# Patient Record
Sex: FEMALE | Race: WHITE | NOT HISPANIC OR LATINO | Employment: OTHER | URBAN - NONMETROPOLITAN AREA
[De-identification: names, ages, dates, MRNs, and addresses within clinical notes are randomized per-mention and may not be internally consistent; named-entity substitution may affect disease eponyms.]

---

## 2017-01-03 ENCOUNTER — TELEPHONE (OUTPATIENT)
Dept: FAMILY MEDICINE CLINIC | Facility: CLINIC | Age: 72
End: 2017-01-03

## 2017-01-03 ENCOUNTER — ANTICOAGULATION VISIT (OUTPATIENT)
Dept: CARDIOLOGY | Facility: CLINIC | Age: 72
End: 2017-01-03

## 2017-01-03 VITALS — SYSTOLIC BLOOD PRESSURE: 126 MMHG | DIASTOLIC BLOOD PRESSURE: 70 MMHG

## 2017-01-03 DIAGNOSIS — I48.91 ATRIAL FIBRILLATION, UNSPECIFIED TYPE (HCC): ICD-10-CM

## 2017-01-03 DIAGNOSIS — Z95.2 PERSONAL HISTORY OF HEART VALVE REPLACEMENT: ICD-10-CM

## 2017-01-03 DIAGNOSIS — Z79.01 LONG-TERM (CURRENT) USE OF ANTICOAGULANTS: ICD-10-CM

## 2017-01-03 LAB — INR PPP: 2.4 (ref 0.9–1.1)

## 2017-01-03 PROCEDURE — 99211 OFF/OP EST MAY X REQ PHY/QHP: CPT | Performed by: NURSE PRACTITIONER

## 2017-01-03 PROCEDURE — 36416 COLLJ CAPILLARY BLOOD SPEC: CPT | Performed by: NURSE PRACTITIONER

## 2017-01-03 PROCEDURE — 85610 PROTHROMBIN TIME: CPT | Performed by: NURSE PRACTITIONER

## 2017-01-03 NOTE — TELEPHONE ENCOUNTER
----- Message from Salome Mireles sent at 1/3/2017 10:36 AM CST -----  Regarding: Blood pressure  Contact: [phone]  Patients blood pressure has gone down. The coumadin clinic checked it and it was 136/70.

## 2017-01-03 NOTE — PROGRESS NOTES
PT states compliant c tx. PT denies any new medications or bleeding issues. PT denies any missed doses or excessive k. Adjusted pt's dose and instructed to hold green vegs for 2 days. PT will be seen next week. Patient instructed regarding medication; results given and questions answered. Nutritional counseling given.  Dietary factors affecting therapy addressed.  Patient instructed to monitor for excessive bruising or bleeding. PT verbalizes understanding.   Electronically signed by ABRAHAM Morrow    '

## 2017-01-03 NOTE — MR AVS SNAPSHOT
Brendon Skelton   1/3/2017   Anticoagulation Visit    Dept Phone:  [phone]   Encounter #:  [id number]    Provider:  Maida Pearce RN   Department:  [facility] CARDIOLOGY                Your Full Care Plan              Your Updated Medication List          This list is accurate as of: 1/3/17 10:24 AM.  Always use your most recent med list.                acetaminophen 325 MG tablet   Commonly known as:  TYLENOL       * albuterol (2.5 MG/3ML) 0.083% nebulizer solution   Commonly known as:  PROVENTIL       * albuterol 108 (90 BASE) MCG/ACT inhaler   Commonly known as:  PROVENTIL HFA;VENTOLIN HFA       aspirin 81 MG chewable tablet       BD PEN NEEDLE DEREK U/F 32G X 4 MM misc   Generic drug:  Insulin Pen Needle       calcitriol 0.25 MCG capsule   Commonly known as:  ROCALTROL       citalopram 20 MG tablet   Commonly known as:  CeleXA   Take 1 tablet by mouth Daily.       diltiaZEM  MG 24 hr capsule   Commonly known as:  CARDIZEM CD       ferrous sulfate 325 (65 FE) MG tablet       furosemide 80 MG tablet   Commonly known as:  LASIX       glucose blood test strip       insulin aspart 100 UNIT/ML injection   Commonly known as:  novoLOG       insulin glargine 100 UNIT/ML injection   Commonly known as:  LANTUS       ipratropium 17 MCG/ACT inhaler   Commonly known as:  ATROVENT HFA   Inhale 2 puffs 4 (Four) Times a Day.       losartan 100 MG tablet   Commonly known as:  COZAAR       metOLazone 2.5 MG tablet   Commonly known as:  ZAROXOLYN       MULTIVITAMIN/IRON PO       potassium chloride 10 MEQ CR tablet   Commonly known as:  K-DUR   Take 1 tablet by mouth Daily.       Prenatal Vitamin 27-0.8 MG tablet       raNITIdine 150 MG tablet   Commonly known as:  ZANTAC       tiotropium 18 MCG per inhalation capsule   Commonly known as:  SPIRIVA HANDIHALER   Place 2 puffs into inhaler and inhale Daily.       traZODone 150 MG tablet   Commonly known as:  DESYREL   Take  2 tablets by mouth Every Night.       vitamin C with bob hips 250 MG tablet       warfarin 5 MG tablet   Commonly known as:  COUMADIN       ZETIA 10 MG tablet   Generic drug:  ezetimibe       * Notice:  This list has 2 medication(s) that are the same as other medications prescribed for you. Read the directions carefully, and ask your doctor or other care provider to review them with you.            We Performed the Following     POC INR       You Were Diagnosed With        Codes Comments    Long-term (current) use of anticoagulants     ICD-10-CM: Z79.01  ICD-9-CM: V58.61     Personal history of heart valve replacement     ICD-10-CM: Z95.2  ICD-9-CM: V43.3     Atrial fibrillation, unspecified type     ICD-10-CM: I48.91  ICD-9-CM: 427.31       Instructions     None    Patient Instructions History      Anticoagulation Summary as of 1/3/2017     INR goal 2.5-3.5   Today's INR 2.40!   Next INR check 1/11/2017    Indications   Long-term (current) use of anticoagulants [Z79.01]  Personal history of heart valve replacement [Z95.2]  Atrial fibrillation [I48.91] [I48.91]         January 2017 Details    Sun Mon Tue Wed Thu Fri Sat     1               2               3      5 mg   See details      4      2.5 mg         5      5 mg         6      2.5 mg         7      2.5 mg           8      5 mg         9      2.5 mg         10      5 mg         11      2.5 mg         12               13               14                 15               16               17               18               19               20               21                 22               23               24               25               26               27               28                 29               30               31                    Date Details   01/03 This INR check       Date of next INR:  1/11/2017           Upcoming Appointments     Visit Type Date Time Department    ANTICOAGULATION 1/3/2017 10:30 AM [facility] CARDIOLOGY [facility]    ANTICOAGULATION  1/11/2017 10:15 AM [facility] CARDIOLOGY [facility]AD    OFFICE VISIT 3/27/2017  1:15 PM [facility] FAM MED 2 MAD    OFFICE VISIT 10/18/2017  1:30 PM [facility] OPHTHALMOLOGY [facility]      MyChart Signup     Our records [facility] that [facility]. If you would like to sign up for MyChart, please email [email or web address] or call [phone] to obtain an activation code.             Other Info from Your Visit           Your Appointments     Jan 03, 2017 10:30 AM CST   Anticoagulation with COUMADIN CLINIC CARD [facility] CARDIOLOGY (--)    [address]  [address] [place] [address] [phone]            Jan 11, 2017 10:15 AM CST   Anticoagulation with COUMADIN CLINIC CARD [facility] CARDIOLOGY (--)    [address] [place] [address] [address]  [address] [place] [address] [phone]            Mar 27, 2017  1:15 PM CDT   Office Visit with Rula Paulino DO   [facility] FAMILY MEDICINE (--)    [address] [facility]  [address] [place] [address] [phone]           Arrive 15 minutes prior to appointment.            Oct 18, 2017  1:30 PM CDT   Office Visit with Ramses West MD   [facility] OPHTHALMOLOGY (--)    [address] [place] [address] [address]  [address] [place] [address] [phone]           Arrive 15 minutes prior to appointment.              Allergies     Crestor [Rosuvastatin Calcium]      Lipitor [Atorvastatin]      Lortab [Hydrocodone-acetaminophen]  Hallucinations      Vital Signs     Blood Pressure Smoking Status                126/70 Former Smoker          Results     POC INR      Component Value Standard Range & Units    INR 2.40 0.9 - 1.1

## 2017-01-11 ENCOUNTER — ANTICOAGULATION VISIT (OUTPATIENT)
Dept: CARDIOLOGY | Facility: CLINIC | Age: 72
End: 2017-01-11

## 2017-01-11 VITALS — DIASTOLIC BLOOD PRESSURE: 56 MMHG | HEART RATE: 60 BPM | SYSTOLIC BLOOD PRESSURE: 135 MMHG

## 2017-01-11 DIAGNOSIS — Z95.2 PERSONAL HISTORY OF HEART VALVE REPLACEMENT: ICD-10-CM

## 2017-01-11 DIAGNOSIS — Z79.01 LONG-TERM (CURRENT) USE OF ANTICOAGULANTS: ICD-10-CM

## 2017-01-11 DIAGNOSIS — I48.91 ATRIAL FIBRILLATION, UNSPECIFIED TYPE (HCC): ICD-10-CM

## 2017-01-11 LAB — INR PPP: 2.2 (ref 0.9–1.1)

## 2017-01-11 PROCEDURE — 36416 COLLJ CAPILLARY BLOOD SPEC: CPT | Performed by: NURSE PRACTITIONER

## 2017-01-11 PROCEDURE — 99211 OFF/OP EST MAY X REQ PHY/QHP: CPT | Performed by: NURSE PRACTITIONER

## 2017-01-11 PROCEDURE — 85610 PROTHROMBIN TIME: CPT | Performed by: NURSE PRACTITIONER

## 2017-01-11 NOTE — PROGRESS NOTES
Pt denies med changes, bleeding problems, missed doses, excessive vit K intake, or s/s of blood clot. Pt instructed to avoid green veggies for 2 days; pt verbalized an understanding. Patient instructed regarding medication; results given and questions answered. Nutritional counseling given.  Dietary factors affecting therapy addressed.  Patient instructed to monitor for excessive bruising or bleeding. Will recheck next week.Electronically signed by ABRAHAM Nieto

## 2017-01-11 NOTE — MR AVS SNAPSHOT
Brendon Skelton   1/11/2017   Anticoagulation Visit    Dept Phone:  [phone]   Encounter #:  [id number]    Provider:  Dixie Bermeo RN   Department:  [facility] CARDIOLOGY                Your Full Care Plan              Your Updated Medication List          This list is accurate as of: 1/11/17 10:12 AM.  Always use your most recent med list.                acetaminophen 325 MG tablet   Commonly known as:  TYLENOL       * albuterol (2.5 MG/3ML) 0.083% nebulizer solution   Commonly known as:  PROVENTIL       * albuterol 108 (90 BASE) MCG/ACT inhaler   Commonly known as:  PROVENTIL HFA;VENTOLIN HFA       aspirin 81 MG chewable tablet       BD PEN NEEDLE DEREK U/F 32G X 4 MM misc   Generic drug:  Insulin Pen Needle       calcitriol 0.25 MCG capsule   Commonly known as:  ROCALTROL       citalopram 20 MG tablet   Commonly known as:  CeleXA   Take 1 tablet by mouth Daily.       diltiaZEM  MG 24 hr capsule   Commonly known as:  CARDIZEM CD       ferrous sulfate 325 (65 FE) MG tablet       furosemide 80 MG tablet   Commonly known as:  LASIX       glucose blood test strip       insulin aspart 100 UNIT/ML injection   Commonly known as:  novoLOG       insulin glargine 100 UNIT/ML injection   Commonly known as:  LANTUS       ipratropium 17 MCG/ACT inhaler   Commonly known as:  ATROVENT HFA   Inhale 2 puffs 4 (Four) Times a Day.       losartan 100 MG tablet   Commonly known as:  COZAAR       metOLazone 2.5 MG tablet   Commonly known as:  ZAROXOLYN       MULTIVITAMIN/IRON PO       potassium chloride 10 MEQ CR tablet   Commonly known as:  K-DUR   Take 1 tablet by mouth Daily.       Prenatal Vitamin 27-0.8 MG tablet       raNITIdine 150 MG tablet   Commonly known as:  ZANTAC       tiotropium 18 MCG per inhalation capsule   Commonly known as:  SPIRIVA HANDIHALER   Place 2 puffs into inhaler and inhale Daily.       traZODone 150 MG tablet   Commonly known as:  DESYREL      Take 2 tablets by mouth Every Night.       vitamin C with bob hips 250 MG tablet       warfarin 5 MG tablet   Commonly known as:  COUMADIN       ZETIA 10 MG tablet   Generic drug:  ezetimibe       * Notice:  This list has 2 medication(s) that are the same as other medications prescribed for you. Read the directions carefully, and ask your doctor or other care provider to review them with you.            We Performed the Following     POC INR       You Were Diagnosed With        Codes Comments    Long-term (current) use of anticoagulants     ICD-10-CM: Z79.01  ICD-9-CM: V58.61     Personal history of heart valve replacement     ICD-10-CM: Z95.2  ICD-9-CM: V43.3     Atrial fibrillation, unspecified type     ICD-10-CM: I48.91  ICD-9-CM: 427.31       Instructions     None    Patient Instructions History      Anticoagulation Summary as of 1/11/2017     INR goal 2.5-3.5   Today's INR 2.20!   Next INR check 1/19/2017    Indications   Long-term (current) use of anticoagulants [Z79.01]  Personal history of heart valve replacement [Z95.2]  Atrial fibrillation [I48.91] [I48.91]         January 2017 Details    Sun Mon Tue Wed Thu Fri Sat     1               2               3               4               5               6               7                 8               9               10               11      5 mg   See details      12      5 mg         13      2.5 mg         14      2.5 mg           15      5 mg         16      2.5 mg         17      5 mg         18      2.5 mg         19      5 mg         20               21                 22               23               24               25               26               27               28                 29               30               31                    Date Details   01/11 This INR check       Date of next INR:  1/19/2017           Upcoming Appointments     Visit Type Date Time Department    ANTICOAGULATION 1/11/2017 10:15 AM [facility] CARDIOLOGY [facility]     ANTICOAGULATION 1/19/2017 10:15 AM [facility] CARDIOLOGY [facility]    OFFICE VISIT 3/27/2017  1:15 PM [facility] MED 2 MAD    OFFICE VISIT 10/18/2017  1:30 PM [facility] OPHTHALMOLOGY [facility]      MyChart Signup     Our records indicate that you have [facility]. If you would like to sign up for MyChart, please email [email or web address] or call [phone] to obtain an activation code.             Other Info from Your Visit           Your Appointments     Jan 11, 2017 10:15 AM CST   Anticoagulation with COUMADIN CLINIC CARD [facility] CARDIOLOGY (--)    [address]  [address] [place] [address] [place] [address] [phone]            Jan 19, 2017 10:15 AM CST   Anticoagulation with COUMADIN CLINIC CARD [facility] CARDIOLOGY (--)    [address]  [place] [address] [place] [address] [phone]            Mar 27, 2017  1:15 PM CDT   Office Visit with Rula Paulino DO   [facility] FAMILY MEDICINE (--)    [address] [facility]  [address] [place] [address] [phone]           Arrive 15 minutes prior to appointment.            Oct 18, 2017  1:30 PM CDT   Office Visit with Ramses West MD   [facility] OPHTHALMOLOGY (--)    [address] [place] [address] [address]  [place] [address] [place] [address] [phone]           Arrive 15 minutes prior to appointment.              Allergies     Crestor [Rosuvastatin Calcium]      Lipitor [Atorvastatin]      Lortab [Hydrocodone-acetaminophen]  Hallucinations      Vital Signs     Blood Pressure Pulse Smoking Status             135/56 60 Former Smoker         Results     POC INR      Component Value Standard Range & Units    INR 2.20 0.9 - 1.1

## 2017-01-19 ENCOUNTER — ANTICOAGULATION VISIT (OUTPATIENT)
Dept: CARDIOLOGY | Facility: CLINIC | Age: 72
End: 2017-01-19

## 2017-01-19 VITALS — DIASTOLIC BLOOD PRESSURE: 55 MMHG | HEART RATE: 60 BPM | SYSTOLIC BLOOD PRESSURE: 136 MMHG

## 2017-01-19 DIAGNOSIS — Z95.2 PERSONAL HISTORY OF HEART VALVE REPLACEMENT: ICD-10-CM

## 2017-01-19 DIAGNOSIS — Z79.01 LONG-TERM (CURRENT) USE OF ANTICOAGULANTS: ICD-10-CM

## 2017-01-19 DIAGNOSIS — I48.91 ATRIAL FIBRILLATION, UNSPECIFIED TYPE (HCC): ICD-10-CM

## 2017-01-19 LAB — INR PPP: 2.1 (ref 0.9–1.1)

## 2017-01-19 PROCEDURE — 85610 PROTHROMBIN TIME: CPT | Performed by: NURSE PRACTITIONER

## 2017-01-19 PROCEDURE — 99211 OFF/OP EST MAY X REQ PHY/QHP: CPT | Performed by: NURSE PRACTITIONER

## 2017-01-19 PROCEDURE — 36416 COLLJ CAPILLARY BLOOD SPEC: CPT | Performed by: NURSE PRACTITIONER

## 2017-01-19 NOTE — MR AVS SNAPSHOT
Brendon Skelton   1/19/2017   Anticoagulation Visit    Dept Phone:  [phone]   Encounter #:  [id number]    Provider:  Dixie Bermeo RN   Department:  [facility] CARDIOLOGY                Your Full Care Plan              Your Updated Medication List          This list is accurate as of: 1/19/17 10:02 AM.  Always use your most recent med list.                acetaminophen 325 MG tablet   Commonly known as:  TYLENOL       * albuterol (2.5 MG/3ML) 0.083% nebulizer solution   Commonly known as:  PROVENTIL       * albuterol 108 (90 BASE) MCG/ACT inhaler   Commonly known as:  PROVENTIL HFA;VENTOLIN HFA       aspirin 81 MG chewable tablet       BD PEN NEEDLE DEREK U/F 32G X 4 MM misc   Generic drug:  Insulin Pen Needle       calcitriol 0.25 MCG capsule   Commonly known as:  ROCALTROL       citalopram 20 MG tablet   Commonly known as:  CeleXA   Take 1 tablet by mouth Daily.       diltiaZEM  MG 24 hr capsule   Commonly known as:  CARDIZEM CD       ferrous sulfate 325 (65 FE) MG tablet       furosemide 80 MG tablet   Commonly known as:  LASIX       glucose blood test strip       insulin aspart 100 UNIT/ML injection   Commonly known as:  novoLOG       insulin glargine 100 UNIT/ML injection   Commonly known as:  LANTUS       ipratropium 17 MCG/ACT inhaler   Commonly known as:  ATROVENT HFA   Inhale 2 puffs 4 (Four) Times a Day.       losartan 100 MG tablet   Commonly known as:  COZAAR       metOLazone 2.5 MG tablet   Commonly known as:  ZAROXOLYN       MULTIVITAMIN/IRON PO       potassium chloride 10 MEQ CR tablet   Commonly known as:  K-DUR   Take 1 tablet by mouth Daily.       Prenatal Vitamin 27-0.8 MG tablet       raNITIdine 150 MG tablet   Commonly known as:  ZANTAC       tiotropium 18 MCG per inhalation capsule   Commonly known as:  SPIRIVA HANDIHALER   Place 2 puffs into inhaler and inhale Daily.       traZODone 150 MG tablet   Commonly known as:  DESYREL      Take 2 tablets by mouth Every Night.       vitamin C with bob hips 250 MG tablet       warfarin 5 MG tablet   Commonly known as:  COUMADIN       ZETIA 10 MG tablet   Generic drug:  ezetimibe       * Notice:  This list has 2 medication(s) that are the same as other medications prescribed for you. Read the directions carefully, and ask your doctor or other care provider to review them with you.            We Performed the Following     POC INR       You Were Diagnosed With        Codes Comments    Long-term (current) use of anticoagulants     ICD-10-CM: Z79.01  ICD-9-CM: V58.61     Personal history of heart valve replacement     ICD-10-CM: Z95.2  ICD-9-CM: V43.3     Atrial fibrillation, unspecified type     ICD-10-CM: I48.91  ICD-9-CM: 427.31       Instructions     None    Patient Instructions History      Anticoagulation Summary as of 1/19/2017     INR goal 2.5-3.5   Today's INR 2.10!   Next INR check 1/26/2017    Indications   Long-term (current) use of anticoagulants [Z79.01]  Personal history of heart valve replacement [Z95.2]  Atrial fibrillation [I48.91] [I48.91]         January 2017 Details    Sun Mon Tue Wed Thu Fri Sat     1               2               3               4               5               6               7                 8               9               10               11               12               13               14                 15               16               17               18               19      5 mg   See details      20      5 mg         21      2.5 mg           22      5 mg         23      2.5 mg         24      5 mg         25      2.5 mg         26      5 mg         27               28                 29               30               31                    Date Details   01/19 This INR check       Date of next INR:  1/26/2017           Upcoming Appointments     Visit Type Date Time Department    ANTICOAGULATION 1/19/2017 10:15 AM [facility] CARDIOLOGY [facility]    ANTICOAGULATION  1/26/2017 10:15 AM [facility] CARDIOLOGY MAD    OFFICE VISIT 3/27/2017  1:15 PM [facility] FAM MED 2 MAD    OFFICE VISIT 10/18/2017  1:30 PM [facility] OPHTHALMOLOGY [facility]      MyChart Signup     Our records indicate that you have [facility]. If you would like to sign up for MyChart, please email [email or web address] or call [phone] to obtain an activation code.             Other Info from Your Visit           Your Appointments     Jan 19, 2017 10:15 AM CST   Anticoagulation with COUMADIN CLINIC CARD [facility] CARDIOLOGY (--)    [address]  [address] [place] [address] [place] [address] [phone]            Jan 26, 2017 10:15 AM CST   Anticoagulation with COUMADIN CLINIC CARD [facility] CARDIOLOGY (--)    [address] [place] [address] [address]  [place] [address] [place] [address] [phone]            Mar 27, 2017  1:15 PM CDT   Office Visit with Rula Paulino DO   [facility] FAMILY MEDICINE (--)    [address] [facility]  [address] [place] [address] [phone]           Arrive 15 minutes prior to appointment.            Oct 18, 2017  1:30 PM CDT   Office Visit with aRmses West MD   [facility] OPHTHALMOLOGY (--)    [address] [place] [address] [address]  [address] [place] [address] [place] [address] [phone]           Arrive 15 minutes prior to appointment.              Allergies     Crestor [Rosuvastatin Calcium]      Lipitor [Atorvastatin]      Lortab [Hydrocodone-acetaminophen]  Hallucinations      Vital Signs     Blood Pressure Pulse Smoking Status             136/55 60 Former Smoker         Results     POC INR      Component Value Standard Range & Units    INR 2.10 0.9 - 1.1

## 2017-01-19 NOTE — PROGRESS NOTES
Pt denies med changes, missed doses, bleeding problems, s/s of blood clot, or excessive vit K intake. Pt was instructed to hold green veggies for 3 days; pt verbalized an understanding. Patient instructed regarding medication; results given and questions answered. Nutritional counseling given.  Dietary factors affecting therapy addressed.  Patient instructed to monitor for excessive bruising or bleeding. Will recheck next week.  Electronically signed by ABRAHAM Morrow

## 2017-01-25 ENCOUNTER — TELEPHONE (OUTPATIENT)
Dept: FAMILY MEDICINE CLINIC | Facility: CLINIC | Age: 72
End: 2017-01-25

## 2017-01-25 RX ORDER — CITALOPRAM 20 MG/1
20 TABLET ORAL DAILY
Qty: 30 TABLET | Refills: 2 | Status: SHIPPED | OUTPATIENT
Start: 2017-01-25 | End: 2017-03-03 | Stop reason: SDUPTHER

## 2017-01-25 NOTE — TELEPHONE ENCOUNTER
----- Message from June Stevens sent at 1/25/2017 11:28 AM CST -----  Regarding: Refill  Contact: [phone]  Pt needs a refill on Celexa, pen needles sent to [facility].

## 2017-01-26 ENCOUNTER — ANTICOAGULATION VISIT (OUTPATIENT)
Dept: CARDIOLOGY | Facility: CLINIC | Age: 72
End: 2017-01-26

## 2017-01-26 VITALS — HEART RATE: 76 BPM | SYSTOLIC BLOOD PRESSURE: 144 MMHG | DIASTOLIC BLOOD PRESSURE: 61 MMHG

## 2017-01-26 DIAGNOSIS — Z95.2 PERSONAL HISTORY OF HEART VALVE REPLACEMENT: ICD-10-CM

## 2017-01-26 DIAGNOSIS — I48.91 ATRIAL FIBRILLATION, UNSPECIFIED TYPE (HCC): ICD-10-CM

## 2017-01-26 DIAGNOSIS — Z79.01 LONG-TERM (CURRENT) USE OF ANTICOAGULANTS: ICD-10-CM

## 2017-01-26 LAB — INR PPP: 2.2 (ref 0.9–1.1)

## 2017-01-26 PROCEDURE — 99211 OFF/OP EST MAY X REQ PHY/QHP: CPT | Performed by: NURSE PRACTITIONER

## 2017-01-26 PROCEDURE — 85610 PROTHROMBIN TIME: CPT | Performed by: NURSE PRACTITIONER

## 2017-01-26 NOTE — PROGRESS NOTES
Pt denies med changes or bleeding problems. Pt states she had 1/2 cup serving of brussels sprouts twice since last visit and no other green veggies. Pt was instructed to hold green veggies for 2 days and continue with 1/2 cup servings every 3-4 days; pt verbalized. Dose adjusted today. Patient instructed regarding medication; results given and questions answered. Nutritional counseling given.  Dietary factors affecting therapy addressed.  Patient instructed to monitor for excessive bruising or bleeding. Will recheck next week.          This document has been electronically signed by ABRAHAM Nieto on January 27, 2017 9:12 AM

## 2017-01-26 NOTE — MR AVS SNAPSHOT
Brendon Skelton   1/26/2017   Anticoagulation Visit    Dept Phone:  [phone]   Encounter #:  [id number]    Provider:  Dixie Bermeo RN   Department:  [facility] CARDIOLOGY                Your Full Care Plan              Your Updated Medication List          This list is accurate as of: 1/26/17 10:15 AM.  Always use your most recent med list.                acetaminophen 325 MG tablet   Commonly known as:  TYLENOL       * albuterol (2.5 MG/3ML) 0.083% nebulizer solution   Commonly known as:  PROVENTIL       * albuterol 108 (90 BASE) MCG/ACT inhaler   Commonly known as:  PROVENTIL HFA;VENTOLIN HFA       aspirin 81 MG chewable tablet       calcitriol 0.25 MCG capsule   Commonly known as:  ROCALTROL       citalopram 20 MG tablet   Commonly known as:  CeleXA   Take 1 tablet by mouth Daily.       diltiaZEM  MG 24 hr capsule   Commonly known as:  CARDIZEM CD       ferrous sulfate 325 (65 FE) MG tablet       furosemide 80 MG tablet   Commonly known as:  LASIX       glucose blood test strip       insulin aspart 100 UNIT/ML injection   Commonly known as:  novoLOG       insulin glargine 100 UNIT/ML injection   Commonly known as:  LANTUS       Insulin Pen Needle 32G X 4 MM misc   Commonly known as:  BD PEN NEEDLE DEREK U/F   1 each 4 (Four) Times a Day.       ipratropium 17 MCG/ACT inhaler   Commonly known as:  ATROVENT HFA   Inhale 2 puffs 4 (Four) Times a Day.       losartan 100 MG tablet   Commonly known as:  COZAAR       metOLazone 2.5 MG tablet   Commonly known as:  ZAROXOLYN       MULTIVITAMIN/IRON PO       potassium chloride 10 MEQ CR tablet   Commonly known as:  K-DUR   Take 1 tablet by mouth Daily.       Prenatal Vitamin 27-0.8 MG tablet       raNITIdine 150 MG tablet   Commonly known as:  ZANTAC       tiotropium 18 MCG per inhalation capsule   Commonly known as:  SPIRIVA HANDIHALER   Place 2 puffs into inhaler and inhale Daily.       traZODone 150 MG  tablet   Commonly known as:  DESYREL   Take 2 tablets by mouth Every Night.       vitamin C with bob hips 250 MG tablet       warfarin 5 MG tablet   Commonly known as:  COUMADIN       ZETIA 10 MG tablet   Generic drug:  ezetimibe       * Notice:  This list has 2 medication(s) that are the same as other medications prescribed for you. Read the directions carefully, and ask your doctor or other care provider to review them with you.            We Performed the Following     POC INR       You Were Diagnosed With        Codes Comments    Long-term (current) use of anticoagulants     ICD-10-CM: Z79.01  ICD-9-CM: V58.61     Personal history of heart valve replacement     ICD-10-CM: Z95.2  ICD-9-CM: V43.3     Atrial fibrillation, unspecified type     ICD-10-CM: I48.91  ICD-9-CM: 427.31       Instructions     None    Patient Instructions History      Anticoagulation Summary as of 1/26/2017     INR goal 2.5-3.5   Today's INR 2.20!   Next INR check 2/2/2017    Indications   Long-term (current) use of anticoagulants [Z79.01]  Personal history of heart valve replacement [Z95.2]  Atrial fibrillation [I48.91] [I48.91]         January 2017 Details    Sun Mon Tue Wed Thu Fri Sat     1               2               3               4               5               6               7                 8               9               10               11               12               13               14                 15               16               17               18               19               20               21                 22               23               24               25               26      7.5 mg   See details      27      5 mg         28      2.5 mg           29      5 mg         30      2.5 mg         31      5 mg              Date Details   01/26 This INR check                 February 2017 Details    Sun Mon Tue Wed Thu Fri Sat        1      2.5 mg         2      7.5 mg         3               4                  5               6               7               8               9               10               11                 12               13               14               15               16               17               18                 19               20               21               22               23               24               25                 26               27               28                    Date Details   No additional details    Date of next INR:  2/2/2017           Upcoming Appointments     Visit Type Date Time Department    ANTICOAGULATION 1/26/2017 10:15 AM [facility] CARDIOLOGY [facility]    ANTICOAGULATION 2/2/2017 10:30 AM [facility] CARDIOLOGY [facility]    OFFICE VISIT 3/27/2017  1:15 PM [facility] FAM MED 2 MAD    OFFICE VISIT 10/18/2017  1:30 PM [facility]ALMOLOGY [facility]      MyChart Signup     Our records indicate that you have declined [facility] signup. If you would like to sign up for [employer], please email [email or web address] or call [phone] to obtain an activation code.             Other Info from Your Visit           Your Appointments     Feb 02, 2017 10:30 AM CST   Anticoagulation with COUMADIN CLINIC CARD [facility] CARDIOLOGY (--)    [address] [place] [address] [address]  [address] [facility] [address] [phone]            Mar 27, 2017  1:15 PM CDT   Office Visit with Rula Paulino DO   [facility] FAMILY MEDICINE (--)    [address] [facility]  [address] [place] [id number] [phone]           Arrive 15 minutes prior to appointment.            Oct 18, 2017  1:30 PM CDT   Office Visit with Ramses West MD   [facility] OPHTHALMOLOGY (--)    [address] [place] [address] [address]  [address] [place] [id number] [phone]           Arrive 15 minutes prior to appointment.              Allergies     Crestor [Rosuvastatin Calcium]      Lipitor [Atorvastatin]      Lortab [Hydrocodone-acetaminophen]  Hallucinations      Vital  Signs     Blood Pressure Pulse Smoking Status             144/61 76 Former Smoker         Results     POC INR      Component Value Standard Range & Units    INR 2.20 0.9 - 1.1

## 2017-01-31 ENCOUNTER — TELEPHONE (OUTPATIENT)
Dept: FAMILY MEDICINE CLINIC | Facility: CLINIC | Age: 72
End: 2017-01-31

## 2017-01-31 NOTE — TELEPHONE ENCOUNTER
----- Message from June Stevens sent at 1/31/2017  2:36 PM CST -----  Regarding: Refill  Contact: [phone]  Pt needs a refill on Lantus SoloStar pen sent to [facility] asap she is on her last pen.

## 2017-02-02 ENCOUNTER — ANTICOAGULATION VISIT (OUTPATIENT)
Dept: CARDIAC SURGERY | Facility: CLINIC | Age: 72
End: 2017-02-02

## 2017-02-02 VITALS — DIASTOLIC BLOOD PRESSURE: 59 MMHG | SYSTOLIC BLOOD PRESSURE: 141 MMHG | HEART RATE: 76 BPM

## 2017-02-02 DIAGNOSIS — Z95.2 PERSONAL HISTORY OF HEART VALVE REPLACEMENT: ICD-10-CM

## 2017-02-02 DIAGNOSIS — Z79.01 LONG-TERM (CURRENT) USE OF ANTICOAGULANTS: ICD-10-CM

## 2017-02-02 DIAGNOSIS — I48.91 ATRIAL FIBRILLATION, UNSPECIFIED TYPE (HCC): ICD-10-CM

## 2017-02-02 LAB — INR PPP: 8 (ref 0.9–1.1)

## 2017-02-02 PROCEDURE — 85610 PROTHROMBIN TIME: CPT | Performed by: NURSE PRACTITIONER

## 2017-02-02 PROCEDURE — 36416 COLLJ CAPILLARY BLOOD SPEC: CPT | Performed by: NURSE PRACTITIONER

## 2017-02-02 PROCEDURE — 99211 OFF/OP EST MAY X REQ PHY/QHP: CPT | Performed by: NURSE PRACTITIONER

## 2017-02-03 ENCOUNTER — ANTICOAGULATION VISIT (OUTPATIENT)
Dept: CARDIAC SURGERY | Facility: CLINIC | Age: 72
End: 2017-02-03

## 2017-02-03 VITALS — HEART RATE: 72 BPM | DIASTOLIC BLOOD PRESSURE: 65 MMHG | SYSTOLIC BLOOD PRESSURE: 131 MMHG

## 2017-02-03 DIAGNOSIS — I48.91 ATRIAL FIBRILLATION, UNSPECIFIED TYPE (HCC): ICD-10-CM

## 2017-02-03 DIAGNOSIS — Z79.01 LONG-TERM (CURRENT) USE OF ANTICOAGULANTS: ICD-10-CM

## 2017-02-03 DIAGNOSIS — Z95.2 PERSONAL HISTORY OF HEART VALVE REPLACEMENT: ICD-10-CM

## 2017-02-03 LAB — INR PPP: 4.5 (ref 0.9–1.1)

## 2017-02-03 PROCEDURE — 85610 PROTHROMBIN TIME: CPT | Performed by: NURSE PRACTITIONER

## 2017-02-03 PROCEDURE — 36416 COLLJ CAPILLARY BLOOD SPEC: CPT | Performed by: NURSE PRACTITIONER

## 2017-02-03 NOTE — PROGRESS NOTES
Pt states she ate a can of spinach and held coumadin as directed. Pt was instructed to have a serving of green veggies today and Monday; pt verbalized. Dose adjusted due to pt being able to come in today (previously thought it would be Monday before she could return). Patient instructed regarding medication; results given and questions answered. Nutritional counseling given.  Dietary factors affecting therapy addressed.  Patient instructed to monitor for excessive bruising or bleeding. Will recheck next week.  Electronically signed by ABRAHAM Morrow

## 2017-02-09 ENCOUNTER — ANTICOAGULATION VISIT (OUTPATIENT)
Dept: CARDIAC SURGERY | Facility: CLINIC | Age: 72
End: 2017-02-09

## 2017-02-09 VITALS — SYSTOLIC BLOOD PRESSURE: 130 MMHG | DIASTOLIC BLOOD PRESSURE: 57 MMHG | HEART RATE: 56 BPM

## 2017-02-09 DIAGNOSIS — Z79.01 LONG-TERM (CURRENT) USE OF ANTICOAGULANTS: ICD-10-CM

## 2017-02-09 DIAGNOSIS — Z95.2 PERSONAL HISTORY OF HEART VALVE REPLACEMENT: ICD-10-CM

## 2017-02-09 DIAGNOSIS — I48.91 ATRIAL FIBRILLATION, UNSPECIFIED TYPE (HCC): ICD-10-CM

## 2017-02-09 LAB — INR PPP: 3.3 (ref 0.9–1.1)

## 2017-02-09 PROCEDURE — 99211 OFF/OP EST MAY X REQ PHY/QHP: CPT | Performed by: NURSE PRACTITIONER

## 2017-02-09 PROCEDURE — 85610 PROTHROMBIN TIME: CPT | Performed by: NURSE PRACTITIONER

## 2017-02-09 NOTE — PROGRESS NOTES
Pt denies med changes or bleeding problems. Pt was instructed to continue 1/2 cup servings of green veggies every 3 days; pt verbalized an understanding. Patient instructed regarding medication; results given and questions answered. Nutritional counseling given.  Dietary factors affecting therapy addressed.  Patient instructed to monitor for excessive bruising or bleeding. Will recheck next week.  Electronically signed by ABRAHAM Morrow

## 2017-02-16 ENCOUNTER — ANTICOAGULATION VISIT (OUTPATIENT)
Dept: CARDIAC SURGERY | Facility: CLINIC | Age: 72
End: 2017-02-16

## 2017-02-16 VITALS — DIASTOLIC BLOOD PRESSURE: 56 MMHG | HEART RATE: 88 BPM | SYSTOLIC BLOOD PRESSURE: 131 MMHG

## 2017-02-16 DIAGNOSIS — Z95.2 PERSONAL HISTORY OF HEART VALVE REPLACEMENT: ICD-10-CM

## 2017-02-16 DIAGNOSIS — Z79.01 LONG-TERM (CURRENT) USE OF ANTICOAGULANTS: ICD-10-CM

## 2017-02-16 DIAGNOSIS — I48.91 ATRIAL FIBRILLATION, UNSPECIFIED TYPE (HCC): ICD-10-CM

## 2017-02-16 LAB — INR PPP: 3.6 (ref 0.9–1.1)

## 2017-02-16 PROCEDURE — 99211 OFF/OP EST MAY X REQ PHY/QHP: CPT | Performed by: NURSE PRACTITIONER

## 2017-02-16 PROCEDURE — 85610 PROTHROMBIN TIME: CPT | Performed by: NURSE PRACTITIONER

## 2017-02-16 NOTE — PROGRESS NOTES
Pt denies med changes or bleeding problems. Dose was adjusted and pt was instructed to increase green veggie intake to three times weekly; pt verbalized. Patient instructed regarding medication; results given and questions answered. Nutritional counseling given.  Dietary factors affecting therapy addressed.  Patient instructed to monitor for excessive bruising or bleeding. Will recheck in 12 days.          This document has been electronically signed by ABRAHAM Nieto on February 17, 2017 10:29 AM

## 2017-02-24 ENCOUNTER — TELEPHONE (OUTPATIENT)
Dept: FAMILY MEDICINE CLINIC | Facility: CLINIC | Age: 72
End: 2017-02-24

## 2017-02-24 RX ORDER — FUROSEMIDE 80 MG
40 TABLET ORAL DAILY
Qty: 15 TABLET | Refills: 0 | Status: SHIPPED | OUTPATIENT
Start: 2017-02-24 | End: 2017-03-03 | Stop reason: SDUPTHER

## 2017-02-24 RX ORDER — LOSARTAN POTASSIUM 100 MG/1
100 TABLET ORAL DAILY
Qty: 30 TABLET | Refills: 0 | Status: SHIPPED | OUTPATIENT
Start: 2017-02-24 | End: 2017-03-03 | Stop reason: SDUPTHER

## 2017-02-24 NOTE — TELEPHONE ENCOUNTER
----- Message from Salome Mireles sent at 2/24/2017 12:07 PM CST -----  Regarding: med refill  Contact: [phone]  Patient needs losartan and furosemide refilled at [facility].

## 2017-03-01 ENCOUNTER — ANTICOAGULATION VISIT (OUTPATIENT)
Dept: CARDIAC SURGERY | Facility: CLINIC | Age: 72
End: 2017-03-01

## 2017-03-01 VITALS — SYSTOLIC BLOOD PRESSURE: 122 MMHG | DIASTOLIC BLOOD PRESSURE: 61 MMHG | HEART RATE: 60 BPM

## 2017-03-01 DIAGNOSIS — Z95.2 PERSONAL HISTORY OF HEART VALVE REPLACEMENT: ICD-10-CM

## 2017-03-01 DIAGNOSIS — I48.91 ATRIAL FIBRILLATION, UNSPECIFIED TYPE (HCC): ICD-10-CM

## 2017-03-01 DIAGNOSIS — Z79.01 LONG-TERM (CURRENT) USE OF ANTICOAGULANTS: ICD-10-CM

## 2017-03-01 LAB — INR PPP: 2.1 (ref 0.9–1.1)

## 2017-03-01 PROCEDURE — 99211 OFF/OP EST MAY X REQ PHY/QHP: CPT | Performed by: NURSE PRACTITIONER

## 2017-03-01 PROCEDURE — 85610 PROTHROMBIN TIME: CPT | Performed by: NURSE PRACTITIONER

## 2017-03-01 NOTE — PROGRESS NOTES
Pt denies med changes or bleeding problems. Pt has been eating green veggies three times weekly. Dose adjusted today only and pt instructed to cut green veggies back to twice weekly; pt verbalized. Patient instructed regarding medication; results given and questions answered. Nutritional counseling given.  Dietary factors affecting therapy addressed.  Patient instructed to monitor for excessive bruising or bleeding. Will recheck in 2 weeks.          This document has been electronically signed by ABRAHAM Nieto on March 2, 2017 8:51 AM

## 2017-03-03 ENCOUNTER — OFFICE VISIT (OUTPATIENT)
Dept: FAMILY MEDICINE CLINIC | Facility: CLINIC | Age: 72
End: 2017-03-03

## 2017-03-03 ENCOUNTER — TELEPHONE (OUTPATIENT)
Dept: FAMILY MEDICINE CLINIC | Facility: CLINIC | Age: 72
End: 2017-03-03

## 2017-03-03 VITALS
TEMPERATURE: 96.5 F | OXYGEN SATURATION: 91 % | DIASTOLIC BLOOD PRESSURE: 78 MMHG | SYSTOLIC BLOOD PRESSURE: 124 MMHG | BODY MASS INDEX: 39.5 KG/M2 | HEART RATE: 51 BPM | WEIGHT: 222.9 LBS | HEIGHT: 63 IN

## 2017-03-03 DIAGNOSIS — Z11.59 NEED FOR HEPATITIS C SCREENING TEST: ICD-10-CM

## 2017-03-03 DIAGNOSIS — I10 ESSENTIAL HYPERTENSION, BENIGN: ICD-10-CM

## 2017-03-03 DIAGNOSIS — I50.32 CHRONIC DIASTOLIC HEART FAILURE (HCC): ICD-10-CM

## 2017-03-03 DIAGNOSIS — Z79.4 TYPE 2 DIABETES MELLITUS WITHOUT COMPLICATION, WITH LONG-TERM CURRENT USE OF INSULIN (HCC): Primary | ICD-10-CM

## 2017-03-03 DIAGNOSIS — E11.9 TYPE 2 DIABETES MELLITUS WITHOUT COMPLICATION, WITH LONG-TERM CURRENT USE OF INSULIN (HCC): Primary | ICD-10-CM

## 2017-03-03 DIAGNOSIS — E78.5 HYPERLIPIDEMIA, UNSPECIFIED HYPERLIPIDEMIA TYPE: ICD-10-CM

## 2017-03-03 DIAGNOSIS — N18.9 CKD (CHRONIC KIDNEY DISEASE), UNSPECIFIED STAGE: ICD-10-CM

## 2017-03-03 DIAGNOSIS — F32.A DEPRESSIVE DISORDER: ICD-10-CM

## 2017-03-03 PROCEDURE — 99214 OFFICE O/P EST MOD 30 MIN: CPT | Performed by: FAMILY MEDICINE

## 2017-03-03 RX ORDER — CITALOPRAM 20 MG/1
20 TABLET ORAL DAILY
Qty: 90 TABLET | Refills: 3 | Status: SHIPPED | OUTPATIENT
Start: 2017-03-03 | End: 2018-03-15 | Stop reason: SDUPTHER

## 2017-03-03 RX ORDER — RANITIDINE 150 MG/1
150 TABLET ORAL 2 TIMES DAILY
Qty: 180 TABLET | Refills: 3 | Status: SHIPPED | OUTPATIENT
Start: 2017-03-03 | End: 2018-08-15 | Stop reason: SDUPTHER

## 2017-03-03 RX ORDER — EZETIMIBE 10 MG/1
10 TABLET ORAL DAILY
Qty: 90 TABLET | Refills: 3 | Status: SHIPPED | OUTPATIENT
Start: 2017-03-03 | End: 2018-03-15 | Stop reason: SDUPTHER

## 2017-03-03 RX ORDER — FUROSEMIDE 40 MG/1
40 TABLET ORAL DAILY
Qty: 90 TABLET | Refills: 3 | Status: SHIPPED | OUTPATIENT
Start: 2017-03-03 | End: 2017-12-30 | Stop reason: HOSPADM

## 2017-03-03 RX ORDER — LOSARTAN POTASSIUM 100 MG/1
100 TABLET ORAL DAILY
Qty: 90 TABLET | Refills: 3 | Status: SHIPPED | OUTPATIENT
Start: 2017-03-03 | End: 2017-12-30 | Stop reason: HOSPADM

## 2017-03-03 RX ORDER — FUROSEMIDE 80 MG
80 TABLET ORAL DAILY
Qty: 90 TABLET | Refills: 3 | Status: SHIPPED | OUTPATIENT
Start: 2017-03-03 | End: 2017-03-03 | Stop reason: SDUPTHER

## 2017-03-03 RX ORDER — DILTIAZEM HYDROCHLORIDE 240 MG/1
240 CAPSULE, COATED, EXTENDED RELEASE ORAL DAILY
Qty: 90 CAPSULE | Refills: 3 | Status: SHIPPED | OUTPATIENT
Start: 2017-03-03 | End: 2018-03-15 | Stop reason: SDUPTHER

## 2017-03-03 RX ORDER — TRAZODONE HYDROCHLORIDE 150 MG/1
300 TABLET ORAL NIGHTLY
Qty: 180 TABLET | Refills: 3 | Status: SHIPPED | OUTPATIENT
Start: 2017-03-03 | End: 2018-08-20 | Stop reason: SDUPTHER

## 2017-03-03 NOTE — TELEPHONE ENCOUNTER
----- Message from Rula Paulino DO sent at 3/3/2017 11:50 AM CST -----  I tried to call her and she didn't answer...she was upset at her appt that her rx for furosemide had been decreased to 40 mg daily and I could not figure out when/where the change had been made. I found her last note from Dr. Caputo and he decreased her furosemide to 40 mg at her last appt December 20. She needs to speak with him regarding this med change. I had to call the pharmacy and change the rx back to 40 mg once a day.

## 2017-03-03 NOTE — PROGRESS NOTES
Subjective   Chief Complaint   Patient presents with   • Follow-up   • Med Refill       Brendon Skelton is a 71 y.o. female who presents for Follow-up and Med Refill   Diabetes   She presents for her follow-up diabetic visit. She has type 2 diabetes mellitus. There are no hypoglycemic associated symptoms. Associated symptoms include fatigue and polyuria. Pertinent negatives for diabetes include no chest pain, no foot paresthesias, no foot ulcerations, no polydipsia and no polyphagia. Current diabetic treatment includes insulin injections. She is compliant with treatment all of the time. Weight trend: has been trying to lose weight. She is following a diabetic diet. She rarely participates in exercise. Blood glucose monitoring compliance is fair. (-120) An ACE inhibitor/angiotensin II receptor blocker is being taken.     She has follow up appt with Dr. Gates coming up soon.   She reports that she normally takes 80 mg of lasix daily but that her last rx was different and she is not sure why.     The following portions of the patient's history were reviewed and updated as appropriate:problem list, current medications, allergies, past family history, past medical history, past social history and past surgical history    Past Medical History   Diagnosis Date   • Acute bronchitis    • Acute renal failure syndrome    • Anxiety    • Asthma    • Atrial fibrillation    • C. difficile colitis    • Callosity      under metatarsal head      • Cardiac pacemaker in situ    • CHF (congestive heart failure)    • Chronic obstructive lung disease    • Corns and callus    • Cortical senile cataract    • Depressive disorder    • Diabetes mellitus    • Diastolic heart failure    • Essential hypertension    • Foot pain    • Hyperlipidemia    • Long term current use of anticoagulant    • Nuclear cataract    • On anticoagulant therapy    • Open wound of hand    • Pulmonary emphysema    • Rectal hemorrhage    • Type 2 diabetes  mellitus        Social History   Substance Use Topics   • Smoking status: Former Smoker   • Smokeless tobacco: Not on file   • Alcohol use No       Medications:    Current Outpatient Prescriptions:   •  acetaminophen (TYLENOL) 325 MG tablet, Take 650 mg by mouth every 6 (six) hours as needed for mild pain (1-3)., Disp: , Rfl:   •  albuterol (PROVENTIL HFA;VENTOLIN HFA) 108 (90 BASE) MCG/ACT inhaler, Inhale 2 puffs every 4 (four) hours as needed for wheezing., Disp: , Rfl:   •  albuterol (PROVENTIL) (2.5 MG/3ML) 0.083% nebulizer solution, Take 2.5 mg by nebulization 4 (four) times a day., Disp: , Rfl:   •  Ascorbic Acid (VITAMIN C WITH OCHOA HIPS) 250 MG tablet, Take 250 mg by mouth daily., Disp: , Rfl:   •  aspirin 81 MG chewable tablet, Chew 81 mg daily., Disp: , Rfl:   •  calcitriol (ROCALTROL) 0.25 MCG capsule, Take 0.25 mcg by mouth every other day., Disp: , Rfl:   •  citalopram (CeleXA) 20 MG tablet, Take 1 tablet by mouth Daily., Disp: 30 tablet, Rfl: 2  •  diltiazem CD (CARDIZEM CD) 240 MG 24 hr capsule, Take 240 mg by mouth daily., Disp: , Rfl:   •  ezetimibe (ZETIA) 10 MG tablet, Take 10 mg by mouth daily., Disp: , Rfl:   •  ferrous sulfate 325 (65 FE) MG tablet, Take 325 mg by mouth Daily With Breakfast., Disp: , Rfl:   •  furosemide (LASIX) 80 MG tablet, Take 0.5 tablets by mouth Daily., Disp: 15 tablet, Rfl: 0  •  glucose blood test strip, 1 each by Other route 3 (three) times a day. Use as instructed, Disp: , Rfl:   •  insulin aspart (NovoLOG) 100 UNIT/ML injection, Inject 2-12 Units under the skin 3 (three) times a day before meals., Disp: , Rfl:   •  Insulin Glargine (LANTUS SOLOSTAR) 100 UNIT/ML injection pen, Inject 30 Units under the skin Daily., Disp: 5 pen, Rfl: 5  •  insulin glargine (LANTUS) 100 UNIT/ML injection, Inject 30 Units under the skin daily., Disp: , Rfl:   •  Insulin Pen Needle (BD PEN NEEDLE DEREK U/F) 32G X 4 MM misc, 1 each 4 (Four) Times a Day., Disp: 120 each, Rfl: 5  •   "ipratropium (ATROVENT HFA) 17 MCG/ACT inhaler, Inhale 2 puffs 4 (Four) Times a Day., Disp: 1 inhaler, Rfl: 3  •  losartan (COZAAR) 100 MG tablet, Take 1 tablet by mouth Daily., Disp: 30 tablet, Rfl: 0  •  Multiple Vitamins-Iron (MULTIVITAMIN/IRON PO), Take 1 tablet by mouth daily., Disp: , Rfl:   •  potassium chloride (K-DUR) 10 MEQ CR tablet, Take 1 tablet by mouth Daily., Disp: 30 tablet, Rfl: 5  •  Prenatal Vit-Fe Fumarate-FA (PRENATAL VITAMIN) 27-0.8 MG tablet, Take 1 tablet by mouth daily., Disp: , Rfl:   •  ranitidine (ZANTAC) 150 MG tablet, Take 150 mg by mouth 2 (two) times a day., Disp: , Rfl:   •  traZODone (DESYREL) 150 MG tablet, Take 2 tablets by mouth Every Night., Disp: 60 tablet, Rfl: 5  •  warfarin (COUMADIN) 5 MG tablet, Take 5 mg by mouth Daily. Take 5 mg Sunday, Tuesday, Thursday and Saturday  Take 2.5 mg Monday, Wednesday and Friday, Disp: , Rfl:     Allergies   Allergen Reactions   • Crestor [Rosuvastatin Calcium]    • Lipitor [Atorvastatin]    • Lortab [Hydrocodone-Acetaminophen] Hallucinations       Review of Systems   Constitutional: Positive for fatigue.   Respiratory: Negative for cough and shortness of breath.    Cardiovascular: Negative for chest pain, palpitations and leg swelling.   Endocrine: Positive for polyuria. Negative for polydipsia and polyphagia.   Musculoskeletal: Negative.    Psychiatric/Behavioral: Negative.        Objective   Visit Vitals   • /78   • Pulse 51   • Temp 96.5 °F (35.8 °C)   • Ht 63\" (160 cm)   • Wt 222 lb 14.4 oz (101 kg)   • SpO2 91%   • BMI 39.48 kg/m2       Physical Exam   Constitutional: She appears well-developed and well-nourished. No distress.   Cardiovascular: Normal rate and regular rhythm.    No murmur heard.  Pulmonary/Chest: Effort normal and breath sounds normal.   Musculoskeletal: She exhibits no edema.   Neurological: She is alert.   Skin: She is not diaphoretic.   Psychiatric: She has a normal mood and affect. Her behavior is normal. "   Nursing note and vitals reviewed.      Assessment/Plan   Brendon Skelton is a 71 y.o. female seen today for the followin. Type 2 diabetes mellitus without complication, with long-term current use of insulin  meds refilled. Labs today    - Basic Metabolic Panel  - Hemoglobin A1c  - Insulin Glargine (LANTUS SOLOSTAR) 100 UNIT/ML injection pen; Inject 30 Units under the skin Daily.  Dispense: 10 pen; Refill: 3  - MicroAlbumin, Urine, Random    2. CKD (chronic kidney disease), unspecified stage    - Basic Metabolic Panel    3. Hyperlipidemia, unspecified hyperlipidemia type    - Lipid Panel  - ezetimibe (ZETIA) 10 MG tablet; Take 1 tablet by mouth Daily.  Dispense: 90 tablet; Refill: 3    4. Depressive disorder    - traZODone (DESYREL) 150 MG tablet; Take 2 tablets by mouth Every Night.  Dispense: 180 tablet; Refill: 3  - citalopram (CeleXA) 20 MG tablet; Take 1 tablet by mouth Daily.  Dispense: 90 tablet; Refill: 3    5. Need for hepatitis C screening test    - Hepatitis C Antibody    6. Essential hypertension, benign    - losartan (COZAAR) 100 MG tablet; Take 1 tablet by mouth Daily.  Dispense: 90 tablet; Refill: 3  - diltiaZEM CD (CARDIZEM CD) 240 MG 24 hr capsule; Take 1 capsule by mouth Daily.  Dispense: 90 capsule; Refill: 3    7. Chronic diastolic heart failure    - furosemide (LASIX) 40 MG tablet; Take 1 tablet by mouth Daily.  Dispense: 90 tablet; Refill: 3    Follow up: Return for Medicare Wellness.          This document has been electronically signed by Rula Paulino DO on 2017 11:57 PM

## 2017-03-07 ENCOUNTER — TELEPHONE (OUTPATIENT)
Dept: FAMILY MEDICINE CLINIC | Facility: CLINIC | Age: 72
End: 2017-03-07

## 2017-03-07 NOTE — TELEPHONE ENCOUNTER
----- Message from June Stevens sent at 3/7/2017  1:44 PM CST -----  Regarding: Medicine   Contact: [phone]  Pt called stating that Dr. Paulino had changed her Lasix to 40 mg 1 x daily and Dr. Simon wants her to stay on 80 mg per day.  Can you change it back to 80 mg and call in a new RX.  Send to [facility]

## 2017-03-14 ENCOUNTER — ANTICOAGULATION VISIT (OUTPATIENT)
Dept: CARDIAC SURGERY | Facility: CLINIC | Age: 72
End: 2017-03-14

## 2017-03-14 VITALS — HEART RATE: 52 BPM | DIASTOLIC BLOOD PRESSURE: 42 MMHG | SYSTOLIC BLOOD PRESSURE: 157 MMHG

## 2017-03-14 DIAGNOSIS — Z79.01 LONG-TERM (CURRENT) USE OF ANTICOAGULANTS: ICD-10-CM

## 2017-03-14 DIAGNOSIS — Z95.2 PERSONAL HISTORY OF HEART VALVE REPLACEMENT: ICD-10-CM

## 2017-03-14 DIAGNOSIS — I48.91 ATRIAL FIBRILLATION, UNSPECIFIED TYPE (HCC): ICD-10-CM

## 2017-03-14 LAB — INR PPP: 2 (ref 0.9–1.1)

## 2017-03-14 PROCEDURE — 85610 PROTHROMBIN TIME: CPT | Performed by: NURSE PRACTITIONER

## 2017-03-14 PROCEDURE — 99211 OFF/OP EST MAY X REQ PHY/QHP: CPT | Performed by: NURSE PRACTITIONER

## 2017-03-14 NOTE — PROGRESS NOTES
Pt states she missed Thursday's coumadin dose by mistake but took it the next day.  Pt denies bleeding issues or new s/s blood clots.  Adjusted coumadin dose and instructed pt to limit green intake for three days.  Recheck INR next wk.  Pt verbalizes.  Patient instructed regarding medication; results given and questions answered. Nutritional counseling given.  Dietary factors affecting therapy addressed.  Patient instructed to monitor for excessive bruising or bleeding.          This document has been electronically signed by ABRAHAM Nieto on March 15, 2017 9:01 AM

## 2017-03-16 ENCOUNTER — DOCUMENTATION (OUTPATIENT)
Dept: CARDIAC SURGERY | Facility: CLINIC | Age: 72
End: 2017-03-16

## 2017-03-16 NOTE — PROGRESS NOTES
Dr Gates called and ask that we see Ms Skelton tomorrow for Lovenox bridging. Dr Gates had pt begin holding Coumadin last night. Prescription called in to [facility] for Lovenox 150 mg sq daily #10 with 1 refill. Pt called and given appt date/time; pt verbalized.

## 2017-03-17 ENCOUNTER — ANTICOAGULATION VISIT (OUTPATIENT)
Dept: CARDIAC SURGERY | Facility: CLINIC | Age: 72
End: 2017-03-17

## 2017-03-17 VITALS — SYSTOLIC BLOOD PRESSURE: 147 MMHG | HEART RATE: 51 BPM | DIASTOLIC BLOOD PRESSURE: 57 MMHG

## 2017-03-17 DIAGNOSIS — Z95.2 PERSONAL HISTORY OF HEART VALVE REPLACEMENT: ICD-10-CM

## 2017-03-17 DIAGNOSIS — Z79.01 LONG-TERM (CURRENT) USE OF ANTICOAGULANTS: ICD-10-CM

## 2017-03-17 DIAGNOSIS — I48.91 ATRIAL FIBRILLATION, UNSPECIFIED TYPE (HCC): ICD-10-CM

## 2017-03-17 LAB — INR PPP: 1.5 (ref 0.9–1.1)

## 2017-03-17 PROCEDURE — 85610 PROTHROMBIN TIME: CPT | Performed by: NURSE PRACTITIONER

## 2017-03-17 PROCEDURE — 99211 OFF/OP EST MAY X REQ PHY/QHP: CPT | Performed by: NURSE PRACTITIONER

## 2017-03-17 NOTE — PROGRESS NOTES
Pt here today for Lovenox bridging today. Pt started holding Coumadin Wednesday per Dr Gates's instructions. Pt was instructed on Coumadin dosing as noted on calendar above; pt verbalized. Pt has taken Lovenox in the past and administers insulin daily. Pt was given Lovenox teaching kit and key points were reviewed. Pt demonstrated correct technique in office with a 150 mg Lovenox injection she brought in from home. Pt was instructed to take Lovenox daily at 4 p.m. except to hold on Sunday night; pt verbalized. Patient instructed regarding medication; results given and questions answered. Nutritional counseling given.  Dietary factors affecting therapy addressed.  Patient instructed to monitor for excessive bruising or bleeding. Appt made for Thursday.        This document has been electronically signed by ABRAHAM Nieto on March 20, 2017 1:54 PM

## 2017-03-20 ENCOUNTER — HOSPITAL ENCOUNTER (OUTPATIENT)
Facility: HOSPITAL | Age: 72
Setting detail: HOSPITAL OUTPATIENT SURGERY
Discharge: HOME | End: 2017-03-20
Attending: INTERNAL MEDICINE | Admitting: INTERNAL MEDICINE

## 2017-03-20 VITALS
RESPIRATION RATE: 20 BRPM | OXYGEN SATURATION: 95 % | HEART RATE: 52 BPM | HEIGHT: 65 IN | DIASTOLIC BLOOD PRESSURE: 73 MMHG | WEIGHT: 217.81 LBS | SYSTOLIC BLOOD PRESSURE: 133 MMHG | TEMPERATURE: 97.6 F | BODY MASS INDEX: 36.29 KG/M2

## 2017-03-20 DIAGNOSIS — Z95.0 PACEMAKER: ICD-10-CM

## 2017-03-20 LAB
ANION GAP SERPL CALCULATED.3IONS-SCNC: 10 MMOL/L (ref 5–15)
ANION GAP SERPL CALCULATED.3IONS-SCNC: 9 MMOL/L (ref 5–15)
APTT PPP: 36.1 SECONDS (ref 20–40.3)
ARTICHOKE IGE QN: 78 MG/DL (ref 1–129)
BUN BLD-MCNC: 53 MG/DL (ref 7–21)
BUN BLD-MCNC: 53 MG/DL (ref 7–21)
BUN/CREAT SERPL: 22.7 (ref 7–25)
BUN/CREAT SERPL: 23 (ref 7–25)
CALCIUM SPEC-SCNC: 9.4 MG/DL (ref 8.4–10.2)
CALCIUM SPEC-SCNC: 9.5 MG/DL (ref 8.4–10.2)
CHLORIDE SERPL-SCNC: 103 MMOL/L (ref 95–110)
CHLORIDE SERPL-SCNC: 103 MMOL/L (ref 95–110)
CHOLEST SERPL-MCNC: 171 MG/DL (ref 0–199)
CO2 SERPL-SCNC: 26 MMOL/L (ref 22–31)
CO2 SERPL-SCNC: 27 MMOL/L (ref 22–31)
CREAT BLD-MCNC: 2.3 MG/DL (ref 0.5–1)
CREAT BLD-MCNC: 2.33 MG/DL (ref 0.5–1)
DEPRECATED RDW RBC AUTO: 50.5 FL (ref 36.4–46.3)
ERYTHROCYTE [DISTWIDTH] IN BLOOD BY AUTOMATED COUNT: 14.4 % (ref 11.5–14.5)
GFR SERPL CREATININE-BSD FRML MDRD: 21 ML/MIN/1.73 (ref 39–90)
GFR SERPL CREATININE-BSD FRML MDRD: 21 ML/MIN/1.73 (ref 39–90)
GLUCOSE BLD-MCNC: 113 MG/DL (ref 60–100)
GLUCOSE BLD-MCNC: 113 MG/DL (ref 60–100)
GLUCOSE BLDC GLUCOMTR-MCNC: 100 MG/DL (ref 70–130)
GLUCOSE BLDC GLUCOMTR-MCNC: 103 MG/DL (ref 70–130)
HBA1C MFR BLD: 5.16 % (ref 4–5.6)
HCT VFR BLD AUTO: 32.7 % (ref 35–45)
HDLC SERPL-MCNC: 46 MG/DL (ref 60–200)
HGB BLD-MCNC: 10.9 G/DL (ref 12–15.5)
INR PPP: 1.08 (ref 0.8–1.2)
LDLC/HDLC SERPL: 2.24 {RATIO} (ref 0–3.22)
MCH RBC QN AUTO: 31.9 PG (ref 26.5–34)
MCHC RBC AUTO-ENTMCNC: 33.3 G/DL (ref 31.4–36)
MCV RBC AUTO: 95.6 FL (ref 80–98)
PLATELET # BLD AUTO: 198 10*3/MM3 (ref 150–450)
PMV BLD AUTO: 12.2 FL (ref 8–12)
POTASSIUM BLD-SCNC: 5.2 MMOL/L (ref 3.5–5.1)
POTASSIUM BLD-SCNC: 5.3 MMOL/L (ref 3.5–5.1)
PROTHROMBIN TIME: 14 SECONDS (ref 11.1–15.3)
RBC # BLD AUTO: 3.42 10*6/MM3 (ref 3.77–5.16)
SODIUM BLD-SCNC: 139 MMOL/L (ref 137–145)
SODIUM BLD-SCNC: 139 MMOL/L (ref 137–145)
TRIGL SERPL-MCNC: 109 MG/DL (ref 20–199)
WBC NRBC COR # BLD: 7.67 10*3/MM3 (ref 3.2–9.8)

## 2017-03-20 PROCEDURE — 80048 BASIC METABOLIC PNL TOTAL CA: CPT | Performed by: INTERNAL MEDICINE

## 2017-03-20 PROCEDURE — 36415 COLL VENOUS BLD VENIPUNCTURE: CPT | Performed by: FAMILY MEDICINE

## 2017-03-20 PROCEDURE — 80061 LIPID PANEL: CPT | Performed by: FAMILY MEDICINE

## 2017-03-20 PROCEDURE — C1785 PMKR, DUAL, RATE-RESP: HCPCS | Performed by: INTERNAL MEDICINE

## 2017-03-20 PROCEDURE — 25010000002 MIDAZOLAM PER 1 MG: Performed by: INTERNAL MEDICINE

## 2017-03-20 PROCEDURE — 33228 REMV&REPLC PM GEN DUAL LEAD: CPT | Performed by: INTERNAL MEDICINE

## 2017-03-20 PROCEDURE — 83036 HEMOGLOBIN GLYCOSYLATED A1C: CPT | Performed by: FAMILY MEDICINE

## 2017-03-20 PROCEDURE — 25010000002 GENTAMICIN PER 80 MG: Performed by: INTERNAL MEDICINE

## 2017-03-20 PROCEDURE — 86803 HEPATITIS C AB TEST: CPT | Performed by: FAMILY MEDICINE

## 2017-03-20 PROCEDURE — 85730 THROMBOPLASTIN TIME PARTIAL: CPT | Performed by: INTERNAL MEDICINE

## 2017-03-20 PROCEDURE — 25010000002 HYDROMORPHONE PER 4 MG: Performed by: INTERNAL MEDICINE

## 2017-03-20 PROCEDURE — 82962 GLUCOSE BLOOD TEST: CPT

## 2017-03-20 PROCEDURE — 80048 BASIC METABOLIC PNL TOTAL CA: CPT | Performed by: FAMILY MEDICINE

## 2017-03-20 PROCEDURE — 85027 COMPLETE CBC AUTOMATED: CPT | Performed by: INTERNAL MEDICINE

## 2017-03-20 PROCEDURE — 85610 PROTHROMBIN TIME: CPT | Performed by: INTERNAL MEDICINE

## 2017-03-20 DEVICE — GEN PM ASSURITY DR RF PM2240 MERLIN: Type: IMPLANTABLE DEVICE | Status: FUNCTIONAL

## 2017-03-20 RX ORDER — LIDOCAINE HYDROCHLORIDE 20 MG/ML
INJECTION, SOLUTION INFILTRATION; PERINEURAL AS NEEDED
Status: DISCONTINUED | OUTPATIENT
Start: 2017-03-20 | End: 2017-03-20 | Stop reason: HOSPADM

## 2017-03-20 RX ORDER — SODIUM CHLORIDE 0.9 % (FLUSH) 0.9 %
1-10 SYRINGE (ML) INJECTION AS NEEDED
Status: DISCONTINUED | OUTPATIENT
Start: 2017-03-20 | End: 2017-03-20 | Stop reason: HOSPADM

## 2017-03-20 RX ORDER — CEPHALEXIN 500 MG/1
500 CAPSULE ORAL EVERY 8 HOURS SCHEDULED
Status: DISCONTINUED | OUTPATIENT
Start: 2017-03-20 | End: 2017-03-20 | Stop reason: HOSPADM

## 2017-03-20 RX ORDER — MIDAZOLAM HYDROCHLORIDE 1 MG/ML
INJECTION INTRAMUSCULAR; INTRAVENOUS AS NEEDED
Status: DISCONTINUED | OUTPATIENT
Start: 2017-03-20 | End: 2017-03-20 | Stop reason: HOSPADM

## 2017-03-20 RX ORDER — SODIUM POLYSTYRENE SULFONATE 15 G/60ML
30 SUSPENSION ORAL; RECTAL ONCE
Status: COMPLETED | OUTPATIENT
Start: 2017-03-20 | End: 2017-03-20

## 2017-03-20 RX ORDER — CEPHALEXIN 500 MG/1
500 CAPSULE ORAL 3 TIMES DAILY
Qty: 30 CAPSULE | Refills: 0 | Status: SHIPPED | OUTPATIENT
Start: 2017-03-20 | End: 2017-03-30

## 2017-03-20 RX ORDER — SODIUM CHLORIDE 9 MG/ML
75 INJECTION, SOLUTION INTRAVENOUS CONTINUOUS
Status: DISCONTINUED | OUTPATIENT
Start: 2017-03-20 | End: 2017-03-20 | Stop reason: HOSPADM

## 2017-03-20 RX ADMIN — CEFAZOLIN 1 G: 1 INJECTION, POWDER, FOR SOLUTION INTRAMUSCULAR; INTRAVENOUS; PARENTERAL at 13:13

## 2017-03-20 RX ADMIN — SODIUM POLYSTYRENE SULFONATE 30 G: 15 SUSPENSION ORAL; RECTAL at 12:33

## 2017-03-20 RX ADMIN — SODIUM CHLORIDE 75 ML/HR: 9 INJECTION, SOLUTION INTRAVENOUS at 09:33

## 2017-03-20 RX ADMIN — CEFAZOLIN 1 G: 1 INJECTION, POWDER, FOR SOLUTION INTRAMUSCULAR; INTRAVENOUS; PARENTERAL at 10:58

## 2017-03-20 RX ADMIN — GENTAMICIN SULFATE 60 MG: 40 INJECTION, SOLUTION INTRAMUSCULAR; INTRAVENOUS at 11:24

## 2017-03-20 NOTE — PLAN OF CARE
Problem: Patient Care Overview (Adult)  Goal: Plan of Care Review  Outcome: Outcome(s) achieved Date Met:  17  Goal: Discharge Needs Assessment  Outcome: Outcome(s) achieved Date Met:  17    Problem: Health Knowledge, Opportunity for Enhanced (Adult,NICU,,Obstetrics,Pediatric)  Goal: Identify Related Risk Factors and Signs and Symptoms  Outcome: Outcome(s) achieved Date Met:  17  Goal: Knowledgeable about Health Subject/Topic  Outcome: Outcome(s) achieved Date Met:  17

## 2017-03-20 NOTE — H&P
Cardiology History and Physical Note.        Patient Name: Brendon Skelton  Age/Sex: 71 y.o. female  : 1945  MRN: [id number]    Date of Admission : 3/20/2017    Primary care Physician: Rula Paulino DO    Reason for Admission:  Elective replacement indicator the pacemaker generator which had triggered on 2016      Subjective:       Chief Complaint: Fatigue tiredness.      History of Present Illness:  Brendon Skelton is a 71 y.o. female     Body mass index is 36.25 kg/(m^2).  lady with a body mass index of 46 with a weight of 126 kg and a height of 65 inches, with a history of sick sinus syndrome, status post Identity [employer] dual chamber permanent pacemaker implantation, model #5376, serial #[id number], implanted in [place] in . She is status post aortic valve replacement for aortic stenosis, atherosclerotic coronary artery disease status post single vessel coronary artery bypass grafting, atrial fibrillation on chronic anticoagulation with Coumadin, arterial hypertension, hypertensive heart disease, with biatrial enlargement with concentric left ventricular hypertrophy, with mild mitral and mild tricuspid regurgitation, chronic kidney disease, non-insulin dependent diabetes, GERD, and chronic obstructive lung disease with previous history of tobacco abuse on home oxygen therapy.    Patient was last hospitalized in  for symptoms of shortness of breath and congestive heart failure.  Patient presents for follow-up evaluation.  Patient had undergone aortic valve replacement and single-vessel coronary artery bypass grafting in [facility].  Patient presents for a follow-up evaluation.      Patient pacemaker interrogation revealed elective replacement indicator on the pacemaker generator made by [employer] absent model #5376 serial number [id number]..  Due to the elective replacement indicator on the pacemaker generator pacemaker replacement was recommended to  the patient the risk-benefit treatment option for the generator replacement was discussed with the patient and an informed consent was obtained from the patient for the generator replacement.  Patient Mallampati score #2 patient ASA classification was #2.  Rest for minimal sedation was also discussed with the patient.      Past Medical History:      1.  Atherosclerotic coronary artery disease status post single vessel coronary artery bypass grafting.  2. Atrial fibrillation and sick sinus syndrome on chronic anticoagulation with Coumadin.   3. Tachy-sherie syndrome, status post [employer]rey dual chamber Identity permanent pacemaker implantation, model no. 5376, serial no. [id number], implanted in December, 2004 in [place], [place].   4. Aortic stenosis, status post [employer]rey aortic valve prosthesis, also done in [place], [place].   5. Arterial hypertension   6. Hypertensive heart disease   7. Concentric left ventricular hypertrophy with an ejection fraction of 55% to 60%.   8. Mitral valve thickening with mitral annular calcification.   9. Trivial amount of mitral and trivial amount of tricuspid regurgitation.   10. Obesity with a body mass index of 46 with a weight of 126 kg and a height of 65 inches.   11. Chronic anticoagulation with Coumadin   12. Non-insulin dependent diabetes   13. Diabetic neuropathy   14. GERD   15. Arthritis   16. Chronic obstructive lung disease with previous tobacco abuse   17. Home oxygen therapy   18. Chronic kidney disease secondary to diabetes   19. Anemia with a hemoglobin of 8.3 20. Clostridium difficile colitis         Past Medical History   Diagnosis Date   • Acute bronchitis    • Acute renal failure syndrome    • Anxiety    • Asthma    • Atrial fibrillation    • C. difficile colitis    • Callosity      under metatarsal head      • Cardiac pacemaker in situ    • CHF (congestive heart failure)    • Chronic obstructive lung disease    • Corns and callus    • Cortical senile cataract    •  Depressive disorder    • Diabetes mellitus    • Diastolic heart failure    • Essential hypertension    • Foot pain    • Hyperlipidemia    • Long term current use of anticoagulant    • Nuclear cataract    • On anticoagulant therapy    • Open wound of hand    • Pulmonary emphysema    • Rectal hemorrhage    • Type 2 diabetes mellitus        Past surgical history:    1. Tubal ligation   2. 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   3. Status post dual chamber [employer] permanent pacemaker implantation, Identity, Model #5376, Serial #[id number] done in December 2004.   4. Status post [employer] aortic valve prosthesis replacement done in [facility].     Past Surgical History   Procedure Laterality Date   • Cardiac pacemaker placement  1999   • Echo - converted  11/12/2013     There is mild to moderate left atrial enlargement EF 50-55%   • Transesophageal echocardiogram (lester)  05/01/2014     With color flow-Mild left atrial enlargement with mild concentric LV hypertrophy with top normal aortic root size. EF 45-50%. Mild mitral regurgitation and mild aortic insufficiency and trivial amount of tricuspid regurgation   • Foot surgery  1990   • Aortic valve repair/replacement  1999   • Other surgical history  10/26/2015     PARING CORN/CALLUS        Family History:  Significant for mother who had aortic stenosis.   Family History   Problem Relation Age of Onset   • Heart disease Mother    • Hyperlipidemia Mother    • Hypertension Mother    • Cancer Other        Social History:  Previous history of tobacco abuse, quit after the surgery, currently on home 02. She is disabled, denies any current tobacco or alcohol intake.       Social History     Social History   • Marital status:      Spouse name: N/A   • Number of children: N/A   • Years of education: N/A     Occupational History   • Not on file.     Social History Main Topics   • Smoking status: Former Smoker   • Smokeless tobacco: Not on file   • Alcohol use No   • Drug  use: No   • Sexual activity: No     Other Topics Concern   • Not on file     Social History Narrative        Cardiac Risk factor:   1. Postmenopausal   2. Obesity   3. Arterial hypertension   4. Hyperlipidemia   5. Diabetes       Allergies:  Allergies   Allergen Reactions   • Adhesive Tape      Only paper tape   • Crestor [Rosuvastatin Calcium]    • Lipitor [Atorvastatin]    • Lortab [Hydrocodone-Acetaminophen] Hallucinations       Medication::  Prescriptions Prior to Admission   Medication Sig Dispense Refill Last Dose   • acetaminophen (TYLENOL) 325 MG tablet Take 650 mg by mouth every 6 (six) hours as needed for mild pain (1-3).   Past Week at Unknown time   • albuterol (PROVENTIL HFA;VENTOLIN HFA) 108 (90 BASE) MCG/ACT inhaler Inhale 2 puffs every 4 (four) hours as needed for wheezing.   3/19/2017 at 0900   • albuterol (PROVENTIL) (2.5 MG/3ML) 0.083% nebulizer solution Take 2.5 mg by nebulization 4 (four) times a day.   3/19/2017 at 2100   • Ascorbic Acid (VITAMIN C WITH OCHOA HIPS) 250 MG tablet Take 250 mg by mouth daily.   3/19/2017 at 1530   • aspirin 81 MG chewable tablet Chew 81 mg daily.   3/20/2017 at 0600   • calcitriol (ROCALTROL) 0.25 MCG capsule Take 0.25 mcg by mouth every other day.   3/20/2017 at 0600   • citalopram (CeleXA) 20 MG tablet Take 1 tablet by mouth Daily. 90 tablet 3 3/20/2017 at 0600   • diltiaZEM CD (CARDIZEM CD) 240 MG 24 hr capsule Take 1 capsule by mouth Daily. 90 capsule 3 3/20/2017 at 0615   • ezetimibe (ZETIA) 10 MG tablet Take 1 tablet by mouth Daily. 90 tablet 3 3/20/2017 at 0615   • ferrous sulfate 325 (65 FE) MG tablet Take 325 mg by mouth Daily With Breakfast.   3/19/2017 at 1530   • furosemide (LASIX) 40 MG tablet Take 1 tablet by mouth Daily. 90 tablet 3 3/20/2017 at 0615   • glucose blood test strip 1 each by Other route 3 (three) times a day. Use as instructed   3/19/2017 at Unknown time   • Insulin Glargine (LANTUS SOLOSTAR) 100 UNIT/ML injection pen Inject 30 Units  under the skin Daily. 10 pen 3 3/19/2017 at 2100   • Insulin Pen Needle (BD PEN NEEDLE DEREK U/F) 32G X 4 MM misc 1 each 4 (Four) Times a Day. 120 each 5 3/19/2017 at Unknown time   • ipratropium (ATROVENT HFA) 17 MCG/ACT inhaler Inhale 2 puffs 4 (Four) Times a Day. 1 inhaler 3 3/20/2017 at 0615   • losartan (COZAAR) 100 MG tablet Take 1 tablet by mouth Daily. 90 tablet 3 3/20/2017 at 0615   • Multiple Vitamins-Iron (MULTIVITAMIN/IRON PO) Take 1 tablet by mouth daily.   3/20/2017 at 0615   • potassium chloride (K-DUR) 10 MEQ CR tablet Take 1 tablet by mouth Daily. 30 tablet 5 3/20/2017 at 0615   • Prenatal Vit-Fe Fumarate-FA (PRENATAL VITAMIN) 27-0.8 MG tablet Take 1 tablet by mouth daily.   3/20/2017 at 0615   • raNITIdine (ZANTAC) 150 MG tablet Take 1 tablet by mouth 2 (Two) Times a Day. 180 tablet 3 3/20/2017 at 0615   • traZODone (DESYREL) 150 MG tablet Take 2 tablets by mouth Every Night. 180 tablet 3 3/19/2017 at 2000   • warfarin (COUMADIN) 5 MG tablet Take 5 mg by mouth Daily. Take 5 mg Sunday, Tuesday, Thursday and Saturday   Take 2.5 mg Monday, Wednesday and Friday   3/14/2017 at Unknown time   • insulin aspart (NovoLOG) 100 UNIT/ML injection Inject 2-12 Units under the skin 3 (three) times a day before meals.   More than a month at Unknown time           Review of Systems:       Constitutional:  Denies recent weight loss, weight gain, fever or chills, no change in exercise tolerance     HENT:  Denies any hearing loss, epistaxis, hoarseness, or difficulty speaking.     Eyes: Wears eyeglasses or contact lenses     Respiratory:  Denies dyspnea with exertion,no cough, wheezing, or hemoptysis.     Cardiovascular: Negative for palpations, chest pain, orthopnea, PND, peripheral edema, syncope, or claudication.     Gastrointestinal:  Denies change in bowel habits, dyspepsia, ulcer disease, hematochezia, or melena.  No nausea, no vomiting, no hematemesis, no diarrhea or constipation, no melena      Endocrine:  Negative for cold intolerance, heat intolerance, polydipsia, polyphagia and polyuria. Denies any history of weight change, heat/cold intolerance, polydipsia, polyuria     Genitourinary: Negative hor hematuria.      Musculoskeletal: Denies any history of arthritic symptoms or back problems .  No joint pain, joint stiffness, joint swelling, muscle pain, muscle weakness and neck pain    Skin:  Denies any change in hair or nails, rashes, or skin lesions.     Allergic/Immunologic: Negative.  Negative for environmental allergies, food allergies and immunocompromised state.     Neurological:  Denies any history of recurrent headaches, strokes, TIA, or seizure disorder.     Hematological: Denies excessive bleeding, easy bruising, fatigue, lymphadenopathy and petechiae or any bleeding disorders, or lymphadenopathy.     Psychiatric/Behavioral: Denies any history of depression, substance abuse, or change in cognitive function. Denies any psychomotor reaction or tangential thought.  No depression, homicidal ideations and suicidal ideations    Endocrine: No frequent urination and nocturia, temperature intolerance, weight gain, unintended and weight loss, unintended            Objective:     Objective:  Vitals:    03/20/17 0929   BP: (!) 210/70   Pulse: 56   Resp: 18   Temp: 97.3 °F (36.3 °C)   SpO2: 95%     .    Body mass index is 36.25 kg/(m^2).           Physical Exam:   General Appearance:    Alert, oriented, cooperative, in no acute distress   Head:    Normocephalic, atraumatic, without obvious abnormality   Eyes:           RENE  Lids and lashes normal, conjunctivae and sclerae normal, no icterus, no pallor   Ears:    Ears appear intact with no abnormalities noted   Throat:   Mucous membranes pink and moist   Neck:   Supple, trachea midline, no carotid bruit, no organomegaly or JVD   Lungs:     Clear to auscultation and percussion, respirations regular, even and Unlabored. No wheezes, rales, rhonchi    Heart:    Regular  rhythm and normal rate, normal S1 and S2, no            murmur, no gallop, no rub, no click   Abdomen:     Soft, non-tender, non-distended, no guarding, no rebound tenderness, Normal bowel sounds in all four quadrant, no masses, liver and spleen nonpalpable,    Genitalia:    Deferred   Extremities:   Moves all extremities well, no edema, no cyanosis, no              Redness, no clubbing   Pulses:   Pulses palpable and equal bilaterally   Skin:   Moist and warm. No bleeding, bruising or rash   Neurologic/Psychiatric:   Alert and oriented to person, place, and time.  Motor, power and tone in upper and lower extremity is grossly intact.  No focal neurological deficits. Normal cognitive function. No psychomotor reaction or tangential thought. No depression, homicidal ideations and suicidal ideations           Lab Review:           Invalid input(s): LABYAZMIN CANTOR                Results from last 7 days  Lab Units 03/17/17 03/14/17   INR  1.50* 2.00*                   Invalid input(s):  T4,  FREET4    EKG:   ECG/EMG Results (last 24 hours)     ** No results found for the last 24 hours. **          Imaging:  Imaging Results (last 24 hours)     ** No results found for the last 24 hours. **          I personally viewed and interpreted the patient's EKG/Telemetry data.    Assessment:   1.  Sick sinus syndrome tachybradycardia syndrome.  Status post elective replacement indicator on the pacemaker generator.  2.  Atherosclerotic coronary artery disease.  Status post single vessel coronary artery bypass grafting done in [facility].  3.  Arterial hypertension.  4.  Hypertensive heart disease.  5.  Chronic anticoagulation with Coumadin.          Plan:   1 Sick sinus syndrome status post dual-chamber [employer] Identity pacemaker implantation done in [place]. Patient's pacemaker interrogation had revealed elective replacement indicator on the pacemaker generator which had triggered on December 2, 2016.  Due to the patient  being 90% of the time atrial paced on the pacemaker interrogation patient was recommended to generator replacement.  Risk-benefit treatment option for the generator replacement were discussed with the patient and an informed consent with the pain from the patient for the generator replacement.  Patient Mallampati score #2 patient ASA classification was #2.  Rest for minimal sedation was also discussed with the patient.  2.  Atherosclerotic coronary artery disease.  Patient had undergone a single-vessel coronary artery bypass [facility] at [facility] in 1999.  Patient had not complained of having any symptoms of substernal chest pain suggestive of angina.  Patient at the present time would be treated medically.  3.   Aortic stenosis status post [employer] aortic valve prosthesis. Patient's echocardiogram has revealed appropriate functioning of the aortic valve prosthesis with a trivial amount of mitral and tricuspid regurgitation. She had preserved left ventricular systolic function with an ejection fraction of 50-55%. At the present time, patient has been reassured of the echocardiographic findings.   4. Arterial hypertension with hypertensive heart disease. Patient's blood pressure is currently well controlled and she would be continued on the home dose of the antihypertensive medication Cardizem and Losartan 25 mg. She is also on Norvasc 10 mg once a day. She is not a candidate for beta blocker secondary to COPD.   5. Anemia on chronic anticoagulation with Coumadin with chronic kidney disease. Patient does not have any episodes or bleeding diastasis of hemoptysis hematuria bright red blood per rectum.  Patient Coumadin and has been followed in the Coumadin clinic.    6. Obesity with a body mass index of 47. Patient's symptoms of shortness of breath could be secondary to the obesity contributing factor along with the anemia and high output failure with concentric left ventricular hypertrophy.   7.  Previous  hospitalization with Clostridium difficile colitis.  Patient currently does not have any symptoms of diarrhea or abdominal discomfort.    8. Hyperlipidemia. Patient has been counseled on low-fat/low-cholesterol diet.   9. Noninsulin dependent diabetes. Patient has been followed by Dr. Pires an outpatient basis.         Time: time spent in face-to-face evaluation off greater than 60 minutes interacting and formulating examining and discussing the plan with the patient with 50% of greater time spent in face-to-face interaction.    Bereket Gates MD  03/20/17  9:35 AM    EMR Dragon/Transcription disclaimer:   Some of this note may be an electronic transcription/translation of spoken language to printed text. The electronic translation of spoken language may permit erroneous, or at times, nonsensical words or phrases to be inadvertently transcribed; Although I have reviewed the note for such errors, some may still exist.

## 2017-03-21 ENCOUNTER — ANESTHESIA (OUTPATIENT)
Dept: PERIOP | Facility: HOSPITAL | Age: 72
End: 2017-03-21

## 2017-03-21 ENCOUNTER — HOSPITAL ENCOUNTER (OUTPATIENT)
Facility: HOSPITAL | Age: 72
Discharge: HOME | End: 2017-03-22
Attending: EMERGENCY MEDICINE | Admitting: FAMILY MEDICINE

## 2017-03-21 ENCOUNTER — ANESTHESIA EVENT (OUTPATIENT)
Dept: PERIOP | Facility: HOSPITAL | Age: 72
End: 2017-03-21

## 2017-03-21 DIAGNOSIS — I48.91 ATRIAL FIBRILLATION, UNSPECIFIED TYPE (HCC): Primary | ICD-10-CM

## 2017-03-21 DIAGNOSIS — S21.102A: ICD-10-CM

## 2017-03-21 PROBLEM — S21.109A: Status: ACTIVE | Noted: 2017-03-21

## 2017-03-21 LAB
ABO GROUP BLD: NORMAL
ALBUMIN SERPL-MCNC: 3.8 G/DL (ref 3.4–4.8)
ALBUMIN/GLOB SERPL: 1.3 G/DL (ref 1.1–1.8)
ALP SERPL-CCNC: 74 U/L (ref 38–126)
ALT SERPL W P-5'-P-CCNC: 46 U/L (ref 9–52)
ANION GAP SERPL CALCULATED.3IONS-SCNC: 12 MMOL/L (ref 5–15)
APTT PPP: 37.4 SECONDS (ref 20–40.3)
AST SERPL-CCNC: 56 U/L (ref 14–36)
BASOPHILS # BLD AUTO: 0.01 10*3/MM3 (ref 0–0.2)
BASOPHILS NFR BLD AUTO: 0.1 % (ref 0–2)
BILIRUB SERPL-MCNC: 0.7 MG/DL (ref 0.2–1.3)
BLD GP AB SCN SERPL QL: NEGATIVE
BUN BLD-MCNC: 53 MG/DL (ref 7–21)
BUN/CREAT SERPL: 22.5 (ref 7–25)
CALCIUM SPEC-SCNC: 9 MG/DL (ref 8.4–10.2)
CHLORIDE SERPL-SCNC: 102 MMOL/L (ref 95–110)
CO2 SERPL-SCNC: 27 MMOL/L (ref 22–31)
CREAT BLD-MCNC: 2.36 MG/DL (ref 0.5–1)
DEPRECATED RDW RBC AUTO: 50.9 FL (ref 36.4–46.3)
EOSINOPHIL # BLD AUTO: 0.07 10*3/MM3 (ref 0–0.7)
EOSINOPHIL NFR BLD AUTO: 0.8 % (ref 0–7)
ERYTHROCYTE [DISTWIDTH] IN BLOOD BY AUTOMATED COUNT: 14.3 % (ref 11.5–14.5)
GFR SERPL CREATININE-BSD FRML MDRD: 20 ML/MIN/1.73 (ref 39–90)
GLOBULIN UR ELPH-MCNC: 2.9 GM/DL (ref 2.3–3.5)
GLUCOSE BLD-MCNC: 98 MG/DL (ref 60–100)
GLUCOSE BLDC GLUCOMTR-MCNC: 138 MG/DL (ref 70–130)
HCT VFR BLD AUTO: 30.7 % (ref 35–45)
HCV AB SER DONR QL: NEGATIVE
HGB BLD-MCNC: 10.1 G/DL (ref 12–15.5)
IMM GRANULOCYTES # BLD: 0.01 10*3/MM3 (ref 0–0.02)
IMM GRANULOCYTES NFR BLD: 0.1 % (ref 0–0.5)
INR PPP: 1.12 (ref 0.8–1.2)
LYMPHOCYTES # BLD AUTO: 0.64 10*3/MM3 (ref 0.6–4.2)
LYMPHOCYTES NFR BLD AUTO: 7.4 % (ref 10–50)
Lab: NORMAL
MCH RBC QN AUTO: 31.8 PG (ref 26.5–34)
MCHC RBC AUTO-ENTMCNC: 32.9 G/DL (ref 31.4–36)
MCV RBC AUTO: 96.5 FL (ref 80–98)
MONOCYTES # BLD AUTO: 0.88 10*3/MM3 (ref 0–0.9)
MONOCYTES NFR BLD AUTO: 10.1 % (ref 0–12)
NEUTROPHILS # BLD AUTO: 7.06 10*3/MM3 (ref 2–8.6)
NEUTROPHILS NFR BLD AUTO: 81.5 % (ref 37–80)
PLATELET # BLD AUTO: 170 10*3/MM3 (ref 150–450)
PMV BLD AUTO: 12.2 FL (ref 8–12)
POTASSIUM BLD-SCNC: 4.7 MMOL/L (ref 3.5–5.1)
PROT SERPL-MCNC: 6.7 G/DL (ref 6.3–8.6)
PROTHROMBIN TIME: 14.4 SECONDS (ref 11.1–15.3)
RBC # BLD AUTO: 3.18 10*6/MM3 (ref 3.77–5.16)
RH BLD: POSITIVE
SODIUM BLD-SCNC: 141 MMOL/L (ref 137–145)
WBC NRBC COR # BLD: 8.67 10*3/MM3 (ref 3.2–9.8)

## 2017-03-21 PROCEDURE — 96375 TX/PRO/DX INJ NEW DRUG ADDON: CPT

## 2017-03-21 PROCEDURE — 36430 TRANSFUSION BLD/BLD COMPNT: CPT

## 2017-03-21 PROCEDURE — 82962 GLUCOSE BLOOD TEST: CPT

## 2017-03-21 PROCEDURE — 86927 PLASMA FRESH FROZEN: CPT

## 2017-03-21 PROCEDURE — G0378 HOSPITAL OBSERVATION PER HR: HCPCS

## 2017-03-21 PROCEDURE — 96374 THER/PROPH/DIAG INJ IV PUSH: CPT

## 2017-03-21 PROCEDURE — 86850 RBC ANTIBODY SCREEN: CPT | Performed by: EMERGENCY MEDICINE

## 2017-03-21 PROCEDURE — 80053 COMPREHEN METABOLIC PANEL: CPT | Performed by: EMERGENCY MEDICINE

## 2017-03-21 PROCEDURE — 25010000002 ONDANSETRON PER 1 MG: Performed by: EMERGENCY MEDICINE

## 2017-03-21 PROCEDURE — 86900 BLOOD TYPING SEROLOGIC ABO: CPT | Performed by: EMERGENCY MEDICINE

## 2017-03-21 PROCEDURE — 10140 I&D HMTMA SEROMA/FLUID COLLJ: CPT | Performed by: THORACIC SURGERY (CARDIOTHORACIC VASCULAR SURGERY)

## 2017-03-21 PROCEDURE — 25010000002 PROPOFOL 1000 MG/ML EMULSION: Performed by: NURSE ANESTHETIST, CERTIFIED REGISTERED

## 2017-03-21 PROCEDURE — 85025 COMPLETE CBC W/AUTO DIFF WBC: CPT | Performed by: EMERGENCY MEDICINE

## 2017-03-21 PROCEDURE — 85610 PROTHROMBIN TIME: CPT | Performed by: EMERGENCY MEDICINE

## 2017-03-21 PROCEDURE — 86901 BLOOD TYPING SEROLOGIC RH(D): CPT | Performed by: EMERGENCY MEDICINE

## 2017-03-21 PROCEDURE — 25010000002 PHENYLEPHRINE PER 1 ML: Performed by: NURSE ANESTHETIST, CERTIFIED REGISTERED

## 2017-03-21 PROCEDURE — 94799 UNLISTED PULMONARY SVC/PX: CPT

## 2017-03-21 PROCEDURE — P9017 PLASMA 1 DONOR FRZ W/IN 8 HR: HCPCS

## 2017-03-21 PROCEDURE — 25010000002 ONDANSETRON PER 1 MG: Performed by: NURSE ANESTHETIST, CERTIFIED REGISTERED

## 2017-03-21 PROCEDURE — 85730 THROMBOPLASTIN TIME PARTIAL: CPT | Performed by: EMERGENCY MEDICINE

## 2017-03-21 PROCEDURE — 25010000002 MIDAZOLAM PER 1 MG: Performed by: NURSE ANESTHETIST, CERTIFIED REGISTERED

## 2017-03-21 PROCEDURE — 25010000003 CEFAZOLIN PER 500 MG: Performed by: NURSE ANESTHETIST, CERTIFIED REGISTERED

## 2017-03-21 PROCEDURE — 96376 TX/PRO/DX INJ SAME DRUG ADON: CPT

## 2017-03-21 PROCEDURE — 99284 EMERGENCY DEPT VISIT MOD MDM: CPT

## 2017-03-21 PROCEDURE — 25010000002 PROPOFOL 10 MG/ML EMULSION: Performed by: NURSE ANESTHETIST, CERTIFIED REGISTERED

## 2017-03-21 PROCEDURE — 25010000002 MORPHINE SULFATE (PF) 2 MG/ML SOLUTION: Performed by: EMERGENCY MEDICINE

## 2017-03-21 PROCEDURE — 25010000002 FENTANYL CITRATE (PF) 100 MCG/2ML SOLUTION: Performed by: NURSE ANESTHETIST, CERTIFIED REGISTERED

## 2017-03-21 RX ORDER — ONDANSETRON 4 MG/1
4 TABLET, FILM COATED ORAL EVERY 6 HOURS PRN
Status: DISCONTINUED | OUTPATIENT
Start: 2017-03-21 | End: 2017-03-22 | Stop reason: HOSPADM

## 2017-03-21 RX ORDER — SODIUM CHLORIDE 0.9 % (FLUSH) 0.9 %
10 SYRINGE (ML) INJECTION AS NEEDED
Status: DISCONTINUED | OUTPATIENT
Start: 2017-03-21 | End: 2017-03-22 | Stop reason: HOSPADM

## 2017-03-21 RX ORDER — ALBUTEROL SULFATE 2.5 MG/3ML
2.5 SOLUTION RESPIRATORY (INHALATION)
Status: DISCONTINUED | OUTPATIENT
Start: 2017-03-21 | End: 2017-03-21

## 2017-03-21 RX ORDER — TRAZODONE HYDROCHLORIDE 150 MG/1
300 TABLET ORAL NIGHTLY
Status: DISCONTINUED | OUTPATIENT
Start: 2017-03-21 | End: 2017-03-22 | Stop reason: HOSPADM

## 2017-03-21 RX ORDER — CALCITRIOL 0.25 UG/1
0.25 CAPSULE, LIQUID FILLED ORAL EVERY OTHER DAY
Status: DISCONTINUED | OUTPATIENT
Start: 2017-03-22 | End: 2017-03-22 | Stop reason: HOSPADM

## 2017-03-21 RX ORDER — FUROSEMIDE 40 MG/1
40 TABLET ORAL DAILY
Status: DISCONTINUED | OUTPATIENT
Start: 2017-03-21 | End: 2017-03-22 | Stop reason: HOSPADM

## 2017-03-21 RX ORDER — HYDROCODONE BITARTRATE AND ACETAMINOPHEN 5; 325 MG/1; MG/1
1 TABLET ORAL EVERY 4 HOURS PRN
Status: DISCONTINUED | OUTPATIENT
Start: 2017-03-21 | End: 2017-03-22 | Stop reason: HOSPADM

## 2017-03-21 RX ORDER — ALBUTEROL SULFATE 90 UG/1
2 AEROSOL, METERED RESPIRATORY (INHALATION) EVERY 4 HOURS PRN
Status: DISCONTINUED | OUTPATIENT
Start: 2017-03-21 | End: 2017-03-21 | Stop reason: CLARIF

## 2017-03-21 RX ORDER — LABETALOL HYDROCHLORIDE 5 MG/ML
5 INJECTION, SOLUTION INTRAVENOUS
Status: DISCONTINUED | OUTPATIENT
Start: 2017-03-21 | End: 2017-03-21 | Stop reason: HOSPADM

## 2017-03-21 RX ORDER — CITALOPRAM 20 MG/1
20 TABLET ORAL DAILY
Status: DISCONTINUED | OUTPATIENT
Start: 2017-03-21 | End: 2017-03-22 | Stop reason: HOSPADM

## 2017-03-21 RX ORDER — FENTANYL CITRATE 50 UG/ML
INJECTION, SOLUTION INTRAMUSCULAR; INTRAVENOUS AS NEEDED
Status: DISCONTINUED | OUTPATIENT
Start: 2017-03-21 | End: 2017-03-21 | Stop reason: SURG

## 2017-03-21 RX ORDER — IPRATROPIUM BROMIDE AND ALBUTEROL SULFATE 2.5; .5 MG/3ML; MG/3ML
3 SOLUTION RESPIRATORY (INHALATION)
Status: DISCONTINUED | OUTPATIENT
Start: 2017-03-21 | End: 2017-03-22 | Stop reason: HOSPADM

## 2017-03-21 RX ORDER — ALBUTEROL SULFATE 2.5 MG/3ML
2.5 SOLUTION RESPIRATORY (INHALATION) EVERY 4 HOURS PRN
Status: DISCONTINUED | OUTPATIENT
Start: 2017-03-21 | End: 2017-03-22 | Stop reason: HOSPADM

## 2017-03-21 RX ORDER — DILTIAZEM HYDROCHLORIDE 240 MG/1
240 CAPSULE, COATED, EXTENDED RELEASE ORAL DAILY
Status: DISCONTINUED | OUTPATIENT
Start: 2017-03-21 | End: 2017-03-22 | Stop reason: HOSPADM

## 2017-03-21 RX ORDER — SODIUM CHLORIDE 9 MG/ML
75 INJECTION, SOLUTION INTRAVENOUS CONTINUOUS
Status: DISCONTINUED | OUTPATIENT
Start: 2017-03-21 | End: 2017-03-22 | Stop reason: HOSPADM

## 2017-03-21 RX ORDER — BISACODYL 10 MG
10 SUPPOSITORY, RECTAL RECTAL DAILY PRN
Status: DISCONTINUED | OUTPATIENT
Start: 2017-03-21 | End: 2017-03-22 | Stop reason: HOSPADM

## 2017-03-21 RX ORDER — ASPIRIN 81 MG/1
81 TABLET, CHEWABLE ORAL DAILY
Status: DISCONTINUED | OUTPATIENT
Start: 2017-03-21 | End: 2017-03-21

## 2017-03-21 RX ORDER — ACETAMINOPHEN 325 MG/1
650 TABLET ORAL EVERY 4 HOURS PRN
Status: DISCONTINUED | OUTPATIENT
Start: 2017-03-21 | End: 2017-03-22 | Stop reason: HOSPADM

## 2017-03-21 RX ORDER — CEFAZOLIN SODIUM 1 G/3ML
INJECTION, POWDER, FOR SOLUTION INTRAMUSCULAR; INTRAVENOUS AS NEEDED
Status: DISCONTINUED | OUTPATIENT
Start: 2017-03-21 | End: 2017-03-21 | Stop reason: SURG

## 2017-03-21 RX ORDER — ASPIRIN 81 MG/1
81 TABLET, CHEWABLE ORAL DAILY
Status: DISCONTINUED | OUTPATIENT
Start: 2017-03-21 | End: 2017-03-22 | Stop reason: HOSPADM

## 2017-03-21 RX ORDER — NALOXONE HCL 0.4 MG/ML
0.2 VIAL (ML) INJECTION AS NEEDED
Status: DISCONTINUED | OUTPATIENT
Start: 2017-03-21 | End: 2017-03-21 | Stop reason: HOSPADM

## 2017-03-21 RX ORDER — ONDANSETRON 2 MG/ML
4 INJECTION INTRAMUSCULAR; INTRAVENOUS EVERY 6 HOURS PRN
Status: DISCONTINUED | OUTPATIENT
Start: 2017-03-21 | End: 2017-03-22 | Stop reason: HOSPADM

## 2017-03-21 RX ORDER — ACETAMINOPHEN 325 MG/1
650 TABLET ORAL ONCE AS NEEDED
Status: DISCONTINUED | OUTPATIENT
Start: 2017-03-21 | End: 2017-03-21 | Stop reason: HOSPADM

## 2017-03-21 RX ORDER — LOSARTAN POTASSIUM 50 MG/1
100 TABLET ORAL DAILY
Status: DISCONTINUED | OUTPATIENT
Start: 2017-03-21 | End: 2017-03-22 | Stop reason: HOSPADM

## 2017-03-21 RX ORDER — MULTIVIT WITH MINERALS/LUTEIN
250 TABLET ORAL DAILY
Status: DISCONTINUED | OUTPATIENT
Start: 2017-03-22 | End: 2017-03-22 | Stop reason: HOSPADM

## 2017-03-21 RX ORDER — FERROUS SULFATE TAB EC 324 MG (65 MG FE EQUIVALENT) 324 (65 FE) MG
324 TABLET DELAYED RESPONSE ORAL
Status: DISCONTINUED | OUTPATIENT
Start: 2017-03-22 | End: 2017-03-22 | Stop reason: HOSPADM

## 2017-03-21 RX ORDER — MORPHINE SULFATE 2 MG/ML
2 INJECTION, SOLUTION INTRAMUSCULAR; INTRAVENOUS ONCE
Status: COMPLETED | OUTPATIENT
Start: 2017-03-21 | End: 2017-03-21

## 2017-03-21 RX ORDER — POTASSIUM CHLORIDE 750 MG/1
10 TABLET, FILM COATED, EXTENDED RELEASE ORAL DAILY
Status: DISCONTINUED | OUTPATIENT
Start: 2017-03-22 | End: 2017-03-22 | Stop reason: HOSPADM

## 2017-03-21 RX ORDER — MIDAZOLAM HYDROCHLORIDE 1 MG/ML
INJECTION INTRAMUSCULAR; INTRAVENOUS AS NEEDED
Status: DISCONTINUED | OUTPATIENT
Start: 2017-03-21 | End: 2017-03-21 | Stop reason: SURG

## 2017-03-21 RX ORDER — ALBUTEROL SULFATE 2.5 MG/3ML
2.5 SOLUTION RESPIRATORY (INHALATION)
Status: DISCONTINUED | OUTPATIENT
Start: 2017-03-21 | End: 2017-03-22 | Stop reason: HOSPADM

## 2017-03-21 RX ORDER — BUPIVACAINE HYDROCHLORIDE AND EPINEPHRINE 5; 5 MG/ML; UG/ML
INJECTION, SOLUTION PERINEURAL AS NEEDED
Status: DISCONTINUED | OUTPATIENT
Start: 2017-03-21 | End: 2017-03-22 | Stop reason: HOSPADM

## 2017-03-21 RX ORDER — FAMOTIDINE 20 MG/1
20 TABLET, FILM COATED ORAL DAILY
Status: DISCONTINUED | OUTPATIENT
Start: 2017-03-21 | End: 2017-03-22 | Stop reason: SDUPTHER

## 2017-03-21 RX ORDER — DIPHENHYDRAMINE HYDROCHLORIDE 50 MG/ML
12.5 INJECTION INTRAMUSCULAR; INTRAVENOUS
Status: DISCONTINUED | OUTPATIENT
Start: 2017-03-21 | End: 2017-03-21 | Stop reason: HOSPADM

## 2017-03-21 RX ORDER — CEPHALEXIN 500 MG/1
500 CAPSULE ORAL EVERY 8 HOURS SCHEDULED
Status: DISCONTINUED | OUTPATIENT
Start: 2017-03-21 | End: 2017-03-22 | Stop reason: SDUPTHER

## 2017-03-21 RX ORDER — ONDANSETRON 2 MG/ML
4 INJECTION INTRAMUSCULAR; INTRAVENOUS ONCE AS NEEDED
Status: DISCONTINUED | OUTPATIENT
Start: 2017-03-21 | End: 2017-03-21 | Stop reason: HOSPADM

## 2017-03-21 RX ORDER — BACITRACIN 50000 [IU]/1
INJECTION, POWDER, FOR SOLUTION INTRAMUSCULAR AS NEEDED
Status: DISCONTINUED | OUTPATIENT
Start: 2017-03-21 | End: 2017-03-22

## 2017-03-21 RX ORDER — SODIUM CHLORIDE 9 MG/ML
100 INJECTION, SOLUTION INTRAVENOUS CONTINUOUS
Status: CANCELLED | OUTPATIENT
Start: 2017-03-21

## 2017-03-21 RX ORDER — ONDANSETRON 4 MG/1
4 TABLET, ORALLY DISINTEGRATING ORAL EVERY 6 HOURS PRN
Status: DISCONTINUED | OUTPATIENT
Start: 2017-03-21 | End: 2017-03-22 | Stop reason: HOSPADM

## 2017-03-21 RX ORDER — CEPHALEXIN 500 MG/1
500 CAPSULE ORAL EVERY 8 HOURS SCHEDULED
Status: DISCONTINUED | OUTPATIENT
Start: 2017-03-21 | End: 2017-03-21

## 2017-03-21 RX ORDER — PROPOFOL 10 MG/ML
VIAL (ML) INTRAVENOUS AS NEEDED
Status: DISCONTINUED | OUTPATIENT
Start: 2017-03-21 | End: 2017-03-21 | Stop reason: SURG

## 2017-03-21 RX ORDER — ACETAMINOPHEN 650 MG/1
650 SUPPOSITORY RECTAL ONCE AS NEEDED
Status: DISCONTINUED | OUTPATIENT
Start: 2017-03-21 | End: 2017-03-21 | Stop reason: HOSPADM

## 2017-03-21 RX ORDER — SODIUM CHLORIDE 9 MG/ML
125 INJECTION, SOLUTION INTRAVENOUS CONTINUOUS
Status: DISCONTINUED | OUTPATIENT
Start: 2017-03-21 | End: 2017-03-21

## 2017-03-21 RX ORDER — ACETAMINOPHEN 325 MG/1
650 TABLET ORAL EVERY 6 HOURS PRN
Status: DISCONTINUED | OUTPATIENT
Start: 2017-03-21 | End: 2017-03-21 | Stop reason: SDUPTHER

## 2017-03-21 RX ORDER — SENNA AND DOCUSATE SODIUM 50; 8.6 MG/1; MG/1
2 TABLET, FILM COATED ORAL 2 TIMES DAILY PRN
Status: DISCONTINUED | OUTPATIENT
Start: 2017-03-21 | End: 2017-03-22 | Stop reason: HOSPADM

## 2017-03-21 RX ORDER — ONDANSETRON 2 MG/ML
4 INJECTION INTRAMUSCULAR; INTRAVENOUS ONCE
Status: COMPLETED | OUTPATIENT
Start: 2017-03-21 | End: 2017-03-21

## 2017-03-21 RX ORDER — FLUMAZENIL 0.1 MG/ML
0.2 INJECTION INTRAVENOUS AS NEEDED
Status: DISCONTINUED | OUTPATIENT
Start: 2017-03-21 | End: 2017-03-21 | Stop reason: HOSPADM

## 2017-03-21 RX ORDER — ONDANSETRON 2 MG/ML
INJECTION INTRAMUSCULAR; INTRAVENOUS AS NEEDED
Status: DISCONTINUED | OUTPATIENT
Start: 2017-03-21 | End: 2017-03-21 | Stop reason: SURG

## 2017-03-21 RX ORDER — SODIUM CHLORIDE 0.9 % (FLUSH) 0.9 %
1-10 SYRINGE (ML) INJECTION AS NEEDED
Status: DISCONTINUED | OUTPATIENT
Start: 2017-03-21 | End: 2017-03-22 | Stop reason: HOSPADM

## 2017-03-21 RX ADMIN — FAMOTIDINE 20 MG: 20 TABLET, FILM COATED ORAL at 20:17

## 2017-03-21 RX ADMIN — CEPHALEXIN 500 MG: 500 CAPSULE ORAL at 21:35

## 2017-03-21 RX ADMIN — Medication 10 ML: at 13:26

## 2017-03-21 RX ADMIN — SODIUM CHLORIDE: 900 INJECTION, SOLUTION INTRAVENOUS at 16:09

## 2017-03-21 RX ADMIN — MORPHINE SULFATE 2 MG: 2 INJECTION, SOLUTION INTRAMUSCULAR; INTRAVENOUS at 13:32

## 2017-03-21 RX ADMIN — PHENYLEPHRINE HYDROCHLORIDE 100 MCG: 10 INJECTION INTRAVENOUS at 16:51

## 2017-03-21 RX ADMIN — IPRATROPIUM BROMIDE AND ALBUTEROL SULFATE 3 ML: 2.5; .5 SOLUTION RESPIRATORY (INHALATION) at 20:51

## 2017-03-21 RX ADMIN — MORPHINE SULFATE 2 MG: 2 INJECTION, SOLUTION INTRAMUSCULAR; INTRAVENOUS at 14:48

## 2017-03-21 RX ADMIN — FENTANYL CITRATE 25 MCG: 50 INJECTION, SOLUTION INTRAMUSCULAR; INTRAVENOUS at 16:56

## 2017-03-21 RX ADMIN — ONDANSETRON 4 MG: 2 INJECTION INTRAMUSCULAR; INTRAVENOUS at 17:05

## 2017-03-21 RX ADMIN — PROPOFOL 50 MG: 10 INJECTION, EMULSION INTRAVENOUS at 16:40

## 2017-03-21 RX ADMIN — ONDANSETRON 4 MG: 2 INJECTION INTRAMUSCULAR; INTRAVENOUS at 13:25

## 2017-03-21 RX ADMIN — MIDAZOLAM 1 MG: 1 INJECTION INTRAMUSCULAR; INTRAVENOUS at 16:20

## 2017-03-21 RX ADMIN — MIDAZOLAM 1 MG: 1 INJECTION INTRAMUSCULAR; INTRAVENOUS at 16:04

## 2017-03-21 RX ADMIN — CEFAZOLIN SODIUM 2 G: 1 INJECTION, POWDER, FOR SOLUTION INTRAMUSCULAR; INTRAVENOUS at 16:28

## 2017-03-21 RX ADMIN — FENTANYL CITRATE 25 MCG: 50 INJECTION, SOLUTION INTRAMUSCULAR; INTRAVENOUS at 16:20

## 2017-03-21 RX ADMIN — PROPOFOL 30 MCG/KG/MIN: 10 INJECTION, EMULSION INTRAVENOUS at 17:15

## 2017-03-21 RX ADMIN — TRAZODONE HYDROCHLORIDE 300 MG: 150 TABLET ORAL at 20:18

## 2017-03-21 NOTE — ANESTHESIA PROCEDURE NOTES
Airway  Urgency: elective    Airway not difficult    General Information and Staff    Patient location during procedure: OR  CRNA: PAVITHRA CARVAJAL    Indications and Patient Condition  Indications for airway management: airway protection    Preoxygenated: yes  Mask difficulty assessment: 0 - not attempted    Final Airway Details  Final airway type: supraglottic airway      Successful airway: classic  Size 3    Number of attempts at approach: 3 or more    Additional Comments  Attempted MAC, but patient obstructing but moving with slight touch of hematoma; decision made to place LMA, LMA 4 inserted per SRNA, unable to seat, attempt made per CRNA to place 4, attempt unsuccessful, LMA 3 placed per CRNA, successful, ETCO2 pos

## 2017-03-21 NOTE — ANESTHESIA POSTPROCEDURE EVALUATION
Patient: Brendon Skelton    Procedure Summary     Date Anesthesia Start Anesthesia Stop Room / Location    03/21/17 8580 [id number]  [facility] OR 05 / [facility] OR       Procedure Diagnosis Surgeon Provider    revision pacemaker pocket, evacuation hematoma, control of bleeding (N/A Chest) Atrial fibrillation, unspecified type  (Atrial fibrillation, unspecified type [I48.91]) MD Arturo Ashton MD          Anesthesia Type: MAC  Last vitals  BP      Temp      Pulse     Resp      SpO2        Post Anesthesia Care and Evaluation    Patient location during evaluation: PACU  Patient participation: complete - patient participated  Level of consciousness: awake and alert  Pain management: adequate  Airway patency: patent  Anesthetic complications: No anesthetic complications  PONV Status: none  Cardiovascular status: acceptable  Respiratory status: acceptable  Hydration status: acceptable

## 2017-03-21 NOTE — ANESTHESIA PREPROCEDURE EVALUATION
Anesthesia Evaluation     Patient summary reviewed and Nursing notes reviewed   NPO Status: > 8 hours   Airway   Mallampati: II  TM distance: >3 FB  Neck ROM: full  possible difficult intubation  Dental    (+) edentulous    Pulmonary - normal exam   (+) a smoker ( quit smoking in 1999) Former, COPD ( she does sometimes use inhalers, but is breathing well today.) mild,   Cardiovascular - normal exam    PT is on anticoagulation therapy    (+) pacemaker pacemaker, hypertension well controlled, dysrhythmias Atrial Fib, CHF ( h/o, but not in actice CHF now),     ROS comment: Had mechanical AVR in 1999 and has since required a permanent pacer.  A new pacer was just inserted on 3/20/17 and she has a hematoma at that site  PE comment: Has pacer in left chest-wall with surrounding hematoma    Neuro/Psych  GI/Hepatic/Renal/Endo    (+) obesity,  GERD ( she denies GERD symptoms today) well controlled, chronic renal disease ( today's BUN/Creatinine= 53/2.36 with a potassium of 4.7) CRI, diabetes mellitus ( blood sugar= 98) type 2,     Musculoskeletal (-) negative ROS    Abdominal   (+) obese,    Substance History - negative use     OB/GYN negative ob/gyn ROS         Other   (+) blood dyscrasia ( has been on blood thinners.  LD of coumadin was 3/14/17 with lovenox as a bridge + ASAS (81 mg))                                 Anesthesia Plan    ASA 3 - emergent     MAC   (We will plan a MAC, but do a general anesthetic if needed)  Anesthetic plan and risks discussed with patient and spouse/significant other.    Plan discussed with CRNA and attending.

## 2017-03-22 VITALS
OXYGEN SATURATION: 95 % | WEIGHT: 222.2 LBS | DIASTOLIC BLOOD PRESSURE: 60 MMHG | SYSTOLIC BLOOD PRESSURE: 126 MMHG | HEART RATE: 51 BPM | BODY MASS INDEX: 37.02 KG/M2 | RESPIRATION RATE: 20 BRPM | HEIGHT: 65 IN | TEMPERATURE: 97.6 F

## 2017-03-22 LAB
ANION GAP SERPL CALCULATED.3IONS-SCNC: 10 MMOL/L (ref 5–15)
BASOPHILS # BLD AUTO: 0.01 10*3/MM3 (ref 0–0.2)
BASOPHILS NFR BLD AUTO: 0.1 % (ref 0–2)
BUN BLD-MCNC: 54 MG/DL (ref 7–21)
BUN/CREAT SERPL: 20.7 (ref 7–25)
CALCIUM SPEC-SCNC: 8.3 MG/DL (ref 8.4–10.2)
CHLORIDE SERPL-SCNC: 106 MMOL/L (ref 95–110)
CO2 SERPL-SCNC: 24 MMOL/L (ref 22–31)
CREAT BLD-MCNC: 2.61 MG/DL (ref 0.5–1)
DEPRECATED RDW RBC AUTO: 53.4 FL (ref 36.4–46.3)
EOSINOPHIL # BLD AUTO: 0.02 10*3/MM3 (ref 0–0.7)
EOSINOPHIL NFR BLD AUTO: 0.3 % (ref 0–7)
ERYTHROCYTE [DISTWIDTH] IN BLOOD BY AUTOMATED COUNT: 14.5 % (ref 11.5–14.5)
GFR SERPL CREATININE-BSD FRML MDRD: 18 ML/MIN/1.73 (ref 39–90)
GLUCOSE BLD-MCNC: 118 MG/DL (ref 60–100)
GLUCOSE BLDC GLUCOMTR-MCNC: 119 MG/DL (ref 70–130)
GLUCOSE BLDC GLUCOMTR-MCNC: 161 MG/DL (ref 70–130)
GLUCOSE BLDC GLUCOMTR-MCNC: 90 MG/DL (ref 70–130)
HCT VFR BLD AUTO: 25 % (ref 35–45)
HGB BLD-MCNC: 8 G/DL (ref 12–15.5)
IMM GRANULOCYTES # BLD: 0.01 10*3/MM3 (ref 0–0.02)
IMM GRANULOCYTES NFR BLD: 0.1 % (ref 0–0.5)
LYMPHOCYTES # BLD AUTO: 0.33 10*3/MM3 (ref 0.6–4.2)
LYMPHOCYTES NFR BLD AUTO: 4.2 % (ref 10–50)
MCH RBC QN AUTO: 32 PG (ref 26.5–34)
MCHC RBC AUTO-ENTMCNC: 32 G/DL (ref 31.4–36)
MCV RBC AUTO: 100 FL (ref 80–98)
MONOCYTES # BLD AUTO: 0.7 10*3/MM3 (ref 0–0.9)
MONOCYTES NFR BLD AUTO: 8.8 % (ref 0–12)
NEUTROPHILS # BLD AUTO: 6.88 10*3/MM3 (ref 2–8.6)
NEUTROPHILS NFR BLD AUTO: 86.5 % (ref 37–80)
PLATELET # BLD AUTO: 125 10*3/MM3 (ref 150–450)
PMV BLD AUTO: 11.3 FL (ref 8–12)
POTASSIUM BLD-SCNC: 4.4 MMOL/L (ref 3.5–5.1)
RBC # BLD AUTO: 2.5 10*6/MM3 (ref 3.77–5.16)
SODIUM BLD-SCNC: 140 MMOL/L (ref 137–145)
WBC NRBC COR # BLD: 7.95 10*3/MM3 (ref 3.2–9.8)

## 2017-03-22 PROCEDURE — 63710000001 INSULIN DETEMIR PER 5 UNITS: Performed by: FAMILY MEDICINE

## 2017-03-22 PROCEDURE — 25010000003 CEFAZOLIN PER 500 MG: Performed by: THORACIC SURGERY (CARDIOTHORACIC VASCULAR SURGERY)

## 2017-03-22 PROCEDURE — 94760 N-INVAS EAR/PLS OXIMETRY 1: CPT

## 2017-03-22 PROCEDURE — 80048 BASIC METABOLIC PNL TOTAL CA: CPT | Performed by: INTERNAL MEDICINE

## 2017-03-22 PROCEDURE — 94799 UNLISTED PULMONARY SVC/PX: CPT

## 2017-03-22 PROCEDURE — G0378 HOSPITAL OBSERVATION PER HR: HCPCS

## 2017-03-22 PROCEDURE — 85025 COMPLETE CBC W/AUTO DIFF WBC: CPT | Performed by: INTERNAL MEDICINE

## 2017-03-22 PROCEDURE — 82962 GLUCOSE BLOOD TEST: CPT

## 2017-03-22 RX ORDER — CEPHALEXIN 500 MG/1
500 CAPSULE ORAL EVERY 8 HOURS SCHEDULED
Status: DISCONTINUED | OUTPATIENT
Start: 2017-03-22 | End: 2017-03-22 | Stop reason: HOSPADM

## 2017-03-22 RX ORDER — FAMOTIDINE 20 MG/1
20 TABLET, FILM COATED ORAL DAILY
Status: DISCONTINUED | OUTPATIENT
Start: 2017-03-22 | End: 2017-03-22 | Stop reason: HOSPADM

## 2017-03-22 RX ORDER — HYDROCODONE BITARTRATE AND ACETAMINOPHEN 5; 325 MG/1; MG/1
1 TABLET ORAL EVERY 4 HOURS PRN
Qty: 20 TABLET | Refills: 0 | Status: SHIPPED | OUTPATIENT
Start: 2017-03-22 | End: 2017-03-31

## 2017-03-22 RX ADMIN — FUROSEMIDE 40 MG: 40 TABLET ORAL at 08:26

## 2017-03-22 RX ADMIN — HYDROCODONE BITARTRATE AND ACETAMINOPHEN 1 TABLET: 5; 325 TABLET ORAL at 05:20

## 2017-03-22 RX ADMIN — ALBUTEROL SULFATE 2.5 MG: 2.5 SOLUTION RESPIRATORY (INHALATION) at 00:36

## 2017-03-22 RX ADMIN — HYDROCODONE BITARTRATE AND ACETAMINOPHEN 1 TABLET: 5; 325 TABLET ORAL at 13:57

## 2017-03-22 RX ADMIN — FERROUS SULFATE TAB EC 324 MG (65 MG FE EQUIVALENT) 324 MG: 324 (65 FE) TABLET DELAYED RESPONSE at 07:47

## 2017-03-22 RX ADMIN — DILTIAZEM HYDROCHLORIDE 240 MG: 240 CAPSULE, COATED, EXTENDED RELEASE ORAL at 08:28

## 2017-03-22 RX ADMIN — CALCITRIOL 0.25 MCG: 0.25 CAPSULE, LIQUID FILLED ORAL at 08:26

## 2017-03-22 RX ADMIN — CEPHALEXIN 500 MG: 500 CAPSULE ORAL at 08:29

## 2017-03-22 RX ADMIN — FAMOTIDINE 20 MG: 20 TABLET ORAL at 09:55

## 2017-03-22 RX ADMIN — OXYCODONE HYDROCHLORIDE AND ACETAMINOPHEN 250 MG: 500 TABLET ORAL at 08:27

## 2017-03-22 RX ADMIN — POTASSIUM CHLORIDE 10 MEQ: 750 TABLET, FILM COATED, EXTENDED RELEASE ORAL at 09:55

## 2017-03-22 RX ADMIN — ALBUTEROL SULFATE 2.5 MG: 2.5 SOLUTION RESPIRATORY (INHALATION) at 15:03

## 2017-03-22 RX ADMIN — INSULIN DETEMIR 30 UNITS: 100 INJECTION, SOLUTION SUBCUTANEOUS at 08:29

## 2017-03-22 RX ADMIN — ASPIRIN 81 MG 81 MG: 81 TABLET ORAL at 08:26

## 2017-03-22 RX ADMIN — CEPHALEXIN 500 MG: 500 CAPSULE ORAL at 13:57

## 2017-03-22 RX ADMIN — CEFAZOLIN SODIUM 2 G: 1 INJECTION, POWDER, FOR SOLUTION INTRAMUSCULAR; INTRAVENOUS at 08:41

## 2017-03-22 RX ADMIN — LOSARTAN POTASSIUM 100 MG: 50 TABLET, FILM COATED ORAL at 08:28

## 2017-03-22 RX ADMIN — CEFAZOLIN SODIUM 2 G: 1 INJECTION, POWDER, FOR SOLUTION INTRAMUSCULAR; INTRAVENOUS at 01:20

## 2017-03-22 RX ADMIN — ALBUTEROL SULFATE 2.5 MG: 2.5 SOLUTION RESPIRATORY (INHALATION) at 07:56

## 2017-03-22 RX ADMIN — CITALOPRAM HYDROBROMIDE 20 MG: 20 TABLET ORAL at 08:26

## 2017-03-23 ENCOUNTER — ANTICOAGULATION VISIT (OUTPATIENT)
Dept: CARDIAC SURGERY | Facility: CLINIC | Age: 72
End: 2017-03-23

## 2017-03-23 VITALS — SYSTOLIC BLOOD PRESSURE: 116 MMHG | DIASTOLIC BLOOD PRESSURE: 56 MMHG | HEART RATE: 88 BPM

## 2017-03-23 DIAGNOSIS — Z95.2 PERSONAL HISTORY OF HEART VALVE REPLACEMENT: ICD-10-CM

## 2017-03-23 DIAGNOSIS — I48.91 ATRIAL FIBRILLATION, UNSPECIFIED TYPE (HCC): ICD-10-CM

## 2017-03-23 DIAGNOSIS — Z79.01 LONG-TERM (CURRENT) USE OF ANTICOAGULANTS: ICD-10-CM

## 2017-03-23 LAB
ABO + RH BLD: NORMAL
ABO + RH BLD: NORMAL
BH BB BLOOD EXPIRATION DATE: NORMAL
BH BB BLOOD EXPIRATION DATE: NORMAL
BH BB BLOOD TYPE BARCODE: 5100
BH BB BLOOD TYPE BARCODE: 5100
BH BB DISPENSE STATUS: NORMAL
BH BB DISPENSE STATUS: NORMAL
BH BB PRODUCT CODE: NORMAL
BH BB PRODUCT CODE: NORMAL
BH BB UNIT NUMBER: NORMAL
BH BB UNIT NUMBER: NORMAL
INR PPP: 2.2 (ref 0.9–1.1)
UNIT  ABO: NORMAL
UNIT  ABO: NORMAL
UNIT  RH: NORMAL
UNIT  RH: NORMAL

## 2017-03-23 PROCEDURE — 85610 PROTHROMBIN TIME: CPT | Performed by: NURSE PRACTITIONER

## 2017-03-23 PROCEDURE — 99211 OFF/OP EST MAY X REQ PHY/QHP: CPT | Performed by: NURSE PRACTITIONER

## 2017-03-23 NOTE — PROGRESS NOTES
Pt here today after having pacemaker battery change with resulting hematoma evacuation. Pt has not restarted Coumadin or Lovenox. Pt is currently taking Tylenol, Norco, and Keflex. I consulted with ABRAHAM Narvaez and she recommended dose as above, restarting Lovenox 150 mg sq daily on Saturday, and rechecking INR on Monday (pt to hold Monday dose of Lovenox until seen in [facility]). Dr Gates was notified of dosing plan per phone. Pt was given dosing instructions as per Anita's recommendation; pt verbalized. Patient instructed regarding medication; results given and questions answered. Nutritional counseling given.  Dietary factors affecting therapy addressed.  Patient instructed to monitor for excessive bruising or bleeding. Will recheck in 4 days.  Electronically signed by ABRAHAM Morrow

## 2017-03-27 ENCOUNTER — ANTICOAGULATION VISIT (OUTPATIENT)
Dept: CARDIAC SURGERY | Facility: CLINIC | Age: 72
End: 2017-03-27

## 2017-03-27 VITALS — SYSTOLIC BLOOD PRESSURE: 162 MMHG | HEART RATE: 64 BPM | DIASTOLIC BLOOD PRESSURE: 50 MMHG

## 2017-03-27 DIAGNOSIS — I48.91 ATRIAL FIBRILLATION, UNSPECIFIED TYPE (HCC): ICD-10-CM

## 2017-03-27 DIAGNOSIS — Z95.2 PERSONAL HISTORY OF HEART VALVE REPLACEMENT: ICD-10-CM

## 2017-03-27 DIAGNOSIS — Z79.01 LONG-TERM (CURRENT) USE OF ANTICOAGULANTS: ICD-10-CM

## 2017-03-27 LAB — INR PPP: 1.2 (ref 0.9–1.1)

## 2017-03-27 PROCEDURE — 85610 PROTHROMBIN TIME: CPT | Performed by: NURSE PRACTITIONER

## 2017-03-27 PROCEDURE — 99211 OFF/OP EST MAY X REQ PHY/QHP: CPT | Performed by: NURSE PRACTITIONER

## 2017-03-27 NOTE — PROGRESS NOTES
"Pt states she did not take Lovenox as instructed, pt states, \"no one told me to\", but instructions were clearly written on pt's coumadin clinic weekly dosing schedule.  Adjusted coumadin dose and instructed pt to resume Lovenox injections daily, every 24hours.  Instructed pt to limit all green intake until INR rechecked this Thursday when pt seeing ABRAHAM Pavon for f/u.  Pt verbalizes.  Patient instructed regarding medication; results given and questions answered. Nutritional counseling given.  Dietary factors affecting therapy addressed.  Patient instructed to monitor for excessive bruising or bleeding.          This document has been electronically signed by ABRAHAM Nieto on March 27, 2017 12:47 PM      "

## 2017-03-31 ENCOUNTER — OFFICE VISIT (OUTPATIENT)
Dept: CARDIAC SURGERY | Facility: CLINIC | Age: 72
End: 2017-03-31

## 2017-03-31 ENCOUNTER — ANTICOAGULATION VISIT (OUTPATIENT)
Dept: CARDIAC SURGERY | Facility: CLINIC | Age: 72
End: 2017-03-31

## 2017-03-31 VITALS — SYSTOLIC BLOOD PRESSURE: 151 MMHG | DIASTOLIC BLOOD PRESSURE: 70 MMHG | HEART RATE: 80 BPM

## 2017-03-31 VITALS
HEIGHT: 65 IN | WEIGHT: 222 LBS | SYSTOLIC BLOOD PRESSURE: 151 MMHG | TEMPERATURE: 97.1 F | DIASTOLIC BLOOD PRESSURE: 70 MMHG | OXYGEN SATURATION: 95 % | HEART RATE: 80 BPM | BODY MASS INDEX: 36.99 KG/M2

## 2017-03-31 DIAGNOSIS — S21.102A: ICD-10-CM

## 2017-03-31 DIAGNOSIS — Z79.01 LONG-TERM (CURRENT) USE OF ANTICOAGULANTS: ICD-10-CM

## 2017-03-31 DIAGNOSIS — Z95.2 PERSONAL HISTORY OF HEART VALVE REPLACEMENT: ICD-10-CM

## 2017-03-31 DIAGNOSIS — Z09 FOLLOW-UP SURGERY CARE: Primary | ICD-10-CM

## 2017-03-31 DIAGNOSIS — I48.91 ATRIAL FIBRILLATION, UNSPECIFIED TYPE (HCC): ICD-10-CM

## 2017-03-31 LAB — INR PPP: 3.4 (ref 0.9–1.1)

## 2017-03-31 PROCEDURE — 99024 POSTOP FOLLOW-UP VISIT: CPT | Performed by: NURSE PRACTITIONER

## 2017-03-31 PROCEDURE — 85610 PROTHROMBIN TIME: CPT | Performed by: NURSE PRACTITIONER

## 2017-03-31 NOTE — PROGRESS NOTES
Pt states she will finish keflex tomorrow. Pt states she is taking pain medication once daily. Pt was instructed to stop lovenox; pt verbalized. Due to rapid rise in INR dose slightly adjusted and pt was instructed to eat 1/2 cup serving of green veggies today ; pt verbalized. Patient instructed regarding medication; results given and questions answered. Nutritional counseling given.  Dietary factors affecting therapy addressed.  Patient instructed to monitor for excessive bruising or bleeding.           This document has been electronically signed by ABRAHAM Nieto on March 31, 2017 1:32 PM

## 2017-04-04 ENCOUNTER — ANTICOAGULATION VISIT (OUTPATIENT)
Dept: CARDIAC SURGERY | Facility: CLINIC | Age: 72
End: 2017-04-04

## 2017-04-04 DIAGNOSIS — Z95.2 PERSONAL HISTORY OF HEART VALVE REPLACEMENT: ICD-10-CM

## 2017-04-04 DIAGNOSIS — Z79.01 LONG-TERM (CURRENT) USE OF ANTICOAGULANTS: ICD-10-CM

## 2017-04-04 DIAGNOSIS — I48.91 ATRIAL FIBRILLATION, UNSPECIFIED TYPE (HCC): ICD-10-CM

## 2017-04-04 LAB — INR PPP: 2.4 (ref 0.9–1.1)

## 2017-04-04 PROCEDURE — 99211 OFF/OP EST MAY X REQ PHY/QHP: CPT | Performed by: NURSE PRACTITIONER

## 2017-04-04 PROCEDURE — 85610 PROTHROMBIN TIME: CPT | Performed by: NURSE PRACTITIONER

## 2017-04-04 NOTE — PROGRESS NOTES
Pt was running late for appt with us and Dr Gates and did not wish to have vitals. Pt denies med changes or bleeding problems. Due to drop in INR dose adjusted and pt instructed to hold green veggies for 2 days; pt verbalized. Patient instructed regarding medication; results given and questions answered. Nutritional counseling given. Dietary factors affecting therapy addressed.  Patient instructed to monitor for excessive bruising or bleeding. Will recheck when pt returns to see Meme next week.  Electronically signed by ABRAHAM Morrow

## 2017-04-12 NOTE — PROGRESS NOTES
Subjective   Patient ID: Brendon Skelton is a 71 y.o. female is here today for follow-up PPM REVISION.    History of Present Illness  The following portions of the patient's history were reviewed and updated as appropriate: allergies, current medications, past family history, past medical history, past social history, past surgical history and problem list.  Card: Yajaira    71yr old female with recent PPM generator change per Dr. Gates on Coumadin for atrial fibrillation developed bleeding from pacemaker pocket requiring revision by Dr. Feliz. She returns today for follow up. INR 3.4    3/20/17: PPM Generator change (Yajaira)  3/21/17: Pacemaker pocket revision, evacuation hematoma, control of bleeding.    Past Medical History:   Diagnosis Date   • Acute bronchitis    • Anxiety    • Atrial fibrillation    • C. difficile colitis    • Callosity     under metatarsal head      • Cardiac pacemaker in situ    • CHF (congestive heart failure)    • Chronic obstructive lung disease    • Corns and callus    • Depressive disorder    • Diabetes mellitus    • Diastolic heart failure    • Essential hypertension    • Foot pain    • Hyperlipidemia    • Long term current use of anticoagulant    • On anticoagulant therapy    • Pulmonary emphysema    • Rectal hemorrhage    • Type 2 diabetes mellitus      Past Surgical History:   Procedure Laterality Date   • AORTIC VALVE REPAIR/REPLACEMENT  1999   • CARDIAC ELECTROPHYSIOLOGY PROCEDURE N/A 3/20/2017    Procedure: PPM generator change - dual;  Surgeon: Bereket Gates MD;  Location: [facility] INVASIVE LOCATION;  Service:    • CARDIAC PACEMAKER PLACEMENT  1999   • ECHO - CONVERTED  11/12/2013    There is mild to moderate left atrial enlargement EF 50-55%   • FOOT SURGERY  1990   • OTHER SURGICAL HISTORY  10/26/2015    PARING CORN/CALLUS    • PACEMAKER REPLACEMENT N/A 3/21/2017    Procedure: revision pacemaker pocket, evacuation hematoma, control of bleeding;  Surgeon: Polo  Rojas Feliz MD;  Location: [facility];  Service:    • TRANSESOPHAGEAL ECHOCARDIOGRAM (MITZY)  05/01/2014    With color flow-Mild left atrial enlargement with mild concentric LV hypertrophy with top normal aortic root size. EF 45-50%. Mild mitral regurgitation and mild aortic insufficiency and trivial amount of tricuspid regurgation   • TUBAL ABDOMINAL LIGATION           Current Outpatient Prescriptions:   •  acetaminophen (TYLENOL) 325 MG tablet, Take 650 mg by mouth every 6 (six) hours as needed for mild pain (1-3)., Disp: , Rfl:   •  albuterol (PROVENTIL HFA;VENTOLIN HFA) 108 (90 BASE) MCG/ACT inhaler, Inhale 2 puffs every 4 (four) hours as needed for wheezing., Disp: , Rfl:   •  albuterol (PROVENTIL) (2.5 MG/3ML) 0.083% nebulizer solution, Take 2.5 mg by nebulization 4 (four) times a day., Disp: , Rfl:   •  Ascorbic Acid (VITAMIN C WITH OCHOA HIPS) 250 MG tablet, Take 250 mg by mouth daily., Disp: , Rfl:   •  calcitriol (ROCALTROL) 0.25 MCG capsule, Take 0.25 mcg by mouth every other day., Disp: , Rfl:   •  citalopram (CeleXA) 20 MG tablet, Take 1 tablet by mouth Daily., Disp: 90 tablet, Rfl: 3  •  diltiaZEM CD (CARDIZEM CD) 240 MG 24 hr capsule, Take 1 capsule by mouth Daily., Disp: 90 capsule, Rfl: 3  •  ezetimibe (ZETIA) 10 MG tablet, Take 1 tablet by mouth Daily., Disp: 90 tablet, Rfl: 3  •  ferrous sulfate 325 (65 FE) MG tablet, Take 325 mg by mouth Daily With Breakfast., Disp: , Rfl:   •  furosemide (LASIX) 40 MG tablet, Take 1 tablet by mouth Daily., Disp: 90 tablet, Rfl: 3  •  glucose blood test strip, 1 each by Other route 3 (three) times a day. Use as instructed, Disp: , Rfl:   •  insulin aspart (NovoLOG) 100 UNIT/ML injection, Inject 2-12 Units under the skin 3 (three) times a day before meals., Disp: , Rfl:   •  Insulin Glargine (LANTUS SOLOSTAR) 100 UNIT/ML injection pen, Inject 30 Units under the skin Daily., Disp: 10 pen, Rfl: 3  •  Insulin Pen Needle (BD PEN NEEDLE DEREK U/F) 32G X 4 MM misc, 1  each 4 (Four) Times a Day., Disp: 120 each, Rfl: 5  •  ipratropium (ATROVENT HFA) 17 MCG/ACT inhaler, Inhale 2 puffs 4 (Four) Times a Day., Disp: 1 inhaler, Rfl: 3  •  losartan (COZAAR) 100 MG tablet, Take 1 tablet by mouth Daily., Disp: 90 tablet, Rfl: 3  •  Multiple Vitamins-Iron (MULTIVITAMIN/IRON PO), Take 1 tablet by mouth daily., Disp: , Rfl:   •  potassium chloride (K-DUR) 10 MEQ CR tablet, Take 1 tablet by mouth Daily., Disp: 30 tablet, Rfl: 5  •  Prenatal Vit-Fe Fumarate-FA (PRENATAL VITAMIN) 27-0.8 MG tablet, Take 1 tablet by mouth daily., Disp: , Rfl:   •  raNITIdine (ZANTAC) 150 MG tablet, Take 1 tablet by mouth 2 (Two) Times a Day., Disp: 180 tablet, Rfl: 3  •  traZODone (DESYREL) 150 MG tablet, Take 2 tablets by mouth Every Night., Disp: 180 tablet, Rfl: 3  •  warfarin (COUMADIN) 5 MG tablet, Take 5 mg by mouth Daily. Take 5 mg Sunday, Tuesday, Thursday and Saturday  Take 2.5 mg Monday, Wednesday and Friday, Disp: , Rfl:     Review of Systems   Respiratory: Negative for shortness of breath.    Hematologic/Lymphatic: Negative for bleeding problem. Bruises/bleeds easily.   Skin: Positive for color change (left chest).   All other systems reviewed and are negative.       Objective   Physical Exam   Constitutional: She is oriented to person, place, and time. She appears well-developed.   HENT:   Head: Normocephalic.   Cardiovascular: Normal rate.    Pulmonary/Chest: Effort normal. No respiratory distress.   Abdominal: Soft.   Musculoskeletal: Normal range of motion. She exhibits no edema.   Neurological: She is alert and oriented to person, place, and time.   Skin: Skin is warm and dry. Bruising noted. No erythema.   LEFT chest incision: clean, dry, well approximated. Resolving hematoma.     Lab Results   Component Value Date    INR 2.40 (A) 04/04/2017    INR 3.40 (A) 03/31/2017    INR 1.20 (A) 03/27/2017    PROTIME 14.4 03/21/2017    PROTIME 14.0 03/20/2017    PROTIME 33.0 (H) 10/21/2015     Lab Results    Component Value Date    WBC 7.95 03/22/2017    HGB 8.0 (L) 03/22/2017    HCT 25.0 (L) 03/22/2017    .0 (H) 03/22/2017     (L) 03/22/2017     Lab Results   Component Value Date    GLUCOSE 118 (H) 03/22/2017    BUN 54 (H) 03/22/2017    CREATININE 2.61 (H) 03/22/2017    EGFRIFNONA 18 (L) 03/22/2017    BCR 20.7 03/22/2017    K 4.4 03/22/2017    CO2 24.0 03/22/2017    CALCIUM 8.3 (L) 03/22/2017    ALBUMIN 3.80 03/21/2017    LABIL2 1.3 03/21/2017    AST 56 (H) 03/21/2017    ALT 46 03/21/2017           Assessment/Plan   Independent Review of Radiographic Studies:    Detailed discussion regarding risks, benefits, and treatment plan.  Patient understands, agrees, and wishes to proceed with plan.     1. Follow-up surgery care  Provena removed. Clean operative site with antibacterial soap/water, pat dry. Keep open to air unless draining, then may apply dry dressing.  No ointments or creams unless prescribed by provider.   Signs and symptoms of infection including drainage from operative site, redness, swelling, with associated fever and/or chills notify Heart and Vascular center immediately for wound check.    Return 2 weeks - Wound check    2. Bleeding from open wound of chest wall, left, initial encounter  S/p revision.     3. Long-term (current) use of anticoagulants  Notify provider if you experience excessive bleeding from the nose, cuts, gums, rectum, urinary tract, or vagina. Reddish or brown urine or stool. Vomiting of blood or hemorrhoidal bleeding. If major injury occurs present to the Emergency Department.    Continue Coumadin dosing per Anticoagulation clinic            This document has been electronically signed by ABRAHAM Nieto on April 12, 2017 9:30 AM

## 2017-04-13 ENCOUNTER — ANTICOAGULATION VISIT (OUTPATIENT)
Dept: CARDIAC SURGERY | Facility: CLINIC | Age: 72
End: 2017-04-13

## 2017-04-13 ENCOUNTER — OFFICE VISIT (OUTPATIENT)
Dept: CARDIAC SURGERY | Facility: CLINIC | Age: 72
End: 2017-04-13

## 2017-04-13 VITALS
BODY MASS INDEX: 36.24 KG/M2 | SYSTOLIC BLOOD PRESSURE: 181 MMHG | OXYGEN SATURATION: 98 % | DIASTOLIC BLOOD PRESSURE: 76 MMHG | HEIGHT: 65 IN | TEMPERATURE: 98 F | WEIGHT: 217.5 LBS | HEART RATE: 50 BPM

## 2017-04-13 DIAGNOSIS — Z09 FOLLOW-UP SURGERY CARE: Primary | ICD-10-CM

## 2017-04-13 DIAGNOSIS — Z79.01 LONG-TERM (CURRENT) USE OF ANTICOAGULANTS: ICD-10-CM

## 2017-04-13 DIAGNOSIS — I48.91 ATRIAL FIBRILLATION, UNSPECIFIED TYPE (HCC): ICD-10-CM

## 2017-04-13 DIAGNOSIS — Z95.2 PERSONAL HISTORY OF HEART VALVE REPLACEMENT: ICD-10-CM

## 2017-04-13 DIAGNOSIS — S21.102S: ICD-10-CM

## 2017-04-13 LAB — INR PPP: 2.1 (ref 0.9–1.1)

## 2017-04-13 PROCEDURE — 99211 OFF/OP EST MAY X REQ PHY/QHP: CPT | Performed by: NURSE PRACTITIONER

## 2017-04-13 PROCEDURE — 85610 PROTHROMBIN TIME: CPT | Performed by: NURSE PRACTITIONER

## 2017-04-13 PROCEDURE — 99024 POSTOP FOLLOW-UP VISIT: CPT | Performed by: NURSE PRACTITIONER

## 2017-04-13 NOTE — PROGRESS NOTES
Pt seeing ABRAHAM Pavon today. Pt states she has not restarted aspirin since pacemaker battery change. Pt denies bleeding problems. Pt was instructed to talk to cardiologist to see if she needs to restart Aspirin; pt verbalized. Dose adjusted and pt instructed to avoid green veggies for 3 days; pt verbalized. .Patient instructed regarding medication; results given and questions answered. Nutritional counseling given.  Dietary factors affecting therapy addressed.  Patient instructed to monitor for excessive bruising or bleeding. Will recheck in 5 days.          This document has been electronically signed by ABRAHAM Nieto on April 13, 2017 3:37 PM

## 2017-04-13 NOTE — PROGRESS NOTES
Subjective   Patient ID: Brendon Skelton is a 71 y.o. female is here today for follow-up PPM REVISION.    Wound Check   She was originally treated more than 14 days ago. Prior ED Treatment: HEMATOMA EVACUATION PPM POCKET. The maximum temperature noted was less than 100.4 F. There has been no drainage from the wound. There is no redness present. There is no swelling present. The pain has no pain. She has no difficulty moving the affected extremity or digit.     The following portions of the patient's history were reviewed and updated as appropriate: allergies, current medications, past family history, past medical history, past social history, past surgical history and problem list.  Card: Yajaira    71yr old female with recent PPM generator change per Dr. Gates on Coumadin for atrial fibrillation developed bleeding from pacemaker pocket requiring revision by Dr. Feliz. She returns today for follow up. INR 2.1    3/20/17: PPM Generator change (Yajaira)  3/21/17: Pacemaker pocket revision, evacuation hematoma, control of bleeding.    Past Medical History:   Diagnosis Date   • Acute bronchitis    • Anxiety    • Atrial fibrillation    • C. difficile colitis    • Callosity     under metatarsal head      • Cardiac pacemaker in situ    • CHF (congestive heart failure)    • Chronic obstructive lung disease    • Corns and callus    • Depressive disorder    • Diabetes mellitus    • Diastolic heart failure    • Essential hypertension    • Foot pain    • Hyperlipidemia    • Long term current use of anticoagulant    • On anticoagulant therapy    • Pulmonary emphysema    • Rectal hemorrhage    • Type 2 diabetes mellitus      Past Surgical History:   Procedure Laterality Date   • AORTIC VALVE REPAIR/REPLACEMENT  1999   • CARDIAC ELECTROPHYSIOLOGY PROCEDURE N/A 3/20/2017    Procedure: PPM generator change - dual;  Surgeon: Bereket Gates MD;  Location: [facility] INVASIVE LOCATION;  Service:    • CARDIAC PACEMAKER  PLACEMENT  1999   • ECHO - CONVERTED  11/12/2013    There is mild to moderate left atrial enlargement EF 50-55%   • FOOT SURGERY  1990   • OTHER SURGICAL HISTORY  10/26/2015    PARING CORN/CALLUS    • PACEMAKER REPLACEMENT N/A 3/21/2017    Procedure: revision pacemaker pocket, evacuation hematoma, control of bleeding;  Surgeon: Polo Feliz MD;  Location: [facility];  Service:    • TRANSESOPHAGEAL ECHOCARDIOGRAM (MITZY)  05/01/2014    With color flow-Mild left atrial enlargement with mild concentric LV hypertrophy with top normal aortic root size. EF 45-50%. Mild mitral regurgitation and mild aortic insufficiency and trivial amount of tricuspid regurgation   • TUBAL ABDOMINAL LIGATION           Current Outpatient Prescriptions:   •  acetaminophen (TYLENOL) 325 MG tablet, Take 650 mg by mouth every 6 (six) hours as needed for mild pain (1-3)., Disp: , Rfl:   •  albuterol (PROVENTIL HFA;VENTOLIN HFA) 108 (90 BASE) MCG/ACT inhaler, Inhale 2 puffs every 4 (four) hours as needed for wheezing., Disp: , Rfl:   •  albuterol (PROVENTIL) (2.5 MG/3ML) 0.083% nebulizer solution, Take 2.5 mg by nebulization 4 (four) times a day., Disp: , Rfl:   •  Ascorbic Acid (VITAMIN C WITH OCHOA HIPS) 250 MG tablet, Take 250 mg by mouth daily., Disp: , Rfl:   •  calcitriol (ROCALTROL) 0.25 MCG capsule, Take 0.25 mcg by mouth every other day., Disp: , Rfl:   •  citalopram (CeleXA) 20 MG tablet, Take 1 tablet by mouth Daily., Disp: 90 tablet, Rfl: 3  •  diltiaZEM CD (CARDIZEM CD) 240 MG 24 hr capsule, Take 1 capsule by mouth Daily., Disp: 90 capsule, Rfl: 3  •  ezetimibe (ZETIA) 10 MG tablet, Take 1 tablet by mouth Daily., Disp: 90 tablet, Rfl: 3  •  ferrous sulfate 325 (65 FE) MG tablet, Take 325 mg by mouth Daily With Breakfast., Disp: , Rfl:   •  furosemide (LASIX) 40 MG tablet, Take 1 tablet by mouth Daily., Disp: 90 tablet, Rfl: 3  •  glucose blood test strip, 1 each by Other route 3 (three) times a day. Use as instructed, Disp: ,  Rfl:   •  insulin aspart (NovoLOG) 100 UNIT/ML injection, Inject 2-12 Units under the skin 3 (three) times a day before meals., Disp: , Rfl:   •  Insulin Glargine (LANTUS SOLOSTAR) 100 UNIT/ML injection pen, Inject 30 Units under the skin Daily., Disp: 10 pen, Rfl: 3  •  Insulin Pen Needle (BD PEN NEEDLE DEREK U/F) 32G X 4 MM misc, 1 each 4 (Four) Times a Day., Disp: 120 each, Rfl: 5  •  ipratropium (ATROVENT HFA) 17 MCG/ACT inhaler, Inhale 2 puffs 4 (Four) Times a Day., Disp: 1 inhaler, Rfl: 3  •  losartan (COZAAR) 100 MG tablet, Take 1 tablet by mouth Daily., Disp: 90 tablet, Rfl: 3  •  Multiple Vitamins-Iron (MULTIVITAMIN/IRON PO), Take 1 tablet by mouth daily., Disp: , Rfl:   •  potassium chloride (K-DUR) 10 MEQ CR tablet, Take 1 tablet by mouth Daily., Disp: 30 tablet, Rfl: 5  •  Prenatal Vit-Fe Fumarate-FA (PRENATAL VITAMIN) 27-0.8 MG tablet, Take 1 tablet by mouth daily., Disp: , Rfl:   •  raNITIdine (ZANTAC) 150 MG tablet, Take 1 tablet by mouth 2 (Two) Times a Day., Disp: 180 tablet, Rfl: 3  •  traZODone (DESYREL) 150 MG tablet, Take 2 tablets by mouth Every Night., Disp: 180 tablet, Rfl: 3  •  warfarin (COUMADIN) 5 MG tablet, Take 5 mg by mouth Daily. Take 5 mg Sunday, Tuesday, Thursday and Saturday  Take 2.5 mg Monday, Wednesday and Friday, Disp: , Rfl:     Review of Systems   Respiratory: Negative for shortness of breath.    Hematologic/Lymphatic: Negative for bleeding problem. Bruises/bleeds easily.   Skin: Positive for color change (left chest).   All other systems reviewed and are negative.       Objective   Physical Exam   Constitutional: She is oriented to person, place, and time. She appears well-developed.   HENT:   Head: Normocephalic.   Cardiovascular: Normal rate.    Pulmonary/Chest: Effort normal. No respiratory distress.   Abdominal: Soft.   Musculoskeletal: Normal range of motion. She exhibits no edema.   Neurological: She is alert and oriented to person, place, and time.   Skin: Skin is warm  and dry. Bruising (IMPROVED) noted. No erythema.   LEFT chest incision: clean, dry, well approximated. Resolving hematoma.     Lab Results   Component Value Date    INR 2.10 (A) 04/13/2017    INR 2.40 (A) 04/04/2017    INR 3.40 (A) 03/31/2017    PROTIME 14.4 03/21/2017    PROTIME 14.0 03/20/2017    PROTIME 33.0 (H) 10/21/2015           Assessment/Plan   Independent Review of Radiographic Studies:    Detailed discussion regarding risks, benefits, and treatment plan.  Patient understands, agrees, and wishes to proceed with plan.     1. Follow-up surgery care  HEALED LEFT chest PPM incision. Mild bruising - almost completely resolved.   Return PRN.    2. Bleeding from open wound of chest wall, left, sequela  Resolved.    3. Long-term (current) use of anticoagulants  Notify provider if you experience excessive bleeding from the nose, cuts, gums, rectum, urinary tract, or vagina. Reddish or brown urine or stool. Vomiting of blood or hemorrhoidal bleeding. If major injury occurs present to the Emergency Department.    Follow with Anticoagulation Clinic as scheduled.   INR 2.1 today.              This document has been electronically signed by ABRAHAM Nieto on April 13, 2017 10:15 AM

## 2017-04-18 ENCOUNTER — ANTICOAGULATION VISIT (OUTPATIENT)
Dept: CARDIAC SURGERY | Facility: CLINIC | Age: 72
End: 2017-04-18

## 2017-04-18 VITALS — SYSTOLIC BLOOD PRESSURE: 131 MMHG | DIASTOLIC BLOOD PRESSURE: 62 MMHG | HEART RATE: 80 BPM

## 2017-04-18 DIAGNOSIS — Z79.01 LONG-TERM (CURRENT) USE OF ANTICOAGULANTS: ICD-10-CM

## 2017-04-18 DIAGNOSIS — Z95.2 PERSONAL HISTORY OF HEART VALVE REPLACEMENT: ICD-10-CM

## 2017-04-18 DIAGNOSIS — I48.91 ATRIAL FIBRILLATION, UNSPECIFIED TYPE (HCC): ICD-10-CM

## 2017-04-18 LAB — INR PPP: 2.6 (ref 0.9–1.1)

## 2017-04-18 PROCEDURE — 99211 OFF/OP EST MAY X REQ PHY/QHP: CPT | Performed by: NURSE PRACTITIONER

## 2017-04-18 PROCEDURE — 85610 PROTHROMBIN TIME: CPT | Performed by: NURSE PRACTITIONER

## 2017-04-18 NOTE — PROGRESS NOTES
Pt restarted Aspirin 4 days ago. Pt denies other med changes or bleeding problems. Pt on higher dose than usual and Aspirin can increase INR, therefor dose was adjusted. Patient instructed regarding medication; results given and questions answered. Nutritional counseling given.  Dietary factors affecting therapy addressed.  Patient instructed to monitor for excessive bruising or bleeding. Will recheck in 8 days.  Electronically signed by ABRAHAM Morrow

## 2017-04-19 ENCOUNTER — TELEPHONE (OUTPATIENT)
Dept: FAMILY MEDICINE CLINIC | Facility: CLINIC | Age: 72
End: 2017-04-19

## 2017-04-19 NOTE — TELEPHONE ENCOUNTER
Pt notified that her refills are waiting at the pharmacy.   Angélica/3/19/2017            ----- Message from Salome Mireles sent at 4/19/2017  1:16 PM CDT -----  Regarding: med refill  Contact: [phone]  Patient wants a refill on celexa at [facility].

## 2017-04-26 ENCOUNTER — ANTICOAGULATION VISIT (OUTPATIENT)
Dept: CARDIAC SURGERY | Facility: CLINIC | Age: 72
End: 2017-04-26

## 2017-04-26 VITALS — HEART RATE: 96 BPM | SYSTOLIC BLOOD PRESSURE: 129 MMHG | DIASTOLIC BLOOD PRESSURE: 54 MMHG

## 2017-04-26 DIAGNOSIS — Z79.01 LONG-TERM (CURRENT) USE OF ANTICOAGULANTS: ICD-10-CM

## 2017-04-26 DIAGNOSIS — Z95.2 PERSONAL HISTORY OF HEART VALVE REPLACEMENT: ICD-10-CM

## 2017-04-26 DIAGNOSIS — I48.91 ATRIAL FIBRILLATION, UNSPECIFIED TYPE (HCC): ICD-10-CM

## 2017-04-26 LAB — INR PPP: 3 (ref 0.9–1.1)

## 2017-04-26 PROCEDURE — 85610 PROTHROMBIN TIME: CPT | Performed by: NURSE PRACTITIONER

## 2017-04-26 NOTE — PROGRESS NOTES
Pt denies med changes or bleeding problems. Patient instructed regarding medication; results given and questions answered. Nutritional counseling given.  Dietary factors affecting therapy addressed.  Patient instructed to monitor for excessive bruising or bleeding. Will recheck in 2 weeks.          This document has been electronically signed by ABRAHAM Nieto on April 27, 2017 9:10 AM

## 2017-05-10 ENCOUNTER — ANTICOAGULATION VISIT (OUTPATIENT)
Dept: CARDIAC SURGERY | Facility: CLINIC | Age: 72
End: 2017-05-10

## 2017-05-10 VITALS — HEART RATE: 72 BPM | DIASTOLIC BLOOD PRESSURE: 68 MMHG | SYSTOLIC BLOOD PRESSURE: 137 MMHG

## 2017-05-10 DIAGNOSIS — Z95.2 PERSONAL HISTORY OF HEART VALVE REPLACEMENT: ICD-10-CM

## 2017-05-10 DIAGNOSIS — Z79.01 LONG-TERM (CURRENT) USE OF ANTICOAGULANTS: ICD-10-CM

## 2017-05-10 DIAGNOSIS — I48.91 ATRIAL FIBRILLATION, UNSPECIFIED TYPE (HCC): ICD-10-CM

## 2017-05-10 LAB — INR PPP: 3.7 (ref 0.9–1.1)

## 2017-05-10 PROCEDURE — 85610 PROTHROMBIN TIME: CPT | Performed by: NURSE PRACTITIONER

## 2017-05-10 PROCEDURE — 99211 OFF/OP EST MAY X REQ PHY/QHP: CPT | Performed by: NURSE PRACTITIONER

## 2017-05-24 ENCOUNTER — ANTICOAGULATION VISIT (OUTPATIENT)
Dept: CARDIAC SURGERY | Facility: CLINIC | Age: 72
End: 2017-05-24

## 2017-05-24 VITALS — SYSTOLIC BLOOD PRESSURE: 140 MMHG | HEART RATE: 56 BPM | DIASTOLIC BLOOD PRESSURE: 56 MMHG

## 2017-05-24 DIAGNOSIS — I48.91 ATRIAL FIBRILLATION, UNSPECIFIED TYPE (HCC): ICD-10-CM

## 2017-05-24 DIAGNOSIS — Z79.01 LONG-TERM (CURRENT) USE OF ANTICOAGULANTS: ICD-10-CM

## 2017-05-24 DIAGNOSIS — Z95.2 PERSONAL HISTORY OF HEART VALVE REPLACEMENT: ICD-10-CM

## 2017-05-24 LAB — INR PPP: 6.1 (ref 0.9–1.1)

## 2017-05-24 PROCEDURE — 99211 OFF/OP EST MAY X REQ PHY/QHP: CPT | Performed by: NURSE PRACTITIONER

## 2017-05-24 PROCEDURE — 85610 PROTHROMBIN TIME: CPT | Performed by: NURSE PRACTITIONER

## 2017-05-30 ENCOUNTER — ANTICOAGULATION VISIT (OUTPATIENT)
Dept: CARDIAC SURGERY | Facility: CLINIC | Age: 72
End: 2017-05-30

## 2017-05-30 VITALS — SYSTOLIC BLOOD PRESSURE: 142 MMHG | HEART RATE: 57 BPM | DIASTOLIC BLOOD PRESSURE: 85 MMHG

## 2017-05-30 DIAGNOSIS — Z79.01 LONG-TERM (CURRENT) USE OF ANTICOAGULANTS: ICD-10-CM

## 2017-05-30 DIAGNOSIS — Z95.2 PERSONAL HISTORY OF HEART VALVE REPLACEMENT: ICD-10-CM

## 2017-05-30 DIAGNOSIS — I48.91 ATRIAL FIBRILLATION, UNSPECIFIED TYPE (HCC): ICD-10-CM

## 2017-05-30 LAB — INR PPP: 2.8 (ref 0.9–1.1)

## 2017-05-30 PROCEDURE — 85610 PROTHROMBIN TIME: CPT | Performed by: NURSE PRACTITIONER

## 2017-06-06 ENCOUNTER — ANTICOAGULATION VISIT (OUTPATIENT)
Dept: CARDIAC SURGERY | Facility: CLINIC | Age: 72
End: 2017-06-06

## 2017-06-06 VITALS — DIASTOLIC BLOOD PRESSURE: 54 MMHG | HEART RATE: 53 BPM | SYSTOLIC BLOOD PRESSURE: 171 MMHG

## 2017-06-06 DIAGNOSIS — Z95.2 PERSONAL HISTORY OF HEART VALVE REPLACEMENT: ICD-10-CM

## 2017-06-06 DIAGNOSIS — Z79.01 LONG-TERM (CURRENT) USE OF ANTICOAGULANTS: ICD-10-CM

## 2017-06-06 DIAGNOSIS — I48.91 ATRIAL FIBRILLATION, UNSPECIFIED TYPE (HCC): ICD-10-CM

## 2017-06-06 LAB — INR PPP: 3 (ref 0.9–1.1)

## 2017-06-06 PROCEDURE — 85610 PROTHROMBIN TIME: CPT | Performed by: NURSE PRACTITIONER

## 2017-06-20 ENCOUNTER — ANTICOAGULATION VISIT (OUTPATIENT)
Dept: CARDIAC SURGERY | Facility: CLINIC | Age: 72
End: 2017-06-20

## 2017-06-20 VITALS — HEART RATE: 80 BPM | SYSTOLIC BLOOD PRESSURE: 141 MMHG | DIASTOLIC BLOOD PRESSURE: 70 MMHG

## 2017-06-20 DIAGNOSIS — Z79.01 LONG-TERM (CURRENT) USE OF ANTICOAGULANTS: ICD-10-CM

## 2017-06-20 DIAGNOSIS — I48.91 ATRIAL FIBRILLATION, UNSPECIFIED TYPE (HCC): ICD-10-CM

## 2017-06-20 DIAGNOSIS — Z95.2 PERSONAL HISTORY OF HEART VALVE REPLACEMENT: ICD-10-CM

## 2017-06-20 LAB — INR PPP: 3.7 (ref 0.9–1.1)

## 2017-06-20 PROCEDURE — 85610 PROTHROMBIN TIME: CPT | Performed by: NURSE PRACTITIONER

## 2017-06-20 PROCEDURE — 99211 OFF/OP EST MAY X REQ PHY/QHP: CPT | Performed by: NURSE PRACTITIONER

## 2017-06-20 NOTE — PROGRESS NOTES
Pt denies med changes or bleeding problems. Pt states she was suppose to eat a serving of green veggies yesterday. Dose adjusted today and pt instructed to have a serving of green veggies; pt verbalized. Patient instructed regarding medication; results given and questions answered. Nutritional counseling given.  Dietary factors affecting therapy addressed.  Patient instructed to monitor for excessive bruising or bleeding. Will recheck in 2 weeks.          This document has been electronically signed by ABRAHAM Nieto on June 20, 2017 2:49 PM

## 2017-06-22 ENCOUNTER — TELEPHONE (OUTPATIENT)
Dept: ENDOCRINOLOGY | Facility: CLINIC | Age: 72
End: 2017-06-22

## 2017-06-26 ENCOUNTER — TRANSCRIBE ORDERS (OUTPATIENT)
Dept: LAB | Facility: CLINIC | Age: 72
End: 2017-06-26

## 2017-06-26 DIAGNOSIS — A04.72 INTESTINAL INFECTION DUE TO CLOSTRIDIUM DIFFICILE: ICD-10-CM

## 2017-06-26 DIAGNOSIS — D63.1 ANEMIA OF CHRONIC KIDNEY FAILURE, UNSPECIFIED STAGE: ICD-10-CM

## 2017-06-26 DIAGNOSIS — E21.1 SECONDARY HYPERPARATHYROIDISM, NON-RENAL (HCC): Primary | ICD-10-CM

## 2017-06-26 DIAGNOSIS — N18.9 ANEMIA OF CHRONIC KIDNEY FAILURE, UNSPECIFIED STAGE: ICD-10-CM

## 2017-06-26 DIAGNOSIS — I50.9 CONGESTIVE HEART FAILURE, UNSPECIFIED: ICD-10-CM

## 2017-06-26 DIAGNOSIS — N18.30 CHRONIC KIDNEY DISEASE, STAGE III (MODERATE) (HCC): ICD-10-CM

## 2017-06-26 LAB
ANISOCYTOSIS BLD QL: NORMAL
BASOPHILS # BLD MANUAL: 0.12 10*3/MM3 (ref 0–0.2)
BASOPHILS NFR BLD AUTO: 2 % (ref 0–2)
DEPRECATED RDW RBC AUTO: 55.1 FL (ref 36.4–46.3)
EOSINOPHIL # BLD MANUAL: 0.24 10*3/MM3 (ref 0–0.7)
EOSINOPHIL NFR BLD MANUAL: 4 % (ref 0–7)
ERYTHROCYTE [DISTWIDTH] IN BLOOD BY AUTOMATED COUNT: 15.2 % (ref 11.5–14.5)
HCT VFR BLD AUTO: 32.3 % (ref 35–45)
HGB BLD-MCNC: 10.3 G/DL (ref 12–15.5)
HYPOCHROMIA BLD QL: NORMAL
LYMPHOCYTES # BLD MANUAL: 0.98 10*3/MM3 (ref 0.6–4.2)
LYMPHOCYTES NFR BLD MANUAL: 10 % (ref 0–12)
LYMPHOCYTES NFR BLD MANUAL: 16 % (ref 10–50)
MCH RBC QN AUTO: 31 PG (ref 26.5–34)
MCHC RBC AUTO-ENTMCNC: 31.9 G/DL (ref 31.4–36)
MCV RBC AUTO: 97.3 FL (ref 80–98)
MONOCYTES # BLD AUTO: 0.61 10*3/MM3 (ref 0–0.9)
NEUTROPHILS # BLD AUTO: 4.15 10*3/MM3 (ref 2–8.6)
NEUTROPHILS NFR BLD MANUAL: 68 % (ref 37–80)
PLATELET # BLD AUTO: 226 10*3/MM3 (ref 150–450)
PMV BLD AUTO: 11.2 FL (ref 8–12)
RBC # BLD AUTO: 3.32 10*6/MM3 (ref 3.77–5.16)
SMALL PLATELETS BLD QL SMEAR: ADEQUATE
WBC MORPH BLD: NORMAL
WBC NRBC COR # BLD: 6.1 10*3/MM3 (ref 3.2–9.8)

## 2017-06-26 PROCEDURE — 80053 COMPREHEN METABOLIC PANEL: CPT | Performed by: INTERNAL MEDICINE

## 2017-06-26 PROCEDURE — 85007 BL SMEAR W/DIFF WBC COUNT: CPT | Performed by: INTERNAL MEDICINE

## 2017-06-26 PROCEDURE — 36415 COLL VENOUS BLD VENIPUNCTURE: CPT | Performed by: FAMILY MEDICINE

## 2017-06-26 PROCEDURE — 85027 COMPLETE CBC AUTOMATED: CPT | Performed by: INTERNAL MEDICINE

## 2017-06-27 LAB
ALBUMIN SERPL-MCNC: 3.6 G/DL (ref 3.4–4.8)
ALBUMIN/GLOB SERPL: 1.3 G/DL (ref 1.1–1.8)
ALP SERPL-CCNC: 101 U/L (ref 38–126)
ALT SERPL W P-5'-P-CCNC: 67 U/L (ref 9–52)
ANION GAP SERPL CALCULATED.3IONS-SCNC: 8 MMOL/L (ref 5–15)
AST SERPL-CCNC: 41 U/L (ref 14–36)
BILIRUB SERPL-MCNC: 0.5 MG/DL (ref 0.2–1.3)
BUN BLD-MCNC: 67 MG/DL (ref 7–21)
BUN/CREAT SERPL: 31.9 (ref 7–25)
CALCIUM SPEC-SCNC: 9.5 MG/DL (ref 8.4–10.2)
CHLORIDE SERPL-SCNC: 102 MMOL/L (ref 95–110)
CO2 SERPL-SCNC: 28 MMOL/L (ref 22–31)
CREAT BLD-MCNC: 2.1 MG/DL (ref 0.5–1)
GFR SERPL CREATININE-BSD FRML MDRD: 23 ML/MIN/1.73 (ref 39–90)
GLOBULIN UR ELPH-MCNC: 2.8 GM/DL (ref 2.3–3.5)
GLUCOSE BLD-MCNC: 77 MG/DL (ref 60–100)
POTASSIUM BLD-SCNC: 5.1 MMOL/L (ref 3.5–5.1)
PROT SERPL-MCNC: 6.4 G/DL (ref 6.3–8.6)
SODIUM BLD-SCNC: 138 MMOL/L (ref 137–145)

## 2017-07-05 ENCOUNTER — ANTICOAGULATION VISIT (OUTPATIENT)
Dept: CARDIAC SURGERY | Facility: CLINIC | Age: 72
End: 2017-07-05

## 2017-07-05 VITALS — HEART RATE: 64 BPM | DIASTOLIC BLOOD PRESSURE: 66 MMHG | SYSTOLIC BLOOD PRESSURE: 135 MMHG

## 2017-07-05 DIAGNOSIS — Z79.01 LONG-TERM (CURRENT) USE OF ANTICOAGULANTS: ICD-10-CM

## 2017-07-05 DIAGNOSIS — Z95.2 PERSONAL HISTORY OF HEART VALVE REPLACEMENT: ICD-10-CM

## 2017-07-05 DIAGNOSIS — I48.91 ATRIAL FIBRILLATION, UNSPECIFIED TYPE (HCC): ICD-10-CM

## 2017-07-05 LAB — INR PPP: 3.1 (ref 0.9–1.1)

## 2017-07-05 PROCEDURE — 85610 PROTHROMBIN TIME: CPT | Performed by: NURSE PRACTITIONER

## 2017-07-05 RX ORDER — WARFARIN SODIUM 5 MG/1
TABLET ORAL
Qty: 30 TABLET | Refills: 2 | Status: SHIPPED | OUTPATIENT
Start: 2017-07-05 | End: 2017-09-07 | Stop reason: SDUPTHER

## 2017-07-05 NOTE — PROGRESS NOTES
Patient states no med changes or bleeding problems or unexplained bruising. Patient instructed to continue current dosing schedule. Verbalizes understanding. Will recheck 1 month.  Patient instructed regarding medication; results given and questions answered. Nutritional counseling given.  Dietary factors affecting therapy addressed.  Patient instructed to monitor for excessive bruising or bleeding.   Electronically signed by ABRAHAM Morrow

## 2017-08-01 ENCOUNTER — ANTICOAGULATION VISIT (OUTPATIENT)
Dept: CARDIAC SURGERY | Facility: CLINIC | Age: 72
End: 2017-08-01

## 2017-08-01 VITALS — DIASTOLIC BLOOD PRESSURE: 54 MMHG | HEART RATE: 68 BPM | SYSTOLIC BLOOD PRESSURE: 126 MMHG

## 2017-08-01 DIAGNOSIS — Z79.01 LONG-TERM (CURRENT) USE OF ANTICOAGULANTS: ICD-10-CM

## 2017-08-01 DIAGNOSIS — Z95.2 PERSONAL HISTORY OF HEART VALVE REPLACEMENT: ICD-10-CM

## 2017-08-01 DIAGNOSIS — I48.91 ATRIAL FIBRILLATION, UNSPECIFIED TYPE (HCC): ICD-10-CM

## 2017-08-01 LAB — INR PPP: 2.2 (ref 0.9–1.1)

## 2017-08-01 PROCEDURE — 85610 PROTHROMBIN TIME: CPT | Performed by: NURSE PRACTITIONER

## 2017-08-01 PROCEDURE — 99211 OFF/OP EST MAY X REQ PHY/QHP: CPT | Performed by: NURSE PRACTITIONER

## 2017-08-01 NOTE — PROGRESS NOTES
Pt denies med changes or bleeding problems. Dose adjusted and pt instructed to hold green veggies for 2 days; pt verbalized. Patient instructed regarding medication; results given and questions answered. Nutritional counseling given.  Dietary factors affecting therapy addressed.  Patient instructed to monitor for excessive bruising or bleeding. Will recheck in 2 weeks and if therapeutic will place out 1 month.  Electronically signed by ABRAHAM Morrow

## 2017-08-15 ENCOUNTER — ANTICOAGULATION VISIT (OUTPATIENT)
Dept: CARDIAC SURGERY | Facility: CLINIC | Age: 72
End: 2017-08-15

## 2017-08-15 VITALS — DIASTOLIC BLOOD PRESSURE: 57 MMHG | HEART RATE: 92 BPM | SYSTOLIC BLOOD PRESSURE: 131 MMHG

## 2017-08-15 DIAGNOSIS — Z79.01 LONG-TERM (CURRENT) USE OF ANTICOAGULANTS: ICD-10-CM

## 2017-08-15 DIAGNOSIS — I48.91 ATRIAL FIBRILLATION, UNSPECIFIED TYPE (HCC): ICD-10-CM

## 2017-08-15 DIAGNOSIS — Z95.2 PERSONAL HISTORY OF HEART VALVE REPLACEMENT: ICD-10-CM

## 2017-08-15 LAB — INR PPP: 2.4 (ref 0.9–1.1)

## 2017-08-15 PROCEDURE — 85610 PROTHROMBIN TIME: CPT | Performed by: NURSE PRACTITIONER

## 2017-08-15 PROCEDURE — 99211 OFF/OP EST MAY X REQ PHY/QHP: CPT | Performed by: NURSE PRACTITIONER

## 2017-08-15 NOTE — PROGRESS NOTES
Pt denies med changes or bleeding problems. Dose adjusted and pt instructed to hold green veggies for 2 days; pt verbalized. Patient instructed regarding medication; results given and questions answered. Nutritional counseling given.  Dietary factors affecting therapy addressed.  Patient instructed to monitor for excessive bruising or bleeding. Will recheck in 10 days.  Electronically signed by ABRAHAM Morrow

## 2017-08-25 ENCOUNTER — ANTICOAGULATION VISIT (OUTPATIENT)
Dept: CARDIAC SURGERY | Facility: CLINIC | Age: 72
End: 2017-08-25

## 2017-08-25 VITALS — HEART RATE: 72 BPM | SYSTOLIC BLOOD PRESSURE: 133 MMHG | DIASTOLIC BLOOD PRESSURE: 67 MMHG

## 2017-08-25 DIAGNOSIS — Z95.2 PERSONAL HISTORY OF HEART VALVE REPLACEMENT: ICD-10-CM

## 2017-08-25 DIAGNOSIS — I48.91 ATRIAL FIBRILLATION, UNSPECIFIED TYPE (HCC): ICD-10-CM

## 2017-08-25 DIAGNOSIS — Z79.01 LONG-TERM (CURRENT) USE OF ANTICOAGULANTS: ICD-10-CM

## 2017-08-25 LAB — INR PPP: 1.8 (ref 0.9–1.1)

## 2017-08-25 PROCEDURE — 85610 PROTHROMBIN TIME: CPT | Performed by: NURSE PRACTITIONER

## 2017-08-25 PROCEDURE — 99211 OFF/OP EST MAY X REQ PHY/QHP: CPT | Performed by: NURSE PRACTITIONER

## 2017-08-25 NOTE — PROGRESS NOTES
Pt denies med changes, bleeding problems, missed doses, or excessive vit K intake. Dose adjusted today only and pt instructed to hold green veggies for 3 days; pt verbalized. Patient instructed regarding medication; results given and questions answered. Nutritional counseling given.  Dietary factors affecting therapy addressed.  Patient instructed to monitor for excessive bruising or bleeding. Will recheck in 6 days.        This document has been electronically signed by ABRAHAM Nieto on August 25, 2017 11:12 AM

## 2017-08-31 ENCOUNTER — ANTICOAGULATION VISIT (OUTPATIENT)
Dept: CARDIAC SURGERY | Facility: CLINIC | Age: 72
End: 2017-08-31

## 2017-08-31 VITALS — HEART RATE: 84 BPM | DIASTOLIC BLOOD PRESSURE: 56 MMHG | SYSTOLIC BLOOD PRESSURE: 135 MMHG

## 2017-08-31 DIAGNOSIS — Z79.01 LONG-TERM (CURRENT) USE OF ANTICOAGULANTS: ICD-10-CM

## 2017-08-31 DIAGNOSIS — I48.91 ATRIAL FIBRILLATION, UNSPECIFIED TYPE (HCC): ICD-10-CM

## 2017-08-31 DIAGNOSIS — Z95.2 PERSONAL HISTORY OF HEART VALVE REPLACEMENT: ICD-10-CM

## 2017-08-31 LAB — INR PPP: 2.4 (ref 0.9–1.1)

## 2017-08-31 PROCEDURE — 85610 PROTHROMBIN TIME: CPT | Performed by: NURSE PRACTITIONER

## 2017-09-05 ENCOUNTER — OFFICE VISIT (OUTPATIENT)
Dept: FAMILY MEDICINE CLINIC | Facility: CLINIC | Age: 72
End: 2017-09-05

## 2017-09-05 VITALS
OXYGEN SATURATION: 96 % | DIASTOLIC BLOOD PRESSURE: 78 MMHG | BODY MASS INDEX: 36.49 KG/M2 | WEIGHT: 219 LBS | HEART RATE: 58 BPM | HEIGHT: 65 IN | SYSTOLIC BLOOD PRESSURE: 128 MMHG

## 2017-09-05 DIAGNOSIS — Z79.4 TYPE 2 DIABETES MELLITUS WITHOUT COMPLICATION, WITH LONG-TERM CURRENT USE OF INSULIN (HCC): Primary | ICD-10-CM

## 2017-09-05 DIAGNOSIS — Z23 NEED FOR STREPTOCOCCUS PNEUMONIAE VACCINATION: ICD-10-CM

## 2017-09-05 DIAGNOSIS — R20.2 PARESTHESIA OF BOTH FEET: ICD-10-CM

## 2017-09-05 DIAGNOSIS — G25.81 RESTLESS LEG SYNDROME: ICD-10-CM

## 2017-09-05 DIAGNOSIS — E11.9 TYPE 2 DIABETES MELLITUS WITHOUT COMPLICATION, WITH LONG-TERM CURRENT USE OF INSULIN (HCC): Primary | ICD-10-CM

## 2017-09-05 DIAGNOSIS — N18.9 CKD (CHRONIC KIDNEY DISEASE), UNSPECIFIED STAGE: ICD-10-CM

## 2017-09-05 DIAGNOSIS — Z23 NEED FOR INFLUENZA VACCINATION: ICD-10-CM

## 2017-09-05 DIAGNOSIS — Z12.11 SCREENING FOR COLON CANCER: ICD-10-CM

## 2017-09-05 DIAGNOSIS — Z12.12 SCREENING FOR RECTAL CANCER: ICD-10-CM

## 2017-09-05 DIAGNOSIS — R74.01 TRANSAMINITIS: ICD-10-CM

## 2017-09-05 PROCEDURE — G0008 ADMIN INFLUENZA VIRUS VAC: HCPCS | Performed by: FAMILY MEDICINE

## 2017-09-05 PROCEDURE — 90662 IIV NO PRSV INCREASED AG IM: CPT | Performed by: FAMILY MEDICINE

## 2017-09-05 PROCEDURE — G0009 ADMIN PNEUMOCOCCAL VACCINE: HCPCS | Performed by: FAMILY MEDICINE

## 2017-09-05 PROCEDURE — 90732 PPSV23 VACC 2 YRS+ SUBQ/IM: CPT | Performed by: FAMILY MEDICINE

## 2017-09-05 PROCEDURE — 99214 OFFICE O/P EST MOD 30 MIN: CPT | Performed by: FAMILY MEDICINE

## 2017-09-05 RX ORDER — ROPINIROLE 0.25 MG/1
0.25 TABLET, FILM COATED ORAL NIGHTLY
Qty: 90 TABLET | Refills: 3 | Status: SHIPPED | OUTPATIENT
Start: 2017-09-05 | End: 2018-09-12 | Stop reason: SDUPTHER

## 2017-09-05 NOTE — PROGRESS NOTES
"Subjective   Chief Complaint   Patient presents with   • Follow-up     6mo follow up       Brendon Skelton is a 71 y.o. female who presents for Follow-up (6mo follow up)   Diabetes   She presents for her follow-up diabetic visit. She has type 2 diabetes mellitus. There are no hypoglycemic associated symptoms. Pertinent negatives for hypoglycemia include no headaches. Associated symptoms include foot paresthesias. Pertinent negatives for diabetes include no chest pain, no fatigue, no polydipsia, no polyphagia and no polyuria. Current diabetic treatment includes insulin injections. She is compliant with treatment all of the time. She is following a diabetic and generally healthy diet. (-120) An ACE inhibitor/angiotensin II receptor blocker is being taken. Eye exam is not current.   Hypertension   This is a chronic problem. The problem is controlled. Pertinent negatives include no chest pain, headaches, orthopnea, palpitations, peripheral edema or shortness of breath. There are no associated agents to hypertension. There are no compliance problems.      She is following with Dr. Caputo for CKD. Recently had cmp which showed elevated liver enzymes    She reports that her feet feel like \"they are on fire\" just at night. They also feel restless and she feels that she has to move them. Keeps her up at night.     The following portions of the patient's history were reviewed and updated as appropriate:problem list, current medications, allergies, past family history, past medical history, past social history and past surgical history    Past Medical History:   Diagnosis Date   • Acute bronchitis    • Anxiety    • Atrial fibrillation    • C. difficile colitis    • Callosity     under metatarsal head      • Cardiac pacemaker in situ    • CHF (congestive heart failure)    • Chronic obstructive lung disease    • Corns and callus    • Depressive disorder    • Diabetes mellitus    • Diastolic heart failure    • " Essential hypertension    • Foot pain    • Hyperlipidemia    • Long term current use of anticoagulant    • On anticoagulant therapy    • Pulmonary emphysema    • Rectal hemorrhage    • Type 2 diabetes mellitus        Social History   Substance Use Topics   • Smoking status: Former Smoker     Quit date: 3/21/1999   • Smokeless tobacco: Not on file   • Alcohol use 1.2 oz/week     2 Shots of liquor per week      Comment: everry three months or so       Medications:  Outpatient Medications Prior to Visit   Medication Sig Dispense Refill   • acetaminophen (TYLENOL) 325 MG tablet Take 650 mg by mouth every 6 (six) hours as needed for mild pain (1-3).     • albuterol (PROVENTIL HFA;VENTOLIN HFA) 108 (90 BASE) MCG/ACT inhaler Inhale 2 puffs every 4 (four) hours as needed for wheezing.     • albuterol (PROVENTIL) (2.5 MG/3ML) 0.083% nebulizer solution Take 2.5 mg by nebulization 4 (four) times a day.     • Ascorbic Acid (VITAMIN C WITH OCHOA HIPS) 250 MG tablet Take 250 mg by mouth daily.     • calcitriol (ROCALTROL) 0.25 MCG capsule Take 0.25 mcg by mouth every other day.     • citalopram (CeleXA) 20 MG tablet Take 1 tablet by mouth Daily. 90 tablet 3   • diltiaZEM CD (CARDIZEM CD) 240 MG 24 hr capsule Take 1 capsule by mouth Daily. 90 capsule 3   • ezetimibe (ZETIA) 10 MG tablet Take 1 tablet by mouth Daily. 90 tablet 3   • ferrous sulfate 325 (65 FE) MG tablet Take 325 mg by mouth Daily With Breakfast.     • furosemide (LASIX) 40 MG tablet Take 1 tablet by mouth Daily. 90 tablet 3   • glucose blood test strip 1 each by Other route 3 (three) times a day. Use as instructed     • insulin aspart (NovoLOG) 100 UNIT/ML injection Inject 2-12 Units under the skin 3 (three) times a day before meals.     • Insulin Glargine (LANTUS SOLOSTAR) 100 UNIT/ML injection pen Inject 30 Units under the skin Daily. 10 pen 3   • Insulin Pen Needle (BD PEN NEEDLE DEREK U/F) 32G X 4 MM misc 1 each 4 (Four) Times a Day. 120 each 5   • ipratropium  "(ATROVENT HFA) 17 MCG/ACT inhaler Inhale 2 puffs 4 (Four) Times a Day. 1 inhaler 3   • losartan (COZAAR) 100 MG tablet Take 1 tablet by mouth Daily. 90 tablet 3   • Multiple Vitamins-Iron (MULTIVITAMIN/IRON PO) Take 1 tablet by mouth daily.     • potassium chloride (K-DUR) 10 MEQ CR tablet Take 1 tablet by mouth Daily. 30 tablet 5   • Prenatal Vit-Fe Fumarate-FA (PRENATAL VITAMIN) 27-0.8 MG tablet Take 1 tablet by mouth daily.     • raNITIdine (ZANTAC) 150 MG tablet Take 1 tablet by mouth 2 (Two) Times a Day. 180 tablet 3   • traZODone (DESYREL) 150 MG tablet Take 2 tablets by mouth Every Night. 180 tablet 3   • warfarin (COUMADIN) 5 MG tablet Take 5 mg Sunday, Tuesday, Thursday and Saturday Take 2.5 mg Monday, Wednesday and Friday 30 tablet 2     No facility-administered medications prior to visit.        Allergies   Allergen Reactions   • Adhesive Tape      Only paper tape   • Crestor [Rosuvastatin Calcium]    • Lipitor [Atorvastatin]    • Lortab [Hydrocodone-Acetaminophen] Hallucinations       Review of Systems   Constitutional: Negative for fatigue, fever and unexpected weight change.   HENT: Negative.    Eyes: Negative.    Respiratory: Negative for shortness of breath.    Cardiovascular: Negative for chest pain, palpitations, orthopnea and leg swelling.   Gastrointestinal: Negative for abdominal pain, constipation, diarrhea, nausea and vomiting.   Endocrine: Negative.  Negative for polydipsia, polyphagia and polyuria.   Genitourinary: Negative.    Musculoskeletal: Negative.    Skin: Negative.    Neurological: Negative.  Negative for headaches.        Burning in feet at night and restless legs/feet   Psychiatric/Behavioral: Negative for dysphoric mood and sleep disturbance.       Objective   Visit Vitals   • /78 (BP Location: Left arm, Patient Position: Sitting, Cuff Size: Large Adult)   • Pulse 58   • Ht 65\" (165.1 cm)   • Wt 219 lb (99.3 kg)   • SpO2 96%   • BMI 36.44 kg/m2       Physical Exam "   Constitutional: She appears well-developed and well-nourished. No distress.   HENT:   Head: Normocephalic.   Eyes: Conjunctivae are normal.   Pulmonary/Chest: Effort normal.   Abdominal: She exhibits no distension.   Musculoskeletal: She exhibits no edema.    Brendon had a diabetic foot exam performed today.   During the foot exam she had a monofilament test performed (normal sensation bilaterally).    Vascular Status -  Her exam exhibits right foot vasculature normal. Her exam exhibits no right foot edema. Her exam exhibits left foot vasculature normal. Her exam exhibits no left foot edema.   Skin Integrity  -  Her right foot skin is intact.     Brendon 's left foot skin is intact. .  Neurological: She is alert.   Skin: Skin is warm and dry.   Psychiatric: She has a normal mood and affect. Her behavior is normal.   Nursing note and vitals reviewed.      Assessment/Plan   Brendon Skelton is a 71 y.o. female seen today for the followin. Type 2 diabetes mellitus without complication, with long-term current use of insulin  Controlled  Check labs  Referred for eye exam    - Comprehensive Metabolic Panel  - Hemoglobin A1c  - MicroAlbumin, Urine, Random  - Ambulatory Referral to Ophthalmology    2. Restless leg syndrome  Trial of requip-discussed may need to titrate dose.   Check relevant labs    - rOPINIRole (REQUIP) 0.25 MG tablet; Take 1 tablet by mouth Every Night. Take 1 hour before bedtime.  Dispense: 90 tablet; Refill: 3  - Vitamin B12  - TSH  - T4, Free  - Ferritin    3. Need for influenza vaccination    - Flu Vaccine High Dose PF 65YR+ (FLUZONE [id number])    4. Need for Streptococcus pneumoniae vaccination    - Pneumococcal Polysaccharide Vaccine 23-Valent Greater Than or Equal To 1yo Subcutaneous / IM    5. Paresthesia of both feet  Foot exam normal today    - Vitamin B12  - TSH  - T4, Free  - Ferritin    6. CKD (chronic kidney disease), unspecified stage    - Ferritin    7. Transaminitis  Last cmp  reviewed. Will repeat today. Monitor. If trend upwards, will need workup    8. Screening for colon cancer  Patient declined colonoscopy. Cologuard ordered.     - Cologuard    9. Screening for rectal cancer    - Cologuard    Follow up: Return in about 6 months (around 3/5/2018).          This document has been electronically signed by Rula Paulino DO on September 5, 2017 12:43 PM

## 2017-09-07 ENCOUNTER — ANTICOAGULATION VISIT (OUTPATIENT)
Dept: CARDIAC SURGERY | Facility: CLINIC | Age: 72
End: 2017-09-07

## 2017-09-07 VITALS — DIASTOLIC BLOOD PRESSURE: 58 MMHG | HEART RATE: 64 BPM | SYSTOLIC BLOOD PRESSURE: 134 MMHG

## 2017-09-07 DIAGNOSIS — Z95.2 PERSONAL HISTORY OF HEART VALVE REPLACEMENT: ICD-10-CM

## 2017-09-07 DIAGNOSIS — Z79.01 LONG-TERM (CURRENT) USE OF ANTICOAGULANTS: ICD-10-CM

## 2017-09-07 DIAGNOSIS — I48.91 ATRIAL FIBRILLATION, UNSPECIFIED TYPE (HCC): ICD-10-CM

## 2017-09-07 LAB — INR PPP: 2.9 (ref 0.9–1.1)

## 2017-09-07 PROCEDURE — 85610 PROTHROMBIN TIME: CPT | Performed by: NURSE PRACTITIONER

## 2017-09-07 RX ORDER — WARFARIN SODIUM 5 MG/1
TABLET ORAL
Qty: 90 TABLET | Refills: 1 | Status: SHIPPED | OUTPATIENT
Start: 2017-09-07 | End: 2018-03-14 | Stop reason: SDUPTHER

## 2017-09-07 NOTE — PROGRESS NOTES
Patient states no med changes or bleeding problems or unexplained bruising. Patient instructed to continue current dosing schedule. Verbalizes understanding. Will recheck in 2 weeks. Patient instructed regarding medication; results given and questions answered. Nutritional counseling given.  Dietary factors affecting therapy addressed.  Patient instructed to monitor for excessive bruising or bleeding.           This document has been electronically signed by ABRAHAM Nieto on September 7, 2017 3:57 PM

## 2017-09-14 LAB
ALBUMIN SERPL-MCNC: 3.6 G/DL (ref 3.4–4.8)
ALBUMIN UR-MCNC: 13.5 MG/L
ALBUMIN/GLOB SERPL: 1.3 G/DL (ref 1.1–1.8)
ALP SERPL-CCNC: 95 U/L (ref 38–126)
ALT SERPL W P-5'-P-CCNC: 77 U/L (ref 9–52)
ANION GAP SERPL CALCULATED.3IONS-SCNC: 6 MMOL/L (ref 5–15)
AST SERPL-CCNC: 45 U/L (ref 14–36)
BILIRUB SERPL-MCNC: 0.6 MG/DL (ref 0.2–1.3)
BUN BLD-MCNC: 49 MG/DL (ref 7–21)
BUN/CREAT SERPL: 25.9 (ref 7–25)
CALCIUM SPEC-SCNC: 9.6 MG/DL (ref 8.4–10.2)
CHLORIDE SERPL-SCNC: 101 MMOL/L (ref 95–110)
CO2 SERPL-SCNC: 32 MMOL/L (ref 22–31)
CREAT BLD-MCNC: 1.89 MG/DL (ref 0.5–1)
FERRITIN SERPL-MCNC: 304 NG/ML (ref 11.1–264)
GFR SERPL CREATININE-BSD FRML MDRD: 26 ML/MIN/1.73 (ref 39–90)
GLOBULIN UR ELPH-MCNC: 2.8 GM/DL (ref 2.3–3.5)
GLUCOSE BLD-MCNC: 76 MG/DL (ref 60–100)
HBA1C MFR BLD: 5.1 % (ref 4–5.6)
POTASSIUM BLD-SCNC: 4.8 MMOL/L (ref 3.5–5.1)
PROT SERPL-MCNC: 6.4 G/DL (ref 6.3–8.6)
SODIUM BLD-SCNC: 139 MMOL/L (ref 137–145)
T4 FREE SERPL-MCNC: 1.22 NG/DL (ref 0.78–2.19)
TSH SERPL DL<=0.05 MIU/L-ACNC: 2.11 MIU/ML (ref 0.46–4.68)
VIT B12 BLD-MCNC: 952 PG/ML (ref 239–931)

## 2017-09-14 PROCEDURE — 80053 COMPREHEN METABOLIC PANEL: CPT | Performed by: FAMILY MEDICINE

## 2017-09-14 PROCEDURE — 36415 COLL VENOUS BLD VENIPUNCTURE: CPT | Performed by: FAMILY MEDICINE

## 2017-09-14 PROCEDURE — 82607 VITAMIN B-12: CPT | Performed by: FAMILY MEDICINE

## 2017-09-14 PROCEDURE — 82043 UR ALBUMIN QUANTITATIVE: CPT | Performed by: FAMILY MEDICINE

## 2017-09-14 PROCEDURE — 83036 HEMOGLOBIN GLYCOSYLATED A1C: CPT | Performed by: FAMILY MEDICINE

## 2017-09-14 PROCEDURE — 84443 ASSAY THYROID STIM HORMONE: CPT | Performed by: FAMILY MEDICINE

## 2017-09-14 PROCEDURE — 82728 ASSAY OF FERRITIN: CPT | Performed by: FAMILY MEDICINE

## 2017-09-14 PROCEDURE — 84439 ASSAY OF FREE THYROXINE: CPT | Performed by: FAMILY MEDICINE

## 2017-09-20 ENCOUNTER — TELEPHONE (OUTPATIENT)
Dept: FAMILY MEDICINE CLINIC | Facility: CLINIC | Age: 72
End: 2017-09-20

## 2017-09-20 ENCOUNTER — TELEPHONE (OUTPATIENT)
Dept: ENDOCRINOLOGY | Facility: CLINIC | Age: 72
End: 2017-09-20

## 2017-09-20 DIAGNOSIS — R74.01 ELEVATED AST (SGOT): Primary | ICD-10-CM

## 2017-09-20 DIAGNOSIS — R74.01 ELEVATED ALANINE AMINOTRANSFERASE (ALT) LEVEL: ICD-10-CM

## 2017-09-20 RX ORDER — ALBUTEROL SULFATE 90 UG/1
2 AEROSOL, METERED RESPIRATORY (INHALATION) EVERY 4 HOURS PRN
Qty: 8 G | Refills: 5 | Status: SHIPPED | OUTPATIENT
Start: 2017-09-20 | End: 2019-03-15 | Stop reason: SDUPTHER

## 2017-09-20 NOTE — TELEPHONE ENCOUNTER
Pr Dr. Paulino, Ms. Skelton has been called with her recent lab results & recommendations.  Continue her current medications and follow-up as planned or sooner if any problems.  Order placed for the US of the Liver       ----- Message from Rula Paulino DO sent at 9/15/2017  9:23 AM CDT -----  a1c and thyroid function good. Kidney function is stable. Liver functions have gone up. I recommend that she have a liver ultrasound. Please order after speaking with her, thanks.

## 2017-09-20 NOTE — PROGRESS NOTES
Pr Dr. Paulino, Ms. Nafisa has been called with her recent lab results & recommendations.  Continue her current medications and follow-up as planned or sooner if any problems.  Order placed for the US of the Liver

## 2017-09-21 ENCOUNTER — ANTICOAGULATION VISIT (OUTPATIENT)
Dept: CARDIAC SURGERY | Facility: CLINIC | Age: 72
End: 2017-09-21

## 2017-09-21 VITALS — HEART RATE: 72 BPM | DIASTOLIC BLOOD PRESSURE: 57 MMHG | SYSTOLIC BLOOD PRESSURE: 136 MMHG

## 2017-09-21 DIAGNOSIS — Z79.01 LONG-TERM (CURRENT) USE OF ANTICOAGULANTS: ICD-10-CM

## 2017-09-21 DIAGNOSIS — Z95.2 PERSONAL HISTORY OF HEART VALVE REPLACEMENT: ICD-10-CM

## 2017-09-21 DIAGNOSIS — I48.91 ATRIAL FIBRILLATION, UNSPECIFIED TYPE (HCC): ICD-10-CM

## 2017-09-21 LAB — INR PPP: 2.1 (ref 0.9–1.1)

## 2017-09-21 PROCEDURE — 85610 PROTHROMBIN TIME: CPT | Performed by: NURSE PRACTITIONER

## 2017-09-21 PROCEDURE — 99211 OFF/OP EST MAY X REQ PHY/QHP: CPT | Performed by: NURSE PRACTITIONER

## 2017-09-21 NOTE — PROGRESS NOTES
Pt denies med changes, bleeding problems, missed doses, excessive vit K, or s/s of a blood clot. Dose adjusted and pt instructed to hold green veggies for 3 days; pt verbalized. Patient instructed regarding medication; results given and questions answered. Nutritional counseling given.  Dietary factors affecting therapy addressed.  Patient instructed to monitor for excessive bruising or bleeding. Will recheck in 12 days.  Electronically signed by ABRAHAM Morrow

## 2017-10-03 ENCOUNTER — HOSPITAL ENCOUNTER (OUTPATIENT)
Dept: ULTRASOUND IMAGING | Facility: HOSPITAL | Age: 72
Discharge: HOME | End: 2017-10-03
Attending: FAMILY MEDICINE | Admitting: FAMILY MEDICINE

## 2017-10-03 ENCOUNTER — ANTICOAGULATION VISIT (OUTPATIENT)
Dept: CARDIAC SURGERY | Facility: CLINIC | Age: 72
End: 2017-10-03

## 2017-10-03 VITALS — DIASTOLIC BLOOD PRESSURE: 64 MMHG | SYSTOLIC BLOOD PRESSURE: 154 MMHG | HEART RATE: 56 BPM

## 2017-10-03 DIAGNOSIS — I48.91 ATRIAL FIBRILLATION, UNSPECIFIED TYPE (HCC): ICD-10-CM

## 2017-10-03 DIAGNOSIS — Z79.01 LONG-TERM (CURRENT) USE OF ANTICOAGULANTS: ICD-10-CM

## 2017-10-03 DIAGNOSIS — Z95.2 PERSONAL HISTORY OF HEART VALVE REPLACEMENT: ICD-10-CM

## 2017-10-03 DIAGNOSIS — R74.01 ELEVATED ALANINE AMINOTRANSFERASE (ALT) LEVEL: ICD-10-CM

## 2017-10-03 DIAGNOSIS — R74.01 ELEVATED AST (SGOT): ICD-10-CM

## 2017-10-03 LAB — INR PPP: 3.4 (ref 0.9–1.1)

## 2017-10-03 PROCEDURE — 85610 PROTHROMBIN TIME: CPT | Performed by: NURSE PRACTITIONER

## 2017-10-03 PROCEDURE — 76705 ECHO EXAM OF ABDOMEN: CPT

## 2017-10-03 NOTE — PROGRESS NOTES
Pt states no changes to meds or diet.  No bleeding issues.  Recheck INR in three wks.  Patient instructed regarding medication; results given and questions answered. Nutritional counseling given.  Dietary factors affecting therapy addressed.  Patient instructed to monitor for excessive bruising or bleeding.  Electronically signed by ABRAHAM Morrow

## 2017-10-04 ENCOUNTER — TELEPHONE (OUTPATIENT)
Dept: FAMILY MEDICINE CLINIC | Facility: CLINIC | Age: 72
End: 2017-10-04

## 2017-10-04 DIAGNOSIS — R19.5 POSITIVE COLORECTAL CANCER SCREENING USING DNA-BASED STOOL TEST: Primary | ICD-10-CM

## 2017-10-04 NOTE — TELEPHONE ENCOUNTER
Discussed positive cologuard result with patient over the phone. Needs colonoscopy. She voiced understanding.    Rula Paulino DO  10/4/2017  4:06 PM    This note was electronically signed.

## 2017-10-05 ENCOUNTER — TELEPHONE (OUTPATIENT)
Dept: FAMILY MEDICINE CLINIC | Facility: CLINIC | Age: 72
End: 2017-10-05

## 2017-10-05 DIAGNOSIS — K80.10 CALCULUS OF GALLBLADDER WITH CHRONIC CHOLECYSTITIS WITHOUT OBSTRUCTION: Primary | ICD-10-CM

## 2017-10-05 NOTE — TELEPHONE ENCOUNTER
Pr Dr. Paulino, Ms. Skelton has been called with her recent US results & recommendations.  Continue her current medications and follow-up as planned or sooner if any problems.      ----- Message from Rula Paulino DO sent at 10/5/2017  2:49 PM CDT -----  Tried calling patient at home, no answer, left vm. FYI-do not call mobile number it is her daughter in law. RUQ ultrasound shows gallstones which are causing inflammation of her gallbladder and likely causing her liver enzyme elevation. I have referred her to general surgery

## 2017-10-05 NOTE — PROGRESS NOTES
Pr Dr. Paulino, MsThomas Skelton has been called with her recent US results & recommendations.  Continue her current medications and follow-up as planned or sooner if any problems.

## 2017-10-24 ENCOUNTER — ANTICOAGULATION VISIT (OUTPATIENT)
Dept: CARDIAC SURGERY | Facility: CLINIC | Age: 72
End: 2017-10-24

## 2017-10-24 VITALS — DIASTOLIC BLOOD PRESSURE: 57 MMHG | SYSTOLIC BLOOD PRESSURE: 141 MMHG | HEART RATE: 60 BPM

## 2017-10-24 DIAGNOSIS — Z95.2 PERSONAL HISTORY OF HEART VALVE REPLACEMENT: ICD-10-CM

## 2017-10-24 DIAGNOSIS — Z79.01 LONG-TERM (CURRENT) USE OF ANTICOAGULANTS: ICD-10-CM

## 2017-10-24 DIAGNOSIS — I48.91 ATRIAL FIBRILLATION, UNSPECIFIED TYPE (HCC): ICD-10-CM

## 2017-10-24 LAB — INR PPP: 3.2 (ref 0.9–1.1)

## 2017-10-24 PROCEDURE — 85610 PROTHROMBIN TIME: CPT | Performed by: NURSE PRACTITIONER

## 2017-10-24 NOTE — PROGRESS NOTES
Patient states no med changes or bleeding problems or unexplained bruising. Patient instructed to continue current dosing schedule. Verbalizes understanding. Will recheck 1 month.  Patient instructed regarding medication; results given and questions answered. Nutritional counseling given.  Dietary factors affecting therapy addressed.  Patient instructed to monitor for excessive bruising or bleeding. Pt states she may be having a colonoscopy soon. Pt was reminded to call [facility] as soon as she has appt date so we can schedule Lovenox bridging; pt verbalized.          This document has been electronically signed by ABRAHAM Nieto on October 24, 2017 3:36 PM

## 2017-10-31 ENCOUNTER — TELEPHONE (OUTPATIENT)
Dept: FAMILY MEDICINE CLINIC | Facility: CLINIC | Age: 72
End: 2017-10-31

## 2017-10-31 RX ORDER — POTASSIUM CHLORIDE 750 MG/1
10 TABLET, FILM COATED, EXTENDED RELEASE ORAL DAILY
Qty: 30 TABLET | Refills: 5 | Status: SHIPPED | OUTPATIENT
Start: 2017-10-31 | End: 2018-06-01

## 2017-11-08 ENCOUNTER — HOSPITAL ENCOUNTER (OUTPATIENT)
Facility: HOSPITAL | Age: 72
Setting detail: HOSPITAL OUTPATIENT SURGERY
End: 2017-11-08
Attending: INTERNAL MEDICINE | Admitting: INTERNAL MEDICINE

## 2017-11-08 ENCOUNTER — OFFICE VISIT (OUTPATIENT)
Dept: GASTROENTEROLOGY | Facility: CLINIC | Age: 72
End: 2017-11-08

## 2017-11-08 VITALS
WEIGHT: 226.2 LBS | HEIGHT: 65 IN | DIASTOLIC BLOOD PRESSURE: 76 MMHG | BODY MASS INDEX: 37.69 KG/M2 | SYSTOLIC BLOOD PRESSURE: 138 MMHG | HEART RATE: 51 BPM

## 2017-11-08 DIAGNOSIS — Z74.09 MOBILITY IMPAIRED: Primary | ICD-10-CM

## 2017-11-08 DIAGNOSIS — Z12.11 ENCOUNTER FOR SCREENING FOR MALIGNANT NEOPLASM OF COLON: Primary | ICD-10-CM

## 2017-11-08 DIAGNOSIS — R19.5 POSITIVE COLORECTAL CANCER SCREENING USING DNA-BASED STOOL TEST: ICD-10-CM

## 2017-11-08 PROCEDURE — 99214 OFFICE O/P EST MOD 30 MIN: CPT | Performed by: NURSE PRACTITIONER

## 2017-11-08 RX ORDER — SODIUM, POTASSIUM,MAG SULFATES 17.5-3.13G
1 SOLUTION, RECONSTITUTED, ORAL ORAL EVERY 12 HOURS
Qty: 2 BOTTLE | Refills: 0 | Status: SHIPPED | OUTPATIENT
Start: 2017-11-08 | End: 2017-12-27

## 2017-11-08 RX ORDER — DEXTROSE AND SODIUM CHLORIDE 5; .45 G/100ML; G/100ML
30 INJECTION, SOLUTION INTRAVENOUS CONTINUOUS PRN
Status: CANCELLED | OUTPATIENT
Start: 2017-11-08

## 2017-11-08 NOTE — PROGRESS NOTES
Chief Complaint   Patient presents with   • Colon Cancer Screening       Subjective    Brendon Skelton is a 72 y.o. female. she is being seen for consultation today at the request of Rula Paulino DO    History of Present Illness  72-year-old female presents to discuss screening colonoscopy due to recent positive cologuard test.  She denies any abdominal pain, nausea, vomiting or changes in her bowel habits.  She is on Coumadin due to having artificial heart valve.  Reports she typically uses bridge therapy when having a procedure which she will discuss with the Coumadin clinic.  She reports her bowel movements are regular and daily and she denies any visible blood in the stool.  She denies any family history of colon rectal cancer and she has never had a screening colonoscopy in the past.  Plan; schedule patient for initial screening colonoscopy due to positive cologuard test.  Follow-up after test or return to office sooner if needed.       The following portions of the patient's history were reviewed and updated as appropriate:   Past Medical History:   Diagnosis Date   • Acute bronchitis    • Anxiety    • Atrial fibrillation    • C. difficile colitis    • Callosity     under metatarsal head      • Cardiac pacemaker in situ    • CHF (congestive heart failure)    • Chronic obstructive lung disease    • Corns and callus    • Depressive disorder    • Diabetes mellitus    • Diastolic heart failure    • Essential hypertension    • Foot pain    • Hyperlipidemia    • Long term current use of anticoagulant    • On anticoagulant therapy    • Pulmonary emphysema    • Rectal hemorrhage    • Type 2 diabetes mellitus      Past Surgical History:   Procedure Laterality Date   • AORTIC VALVE REPAIR/REPLACEMENT  1999   • CARDIAC ELECTROPHYSIOLOGY PROCEDURE N/A 3/20/2017    Procedure: PPM generator change - dual;  Surgeon: Bereket Gates MD;  Location: [facility] INVASIVE LOCATION;  Service:    • CARDIAC PACEMAKER  PLACEMENT  1999   • ECHO - CONVERTED  11/12/2013    There is mild to moderate left atrial enlargement EF 50-55%   • FOOT SURGERY  1990   • OTHER SURGICAL HISTORY  10/26/2015    PARING CORN/CALLUS    • PACEMAKER REPLACEMENT N/A 3/21/2017    Procedure: revision pacemaker pocket, evacuation hematoma, control of bleeding;  Surgeon: Polo Feliz MD;  Location: [facility];  Service:    • TRANSESOPHAGEAL ECHOCARDIOGRAM (MITZY)  05/01/2014    With color flow-Mild left atrial enlargement with mild concentric LV hypertrophy with top normal aortic root size. EF 45-50%. Mild mitral regurgitation and mild aortic insufficiency and trivial amount of tricuspid regurgation   • TUBAL ABDOMINAL LIGATION       Family History   Problem Relation Age of Onset   • Heart disease Mother    • Hyperlipidemia Mother    • Hypertension Mother    • Cancer Other      OB History     No data available        Current Outpatient Prescriptions   Medication Sig Dispense Refill   • acetaminophen (TYLENOL) 325 MG tablet Take 650 mg by mouth every 6 (six) hours as needed for mild pain (1-3).     • albuterol (PROVENTIL HFA;VENTOLIN HFA) 108 (90 Base) MCG/ACT inhaler Inhale 2 puffs Every 4 (Four) Hours As Needed for Wheezing. 8 g 5   • albuterol (PROVENTIL) (2.5 MG/3ML) 0.083% nebulizer solution Take 2.5 mg by nebulization 4 (four) times a day.     • Ascorbic Acid (VITAMIN C WITH OCHOA HIPS) 250 MG tablet Take 250 mg by mouth daily.     • calcitriol (ROCALTROL) 0.25 MCG capsule Take 0.25 mcg by mouth every other day.     • citalopram (CeleXA) 20 MG tablet Take 1 tablet by mouth Daily. 90 tablet 3   • diltiaZEM CD (CARDIZEM CD) 240 MG 24 hr capsule Take 1 capsule by mouth Daily. 90 capsule 3   • ezetimibe (ZETIA) 10 MG tablet Take 1 tablet by mouth Daily. 90 tablet 3   • ferrous sulfate 325 (65 FE) MG tablet Take 325 mg by mouth Daily With Breakfast.     • furosemide (LASIX) 40 MG tablet Take 1 tablet by mouth Daily. 90 tablet 3   • glucose blood test  strip 1 each by Other route 3 (three) times a day. Use as instructed     • insulin aspart (NovoLOG) 100 UNIT/ML injection Inject 2-12 Units under the skin 3 (three) times a day before meals.     • Insulin Glargine (LANTUS SOLOSTAR) 100 UNIT/ML injection pen Inject 30 Units under the skin Daily. 10 pen 3   • Insulin Pen Needle (BD PEN NEEDLE DEREK U/F) 32G X 4 MM misc 1 each 4 (Four) Times a Day. 120 each 5   • ipratropium (ATROVENT HFA) 17 MCG/ACT inhaler Inhale 2 puffs 4 (Four) Times a Day. 1 inhaler 3   • losartan (COZAAR) 100 MG tablet Take 1 tablet by mouth Daily. 90 tablet 3   • Multiple Vitamins-Iron (MULTIVITAMIN/IRON PO) Take 1 tablet by mouth daily.     • potassium chloride (K-DUR) 10 MEQ CR tablet Take 1 tablet by mouth Daily. 30 tablet 5   • Prenatal Vit-Fe Fumarate-FA (PRENATAL VITAMIN) 27-0.8 MG tablet Take 1 tablet by mouth daily.     • raNITIdine (ZANTAC) 150 MG tablet Take 1 tablet by mouth 2 (Two) Times a Day. 180 tablet 3   • rOPINIRole (REQUIP) 0.25 MG tablet Take 1 tablet by mouth Every Night. Take 1 hour before bedtime. 90 tablet 3   • traZODone (DESYREL) 150 MG tablet Take 2 tablets by mouth Every Night. 180 tablet 3   • warfarin (COUMADIN) 5 MG tablet Take 1 tablet nightly except on Monday and Friday take 1/2 tablet or as directed 90 tablet 1   • sodium-potassium-magnesium sulfates (SUPREP BOWEL PREP KIT) 17.5-3.13-1.6 GM/180ML solution oral solution Take 1 bottle by mouth Every 12 (Twelve) Hours. 2 bottle 0     No current facility-administered medications for this visit.      Allergies   Allergen Reactions   • Adhesive Tape      Only paper tape   • Crestor [Rosuvastatin Calcium]    • Lipitor [Atorvastatin]    • Lortab [Hydrocodone-Acetaminophen] Hallucinations     Social History     Social History   • Marital status:      Spouse name: N/A   • Number of children: N/A   • Years of education: N/A     Social History Main Topics   • Smoking status: Former Smoker     Quit date: 3/21/1999   •  "Smokeless tobacco: Never Used   • Alcohol use 1.2 oz/week     2 Shots of liquor per week      Comment: everry three months or so   • Drug use: No   • Sexual activity: No     Other Topics Concern   • None     Social History Narrative       Review of Systems  Review of Systems   Constitutional: Negative for activity change, appetite change, chills, diaphoresis, fatigue, fever and unexpected weight change.   HENT: Negative for sore throat and trouble swallowing.    Respiratory: Negative for shortness of breath.    Gastrointestinal: Negative for abdominal distention, abdominal pain, anal bleeding, blood in stool, constipation, diarrhea, nausea, rectal pain and vomiting.   Musculoskeletal: Negative for arthralgias.   Skin: Negative for pallor.   Neurological: Negative for light-headedness.        /76 (BP Location: Left arm, Patient Position: Sitting, Cuff Size: Adult)  Pulse 51  Ht 65\" (165.1 cm)  Wt 226 lb 3.2 oz (103 kg)  BMI 37.64 kg/m2    Objective    Physical Exam   Constitutional: She is oriented to person, place, and time. She appears well-developed and well-nourished. She is cooperative. No distress.   HENT:   Head: Normocephalic and atraumatic.   Neck: Normal range of motion. Neck supple. No thyromegaly present.   Cardiovascular: Normal rate, regular rhythm and normal heart sounds.    Pulmonary/Chest: Effort normal and breath sounds normal. She has no wheezes. She has no rhonchi. She has no rales.   Abdominal: Soft. Normal appearance and bowel sounds are normal. She exhibits no shifting dullness and no distension. There is no hepatosplenomegaly. There is no tenderness. There is no rigidity and no guarding. No hernia.   Lymphadenopathy:     She has no cervical adenopathy.   Neurological: She is alert and oriented to person, place, and time.   Skin: Skin is warm, dry and intact. No rash noted. No pallor.   Psychiatric: She has a normal mood and affect. Her speech is normal.     Anticoagulation Visit on " 10/24/2017   Component Date Value Ref Range Status   • INR 10/24/2017 3.20* 0.9 - 1.1 Final     Assessment/Plan      1. Encounter for screening for malignant neoplasm of colon    2. Positive colorectal cancer screening using DNA-based stool test    .     Orders placed during this encounter include:      COLONOSCOPY (N/A)    Review and/or summary of lab tests, radiology, procedures, medications. Review and summary of old records and obtaining of history. The risks and benefits of my recommendations, as well as other treatment options were discussed with the patient today. Questions were answered.    New Medications Ordered This Visit   Medications   • sodium-potassium-magnesium sulfates (SUPREP BOWEL PREP KIT) 17.5-3.13-1.6 GM/180ML solution oral solution     Sig: Take 1 bottle by mouth Every 12 (Twelve) Hours.     Dispense:  2 bottle     Refill:  0       Follow-up: Return in about 4 weeks (around 12/6/2017).          This document has been electronically signed by ABRAHAM Blandon on November 8, 2017 9:54 AM             Results for orders placed or performed in visit on 10/24/17   POC INR   Result Value Ref Range    INR 3.20 (A) 0.9 - 1.1   Results for orders placed or performed in visit on 10/03/17   POC INR   Result Value Ref Range    INR 3.40 (A) 0.9 - 1.1   Results for orders placed or performed in visit on 09/21/17   POC INR   Result Value Ref Range    INR 2.10 (A) 0.9 - 1.1   Results for orders placed or performed in visit on 09/07/17   POC INR   Result Value Ref Range    INR 2.90 (A) 0.9 - 1.1   Results for orders placed or performed in visit on 09/05/17   MicroAlbumin, Urine, Random   Result Value Ref Range    Microalbumin, Urine 13.5 mg/L   TSH   Result Value Ref Range    TSH 2.110 0.460 - 4.680 mIU/mL   T4, Free   Result Value Ref Range    Free T4 1.22 0.78 - 2.19 ng/dL   Hemoglobin A1c   Result Value Ref Range    Hemoglobin A1C 5.1 4 - 5.6 %   Ferritin   Result Value Ref Range    Ferritin 304.00 (H)  11.10 - 264.00 ng/mL   Vitamin B12   Result Value Ref Range    Vitamin B-12 952 (H) 239 - 931 pg/mL   Comprehensive Metabolic Panel   Result Value Ref Range    Glucose 76 60 - 100 mg/dL    BUN 49 (H) 7 - 21 mg/dL    Creatinine 1.89 (H) 0.50 - 1.00 mg/dL    Sodium 139 137 - 145 mmol/L    Potassium 4.8 3.5 - 5.1 mmol/L    Chloride 101 95 - 110 mmol/L    CO2 32.0 (H) 22.0 - 31.0 mmol/L    Calcium 9.6 8.4 - 10.2 mg/dL    Total Protein 6.4 6.3 - 8.6 g/dL    Albumin 3.60 3.40 - 4.80 g/dL    ALT (SGPT) 77 (H) 9 - 52 U/L    AST (SGOT) 45 (H) 14 - 36 U/L    Alkaline Phosphatase 95 38 - 126 U/L    Total Bilirubin 0.6 0.2 - 1.3 mg/dL    eGFR Non  Amer 26 (L) 39 - 90 mL/min/1.73    Globulin 2.8 2.3 - 3.5 gm/dL    A/G Ratio 1.3 1.1 - 1.8 g/dL    BUN/Creatinine Ratio 25.9 (H) 7.0 - 25.0    Anion Gap 6.0 5.0 - 15.0 mmol/L   Results for orders placed or performed in visit on 08/31/17   POC INR   Result Value Ref Range    INR 2.40 (A) 0.9 - 1.1   Results for orders placed or performed in visit on 08/25/17   POC INR   Result Value Ref Range    INR 1.80 (A) 0.9 - 1.1   Results for orders placed or performed in visit on 08/15/17   POC INR   Result Value Ref Range    INR 2.40 (A) 0.9 - 1.1   Results for orders placed or performed in visit on 08/01/17   POC INR   Result Value Ref Range    INR 2.20 (A) 0.9 - 1.1   Results for orders placed or performed in visit on 07/05/17   POC INR   Result Value Ref Range    INR 3.10 (A) 0.9 - 1.1   Results for orders placed or performed in visit on 06/26/17   CBC Auto Differential   Result Value Ref Range    WBC 6.10 3.20 - 9.80 10*3/mm3    RBC 3.32 (L) 3.77 - 5.16 10*6/mm3    Hemoglobin 10.3 (L) 12.0 - 15.5 g/dL    Hematocrit 32.3 (L) 35.0 - 45.0 %    MCV 97.3 80.0 - 98.0 fL    MCH 31.0 26.5 - 34.0 pg    MCHC 31.9 31.4 - 36.0 g/dL    RDW 15.2 (H) 11.5 - 14.5 %    RDW-SD 55.1 (H) 36.4 - 46.3 fl    MPV 11.2 8.0 - 12.0 fL    Platelets 226 150 - 450 10*3/mm3   Manual Differential   Result Value Ref  Range    Neutrophil % 68.0 37.0 - 80.0 %    Lymphocyte % 16.0 10.0 - 50.0 %    Monocyte % 10.0 0.0 - 12.0 %    Eosinophil % 4.0 0.0 - 7.0 %    Basophil % 2.0 0.0 - 2.0 %    Neutrophils Absolute 4.15 2.00 - 8.60 10*3/mm3    Lymphocytes Absolute 0.98 0.60 - 4.20 10*3/mm3    Monocytes Absolute 0.61 0.00 - 0.90 10*3/mm3    Eosinophils Absolute 0.24 0.00 - 0.70 10*3/mm3    Basophils Absolute 0.12 0.00 - 0.20 10*3/mm3    Anisocytosis Slight/1+ None Seen    Hypochromia Slight/1+ None Seen    WBC Morphology Normal Normal    Platelet Estimate Adequate Normal   Comprehensive Metabolic Panel   Result Value Ref Range    Glucose 77 60 - 100 mg/dL    BUN 67 (H) 7 - 21 mg/dL    Creatinine 2.10 (H) 0.50 - 1.00 mg/dL    Sodium 138 137 - 145 mmol/L    Potassium 5.1 3.5 - 5.1 mmol/L    Chloride 102 95 - 110 mmol/L    CO2 28.0 22.0 - 31.0 mmol/L    Calcium 9.5 8.4 - 10.2 mg/dL    Total Protein 6.4 6.3 - 8.6 g/dL    Albumin 3.60 3.40 - 4.80 g/dL    ALT (SGPT) 67 (H) 9 - 52 U/L    AST (SGOT) 41 (H) 14 - 36 U/L    Alkaline Phosphatase 101 38 - 126 U/L    Total Bilirubin 0.5 0.2 - 1.3 mg/dL    eGFR Non  Amer 23 (L) 39 - 90 mL/min/1.73    Globulin 2.8 2.3 - 3.5 gm/dL    A/G Ratio 1.3 1.1 - 1.8 g/dL    BUN/Creatinine Ratio 31.9 (H) 7.0 - 25.0    Anion Gap 8.0 5.0 - 15.0 mmol/L   Results for orders placed or performed in visit on 06/20/17   POC INR   Result Value Ref Range    INR 3.70 (A) 0.9 - 1.1   Results for orders placed or performed in visit on 06/06/17   POC INR   Result Value Ref Range    INR 3.00 (A) 0.9 - 1.1   Results for orders placed or performed in visit on 05/30/17   POC INR   Result Value Ref Range    INR 2.80 (A) 0.9 - 1.1   Results for orders placed or performed in visit on 05/24/17   POC INR   Result Value Ref Range    INR 6.10 (A) 0.9 - 1.1   Results for orders placed or performed in visit on 05/10/17   POC INR   Result Value Ref Range    INR 3.70 (A) 0.9 - 1.1   Results for orders placed or performed in visit on  04/26/17   POC INR   Result Value Ref Range    INR 3.00 (A) 0.9 - 1.1   Results for orders placed or performed in visit on 04/18/17   POC INR   Result Value Ref Range    INR 2.60 (A) 0.9 - 1.1   Results for orders placed or performed in visit on 04/13/17   POC INR   Result Value Ref Range    INR 2.10 (A) 0.9 - 1.1   Results for orders placed or performed in visit on 04/04/17   POC INR   Result Value Ref Range    INR 2.40 (A) 0.9 - 1.1   Results for orders placed or performed in visit on 03/31/17   POC INR   Result Value Ref Range    INR 3.40 (A) 0.9 - 1.1   Results for orders placed or performed in visit on 03/27/17   POC INR   Result Value Ref Range    INR 1.20 (A) 0.9 - 1.1   Results for orders placed or performed in visit on 03/23/17   POC INR   Result Value Ref Range    INR 2.20 (A) 0.9 - 1.1   Results for orders placed or performed during the hospital encounter of 03/21/17   PREVIOUS HISTORY   Result Value Ref Range    Previous History Previous Record on File    CBC Auto Differential   Result Value Ref Range    WBC 7.95 3.20 - 9.80 10*3/mm3    RBC 2.50 (L) 3.77 - 5.16 10*6/mm3    Hemoglobin 8.0 (L) 12.0 - 15.5 g/dL    Hematocrit 25.0 (L) 35.0 - 45.0 %    .0 (H) 80.0 - 98.0 fL    MCH 32.0 26.5 - 34.0 pg    MCHC 32.0 31.4 - 36.0 g/dL    RDW 14.5 11.5 - 14.5 %    RDW-SD 53.4 (H) 36.4 - 46.3 fl    MPV 11.3 8.0 - 12.0 fL    Platelets 125 (L) 150 - 450 10*3/mm3    Neutrophil % 86.5 (H) 37.0 - 80.0 %    Lymphocyte % 4.2 (L) 10.0 - 50.0 %    Monocyte % 8.8 0.0 - 12.0 %    Eosinophil % 0.3 0.0 - 7.0 %    Basophil % 0.1 0.0 - 2.0 %    Immature Grans % 0.1 0.0 - 0.5 %    Neutrophils, Absolute 6.88 2.00 - 8.60 10*3/mm3    Lymphocytes, Absolute 0.33 (L) 0.60 - 4.20 10*3/mm3    Monocytes, Absolute 0.70 0.00 - 0.90 10*3/mm3    Eosinophils, Absolute 0.02 0.00 - 0.70 10*3/mm3    Basophils, Absolute 0.01 0.00 - 0.20 10*3/mm3    Immature Grans, Absolute 0.01 0.00 - 0.02 10*3/mm3     *Note: Due to a large number of results  and/or encounters for the requested time period, some results have not been displayed. A complete set of results can be found in Results Review.

## 2017-11-20 ENCOUNTER — HOSPITAL ENCOUNTER (OUTPATIENT)
Dept: PHYSICAL THERAPY | Facility: HOSPITAL | Age: 72
Setting detail: THERAPIES SERIES
Discharge: HOME | End: 2017-11-20

## 2017-11-20 ENCOUNTER — TELEPHONE (OUTPATIENT)
Dept: FAMILY MEDICINE CLINIC | Facility: CLINIC | Age: 72
End: 2017-11-20

## 2017-11-20 DIAGNOSIS — Z74.09 MOBILITY IMPAIRED: Primary | ICD-10-CM

## 2017-11-20 PROCEDURE — G8980 MOBILITY D/C STATUS: HCPCS

## 2017-11-20 PROCEDURE — G8979 MOBILITY GOAL STATUS: HCPCS

## 2017-11-20 PROCEDURE — 97161 PT EVAL LOW COMPLEX 20 MIN: CPT

## 2017-11-20 PROCEDURE — G8978 MOBILITY CURRENT STATUS: HCPCS

## 2017-11-20 NOTE — THERAPY EVALUATION
Outpatient Physical Therapy Ortho Initial Evaluation  [facility]     Patient Name: Brendon Skelton  : 1945  MRN: [id number]  Today's Date: 2017      Visit Date: 2017    Patient Active Problem List   Diagnosis   • Long-term (current) use of anticoagulants   • Personal history of heart valve replacement   • Atrial fibrillation [I48.91]   • Pulmonary emphysema   • On anticoagulant therapy   • Hyperlipidemia   • Diastolic heart failure   • Depressive disorder   • Chronic obstructive lung disease   • Essential hypertension, benign   • Diabetes mellitus without complication   • Myopia   • Astigmatism   • Bleeding from open wound of chest wall   • Follow-up surgery care   • Encounter for screening for malignant neoplasm of colon   • Positive colorectal cancer screening using DNA-based stool test        Past Medical History:   Diagnosis Date   • Acute bronchitis    • Anxiety    • Atrial fibrillation    • C. difficile colitis    • Callosity     under metatarsal head      • Cardiac pacemaker in situ    • CHF (congestive heart failure)    • Chronic obstructive lung disease    • Corns and callus    • Depressive disorder    • Diabetes mellitus    • Diastolic heart failure    • Essential hypertension    • Foot pain    • Hyperlipidemia    • Long term current use of anticoagulant    • On anticoagulant therapy    • Pulmonary emphysema    • Rectal hemorrhage    • Type 2 diabetes mellitus         Past Surgical History:   Procedure Laterality Date   • AORTIC VALVE REPAIR/REPLACEMENT     • CARDIAC ELECTROPHYSIOLOGY PROCEDURE N/A 3/20/2017    Procedure: PPM generator change - dual;  Surgeon: Bereket Gates MD;  Location: [facility] INVASIVE LOCATION;  Service:    • CARDIAC PACEMAKER PLACEMENT     • ECHO - CONVERTED  2013    There is mild to moderate left atrial enlargement EF 50-55%   • FOOT SURGERY     • OTHER SURGICAL HISTORY  10/26/2015    PARING CORN/CALLUS    • PACEMAKER  REPLACEMENT N/A 3/21/2017    Procedure: revision pacemaker pocket, evacuation hematoma, control of bleeding;  Surgeon: Polo Feliz MD;  Location: [facility];  Service:    • TRANSESOPHAGEAL ECHOCARDIOGRAM (MITZY)  2014    With color flow-Mild left atrial enlargement with mild concentric LV hypertrophy with top normal aortic root size. EF 45-50%. Mild mitral regurgitation and mild aortic insufficiency and trivial amount of tricuspid regurgation   • TUBAL ABDOMINAL LIGATION         Visit Dx:     ICD-10-CM ICD-9-CM   1. Mobility impaired Z74.09 799.89             Patient History       17 1515          History    Chief Complaint Difficulty Walking  -AK        User Key  (r) = Recorded By, (t) = Taken By, (c) = Cosigned By    Initials Name Provider Type    SERGIO Osborne PT Physical Therapist             [facility]   Wheelchair/Mobility Evaluation    Patient Information:   Patient name:Brendon Skelton  Patient : 1945  Equipment supplier:  Date of eval: 17  Diagnosis: Impaired Mobilty  Occupation: Retired     Subjective:   Chief complaint/History of Present condition: Impaired Mobility requiring motorized WC   Pain: 0/10  Date of onset: over 6 years  Past Medical History:   Past Medical History:   Diagnosis Date   • Acute bronchitis    • Anxiety    • Atrial fibrillation    • C. difficile colitis    • Callosity     under metatarsal head      • Cardiac pacemaker in situ    • CHF (congestive heart failure)    • Chronic obstructive lung disease    • Corns and callus    • Depressive disorder    • Diabetes mellitus    • Diastolic heart failure    • Essential hypertension    • Foot pain    • Hyperlipidemia    • Long term current use of anticoagulant    • On anticoagulant therapy    • Pulmonary emphysema    • Rectal hemorrhage    • Type 2 diabetes mellitus      Past Surgical History:   Past Surgical History:   Procedure Laterality Date   • AORTIC VALVE REPAIR/REPLACEMENT   1999   • CARDIAC ELECTROPHYSIOLOGY PROCEDURE N/A 3/20/2017    Procedure: PPM generator change - dual;  Surgeon: Bereket Gates MD;  Location: [facility] CATH INVASIVE LOCATION;  Service:    • CARDIAC PACEMAKER PLACEMENT  1999   • ECHO - CONVERTED  11/12/2013    There is mild to moderate left atrial enlargement EF 50-55%   • FOOT SURGERY  1990   • OTHER SURGICAL HISTORY  10/26/2015    PARING CORN/CALLUS    • PACEMAKER REPLACEMENT N/A 3/21/2017    Procedure: revision pacemaker pocket, evacuation hematoma, control of bleeding;  Surgeon: Polo Feliz MD;  Location: [facility] OR;  Service:    • TRANSESOPHAGEAL ECHOCARDIOGRAM (MITZY)  05/01/2014    With color flow-Mild left atrial enlargement with mild concentric LV hypertrophy with top normal aortic root size. EF 45-50%. Mild mitral regurgitation and mild aortic insufficiency and trivial amount of tricuspid regurgation   • TUBAL ABDOMINAL LIGATION       Allergies:   Allergies   Allergen Reactions   • Adhesive Tape      Only paper tape   • Crestor [Rosuvastatin Calcium]    • Lipitor [Atorvastatin]    • Lortab [Hydrocodone-Acetaminophen] Hallucinations     Special tests (Xray, MRI etc): None  Alcohol use: None  Tobacco Use: None-quit in 1999    Social History/Home environment:   ///single:    Children: 2 grown children  Lives with: Alone  Type of home: Duplex (son lives in other apartment)  Stairs: 1 stair  Handrail: No  Ramp: Yes Handrail: Yes bilaterally    [place] in home: Carpet  Bathroom accessible(per patient report): Yes  Bedroom accessible(per patient report): Yes  Home equipment: (lift chair, wheelchair, BSC, etc): Powerchair, RW  Driveway (gravel, blacktop): Cement  Current mode of transportation: PACCS  Does patient drive: No  What type of vehicle will be used: None-pt uses chair to go to store  Will patient load/unload independently: N/A    Communication:  Verbal communication: WNL   Impaired No       Hearing: WNL   Deficits noted  None    Current ambulation/mobility:  Does the patient Ambulate?:Yes  How far: 30'  AD used: RW  Distance a day: 120'  Gait deviations: short, shuffling steps  Is the patient able to push a manual wheelchair: No  Current gait:  Short, shuffling steps  Mobility in home:WC  Fall status/recent falls/frequency: 8 months ago pt fell in the home after catching her toe on the carpet-no fractures    Objective:   General appearance: Flexed trunk, protracted head and bilateral shoulders   Posture in sitting: Flexed trunk, protracted head and bilateral shoulders    In standing: Flexed trunk, protracted head and bilateral shoulders   Transfers: sit to stand and S-P-S Independent    Physical exam:  ROM(UE/LE): WFL  Strength(UE/LE): UE=WFLs/ LE strength symmetrical bilaterally: Hip Flexion=3/5, Hip Extension=3+, Hip Abduction=4-/5, Knee Flexion and Extension=4-/5. Ankle DF+PF=4/5.  Tone(UE/LE): Normal  Balance(sitting, standing): Siting static and dynamic=Good, standing static=good-, standing dynamic=fair  Coordination(UE/LE): Normal  Neurovascular status: (sensation in UE, LE, bottom area) Normal  Other (vitals, visual observation of skin): Normal  Pain rating/location/frequency: None  Transfers(bed to chair, etc if applicable) Independent  Special tests:(6 minute walk test, TUG, if applicable) TUG=44 seconds    Current wheel chair and seating system:   Wheel chair : Unknown  Model type: Unknown  Seat width: Unknown  Seat depth: Unknown  Back height: Unknown  Overall width: Unknown  Overall length: Unknown  Overall height in w/c: Unknown  Seat height from floor: Unknown     Rear: Unknown  Front: Unknown  Age of current w/c and or seating system: 5 years  Are there problems with current system: slower battery, battery doesn't hold charge, seat breaking down, broken armwrests  Hours current system is used: 8 hours    Has the patient tried any other types of wheelchairs or scooters, if so what types: No    Current  cushion: Yes  Type: Foam with gel  How old: 3 years  Does patient have sores if so where: None  History of sores: None      Clients Current Measurements:  Across Hips: 42cm  Hip to knee Left 27cm Right 27cm  Knee to foot left 39cm Right 39cm  Bottom of buttocks to inferior angle of scapula 39cm  Bottom of buttocks to top of shoulder 58cm  Bottom of buttocks to top of head 79cm  Bottom of buttocks to forearm with elbow bent 19cm  Shoe size 7.5  Chest width 31cm  Chest depth 18cm  Shoulder width 41cm  Hip width: 42cm  Does patient have spasms: No  Severity: N/A  Need for lateral supports: No  Positioning seat belt needed? No  Orthopedic defortmities: (scoliosis, trunk rotation R/L, kyphosis, pelvic rotation ant/post, amputee)No    Treatment: Initial evaluation with seating and mobility assessment including measurements taken and discussion of POC for patient to follow up with medical supply to obtain new system.     Assessment:   Patient is able to communicate and interact appropriately during evaluation.   It is recommended the patient receive a Standard type of wheelchair  that is powered, with Standard wheel type, foam seat cushion, with joystick driving, with std foot plates with lateral supports for positioning, removable armrests, without thigh and knee lateral supports, without trunk supports, without chest supports, without headrest,  Patient verbalizes and demonstrates full understanding of proposed treatment plan. Patient will follow up with patients choice of wheelchair supplier.  Yes      Functional Limitation/Rehabilitation Problem List:    1. Ability to obtaining a new wheelchair   2. Decreased mobility    3. Difficulty with ADLs and IADLs   Rehab Potential: Good  G-Codes:   CM, [id number], [id number]  Safety Issues/Barriers to Rehab: Poor endurance, Type 2 DM, COPD, Pacemaker, Artificial Aortic Valve,    Goals:  Patient Goals:   1. Obtain new seating system   2. Have independent mobility in community and  home   ST. Patient to follow up with medical supply for obtaining new seating system    Plan:   Wheelchair assessment, measurements, and mobility assessment. Patient to follow up PRN for additional documentation and measurements as needed but will otherwise be discharged at this time and to follow up with MD and or medical supplier of patients choice for chair.   Frequency: PRN  Reassessment: PRN     Thank you for this referral,   Name and credentials: Gurpreet Osborne PT, DPT                                                                Time Calculation:                       Gurpreet Osborne PT  2017

## 2017-11-21 ENCOUNTER — ANTICOAGULATION VISIT (OUTPATIENT)
Dept: CARDIAC SURGERY | Facility: CLINIC | Age: 72
End: 2017-11-21

## 2017-11-21 VITALS
BODY MASS INDEX: 37.53 KG/M2 | WEIGHT: 225.5 LBS | HEART RATE: 60 BPM | SYSTOLIC BLOOD PRESSURE: 145 MMHG | DIASTOLIC BLOOD PRESSURE: 60 MMHG

## 2017-11-21 DIAGNOSIS — Z79.01 LONG-TERM (CURRENT) USE OF ANTICOAGULANTS: ICD-10-CM

## 2017-11-21 DIAGNOSIS — I48.91 ATRIAL FIBRILLATION, UNSPECIFIED TYPE (HCC): ICD-10-CM

## 2017-11-21 DIAGNOSIS — Z95.2 PERSONAL HISTORY OF HEART VALVE REPLACEMENT: ICD-10-CM

## 2017-11-21 LAB — INR PPP: 4.4 (ref 0.9–1.1)

## 2017-11-21 PROCEDURE — 99211 OFF/OP EST MAY X REQ PHY/QHP: CPT | Performed by: NURSE PRACTITIONER

## 2017-11-21 PROCEDURE — 85610 PROTHROMBIN TIME: CPT | Performed by: NURSE PRACTITIONER

## 2017-11-21 NOTE — PROGRESS NOTES
Pt denies med changes or bleeding problems. Pt states she has been eating green veggies twice weekly. Unable to determine cause for elevation. Dose adjusted today and pt instructed to have a cup serving of green veggies; pt verbalized. Patient instructed regarding medication; results given and questions answered. Nutritional counseling given.  Dietary factors affecting therapy addressed.  Patient instructed to monitor for excessive bruising or bleeding. Will recheck next week.    Pt is having a colonoscopy on Dec 20 and will need to hold coumadin 3 nights and be bridged with Lovenox. Pt states she has 1 injections at home. Pt was weighed today and was 225.5 lb. Pt was instructed to call [facility] with strength of Lovenox she has at home; pt verbalized.          This document has been electronically signed by ABRAHAM Nieto on November 21, 2017 1:17 PM

## 2017-11-22 ENCOUNTER — DOCUMENTATION (OUTPATIENT)
Dept: CARDIAC SURGERY | Facility: CLINIC | Age: 72
End: 2017-11-22

## 2017-11-22 NOTE — PROGRESS NOTES
Pt called and states she has Lovenox 150 mg syringes #13 at home that are not . Pt will need these when she is bridged in Dec for colonoscopy.

## 2017-11-28 ENCOUNTER — ANTICOAGULATION VISIT (OUTPATIENT)
Dept: CARDIAC SURGERY | Facility: CLINIC | Age: 72
End: 2017-11-28

## 2017-11-28 VITALS — HEART RATE: 60 BPM | DIASTOLIC BLOOD PRESSURE: 60 MMHG | SYSTOLIC BLOOD PRESSURE: 158 MMHG

## 2017-11-28 DIAGNOSIS — Z79.01 LONG-TERM (CURRENT) USE OF ANTICOAGULANTS: ICD-10-CM

## 2017-11-28 DIAGNOSIS — I48.91 ATRIAL FIBRILLATION, UNSPECIFIED TYPE (HCC): ICD-10-CM

## 2017-11-28 DIAGNOSIS — Z95.2 PERSONAL HISTORY OF HEART VALVE REPLACEMENT: ICD-10-CM

## 2017-11-28 LAB — INR PPP: 4 (ref 0.9–1.1)

## 2017-11-28 PROCEDURE — 99211 OFF/OP EST MAY X REQ PHY/QHP: CPT | Performed by: NURSE PRACTITIONER

## 2017-11-28 PROCEDURE — 85610 PROTHROMBIN TIME: CPT | Performed by: NURSE PRACTITIONER

## 2017-11-28 NOTE — PROGRESS NOTES
Pt here today for recheck of elevated INR of unknown cause.  Pt denies med or diet changes, no bleeding issues.  Adjusted coumadin dose and instructed pt to increase Vit K intake today with a broccoli serving.  Recheck INR next wk.  Pt verbalizes.  Patient instructed regarding medication; results given and questions answered. Nutritional counseling given.  Dietary factors affecting therapy addressed.  Patient instructed to monitor for excessive bruising or bleeding.          This document has been electronically signed by ABRAHAM Nieto on November 28, 2017 3:59 PM

## 2017-12-05 ENCOUNTER — ANTICOAGULATION VISIT (OUTPATIENT)
Dept: CARDIAC SURGERY | Facility: CLINIC | Age: 72
End: 2017-12-05

## 2017-12-05 VITALS — DIASTOLIC BLOOD PRESSURE: 57 MMHG | SYSTOLIC BLOOD PRESSURE: 134 MMHG | HEART RATE: 80 BPM

## 2017-12-05 DIAGNOSIS — Z95.2 PERSONAL HISTORY OF HEART VALVE REPLACEMENT: ICD-10-CM

## 2017-12-05 DIAGNOSIS — I48.91 ATRIAL FIBRILLATION, UNSPECIFIED TYPE (HCC): ICD-10-CM

## 2017-12-05 DIAGNOSIS — Z79.01 LONG-TERM (CURRENT) USE OF ANTICOAGULANTS: ICD-10-CM

## 2017-12-05 LAB — INR PPP: 5.6 (ref 0.9–1.1)

## 2017-12-05 PROCEDURE — 85610 PROTHROMBIN TIME: CPT | Performed by: NURSE PRACTITIONER

## 2017-12-05 PROCEDURE — 99211 OFF/OP EST MAY X REQ PHY/QHP: CPT | Performed by: NURSE PRACTITIONER

## 2017-12-05 NOTE — PROGRESS NOTES
Pt denies med changes or bleeding problems. Pt denies alcohol, grapefruit, or cranberry intake. Unable to determine the cause for elevation. Pt does not wish to go to lab for venipuncture. Dose adjusted and pt instructed to have a serving of bruDistributive Networksts today; pt verbalized. Patient instructed regarding medication; results given and questions answered. Nutritional counseling given.  Dietary factors affecting therapy addressed.  Patient instructed to monitor for excessive bruising or bleeding. Notify provider if you experience excessive bleeding from the nose, cuts, gums, rectum, urinary tract, or vagina. Reddish or brown urine or stool. Vomiting of blood or hemorrhoidal bleeding. If major injury occurs present to the Emergency Department.  Will recheck in 3 days when pt can return. Pt was instructed to bring all medications to next appt; pt verbalized.     Electronically signed by ABRAHAM Morrow

## 2017-12-08 ENCOUNTER — ANTICOAGULATION VISIT (OUTPATIENT)
Dept: CARDIAC SURGERY | Facility: CLINIC | Age: 72
End: 2017-12-08

## 2017-12-08 DIAGNOSIS — I48.91 ATRIAL FIBRILLATION, UNSPECIFIED TYPE (HCC): ICD-10-CM

## 2017-12-08 DIAGNOSIS — Z79.01 LONG-TERM (CURRENT) USE OF ANTICOAGULANTS: ICD-10-CM

## 2017-12-08 DIAGNOSIS — Z95.2 PERSONAL HISTORY OF HEART VALVE REPLACEMENT: ICD-10-CM

## 2017-12-08 LAB — INR PPP: 2.8 (ref 0.9–1.1)

## 2017-12-08 PROCEDURE — 99211 OFF/OP EST MAY X REQ PHY/QHP: CPT | Performed by: NURSE PRACTITIONER

## 2017-12-08 PROCEDURE — 85610 PROTHROMBIN TIME: CPT | Performed by: NURSE PRACTITIONER

## 2017-12-08 RX ORDER — ASPIRIN 81 MG/1
81 TABLET, CHEWABLE ORAL DAILY
COMMUNITY

## 2017-12-08 NOTE — PROGRESS NOTES
Pt brought in medications and they were verified. Pt is staring Omnicef (10 days) and steroid pack (6 days) today. Pt denies bleeding problems. Dose slightly adjusted due to med changes. Patient instructed regarding medication; results given and questions answered. Nutritional counseling given.  Dietary factors affecting therapy addressed.  Patient instructed to monitor for excessive bruising or bleeding. Will recheck in 4 days.   Electronically signed by ABRAHAM Morrow

## 2017-12-12 ENCOUNTER — ANTICOAGULATION VISIT (OUTPATIENT)
Dept: CARDIAC SURGERY | Facility: CLINIC | Age: 72
End: 2017-12-12

## 2017-12-12 VITALS — SYSTOLIC BLOOD PRESSURE: 154 MMHG | HEART RATE: 56 BPM | DIASTOLIC BLOOD PRESSURE: 56 MMHG

## 2017-12-12 DIAGNOSIS — Z79.01 LONG-TERM (CURRENT) USE OF ANTICOAGULANTS: ICD-10-CM

## 2017-12-12 DIAGNOSIS — Z95.2 PERSONAL HISTORY OF HEART VALVE REPLACEMENT: ICD-10-CM

## 2017-12-12 DIAGNOSIS — I48.91 ATRIAL FIBRILLATION, UNSPECIFIED TYPE (HCC): ICD-10-CM

## 2017-12-12 LAB — INR PPP: 6 (ref 0.9–1.1)

## 2017-12-12 PROCEDURE — 99211 OFF/OP EST MAY X REQ PHY/QHP: CPT | Performed by: NURSE PRACTITIONER

## 2017-12-12 PROCEDURE — 85610 PROTHROMBIN TIME: CPT | Performed by: NURSE PRACTITIONER

## 2017-12-12 NOTE — PROGRESS NOTES
Pt states she will finish ABs and steroids tomorrow.  Pt does not with to have INR verified by venipuncture.  Adjusted coumadin dose instructing pt to hold today's dose and to increase Vit K intake today with one and one-half cups of spinach.  Instructed pt to have a half cup serving of turnip greens tomorrow as well.  Instructed pt to watch for s/s bleeding and report to ER for blunt trauma; pt denies bleeding issues at this time.  Pt cannot return to [facility] until this Friday due to PAX appt limitations.  Pt verbalizes instructions. Patient instructed regarding medication; results given and questions answered. Nutritional counseling given.  Dietary factors affecting therapy addressed.  Patient instructed to monitor for excessive bruising or bleeding.          This document has been electronically signed by ABRAHAM Nieto on December 13, 2017 11:03 AM

## 2017-12-13 ENCOUNTER — OFFICE VISIT (OUTPATIENT)
Dept: FAMILY MEDICINE CLINIC | Facility: CLINIC | Age: 72
End: 2017-12-13

## 2017-12-13 VITALS
TEMPERATURE: 98.5 F | OXYGEN SATURATION: 95 % | WEIGHT: 226.5 LBS | BODY MASS INDEX: 37.69 KG/M2 | HEART RATE: 60 BPM | SYSTOLIC BLOOD PRESSURE: 144 MMHG | DIASTOLIC BLOOD PRESSURE: 86 MMHG

## 2017-12-13 DIAGNOSIS — J18.9 PNEUMONIA DUE TO INFECTIOUS ORGANISM, UNSPECIFIED LATERALITY, UNSPECIFIED PART OF LUNG: Primary | ICD-10-CM

## 2017-12-13 DIAGNOSIS — R91.8 LUNG MASS: Primary | ICD-10-CM

## 2017-12-13 PROCEDURE — 99213 OFFICE O/P EST LOW 20 MIN: CPT | Performed by: FAMILY MEDICINE

## 2017-12-13 RX ORDER — AMOXICILLIN AND CLAVULANATE POTASSIUM 875; 125 MG/1; MG/1
1 TABLET, FILM COATED ORAL 2 TIMES DAILY
Qty: 14 TABLET | Refills: 0 | Status: SHIPPED | OUTPATIENT
Start: 2017-12-13 | End: 2017-12-15

## 2017-12-13 NOTE — PROGRESS NOTES
Subjective   Chief Complaint   Patient presents with   • Follow-up     1 mo follow up       Brendon Skelton is a 72 y.o. female who presents for Follow-up (1 mo follow up)   Cough   This is a new problem. The current episode started in the past 7 days. The cough is productive of sputum. Associated symptoms include nasal congestion and shortness of breath. Pertinent negatives include no chest pain, fever or headaches. She has tried a beta-agonist inhaler (cefdinir and steroids) for the symptoms. The treatment provided no relief. Her past medical history is significant for COPD.   She denies lower extremity edema/swelling.  She was seen at the urgent care 7 days ago and given a prescription for Ceftin ear and steroids which she has been taking but does not feel any better.    Of note her elevated INR is being managed by coumadin clinic. They are holding her coumadin for right now    The following portions of the patient's history were reviewed and updated as appropriate:problem list, current medications, allergies, past family history, past medical history, past social history and past surgical history    Past Medical History:   Diagnosis Date   • Acute bronchitis    • Anxiety    • Atrial fibrillation    • C. difficile colitis    • Callosity     under metatarsal head      • Cardiac pacemaker in situ    • CHF (congestive heart failure)    • Chronic obstructive lung disease    • Corns and callus    • Depressive disorder    • Diabetes mellitus    • Diastolic heart failure    • Essential hypertension    • Foot pain    • Hyperlipidemia    • Long term current use of anticoagulant    • On anticoagulant therapy    • Pulmonary emphysema    • Rectal hemorrhage    • Type 2 diabetes mellitus        Social History   Substance Use Topics   • Smoking status: Former Smoker     Quit date: 3/21/1999   • Smokeless tobacco: Never Used   • Alcohol use 1.2 oz/week     2 Shots of liquor per week      Comment: everry three months or so      History   Sexual Activity   • Sexual activity: No       Medications:  Outpatient Medications Prior to Visit   Medication Sig Dispense Refill   • acetaminophen (TYLENOL) 325 MG tablet Take 650 mg by mouth every 6 (six) hours as needed for mild pain (1-3).     • albuterol (PROVENTIL HFA;VENTOLIN HFA) 108 (90 Base) MCG/ACT inhaler Inhale 2 puffs Every 4 (Four) Hours As Needed for Wheezing. 8 g 5   • albuterol (PROVENTIL) (2.5 MG/3ML) 0.083% nebulizer solution Take 2.5 mg by nebulization 4 (four) times a day.     • Ascorbic Acid (VITAMIN C WITH OCHOA HIPS) 250 MG tablet Take 250 mg by mouth daily.     • aspirin 81 MG chewable tablet Chew 81 mg Daily.     • calcitriol (ROCALTROL) 0.25 MCG capsule Take 0.25 mcg by mouth every other day.     • citalopram (CeleXA) 20 MG tablet Take 1 tablet by mouth Daily. 90 tablet 3   • diltiaZEM CD (CARDIZEM CD) 240 MG 24 hr capsule Take 1 capsule by mouth Daily. 90 capsule 3   • ezetimibe (ZETIA) 10 MG tablet Take 1 tablet by mouth Daily. 90 tablet 3   • ferrous sulfate 325 (65 FE) MG tablet Take 325 mg by mouth Daily With Breakfast.     • furosemide (LASIX) 40 MG tablet Take 1 tablet by mouth Daily. 90 tablet 3   • glucose blood test strip 1 each by Other route 3 (three) times a day. Use as instructed     • insulin aspart (NovoLOG) 100 UNIT/ML injection Inject 2-12 Units under the skin 3 (three) times a day before meals.     • Insulin Glargine (LANTUS SOLOSTAR) 100 UNIT/ML injection pen Inject 30 Units under the skin Daily. 10 pen 3   • Insulin Pen Needle (BD PEN NEEDLE DEREK U/F) 32G X 4 MM misc 1 each 4 (Four) Times a Day. 120 each 5   • ipratropium (ATROVENT HFA) 17 MCG/ACT inhaler Inhale 2 puffs 4 (Four) Times a Day. 1 inhaler 3   • losartan (COZAAR) 100 MG tablet Take 1 tablet by mouth Daily. 90 tablet 3   • Multiple Vitamins-Iron (MULTIVITAMIN/IRON PO) Take 1 tablet by mouth daily.     • potassium chloride (K-DUR) 10 MEQ CR tablet Take 1 tablet by mouth Daily. 30 tablet 5   •  Prenatal Vit-Fe Fumarate-FA (PRENATAL VITAMIN) 27-0.8 MG tablet Take 1 tablet by mouth daily.     • raNITIdine (ZANTAC) 150 MG tablet Take 1 tablet by mouth 2 (Two) Times a Day. 180 tablet 3   • rOPINIRole (REQUIP) 0.25 MG tablet Take 1 tablet by mouth Every Night. Take 1 hour before bedtime. 90 tablet 3   • sodium-potassium-magnesium sulfates (SUPREP BOWEL PREP KIT) 17.5-3.13-1.6 GM/180ML solution oral solution Take 1 bottle by mouth Every 12 (Twelve) Hours. 2 bottle 0   • traZODone (DESYREL) 150 MG tablet Take 2 tablets by mouth Every Night. 180 tablet 3   • warfarin (COUMADIN) 5 MG tablet Take 1 tablet nightly except on Monday and Friday take 1/2 tablet or as directed 90 tablet 1     No facility-administered medications prior to visit.        Allergies   Allergen Reactions   • Adhesive Tape      Only paper tape   • Crestor [Rosuvastatin Calcium]    • Lipitor [Atorvastatin]    • Lortab [Hydrocodone-Acetaminophen] Hallucinations       Review of Systems   Constitutional: Negative for fever.   Respiratory: Positive for cough and shortness of breath.    Cardiovascular: Negative for chest pain, palpitations and leg swelling.   Neurological: Negative for headaches.       Objective   Visit Vitals   • /86 (BP Location: Left arm, Patient Position: Sitting, Cuff Size: Large Adult)   • Pulse 60   • Temp 98.5 °F (36.9 °C)   • Wt 103 kg (226 lb 8 oz)   • SpO2 95%   • BMI 37.69 kg/m2       Physical Exam   Constitutional: She is oriented to person, place, and time. She appears well-developed and well-nourished. No distress.   HENT:   Head: Normocephalic.   Nose: Nose normal.   Eyes: Conjunctivae and EOM are normal. Right eye exhibits no discharge. Left eye exhibits no discharge.   Neck: Normal range of motion.   Cardiovascular: Normal rate, regular rhythm and normal heart sounds.  Exam reveals no gallop and no friction rub.    No murmur heard.  Pulmonary/Chest: Effort normal. No respiratory distress. She has wheezes. She  has no rhonchi. She has no rales.   Musculoskeletal: She exhibits no edema.   Neurological: She is alert and oriented to person, place, and time. No cranial nerve deficit.   Skin: She is not diaphoretic.   Psychiatric: She has a normal mood and affect. Her behavior is normal.   Nursing note and vitals reviewed.      Assessment/Plan   Brendon Skelton is a 72 y.o. female seen today for the following:     Diagnosis Plan   1. Pneumonia due to infectious organism, unspecified laterality, unspecified part of lung       Discussed with patient that since she has failed outpatient antibiotics I recommended that she go to the hospital to be admitted for suspected pneumonia with failed outpatient treatment.  She refused to go to the hospital.  So we will get a chest x-ray today and I have changed her antibiotics to Augmentin.  Advised her that her symptoms do not improve and certainly if they worsen in the next 1-2 days that she should go to the ER.             This document has been electronically signed by Rula Paulino DO on December 13, 2017 3:57 PM

## 2017-12-15 ENCOUNTER — ANTICOAGULATION VISIT (OUTPATIENT)
Dept: CARDIAC SURGERY | Facility: CLINIC | Age: 72
End: 2017-12-15

## 2017-12-15 VITALS — DIASTOLIC BLOOD PRESSURE: 60 MMHG | HEART RATE: 88 BPM | SYSTOLIC BLOOD PRESSURE: 145 MMHG

## 2017-12-15 DIAGNOSIS — Z95.2 PERSONAL HISTORY OF HEART VALVE REPLACEMENT: ICD-10-CM

## 2017-12-15 DIAGNOSIS — I48.91 ATRIAL FIBRILLATION, UNSPECIFIED TYPE (HCC): ICD-10-CM

## 2017-12-15 DIAGNOSIS — Z79.01 LONG-TERM (CURRENT) USE OF ANTICOAGULANTS: ICD-10-CM

## 2017-12-15 LAB — INR PPP: 1.6 (ref 0.9–1.1)

## 2017-12-15 PROCEDURE — 99211 OFF/OP EST MAY X REQ PHY/QHP: CPT | Performed by: NURSE PRACTITIONER

## 2017-12-15 PROCEDURE — 85610 PROTHROMBIN TIME: CPT | Performed by: NURSE PRACTITIONER

## 2017-12-15 NOTE — PROGRESS NOTES
Pt is on day 3 of 7 of Augmentin. Pt denies bleeding problems. Pt finished steroids and Omnicef 2 days ago. Dose adjusted and pt instructed to hold green veggies until further notice; pt verbalized. Patient instructed regarding medication; results given and questions answered. Nutritional counseling given.  Dietary factors affecting therapy addressed.  Patient instructed to monitor for excessive bruising or bleeding. Will recheck in 4 days.          This document has been electronically signed by ABRAHAM Nieto on December 15, 2017 11:53 AM

## 2017-12-19 ENCOUNTER — ANTICOAGULATION VISIT (OUTPATIENT)
Dept: CARDIAC SURGERY | Facility: CLINIC | Age: 72
End: 2017-12-19

## 2017-12-19 VITALS — SYSTOLIC BLOOD PRESSURE: 162 MMHG | HEART RATE: 88 BPM | DIASTOLIC BLOOD PRESSURE: 59 MMHG

## 2017-12-19 DIAGNOSIS — I48.91 ATRIAL FIBRILLATION, UNSPECIFIED TYPE (HCC): ICD-10-CM

## 2017-12-19 DIAGNOSIS — Z95.2 PERSONAL HISTORY OF HEART VALVE REPLACEMENT: ICD-10-CM

## 2017-12-19 DIAGNOSIS — Z79.01 LONG-TERM (CURRENT) USE OF ANTICOAGULANTS: ICD-10-CM

## 2017-12-19 LAB — INR PPP: 2.7 (ref 0.9–1.1)

## 2017-12-19 PROCEDURE — 85610 PROTHROMBIN TIME: CPT | Performed by: NURSE PRACTITIONER

## 2017-12-19 PROCEDURE — 99211 OFF/OP EST MAY X REQ PHY/QHP: CPT | Performed by: NURSE PRACTITIONER

## 2017-12-19 NOTE — PROGRESS NOTES
Pt states she will finish Augmentin today. Pt denies med changes or bleeding problems. Dose slightly adjusted due to recent elevation. Patient instructed regarding medication; results given and questions answered. Nutritional counseling given.  Dietary factors affecting therapy addressed. Patient instructed to monitor for excessive bruising or bleeding. Will recheck next week.  Electronically signed by ABRAHAM Morrow

## 2017-12-22 ENCOUNTER — HOSPITAL ENCOUNTER (OUTPATIENT)
Dept: CT IMAGING | Facility: HOSPITAL | Age: 72
Discharge: HOME | End: 2017-12-22
Attending: FAMILY MEDICINE | Admitting: FAMILY MEDICINE

## 2017-12-22 PROCEDURE — 71250 CT THORAX DX C-: CPT

## 2017-12-26 ENCOUNTER — ANTICOAGULATION VISIT (OUTPATIENT)
Dept: CARDIAC SURGERY | Facility: CLINIC | Age: 72
End: 2017-12-26

## 2017-12-26 VITALS — SYSTOLIC BLOOD PRESSURE: 172 MMHG | HEART RATE: 72 BPM | DIASTOLIC BLOOD PRESSURE: 50 MMHG

## 2017-12-26 DIAGNOSIS — Z95.2 PERSONAL HISTORY OF HEART VALVE REPLACEMENT: ICD-10-CM

## 2017-12-26 DIAGNOSIS — I48.91 ATRIAL FIBRILLATION, UNSPECIFIED TYPE (HCC): ICD-10-CM

## 2017-12-26 DIAGNOSIS — Z79.01 LONG-TERM (CURRENT) USE OF ANTICOAGULANTS: ICD-10-CM

## 2017-12-26 LAB — INR PPP: 3.3 (ref 0.9–1.1)

## 2017-12-26 PROCEDURE — 85610 PROTHROMBIN TIME: CPT | Performed by: NURSE PRACTITIONER

## 2017-12-26 NOTE — PROGRESS NOTES
PT states she took dose but has not been eating green. PT has finished all AB. PT denies any med changes or bleeding issues. PT instructed to continue same dose but resume green vegs 2x per week. PT will be seen in [facility] next week. Patient instructed regarding medication; results given and questions answered. Nutritional counseling given.  Dietary factors affecting therapy addressed.  Patient instructed to monitor for excessive bruising or bleeding. PT verbalizes understanding.   Electronically signed by ABRAHAM Morrow

## 2017-12-27 ENCOUNTER — APPOINTMENT (OUTPATIENT)
Dept: GENERAL RADIOLOGY | Facility: HOSPITAL | Age: 72
End: 2017-12-27

## 2017-12-27 ENCOUNTER — APPOINTMENT (OUTPATIENT)
Dept: CT IMAGING | Facility: HOSPITAL | Age: 72
End: 2017-12-27

## 2017-12-27 ENCOUNTER — HOSPITAL ENCOUNTER (INPATIENT)
Facility: HOSPITAL | Age: 72
LOS: 3 days | Discharge: HOME | End: 2017-12-30
Attending: EMERGENCY MEDICINE | Admitting: HOSPITALIST

## 2017-12-27 DIAGNOSIS — N18.9 CHRONIC KIDNEY DISEASE, UNSPECIFIED CKD STAGE: ICD-10-CM

## 2017-12-27 DIAGNOSIS — R77.8 ELEVATED TROPONIN I LEVEL: ICD-10-CM

## 2017-12-27 DIAGNOSIS — Z12.11 ENCOUNTER FOR SCREENING FOR MALIGNANT NEOPLASM OF COLON: ICD-10-CM

## 2017-12-27 DIAGNOSIS — I50.23 ACUTE ON CHRONIC SYSTOLIC CONGESTIVE HEART FAILURE (HCC): Primary | ICD-10-CM

## 2017-12-27 DIAGNOSIS — J90 PLEURAL EFFUSION, LEFT: ICD-10-CM

## 2017-12-27 LAB
ALBUMIN SERPL-MCNC: 3.8 G/DL (ref 3.4–4.8)
ALBUMIN/GLOB SERPL: 1.2 G/DL (ref 1.1–1.8)
ALP SERPL-CCNC: 85 U/L (ref 38–126)
ALT SERPL W P-5'-P-CCNC: 54 U/L (ref 9–52)
ANION GAP SERPL CALCULATED.3IONS-SCNC: 9 MMOL/L (ref 5–15)
APTT PPP: 44 SECONDS (ref 20–40.3)
ARTERIAL PATENCY WRIST A: ABNORMAL
AST SERPL-CCNC: 44 U/L (ref 14–36)
ATMOSPHERIC PRESS: ABNORMAL MMHG
BASE EXCESS BLDA CALC-SCNC: 2 MMOL/L (ref -2.4–2.4)
BASOPHILS # BLD AUTO: 0.03 10*3/MM3 (ref 0–0.2)
BASOPHILS NFR BLD AUTO: 0.4 % (ref 0–2)
BDY SITE: ABNORMAL
BILIRUB SERPL-MCNC: 0.7 MG/DL (ref 0.2–1.3)
BILIRUB UR QL STRIP: NEGATIVE
BUN BLD-MCNC: 58 MG/DL (ref 7–21)
BUN/CREAT SERPL: 27.2 (ref 7–25)
CA-I BLD-MCNC: 4.9 MG/DL (ref 4.5–4.9)
CALCIUM SPEC-SCNC: 9.9 MG/DL (ref 8.4–10.2)
CHLORIDE SERPL-SCNC: 105 MMOL/L (ref 95–110)
CK MB SERPL-CCNC: 1.56 NG/ML (ref 0–5)
CK SERPL-CCNC: 33 U/L (ref 30–135)
CLARITY UR: CLEAR
CO2 BLDA-SCNC: 27.8 MMOL/L (ref 23–27)
CO2 SERPL-SCNC: 27 MMOL/L (ref 22–31)
COLOR UR: YELLOW
CREAT BLD-MCNC: 2.13 MG/DL (ref 0.5–1)
DEPRECATED RDW RBC AUTO: 61.8 FL (ref 36.4–46.3)
EOSINOPHIL # BLD AUTO: 0.11 10*3/MM3 (ref 0–0.7)
EOSINOPHIL NFR BLD AUTO: 1.4 % (ref 0–7)
ERYTHROCYTE [DISTWIDTH] IN BLOOD BY AUTOMATED COUNT: 16.5 % (ref 11.5–14.5)
GFR SERPL CREATININE-BSD FRML MDRD: 23 ML/MIN/1.73 (ref 39–90)
GLOBULIN UR ELPH-MCNC: 3.2 GM/DL (ref 2.3–3.5)
GLUCOSE BLD-MCNC: 128 MG/DL (ref 60–100)
GLUCOSE BLDA-MCNC: 131 MMOL/L
GLUCOSE BLDC GLUCOMTR-MCNC: 108 MG/DL (ref 70–130)
GLUCOSE BLDC GLUCOMTR-MCNC: 196 MG/DL (ref 70–130)
GLUCOSE UR STRIP-MCNC: NEGATIVE MG/DL
HBA1C MFR BLD: 4.8 % (ref 4–5.6)
HCO3 BLDA-SCNC: 26.5 MMOL/L (ref 22–26)
HCT VFR BLD AUTO: 32.6 % (ref 35–45)
HCT VFR BLD CALC: 29 % (ref 38–47)
HGB BLD-MCNC: 10 G/DL (ref 12–15.5)
HGB BLDA-MCNC: 9.7 G/DL (ref 12–16)
HGB UR QL STRIP.AUTO: ABNORMAL
IMM GRANULOCYTES # BLD: 0.02 10*3/MM3 (ref 0–0.02)
IMM GRANULOCYTES NFR BLD: 0.3 % (ref 0–0.5)
INR PPP: 3.45 (ref 0.8–1.2)
KETONES UR QL STRIP: NEGATIVE
LEUKOCYTE ESTERASE UR QL STRIP.AUTO: ABNORMAL
LYMPHOCYTES # BLD AUTO: 0.69 10*3/MM3 (ref 0.6–4.2)
LYMPHOCYTES NFR BLD AUTO: 8.6 % (ref 10–50)
MCH RBC QN AUTO: 31.4 PG (ref 26.5–34)
MCHC RBC AUTO-ENTMCNC: 30.7 G/DL (ref 31.4–36)
MCV RBC AUTO: 102.5 FL (ref 80–98)
MODALITY: ABNORMAL
MONOCYTES # BLD AUTO: 0.41 10*3/MM3 (ref 0–0.9)
MONOCYTES NFR BLD AUTO: 5.1 % (ref 0–12)
NEUTROPHILS # BLD AUTO: 6.72 10*3/MM3 (ref 2–8.6)
NEUTROPHILS NFR BLD AUTO: 84.2 % (ref 37–80)
NITRITE UR QL STRIP: NEGATIVE
NRBC BLD MANUAL-RTO: 0 /100 WBC (ref 0–0)
NT-PROBNP SERPL-MCNC: 7310 PG/ML (ref 0–900)
PCO2 BLDA: 41.1 MM HG (ref 35–45)
PH BLDA: 7.43 PH UNITS (ref 7.35–7.45)
PH UR STRIP.AUTO: <=5 [PH] (ref 5–9)
PLATELET # BLD AUTO: 217 10*3/MM3 (ref 150–450)
PMV BLD AUTO: 12.4 FL (ref 8–12)
PO2 BLDA: 63.4 MM HG (ref 80–105)
POTASSIUM BLD-SCNC: 5.2 MMOL/L (ref 3.5–5.1)
POTASSIUM BLDA-SCNC: 5.28 MMOL/L (ref 3.6–4.9)
PROT SERPL-MCNC: 7 G/DL (ref 6.3–8.6)
PROT UR QL STRIP: NEGATIVE
PROTHROMBIN TIME: 35.3 SECONDS (ref 11.1–15.3)
RBC # BLD AUTO: 3.18 10*6/MM3 (ref 3.77–5.16)
SAO2 % BLDCOA: 92.1 % (ref 94–100)
SODIUM BLD-SCNC: 141 MMOL/L (ref 137–145)
SODIUM BLDA-SCNC: 141.8 MMOL/L (ref 138–146)
SP GR UR STRIP: 1.01 (ref 1–1.03)
TROPONIN I SERPL-MCNC: 0.08 NG/ML
UROBILINOGEN UR QL STRIP: ABNORMAL
WBC NRBC COR # BLD: 7.98 10*3/MM3 (ref 3.2–9.8)

## 2017-12-27 PROCEDURE — 71020 HC CHEST PA AND LATERAL: CPT

## 2017-12-27 PROCEDURE — 82553 CREATINE MB FRACTION: CPT | Performed by: EMERGENCY MEDICINE

## 2017-12-27 PROCEDURE — 99285 EMERGENCY DEPT VISIT HI MDM: CPT

## 2017-12-27 PROCEDURE — 93005 ELECTROCARDIOGRAM TRACING: CPT | Performed by: EMERGENCY MEDICINE

## 2017-12-27 PROCEDURE — 94799 UNLISTED PULMONARY SVC/PX: CPT

## 2017-12-27 PROCEDURE — 25010000002 FUROSEMIDE PER 20 MG: Performed by: EMERGENCY MEDICINE

## 2017-12-27 PROCEDURE — 94640 AIRWAY INHALATION TREATMENT: CPT

## 2017-12-27 PROCEDURE — 25010000002 FUROSEMIDE PER 20 MG: Performed by: INTERNAL MEDICINE

## 2017-12-27 PROCEDURE — 81001 URINALYSIS AUTO W/SCOPE: CPT | Performed by: INTERNAL MEDICINE

## 2017-12-27 PROCEDURE — 82550 ASSAY OF CK (CPK): CPT | Performed by: EMERGENCY MEDICINE

## 2017-12-27 PROCEDURE — 83036 HEMOGLOBIN GLYCOSYLATED A1C: CPT | Performed by: INTERNAL MEDICINE

## 2017-12-27 PROCEDURE — 85610 PROTHROMBIN TIME: CPT | Performed by: EMERGENCY MEDICINE

## 2017-12-27 PROCEDURE — 83880 ASSAY OF NATRIURETIC PEPTIDE: CPT | Performed by: EMERGENCY MEDICINE

## 2017-12-27 PROCEDURE — 80053 COMPREHEN METABOLIC PANEL: CPT | Performed by: EMERGENCY MEDICINE

## 2017-12-27 PROCEDURE — 94760 N-INVAS EAR/PLS OXIMETRY 1: CPT

## 2017-12-27 PROCEDURE — 82962 GLUCOSE BLOOD TEST: CPT

## 2017-12-27 PROCEDURE — 71250 CT THORAX DX C-: CPT

## 2017-12-27 PROCEDURE — 82803 BLOOD GASES ANY COMBINATION: CPT | Performed by: EMERGENCY MEDICINE

## 2017-12-27 PROCEDURE — 63710000001 INSULIN DETEMIR PER 5 UNITS: Performed by: INTERNAL MEDICINE

## 2017-12-27 PROCEDURE — 87040 BLOOD CULTURE FOR BACTERIA: CPT | Performed by: EMERGENCY MEDICINE

## 2017-12-27 PROCEDURE — 84484 ASSAY OF TROPONIN QUANT: CPT | Performed by: EMERGENCY MEDICINE

## 2017-12-27 PROCEDURE — 85730 THROMBOPLASTIN TIME PARTIAL: CPT | Performed by: EMERGENCY MEDICINE

## 2017-12-27 PROCEDURE — 85025 COMPLETE CBC W/AUTO DIFF WBC: CPT | Performed by: EMERGENCY MEDICINE

## 2017-12-27 PROCEDURE — 93010 ELECTROCARDIOGRAM REPORT: CPT | Performed by: INTERNAL MEDICINE

## 2017-12-27 PROCEDURE — 36600 WITHDRAWAL OF ARTERIAL BLOOD: CPT

## 2017-12-27 RX ORDER — BUMETANIDE 0.25 MG/ML
0.5 INJECTION INTRAMUSCULAR; INTRAVENOUS ONCE
Status: COMPLETED | OUTPATIENT
Start: 2017-12-27 | End: 2017-12-27

## 2017-12-27 RX ORDER — CITALOPRAM 20 MG/1
20 TABLET ORAL DAILY
Status: DISCONTINUED | OUTPATIENT
Start: 2017-12-28 | End: 2017-12-30 | Stop reason: HOSPADM

## 2017-12-27 RX ORDER — IPRATROPIUM BROMIDE AND ALBUTEROL SULFATE 2.5; .5 MG/3ML; MG/3ML
3 SOLUTION RESPIRATORY (INHALATION)
Status: DISCONTINUED | OUTPATIENT
Start: 2017-12-27 | End: 2017-12-30 | Stop reason: HOSPADM

## 2017-12-27 RX ORDER — MELATONIN
2000 DAILY
Status: DISCONTINUED | OUTPATIENT
Start: 2017-12-28 | End: 2017-12-30 | Stop reason: HOSPADM

## 2017-12-27 RX ORDER — DILTIAZEM HYDROCHLORIDE 240 MG/1
240 CAPSULE, COATED, EXTENDED RELEASE ORAL DAILY
Status: DISCONTINUED | OUTPATIENT
Start: 2017-12-28 | End: 2017-12-28

## 2017-12-27 RX ORDER — BISACODYL 5 MG/1
5 TABLET, DELAYED RELEASE ORAL DAILY PRN
Status: DISCONTINUED | OUTPATIENT
Start: 2017-12-27 | End: 2017-12-30 | Stop reason: HOSPADM

## 2017-12-27 RX ORDER — BUDESONIDE AND FORMOTEROL FUMARATE DIHYDRATE 160; 4.5 UG/1; UG/1
2 AEROSOL RESPIRATORY (INHALATION)
Status: DISCONTINUED | OUTPATIENT
Start: 2017-12-27 | End: 2017-12-30 | Stop reason: HOSPADM

## 2017-12-27 RX ORDER — SODIUM CHLORIDE 0.9 % (FLUSH) 0.9 %
1-10 SYRINGE (ML) INJECTION AS NEEDED
Status: DISCONTINUED | OUTPATIENT
Start: 2017-12-27 | End: 2017-12-30 | Stop reason: HOSPADM

## 2017-12-27 RX ORDER — FERROUS SULFATE TAB EC 324 MG (65 MG FE EQUIVALENT) 324 (65 FE) MG
324 TABLET DELAYED RESPONSE ORAL
Status: DISCONTINUED | OUTPATIENT
Start: 2017-12-28 | End: 2017-12-30 | Stop reason: HOSPADM

## 2017-12-27 RX ORDER — FUROSEMIDE 10 MG/ML
60 INJECTION INTRAMUSCULAR; INTRAVENOUS ONCE
Status: COMPLETED | OUTPATIENT
Start: 2017-12-27 | End: 2017-12-27

## 2017-12-27 RX ORDER — ROPINIROLE 0.25 MG/1
0.25 TABLET, FILM COATED ORAL NIGHTLY
Status: DISCONTINUED | OUTPATIENT
Start: 2017-12-27 | End: 2017-12-30 | Stop reason: HOSPADM

## 2017-12-27 RX ORDER — CALCITRIOL 0.25 UG/1
0.25 CAPSULE, LIQUID FILLED ORAL EVERY OTHER DAY
Status: DISCONTINUED | OUTPATIENT
Start: 2017-12-29 | End: 2017-12-30 | Stop reason: HOSPADM

## 2017-12-27 RX ORDER — TRAZODONE HYDROCHLORIDE 150 MG/1
300 TABLET ORAL NIGHTLY
Status: DISCONTINUED | OUTPATIENT
Start: 2017-12-28 | End: 2017-12-30 | Stop reason: HOSPADM

## 2017-12-27 RX ORDER — SODIUM CHLORIDE 0.9 % (FLUSH) 0.9 %
10 SYRINGE (ML) INJECTION AS NEEDED
Status: DISCONTINUED | OUTPATIENT
Start: 2017-12-27 | End: 2017-12-30 | Stop reason: HOSPADM

## 2017-12-27 RX ORDER — ASPIRIN 81 MG/1
324 TABLET, CHEWABLE ORAL ONCE
Status: DISCONTINUED | OUTPATIENT
Start: 2017-12-27 | End: 2017-12-27

## 2017-12-27 RX ORDER — ACETAMINOPHEN 325 MG/1
650 TABLET ORAL EVERY 6 HOURS PRN
Status: DISCONTINUED | OUTPATIENT
Start: 2017-12-27 | End: 2017-12-30 | Stop reason: HOSPADM

## 2017-12-27 RX ORDER — FUROSEMIDE 10 MG/ML
40 INJECTION INTRAMUSCULAR; INTRAVENOUS DAILY
Status: DISCONTINUED | OUTPATIENT
Start: 2017-12-27 | End: 2017-12-28

## 2017-12-27 RX ORDER — ASPIRIN 81 MG/1
81 TABLET, CHEWABLE ORAL DAILY
Status: DISCONTINUED | OUTPATIENT
Start: 2017-12-27 | End: 2017-12-30 | Stop reason: HOSPADM

## 2017-12-27 RX ORDER — NICOTINE POLACRILEX 4 MG
15 LOZENGE BUCCAL
Status: DISCONTINUED | OUTPATIENT
Start: 2017-12-27 | End: 2017-12-30 | Stop reason: HOSPADM

## 2017-12-27 RX ORDER — FAMOTIDINE 20 MG/1
20 TABLET, FILM COATED ORAL 2 TIMES DAILY
Status: DISCONTINUED | OUTPATIENT
Start: 2017-12-27 | End: 2017-12-30 | Stop reason: HOSPADM

## 2017-12-27 RX ORDER — POTASSIUM CHLORIDE 750 MG/1
10 CAPSULE, EXTENDED RELEASE ORAL DAILY
Status: DISCONTINUED | OUTPATIENT
Start: 2017-12-27 | End: 2017-12-30 | Stop reason: HOSPADM

## 2017-12-27 RX ORDER — TRAZODONE HYDROCHLORIDE 150 MG/1
150 TABLET ORAL NIGHTLY
Status: DISCONTINUED | OUTPATIENT
Start: 2017-12-27 | End: 2017-12-27

## 2017-12-27 RX ORDER — ONDANSETRON 2 MG/ML
4 INJECTION INTRAMUSCULAR; INTRAVENOUS EVERY 6 HOURS PRN
Status: DISCONTINUED | OUTPATIENT
Start: 2017-12-27 | End: 2017-12-30 | Stop reason: HOSPADM

## 2017-12-27 RX ORDER — SODIUM CHLORIDE 9 MG/ML
75 INJECTION, SOLUTION INTRAVENOUS CONTINUOUS
Status: DISCONTINUED | OUTPATIENT
Start: 2017-12-27 | End: 2017-12-27

## 2017-12-27 RX ORDER — MELATONIN
2000 DAILY
COMMUNITY
End: 2020-01-01

## 2017-12-27 RX ORDER — DEXTROSE MONOHYDRATE 25 G/50ML
25 INJECTION, SOLUTION INTRAVENOUS
Status: DISCONTINUED | OUTPATIENT
Start: 2017-12-27 | End: 2017-12-30 | Stop reason: HOSPADM

## 2017-12-27 RX ADMIN — FUROSEMIDE 60 MG: 10 INJECTION, SOLUTION INTRAMUSCULAR; INTRAVENOUS at 15:13

## 2017-12-27 RX ADMIN — BUDESONIDE AND FORMOTEROL FUMARATE DIHYDRATE 2 PUFF: 160; 4.5 AEROSOL RESPIRATORY (INHALATION) at 22:42

## 2017-12-27 RX ADMIN — IPRATROPIUM BROMIDE AND ALBUTEROL SULFATE 3 ML: 2.5; .5 SOLUTION RESPIRATORY (INHALATION) at 22:42

## 2017-12-27 RX ADMIN — ROPINIROLE HYDROCHLORIDE 0.25 MG: 0.25 TABLET, FILM COATED ORAL at 20:49

## 2017-12-27 RX ADMIN — SODIUM CHLORIDE 75 ML/HR: 900 INJECTION, SOLUTION INTRAVENOUS at 13:14

## 2017-12-27 RX ADMIN — FUROSEMIDE 40 MG: 10 INJECTION, SOLUTION INTRAMUSCULAR; INTRAVENOUS at 20:38

## 2017-12-27 RX ADMIN — BUMETANIDE 0.5 MG: 0.25 INJECTION INTRAMUSCULAR; INTRAVENOUS at 20:37

## 2017-12-27 RX ADMIN — TRAZODONE HYDROCHLORIDE 300 MG: 150 TABLET ORAL at 22:19

## 2017-12-27 RX ADMIN — INSULIN DETEMIR 30 UNITS: 100 INJECTION, SOLUTION SUBCUTANEOUS at 20:48

## 2017-12-27 RX ADMIN — NITROGLYCERIN 1 INCH: 20 OINTMENT TOPICAL at 15:13

## 2017-12-28 ENCOUNTER — APPOINTMENT (OUTPATIENT)
Dept: CARDIOLOGY | Facility: HOSPITAL | Age: 72
End: 2017-12-28
Attending: INTERNAL MEDICINE

## 2017-12-28 LAB
ANION GAP SERPL CALCULATED.3IONS-SCNC: 5 MMOL/L (ref 5–15)
BACTERIA UR QL AUTO: NORMAL /HPF
BASOPHILS # BLD AUTO: 0.02 10*3/MM3 (ref 0–0.2)
BASOPHILS NFR BLD AUTO: 0.3 % (ref 0–2)
BH CV ECHO MEAS - ACS: 1.3 CM
BH CV ECHO MEAS - AI DEC SLOPE: 451.2 CM/SEC^2
BH CV ECHO MEAS - AI MAX PG: 89.8 MMHG
BH CV ECHO MEAS - AI MAX VEL: 473.8 CM/SEC
BH CV ECHO MEAS - AI P1/2T: 307.6 MSEC
BH CV ECHO MEAS - AO ISTHMUS: 2.3 CM
BH CV ECHO MEAS - AO MAX PG (FULL): 35.3 MMHG
BH CV ECHO MEAS - AO MAX PG: 41.5 MMHG
BH CV ECHO MEAS - AO MEAN PG (FULL): 17.9 MMHG
BH CV ECHO MEAS - AO MEAN PG: 20.4 MMHG
BH CV ECHO MEAS - AO ROOT AREA (BSA CORRECTED): 1.2
BH CV ECHO MEAS - AO ROOT AREA: 4.7 CM^2
BH CV ECHO MEAS - AO ROOT DIAM: 2.4 CM
BH CV ECHO MEAS - AO V2 MAX: 320.8 CM/SEC
BH CV ECHO MEAS - AO V2 MEAN: 207.7 CM/SEC
BH CV ECHO MEAS - AO V2 VTI: 61 CM
BH CV ECHO MEAS - ASC AORTA: 2.5 CM
BH CV ECHO MEAS - AVA(I,A): 1.1 CM^2
BH CV ECHO MEAS - AVA(I,D): 1.1 CM^2
BH CV ECHO MEAS - AVA(V,A): 1.1 CM^2
BH CV ECHO MEAS - AVA(V,D): 1.1 CM^2
BH CV ECHO MEAS - BSA(HAYCOCK): 2.2 M^2
BH CV ECHO MEAS - BSA: 2.1 M^2
BH CV ECHO MEAS - BZI_BMI: 38.3 KILOGRAMS/M^2
BH CV ECHO MEAS - BZI_METRIC_HEIGHT: 165.1 CM
BH CV ECHO MEAS - BZI_METRIC_WEIGHT: 104.3 KG
BH CV ECHO MEAS - EDV(CUBED): 118.7 ML
BH CV ECHO MEAS - EDV(TEICH): 113.6 ML
BH CV ECHO MEAS - EF(CUBED): 89.7 %
BH CV ECHO MEAS - EF(MOD-SP4): 60 %
BH CV ECHO MEAS - EF(TEICH): 84 %
BH CV ECHO MEAS - ESV(CUBED): 12.2 ML
BH CV ECHO MEAS - ESV(TEICH): 18.2 ML
BH CV ECHO MEAS - FS: 53.1 %
BH CV ECHO MEAS - IVS/LVPW: 1.4
BH CV ECHO MEAS - IVSD: 1.7 CM
BH CV ECHO MEAS - LA DIMENSION: 3.5 CM
BH CV ECHO MEAS - LA/AO: 1.4
BH CV ECHO MEAS - LV MASS(C)D: 304.8 GRAMS
BH CV ECHO MEAS - LV MASS(C)DI: 145.2 GRAMS/M^2
BH CV ECHO MEAS - LV MAX PG: 6.3 MMHG
BH CV ECHO MEAS - LV MEAN PG: 2.6 MMHG
BH CV ECHO MEAS - LV V1 MAX: 125.2 CM/SEC
BH CV ECHO MEAS - LV V1 MEAN: 74 CM/SEC
BH CV ECHO MEAS - LV V1 VTI: 23.2 CM
BH CV ECHO MEAS - LVIDD: 4.9 CM
BH CV ECHO MEAS - LVIDS: 2.3 CM
BH CV ECHO MEAS - LVOT AREA (M): 2.8 CM^2
BH CV ECHO MEAS - LVOT AREA: 2.8 CM^2
BH CV ECHO MEAS - LVOT DIAM: 1.9 CM
BH CV ECHO MEAS - LVPWD: 1.2 CM
BH CV ECHO MEAS - MV A MAX VEL: 63.6 CM/SEC
BH CV ECHO MEAS - MV DEC SLOPE: 686.4 CM/SEC^2
BH CV ECHO MEAS - MV E MAX VEL: 156.3 CM/SEC
BH CV ECHO MEAS - MV E/A: 2.5
BH CV ECHO MEAS - MV MAX PG: 14.2 MMHG
BH CV ECHO MEAS - MV MEAN PG: 3.3 MMHG
BH CV ECHO MEAS - MV P1/2T MAX VEL: 191.5 CM/SEC
BH CV ECHO MEAS - MV P1/2T: 81.7 MSEC
BH CV ECHO MEAS - MV V2 MAX: 188.5 CM/SEC
BH CV ECHO MEAS - MV V2 MEAN: 79.6 CM/SEC
BH CV ECHO MEAS - MV V2 VTI: 49.1 CM
BH CV ECHO MEAS - MVA P1/2T LCG: 1.1 CM^2
BH CV ECHO MEAS - MVA(P1/2T): 2.7 CM^2
BH CV ECHO MEAS - MVA(VTI): 1.3 CM^2
BH CV ECHO MEAS - PA MAX PG: 5.5 MMHG
BH CV ECHO MEAS - PA V2 MAX: 117 CM/SEC
BH CV ECHO MEAS - PI END-D VEL: 59.1 CM/SEC
BH CV ECHO MEAS - RVDD: 4.3 CM
BH CV ECHO MEAS - SI(AO): 135.6 ML/M^2
BH CV ECHO MEAS - SI(CUBED): 50.7 ML/M^2
BH CV ECHO MEAS - SI(LVOT): 31.3 ML/M^2
BH CV ECHO MEAS - SI(TEICH): 45.4 ML/M^2
BH CV ECHO MEAS - SV(AO): 284.6 ML
BH CV ECHO MEAS - SV(CUBED): 106.4 ML
BH CV ECHO MEAS - SV(LVOT): 65.7 ML
BH CV ECHO MEAS - SV(TEICH): 95.4 ML
BH CV ECHO MEAS - TR MAX VEL: 260 CM/SEC
BUN BLD-MCNC: 57 MG/DL (ref 7–21)
BUN/CREAT SERPL: 26.5 (ref 7–25)
CALCIUM SPEC-SCNC: 9 MG/DL (ref 8.4–10.2)
CHLORIDE SERPL-SCNC: 104 MMOL/L (ref 95–110)
CO2 SERPL-SCNC: 31 MMOL/L (ref 22–31)
CREAT BLD-MCNC: 2.15 MG/DL (ref 0.5–1)
DEPRECATED RDW RBC AUTO: 57.1 FL (ref 36.4–46.3)
EOSINOPHIL # BLD AUTO: 0.21 10*3/MM3 (ref 0–0.7)
EOSINOPHIL NFR BLD AUTO: 3.2 % (ref 0–7)
ERYTHROCYTE [DISTWIDTH] IN BLOOD BY AUTOMATED COUNT: 15.8 % (ref 11.5–14.5)
GFR SERPL CREATININE-BSD FRML MDRD: 23 ML/MIN/1.73 (ref 39–90)
GLUCOSE BLD-MCNC: 44 MG/DL (ref 60–100)
GLUCOSE BLDC GLUCOMTR-MCNC: 124 MG/DL (ref 70–130)
GLUCOSE BLDC GLUCOMTR-MCNC: 145 MG/DL (ref 70–130)
GLUCOSE BLDC GLUCOMTR-MCNC: 162 MG/DL (ref 70–130)
GLUCOSE BLDC GLUCOMTR-MCNC: 68 MG/DL (ref 70–130)
HCT VFR BLD AUTO: 28.2 % (ref 35–45)
HGB BLD-MCNC: 9 G/DL (ref 12–15.5)
HYALINE CASTS UR QL AUTO: NORMAL /LPF
IMM GRANULOCYTES # BLD: 0.01 10*3/MM3 (ref 0–0.02)
IMM GRANULOCYTES NFR BLD: 0.2 % (ref 0–0.5)
INR PPP: 3.97 (ref 0.8–1.2)
LV EF 2D ECHO EST: 55 %
LYMPHOCYTES # BLD AUTO: 1 10*3/MM3 (ref 0.6–4.2)
LYMPHOCYTES NFR BLD AUTO: 15.2 % (ref 10–50)
MCH RBC QN AUTO: 31.5 PG (ref 26.5–34)
MCHC RBC AUTO-ENTMCNC: 31.9 G/DL (ref 31.4–36)
MCV RBC AUTO: 98.6 FL (ref 80–98)
MONOCYTES # BLD AUTO: 0.57 10*3/MM3 (ref 0–0.9)
MONOCYTES NFR BLD AUTO: 8.6 % (ref 0–12)
NEUTROPHILS # BLD AUTO: 4.79 10*3/MM3 (ref 2–8.6)
NEUTROPHILS NFR BLD AUTO: 72.5 % (ref 37–80)
PLATELET # BLD AUTO: 199 10*3/MM3 (ref 150–450)
PMV BLD AUTO: 12 FL (ref 8–12)
POTASSIUM BLD-SCNC: 4.5 MMOL/L (ref 3.5–5.1)
PROTHROMBIN TIME: 39.5 SECONDS (ref 11.1–15.3)
RBC # BLD AUTO: 2.86 10*6/MM3 (ref 3.77–5.16)
RBC # UR: NORMAL /HPF
REF LAB TEST METHOD: NORMAL
SODIUM BLD-SCNC: 140 MMOL/L (ref 137–145)
SQUAMOUS #/AREA URNS HPF: NORMAL /HPF
WBC NRBC COR # BLD: 6.6 10*3/MM3 (ref 3.2–9.8)
WBC UR QL AUTO: NORMAL /HPF

## 2017-12-28 PROCEDURE — 63710000001 INSULIN ASPART PER 5 UNITS: Performed by: INTERNAL MEDICINE

## 2017-12-28 PROCEDURE — 94760 N-INVAS EAR/PLS OXIMETRY 1: CPT

## 2017-12-28 PROCEDURE — 63710000001 INSULIN DETEMIR PER 5 UNITS: Performed by: INTERNAL MEDICINE

## 2017-12-28 PROCEDURE — 85025 COMPLETE CBC W/AUTO DIFF WBC: CPT | Performed by: INTERNAL MEDICINE

## 2017-12-28 PROCEDURE — 93306 TTE W/DOPPLER COMPLETE: CPT

## 2017-12-28 PROCEDURE — 94799 UNLISTED PULMONARY SVC/PX: CPT

## 2017-12-28 PROCEDURE — 25010000002 FUROSEMIDE PER 20 MG: Performed by: INTERNAL MEDICINE

## 2017-12-28 PROCEDURE — 85610 PROTHROMBIN TIME: CPT | Performed by: INTERNAL MEDICINE

## 2017-12-28 PROCEDURE — 80048 BASIC METABOLIC PNL TOTAL CA: CPT | Performed by: INTERNAL MEDICINE

## 2017-12-28 PROCEDURE — 82962 GLUCOSE BLOOD TEST: CPT

## 2017-12-28 PROCEDURE — 93306 TTE W/DOPPLER COMPLETE: CPT | Performed by: INTERNAL MEDICINE

## 2017-12-28 RX ORDER — FUROSEMIDE 10 MG/ML
40 INJECTION INTRAMUSCULAR; INTRAVENOUS EVERY 12 HOURS
Status: DISCONTINUED | OUTPATIENT
Start: 2017-12-28 | End: 2017-12-30 | Stop reason: HOSPADM

## 2017-12-28 RX ORDER — DILTIAZEM HYDROCHLORIDE 180 MG/1
180 CAPSULE, COATED, EXTENDED RELEASE ORAL DAILY
Status: DISCONTINUED | OUTPATIENT
Start: 2017-12-29 | End: 2017-12-30 | Stop reason: HOSPADM

## 2017-12-28 RX ADMIN — IPRATROPIUM BROMIDE AND ALBUTEROL SULFATE 3 ML: 2.5; .5 SOLUTION RESPIRATORY (INHALATION) at 07:02

## 2017-12-28 RX ADMIN — FUROSEMIDE 40 MG: 10 INJECTION, SOLUTION INTRAMUSCULAR; INTRAVENOUS at 18:24

## 2017-12-28 RX ADMIN — CITALOPRAM HYDROBROMIDE 20 MG: 20 TABLET ORAL at 10:52

## 2017-12-28 RX ADMIN — VITAMIN D, TAB 1000IU (100/BT) 2000 UNITS: 25 TAB at 10:53

## 2017-12-28 RX ADMIN — POTASSIUM CHLORIDE 10 MEQ: 750 CAPSULE, EXTENDED RELEASE ORAL at 10:53

## 2017-12-28 RX ADMIN — ASPIRIN 81 MG 81 MG: 81 TABLET ORAL at 10:52

## 2017-12-28 RX ADMIN — TRAZODONE HYDROCHLORIDE 300 MG: 150 TABLET ORAL at 21:00

## 2017-12-28 RX ADMIN — INSULIN ASPART 2 UNITS: 100 INJECTION, SOLUTION INTRAVENOUS; SUBCUTANEOUS at 21:00

## 2017-12-28 RX ADMIN — FAMOTIDINE 20 MG: 20 TABLET, FILM COATED ORAL at 10:52

## 2017-12-28 RX ADMIN — IPRATROPIUM BROMIDE AND ALBUTEROL SULFATE 3 ML: 2.5; .5 SOLUTION RESPIRATORY (INHALATION) at 15:50

## 2017-12-28 RX ADMIN — ROPINIROLE HYDROCHLORIDE 0.25 MG: 0.25 TABLET, FILM COATED ORAL at 21:00

## 2017-12-28 RX ADMIN — FUROSEMIDE 40 MG: 10 INJECTION, SOLUTION INTRAMUSCULAR; INTRAVENOUS at 10:51

## 2017-12-28 RX ADMIN — DILTIAZEM HYDROCHLORIDE 240 MG: 240 CAPSULE, COATED, EXTENDED RELEASE ORAL at 10:52

## 2017-12-28 RX ADMIN — BUDESONIDE AND FORMOTEROL FUMARATE DIHYDRATE 2 PUFF: 160; 4.5 AEROSOL RESPIRATORY (INHALATION) at 07:15

## 2017-12-28 RX ADMIN — INSULIN DETEMIR 25 UNITS: 100 INJECTION, SOLUTION SUBCUTANEOUS at 21:00

## 2017-12-28 RX ADMIN — FERROUS SULFATE TAB EC 324 MG (65 MG FE EQUIVALENT) 324 MG: 324 (65 FE) TABLET DELAYED RESPONSE at 10:52

## 2017-12-28 RX ADMIN — FAMOTIDINE 20 MG: 20 TABLET, FILM COATED ORAL at 21:00

## 2017-12-29 ENCOUNTER — APPOINTMENT (OUTPATIENT)
Dept: GENERAL RADIOLOGY | Facility: HOSPITAL | Age: 72
End: 2017-12-29

## 2017-12-29 LAB
ANION GAP SERPL CALCULATED.3IONS-SCNC: 7 MMOL/L (ref 5–15)
BASOPHILS # BLD AUTO: 0.03 10*3/MM3 (ref 0–0.2)
BASOPHILS NFR BLD AUTO: 0.6 % (ref 0–2)
BUN BLD-MCNC: 61 MG/DL (ref 7–21)
BUN/CREAT SERPL: 26.9 (ref 7–25)
CALCIUM SPEC-SCNC: 8.5 MG/DL (ref 8.4–10.2)
CHLORIDE SERPL-SCNC: 102 MMOL/L (ref 95–110)
CO2 SERPL-SCNC: 31 MMOL/L (ref 22–31)
CREAT BLD-MCNC: 2.27 MG/DL (ref 0.5–1)
DEPRECATED RDW RBC AUTO: 56.6 FL (ref 36.4–46.3)
EOSINOPHIL # BLD AUTO: 0.34 10*3/MM3 (ref 0–0.7)
EOSINOPHIL NFR BLD AUTO: 6.3 % (ref 0–7)
ERYTHROCYTE [DISTWIDTH] IN BLOOD BY AUTOMATED COUNT: 15.8 % (ref 11.5–14.5)
FERRITIN SERPL-MCNC: 196 NG/ML (ref 11.1–264)
FOLATE SERPL-MCNC: >20 NG/ML (ref 2.76–21)
GFR SERPL CREATININE-BSD FRML MDRD: 21 ML/MIN/1.73 (ref 60–90)
GLUCOSE BLD-MCNC: 68 MG/DL (ref 60–100)
GLUCOSE BLDC GLUCOMTR-MCNC: 109 MG/DL (ref 70–130)
GLUCOSE BLDC GLUCOMTR-MCNC: 131 MG/DL (ref 70–130)
GLUCOSE BLDC GLUCOMTR-MCNC: 132 MG/DL (ref 70–130)
GLUCOSE BLDC GLUCOMTR-MCNC: 139 MG/DL (ref 70–130)
HCT VFR BLD AUTO: 25.7 % (ref 35–45)
HGB BLD-MCNC: 8.2 G/DL (ref 12–15.5)
IMM GRANULOCYTES # BLD: 0.02 10*3/MM3 (ref 0–0.02)
IMM GRANULOCYTES NFR BLD: 0.4 % (ref 0–0.5)
INR PPP: 2.88 (ref 0.8–1.2)
IRON 24H UR-MRATE: 52 MCG/DL (ref 37–170)
IRON SATN MFR SERPL: 20 % (ref 15–50)
LYMPHOCYTES # BLD AUTO: 0.56 10*3/MM3 (ref 0.6–4.2)
LYMPHOCYTES NFR BLD AUTO: 10.4 % (ref 10–50)
MCH RBC QN AUTO: 31.1 PG (ref 26.5–34)
MCHC RBC AUTO-ENTMCNC: 31.9 G/DL (ref 31.4–36)
MCV RBC AUTO: 97.3 FL (ref 80–98)
MONOCYTES # BLD AUTO: 0.51 10*3/MM3 (ref 0–0.9)
MONOCYTES NFR BLD AUTO: 9.5 % (ref 0–12)
NEUTROPHILS # BLD AUTO: 3.92 10*3/MM3 (ref 2–8.6)
NEUTROPHILS NFR BLD AUTO: 72.8 % (ref 37–80)
PHOSPHATE SERPL-MCNC: 4 MG/DL (ref 2.4–4.4)
PLATELET # BLD AUTO: 169 10*3/MM3 (ref 150–450)
PMV BLD AUTO: 11.8 FL (ref 8–12)
POTASSIUM BLD-SCNC: 4.5 MMOL/L (ref 3.5–5.1)
PROTHROMBIN TIME: 30.5 SECONDS (ref 11.1–15.3)
RBC # BLD AUTO: 2.64 10*6/MM3 (ref 3.77–5.16)
SODIUM BLD-SCNC: 140 MMOL/L (ref 137–145)
TIBC SERPL-MCNC: 258 MCG/DL (ref 265–497)
VIT B12 BLD-MCNC: 924 PG/ML (ref 239–931)
WBC NRBC COR # BLD: 5.38 10*3/MM3 (ref 3.2–9.8)

## 2017-12-29 PROCEDURE — 82962 GLUCOSE BLOOD TEST: CPT

## 2017-12-29 PROCEDURE — 82746 ASSAY OF FOLIC ACID SERUM: CPT | Performed by: INTERNAL MEDICINE

## 2017-12-29 PROCEDURE — 25010000002 FUROSEMIDE PER 20 MG: Performed by: INTERNAL MEDICINE

## 2017-12-29 PROCEDURE — 82607 VITAMIN B-12: CPT | Performed by: INTERNAL MEDICINE

## 2017-12-29 PROCEDURE — 82728 ASSAY OF FERRITIN: CPT | Performed by: INTERNAL MEDICINE

## 2017-12-29 PROCEDURE — 80048 BASIC METABOLIC PNL TOTAL CA: CPT | Performed by: INTERNAL MEDICINE

## 2017-12-29 PROCEDURE — 63710000001 INSULIN DETEMIR PER 5 UNITS: Performed by: INTERNAL MEDICINE

## 2017-12-29 PROCEDURE — 85025 COMPLETE CBC W/AUTO DIFF WBC: CPT | Performed by: INTERNAL MEDICINE

## 2017-12-29 PROCEDURE — 83540 ASSAY OF IRON: CPT | Performed by: INTERNAL MEDICINE

## 2017-12-29 PROCEDURE — 71020 HC CHEST PA AND LATERAL: CPT

## 2017-12-29 PROCEDURE — 94799 UNLISTED PULMONARY SVC/PX: CPT

## 2017-12-29 PROCEDURE — 84100 ASSAY OF PHOSPHORUS: CPT | Performed by: INTERNAL MEDICINE

## 2017-12-29 PROCEDURE — 83550 IRON BINDING TEST: CPT | Performed by: INTERNAL MEDICINE

## 2017-12-29 PROCEDURE — 94760 N-INVAS EAR/PLS OXIMETRY 1: CPT

## 2017-12-29 PROCEDURE — 85610 PROTHROMBIN TIME: CPT | Performed by: INTERNAL MEDICINE

## 2017-12-29 RX ORDER — WARFARIN SODIUM 3 MG/1
3 TABLET ORAL
Status: DISCONTINUED | OUTPATIENT
Start: 2017-12-29 | End: 2017-12-30 | Stop reason: HOSPADM

## 2017-12-29 RX ADMIN — ASPIRIN 81 MG 81 MG: 81 TABLET ORAL at 09:16

## 2017-12-29 RX ADMIN — FUROSEMIDE 40 MG: 10 INJECTION, SOLUTION INTRAMUSCULAR; INTRAVENOUS at 06:20

## 2017-12-29 RX ADMIN — FERROUS SULFATE TAB EC 324 MG (65 MG FE EQUIVALENT) 324 MG: 324 (65 FE) TABLET DELAYED RESPONSE at 09:16

## 2017-12-29 RX ADMIN — VITAMIN D, TAB 1000IU (100/BT) 2000 UNITS: 25 TAB at 09:16

## 2017-12-29 RX ADMIN — FAMOTIDINE 20 MG: 20 TABLET, FILM COATED ORAL at 09:17

## 2017-12-29 RX ADMIN — FUROSEMIDE 40 MG: 10 INJECTION, SOLUTION INTRAMUSCULAR; INTRAVENOUS at 18:07

## 2017-12-29 RX ADMIN — TRAZODONE HYDROCHLORIDE 300 MG: 150 TABLET ORAL at 21:42

## 2017-12-29 RX ADMIN — INSULIN DETEMIR 25 UNITS: 100 INJECTION, SOLUTION SUBCUTANEOUS at 21:55

## 2017-12-29 RX ADMIN — Medication 10 ML: at 18:07

## 2017-12-29 RX ADMIN — BUDESONIDE AND FORMOTEROL FUMARATE DIHYDRATE 2 PUFF: 160; 4.5 AEROSOL RESPIRATORY (INHALATION) at 19:46

## 2017-12-29 RX ADMIN — IPRATROPIUM BROMIDE AND ALBUTEROL SULFATE 3 ML: 2.5; .5 SOLUTION RESPIRATORY (INHALATION) at 10:40

## 2017-12-29 RX ADMIN — IPRATROPIUM BROMIDE AND ALBUTEROL SULFATE 3 ML: 2.5; .5 SOLUTION RESPIRATORY (INHALATION) at 19:46

## 2017-12-29 RX ADMIN — CITALOPRAM HYDROBROMIDE 20 MG: 20 TABLET ORAL at 09:16

## 2017-12-29 RX ADMIN — WARFARIN SODIUM 3 MG: 3 TABLET ORAL at 18:07

## 2017-12-29 RX ADMIN — Medication 3 ML: at 09:14

## 2017-12-29 RX ADMIN — IPRATROPIUM BROMIDE AND ALBUTEROL SULFATE 3 ML: 2.5; .5 SOLUTION RESPIRATORY (INHALATION) at 14:27

## 2017-12-29 RX ADMIN — CALCITRIOL 0.25 MCG: 0.25 CAPSULE, LIQUID FILLED ORAL at 09:16

## 2017-12-29 RX ADMIN — BUDESONIDE AND FORMOTEROL FUMARATE DIHYDRATE 2 PUFF: 160; 4.5 AEROSOL RESPIRATORY (INHALATION) at 10:40

## 2017-12-29 RX ADMIN — ROPINIROLE HYDROCHLORIDE 0.25 MG: 0.25 TABLET, FILM COATED ORAL at 21:42

## 2017-12-29 RX ADMIN — DILTIAZEM HYDROCHLORIDE 180 MG: 180 CAPSULE, COATED, EXTENDED RELEASE ORAL at 09:16

## 2017-12-29 RX ADMIN — POTASSIUM CHLORIDE 10 MEQ: 750 CAPSULE, EXTENDED RELEASE ORAL at 09:17

## 2017-12-30 VITALS
SYSTOLIC BLOOD PRESSURE: 134 MMHG | BODY MASS INDEX: 37.25 KG/M2 | OXYGEN SATURATION: 93 % | WEIGHT: 223.6 LBS | HEIGHT: 65 IN | DIASTOLIC BLOOD PRESSURE: 58 MMHG | HEART RATE: 58 BPM | TEMPERATURE: 98.1 F | RESPIRATION RATE: 18 BRPM

## 2017-12-30 LAB
ANION GAP SERPL CALCULATED.3IONS-SCNC: 9 MMOL/L (ref 5–15)
BASOPHILS # BLD AUTO: 0.02 10*3/MM3 (ref 0–0.2)
BASOPHILS NFR BLD AUTO: 0.3 % (ref 0–2)
BUN BLD-MCNC: 66 MG/DL (ref 7–21)
BUN/CREAT SERPL: 28.7 (ref 7–25)
CALCIUM SPEC-SCNC: 9.1 MG/DL (ref 8.4–10.2)
CHLORIDE SERPL-SCNC: 99 MMOL/L (ref 95–110)
CO2 SERPL-SCNC: 30 MMOL/L (ref 22–31)
CREAT BLD-MCNC: 2.3 MG/DL (ref 0.5–1)
DEPRECATED RDW RBC AUTO: 57.1 FL (ref 36.4–46.3)
EOSINOPHIL # BLD AUTO: 0.35 10*3/MM3 (ref 0–0.7)
EOSINOPHIL NFR BLD AUTO: 5.2 % (ref 0–7)
ERYTHROCYTE [DISTWIDTH] IN BLOOD BY AUTOMATED COUNT: 16.1 % (ref 11.5–14.5)
GFR SERPL CREATININE-BSD FRML MDRD: 21 ML/MIN/1.73 (ref 39–90)
GLUCOSE BLD-MCNC: 89 MG/DL (ref 60–100)
GLUCOSE BLDC GLUCOMTR-MCNC: 136 MG/DL (ref 70–130)
GLUCOSE BLDC GLUCOMTR-MCNC: 150 MG/DL (ref 70–130)
GLUCOSE BLDC GLUCOMTR-MCNC: 97 MG/DL (ref 70–130)
HCT VFR BLD AUTO: 28.4 % (ref 35–45)
HGB BLD-MCNC: 9.1 G/DL (ref 12–15.5)
IMM GRANULOCYTES # BLD: 0.02 10*3/MM3 (ref 0–0.02)
IMM GRANULOCYTES NFR BLD: 0.3 % (ref 0–0.5)
INR PPP: 2.05 (ref 0.8–1.2)
LYMPHOCYTES # BLD AUTO: 0.83 10*3/MM3 (ref 0.6–4.2)
LYMPHOCYTES NFR BLD AUTO: 12.3 % (ref 10–50)
MCH RBC QN AUTO: 31.3 PG (ref 26.5–34)
MCHC RBC AUTO-ENTMCNC: 32 G/DL (ref 31.4–36)
MCV RBC AUTO: 97.6 FL (ref 80–98)
MONOCYTES # BLD AUTO: 0.51 10*3/MM3 (ref 0–0.9)
MONOCYTES NFR BLD AUTO: 7.5 % (ref 0–12)
NEUTROPHILS # BLD AUTO: 5.03 10*3/MM3 (ref 2–8.6)
NEUTROPHILS NFR BLD AUTO: 74.4 % (ref 37–80)
PLATELET # BLD AUTO: 193 10*3/MM3 (ref 150–450)
PMV BLD AUTO: 11.1 FL (ref 8–12)
POTASSIUM BLD-SCNC: 4.7 MMOL/L (ref 3.5–5.1)
PROTHROMBIN TIME: 23.3 SECONDS (ref 11.1–15.3)
RBC # BLD AUTO: 2.91 10*6/MM3 (ref 3.77–5.16)
SODIUM BLD-SCNC: 138 MMOL/L (ref 137–145)
WBC NRBC COR # BLD: 6.76 10*3/MM3 (ref 3.2–9.8)

## 2017-12-30 PROCEDURE — 94799 UNLISTED PULMONARY SVC/PX: CPT

## 2017-12-30 PROCEDURE — 85610 PROTHROMBIN TIME: CPT | Performed by: INTERNAL MEDICINE

## 2017-12-30 PROCEDURE — 25010000002 FUROSEMIDE PER 20 MG: Performed by: INTERNAL MEDICINE

## 2017-12-30 PROCEDURE — 82962 GLUCOSE BLOOD TEST: CPT

## 2017-12-30 PROCEDURE — 94760 N-INVAS EAR/PLS OXIMETRY 1: CPT

## 2017-12-30 PROCEDURE — 80048 BASIC METABOLIC PNL TOTAL CA: CPT | Performed by: INTERNAL MEDICINE

## 2017-12-30 PROCEDURE — 85025 COMPLETE CBC W/AUTO DIFF WBC: CPT | Performed by: INTERNAL MEDICINE

## 2017-12-30 RX ORDER — FUROSEMIDE 40 MG/1
40 TABLET ORAL 2 TIMES DAILY
Qty: 60 TABLET | Refills: 1 | Status: SHIPPED | OUTPATIENT
Start: 2017-12-30 | End: 2018-02-12 | Stop reason: SDUPTHER

## 2017-12-30 RX ADMIN — FERROUS SULFATE TAB EC 324 MG (65 MG FE EQUIVALENT) 324 MG: 324 (65 FE) TABLET DELAYED RESPONSE at 09:08

## 2017-12-30 RX ADMIN — BUDESONIDE AND FORMOTEROL FUMARATE DIHYDRATE 2 PUFF: 160; 4.5 AEROSOL RESPIRATORY (INHALATION) at 08:57

## 2017-12-30 RX ADMIN — IPRATROPIUM BROMIDE AND ALBUTEROL SULFATE 3 ML: 2.5; .5 SOLUTION RESPIRATORY (INHALATION) at 08:57

## 2017-12-30 RX ADMIN — VITAMIN D, TAB 1000IU (100/BT) 2000 UNITS: 25 TAB at 09:08

## 2017-12-30 RX ADMIN — ASPIRIN 81 MG 81 MG: 81 TABLET ORAL at 09:07

## 2017-12-30 RX ADMIN — FUROSEMIDE 40 MG: 10 INJECTION, SOLUTION INTRAMUSCULAR; INTRAVENOUS at 06:05

## 2017-12-30 RX ADMIN — IPRATROPIUM BROMIDE AND ALBUTEROL SULFATE 3 ML: 2.5; .5 SOLUTION RESPIRATORY (INHALATION) at 11:50

## 2017-12-30 RX ADMIN — Medication 3 ML: at 09:08

## 2017-12-30 RX ADMIN — FAMOTIDINE 20 MG: 20 TABLET, FILM COATED ORAL at 09:08

## 2017-12-30 RX ADMIN — POTASSIUM CHLORIDE 10 MEQ: 750 CAPSULE, EXTENDED RELEASE ORAL at 09:08

## 2017-12-30 RX ADMIN — DILTIAZEM HYDROCHLORIDE 180 MG: 180 CAPSULE, COATED, EXTENDED RELEASE ORAL at 09:08

## 2017-12-30 RX ADMIN — CITALOPRAM HYDROBROMIDE 20 MG: 20 TABLET ORAL at 09:08

## 2018-01-01 LAB
BACTERIA SPEC AEROBE CULT: NORMAL
BACTERIA SPEC AEROBE CULT: NORMAL

## 2018-01-04 ENCOUNTER — ANTICOAGULATION VISIT (OUTPATIENT)
Dept: CARDIAC SURGERY | Facility: CLINIC | Age: 73
End: 2018-01-04

## 2018-01-04 ENCOUNTER — TRANSCRIBE ORDERS (OUTPATIENT)
Dept: LAB | Facility: CLINIC | Age: 73
End: 2018-01-04

## 2018-01-04 VITALS — HEART RATE: 70 BPM | OXYGEN SATURATION: 95 %

## 2018-01-04 DIAGNOSIS — I11.0 BENIGN HYPERTENSIVE HEART DISEASE WITH CONGESTIVE HEART FAILURE (HCC): ICD-10-CM

## 2018-01-04 DIAGNOSIS — Z95.2 PERSONAL HISTORY OF HEART VALVE REPLACEMENT: ICD-10-CM

## 2018-01-04 DIAGNOSIS — A04.71 RECURRENT CLOSTRIDIUM DIFFICILE DIARRHEA: ICD-10-CM

## 2018-01-04 DIAGNOSIS — N18.9 ANEMIA OF CHRONIC RENAL FAILURE, UNSPECIFIED CKD STAGE: ICD-10-CM

## 2018-01-04 DIAGNOSIS — Z79.01 LONG-TERM (CURRENT) USE OF ANTICOAGULANTS: ICD-10-CM

## 2018-01-04 DIAGNOSIS — I48.91 ATRIAL FIBRILLATION, UNSPECIFIED TYPE (HCC): ICD-10-CM

## 2018-01-04 DIAGNOSIS — E21.1 SECONDARY HYPERPARATHYROIDISM, NON-RENAL (HCC): Primary | ICD-10-CM

## 2018-01-04 DIAGNOSIS — D63.1 ANEMIA OF CHRONIC RENAL FAILURE, UNSPECIFIED CKD STAGE: ICD-10-CM

## 2018-01-04 LAB — INR PPP: 3.6 (ref 0.9–1.1)

## 2018-01-04 PROCEDURE — 99211 OFF/OP EST MAY X REQ PHY/QHP: CPT | Performed by: NURSE PRACTITIONER

## 2018-01-04 PROCEDURE — 85610 PROTHROMBIN TIME: CPT | Performed by: NURSE PRACTITIONER

## 2018-01-04 NOTE — PROGRESS NOTES
PT was in the hospital from 12/27 and was dishcarged on 12/30. Dose changed in computer to reflect what pt actually took. PT is now on lasix and is off Losartan. Denies any steroids/AB. Adjusted pt's dose and instructed to increase vit k today and Monday. Recheck INR on Tuesday when she can return due to PACS. Patient instructed regarding medication; results given and questions answered. Nutritional counseling given.  Dietary factors affecting therapy addressed.  Patient instructed to monitor for excessive bruising or bleeding. PT verbalizes understanding.           This document has been electronically signed by ABRAHAM Nieto on January 4, 2018 3:46 PM

## 2018-01-08 ENCOUNTER — LAB (OUTPATIENT)
Dept: LAB | Facility: CLINIC | Age: 73
End: 2018-01-08

## 2018-01-08 DIAGNOSIS — N18.9 ANEMIA OF CHRONIC RENAL FAILURE, UNSPECIFIED CKD STAGE: ICD-10-CM

## 2018-01-08 DIAGNOSIS — E21.1 SECONDARY HYPERPARATHYROIDISM, NON-RENAL (HCC): ICD-10-CM

## 2018-01-08 DIAGNOSIS — A04.71 RECURRENT CLOSTRIDIUM DIFFICILE DIARRHEA: ICD-10-CM

## 2018-01-08 DIAGNOSIS — I11.0 BENIGN HYPERTENSIVE HEART DISEASE WITH CONGESTIVE HEART FAILURE (HCC): ICD-10-CM

## 2018-01-08 DIAGNOSIS — D63.1 ANEMIA OF CHRONIC RENAL FAILURE, UNSPECIFIED CKD STAGE: ICD-10-CM

## 2018-01-08 LAB
25(OH)D3 SERPL-MCNC: 47.1 NG/ML (ref 30–100)
ALBUMIN SERPL-MCNC: 3.9 G/DL (ref 3.4–4.8)
ANION GAP SERPL CALCULATED.3IONS-SCNC: 9 MMOL/L (ref 5–15)
BUN BLD-MCNC: 55 MG/DL (ref 7–21)
BUN/CREAT SERPL: 28.2 (ref 7–25)
CALCIUM SPEC-SCNC: 9.6 MG/DL (ref 8.4–10.2)
CHLORIDE SERPL-SCNC: 101 MMOL/L (ref 95–110)
CO2 SERPL-SCNC: 32 MMOL/L (ref 22–31)
CREAT BLD-MCNC: 1.95 MG/DL (ref 0.5–1)
GFR SERPL CREATININE-BSD FRML MDRD: 25 ML/MIN/1.73 (ref 60–90)
GLUCOSE BLD-MCNC: 82 MG/DL (ref 60–100)
HCT VFR BLD AUTO: 33.1 % (ref 35–45)
HGB BLD-MCNC: 10.1 G/DL (ref 12–15.5)
PHOSPHATE SERPL-MCNC: 3.1 MG/DL (ref 2.4–4.4)
POTASSIUM BLD-SCNC: 4.1 MMOL/L (ref 3.5–5.1)
SODIUM BLD-SCNC: 142 MMOL/L (ref 137–145)

## 2018-01-08 PROCEDURE — 85018 HEMOGLOBIN: CPT | Performed by: INTERNAL MEDICINE

## 2018-01-08 PROCEDURE — 82310 ASSAY OF CALCIUM: CPT | Performed by: INTERNAL MEDICINE

## 2018-01-08 PROCEDURE — 84156 ASSAY OF PROTEIN URINE: CPT | Performed by: INTERNAL MEDICINE

## 2018-01-08 PROCEDURE — 36415 COLL VENOUS BLD VENIPUNCTURE: CPT | Performed by: FAMILY MEDICINE

## 2018-01-08 PROCEDURE — 82306 VITAMIN D 25 HYDROXY: CPT | Performed by: INTERNAL MEDICINE

## 2018-01-08 PROCEDURE — 85014 HEMATOCRIT: CPT | Performed by: INTERNAL MEDICINE

## 2018-01-08 PROCEDURE — 80069 RENAL FUNCTION PANEL: CPT | Performed by: INTERNAL MEDICINE

## 2018-01-08 PROCEDURE — 83970 ASSAY OF PARATHORMONE: CPT | Performed by: INTERNAL MEDICINE

## 2018-01-08 PROCEDURE — 82570 ASSAY OF URINE CREATININE: CPT | Performed by: INTERNAL MEDICINE

## 2018-01-09 ENCOUNTER — TELEPHONE (OUTPATIENT)
Dept: FAMILY MEDICINE CLINIC | Facility: CLINIC | Age: 73
End: 2018-01-09

## 2018-01-09 LAB
CREAT UR-MCNC: 96.3 MG/DL
PROT UR-MCNC: 93.5 MG/DL
PROT/CREAT UR: 970.9 MG/G CREA (ref 0–200)

## 2018-01-09 NOTE — TELEPHONE ENCOUNTER
spoke to Edwige at [employer] and I am faxing her the appt cover sheet and she is to verify the date seen and fax the cover sheet and the PT eval for the chair to [facility] today.

## 2018-01-10 LAB
CALCIUM SPEC-SCNC: 9.8 MG/DL (ref 8.4–10.2)
PTH-INTACT SERPL-MCNC: 69 PG/ML (ref 10–65)

## 2018-01-11 ENCOUNTER — ANTICOAGULATION VISIT (OUTPATIENT)
Dept: CARDIAC SURGERY | Facility: CLINIC | Age: 73
End: 2018-01-11

## 2018-01-11 DIAGNOSIS — Z79.01 LONG-TERM (CURRENT) USE OF ANTICOAGULANTS: ICD-10-CM

## 2018-01-11 DIAGNOSIS — Z95.2 PERSONAL HISTORY OF HEART VALVE REPLACEMENT: ICD-10-CM

## 2018-01-11 DIAGNOSIS — I48.91 ATRIAL FIBRILLATION, UNSPECIFIED TYPE (HCC): ICD-10-CM

## 2018-01-11 LAB — INR PPP: 3.1 (ref 0.9–1.1)

## 2018-01-11 PROCEDURE — 85610 PROTHROMBIN TIME: CPT | Performed by: NURSE PRACTITIONER

## 2018-01-11 NOTE — PROGRESS NOTES
PT states compliant c tx. PT denies any new medications or bleeding issues. PT denies any s/s of blood clot. PT instructed to continue same dose and Coumadin friendly diet. PT will be seen in 2 weeks. Patient instructed regarding medication; results given and questions answered. Nutritional counseling given.  Dietary factors affecting therapy addressed.  Patient instructed to monitor for excessive bruising or bleeding. PT verbalizes understanding.           This document has been electronically signed by ABRAHAM Nieto on January 12, 2018 11:06 AM

## 2018-01-15 ENCOUNTER — OFFICE VISIT (OUTPATIENT)
Dept: FAMILY MEDICINE CLINIC | Facility: CLINIC | Age: 73
End: 2018-01-15

## 2018-01-15 VITALS
HEART RATE: 50 BPM | SYSTOLIC BLOOD PRESSURE: 142 MMHG | RESPIRATION RATE: 18 BRPM | OXYGEN SATURATION: 94 % | WEIGHT: 224 LBS | TEMPERATURE: 97.6 F | BODY MASS INDEX: 37.32 KG/M2 | HEIGHT: 65 IN | DIASTOLIC BLOOD PRESSURE: 62 MMHG

## 2018-01-15 DIAGNOSIS — R91.1 LUNG NODULE: ICD-10-CM

## 2018-01-15 DIAGNOSIS — I50.32 CHRONIC DIASTOLIC HEART FAILURE (HCC): Primary | ICD-10-CM

## 2018-01-15 PROCEDURE — 99213 OFFICE O/P EST LOW 20 MIN: CPT | Performed by: FAMILY MEDICINE

## 2018-01-15 NOTE — PROGRESS NOTES
"Subjective   Chief Complaint   Patient presents with   • Follow-up     6 month        Brendon Skelton is a 72 y.o. female who presents for Follow-up (6 month )   History of Present Illness:   Patient here for hospital follow up. Was hospitalized 12/27-12/30 for acute on chronic CHF as well as CKD. She was diuresed in the hospital with improvement in pulm vascular congestion. She was also seen by nephro. She is also followed by her cardiologist, Dr. Gates.   Also found to have left upper lobe lung nodule on CT chest.    She is on O2 2L by nasal canula. She reports breathing has improved since hospitalization. Denies chest pain, sob, edema. Orthopnea.   She saw Dr. Caputo last week in clinic (nephro) and he told her to take lasix in the evening every other day but to continue taking it every morning. BUN/Cr improving on last labs. She is off losartan per nephrology.   She declines home health.  She is taking iron for anemia.    INR being monitored by coumadin clinic.     Of note, gallbladder sludge was noted on imaging once again. Patient is aware of this. She denies any RUQ pain or nausea or pain with eating. She declines further workup of this.     CT Chest Without contrast 12/27/17:  \"IMPRESSION:  1.  Prevascular space, precarinal space, and pretracheal space  lymphadenopathy of uncertain clinical significance. This most  likely represents inflammatory lymphadenopathy although cannot  totally exclude neoplastic involvement. Recommend clinical  correlation.  2.  Mild centrilobular emphysematous changes.  3.  Left upper lobe anterior segment medial 8.6 mm noncalcified  pulmonary nodule best seen on axial image 17. Recommend follow-up  CT in 3 months or consideration of CT PET scan to further  evaluate.  4.  Left lower lobe posterior basilar segment peripheral crescent  shaped opacity most consistent with passive atelectasis versus  less likely pneumonia.  5.  Left upper lobe lingula small patchy opacity most " "likely  representing subsegmental atelectasis versus less likely  pneumonia.  6.  Small bilateral pleural effusions.\"    The following portions of the patient's history were reviewed and updated as appropriate:problem list, current medications, allergies, past family history, past medical history, past social history and past surgical history    Past Medical History:   Diagnosis Date   • Acute bronchitis    • Anxiety    • Atrial fibrillation    • C. difficile colitis    • Callosity     under metatarsal head      • Cardiac pacemaker in situ    • CHF (congestive heart failure)    • Chronic obstructive lung disease    • Corns and callus    • Depressive disorder    • Diabetes mellitus    • Diastolic heart failure    • Essential hypertension    • Foot pain    • Hyperlipidemia    • Long term current use of anticoagulant    • On anticoagulant therapy    • Pulmonary emphysema    • Rectal hemorrhage    • Type 2 diabetes mellitus        Social History   Substance Use Topics   • Smoking status: Former Smoker     Quit date: 3/21/1999   • Smokeless tobacco: Never Used   • Alcohol use No      Comment: quit in 2016     History   Sexual Activity   • Sexual activity: No       Medications:  Outpatient Medications Prior to Visit   Medication Sig Dispense Refill   • acetaminophen (TYLENOL) 325 MG tablet Take 650 mg by mouth every 6 (six) hours as needed for mild pain (1-3).     • albuterol (PROVENTIL HFA;VENTOLIN HFA) 108 (90 Base) MCG/ACT inhaler Inhale 2 puffs Every 4 (Four) Hours As Needed for Wheezing. 8 g 5   • albuterol (PROVENTIL) (2.5 MG/3ML) 0.083% nebulizer solution Take 2.5 mg by nebulization 4 (four) times a day.     • Ascorbic Acid (VITAMIN C WITH OCHOA HIPS) 250 MG tablet Take 500 mg by mouth Daily.     • aspirin 81 MG chewable tablet Chew 81 mg Daily.     • calcitriol (ROCALTROL) 0.25 MCG capsule Take 0.25 mcg by mouth Every Other Day. Every Monday, Wednesday and friday     • cholecalciferol (VITAMIN D3) 1000 units tablet " Take 2,000 Units by mouth Daily.     • citalopram (CeleXA) 20 MG tablet Take 1 tablet by mouth Daily. 90 tablet 3   • diltiaZEM CD (CARDIZEM CD) 240 MG 24 hr capsule Take 1 capsule by mouth Daily. 90 capsule 3   • ezetimibe (ZETIA) 10 MG tablet Take 1 tablet by mouth Daily. 90 tablet 3   • ferrous sulfate 325 (65 FE) MG tablet Take 325 mg by mouth Daily With Breakfast.     • furosemide (LASIX) 40 MG tablet Take 1 tablet by mouth 2 (Two) Times a Day. 60 tablet 1   • glucose blood test strip 1 each by Other route 3 (three) times a day. Use as instructed     • insulin aspart (NovoLOG) 100 UNIT/ML injection Inject 2-12 Units under the skin 3 (three) times a day before meals.     • Insulin Glargine (LANTUS SOLOSTAR) 100 UNIT/ML injection pen Inject 30 Units under the skin Daily. 10 pen 3   • Insulin Pen Needle (BD PEN NEEDLE DEREK U/F) 32G X 4 MM misc 1 each 4 (Four) Times a Day. 120 each 5   • potassium chloride (K-DUR) 10 MEQ CR tablet Take 1 tablet by mouth Daily. 30 tablet 5   • Prenatal Vit-Fe Fumarate-FA (PRENATAL VITAMIN) 27-0.8 MG tablet Take 1 tablet by mouth daily.     • raNITIdine (ZANTAC) 150 MG tablet Take 1 tablet by mouth 2 (Two) Times a Day. (Patient taking differently: Take 150 mg by mouth Daily.) 180 tablet 3   • rOPINIRole (REQUIP) 0.25 MG tablet Take 1 tablet by mouth Every Night. Take 1 hour before bedtime. 90 tablet 3   • traZODone (DESYREL) 150 MG tablet Take 2 tablets by mouth Every Night. 180 tablet 3   • warfarin (COUMADIN) 5 MG tablet Take 1 tablet nightly except on Monday and Friday take 1/2 tablet or as directed (Patient taking differently: Take 5 mg by mouth. Take 5mg alternating with 2.5mg every other day. Pt took 5mg last night, tonight is a 2.5mg dose) 90 tablet 1   • ipratropium (ATROVENT HFA) 17 MCG/ACT inhaler Inhale 2 puffs 4 (Four) Times a Day. 1 inhaler 3     No facility-administered medications prior to visit.        Allergies   Allergen Reactions   • Crestor [Rosuvastatin Calcium]  "Anaphylaxis     Hurts liver   • Adhesive Tape      Only paper tape   • Lipitor [Atorvastatin]    • Lortab [Hydrocodone-Acetaminophen] Hallucinations       Review of Systems   Constitutional: Negative for fatigue, fever and unexpected weight change.   HENT: Negative.    Eyes: Negative.    Respiratory: Negative for shortness of breath (breathing improved on O2 by nasal canula).    Cardiovascular: Negative for chest pain, palpitations and leg swelling.   Gastrointestinal: Negative for abdominal pain, constipation, diarrhea, nausea and vomiting.   Endocrine: Negative.    Genitourinary: Negative.    Musculoskeletal: Negative.    Skin: Negative.    Neurological: Negative.    Psychiatric/Behavioral: Negative for dysphoric mood and sleep disturbance.       Objective   Visit Vitals   • /62 (BP Location: Left arm, Patient Position: Sitting, Cuff Size: Adult)  Comment: left wrist   • Pulse 50   • Temp 97.6 °F (36.4 °C) (Tympanic)   • Resp 18   • Ht 165.1 cm (65\")   • Wt 102 kg (224 lb)   • SpO2 94%  Comment: 2 liters of portable oxygen   • BMI 37.28 kg/m2       Physical Exam   Constitutional: She is oriented to person, place, and time. She appears well-developed and well-nourished. No distress.   HENT:   Head: Normocephalic.   Nose: Nose normal.   Eyes: Conjunctivae and EOM are normal. Right eye exhibits no discharge. Left eye exhibits no discharge.   Neck: Normal range of motion.   Cardiovascular: Normal rate and regular rhythm.  Exam reveals no gallop and no friction rub.    Murmur heard.  Pulmonary/Chest: Effort normal and breath sounds normal. She has no wheezes. She has no rales.   Musculoskeletal: She exhibits no edema.   Neurological: She is alert and oriented to person, place, and time. No cranial nerve deficit.   Skin: She is not diaphoretic.   Psychiatric: She has a normal mood and affect. Her behavior is normal.   Nursing note and vitals reviewed.      Assessment/Plan   Brendon Skelton is a 72 y.o. female " seen today for the following:     Diagnosis Plan   1. Chronic diastolic heart failure     2. Lung nodule  Ambulatory Referral to Pulmonology     Doing better post hospital discharge.   Will have her follow up with pulmonology regarding lung nodule.  Will need repeat bmp in the next few months in light of recent med adjustment.   She will continue to see nephro.   She declines home health    Patient is aware that I will be leaving the practice in the next few months and they will establish with another PCP.   Follow up with primary care in 3 months.            This document has been electronically signed by Rula Paulino DO on January 15, 2018 2:21 PM

## 2018-01-19 ENCOUNTER — EPISODE CHANGES (OUTPATIENT)
Dept: CASE MANAGEMENT | Facility: OTHER | Age: 73
End: 2018-01-19

## 2018-01-24 ENCOUNTER — OFFICE VISIT (OUTPATIENT)
Dept: FAMILY MEDICINE CLINIC | Facility: CLINIC | Age: 73
End: 2018-01-24

## 2018-01-24 DIAGNOSIS — I50.32 CHRONIC DIASTOLIC HEART FAILURE (HCC): Primary | ICD-10-CM

## 2018-01-24 DIAGNOSIS — J44.9 CHRONIC OBSTRUCTIVE PULMONARY DISEASE, UNSPECIFIED COPD TYPE (HCC): ICD-10-CM

## 2018-01-24 DIAGNOSIS — Z74.09 IMPAIRED MOBILITY: ICD-10-CM

## 2018-01-24 PROCEDURE — 99214 OFFICE O/P EST MOD 30 MIN: CPT | Performed by: FAMILY MEDICINE

## 2018-01-24 RX ORDER — INSULIN GLARGINE 100 [IU]/ML
30 INJECTION, SOLUTION SUBCUTANEOUS DAILY
Qty: 1 PEN | Refills: 11 | Status: SHIPPED | OUTPATIENT
Start: 2018-01-24 | End: 2018-09-10 | Stop reason: SDUPTHER

## 2018-01-24 RX ORDER — ALBUTEROL SULFATE 2.5 MG/3ML
SOLUTION RESPIRATORY (INHALATION)
Qty: 360 ML | Refills: 2 | Status: SHIPPED | OUTPATIENT
Start: 2018-01-24 | End: 2019-01-31

## 2018-01-24 NOTE — PROGRESS NOTES
Subjective   Chief Complaint   Patient presents with   • Follow-up     wheel chair visit       Brendon Skelton is a 72 y.o. female who presents for Follow-up (wheel chair visit)   History of Present Illness:   Ms. Skelton is a 73 yo female with a past medical history of Acute bronchitis; Anxiety; Atrial fibrillation; C. difficile colitis; Callosity; Cardiac pacemaker in situ; CHF (congestive heart failure); Chronic obstructive lung disease; Corns and callus; Depressive disorder; Diabetes mellitus; Diastolic heart failure; Essential hypertension; Foot pain; Hyperlipidemia; Long term current use of anticoagulant; On anticoagulant therapy; Pulmonary emphysema; Rectal hemorrhage; and Type 2 diabetes mellitus who presents for face to face evaluation for power mobility.  She has used a power mobility device for >5 years. She has used her current device for 5 years. She has difficulty with mobility due to CHF, COPD, and deconditioning. Her current powered mobility device has wear and tear on the arm cushions and the battery is wearing out so she is not able to use it long enough to make the trips she needs to make. She mainly uses the PMD when she goes out of the house and would otherwise need to walk long distances (for example doctors visits, grocery store, pharmacy, etc). In the home she mostly uses her rollator walker to ambulate short distances, but can only walk about 20 feet with this (even with supplemental O2) due to hypoxia from CHF and COPD. She gets short of breath just standing up and her O2 sat desaturates to the low 80's/high 70's. She is oxygen dependent and uses her O2 by NC at home and when out. If she is doing anything more than sitting she must wear her O2. She can sometimes take it off when sitting for long period of time. She has a ramp at her house for her PMD. She does some of her ADL's at home on her own such as dressing and bathing, but has help from her son and daughter in law with  cleaning, cooking, etc. She is unable to use a wheelchair as she does not have the strength or conditioning to manually power a WC due to CHF and COPD.  In my opinion she can safely operate a PMD. She is willing and motivated to use the PMD.      The following portions of the patient's history were reviewed and updated as appropriate:problem list, current medications, allergies, past family history, past medical history, past social history and past surgical history    Past Medical History:   Diagnosis Date   • Acute bronchitis    • Anxiety    • Atrial fibrillation    • C. difficile colitis    • Callosity     under metatarsal head      • Cardiac pacemaker in situ    • CHF (congestive heart failure)    • Chronic obstructive lung disease    • Corns and callus    • Depressive disorder    • Diabetes mellitus    • Diastolic heart failure    • Essential hypertension    • Foot pain    • Hyperlipidemia    • Long term current use of anticoagulant    • On anticoagulant therapy    • Pulmonary emphysema    • Rectal hemorrhage    • Type 2 diabetes mellitus        Social History   Substance Use Topics   • Smoking status: Former Smoker     Quit date: 3/21/1999   • Smokeless tobacco: Never Used   • Alcohol use No      Comment: quit in 2016     History   Sexual Activity   • Sexual activity: No       Medications:  Outpatient Medications Prior to Visit   Medication Sig Dispense Refill   • acetaminophen (TYLENOL) 325 MG tablet Take 650 mg by mouth every 6 (six) hours as needed for mild pain (1-3).     • albuterol (PROVENTIL HFA;VENTOLIN HFA) 108 (90 Base) MCG/ACT inhaler Inhale 2 puffs Every 4 (Four) Hours As Needed for Wheezing. 8 g 5   • Ascorbic Acid (VITAMIN C WITH OCHOA HIPS) 250 MG tablet Take 500 mg by mouth Daily.     • aspirin 81 MG chewable tablet Chew 81 mg Daily.     • calcitriol (ROCALTROL) 0.25 MCG capsule Take 0.25 mcg by mouth Every Other Day. Every Monday, Wednesday and friday     • cholecalciferol (VITAMIN D3) 1000  units tablet Take 2,000 Units by mouth Daily.     • citalopram (CeleXA) 20 MG tablet Take 1 tablet by mouth Daily. 90 tablet 3   • diltiaZEM CD (CARDIZEM CD) 240 MG 24 hr capsule Take 1 capsule by mouth Daily. 90 capsule 3   • ezetimibe (ZETIA) 10 MG tablet Take 1 tablet by mouth Daily. 90 tablet 3   • ferrous sulfate 325 (65 FE) MG tablet Take 325 mg by mouth Daily With Breakfast.     • furosemide (LASIX) 40 MG tablet Take 1 tablet by mouth 2 (Two) Times a Day. 60 tablet 1   • glucose blood test strip 1 each by Other route 3 (three) times a day. Use as instructed     • insulin aspart (NovoLOG) 100 UNIT/ML injection Inject 2-12 Units under the skin 3 (three) times a day before meals.     • Insulin Pen Needle (BD PEN NEEDLE DEREK U/F) 32G X 4 MM misc 1 each 4 (Four) Times a Day. 120 each 5   • ipratropium (ATROVENT HFA) 17 MCG/ACT inhaler Inhale 2 puffs 4 (Four) Times a Day As Needed for Wheezing. 1 inhaler 5   • potassium chloride (K-DUR) 10 MEQ CR tablet Take 1 tablet by mouth Daily. 30 tablet 5   • Prenatal Vit-Fe Fumarate-FA (PRENATAL VITAMIN) 27-0.8 MG tablet Take 1 tablet by mouth daily.     • raNITIdine (ZANTAC) 150 MG tablet Take 1 tablet by mouth 2 (Two) Times a Day. (Patient taking differently: Take 150 mg by mouth Daily.) 180 tablet 3   • rOPINIRole (REQUIP) 0.25 MG tablet Take 1 tablet by mouth Every Night. Take 1 hour before bedtime. 90 tablet 3   • traZODone (DESYREL) 150 MG tablet Take 2 tablets by mouth Every Night. 180 tablet 3   • warfarin (COUMADIN) 5 MG tablet Take 1 tablet nightly except on Monday and Friday take 1/2 tablet or as directed (Patient taking differently: Take 5 mg by mouth. Take 5mg alternating with 2.5mg every other day. Pt took 5mg last night, tonight is a 2.5mg dose) 90 tablet 1   • albuterol (PROVENTIL) (2.5 MG/3ML) 0.083% nebulizer solution Take 2.5 mg by nebulization 4 (four) times a day.     • Insulin Glargine (LANTUS SOLOSTAR) 100 UNIT/ML injection pen Inject 30 Units under  "the skin Daily. 10 pen 3     No facility-administered medications prior to visit.        Allergies   Allergen Reactions   • Crestor [Rosuvastatin Calcium] Anaphylaxis     Hurts liver   • Adhesive Tape      Only paper tape   • Lipitor [Atorvastatin]    • Lortab [Hydrocodone-Acetaminophen] Hallucinations       Review of Systems   Constitutional: Negative for fatigue, fever and unexpected weight change.   HENT: Negative.    Eyes: Negative.    Respiratory: Positive for shortness of breath (chronic).    Cardiovascular: Negative for chest pain, palpitations and leg swelling.   Gastrointestinal: Negative for abdominal pain, constipation, diarrhea, nausea and vomiting.   Endocrine: Negative.    Genitourinary: Negative.    Musculoskeletal: Negative.    Skin: Negative.    Neurological: Negative.    Psychiatric/Behavioral: Negative for dysphoric mood and sleep disturbance.       Objective   Vitals:    01/24/18 1027 01/25/18 1245   BP: 132/84    BP Location: Left arm    Patient Position: Sitting    Cuff Size: Large Adult    Pulse: 58    SpO2: (!) 87% 92%   Weight: 102 kg (224 lb)    Height: 165.1 cm (65\")    O2 desat to 78% with ambulation 5 feet (without supplemental O2). Came up to 92% without supp O2 with rest    Physical Exam   Constitutional: She is oriented to person, place, and time. She appears well-developed and well-nourished. No distress.   Morbidly obese   HENT:   Head: Normocephalic.   Nose: Nose normal.   Eyes: Conjunctivae and EOM are normal. Right eye exhibits no discharge. Left eye exhibits no discharge.   Neck: Normal range of motion.   Cardiovascular: Normal rate, regular rhythm and normal heart sounds.  Exam reveals no gallop and no friction rub.    No murmur heard.  Pulmonary/Chest: Effort normal and breath sounds normal. She has no wheezes. She has no rales.   Musculoskeletal: She exhibits no edema.   Normal ROM; strength 4/5 bilateral UE.  strength 4/5 bilaterally.   See attached PT note for full [facility] " exam   Neurological: She is alert and oriented to person, place, and time. No cranial nerve deficit.   Skin: She is not diaphoretic.   Psychiatric: She has a normal mood and affect. Her behavior is normal.   Nursing note and vitals reviewed.  see attached Physical therapy [facility] eval note-I agree with the findings    Assessment/Plan   Brendon Skelton is a 72 y.o. female seen today for the following:     Diagnosis Plan   1. Chronic diastolic heart failure     2. Chronic obstructive pulmonary disease, unspecified COPD type     3. Impaired mobility       PT eval attached.  Patient needs PMD for ambulating outside of the home.    Follow up: Return in about 2 weeks (around 2/7/2018).          This document has been electronically signed by Rula Paulino DO on January 25, 2018 12:53 PM      Addendum 1/29/18:    I was asked to clarify which MRADL's in the home are impaired for this patient. I spoke to Ms. Skelton over the phone today to get clarification because at our visit it was my understanding that she only uses her current PMD for ambulation outside of the home. However, after talking to her on the phone today she states that she does use her PMD to ambulate in the home when she feels short of breath. In these instances, she uses the PMD to get from the living room to the kitchen to cook meals and also uses the PMD to go to the bathroom to toilet and bathe.    Rula Paulino DO  1/29/2018  4:57 PM    This note was electronically signed.        Addendum 2/2/18:  I was asked by Efrain to clarify why a POV (scooter) will not meet patient's needs. Patient cannot use a POV due to lack of postural stability and lack of upper extremity strength required to adequately operate a POV.           This document has been electronically signed by Rula Paulino DO on February 2, 2018 11:34 AM

## 2018-01-25 ENCOUNTER — ANTICOAGULATION VISIT (OUTPATIENT)
Dept: CARDIAC SURGERY | Facility: CLINIC | Age: 73
End: 2018-01-25

## 2018-01-25 VITALS — SYSTOLIC BLOOD PRESSURE: 131 MMHG | DIASTOLIC BLOOD PRESSURE: 74 MMHG | HEART RATE: 60 BPM

## 2018-01-25 VITALS
BODY MASS INDEX: 37.32 KG/M2 | HEIGHT: 65 IN | SYSTOLIC BLOOD PRESSURE: 132 MMHG | WEIGHT: 224 LBS | OXYGEN SATURATION: 92 % | DIASTOLIC BLOOD PRESSURE: 84 MMHG | HEART RATE: 58 BPM

## 2018-01-25 DIAGNOSIS — I48.91 ATRIAL FIBRILLATION, UNSPECIFIED TYPE (HCC): ICD-10-CM

## 2018-01-25 DIAGNOSIS — Z95.2 PERSONAL HISTORY OF HEART VALVE REPLACEMENT: ICD-10-CM

## 2018-01-25 DIAGNOSIS — Z79.01 LONG-TERM (CURRENT) USE OF ANTICOAGULANTS: ICD-10-CM

## 2018-01-25 LAB — INR PPP: 3.5 (ref 0.9–1.1)

## 2018-01-25 PROCEDURE — 85610 PROTHROMBIN TIME: CPT | Performed by: NURSE PRACTITIONER

## 2018-01-25 NOTE — PROGRESS NOTES
Pt denies med changes or bleeding problems. Pt was instructed to continue 1/2 cup servings of green veggies every 3 days; pt verbalized. Patient instructed regarding medication; results given and questions answered. Nutritional counseling given.  Dietary factors affecting therapy addressed.  Patient instructed to monitor for excessive bruising or bleeding. Will recheck in 3 weeks.          This document has been electronically signed by ABRAHAM Nieto on January 26, 2018 2:54 PM

## 2018-01-29 ENCOUNTER — TELEPHONE (OUTPATIENT)
Dept: FAMILY MEDICINE CLINIC | Facility: CLINIC | Age: 73
End: 2018-01-29

## 2018-01-29 NOTE — TELEPHONE ENCOUNTER
Spoke to patient on the phone regarding hoveround. Medicare will not cover it if not used inside of home. She states that she does sometimes use it in the home to get from the Living room to the kitchen and also to the bathroom when she is feeling too short of breath to use her rollator walker.     Will addend note and resubmit    Rula Paulino DO  1/29/2018  11:09 AM    This note was electronically signed.

## 2018-01-31 ENCOUNTER — OFFICE VISIT (OUTPATIENT)
Dept: PULMONOLOGY | Facility: CLINIC | Age: 73
End: 2018-01-31

## 2018-01-31 VITALS
BODY MASS INDEX: 37.32 KG/M2 | OXYGEN SATURATION: 93 % | SYSTOLIC BLOOD PRESSURE: 134 MMHG | DIASTOLIC BLOOD PRESSURE: 80 MMHG | HEART RATE: 55 BPM | WEIGHT: 224 LBS | HEIGHT: 65 IN

## 2018-01-31 DIAGNOSIS — R53.81 PHYSICAL DECONDITIONING: ICD-10-CM

## 2018-01-31 DIAGNOSIS — R91.1 NODULE OF LEFT LUNG: Primary | ICD-10-CM

## 2018-01-31 DIAGNOSIS — J44.9 CHRONIC OBSTRUCTIVE PULMONARY DISEASE, UNSPECIFIED COPD TYPE (HCC): ICD-10-CM

## 2018-01-31 DIAGNOSIS — I50.32 CHRONIC DIASTOLIC HEART FAILURE (HCC): ICD-10-CM

## 2018-01-31 DIAGNOSIS — IMO0001 CLASS 2 OBESITY DUE TO EXCESS CALORIES WITH SERIOUS COMORBIDITY AND BODY MASS INDEX (BMI) OF 37.0 TO 37.9 IN ADULT: ICD-10-CM

## 2018-01-31 DIAGNOSIS — J96.11 CHRONIC HYPOXEMIC RESPIRATORY FAILURE (HCC): ICD-10-CM

## 2018-01-31 PROBLEM — E66.01 MORBID OBESITY (HCC): Status: ACTIVE | Noted: 2018-01-31

## 2018-01-31 PROBLEM — I50.23 ACUTE ON CHRONIC SYSTOLIC CONGESTIVE HEART FAILURE (HCC): Status: RESOLVED | Noted: 2017-12-27 | Resolved: 2018-01-31

## 2018-01-31 PROCEDURE — 99204 OFFICE O/P NEW MOD 45 MIN: CPT | Performed by: INTERNAL MEDICINE

## 2018-01-31 PROCEDURE — 94060 EVALUATION OF WHEEZING: CPT | Performed by: INTERNAL MEDICINE

## 2018-01-31 NOTE — PROGRESS NOTES
Pulmonary Consultation    Rula Paulino DO,    Thank you for asking me to see Brendon Skelton for   Chief Complaint   Patient presents with   • Lung Nodule     ref by Dr. Paulino   .    Subjective     History of Present Illness  Brendon Skelton is a 72 y.o. female with a PMH significant for COPD, chronic hypoxic respiratory failure, past tobacco use, AF, CHF, s/p AVR, obesity, CKD, and DM who presents for evaluation of a lung nodule. Pt states she was referred by Dr. Paulino for a lung evaluation. She reports she was told she had emphysema 15yrs ago so she has been using atrovent BID with minimal effect. Pt had previously on Advair and Spiriva but they were stopped for some reason. She believes they helped. She reports dyspnea on exertion, but denies dyspnea at rest, orthopnea, or PND. She denies chest pain or edema. Pt does have occasional wheeze. She has been on 2lpm ATC since 2006. Pt denies prior diagnosis ANDRES. She does snore but denies apneas, EDS, or AM headaches. Pt previously smoked 1ppd for 45yrs but quit in 1999. She has worked in a shoe factory, a restaurant, and in management of a storage facility. There is no lung disease or lung cancer in the family. She does have 1 dog in the house. Pt states she needs to get a new scooter as her current one is failing. She reports already discussing with her PCP and having a mobility exam with sports medicine recently.     Review of Systems: History obtained from chart review and the patient.  Review of Systems   Constitutional: Negative for fever and unexpected weight change.   Respiratory: Positive for shortness of breath and wheezing. Negative for cough and choking.    Cardiovascular: Negative for chest pain and leg swelling.   Neurological: Positive for weakness.     As described in the HPI. Otherwise, remainder of ROS (14 systems) were negative.    Patient Active Problem List   Diagnosis   • Long-term (current) use of anticoagulants   • Personal history  of heart valve replacement   • Atrial fibrillation [I48.91]   • Pulmonary emphysema   • On anticoagulant therapy   • Hyperlipidemia   • Diastolic heart failure   • Depressive disorder   • Chronic obstructive lung disease   • Essential hypertension, benign   • Diabetes mellitus without complication   • Myopia   • Astigmatism   • Bleeding from open wound of chest wall   • Follow-up surgery care   • Encounter for screening for malignant neoplasm of colon   • Positive colorectal cancer screening using DNA-based stool test   • Nodule of left lung   • Class 2 obesity due to excess calories with serious comorbidity and body mass index (BMI) of 37.0 to 37.9 in adult   • Chronic hypoxemic respiratory failure   • Physical deconditioning         Current Outpatient Prescriptions:   •  acetaminophen (TYLENOL) 325 MG tablet, Take 650 mg by mouth every 6 (six) hours as needed for mild pain (1-3)., Disp: , Rfl:   •  albuterol (PROVENTIL HFA;VENTOLIN HFA) 108 (90 Base) MCG/ACT inhaler, Inhale 2 puffs Every 4 (Four) Hours As Needed for Wheezing., Disp: 8 g, Rfl: 5  •  albuterol (PROVENTIL) (2.5 MG/3ML) 0.083% nebulizer solution, INHALE THE CONTENTS OF 1 VIAL BY NEBULIZER FOUR (4) TIMES DAILY AS NEEDED FOR SHORTNESS OF BREATH AND WHEEZING, Disp: 360 mL, Rfl: 2  •  Ascorbic Acid (VITAMIN C WITH OCHOA HIPS) 250 MG tablet, Take 500 mg by mouth Daily., Disp: , Rfl:   •  aspirin 81 MG chewable tablet, Chew 81 mg Daily., Disp: , Rfl:   •  calcitriol (ROCALTROL) 0.25 MCG capsule, Take 0.25 mcg by mouth Every Other Day. Every Monday, Wednesday and friday, Disp: , Rfl:   •  cholecalciferol (VITAMIN D3) 1000 units tablet, Take 2,000 Units by mouth Daily., Disp: , Rfl:   •  citalopram (CeleXA) 20 MG tablet, Take 1 tablet by mouth Daily., Disp: 90 tablet, Rfl: 3  •  diltiaZEM CD (CARDIZEM CD) 240 MG 24 hr capsule, Take 1 capsule by mouth Daily., Disp: 90 capsule, Rfl: 3  •  ezetimibe (ZETIA) 10 MG tablet, Take 1 tablet by mouth Daily., Disp: 90  tablet, Rfl: 3  •  ferrous sulfate 325 (65 FE) MG tablet, Take 325 mg by mouth Daily With Breakfast., Disp: , Rfl:   •  furosemide (LASIX) 40 MG tablet, Take 1 tablet by mouth 2 (Two) Times a Day., Disp: 60 tablet, Rfl: 1  •  glucose blood test strip, 1 each by Other route 3 (three) times a day. Use as instructed, Disp: , Rfl:   •  insulin aspart (NovoLOG) 100 UNIT/ML injection, Inject 2-12 Units under the skin 3 (three) times a day before meals., Disp: , Rfl:   •  Insulin Glargine (BASAGLAR KWIKPEN) 100 UNIT/ML injection pen, Inject 30 Units under the skin Daily., Disp: 1 pen, Rfl: 11  •  Insulin Pen Needle (BD PEN NEEDLE DEREK U/F) 32G X 4 MM misc, 1 each 4 (Four) Times a Day., Disp: 120 each, Rfl: 5  •  potassium chloride (K-DUR) 10 MEQ CR tablet, Take 1 tablet by mouth Daily., Disp: 30 tablet, Rfl: 5  •  Prenatal Vit-Fe Fumarate-FA (PRENATAL VITAMIN) 27-0.8 MG tablet, Take 1 tablet by mouth daily., Disp: , Rfl:   •  raNITIdine (ZANTAC) 150 MG tablet, Take 1 tablet by mouth 2 (Two) Times a Day. (Patient taking differently: Take 150 mg by mouth Daily.), Disp: 180 tablet, Rfl: 3  •  rOPINIRole (REQUIP) 0.25 MG tablet, Take 1 tablet by mouth Every Night. Take 1 hour before bedtime., Disp: 90 tablet, Rfl: 3  •  traZODone (DESYREL) 150 MG tablet, Take 2 tablets by mouth Every Night., Disp: 180 tablet, Rfl: 3  •  warfarin (COUMADIN) 5 MG tablet, Take 1 tablet nightly except on Monday and Friday take 1/2 tablet or as directed (Patient taking differently: Take 5 mg by mouth. Take 5mg alternating with 2.5mg every other day. Pt took 5mg last night, tonight is a 2.5mg dose), Disp: 90 tablet, Rfl: 1  •  umeclidinium-vilanterol (ANORO ELLIPTA) 62.5-25 MCG/INH aerosol powder  inhaler, Inhale 1 puff Daily., Disp: 1 each, Rfl: 11    Past Medical History:   Diagnosis Date   • Acute bronchitis    • Anxiety    • Atrial fibrillation    • C. difficile colitis    • Callosity     under metatarsal head      • Cardiac pacemaker in situ     • CHF (congestive heart failure)    • Chronic obstructive lung disease    • Corns and callus    • Depressive disorder    • Diabetes mellitus    • Diastolic heart failure    • Essential hypertension    • Foot pain    • Hyperlipidemia    • Long term current use of anticoagulant    • On anticoagulant therapy    • Pulmonary emphysema    • Rectal hemorrhage    • Type 2 diabetes mellitus      Past Surgical History:   Procedure Laterality Date   • AORTIC VALVE REPAIR/REPLACEMENT  1999   • CARDIAC ELECTROPHYSIOLOGY PROCEDURE N/A 3/20/2017    Procedure: PPM generator change - dual;  Surgeon: Bereket Gates MD;  Location: [facility] CATH INVASIVE LOCATION;  Service:    • CARDIAC PACEMAKER PLACEMENT  1999   • ECHO - CONVERTED  11/12/2013    There is mild to moderate left atrial enlargement EF 50-55%   • FOOT SURGERY  1990   • OTHER SURGICAL HISTORY  10/26/2015    PARING CORN/CALLUS    • PACEMAKER REPLACEMENT N/A 3/21/2017    Procedure: revision pacemaker pocket, evacuation hematoma, control of bleeding;  Surgeon: Polo Feliz MD;  Location: [facility] OR;  Service:    • TRANSESOPHAGEAL ECHOCARDIOGRAM (MITZY)  05/01/2014    With color flow-Mild left atrial enlargement with mild concentric LV hypertrophy with top normal aortic root size. EF 45-50%. Mild mitral regurgitation and mild aortic insufficiency and trivial amount of tricuspid regurgation   • TUBAL ABDOMINAL LIGATION       Social History     Social History   • Marital status:      Spouse name: N/A   • Number of children: N/A   • Years of education: N/A     Social History Main Topics   • Smoking status: Former Smoker     Quit date: 3/21/1999   • Smokeless tobacco: Never Used   • Alcohol use No      Comment: quit in 2016   • Drug use: No   • Sexual activity: No     Other Topics Concern   • None     Social History Narrative     Family History   Problem Relation Age of Onset   • Heart disease Mother    • Hyperlipidemia Mother    • Hypertension Mother    • Cancer  "Other           Objective     Blood pressure 134/80, pulse 55, height 165.1 cm (65\"), weight 102 kg (224 lb), SpO2 93 %, not currently breastfeeding.  Physical Exam   Constitutional: She is oriented to person, place, and time. Vital signs are normal. She appears well-developed and well-nourished.   Morbidly obese   HENT:   Head: Normocephalic and atraumatic.   Nose: Nose normal.   Mouth/Throat: Uvula is midline, oropharynx is clear and moist and mucous membranes are normal.   Mallampati 3   Eyes: Conjunctivae, EOM and lids are normal. Pupils are equal, round, and reactive to light.   Neck: Trachea normal and normal range of motion. No tracheal tenderness present. No thyroid mass present.   Cardiovascular: Normal rate and regular rhythm.  PMI is not displaced.  Exam reveals no gallop.    Murmur heard.   Systolic murmur is present with a grade of 2/6   AV click   Pulmonary/Chest: Effort normal and breath sounds normal. No respiratory distress. She has no decreased breath sounds. She has no wheezes. She has no rhonchi. Chest wall is not dull to percussion. She exhibits deformity (thoracic kyphosis). She exhibits no tenderness.   Abdominal: Soft. Normal appearance and bowel sounds are normal. There is no hepatosplenomegaly. There is no tenderness.   Morbidly obese   Musculoskeletal:   Uses motorized scooter, no extremity edema       Vascular Status -  Her exam exhibits right foot vasculature normal. Her exam exhibits no right foot edema. Her exam exhibits no left foot edema.  Lymphadenopathy:        Head (right side): No submandibular adenopathy present.        Head (left side): No submandibular adenopathy present.     She has no cervical adenopathy.        Right: No supraclavicular adenopathy present.        Left: No supraclavicular adenopathy present.   Neurological: She is alert and oriented to person, place, and time. Gait normal.   Skin: Skin is warm and dry. No rash noted. No cyanosis. Nails show no clubbing. "   Psychiatric: She has a normal mood and affect. Her speech is normal and behavior is normal. Judgment normal.   Nursing note and vitals reviewed.      PFTs: 1/31/18 (independently reviewed and interpreted by me)  Ratio 73   FVC 1.46/ 47%  FEV1 1.06/ 46%  Poor effort.  Reduced FVC and FEV1 suggesting restriction.  No bronchodilator response.  No comparative data available.    Radiology (independently reviewed and interpreted by me): CXR 12/29/17 showed cardiomegaly, vascular congestion, pulmonary edema, smaller left pleural effusion with subsegmental LLL atelectasis, mild hyperinflation, prior sternotomy, prosthetic aortic valve, PPM    CT chest without contrast 12/27/17: centrilobular emphysema, 8.6mm FAMILIA noncalcified nodule, posterior LLL area of atelectasis, small bilateral pleural effusions, slight increase in shotty mediastinal/ hilar adenopathy c/w hypervolemia    TTE 12/28/17: EF 55%, diastolic dysfunction, trace MR, trace AR       Assessment/Plan     Brendon was seen today for lung nodule.    Diagnoses and all orders for this visit:    Nodule of left lung  -     Spirometry With Bronchodilator    Chronic obstructive pulmonary disease, unspecified COPD type  -     umeclidinium-vilanterol (ANORO ELLIPTA) 62.5-25 MCG/INH aerosol powder  inhaler; Inhale 1 puff Daily.    Chronic hypoxemic respiratory failure    Class 2 obesity due to excess calories with serious comorbidity and body mass index (BMI) of 37.0 to 37.9 in adult    Chronic diastolic heart failure  -     Ambulatory Referral to Cardiology    Physical deconditioning         Discussion/ Recommendations:   Spirometry suggests restriction, which is likely secondary to the patient's body habitus.  Recent chest imaging was reviewed and was most consistent with volume overload.  She does have a noncalcified nodule in the left upper lobe as well.  I think her dyspnea is multifactorial from underlying COPD, obesity, deconditioning, and chronic heart failure.  She  has perceived benefit from bronchodilator use, so I think she'll benefit from long-acting maintenance bronchodilators.  Given her history of heart failure as well as her recent hospitalization for hypervolemia, I think she would benefit from establishing with the heart failure clinic.    -Stop Atrovent.  -Start Anoro daily.  Sample provided and instructed on use.  -Continue albuterol MDI or nebulizer as needed only.  -Continue supplemental oxygen.  -Referral to Lee[facility] Hamm to establish care in the heart failure clinic.  -Would benefit from pulmonary rehabilitation, but patient is not able to participate with current low functional status.  -Outside criteria for LD CT screening by duration since cessation.         Return in about 4 weeks (around 2/28/2018) for Recheck.      Thank you for allowing me to participate in the care of Brendon Saleh Peters. Please do not hesitate to contact me with any questions.         This document has been electronically signed by Nel Grant MD on January 31, 2018 1:24 PM      Dictated using Dragon

## 2018-02-12 ENCOUNTER — OFFICE VISIT (OUTPATIENT)
Dept: FAMILY MEDICINE CLINIC | Facility: CLINIC | Age: 73
End: 2018-02-12

## 2018-02-12 VITALS
HEART RATE: 52 BPM | SYSTOLIC BLOOD PRESSURE: 116 MMHG | DIASTOLIC BLOOD PRESSURE: 72 MMHG | BODY MASS INDEX: 49.59 KG/M2 | HEIGHT: 55 IN | WEIGHT: 214.3 LBS | OXYGEN SATURATION: 90 %

## 2018-02-12 DIAGNOSIS — I50.32 CHRONIC DIASTOLIC HEART FAILURE (HCC): Primary | ICD-10-CM

## 2018-02-12 DIAGNOSIS — E11.9 TYPE 2 DIABETES MELLITUS WITHOUT COMPLICATION, WITH LONG-TERM CURRENT USE OF INSULIN (HCC): ICD-10-CM

## 2018-02-12 DIAGNOSIS — Z79.4 TYPE 2 DIABETES MELLITUS WITHOUT COMPLICATION, WITH LONG-TERM CURRENT USE OF INSULIN (HCC): ICD-10-CM

## 2018-02-12 PROCEDURE — 99213 OFFICE O/P EST LOW 20 MIN: CPT | Performed by: FAMILY MEDICINE

## 2018-02-12 RX ORDER — FUROSEMIDE 40 MG/1
40 TABLET ORAL 2 TIMES DAILY
Qty: 60 TABLET | Refills: 5 | Status: SHIPPED | OUTPATIENT
Start: 2018-02-12 | End: 2019-01-31

## 2018-02-12 NOTE — PROGRESS NOTES
Subjective   Chief Complaint   Patient presents with   • Follow-up     2 week follow up       Brendon Skelton is a 72 y.o. female who presents for Follow-up (2 week follow up)   History of Present Illness:   She is doing well. Leg edema and breathing have been much better.     Blood sugars are well controlled. No hypoglycemia    The following portions of the patient's history were reviewed and updated as appropriate:problem list, current medications, allergies, past family history, past medical history, past social history and past surgical history    Past Medical History:   Diagnosis Date   • Acute bronchitis    • Anxiety    • Atrial fibrillation    • C. difficile colitis    • Callosity     under metatarsal head      • Cardiac pacemaker in situ    • CHF (congestive heart failure)    • Chronic obstructive lung disease    • Corns and callus    • Depressive disorder    • Diabetes mellitus    • Diastolic heart failure    • Essential hypertension    • Foot pain    • Hyperlipidemia    • Long term current use of anticoagulant    • On anticoagulant therapy    • Pulmonary emphysema    • Rectal hemorrhage    • Type 2 diabetes mellitus        Social History   Substance Use Topics   • Smoking status: Former Smoker     Quit date: 3/21/1999   • Smokeless tobacco: Never Used   • Alcohol use No      Comment: quit in 2016     History   Sexual Activity   • Sexual activity: No       Medications:  Outpatient Medications Prior to Visit   Medication Sig Dispense Refill   • acetaminophen (TYLENOL) 325 MG tablet Take 650 mg by mouth every 6 (six) hours as needed for mild pain (1-3).     • albuterol (PROVENTIL HFA;VENTOLIN HFA) 108 (90 Base) MCG/ACT inhaler Inhale 2 puffs Every 4 (Four) Hours As Needed for Wheezing. 8 g 5   • albuterol (PROVENTIL) (2.5 MG/3ML) 0.083% nebulizer solution INHALE THE CONTENTS OF 1 VIAL BY NEBULIZER FOUR (4) TIMES DAILY AS NEEDED FOR SHORTNESS OF BREATH AND WHEEZING 360 mL 2   • Ascorbic Acid (VITAMIN C  WITH OCHOA HIPS) 250 MG tablet Take 500 mg by mouth Daily.     • aspirin 81 MG chewable tablet Chew 81 mg Daily.     • calcitriol (ROCALTROL) 0.25 MCG capsule Take 0.25 mcg by mouth Every Other Day. Every Monday, Wednesday and friday     • cholecalciferol (VITAMIN D3) 1000 units tablet Take 2,000 Units by mouth Daily.     • citalopram (CeleXA) 20 MG tablet Take 1 tablet by mouth Daily. 90 tablet 3   • diltiaZEM CD (CARDIZEM CD) 240 MG 24 hr capsule Take 1 capsule by mouth Daily. 90 capsule 3   • ezetimibe (ZETIA) 10 MG tablet Take 1 tablet by mouth Daily. 90 tablet 3   • ferrous sulfate 325 (65 FE) MG tablet Take 325 mg by mouth Daily With Breakfast.     • glucose blood test strip 1 each by Other route 3 (three) times a day. Use as instructed     • insulin aspart (NovoLOG) 100 UNIT/ML injection Inject 2-12 Units under the skin 3 (three) times a day before meals.     • Insulin Glargine (BASAGLAR KWIKPEN) 100 UNIT/ML injection pen Inject 30 Units under the skin Daily. 1 pen 11   • Insulin Pen Needle (BD PEN NEEDLE DEREK U/F) 32G X 4 MM misc 1 each 4 (Four) Times a Day. 120 each 5   • potassium chloride (K-DUR) 10 MEQ CR tablet Take 1 tablet by mouth Daily. 30 tablet 5   • Prenatal Vit-Fe Fumarate-FA (PRENATAL VITAMIN) 27-0.8 MG tablet Take 1 tablet by mouth daily.     • raNITIdine (ZANTAC) 150 MG tablet Take 1 tablet by mouth 2 (Two) Times a Day. (Patient taking differently: Take 150 mg by mouth Daily.) 180 tablet 3   • rOPINIRole (REQUIP) 0.25 MG tablet Take 1 tablet by mouth Every Night. Take 1 hour before bedtime. 90 tablet 3   • traZODone (DESYREL) 150 MG tablet Take 2 tablets by mouth Every Night. 180 tablet 3   • umeclidinium-vilanterol (ANORO ELLIPTA) 62.5-25 MCG/INH aerosol powder  inhaler Inhale 1 puff Daily. 1 each 11   • warfarin (COUMADIN) 5 MG tablet Take 1 tablet nightly except on Monday and Friday take 1/2 tablet or as directed (Patient taking differently: Take 5 mg by mouth. Take 5mg alternating with  "2.5mg every other day. Pt took 5mg last night, tonight is a 2.5mg dose) 90 tablet 1   • furosemide (LASIX) 40 MG tablet Take 1 tablet by mouth 2 (Two) Times a Day. 60 tablet 1     No facility-administered medications prior to visit.        Allergies   Allergen Reactions   • Crestor [Rosuvastatin Calcium] Anaphylaxis     Hurts liver   • Adhesive Tape      Only paper tape   • Lipitor [Atorvastatin]    • Lortab [Hydrocodone-Acetaminophen] Hallucinations       Review of Systems   Constitutional: Negative for fatigue, fever and unexpected weight change.   HENT: Negative.    Eyes: Negative.    Respiratory: Negative for shortness of breath.    Cardiovascular: Negative for chest pain, palpitations and leg swelling.   Gastrointestinal: Negative for abdominal pain, constipation, diarrhea, nausea and vomiting.   Endocrine: Negative.    Genitourinary: Negative.    Musculoskeletal: Negative.    Skin: Negative.    Neurological: Negative.    Psychiatric/Behavioral: Negative for dysphoric mood and sleep disturbance.       Objective   Visit Vitals   • /72 (BP Location: Left arm, Patient Position: Sitting, Cuff Size: Large Adult)   • Pulse 52   • Ht 102 cm (40.16\")   • Wt 97.2 kg (214 lb 4.8 oz)   • SpO2 90%   • BMI 93.43 kg/m2       Physical Exam   Constitutional: She is oriented to person, place, and time. She appears well-developed and well-nourished. No distress.   HENT:   Head: Normocephalic.   Nose: Nose normal.   Eyes: Conjunctivae and EOM are normal. Right eye exhibits no discharge. Left eye exhibits no discharge.   Neck: Normal range of motion.   Cardiovascular: Normal rate and regular rhythm.  Exam reveals no gallop and no friction rub.    Murmur heard.  Pulmonary/Chest: Effort normal and breath sounds normal. She has no wheezes. She has no rales.   Musculoskeletal: She exhibits no edema.   Neurological: She is alert and oriented to person, place, and time. No cranial nerve deficit.   Skin: She is not diaphoretic. "   Psychiatric: She has a normal mood and affect. Her behavior is normal.   Nursing note and vitals reviewed.      Assessment/Plan   Brendon Skelton is a 72 y.o. female seen today for the following:     Diagnosis Plan   1. Chronic diastolic heart failure     2. Type 2 diabetes mellitus without complication, with long-term current use of insulin       Insulin has been changed to basaglar due to insurance formulary change.     Continue lasix 40 mg daily. If increased swelling, take additional tablet in pm.  Needs close monitoring of renal function and electrolytes    Patient is aware that I will be leaving the practice in the next few months and they will establish with another PCP.   Follow up with primary care in 1 months.          This document has been electronically signed by Rlua Paulino DO on February 22, 2018 3:23 PM

## 2018-02-14 ENCOUNTER — PATIENT OUTREACH (OUTPATIENT)
Dept: CASE MANAGEMENT | Facility: OTHER | Age: 73
End: 2018-02-14

## 2018-02-15 ENCOUNTER — ANTICOAGULATION VISIT (OUTPATIENT)
Dept: CARDIAC SURGERY | Facility: CLINIC | Age: 73
End: 2018-02-15

## 2018-02-15 VITALS — HEART RATE: 60 BPM | SYSTOLIC BLOOD PRESSURE: 139 MMHG | DIASTOLIC BLOOD PRESSURE: 59 MMHG

## 2018-02-15 DIAGNOSIS — I48.91 ATRIAL FIBRILLATION, UNSPECIFIED TYPE (HCC): ICD-10-CM

## 2018-02-15 DIAGNOSIS — Z79.01 LONG-TERM (CURRENT) USE OF ANTICOAGULANTS: ICD-10-CM

## 2018-02-15 DIAGNOSIS — Z95.2 PERSONAL HISTORY OF HEART VALVE REPLACEMENT: ICD-10-CM

## 2018-02-15 LAB — INR PPP: 2.8 (ref 0.9–1.1)

## 2018-02-15 PROCEDURE — 85610 PROTHROMBIN TIME: CPT | Performed by: NURSE PRACTITIONER

## 2018-02-15 NOTE — PROGRESS NOTES
Patient states no med changes or bleeding problems or unexplained bruising. Patient instructed to continue current dosing schedule. Verbalizes understanding. Will recheck 1 month.  Patient instructed regarding medication; results given and questions answered. Nutritional counseling given.  Dietary factors affecting therapy addressed.  Patient instructed to monitor for excessive bruising or bleeding.           This document has been electronically signed by ABRAHAM Nieto on February 15, 2018 11:58 AM

## 2018-03-02 ENCOUNTER — OFFICE VISIT (OUTPATIENT)
Dept: PULMONOLOGY | Facility: CLINIC | Age: 73
End: 2018-03-02

## 2018-03-02 VITALS
HEART RATE: 50 BPM | HEIGHT: 65 IN | DIASTOLIC BLOOD PRESSURE: 68 MMHG | OXYGEN SATURATION: 95 % | SYSTOLIC BLOOD PRESSURE: 142 MMHG

## 2018-03-02 DIAGNOSIS — J44.9 CHRONIC OBSTRUCTIVE PULMONARY DISEASE, UNSPECIFIED COPD TYPE (HCC): Primary | ICD-10-CM

## 2018-03-02 DIAGNOSIS — Z87.891 PERSONAL HISTORY OF TOBACCO USE, PRESENTING HAZARDS TO HEALTH: ICD-10-CM

## 2018-03-02 DIAGNOSIS — I50.32 CHRONIC DIASTOLIC HEART FAILURE (HCC): ICD-10-CM

## 2018-03-02 DIAGNOSIS — R53.81 PHYSICAL DECONDITIONING: ICD-10-CM

## 2018-03-02 DIAGNOSIS — J96.11 CHRONIC HYPOXEMIC RESPIRATORY FAILURE (HCC): ICD-10-CM

## 2018-03-02 PROCEDURE — G9903 PT SCRN TBCO ID AS NON USER: HCPCS | Performed by: INTERNAL MEDICINE

## 2018-03-02 PROCEDURE — G8417 CALC BMI ABV UP PARAM F/U: HCPCS | Performed by: INTERNAL MEDICINE

## 2018-03-02 PROCEDURE — 99214 OFFICE O/P EST MOD 30 MIN: CPT | Performed by: INTERNAL MEDICINE

## 2018-03-02 NOTE — PROGRESS NOTES
Pulmonary Office Follow-up    Subjective     Brendon Skelton is seen today at the office for   Chief Complaint   Patient presents with   • Follow-up     4 week         HPI  Brendon Skelton is a 72 y.o. female with a PMH significant for COPD, chronic hypoxic respiratory failure, past tobacco use, AF, CHF, s/p AVR, obesity, CKD, and DM who presents for follow-up of COPD.  She was last seen on 1/31/18, at which time I recommended stopping Atrovent and starting Anoro daily.  I also referred her to the heart failure clinic to establish care, but she was not able to make her initial appointment. She states that her dyspnea is improved since starting the Anoro and she has not needed her albuterol.  She does still have occasional nonproductive cough, but it is also improved.  Patient denies any chest pain, sputum, wheeze, or edema.  She states she has rescheduled her appointment to establish with Leelee Chavez for later this month.  She denies any recent exacerbations or steroid use.  Patient continues on her supplemental oxygen and is perceiving benefit.      Patient Active Problem List   Diagnosis   • Long-term (current) use of anticoagulants   • Personal history of heart valve replacement   • Atrial fibrillation [I48.91]   • Pulmonary emphysema   • On anticoagulant therapy   • Hyperlipidemia   • Diastolic heart failure   • Depressive disorder   • Chronic obstructive lung disease   • Essential hypertension, benign   • Diabetes mellitus without complication   • Myopia   • Astigmatism   • Bleeding from open wound of chest wall   • Follow-up surgery care   • Encounter for screening for malignant neoplasm of colon   • Positive colorectal cancer screening using DNA-based stool test   • Nodule of left lung   • Class 2 obesity due to excess calories with serious comorbidity and body mass index (BMI) of 37.0 to 37.9 in adult   • Chronic hypoxemic respiratory failure   • Physical deconditioning       Review of  Systems  Review of Systems   Constitutional: Negative for fever and unexpected weight change.   Respiratory: Positive for cough and shortness of breath. Negative for choking and wheezing.    Cardiovascular: Negative for chest pain and leg swelling.   Neurological: Positive for weakness.     As described in the HPI. Otherwise, remainder of ROS (14 systems) were negative.    Medications, Allergies, Social, and Family Histories reviewed as per EMR.    Objective     Vitals:    03/02/18 1012   BP: 142/68   Pulse: 50   SpO2: 95%     Physical Exam   Constitutional: She is oriented to person, place, and time. Vital signs are normal. She appears well-developed and well-nourished.   Morbidly obese   HENT:   Head: Normocephalic and atraumatic.   Nose: Nose normal.   Mouth/Throat: Uvula is midline, oropharynx is clear and moist and mucous membranes are normal.   Mallampati 3   Eyes: Conjunctivae, EOM and lids are normal. Pupils are equal, round, and reactive to light.   Neck: Trachea normal and normal range of motion. No tracheal tenderness present. No thyroid mass present.   Cardiovascular: Normal rate and regular rhythm.  PMI is not displaced.  Exam reveals no gallop.    Murmur heard.   Systolic murmur is present with a grade of 2/6   AV click   Pulmonary/Chest: Effort normal and breath sounds normal. No respiratory distress. She has no decreased breath sounds. She has no wheezes. She has no rhonchi. She exhibits deformity (thoracic kyphosis). She exhibits no tenderness.   Abdominal: Soft. Normal appearance and bowel sounds are normal. There is no hepatosplenomegaly. There is no tenderness.   Morbidly obese   Musculoskeletal:   Uses motorized scooter, no extremity edema       Vascular Status -  Her exam exhibits right foot vasculature normal. Her exam exhibits no right foot edema. Her exam exhibits no left foot edema.  Lymphadenopathy:        Head (right side): No submandibular adenopathy present.        Head (left side): No  submandibular adenopathy present.     She has no cervical adenopathy.        Right: No supraclavicular adenopathy present.        Left: No supraclavicular adenopathy present.   Neurological: She is alert and oriented to person, place, and time. Gait normal.   Skin: Skin is warm and dry. No rash noted. No cyanosis. Nails show no clubbing.   Psychiatric: She has a normal mood and affect. Her speech is normal and behavior is normal. Judgment normal.   Nursing note and vitals reviewed.          Assessment/Plan     Brendon was seen today for follow-up.    Diagnoses and all orders for this visit:    Chronic obstructive pulmonary disease, unspecified COPD type    Chronic hypoxemic respiratory failure    Chronic diastolic heart failure    Physical deconditioning    Personal history of tobacco use, presenting hazards to health         Discussion/ Recommendations:   She is doing better since starting the Anoro has had reduced rescue needed.  Her dyspnea remains multifactorial from underlying COPD, obesity, deconditioning, and likely chronic diastolic heart failure.  I do think she would benefit from establishing in the heart failure clinic to hopefully avoid any issues with hypervolemia in the future.    -Continue Anoro daily and albuterol as needed only.  -Continue supplemental oxygen.  -Encouraged her to keep her appointment with Leelee Hamm on 3/12/18 to establish in the heart failure clinic for her diastolic heart failure.  -She would benefit from pulmonary rehabilitation, but given her limited mobility she is not able to state.  -Does not meet criteria for LD CT screening.    Patient's BMI is above normal parameters. Follow-up plan includes:  nutrition counseling and referral to primary care.        Return in about 3 months (around 6/2/2018) for Recheck.          This document has been electronically signed by Nel Grant MD on March 2, 2018 10:33 AM      Dictated using Dragon

## 2018-03-05 ENCOUNTER — APPOINTMENT (OUTPATIENT)
Dept: LAB | Facility: HOSPITAL | Age: 73
End: 2018-03-05

## 2018-03-05 ENCOUNTER — OFFICE VISIT (OUTPATIENT)
Dept: FAMILY MEDICINE CLINIC | Facility: CLINIC | Age: 73
End: 2018-03-05

## 2018-03-05 ENCOUNTER — TELEPHONE (OUTPATIENT)
Dept: FAMILY MEDICINE CLINIC | Facility: CLINIC | Age: 73
End: 2018-03-05

## 2018-03-05 VITALS
BODY MASS INDEX: 36.54 KG/M2 | HEIGHT: 65 IN | DIASTOLIC BLOOD PRESSURE: 50 MMHG | TEMPERATURE: 98 F | SYSTOLIC BLOOD PRESSURE: 160 MMHG | OXYGEN SATURATION: 95 % | RESPIRATION RATE: 18 BRPM | WEIGHT: 219.3 LBS | HEART RATE: 52 BPM

## 2018-03-05 DIAGNOSIS — E11.9 DIABETES MELLITUS WITHOUT COMPLICATION (HCC): Primary | ICD-10-CM

## 2018-03-05 DIAGNOSIS — Z79.01 ON ANTICOAGULANT THERAPY: ICD-10-CM

## 2018-03-05 DIAGNOSIS — R53.83 FATIGUE, UNSPECIFIED TYPE: ICD-10-CM

## 2018-03-05 DIAGNOSIS — E78.5 HYPERLIPIDEMIA, UNSPECIFIED HYPERLIPIDEMIA TYPE: ICD-10-CM

## 2018-03-05 DIAGNOSIS — Z76.89 ENCOUNTER TO ESTABLISH CARE: ICD-10-CM

## 2018-03-05 LAB
ALBUMIN SERPL-MCNC: 3.8 G/DL (ref 3.4–4.8)
ALBUMIN/GLOB SERPL: 1.1 G/DL (ref 1.1–1.8)
ALP SERPL-CCNC: 88 U/L (ref 38–126)
ALT SERPL W P-5'-P-CCNC: 38 U/L (ref 9–52)
ANION GAP SERPL CALCULATED.3IONS-SCNC: 9 MMOL/L (ref 5–15)
ARTICHOKE IGE QN: 77 MG/DL (ref 1–129)
AST SERPL-CCNC: 56 U/L (ref 14–36)
BASOPHILS # BLD MANUAL: 0.08 10*3/MM3 (ref 0–0.2)
BASOPHILS NFR BLD AUTO: 1 % (ref 0–2)
BILIRUB SERPL-MCNC: 0.6 MG/DL (ref 0.2–1.3)
BUN BLD-MCNC: 51 MG/DL (ref 7–21)
BUN/CREAT SERPL: 27.9 (ref 7–25)
CALCIUM SPEC-SCNC: 9.6 MG/DL (ref 8.4–10.2)
CHLORIDE SERPL-SCNC: 101 MMOL/L (ref 95–110)
CHOLEST SERPL-MCNC: 154 MG/DL (ref 0–199)
CO2 SERPL-SCNC: 30 MMOL/L (ref 22–31)
CREAT BLD-MCNC: 1.83 MG/DL (ref 0.5–1)
DEPRECATED RDW RBC AUTO: 59.1 FL (ref 36.4–46.3)
EOSINOPHIL # BLD MANUAL: 0.08 10*3/MM3 (ref 0–0.7)
EOSINOPHIL NFR BLD MANUAL: 1 % (ref 0–7)
ERYTHROCYTE [DISTWIDTH] IN BLOOD BY AUTOMATED COUNT: 16.6 % (ref 11.5–14.5)
GFR SERPL CREATININE-BSD FRML MDRD: 27 ML/MIN/1.73 (ref 39–90)
GLOBULIN UR ELPH-MCNC: 3.4 GM/DL (ref 2.3–3.5)
GLUCOSE BLD-MCNC: 87 MG/DL (ref 60–100)
HBA1C MFR BLD: 4.6 % (ref 4–5.6)
HCT VFR BLD AUTO: 34.5 % (ref 35–45)
HDLC SERPL-MCNC: 40 MG/DL (ref 60–200)
HGB BLD-MCNC: 11 G/DL (ref 12–15.5)
HYPOCHROMIA BLD QL: ABNORMAL
INR PPP: 3 (ref 0.8–1.2)
LDLC/HDLC SERPL: 2.43 {RATIO} (ref 0–3.22)
LYMPHOCYTES # BLD MANUAL: 0.16 10*3/MM3 (ref 0.6–4.2)
LYMPHOCYTES NFR BLD MANUAL: 2 % (ref 10–50)
LYMPHOCYTES NFR BLD MANUAL: 5 % (ref 0–12)
MCH RBC QN AUTO: 31.3 PG (ref 26.5–34)
MCHC RBC AUTO-ENTMCNC: 31.9 G/DL (ref 31.4–36)
MCV RBC AUTO: 98 FL (ref 80–98)
MONOCYTES # BLD AUTO: 0.39 10*3/MM3 (ref 0–0.9)
NEUTROPHILS # BLD AUTO: 7.13 10*3/MM3 (ref 2–8.6)
NEUTROPHILS NFR BLD MANUAL: 91 % (ref 37–80)
PLAT MORPH BLD: NORMAL
PLATELET # BLD AUTO: 226 10*3/MM3 (ref 150–450)
PMV BLD AUTO: 10.5 FL (ref 8–12)
POTASSIUM BLD-SCNC: 5 MMOL/L (ref 3.5–5.1)
PROT SERPL-MCNC: 7.2 G/DL (ref 6.3–8.6)
PROTHROMBIN TIME: 29.7 SECONDS (ref 11.1–15.3)
RBC # BLD AUTO: 3.52 10*6/MM3 (ref 3.77–5.16)
SODIUM BLD-SCNC: 140 MMOL/L (ref 137–145)
TRIGL SERPL-MCNC: 85 MG/DL (ref 20–199)
TSH SERPL DL<=0.05 MIU/L-ACNC: 1.88 MIU/ML (ref 0.46–4.68)
WBC MORPH BLD: NORMAL
WBC NRBC COR # BLD: 7.83 10*3/MM3 (ref 3.2–9.8)

## 2018-03-05 PROCEDURE — 85027 COMPLETE CBC AUTOMATED: CPT | Performed by: NURSE PRACTITIONER

## 2018-03-05 PROCEDURE — 83036 HEMOGLOBIN GLYCOSYLATED A1C: CPT | Performed by: NURSE PRACTITIONER

## 2018-03-05 PROCEDURE — 85007 BL SMEAR W/DIFF WBC COUNT: CPT | Performed by: NURSE PRACTITIONER

## 2018-03-05 PROCEDURE — 85610 PROTHROMBIN TIME: CPT | Performed by: NURSE PRACTITIONER

## 2018-03-05 PROCEDURE — 80053 COMPREHEN METABOLIC PANEL: CPT | Performed by: NURSE PRACTITIONER

## 2018-03-05 PROCEDURE — 80061 LIPID PANEL: CPT | Performed by: NURSE PRACTITIONER

## 2018-03-05 PROCEDURE — 99214 OFFICE O/P EST MOD 30 MIN: CPT | Performed by: NURSE PRACTITIONER

## 2018-03-05 PROCEDURE — 84443 ASSAY THYROID STIM HORMONE: CPT | Performed by: NURSE PRACTITIONER

## 2018-03-05 NOTE — PROGRESS NOTES
Subjective   Brendon Skelton is a 72 y.o. female.  Establish primary care. Nose bleed from right side this morning.  Is on Coumadin for an artificial heart valve.  Due sometime next month for follow-up appointment at Coumadin clinic.  Had her last diabetic eye exam by Dr. Clay.  Would like to come off of her depression medication.  Was placed on it after her divorce 3 years ago but feels like she is no longer depressed      Nose Bleed    The bleeding has been from the right nare. This is a new problem. The current episode started today. The problem occurs constantly. The problem has been resolved. The bleeding is associated with anticoagulants and dry air. She has tried pressure for the symptoms. The treatment provided significant relief.   Depression   Visit Type: initial  Progression since onset: resolved  Aggravated by: family issues  Risk factors: marital problems  Patient has a history of: chronic lung disease  Treatment tried: SSRI  Compliance with treatment: good  Improvement on treatment: significant      Diabetes   She has type 2 diabetes mellitus. Associated symptoms include fatigue. Symptoms are stable. Risk factors for coronary artery disease include diabetes mellitus, hypertension, obesity, post-menopausal, sedentary lifestyle and dyslipidemia. Current diabetic treatment includes insulin injections. Her weight is stable. Her home blood glucose trend is fluctuating minimally.      The following portions of the patient's history were reviewed and updated as appropriate: allergies, current medications, past family history, past medical history, past social history, past surgical history and problem list.    Review of Systems   Constitutional: Positive for fatigue.   HENT: Positive for nosebleeds.    Eyes: Negative.    Respiratory: Negative.    Cardiovascular: Negative.    Gastrointestinal: Negative.    Genitourinary: Negative.    Musculoskeletal: Negative.    Skin: Negative.    Neurological:  Negative.        Objective   Physical Exam   Constitutional: She is oriented to person, place, and time. She appears well-developed and well-nourished.   HENT:   Head: Normocephalic.   Right Ear: External ear normal.   Left Ear: External ear normal.   Nose: Epistaxis is observed.   Mouth/Throat: Oropharynx is clear and moist.   Scant amount of fresh blood noted to right knee air.  No active bleeding site identified.  Multiple small erosions noted inside bilateral nare with erythema present.  Most likely due to continuous use of oxygen per nasal cannula.   Eyes: EOM are normal. Pupils are equal, round, and reactive to light.   Neck: Normal range of motion. Neck supple.   Cardiovascular: Normal rate, regular rhythm and normal heart sounds.    Pulmonary/Chest: Effort normal and breath sounds normal.   Abdominal: Soft. Bowel sounds are normal.   Musculoskeletal: Normal range of motion.   Neurological: She is alert and oriented to person, place, and time.   Skin: Skin is warm and dry.   Psychiatric: She has a normal mood and affect. Her behavior is normal. Judgment and thought content normal.   Nursing note and vitals reviewed.      Assessment/Plan   Problems Addressed this Visit        Cardiovascular and Mediastinum    Hyperlipidemia    Relevant Orders    Lipid panel (Completed)       Endocrine    Diabetes mellitus without complication - Primary    Relevant Orders    Comprehensive metabolic panel (Completed)    Hemoglobin A1c (Completed)       Hematopoietic and Hemostatic    On anticoagulant therapy    Relevant Orders    Protime-INR (Completed)      Other Visit Diagnoses     Fatigue, unspecified type        Relevant Orders    CBC w MANUAL Differential (Completed)    TSH (Completed)    CBC Auto Differential (Completed)    Manual Differential (Completed)    Encounter to establish care            1.  Diabetes mellitus without complication:  Complete lab work is ordered including chemistry panel and hemoglobin A1c and will  call with results when available  Encouraged to continue to adhere to ADA diet    2.  On anticoagulant therapy:  Complete lab work is ordered including PT/INR and will call with results when available  Encouraged to move nasal cannula to mouth in order to reduce nasal irritation to help prevent future nose bleeds  Will contact Coumadin clinic with results of PT/INR    3.  Fatigue, unspecified type:  Complete lab work is ordered including CBC and TSH and will call with results when available    4.  Hyperlipidemia:  Complete lab work as ordered including fasting lipid panel and call with results when available    5.  Encounter to establish care:  Schedule follow-up appointment with this office in 6 months for recheck of diabetes, fatigue and hyperlipidemia  Discussed tapering dose of depression medication        This document has been electronically signed by ABRAHAM Boone on March 9, 2018 7:55 AM

## 2018-03-05 NOTE — TELEPHONE ENCOUNTER
Per ABRAHAM Gomes Zetta Bouchicas was called and given recent lab results.  Patient stated that her nose had stopped bleeding and was able to wear her oxygen.  Continue current meds and follow up as planned or sooner if any problems.

## 2018-03-12 ENCOUNTER — OFFICE VISIT (OUTPATIENT)
Dept: CARDIOLOGY | Facility: CLINIC | Age: 73
End: 2018-03-12

## 2018-03-12 ENCOUNTER — ANTICOAGULATION VISIT (OUTPATIENT)
Dept: CARDIAC SURGERY | Facility: CLINIC | Age: 73
End: 2018-03-12

## 2018-03-12 VITALS — HEART RATE: 63 BPM | WEIGHT: 220.3 LBS | OXYGEN SATURATION: 93 % | HEIGHT: 65 IN | BODY MASS INDEX: 36.71 KG/M2

## 2018-03-12 VITALS — HEART RATE: 50 BPM

## 2018-03-12 DIAGNOSIS — I48.0 PAROXYSMAL ATRIAL FIBRILLATION (HCC): ICD-10-CM

## 2018-03-12 DIAGNOSIS — Z95.2 PERSONAL HISTORY OF HEART VALVE REPLACEMENT: ICD-10-CM

## 2018-03-12 DIAGNOSIS — I50.30 HEART FAILURE WITH PRESERVED LEFT VENTRICULAR FUNCTION (HFPEF) (HCC): Primary | ICD-10-CM

## 2018-03-12 DIAGNOSIS — J44.9 CHRONIC OBSTRUCTIVE PULMONARY DISEASE, UNSPECIFIED COPD TYPE (HCC): ICD-10-CM

## 2018-03-12 DIAGNOSIS — Z79.01 LONG-TERM (CURRENT) USE OF ANTICOAGULANTS: ICD-10-CM

## 2018-03-12 DIAGNOSIS — I10 ESSENTIAL HYPERTENSION: ICD-10-CM

## 2018-03-12 DIAGNOSIS — Z79.899 DRUG THERAPY: ICD-10-CM

## 2018-03-12 DIAGNOSIS — Z95.1 HX OF CABG: ICD-10-CM

## 2018-03-12 DIAGNOSIS — I48.91 ATRIAL FIBRILLATION, UNSPECIFIED TYPE (HCC): ICD-10-CM

## 2018-03-12 DIAGNOSIS — J96.11 CHRONIC HYPOXEMIC RESPIRATORY FAILURE (HCC): ICD-10-CM

## 2018-03-12 DIAGNOSIS — I25.10 CORONARY ARTERY DISEASE INVOLVING NATIVE CORONARY ARTERY OF NATIVE HEART WITHOUT ANGINA PECTORIS: ICD-10-CM

## 2018-03-12 LAB — INR PPP: 3.4 (ref 0.9–1.1)

## 2018-03-12 PROCEDURE — 99215 OFFICE O/P EST HI 40 MIN: CPT | Performed by: NURSE PRACTITIONER

## 2018-03-12 PROCEDURE — 85610 PROTHROMBIN TIME: CPT | Performed by: NURSE PRACTITIONER

## 2018-03-12 PROCEDURE — 93010 ELECTROCARDIOGRAM REPORT: CPT | Performed by: INTERNAL MEDICINE

## 2018-03-12 PROCEDURE — 93005 ELECTROCARDIOGRAM TRACING: CPT | Performed by: NURSE PRACTITIONER

## 2018-03-12 NOTE — PROGRESS NOTES
Subjective:     Congestive Heart Failure (New)    PCP: ABRAHAM Gomes  Cardiologist: Dr. Gates  Nephrologist: Dr. Cooper  Pulmonologist: Dr. Corazon Grant    Congestive Heart Failure   Presents for initial visit. The disease course has been stable. The condition has lasted for 3 months. Associated symptoms include fatigue and muscle weakness. Pertinent negatives include no abdominal pain, chest pain, chest pressure, claudication, edema, near-syncope, nocturia, orthopnea, palpitations, paroxysmal nocturnal dyspnea, shortness of breath or unexpected weight change. The symptoms have been stable. Her past medical history is significant for arrhythmia, CAD, chronic lung disease, HTN and valvular heart disease.     The patient is a 72-year-old female referred to the heart failure program per Dr. Corazon Grant.    The patient reports that she was hospitalized on 12/27/2017 with discharge on 12/30/2017 secondary to volume overload.  She believes that in recall her shortness of breath and leg edema was related to poor dietary selections/excessive canned soup intake.    Since that time, the patient reports that she has improved her dietary selections and is compliant with low sodium dietary intake.    She does weigh herself and is aware of the need to contact providers should she notice a 2 pound weight gain overnight or 5 pounds in one week.  She has been given prior instructions to increase her Lasix to twice daily if in fact she notices edema of lower extremity.  She reports compliance with her medications.  Activities are limited secondary to exercise tolerance, and she does use a scooter for mobile assistance.  She is on home oxygen therapy and presents today with such.    She is on long-term anticoagulation (vitamin K antagonist in (and is monitored through the Coumadin clinic.    The patient received last office evaluation with Dr. Gates in October 2017 at which time cardiac device interrogation was  performed.    Review of Systems   Constitution: Positive for fatigue and malaise/fatigue. Negative for chills, fever, unexpected weight change and weight gain.   HENT: Negative for congestion and nosebleeds.    Eyes: Negative for blurred vision and pain.   Cardiovascular: Positive for dyspnea on exertion. Negative for chest pain, claudication, cyanosis, irregular heartbeat, leg swelling, near-syncope, orthopnea, palpitations, paroxysmal nocturnal dyspnea and syncope.   Respiratory: Negative for cough, hemoptysis, shortness of breath, sleep disturbances due to breathing, snoring, sputum production and wheezing.    Endocrine: Negative for polydipsia, polyphagia and polyuria.   Hematologic/Lymphatic: Negative for bleeding problem. Does not bruise/bleed easily.   Skin: Negative for itching and rash.   Musculoskeletal: Positive for muscle weakness. Negative for falls.   Gastrointestinal: Negative for bloating and abdominal pain.   Genitourinary: Negative for dysuria, hematuria and nocturia.     Past Medical History:   Diagnosis Date   • Acute bronchitis    • Anxiety    • Atrial fibrillation    • C. difficile colitis    • Callosity     under metatarsal head      • Cardiac pacemaker in situ    • CHF (congestive heart failure)    • Chronic obstructive lung disease    • Corns and callus    • Depressive disorder    • Diabetes mellitus    • Diastolic heart failure    • Essential hypertension    • Foot pain    • Hyperlipidemia    • Long term current use of anticoagulant    • On anticoagulant therapy    • Pulmonary emphysema    • Rectal hemorrhage    • Type 2 diabetes mellitus      Past Surgical History:   Procedure Laterality Date   • AORTIC VALVE REPAIR/REPLACEMENT  1999   • CARDIAC ELECTROPHYSIOLOGY PROCEDURE N/A 3/20/2017    Procedure: PPM generator change - dual;  Surgeon: Bereket Gates MD;  Location: [facility] INVASIVE LOCATION;  Service:    • CARDIAC PACEMAKER PLACEMENT  1999   • ECHO - CONVERTED  11/12/2013    There  is mild to moderate left atrial enlargement EF 50-55%   • FOOT SURGERY  1990   • OTHER SURGICAL HISTORY  10/26/2015    PARING CORN/CALLUS    • PACEMAKER REPLACEMENT N/A 3/21/2017    Procedure: revision pacemaker pocket, evacuation hematoma, control of bleeding;  Surgeon: Polo Feliz MD;  Location: [facility];  Service:    • TRANSESOPHAGEAL ECHOCARDIOGRAM (MITZY)  05/01/2014    With color flow-Mild left atrial enlargement with mild concentric LV hypertrophy with top normal aortic root size. EF 45-50%. Mild mitral regurgitation and mild aortic insufficiency and trivial amount of tricuspid regurgation   • TUBAL ABDOMINAL LIGATION       Social History     Social History   • Marital status:      Social History Main Topics   • Smoking status: Former Smoker     Quit date: 3/21/1999   • Smokeless tobacco: Never Used   • Alcohol use No      Comment: quit in 2016   • Drug use: No   • Sexual activity: No     Other Topics Concern   • Not on file     Crestor [rosuvastatin calcium]; Adhesive tape; Lipitor [atorvastatin]; and Lortab [hydrocodone-acetaminophen]    Current Outpatient Prescriptions   Medication Sig Dispense Refill   • acetaminophen (TYLENOL) 325 MG tablet Take 650 mg by mouth every 6 (six) hours as needed for mild pain (1-3).     • albuterol (PROVENTIL HFA;VENTOLIN HFA) 108 (90 Base) MCG/ACT inhaler Inhale 2 puffs Every 4 (Four) Hours As Needed for Wheezing. 8 g 5   • albuterol (PROVENTIL) (2.5 MG/3ML) 0.083% nebulizer solution INHALE THE CONTENTS OF 1 VIAL BY NEBULIZER FOUR (4) TIMES DAILY AS NEEDED FOR SHORTNESS OF BREATH AND WHEEZING 360 mL 2   • Ascorbic Acid (VITAMIN C WITH OCHOA HIPS) 250 MG tablet Take 500 mg by mouth Daily.     • aspirin 81 MG chewable tablet Chew 81 mg Daily.     • calcitriol (ROCALTROL) 0.25 MCG capsule Take 0.25 mcg by mouth Every Other Day. Every Monday, Wednesday and friday     • cholecalciferol (VITAMIN D3) 1000 units tablet Take 2,000 Units by mouth Daily.     •  citalopram (CeleXA) 20 MG tablet Take 1 tablet by mouth Daily. 90 tablet 3   • diltiaZEM CD (CARDIZEM CD) 240 MG 24 hr capsule Take 1 capsule by mouth Daily. 90 capsule 3   • ezetimibe (ZETIA) 10 MG tablet Take 1 tablet by mouth Daily. 90 tablet 3   • ferrous sulfate 325 (65 FE) MG tablet Take 325 mg by mouth Daily With Breakfast.     • furosemide (LASIX) 40 MG tablet Take 1 tablet by mouth 2 (Two) Times a Day. 60 tablet 5   • glucose blood test strip 1 each by Other route 3 (three) times a day. Use as instructed     • insulin aspart (NovoLOG) 100 UNIT/ML injection Inject 2-12 Units under the skin 3 (three) times a day before meals.     • Insulin Glargine (BASAGLAR KWIKPEN) 100 UNIT/ML injection pen Inject 30 Units under the skin Daily. 1 pen 11   • Insulin Pen Needle (BD PEN NEEDLE DEREK U/F) 32G X 4 MM misc 1 each 4 (Four) Times a Day. 120 each 5   • potassium chloride (K-DUR) 10 MEQ CR tablet Take 1 tablet by mouth Daily. 30 tablet 5   • Prenatal Vit-Fe Fumarate-FA (PRENATAL VITAMIN) 27-0.8 MG tablet Take 1 tablet by mouth daily.     • raNITIdine (ZANTAC) 150 MG tablet Take 1 tablet by mouth 2 (Two) Times a Day. (Patient taking differently: Take 150 mg by mouth Daily.) 180 tablet 3   • rOPINIRole (REQUIP) 0.25 MG tablet Take 1 tablet by mouth Every Night. Take 1 hour before bedtime. 90 tablet 3   • traZODone (DESYREL) 150 MG tablet Take 2 tablets by mouth Every Night. 180 tablet 3   • umeclidinium-vilanterol (ANORO ELLIPTA) 62.5-25 MCG/INH aerosol powder  inhaler Inhale 1 puff Daily. 1 each 11   • warfarin (COUMADIN) 5 MG tablet Take 1 tablet nightly except on Monday and Friday take 1/2 tablet or as directed (Patient taking differently: Take 5 mg by mouth. Take 5mg alternating with 2.5mg every other day. Pt took 5mg last night, tonight is a 2.5mg dose) 90 tablet 1     No current facility-administered medications for this visit.      Objective:     Vitals:    03/12/18 1310   Pulse: 63   SpO2: 93%   Weight: 99.9 kg  "(220 lb 4.8 oz)   Height: 165.1 cm (65\")     Wt Readings from Last 3 Encounters:   03/12/18 99.9 kg (220 lb 4.8 oz)   03/05/18 99.5 kg (219 lb 4.8 oz)   02/12/18 97.2 kg (214 lb 4.8 oz)      Physical Exam   Constitutional: She is oriented to person, place, and time. She appears well-developed and well-nourished. No distress.   HENT:   Head: Normocephalic and atraumatic.   Neck: No JVD present. No tracheal deviation present.   Cardiovascular: Regular rhythm, S1 normal and S2 normal.  Bradycardia present.  Exam reveals no gallop, no S3 and no S4.    No murmur heard.  Pulses:       Radial pulses are 2+ on the right side, and 2+ on the left side.   Normal prosthetic valvular sounds (click) auscultated at the aortic position   Pulmonary/Chest: Effort normal. No respiratory distress. She has no wheezes. She has no rales.   Abdominal: She exhibits no distension. There is no tenderness.   Musculoskeletal: She exhibits no edema or tenderness.   Neurological: She is alert and oriented to person, place, and time.   Skin: Skin is warm and dry. She is not diaphoretic.   Psychiatric: She has a normal mood and affect. Her behavior is normal. Judgment and thought content normal.   Vitals reviewed.    Flowsheet Rows    Flowsheet Row First Filed Value   Admission Height 165.1 cm (65\") Documented at 03/12/2018 1310   Admission Weight 99.9 kg (220 lb 4.8 oz) Documented at 03/12/2018 1310        Data Reviewed:  Electrocardiogram:Performed today-electronic pacemaker at a rate of 50 bpm    Echocardiogram: 12/28/2017  · Left ventricular systolic function is normal. Estimated EF = 55%.  · Left ventricular diastolic dysfunction (grade I a) consistent with impaired relaxation.    Labs:   Glucose   Date Value Ref Range Status   03/05/2018 87 60 - 100 mg/dL Final     Glucose, Arterial   Date Value Ref Range Status   12/27/2017 131 mmol/L Final     BUN   Date Value Ref Range Status   03/05/2018 51 (H) 7 - 21 mg/dL Final     Creatinine   Date " Value Ref Range Status   03/05/2018 1.83 (H) 0.50 - 1.00 mg/dL Final     Sodium   Date Value Ref Range Status   03/05/2018 140 137 - 145 mmol/L Final     Potassium   Date Value Ref Range Status   03/05/2018 5.0 3.5 - 5.1 mmol/L Final     Chloride   Date Value Ref Range Status   03/05/2018 101 95 - 110 mmol/L Final     CO2   Date Value Ref Range Status   03/05/2018 30.0 22.0 - 31.0 mmol/L Final     Calcium   Date Value Ref Range Status   03/05/2018 9.6 8.4 - 10.2 mg/dL Final     Total Protein   Date Value Ref Range Status   03/05/2018 7.2 6.3 - 8.6 g/dL Final     Albumin   Date Value Ref Range Status   03/05/2018 3.80 3.40 - 4.80 g/dL Final     ALT (SGPT)   Date Value Ref Range Status   03/05/2018 38 9 - 52 U/L Final     AST (SGOT)   Date Value Ref Range Status   03/05/2018 56 (H) 14 - 36 U/L Final     Alkaline Phosphatase   Date Value Ref Range Status   03/05/2018 88 38 - 126 U/L Final     Total Bilirubin   Date Value Ref Range Status   03/05/2018 0.6 0.2 - 1.3 mg/dL Final     eGFR Non  Amer   Date Value Ref Range Status   03/05/2018 27 (L) 39 - 90 mL/min/1.73 Final     A/G Ratio   Date Value Ref Range Status   03/05/2018 1.1 1.1 - 1.8 g/dL Final     BUN/Creatinine Ratio   Date Value Ref Range Status   03/05/2018 27.9 (H) 7.0 - 25.0 Final     Anion Gap   Date Value Ref Range Status   03/05/2018 9.0 5.0 - 15.0 mmol/L Final     WBC   Date Value Ref Range Status   03/05/2018 7.83 3.20 - 9.80 10*3/mm3 Final     RBC   Date Value Ref Range Status   03/05/2018 3.52 (L) 3.77 - 5.16 10*6/mm3 Final     Hemoglobin   Date Value Ref Range Status   03/05/2018 11.0 (L) 12.0 - 15.5 g/dL Final     Hematocrit   Date Value Ref Range Status   03/05/2018 34.5 (L) 35.0 - 45.0 % Final     MCV   Date Value Ref Range Status   03/05/2018 98.0 80.0 - 98.0 fL Final     MCH   Date Value Ref Range Status   03/05/2018 31.3 26.5 - 34.0 pg Final     MCHC   Date Value Ref Range Status   03/05/2018 31.9 31.4 - 36.0 g/dL Final     RDW   Date  Value Ref Range Status   03/05/2018 16.6 (H) 11.5 - 14.5 % Final     RDW-SD   Date Value Ref Range Status   03/05/2018 59.1 (H) 36.4 - 46.3 fl Final     MPV   Date Value Ref Range Status   03/05/2018 10.5 8.0 - 12.0 fL Final     Platelets   Date Value Ref Range Status   03/05/2018 226 150 - 450 10*3/mm3 Final     Neutrophil %   Date Value Ref Range Status   12/30/2017 74.4 37.0 - 80.0 % Final     Lymphocyte %   Date Value Ref Range Status   12/30/2017 12.3 10.0 - 50.0 % Final     Monocyte %   Date Value Ref Range Status   12/30/2017 7.5 0.0 - 12.0 % Final     Eosinophil %   Date Value Ref Range Status   12/30/2017 5.2 0.0 - 7.0 % Final     Basophil %   Date Value Ref Range Status   12/30/2017 0.3 0.0 - 2.0 % Final     Immature Grans %   Date Value Ref Range Status   12/30/2017 0.3 0.0 - 0.5 % Final     Neutrophils, Absolute   Date Value Ref Range Status   12/30/2017 5.03 2.00 - 8.60 10*3/mm3 Final     Neutrophils Absolute   Date Value Ref Range Status   03/05/2018 7.13 2.00 - 8.60 10*3/mm3 Final     Lymphocytes, Absolute   Date Value Ref Range Status   12/30/2017 0.83 0.60 - 4.20 10*3/mm3 Final     Monocytes, Absolute   Date Value Ref Range Status   12/30/2017 0.51 0.00 - 0.90 10*3/mm3 Final     Eosinophils, Absolute   Date Value Ref Range Status   12/30/2017 0.35 0.00 - 0.70 10*3/mm3 Final     Eosinophils Absolute   Date Value Ref Range Status   03/05/2018 0.08 0.00 - 0.70 10*3/mm3 Final     Basophils, Absolute   Date Value Ref Range Status   12/30/2017 0.02 0.00 - 0.20 10*3/mm3 Final     Basophils Absolute   Date Value Ref Range Status   03/05/2018 0.08 0.00 - 0.20 10*3/mm3 Final     Immature Grans, Absolute   Date Value Ref Range Status   12/30/2017 0.02 0.00 - 0.02 10*3/mm3 Final     nRBC   Date Value Ref Range Status   12/27/2017 0.0 0.0 - 0.0 /100 WBC Final       Lab Results   Component Value Date    CHOL 154 03/05/2018    CHOL 171 03/20/2017     Lab Results   Component Value Date    TRIG 85 03/05/2018    TRIG  109 03/20/2017    TRIG 67 01/30/2014     Lab Results   Component Value Date    HDL 40 (L) 03/05/2018    HDL 46 (L) 03/20/2017    HDL 26 (L) 01/30/2014     Lab Results   Component Value Date    TSH 1.880 03/05/2018       Lab Results   Component Value Date    PROBNP 7,310.0 (H) 12/27/2017     The following portions of the patient's history were reviewed and updated as appropriate: allergies, current medications, problem list,  past medical history, surgical history, family history and social history.    Recent images independently reviewed.    Available laboratory values reviewed.    Assessment:      Diagnosis Plan   1. Heart failure with preserved left ventricular function (HFpEF) without clinical evidence of hypervolemia; she is well perfused  AHA Stage C: ; NYHA Class III  BETA-BLOCKER: Not applicable (diltiazem 240 mg daily)  ACE/ARB: Could be considered/discussion with nephrology regarding any prior history indicating contraindication  ENTRESTO: Not applicable  DIURETIC: Lasix 40 mg daily; increase to twice daily if leg edema/weight gain as described  ALDOSTERONT ANTAGONIST: Not applicable  IMDUR/HYDRALAZINE: Hydralazine could be considered regarding blood pressure control as needed  DIGOXIN: Not applicable  Fluid restriction: 2000 cc daily  Sodium restriction: 2000 mg daily   6MWT: To be performed  Cardiac Rehab: Not a candidate  Pulmonary Rehab: Referral made  ICD: Not applicable  CardioMEMS: Not applicable  Advanced HF evaluation (Transplant/LVAD): Not applicable    Presented an overview of heart failure, expected course, considerations, risk factors and exacerbation prevention.  Recommended daily weight monitoring.  Discussed patient action plan for heart failure;  Recommended avoiding NSAIDs use.  Discussed warning signs requiring additional medical attention for heart failure.    Discussion with the patient regarding referral to sleep medicine for evaluation of objective sleep apnea; patient is adamant  about negative symptoms for indication    Patient education (written information given):  What is heart failure  What is blood pressure  What is sleep apnea  What is obesity  Heart healthy nutrition  How to follow a low-sodium diet  Active and mindful living       2. Personal history of heart valve replacement  Continue surveillance with attached cardiologist Dr. Gates;   VKA    1999 - [place]       3. Coronary artery disease involving native coronary artery of native heart without angina pectoris      Continue with cardiology care per Dr. Gates    4. Hx of CABG  As per #3     CABG x 1 1999 - [place]       5. Paroxysmal atrial fibrillation/Sick Sinus Syndrome S/P PPM Diltiazem plus presence of VKA (AVR)  Cardiac device interrogations as per Dr. Gates - will discuss with Dr Gates the need for heart rate @ 50 bpm       6. Essential hypertension, stable ECG 12 Lead  Instructed the patient that her next visit to the heart and vascular Center she should bring her outpatient blood pressure monitor and we will demonstrate use as well as correlation with manual cuff       7. Chronic obstructive pulmonary disease, unspecified COPD type  Ambulatory Referral to Pulmonary Rehab;   Continue per Dr. Corazon Grant     Diagnosis: 2001        8.  CKD, Stage 3 Dr. Caputo        9. Chronic hypoxemic respiratory failure      Ambulatory Referral to Pulmonary Rehab   10. Drug therapy      ECG 12 Lead   11. Long-term (current) use of anticoagulants  Continue monitoring per Coumadin clinic                  This document has been electronically signed by ABRAHAM Sommer on March 12, 2018 5:49 PM

## 2018-03-12 NOTE — PROGRESS NOTES
Patient states no med changes or bleeding problems or unexplained bruising. Patient instructed to continue current dosing schedule. Verbalizes understanding. Will recheck 1 month.   Patient instructed regarding medication; results given and questions answered. Nutritional counseling given.  Dietary factors affecting therapy addressed.  Patient instructed to monitor for excessive bruising or bleeding.          This document has been electronically signed by ABRAHAM Nieto on March 12, 2018 3:39 PM

## 2018-03-15 DIAGNOSIS — I10 ESSENTIAL HYPERTENSION, BENIGN: ICD-10-CM

## 2018-03-15 DIAGNOSIS — E78.5 HYPERLIPIDEMIA, UNSPECIFIED HYPERLIPIDEMIA TYPE: ICD-10-CM

## 2018-03-15 DIAGNOSIS — F32.A DEPRESSIVE DISORDER: ICD-10-CM

## 2018-03-15 RX ORDER — CITALOPRAM 20 MG/1
20 TABLET ORAL DAILY
Qty: 90 TABLET | Refills: 3 | Status: SHIPPED | OUTPATIENT
Start: 2018-03-15 | End: 2019-05-08 | Stop reason: SDUPTHER

## 2018-03-15 RX ORDER — DILTIAZEM HYDROCHLORIDE 240 MG/1
240 CAPSULE, COATED, EXTENDED RELEASE ORAL DAILY
Qty: 90 CAPSULE | Refills: 3 | Status: SHIPPED | OUTPATIENT
Start: 2018-03-15 | End: 2019-06-04 | Stop reason: SDUPTHER

## 2018-03-15 RX ORDER — EZETIMIBE 10 MG/1
10 TABLET ORAL DAILY
Qty: 90 TABLET | Refills: 3 | Status: SHIPPED | OUTPATIENT
Start: 2018-03-15 | End: 2019-04-08 | Stop reason: SDUPTHER

## 2018-03-15 RX ORDER — WARFARIN SODIUM 5 MG/1
TABLET ORAL
Qty: 90 TABLET | Refills: 1 | Status: SHIPPED | OUTPATIENT
Start: 2018-03-15 | End: 2018-12-20 | Stop reason: SDUPTHER

## 2018-03-22 ENCOUNTER — TELEPHONE (OUTPATIENT)
Dept: FAMILY MEDICINE CLINIC | Facility: CLINIC | Age: 73
End: 2018-03-22

## 2018-03-22 DIAGNOSIS — Z79.4 DIABETES MELLITUS DUE TO UNDERLYING CONDITION WITHOUT COMPLICATION, WITH LONG-TERM CURRENT USE OF INSULIN (HCC): Primary | ICD-10-CM

## 2018-03-22 DIAGNOSIS — E08.9 DIABETES MELLITUS DUE TO UNDERLYING CONDITION WITHOUT COMPLICATION, WITH LONG-TERM CURRENT USE OF INSULIN (HCC): Primary | ICD-10-CM

## 2018-03-28 ENCOUNTER — CLINICAL SUPPORT (OUTPATIENT)
Dept: CARDIOLOGY | Facility: CLINIC | Age: 73
End: 2018-03-28

## 2018-03-28 DIAGNOSIS — I49.5 SSS (SICK SINUS SYNDROME) (HCC): Primary | ICD-10-CM

## 2018-03-28 DIAGNOSIS — Z95.0 PACEMAKER: ICD-10-CM

## 2018-03-28 PROCEDURE — 93280 PM DEVICE PROGR EVAL DUAL: CPT | Performed by: NURSE PRACTITIONER

## 2018-03-28 NOTE — PROGRESS NOTES
Pacemaker Evaluation Report    March 28, 2018    Primary Cardiologist: Dr. Gates  Implanting MD: Dr. Gates  :[employer] Model: [id number]  Serial Number: [id number]  Implant date: 3/20/17    Reason for evaluation: symptoms  Cardiac device indication(s): tachy/sherie syndrome    Battery  STACY: 6.9-7.4 yrs     Interrogation Results  Atrial sensing: P wave: 1.1 mV  Atrial capture: 0.75 V @ 0.5 ms   Atrial lead impedance: 310 ohms  Ventricular sensing: R wave: 9.1 mV  Ventricular capture: 0.5 V @ 0.5 ms  Ventricular lead impedance: right  390 ohms    Parameters  Mode: DDIR  Base Rate: 50    Diagnostic Data  Atrial paced: 100 %   Ventricular paced: 100 %  Mode switch:   AT/AF [place]:   AHR:   VHR:     Changes made: Base rate to 60 bpm per Dr. Gates/ABRAHAM Aggarwal, cybersecurity upgrade    Conclusions: normal device function    Assessment:  1. SSS (sick sinus syndrome)    2. Pacemaker              This document has been electronically signed by ABRAHAM Verduzco on March 28, 2018 1:48 PM

## 2018-04-03 ENCOUNTER — HOSPITAL ENCOUNTER (OUTPATIENT)
Dept: PULMONOLOGY | Facility: HOSPITAL | Age: 73
Setting detail: THERAPIES SERIES
End: 2018-04-03

## 2018-04-12 ENCOUNTER — ANTICOAGULATION VISIT (OUTPATIENT)
Dept: CARDIAC SURGERY | Facility: CLINIC | Age: 73
End: 2018-04-12

## 2018-04-12 VITALS — HEART RATE: 84 BPM | SYSTOLIC BLOOD PRESSURE: 140 MMHG | DIASTOLIC BLOOD PRESSURE: 64 MMHG

## 2018-04-12 DIAGNOSIS — Z79.01 LONG-TERM (CURRENT) USE OF ANTICOAGULANTS: ICD-10-CM

## 2018-04-12 DIAGNOSIS — I48.0 PAROXYSMAL ATRIAL FIBRILLATION (HCC): ICD-10-CM

## 2018-04-12 DIAGNOSIS — Z95.2 PERSONAL HISTORY OF HEART VALVE REPLACEMENT: ICD-10-CM

## 2018-04-12 LAB — INR PPP: 2.5 (ref 0.9–1.1)

## 2018-04-12 PROCEDURE — 85610 PROTHROMBIN TIME: CPT | Performed by: NURSE PRACTITIONER

## 2018-04-12 NOTE — PROGRESS NOTES
Patient states no med changes or bleeding problems or unexplained bruising. Patient instructed to continue current dosing schedule. Verbalizes understanding. Will recheck 1 month.   Patient instructed regarding medication; results given and questions answered. Nutritional counseling given.  Dietary factors affecting therapy addressed.  Patient instructed to monitor for excessive bruising or bleeding.          This document has been electronically signed by ABRAHAM Nieto on April 12, 2018 4:19 PM

## 2018-05-02 ENCOUNTER — LAB (OUTPATIENT)
Dept: LAB | Facility: CLINIC | Age: 73
End: 2018-05-02

## 2018-05-02 ENCOUNTER — TRANSCRIBE ORDERS (OUTPATIENT)
Dept: LAB | Facility: CLINIC | Age: 73
End: 2018-05-02

## 2018-05-02 DIAGNOSIS — D63.1 ANEMIA IN CHRONIC KIDNEY DISEASE, UNSPECIFIED CKD STAGE: ICD-10-CM

## 2018-05-02 DIAGNOSIS — N18.9 ANEMIA IN CHRONIC KIDNEY DISEASE, UNSPECIFIED CKD STAGE: ICD-10-CM

## 2018-05-02 DIAGNOSIS — N18.30 CHRONIC KIDNEY DISEASE, STAGE III (MODERATE) (HCC): ICD-10-CM

## 2018-05-02 DIAGNOSIS — A04.72 INTESTINAL INFECTION DUE TO CLOSTRIDIUM DIFFICILE: ICD-10-CM

## 2018-05-02 DIAGNOSIS — E21.1 SECONDARY HYPERPARATHYROIDISM, NON-RENAL (HCC): Primary | ICD-10-CM

## 2018-05-02 DIAGNOSIS — I50.9 CHRONIC CONGESTIVE HEART FAILURE, UNSPECIFIED CONGESTIVE HEART FAILURE TYPE: ICD-10-CM

## 2018-05-02 DIAGNOSIS — E21.1 SECONDARY HYPERPARATHYROIDISM, NON-RENAL (HCC): ICD-10-CM

## 2018-05-02 PROCEDURE — 36415 COLL VENOUS BLD VENIPUNCTURE: CPT | Performed by: FAMILY MEDICINE

## 2018-05-02 PROCEDURE — 82306 VITAMIN D 25 HYDROXY: CPT | Performed by: INTERNAL MEDICINE

## 2018-05-02 PROCEDURE — 85014 HEMATOCRIT: CPT | Performed by: INTERNAL MEDICINE

## 2018-05-02 PROCEDURE — 80069 RENAL FUNCTION PANEL: CPT | Performed by: INTERNAL MEDICINE

## 2018-05-02 PROCEDURE — 82310 ASSAY OF CALCIUM: CPT | Performed by: INTERNAL MEDICINE

## 2018-05-02 PROCEDURE — 85018 HEMOGLOBIN: CPT | Performed by: INTERNAL MEDICINE

## 2018-05-02 PROCEDURE — 83970 ASSAY OF PARATHORMONE: CPT | Performed by: INTERNAL MEDICINE

## 2018-05-03 LAB
25(OH)D3 SERPL-MCNC: 37.7 NG/ML (ref 30–100)
ALBUMIN SERPL-MCNC: 3.2 G/DL (ref 3.4–4.8)
ANION GAP SERPL CALCULATED.3IONS-SCNC: 6 MMOL/L (ref 5–15)
BUN BLD-MCNC: 50 MG/DL (ref 7–21)
BUN/CREAT SERPL: 27 (ref 7–25)
CALCIUM SPEC-SCNC: 9.2 MG/DL (ref 8.4–10.2)
CHLORIDE SERPL-SCNC: 104 MMOL/L (ref 95–110)
CO2 SERPL-SCNC: 30 MMOL/L (ref 22–31)
CREAT BLD-MCNC: 1.85 MG/DL (ref 0.5–1)
GFR SERPL CREATININE-BSD FRML MDRD: 27 ML/MIN/1.73 (ref 39–90)
GLUCOSE BLD-MCNC: 79 MG/DL (ref 60–100)
HCT VFR BLD AUTO: 33.6 % (ref 35–45)
HGB BLD-MCNC: 10.8 G/DL (ref 12–15.5)
PHOSPHATE SERPL-MCNC: 3.6 MG/DL (ref 2.4–4.4)
POTASSIUM BLD-SCNC: 5.2 MMOL/L (ref 3.5–5.1)
SODIUM BLD-SCNC: 140 MMOL/L (ref 137–145)

## 2018-05-04 LAB
CALCIUM SPEC-SCNC: 9.2 MG/DL (ref 8.4–10.2)
PTH-INTACT SERPL-MCNC: 78.4 PG/ML (ref 10–65)

## 2018-05-10 ENCOUNTER — ANTICOAGULATION VISIT (OUTPATIENT)
Dept: CARDIAC SURGERY | Facility: CLINIC | Age: 73
End: 2018-05-10

## 2018-05-10 VITALS — SYSTOLIC BLOOD PRESSURE: 150 MMHG | DIASTOLIC BLOOD PRESSURE: 58 MMHG | HEART RATE: 56 BPM

## 2018-05-10 DIAGNOSIS — Z79.01 LONG-TERM (CURRENT) USE OF ANTICOAGULANTS: ICD-10-CM

## 2018-05-10 DIAGNOSIS — Z95.2 PERSONAL HISTORY OF HEART VALVE REPLACEMENT: ICD-10-CM

## 2018-05-10 DIAGNOSIS — I48.0 PAROXYSMAL ATRIAL FIBRILLATION (HCC): ICD-10-CM

## 2018-05-10 LAB — INR PPP: 1.8 (ref 0.9–1.1)

## 2018-05-10 PROCEDURE — 99211 OFF/OP EST MAY X REQ PHY/QHP: CPT | Performed by: NURSE PRACTITIONER

## 2018-05-10 PROCEDURE — 85610 PROTHROMBIN TIME: CPT | Performed by: NURSE PRACTITIONER

## 2018-05-10 NOTE — PROGRESS NOTES
Pt denies missed coumadin doses.  Pt states she may have consumed too much green.  Pt states medications have not changed, no bleeding issues.  Adjusted coumadin dose and instructed pt to limit green intake for three days.  Recheck INR next wk.  Pt verbalizes.  Patient instructed regarding medication; results given and questions answered. Nutritional counseling given.  Dietary factors affecting therapy addressed.  Patient instructed to monitor for excessive bruising or bleeding.        This document has been electronically signed by ABRAHAM Nieto on May 10, 2018 12:19 PM

## 2018-05-16 ENCOUNTER — ANTICOAGULATION VISIT (OUTPATIENT)
Dept: CARDIAC SURGERY | Facility: CLINIC | Age: 73
End: 2018-05-16

## 2018-05-16 VITALS — HEART RATE: 61 BPM | OXYGEN SATURATION: 92 %

## 2018-05-16 DIAGNOSIS — Z79.01 LONG-TERM (CURRENT) USE OF ANTICOAGULANTS: ICD-10-CM

## 2018-05-16 DIAGNOSIS — Z95.2 PERSONAL HISTORY OF HEART VALVE REPLACEMENT: ICD-10-CM

## 2018-05-16 DIAGNOSIS — I48.0 PAROXYSMAL ATRIAL FIBRILLATION (HCC): ICD-10-CM

## 2018-05-16 LAB — INR PPP: 1.6 (ref 0.9–1.1)

## 2018-05-16 PROCEDURE — 85610 PROTHROMBIN TIME: CPT | Performed by: NURSE PRACTITIONER

## 2018-05-16 NOTE — PROGRESS NOTES
PT states her brand of prenatal vitamins changed but she is unsure if ingredients have changed. PT denies any s/s of blood clot or bleeding issues. PT denies any chest pain. Due to continual drop in INR, dose was increased for 2 days. PT instructed to hold green vegs until Sunday. Patient instructed regarding medication; results given and questions answered. Nutritional counseling given.  Dietary factors affecting therapy addressed.  Patient instructed to monitor for excessive bruising or bleeding. PT verbalizes understanding.   Electronically signed by ABRAHAM Morrow

## 2018-05-17 ENCOUNTER — EPISODE CHANGES (OUTPATIENT)
Dept: CASE MANAGEMENT | Facility: OTHER | Age: 73
End: 2018-05-17

## 2018-05-23 ENCOUNTER — ANTICOAGULATION VISIT (OUTPATIENT)
Dept: CARDIAC SURGERY | Facility: CLINIC | Age: 73
End: 2018-05-23

## 2018-05-23 VITALS — SYSTOLIC BLOOD PRESSURE: 124 MMHG | DIASTOLIC BLOOD PRESSURE: 60 MMHG | HEART RATE: 60 BPM

## 2018-05-23 DIAGNOSIS — Z95.2 PERSONAL HISTORY OF HEART VALVE REPLACEMENT: ICD-10-CM

## 2018-05-23 DIAGNOSIS — Z79.01 LONG-TERM (CURRENT) USE OF ANTICOAGULANTS: ICD-10-CM

## 2018-05-23 DIAGNOSIS — I48.0 PAROXYSMAL ATRIAL FIBRILLATION (HCC): ICD-10-CM

## 2018-05-23 LAB — INR PPP: 3.5 (ref 0.9–1.1)

## 2018-05-23 PROCEDURE — 85610 PROTHROMBIN TIME: CPT | Performed by: NURSE PRACTITIONER

## 2018-05-23 PROCEDURE — 99211 OFF/OP EST MAY X REQ PHY/QHP: CPT | Performed by: NURSE PRACTITIONER

## 2018-05-23 NOTE — PROGRESS NOTES
Pt here today for recheck of subtherapeutic INR level.  Pt denies med changes or bleeding issues.  Pt has had a considerable increase is weekly coumadin dosing schedule, as much as 7.5mg weekly, to obtain this level.  Will reduce coumadin dose slightly.  Recheck INR next wk.  Instructed pt to have a small green serving today.  Pt verbalizes.  Patient instructed regarding medication; results given and questions answered. Nutritional counseling given.  Dietary factors affecting therapy addressed.  Patient instructed to monitor for excessive bruising or bleeding.          This document has been electronically signed by ABRAHAM Nieto on May 24, 2018 9:13 AM

## 2018-05-25 ENCOUNTER — TELEPHONE (OUTPATIENT)
Dept: FAMILY MEDICINE CLINIC | Facility: CLINIC | Age: 73
End: 2018-05-25

## 2018-05-25 DIAGNOSIS — F32.A DEPRESSIVE DISORDER: ICD-10-CM

## 2018-05-30 ENCOUNTER — ANTICOAGULATION VISIT (OUTPATIENT)
Dept: CARDIAC SURGERY | Facility: CLINIC | Age: 73
End: 2018-05-30

## 2018-05-30 VITALS — DIASTOLIC BLOOD PRESSURE: 78 MMHG | HEART RATE: 62 BPM | OXYGEN SATURATION: 96 % | SYSTOLIC BLOOD PRESSURE: 148 MMHG

## 2018-05-30 DIAGNOSIS — Z79.01 LONG-TERM (CURRENT) USE OF ANTICOAGULANTS: ICD-10-CM

## 2018-05-30 DIAGNOSIS — I48.0 PAROXYSMAL ATRIAL FIBRILLATION (HCC): ICD-10-CM

## 2018-05-30 DIAGNOSIS — Z95.2 PERSONAL HISTORY OF HEART VALVE REPLACEMENT: ICD-10-CM

## 2018-05-30 LAB — INR PPP: 2 (ref 0.9–1.1)

## 2018-05-30 PROCEDURE — 99211 OFF/OP EST MAY X REQ PHY/QHP: CPT | Performed by: NURSE PRACTITIONER

## 2018-05-30 PROCEDURE — 85610 PROTHROMBIN TIME: CPT | Performed by: NURSE PRACTITIONER

## 2018-05-30 NOTE — PROGRESS NOTES
PT states compliant c tx. PT denies any new medications or bleeding issues. PT denies any missed doses or excessive k.  PT denies any s/s of blood clot. Due to drop in INR, dose was increased and pt will be seen next week. Patient instructed regarding medication; results given and questions answered. Nutritional counseling given.  Dietary factors affecting therapy addressed.  Patient instructed to monitor for excessive bruising or bleeding. PT verbalizes understanding.   Electronically signed by ABRAHAM Morrow

## 2018-06-01 ENCOUNTER — OFFICE VISIT (OUTPATIENT)
Dept: PULMONOLOGY | Facility: CLINIC | Age: 73
End: 2018-06-01

## 2018-06-01 VITALS
HEIGHT: 64 IN | HEART RATE: 60 BPM | DIASTOLIC BLOOD PRESSURE: 80 MMHG | SYSTOLIC BLOOD PRESSURE: 128 MMHG | OXYGEN SATURATION: 93 %

## 2018-06-01 DIAGNOSIS — J44.9 CHRONIC OBSTRUCTIVE PULMONARY DISEASE, UNSPECIFIED COPD TYPE (HCC): Primary | ICD-10-CM

## 2018-06-01 DIAGNOSIS — IMO0001 CLASS 2 OBESITY DUE TO EXCESS CALORIES WITH SERIOUS COMORBIDITY AND BODY MASS INDEX (BMI) OF 36.0 TO 36.9 IN ADULT: ICD-10-CM

## 2018-06-01 DIAGNOSIS — I50.32 CHRONIC DIASTOLIC HEART FAILURE (HCC): ICD-10-CM

## 2018-06-01 DIAGNOSIS — R53.81 PHYSICAL DECONDITIONING: ICD-10-CM

## 2018-06-01 DIAGNOSIS — J96.11 CHRONIC HYPOXEMIC RESPIRATORY FAILURE (HCC): ICD-10-CM

## 2018-06-01 PROCEDURE — 99214 OFFICE O/P EST MOD 30 MIN: CPT | Performed by: INTERNAL MEDICINE

## 2018-06-01 NOTE — PROGRESS NOTES
Pulmonary Office Follow-up    Subjective     Brendon Skelton is seen today at the office for   Chief Complaint   Patient presents with   • COPD         HPI  Brendon Skelton is a 72 y.o. female with a PMH significant for COPD, chronic hypoxic respiratory failure, past tobacco use, AF, CHF, s/p AVR, obesity, CKD, and DM who presents for follow-up of COPD.  She was last seen on 3/2/18, at which time she was doing better on the Anoro daily so recommended she continue it along with her supplemental oxygen. She reports that she continues to do well on Anoro and is having no issues with dyspnea.  Patient has not required her albuterol since her last visit.  She also reports that she stopped using her oxygen 2 months ago as she did not feel that it was necessary.  Patient does not have a pulse oximeter to monitor her oxygen saturations at home.  She does get some exercise in the home, but she does not regularly ambulate outside of the home.  Patient has been actively working with weight loss and has been able to lose 5 pounds recently.  She denies chest pain, cough, sputum, wheezing, or leg edema.      Patient Active Problem List   Diagnosis   • Long-term (current) use of anticoagulants   • Personal history of heart valve replacement   • Atrial fibrillation [I48.91]   • Pulmonary emphysema   • On anticoagulant therapy   • Hyperlipidemia   • Diastolic heart failure   • Depressive disorder   • Chronic obstructive lung disease   • Essential hypertension, benign   • Diabetes mellitus without complication   • Myopia   • Astigmatism   • Bleeding from open wound of chest wall   • Follow-up surgery care   • Encounter for screening for malignant neoplasm of colon   • Positive colorectal cancer screening using DNA-based stool test   • Nodule of left lung   • Class 2 obesity due to excess calories with serious comorbidity and body mass index (BMI) of 36.0 to 36.9 in adult   • Chronic hypoxemic respiratory failure   • Physical  deconditioning   • Heart failure with preserved left ventricular function (HFpEF)   • Essential hypertension   • Coronary artery disease involving native coronary artery without angina pectoris   • Hx of CABG   • SSS (sick sinus syndrome)   • Pacemaker       Review of Systems  Review of Systems   Constitutional: Negative for fatigue, fever and unexpected weight change.   HENT: Negative for congestion.    Respiratory: Negative for cough, choking, shortness of breath and wheezing.    Cardiovascular: Negative for chest pain and leg swelling.   Gastrointestinal: Negative for abdominal pain.   Neurological: Positive for weakness.     As described in the HPI. Otherwise, remainder of ROS (14 systems) were negative.    Medications, Allergies, Social, and Family Histories reviewed as per EMR.    Objective     Vitals:    06/01/18 1044   BP: 128/80   Pulse: 60   SpO2: 93%     Physical Exam   Constitutional: She is oriented to person, place, and time. Vital signs are normal. She appears well-developed and well-nourished.   Morbidly obese   HENT:   Head: Normocephalic and atraumatic.   Nose: Nose normal.   Mouth/Throat: Uvula is midline, oropharynx is clear and moist and mucous membranes are normal.   Mallampati 3   Eyes: Conjunctivae, EOM and lids are normal. Pupils are equal, round, and reactive to light.   Neck: Trachea normal and normal range of motion. No tracheal tenderness present. No thyroid mass present.   Cardiovascular: Normal rate and regular rhythm.  PMI is not displaced.  Exam reveals no gallop.    Murmur heard.   Systolic murmur is present with a grade of 2/6   AV click   Pulmonary/Chest: Effort normal and breath sounds normal. No respiratory distress. She has no decreased breath sounds. She has no wheezes. She has no rhonchi. She exhibits deformity (thoracic kyphosis). She exhibits no tenderness.   Abdominal: Soft. Normal appearance and bowel sounds are normal. There is no hepatosplenomegaly. There is no  tenderness.   Morbidly obese   Musculoskeletal:   Uses motorized scooter, no extremity edema     Vascular Status -  Her right foot exhibits no edema. Her left foot exhibits no edema.  Lymphadenopathy:        Head (right side): No submandibular adenopathy present.        Head (left side): No submandibular adenopathy present.     She has no cervical adenopathy.        Right: No supraclavicular adenopathy present.        Left: No supraclavicular adenopathy present.   Neurological: She is alert and oriented to person, place, and time. Gait normal.   Skin: Skin is warm and dry. No rash noted. No cyanosis. Nails show no clubbing.   Psychiatric: She has a normal mood and affect. Her speech is normal and behavior is normal. Judgment normal.   Nursing note and vitals reviewed.          Assessment/Plan     Brendon was seen today for copd.    Diagnoses and all orders for this visit:    Chronic obstructive pulmonary disease, unspecified COPD type    Chronic hypoxemic respiratory failure    Class 2 obesity due to excess calories with serious comorbidity and body mass index (BMI) of 36.0 to 36.9 in adult    Physical deconditioning    Chronic diastolic heart failure         Discussion/ Recommendations:   She continues to do well on Anoro and is not requiring her albuterol at all.  I am concerned that she is not wearing her oxygen and has no method of monitoring her pulse oximetry.  She has been actively working with weight loss and has been successful recently.  She appears euvolemic on exam so I think her diastolic heart failure is well controlled.    -Continue Anoro daily and albuterol as needed only.  -Recommended she look into buying a pulse oximeter for home use.  Instructed to use oxygen to maintain oxygen saturations greater than 88%.  Counseled on risks of prolonged hypoxemia.  -Continue efforts with weight loss through diet and exercise.  Encouraged her to start ambulating more.  -She would benefit from pulmonary  rehabilitation, but given her limited mobility she is not able to tolerate.  -Does not meet criteria for LD CT screening.  -Continue diuretics as prescribed and follow up with Leelee Hamm as scheduled next month.    Patient's BMI is above normal parameters. Follow-up plan includes:  nutrition counseling and referral to primary care.        Return in about 3 months (around 9/1/2018) for Recheck.          This document has been electronically signed by Nel Grant MD on June 1, 2018 11:09 AM      Dictated using Dragon

## 2018-06-04 ENCOUNTER — PATIENT OUTREACH (OUTPATIENT)
Dept: CASE MANAGEMENT | Facility: OTHER | Age: 73
End: 2018-06-04

## 2018-06-04 ENCOUNTER — EPISODE CHANGES (OUTPATIENT)
Dept: CASE MANAGEMENT | Facility: OTHER | Age: 73
End: 2018-06-04

## 2018-06-04 NOTE — OUTREACH NOTE
Care Management Plan 6/4/2018   Lifestyle Goals Eat a healthy diet   Barriers Disease education   Barriers -   Self Management Increase Physical Activities   Annual Wellness Visit:  Patient Will Schedule   Annual Wellness Visit:  -   Does patient have depression diagnosis? No   Advanced Directives: -   Medication Adherence -   Readiness Scale -   Confidence Scale -   Health Literacy -     The main concerns and/or symptoms the patient would like to address are: patient stated she is walking more and working on losing weight.    Education/instruction provided by Care Coordinator: as per notes    Follow Up Outreach Due:about 1 month    Elvira Horton RN

## 2018-06-06 ENCOUNTER — ANTICOAGULATION VISIT (OUTPATIENT)
Dept: CARDIAC SURGERY | Facility: CLINIC | Age: 73
End: 2018-06-06

## 2018-06-06 VITALS — DIASTOLIC BLOOD PRESSURE: 72 MMHG | SYSTOLIC BLOOD PRESSURE: 122 MMHG | HEART RATE: 64 BPM

## 2018-06-06 DIAGNOSIS — I48.0 PAROXYSMAL ATRIAL FIBRILLATION (HCC): ICD-10-CM

## 2018-06-06 DIAGNOSIS — Z95.2 PERSONAL HISTORY OF HEART VALVE REPLACEMENT: ICD-10-CM

## 2018-06-06 DIAGNOSIS — Z79.01 LONG-TERM (CURRENT) USE OF ANTICOAGULANTS: ICD-10-CM

## 2018-06-06 LAB — INR PPP: 2.4 (ref 0.9–1.1)

## 2018-06-06 PROCEDURE — 85610 PROTHROMBIN TIME: CPT | Performed by: NURSE PRACTITIONER

## 2018-06-06 NOTE — PROGRESS NOTES
Pt states no changes to meds or diet.  No bleeding issues.  INR on thre rise from last wk.  Will leave coumadin dose the same for now.  Instructed pt to limit green intake today.  Pt requests a two week appt.  Patient instructed regarding medication; results given and questions answered. Nutritional counseling given.  Dietary factors affecting therapy addressed.  Patient instructed to monitor for excessive bruising or bleeding.  Electronically signed by ABRAHAM Morrow

## 2018-06-19 ENCOUNTER — OFFICE VISIT (OUTPATIENT)
Dept: CARDIOLOGY | Facility: CLINIC | Age: 73
End: 2018-06-19

## 2018-06-19 ENCOUNTER — ANTICOAGULATION VISIT (OUTPATIENT)
Dept: CARDIAC SURGERY | Facility: CLINIC | Age: 73
End: 2018-06-19

## 2018-06-19 VITALS
BODY MASS INDEX: 36.77 KG/M2 | WEIGHT: 220.7 LBS | OXYGEN SATURATION: 95 % | HEIGHT: 65 IN | HEART RATE: 60 BPM | SYSTOLIC BLOOD PRESSURE: 158 MMHG | DIASTOLIC BLOOD PRESSURE: 60 MMHG

## 2018-06-19 VITALS — DIASTOLIC BLOOD PRESSURE: 60 MMHG | SYSTOLIC BLOOD PRESSURE: 158 MMHG | HEART RATE: 68 BPM

## 2018-06-19 DIAGNOSIS — I48.0 PAROXYSMAL ATRIAL FIBRILLATION (HCC): ICD-10-CM

## 2018-06-19 DIAGNOSIS — I49.5 SSS (SICK SINUS SYNDROME) (HCC): ICD-10-CM

## 2018-06-19 DIAGNOSIS — Z79.01 LONG-TERM (CURRENT) USE OF ANTICOAGULANTS: ICD-10-CM

## 2018-06-19 DIAGNOSIS — Z95.0 PACEMAKER: ICD-10-CM

## 2018-06-19 DIAGNOSIS — Z95.1 HX OF CABG: ICD-10-CM

## 2018-06-19 DIAGNOSIS — I50.30 HEART FAILURE WITH PRESERVED LEFT VENTRICULAR FUNCTION (HFPEF) (HCC): Primary | ICD-10-CM

## 2018-06-19 DIAGNOSIS — IMO0001 CLASS 2 OBESITY DUE TO EXCESS CALORIES WITH SERIOUS COMORBIDITY AND BODY MASS INDEX (BMI) OF 36.0 TO 36.9 IN ADULT: ICD-10-CM

## 2018-06-19 DIAGNOSIS — I10 ESSENTIAL HYPERTENSION: ICD-10-CM

## 2018-06-19 DIAGNOSIS — N18.30 STAGE 3 CHRONIC KIDNEY DISEASE (HCC): ICD-10-CM

## 2018-06-19 DIAGNOSIS — J96.11 CHRONIC HYPOXEMIC RESPIRATORY FAILURE (HCC): ICD-10-CM

## 2018-06-19 DIAGNOSIS — Z95.2 PERSONAL HISTORY OF HEART VALVE REPLACEMENT: ICD-10-CM

## 2018-06-19 DIAGNOSIS — I25.10 CORONARY ARTERY DISEASE INVOLVING NATIVE CORONARY ARTERY OF NATIVE HEART WITHOUT ANGINA PECTORIS: ICD-10-CM

## 2018-06-19 LAB — INR PPP: 2.8 (ref 0.9–1.1)

## 2018-06-19 PROCEDURE — 85610 PROTHROMBIN TIME: CPT | Performed by: NURSE PRACTITIONER

## 2018-06-19 PROCEDURE — 99215 OFFICE O/P EST HI 40 MIN: CPT | Performed by: NURSE PRACTITIONER

## 2018-06-19 NOTE — PROGRESS NOTES
Subjective:     Congestive Heart Failure (chief complaint )    PCP: ABRAHAM Gomes  Cardiologist: Dr. Gates  Nephrologist: Dr. Cooper  Pulmonologist: Dr. Corazon Grant    Congestive Heart Failure   Presents for initial visit. The disease course has been stable. The condition has lasted for 3 months. Pertinent negatives include no abdominal pain, chest pain, chest pressure, claudication, edema, fatigue, muscle weakness, near-syncope, nocturia, orthopnea, palpitations, paroxysmal nocturnal dyspnea, shortness of breath or unexpected weight change. The symptoms have been stable. Her past medical history is significant for arrhythmia, CAD, chronic lung disease, HTN and valvular heart disease.     The patient is a 72-year-old female referred to the heart failure program per Dr. Corazon Grant.    The patient reports that she was hospitalized on 12/27/2017 with discharge on 12/30/2017 secondary to volume overload.  She believes that in recall her shortness of breath and leg edema was related to poor dietary selections/excessive canned soup intake.    The patient reports that she has continues with improvements with her dietary selections and is compliant with low sodium dietary intake.    She does weigh herself and is aware of the need to contact providers should she notice a 2 pound weight gain overnight or 5 pounds in one week.  She has been given prior instructions to increase her Lasix to twice daily if in fact she notices edema of lower extremity.  She reports compliance with her medications.  Activities are limited secondary to exercise tolerance, and she does use a scooter for mobile assistance.  She is on home oxygen therapy and presents today with such.    She is on long-term anticoagulation (vitamin K antagonist in (and is monitored through the Coumadin clinic.    06/19/2018  Following last office evaluation, the patient was brought to the cardiac device clinic where her heart rate was increased to 60 bpm.   Today the patient is indicating that she believes herself to be much more tolerant to activity with an increased energy level.  The patient indicates that she is now on when necessary oxygen therapy (SaO2 less than 88%).  The patient reports that she is scheduled for office evaluation with Dr. Gates on 06/28/2018.  This will be inclusive of device interrogation.    Review of Systems   Constitution: Negative for chills, fatigue, fever, malaise/fatigue, unexpected weight change and weight gain.   HENT: Negative for congestion and nosebleeds.    Eyes: Negative for blurred vision and pain.   Cardiovascular: Positive for dyspnea on exertion. Negative for chest pain, claudication, cyanosis, irregular heartbeat, leg swelling, near-syncope, orthopnea, palpitations, paroxysmal nocturnal dyspnea and syncope.   Respiratory: Negative for cough, hemoptysis, shortness of breath, sleep disturbances due to breathing, snoring, sputum production and wheezing.    Endocrine: Negative for polydipsia, polyphagia and polyuria.   Hematologic/Lymphatic: Negative for bleeding problem. Does not bruise/bleed easily.   Skin: Negative for itching and rash.   Musculoskeletal: Negative for falls and muscle weakness.   Gastrointestinal: Negative for bloating and abdominal pain.   Genitourinary: Negative for dysuria, hematuria and nocturia.     Past Medical History:   Diagnosis Date   • Acute bronchitis    • Anxiety    • Atrial fibrillation    • C. difficile colitis    • Callosity     under metatarsal head      • Cardiac pacemaker in situ    • CHF (congestive heart failure)    • Chronic obstructive lung disease    • Corns and callus    • Depressive disorder    • Diabetes mellitus    • Diastolic heart failure    • Essential hypertension    • Foot pain    • Hyperlipidemia    • Long term current use of anticoagulant    • On anticoagulant therapy    • Pulmonary emphysema    • Rectal hemorrhage    • Type 2 diabetes mellitus      Past Surgical History:    Procedure Laterality Date   • AORTIC VALVE REPAIR/REPLACEMENT  1999   • CARDIAC ELECTROPHYSIOLOGY PROCEDURE N/A 3/20/2017    Procedure: PPM generator change - dual;  Surgeon: Bereket Gates MD;  Location: [facility] CATH INVASIVE LOCATION;  Service:    • CARDIAC PACEMAKER PLACEMENT  1999   • ECHO - CONVERTED  11/12/2013    There is mild to moderate left atrial enlargement EF 50-55%   • FOOT SURGERY  1990   • OTHER SURGICAL HISTORY  10/26/2015    PARING CORN/CALLUS    • PACEMAKER REPLACEMENT N/A 3/21/2017    Procedure: revision pacemaker pocket, evacuation hematoma, control of bleeding;  Surgeon: Polo Feliz MD;  Location: [facility] OR;  Service:    • TRANSESOPHAGEAL ECHOCARDIOGRAM (MITZY)  05/01/2014    With color flow-Mild left atrial enlargement with mild concentric LV hypertrophy with top normal aortic root size. EF 45-50%. Mild mitral regurgitation and mild aortic insufficiency and trivial amount of tricuspid regurgation   • TUBAL ABDOMINAL LIGATION       Social History     Social History   • Marital status:      Social History Main Topics   • Smoking status: Former Smoker     Quit date: 3/21/1999   • Smokeless tobacco: Never Used   • Alcohol use No      Comment: quit in 2016   • Drug use: No   • Sexual activity: No     Other Topics Concern   • Not on file     Crestor [rosuvastatin calcium]; Lipitor [atorvastatin]; Lortab [hydrocodone-acetaminophen]; and Adhesive tape    Current Outpatient Prescriptions   Medication Sig Dispense Refill   • acetaminophen (TYLENOL) 325 MG tablet Take 650 mg by mouth every 6 (six) hours as needed for mild pain (1-3).     • albuterol (PROVENTIL HFA;VENTOLIN HFA) 108 (90 Base) MCG/ACT inhaler Inhale 2 puffs Every 4 (Four) Hours As Needed for Wheezing. 8 g 5   • albuterol (PROVENTIL) (2.5 MG/3ML) 0.083% nebulizer solution INHALE THE CONTENTS OF 1 VIAL BY NEBULIZER FOUR (4) TIMES DAILY AS NEEDED FOR SHORTNESS OF BREATH AND WHEEZING 360 mL 2   • Ascorbic Acid (VITAMIN C  WITH OCHOA HIPS) 250 MG tablet Take 500 mg by mouth Daily.     • aspirin 81 MG chewable tablet Chew 81 mg Daily.     • calcitriol (ROCALTROL) 0.25 MCG capsule Take 0.25 mcg by mouth Every Other Day. Every Monday, Wednesday and friday     • cholecalciferol (VITAMIN D3) 1000 units tablet Take 2,000 Units by mouth Daily.     • citalopram (CeleXA) 20 MG tablet Take 1 tablet by mouth Daily. 90 tablet 3   • diltiaZEM CD (CARDIZEM CD) 240 MG 24 hr capsule Take 1 capsule by mouth Daily. 90 capsule 3   • ezetimibe (ZETIA) 10 MG tablet Take 1 tablet by mouth Daily. 90 tablet 3   • ferrous sulfate 325 (65 FE) MG tablet Take 325 mg by mouth Daily With Breakfast.     • furosemide (LASIX) 40 MG tablet Take 1 tablet by mouth 2 (Two) Times a Day. 60 tablet 5   • glucose blood test strip 1 each by Other route 3 (three) times a day. Use as instructed     • insulin aspart (NovoLOG) 100 UNIT/ML injection Inject 2-12 Units under the skin 3 (three) times a day before meals.     • Insulin Glargine (BASAGLAR KWIKPEN) 100 UNIT/ML injection pen Inject 30 Units under the skin Daily. 1 pen 11   • Insulin Pen Needle (BD PEN NEEDLE DEREK U/F) 32G X 4 MM misc 1 each 4 (Four) Times a Day. 120 each 5   • Prenatal Vit-Fe Fumarate-FA (PRENATAL VITAMIN) 27-0.8 MG tablet Take 1 tablet by mouth daily.     • raNITIdine (ZANTAC) 150 MG tablet Take 1 tablet by mouth 2 (Two) Times a Day. (Patient taking differently: Take 150 mg by mouth Daily.) 180 tablet 3   • rOPINIRole (REQUIP) 0.25 MG tablet Take 1 tablet by mouth Every Night. Take 1 hour before bedtime. 90 tablet 3   • traZODone (DESYREL) 150 MG tablet Take 2 tablets by mouth Every Night. 180 tablet 3   • umeclidinium-vilanterol (ANORO ELLIPTA) 62.5-25 MCG/INH aerosol powder  inhaler Inhale 1 puff Daily. 1 each 11   • warfarin (COUMADIN) 5 MG tablet Take 1/2 tablet nightly except on Monday, Wednesday, and Saturday take 1 tablet or as directed 90 tablet 1     No current facility-administered medications  "for this visit.      Objective:     Vitals:    06/19/18 1107   BP: 158/60   BP Location: Right arm   Patient Position: Sitting   Cuff Size: Adult   Pulse: 60   SpO2: 95%   Weight: 100 kg (220 lb 11.2 oz)   Height: 165.1 cm (65\")     Wt Readings from Last 3 Encounters:   06/19/18 100 kg (220 lb 11.2 oz)   03/12/18 99.9 kg (220 lb 4.8 oz)   03/05/18 99.5 kg (219 lb 4.8 oz)      Physical Exam   Constitutional: She is oriented to person, place, and time. She appears well-developed and well-nourished. No distress.   HENT:   Head: Normocephalic and atraumatic.   Neck: No JVD present. No tracheal deviation present.   Cardiovascular: Normal rate, regular rhythm, S1 normal and S2 normal.  Exam reveals no gallop, no S3 and no S4.    No murmur heard.  Pulses:       Radial pulses are 2+ on the right side, and 2+ on the left side.   Normal prosthetic valvular sounds (click) auscultated at the aortic position   Pulmonary/Chest: Effort normal. No respiratory distress. She has no wheezes. She has no rales.   Abdominal: She exhibits no distension. There is no tenderness.   Musculoskeletal: She exhibits no edema or tenderness.   Neurological: She is alert and oriented to person, place, and time.   Skin: Skin is warm and dry. She is not diaphoretic.   Psychiatric: She has a normal mood and affect. Her behavior is normal. Judgment and thought content normal.   Vitals reviewed.    Flowsheet Rows    Flowsheet Row First Filed Value   Admission Height 165.1 cm (65\") Documented at 03/12/2018 1310   Admission Weight 99.9 kg (220 lb 4.8 oz) Documented at 03/12/2018 1310        Data Reviewed:  Electrocardiogram:Performed today-electronic pacemaker at a rate of 50 bpm    Echocardiogram: 12/28/2017  · Left ventricular systolic function is normal. Estimated EF = 55%.  · Left ventricular diastolic dysfunction (grade I a) consistent with impaired relaxation.    Labs:   Glucose   Date Value Ref Range Status   05/02/2018 79 60 - 100 mg/dL Final "     Glucose, Arterial   Date Value Ref Range Status   12/27/2017 131 mmol/L Final     BUN   Date Value Ref Range Status   05/02/2018 50 (H) 7 - 21 mg/dL Final     Creatinine   Date Value Ref Range Status   05/02/2018 1.85 (H) 0.50 - 1.00 mg/dL Final     Sodium   Date Value Ref Range Status   05/02/2018 140 137 - 145 mmol/L Final     Potassium   Date Value Ref Range Status   05/02/2018 5.2 (H) 3.5 - 5.1 mmol/L Final     Chloride   Date Value Ref Range Status   05/02/2018 104 95 - 110 mmol/L Final     CO2   Date Value Ref Range Status   05/02/2018 30.0 22.0 - 31.0 mmol/L Final     Calcium   Date Value Ref Range Status   05/02/2018 9.2 8.4 - 10.2 mg/dL Final   05/02/2018 9.2 8.4 - 10.2 mg/dL Final     Total Protein   Date Value Ref Range Status   03/05/2018 7.2 6.3 - 8.6 g/dL Final     Albumin   Date Value Ref Range Status   05/02/2018 3.20 (L) 3.40 - 4.80 g/dL Final     ALT (SGPT)   Date Value Ref Range Status   03/05/2018 38 9 - 52 U/L Final     AST (SGOT)   Date Value Ref Range Status   03/05/2018 56 (H) 14 - 36 U/L Final     Alkaline Phosphatase   Date Value Ref Range Status   03/05/2018 88 38 - 126 U/L Final     Total Bilirubin   Date Value Ref Range Status   03/05/2018 0.6 0.2 - 1.3 mg/dL Final     eGFR Non  Amer   Date Value Ref Range Status   05/02/2018 27 (L) 39 - 90 mL/min/1.73 Final     A/G Ratio   Date Value Ref Range Status   03/05/2018 1.1 1.1 - 1.8 g/dL Final     BUN/Creatinine Ratio   Date Value Ref Range Status   05/02/2018 27.0 (H) 7.0 - 25.0 Final     Anion Gap   Date Value Ref Range Status   05/02/2018 6.0 5.0 - 15.0 mmol/L Final     WBC   Date Value Ref Range Status   03/05/2018 7.83 3.20 - 9.80 10*3/mm3 Final     RBC   Date Value Ref Range Status   03/05/2018 3.52 (L) 3.77 - 5.16 10*6/mm3 Final     Hemoglobin   Date Value Ref Range Status   05/02/2018 10.8 (L) 12.0 - 15.5 g/dL Final     Hematocrit   Date Value Ref Range Status   05/02/2018 33.6 (L) 35.0 - 45.0 % Final     MCV   Date Value  Ref Range Status   03/05/2018 98.0 80.0 - 98.0 fL Final     MCH   Date Value Ref Range Status   03/05/2018 31.3 26.5 - 34.0 pg Final     MCHC   Date Value Ref Range Status   03/05/2018 31.9 31.4 - 36.0 g/dL Final     RDW   Date Value Ref Range Status   03/05/2018 16.6 (H) 11.5 - 14.5 % Final     RDW-SD   Date Value Ref Range Status   03/05/2018 59.1 (H) 36.4 - 46.3 fl Final     MPV   Date Value Ref Range Status   03/05/2018 10.5 8.0 - 12.0 fL Final     Platelets   Date Value Ref Range Status   03/05/2018 226 150 - 450 10*3/mm3 Final     Neutrophil %   Date Value Ref Range Status   12/30/2017 74.4 37.0 - 80.0 % Final     Lymphocyte %   Date Value Ref Range Status   12/30/2017 12.3 10.0 - 50.0 % Final     Monocyte %   Date Value Ref Range Status   12/30/2017 7.5 0.0 - 12.0 % Final     Eosinophil %   Date Value Ref Range Status   12/30/2017 5.2 0.0 - 7.0 % Final     Basophil %   Date Value Ref Range Status   12/30/2017 0.3 0.0 - 2.0 % Final     Immature Grans %   Date Value Ref Range Status   12/30/2017 0.3 0.0 - 0.5 % Final     Neutrophils, Absolute   Date Value Ref Range Status   12/30/2017 5.03 2.00 - 8.60 10*3/mm3 Final     Neutrophils Absolute   Date Value Ref Range Status   03/05/2018 7.13 2.00 - 8.60 10*3/mm3 Final     Lymphocytes, Absolute   Date Value Ref Range Status   12/30/2017 0.83 0.60 - 4.20 10*3/mm3 Final     Monocytes, Absolute   Date Value Ref Range Status   12/30/2017 0.51 0.00 - 0.90 10*3/mm3 Final     Eosinophils, Absolute   Date Value Ref Range Status   12/30/2017 0.35 0.00 - 0.70 10*3/mm3 Final     Eosinophils Absolute   Date Value Ref Range Status   03/05/2018 0.08 0.00 - 0.70 10*3/mm3 Final     Basophils, Absolute   Date Value Ref Range Status   12/30/2017 0.02 0.00 - 0.20 10*3/mm3 Final     Basophils Absolute   Date Value Ref Range Status   03/05/2018 0.08 0.00 - 0.20 10*3/mm3 Final     Immature Grans, Absolute   Date Value Ref Range Status   12/30/2017 0.02 0.00 - 0.02 10*3/mm3 Final     nRBC    Date Value Ref Range Status   12/27/2017 0.0 0.0 - 0.0 /100 WBC Final       Lab Results   Component Value Date    CHOL 154 03/05/2018    CHOL 171 03/20/2017     Lab Results   Component Value Date    TRIG 85 03/05/2018    TRIG 109 03/20/2017    TRIG 67 01/30/2014     Lab Results   Component Value Date    HDL 40 (L) 03/05/2018    HDL 46 (L) 03/20/2017    HDL 26 (L) 01/30/2014     Lab Results   Component Value Date    TSH 1.880 03/05/2018       Lab Results   Component Value Date    PROBNP 7,310.0 (H) 12/27/2017     The following portions of the patient's history were reviewed and updated as appropriate: allergies, current medications, problem list,  past medical history, surgical history, family history and social history.    Recent images independently reviewed.    Available laboratory values reviewed.    Assessment:      Diagnosis Plan   1. Heart failure with preserved left ventricular function (HFpEF) without clinical evidence of hypervolemia; she is well perfused  AHA Stage C: ; NYHA Class III  BETA-BLOCKER: Not applicable (diltiazem 240 mg daily)  ACE/ARB: Could be considered/discussion with nephrology regarding any prior history indicating contraindication  ENTRESTO: Not applicable  DIURETIC: Lasix 40 mg daily; increase to twice daily if leg edema/weight gain as described  ALDOSTERONT ANTAGONIST: Not applicable  IMDUR/HYDRALAZINE: Hydralazine could be considered regarding blood pressure control as needed  DIGOXIN: Not applicable  Fluid restriction: 2000 cc daily  Sodium restriction: 2000 mg daily   6MWT: To be performed  Cardiac Rehab: Not a candidate  Pulmonary Rehab: Referral made  ICD: Not applicable  CardioMEMS: Not applicable  Advanced HF evaluation (Transplant/LVAD): Not applicable    Recommended daily weight monitoring.  Discussed patient action plan for heart failure;  Recommended avoiding NSAIDs use.  Discussed warning signs requiring additional medical attention for heart failure.    Discussion with  the patient regarding referral to sleep medicine for evaluation of objective sleep apnea; patient is adamant about negative symptoms for indication       2. Personal history of heart valve replacement  1999 - [place]  Continue surveillance with attached cardiologist Dr. Gates;   VKA       3. Coronary artery disease involving native coronary artery of native heart without angina pectoris      Continue with cardiology care per Dr. Gates    4. Hx of CABG  As per #3     CABG x 1 1999 - [place]       5. Paroxysmal atrial fibrillation/Sick Sinus Syndrome S/P PPM Diltiazem plus presence of VKA (AVR)  Cardiac device interrogations as per Dr. Gates       6. Essential hypertension, stable As per #1       7. Chronic obstructive pulmonary disease, unspecified COPD type  Dr. Corazon Grant     Diagnosis: 2001        8.  CKD, Stage 3 Dr. Caputo        9. Chronic hypoxemic respiratory failure      Ambulatory Referral to Pulmonary Rehab   10. Drug therapy      ECG 12 Lead   11. Long-term (current) use of anticoagulants  Continue monitoring per Coumadin clinic                  This document has been electronically signed by ABRAHAM Sommer on June 20, 2018 1:26 PM

## 2018-06-19 NOTE — PROGRESS NOTES
Patient states no med changes or bleeding problems or unexplained bruising. Patient instructed to continue current dosing schedule. Verbalizes understanding. Will recheck in 3 wks.  Patient instructed regarding medication; results given and questions answered. Nutritional counseling given.  Dietary factors affecting therapy addressed.  Patient instructed to monitor for excessive bruising or bleeding.        This document has been electronically signed by DANIEL Pavon @ on June 19, 2018 11:12 AM

## 2018-07-05 ENCOUNTER — PATIENT OUTREACH (OUTPATIENT)
Dept: CASE MANAGEMENT | Facility: OTHER | Age: 73
End: 2018-07-05

## 2018-07-05 NOTE — OUTREACH NOTE
Care Management Plan 7/5/2018   Lifestyle Goals Attend a support group;Lose weight   Barriers Disease education   Barriers -   Self Management Increase Physical Activities   Self Management Patient stated she is walking inside only due to excessive heat    Annual Wellness Visit:  Patient Will Schedule   Annual Wellness Visit:  -   Does patient have depression diagnosis? Yes   Does patient have depression diagnosis? Patient states she is feeling great, feels is under control.   Advanced Directives: (No Data)   Advanced Directives: Patient has information, working on completion   Medication Adherence -   Readiness Scale 7   Confidence Scale 5   Health Literacy Good     The main concerns and/or symptoms the patient would like to address are: walking more once heat is not a factor outdoors.    Education/instruction provided by Care Coordinator:  MARY FERNANDEZ discussed see notes    Follow Up Outreach Due About 1 month    Elvira Horton RN

## 2018-07-11 ENCOUNTER — ANTICOAGULATION VISIT (OUTPATIENT)
Dept: CARDIAC SURGERY | Facility: CLINIC | Age: 73
End: 2018-07-11

## 2018-07-11 VITALS — DIASTOLIC BLOOD PRESSURE: 66 MMHG | HEART RATE: 61 BPM | SYSTOLIC BLOOD PRESSURE: 132 MMHG

## 2018-07-11 DIAGNOSIS — I48.0 PAROXYSMAL ATRIAL FIBRILLATION (HCC): ICD-10-CM

## 2018-07-11 DIAGNOSIS — Z95.2 PERSONAL HISTORY OF HEART VALVE REPLACEMENT: ICD-10-CM

## 2018-07-11 DIAGNOSIS — Z79.01 LONG-TERM (CURRENT) USE OF ANTICOAGULANTS: ICD-10-CM

## 2018-07-11 LAB — INR PPP: 4.2 (ref 0.9–1.1)

## 2018-07-11 PROCEDURE — 99211 OFF/OP EST MAY X REQ PHY/QHP: CPT | Performed by: NURSE PRACTITIONER

## 2018-07-11 PROCEDURE — 85610 PROTHROMBIN TIME: CPT | Performed by: NURSE PRACTITIONER

## 2018-07-11 NOTE — PROGRESS NOTES
Pt denies med changes or bleeding problems. Dose adjusted and pt instructed to have a serving of green veggies today; pt verbalized. Patient instructed regarding medication; results given and questions answered. Nutritional counseling given.  Dietary factors affecting therapy addressed.  Patient instructed to monitor for excessive bruising or bleeding. Will recheck next week.         This document has been electronically signed by ABRAHAM White on July 11, 2018 11:20 AM

## 2018-07-18 ENCOUNTER — ANTICOAGULATION VISIT (OUTPATIENT)
Dept: CARDIAC SURGERY | Facility: CLINIC | Age: 73
End: 2018-07-18

## 2018-07-18 VITALS — SYSTOLIC BLOOD PRESSURE: 138 MMHG | HEART RATE: 72 BPM | DIASTOLIC BLOOD PRESSURE: 68 MMHG

## 2018-07-18 DIAGNOSIS — Z79.01 LONG-TERM (CURRENT) USE OF ANTICOAGULANTS: ICD-10-CM

## 2018-07-18 DIAGNOSIS — Z95.2 PERSONAL HISTORY OF HEART VALVE REPLACEMENT: ICD-10-CM

## 2018-07-18 DIAGNOSIS — I48.0 PAROXYSMAL ATRIAL FIBRILLATION (HCC): ICD-10-CM

## 2018-07-18 LAB — INR PPP: 3.8 (ref 0.9–1.1)

## 2018-07-18 PROCEDURE — 85610 PROTHROMBIN TIME: CPT | Performed by: NURSE PRACTITIONER

## 2018-07-18 PROCEDURE — 99211 OFF/OP EST MAY X REQ PHY/QHP: CPT | Performed by: NURSE PRACTITIONER

## 2018-07-18 NOTE — PROGRESS NOTES
PT states she took Coumadin and ate green as directed. Denies any new medications or bleeding issues. PT denies any s/s of blood clot. Adjusted pt's dose and instructed to increase vit k today. Patient instructed regarding medication; results given and questions answered. Nutritional counseling given.  Dietary factors affecting therapy addressed.  Patient instructed to monitor for excessive bruising or bleeding. PT will be seen next week.         This document has been electronically signed by ABRAHAM White on July 18, 2018 11:12 AM

## 2018-07-27 ENCOUNTER — ANTICOAGULATION VISIT (OUTPATIENT)
Dept: CARDIAC SURGERY | Facility: CLINIC | Age: 73
End: 2018-07-27

## 2018-07-27 VITALS — HEART RATE: 60 BPM | OXYGEN SATURATION: 93 % | SYSTOLIC BLOOD PRESSURE: 143 MMHG | DIASTOLIC BLOOD PRESSURE: 69 MMHG

## 2018-07-27 DIAGNOSIS — Z79.01 LONG-TERM (CURRENT) USE OF ANTICOAGULANTS: ICD-10-CM

## 2018-07-27 DIAGNOSIS — Z95.2 PERSONAL HISTORY OF HEART VALVE REPLACEMENT: ICD-10-CM

## 2018-07-27 DIAGNOSIS — I48.0 PAROXYSMAL ATRIAL FIBRILLATION (HCC): ICD-10-CM

## 2018-07-27 LAB — INR PPP: 3.5 (ref 0.9–1.1)

## 2018-07-27 PROCEDURE — 85610 PROTHROMBIN TIME: CPT | Performed by: NURSE PRACTITIONER

## 2018-07-27 NOTE — PROGRESS NOTES
Patient states no med changes or bleeding problems or unexplained bruising. Patient instructed to continue current dosing schedule. Verbalizes understanding. Will recheck in 10 days. Patient instructed regarding medication; results given and questions answered. Nutritional counseling given.  Dietary factors affecting therapy addressed.  Patient instructed to monitor for excessive bruising or bleeding.         This document has been electronically signed by DANIEL Pavon @ on July 27, 2018 1:10 PM

## 2018-08-06 ENCOUNTER — ANTICOAGULATION VISIT (OUTPATIENT)
Dept: CARDIAC SURGERY | Facility: CLINIC | Age: 73
End: 2018-08-06

## 2018-08-06 VITALS — HEART RATE: 61 BPM | SYSTOLIC BLOOD PRESSURE: 143 MMHG | OXYGEN SATURATION: 94 % | DIASTOLIC BLOOD PRESSURE: 66 MMHG

## 2018-08-06 DIAGNOSIS — Z79.01 LONG-TERM (CURRENT) USE OF ANTICOAGULANTS: ICD-10-CM

## 2018-08-06 DIAGNOSIS — Z95.2 PERSONAL HISTORY OF HEART VALVE REPLACEMENT: ICD-10-CM

## 2018-08-06 DIAGNOSIS — I48.0 PAROXYSMAL ATRIAL FIBRILLATION (HCC): ICD-10-CM

## 2018-08-06 LAB — INR PPP: 3.9 (ref 0.9–1.1)

## 2018-08-06 PROCEDURE — 85610 PROTHROMBIN TIME: CPT | Performed by: NURSE PRACTITIONER

## 2018-08-06 PROCEDURE — 99211 OFF/OP EST MAY X REQ PHY/QHP: CPT | Performed by: NURSE PRACTITIONER

## 2018-08-06 NOTE — PROGRESS NOTES
Pt denies med changes or bleeding problems. Dose adjusted and pt instructed to have a serving of green veggies today; pt verbalized. Patient instructed regarding medication; results given and questions answered. Nutritional counseling given.  Dietary factors affecting therapy addressed.  Patient instructed to monitor for excessive bruising or bleeding. Will recheck next week.        This document has been electronically signed by DANIEL Pavon @ on August 6, 2018 10:44 AM

## 2018-08-08 ENCOUNTER — PATIENT OUTREACH (OUTPATIENT)
Dept: CASE MANAGEMENT | Facility: OTHER | Age: 73
End: 2018-08-08

## 2018-08-09 NOTE — OUTREACH NOTE
Care Management Plan 8/8/2018   Lifestyle Goals Record weight daily   Lifestyle Goals Patient stated she has been monitoring weight .   Barriers Other (See Comment)   Barriers Summer heat and humidity affecting breathin for outdoor activities per patient report   Self Management Increase Physical Activities   Self Management Patient stated she is using hand weights to increase arm strength and  sitting to standing motion several times per day  to increase leg strength.   Annual Wellness Visit:  Patient Will Schedule   Annual Wellness Visit:  -   Specific Disease Process Teaching Heart Failure   Does patient have depression diagnosis? Yes   Does patient have depression diagnosis? Reports remains controlled   Advanced Directives: Patient Has   Advanced Directives: -   Medication Adherence -   Readiness Scale 7   Confidence Scale 7   Health Literacy Good     The main concerns and/or symptoms the patient would like to address are: no concerns voiced at time of call, stated she is doing well.    Education/instruction provided by Care Coordinator: Discussion with patient of CHF symptoms, daily weights and medications adherence. Voiced understanding at time of discussion.    Follow Up Outreach Due: September    Elvira Horton RN

## 2018-08-13 ENCOUNTER — ANTICOAGULATION VISIT (OUTPATIENT)
Dept: CARDIAC SURGERY | Facility: CLINIC | Age: 73
End: 2018-08-13

## 2018-08-13 VITALS — HEART RATE: 60 BPM

## 2018-08-13 DIAGNOSIS — Z79.01 LONG-TERM (CURRENT) USE OF ANTICOAGULANTS: ICD-10-CM

## 2018-08-13 DIAGNOSIS — Z95.2 PERSONAL HISTORY OF HEART VALVE REPLACEMENT: ICD-10-CM

## 2018-08-13 DIAGNOSIS — I48.0 PAROXYSMAL ATRIAL FIBRILLATION (HCC): ICD-10-CM

## 2018-08-13 LAB — INR PPP: 4.9 (ref 0.9–1.1)

## 2018-08-13 PROCEDURE — 85610 PROTHROMBIN TIME: CPT | Performed by: NURSE PRACTITIONER

## 2018-08-13 PROCEDURE — 99211 OFF/OP EST MAY X REQ PHY/QHP: CPT | Performed by: NURSE PRACTITIONER

## 2018-08-13 NOTE — PROGRESS NOTES
Unable to determine cause of elevated INR. Denies any med changes, bleeding issues or s/s of blood clot. PT has been consuming green consistently. Adjusted pt's dose and instructed to increase vit k with 1.5 cups of green today and 1/2 cup green on Thursday. Patient instructed regarding medication; results given and questions answered. Nutritional counseling given.  Dietary factors affecting therapy addressed.  Patient instructed to monitor for excessive bruising or bleeding. PT will be seen on Friday when she can return due to PACS.         This document has been electronically signed by Meme Duckworth, DANEIL @ on August 13, 2018 11:00 AM

## 2018-08-15 ENCOUNTER — TELEPHONE (OUTPATIENT)
Dept: FAMILY MEDICINE CLINIC | Facility: CLINIC | Age: 73
End: 2018-08-15

## 2018-08-15 RX ORDER — RANITIDINE 150 MG/1
150 TABLET ORAL NIGHTLY
Qty: 180 TABLET | Refills: 1 | Status: SHIPPED | OUTPATIENT
Start: 2018-08-15 | End: 2019-10-15 | Stop reason: SDUPTHER

## 2018-08-15 NOTE — TELEPHONE ENCOUNTER
Requested Refills Sent    ----- Message from Suha Mtz sent at 8/15/2018 11:42 AM CDT -----  Contact: QUIANA  Needs refill for Ranitidine to [facility]    [phone]

## 2018-08-17 ENCOUNTER — ANTICOAGULATION VISIT (OUTPATIENT)
Dept: CARDIAC SURGERY | Facility: CLINIC | Age: 73
End: 2018-08-17

## 2018-08-17 VITALS — OXYGEN SATURATION: 92 % | SYSTOLIC BLOOD PRESSURE: 132 MMHG | DIASTOLIC BLOOD PRESSURE: 63 MMHG | HEART RATE: 61 BPM

## 2018-08-17 DIAGNOSIS — Z95.2 PERSONAL HISTORY OF HEART VALVE REPLACEMENT: ICD-10-CM

## 2018-08-17 DIAGNOSIS — Z79.01 LONG-TERM (CURRENT) USE OF ANTICOAGULANTS: ICD-10-CM

## 2018-08-17 DIAGNOSIS — I48.0 PAROXYSMAL ATRIAL FIBRILLATION (HCC): ICD-10-CM

## 2018-08-17 LAB — INR PPP: 1.7 (ref 0.9–1.1)

## 2018-08-17 PROCEDURE — 99211 OFF/OP EST MAY X REQ PHY/QHP: CPT | Performed by: NURSE PRACTITIONER

## 2018-08-17 PROCEDURE — 85610 PROTHROMBIN TIME: CPT | Performed by: NURSE PRACTITIONER

## 2018-08-17 NOTE — PROGRESS NOTES
Pt denies med changes or bleeding problems. Dose adjusted and pt instructed to hold green veggies. Patient instructed regarding medication; results given and questions answered. Nutritional counseling given.  Dietary factors affecting therapy addressed.  Patient instructed to monitor for excessive bruising or bleeding. Will recheck in 4 days.         This document has been electronically signed by Meme Duckworth, DANIEL @ on August 17, 2018 10:56 AM

## 2018-08-20 DIAGNOSIS — F32.A DEPRESSIVE DISORDER: ICD-10-CM

## 2018-08-20 RX ORDER — TRAZODONE HYDROCHLORIDE 150 MG/1
300 TABLET ORAL NIGHTLY
Qty: 180 TABLET | Refills: 0 | Status: SHIPPED | OUTPATIENT
Start: 2018-08-20 | End: 2018-09-12 | Stop reason: SDUPTHER

## 2018-08-21 ENCOUNTER — ANTICOAGULATION VISIT (OUTPATIENT)
Dept: CARDIAC SURGERY | Facility: CLINIC | Age: 73
End: 2018-08-21

## 2018-08-21 VITALS — HEART RATE: 68 BPM | DIASTOLIC BLOOD PRESSURE: 70 MMHG | SYSTOLIC BLOOD PRESSURE: 138 MMHG

## 2018-08-21 DIAGNOSIS — I48.0 PAROXYSMAL ATRIAL FIBRILLATION (HCC): ICD-10-CM

## 2018-08-21 DIAGNOSIS — Z95.2 PERSONAL HISTORY OF HEART VALVE REPLACEMENT: ICD-10-CM

## 2018-08-21 DIAGNOSIS — Z79.01 LONG-TERM (CURRENT) USE OF ANTICOAGULANTS: ICD-10-CM

## 2018-08-21 LAB — INR PPP: 1.9 (ref 0.9–1.1)

## 2018-08-21 PROCEDURE — 85610 PROTHROMBIN TIME: CPT | Performed by: NURSE PRACTITIONER

## 2018-08-21 PROCEDURE — 99211 OFF/OP EST MAY X REQ PHY/QHP: CPT | Performed by: NURSE PRACTITIONER

## 2018-08-21 NOTE — PROGRESS NOTES
Pt here today for recheck of subtherapeutic INR level of unknown cause.  Pt denies missed coumadin doses, med or diet changes.  NO bleeding issues or new s/s blood clots at this time.  Adjusted coumadin dose and instructed pt to limit green intake for three days.  Recheck INR next wk.  Pt verbalizes.  Patient instructed regarding medication; results given and questions answered. Nutritional counseling given.  Dietary factors affecting therapy addressed.  Patient instructed to monitor for excessive bruising or bleeding.        This document has been electronically signed by ABRAHAM White on August 21, 2018 11:06 AM

## 2018-08-29 ENCOUNTER — ANTICOAGULATION VISIT (OUTPATIENT)
Dept: CARDIAC SURGERY | Facility: CLINIC | Age: 73
End: 2018-08-29

## 2018-08-29 VITALS — DIASTOLIC BLOOD PRESSURE: 62 MMHG | SYSTOLIC BLOOD PRESSURE: 118 MMHG | HEART RATE: 64 BPM

## 2018-08-29 DIAGNOSIS — Z79.01 LONG-TERM (CURRENT) USE OF ANTICOAGULANTS: ICD-10-CM

## 2018-08-29 DIAGNOSIS — Z95.2 PERSONAL HISTORY OF HEART VALVE REPLACEMENT: ICD-10-CM

## 2018-08-29 DIAGNOSIS — I48.0 PAROXYSMAL ATRIAL FIBRILLATION (HCC): ICD-10-CM

## 2018-08-29 LAB — INR PPP: 1.7 (ref 0.9–1.1)

## 2018-08-29 PROCEDURE — 99211 OFF/OP EST MAY X REQ PHY/QHP: CPT | Performed by: NURSE PRACTITIONER

## 2018-08-29 PROCEDURE — 85610 PROTHROMBIN TIME: CPT | Performed by: NURSE PRACTITIONER

## 2018-08-29 NOTE — PROGRESS NOTES
Pt denies missed coumadin doses or consuming too much green.  Pt states medications have not changed.  Unable to determine cause for continued inconsistent INR levels.  Adjusted coumadin dose and instructed pt to limit green intake for three days.  Recheck INR next Tuesday when pt is seeing Nel Grant.  Pt verbalizes.  Patient instructed regarding medication; results given and questions answered. Nutritional counseling given.  Dietary factors affecting therapy addressed.  Patient instructed to monitor for excessive bruising or bleeding.        This document has been electronically signed by DANIEL Pavon @ on August 29, 2018 11:00 AM

## 2018-09-04 ENCOUNTER — ANTICOAGULATION VISIT (OUTPATIENT)
Dept: CARDIAC SURGERY | Facility: CLINIC | Age: 73
End: 2018-09-04

## 2018-09-04 ENCOUNTER — OFFICE VISIT (OUTPATIENT)
Dept: PULMONOLOGY | Facility: CLINIC | Age: 73
End: 2018-09-04

## 2018-09-04 VITALS
SYSTOLIC BLOOD PRESSURE: 138 MMHG | BODY MASS INDEX: 38.14 KG/M2 | WEIGHT: 228.9 LBS | OXYGEN SATURATION: 93 % | HEIGHT: 65 IN | DIASTOLIC BLOOD PRESSURE: 64 MMHG | HEART RATE: 64 BPM

## 2018-09-04 VITALS — DIASTOLIC BLOOD PRESSURE: 64 MMHG | SYSTOLIC BLOOD PRESSURE: 138 MMHG | HEART RATE: 64 BPM

## 2018-09-04 DIAGNOSIS — R53.81 PHYSICAL DECONDITIONING: ICD-10-CM

## 2018-09-04 DIAGNOSIS — Z79.01 LONG-TERM (CURRENT) USE OF ANTICOAGULANTS: ICD-10-CM

## 2018-09-04 DIAGNOSIS — I48.0 PAROXYSMAL ATRIAL FIBRILLATION (HCC): ICD-10-CM

## 2018-09-04 DIAGNOSIS — Z95.2 PERSONAL HISTORY OF HEART VALVE REPLACEMENT: ICD-10-CM

## 2018-09-04 DIAGNOSIS — J44.9 CHRONIC OBSTRUCTIVE PULMONARY DISEASE, UNSPECIFIED COPD TYPE (HCC): Primary | ICD-10-CM

## 2018-09-04 DIAGNOSIS — G47.00 INSOMNIA, UNSPECIFIED TYPE: ICD-10-CM

## 2018-09-04 DIAGNOSIS — IMO0001 CLASS 2 OBESITY DUE TO EXCESS CALORIES WITH SERIOUS COMORBIDITY AND BODY MASS INDEX (BMI) OF 36.0 TO 36.9 IN ADULT: ICD-10-CM

## 2018-09-04 DIAGNOSIS — J96.11 CHRONIC HYPOXEMIC RESPIRATORY FAILURE (HCC): ICD-10-CM

## 2018-09-04 LAB — INR PPP: 2.7 (ref 0.9–1.1)

## 2018-09-04 PROCEDURE — 99214 OFFICE O/P EST MOD 30 MIN: CPT | Performed by: INTERNAL MEDICINE

## 2018-09-04 PROCEDURE — 85610 PROTHROMBIN TIME: CPT | Performed by: NURSE PRACTITIONER

## 2018-09-04 NOTE — PROGRESS NOTES
Pt here today for recheck of subtherapeutic INR level.  Pt denies med changes or bleeding issues.  Will recheck INR next wk on current dosing schedule. Pt seeing Nel Grant today as well. Patient instructed regarding medication; results given and questions answered. Nutritional counseling given.  Dietary factors affecting therapy addressed.  Patient instructed to monitor for excessive bruising or bleeding. Pt verbalizes.        This document has been electronically signed by ABRAHAM White on September 4, 2018 10:35 AM

## 2018-09-04 NOTE — PROGRESS NOTES
Pulmonary Office Follow-up    Subjective     Brendon Skelton is seen today at the office for   Chief Complaint   Patient presents with   • COPD     3 MO F/U         HPI  Brendon Skelton is a 72 y.o. female with a PMH significant for COPD, chronic hypoxic respiratory failure, past tobacco use, AF, CHF, s/p AVR, obesity, CKD, and DM who presents for follow-up of COPD.  She was last seen on 6/1/18, at which time I recommend she continue Anoro daily and counseled her on use of her supplemental oxygen.  States she's been doing well on her Anoro and only needs her albuterol on average twice a week.  She denies being limited by her breathing at this time.  She has not needed her oxygen since her last visit with me, but she is monitoring her oxygen saturations which stay over 88%. Pt does complain of difficulty sleeping which she attributes to her insomnia as well as frequent micturition at night.  She is unaware of any snoring or witnessed apneas, and she has never been assessed for ANDRES. Pt does take trazodone as well as Requip at night that she has been out of her Requip for the past week.  She denies cough, wheeze, chest pain, or leg swelling.    Patient Active Problem List   Diagnosis   • Long-term (current) use of anticoagulants   • Personal history of heart valve replacement   • Atrial fibrillation [I48.91]   • Pulmonary emphysema (CMS/HCC)   • On anticoagulant therapy   • Hyperlipidemia   • Diastolic heart failure (CMS/HCC)   • Depressive disorder   • Chronic obstructive lung disease (CMS/HCC)   • Essential hypertension, benign   • Diabetes mellitus without complication (CMS/HCC)   • Myopia   • Astigmatism   • Bleeding from open wound of chest wall   • Follow-up surgery care   • Encounter for screening for malignant neoplasm of colon   • Positive colorectal cancer screening using DNA-based stool test   • Nodule of left lung   • Class 2 obesity due to excess calories with serious comorbidity and body mass  index (BMI) of 36.0 to 36.9 in adult   • Chronic hypoxemic respiratory failure (CMS/HCC)   • Physical deconditioning   • Heart failure with preserved left ventricular function (HFpEF) (CMS/[facility])   • Essential hypertension   • Coronary artery disease involving native coronary artery without angina pectoris   • Hx of CABG   • SSS (sick sinus syndrome) (CMS/[facility])   • Pacemaker   • Stage 3 chronic kidney disease       Review of Systems  Review of Systems   Constitutional: Negative for fatigue, fever and unexpected weight change.        Insomnia   HENT: Negative for congestion.    Respiratory: Negative for cough, choking, shortness of breath and wheezing.    Cardiovascular: Negative for chest pain and leg swelling.   Gastrointestinal: Negative for abdominal pain.   Genitourinary: Positive for frequency.   Neurological: Positive for weakness.     As described in the HPI. Otherwise, remainder of ROS (14 systems) were negative.    Medications, Allergies, Social, and Family Histories reviewed as per EMR.    Objective     Vitals:    09/04/18 1034   BP: 138/64   Pulse: 64   SpO2: 93%     Physical Exam   Constitutional: She is oriented to person, place, and time. Vital signs are normal. She appears well-developed and well-nourished.   Morbidly obese   HENT:   Head: Normocephalic and atraumatic.   Nose: Nose normal.   Mouth/Throat: Uvula is midline, oropharynx is clear and moist and mucous membranes are normal.   Mallampati 3   Eyes: Pupils are equal, round, and reactive to light. Conjunctivae, EOM and lids are normal.   Neck: Trachea normal and normal range of motion. No tracheal tenderness present. No thyroid mass present.   Cardiovascular: Normal rate and regular rhythm.  PMI is not displaced.  Exam reveals no gallop.    Murmur heard.   Systolic murmur is present with a grade of 2/6   AV click   Pulmonary/Chest: Effort normal and breath sounds normal. No respiratory distress. She has no decreased breath sounds. She has no  wheezes. She has no rhonchi. She exhibits deformity (thoracic kyphosis). She exhibits no tenderness.   Abdominal: Soft. Normal appearance and bowel sounds are normal. There is no hepatosplenomegaly. There is no tenderness.   Morbidly obese   Musculoskeletal:   Uses motorized scooter, no extremity edema     Vascular Status -  Her right foot exhibits no edema. Her left foot exhibits no edema.  Lymphadenopathy:        Head (right side): No submandibular adenopathy present.        Head (left side): No submandibular adenopathy present.     She has no cervical adenopathy.        Right: No supraclavicular adenopathy present.        Left: No supraclavicular adenopathy present.   Neurological: She is alert and oriented to person, place, and time. Gait normal.   Skin: Skin is warm and dry. No rash noted. No cyanosis. Nails show no clubbing.   Psychiatric: She has a normal mood and affect. Her speech is normal and behavior is normal. Judgment normal.   Nursing note and vitals reviewed.          Assessment/Plan     Brendon was seen today for copd.    Diagnoses and all orders for this visit:    Chronic obstructive pulmonary disease, unspecified COPD type (CMS/HCC)    Chronic hypoxemic respiratory failure (CMS/HCC)    Class 2 obesity due to excess calories with serious comorbidity and body mass index (BMI) of 36.0 to 36.9 in adult    Physical deconditioning    Insomnia, unspecified type         Discussion/ Recommendations:   Her COPD continues to be well controlled on Anoro and she has infrequent albuterol use.  She has not required supplemental oxygen in over 6 months and is able to monitor her saturations at home.  I am concerned that she has several risk factors as well as symptoms for ANDRES which could be underlying her insomnia, but the patient wishes to hold on any sleep evaluations at this time.    -Continue Anoro daily and albuterol as needed only.  -Instructed to use oxygen to maintain oxygen saturations greoxemia.ater than  88%.  Counseled on risks of prolonged hypoxemia.  -Continue efforts with weight loss through diet and exercise.  Encouraged her to ambulate more at home.  -She would benefit from pulmonary rehabilitation, but given her limited mobility she is not able to tolerate.  -Does not meet criteria for LD CT screening.  -Recommended sleep evaluation, but the patient declined.  Encouraged her to discuss this further with her PCP is I think it is warranted given her history of RLS, insomnia, and obesity, as well as underlying cardiac issues.  -Encouraged her to get the flu vaccine.    Patient's BMI is above normal parameters. Follow-up plan includes:  nutrition counseling and referral to primary care.        Return in about 3 months (around 12/4/2018) for Recheck.          This document has been electronically signed by Nel Grant MD on September 4, 2018 11:03 AM      Dictated using Dragon

## 2018-09-10 RX ORDER — INSULIN GLARGINE 100 [IU]/ML
30 INJECTION, SOLUTION SUBCUTANEOUS DAILY
Qty: 1 PEN | Refills: 11 | Status: SHIPPED | OUTPATIENT
Start: 2018-09-10 | End: 2019-10-07 | Stop reason: SDUPTHER

## 2018-09-12 ENCOUNTER — OFFICE VISIT (OUTPATIENT)
Dept: FAMILY MEDICINE CLINIC | Facility: CLINIC | Age: 73
End: 2018-09-12

## 2018-09-12 VITALS
DIASTOLIC BLOOD PRESSURE: 62 MMHG | WEIGHT: 228 LBS | SYSTOLIC BLOOD PRESSURE: 112 MMHG | HEIGHT: 65 IN | BODY MASS INDEX: 37.99 KG/M2

## 2018-09-12 DIAGNOSIS — F32.A DEPRESSIVE DISORDER: ICD-10-CM

## 2018-09-12 DIAGNOSIS — G25.81 RESTLESS LEG SYNDROME: ICD-10-CM

## 2018-09-12 DIAGNOSIS — E78.2 MIXED HYPERLIPIDEMIA: ICD-10-CM

## 2018-09-12 DIAGNOSIS — Z13.29 SCREENING FOR HYPOTHYROIDISM: ICD-10-CM

## 2018-09-12 DIAGNOSIS — E11.9 DIABETES MELLITUS WITHOUT COMPLICATION (HCC): ICD-10-CM

## 2018-09-12 DIAGNOSIS — I10 ESSENTIAL HYPERTENSION: Primary | ICD-10-CM

## 2018-09-12 PROCEDURE — 99214 OFFICE O/P EST MOD 30 MIN: CPT | Performed by: NURSE PRACTITIONER

## 2018-09-12 RX ORDER — TRAZODONE HYDROCHLORIDE 150 MG/1
300 TABLET ORAL NIGHTLY
Qty: 180 TABLET | Refills: 3 | Status: SHIPPED | OUTPATIENT
Start: 2018-09-12 | End: 2019-10-29 | Stop reason: SDUPTHER

## 2018-09-12 RX ORDER — ROPINIROLE 0.25 MG/1
0.25 TABLET, FILM COATED ORAL NIGHTLY
Qty: 90 TABLET | Refills: 3 | Status: SHIPPED | OUTPATIENT
Start: 2018-09-12 | End: 2019-10-15 | Stop reason: SDUPTHER

## 2018-09-13 ENCOUNTER — ANTICOAGULATION VISIT (OUTPATIENT)
Dept: CARDIAC SURGERY | Facility: CLINIC | Age: 73
End: 2018-09-13

## 2018-09-13 VITALS — DIASTOLIC BLOOD PRESSURE: 72 MMHG | SYSTOLIC BLOOD PRESSURE: 138 MMHG | HEART RATE: 68 BPM

## 2018-09-13 DIAGNOSIS — Z79.01 LONG-TERM (CURRENT) USE OF ANTICOAGULANTS: ICD-10-CM

## 2018-09-13 DIAGNOSIS — I48.0 PAROXYSMAL ATRIAL FIBRILLATION (HCC): ICD-10-CM

## 2018-09-13 DIAGNOSIS — Z95.2 PERSONAL HISTORY OF HEART VALVE REPLACEMENT: ICD-10-CM

## 2018-09-13 LAB — INR PPP: 4 (ref 0.9–1.1)

## 2018-09-13 PROCEDURE — 85610 PROTHROMBIN TIME: CPT | Performed by: NURSE PRACTITIONER

## 2018-09-13 PROCEDURE — 99211 OFF/OP EST MAY X REQ PHY/QHP: CPT | Performed by: NURSE PRACTITIONER

## 2018-09-13 NOTE — PROGRESS NOTES
PT states compliant c tx. Denies any new medications or bleeding issues. PT denies any s/s of blood clot. PT states she ate green consistently. Denies any GI issues. Adjusted pt's dose and instructed to increase vit k today. PT will be seen on Monday. Patient instructed regarding medication; results given and questions answered. Nutritional counseling given.  Dietary factors affecting therapy addressed.  Patient instructed to monitor for excessive bruising or bleeding. PT verbalizes understanding.         This document has been electronically signed by DANIEL Pavon @ on September 13, 2018 10:05 AM

## 2018-09-17 ENCOUNTER — ANTICOAGULATION VISIT (OUTPATIENT)
Dept: CARDIAC SURGERY | Facility: CLINIC | Age: 73
End: 2018-09-17

## 2018-09-17 ENCOUNTER — LAB (OUTPATIENT)
Dept: LAB | Facility: HOSPITAL | Age: 73
End: 2018-09-17

## 2018-09-17 VITALS — OXYGEN SATURATION: 97 % | DIASTOLIC BLOOD PRESSURE: 82 MMHG | SYSTOLIC BLOOD PRESSURE: 162 MMHG | HEART RATE: 72 BPM

## 2018-09-17 DIAGNOSIS — E78.2 MIXED HYPERLIPIDEMIA: ICD-10-CM

## 2018-09-17 DIAGNOSIS — I10 ESSENTIAL HYPERTENSION: ICD-10-CM

## 2018-09-17 DIAGNOSIS — Z79.01 LONG-TERM (CURRENT) USE OF ANTICOAGULANTS: ICD-10-CM

## 2018-09-17 DIAGNOSIS — Z13.29 SCREENING FOR HYPOTHYROIDISM: ICD-10-CM

## 2018-09-17 DIAGNOSIS — I48.0 PAROXYSMAL ATRIAL FIBRILLATION (HCC): ICD-10-CM

## 2018-09-17 DIAGNOSIS — E11.9 DIABETES MELLITUS WITHOUT COMPLICATION (HCC): ICD-10-CM

## 2018-09-17 DIAGNOSIS — Z95.2 PERSONAL HISTORY OF HEART VALVE REPLACEMENT: ICD-10-CM

## 2018-09-17 LAB
ALBUMIN SERPL-MCNC: 3.5 G/DL (ref 3.4–4.8)
ALBUMIN/GLOB SERPL: 0.9 G/DL (ref 1.1–1.8)
ALP SERPL-CCNC: 99 U/L (ref 38–126)
ALT SERPL W P-5'-P-CCNC: 49 U/L (ref 9–52)
ANION GAP SERPL CALCULATED.3IONS-SCNC: 6 MMOL/L (ref 5–15)
ARTICHOKE IGE QN: 78 MG/DL (ref 1–129)
AST SERPL-CCNC: 99 U/L (ref 14–36)
BASOPHILS # BLD AUTO: 0.02 10*3/MM3 (ref 0–0.2)
BASOPHILS NFR BLD AUTO: 0.3 % (ref 0–2)
BILIRUB SERPL-MCNC: 0.5 MG/DL (ref 0.2–1.3)
BUN BLD-MCNC: 57 MG/DL (ref 7–21)
BUN/CREAT SERPL: 31.3 (ref 7–25)
CALCIUM SPEC-SCNC: 8.9 MG/DL (ref 8.4–10.2)
CHLORIDE SERPL-SCNC: 102 MMOL/L (ref 95–110)
CHOLEST SERPL-MCNC: 160 MG/DL (ref 0–199)
CO2 SERPL-SCNC: 29 MMOL/L (ref 22–31)
CREAT BLD-MCNC: 1.82 MG/DL (ref 0.5–1)
DEPRECATED RDW RBC AUTO: 55.2 FL (ref 36.4–46.3)
EOSINOPHIL # BLD AUTO: 0.14 10*3/MM3 (ref 0–0.7)
EOSINOPHIL NFR BLD AUTO: 2.1 % (ref 0–7)
ERYTHROCYTE [DISTWIDTH] IN BLOOD BY AUTOMATED COUNT: 15.9 % (ref 11.5–14.5)
GFR SERPL CREATININE-BSD FRML MDRD: 27 ML/MIN/1.73 (ref 39–90)
GLOBULIN UR ELPH-MCNC: 3.7 GM/DL (ref 2.3–3.5)
GLUCOSE BLD-MCNC: 66 MG/DL (ref 60–100)
HBA1C MFR BLD: 5.1 % (ref 4–5.6)
HCT VFR BLD AUTO: 35.2 % (ref 35–45)
HDLC SERPL-MCNC: 44 MG/DL (ref 60–200)
HGB BLD-MCNC: 11.5 G/DL (ref 12–15.5)
IMM GRANULOCYTES # BLD: 0 10*3/MM3 (ref 0–0.02)
IMM GRANULOCYTES NFR BLD: 0 % (ref 0–0.5)
INR PPP: 3.5 (ref 0.9–1.1)
LDLC/HDLC SERPL: 2.17 {RATIO} (ref 0–3.22)
LYMPHOCYTES # BLD AUTO: 0.97 10*3/MM3 (ref 0.6–4.2)
LYMPHOCYTES NFR BLD AUTO: 14.3 % (ref 10–50)
MCH RBC QN AUTO: 31 PG (ref 26.5–34)
MCHC RBC AUTO-ENTMCNC: 32.7 G/DL (ref 31.4–36)
MCV RBC AUTO: 94.9 FL (ref 80–98)
MONOCYTES # BLD AUTO: 0.62 10*3/MM3 (ref 0–0.9)
MONOCYTES NFR BLD AUTO: 9.2 % (ref 0–12)
NEUTROPHILS # BLD AUTO: 5.01 10*3/MM3 (ref 2–8.6)
NEUTROPHILS NFR BLD AUTO: 74.1 % (ref 37–80)
PLATELET # BLD AUTO: 226 10*3/MM3 (ref 150–450)
PMV BLD AUTO: 11.4 FL (ref 8–12)
POTASSIUM BLD-SCNC: 4.8 MMOL/L (ref 3.5–5.1)
PROT SERPL-MCNC: 7.2 G/DL (ref 6.3–8.6)
RBC # BLD AUTO: 3.71 10*6/MM3 (ref 3.77–5.16)
SODIUM BLD-SCNC: 137 MMOL/L (ref 137–145)
TRIGL SERPL-MCNC: 103 MG/DL (ref 20–199)
TSH SERPL DL<=0.05 MIU/L-ACNC: 1.1 MIU/ML (ref 0.46–4.68)
WBC NRBC COR # BLD: 6.76 10*3/MM3 (ref 3.2–9.8)

## 2018-09-17 PROCEDURE — 85610 PROTHROMBIN TIME: CPT | Performed by: NURSE PRACTITIONER

## 2018-09-17 PROCEDURE — 80053 COMPREHEN METABOLIC PANEL: CPT

## 2018-09-17 PROCEDURE — 85025 COMPLETE CBC W/AUTO DIFF WBC: CPT

## 2018-09-17 PROCEDURE — 80061 LIPID PANEL: CPT

## 2018-09-17 PROCEDURE — 83036 HEMOGLOBIN GLYCOSYLATED A1C: CPT

## 2018-09-17 PROCEDURE — 84443 ASSAY THYROID STIM HORMONE: CPT

## 2018-09-17 NOTE — PROGRESS NOTES
PT states compliant c tx. PT denies any new medications or bleeding issues. PT denies any s/s of blood clot. PT ate green as directed. PT instructed to continue same dose and Coumadin friendly diet. PT will be seen next week. Patient instructed regarding medication; results given and questions answered. Nutritional counseling given.  Dietary factors affecting therapy addressed.  Patient instructed to monitor for excessive bruising or bleeding. PT verbalizes understanding.         This document has been electronically signed by ABRAHAM White on September 17, 2018 10:56 AM

## 2018-09-18 ENCOUNTER — TELEPHONE (OUTPATIENT)
Dept: FAMILY MEDICINE CLINIC | Facility: CLINIC | Age: 73
End: 2018-09-18

## 2018-09-18 NOTE — PROGRESS NOTES
Per ABRAHAM Gomes, Ms. Skelton has been called with her recent lab results & recommendations.  Continue her current medications and follow-up as planned or sooner if any problems.    She has an appointment scheduled with Dr. Caputo in November.

## 2018-09-18 NOTE — TELEPHONE ENCOUNTER
Per ABRAHAM Gomes, Ms. Skelton has been called with her recent lab results & recommendations.  Continue her current medications and follow-up as planned or sooner if any problems.    Nephrology Follow-up  She has an appointment scheduled with Dr. Caputo in November.      ----- Message from ABRAHAM Boone sent at 9/17/2018  4:37 PM CDT -----  Lab work Is okay but renal function is still elevated. Please make sure she is following up with . If she does not have an appt she needs to call and make one. Thank you

## 2018-09-25 ENCOUNTER — ANTICOAGULATION VISIT (OUTPATIENT)
Dept: CARDIAC SURGERY | Facility: CLINIC | Age: 73
End: 2018-09-25

## 2018-09-25 VITALS — OXYGEN SATURATION: 92 % | HEART RATE: 64 BPM

## 2018-09-25 DIAGNOSIS — I48.0 PAROXYSMAL ATRIAL FIBRILLATION (HCC): ICD-10-CM

## 2018-09-25 DIAGNOSIS — Z79.01 LONG-TERM (CURRENT) USE OF ANTICOAGULANTS: ICD-10-CM

## 2018-09-25 DIAGNOSIS — Z95.2 PERSONAL HISTORY OF HEART VALVE REPLACEMENT: ICD-10-CM

## 2018-09-25 LAB — INR PPP: 3.1 (ref 0.9–1.1)

## 2018-09-25 PROCEDURE — 85610 PROTHROMBIN TIME: CPT | Performed by: NURSE PRACTITIONER

## 2018-09-25 NOTE — PROGRESS NOTES
PT states compliant c tx. PT denies any new medications or bleeding issues. Denies any s/s of blood clot. PT instructed to continue same dose and Coumadin friendly diet. PT will be in office seeing CHF clinic on Monday. Due to PACS transportation, INR will be checked then. If ok, pt will be placed out 2-3 weeks. Patient instructed regarding medication; results given and questions answered. Nutritional counseling given.  Dietary factors affecting therapy addressed.  Patient instructed to monitor for excessive bruising or bleeding. PT verbalizes understanding.         This document has been electronically signed by DANIEL Pavon @ on September 25, 2018 10:39 AM

## 2018-10-01 ENCOUNTER — OFFICE VISIT (OUTPATIENT)
Dept: CARDIOLOGY | Facility: CLINIC | Age: 73
End: 2018-10-01

## 2018-10-01 ENCOUNTER — ANTICOAGULATION VISIT (OUTPATIENT)
Dept: CARDIAC SURGERY | Facility: CLINIC | Age: 73
End: 2018-10-01

## 2018-10-01 VITALS
SYSTOLIC BLOOD PRESSURE: 140 MMHG | HEIGHT: 65 IN | WEIGHT: 228.4 LBS | OXYGEN SATURATION: 97 % | HEART RATE: 70 BPM | DIASTOLIC BLOOD PRESSURE: 60 MMHG | BODY MASS INDEX: 38.05 KG/M2

## 2018-10-01 DIAGNOSIS — I50.30 DIASTOLIC HEART FAILURE, UNSPECIFIED HF CHRONICITY (HCC): ICD-10-CM

## 2018-10-01 DIAGNOSIS — I25.10 CORONARY ARTERY DISEASE INVOLVING NATIVE CORONARY ARTERY OF NATIVE HEART WITHOUT ANGINA PECTORIS: ICD-10-CM

## 2018-10-01 DIAGNOSIS — Z79.01 LONG TERM CURRENT USE OF ANTICOAGULANT THERAPY: ICD-10-CM

## 2018-10-01 DIAGNOSIS — Z95.0 PACEMAKER: ICD-10-CM

## 2018-10-01 DIAGNOSIS — Z95.2 PERSONAL HISTORY OF HEART VALVE REPLACEMENT: ICD-10-CM

## 2018-10-01 DIAGNOSIS — Z95.1 HX OF CABG: ICD-10-CM

## 2018-10-01 DIAGNOSIS — N18.30 STAGE 3 CHRONIC KIDNEY DISEASE (HCC): ICD-10-CM

## 2018-10-01 DIAGNOSIS — J44.9 CHRONIC OBSTRUCTIVE PULMONARY DISEASE, UNSPECIFIED COPD TYPE (HCC): ICD-10-CM

## 2018-10-01 DIAGNOSIS — I50.30 HEART FAILURE WITH PRESERVED LEFT VENTRICULAR FUNCTION (HFPEF) (HCC): Primary | ICD-10-CM

## 2018-10-01 DIAGNOSIS — I49.5 SSS (SICK SINUS SYNDROME) (HCC): ICD-10-CM

## 2018-10-01 DIAGNOSIS — I48.0 PAROXYSMAL ATRIAL FIBRILLATION (HCC): ICD-10-CM

## 2018-10-01 DIAGNOSIS — I10 ESSENTIAL HYPERTENSION: ICD-10-CM

## 2018-10-01 LAB — INR PPP: 2.5 (ref 0.9–1.1)

## 2018-10-01 PROCEDURE — 85610 PROTHROMBIN TIME: CPT | Performed by: NURSE PRACTITIONER

## 2018-10-01 PROCEDURE — 99215 OFFICE O/P EST HI 40 MIN: CPT | Performed by: NURSE PRACTITIONER

## 2018-10-01 PROCEDURE — 93000 ELECTROCARDIOGRAM COMPLETE: CPT | Performed by: INTERNAL MEDICINE

## 2018-10-01 NOTE — PROGRESS NOTES
Subjective:     Congestive Heart Failure (chief complaint)    PCP: ABRAHAM Gomes  Cardiologist: Dr. Gates  Nephrologist: Dr. Cooper  Pulmonologist: Dr. Corazon Grant    Congestive Heart Failure   Presents for follow-up visit. The disease course has been stable. The condition has lasted for 3 months. Pertinent negatives include no abdominal pain, chest pain, chest pressure, claudication, edema, fatigue, muscle weakness, near-syncope, nocturia, orthopnea, palpitations, paroxysmal nocturnal dyspnea, shortness of breath or unexpected weight change. The symptoms have been stable. Her past medical history is significant for arrhythmia, CAD, chronic lung disease, HTN and valvular heart disease. Compliance with total regimen is %. Compliance with diet is 51-75%. Compliance with exercise is 51-75%. Compliance with medications is 0-25%.     The patient is a 73-year-old female referred to the heart failure program per Dr. Corazon Grant.    The patient reports that she was hospitalized on 12/27/2017 with discharge on 12/30/2017 secondary to volume overload.  She believes that in recall her shortness of breath and leg edema was related to poor dietary selections/excessive canned soup intake.    The patient reports that she has continues with improvements with her dietary selections and is compliant with low sodium dietary intake.    She does weigh herself and is aware of the need to contact providers should she notice a 2 pound weight gain overnight or 5 pounds in one week.  She has been given prior instructions to increase her Lasix to twice daily if in fact she notices edema of lower extremity.  She reports compliance with her medications.  Activities are limited secondary to exercise tolerance, and she does use a scooter for mobile assistance. She reports performance of physical exercise in the home     She is on long-term anticoagulation (vitamin K antagonist in (and is monitored through the Coumadin  clinic.    Review of Systems   Constitution: Negative for chills, fatigue, fever, malaise/fatigue, unexpected weight change and weight gain.   HENT: Negative for congestion and nosebleeds.    Eyes: Negative for blurred vision and pain.   Cardiovascular: Positive for dyspnea on exertion. Negative for chest pain, claudication, cyanosis, irregular heartbeat, leg swelling, near-syncope, orthopnea, palpitations, paroxysmal nocturnal dyspnea and syncope.   Respiratory: Negative for cough, hemoptysis, shortness of breath, sleep disturbances due to breathing, snoring, sputum production and wheezing.    Endocrine: Negative for polydipsia, polyphagia and polyuria.   Hematologic/Lymphatic: Negative for bleeding problem. Does not bruise/bleed easily.   Skin: Negative for itching and rash.   Musculoskeletal: Negative for falls and muscle weakness.   Gastrointestinal: Negative for bloating and abdominal pain.   Genitourinary: Negative for dysuria, hematuria and nocturia.     Past Medical History:   Diagnosis Date   • Acute bronchitis    • Anxiety    • Atrial fibrillation (CMS/HCC)    • C. difficile colitis    • Callosity     under metatarsal head      • Cardiac pacemaker in situ    • CHF (congestive heart failure) (CMS/HCC)    • Chronic obstructive lung disease (CMS/HCC)    • Corns and callus    • Depressive disorder    • Diabetes mellitus (CMS/HCC)    • Diastolic heart failure (CMS/HCC)    • Essential hypertension    • Foot pain    • Hyperlipidemia    • Long term current use of anticoagulant    • On anticoagulant therapy    • Pulmonary emphysema (CMS/HCC)    • Rectal hemorrhage    • Type 2 diabetes mellitus (CMS/HCC)      Past Surgical History:   Procedure Laterality Date   • AORTIC VALVE REPAIR/REPLACEMENT  1999   • CARDIAC ELECTROPHYSIOLOGY PROCEDURE N/A 3/20/2017    Procedure: PPM generator change - dual;  Surgeon: Bereket Gates MD;  Location: [facility] INVASIVE LOCATION;  Service:    • CARDIAC PACEMAKER PLACEMENT  1999    • ECHO - CONVERTED  11/12/2013    There is mild to moderate left atrial enlargement EF 50-55%   • FOOT SURGERY  1990   • OTHER SURGICAL HISTORY  10/26/2015    PARING CORN/CALLUS    • PACEMAKER REPLACEMENT N/A 3/21/2017    Procedure: revision pacemaker pocket, evacuation hematoma, control of bleeding;  Surgeon: Polo Feliz MD;  Location: [facility];  Service:    • TRANSESOPHAGEAL ECHOCARDIOGRAM (MITZY)  05/01/2014    With color flow-Mild left atrial enlargement with mild concentric LV hypertrophy with top normal aortic root size. EF 45-50%. Mild mitral regurgitation and mild aortic insufficiency and trivial amount of tricuspid regurgation   • TUBAL ABDOMINAL LIGATION       Social History     Social History   • Marital status:      Social History Main Topics   • Smoking status: Former Smoker     Quit date: 3/21/1999   • Smokeless tobacco: Never Used   • Alcohol use No      Comment: quit in 2016   • Drug use: No   • Sexual activity: No     Other Topics Concern   • Not on file     Crestor [rosuvastatin calcium]; Lipitor [atorvastatin]; Lortab [hydrocodone-acetaminophen]; and Adhesive tape    Current Outpatient Prescriptions   Medication Sig Dispense Refill   • acetaminophen (TYLENOL) 325 MG tablet Take 650 mg by mouth every 6 (six) hours as needed for mild pain (1-3).     • albuterol (PROVENTIL HFA;VENTOLIN HFA) 108 (90 Base) MCG/ACT inhaler Inhale 2 puffs Every 4 (Four) Hours As Needed for Wheezing. 8 g 5   • albuterol (PROVENTIL) (2.5 MG/3ML) 0.083% nebulizer solution INHALE THE CONTENTS OF 1 VIAL BY NEBULIZER FOUR (4) TIMES DAILY AS NEEDED FOR SHORTNESS OF BREATH AND WHEEZING 360 mL 2   • Ascorbic Acid (VITAMIN C WITH OCHOA HIPS) 250 MG tablet Take 500 mg by mouth Daily.     • aspirin 81 MG chewable tablet Chew 81 mg Daily.     • calcitriol (ROCALTROL) 0.25 MCG capsule Take 0.25 mcg by mouth Every Other Day. Every Monday, Wednesday and friday     • cholecalciferol (VITAMIN D3) 1000 units tablet Take  "2,000 Units by mouth Daily.     • citalopram (CeleXA) 20 MG tablet Take 1 tablet by mouth Daily. 90 tablet 3   • diltiaZEM CD (CARDIZEM CD) 240 MG 24 hr capsule Take 1 capsule by mouth Daily. 90 capsule 3   • ezetimibe (ZETIA) 10 MG tablet Take 1 tablet by mouth Daily. 90 tablet 3   • ferrous sulfate 325 (65 FE) MG tablet Take 325 mg by mouth Daily With Breakfast.     • furosemide (LASIX) 40 MG tablet Take 1 tablet by mouth 2 (Two) Times a Day. (Patient taking differently: Take 40 mg by mouth Daily.) 60 tablet 5   • glucose blood test strip 1 each by Other route 3 (three) times a day. Use as instructed     • Insulin Glargine (BASAGLAR KWIKPEN) 100 UNIT/ML injection pen Inject 30 Units under the skin into the appropriate area as directed Daily. 1 pen 11   • Insulin Pen Needle (BD PEN NEEDLE DEREK U/F) 32G X 4 MM misc 1 each 4 (Four) Times a Day. 120 each 5   • Prenatal Vit-Fe Fumarate-FA (PRENATAL VITAMIN) 27-0.8 MG tablet Take 1 tablet by mouth daily.     • raNITIdine (ZANTAC) 150 MG tablet Take 1 tablet by mouth Every Night. 180 tablet 1   • rOPINIRole (REQUIP) 0.25 MG tablet Take 1 tablet by mouth Every Night. Take 1 hour before bedtime. 90 tablet 3   • traZODone (DESYREL) 150 MG tablet Take 2 tablets by mouth Every Night. 180 tablet 3   • umeclidinium-vilanterol (ANORO ELLIPTA) 62.5-25 MCG/INH aerosol powder  inhaler Inhale 1 puff Daily. 1 each 11   • warfarin (COUMADIN) 5 MG tablet Take 1/2 tablet nightly except on Monday, Wednesday, and Saturday take 1 tablet or as directed 90 tablet 1     No current facility-administered medications for this visit.      Objective:     Vitals:    10/01/18 1034   BP: 140/60   BP Location: Left arm   Patient Position: Sitting   Cuff Size: Adult   Pulse: 70   SpO2: 97%   Weight: 104 kg (228 lb 6.4 oz)   Height: 165.1 cm (65\")     Wt Readings from Last 3 Encounters:   10/01/18 104 kg (228 lb 6.4 oz)   09/12/18 103 kg (228 lb)   09/04/18 104 kg (228 lb 14.4 oz)      Physical Exam "   Constitutional: She is oriented to person, place, and time. She appears well-developed and well-nourished. No distress.   HENT:   Head: Normocephalic and atraumatic.   Neck: No JVD present. No tracheal deviation present.   Cardiovascular: Normal rate, regular rhythm, S1 normal and S2 normal.  Exam reveals no gallop, no S3 and no S4.    No murmur heard.  Pulses:       Radial pulses are 2+ on the right side, and 2+ on the left side.   Normal prosthetic valvular sounds (click) auscultated at the aortic position   Pulmonary/Chest: Effort normal. No respiratory distress. She has no wheezes. She has no rales.   Abdominal: She exhibits no distension. There is no tenderness.   Musculoskeletal: She exhibits no edema or tenderness.   Neurological: She is alert and oriented to person, place, and time.   Skin: Skin is warm and dry. She is not diaphoretic.   Psychiatric: She has a normal mood and affect. Her behavior is normal. Judgment and thought content normal.   Vitals reviewed.    Data Reviewed:  Electrocardiogram:Performed today-electronic pacemaker at a rate of 50 bpm    Echocardiogram: 12/28/2017  · Left ventricular systolic function is normal. Estimated EF = 55%.  · Left ventricular diastolic dysfunction (grade I a) consistent with impaired relaxation.    Labs:   Glucose   Date Value Ref Range Status   09/17/2018 66 60 - 100 mg/dL Final     Glucose, Arterial   Date Value Ref Range Status   12/27/2017 131 mmol/L Final     BUN   Date Value Ref Range Status   09/17/2018 57 (H) 7 - 21 mg/dL Final     Creatinine   Date Value Ref Range Status   09/17/2018 1.82 (H) 0.50 - 1.00 mg/dL Final     Sodium   Date Value Ref Range Status   09/17/2018 137 137 - 145 mmol/L Final     Potassium   Date Value Ref Range Status   09/17/2018 4.8 3.5 - 5.1 mmol/L Final     Chloride   Date Value Ref Range Status   09/17/2018 102 95 - 110 mmol/L Final     CO2   Date Value Ref Range Status   09/17/2018 29.0 22.0 - 31.0 mmol/L Final     Calcium    Date Value Ref Range Status   09/17/2018 8.9 8.4 - 10.2 mg/dL Final     Total Protein   Date Value Ref Range Status   09/17/2018 7.2 6.3 - 8.6 g/dL Final     Albumin   Date Value Ref Range Status   09/17/2018 3.50 3.40 - 4.80 g/dL Final     ALT (SGPT)   Date Value Ref Range Status   09/17/2018 49 9 - 52 U/L Final     AST (SGOT)   Date Value Ref Range Status   09/17/2018 99 (H) 14 - 36 U/L Final     Alkaline Phosphatase   Date Value Ref Range Status   09/17/2018 99 38 - 126 U/L Final     Total Bilirubin   Date Value Ref Range Status   09/17/2018 0.5 0.2 - 1.3 mg/dL Final     eGFR Non  Amer   Date Value Ref Range Status   09/17/2018 27 (L) 39 - 90 mL/min/1.73 Final     BUN/Creatinine Ratio   Date Value Ref Range Status   09/17/2018 31.3 (H) 7.0 - 25.0 Final     Anion Gap   Date Value Ref Range Status   09/17/2018 6.0 5.0 - 15.0 mmol/L Final     WBC   Date Value Ref Range Status   09/17/2018 6.76 3.20 - 9.80 10*3/mm3 Final     RBC   Date Value Ref Range Status   09/17/2018 3.71 (L) 3.77 - 5.16 10*6/mm3 Final     Hemoglobin   Date Value Ref Range Status   09/17/2018 11.5 (L) 12.0 - 15.5 g/dL Final     Hematocrit   Date Value Ref Range Status   09/17/2018 35.2 35.0 - 45.0 % Final     MCV   Date Value Ref Range Status   09/17/2018 94.9 80.0 - 98.0 fL Final     MCH   Date Value Ref Range Status   09/17/2018 31.0 26.5 - 34.0 pg Final     MCHC   Date Value Ref Range Status   09/17/2018 32.7 31.4 - 36.0 g/dL Final     RDW   Date Value Ref Range Status   09/17/2018 15.9 (H) 11.5 - 14.5 % Final     RDW-SD   Date Value Ref Range Status   09/17/2018 55.2 (H) 36.4 - 46.3 fl Final     MPV   Date Value Ref Range Status   09/17/2018 11.4 8.0 - 12.0 fL Final     Platelets   Date Value Ref Range Status   09/17/2018 226 150 - 450 10*3/mm3 Final     Neutrophil %   Date Value Ref Range Status   09/17/2018 74.1 37.0 - 80.0 % Final     Lymphocyte %   Date Value Ref Range Status   09/17/2018 14.3 10.0 - 50.0 % Final     Monocyte %    Date Value Ref Range Status   09/17/2018 9.2 0.0 - 12.0 % Final     Eosinophil %   Date Value Ref Range Status   09/17/2018 2.1 0.0 - 7.0 % Final     Basophil %   Date Value Ref Range Status   09/17/2018 0.3 0.0 - 2.0 % Final     Immature Grans %   Date Value Ref Range Status   09/17/2018 0.0 0.0 - 0.5 % Final     Neutrophils, Absolute   Date Value Ref Range Status   09/17/2018 5.01 2.00 - 8.60 10*3/mm3 Final     Lymphocytes, Absolute   Date Value Ref Range Status   09/17/2018 0.97 0.60 - 4.20 10*3/mm3 Final     Monocytes, Absolute   Date Value Ref Range Status   09/17/2018 0.62 0.00 - 0.90 10*3/mm3 Final     Eosinophils, Absolute   Date Value Ref Range Status   09/17/2018 0.14 0.00 - 0.70 10*3/mm3 Final     Basophils, Absolute   Date Value Ref Range Status   09/17/2018 0.02 0.00 - 0.20 10*3/mm3 Final     Immature Grans, Absolute   Date Value Ref Range Status   09/17/2018 0.00 0.00 - 0.02 10*3/mm3 Final     nRBC   Date Value Ref Range Status   12/27/2017 0.0 0.0 - 0.0 /100 WBC Final       Lab Results   Component Value Date    CHOL 160 09/17/2018    CHOL 154 03/05/2018    CHOL 171 03/20/2017     Lab Results   Component Value Date    TRIG 103 09/17/2018    TRIG 85 03/05/2018    TRIG 109 03/20/2017     Lab Results   Component Value Date    HDL 44 (L) 09/17/2018    HDL 40 (L) 03/05/2018    HDL 46 (L) 03/20/2017     Lab Results   Component Value Date    TSH 1.100 09/17/2018       Lab Results   Component Value Date    PROBNP 7,310.0 (H) 12/27/2017     The following portions of the patient's history were reviewed and updated as appropriate: allergies, current medications, problem list,  past medical history, surgical history, family history and social history.    Recent images independently reviewed.    Available laboratory values reviewed.    Assessment:      Diagnosis Plan   1. Heart failure with preserved left ventricular function (HFpEF) without clinical evidence of hypervolemia; she is well perfused  AHA Stage C: ;  NYHA Class III  BETA-BLOCKER: Not applicable (diltiazem 240 mg daily)  ACE/ARB: Could be considered/discussion with nephrology regarding any prior history indicating contraindication  ENTRESTO: Not applicable  DIURETIC: Lasix 40 mg daily; increase to twice daily if leg edema/weight gain as described  ALDOSTERONT ANTAGONIST: Not applicable  IMDUR/HYDRALAZINE: Hydralazine could be considered regarding blood pressure control as needed  DIGOXIN: Not applicable  Fluid restriction: 2000 cc daily  Sodium restriction: 2000 mg daily   6MWT: To be performed  Cardiac Rehab: Not a candidate  Pulmonary Rehab: Did not attend prior referral  ICD: Not applicable  CardioMEMS: Not applicable  Advanced HF evaluation (Transplant/LVAD): Not applicable    Recommended daily weight monitoring.  Discussed patient action plan for heart failure;  Recommended avoiding NSAIDs use.  Discussed warning signs requiring additional medical attention for heart failure.    Discussion with the patient regarding referral to sleep medicine for evaluation of objective sleep apnea; patient is adamant about negative symptoms for indication       2. Personal history of heart valve replacement  1999 - [place]  Continue surveillance with attached cardiologist Dr. Gates;   VKA       3. Coronary artery disease involving native coronary artery of native heart without angina pectoris      Continue with cardiology care per Dr. Gates    4. Hx of CABG  As per #3     CABG x 1 1999 - [place]       5. Paroxysmal atrial fibrillation/Sick Sinus Syndrome S/P PPM Diltiazem plus presence of VKA (AVR)  Cardiac device interrogations as per Dr. Gates       6. Essential hypertension, stable As per #1       7. Chronic obstructive pulmonary disease, unspecified COPD type  Dr. Corazon Grant    8.  CKD, Stage 3 Dr. Caputo        9. Long-term (current) use of anticoagulants  Continue monitoring per Anticoagulation Clinic          The patient would like to receive HF evaluations on  an as needed basis due to hardship of transportation with multiple providers for whom she receives care.           This document has been electronically signed by ABRAHAM Sommer on October 1, 2018 10:42 AM

## 2018-10-01 NOTE — PROGRESS NOTES
Pt states no changes to meds or diet.  No bleeding issues.  Recheck INR in 2.5 wks.  Pt verbalizes.  Patient instructed regarding medication; results given and questions answered. Nutritional counseling given.  Dietary factors affecting therapy addressed.  Patient instructed to monitor for excessive bruising or bleeding.        This document has been electronically signed by ABRAHAM White on October 1, 2018 10:43 AM

## 2018-10-04 ENCOUNTER — PATIENT OUTREACH (OUTPATIENT)
Dept: CASE MANAGEMENT | Facility: OTHER | Age: 73
End: 2018-10-04

## 2018-10-04 NOTE — OUTREACH NOTE
Care Management Plan 10/4/2018   Lifestyle Goals Increase physical activity   Lifestyle Goals Patient has been increasing her exercise , using ankle and hand weights   Barriers Disease education   Self Management Home Glucose Monitoring   Self Management Patient stated she sometimes checks her blood glucose at home,  stated A1C was good last check   Annual Wellness Visit:  Patient Will Schedule   Annual Wellness Visit:  Patient stated it is scheduled for December   Care Gaps Addressed Flu Shot   Specific Disease Process Teaching Diabetes   Does patient have depression diagnosis? -   Does patient have depression diagnosis? -   Advanced Directives: Patient Has   Advanced Directives: -   Medication Adherence -   Readiness Scale -   Confidence Scale -   Health Literacy -     The main concerns and/or symptoms the patient would like to address are: Continues to work to increase strength.    Education/instruction provided by Care Coordinator: Discussion of flu vaccine for this flu season, patient stated she will get vaccine soon    Follow Up Outreach Due: November    Elvira Horton RN

## 2018-10-18 ENCOUNTER — ANTICOAGULATION VISIT (OUTPATIENT)
Dept: CARDIAC SURGERY | Facility: CLINIC | Age: 73
End: 2018-10-18

## 2018-10-18 VITALS — DIASTOLIC BLOOD PRESSURE: 68 MMHG | SYSTOLIC BLOOD PRESSURE: 148 MMHG | HEART RATE: 72 BPM

## 2018-10-18 DIAGNOSIS — I48.91 ATRIAL FIBRILLATION, UNSPECIFIED TYPE (HCC): ICD-10-CM

## 2018-10-18 DIAGNOSIS — Z79.01 LONG TERM CURRENT USE OF ANTICOAGULANT THERAPY: ICD-10-CM

## 2018-10-18 DIAGNOSIS — Z95.2 PERSONAL HISTORY OF HEART VALVE REPLACEMENT: ICD-10-CM

## 2018-10-18 LAB — INR PPP: 2.1 (ref 0.9–1.1)

## 2018-10-18 PROCEDURE — 85610 PROTHROMBIN TIME: CPT | Performed by: NURSE PRACTITIONER

## 2018-10-18 PROCEDURE — 99211 OFF/OP EST MAY X REQ PHY/QHP: CPT | Performed by: NURSE PRACTITIONER

## 2018-10-18 NOTE — PROGRESS NOTES
Unable to determine cause of subtherapeutic INR. Denies any med changes, bleeding issues, missed doses or excessive k. Denies any s/s of blood clot. Adjusted pt's dose and instructed to hold green vegs for 2 days.  PT will be seen in 1 week. Patient instructed regarding medication; results given and questions answered. Nutritional counseling given.  Dietary factors affecting therapy addressed.  Patient instructed to monitor for excessive bruising or bleeding. PT verbalizes understanding.         This document has been electronically signed by ABRAHAM White on October 18, 2018 10:44 AM

## 2018-10-25 ENCOUNTER — ANTICOAGULATION VISIT (OUTPATIENT)
Dept: CARDIAC SURGERY | Facility: CLINIC | Age: 73
End: 2018-10-25

## 2018-10-25 VITALS — SYSTOLIC BLOOD PRESSURE: 128 MMHG | HEART RATE: 76 BPM | DIASTOLIC BLOOD PRESSURE: 60 MMHG

## 2018-10-25 DIAGNOSIS — I48.91 ATRIAL FIBRILLATION, UNSPECIFIED TYPE (HCC): ICD-10-CM

## 2018-10-25 DIAGNOSIS — Z79.01 LONG TERM CURRENT USE OF ANTICOAGULANT THERAPY: ICD-10-CM

## 2018-10-25 DIAGNOSIS — Z95.2 PERSONAL HISTORY OF HEART VALVE REPLACEMENT: ICD-10-CM

## 2018-10-25 LAB — INR PPP: 2.2 (ref 0.9–1.1)

## 2018-10-25 PROCEDURE — 99211 OFF/OP EST MAY X REQ PHY/QHP: CPT | Performed by: NURSE PRACTITIONER

## 2018-10-25 PROCEDURE — 85610 PROTHROMBIN TIME: CPT | Performed by: NURSE PRACTITIONER

## 2018-10-25 NOTE — PROGRESS NOTES
PT states compliant c tx. PT denies any med changes or bleeding issues. Denies any s/s of blood clot, missed doses or excessive k. Adjusted pt's dose and instructed to hold green vegs for 2 days. PT will be seen in 1 week. Patient instructed regarding medication; results given and questions answered. Nutritional counseling given.  Dietary factors affecting therapy addressed.  Patient instructed to monitor for excessive bruising or bleeding. PT verbalizes understanding.         This document has been electronically signed by Meme Duckworth, DANIEL @ on October 25, 2018 11:03 AM

## 2018-11-01 ENCOUNTER — ANTICOAGULATION VISIT (OUTPATIENT)
Dept: CARDIAC SURGERY | Facility: CLINIC | Age: 73
End: 2018-11-01

## 2018-11-01 VITALS — DIASTOLIC BLOOD PRESSURE: 78 MMHG | HEART RATE: 68 BPM | SYSTOLIC BLOOD PRESSURE: 148 MMHG

## 2018-11-01 DIAGNOSIS — I48.91 ATRIAL FIBRILLATION, UNSPECIFIED TYPE (HCC): ICD-10-CM

## 2018-11-01 DIAGNOSIS — Z95.2 PERSONAL HISTORY OF HEART VALVE REPLACEMENT: ICD-10-CM

## 2018-11-01 DIAGNOSIS — Z79.01 LONG TERM CURRENT USE OF ANTICOAGULANT THERAPY: ICD-10-CM

## 2018-11-01 LAB — INR PPP: 3 (ref 0.9–1.1)

## 2018-11-01 PROCEDURE — 85610 PROTHROMBIN TIME: CPT | Performed by: NURSE PRACTITIONER

## 2018-11-01 NOTE — PROGRESS NOTES
PT states compliant c tx. Denies any med changes or bleeding issues. Denies any s/s of blood clot. PT instructed to continue same dose and Coumadin friendly diet. PT will be seen in 1 week. Patient instructed regarding medication; results given and questions answered. Nutritional counseling given.  Dietary factors affecting therapy addressed.  Patient instructed to monitor for excessive bruising or bleeding. PT verbalizes understanding.         This document has been electronically signed by DANIEL Pavon @ on November 1, 2018 10:30 AM

## 2018-11-08 ENCOUNTER — ANTICOAGULATION VISIT (OUTPATIENT)
Dept: CARDIAC SURGERY | Facility: CLINIC | Age: 73
End: 2018-11-08

## 2018-11-08 VITALS — OXYGEN SATURATION: 95 % | SYSTOLIC BLOOD PRESSURE: 122 MMHG | DIASTOLIC BLOOD PRESSURE: 74 MMHG | HEART RATE: 61 BPM

## 2018-11-08 DIAGNOSIS — Z79.01 LONG TERM CURRENT USE OF ANTICOAGULANT THERAPY: ICD-10-CM

## 2018-11-08 DIAGNOSIS — I48.91 ATRIAL FIBRILLATION, UNSPECIFIED TYPE (HCC): ICD-10-CM

## 2018-11-08 DIAGNOSIS — Z95.2 PERSONAL HISTORY OF HEART VALVE REPLACEMENT: ICD-10-CM

## 2018-11-08 LAB — INR PPP: 2.9 (ref 0.9–1.1)

## 2018-11-08 PROCEDURE — 85610 PROTHROMBIN TIME: CPT | Performed by: NURSE PRACTITIONER

## 2018-11-08 NOTE — PROGRESS NOTES
Patient states no med changes or bleeding problems or unexplained bruising. Patient instructed to continue current dosing schedule. Verbalizes understanding. Will recheck in 2.5 weeks. Patient instructed regarding medication; results given and questions answered. Nutritional counseling given.  Dietary factors affecting therapy addressed.  Patient instructed to monitor for excessive bruising or bleeding.         This document has been electronically signed by ABRAHAM White on November 8, 2018 10:28 AM

## 2018-11-19 ENCOUNTER — LAB (OUTPATIENT)
Dept: LAB | Facility: HOSPITAL | Age: 73
End: 2018-11-19

## 2018-11-19 ENCOUNTER — TRANSCRIBE ORDERS (OUTPATIENT)
Dept: LAB | Facility: HOSPITAL | Age: 73
End: 2018-11-19

## 2018-11-19 DIAGNOSIS — I50.9 HEART FAILURE, UNSPECIFIED HF CHRONICITY, UNSPECIFIED HEART FAILURE TYPE (HCC): ICD-10-CM

## 2018-11-19 DIAGNOSIS — N18.30 CHRONIC KIDNEY DISEASE, STAGE III (MODERATE) (HCC): Primary | ICD-10-CM

## 2018-11-19 DIAGNOSIS — N18.30 CHRONIC KIDNEY DISEASE, STAGE III (MODERATE) (HCC): ICD-10-CM

## 2018-11-19 DIAGNOSIS — D63.1 ANEMIA OF CHRONIC RENAL FAILURE, STAGE 3 (MODERATE) (HCC): ICD-10-CM

## 2018-11-19 DIAGNOSIS — N18.30 ANEMIA OF CHRONIC RENAL FAILURE, STAGE 3 (MODERATE) (HCC): ICD-10-CM

## 2018-11-19 DIAGNOSIS — A04.72 INTESTINAL INFECTION DUE TO CLOSTRIDIUM DIFFICILE: ICD-10-CM

## 2018-11-19 LAB
ALBUMIN SERPL-MCNC: 3.7 G/DL (ref 3.4–4.8)
ANION GAP SERPL CALCULATED.3IONS-SCNC: 10 MMOL/L (ref 5–15)
BUN BLD-MCNC: 61 MG/DL (ref 7–21)
BUN/CREAT SERPL: 32.1 (ref 7–25)
CALCIUM SPEC-SCNC: 9.4 MG/DL (ref 8.4–10.2)
CHLORIDE SERPL-SCNC: 99 MMOL/L (ref 95–110)
CO2 SERPL-SCNC: 27 MMOL/L (ref 22–31)
CREAT BLD-MCNC: 1.9 MG/DL (ref 0.5–1)
GFR SERPL CREATININE-BSD FRML MDRD: 26 ML/MIN/1.73 (ref 39–90)
GLUCOSE BLD-MCNC: 173 MG/DL (ref 60–100)
HCT VFR BLD AUTO: 35.5 % (ref 35–45)
HGB BLD-MCNC: 11.6 G/DL (ref 12–15.5)
PHOSPHATE SERPL-MCNC: 4.5 MG/DL (ref 2.4–4.4)
POTASSIUM BLD-SCNC: 4.8 MMOL/L (ref 3.5–5.1)
SODIUM BLD-SCNC: 136 MMOL/L (ref 137–145)

## 2018-11-19 PROCEDURE — 80069 RENAL FUNCTION PANEL: CPT | Performed by: INTERNAL MEDICINE

## 2018-11-19 PROCEDURE — 85014 HEMATOCRIT: CPT | Performed by: INTERNAL MEDICINE

## 2018-11-19 PROCEDURE — 85018 HEMOGLOBIN: CPT | Performed by: INTERNAL MEDICINE

## 2018-11-26 ENCOUNTER — ANTICOAGULATION VISIT (OUTPATIENT)
Dept: CARDIAC SURGERY | Facility: CLINIC | Age: 73
End: 2018-11-26

## 2018-11-26 VITALS — HEART RATE: 65 BPM | OXYGEN SATURATION: 96 %

## 2018-11-26 DIAGNOSIS — Z79.01 LONG TERM CURRENT USE OF ANTICOAGULANT THERAPY: ICD-10-CM

## 2018-11-26 DIAGNOSIS — Z95.2 PERSONAL HISTORY OF HEART VALVE REPLACEMENT: ICD-10-CM

## 2018-11-26 DIAGNOSIS — I48.91 ATRIAL FIBRILLATION, UNSPECIFIED TYPE (HCC): ICD-10-CM

## 2018-11-26 LAB — INR PPP: 5.5 (ref 0.9–1.1)

## 2018-11-26 PROCEDURE — 99211 OFF/OP EST MAY X REQ PHY/QHP: CPT | Performed by: NURSE PRACTITIONER

## 2018-11-26 PROCEDURE — 85610 PROTHROMBIN TIME: CPT | Performed by: NURSE PRACTITIONER

## 2018-11-26 NOTE — PROGRESS NOTES
PT states she took Coumadin as directed and ate green. PT states she ate small serving of cranberry. Denies any med changes, bleeding issues or s/s of blood clot. PT instructed to hold Coumadin tonight and increase vit k with 1 cup of turnip greens at lunch and 1 cup of broccoli at supper. PT states she can not return until Thursday due to PACS. PT instructed to notify provider if you experience excessive bleeding from the nose, cuts, gums, rectum, urinary tract, or vagina. Reddish or brown urine or stool. Vomiting of blood or hemorrhoidal bleeding. If major injury occurs present to the Emergency Department. PT verbalizes understanding.         This document has been electronically signed by ABRAHAM White on November 26, 2018 10:51 AM

## 2018-11-29 ENCOUNTER — ANTICOAGULATION VISIT (OUTPATIENT)
Dept: CARDIAC SURGERY | Facility: CLINIC | Age: 73
End: 2018-11-29

## 2018-11-29 VITALS — HEART RATE: 72 BPM | OXYGEN SATURATION: 93 %

## 2018-11-29 DIAGNOSIS — Z95.2 PERSONAL HISTORY OF HEART VALVE REPLACEMENT: ICD-10-CM

## 2018-11-29 DIAGNOSIS — Z79.01 LONG TERM CURRENT USE OF ANTICOAGULANT THERAPY: ICD-10-CM

## 2018-11-29 DIAGNOSIS — I48.91 ATRIAL FIBRILLATION, UNSPECIFIED TYPE (HCC): ICD-10-CM

## 2018-11-29 LAB — INR PPP: 4.5 (ref 0.9–1.1)

## 2018-11-29 PROCEDURE — 99211 OFF/OP EST MAY X REQ PHY/QHP: CPT | Performed by: NURSE PRACTITIONER

## 2018-11-29 PROCEDURE — 85610 PROTHROMBIN TIME: CPT | Performed by: NURSE PRACTITIONER

## 2018-11-29 NOTE — PROGRESS NOTES
PT states she accidentally took Coumadin on Monday and did not realize it until last night so she held last night's dose. PT did eat can of spinach on Monday. Denies any med changes or bleeding issues. Adjusted pt's dose for today and instructed to increase vit k with 1 cup of broccoli. Patient instructed regarding medication; results given and questions answered. Nutritional counseling given.  Dietary factors affecting therapy addressed.  Patient instructed to monitor for excessive bruising or bleeding. PT verbalizes understanding and will be seen on Monday when she can return.         This document has been electronically signed by ABRAHAM White on November 29, 2018 11:33 AM

## 2018-12-04 ENCOUNTER — OFFICE VISIT (OUTPATIENT)
Dept: PULMONOLOGY | Facility: CLINIC | Age: 73
End: 2018-12-04

## 2018-12-04 ENCOUNTER — ANTICOAGULATION VISIT (OUTPATIENT)
Dept: CARDIAC SURGERY | Facility: CLINIC | Age: 73
End: 2018-12-04

## 2018-12-04 VITALS
HEIGHT: 65 IN | DIASTOLIC BLOOD PRESSURE: 72 MMHG | SYSTOLIC BLOOD PRESSURE: 186 MMHG | OXYGEN SATURATION: 94 % | WEIGHT: 237.3 LBS | HEART RATE: 62 BPM | BODY MASS INDEX: 39.54 KG/M2

## 2018-12-04 DIAGNOSIS — Z79.01 LONG TERM CURRENT USE OF ANTICOAGULANT THERAPY: ICD-10-CM

## 2018-12-04 DIAGNOSIS — R53.81 PHYSICAL DECONDITIONING: ICD-10-CM

## 2018-12-04 DIAGNOSIS — Z87.891 PERSONAL HISTORY OF TOBACCO USE, PRESENTING HAZARDS TO HEALTH: ICD-10-CM

## 2018-12-04 DIAGNOSIS — J44.9 CHRONIC OBSTRUCTIVE PULMONARY DISEASE, UNSPECIFIED COPD TYPE (HCC): Primary | ICD-10-CM

## 2018-12-04 DIAGNOSIS — Z95.2 PERSONAL HISTORY OF HEART VALVE REPLACEMENT: ICD-10-CM

## 2018-12-04 DIAGNOSIS — I10 ESSENTIAL HYPERTENSION, BENIGN: ICD-10-CM

## 2018-12-04 DIAGNOSIS — E66.01 CLASS 2 SEVERE OBESITY DUE TO EXCESS CALORIES WITH SERIOUS COMORBIDITY AND BODY MASS INDEX (BMI) OF 38.0 TO 38.9 IN ADULT (HCC): ICD-10-CM

## 2018-12-04 DIAGNOSIS — J96.11 CHRONIC HYPOXEMIC RESPIRATORY FAILURE (HCC): ICD-10-CM

## 2018-12-04 DIAGNOSIS — I48.91 ATRIAL FIBRILLATION, UNSPECIFIED TYPE (HCC): ICD-10-CM

## 2018-12-04 LAB — INR PPP: 2.4 (ref 0.9–1.1)

## 2018-12-04 PROCEDURE — 85610 PROTHROMBIN TIME: CPT | Performed by: NURSE PRACTITIONER

## 2018-12-04 PROCEDURE — 99214 OFFICE O/P EST MOD 30 MIN: CPT | Performed by: INTERNAL MEDICINE

## 2018-12-04 PROCEDURE — 99211 OFF/OP EST MAY X REQ PHY/QHP: CPT | Performed by: NURSE PRACTITIONER

## 2018-12-04 NOTE — PROGRESS NOTES
Pt here today seeing Dr Grant. Pt denies med changes or bleeding problems. Pt recently elevated on a 30mg/week dose. Dose adjusted to 27.5mg/week and appt made for next week; pt verbalized. Patient instructed regarding medication; results given and questions answered. Nutritional counseling given.  Dietary factors affecting therapy addressed.  Patient instructed to monitor for excessive bruising or bleeding.         This document has been electronically signed by ABRAHAM White on December 4, 2018 10:26 AM

## 2018-12-04 NOTE — PROGRESS NOTES
Pulmonary Office Follow-up    Subjective     Brendon Skelton is seen today at the office for   Chief Complaint   Patient presents with   • COPD     3 mo judith         HPI  Brendon Skelton is a 73 y.o. female with a PMH significant for COPD, chronic hypoxic respiratory failure, past tobacco use, AF, CHF, s/p AVR, obesity, CKD, and DM who presents for follow-up of COPD.  She was last seen on 9/4/18, at which time she continued to do well Anoro and I recommended she have a sleep evaluation but she declined.  She states that her breathing is stable on Anoro and she is not needing her albuterol at all.  Patient continues to not need her oxygen and reports that her saturations on room air to stay greater than 94%.  She denies cough, chest pain, wheeze, or recent illnesses.  She does admit to some recent weight gain which she attributes to eating more sandwiches.  Patient did get her flu vaccine in October.  Her blood pressure is elevated today, but she reports it is normally well controlled when she checks it at home.  Otherwise, she is continuing on Coumadin at the direction of the Coumadin clinic and had her INR checked this morning.      Patient Active Problem List   Diagnosis   • Long term current use of anticoagulant therapy   • Personal history of heart valve replacement   • Atrial fibrillation [I48.91]   • Pulmonary emphysema (CMS/HCC)   • On anticoagulant therapy   • Hyperlipidemia   • Diastolic heart failure (CMS/HCC)   • Depressive disorder   • COPD (chronic obstructive pulmonary disease) (CMS/HCC)   • Essential hypertension, benign   • Diabetes mellitus without complication (CMS/HCC)   • Myopia   • Astigmatism   • Bleeding from open wound of chest wall   • Follow-up surgery care   • Encounter for screening for malignant neoplasm of colon   • Positive colorectal cancer screening using DNA-based stool test   • Nodule of left lung   • Class 2 severe obesity due to excess calories with serious comorbidity  and body mass index (BMI) of 38.0 to 38.9 in adult (CMS/[facility])   • Chronic hypoxemic respiratory failure (CMS/[facility])   • Physical deconditioning   • Heart failure with preserved left ventricular function (HFpEF) (CMS/[facility])   • Essential hypertension   • Coronary artery disease involving native coronary artery without angina pectoris   • Hx of CABG   • SSS (sick sinus syndrome) (CMS/[facility])   • Pacemaker   • Stage 3 chronic kidney disease (CMS/[facility])   • Personal history of tobacco use, presenting hazards to health       Review of Systems  Review of Systems   Constitutional: Positive for unexpected weight change. Negative for fatigue and fever.        Insomnia   HENT: Negative for congestion.    Respiratory: Negative for cough, choking, shortness of breath and wheezing.    Cardiovascular: Negative for chest pain and leg swelling.     As described in the HPI. Otherwise, remainder of ROS (14 systems) were negative.    Medications, Allergies, Social, and Family Histories reviewed as per EMR.    Objective     Vitals:    12/04/18 1018   BP: (!) 186/72   Pulse: 62   SpO2: 94%     Physical Exam   Constitutional: She is oriented to person, place, and time. Vital signs are normal. She appears well-developed and well-nourished.   Morbidly obese   HENT:   Head: Normocephalic and atraumatic.   Nose: Nose normal.   Mouth/Throat: Uvula is midline, oropharynx is clear and moist and mucous membranes are normal.   Mallampati 3   Eyes: Conjunctivae, EOM and lids are normal. Pupils are equal, round, and reactive to light.   Neck: Trachea normal and normal range of motion. No tracheal tenderness present. No thyroid mass present.   Cardiovascular: Normal rate and regular rhythm. PMI is not displaced. Exam reveals no gallop.   Murmur heard.   Systolic murmur is present with a grade of 2/6.  AV click   Pulmonary/Chest: Effort normal and breath sounds normal. No respiratory distress. She has no decreased breath sounds. She has no wheezes. She has no  rhonchi. She exhibits deformity (thoracic kyphosis). She exhibits no tenderness.   Abdominal: Soft. Normal appearance and bowel sounds are normal. There is no hepatosplenomegaly. There is no tenderness.   Morbidly obese   Musculoskeletal:   Uses motorized scooter, no extremity edema     Vascular Status -  Her right foot exhibits no edema. Her left foot exhibits no edema.  Lymphadenopathy:        Head (right side): No submandibular adenopathy present.        Head (left side): No submandibular adenopathy present.     She has no cervical adenopathy.        Right: No supraclavicular adenopathy present.        Left: No supraclavicular adenopathy present.   Neurological: She is alert and oriented to person, place, and time. Gait normal.   Skin: Skin is warm and dry. No rash noted. No cyanosis. Nails show no clubbing.   Psychiatric: She has a normal mood and affect. Her speech is normal and behavior is normal. Judgment normal.   Nursing note and vitals reviewed.          Assessment/Plan     Brendon was seen today for copd.    Diagnoses and all orders for this visit:    Chronic obstructive pulmonary disease, unspecified COPD type (CMS/HCC)  -     umeclidinium-vilanterol (ANORO ELLIPTA) 62.5-25 MCG/INH aerosol powder  inhaler; Inhale 1 puff Daily.    Chronic hypoxemic respiratory failure (CMS/HCC)    Class 2 severe obesity due to excess calories with serious comorbidity and body mass index (BMI) of 38.0 to 38.9 in adult (CMS/HCC)    Physical deconditioning    Personal history of tobacco use, presenting hazards to health    Essential hypertension, benign         Discussion/ Recommendations:   She continues to do well on Anoro with infrequent albuterol use.  Patient is also doing well off supplemental oxygen during the daytime, but I think it is a decent possibility she has some nocturnal hypoxia especially in light of the likelihood of her having underlying ANDRES.  She again declines a sleep evaluation as previously discussed on  multiple occasions.  Her blood pressure is elevated today but she reports it is normally well controlled at home.    -Continue Anoro daily and albuterol as needed for dyspnea or wheeze.  -Instructed to use oxygen to maintain oxygen saturations greater than 88%.  When she comes due for recertification, she will require an overnight pulse oximetry on room air prior to termination of supplemental oxygen.   -Encouraged her to exercise more and ambulate within the home.  Counseled to reduce caloric intake to prevent further weight gain.  -She would benefit from pulmonary rehabilitation, but given her limited mobility she is not able to tolerate.  -Does not meet criteria for LD CT screening.  -Continue to follow blood pressure at home and if it is elevated recommend that she see her PCP.  -Up-to-date with flu vaccine.    Patient's BMI is above normal parameters. Follow-up plan includes:  nutrition counseling and referral to primary care.        Return in about 3 months (around 3/4/2019) for Recheck COPD.          This document has been electronically signed by Nel Grant MD on December 4, 2018 10:36 AM      Dictated using Dragon

## 2018-12-05 ENCOUNTER — TELEPHONE (OUTPATIENT)
Dept: FAMILY MEDICINE CLINIC | Facility: CLINIC | Age: 73
End: 2018-12-05

## 2018-12-12 ENCOUNTER — OFFICE VISIT (OUTPATIENT)
Dept: FAMILY MEDICINE CLINIC | Facility: CLINIC | Age: 73
End: 2018-12-12

## 2018-12-12 VITALS
WEIGHT: 234.2 LBS | SYSTOLIC BLOOD PRESSURE: 142 MMHG | DIASTOLIC BLOOD PRESSURE: 62 MMHG | BODY MASS INDEX: 39.02 KG/M2 | HEIGHT: 65 IN

## 2018-12-12 DIAGNOSIS — Z00.00 INITIAL MEDICARE ANNUAL WELLNESS VISIT: Primary | ICD-10-CM

## 2018-12-12 PROCEDURE — G0438 PPPS, INITIAL VISIT: HCPCS | Performed by: NURSE PRACTITIONER

## 2018-12-12 NOTE — PROGRESS NOTES
QUICK REFERENCE INFORMATION:  The ABCs of the Annual Wellness Visit    Initial Medicare Wellness Visit    HEALTH RISK ASSESSMENT    1945    Recent Hospitalizations:  No hospitalization(s) within the last year..        Current Medical Providers:  Patient Care Team:  Josefa Pozo APRN as PCP - General (Nurse Practitioner)  Meme Duckworth APRN as PCP - Claims Attributed        Smoking Status:  Social History     Tobacco Use   Smoking Status Former Smoker   • Last attempt to quit: 3/21/1999   • Years since quittin.7   Smokeless Tobacco Never Used       Alcohol Consumption:  Social History     Substance and Sexual Activity   Alcohol Use No    Comment: quit in        Depression Screen:   PHQ-2/PHQ-9 Depression Screening 2018   Little interest or pleasure in doing things 0   Feeling down, depressed, or hopeless 0   Trouble falling or staying asleep, or sleeping too much 0   Feeling tired or having little energy 0   Poor appetite or overeating 0   Feeling bad about yourself - or that you are a failure or have let yourself or your family down 0   Trouble concentrating on things, such as reading the newspaper or watching television 0   Moving or speaking so slowly that other people could have noticed. Or the opposite - being so fidgety or restless that you have been moving around a lot more than usual 0   Thoughts that you would be better off dead, or of hurting yourself in some way 0   Total Score 0   If you checked off any problems, how difficult have these problems made it for you to do your work, take care of things at home, or get along with other people? Not difficult at all       Health Habits and Functional and Cognitive Screening:  Functional & Cognitive Status 2018   Do you have difficulty preparing food and eating? No   Do you have difficulty bathing yourself, getting dressed or grooming yourself? No   Do you have difficulty using the toilet? No   Do you have difficulty moving  around from place to place? No   Do you have trouble with steps or getting out of a bed or a chair? No   In the past year have you fallen or experienced a near fall? No   Current Diet Well Balanced Diet   Dental Exam Up to date   Eye Exam Up to date   Exercise (times per week) 3 times per week   Current Exercise Activities Include Walking   Do you need help using the phone?  No   Are you deaf or do you have serious difficulty hearing?  No   Do you need help with transportation? No   Do you need help shopping? No   Do you need help preparing meals?  No   Do you need help with housework?  No   Do you need help with laundry? No   Do you need help taking your medications? No   Do you need help managing money? No   Do you ever drive or ride in a car without wearing a seat belt? No   Have you felt unusual stress, anger or loneliness in the last month? No   Who do you live with? Alone   If you need help, do you have trouble finding someone available to you? No   Have you been bothered in the last four weeks by sexual problems? No   Do you have difficulty concentrating, remembering or making decisions? No           Does the patient have evidence of cognitive impairment? Yes    Asiprin use counseling: Taking ASA appropriately as indicated      Recent Lab Results:    Visual Acuity:  No exam data present    Age-appropriate Screening Schedule:  Refer to the list below for future screening recommendations based on patient's age, sex and/or medical conditions. Orders for these recommended tests are listed in the plan section. The patient has been provided with a written plan.    Health Maintenance   Topic Date Due   • TDAP/TD VACCINES (1 - Tdap) 09/24/1964   • ZOSTER VACCINE (1 of 2) 09/24/1995   • INFLUENZA VACCINE  08/01/2018   • URINE MICROALBUMIN  01/08/2019   • DIABETIC EYE EXAM  01/09/2019   • HEMOGLOBIN A1C  03/17/2019   • DIABETIC FOOT EXAM  09/12/2019   • LIPID PANEL  09/17/2019   • MAMMOGRAM  02/12/2020   • COLONOSCOPY   09/12/2028   • PNEUMOCOCCAL VACCINES (65+ LOW/MEDIUM RISK)  Completed        Subjective   History of Present Illness    Brendon Skelton is a 73 y.o. female who presents for an Annual Wellness Visit.    The following portions of the patient's history were reviewed and updated as appropriate: allergies, current medications, past family history, past medical history, past social history, past surgical history and problem list.    Outpatient Medications Prior to Visit   Medication Sig Dispense Refill   • acetaminophen (TYLENOL) 325 MG tablet Take 650 mg by mouth every 6 (six) hours as needed for mild pain (1-3).     • albuterol (PROVENTIL HFA;VENTOLIN HFA) 108 (90 Base) MCG/ACT inhaler Inhale 2 puffs Every 4 (Four) Hours As Needed for Wheezing. 8 g 5   • albuterol (PROVENTIL) (2.5 MG/3ML) 0.083% nebulizer solution INHALE THE CONTENTS OF 1 VIAL BY NEBULIZER FOUR (4) TIMES DAILY AS NEEDED FOR SHORTNESS OF BREATH AND WHEEZING 360 mL 2   • Ascorbic Acid (VITAMIN C WITH OCHOA HIPS) 250 MG tablet Take 500 mg by mouth Daily.     • aspirin 81 MG chewable tablet Chew 81 mg Daily.     • calcitriol (ROCALTROL) 0.25 MCG capsule Take 0.25 mcg by mouth Every Other Day. Every Monday, Wednesday and friday     • cholecalciferol (VITAMIN D3) 1000 units tablet Take 2,000 Units by mouth Daily.     • citalopram (CeleXA) 20 MG tablet Take 1 tablet by mouth Daily. 90 tablet 3   • diltiaZEM CD (CARDIZEM CD) 240 MG 24 hr capsule Take 1 capsule by mouth Daily. 90 capsule 3   • ezetimibe (ZETIA) 10 MG tablet Take 1 tablet by mouth Daily. 90 tablet 3   • ferrous sulfate 325 (65 FE) MG tablet Take 325 mg by mouth Daily With Breakfast.     • furosemide (LASIX) 40 MG tablet Take 1 tablet by mouth 2 (Two) Times a Day. (Patient taking differently: Take 40 mg by mouth Daily.) 60 tablet 5   • glucose blood test strip 1 each by Other route 3 (three) times a day. Use as instructed     • Insulin Glargine (BASAGLAR KWIKPEN) 100 UNIT/ML injection pen  Inject 30 Units under the skin into the appropriate area as directed Daily. 1 pen 11   • Insulin Pen Needle (BD PEN NEEDLE DEREK U/F) 32G X 4 MM misc 1 each 4 (Four) Times a Day. 120 each 5   • Prenatal Vit-Fe Fumarate-FA (PRENATAL VITAMIN) 27-0.8 MG tablet Take 1 tablet by mouth daily.     • raNITIdine (ZANTAC) 150 MG tablet Take 1 tablet by mouth Every Night. 180 tablet 1   • rOPINIRole (REQUIP) 0.25 MG tablet Take 1 tablet by mouth Every Night. Take 1 hour before bedtime. 90 tablet 3   • traZODone (DESYREL) 150 MG tablet Take 2 tablets by mouth Every Night. 180 tablet 3   • umeclidinium-vilanterol (ANORO ELLIPTA) 62.5-25 MCG/INH aerosol powder  inhaler Inhale 1 puff Daily. 1 each 11   • warfarin (COUMADIN) 5 MG tablet Take 1/2 tablet nightly except on Monday, Wednesday, and Saturday take 1 tablet or as directed 90 tablet 1     No facility-administered medications prior to visit.        Patient Active Problem List   Diagnosis   • Long term current use of anticoagulant therapy   • Personal history of heart valve replacement   • Atrial fibrillation [I48.91]   • Pulmonary emphysema (CMS/HCC)   • On anticoagulant therapy   • Hyperlipidemia   • Diastolic heart failure (CMS/HCC)   • Depressive disorder   • COPD (chronic obstructive pulmonary disease) (CMS/HCC)   • Essential hypertension, benign   • Diabetes mellitus without complication (CMS/HCC)   • Myopia   • Astigmatism   • Bleeding from open wound of chest wall   • Follow-up surgery care   • Encounter for screening for malignant neoplasm of colon   • Positive colorectal cancer screening using DNA-based stool test   • Nodule of left lung   • Class 2 severe obesity due to excess calories with serious comorbidity and body mass index (BMI) of 38.0 to 38.9 in adult (CMS/HCC)   • Chronic hypoxemic respiratory failure (CMS/HCC)   • Physical deconditioning   • Heart failure with preserved left ventricular function (HFpEF) (CMS/HCC)   • Essential hypertension   • Coronary  artery disease involving native coronary artery without angina pectoris   • Hx of CABG   • SSS (sick sinus syndrome) (CMS/[facility])   • Pacemaker   • Stage 3 chronic kidney disease (CMS/[facility])   • Personal history of tobacco use, presenting hazards to health       Advance Care Planning:  has NO advance directive - information provided to the patient today    Identification of Risk Factors:  Risk factors include: weight , unhealthy diet, cardiovascular risk and increased fall risk.    Review of Systems   Constitutional: Negative.  Negative for chills, diaphoresis, fatigue and fever.   HENT: Negative.    Eyes: Negative.    Respiratory: Negative.    Cardiovascular: Negative.    Gastrointestinal: Negative.    Genitourinary: Negative.    Musculoskeletal: Negative.    Skin: Negative.    Neurological: Negative.    Psychiatric/Behavioral: Negative.  Negative for confusion.       Compared to one year ago, the patient feels her physical health is better.  Compared to one year ago, the patient feels her mental health is better.    Objective     Physical Exam   Constitutional: She is oriented to person, place, and time. She appears well-developed and well-nourished.   Arrives via personal wheelchair    HENT:   Head: Normocephalic.   Right Ear: External ear normal.   Left Ear: External ear normal.   Eyes: EOM are normal. Pupils are equal, round, and reactive to light.   Neck: Normal range of motion. Neck supple.   Cardiovascular: Normal rate, regular rhythm and normal heart sounds.   Pulmonary/Chest: Effort normal and breath sounds normal.   Abdominal: Soft. Bowel sounds are normal.   Musculoskeletal: Normal range of motion.   Neurological: She is alert and oriented to person, place, and time.   Skin: Skin is warm. Capillary refill takes less than 2 seconds.   Psychiatric: She has a normal mood and affect. Her behavior is normal.   Nursing note and vitals reviewed.      Vitals:    12/12/18 1015   BP: 142/62   Weight: 106 kg (234 lb 3.2  "oz)   Height: 165.1 cm (65\")       Patient's Body mass index is 38.97 kg/m². BMI is above normal parameters. Recommendations include: educational material, exercise counseling and nutrition counseling.      Assessment/Plan   Patient Self-Management and Personalized Health Advice  The patient has been provided with information about: diet, exercise and fall prevention and preventive services including:   · Advance directive, Fall Risk assessment done, Nutrition counseling provided.    Visit Diagnoses:    ICD-10-CM ICD-9-CM   1. Initial Medicare annual wellness visit Z00.00 V70.0       No orders of the defined types were placed in this encounter.      Outpatient Encounter Medications as of 12/12/2018   Medication Sig Dispense Refill   • acetaminophen (TYLENOL) 325 MG tablet Take 650 mg by mouth every 6 (six) hours as needed for mild pain (1-3).     • albuterol (PROVENTIL HFA;VENTOLIN HFA) 108 (90 Base) MCG/ACT inhaler Inhale 2 puffs Every 4 (Four) Hours As Needed for Wheezing. 8 g 5   • albuterol (PROVENTIL) (2.5 MG/3ML) 0.083% nebulizer solution INHALE THE CONTENTS OF 1 VIAL BY NEBULIZER FOUR (4) TIMES DAILY AS NEEDED FOR SHORTNESS OF BREATH AND WHEEZING 360 mL 2   • Ascorbic Acid (VITAMIN C WITH OCHOA HIPS) 250 MG tablet Take 500 mg by mouth Daily.     • aspirin 81 MG chewable tablet Chew 81 mg Daily.     • calcitriol (ROCALTROL) 0.25 MCG capsule Take 0.25 mcg by mouth Every Other Day. Every Monday, Wednesday and friday     • cholecalciferol (VITAMIN D3) 1000 units tablet Take 2,000 Units by mouth Daily.     • citalopram (CeleXA) 20 MG tablet Take 1 tablet by mouth Daily. 90 tablet 3   • diltiaZEM CD (CARDIZEM CD) 240 MG 24 hr capsule Take 1 capsule by mouth Daily. 90 capsule 3   • ezetimibe (ZETIA) 10 MG tablet Take 1 tablet by mouth Daily. 90 tablet 3   • ferrous sulfate 325 (65 FE) MG tablet Take 325 mg by mouth Daily With Breakfast.     • furosemide (LASIX) 40 MG tablet Take 1 tablet by mouth 2 (Two) Times a Day. " (Patient taking differently: Take 40 mg by mouth Daily.) 60 tablet 5   • glucose blood test strip 1 each by Other route 3 (three) times a day. Use as instructed     • Insulin Glargine (BASAGLAR KWIKPEN) 100 UNIT/ML injection pen Inject 30 Units under the skin into the appropriate area as directed Daily. 1 pen 11   • Insulin Pen Needle (BD PEN NEEDLE DEREK U/F) 32G X 4 MM misc 1 each 4 (Four) Times a Day. 120 each 5   • Prenatal Vit-Fe Fumarate-FA (PRENATAL VITAMIN) 27-0.8 MG tablet Take 1 tablet by mouth daily.     • raNITIdine (ZANTAC) 150 MG tablet Take 1 tablet by mouth Every Night. 180 tablet 1   • rOPINIRole (REQUIP) 0.25 MG tablet Take 1 tablet by mouth Every Night. Take 1 hour before bedtime. 90 tablet 3   • traZODone (DESYREL) 150 MG tablet Take 2 tablets by mouth Every Night. 180 tablet 3   • umeclidinium-vilanterol (ANORO ELLIPTA) 62.5-25 MCG/INH aerosol powder  inhaler Inhale 1 puff Daily. 1 each 11   • warfarin (COUMADIN) 5 MG tablet Take 1/2 tablet nightly except on Monday, Wednesday, and Saturday take 1 tablet or as directed 90 tablet 1     No facility-administered encounter medications on file as of 12/12/2018.        Reviewed use of high risk medication in the elderly: yes  Reviewed for potential of harmful drug interactions in the elderly: yes    Follow Up:  No Follow-up on file.     An After Visit Summary and PPPS with all of these plans were given to the patient.          This document has been electronically signed by ABRAHAM Boone on December 12, 2018 11:32 AM

## 2018-12-14 ENCOUNTER — ANTICOAGULATION VISIT (OUTPATIENT)
Dept: CARDIAC SURGERY | Facility: CLINIC | Age: 73
End: 2018-12-14

## 2018-12-14 DIAGNOSIS — Z79.01 LONG TERM CURRENT USE OF ANTICOAGULANT THERAPY: ICD-10-CM

## 2018-12-14 DIAGNOSIS — Z95.2 PERSONAL HISTORY OF HEART VALVE REPLACEMENT: ICD-10-CM

## 2018-12-14 DIAGNOSIS — I48.91 ATRIAL FIBRILLATION, UNSPECIFIED TYPE (HCC): ICD-10-CM

## 2018-12-14 LAB — INR PPP: 2.6 (ref 0.9–1.1)

## 2018-12-14 PROCEDURE — 85610 PROTHROMBIN TIME: CPT | Performed by: NURSE PRACTITIONER

## 2018-12-20 RX ORDER — WARFARIN SODIUM 5 MG/1
TABLET ORAL
Qty: 90 TABLET | Refills: 1 | Status: SHIPPED | OUTPATIENT
Start: 2018-12-20 | End: 2019-05-08 | Stop reason: SDUPTHER

## 2018-12-27 ENCOUNTER — ANTICOAGULATION VISIT (OUTPATIENT)
Dept: CARDIAC SURGERY | Facility: CLINIC | Age: 73
End: 2018-12-27

## 2018-12-27 VITALS — OXYGEN SATURATION: 96 % | HEART RATE: 60 BPM | SYSTOLIC BLOOD PRESSURE: 140 MMHG | DIASTOLIC BLOOD PRESSURE: 65 MMHG

## 2018-12-27 DIAGNOSIS — Z79.01 LONG TERM CURRENT USE OF ANTICOAGULANT THERAPY: ICD-10-CM

## 2018-12-27 DIAGNOSIS — I48.91 ATRIAL FIBRILLATION, UNSPECIFIED TYPE (HCC): ICD-10-CM

## 2018-12-27 DIAGNOSIS — Z95.2 PERSONAL HISTORY OF HEART VALVE REPLACEMENT: ICD-10-CM

## 2018-12-27 LAB — INR PPP: 4.1 (ref 0.9–1.1)

## 2018-12-27 PROCEDURE — 85610 PROTHROMBIN TIME: CPT | Performed by: NURSE PRACTITIONER

## 2018-12-27 PROCEDURE — 99211 OFF/OP EST MAY X REQ PHY/QHP: CPT | Performed by: NURSE PRACTITIONER

## 2018-12-27 NOTE — PROGRESS NOTES
Pt states she has been taking Mucinex DM and a nasal spray recommended by pharmacist. Pt denies bleeding problems. Dose adjusted and pt instructed to have a serving of green veggies today; pt verbalized. Patient instructed regarding medication; results given and questions answered. Nutritional counseling given.  Dietary factors affecting therapy addressed.  Patient instructed to monitor for excessive bruising or bleeding. Will recheck next week.         This document has been electronically signed by DANIEL Pavon @ on December 27, 2018 10:42 AM

## 2019-01-01 ENCOUNTER — ANTICOAGULATION VISIT (OUTPATIENT)
Dept: CARDIAC SURGERY | Facility: CLINIC | Age: 74
End: 2019-01-01

## 2019-01-01 ENCOUNTER — OFFICE VISIT (OUTPATIENT)
Dept: PULMONOLOGY | Facility: CLINIC | Age: 74
End: 2019-01-01

## 2019-01-01 ENCOUNTER — OFFICE VISIT (OUTPATIENT)
Dept: GASTROENTEROLOGY | Facility: CLINIC | Age: 74
End: 2019-01-01

## 2019-01-01 VITALS
OXYGEN SATURATION: 92 % | WEIGHT: 234.6 LBS | BODY MASS INDEX: 39.09 KG/M2 | HEART RATE: 89 BPM | HEIGHT: 65 IN | SYSTOLIC BLOOD PRESSURE: 148 MMHG | DIASTOLIC BLOOD PRESSURE: 80 MMHG

## 2019-01-01 VITALS
DIASTOLIC BLOOD PRESSURE: 76 MMHG | OXYGEN SATURATION: 91 % | WEIGHT: 232 LBS | HEART RATE: 68 BPM | HEIGHT: 65 IN | BODY MASS INDEX: 38.65 KG/M2 | SYSTOLIC BLOOD PRESSURE: 162 MMHG

## 2019-01-01 VITALS — OXYGEN SATURATION: 90 % | SYSTOLIC BLOOD PRESSURE: 124 MMHG | HEART RATE: 80 BPM | DIASTOLIC BLOOD PRESSURE: 62 MMHG

## 2019-01-01 DIAGNOSIS — E66.01 CLASS 2 SEVERE OBESITY DUE TO EXCESS CALORIES WITH SERIOUS COMORBIDITY AND BODY MASS INDEX (BMI) OF 36.0 TO 36.9 IN ADULT (HCC): ICD-10-CM

## 2019-01-01 DIAGNOSIS — C18.7 MALIGNANT NEOPLASM OF SIGMOID COLON (HCC): Primary | ICD-10-CM

## 2019-01-01 DIAGNOSIS — Z87.891 PERSONAL HISTORY OF TOBACCO USE, PRESENTING HAZARDS TO HEALTH: ICD-10-CM

## 2019-01-01 DIAGNOSIS — I50.30 HEART FAILURE WITH PRESERVED LEFT VENTRICULAR FUNCTION (HFPEF) (HCC): ICD-10-CM

## 2019-01-01 DIAGNOSIS — I27.20 PULMONARY HYPERTENSION (HCC): ICD-10-CM

## 2019-01-01 DIAGNOSIS — Z95.2 PERSONAL HISTORY OF HEART VALVE REPLACEMENT: ICD-10-CM

## 2019-01-01 DIAGNOSIS — Z79.01 LONG TERM CURRENT USE OF ANTICOAGULANT THERAPY: ICD-10-CM

## 2019-01-01 DIAGNOSIS — I48.91 ATRIAL FIBRILLATION, UNSPECIFIED TYPE (HCC): ICD-10-CM

## 2019-01-01 DIAGNOSIS — J44.9 CHRONIC OBSTRUCTIVE PULMONARY DISEASE, UNSPECIFIED COPD TYPE (HCC): Primary | ICD-10-CM

## 2019-01-01 DIAGNOSIS — G25.81 RESTLESS LEG SYNDROME: ICD-10-CM

## 2019-01-01 LAB — INR PPP: 3.5 (ref 0.9–1.1)

## 2019-01-01 PROCEDURE — 85610 PROTHROMBIN TIME: CPT | Performed by: NURSE PRACTITIONER

## 2019-01-01 PROCEDURE — 99214 OFFICE O/P EST MOD 30 MIN: CPT | Performed by: INTERNAL MEDICINE

## 2019-01-01 RX ORDER — ROPINIROLE 0.25 MG/1
0.25 TABLET, FILM COATED ORAL NIGHTLY
Qty: 30 TABLET | Refills: 0 | Status: SHIPPED | OUTPATIENT
Start: 2019-01-01 | End: 2020-01-01

## 2019-01-01 RX ORDER — ALBUTEROL SULFATE 90 UG/1
2 AEROSOL, METERED RESPIRATORY (INHALATION) EVERY 4 HOURS PRN
Qty: 18 G | Refills: 11 | Status: SHIPPED | OUTPATIENT
Start: 2019-01-01

## 2019-01-03 RX ORDER — CALCITRIOL 0.25 UG/1
0.25 CAPSULE, LIQUID FILLED ORAL 3 TIMES WEEKLY
Qty: 39 CAPSULE | Refills: 5 | Status: SHIPPED | OUTPATIENT
Start: 2019-01-04 | End: 2019-05-31 | Stop reason: ALTCHOICE

## 2019-01-04 ENCOUNTER — ANTICOAGULATION VISIT (OUTPATIENT)
Dept: CARDIAC SURGERY | Facility: CLINIC | Age: 74
End: 2019-01-04

## 2019-01-04 VITALS — HEART RATE: 68 BPM

## 2019-01-04 DIAGNOSIS — Z95.2 PERSONAL HISTORY OF HEART VALVE REPLACEMENT: ICD-10-CM

## 2019-01-04 DIAGNOSIS — I48.91 ATRIAL FIBRILLATION, UNSPECIFIED TYPE (HCC): ICD-10-CM

## 2019-01-04 DIAGNOSIS — Z79.01 LONG TERM CURRENT USE OF ANTICOAGULANT THERAPY: ICD-10-CM

## 2019-01-04 LAB — INR PPP: 3.7 (ref 0.9–1.1)

## 2019-01-04 PROCEDURE — 85610 PROTHROMBIN TIME: CPT | Performed by: NURSE PRACTITIONER

## 2019-01-04 NOTE — PROGRESS NOTES
PT states compliant c tx. Denies any new medications or bleeding issues. Denies any diet changes and has been consuming green consistently. PT if off Mucinex and denies any OTC meds. Denies any s/s of blood clot. Adjusted pt's dose and instructed to increase vit k today. Recheck INR next week. Patient instructed regarding medication; results given and questions answered. Nutritional counseling given.  Dietary factors affecting therapy addressed.  Patient instructed to monitor for excessive bruising or bleeding. PT verbalizes understanding.         This document has been electronically signed by DANIEL Pavon @ on January 4, 2019 10:45 AM

## 2019-01-11 ENCOUNTER — ANTICOAGULATION VISIT (OUTPATIENT)
Dept: CARDIAC SURGERY | Facility: CLINIC | Age: 74
End: 2019-01-11

## 2019-01-11 VITALS — HEART RATE: 68 BPM | SYSTOLIC BLOOD PRESSURE: 137 MMHG | DIASTOLIC BLOOD PRESSURE: 64 MMHG

## 2019-01-11 DIAGNOSIS — I48.91 ATRIAL FIBRILLATION, UNSPECIFIED TYPE (HCC): ICD-10-CM

## 2019-01-11 DIAGNOSIS — Z95.2 PERSONAL HISTORY OF HEART VALVE REPLACEMENT: ICD-10-CM

## 2019-01-11 DIAGNOSIS — Z79.01 LONG TERM CURRENT USE OF ANTICOAGULANT THERAPY: ICD-10-CM

## 2019-01-11 LAB — INR PPP: 3.3 (ref 0.9–1.1)

## 2019-01-11 PROCEDURE — 85610 PROTHROMBIN TIME: CPT | Performed by: NURSE PRACTITIONER

## 2019-01-11 NOTE — PROGRESS NOTES
Patient states no med changes or bleeding problems or unexplained bruising. Patient instructed to continue current dosing schedule. Verbalizes understanding. Will recheck 1 week. Patient instructed regarding medication; results given and questions answered. Nutritional counseling given.  Dietary factors affecting therapy addressed.  Patient instructed to monitor for excessive bruising or bleeding.         This document has been electronically signed by Meme Duckworth, DANIEL @ on January 11, 2019 11:01 AM

## 2019-01-16 ENCOUNTER — ANTICOAGULATION VISIT (OUTPATIENT)
Dept: CARDIAC SURGERY | Facility: CLINIC | Age: 74
End: 2019-01-16

## 2019-01-16 VITALS — HEART RATE: 61 BPM | SYSTOLIC BLOOD PRESSURE: 115 MMHG | DIASTOLIC BLOOD PRESSURE: 66 MMHG | OXYGEN SATURATION: 95 %

## 2019-01-16 DIAGNOSIS — Z79.01 LONG TERM CURRENT USE OF ANTICOAGULANT THERAPY: ICD-10-CM

## 2019-01-16 DIAGNOSIS — I48.91 ATRIAL FIBRILLATION, UNSPECIFIED TYPE (HCC): ICD-10-CM

## 2019-01-16 DIAGNOSIS — Z95.2 PERSONAL HISTORY OF HEART VALVE REPLACEMENT: ICD-10-CM

## 2019-01-16 LAB — INR PPP: 2.7 (ref 0.9–1.1)

## 2019-01-16 PROCEDURE — 99211 OFF/OP EST MAY X REQ PHY/QHP: CPT | Performed by: NURSE PRACTITIONER

## 2019-01-16 PROCEDURE — 85610 PROTHROMBIN TIME: CPT | Performed by: NURSE PRACTITIONER

## 2019-01-16 NOTE — PROGRESS NOTES
Pt denies med changes or bleeding problems. Pt INR continues dropping. Dose adjusted in between dose pt elevated on and current dose. Pt instructed to continue coumadin friendly diet; pt verbalized. Patient instructed regarding medication; results given and questions answered. Nutritional counseling given.  Dietary factors affecting therapy addressed.  Patient instructed to monitor for excessive bruising or bleeding. Will recheck in 2 weeks.         This document has been electronically signed by Meme Duckworth, DANIEL @ on January 16, 2019 3:51 PM

## 2019-01-30 ENCOUNTER — ANTICOAGULATION VISIT (OUTPATIENT)
Dept: CARDIAC SURGERY | Facility: CLINIC | Age: 74
End: 2019-01-30

## 2019-01-30 VITALS — HEART RATE: 64 BPM | SYSTOLIC BLOOD PRESSURE: 112 MMHG | DIASTOLIC BLOOD PRESSURE: 64 MMHG

## 2019-01-30 DIAGNOSIS — I48.91 ATRIAL FIBRILLATION, UNSPECIFIED TYPE (HCC): ICD-10-CM

## 2019-01-30 DIAGNOSIS — Z95.2 PERSONAL HISTORY OF HEART VALVE REPLACEMENT: ICD-10-CM

## 2019-01-30 DIAGNOSIS — Z79.01 LONG TERM CURRENT USE OF ANTICOAGULANT THERAPY: ICD-10-CM

## 2019-01-30 LAB — INR PPP: 3 (ref 0.9–1.1)

## 2019-01-30 PROCEDURE — 85610 PROTHROMBIN TIME: CPT | Performed by: NURSE PRACTITIONER

## 2019-01-30 NOTE — PROGRESS NOTES
Pt INR today is 3.0.  Pt states no changes to  meds or diet.  No bleeding issues.  Recheck INR in three wks.  Pt verbalizes.  Patient instructed regarding medication; results given and questions answered. Nutritional counseling given.  Dietary factors affecting therapy addressed.  Patient instructed to monitor for excessive bruising or bleeding.        This document has been electronically signed by DANIEL Pavon @ on January 30, 2019 10:39 AM

## 2019-01-31 ENCOUNTER — ANESTHESIA EVENT (OUTPATIENT)
Dept: PERIOP | Facility: HOSPITAL | Age: 74
End: 2019-01-31

## 2019-01-31 RX ORDER — FUROSEMIDE 40 MG/1
40 TABLET ORAL 2 TIMES DAILY
COMMUNITY
End: 2019-03-08 | Stop reason: SDUPTHER

## 2019-01-31 RX ORDER — ALBUTEROL SULFATE 2.5 MG/3ML
2.5 SOLUTION RESPIRATORY (INHALATION) EVERY 4 HOURS PRN
COMMUNITY

## 2019-02-01 ENCOUNTER — HOSPITAL ENCOUNTER (OUTPATIENT)
Facility: HOSPITAL | Age: 74
Setting detail: HOSPITAL OUTPATIENT SURGERY
Discharge: HOME | End: 2019-02-01
Attending: OPHTHALMOLOGY | Admitting: OPHTHALMOLOGY

## 2019-02-01 ENCOUNTER — ANESTHESIA (OUTPATIENT)
Dept: PERIOP | Facility: HOSPITAL | Age: 74
End: 2019-02-01

## 2019-02-01 VITALS
HEIGHT: 65 IN | HEART RATE: 60 BPM | TEMPERATURE: 97.9 F | DIASTOLIC BLOOD PRESSURE: 72 MMHG | OXYGEN SATURATION: 92 % | RESPIRATION RATE: 18 BRPM | SYSTOLIC BLOOD PRESSURE: 183 MMHG | BODY MASS INDEX: 38.93 KG/M2 | WEIGHT: 233.69 LBS

## 2019-02-01 LAB — GLUCOSE BLDC GLUCOMTR-MCNC: 100 MG/DL (ref 70–130)

## 2019-02-01 PROCEDURE — 25010000002 FENTANYL CITRATE (PF) 100 MCG/2ML SOLUTION: Performed by: NURSE ANESTHETIST, CERTIFIED REGISTERED

## 2019-02-01 PROCEDURE — 25010000002 MIDAZOLAM PER 1 MG: Performed by: NURSE ANESTHETIST, CERTIFIED REGISTERED

## 2019-02-01 PROCEDURE — 25010000002 EPINEPHRINE PER 0.1 MG: Performed by: OPHTHALMOLOGY

## 2019-02-01 PROCEDURE — 25010000002 VANCOMYCIN 1 G RECONSTITUTED SOLUTION 1 EACH VIAL: Performed by: OPHTHALMOLOGY

## 2019-02-01 PROCEDURE — 82962 GLUCOSE BLOOD TEST: CPT

## 2019-02-01 PROCEDURE — V2632 POST CHMBR INTRAOCULAR LENS: HCPCS | Performed by: OPHTHALMOLOGY

## 2019-02-01 DEVICE — LENS ACRYSOF IQ 6X13MM SN60WF 24.0: Type: IMPLANTABLE DEVICE | Site: EYE | Status: FUNCTIONAL

## 2019-02-01 RX ORDER — TETRACAINE HYDROCHLORIDE 5 MG/ML
1 SOLUTION OPHTHALMIC
Status: COMPLETED | OUTPATIENT
Start: 2019-02-01 | End: 2019-02-01

## 2019-02-01 RX ORDER — MOXIFLOXACIN 5 MG/ML
SOLUTION/ DROPS OPHTHALMIC AS NEEDED
Status: DISCONTINUED | OUTPATIENT
Start: 2019-02-01 | End: 2019-02-01 | Stop reason: HOSPADM

## 2019-02-01 RX ORDER — FENTANYL CITRATE 50 UG/ML
INJECTION, SOLUTION INTRAMUSCULAR; INTRAVENOUS AS NEEDED
Status: DISCONTINUED | OUTPATIENT
Start: 2019-02-01 | End: 2019-02-01 | Stop reason: SURG

## 2019-02-01 RX ORDER — PREDNISOLONE ACETATE 10 MG/ML
SUSPENSION/ DROPS OPHTHALMIC AS NEEDED
Status: DISCONTINUED | OUTPATIENT
Start: 2019-02-01 | End: 2019-02-01 | Stop reason: HOSPADM

## 2019-02-01 RX ORDER — SODIUM CHLORIDE 0.9 % (FLUSH) 0.9 %
10 SYRINGE (ML) INJECTION AS NEEDED
Status: DISCONTINUED | OUTPATIENT
Start: 2019-02-01 | End: 2019-02-01 | Stop reason: HOSPADM

## 2019-02-01 RX ORDER — MIDAZOLAM HYDROCHLORIDE 1 MG/ML
INJECTION INTRAMUSCULAR; INTRAVENOUS AS NEEDED
Status: DISCONTINUED | OUTPATIENT
Start: 2019-02-01 | End: 2019-02-01 | Stop reason: SURG

## 2019-02-01 RX ORDER — TETRACAINE HYDROCHLORIDE 5 MG/ML
SOLUTION OPHTHALMIC AS NEEDED
Status: DISCONTINUED | OUTPATIENT
Start: 2019-02-01 | End: 2019-02-01 | Stop reason: HOSPADM

## 2019-02-01 RX ORDER — PHENYLEPHRINE HCL 2.5 %
1 DROPS OPHTHALMIC (EYE)
Status: COMPLETED | OUTPATIENT
Start: 2019-02-01 | End: 2019-02-01

## 2019-02-01 RX ORDER — CYCLOPENTOLATE HYDROCHLORIDE 10 MG/ML
1 SOLUTION/ DROPS OPHTHALMIC
Status: COMPLETED | OUTPATIENT
Start: 2019-02-01 | End: 2019-02-01

## 2019-02-01 RX ORDER — BRIMONIDINE TARTRATE 0.15 %
DROPS OPHTHALMIC (EYE) AS NEEDED
Status: DISCONTINUED | OUTPATIENT
Start: 2019-02-01 | End: 2019-02-01 | Stop reason: HOSPADM

## 2019-02-01 RX ADMIN — PHENYLEPHRINE HYDROCHLORIDE 1 DROP: 25 SOLUTION/ DROPS OPHTHALMIC at 07:35

## 2019-02-01 RX ADMIN — PHENYLEPHRINE HYDROCHLORIDE 1 DROP: 25 SOLUTION/ DROPS OPHTHALMIC at 07:25

## 2019-02-01 RX ADMIN — TETRACAINE HYDROCHLORIDE 1 DROP: 5 SOLUTION OPHTHALMIC at 07:46

## 2019-02-01 RX ADMIN — FENTANYL CITRATE 50 MCG: 50 INJECTION, SOLUTION INTRAMUSCULAR; INTRAVENOUS at 08:56

## 2019-02-01 RX ADMIN — CYCLOPENTOLATE HYDROCHLORIDE 1 DROP: 10 SOLUTION/ DROPS OPHTHALMIC at 07:25

## 2019-02-01 RX ADMIN — SODIUM CHLORIDE, PRESERVATIVE FREE 10 ML: 5 INJECTION INTRAVENOUS at 07:46

## 2019-02-01 RX ADMIN — MIDAZOLAM HYDROCHLORIDE 1 MG: 2 INJECTION, SOLUTION INTRAMUSCULAR; INTRAVENOUS at 08:56

## 2019-02-01 RX ADMIN — CYCLOPENTOLATE HYDROCHLORIDE 1 DROP: 10 SOLUTION/ DROPS OPHTHALMIC at 07:35

## 2019-02-01 RX ADMIN — TETRACAINE HYDROCHLORIDE 1 DROP: 5 SOLUTION OPHTHALMIC at 07:25

## 2019-02-01 RX ADMIN — PHENYLEPHRINE HYDROCHLORIDE 1 DROP: 25 SOLUTION/ DROPS OPHTHALMIC at 07:46

## 2019-02-01 RX ADMIN — CYCLOPENTOLATE HYDROCHLORIDE 1 DROP: 10 SOLUTION/ DROPS OPHTHALMIC at 07:46

## 2019-02-01 NOTE — ANESTHESIA POSTPROCEDURE EVALUATION
Patient: Brendon Skelton    Procedure Summary     Date:  02/01/19 Room / Location:  [facility] OR  / [facility] OR    Anesthesia Start:  0856 Anesthesia Stop:  0907    Procedure:  REMOVE CATARACT AND IMPLANT INTRAOCULAR LENS I (Left Eye) Diagnosis:       Age-related nuclear cataract of left eye      (age-related nuclear cataract of left eye)    Surgeon:  Jose L Clay MD Provider:  Tyrone Prieto MD    Anesthesia Type:  MAC ASA Status:  3          Anesthesia Type: MAC  Last vitals  BP   (!) 193/97 (02/01/19 0733)   Temp   97.4 °F (36.3 °C) (02/01/19 0733)   Pulse   60 (02/01/19 0733)   Resp   18 (02/01/19 0733)     SpO2   93 % (02/01/19 0733)     Post Anesthesia Care and Evaluation    Patient location during evaluation: PHASE II  Level of consciousness: awake  Pain score: 0  Pain management: adequate  Airway patency: patent  Anesthetic complications: No anesthetic complications  PONV Status: none  Cardiovascular status: acceptable  Respiratory status: acceptable and spontaneous ventilation  Hydration status: acceptable

## 2019-02-01 NOTE — ANESTHESIA PREPROCEDURE EVALUATION
Anesthesia Evaluation     Patient summary reviewed and Nursing notes reviewed   NPO Solid Status: > 8 hours  NPO Liquid Status: > 8 hours           Airway   Mallampati: II  TM distance: >3 FB  Neck ROM: full  possible difficult intubation  Dental    (+) edentulous    Pulmonary    (+) a smoker ( quit smoking in 1999) Former, COPD ( she does sometimes use inhalers, but is breathing well today.) mild, sleep apnea, decreased breath sounds,   Cardiovascular   Exercise tolerance: poor (<4 METS)    NYHA Classification: III  ECG reviewed  PT is on anticoagulation therapy    (+) pacemaker pacemaker, hypertension well controlled, CAD, dysrhythmias Atrial Fib, CHF ( h/o, but not in actice CHF now), systolic click, hyperlipidemia,     ROS comment: Had mechanical AVR in 1999 and has since required a permanent pacer.      Neuro/Psych  (+) weakness, psychiatric history Anxiety,     GI/Hepatic/Renal/Endo    (+) obesity, morbid obesity, GERD ( she denies GERD symptoms today) well controlled, GI bleeding, diabetes mellitus ( blood sugar= 98) type 2,     Musculoskeletal (-) negative ROS    Abdominal   (+) obese,    Substance History - negative use     OB/GYN negative ob/gyn ROS         Other   (+) blood dyscrasia ( has been on blood thinners.  LD of coumadin was 3/14/17 with lovenox as a bridge + ASAS (81 mg))                       Anesthesia Plan    ASA 3     MAC     Anesthetic plan, all risks, benefits, and alternatives have been provided, discussed and informed consent has been obtained with: patient and spouse/significant other.

## 2019-02-08 ENCOUNTER — ANESTHESIA (OUTPATIENT)
Dept: PERIOP | Facility: HOSPITAL | Age: 74
End: 2019-02-08

## 2019-02-08 ENCOUNTER — HOSPITAL ENCOUNTER (OUTPATIENT)
Facility: HOSPITAL | Age: 74
Setting detail: HOSPITAL OUTPATIENT SURGERY
Discharge: HOME | End: 2019-02-08
Attending: OPHTHALMOLOGY | Admitting: OPHTHALMOLOGY

## 2019-02-08 ENCOUNTER — ANESTHESIA EVENT (OUTPATIENT)
Dept: PERIOP | Facility: HOSPITAL | Age: 74
End: 2019-02-08

## 2019-02-08 VITALS
HEART RATE: 62 BPM | OXYGEN SATURATION: 94 % | RESPIRATION RATE: 18 BRPM | HEIGHT: 65 IN | WEIGHT: 233.69 LBS | DIASTOLIC BLOOD PRESSURE: 61 MMHG | BODY MASS INDEX: 38.93 KG/M2 | TEMPERATURE: 97.5 F | SYSTOLIC BLOOD PRESSURE: 136 MMHG

## 2019-02-08 LAB — GLUCOSE BLDC GLUCOMTR-MCNC: 76 MG/DL (ref 70–130)

## 2019-02-08 PROCEDURE — 25010000002 VANCOMYCIN 1 G RECONSTITUTED SOLUTION 1 EACH VIAL: Performed by: OPHTHALMOLOGY

## 2019-02-08 PROCEDURE — 25010000002 FENTANYL CITRATE (PF) 100 MCG/2ML SOLUTION: Performed by: NURSE ANESTHETIST, CERTIFIED REGISTERED

## 2019-02-08 PROCEDURE — V2632 POST CHMBR INTRAOCULAR LENS: HCPCS | Performed by: OPHTHALMOLOGY

## 2019-02-08 PROCEDURE — 25010000002 EPINEPHRINE PER 0.1 MG: Performed by: OPHTHALMOLOGY

## 2019-02-08 PROCEDURE — 94640 AIRWAY INHALATION TREATMENT: CPT

## 2019-02-08 PROCEDURE — 82962 GLUCOSE BLOOD TEST: CPT

## 2019-02-08 PROCEDURE — 25010000002 MIDAZOLAM PER 1 MG: Performed by: NURSE ANESTHETIST, CERTIFIED REGISTERED

## 2019-02-08 DEVICE — LENS ACRYSOF IQ 6X13MM SN60WF 22.5: Type: IMPLANTABLE DEVICE | Site: EYE | Status: FUNCTIONAL

## 2019-02-08 RX ORDER — MIDAZOLAM HYDROCHLORIDE 1 MG/ML
INJECTION INTRAMUSCULAR; INTRAVENOUS AS NEEDED
Status: DISCONTINUED | OUTPATIENT
Start: 2019-02-08 | End: 2019-02-08 | Stop reason: SURG

## 2019-02-08 RX ORDER — ALBUTEROL SULFATE 2.5 MG/3ML
2.5 SOLUTION RESPIRATORY (INHALATION) ONCE
Status: COMPLETED | OUTPATIENT
Start: 2019-02-08 | End: 2019-02-08

## 2019-02-08 RX ORDER — PHENYLEPHRINE HCL 2.5 %
1 DROPS OPHTHALMIC (EYE)
Status: COMPLETED | OUTPATIENT
Start: 2019-02-08 | End: 2019-02-08

## 2019-02-08 RX ORDER — TETRACAINE HYDROCHLORIDE 5 MG/ML
SOLUTION OPHTHALMIC AS NEEDED
Status: DISCONTINUED | OUTPATIENT
Start: 2019-02-08 | End: 2019-02-08 | Stop reason: HOSPADM

## 2019-02-08 RX ORDER — FENTANYL CITRATE 50 UG/ML
INJECTION, SOLUTION INTRAMUSCULAR; INTRAVENOUS AS NEEDED
Status: DISCONTINUED | OUTPATIENT
Start: 2019-02-08 | End: 2019-02-08 | Stop reason: SURG

## 2019-02-08 RX ORDER — BRIMONIDINE TARTRATE 0.15 %
DROPS OPHTHALMIC (EYE) AS NEEDED
Status: DISCONTINUED | OUTPATIENT
Start: 2019-02-08 | End: 2019-02-08 | Stop reason: HOSPADM

## 2019-02-08 RX ORDER — CYCLOPENTOLATE HYDROCHLORIDE 10 MG/ML
1 SOLUTION/ DROPS OPHTHALMIC
Status: COMPLETED | OUTPATIENT
Start: 2019-02-08 | End: 2019-02-08

## 2019-02-08 RX ORDER — MOXIFLOXACIN 5 MG/ML
SOLUTION/ DROPS OPHTHALMIC AS NEEDED
Status: DISCONTINUED | OUTPATIENT
Start: 2019-02-08 | End: 2019-02-08 | Stop reason: HOSPADM

## 2019-02-08 RX ORDER — PREDNISOLONE ACETATE 10 MG/ML
SUSPENSION/ DROPS OPHTHALMIC AS NEEDED
Status: DISCONTINUED | OUTPATIENT
Start: 2019-02-08 | End: 2019-02-08 | Stop reason: HOSPADM

## 2019-02-08 RX ORDER — SODIUM CHLORIDE 0.9 % (FLUSH) 0.9 %
10 SYRINGE (ML) INJECTION AS NEEDED
Status: DISCONTINUED | OUTPATIENT
Start: 2019-02-08 | End: 2019-02-08 | Stop reason: HOSPADM

## 2019-02-08 RX ORDER — TETRACAINE HYDROCHLORIDE 5 MG/ML
1 SOLUTION OPHTHALMIC
Status: COMPLETED | OUTPATIENT
Start: 2019-02-08 | End: 2019-02-08

## 2019-02-08 RX ADMIN — PHENYLEPHRINE HYDROCHLORIDE 1 DROP: 25 SOLUTION/ DROPS OPHTHALMIC at 07:45

## 2019-02-08 RX ADMIN — PHENYLEPHRINE HYDROCHLORIDE 1 DROP: 25 SOLUTION/ DROPS OPHTHALMIC at 07:35

## 2019-02-08 RX ADMIN — PHENYLEPHRINE HYDROCHLORIDE 1 DROP: 25 SOLUTION/ DROPS OPHTHALMIC at 07:55

## 2019-02-08 RX ADMIN — FENTANYL CITRATE 25 MCG: 50 INJECTION, SOLUTION INTRAMUSCULAR; INTRAVENOUS at 09:57

## 2019-02-08 RX ADMIN — MIDAZOLAM HYDROCHLORIDE 0.5 MG: 2 INJECTION, SOLUTION INTRAMUSCULAR; INTRAVENOUS at 09:57

## 2019-02-08 RX ADMIN — TETRACAINE HYDROCHLORIDE 1 DROP: 5 SOLUTION OPHTHALMIC at 07:55

## 2019-02-08 RX ADMIN — TETRACAINE HYDROCHLORIDE 1 DROP: 5 SOLUTION OPHTHALMIC at 07:35

## 2019-02-08 RX ADMIN — CYCLOPENTOLATE HYDROCHLORIDE 1 DROP: 10 SOLUTION/ DROPS OPHTHALMIC at 07:45

## 2019-02-08 RX ADMIN — ALBUTEROL SULFATE 2.5 MG: 2.5 SOLUTION RESPIRATORY (INHALATION) at 07:59

## 2019-02-08 RX ADMIN — CYCLOPENTOLATE HYDROCHLORIDE 1 DROP: 10 SOLUTION/ DROPS OPHTHALMIC at 07:55

## 2019-02-08 RX ADMIN — CYCLOPENTOLATE HYDROCHLORIDE 1 DROP: 10 SOLUTION/ DROPS OPHTHALMIC at 07:35

## 2019-02-08 NOTE — ANESTHESIA POSTPROCEDURE EVALUATION
Patient: Brendon Skelton    Procedure Summary     Date:  02/08/19 Room / Location:  [facility] OR 06 / [facility] OR    Anesthesia Start:  0958 Anesthesia Stop:  1012    Procedure:  REMOVE CATARACT AND IMPLANT  INTRAOCULAR LENS (Right Eye) Diagnosis:       Age-related nuclear cataract of right eye      (AGE-RELATED NUCLEAR CATARACT OF RIGHT EYE)    Surgeon:  Jose L Clay MD Provider:  Tyrone Prieto MD    Anesthesia Type:  MAC ASA Status:  3          Anesthesia Type: MAC  Last vitals  BP   109/44 (02/08/19 0747)   Temp   97.9 °F (36.6 °C) (02/08/19 0740)   Pulse   59 (02/08/19 0800)   Resp   18 (02/08/19 0740)     SpO2   97 % (02/08/19 0800)     Post Anesthesia Care and Evaluation    Patient location during evaluation: PHASE II  Patient participation: complete - patient participated  Level of consciousness: awake and awake and alert  Pain score: 0  Pain management: adequate  Airway patency: patent  Anesthetic complications: No anesthetic complications  PONV Status: none  Cardiovascular status: acceptable  Respiratory status: acceptable and spontaneous ventilation  Hydration status: acceptable

## 2019-02-18 DIAGNOSIS — J44.9 CHRONIC OBSTRUCTIVE PULMONARY DISEASE, UNSPECIFIED COPD TYPE (HCC): ICD-10-CM

## 2019-02-20 ENCOUNTER — ANTICOAGULATION VISIT (OUTPATIENT)
Dept: CARDIAC SURGERY | Facility: CLINIC | Age: 74
End: 2019-02-20

## 2019-02-20 VITALS — SYSTOLIC BLOOD PRESSURE: 120 MMHG | HEART RATE: 60 BPM | DIASTOLIC BLOOD PRESSURE: 72 MMHG

## 2019-02-20 DIAGNOSIS — Z95.2 PERSONAL HISTORY OF HEART VALVE REPLACEMENT: ICD-10-CM

## 2019-02-20 DIAGNOSIS — I48.91 ATRIAL FIBRILLATION, UNSPECIFIED TYPE (HCC): ICD-10-CM

## 2019-02-20 DIAGNOSIS — Z79.01 LONG TERM CURRENT USE OF ANTICOAGULANT THERAPY: ICD-10-CM

## 2019-02-20 LAB — INR PPP: 2 (ref 0.9–1.1)

## 2019-02-20 PROCEDURE — 85610 PROTHROMBIN TIME: CPT | Performed by: NURSE PRACTITIONER

## 2019-02-20 PROCEDURE — 99211 OFF/OP EST MAY X REQ PHY/QHP: CPT | Performed by: NURSE PRACTITIONER

## 2019-02-20 NOTE — PROGRESS NOTES
Today's INR is 2.0.   Pt denies missed coumadin doses or consuming too much green.  Pt denies med changes or bleeding issues.  Adjusted coumadin dose and instructed pt to limit green intake for two days.  Recheck INR next wk.  Pt verbalizes.  Patient instructed regarding medication; results given and questions answered. Nutritional counseling given.  Dietary factors affecting therapy addressed.  Patient instructed to monitor for excessive bruising or bleeding.        This document has been electronically signed by DANIEL Pavon @ on February 20, 2019 11:12 AM

## 2019-02-27 ENCOUNTER — ANTICOAGULATION VISIT (OUTPATIENT)
Dept: CARDIAC SURGERY | Facility: CLINIC | Age: 74
End: 2019-02-27

## 2019-02-27 VITALS — DIASTOLIC BLOOD PRESSURE: 63 MMHG | SYSTOLIC BLOOD PRESSURE: 126 MMHG | OXYGEN SATURATION: 94 % | HEART RATE: 94 BPM

## 2019-02-27 DIAGNOSIS — Z79.01 LONG TERM CURRENT USE OF ANTICOAGULANT THERAPY: ICD-10-CM

## 2019-02-27 DIAGNOSIS — I48.91 ATRIAL FIBRILLATION, UNSPECIFIED TYPE (HCC): ICD-10-CM

## 2019-02-27 DIAGNOSIS — Z95.2 PERSONAL HISTORY OF HEART VALVE REPLACEMENT: ICD-10-CM

## 2019-02-27 LAB — INR PPP: 2.3 (ref 0.9–1.1)

## 2019-02-27 PROCEDURE — 85610 PROTHROMBIN TIME: CPT | Performed by: NURSE PRACTITIONER

## 2019-02-27 NOTE — PROGRESS NOTES
Today's INR is 2.3.  Pt denies med changes or bleeding problems. Dose not adjusted as INR is increasing and pt has elevated on higher dose. Pt instructed to hold green veggies 2-3 days and appt made for next week to check on current dose; pt verbalized. Patient instructed regarding medication; results given and questions answered. Nutritional counseling given.  Dietary factors affecting therapy addressed.  Patient instructed to monitor for excessive bruising or bleeding.         This document has been electronically signed by Meme Duckworth, DANIEL @ on February 27, 2019 9:51 AM

## 2019-03-06 ENCOUNTER — ANTICOAGULATION VISIT (OUTPATIENT)
Dept: CARDIAC SURGERY | Facility: CLINIC | Age: 74
End: 2019-03-06

## 2019-03-06 VITALS — OXYGEN SATURATION: 97 % | HEART RATE: 82 BPM | SYSTOLIC BLOOD PRESSURE: 124 MMHG | DIASTOLIC BLOOD PRESSURE: 78 MMHG

## 2019-03-06 DIAGNOSIS — Z79.01 LONG TERM CURRENT USE OF ANTICOAGULANT THERAPY: ICD-10-CM

## 2019-03-06 DIAGNOSIS — Z95.2 PERSONAL HISTORY OF HEART VALVE REPLACEMENT: ICD-10-CM

## 2019-03-06 DIAGNOSIS — I48.91 ATRIAL FIBRILLATION, UNSPECIFIED TYPE (HCC): ICD-10-CM

## 2019-03-06 LAB — INR PPP: 2.8 (ref 0.9–1.1)

## 2019-03-06 PROCEDURE — 85610 PROTHROMBIN TIME: CPT | Performed by: NURSE PRACTITIONER

## 2019-03-06 NOTE — PROGRESS NOTES
Today's INR is 2.8.  Patient states no med changes or bleeding problems or unexplained bruising. Patient instructed to continue current dosing schedule. Verbalizes understanding. Will recheck next week. Patient instructed regarding medication; results given and questions answered. Nutritional counseling given.  Dietary factors affecting therapy addressed.  Patient instructed to monitor for excessive bruising or bleeding.         This document has been electronically signed by DANIEL Pavon @ on March 6, 2019 10:49 AM

## 2019-03-08 RX ORDER — FUROSEMIDE 40 MG/1
40 TABLET ORAL 2 TIMES DAILY
Qty: 60 TABLET | Refills: 2 | Status: SHIPPED | OUTPATIENT
Start: 2019-03-08 | End: 2019-05-31 | Stop reason: DRUGHIGH

## 2019-03-14 NOTE — PROGRESS NOTES
Pulmonary Office Follow-up    Subjective     Brendon Skelton is seen today at the office for   Chief Complaint   Patient presents with   • COPD         HPI  Brendon Skelton is a 73 y.o. female with a PMH significant for COPD, chronic hypoxic respiratory failure, past tobacco use, AF, CHF, s/p AVR, obesity, CKD, and DM who presents for follow-up of COPD.  She was last seen on 12/4/18, at which time  I recommended continuing Anoro daily and counseled on supplemental oxygen use.  She states that her breathing is stable and she continues on Anoro daily.  Patient has not felt the need to use her albuterol.  She also has not been using her oxygen as her saturations are adequate and she does not feel like it is necessary.  She denies any significant cough, sputum, chest pain, wheeze, or leg swelling.  She continues to be very sedentary but she does ambulate someone in the home.  She has not had any recent exacerbations or steroid use.    Patient Active Problem List   Diagnosis   • Long term current use of anticoagulant therapy   • Personal history of heart valve replacement   • Atrial fibrillation [I48.91]   • Pulmonary emphysema (CMS/HCC)   • On anticoagulant therapy   • Hyperlipidemia   • Diastolic heart failure (CMS/HCC)   • Depressive disorder   • COPD (chronic obstructive pulmonary disease) (CMS/HCC)   • Essential hypertension, benign   • Diabetes mellitus without complication (CMS/HCC)   • Myopia   • Astigmatism   • Bleeding from open wound of chest wall   • Follow-up surgery care   • Encounter for screening for malignant neoplasm of colon   • Positive colorectal cancer screening using DNA-based stool test   • Nodule of left lung   • Class 2 severe obesity due to excess calories with serious comorbidity and body mass index (BMI) of 38.0 to 38.9 in adult (CMS/HCC)   • Chronic hypoxemic respiratory failure (CMS/HCC)   • Physical deconditioning   • Heart failure with preserved left ventricular function (HFpEF)  (CMS/[facility])   • Essential hypertension   • Coronary artery disease involving native coronary artery without angina pectoris   • Hx of CABG   • SSS (sick sinus syndrome) (CMS/[facility])   • Pacemaker   • Stage 3 chronic kidney disease (CMS/HCC)   • Personal history of tobacco use, presenting hazards to health       Review of Systems  Review of Systems   Constitutional: Negative for fatigue, fever and unexpected weight change.        Insomnia   HENT: Negative for congestion.    Respiratory: Positive for cough. Negative for choking, shortness of breath and wheezing.    Cardiovascular: Negative for chest pain and leg swelling.     As described in the HPI. Otherwise, remainder of ROS (14 systems) were negative.    Medications, Allergies, Social, and Family Histories reviewed as per EMR.    Objective     Vitals:    03/15/19 0942   BP: 160/82   Pulse: 60   SpO2: 97%     Physical Exam   Constitutional: She is oriented to person, place, and time. Vital signs are normal. She appears well-developed and well-nourished.   Morbidly obese   HENT:   Head: Normocephalic and atraumatic.   Nose: Nose normal.   Mouth/Throat: Uvula is midline, oropharynx is clear and moist and mucous membranes are normal.   Mallampati 3   Eyes: Conjunctivae, EOM and lids are normal. Pupils are equal, round, and reactive to light.   Neck: Trachea normal and normal range of motion. No tracheal tenderness present. No thyroid mass present.   Cardiovascular: Normal rate and regular rhythm. PMI is not displaced. Exam reveals no gallop.   Murmur heard.   Systolic murmur is present with a grade of 2/6.  AV click   Pulmonary/Chest: Effort normal and breath sounds normal. No respiratory distress. She has no decreased breath sounds. She has no wheezes. She has no rhonchi. She exhibits deformity (thoracic kyphosis). She exhibits no tenderness.   Abdominal: Soft. Normal appearance and bowel sounds are normal. There is no hepatosplenomegaly. There is no tenderness.    Morbidly obese   Musculoskeletal:   Uses motorized scooter, no extremity edema     Vascular Status -  Her right foot exhibits no edema. Her left foot exhibits no edema.  Lymphadenopathy:        Head (right side): No submandibular adenopathy present.        Head (left side): No submandibular adenopathy present.     She has no cervical adenopathy.        Right: No supraclavicular adenopathy present.        Left: No supraclavicular adenopathy present.   Neurological: She is alert and oriented to person, place, and time. Gait normal.   Skin: Skin is warm and dry. No rash noted. No cyanosis. Nails show no clubbing.   Psychiatric: She has a normal mood and affect. Her speech is normal and behavior is normal. Judgment normal.   Nursing note and vitals reviewed.      IMAGING: CT chest without contrast 12/27/17 (independently reviewed and interpreted by me) showed mediastinal adenopathy likely due to vascular congestion, mild centrilobular emphysema, anterior left upper lobe 8.6 mm noncalcified nodule (may represent pleural parenchymal scar), left lower lobe basilar atelectasis, small bilateral pleural effusions      Assessment/Plan     Brendon was seen today for copd.    Diagnoses and all orders for this visit:    Chronic obstructive pulmonary disease, unspecified COPD type (CMS/HCC)  -     albuterol sulfate  (90 Base) MCG/ACT inhaler; Inhale 2 puffs Every 4 (Four) Hours As Needed for Wheezing or Shortness of Air.    Chronic hypoxemic respiratory failure (CMS/HCC)    Class 2 severe obesity due to excess calories with serious comorbidity and body mass index (BMI) of 38.0 to 38.9 in adult (CMS/HCC)    Physical deconditioning    Personal history of tobacco use, presenting hazards to health    Nodule of left lung  -     CT Chest Without Contrast; Future         Discussion/ Recommendations:   She remained stable from a COPD standpoint on Anoro and is not having frequent albuterol use.  She also is not requiring her oxygen  but I encouraged her to use it at night due to her likelihood of having underlying ANDRES and hypoventilation associated with it.  On review of records, the patient did have a CT chest from December 2017 which was interpreted as having a nodule at the anterior left apex, but the patient did not have a follow-up CT is recommended.  Given her history of smoking I think it would benefit to have a repeat CT chest to assess this area.    -CT chest without contrast for left apical nodule.  I will contact the patient after the scan to discuss results.  She did confirm her mobile number and is willing to accept messages regarding results on her voicemail.  -Continue Anoro daily and albuterol as needed for dyspnea or wheeze.  -Instructed to use oxygen to maintain oxygen saturations greater than 88%.  Encouraged her to use it with sleep.  When she comes due for recertification, she will require an overnight pulse oximetry on room air prior to termination of supplemental oxygen.   -Encouraged her to exercise more and ambulate within the home.  Counseled to reduce caloric intake to prevent further weight gain.  -She would benefit from pulmonary rehabilitation, but given her limited mobility she is not able to tolerate.  -Does not meet criteria for LD CT screening.  -Up-to-date with flu vaccine.    Patient's Body mass index is 38.94 kg/m². BMI is above normal parameters. Recommendations include: exercise counseling and nutrition counseling.      Return in about 3 months (around 6/15/2019) for Recheck COPD.          This document has been electronically signed by Nel Grant MD on March 15, 2019 11:53 AM      Dictated using Dragon

## 2019-03-15 ENCOUNTER — ANTICOAGULATION VISIT (OUTPATIENT)
Dept: CARDIAC SURGERY | Facility: CLINIC | Age: 74
End: 2019-03-15

## 2019-03-15 ENCOUNTER — OFFICE VISIT (OUTPATIENT)
Dept: PULMONOLOGY | Facility: CLINIC | Age: 74
End: 2019-03-15

## 2019-03-15 VITALS
BODY MASS INDEX: 38.99 KG/M2 | HEIGHT: 65 IN | OXYGEN SATURATION: 97 % | SYSTOLIC BLOOD PRESSURE: 160 MMHG | WEIGHT: 234 LBS | DIASTOLIC BLOOD PRESSURE: 82 MMHG | HEART RATE: 60 BPM

## 2019-03-15 DIAGNOSIS — J44.9 CHRONIC OBSTRUCTIVE PULMONARY DISEASE, UNSPECIFIED COPD TYPE (HCC): Primary | ICD-10-CM

## 2019-03-15 DIAGNOSIS — Z79.01 LONG TERM CURRENT USE OF ANTICOAGULANT THERAPY: ICD-10-CM

## 2019-03-15 DIAGNOSIS — E66.01 CLASS 2 SEVERE OBESITY DUE TO EXCESS CALORIES WITH SERIOUS COMORBIDITY AND BODY MASS INDEX (BMI) OF 38.0 TO 38.9 IN ADULT (HCC): ICD-10-CM

## 2019-03-15 DIAGNOSIS — Z95.2 PERSONAL HISTORY OF HEART VALVE REPLACEMENT: ICD-10-CM

## 2019-03-15 DIAGNOSIS — I48.91 ATRIAL FIBRILLATION, UNSPECIFIED TYPE (HCC): ICD-10-CM

## 2019-03-15 DIAGNOSIS — R91.1 NODULE OF LEFT LUNG: ICD-10-CM

## 2019-03-15 DIAGNOSIS — J96.11 CHRONIC HYPOXEMIC RESPIRATORY FAILURE (HCC): ICD-10-CM

## 2019-03-15 DIAGNOSIS — R53.81 PHYSICAL DECONDITIONING: ICD-10-CM

## 2019-03-15 DIAGNOSIS — Z87.891 PERSONAL HISTORY OF TOBACCO USE, PRESENTING HAZARDS TO HEALTH: ICD-10-CM

## 2019-03-15 LAB — INR PPP: 2.8 (ref 0.9–1.1)

## 2019-03-15 PROCEDURE — 99214 OFFICE O/P EST MOD 30 MIN: CPT | Performed by: INTERNAL MEDICINE

## 2019-03-15 PROCEDURE — 85610 PROTHROMBIN TIME: CPT | Performed by: NURSE PRACTITIONER

## 2019-03-15 RX ORDER — ALBUTEROL SULFATE 90 UG/1
2 AEROSOL, METERED RESPIRATORY (INHALATION) EVERY 4 HOURS PRN
Qty: 18 G | Refills: 11 | Status: SHIPPED | OUTPATIENT
Start: 2019-03-15 | End: 2019-01-01 | Stop reason: SDUPTHER

## 2019-03-15 NOTE — PROGRESS NOTES
Today's INR is 2.8.  Patient states no med changes or bleeding problems or unexplained bruising. Patient instructed to continue current dosing schedule. Verbalizes understanding. Will recheck in 2.5 weeks. Patient instructed regarding medication; results given and questions answered. Nutritional counseling given.  Dietary factors affecting therapy addressed.  Patient instructed to monitor for excessive bruising or bleeding.         This document has been electronically signed by DANIEL Pavon @ on March 15, 2019 10:13 AM

## 2019-03-27 ENCOUNTER — ANTICOAGULATION VISIT (OUTPATIENT)
Dept: CARDIAC SURGERY | Facility: CLINIC | Age: 74
End: 2019-03-27

## 2019-03-27 ENCOUNTER — HOSPITAL ENCOUNTER (OUTPATIENT)
Dept: CT IMAGING | Facility: HOSPITAL | Age: 74
Discharge: HOME | End: 2019-03-27
Admitting: INTERNAL MEDICINE

## 2019-03-27 VITALS — OXYGEN SATURATION: 96 % | DIASTOLIC BLOOD PRESSURE: 63 MMHG | HEART RATE: 62 BPM | SYSTOLIC BLOOD PRESSURE: 136 MMHG

## 2019-03-27 DIAGNOSIS — R91.1 NODULE OF LEFT LUNG: ICD-10-CM

## 2019-03-27 DIAGNOSIS — Z95.2 PERSONAL HISTORY OF HEART VALVE REPLACEMENT: ICD-10-CM

## 2019-03-27 DIAGNOSIS — I48.91 ATRIAL FIBRILLATION, UNSPECIFIED TYPE (HCC): ICD-10-CM

## 2019-03-27 DIAGNOSIS — Z79.01 LONG TERM CURRENT USE OF ANTICOAGULANT THERAPY: ICD-10-CM

## 2019-03-27 LAB — INR PPP: 2.8 (ref 0.9–1.1)

## 2019-03-27 PROCEDURE — 71250 CT THORAX DX C-: CPT

## 2019-03-27 PROCEDURE — 85610 PROTHROMBIN TIME: CPT | Performed by: NURSE PRACTITIONER

## 2019-03-27 NOTE — PROGRESS NOTES
Today's INR is 2.8.  Patient states no med changes or bleeding problems or unexplained bruising. Patient instructed to continue current dosing schedule. Verbalizes understanding. Will recheck in 3 weeks. Patient instructed regarding medication; results given and questions answered. Nutritional counseling given.  Dietary factors affecting therapy addressed.  Patient instructed to monitor for excessive bruising or bleeding.         This document has been electronically signed by DANIEL Pavon @ on March 27, 2019 1:25 PM

## 2019-04-08 DIAGNOSIS — E78.5 HYPERLIPIDEMIA, UNSPECIFIED HYPERLIPIDEMIA TYPE: ICD-10-CM

## 2019-04-08 RX ORDER — EZETIMIBE 10 MG/1
10 TABLET ORAL DAILY
Qty: 90 TABLET | Refills: 0 | Status: ON HOLD | OUTPATIENT
Start: 2019-04-08 | End: 2019-07-18 | Stop reason: SDUPTHER

## 2019-04-17 ENCOUNTER — ANTICOAGULATION VISIT (OUTPATIENT)
Dept: CARDIAC SURGERY | Facility: CLINIC | Age: 74
End: 2019-04-17

## 2019-04-17 ENCOUNTER — TELEPHONE (OUTPATIENT)
Dept: PULMONOLOGY | Facility: CLINIC | Age: 74
End: 2019-04-17

## 2019-04-17 VITALS — HEART RATE: 50 BPM | SYSTOLIC BLOOD PRESSURE: 142 MMHG | OXYGEN SATURATION: 94 % | DIASTOLIC BLOOD PRESSURE: 65 MMHG

## 2019-04-17 DIAGNOSIS — Z95.2 PERSONAL HISTORY OF HEART VALVE REPLACEMENT: ICD-10-CM

## 2019-04-17 DIAGNOSIS — Z79.01 LONG TERM CURRENT USE OF ANTICOAGULANT THERAPY: ICD-10-CM

## 2019-04-17 DIAGNOSIS — I48.91 ATRIAL FIBRILLATION, UNSPECIFIED TYPE (HCC): ICD-10-CM

## 2019-04-17 LAB — INR PPP: 2.4 (ref 0.9–1.1)

## 2019-04-17 PROCEDURE — 85610 PROTHROMBIN TIME: CPT | Performed by: NURSE PRACTITIONER

## 2019-04-17 PROCEDURE — 99211 OFF/OP EST MAY X REQ PHY/QHP: CPT | Performed by: NURSE PRACTITIONER

## 2019-04-17 NOTE — PROGRESS NOTES
Today's INR is 2.4.  Pt denies med changes or bleeding problems. Dose adjusted today only and pt instructed to hold green veggies for 2 days; pt verbalized. Patient instructed regarding medication; results given and questions answered. Nutritional counseling given.  Dietary factors affecting therapy addressed.  Patient instructed to monitor for excessive bruising or bleeding. Will recheck in 2 weeks.        This document has been electronically signed by Meme Duckworth, DANIEL @ on April 17, 2019 10:57 AM

## 2019-04-17 NOTE — TELEPHONE ENCOUNTER
Per Dr. Grant, Patient notified that the previously seen nodule is resolved.  Still some lymph nodes, but they are smaller, so we will watch them.            ----- Message from Dixie Bermeo RN sent at 4/17/2019 10:58 AM CDT -----  Regarding: CT Results/mutual patient  Pt states she had a CT scan April 1st and has not been called regarding results. She was hoping someone would call her.     Thank you,   Dixie

## 2019-05-01 ENCOUNTER — ANTICOAGULATION VISIT (OUTPATIENT)
Dept: CARDIAC SURGERY | Facility: CLINIC | Age: 74
End: 2019-05-01

## 2019-05-01 ENCOUNTER — TRANSCRIBE ORDERS (OUTPATIENT)
Dept: LAB | Facility: HOSPITAL | Age: 74
End: 2019-05-01

## 2019-05-01 ENCOUNTER — LAB (OUTPATIENT)
Dept: LAB | Facility: HOSPITAL | Age: 74
End: 2019-05-01

## 2019-05-01 VITALS — SYSTOLIC BLOOD PRESSURE: 114 MMHG | HEART RATE: 61 BPM | OXYGEN SATURATION: 94 % | DIASTOLIC BLOOD PRESSURE: 60 MMHG

## 2019-05-01 DIAGNOSIS — E21.1 SECONDARY HYPERPARATHYROIDISM, NON-RENAL (HCC): ICD-10-CM

## 2019-05-01 DIAGNOSIS — I48.91 ATRIAL FIBRILLATION, UNSPECIFIED TYPE (HCC): ICD-10-CM

## 2019-05-01 DIAGNOSIS — D63.1 ANEMIA ASSOCIATED WITH CHRONIC RENAL FAILURE: ICD-10-CM

## 2019-05-01 DIAGNOSIS — N18.9 ANEMIA ASSOCIATED WITH CHRONIC RENAL FAILURE: ICD-10-CM

## 2019-05-01 DIAGNOSIS — N18.30 CHRONIC RENAL DISEASE, STAGE III (HCC): ICD-10-CM

## 2019-05-01 DIAGNOSIS — Z79.01 LONG TERM CURRENT USE OF ANTICOAGULANT THERAPY: ICD-10-CM

## 2019-05-01 DIAGNOSIS — E21.1 SECONDARY HYPERPARATHYROIDISM, NON-RENAL (HCC): Primary | ICD-10-CM

## 2019-05-01 DIAGNOSIS — I50.9 SEVERE CONGESTIVE HEART FAILURE (HCC): ICD-10-CM

## 2019-05-01 DIAGNOSIS — A04.72 INTESTINAL INFECTION DUE TO CLOSTRIDIUM DIFFICILE: ICD-10-CM

## 2019-05-01 DIAGNOSIS — Z95.2 PERSONAL HISTORY OF HEART VALVE REPLACEMENT: ICD-10-CM

## 2019-05-01 LAB
25(OH)D3 SERPL-MCNC: 41.9 NG/ML (ref 30–100)
ALBUMIN SERPL-MCNC: 3.9 G/DL (ref 3.5–5.2)
ANION GAP SERPL CALCULATED.3IONS-SCNC: 9.5 MMOL/L
BUN BLD-MCNC: 63 MG/DL (ref 8–23)
BUN/CREAT SERPL: 31.7 (ref 7–25)
CALCIUM SPEC-SCNC: 9.8 MG/DL (ref 8.6–10.5)
CHLORIDE SERPL-SCNC: 103 MMOL/L (ref 98–107)
CO2 SERPL-SCNC: 29.5 MMOL/L (ref 22–29)
CREAT BLD-MCNC: 1.99 MG/DL (ref 0.57–1)
GFR SERPL CREATININE-BSD FRML MDRD: 25 ML/MIN/1.73
GLUCOSE BLD-MCNC: 127 MG/DL (ref 65–99)
HCT VFR BLD AUTO: 34.4 % (ref 34–46.6)
HGB BLD-MCNC: 10.8 G/DL (ref 12–15.9)
INR PPP: 1.7 (ref 0.9–1.1)
PHOSPHATE SERPL-MCNC: 3.7 MG/DL (ref 2.5–4.5)
POTASSIUM BLD-SCNC: 5 MMOL/L (ref 3.5–5.2)
PTH-INTACT SERPL-MCNC: 28.9 PG/ML (ref 15–65)
SODIUM BLD-SCNC: 142 MMOL/L (ref 136–145)

## 2019-05-01 PROCEDURE — 80069 RENAL FUNCTION PANEL: CPT

## 2019-05-01 PROCEDURE — 99211 OFF/OP EST MAY X REQ PHY/QHP: CPT | Performed by: NURSE PRACTITIONER

## 2019-05-01 PROCEDURE — 85018 HEMOGLOBIN: CPT

## 2019-05-01 PROCEDURE — 82306 VITAMIN D 25 HYDROXY: CPT

## 2019-05-01 PROCEDURE — 36415 COLL VENOUS BLD VENIPUNCTURE: CPT

## 2019-05-01 PROCEDURE — 83970 ASSAY OF PARATHORMONE: CPT

## 2019-05-01 PROCEDURE — 85014 HEMATOCRIT: CPT

## 2019-05-01 PROCEDURE — 85610 PROTHROMBIN TIME: CPT | Performed by: NURSE PRACTITIONER

## 2019-05-01 NOTE — PROGRESS NOTES
Today's INR is 1.7.  Pt denies med changes, bleeding problems, missed doses, or excessive vitamin K intake. Dose adjusted today and pt instructed to hold green veggies for 3 days; pt verbalized. Patient instructed regarding medication; results given and questions answered. Nutritional counseling given.  Dietary factors affecting therapy addressed.  Patient instructed to monitor for excessive bruising or bleeding. Will recheck next week.         This document has been electronically signed by Meme Duckworth, DANIEL @ on May 1, 2019 10:11 AM

## 2019-05-08 ENCOUNTER — TELEPHONE (OUTPATIENT)
Dept: FAMILY MEDICINE CLINIC | Facility: CLINIC | Age: 74
End: 2019-05-08

## 2019-05-08 ENCOUNTER — ANTICOAGULATION VISIT (OUTPATIENT)
Dept: CARDIAC SURGERY | Facility: CLINIC | Age: 74
End: 2019-05-08

## 2019-05-08 VITALS — SYSTOLIC BLOOD PRESSURE: 122 MMHG | HEART RATE: 60 BPM | DIASTOLIC BLOOD PRESSURE: 82 MMHG | OXYGEN SATURATION: 96 %

## 2019-05-08 DIAGNOSIS — Z95.2 PERSONAL HISTORY OF HEART VALVE REPLACEMENT: ICD-10-CM

## 2019-05-08 DIAGNOSIS — I48.91 ATRIAL FIBRILLATION, UNSPECIFIED TYPE (HCC): ICD-10-CM

## 2019-05-08 DIAGNOSIS — F32.A DEPRESSIVE DISORDER: ICD-10-CM

## 2019-05-08 DIAGNOSIS — Z79.01 LONG TERM CURRENT USE OF ANTICOAGULANT THERAPY: ICD-10-CM

## 2019-05-08 LAB — INR PPP: 2.6 (ref 0.9–1.1)

## 2019-05-08 PROCEDURE — 85610 PROTHROMBIN TIME: CPT | Performed by: NURSE PRACTITIONER

## 2019-05-08 RX ORDER — CITALOPRAM 20 MG/1
20 TABLET ORAL DAILY
Qty: 90 TABLET | Refills: 3 | Status: SHIPPED | OUTPATIENT
Start: 2019-05-08 | End: 2020-01-01

## 2019-05-08 RX ORDER — WARFARIN SODIUM 5 MG/1
TABLET ORAL
Qty: 90 TABLET | Refills: 1 | Status: ON HOLD | OUTPATIENT
Start: 2019-05-08 | End: 2019-07-14

## 2019-05-08 NOTE — PROGRESS NOTES
Today's INR is 2.6.  Pt denies med changes or bleeding problems. Will recheck next week on current weekly dose. Patient instructed regarding medication; results given and questions answered. Nutritional counseling given.  Dietary factors affecting therapy addressed.  Patient instructed to monitor for excessive bruising or bleeding.         This document has been electronically signed by DANIEL Pavon @ on May 8, 2019 11:21 AM

## 2019-05-08 NOTE — TELEPHONE ENCOUNTER
Patient called and states she needs refills sent to [facility]  citalopram (CeleXA) 20 MG tablet

## 2019-05-16 ENCOUNTER — ANTICOAGULATION VISIT (OUTPATIENT)
Dept: CARDIAC SURGERY | Facility: CLINIC | Age: 74
End: 2019-05-16

## 2019-05-16 VITALS — HEART RATE: 60 BPM | SYSTOLIC BLOOD PRESSURE: 122 MMHG | DIASTOLIC BLOOD PRESSURE: 63 MMHG | OXYGEN SATURATION: 95 %

## 2019-05-16 DIAGNOSIS — Z79.01 LONG TERM CURRENT USE OF ANTICOAGULANT THERAPY: ICD-10-CM

## 2019-05-16 DIAGNOSIS — I48.91 ATRIAL FIBRILLATION, UNSPECIFIED TYPE (HCC): ICD-10-CM

## 2019-05-16 DIAGNOSIS — Z95.2 PERSONAL HISTORY OF HEART VALVE REPLACEMENT: ICD-10-CM

## 2019-05-16 LAB — INR PPP: 2.5 (ref 0.9–1.1)

## 2019-05-16 PROCEDURE — 85610 PROTHROMBIN TIME: CPT | Performed by: NURSE PRACTITIONER

## 2019-05-16 NOTE — PROGRESS NOTES
Today's INR is 2.5.  Patient states no med changes or bleeding problems or unexplained bruising. Patient instructed to continue current dosing schedule. Verbalizes understanding. Will recheck in 2 weeks. Patient instructed regarding medication; results given and questions answered. Nutritional counseling given.  Dietary factors affecting therapy addressed.  Patient instructed to monitor for excessive bruising or bleeding.         This document has been electronically signed by DANIEL Pavon @ on May 16, 2019 11:00 AM

## 2019-05-23 ENCOUNTER — TELEPHONE (OUTPATIENT)
Dept: FAMILY MEDICINE CLINIC | Facility: CLINIC | Age: 74
End: 2019-05-23

## 2019-05-23 NOTE — TELEPHONE ENCOUNTER
Aranza from [facility] round is requesting office notes for repairs on wheelchair, said she faxed request.      [phone]

## 2019-05-31 ENCOUNTER — ANTICOAGULATION VISIT (OUTPATIENT)
Dept: CARDIAC SURGERY | Facility: CLINIC | Age: 74
End: 2019-05-31

## 2019-05-31 VITALS — SYSTOLIC BLOOD PRESSURE: 141 MMHG | OXYGEN SATURATION: 92 % | HEART RATE: 75 BPM | DIASTOLIC BLOOD PRESSURE: 63 MMHG

## 2019-05-31 DIAGNOSIS — I48.91 ATRIAL FIBRILLATION, UNSPECIFIED TYPE (HCC): ICD-10-CM

## 2019-05-31 DIAGNOSIS — Z95.2 PERSONAL HISTORY OF HEART VALVE REPLACEMENT: ICD-10-CM

## 2019-05-31 DIAGNOSIS — Z79.01 LONG TERM CURRENT USE OF ANTICOAGULANT THERAPY: ICD-10-CM

## 2019-05-31 LAB — INR PPP: 4.9 (ref 0.9–1.1)

## 2019-05-31 PROCEDURE — 85610 PROTHROMBIN TIME: CPT | Performed by: NURSE PRACTITIONER

## 2019-05-31 PROCEDURE — 99211 OFF/OP EST MAY X REQ PHY/QHP: CPT | Performed by: NURSE PRACTITIONER

## 2019-05-31 RX ORDER — FUROSEMIDE 40 MG/1
40 TABLET ORAL DAILY
COMMUNITY
End: 2019-07-22 | Stop reason: HOSPADM

## 2019-06-04 ENCOUNTER — TELEPHONE (OUTPATIENT)
Dept: FAMILY MEDICINE CLINIC | Facility: CLINIC | Age: 74
End: 2019-06-04

## 2019-06-04 ENCOUNTER — ANTICOAGULATION VISIT (OUTPATIENT)
Dept: CARDIAC SURGERY | Facility: CLINIC | Age: 74
End: 2019-06-04

## 2019-06-04 VITALS — HEART RATE: 60 BPM | OXYGEN SATURATION: 95 % | DIASTOLIC BLOOD PRESSURE: 59 MMHG | SYSTOLIC BLOOD PRESSURE: 133 MMHG

## 2019-06-04 DIAGNOSIS — Z95.2 PERSONAL HISTORY OF HEART VALVE REPLACEMENT: ICD-10-CM

## 2019-06-04 DIAGNOSIS — I48.91 ATRIAL FIBRILLATION, UNSPECIFIED TYPE (HCC): ICD-10-CM

## 2019-06-04 DIAGNOSIS — I10 ESSENTIAL HYPERTENSION, BENIGN: ICD-10-CM

## 2019-06-04 DIAGNOSIS — Z79.01 LONG TERM CURRENT USE OF ANTICOAGULANT THERAPY: ICD-10-CM

## 2019-06-04 LAB — INR PPP: 2.9 (ref 0.9–1.1)

## 2019-06-04 PROCEDURE — 85610 PROTHROMBIN TIME: CPT | Performed by: NURSE PRACTITIONER

## 2019-06-04 PROCEDURE — 99211 OFF/OP EST MAY X REQ PHY/QHP: CPT | Performed by: NURSE PRACTITIONER

## 2019-06-04 RX ORDER — DILTIAZEM HYDROCHLORIDE 240 MG/1
240 CAPSULE, COATED, EXTENDED RELEASE ORAL DAILY
Qty: 90 CAPSULE | Refills: 3 | Status: SHIPPED | OUTPATIENT
Start: 2019-06-04 | End: 2020-01-01

## 2019-06-04 NOTE — TELEPHONE ENCOUNTER
QUIANA KAMARA IS NEEDING REFILL ON CARDIZEM TO BE SENT TO [facility]  SHE HAS ONLY 2 PILLS LEFT SHE SAID

## 2019-06-04 NOTE — PROGRESS NOTES
Today's INR is 2.9.  Pt states she is taking Furosemide daily again. Pt denies bleeding problems. Pt placed back on previous dose and instructed to have green veggies on Tuesday and Friday; pt verbalized. Patient instructed regarding medication; results given and questions answered. Nutritional counseling given.  Dietary factors affecting therapy addressed.  Patient instructed to monitor for excessive bruising or bleeding. Will recheck next week.      This document has been electronically signed by CRIS Pan @  On June 4, 2019 11:24 AM

## 2019-06-06 ENCOUNTER — TELEPHONE (OUTPATIENT)
Dept: FAMILY MEDICINE CLINIC | Facility: CLINIC | Age: 74
End: 2019-06-06

## 2019-06-06 NOTE — TELEPHONE ENCOUNTER
We have the paperwork. We will get it filled out when the patient comes in for her appointment on June 10th.

## 2019-06-06 NOTE — TELEPHONE ENCOUNTER
Patient has called and states that she called the company from where she got her power wheelchair for a new battery. Her's wont stay charged. She states that they told her that she would have to contact her Dr in order for them to send? She states they were supposed to be sending something to [place] for that. Have you gotten anything from [facility]? Please call her and let her know [phone]

## 2019-06-10 ENCOUNTER — TELEPHONE (OUTPATIENT)
Dept: FAMILY MEDICINE CLINIC | Facility: CLINIC | Age: 74
End: 2019-06-10

## 2019-06-10 ENCOUNTER — OFFICE VISIT (OUTPATIENT)
Dept: FAMILY MEDICINE CLINIC | Facility: CLINIC | Age: 74
End: 2019-06-10

## 2019-06-10 ENCOUNTER — ANTICOAGULATION VISIT (OUTPATIENT)
Dept: CARDIAC SURGERY | Facility: CLINIC | Age: 74
End: 2019-06-10

## 2019-06-10 VITALS — OXYGEN SATURATION: 95 % | HEART RATE: 61 BPM | SYSTOLIC BLOOD PRESSURE: 140 MMHG | DIASTOLIC BLOOD PRESSURE: 60 MMHG

## 2019-06-10 VITALS
BODY MASS INDEX: 40.82 KG/M2 | DIASTOLIC BLOOD PRESSURE: 62 MMHG | WEIGHT: 245 LBS | SYSTOLIC BLOOD PRESSURE: 156 MMHG | HEIGHT: 65 IN

## 2019-06-10 DIAGNOSIS — Z13.29 SCREENING FOR THYROID DISORDER: ICD-10-CM

## 2019-06-10 DIAGNOSIS — J44.9 CHRONIC OBSTRUCTIVE PULMONARY DISEASE, UNSPECIFIED COPD TYPE (HCC): ICD-10-CM

## 2019-06-10 DIAGNOSIS — Z79.01 LONG TERM CURRENT USE OF ANTICOAGULANT THERAPY: ICD-10-CM

## 2019-06-10 DIAGNOSIS — E78.2 MIXED HYPERLIPIDEMIA: ICD-10-CM

## 2019-06-10 DIAGNOSIS — E11.9 DIABETES MELLITUS WITHOUT COMPLICATION (HCC): ICD-10-CM

## 2019-06-10 DIAGNOSIS — I48.91 ATRIAL FIBRILLATION, UNSPECIFIED TYPE (HCC): ICD-10-CM

## 2019-06-10 DIAGNOSIS — I10 ESSENTIAL HYPERTENSION: Primary | ICD-10-CM

## 2019-06-10 DIAGNOSIS — Z95.2 PERSONAL HISTORY OF HEART VALVE REPLACEMENT: ICD-10-CM

## 2019-06-10 DIAGNOSIS — F32.A DEPRESSIVE DISORDER: ICD-10-CM

## 2019-06-10 LAB — INR PPP: 4.1 (ref 0.9–1.1)

## 2019-06-10 PROCEDURE — 99211 OFF/OP EST MAY X REQ PHY/QHP: CPT | Performed by: NURSE PRACTITIONER

## 2019-06-10 PROCEDURE — 85610 PROTHROMBIN TIME: CPT | Performed by: NURSE PRACTITIONER

## 2019-06-10 PROCEDURE — 99214 OFFICE O/P EST MOD 30 MIN: CPT | Performed by: NURSE PRACTITIONER

## 2019-06-10 NOTE — TELEPHONE ENCOUNTER
[facility] the [facility] that states patient still uses her chair so she can get repairs approved     #[phone]  Fax [phone]

## 2019-06-10 NOTE — PROGRESS NOTES
"Subjective   Brendon Skelton is a 73 y.o. female. Six month follow up.  \"Dr. Caputo tried to lower my Lasix to every other day but I wasn't able to do that. I had to start back taking it everyday because I was short of breath.\" Patient indicates the battery on her Hoveround runs out easily. \"I will have it fully charged but if I go to far from home, the yellow light pops on and I have to turn it off and siit for a few minutes and then turn it back on to use. I use it everyday, if I go the pharmacy or to the food store.\" \"This is the only way I can get around.\"    Depression   Visit Type: follow-up  Patient presents with the following symptoms: decreased concentration, depressed mood, restlessness and shortness of breath.  Patient is not experiencing: confusion, suicidal ideas, suicidal planning and thoughts of death.  Frequency of symptoms: occasionally   Severity: mild   Sleep quality: good  Nighttime awakenings: occasional  Compliance with medications:  %        Diabetes   She has type 2 diabetes mellitus. Pertinent negatives for hypoglycemia include no confusion. Associated symptoms include weakness. Pertinent negatives for diabetes include no chest pain and no fatigue. Symptoms are stable. Risk factors for coronary artery disease include diabetes mellitus, hypertension, obesity, post-menopausal, sedentary lifestyle and dyslipidemia. Current diabetic treatment includes insulin injections. Her weight is stable. She is following a generally healthy diet. Meal planning includes avoidance of concentrated sweets. She never participates in exercise. Her home blood glucose trend is fluctuating minimally.   Hypertension   This is a chronic problem. The current episode started more than 1 year ago. The problem is unchanged. The problem is controlled. Associated symptoms include shortness of breath. Pertinent negatives include no chest pain. There are no associated agents to hypertension. Risk factors for " coronary artery disease include obesity, dyslipidemia, sedentary lifestyle and post-menopausal state. Past treatments include calcium channel blockers. Current antihypertension treatment includes calcium channel blockers. The current treatment provides significant improvement. Compliance problems include exercise and diet.  Hypertensive end-organ damage includes CAD/MI and heart failure.   Hyperlipidemia   This is a chronic problem. The current episode started more than 1 year ago. The problem is controlled. Exacerbating diseases include diabetes and obesity. Associated symptoms include shortness of breath. Pertinent negatives include no chest pain. Current antihyperlipidemic treatment includes ezetimibe. The current treatment provides moderate improvement of lipids. There are no compliance problems.  Risk factors for coronary artery disease include a sedentary lifestyle, hypertension and diabetes mellitus.   COPD   This is a chronic problem. The current episode started more than 1 year ago. The problem occurs constantly. The problem has been waxing and waning. Associated symptoms include coughing and weakness. Pertinent negatives include no chest pain, chills, diaphoresis, fatigue or fever. The symptoms are aggravated by walking and exertion.        The following portions of the patient's history were reviewed and updated as appropriate:     Current Outpatient Medications   Medication Sig Dispense Refill   • acetaminophen (TYLENOL) 325 MG tablet Take 650 mg by mouth every 6 (six) hours as needed for mild pain (1-3).     • albuterol (PROVENTIL) (2.5 MG/3ML) 0.083% nebulizer solution Take 2.5 mg by nebulization Every 4 (Four) Hours As Needed for Wheezing.     • albuterol sulfate  (90 Base) MCG/ACT inhaler Inhale 2 puffs Every 4 (Four) Hours As Needed for Wheezing or Shortness of Air. 18 g 11   • Ascorbic Acid (VITAMIN C WITH OCHOA HIPS) 250 MG tablet Take 500 mg by mouth Daily.     • aspirin 81 MG chewable  tablet Chew 81 mg Daily.     • cholecalciferol (VITAMIN D3) 1000 units tablet Take 2,000 Units by mouth Daily.     • citalopram (CeleXA) 20 MG tablet Take 1 tablet by mouth Daily. 90 tablet 3   • diltiaZEM CD (CARDIZEM CD) 240 MG 24 hr capsule Take 1 capsule by mouth Daily. 90 capsule 3   • ezetimibe (ZETIA) 10 MG tablet Take 1 tablet by mouth Daily. 90 tablet 0   • ferrous sulfate 325 (65 FE) MG tablet Take 325 mg by mouth Daily With Breakfast.     • furosemide (LASIX) 40 MG tablet Take 40 mg by mouth Daily.     • glucose blood test strip 1 each by Other route 3 (three) times a day. Use as instructed     • Insulin Glargine (BASAGLAR KWIKPEN) 100 UNIT/ML injection pen Inject 30 Units under the skin into the appropriate area as directed Daily. 1 pen 11   • Insulin Pen Needle (BD PEN NEEDLE DEREK U/F) 32G X 4 MM misc 1 each 4 (Four) Times a Day. 120 each 5   • Prenatal Vit-Fe Fumarate-FA (PRENATAL VITAMIN) 27-0.8 MG tablet Take 1 tablet by mouth daily.     • raNITIdine (ZANTAC) 150 MG tablet Take 1 tablet by mouth Every Night. 180 tablet 1   • rOPINIRole (REQUIP) 0.25 MG tablet Take 1 tablet by mouth Every Night. Take 1 hour before bedtime. 90 tablet 3   • traZODone (DESYREL) 150 MG tablet Take 2 tablets by mouth Every Night. 180 tablet 3   • umeclidinium-vilanterol (ANORO ELLIPTA) 62.5-25 MCG/INH aerosol powder  inhaler Inhale 1 puff Daily. 1 each 6   • warfarin (COUMADIN) 5 MG tablet Take 1 tablet nightly except on Tuesday and Friday take 1/2 tablet OR as directed 90 tablet 1     No current facility-administered medications for this visit.      Current Outpatient Medications on File Prior to Visit   Medication Sig   • acetaminophen (TYLENOL) 325 MG tablet Take 650 mg by mouth every 6 (six) hours as needed for mild pain (1-3).   • albuterol (PROVENTIL) (2.5 MG/3ML) 0.083% nebulizer solution Take 2.5 mg by nebulization Every 4 (Four) Hours As Needed for Wheezing.   • albuterol sulfate  (90 Base) MCG/ACT inhaler  Inhale 2 puffs Every 4 (Four) Hours As Needed for Wheezing or Shortness of Air.   • Ascorbic Acid (VITAMIN C WITH OCHOA HIPS) 250 MG tablet Take 500 mg by mouth Daily.   • aspirin 81 MG chewable tablet Chew 81 mg Daily.   • cholecalciferol (VITAMIN D3) 1000 units tablet Take 2,000 Units by mouth Daily.   • citalopram (CeleXA) 20 MG tablet Take 1 tablet by mouth Daily.   • diltiaZEM CD (CARDIZEM CD) 240 MG 24 hr capsule Take 1 capsule by mouth Daily.   • ezetimibe (ZETIA) 10 MG tablet Take 1 tablet by mouth Daily.   • ferrous sulfate 325 (65 FE) MG tablet Take 325 mg by mouth Daily With Breakfast.   • furosemide (LASIX) 40 MG tablet Take 40 mg by mouth Daily.   • glucose blood test strip 1 each by Other route 3 (three) times a day. Use as instructed   • Insulin Glargine (BASAGLAR KWIKPEN) 100 UNIT/ML injection pen Inject 30 Units under the skin into the appropriate area as directed Daily.   • Insulin Pen Needle (BD PEN NEEDLE DEREK U/F) 32G X 4 MM misc 1 each 4 (Four) Times a Day.   • Prenatal Vit-Fe Fumarate-FA (PRENATAL VITAMIN) 27-0.8 MG tablet Take 1 tablet by mouth daily.   • raNITIdine (ZANTAC) 150 MG tablet Take 1 tablet by mouth Every Night.   • rOPINIRole (REQUIP) 0.25 MG tablet Take 1 tablet by mouth Every Night. Take 1 hour before bedtime.   • traZODone (DESYREL) 150 MG tablet Take 2 tablets by mouth Every Night.   • umeclidinium-vilanterol (ANORO ELLIPTA) 62.5-25 MCG/INH aerosol powder  inhaler Inhale 1 puff Daily.   • warfarin (COUMADIN) 5 MG tablet Take 1 tablet nightly except on Tuesday and Friday take 1/2 tablet OR as directed     No current facility-administered medications on file prior to visit.      She is allergic to crestor [rosuvastatin calcium]; lipitor [atorvastatin]; lortab [hydrocodone-acetaminophen]; adhesive tape; and hydrocodone-acetaminophen..    Review of Systems   Constitutional: Negative for chills, diaphoresis, fatigue and fever.   HENT: Negative.    Eyes: Negative.    Respiratory:  "Positive for cough and shortness of breath.    Cardiovascular: Negative.  Negative for chest pain.   Gastrointestinal: Negative.    Genitourinary: Negative.    Musculoskeletal: Positive for gait problem.   Skin: Negative.    Neurological: Positive for weakness.   Psychiatric/Behavioral: Positive for decreased concentration. Negative for confusion and suicidal ideas.       Objective    Visit Vitals  /62   Ht 165.1 cm (65\")   Wt 111 kg (245 lb)   LMP  (LMP Unknown)   BMI 40.77 kg/m²       Physical Exam   Constitutional: She is oriented to person, place, and time. She appears well-developed and well-nourished.   Arrives via personal wheelchair    HENT:   Head: Normocephalic.   Right Ear: External ear normal.   Left Ear: External ear normal.   Eyes: EOM are normal. Pupils are equal, round, and reactive to light.   Neck: Normal range of motion. Neck supple.   Cardiovascular: Normal rate, regular rhythm and normal heart sounds.   Pulmonary/Chest: Effort normal and breath sounds normal.   Abdominal: Soft. Bowel sounds are normal.   Musculoskeletal: Normal range of motion.   Patient arrived via Hoveround, bilateral pedal pushes and pulls equal but weak.    Neurological: She is alert and oriented to person, place, and time.   Skin: Skin is warm. Capillary refill takes less than 2 seconds.   Psychiatric: She has a normal mood and affect. Her behavior is normal.   Nursing note and vitals reviewed.      Assessment/Plan   Problems Addressed this Visit        Cardiovascular and Mediastinum    Hyperlipidemia    Essential hypertension - Primary       Respiratory    COPD (chronic obstructive pulmonary disease) (CMS/[facility])       Endocrine    Diabetes mellitus without complication (CMS/[facility])       Other    Depressive disorder      No orders of the defined types were placed in this encounter.    1. Essential Hypertension:  Continue Cardizem and Lasix use as previously prescribed  Complete CMP and CBC as ordered will notify results " when available  Adhere to no more than 2 g sodium diet daily    2.  Hyperlipidemia:  Continue Zetia as previously prescribed  Complete fasting lipid panel as ordered will notify results when available    3.  COPD:  Continue Anoro, albuterol inhaler and nebulizer as needed  Continue Lasix as previously prescribed  Continue use of Hoveround to prevent exertional dyspnea and prevent further COPD exacerbations    4.  Diabetes mellitus without complications:  Continue insulin use as previously prescribed  Complete hemoglobin A1c as ordered and will notify results when available    5. Depressive disorder:  Continue Celexa as previously prescribed  Educated on possible side of this medication including but not limited possible suicidal ideations  Encouraged to discontinue medication immediately if suicidal ideations occur and seek emergency medical treatment    Continue on current medications as previously prescribed   Return in about 6 months (around 12/13/2019), or if symptoms worsen or fail to improve, for Medicare Wellness.        This document has been electronically signed by ABRAHAM Boone on Latoya 10, 2019 11:35 AM

## 2019-06-10 NOTE — PROGRESS NOTES
Today's INR is 4.1.   Pt continues furosemide which does show potential for minor interaction with coumadin.  Pt denies bleeding issues.  Adjusted coumadin dose and instructed pt to increase Vit K intake today with a broccoli serving.  Recheck INR this Friday. Pt verbalized instructions.  Patient instructed regarding medication; results given and questions answered. Nutritional counseling given.  Dietary factors affecting therapy addressed.  Patient instructed to monitor for excessive bruising or bleeding.        This document has been electronically signed by Meme Duckworth, DANIEL @ on Latoya 10, 2019 10:41 AM

## 2019-06-14 ENCOUNTER — ANTICOAGULATION VISIT (OUTPATIENT)
Dept: CARDIAC SURGERY | Facility: CLINIC | Age: 74
End: 2019-06-14

## 2019-06-14 VITALS — OXYGEN SATURATION: 90 % | HEART RATE: 62 BPM | SYSTOLIC BLOOD PRESSURE: 116 MMHG | DIASTOLIC BLOOD PRESSURE: 65 MMHG

## 2019-06-14 DIAGNOSIS — I48.91 ATRIAL FIBRILLATION, UNSPECIFIED TYPE (HCC): ICD-10-CM

## 2019-06-14 DIAGNOSIS — Z79.01 LONG TERM CURRENT USE OF ANTICOAGULANT THERAPY: ICD-10-CM

## 2019-06-14 DIAGNOSIS — Z95.2 PERSONAL HISTORY OF HEART VALVE REPLACEMENT: ICD-10-CM

## 2019-06-14 LAB
INR PPP: 6.5 (ref 0.9–1.1)
INR PPP: 6.5 (ref 0.9–1.1)

## 2019-06-14 PROCEDURE — 99211 OFF/OP EST MAY X REQ PHY/QHP: CPT | Performed by: NURSE PRACTITIONER

## 2019-06-14 PROCEDURE — 85610 PROTHROMBIN TIME: CPT | Performed by: NURSE PRACTITIONER

## 2019-06-14 NOTE — PROGRESS NOTES
Today's INR is 6.5.  Pt denies med changes or bleeding problems. Pt states she followed dosing instructions and ate broccoli and zucchini. Dose adjusted and pt instructed to have 2 cups of brussels sprouts today; pt verbalized. Patient instructed regarding medication; results given and questions answered. Nutritional counseling given.  Dietary factors affecting therapy addressed.  Patient instructed to monitor for excessive bruising or bleeding. Will recheck in 4 days when pt can return. Notify provider if you experience excessive bleeding from the nose, cuts, gums, rectum, urinary tract, or vagina. Reddish or brown urine or stool. Vomiting of blood or hemorrhoidal bleeding. If major injury occurs present to the Emergency Department. Pt verbalized.        This document has been electronically signed by Meme Duckworth, DANIEL @ on June 14, 2019 10:40 AM

## 2019-06-18 ENCOUNTER — APPOINTMENT (OUTPATIENT)
Dept: CARDIOLOGY | Facility: HOSPITAL | Age: 74
End: 2019-06-18

## 2019-06-18 ENCOUNTER — HOSPITAL ENCOUNTER (INPATIENT)
Facility: HOSPITAL | Age: 74
LOS: 15 days | Discharge: NURSING, REHAB OR LONG-TERM CARE | End: 2019-07-05
Attending: FAMILY MEDICINE | Admitting: INTERNAL MEDICINE

## 2019-06-18 ENCOUNTER — EPISODE CHANGES (OUTPATIENT)
Dept: CASE MANAGEMENT | Facility: OTHER | Age: 74
End: 2019-06-18

## 2019-06-18 ENCOUNTER — APPOINTMENT (OUTPATIENT)
Dept: GENERAL RADIOLOGY | Facility: HOSPITAL | Age: 74
End: 2019-06-18

## 2019-06-18 DIAGNOSIS — K92.2 GASTROINTESTINAL HEMORRHAGE, UNSPECIFIED GASTROINTESTINAL HEMORRHAGE TYPE: Primary | ICD-10-CM

## 2019-06-18 DIAGNOSIS — Z74.09 IMPAIRED MOBILITY AND ACTIVITIES OF DAILY LIVING: ICD-10-CM

## 2019-06-18 DIAGNOSIS — Z95.2 S/P AVR: ICD-10-CM

## 2019-06-18 DIAGNOSIS — R79.1 SUPRATHERAPEUTIC INR: ICD-10-CM

## 2019-06-18 DIAGNOSIS — I49.5 SSS (SICK SINUS SYNDROME) (HCC): ICD-10-CM

## 2019-06-18 DIAGNOSIS — Z74.09 IMPAIRED PHYSICAL MOBILITY: ICD-10-CM

## 2019-06-18 DIAGNOSIS — I25.810 ATHEROSCLEROSIS OF AUTOLOGOUS VEIN CORONARY ARTERY BYPASS GRAFT, ANGINA PRESENCE UNSPECIFIED: ICD-10-CM

## 2019-06-18 DIAGNOSIS — I25.10 ATHEROSCLEROSIS OF NATIVE CORONARY ARTERY OF NATIVE HEART, ANGINA PRESENCE UNSPECIFIED: ICD-10-CM

## 2019-06-18 DIAGNOSIS — Z78.9 IMPAIRED MOBILITY AND ACTIVITIES OF DAILY LIVING: ICD-10-CM

## 2019-06-18 LAB
ALBUMIN SERPL-MCNC: 3.8 G/DL (ref 3.5–5.2)
ALBUMIN/GLOB SERPL: 1.1 G/DL
ALP SERPL-CCNC: 74 U/L (ref 39–117)
ALT SERPL W P-5'-P-CCNC: 19 U/L (ref 1–33)
ANION GAP SERPL CALCULATED.3IONS-SCNC: 9 MMOL/L
AST SERPL-CCNC: 29 U/L (ref 1–32)
BACTERIA UR QL AUTO: ABNORMAL /HPF
BASOPHILS # BLD AUTO: 0.06 10*3/MM3 (ref 0–0.2)
BASOPHILS NFR BLD AUTO: 0.6 % (ref 0–1.5)
BH CV ECHO MEAS - AO MAX PG: 25.8 MMHG
BH CV ECHO MEAS - AO MEAN PG: 11 MMHG
BH CV ECHO MEAS - AO ROOT AREA (BSA CORRECTED): 1.4
BH CV ECHO MEAS - AO ROOT AREA: 7.5 CM^2
BH CV ECHO MEAS - AO ROOT DIAM: 3.1 CM
BH CV ECHO MEAS - AO V2 MAX: 254 CM/SEC
BH CV ECHO MEAS - AO V2 MEAN: 152 CM/SEC
BH CV ECHO MEAS - AO V2 VTI: 50.1 CM
BH CV ECHO MEAS - BSA(HAYCOCK): 2.3 M^2
BH CV ECHO MEAS - BSA: 2.2 M^2
BH CV ECHO MEAS - BZI_BMI: 40.6 KILOGRAMS/M^2
BH CV ECHO MEAS - BZI_METRIC_HEIGHT: 165.1 CM
BH CV ECHO MEAS - BZI_METRIC_WEIGHT: 110.7 KG
BH CV ECHO MEAS - EDV(CUBED): 131.9 ML
BH CV ECHO MEAS - EDV(TEICH): 123.2 ML
BH CV ECHO MEAS - EF(CUBED): 61.3 %
BH CV ECHO MEAS - EF(TEICH): 52.5 %
BH CV ECHO MEAS - ESV(CUBED): 51.1 ML
BH CV ECHO MEAS - ESV(TEICH): 58.5 ML
BH CV ECHO MEAS - FS: 27.1 %
BH CV ECHO MEAS - IVS/LVPW: 1.2
BH CV ECHO MEAS - IVSD: 1.4 CM
BH CV ECHO MEAS - LA DIMENSION: 5 CM
BH CV ECHO MEAS - LA/AO: 1.6
BH CV ECHO MEAS - LV MASS(C)D: 267.8 GRAMS
BH CV ECHO MEAS - LV MASS(C)DI: 124.4 GRAMS/M^2
BH CV ECHO MEAS - LVIDD: 5.1 CM
BH CV ECHO MEAS - LVIDS: 3.7 CM
BH CV ECHO MEAS - LVPWD: 1.2 CM
BH CV ECHO MEAS - MR MAX PG: 23 MMHG
BH CV ECHO MEAS - MR MAX VEL: 240 CM/SEC
BH CV ECHO MEAS - MV A MAX VEL: 84.9 CM/SEC
BH CV ECHO MEAS - MV DEC SLOPE: 691 CM/SEC^2
BH CV ECHO MEAS - MV E MAX VEL: 131 CM/SEC
BH CV ECHO MEAS - MV E/A: 1.5
BH CV ECHO MEAS - MV MAX PG: 8 MMHG
BH CV ECHO MEAS - MV MEAN PG: 2 MMHG
BH CV ECHO MEAS - MV P1/2T MAX VEL: 140 CM/SEC
BH CV ECHO MEAS - MV P1/2T: 59.3 MSEC
BH CV ECHO MEAS - MV V2 MAX: 141 CM/SEC
BH CV ECHO MEAS - MV V2 MEAN: 65.3 CM/SEC
BH CV ECHO MEAS - MV V2 VTI: 36.7 CM
BH CV ECHO MEAS - MVA P1/2T LCG: 1.6 CM^2
BH CV ECHO MEAS - MVA(P1/2T): 3.7 CM^2
BH CV ECHO MEAS - PA MAX PG: 4 MMHG
BH CV ECHO MEAS - PA V2 MAX: 100 CM/SEC
BH CV ECHO MEAS - RAP SYSTOLE: 10 MMHG
BH CV ECHO MEAS - RVDD: 4.4 CM
BH CV ECHO MEAS - RVSP: 69.3 MMHG
BH CV ECHO MEAS - SI(AO): 175.7 ML/M^2
BH CV ECHO MEAS - SI(CUBED): 37.5 ML/M^2
BH CV ECHO MEAS - SI(TEICH): 30.1 ML/M^2
BH CV ECHO MEAS - SV(AO): 378.1 ML
BH CV ECHO MEAS - SV(CUBED): 80.8 ML
BH CV ECHO MEAS - SV(TEICH): 64.7 ML
BH CV ECHO MEAS - TR MAX VEL: 385 CM/SEC
BILIRUB SERPL-MCNC: 0.8 MG/DL (ref 0.2–1.2)
BILIRUB UR QL STRIP: NEGATIVE
BUN BLD-MCNC: 42 MG/DL (ref 8–23)
BUN/CREAT SERPL: 23.2 (ref 7–25)
CALCIUM SPEC-SCNC: 9.5 MG/DL (ref 8.6–10.5)
CHLORIDE SERPL-SCNC: 103 MMOL/L (ref 98–107)
CLARITY UR: ABNORMAL
CO2 SERPL-SCNC: 31 MMOL/L (ref 22–29)
COLOR UR: YELLOW
CREAT BLD-MCNC: 1.81 MG/DL (ref 0.57–1)
DEPRECATED RDW RBC AUTO: 61.1 FL (ref 37–54)
EOSINOPHIL # BLD AUTO: 0.16 10*3/MM3 (ref 0–0.4)
EOSINOPHIL NFR BLD AUTO: 1.6 % (ref 0.3–6.2)
ERYTHROCYTE [DISTWIDTH] IN BLOOD BY AUTOMATED COUNT: 17.3 % (ref 12.3–15.4)
GFR SERPL CREATININE-BSD FRML MDRD: 27 ML/MIN/1.73
GLOBULIN UR ELPH-MCNC: 3.4 GM/DL
GLUCOSE BLD-MCNC: 105 MG/DL (ref 65–99)
GLUCOSE BLDC GLUCOMTR-MCNC: 204 MG/DL (ref 70–130)
GLUCOSE BLDC GLUCOMTR-MCNC: 261 MG/DL (ref 70–130)
GLUCOSE UR STRIP-MCNC: NEGATIVE MG/DL
HCT VFR BLD AUTO: 28.8 % (ref 34–46.6)
HCT VFR BLD AUTO: 29.8 % (ref 34–46.6)
HGB BLD-MCNC: 9.1 G/DL (ref 12–15.9)
HGB BLD-MCNC: 9.4 G/DL (ref 12–15.9)
HGB UR QL STRIP.AUTO: ABNORMAL
HOLD SPECIMEN: NORMAL
HOLD SPECIMEN: NORMAL
HYALINE CASTS UR QL AUTO: ABNORMAL /LPF
IMM GRANULOCYTES # BLD AUTO: 0.05 10*3/MM3 (ref 0–0.05)
IMM GRANULOCYTES NFR BLD AUTO: 0.5 % (ref 0–0.5)
INR PPP: 3.97 (ref 0.8–1.2)
KETONES UR QL STRIP: NEGATIVE
LEUKOCYTE ESTERASE UR QL STRIP.AUTO: NEGATIVE
LIPASE SERPL-CCNC: 24 U/L (ref 13–60)
LYMPHOCYTES # BLD AUTO: 0.37 10*3/MM3 (ref 0.7–3.1)
LYMPHOCYTES NFR BLD AUTO: 3.7 % (ref 19.6–45.3)
MAXIMAL PREDICTED HEART RATE: 147 BPM
MCH RBC QN AUTO: 30.7 PG (ref 26.6–33)
MCHC RBC AUTO-ENTMCNC: 31.5 G/DL (ref 31.5–35.7)
MCV RBC AUTO: 97.4 FL (ref 79–97)
MONOCYTES # BLD AUTO: 0.58 10*3/MM3 (ref 0.1–0.9)
MONOCYTES NFR BLD AUTO: 5.7 % (ref 5–12)
NEUTROPHILS # BLD AUTO: 8.88 10*3/MM3 (ref 1.7–7)
NEUTROPHILS NFR BLD AUTO: 87.9 % (ref 42.7–76)
NITRITE UR QL STRIP: NEGATIVE
NRBC BLD AUTO-RTO: 0 /100 WBC (ref 0–0.2)
PH UR STRIP.AUTO: <=5 [PH] (ref 5–9)
PLATELET # BLD AUTO: 225 10*3/MM3 (ref 140–450)
PMV BLD AUTO: 11.2 FL (ref 6–12)
POTASSIUM BLD-SCNC: 4.5 MMOL/L (ref 3.5–5.2)
PROT SERPL-MCNC: 7.2 G/DL (ref 6–8.5)
PROT UR QL STRIP: ABNORMAL
PROTHROMBIN TIME: 38.5 SECONDS (ref 11.1–15.3)
RBC # BLD AUTO: 3.06 10*6/MM3 (ref 3.77–5.28)
RBC # UR: ABNORMAL /HPF
REF LAB TEST METHOD: ABNORMAL
SODIUM BLD-SCNC: 143 MMOL/L (ref 136–145)
SP GR UR STRIP: 1.01 (ref 1–1.03)
SQUAMOUS #/AREA URNS HPF: ABNORMAL /HPF
STRESS TARGET HR: 125 BPM
UROBILINOGEN UR QL STRIP: ABNORMAL
WBC NRBC COR # BLD: 10.1 10*3/MM3 (ref 3.4–10.8)
WBC UR QL AUTO: ABNORMAL /HPF
WHOLE BLOOD HOLD SPECIMEN: NORMAL
WHOLE BLOOD HOLD SPECIMEN: NORMAL

## 2019-06-18 PROCEDURE — 99284 EMERGENCY DEPT VISIT MOD MDM: CPT

## 2019-06-18 PROCEDURE — 93306 TTE W/DOPPLER COMPLETE: CPT

## 2019-06-18 PROCEDURE — 94760 N-INVAS EAR/PLS OXIMETRY 1: CPT

## 2019-06-18 PROCEDURE — 74018 RADEX ABDOMEN 1 VIEW: CPT

## 2019-06-18 PROCEDURE — 94799 UNLISTED PULMONARY SVC/PX: CPT

## 2019-06-18 PROCEDURE — 63710000001 INSULIN ASPART PER 5 UNITS: Performed by: INTERNAL MEDICINE

## 2019-06-18 PROCEDURE — 25010000002 PERFLUTREN (DEFINITY) 8.476 MG IN SODIUM CHLORIDE 0.9 % 10 ML INJECTION: Performed by: INTERNAL MEDICINE

## 2019-06-18 PROCEDURE — 71046 X-RAY EXAM CHEST 2 VIEWS: CPT

## 2019-06-18 PROCEDURE — G0378 HOSPITAL OBSERVATION PER HR: HCPCS

## 2019-06-18 PROCEDURE — 81001 URINALYSIS AUTO W/SCOPE: CPT | Performed by: PHYSICIAN ASSISTANT

## 2019-06-18 PROCEDURE — 85018 HEMOGLOBIN: CPT | Performed by: INTERNAL MEDICINE

## 2019-06-18 PROCEDURE — 85025 COMPLETE CBC W/AUTO DIFF WBC: CPT | Performed by: PHYSICIAN ASSISTANT

## 2019-06-18 PROCEDURE — 85610 PROTHROMBIN TIME: CPT | Performed by: PHYSICIAN ASSISTANT

## 2019-06-18 PROCEDURE — 85014 HEMATOCRIT: CPT | Performed by: INTERNAL MEDICINE

## 2019-06-18 PROCEDURE — 82962 GLUCOSE BLOOD TEST: CPT

## 2019-06-18 PROCEDURE — 83690 ASSAY OF LIPASE: CPT | Performed by: PHYSICIAN ASSISTANT

## 2019-06-18 PROCEDURE — 25010000002 METHYLPREDNISOLONE PER 125 MG: Performed by: PHYSICIAN ASSISTANT

## 2019-06-18 PROCEDURE — 94640 AIRWAY INHALATION TREATMENT: CPT

## 2019-06-18 PROCEDURE — 80053 COMPREHEN METABOLIC PANEL: CPT | Performed by: PHYSICIAN ASSISTANT

## 2019-06-18 RX ORDER — PANTOPRAZOLE SODIUM 40 MG/10ML
80 INJECTION, POWDER, LYOPHILIZED, FOR SOLUTION INTRAVENOUS ONCE
Status: COMPLETED | OUTPATIENT
Start: 2019-06-18 | End: 2019-06-18

## 2019-06-18 RX ORDER — ROPINIROLE 0.25 MG/1
0.25 TABLET, FILM COATED ORAL NIGHTLY
Status: DISCONTINUED | OUTPATIENT
Start: 2019-06-18 | End: 2019-06-20

## 2019-06-18 RX ORDER — TRAZODONE HYDROCHLORIDE 150 MG/1
150 TABLET ORAL NIGHTLY
Status: DISCONTINUED | OUTPATIENT
Start: 2019-06-18 | End: 2019-06-18

## 2019-06-18 RX ORDER — ONDANSETRON 2 MG/ML
4 INJECTION INTRAMUSCULAR; INTRAVENOUS EVERY 6 HOURS PRN
Status: DISCONTINUED | OUTPATIENT
Start: 2019-06-18 | End: 2019-07-05 | Stop reason: HOSPADM

## 2019-06-18 RX ORDER — IPRATROPIUM BROMIDE AND ALBUTEROL SULFATE 2.5; .5 MG/3ML; MG/3ML
3 SOLUTION RESPIRATORY (INHALATION) ONCE
Status: COMPLETED | OUTPATIENT
Start: 2019-06-18 | End: 2019-06-18

## 2019-06-18 RX ORDER — SODIUM CHLORIDE 0.9 % (FLUSH) 0.9 %
3 SYRINGE (ML) INJECTION EVERY 12 HOURS SCHEDULED
Status: DISCONTINUED | OUTPATIENT
Start: 2019-06-18 | End: 2019-07-05 | Stop reason: HOSPADM

## 2019-06-18 RX ORDER — IPRATROPIUM BROMIDE AND ALBUTEROL SULFATE 2.5; .5 MG/3ML; MG/3ML
3 SOLUTION RESPIRATORY (INHALATION)
Status: DISCONTINUED | OUTPATIENT
Start: 2019-06-18 | End: 2019-06-19 | Stop reason: DRUGHIGH

## 2019-06-18 RX ORDER — SODIUM CHLORIDE 9 MG/ML
50 INJECTION, SOLUTION INTRAVENOUS CONTINUOUS
Status: DISCONTINUED | OUTPATIENT
Start: 2019-06-18 | End: 2019-06-19

## 2019-06-18 RX ORDER — METOPROLOL TARTRATE 5 MG/5ML
5 INJECTION INTRAVENOUS EVERY 8 HOURS
Status: DISCONTINUED | OUTPATIENT
Start: 2019-06-18 | End: 2019-06-26

## 2019-06-18 RX ORDER — SODIUM CHLORIDE 0.9 % (FLUSH) 0.9 %
10 SYRINGE (ML) INJECTION AS NEEDED
Status: DISCONTINUED | OUTPATIENT
Start: 2019-06-18 | End: 2019-07-05 | Stop reason: HOSPADM

## 2019-06-18 RX ORDER — NICOTINE POLACRILEX 4 MG
15 LOZENGE BUCCAL
Status: DISCONTINUED | OUTPATIENT
Start: 2019-06-18 | End: 2019-07-05 | Stop reason: HOSPADM

## 2019-06-18 RX ORDER — FUROSEMIDE 10 MG/ML
20 INJECTION INTRAMUSCULAR; INTRAVENOUS EVERY 12 HOURS
Status: DISCONTINUED | OUTPATIENT
Start: 2019-06-18 | End: 2019-06-22

## 2019-06-18 RX ORDER — TRAZODONE HYDROCHLORIDE 150 MG/1
300 TABLET ORAL NIGHTLY
Status: DISCONTINUED | OUTPATIENT
Start: 2019-06-18 | End: 2019-06-20

## 2019-06-18 RX ORDER — METHYLPREDNISOLONE SODIUM SUCCINATE 125 MG/2ML
125 INJECTION, POWDER, LYOPHILIZED, FOR SOLUTION INTRAMUSCULAR; INTRAVENOUS ONCE
Status: COMPLETED | OUTPATIENT
Start: 2019-06-18 | End: 2019-06-18

## 2019-06-18 RX ORDER — SODIUM CHLORIDE 0.9 % (FLUSH) 0.9 %
3-10 SYRINGE (ML) INJECTION AS NEEDED
Status: DISCONTINUED | OUTPATIENT
Start: 2019-06-18 | End: 2019-07-05 | Stop reason: HOSPADM

## 2019-06-18 RX ORDER — DEXTROSE MONOHYDRATE 25 G/50ML
25 INJECTION, SOLUTION INTRAVENOUS
Status: DISCONTINUED | OUTPATIENT
Start: 2019-06-18 | End: 2019-07-05 | Stop reason: HOSPADM

## 2019-06-18 RX ADMIN — PANTOPRAZOLE SODIUM 80 MG: 40 INJECTION, POWDER, FOR SOLUTION INTRAVENOUS at 10:52

## 2019-06-18 RX ADMIN — SODIUM CHLORIDE 2 ML: 9 INJECTION INTRAMUSCULAR; INTRAVENOUS; SUBCUTANEOUS at 15:00

## 2019-06-18 RX ADMIN — IPRATROPIUM BROMIDE AND ALBUTEROL SULFATE 3 ML: 2.5; .5 SOLUTION RESPIRATORY (INHALATION) at 15:55

## 2019-06-18 RX ADMIN — IPRATROPIUM BROMIDE AND ALBUTEROL SULFATE 3 ML: 2.5; .5 SOLUTION RESPIRATORY (INHALATION) at 19:36

## 2019-06-18 RX ADMIN — METOPROLOL TARTRATE 5 MG: 5 INJECTION INTRAVENOUS at 21:37

## 2019-06-18 RX ADMIN — SODIUM CHLORIDE 8 MG/HR: 900 INJECTION INTRAVENOUS at 11:42

## 2019-06-18 RX ADMIN — INSULIN ASPART 4 UNITS: 100 INJECTION, SOLUTION INTRAVENOUS; SUBCUTANEOUS at 18:32

## 2019-06-18 RX ADMIN — INSULIN ASPART 6 UNITS: 100 INJECTION, SOLUTION INTRAVENOUS; SUBCUTANEOUS at 21:37

## 2019-06-18 RX ADMIN — TRAZODONE HYDROCHLORIDE 300 MG: 150 TABLET ORAL at 22:39

## 2019-06-18 RX ADMIN — METHYLPREDNISOLONE SODIUM SUCCINATE 125 MG: 125 INJECTION, POWDER, FOR SOLUTION INTRAMUSCULAR; INTRAVENOUS at 10:52

## 2019-06-18 RX ADMIN — POLYETHYLENE GLYCOL 3350, SODIUM SULFATE ANHYDROUS, SODIUM BICARBONATE, SODIUM CHLORIDE, POTASSIUM CHLORIDE 4000 ML: 236; 22.74; 6.74; 5.86; 2.97 POWDER, FOR SOLUTION ORAL at 15:30

## 2019-06-18 RX ADMIN — SODIUM CHLORIDE 8 MG/HR: 900 INJECTION INTRAVENOUS at 22:32

## 2019-06-18 RX ADMIN — SODIUM CHLORIDE 50 ML/HR: 9 INJECTION, SOLUTION INTRAVENOUS at 13:12

## 2019-06-18 RX ADMIN — IPRATROPIUM BROMIDE AND ALBUTEROL SULFATE 3 ML: 2.5; .5 SOLUTION RESPIRATORY (INHALATION) at 10:32

## 2019-06-18 RX ADMIN — METOPROLOL TARTRATE 5 MG: 5 INJECTION INTRAVENOUS at 15:30

## 2019-06-18 RX ADMIN — ROPINIROLE HYDROCHLORIDE 0.25 MG: 0.25 TABLET, FILM COATED ORAL at 22:32

## 2019-06-19 ENCOUNTER — EPISODE CHANGES (OUTPATIENT)
Dept: CASE MANAGEMENT | Facility: OTHER | Age: 74
End: 2019-06-19

## 2019-06-19 ENCOUNTER — ANESTHESIA EVENT (OUTPATIENT)
Dept: GASTROENTEROLOGY | Facility: HOSPITAL | Age: 74
End: 2019-06-19

## 2019-06-19 ENCOUNTER — ANESTHESIA (OUTPATIENT)
Dept: GASTROENTEROLOGY | Facility: HOSPITAL | Age: 74
End: 2019-06-19

## 2019-06-19 PROBLEM — E66.01 MORBID OBESITY (HCC): Chronic | Status: ACTIVE | Noted: 2019-06-19

## 2019-06-19 LAB
ANION GAP SERPL CALCULATED.3IONS-SCNC: 11 MMOL/L
BUN BLD-MCNC: 40 MG/DL (ref 8–23)
BUN/CREAT SERPL: 23.4 (ref 7–25)
CALCIUM SPEC-SCNC: 8.8 MG/DL (ref 8.6–10.5)
CHLORIDE SERPL-SCNC: 100 MMOL/L (ref 98–107)
CO2 SERPL-SCNC: 30 MMOL/L (ref 22–29)
CREAT BLD-MCNC: 1.71 MG/DL (ref 0.57–1)
GFR SERPL CREATININE-BSD FRML MDRD: 29 ML/MIN/1.73
GLUCOSE BLD-MCNC: 159 MG/DL (ref 65–99)
GLUCOSE BLDC GLUCOMTR-MCNC: 142 MG/DL (ref 70–130)
GLUCOSE BLDC GLUCOMTR-MCNC: 156 MG/DL (ref 70–130)
GLUCOSE BLDC GLUCOMTR-MCNC: 179 MG/DL (ref 70–130)
GLUCOSE BLDC GLUCOMTR-MCNC: 186 MG/DL (ref 70–130)
HCT VFR BLD AUTO: 26.2 % (ref 34–46.6)
HCT VFR BLD AUTO: 26.7 % (ref 34–46.6)
HCT VFR BLD AUTO: 27.1 % (ref 34–46.6)
HGB BLD-MCNC: 8.3 G/DL (ref 12–15.9)
HGB BLD-MCNC: 8.6 G/DL (ref 12–15.9)
HGB BLD-MCNC: 8.7 G/DL (ref 12–15.9)
INR PPP: 4.73 (ref 0.8–1.2)
POTASSIUM BLD-SCNC: 4.2 MMOL/L (ref 3.5–5.2)
PROTHROMBIN TIME: 44.2 SECONDS (ref 11.1–15.3)
SODIUM BLD-SCNC: 141 MMOL/L (ref 136–145)

## 2019-06-19 PROCEDURE — 25010000002 FUROSEMIDE PER 20 MG: Performed by: INTERNAL MEDICINE

## 2019-06-19 PROCEDURE — 85018 HEMOGLOBIN: CPT | Performed by: INTERNAL MEDICINE

## 2019-06-19 PROCEDURE — G0378 HOSPITAL OBSERVATION PER HR: HCPCS

## 2019-06-19 PROCEDURE — 85610 PROTHROMBIN TIME: CPT | Performed by: INTERNAL MEDICINE

## 2019-06-19 PROCEDURE — 45378 DIAGNOSTIC COLONOSCOPY: CPT | Performed by: INTERNAL MEDICINE

## 2019-06-19 PROCEDURE — 63710000001 INSULIN ASPART PER 5 UNITS: Performed by: INTERNAL MEDICINE

## 2019-06-19 PROCEDURE — 85014 HEMATOCRIT: CPT | Performed by: INTERNAL MEDICINE

## 2019-06-19 PROCEDURE — 93010 ELECTROCARDIOGRAM REPORT: CPT | Performed by: INTERNAL MEDICINE

## 2019-06-19 PROCEDURE — 93005 ELECTROCARDIOGRAM TRACING: CPT | Performed by: INTERNAL MEDICINE

## 2019-06-19 PROCEDURE — 82962 GLUCOSE BLOOD TEST: CPT

## 2019-06-19 PROCEDURE — 0DJD8ZZ INSPECTION OF LOWER INTESTINAL TRACT, VIA NATURAL OR ARTIFICIAL OPENING ENDOSCOPIC: ICD-10-PCS | Performed by: INTERNAL MEDICINE

## 2019-06-19 PROCEDURE — 25010000002 PROPOFOL 10 MG/ML EMULSION: Performed by: NURSE ANESTHETIST, CERTIFIED REGISTERED

## 2019-06-19 PROCEDURE — 94799 UNLISTED PULMONARY SVC/PX: CPT

## 2019-06-19 PROCEDURE — 0DJ08ZZ INSPECTION OF UPPER INTESTINAL TRACT, VIA NATURAL OR ARTIFICIAL OPENING ENDOSCOPIC: ICD-10-PCS | Performed by: INTERNAL MEDICINE

## 2019-06-19 PROCEDURE — 94760 N-INVAS EAR/PLS OXIMETRY 1: CPT

## 2019-06-19 PROCEDURE — 80048 BASIC METABOLIC PNL TOTAL CA: CPT | Performed by: INTERNAL MEDICINE

## 2019-06-19 PROCEDURE — 43235 EGD DIAGNOSTIC BRUSH WASH: CPT | Performed by: INTERNAL MEDICINE

## 2019-06-19 RX ORDER — IPRATROPIUM BROMIDE AND ALBUTEROL SULFATE 2.5; .5 MG/3ML; MG/3ML
3 SOLUTION RESPIRATORY (INHALATION)
Status: DISCONTINUED | OUTPATIENT
Start: 2019-06-19 | End: 2019-07-05 | Stop reason: HOSPADM

## 2019-06-19 RX ORDER — LIDOCAINE HYDROCHLORIDE 20 MG/ML
INJECTION, SOLUTION INTRAVENOUS AS NEEDED
Status: DISCONTINUED | OUTPATIENT
Start: 2019-06-19 | End: 2019-06-19 | Stop reason: SURG

## 2019-06-19 RX ORDER — PROPOFOL 10 MG/ML
VIAL (ML) INTRAVENOUS AS NEEDED
Status: DISCONTINUED | OUTPATIENT
Start: 2019-06-19 | End: 2019-06-19 | Stop reason: SURG

## 2019-06-19 RX ADMIN — INSULIN ASPART 2 UNITS: 100 INJECTION, SOLUTION INTRAVENOUS; SUBCUTANEOUS at 21:27

## 2019-06-19 RX ADMIN — INSULIN ASPART 2 UNITS: 100 INJECTION, SOLUTION INTRAVENOUS; SUBCUTANEOUS at 18:21

## 2019-06-19 RX ADMIN — IPRATROPIUM BROMIDE AND ALBUTEROL SULFATE 3 ML: 2.5; .5 SOLUTION RESPIRATORY (INHALATION) at 22:31

## 2019-06-19 RX ADMIN — IPRATROPIUM BROMIDE AND ALBUTEROL SULFATE 3 ML: 2.5; .5 SOLUTION RESPIRATORY (INHALATION) at 07:28

## 2019-06-19 RX ADMIN — SODIUM CHLORIDE 50 ML/HR: 9 INJECTION, SOLUTION INTRAVENOUS at 07:52

## 2019-06-19 RX ADMIN — SODIUM CHLORIDE 8 MG/HR: 900 INJECTION INTRAVENOUS at 08:45

## 2019-06-19 RX ADMIN — SODIUM CHLORIDE 8 MG/HR: 900 INJECTION INTRAVENOUS at 14:37

## 2019-06-19 RX ADMIN — ROPINIROLE HYDROCHLORIDE 0.25 MG: 0.25 TABLET, FILM COATED ORAL at 21:27

## 2019-06-19 RX ADMIN — SODIUM CHLORIDE 8 MG/HR: 900 INJECTION INTRAVENOUS at 21:24

## 2019-06-19 RX ADMIN — METOPROLOL TARTRATE 5 MG: 5 INJECTION INTRAVENOUS at 18:19

## 2019-06-19 RX ADMIN — PROPOFOL 60 MG: 10 INJECTION, EMULSION INTRAVENOUS at 12:23

## 2019-06-19 RX ADMIN — PROPOFOL 40 MG: 10 INJECTION, EMULSION INTRAVENOUS at 12:27

## 2019-06-19 RX ADMIN — SODIUM CHLORIDE, PRESERVATIVE FREE 3 ML: 5 INJECTION INTRAVENOUS at 21:25

## 2019-06-19 RX ADMIN — LIDOCAINE HYDROCHLORIDE 60 MG: 20 INJECTION, SOLUTION INTRAVENOUS at 12:23

## 2019-06-19 RX ADMIN — PROPOFOL 40 MG: 10 INJECTION, EMULSION INTRAVENOUS at 12:33

## 2019-06-19 RX ADMIN — SODIUM CHLORIDE 8 MG/HR: 900 INJECTION INTRAVENOUS at 03:48

## 2019-06-19 RX ADMIN — METOPROLOL TARTRATE 5 MG: 5 INJECTION INTRAVENOUS at 05:55

## 2019-06-19 RX ADMIN — FUROSEMIDE 20 MG: 10 INJECTION, SOLUTION INTRAVENOUS at 05:54

## 2019-06-19 RX ADMIN — TRAZODONE HYDROCHLORIDE 300 MG: 150 TABLET ORAL at 21:24

## 2019-06-19 RX ADMIN — IPRATROPIUM BROMIDE AND ALBUTEROL SULFATE 3 ML: 2.5; .5 SOLUTION RESPIRATORY (INHALATION) at 15:36

## 2019-06-19 RX ADMIN — FUROSEMIDE 20 MG: 10 INJECTION, SOLUTION INTRAVENOUS at 18:19

## 2019-06-19 NOTE — ANESTHESIA PREPROCEDURE EVALUATION
Anesthesia Evaluation     Patient summary reviewed and Nursing notes reviewed   NPO Solid Status: > 8 hours  NPO Liquid Status: > 8 hours           Airway   Mallampati: II  TM distance: >3 FB  Neck ROM: full  possible difficult intubation  Dental    (+) edentulous    Pulmonary    (+) a smoker ( quit smoking in 1999) Former, COPD ( she does sometimes use inhalers, but is breathing well today.) mild, sleep apnea, decreased breath sounds,   Cardiovascular   Exercise tolerance: poor (<4 METS)    NYHA Classification: III  ECG reviewed  PT is on anticoagulation therapy    (+) pacemaker pacemaker, hypertension well controlled, CAD, dysrhythmias Atrial Fib, CHF ( h/o, but not in actice CHF now), systolic click, hyperlipidemia,     ROS comment: Had mechanical AVR in 1999 and has since required a permanent pacer.      Neuro/Psych  (+) weakness, psychiatric history Anxiety,     GI/Hepatic/Renal/Endo    (+) obesity, morbid obesity, GERD ( she denies GERD symptoms today) well controlled, GI bleeding, diabetes mellitus ( blood sugar= 98) type 2,     Musculoskeletal (-) negative ROS    Abdominal   (+) obese,    Substance History - negative use     OB/GYN negative ob/gyn ROS         Other   (+) blood dyscrasia ( has been on blood thinners.  LD of coumadin was 3/14/17 with lovenox as a bridge + ASAS (81 mg))                       Anesthesia Plan    ASA 4     MAC     intravenous induction   Anesthetic plan, all risks, benefits, and alternatives have been provided, discussed and informed consent has been obtained with: patient and spouse/significant other.    Plan discussed with CRNA.

## 2019-06-19 NOTE — ANESTHESIA POSTPROCEDURE EVALUATION
Patient: Brendon Skelton    Procedure Summary     Date:  06/19/19 Room / Location:  [facility] ENDOSCOPY 3 / [facility] ENDOSCOPY    Anesthesia Start:  1216 Anesthesia Stop:  1239    Procedures:       ESOPHAGOGASTRODUODENOSCOPY WITH CONTROL OF BLEED (N/A )      COLONOSCOPY WITH CONTROL OF BLEED (N/A ) Diagnosis:       Gastrointestinal hemorrhage, unspecified gastrointestinal hemorrhage type      (Gastrointestinal hemorrhage, unspecified gastrointestinal hemorrhage type [K92.2])    Surgeon:  Alfredo Guerrero MD Provider:  Iris Samuel CRNA    Anesthesia Type:  MAC ASA Status:  4          Anesthesia Type: MAC  Last vitals  BP   (!) 212/72 (06/19/19 1126)   Temp   97.1 °F (36.2 °C) (06/19/19 1126)   Pulse   63 (06/19/19 1126)   Resp   16 (06/19/19 1126)     SpO2   92 % (06/19/19 1126)     Post Anesthesia Care and Evaluation    Patient location during evaluation: bedside  Patient participation: complete - patient participated  Level of consciousness: awake and awake and alert  Pain score: 0  Pain management: satisfactory to patient  Airway patency: patent  Anesthetic complications: No anesthetic complications  PONV Status: none  Cardiovascular status: acceptable and stable  Respiratory status: acceptable, room air and spontaneous ventilation  Hydration status: acceptable

## 2019-06-20 PROBLEM — T45.511A COUMADIN TOXICITY: Status: ACTIVE | Noted: 2019-06-20

## 2019-06-20 PROBLEM — K29.70 GASTRITIS: Chronic | Status: ACTIVE | Noted: 2019-06-20

## 2019-06-20 PROBLEM — K63.5 COLON POLYP: Chronic | Status: ACTIVE | Noted: 2019-06-20

## 2019-06-20 LAB
ANION GAP SERPL CALCULATED.3IONS-SCNC: 8 MMOL/L
BASOPHILS # BLD AUTO: 0.04 10*3/MM3 (ref 0–0.2)
BASOPHILS NFR BLD AUTO: 0.4 % (ref 0–1.5)
BUN BLD-MCNC: 47 MG/DL (ref 8–23)
BUN/CREAT SERPL: 23.7 (ref 7–25)
CALCIUM SPEC-SCNC: 8.4 MG/DL (ref 8.6–10.5)
CHLORIDE SERPL-SCNC: 104 MMOL/L (ref 98–107)
CO2 SERPL-SCNC: 29 MMOL/L (ref 22–29)
CREAT BLD-MCNC: 1.98 MG/DL (ref 0.57–1)
DEPRECATED RDW RBC AUTO: 59.6 FL (ref 37–54)
EOSINOPHIL # BLD AUTO: 0.24 10*3/MM3 (ref 0–0.4)
EOSINOPHIL NFR BLD AUTO: 2.6 % (ref 0.3–6.2)
ERYTHROCYTE [DISTWIDTH] IN BLOOD BY AUTOMATED COUNT: 17.7 % (ref 12.3–15.4)
GFR SERPL CREATININE-BSD FRML MDRD: 25 ML/MIN/1.73
GLUCOSE BLD-MCNC: 108 MG/DL (ref 65–99)
GLUCOSE BLDC GLUCOMTR-MCNC: 116 MG/DL (ref 70–130)
GLUCOSE BLDC GLUCOMTR-MCNC: 151 MG/DL (ref 70–130)
HCT VFR BLD AUTO: 24.8 % (ref 34–46.6)
HCT VFR BLD AUTO: 24.8 % (ref 34–46.6)
HCT VFR BLD AUTO: 28 % (ref 34–46.6)
HGB BLD-MCNC: 7.9 G/DL (ref 12–15.9)
HGB BLD-MCNC: 8 G/DL (ref 12–15.9)
HGB BLD-MCNC: 8.7 G/DL (ref 12–15.9)
IMM GRANULOCYTES # BLD AUTO: 0.03 10*3/MM3 (ref 0–0.05)
IMM GRANULOCYTES NFR BLD AUTO: 0.3 % (ref 0–0.5)
INR PPP: 6.95 (ref 0.8–1.2)
LYMPHOCYTES # BLD AUTO: 0.71 10*3/MM3 (ref 0.7–3.1)
LYMPHOCYTES NFR BLD AUTO: 7.6 % (ref 19.6–45.3)
MCH RBC QN AUTO: 30.7 PG (ref 26.6–33)
MCHC RBC AUTO-ENTMCNC: 32.3 G/DL (ref 31.5–35.7)
MCV RBC AUTO: 95 FL (ref 79–97)
MONOCYTES # BLD AUTO: 0.63 10*3/MM3 (ref 0.1–0.9)
MONOCYTES NFR BLD AUTO: 6.7 % (ref 5–12)
NEUTROPHILS # BLD AUTO: 7.75 10*3/MM3 (ref 1.7–7)
NEUTROPHILS NFR BLD AUTO: 82.4 % (ref 42.7–76)
NRBC BLD AUTO-RTO: 0 /100 WBC (ref 0–0.2)
PLATELET # BLD AUTO: 196 10*3/MM3 (ref 140–450)
PMV BLD AUTO: 12.5 FL (ref 6–12)
POTASSIUM BLD-SCNC: 4.1 MMOL/L (ref 3.5–5.2)
PROTHROMBIN TIME: 59.8 SECONDS (ref 11.1–15.3)
RBC # BLD AUTO: 2.61 10*6/MM3 (ref 3.77–5.28)
SODIUM BLD-SCNC: 141 MMOL/L (ref 136–145)
WBC NRBC COR # BLD: 9.4 10*3/MM3 (ref 3.4–10.8)

## 2019-06-20 PROCEDURE — 82962 GLUCOSE BLOOD TEST: CPT

## 2019-06-20 PROCEDURE — 99232 SBSQ HOSP IP/OBS MODERATE 35: CPT | Performed by: INTERNAL MEDICINE

## 2019-06-20 PROCEDURE — 85014 HEMATOCRIT: CPT | Performed by: INTERNAL MEDICINE

## 2019-06-20 PROCEDURE — 94760 N-INVAS EAR/PLS OXIMETRY 1: CPT

## 2019-06-20 PROCEDURE — 85018 HEMOGLOBIN: CPT | Performed by: INTERNAL MEDICINE

## 2019-06-20 PROCEDURE — 80048 BASIC METABOLIC PNL TOTAL CA: CPT | Performed by: INTERNAL MEDICINE

## 2019-06-20 PROCEDURE — 94799 UNLISTED PULMONARY SVC/PX: CPT

## 2019-06-20 PROCEDURE — 25010000002 FUROSEMIDE PER 20 MG: Performed by: INTERNAL MEDICINE

## 2019-06-20 PROCEDURE — 85025 COMPLETE CBC W/AUTO DIFF WBC: CPT | Performed by: INTERNAL MEDICINE

## 2019-06-20 PROCEDURE — 25010000002 VITAMIN K1 PER 1 MG: Performed by: INTERNAL MEDICINE

## 2019-06-20 PROCEDURE — 85610 PROTHROMBIN TIME: CPT | Performed by: INTERNAL MEDICINE

## 2019-06-20 RX ORDER — FERROUS SULFATE TAB EC 324 MG (65 MG FE EQUIVALENT) 324 (65 FE) MG
325 TABLET DELAYED RESPONSE ORAL
Status: DISCONTINUED | OUTPATIENT
Start: 2019-06-20 | End: 2019-06-26

## 2019-06-20 RX ORDER — ACETAMINOPHEN 325 MG/1
650 TABLET ORAL EVERY 6 HOURS PRN
Status: DISCONTINUED | OUTPATIENT
Start: 2019-06-20 | End: 2019-07-05 | Stop reason: HOSPADM

## 2019-06-20 RX ORDER — ROPINIROLE 0.25 MG/1
0.25 TABLET, FILM COATED ORAL NIGHTLY
Status: DISCONTINUED | OUTPATIENT
Start: 2019-06-20 | End: 2019-07-05 | Stop reason: HOSPADM

## 2019-06-20 RX ORDER — TRAZODONE HYDROCHLORIDE 150 MG/1
300 TABLET ORAL NIGHTLY
Status: DISCONTINUED | OUTPATIENT
Start: 2019-06-20 | End: 2019-07-05 | Stop reason: HOSPADM

## 2019-06-20 RX ORDER — DILTIAZEM HYDROCHLORIDE 240 MG/1
240 CAPSULE, COATED, EXTENDED RELEASE ORAL DAILY
Status: DISCONTINUED | OUTPATIENT
Start: 2019-06-20 | End: 2019-07-05 | Stop reason: HOSPADM

## 2019-06-20 RX ORDER — CITALOPRAM 20 MG/1
20 TABLET ORAL DAILY
Status: DISCONTINUED | OUTPATIENT
Start: 2019-06-20 | End: 2019-07-05 | Stop reason: HOSPADM

## 2019-06-20 RX ADMIN — ROPINIROLE HYDROCHLORIDE 0.25 MG: 0.25 TABLET, FILM COATED ORAL at 21:04

## 2019-06-20 RX ADMIN — ACETAMINOPHEN 650 MG: 325 TABLET, FILM COATED ORAL at 17:56

## 2019-06-20 RX ADMIN — SODIUM CHLORIDE 8 MG/HR: 900 INJECTION INTRAVENOUS at 03:16

## 2019-06-20 RX ADMIN — SODIUM CHLORIDE, PRESERVATIVE FREE 3 ML: 5 INJECTION INTRAVENOUS at 08:15

## 2019-06-20 RX ADMIN — FUROSEMIDE 20 MG: 10 INJECTION, SOLUTION INTRAVENOUS at 17:57

## 2019-06-20 RX ADMIN — METOPROLOL TARTRATE 5 MG: 5 INJECTION INTRAVENOUS at 21:04

## 2019-06-20 RX ADMIN — SODIUM CHLORIDE, PRESERVATIVE FREE 10 ML: 5 INJECTION INTRAVENOUS at 17:57

## 2019-06-20 RX ADMIN — IPRATROPIUM BROMIDE AND ALBUTEROL SULFATE 3 ML: 2.5; .5 SOLUTION RESPIRATORY (INHALATION) at 14:13

## 2019-06-20 RX ADMIN — FUROSEMIDE 20 MG: 10 INJECTION, SOLUTION INTRAVENOUS at 06:38

## 2019-06-20 RX ADMIN — SODIUM CHLORIDE, PRESERVATIVE FREE 3 ML: 5 INJECTION INTRAVENOUS at 21:04

## 2019-06-20 RX ADMIN — METOPROLOL TARTRATE 5 MG: 5 INJECTION INTRAVENOUS at 06:38

## 2019-06-20 RX ADMIN — FERROUS SULFATE TAB EC 324 MG (65 MG FE EQUIVALENT) 325 MG: 324 (65 FE) TABLET DELAYED RESPONSE at 11:20

## 2019-06-20 RX ADMIN — DILTIAZEM HYDROCHLORIDE 240 MG: 240 CAPSULE, COATED, EXTENDED RELEASE ORAL at 11:21

## 2019-06-20 RX ADMIN — PHYTONADIONE 5 MG: 10 INJECTION, EMULSION INTRAMUSCULAR; INTRAVENOUS; SUBCUTANEOUS at 12:26

## 2019-06-20 RX ADMIN — SODIUM CHLORIDE 8 MG/HR: 900 INJECTION INTRAVENOUS at 21:04

## 2019-06-20 RX ADMIN — TRAZODONE HYDROCHLORIDE 300 MG: 150 TABLET ORAL at 21:04

## 2019-06-20 RX ADMIN — SODIUM CHLORIDE, PRESERVATIVE FREE 10 ML: 5 INJECTION INTRAVENOUS at 12:26

## 2019-06-20 RX ADMIN — CITALOPRAM HYDROBROMIDE 20 MG: 20 TABLET ORAL at 11:21

## 2019-06-20 RX ADMIN — IPRATROPIUM BROMIDE AND ALBUTEROL SULFATE 3 ML: 2.5; .5 SOLUTION RESPIRATORY (INHALATION) at 07:05

## 2019-06-20 RX ADMIN — SODIUM CHLORIDE 8 MG/HR: 900 INJECTION INTRAVENOUS at 08:15

## 2019-06-20 RX ADMIN — METOPROLOL TARTRATE 5 MG: 5 INJECTION INTRAVENOUS at 17:57

## 2019-06-21 ENCOUNTER — EPISODE CHANGES (OUTPATIENT)
Dept: CASE MANAGEMENT | Facility: OTHER | Age: 74
End: 2019-06-21

## 2019-06-21 LAB
ANION GAP SERPL CALCULATED.3IONS-SCNC: 9 MMOL/L
BASOPHILS # BLD AUTO: 0.05 10*3/MM3 (ref 0–0.2)
BASOPHILS NFR BLD AUTO: 0.7 % (ref 0–1.5)
BUN BLD-MCNC: 50 MG/DL (ref 8–23)
BUN/CREAT SERPL: 23.9 (ref 7–25)
CALCIUM SPEC-SCNC: 8.2 MG/DL (ref 8.6–10.5)
CHLORIDE SERPL-SCNC: 103 MMOL/L (ref 98–107)
CO2 SERPL-SCNC: 29 MMOL/L (ref 22–29)
CREAT BLD-MCNC: 2.09 MG/DL (ref 0.57–1)
DEPRECATED RDW RBC AUTO: 58.6 FL (ref 37–54)
EOSINOPHIL # BLD AUTO: 0.48 10*3/MM3 (ref 0–0.4)
EOSINOPHIL NFR BLD AUTO: 6.3 % (ref 0.3–6.2)
ERYTHROCYTE [DISTWIDTH] IN BLOOD BY AUTOMATED COUNT: 17.3 % (ref 12.3–15.4)
GFR SERPL CREATININE-BSD FRML MDRD: 23 ML/MIN/1.73
GLUCOSE BLD-MCNC: 119 MG/DL (ref 65–99)
GLUCOSE BLDC GLUCOMTR-MCNC: 138 MG/DL (ref 70–130)
GLUCOSE BLDC GLUCOMTR-MCNC: 144 MG/DL (ref 70–130)
GLUCOSE BLDC GLUCOMTR-MCNC: 150 MG/DL (ref 70–130)
GLUCOSE BLDC GLUCOMTR-MCNC: 176 MG/DL (ref 70–130)
GLUCOSE BLDC GLUCOMTR-MCNC: 206 MG/DL (ref 70–130)
HCT VFR BLD AUTO: 25 % (ref 34–46.6)
HGB BLD-MCNC: 7.9 G/DL (ref 12–15.9)
HOLD SPECIMEN: NORMAL
IMM GRANULOCYTES # BLD AUTO: 0.04 10*3/MM3 (ref 0–0.05)
IMM GRANULOCYTES NFR BLD AUTO: 0.5 % (ref 0–0.5)
INR PPP: 1.32 (ref 0.8–1.2)
INR PPP: 1.62 (ref 0.8–1.2)
LYMPHOCYTES # BLD AUTO: 0.54 10*3/MM3 (ref 0.7–3.1)
LYMPHOCYTES NFR BLD AUTO: 7.1 % (ref 19.6–45.3)
MCH RBC QN AUTO: 29.8 PG (ref 26.6–33)
MCHC RBC AUTO-ENTMCNC: 31.6 G/DL (ref 31.5–35.7)
MCV RBC AUTO: 94.3 FL (ref 79–97)
MONOCYTES # BLD AUTO: 0.65 10*3/MM3 (ref 0.1–0.9)
MONOCYTES NFR BLD AUTO: 8.6 % (ref 5–12)
NEUTROPHILS # BLD AUTO: 5.82 10*3/MM3 (ref 1.7–7)
NEUTROPHILS NFR BLD AUTO: 76.8 % (ref 42.7–76)
NRBC BLD AUTO-RTO: 0 /100 WBC (ref 0–0.2)
PLATELET # BLD AUTO: 176 10*3/MM3 (ref 140–450)
POTASSIUM BLD-SCNC: 4.3 MMOL/L (ref 3.5–5.2)
PROTHROMBIN TIME: 16.2 SECONDS (ref 11.1–15.3)
PROTHROMBIN TIME: 19 SECONDS (ref 11.1–15.3)
RBC # BLD AUTO: 2.65 10*6/MM3 (ref 3.77–5.28)
SODIUM BLD-SCNC: 141 MMOL/L (ref 136–145)
WBC NRBC COR # BLD: 7.58 10*3/MM3 (ref 3.4–10.8)

## 2019-06-21 PROCEDURE — 25010000002 FUROSEMIDE PER 20 MG: Performed by: INTERNAL MEDICINE

## 2019-06-21 PROCEDURE — 94799 UNLISTED PULMONARY SVC/PX: CPT

## 2019-06-21 PROCEDURE — 63710000001 INSULIN ASPART PER 5 UNITS: Performed by: INTERNAL MEDICINE

## 2019-06-21 PROCEDURE — 80048 BASIC METABOLIC PNL TOTAL CA: CPT | Performed by: INTERNAL MEDICINE

## 2019-06-21 PROCEDURE — 85610 PROTHROMBIN TIME: CPT | Performed by: INTERNAL MEDICINE

## 2019-06-21 PROCEDURE — 82962 GLUCOSE BLOOD TEST: CPT

## 2019-06-21 PROCEDURE — 85025 COMPLETE CBC W/AUTO DIFF WBC: CPT | Performed by: INTERNAL MEDICINE

## 2019-06-21 PROCEDURE — 99232 SBSQ HOSP IP/OBS MODERATE 35: CPT | Performed by: INTERNAL MEDICINE

## 2019-06-21 PROCEDURE — 94760 N-INVAS EAR/PLS OXIMETRY 1: CPT

## 2019-06-21 RX ORDER — WARFARIN SODIUM 2.5 MG/1
2.5 TABLET ORAL
Status: DISCONTINUED | OUTPATIENT
Start: 2019-06-21 | End: 2019-06-23

## 2019-06-21 RX ADMIN — METOPROLOL TARTRATE 5 MG: 5 INJECTION INTRAVENOUS at 14:08

## 2019-06-21 RX ADMIN — WARFARIN SODIUM 2.5 MG: 2.5 TABLET ORAL at 18:02

## 2019-06-21 RX ADMIN — DILTIAZEM HYDROCHLORIDE 240 MG: 240 CAPSULE, COATED, EXTENDED RELEASE ORAL at 09:10

## 2019-06-21 RX ADMIN — FUROSEMIDE 20 MG: 10 INJECTION, SOLUTION INTRAVENOUS at 06:40

## 2019-06-21 RX ADMIN — SODIUM CHLORIDE 8 MG/HR: 900 INJECTION INTRAVENOUS at 19:17

## 2019-06-21 RX ADMIN — INSULIN ASPART 4 UNITS: 100 INJECTION, SOLUTION INTRAVENOUS; SUBCUTANEOUS at 11:30

## 2019-06-21 RX ADMIN — ROPINIROLE HYDROCHLORIDE 0.25 MG: 0.25 TABLET, FILM COATED ORAL at 20:18

## 2019-06-21 RX ADMIN — METOPROLOL TARTRATE 5 MG: 5 INJECTION INTRAVENOUS at 06:40

## 2019-06-21 RX ADMIN — IPRATROPIUM BROMIDE AND ALBUTEROL SULFATE 3 ML: 2.5; .5 SOLUTION RESPIRATORY (INHALATION) at 23:14

## 2019-06-21 RX ADMIN — IPRATROPIUM BROMIDE AND ALBUTEROL SULFATE 3 ML: 2.5; .5 SOLUTION RESPIRATORY (INHALATION) at 15:42

## 2019-06-21 RX ADMIN — CITALOPRAM HYDROBROMIDE 20 MG: 20 TABLET ORAL at 09:10

## 2019-06-21 RX ADMIN — FUROSEMIDE 20 MG: 10 INJECTION, SOLUTION INTRAVENOUS at 18:02

## 2019-06-21 RX ADMIN — IPRATROPIUM BROMIDE AND ALBUTEROL SULFATE 3 ML: 2.5; .5 SOLUTION RESPIRATORY (INHALATION) at 07:01

## 2019-06-21 RX ADMIN — SODIUM CHLORIDE 8 MG/HR: 900 INJECTION INTRAVENOUS at 01:34

## 2019-06-21 RX ADMIN — SODIUM CHLORIDE 8 MG/HR: 900 INJECTION INTRAVENOUS at 07:39

## 2019-06-21 RX ADMIN — INSULIN ASPART 2 UNITS: 100 INJECTION, SOLUTION INTRAVENOUS; SUBCUTANEOUS at 20:17

## 2019-06-21 RX ADMIN — TRAZODONE HYDROCHLORIDE 300 MG: 150 TABLET ORAL at 20:18

## 2019-06-21 RX ADMIN — METOPROLOL TARTRATE 5 MG: 5 INJECTION INTRAVENOUS at 22:05

## 2019-06-21 RX ADMIN — FERROUS SULFATE TAB EC 324 MG (65 MG FE EQUIVALENT) 325 MG: 324 (65 FE) TABLET DELAYED RESPONSE at 09:10

## 2019-06-21 RX ADMIN — SODIUM CHLORIDE 8 MG/HR: 900 INJECTION INTRAVENOUS at 14:07

## 2019-06-21 RX ADMIN — SODIUM CHLORIDE, PRESERVATIVE FREE 3 ML: 5 INJECTION INTRAVENOUS at 09:11

## 2019-06-22 LAB
ANION GAP SERPL CALCULATED.3IONS-SCNC: 11 MMOL/L
BASOPHILS # BLD AUTO: 0.04 10*3/MM3 (ref 0–0.2)
BASOPHILS NFR BLD AUTO: 0.4 % (ref 0–1.5)
BUN BLD-MCNC: 50 MG/DL (ref 8–23)
BUN/CREAT SERPL: 22.8 (ref 7–25)
CALCIUM SPEC-SCNC: 8.8 MG/DL (ref 8.6–10.5)
CHLORIDE SERPL-SCNC: 104 MMOL/L (ref 98–107)
CO2 SERPL-SCNC: 27 MMOL/L (ref 22–29)
CREAT BLD-MCNC: 2.19 MG/DL (ref 0.57–1)
DEPRECATED RDW RBC AUTO: 58.8 FL (ref 37–54)
EOSINOPHIL # BLD AUTO: 0.55 10*3/MM3 (ref 0–0.4)
EOSINOPHIL NFR BLD AUTO: 6.1 % (ref 0.3–6.2)
ERYTHROCYTE [DISTWIDTH] IN BLOOD BY AUTOMATED COUNT: 17.3 % (ref 12.3–15.4)
GFR SERPL CREATININE-BSD FRML MDRD: 22 ML/MIN/1.73
GLUCOSE BLD-MCNC: 123 MG/DL (ref 65–99)
GLUCOSE BLDC GLUCOMTR-MCNC: 117 MG/DL (ref 70–130)
GLUCOSE BLDC GLUCOMTR-MCNC: 122 MG/DL (ref 70–130)
GLUCOSE BLDC GLUCOMTR-MCNC: 147 MG/DL (ref 70–130)
GLUCOSE BLDC GLUCOMTR-MCNC: 176 MG/DL (ref 70–130)
HCT VFR BLD AUTO: 26.3 % (ref 34–46.6)
HGB BLD-MCNC: 8.3 G/DL (ref 12–15.9)
IMM GRANULOCYTES # BLD AUTO: 0.07 10*3/MM3 (ref 0–0.05)
IMM GRANULOCYTES NFR BLD AUTO: 0.8 % (ref 0–0.5)
INR PPP: 1.21 (ref 0.8–1.2)
LYMPHOCYTES # BLD AUTO: 0.49 10*3/MM3 (ref 0.7–3.1)
LYMPHOCYTES NFR BLD AUTO: 5.4 % (ref 19.6–45.3)
MCH RBC QN AUTO: 30.2 PG (ref 26.6–33)
MCHC RBC AUTO-ENTMCNC: 31.6 G/DL (ref 31.5–35.7)
MCV RBC AUTO: 95.6 FL (ref 79–97)
MONOCYTES # BLD AUTO: 0.71 10*3/MM3 (ref 0.1–0.9)
MONOCYTES NFR BLD AUTO: 7.9 % (ref 5–12)
NEUTROPHILS # BLD AUTO: 7.17 10*3/MM3 (ref 1.7–7)
NEUTROPHILS NFR BLD AUTO: 79.4 % (ref 42.7–76)
NRBC BLD AUTO-RTO: 0 /100 WBC (ref 0–0.2)
PLATELET # BLD AUTO: 195 10*3/MM3 (ref 140–450)
PMV BLD AUTO: 11.2 FL (ref 6–12)
POTASSIUM BLD-SCNC: 4.1 MMOL/L (ref 3.5–5.2)
PROTHROMBIN TIME: 15.1 SECONDS (ref 11.1–15.3)
RBC # BLD AUTO: 2.75 10*6/MM3 (ref 3.77–5.28)
SODIUM BLD-SCNC: 142 MMOL/L (ref 136–145)
WBC NRBC COR # BLD: 9.03 10*3/MM3 (ref 3.4–10.8)

## 2019-06-22 PROCEDURE — 63710000001 INSULIN ASPART PER 5 UNITS: Performed by: INTERNAL MEDICINE

## 2019-06-22 PROCEDURE — 25010000002 FUROSEMIDE PER 20 MG: Performed by: INTERNAL MEDICINE

## 2019-06-22 PROCEDURE — 94799 UNLISTED PULMONARY SVC/PX: CPT

## 2019-06-22 PROCEDURE — 80048 BASIC METABOLIC PNL TOTAL CA: CPT | Performed by: INTERNAL MEDICINE

## 2019-06-22 PROCEDURE — 82962 GLUCOSE BLOOD TEST: CPT

## 2019-06-22 PROCEDURE — 85025 COMPLETE CBC W/AUTO DIFF WBC: CPT | Performed by: INTERNAL MEDICINE

## 2019-06-22 PROCEDURE — 94760 N-INVAS EAR/PLS OXIMETRY 1: CPT

## 2019-06-22 PROCEDURE — 85610 PROTHROMBIN TIME: CPT | Performed by: INTERNAL MEDICINE

## 2019-06-22 RX ORDER — SODIUM CHLORIDE 9 MG/ML
50 INJECTION, SOLUTION INTRAVENOUS CONTINUOUS
Status: DISCONTINUED | OUTPATIENT
Start: 2019-06-22 | End: 2019-06-25

## 2019-06-22 RX ADMIN — SODIUM CHLORIDE 8 MG/HR: 900 INJECTION INTRAVENOUS at 07:25

## 2019-06-22 RX ADMIN — FERROUS SULFATE TAB EC 324 MG (65 MG FE EQUIVALENT) 325 MG: 324 (65 FE) TABLET DELAYED RESPONSE at 08:40

## 2019-06-22 RX ADMIN — CITALOPRAM HYDROBROMIDE 20 MG: 20 TABLET ORAL at 08:40

## 2019-06-22 RX ADMIN — IPRATROPIUM BROMIDE AND ALBUTEROL SULFATE 3 ML: 2.5; .5 SOLUTION RESPIRATORY (INHALATION) at 07:39

## 2019-06-22 RX ADMIN — ROPINIROLE HYDROCHLORIDE 0.25 MG: 0.25 TABLET, FILM COATED ORAL at 20:58

## 2019-06-22 RX ADMIN — HYDROCORTISONE: 1 CREAM TOPICAL at 10:24

## 2019-06-22 RX ADMIN — SODIUM CHLORIDE 75 ML/HR: 9 INJECTION, SOLUTION INTRAVENOUS at 12:30

## 2019-06-22 RX ADMIN — METOPROLOL TARTRATE 5 MG: 5 INJECTION INTRAVENOUS at 15:06

## 2019-06-22 RX ADMIN — METOPROLOL TARTRATE 5 MG: 5 INJECTION INTRAVENOUS at 20:58

## 2019-06-22 RX ADMIN — HYDROCORTISONE: 1 CREAM TOPICAL at 20:59

## 2019-06-22 RX ADMIN — TRAZODONE HYDROCHLORIDE 300 MG: 150 TABLET ORAL at 20:58

## 2019-06-22 RX ADMIN — METOPROLOL TARTRATE 5 MG: 5 INJECTION INTRAVENOUS at 05:23

## 2019-06-22 RX ADMIN — SODIUM CHLORIDE 8 MG/HR: 900 INJECTION INTRAVENOUS at 01:35

## 2019-06-22 RX ADMIN — SODIUM CHLORIDE 75 ML/HR: 9 INJECTION, SOLUTION INTRAVENOUS at 20:59

## 2019-06-22 RX ADMIN — INSULIN ASPART 2 UNITS: 100 INJECTION, SOLUTION INTRAVENOUS; SUBCUTANEOUS at 12:29

## 2019-06-22 RX ADMIN — FUROSEMIDE 20 MG: 10 INJECTION, SOLUTION INTRAVENOUS at 05:23

## 2019-06-22 RX ADMIN — WARFARIN SODIUM 2.5 MG: 2.5 TABLET ORAL at 16:57

## 2019-06-22 RX ADMIN — DILTIAZEM HYDROCHLORIDE 240 MG: 240 CAPSULE, COATED, EXTENDED RELEASE ORAL at 08:40

## 2019-06-22 RX ADMIN — SODIUM CHLORIDE, PRESERVATIVE FREE 3 ML: 5 INJECTION INTRAVENOUS at 20:58

## 2019-06-23 LAB
ANION GAP SERPL CALCULATED.3IONS-SCNC: 9 MMOL/L
BASOPHILS # BLD AUTO: 0.03 10*3/MM3 (ref 0–0.2)
BASOPHILS NFR BLD AUTO: 0.4 % (ref 0–1.5)
BUN BLD-MCNC: 43 MG/DL (ref 8–23)
BUN/CREAT SERPL: 21.9 (ref 7–25)
CALCIUM SPEC-SCNC: 8.8 MG/DL (ref 8.6–10.5)
CHLORIDE SERPL-SCNC: 107 MMOL/L (ref 98–107)
CO2 SERPL-SCNC: 28 MMOL/L (ref 22–29)
CREAT BLD-MCNC: 1.96 MG/DL (ref 0.57–1)
DEPRECATED RDW RBC AUTO: 61.5 FL (ref 37–54)
EOSINOPHIL # BLD AUTO: 0.58 10*3/MM3 (ref 0–0.4)
EOSINOPHIL NFR BLD AUTO: 6.9 % (ref 0.3–6.2)
ERYTHROCYTE [DISTWIDTH] IN BLOOD BY AUTOMATED COUNT: 17.4 % (ref 12.3–15.4)
GFR SERPL CREATININE-BSD FRML MDRD: 25 ML/MIN/1.73
GLUCOSE BLD-MCNC: 123 MG/DL (ref 65–99)
GLUCOSE BLDC GLUCOMTR-MCNC: 121 MG/DL (ref 70–130)
GLUCOSE BLDC GLUCOMTR-MCNC: 127 MG/DL (ref 70–130)
GLUCOSE BLDC GLUCOMTR-MCNC: 184 MG/DL (ref 70–130)
HCT VFR BLD AUTO: 27.5 % (ref 34–46.6)
HGB BLD-MCNC: 8.6 G/DL (ref 12–15.9)
IMM GRANULOCYTES # BLD AUTO: 0.05 10*3/MM3 (ref 0–0.05)
IMM GRANULOCYTES NFR BLD AUTO: 0.6 % (ref 0–0.5)
INR PPP: 1.28 (ref 0.8–1.2)
LYMPHOCYTES # BLD AUTO: 0.45 10*3/MM3 (ref 0.7–3.1)
LYMPHOCYTES NFR BLD AUTO: 5.4 % (ref 19.6–45.3)
MCH RBC QN AUTO: 30.8 PG (ref 26.6–33)
MCHC RBC AUTO-ENTMCNC: 31.3 G/DL (ref 31.5–35.7)
MCV RBC AUTO: 98.6 FL (ref 79–97)
MONOCYTES # BLD AUTO: 0.63 10*3/MM3 (ref 0.1–0.9)
MONOCYTES NFR BLD AUTO: 7.5 % (ref 5–12)
NEUTROPHILS # BLD AUTO: 6.65 10*3/MM3 (ref 1.7–7)
NEUTROPHILS NFR BLD AUTO: 79.2 % (ref 42.7–76)
NRBC BLD AUTO-RTO: 0 /100 WBC (ref 0–0.2)
PLATELET # BLD AUTO: 198 10*3/MM3 (ref 140–450)
PMV BLD AUTO: 10.9 FL (ref 6–12)
POTASSIUM BLD-SCNC: 4.1 MMOL/L (ref 3.5–5.2)
PROTHROMBIN TIME: 15.8 SECONDS (ref 11.1–15.3)
RBC # BLD AUTO: 2.79 10*6/MM3 (ref 3.77–5.28)
SODIUM BLD-SCNC: 144 MMOL/L (ref 136–145)
WBC NRBC COR # BLD: 8.39 10*3/MM3 (ref 3.4–10.8)

## 2019-06-23 PROCEDURE — 63710000001 INSULIN ASPART PER 5 UNITS: Performed by: INTERNAL MEDICINE

## 2019-06-23 PROCEDURE — 85025 COMPLETE CBC W/AUTO DIFF WBC: CPT | Performed by: INTERNAL MEDICINE

## 2019-06-23 PROCEDURE — 94799 UNLISTED PULMONARY SVC/PX: CPT

## 2019-06-23 PROCEDURE — 82962 GLUCOSE BLOOD TEST: CPT

## 2019-06-23 PROCEDURE — 94760 N-INVAS EAR/PLS OXIMETRY 1: CPT

## 2019-06-23 PROCEDURE — 80048 BASIC METABOLIC PNL TOTAL CA: CPT | Performed by: INTERNAL MEDICINE

## 2019-06-23 PROCEDURE — 85610 PROTHROMBIN TIME: CPT | Performed by: INTERNAL MEDICINE

## 2019-06-23 RX ORDER — NYSTATIN 100000 [USP'U]/G
POWDER TOPICAL EVERY 12 HOURS SCHEDULED
Status: DISCONTINUED | OUTPATIENT
Start: 2019-06-23 | End: 2019-07-05 | Stop reason: HOSPADM

## 2019-06-23 RX ORDER — WARFARIN SODIUM 3 MG/1
3 TABLET ORAL
Status: DISCONTINUED | OUTPATIENT
Start: 2019-06-23 | End: 2019-06-24

## 2019-06-23 RX ADMIN — METOPROLOL TARTRATE 5 MG: 5 INJECTION INTRAVENOUS at 14:14

## 2019-06-23 RX ADMIN — HYDROCORTISONE: 1 CREAM TOPICAL at 22:26

## 2019-06-23 RX ADMIN — NYSTATIN: 100000 POWDER TOPICAL at 08:24

## 2019-06-23 RX ADMIN — SODIUM CHLORIDE, PRESERVATIVE FREE 3 ML: 5 INJECTION INTRAVENOUS at 22:28

## 2019-06-23 RX ADMIN — HYDROCORTISONE: 1 CREAM TOPICAL at 08:24

## 2019-06-23 RX ADMIN — IPRATROPIUM BROMIDE AND ALBUTEROL SULFATE 3 ML: 2.5; .5 SOLUTION RESPIRATORY (INHALATION) at 15:03

## 2019-06-23 RX ADMIN — SODIUM CHLORIDE 50 ML/HR: 9 INJECTION, SOLUTION INTRAVENOUS at 22:26

## 2019-06-23 RX ADMIN — CITALOPRAM HYDROBROMIDE 20 MG: 20 TABLET ORAL at 08:25

## 2019-06-23 RX ADMIN — METOPROLOL TARTRATE 5 MG: 5 INJECTION INTRAVENOUS at 05:21

## 2019-06-23 RX ADMIN — SODIUM CHLORIDE 75 ML/HR: 9 INJECTION, SOLUTION INTRAVENOUS at 11:53

## 2019-06-23 RX ADMIN — ROPINIROLE HYDROCHLORIDE 0.25 MG: 0.25 TABLET, FILM COATED ORAL at 22:28

## 2019-06-23 RX ADMIN — NYSTATIN: 100000 POWDER TOPICAL at 22:26

## 2019-06-23 RX ADMIN — DILTIAZEM HYDROCHLORIDE 240 MG: 240 CAPSULE, COATED, EXTENDED RELEASE ORAL at 08:25

## 2019-06-23 RX ADMIN — WARFARIN SODIUM 3 MG: 3 TABLET ORAL at 17:49

## 2019-06-23 RX ADMIN — POLYETHYLENE GLYCOL 3350, SODIUM SULFATE ANHYDROUS, SODIUM BICARBONATE, SODIUM CHLORIDE, POTASSIUM CHLORIDE 4000 ML: 236; 22.74; 6.74; 5.86; 2.97 POWDER, FOR SOLUTION ORAL at 18:45

## 2019-06-23 RX ADMIN — FERROUS SULFATE TAB EC 324 MG (65 MG FE EQUIVALENT) 325 MG: 324 (65 FE) TABLET DELAYED RESPONSE at 08:25

## 2019-06-23 RX ADMIN — INSULIN ASPART 2 UNITS: 100 INJECTION, SOLUTION INTRAVENOUS; SUBCUTANEOUS at 22:27

## 2019-06-23 RX ADMIN — METOPROLOL TARTRATE 5 MG: 5 INJECTION INTRAVENOUS at 22:27

## 2019-06-24 ENCOUNTER — ANESTHESIA (OUTPATIENT)
Dept: GASTROENTEROLOGY | Facility: HOSPITAL | Age: 74
End: 2019-06-24

## 2019-06-24 ENCOUNTER — ANESTHESIA EVENT (OUTPATIENT)
Dept: GASTROENTEROLOGY | Facility: HOSPITAL | Age: 74
End: 2019-06-24

## 2019-06-24 LAB
ANION GAP SERPL CALCULATED.3IONS-SCNC: 9 MMOL/L
BUN BLD-MCNC: 43 MG/DL (ref 8–23)
BUN/CREAT SERPL: 22.3 (ref 7–25)
CALCIUM SPEC-SCNC: 8.6 MG/DL (ref 8.6–10.5)
CHLORIDE SERPL-SCNC: 107 MMOL/L (ref 98–107)
CO2 SERPL-SCNC: 28 MMOL/L (ref 22–29)
CREAT BLD-MCNC: 1.93 MG/DL (ref 0.57–1)
GFR SERPL CREATININE-BSD FRML MDRD: 25 ML/MIN/1.73
GLUCOSE BLD-MCNC: 123 MG/DL (ref 65–99)
GLUCOSE BLDC GLUCOMTR-MCNC: 119 MG/DL (ref 70–130)
GLUCOSE BLDC GLUCOMTR-MCNC: 126 MG/DL (ref 70–130)
GLUCOSE BLDC GLUCOMTR-MCNC: 139 MG/DL (ref 70–130)
GLUCOSE BLDC GLUCOMTR-MCNC: 150 MG/DL (ref 70–130)
GLUCOSE BLDC GLUCOMTR-MCNC: 151 MG/DL (ref 70–130)
GLUCOSE BLDC GLUCOMTR-MCNC: 168 MG/DL (ref 70–130)
INR PPP: 1.54 (ref 0.8–1.2)
POTASSIUM BLD-SCNC: 4.6 MMOL/L (ref 3.5–5.2)
PROTHROMBIN TIME: 18.3 SECONDS (ref 11.1–15.3)
SODIUM BLD-SCNC: 144 MMOL/L (ref 136–145)

## 2019-06-24 PROCEDURE — 25010000002 PROPOFOL 10 MG/ML EMULSION: Performed by: NURSE ANESTHETIST, CERTIFIED REGISTERED

## 2019-06-24 PROCEDURE — 94799 UNLISTED PULMONARY SVC/PX: CPT

## 2019-06-24 PROCEDURE — 82962 GLUCOSE BLOOD TEST: CPT

## 2019-06-24 PROCEDURE — 0DBN8ZX EXCISION OF SIGMOID COLON, VIA NATURAL OR ARTIFICIAL OPENING ENDOSCOPIC, DIAGNOSTIC: ICD-10-PCS | Performed by: INTERNAL MEDICINE

## 2019-06-24 PROCEDURE — 88305 TISSUE EXAM BY PATHOLOGIST: CPT | Performed by: INTERNAL MEDICINE

## 2019-06-24 PROCEDURE — 80048 BASIC METABOLIC PNL TOTAL CA: CPT | Performed by: INTERNAL MEDICINE

## 2019-06-24 PROCEDURE — 88305 TISSUE EXAM BY PATHOLOGIST: CPT | Performed by: PATHOLOGY

## 2019-06-24 PROCEDURE — 85610 PROTHROMBIN TIME: CPT | Performed by: INTERNAL MEDICINE

## 2019-06-24 PROCEDURE — 99232 SBSQ HOSP IP/OBS MODERATE 35: CPT | Performed by: INTERNAL MEDICINE

## 2019-06-24 PROCEDURE — 45385 COLONOSCOPY W/LESION REMOVAL: CPT | Performed by: INTERNAL MEDICINE

## 2019-06-24 DEVICE — CLIPPING DEVICE
Type: IMPLANTABLE DEVICE | Status: FUNCTIONAL
Brand: RESOLUTION CLIP

## 2019-06-24 RX ORDER — WARFARIN SODIUM 2.5 MG/1
2.5 TABLET ORAL
Status: DISCONTINUED | OUTPATIENT
Start: 2019-06-25 | End: 2019-06-25

## 2019-06-24 RX ORDER — WARFARIN SODIUM 2.5 MG/1
2.5 TABLET ORAL
Status: DISCONTINUED | OUTPATIENT
Start: 2019-06-24 | End: 2019-06-24

## 2019-06-24 RX ORDER — DEXTROSE AND SODIUM CHLORIDE 5; .45 G/100ML; G/100ML
30 INJECTION, SOLUTION INTRAVENOUS CONTINUOUS PRN
Status: DISCONTINUED | OUTPATIENT
Start: 2019-06-24 | End: 2019-07-05 | Stop reason: HOSPADM

## 2019-06-24 RX ORDER — PROPOFOL 10 MG/ML
VIAL (ML) INTRAVENOUS AS NEEDED
Status: DISCONTINUED | OUTPATIENT
Start: 2019-06-24 | End: 2019-06-24 | Stop reason: SURG

## 2019-06-24 RX ORDER — LIDOCAINE HYDROCHLORIDE 10 MG/ML
INJECTION, SOLUTION INFILTRATION; PERINEURAL AS NEEDED
Status: DISCONTINUED | OUTPATIENT
Start: 2019-06-24 | End: 2019-06-24 | Stop reason: SURG

## 2019-06-24 RX ADMIN — NYSTATIN: 100000 POWDER TOPICAL at 08:30

## 2019-06-24 RX ADMIN — TRAZODONE HYDROCHLORIDE 300 MG: 150 TABLET ORAL at 21:16

## 2019-06-24 RX ADMIN — HYDROCORTISONE: 1 CREAM TOPICAL at 08:30

## 2019-06-24 RX ADMIN — IPRATROPIUM BROMIDE AND ALBUTEROL SULFATE 3 ML: 2.5; .5 SOLUTION RESPIRATORY (INHALATION) at 23:09

## 2019-06-24 RX ADMIN — NYSTATIN: 100000 POWDER TOPICAL at 21:20

## 2019-06-24 RX ADMIN — LIDOCAINE HYDROCHLORIDE 50 MG: 10 INJECTION, SOLUTION INFILTRATION; PERINEURAL at 12:17

## 2019-06-24 RX ADMIN — SODIUM CHLORIDE, PRESERVATIVE FREE 10 ML: 5 INJECTION INTRAVENOUS at 08:29

## 2019-06-24 RX ADMIN — IPRATROPIUM BROMIDE AND ALBUTEROL SULFATE 3 ML: 2.5; .5 SOLUTION RESPIRATORY (INHALATION) at 05:00

## 2019-06-24 RX ADMIN — DILTIAZEM HYDROCHLORIDE 240 MG: 240 CAPSULE, COATED, EXTENDED RELEASE ORAL at 08:28

## 2019-06-24 RX ADMIN — ROPINIROLE HYDROCHLORIDE 0.25 MG: 0.25 TABLET, FILM COATED ORAL at 21:16

## 2019-06-24 RX ADMIN — METOPROLOL TARTRATE 5 MG: 5 INJECTION INTRAVENOUS at 14:44

## 2019-06-24 RX ADMIN — METOPROLOL TARTRATE 5 MG: 5 INJECTION INTRAVENOUS at 05:58

## 2019-06-24 RX ADMIN — SODIUM CHLORIDE 50 ML/HR: 9 INJECTION, SOLUTION INTRAVENOUS at 21:30

## 2019-06-24 RX ADMIN — PROPOFOL 20 MG: 10 INJECTION, EMULSION INTRAVENOUS at 12:30

## 2019-06-24 RX ADMIN — FERROUS SULFATE TAB EC 324 MG (65 MG FE EQUIVALENT) 325 MG: 324 (65 FE) TABLET DELAYED RESPONSE at 08:28

## 2019-06-24 RX ADMIN — CITALOPRAM HYDROBROMIDE 20 MG: 20 TABLET ORAL at 08:28

## 2019-06-24 RX ADMIN — PROPOFOL 50 MG: 10 INJECTION, EMULSION INTRAVENOUS at 12:17

## 2019-06-24 RX ADMIN — HYDROCORTISONE: 1 CREAM TOPICAL at 21:20

## 2019-06-24 RX ADMIN — PROPOFOL 20 MG: 10 INJECTION, EMULSION INTRAVENOUS at 12:24

## 2019-06-24 RX ADMIN — METOPROLOL TARTRATE 5 MG: 5 INJECTION INTRAVENOUS at 21:15

## 2019-06-24 RX ADMIN — SODIUM CHLORIDE, PRESERVATIVE FREE 3 ML: 5 INJECTION INTRAVENOUS at 21:17

## 2019-06-24 NOTE — ANESTHESIA PREPROCEDURE EVALUATION
Anesthesia Evaluation     NPO Solid Status: > 8 hours  NPO Liquid Status: > 8 hours           Airway   Mallampati: III  TM distance: >3 FB  Neck ROM: full  No difficulty expected  Dental - normal exam     Pulmonary    Cardiovascular - normal exam        Neuro/Psych  GI/Hepatic/Renal/Endo      Musculoskeletal     Abdominal    Substance History      OB/GYN          Other                        Anesthesia Plan    ASA 3     MAC     intravenous induction   Anesthetic plan, all risks, benefits, and alternatives have been provided, discussed and informed consent has been obtained with: patient.

## 2019-06-25 ENCOUNTER — APPOINTMENT (OUTPATIENT)
Dept: GENERAL RADIOLOGY | Facility: HOSPITAL | Age: 74
End: 2019-06-25

## 2019-06-25 ENCOUNTER — PREP FOR SURGERY (OUTPATIENT)
Dept: OTHER | Facility: HOSPITAL | Age: 74
End: 2019-06-25

## 2019-06-25 LAB
ANION GAP SERPL CALCULATED.3IONS-SCNC: 10 MMOL/L
BUN BLD-MCNC: 43 MG/DL (ref 8–23)
BUN/CREAT SERPL: 24 (ref 7–25)
CALCIUM SPEC-SCNC: 8.9 MG/DL (ref 8.6–10.5)
CHLORIDE SERPL-SCNC: 107 MMOL/L (ref 98–107)
CO2 SERPL-SCNC: 26 MMOL/L (ref 22–29)
CREAT BLD-MCNC: 1.79 MG/DL (ref 0.57–1)
DEPRECATED RDW RBC AUTO: 65.4 FL (ref 37–54)
ERYTHROCYTE [DISTWIDTH] IN BLOOD BY AUTOMATED COUNT: 18.4 % (ref 12.3–15.4)
GFR SERPL CREATININE-BSD FRML MDRD: 28 ML/MIN/1.73
GLUCOSE BLD-MCNC: 129 MG/DL (ref 65–99)
GLUCOSE BLDC GLUCOMTR-MCNC: 166 MG/DL (ref 70–130)
GLUCOSE BLDC GLUCOMTR-MCNC: 207 MG/DL (ref 70–130)
HCT VFR BLD AUTO: 26 % (ref 34–46.6)
HGB BLD-MCNC: 8 G/DL (ref 12–15.9)
HOLD SPECIMEN: NORMAL
HOLD SPECIMEN: NORMAL
INR PPP: 1.75 (ref 0.8–1.2)
LAB AP CASE REPORT: NORMAL
LAB AP DIAGNOSIS COMMENT: NORMAL
MCH RBC QN AUTO: 30.5 PG (ref 26.6–33)
MCHC RBC AUTO-ENTMCNC: 30.8 G/DL (ref 31.5–35.7)
MCV RBC AUTO: 99.2 FL (ref 79–97)
PATH REPORT.FINAL DX SPEC: NORMAL
PATH REPORT.GROSS SPEC: NORMAL
PLATELET # BLD AUTO: 209 10*3/MM3 (ref 140–450)
PMV BLD AUTO: 11.8 FL (ref 6–12)
POTASSIUM BLD-SCNC: 4.3 MMOL/L (ref 3.5–5.2)
PROTHROMBIN TIME: 20.2 SECONDS (ref 11.1–15.3)
RBC # BLD AUTO: 2.62 10*6/MM3 (ref 3.77–5.28)
SODIUM BLD-SCNC: 143 MMOL/L (ref 136–145)
WBC NRBC COR # BLD: 10.27 10*3/MM3 (ref 3.4–10.8)

## 2019-06-25 PROCEDURE — 99232 SBSQ HOSP IP/OBS MODERATE 35: CPT | Performed by: INTERNAL MEDICINE

## 2019-06-25 PROCEDURE — 80048 BASIC METABOLIC PNL TOTAL CA: CPT | Performed by: INTERNAL MEDICINE

## 2019-06-25 PROCEDURE — 63710000001 INSULIN ASPART PER 5 UNITS: Performed by: INTERNAL MEDICINE

## 2019-06-25 PROCEDURE — 85610 PROTHROMBIN TIME: CPT | Performed by: INTERNAL MEDICINE

## 2019-06-25 PROCEDURE — 71045 X-RAY EXAM CHEST 1 VIEW: CPT

## 2019-06-25 PROCEDURE — 82962 GLUCOSE BLOOD TEST: CPT

## 2019-06-25 PROCEDURE — 94799 UNLISTED PULMONARY SVC/PX: CPT

## 2019-06-25 PROCEDURE — 85027 COMPLETE CBC AUTOMATED: CPT | Performed by: FAMILY MEDICINE

## 2019-06-25 RX ORDER — WARFARIN SODIUM 2.5 MG/1
2.5 TABLET ORAL
Status: DISCONTINUED | OUTPATIENT
Start: 2019-06-26 | End: 2019-06-28

## 2019-06-25 RX ORDER — FUROSEMIDE 20 MG/1
20 TABLET ORAL DAILY
Status: DISCONTINUED | OUTPATIENT
Start: 2019-06-25 | End: 2019-06-26

## 2019-06-25 RX ADMIN — HYDROCORTISONE: 1 CREAM TOPICAL at 21:00

## 2019-06-25 RX ADMIN — NYSTATIN: 100000 POWDER TOPICAL at 21:00

## 2019-06-25 RX ADMIN — IPRATROPIUM BROMIDE AND ALBUTEROL SULFATE 3 ML: 2.5; .5 SOLUTION RESPIRATORY (INHALATION) at 08:03

## 2019-06-25 RX ADMIN — METOPROLOL TARTRATE 5 MG: 5 INJECTION INTRAVENOUS at 21:00

## 2019-06-25 RX ADMIN — ROPINIROLE HYDROCHLORIDE 0.25 MG: 0.25 TABLET, FILM COATED ORAL at 21:00

## 2019-06-25 RX ADMIN — NYSTATIN: 100000 POWDER TOPICAL at 09:34

## 2019-06-25 RX ADMIN — CITALOPRAM HYDROBROMIDE 20 MG: 20 TABLET ORAL at 09:33

## 2019-06-25 RX ADMIN — FUROSEMIDE 20 MG: 20 TABLET ORAL at 18:20

## 2019-06-25 RX ADMIN — METOPROLOL TARTRATE 5 MG: 5 INJECTION INTRAVENOUS at 05:29

## 2019-06-25 RX ADMIN — DILTIAZEM HYDROCHLORIDE 240 MG: 240 CAPSULE, COATED, EXTENDED RELEASE ORAL at 09:32

## 2019-06-25 RX ADMIN — INSULIN ASPART 2 UNITS: 100 INJECTION, SOLUTION INTRAVENOUS; SUBCUTANEOUS at 11:45

## 2019-06-25 RX ADMIN — SODIUM CHLORIDE, PRESERVATIVE FREE 3 ML: 5 INJECTION INTRAVENOUS at 21:00

## 2019-06-25 RX ADMIN — METOPROLOL TARTRATE 5 MG: 5 INJECTION INTRAVENOUS at 14:15

## 2019-06-25 RX ADMIN — FERROUS SULFATE TAB EC 324 MG (65 MG FE EQUIVALENT) 325 MG: 324 (65 FE) TABLET DELAYED RESPONSE at 09:32

## 2019-06-25 RX ADMIN — INSULIN ASPART 4 UNITS: 100 INJECTION, SOLUTION INTRAVENOUS; SUBCUTANEOUS at 21:00

## 2019-06-25 RX ADMIN — HYDROCORTISONE: 1 CREAM TOPICAL at 09:35

## 2019-06-25 RX ADMIN — TRAZODONE HYDROCHLORIDE 300 MG: 150 TABLET ORAL at 21:00

## 2019-06-25 RX ADMIN — IPRATROPIUM BROMIDE AND ALBUTEROL SULFATE 3 ML: 2.5; .5 SOLUTION RESPIRATORY (INHALATION) at 16:18

## 2019-06-26 LAB
ANION GAP SERPL CALCULATED.3IONS-SCNC: 11 MMOL/L
BUN BLD-MCNC: 49 MG/DL (ref 8–23)
BUN/CREAT SERPL: 25.3 (ref 7–25)
CALCIUM SPEC-SCNC: 9.1 MG/DL (ref 8.6–10.5)
CHLORIDE SERPL-SCNC: 107 MMOL/L (ref 98–107)
CO2 SERPL-SCNC: 24 MMOL/L (ref 22–29)
CREAT BLD-MCNC: 1.94 MG/DL (ref 0.57–1)
DEPRECATED RDW RBC AUTO: 64.6 FL (ref 37–54)
ERYTHROCYTE [DISTWIDTH] IN BLOOD BY AUTOMATED COUNT: 18.2 % (ref 12.3–15.4)
GFR SERPL CREATININE-BSD FRML MDRD: 25 ML/MIN/1.73
GLUCOSE BLD-MCNC: 130 MG/DL (ref 65–99)
GLUCOSE BLDC GLUCOMTR-MCNC: 122 MG/DL (ref 70–130)
GLUCOSE BLDC GLUCOMTR-MCNC: 125 MG/DL (ref 70–130)
GLUCOSE BLDC GLUCOMTR-MCNC: 143 MG/DL (ref 70–130)
GLUCOSE BLDC GLUCOMTR-MCNC: 165 MG/DL (ref 70–130)
GLUCOSE BLDC GLUCOMTR-MCNC: 174 MG/DL (ref 70–130)
HCT VFR BLD AUTO: 28.4 % (ref 34–46.6)
HGB BLD-MCNC: 8.7 G/DL (ref 12–15.9)
INR PPP: 1.62 (ref 0.8–1.2)
MCH RBC QN AUTO: 30.4 PG (ref 26.6–33)
MCHC RBC AUTO-ENTMCNC: 30.6 G/DL (ref 31.5–35.7)
MCV RBC AUTO: 99.3 FL (ref 79–97)
PLATELET # BLD AUTO: 234 10*3/MM3 (ref 140–450)
PMV BLD AUTO: 11.9 FL (ref 6–12)
POTASSIUM BLD-SCNC: 4.5 MMOL/L (ref 3.5–5.2)
PROTHROMBIN TIME: 19 SECONDS (ref 11.1–15.3)
RBC # BLD AUTO: 2.86 10*6/MM3 (ref 3.77–5.28)
SODIUM BLD-SCNC: 142 MMOL/L (ref 136–145)
WBC NRBC COR # BLD: 10.3 10*3/MM3 (ref 3.4–10.8)

## 2019-06-26 PROCEDURE — 97162 PT EVAL MOD COMPLEX 30 MIN: CPT

## 2019-06-26 PROCEDURE — 94799 UNLISTED PULMONARY SVC/PX: CPT

## 2019-06-26 PROCEDURE — 94760 N-INVAS EAR/PLS OXIMETRY 1: CPT

## 2019-06-26 PROCEDURE — 99232 SBSQ HOSP IP/OBS MODERATE 35: CPT | Performed by: INTERNAL MEDICINE

## 2019-06-26 PROCEDURE — 97166 OT EVAL MOD COMPLEX 45 MIN: CPT

## 2019-06-26 PROCEDURE — 85610 PROTHROMBIN TIME: CPT | Performed by: INTERNAL MEDICINE

## 2019-06-26 PROCEDURE — 80048 BASIC METABOLIC PNL TOTAL CA: CPT | Performed by: INTERNAL MEDICINE

## 2019-06-26 PROCEDURE — 82962 GLUCOSE BLOOD TEST: CPT

## 2019-06-26 PROCEDURE — 25010000002 FUROSEMIDE PER 20 MG: Performed by: INTERNAL MEDICINE

## 2019-06-26 PROCEDURE — 63710000001 INSULIN ASPART PER 5 UNITS: Performed by: INTERNAL MEDICINE

## 2019-06-26 PROCEDURE — 85027 COMPLETE CBC AUTOMATED: CPT | Performed by: FAMILY MEDICINE

## 2019-06-26 RX ORDER — FUROSEMIDE 10 MG/ML
40 INJECTION INTRAMUSCULAR; INTRAVENOUS ONCE
Status: COMPLETED | OUTPATIENT
Start: 2019-06-26 | End: 2019-06-26

## 2019-06-26 RX ORDER — METOPROLOL TARTRATE 5 MG/5ML
2.5 INJECTION INTRAVENOUS EVERY 6 HOURS PRN
Status: DISCONTINUED | OUTPATIENT
Start: 2019-06-26 | End: 2019-07-05 | Stop reason: HOSPADM

## 2019-06-26 RX ORDER — FERROUS SULFATE TAB EC 324 MG (65 MG FE EQUIVALENT) 324 (65 FE) MG
324 TABLET DELAYED RESPONSE ORAL
Status: DISCONTINUED | OUTPATIENT
Start: 2019-06-27 | End: 2019-07-01

## 2019-06-26 RX ORDER — FUROSEMIDE 10 MG/ML
40 INJECTION INTRAMUSCULAR; INTRAVENOUS DAILY
Status: DISCONTINUED | OUTPATIENT
Start: 2019-06-27 | End: 2019-06-29

## 2019-06-26 RX ADMIN — IPRATROPIUM BROMIDE AND ALBUTEROL SULFATE 3 ML: 2.5; .5 SOLUTION RESPIRATORY (INHALATION) at 23:38

## 2019-06-26 RX ADMIN — SODIUM CHLORIDE, PRESERVATIVE FREE 3 ML: 5 INJECTION INTRAVENOUS at 10:02

## 2019-06-26 RX ADMIN — NYSTATIN: 100000 POWDER TOPICAL at 20:57

## 2019-06-26 RX ADMIN — IPRATROPIUM BROMIDE AND ALBUTEROL SULFATE 3 ML: 2.5; .5 SOLUTION RESPIRATORY (INHALATION) at 07:10

## 2019-06-26 RX ADMIN — FUROSEMIDE 20 MG: 20 TABLET ORAL at 10:00

## 2019-06-26 RX ADMIN — METOPROLOL TARTRATE 5 MG: 5 INJECTION INTRAVENOUS at 06:05

## 2019-06-26 RX ADMIN — NYSTATIN: 100000 POWDER TOPICAL at 10:02

## 2019-06-26 RX ADMIN — FERROUS SULFATE TAB EC 324 MG (65 MG FE EQUIVALENT) 325 MG: 324 (65 FE) TABLET DELAYED RESPONSE at 10:00

## 2019-06-26 RX ADMIN — SODIUM CHLORIDE, PRESERVATIVE FREE 3 ML: 5 INJECTION INTRAVENOUS at 20:57

## 2019-06-26 RX ADMIN — INSULIN ASPART 2 UNITS: 100 INJECTION, SOLUTION INTRAVENOUS; SUBCUTANEOUS at 11:39

## 2019-06-26 RX ADMIN — HYDROCORTISONE: 1 CREAM TOPICAL at 20:57

## 2019-06-26 RX ADMIN — ROPINIROLE HYDROCHLORIDE 0.25 MG: 0.25 TABLET, FILM COATED ORAL at 20:57

## 2019-06-26 RX ADMIN — FUROSEMIDE 40 MG: 10 INJECTION, SOLUTION INTRAMUSCULAR; INTRAVENOUS at 11:13

## 2019-06-26 RX ADMIN — WARFARIN SODIUM 2.5 MG: 2.5 TABLET ORAL at 18:22

## 2019-06-26 RX ADMIN — DILTIAZEM HYDROCHLORIDE 240 MG: 240 CAPSULE, COATED, EXTENDED RELEASE ORAL at 10:00

## 2019-06-26 RX ADMIN — INSULIN ASPART 2 UNITS: 100 INJECTION, SOLUTION INTRAVENOUS; SUBCUTANEOUS at 20:58

## 2019-06-26 RX ADMIN — SODIUM CHLORIDE, PRESERVATIVE FREE 10 ML: 5 INJECTION INTRAVENOUS at 11:14

## 2019-06-26 RX ADMIN — CITALOPRAM HYDROBROMIDE 20 MG: 20 TABLET ORAL at 10:00

## 2019-06-26 RX ADMIN — TRAZODONE HYDROCHLORIDE 300 MG: 150 TABLET ORAL at 20:57

## 2019-06-26 RX ADMIN — IPRATROPIUM BROMIDE AND ALBUTEROL SULFATE 3 ML: 2.5; .5 SOLUTION RESPIRATORY (INHALATION) at 14:36

## 2019-06-26 RX ADMIN — HYDROCORTISONE: 1 CREAM TOPICAL at 10:01

## 2019-06-27 LAB
ANION GAP SERPL CALCULATED.3IONS-SCNC: 9 MMOL/L (ref 5–15)
BUN BLD-MCNC: 52 MG/DL (ref 8–23)
BUN/CREAT SERPL: 27.2 (ref 7–25)
CALCIUM SPEC-SCNC: 8.9 MG/DL (ref 8.6–10.5)
CHLORIDE SERPL-SCNC: 105 MMOL/L (ref 98–107)
CO2 SERPL-SCNC: 27 MMOL/L (ref 22–29)
CREAT BLD-MCNC: 1.91 MG/DL (ref 0.57–1)
DEPRECATED RDW RBC AUTO: 63.7 FL (ref 37–54)
ERYTHROCYTE [DISTWIDTH] IN BLOOD BY AUTOMATED COUNT: 17.9 % (ref 12.3–15.4)
FERRITIN SERPL-MCNC: 211 NG/ML (ref 13–150)
GFR SERPL CREATININE-BSD FRML MDRD: 26 ML/MIN/1.73
GLUCOSE BLD-MCNC: 124 MG/DL (ref 65–99)
GLUCOSE BLDC GLUCOMTR-MCNC: 122 MG/DL (ref 70–130)
GLUCOSE BLDC GLUCOMTR-MCNC: 138 MG/DL (ref 70–130)
GLUCOSE BLDC GLUCOMTR-MCNC: 148 MG/DL (ref 70–130)
GLUCOSE BLDC GLUCOMTR-MCNC: 149 MG/DL (ref 70–130)
HCT VFR BLD AUTO: 26.3 % (ref 34–46.6)
HGB BLD-MCNC: 8.1 G/DL (ref 12–15.9)
INR PPP: 1.65 (ref 0.8–1.2)
IRON 24H UR-MRATE: 36 MCG/DL (ref 37–145)
IRON SATN MFR SERPL: 13 % (ref 20–50)
MCH RBC QN AUTO: 30.2 PG (ref 26.6–33)
MCHC RBC AUTO-ENTMCNC: 30.8 G/DL (ref 31.5–35.7)
MCV RBC AUTO: 98.1 FL (ref 79–97)
PLATELET # BLD AUTO: 208 10*3/MM3 (ref 140–450)
PMV BLD AUTO: 11.2 FL (ref 6–12)
POTASSIUM BLD-SCNC: 4.3 MMOL/L (ref 3.5–5.2)
PROTHROMBIN TIME: 19.3 SECONDS (ref 11.1–15.3)
RBC # BLD AUTO: 2.68 10*6/MM3 (ref 3.77–5.28)
SODIUM BLD-SCNC: 141 MMOL/L (ref 136–145)
TIBC SERPL-MCNC: 277 MCG/DL (ref 298–536)
TRANSFERRIN SERPL-MCNC: 186 MG/DL (ref 200–360)
WBC NRBC COR # BLD: 8.45 10*3/MM3 (ref 3.4–10.8)

## 2019-06-27 PROCEDURE — 94799 UNLISTED PULMONARY SVC/PX: CPT

## 2019-06-27 PROCEDURE — 80048 BASIC METABOLIC PNL TOTAL CA: CPT | Performed by: INTERNAL MEDICINE

## 2019-06-27 PROCEDURE — 99232 SBSQ HOSP IP/OBS MODERATE 35: CPT | Performed by: INTERNAL MEDICINE

## 2019-06-27 PROCEDURE — 82728 ASSAY OF FERRITIN: CPT | Performed by: INTERNAL MEDICINE

## 2019-06-27 PROCEDURE — 94760 N-INVAS EAR/PLS OXIMETRY 1: CPT

## 2019-06-27 PROCEDURE — 85027 COMPLETE CBC AUTOMATED: CPT | Performed by: FAMILY MEDICINE

## 2019-06-27 PROCEDURE — 25010000002 FUROSEMIDE PER 20 MG: Performed by: INTERNAL MEDICINE

## 2019-06-27 PROCEDURE — 83540 ASSAY OF IRON: CPT | Performed by: INTERNAL MEDICINE

## 2019-06-27 PROCEDURE — 82962 GLUCOSE BLOOD TEST: CPT

## 2019-06-27 PROCEDURE — 85610 PROTHROMBIN TIME: CPT | Performed by: INTERNAL MEDICINE

## 2019-06-27 PROCEDURE — 84466 ASSAY OF TRANSFERRIN: CPT | Performed by: INTERNAL MEDICINE

## 2019-06-27 RX ORDER — FUROSEMIDE 10 MG/ML
40 INJECTION INTRAMUSCULAR; INTRAVENOUS ONCE
Status: COMPLETED | OUTPATIENT
Start: 2019-06-27 | End: 2019-06-27

## 2019-06-27 RX ADMIN — WARFARIN SODIUM 2.5 MG: 2.5 TABLET ORAL at 18:06

## 2019-06-27 RX ADMIN — DILTIAZEM HYDROCHLORIDE 240 MG: 240 CAPSULE, COATED, EXTENDED RELEASE ORAL at 10:00

## 2019-06-27 RX ADMIN — NYSTATIN: 100000 POWDER TOPICAL at 10:01

## 2019-06-27 RX ADMIN — SODIUM CHLORIDE, PRESERVATIVE FREE 3 ML: 5 INJECTION INTRAVENOUS at 10:00

## 2019-06-27 RX ADMIN — CITALOPRAM HYDROBROMIDE 20 MG: 20 TABLET ORAL at 10:00

## 2019-06-27 RX ADMIN — ROPINIROLE HYDROCHLORIDE 0.25 MG: 0.25 TABLET, FILM COATED ORAL at 20:04

## 2019-06-27 RX ADMIN — NYSTATIN: 100000 POWDER TOPICAL at 20:04

## 2019-06-27 RX ADMIN — SODIUM CHLORIDE, PRESERVATIVE FREE 10 ML: 5 INJECTION INTRAVENOUS at 15:34

## 2019-06-27 RX ADMIN — TRAZODONE HYDROCHLORIDE 300 MG: 150 TABLET ORAL at 20:04

## 2019-06-27 RX ADMIN — FUROSEMIDE 40 MG: 10 INJECTION, SOLUTION INTRAMUSCULAR; INTRAVENOUS at 15:33

## 2019-06-27 RX ADMIN — HYDROCORTISONE: 1 CREAM TOPICAL at 10:01

## 2019-06-27 RX ADMIN — FUROSEMIDE 40 MG: 10 INJECTION, SOLUTION INTRAMUSCULAR; INTRAVENOUS at 10:00

## 2019-06-27 RX ADMIN — FERROUS SULFATE TAB EC 324 MG (65 MG FE EQUIVALENT) 324 MG: 324 (65 FE) TABLET DELAYED RESPONSE at 10:00

## 2019-06-27 RX ADMIN — HYDROCORTISONE: 1 CREAM TOPICAL at 20:04

## 2019-06-27 RX ADMIN — IPRATROPIUM BROMIDE AND ALBUTEROL SULFATE 3 ML: 2.5; .5 SOLUTION RESPIRATORY (INHALATION) at 23:31

## 2019-06-27 RX ADMIN — IPRATROPIUM BROMIDE AND ALBUTEROL SULFATE 3 ML: 2.5; .5 SOLUTION RESPIRATORY (INHALATION) at 14:19

## 2019-06-27 RX ADMIN — SODIUM CHLORIDE, PRESERVATIVE FREE 3 ML: 5 INJECTION INTRAVENOUS at 20:04

## 2019-06-27 RX ADMIN — IPRATROPIUM BROMIDE AND ALBUTEROL SULFATE 3 ML: 2.5; .5 SOLUTION RESPIRATORY (INHALATION) at 06:53

## 2019-06-28 LAB
ANION GAP SERPL CALCULATED.3IONS-SCNC: 10 MMOL/L (ref 5–15)
BUN BLD-MCNC: 53 MG/DL (ref 8–23)
BUN/CREAT SERPL: 25.2 (ref 7–25)
CALCIUM SPEC-SCNC: 9.3 MG/DL (ref 8.6–10.5)
CHLORIDE SERPL-SCNC: 105 MMOL/L (ref 98–107)
CO2 SERPL-SCNC: 28 MMOL/L (ref 22–29)
CREAT BLD-MCNC: 2.1 MG/DL (ref 0.57–1)
DEPRECATED RDW RBC AUTO: 64.7 FL (ref 37–54)
ERYTHROCYTE [DISTWIDTH] IN BLOOD BY AUTOMATED COUNT: 18.6 % (ref 12.3–15.4)
GFR SERPL CREATININE-BSD FRML MDRD: 23 ML/MIN/1.73
GLUCOSE BLD-MCNC: 125 MG/DL (ref 65–99)
GLUCOSE BLDC GLUCOMTR-MCNC: 130 MG/DL (ref 70–130)
GLUCOSE BLDC GLUCOMTR-MCNC: 131 MG/DL (ref 70–130)
GLUCOSE BLDC GLUCOMTR-MCNC: 134 MG/DL (ref 70–130)
GLUCOSE BLDC GLUCOMTR-MCNC: 150 MG/DL (ref 70–130)
HCT VFR BLD AUTO: 25.8 % (ref 34–46.6)
HGB BLD-MCNC: 8.2 G/DL (ref 12–15.9)
INR PPP: 1.6 (ref 0.8–1.2)
MCH RBC QN AUTO: 30.9 PG (ref 26.6–33)
MCHC RBC AUTO-ENTMCNC: 31.8 G/DL (ref 31.5–35.7)
MCV RBC AUTO: 97.4 FL (ref 79–97)
PLATELET # BLD AUTO: 203 10*3/MM3 (ref 140–450)
PMV BLD AUTO: 11.6 FL (ref 6–12)
POTASSIUM BLD-SCNC: 4.3 MMOL/L (ref 3.5–5.2)
PROTHROMBIN TIME: 18.8 SECONDS (ref 11.1–15.3)
RBC # BLD AUTO: 2.65 10*6/MM3 (ref 3.77–5.28)
SODIUM BLD-SCNC: 143 MMOL/L (ref 136–145)
WBC NRBC COR # BLD: 8.63 10*3/MM3 (ref 3.4–10.8)

## 2019-06-28 PROCEDURE — 94799 UNLISTED PULMONARY SVC/PX: CPT

## 2019-06-28 PROCEDURE — 97110 THERAPEUTIC EXERCISES: CPT

## 2019-06-28 PROCEDURE — 94760 N-INVAS EAR/PLS OXIMETRY 1: CPT

## 2019-06-28 PROCEDURE — 97116 GAIT TRAINING THERAPY: CPT

## 2019-06-28 PROCEDURE — 97535 SELF CARE MNGMENT TRAINING: CPT

## 2019-06-28 PROCEDURE — 82962 GLUCOSE BLOOD TEST: CPT

## 2019-06-28 PROCEDURE — 85610 PROTHROMBIN TIME: CPT | Performed by: INTERNAL MEDICINE

## 2019-06-28 PROCEDURE — 85027 COMPLETE CBC AUTOMATED: CPT | Performed by: FAMILY MEDICINE

## 2019-06-28 PROCEDURE — 25010000002 NA FERRIC GLUC CPLX PER 12.5 MG: Performed by: INTERNAL MEDICINE

## 2019-06-28 PROCEDURE — 80048 BASIC METABOLIC PNL TOTAL CA: CPT | Performed by: INTERNAL MEDICINE

## 2019-06-28 PROCEDURE — 25010000002 FUROSEMIDE PER 20 MG: Performed by: INTERNAL MEDICINE

## 2019-06-28 RX ORDER — WARFARIN SODIUM 3 MG/1
3 TABLET ORAL
Status: DISCONTINUED | OUTPATIENT
Start: 2019-06-28 | End: 2019-06-29

## 2019-06-28 RX ADMIN — FUROSEMIDE 40 MG: 10 INJECTION, SOLUTION INTRAMUSCULAR; INTRAVENOUS at 09:02

## 2019-06-28 RX ADMIN — CITALOPRAM HYDROBROMIDE 20 MG: 20 TABLET ORAL at 09:02

## 2019-06-28 RX ADMIN — TRAZODONE HYDROCHLORIDE 300 MG: 150 TABLET ORAL at 20:29

## 2019-06-28 RX ADMIN — NYSTATIN: 100000 POWDER TOPICAL at 09:01

## 2019-06-28 RX ADMIN — FERROUS SULFATE TAB EC 324 MG (65 MG FE EQUIVALENT) 324 MG: 324 (65 FE) TABLET DELAYED RESPONSE at 09:02

## 2019-06-28 RX ADMIN — IPRATROPIUM BROMIDE AND ALBUTEROL SULFATE 3 ML: 2.5; .5 SOLUTION RESPIRATORY (INHALATION) at 08:26

## 2019-06-28 RX ADMIN — SODIUM CHLORIDE 125 MG: 9 INJECTION, SOLUTION INTRAVENOUS at 13:55

## 2019-06-28 RX ADMIN — DILTIAZEM HYDROCHLORIDE 240 MG: 240 CAPSULE, COATED, EXTENDED RELEASE ORAL at 09:02

## 2019-06-28 RX ADMIN — NYSTATIN: 100000 POWDER TOPICAL at 20:30

## 2019-06-28 RX ADMIN — WARFARIN SODIUM 3 MG: 3 TABLET ORAL at 16:44

## 2019-06-28 RX ADMIN — HYDROCORTISONE: 1 CREAM TOPICAL at 09:02

## 2019-06-28 RX ADMIN — SODIUM CHLORIDE, PRESERVATIVE FREE 3 ML: 5 INJECTION INTRAVENOUS at 20:30

## 2019-06-28 RX ADMIN — ROPINIROLE HYDROCHLORIDE 0.25 MG: 0.25 TABLET, FILM COATED ORAL at 20:29

## 2019-06-29 LAB
ANION GAP SERPL CALCULATED.3IONS-SCNC: 8 MMOL/L (ref 5–15)
BUN BLD-MCNC: 60 MG/DL (ref 8–23)
BUN/CREAT SERPL: 27.1 (ref 7–25)
CALCIUM SPEC-SCNC: 9.5 MG/DL (ref 8.6–10.5)
CHLORIDE SERPL-SCNC: 104 MMOL/L (ref 98–107)
CO2 SERPL-SCNC: 29 MMOL/L (ref 22–29)
CREAT BLD-MCNC: 2.21 MG/DL (ref 0.57–1)
DEPRECATED RDW RBC AUTO: 65.9 FL (ref 37–54)
ERYTHROCYTE [DISTWIDTH] IN BLOOD BY AUTOMATED COUNT: 18.6 % (ref 12.3–15.4)
GFR SERPL CREATININE-BSD FRML MDRD: 22 ML/MIN/1.73
GLUCOSE BLD-MCNC: 127 MG/DL (ref 65–99)
GLUCOSE BLDC GLUCOMTR-MCNC: 120 MG/DL (ref 70–130)
GLUCOSE BLDC GLUCOMTR-MCNC: 123 MG/DL (ref 70–130)
GLUCOSE BLDC GLUCOMTR-MCNC: 127 MG/DL (ref 70–130)
GLUCOSE BLDC GLUCOMTR-MCNC: 172 MG/DL (ref 70–130)
HCT VFR BLD AUTO: 26.5 % (ref 34–46.6)
HGB BLD-MCNC: 8.2 G/DL (ref 12–15.9)
INR PPP: 1.49 (ref 0.8–1.2)
MCH RBC QN AUTO: 30.6 PG (ref 26.6–33)
MCHC RBC AUTO-ENTMCNC: 30.9 G/DL (ref 31.5–35.7)
MCV RBC AUTO: 98.9 FL (ref 79–97)
PLATELET # BLD AUTO: 213 10*3/MM3 (ref 140–450)
PMV BLD AUTO: 10.9 FL (ref 6–12)
POTASSIUM BLD-SCNC: 4.5 MMOL/L (ref 3.5–5.2)
PROTHROMBIN TIME: 17.8 SECONDS (ref 11.1–15.3)
RBC # BLD AUTO: 2.68 10*6/MM3 (ref 3.77–5.28)
SODIUM BLD-SCNC: 141 MMOL/L (ref 136–145)
WBC NRBC COR # BLD: 8.26 10*3/MM3 (ref 3.4–10.8)

## 2019-06-29 PROCEDURE — 97110 THERAPEUTIC EXERCISES: CPT

## 2019-06-29 PROCEDURE — 25010000002 NA FERRIC GLUC CPLX PER 12.5 MG: Performed by: INTERNAL MEDICINE

## 2019-06-29 PROCEDURE — 94799 UNLISTED PULMONARY SVC/PX: CPT

## 2019-06-29 PROCEDURE — 80048 BASIC METABOLIC PNL TOTAL CA: CPT | Performed by: INTERNAL MEDICINE

## 2019-06-29 PROCEDURE — 82962 GLUCOSE BLOOD TEST: CPT

## 2019-06-29 PROCEDURE — 85027 COMPLETE CBC AUTOMATED: CPT | Performed by: FAMILY MEDICINE

## 2019-06-29 PROCEDURE — 63710000001 INSULIN ASPART PER 5 UNITS: Performed by: INTERNAL MEDICINE

## 2019-06-29 PROCEDURE — 85610 PROTHROMBIN TIME: CPT | Performed by: INTERNAL MEDICINE

## 2019-06-29 PROCEDURE — 25010000002 FUROSEMIDE PER 20 MG: Performed by: INTERNAL MEDICINE

## 2019-06-29 PROCEDURE — 97530 THERAPEUTIC ACTIVITIES: CPT

## 2019-06-29 PROCEDURE — 94760 N-INVAS EAR/PLS OXIMETRY 1: CPT

## 2019-06-29 RX ORDER — FUROSEMIDE 10 MG/ML
40 INJECTION INTRAMUSCULAR; INTRAVENOUS ONCE
Status: DISCONTINUED | OUTPATIENT
Start: 2019-06-29 | End: 2019-06-29

## 2019-06-29 RX ORDER — FUROSEMIDE 40 MG/1
40 TABLET ORAL DAILY
Status: DISCONTINUED | OUTPATIENT
Start: 2019-06-30 | End: 2019-06-30

## 2019-06-29 RX ORDER — WARFARIN SODIUM 2.5 MG/1
2.5 TABLET ORAL EVERY OTHER DAY
Status: DISCONTINUED | OUTPATIENT
Start: 2019-06-30 | End: 2019-07-03 | Stop reason: DRUGHIGH

## 2019-06-29 RX ORDER — IPRATROPIUM BROMIDE AND ALBUTEROL SULFATE 2.5; .5 MG/3ML; MG/3ML
3 SOLUTION RESPIRATORY (INHALATION) EVERY 4 HOURS PRN
Status: DISCONTINUED | OUTPATIENT
Start: 2019-06-29 | End: 2019-07-05 | Stop reason: HOSPADM

## 2019-06-29 RX ORDER — WARFARIN SODIUM 5 MG/1
5 TABLET ORAL EVERY OTHER DAY
Status: DISCONTINUED | OUTPATIENT
Start: 2019-06-29 | End: 2019-07-03 | Stop reason: DRUGHIGH

## 2019-06-29 RX ADMIN — CITALOPRAM HYDROBROMIDE 20 MG: 20 TABLET ORAL at 09:37

## 2019-06-29 RX ADMIN — SODIUM CHLORIDE, PRESERVATIVE FREE 3 ML: 5 INJECTION INTRAVENOUS at 21:46

## 2019-06-29 RX ADMIN — IPRATROPIUM BROMIDE AND ALBUTEROL SULFATE 3 ML: 2.5; .5 SOLUTION RESPIRATORY (INHALATION) at 00:10

## 2019-06-29 RX ADMIN — INSULIN ASPART 2 UNITS: 100 INJECTION, SOLUTION INTRAVENOUS; SUBCUTANEOUS at 21:47

## 2019-06-29 RX ADMIN — NYSTATIN: 100000 POWDER TOPICAL at 21:47

## 2019-06-29 RX ADMIN — IPRATROPIUM BROMIDE AND ALBUTEROL SULFATE 3 ML: 2.5; .5 SOLUTION RESPIRATORY (INHALATION) at 10:25

## 2019-06-29 RX ADMIN — ACETAMINOPHEN 650 MG: 325 TABLET, FILM COATED ORAL at 01:51

## 2019-06-29 RX ADMIN — FERROUS SULFATE TAB EC 324 MG (65 MG FE EQUIVALENT) 324 MG: 324 (65 FE) TABLET DELAYED RESPONSE at 09:37

## 2019-06-29 RX ADMIN — IPRATROPIUM BROMIDE AND ALBUTEROL SULFATE 3 ML: 2.5; .5 SOLUTION RESPIRATORY (INHALATION) at 20:33

## 2019-06-29 RX ADMIN — SODIUM CHLORIDE 125 MG: 9 INJECTION, SOLUTION INTRAVENOUS at 09:37

## 2019-06-29 RX ADMIN — FUROSEMIDE 40 MG: 10 INJECTION, SOLUTION INTRAMUSCULAR; INTRAVENOUS at 09:37

## 2019-06-29 RX ADMIN — WARFARIN SODIUM 5 MG: 5 TABLET ORAL at 17:30

## 2019-06-29 RX ADMIN — TRAZODONE HYDROCHLORIDE 300 MG: 150 TABLET ORAL at 21:46

## 2019-06-29 RX ADMIN — DILTIAZEM HYDROCHLORIDE 240 MG: 240 CAPSULE, COATED, EXTENDED RELEASE ORAL at 09:37

## 2019-06-29 RX ADMIN — ROPINIROLE HYDROCHLORIDE 0.25 MG: 0.25 TABLET, FILM COATED ORAL at 21:46

## 2019-06-29 RX ADMIN — HYDROCORTISONE: 1 CREAM TOPICAL at 21:47

## 2019-06-30 LAB
ANION GAP SERPL CALCULATED.3IONS-SCNC: 10 MMOL/L (ref 5–15)
BUN BLD-MCNC: 58 MG/DL (ref 8–23)
BUN/CREAT SERPL: 28.3 (ref 7–25)
CALCIUM SPEC-SCNC: 9.2 MG/DL (ref 8.6–10.5)
CHLORIDE SERPL-SCNC: 104 MMOL/L (ref 98–107)
CO2 SERPL-SCNC: 28 MMOL/L (ref 22–29)
CREAT BLD-MCNC: 2.05 MG/DL (ref 0.57–1)
DEPRECATED RDW RBC AUTO: 69.1 FL (ref 37–54)
ERYTHROCYTE [DISTWIDTH] IN BLOOD BY AUTOMATED COUNT: 19.3 % (ref 12.3–15.4)
GFR SERPL CREATININE-BSD FRML MDRD: 24 ML/MIN/1.73
GLUCOSE BLD-MCNC: 123 MG/DL (ref 65–99)
GLUCOSE BLDC GLUCOMTR-MCNC: 116 MG/DL (ref 70–130)
GLUCOSE BLDC GLUCOMTR-MCNC: 118 MG/DL (ref 70–130)
GLUCOSE BLDC GLUCOMTR-MCNC: 169 MG/DL (ref 70–130)
GLUCOSE BLDC GLUCOMTR-MCNC: 170 MG/DL (ref 70–130)
HCT VFR BLD AUTO: 26.2 % (ref 34–46.6)
HGB BLD-MCNC: 8.1 G/DL (ref 12–15.9)
INR PPP: 1.74 (ref 0.8–1.2)
MCH RBC QN AUTO: 30.9 PG (ref 26.6–33)
MCHC RBC AUTO-ENTMCNC: 30.9 G/DL (ref 31.5–35.7)
MCV RBC AUTO: 100 FL (ref 79–97)
PLATELET # BLD AUTO: 235 10*3/MM3 (ref 140–450)
PMV BLD AUTO: 12.3 FL (ref 6–12)
POTASSIUM BLD-SCNC: 4.5 MMOL/L (ref 3.5–5.2)
PROTHROMBIN TIME: 20.1 SECONDS (ref 11.1–15.3)
RBC # BLD AUTO: 2.62 10*6/MM3 (ref 3.77–5.28)
SODIUM BLD-SCNC: 142 MMOL/L (ref 136–145)
WBC NRBC COR # BLD: 8.24 10*3/MM3 (ref 3.4–10.8)

## 2019-06-30 PROCEDURE — 85610 PROTHROMBIN TIME: CPT | Performed by: INTERNAL MEDICINE

## 2019-06-30 PROCEDURE — 80048 BASIC METABOLIC PNL TOTAL CA: CPT | Performed by: INTERNAL MEDICINE

## 2019-06-30 PROCEDURE — 97530 THERAPEUTIC ACTIVITIES: CPT

## 2019-06-30 PROCEDURE — 25010000002 NA FERRIC GLUC CPLX PER 12.5 MG: Performed by: INTERNAL MEDICINE

## 2019-06-30 PROCEDURE — 85027 COMPLETE CBC AUTOMATED: CPT | Performed by: FAMILY MEDICINE

## 2019-06-30 PROCEDURE — 63710000001 INSULIN ASPART PER 5 UNITS: Performed by: INTERNAL MEDICINE

## 2019-06-30 PROCEDURE — 94799 UNLISTED PULMONARY SVC/PX: CPT

## 2019-06-30 PROCEDURE — 97116 GAIT TRAINING THERAPY: CPT

## 2019-06-30 PROCEDURE — 82962 GLUCOSE BLOOD TEST: CPT

## 2019-06-30 PROCEDURE — 97110 THERAPEUTIC EXERCISES: CPT

## 2019-06-30 PROCEDURE — 94760 N-INVAS EAR/PLS OXIMETRY 1: CPT

## 2019-06-30 RX ORDER — FUROSEMIDE 40 MG/1
40 TABLET ORAL
Status: DISCONTINUED | OUTPATIENT
Start: 2019-06-30 | End: 2019-07-04

## 2019-06-30 RX ADMIN — IPRATROPIUM BROMIDE AND ALBUTEROL SULFATE 3 ML: 2.5; .5 SOLUTION RESPIRATORY (INHALATION) at 10:37

## 2019-06-30 RX ADMIN — CITALOPRAM HYDROBROMIDE 20 MG: 20 TABLET ORAL at 09:25

## 2019-06-30 RX ADMIN — NYSTATIN: 100000 POWDER TOPICAL at 21:36

## 2019-06-30 RX ADMIN — IPRATROPIUM BROMIDE AND ALBUTEROL SULFATE 3 ML: 2.5; .5 SOLUTION RESPIRATORY (INHALATION) at 18:48

## 2019-06-30 RX ADMIN — HYDROCORTISONE: 1 CREAM TOPICAL at 21:35

## 2019-06-30 RX ADMIN — FUROSEMIDE 40 MG: 40 TABLET ORAL at 17:12

## 2019-06-30 RX ADMIN — DILTIAZEM HYDROCHLORIDE 240 MG: 240 CAPSULE, COATED, EXTENDED RELEASE ORAL at 09:26

## 2019-06-30 RX ADMIN — NYSTATIN: 100000 POWDER TOPICAL at 09:26

## 2019-06-30 RX ADMIN — TRAZODONE HYDROCHLORIDE 300 MG: 150 TABLET ORAL at 21:35

## 2019-06-30 RX ADMIN — INSULIN ASPART 2 UNITS: 100 INJECTION, SOLUTION INTRAVENOUS; SUBCUTANEOUS at 21:35

## 2019-06-30 RX ADMIN — SODIUM CHLORIDE, PRESERVATIVE FREE 3 ML: 5 INJECTION INTRAVENOUS at 21:35

## 2019-06-30 RX ADMIN — HYDROCORTISONE: 1 CREAM TOPICAL at 09:26

## 2019-06-30 RX ADMIN — FUROSEMIDE 40 MG: 40 TABLET ORAL at 09:26

## 2019-06-30 RX ADMIN — FERROUS SULFATE TAB EC 324 MG (65 MG FE EQUIVALENT) 324 MG: 324 (65 FE) TABLET DELAYED RESPONSE at 09:26

## 2019-06-30 RX ADMIN — ROPINIROLE HYDROCHLORIDE 0.25 MG: 0.25 TABLET, FILM COATED ORAL at 21:35

## 2019-06-30 RX ADMIN — INSULIN ASPART 2 UNITS: 100 INJECTION, SOLUTION INTRAVENOUS; SUBCUTANEOUS at 11:04

## 2019-06-30 RX ADMIN — SODIUM CHLORIDE 125 MG: 9 INJECTION, SOLUTION INTRAVENOUS at 09:25

## 2019-06-30 RX ADMIN — WARFARIN SODIUM 2.5 MG: 2.5 TABLET ORAL at 17:12

## 2019-07-01 ENCOUNTER — APPOINTMENT (OUTPATIENT)
Dept: ULTRASOUND IMAGING | Facility: HOSPITAL | Age: 74
End: 2019-07-01

## 2019-07-01 LAB
ABO GROUP BLD: NORMAL
ANION GAP SERPL CALCULATED.3IONS-SCNC: 11 MMOL/L (ref 5–15)
BLD GP AB SCN SERPL QL: NEGATIVE
BUN BLD-MCNC: 59 MG/DL (ref 8–23)
BUN/CREAT SERPL: 29.2 (ref 7–25)
CALCIUM SPEC-SCNC: 9 MG/DL (ref 8.6–10.5)
CHLORIDE SERPL-SCNC: 105 MMOL/L (ref 98–107)
CO2 SERPL-SCNC: 29 MMOL/L (ref 22–29)
CREAT BLD-MCNC: 2.02 MG/DL (ref 0.57–1)
DEPRECATED RDW RBC AUTO: 67.2 FL (ref 37–54)
ERYTHROCYTE [DISTWIDTH] IN BLOOD BY AUTOMATED COUNT: 19.3 % (ref 12.3–15.4)
GFR SERPL CREATININE-BSD FRML MDRD: 24 ML/MIN/1.73
GLUCOSE BLD-MCNC: 121 MG/DL (ref 65–99)
GLUCOSE BLDC GLUCOMTR-MCNC: 118 MG/DL (ref 70–130)
GLUCOSE BLDC GLUCOMTR-MCNC: 125 MG/DL (ref 70–130)
GLUCOSE BLDC GLUCOMTR-MCNC: 131 MG/DL (ref 70–130)
GLUCOSE BLDC GLUCOMTR-MCNC: 168 MG/DL (ref 70–130)
HCT VFR BLD AUTO: 25.3 % (ref 34–46.6)
HGB BLD-MCNC: 7.9 G/DL (ref 12–15.9)
HOLD SPECIMEN: NORMAL
HOLD SPECIMEN: NORMAL
INR PPP: 1.92 (ref 0.8–1.2)
Lab: NORMAL
MCH RBC QN AUTO: 31 PG (ref 26.6–33)
MCHC RBC AUTO-ENTMCNC: 31.2 G/DL (ref 31.5–35.7)
MCV RBC AUTO: 99.2 FL (ref 79–97)
PLATELET # BLD AUTO: 216 10*3/MM3 (ref 140–450)
PMV BLD AUTO: 11.1 FL (ref 6–12)
POTASSIUM BLD-SCNC: 4.2 MMOL/L (ref 3.5–5.2)
PROTHROMBIN TIME: 21.7 SECONDS (ref 11.1–15.3)
RBC # BLD AUTO: 2.55 10*6/MM3 (ref 3.77–5.28)
RH BLD: POSITIVE
SODIUM BLD-SCNC: 145 MMOL/L (ref 136–145)
T&S EXPIRATION DATE: NORMAL
WBC NRBC COR # BLD: 8.43 10*3/MM3 (ref 3.4–10.8)
WHOLE BLOOD HOLD SPECIMEN: NORMAL

## 2019-07-01 PROCEDURE — 86850 RBC ANTIBODY SCREEN: CPT | Performed by: FAMILY MEDICINE

## 2019-07-01 PROCEDURE — 86901 BLOOD TYPING SEROLOGIC RH(D): CPT | Performed by: FAMILY MEDICINE

## 2019-07-01 PROCEDURE — 94799 UNLISTED PULMONARY SVC/PX: CPT

## 2019-07-01 PROCEDURE — 86900 BLOOD TYPING SEROLOGIC ABO: CPT | Performed by: FAMILY MEDICINE

## 2019-07-01 PROCEDURE — 80048 BASIC METABOLIC PNL TOTAL CA: CPT | Performed by: INTERNAL MEDICINE

## 2019-07-01 PROCEDURE — 93971 EXTREMITY STUDY: CPT

## 2019-07-01 PROCEDURE — P9016 RBC LEUKOCYTES REDUCED: HCPCS

## 2019-07-01 PROCEDURE — 82962 GLUCOSE BLOOD TEST: CPT

## 2019-07-01 PROCEDURE — 86923 COMPATIBILITY TEST ELECTRIC: CPT

## 2019-07-01 PROCEDURE — 97530 THERAPEUTIC ACTIVITIES: CPT

## 2019-07-01 PROCEDURE — 86900 BLOOD TYPING SEROLOGIC ABO: CPT

## 2019-07-01 PROCEDURE — 36430 TRANSFUSION BLD/BLD COMPNT: CPT

## 2019-07-01 PROCEDURE — 25010000002 FUROSEMIDE PER 20 MG: Performed by: FAMILY MEDICINE

## 2019-07-01 PROCEDURE — 85027 COMPLETE CBC AUTOMATED: CPT | Performed by: FAMILY MEDICINE

## 2019-07-01 PROCEDURE — 85610 PROTHROMBIN TIME: CPT | Performed by: INTERNAL MEDICINE

## 2019-07-01 PROCEDURE — 94760 N-INVAS EAR/PLS OXIMETRY 1: CPT

## 2019-07-01 PROCEDURE — 97535 SELF CARE MNGMENT TRAINING: CPT

## 2019-07-01 PROCEDURE — 25010000002 NA FERRIC GLUC CPLX PER 12.5 MG: Performed by: INTERNAL MEDICINE

## 2019-07-01 PROCEDURE — 63710000001 INSULIN ASPART PER 5 UNITS: Performed by: INTERNAL MEDICINE

## 2019-07-01 RX ORDER — PRENATAL VIT/IRON FUM/FOLIC AC 27MG-0.8MG
1 TABLET ORAL DAILY
Status: DISCONTINUED | OUTPATIENT
Start: 2019-07-01 | End: 2019-07-05 | Stop reason: HOSPADM

## 2019-07-01 RX ORDER — MELATONIN
1000 DAILY
Status: DISCONTINUED | OUTPATIENT
Start: 2019-07-01 | End: 2019-07-05 | Stop reason: HOSPADM

## 2019-07-01 RX ORDER — ASCORBIC ACID 500 MG
500 TABLET ORAL DAILY
Status: DISCONTINUED | OUTPATIENT
Start: 2019-07-01 | End: 2019-07-05 | Stop reason: HOSPADM

## 2019-07-01 RX ORDER — FUROSEMIDE 10 MG/ML
10 INJECTION INTRAMUSCULAR; INTRAVENOUS ONCE
Status: COMPLETED | OUTPATIENT
Start: 2019-07-01 | End: 2019-07-01

## 2019-07-01 RX ORDER — GUAIFENESIN 600 MG/1
600 TABLET, EXTENDED RELEASE ORAL EVERY 12 HOURS SCHEDULED
Status: DISCONTINUED | OUTPATIENT
Start: 2019-07-01 | End: 2019-07-05 | Stop reason: HOSPADM

## 2019-07-01 RX ADMIN — IPRATROPIUM BROMIDE AND ALBUTEROL SULFATE 3 ML: 2.5; .5 SOLUTION RESPIRATORY (INHALATION) at 23:11

## 2019-07-01 RX ADMIN — HYDROCORTISONE: 1 CREAM TOPICAL at 20:23

## 2019-07-01 RX ADMIN — CITALOPRAM HYDROBROMIDE 20 MG: 20 TABLET ORAL at 08:51

## 2019-07-01 RX ADMIN — SODIUM CHLORIDE, PRESERVATIVE FREE 3 ML: 5 INJECTION INTRAVENOUS at 20:21

## 2019-07-01 RX ADMIN — IPRATROPIUM BROMIDE AND ALBUTEROL SULFATE 3 ML: 2.5; .5 SOLUTION RESPIRATORY (INHALATION) at 07:29

## 2019-07-01 RX ADMIN — TRAZODONE HYDROCHLORIDE 300 MG: 150 TABLET ORAL at 20:20

## 2019-07-01 RX ADMIN — PRENATAL VIT W/ FE FUMARATE-FA TAB 27-0.8 MG 1 TABLET: 27-0.8 TAB at 11:46

## 2019-07-01 RX ADMIN — DILTIAZEM HYDROCHLORIDE 240 MG: 240 CAPSULE, COATED, EXTENDED RELEASE ORAL at 08:51

## 2019-07-01 RX ADMIN — SODIUM CHLORIDE, PRESERVATIVE FREE 3 ML: 5 INJECTION INTRAVENOUS at 08:53

## 2019-07-01 RX ADMIN — IPRATROPIUM BROMIDE AND ALBUTEROL SULFATE 3 ML: 2.5; .5 SOLUTION RESPIRATORY (INHALATION) at 15:07

## 2019-07-01 RX ADMIN — SODIUM CHLORIDE 125 MG: 9 INJECTION, SOLUTION INTRAVENOUS at 11:46

## 2019-07-01 RX ADMIN — GUAIFENESIN 600 MG: 600 TABLET, EXTENDED RELEASE ORAL at 11:46

## 2019-07-01 RX ADMIN — FUROSEMIDE 40 MG: 40 TABLET ORAL at 08:51

## 2019-07-01 RX ADMIN — FUROSEMIDE 10 MG: 10 INJECTION, SOLUTION INTRAVENOUS at 21:39

## 2019-07-01 RX ADMIN — VITAMIN D, TAB 1000IU (100/BT) 1000 UNITS: 25 TAB at 11:46

## 2019-07-01 RX ADMIN — FERROUS SULFATE TAB EC 324 MG (65 MG FE EQUIVALENT) 324 MG: 324 (65 FE) TABLET DELAYED RESPONSE at 08:51

## 2019-07-01 RX ADMIN — GUAIFENESIN 600 MG: 600 TABLET, EXTENDED RELEASE ORAL at 20:20

## 2019-07-01 RX ADMIN — ROPINIROLE HYDROCHLORIDE 0.25 MG: 0.25 TABLET, FILM COATED ORAL at 20:20

## 2019-07-01 RX ADMIN — WARFARIN SODIUM 5 MG: 5 TABLET ORAL at 17:31

## 2019-07-01 RX ADMIN — HYDROCORTISONE: 1 CREAM TOPICAL at 08:53

## 2019-07-01 RX ADMIN — INSULIN ASPART 2 UNITS: 100 INJECTION, SOLUTION INTRAVENOUS; SUBCUTANEOUS at 20:20

## 2019-07-01 RX ADMIN — OXYCODONE HYDROCHLORIDE AND ACETAMINOPHEN 500 MG: 500 TABLET ORAL at 11:46

## 2019-07-01 RX ADMIN — NYSTATIN: 100000 POWDER TOPICAL at 20:24

## 2019-07-01 RX ADMIN — NYSTATIN: 100000 POWDER TOPICAL at 08:53

## 2019-07-01 NOTE — PROGRESS NOTES
"Anticoagulation by Pharmacy - Warfarin    Brendon Skelton is a 73 y.o.female being continued on warfarin for aortic valve replacement  Home regimen: Alternating of 5 and 2.5 mg daily   INR Goal: 2-3  Today's INR:   Lab Results   Component Value Date    INR 1.92 (H) 07/01/2019       Objective:  [Ht: 165.1 cm (65\"); Wt: 118 kg (260 lb 2.3 oz)]  Lab Results   Component Value Date    INR 1.92 (H) 07/01/2019    INR 1.74 (H) 06/30/2019    INR 1.49 (H) 06/29/2019    PROTIME 21.7 (H) 07/01/2019    PROTIME 20.1 (H) 06/30/2019    PROTIME 17.8 (H) 06/29/2019     Lab Results   Component Value Date    HGB 7.9 (L) 07/01/2019    HGB 8.1 (L) 06/30/2019    HGB 8.2 (L) 06/29/2019    HCT 25.3 (L) 07/01/2019    HCT 26.2 (L) 06/30/2019    HCT 26.5 (L) 06/29/2019     07/01/2019     06/30/2019     06/29/2019       Recent Warfarin Administrations       The 5 most recent administrations since 06/24/2019 are shown below each listed medication.    Warfarin Sodium         Order Dose Date Given     warfarin (COUMADIN) tablet 2.5 mg 2.5 mg 06/30/2019     warfarin (COUMADIN) tablet 5 mg 5 mg 06/29/2019     warfarin (COUMADIN) tablet 3 mg 3 mg 06/28/2019     warfarin (COUMADIN) tablet 2.5 mg 2.5 mg 06/27/2019      2.5 mg 06/26/2019                      Assessment  Interacting medications: No significant interactions noted  INR is subtherapeutic, but trending towards goal - will continue current regimen  Anemia - nos/sx of bleeding documented    Plan:  1.  Give warfarin 5 mg tablet PO @ 1800 tonight  2.  Draw a PT/INR in AM  3.  Pharmacy will continue to follow    Cristino Ness RPH  07/01/19 11:38 AM     "

## 2019-07-01 NOTE — THERAPY TREATMENT NOTE
Acute Care - Occupational Therapy Treatment Note  [facility]     Patient Name: Brendon Skelton  : 1945  MRN: [id number]  Today's Date: 2019  Onset of Illness/Injury or Date of Surgery: 19  Date of Referral to OT: 19  Referring Physician: LATISHA Reaves MD    Admit Date: 2019       ICD-10-CM ICD-9-CM   1. Gastrointestinal hemorrhage, unspecified gastrointestinal hemorrhage type K92.2 578.9   2. Supratherapeutic INR R79.1 790.92   3. SSS (sick sinus syndrome) (CMS/HCC) I49.5 427.81   4. Atherosclerosis of native coronary artery of native heart, angina presence unspecified I25.10 414.01   5. 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 S/P AVR Z95.2 V43.3   7. Impaired physical mobility Z74.09 781.99   8. Impaired mobility and activities of daily living Z74.09 799.89     Patient Active Problem List   Diagnosis   • Long term current use of anticoagulant therapy   • Personal history of heart valve replacement   • Atrial fibrillation [I48.91]   • Emphysema of lung (CMS/HCC)   • On anticoagulant therapy   • Hyperlipidemia   • Diastolic heart failure (CMS/HCC)   • Depressive disorder   • COPD (chronic obstructive pulmonary disease) (CMS/HCC)   • Diabetes mellitus (CMS/HCC)   • Myopia   • Astigmatism   • Bleeding from open wound of chest wall   • Follow-up surgery care   • Encounter for screening for malignant neoplasm of colon   • Positive colorectal cancer screening using DNA-based stool test   • Nodule of left lung   • Chronic hypoxemic respiratory failure (CMS/HCC)   • Physical deconditioning   • Heart failure with preserved left ventricular function (HFpEF) (CMS/HCC)   • Essential hypertension   • Coronary artery disease involving native coronary artery without angina pectoris   • Hx of CABG   • SSS (sick sinus syndrome) (CMS/HCC)   • Pacemaker   • CKD (chronic kidney disease) stage 3, GFR 30-59 ml/min (CMS/HCC)   •  Personal history of tobacco use, presenting hazards to health   • Gastrointestinal hemorrhage   • Morbid obesity (CMS/HCC)   • Gastritis   • Colon polyp   • Coumadin toxicity     Past Medical History:   Diagnosis Date   • Acute bronchitis    • Anxiety    • Atrial fibrillation (CMS/HCC)    • C. difficile colitis    • Callosity     under metatarsal head      • Cardiac pacemaker in situ    • CHF (congestive heart failure) (CMS/HCC)    • Chronic obstructive lung disease (CMS/HCC)    • Corns and callus    • Depressive disorder    • Diabetes mellitus (CMS/HCC)    • Diastolic heart failure (CMS/HCC)    • Essential hypertension    • Foot pain    • Hyperlipidemia    • Long term current use of anticoagulant    • On anticoagulant therapy    • Pulmonary emphysema (CMS/HCC)    • Rectal hemorrhage    • Type 2 diabetes mellitus (CMS/HCC)      Past Surgical History:   Procedure Laterality Date   • AORTIC VALVE REPAIR/REPLACEMENT  1999   • CARDIAC ELECTROPHYSIOLOGY PROCEDURE N/A 3/20/2017    Procedure: PPM generator change - dual;  Surgeon: Bereket Gates MD;  Location: [facility] CATH INVASIVE LOCATION;  Service:    • CARDIAC PACEMAKER PLACEMENT  1999   • CATARACT EXTRACTION W/ INTRAOCULAR LENS IMPLANT Left 2/1/2019    Procedure: REMOVE CATARACT AND IMPLANT INTRAOCULAR LENS I;  Surgeon: Jose L Clay MD;  Location: [facility] OR;  Service: Ophthalmology   • CATARACT EXTRACTION W/ INTRAOCULAR LENS IMPLANT Right 2/8/2019    Procedure: REMOVE CATARACT AND IMPLANT  INTRAOCULAR LENS;  Surgeon: Jose L Clay MD;  Location: [facility] OR;  Service: Ophthalmology   • COLONOSCOPY N/A 6/19/2019    Procedure: COLONOSCOPY WITH CONTROL OF BLEED;  Surgeon: Alfredo Guerrero MD;  Location: [facility] ENDOSCOPY;  Service: Gastroenterology   • COLONOSCOPY N/A 6/24/2019    Procedure: COLONOSCOPY;  Surgeon: Alfredo Guerrero MD;  Location: [facility] ENDOSCOPY;  Service: Gastroenterology   • ECHO - CONVERTED  11/12/2013    There is mild to moderate  left atrial enlargement EF 50-55%   • ENDOSCOPY N/A 6/19/2019    Procedure: ESOPHAGOGASTRODUODENOSCOPY WITH CONTROL OF BLEED;  Surgeon: Alfredo Guerrero MD;  Location: [facility] ENDOSCOPY;  Service: Gastroenterology   • FOOT SURGERY  1990   • OTHER SURGICAL HISTORY  10/26/2015    PARING CORN/CALLUS    • PACEMAKER REPLACEMENT N/A 3/21/2017    Procedure: revision pacemaker pocket, evacuation hematoma, control of bleeding;  Surgeon: Polo Feliz MD;  Location: [facility] OR;  Service:    • TRANSESOPHAGEAL ECHOCARDIOGRAM (MITZY)  05/01/2014    With color flow-Mild left atrial enlargement with mild concentric LV hypertrophy with top normal aortic root size. EF 45-50%. Mild mitral regurgitation and mild aortic insufficiency and trivial amount of tricuspid regurgation   • TUBAL ABDOMINAL LIGATION         Therapy Treatment    Rehabilitation Treatment Summary     Row Name 07/01/19 1448 07/01/19 1039          Treatment Time/Intention    Discipline  occupational therapy assistant  -BB  physical therapist  -LF     Document Type  therapy note (daily note)  -BB  therapy note (daily note)  -LF     Subjective Information  complains of;dyspnea  -BB  complains of;dyspnea  -LF     Mode of Treatment  individual therapy;occupational therapy  -BB  individual therapy;physical therapy  -LF     Patient/Family Observations  no family present  -BB  no family present; pt sitting on EOB, O2 at 4L, IV  -LF     Care Plan Review  --  evaluation/treatment results reviewed;care plan/treatment goals reviewed;risks/benefits reviewed;current/potential barriers reviewed;patient/other agree to care plan  -LF     Total Minutes, Occupational Therapy Treatment  15  -BB  --     Therapy Frequency (OT Eval)  other (see comments) 5-7 days/wk  -BB  --     Patient Effort  good  -BB  good  -LF2     Comment  --  SpO2 was mostly 86-88% at rest, but would briefly raise to 90% when resting.  -LF2     Existing Precautions/Restrictions  oxygen therapy device and L/min   -BB  oxygen therapy device and L/min  -LF2     Recorded by [BB] Selena Simpson COTA/DANDRE 07/01/19 1552 [LF] Fatimah Gavin, PT 07/01/19 1052  [LF2] Fatimah Gavin, PT 07/01/19 1152     Row Name 07/01/19 1448 07/01/19 1039          Vital Signs    Pretreatment Heart Rate (beats/min)  66  -BB  --     Pre SpO2 (%)  91  -BB  90  -LF     O2 Delivery Pre Treatment  supplemental O2  -BB  supplemental O2  -LF     Intra SpO2 (%)  --  72 after commode to bed t/f  -LF     O2 Delivery Intra Treatment  --  supplemental O2  -LF     Post SpO2 (%)  --  88  -LF2     O2 Delivery Post Treatment  --  supplemental O2  -LF2     Pre Patient Position  -- long sitting  -BB  Sitting  -LF2     Intra Patient Position  --  Sitting after commode to bed t/f  -LF2     Post Patient Position  --  Sitting  -LF2     Recorded by [BB] Selena Simpson COTA/DANDRE 07/01/19 1552 [LF] Fatimah Gavin, PT 07/01/19 1121  [LF2] Fatimah Gavin, PT 07/01/19 1152     Row Name 07/01/19 1448 07/01/19 1039          Cognitive Assessment/Intervention- PT/OT    Affect/Mental Status (Cognitive)  WFL  -BB  WFL frustrated with medical situation  -LF     Orientation Status (Cognition)  oriented x 4  -BB  oriented x 4  -LF2     Follows Commands (Cognition)  WNL  -BB  WNL  -LF     Personal Safety Interventions  fall prevention program maintained;gait belt;nonskid shoes/slippers when out of bed;supervised activity  -BB  fall prevention program maintained;gait belt;muscle strengthening facilitated;nonskid shoes/slippers when out of bed;supervised activity  -LF     Recorded by [BB] Selena Simpson COTA/DANDRE 07/01/19 1552 [LF] Fatimah Gavin, PT 07/01/19 1152  [LF2] Fatimah Gavin, PT 07/01/19 1052     Row Name 07/01/19 1039             Safety Issues, Functional Mobility    Impairments Affecting Function (Mobility)  shortness of breath;endurance/activity tolerance  -LF      Recorded by [LF] Fatimah Gavin, PT 07/01/19 1152      Row Name 07/01/19 1448 07/01/19 1039          Bed Mobility  Assessment/Treatment    Supine-Sit [place] (Bed Mobility)  independent  -BB  --     Sit-Supine [place] (Bed Mobility)  conditional independence  -BB  conditional independence  -LF     Assistive Device (Bed Mobility)  bed rails  -BB  bed rails;head of bed elevated  -LF     Recorded by [BB] Selena Simpson COTA/DANDRE 07/01/19 1552 [LF] Fatimah Gavin, PT 07/01/19 1152     Row Name 07/01/19 1448 07/01/19 1039          Sit-Stand Transfer    Sit-Stand [place] (Transfers)  supervision  -BB  supervision  -LF     Assistive Device (Sit-Stand Transfers)  walker, front-wheeled  -BB  walker, front-wheeled  -LF     Recorded by [BB] Selena Simpson COTA/DANDRE 07/01/19 1552 [LF] Fatimah Gavin, PT 07/01/19 1121     Row Name 07/01/19 1448 07/01/19 1039          Stand-Sit Transfer    Stand-Sit [place] (Transfers)  supervision  -BB  supervision  -LF     Assistive Device (Stand-Sit Transfers)  walker, front-wheeled  -BB  walker, front-wheeled  -LF     Recorded by [BB] Selena Simpson COTA/DANDRE 07/01/19 1552 [LF] Fatimah Gavin, PT 07/01/19 1121     Row Name 07/01/19 1448 07/01/19 1039          Toilet Transfer    Type (Toilet Transfer)  sit-stand;stand-sit  -BB  stand pivot/stand step to BS  -LF     [place] Level (Toilet Transfer)  supervision  -BB  supervision  -LF     Assistive Device (Toilet Transfer)  --  -- pt held arm of BSC and bedrail  -LF     Recorded by [BB] Selena Simpson COTA/DANDRE 07/01/19 1552 [LF] Fatimah Gavin, PT 07/01/19 1121     Row Name 07/01/19 1039             Gait/Stairs Assessment/Training    [place] Level (Gait)  stand by assist  -LF      Assistive Device (Gait)  walker, front-wheeled  -LF      Distance in Feet (Gait)  28 feet x2 with extended rest break in between to allow SpO2 recover  -LF2      Comment (Gait/Stairs)  SpO2 77% after ambulation of 28 feet. 2.5 minutes to recover to 88%. Pt reported 6/10 shortness of breath after ambulation.  -LF      Recorded by [LF] Fatimah Gavin, PT  07/01/19 1121  [LF2] Fatimah Gavin, PT 07/01/19 1152      Row Name 07/01/19 1448             Toileting Assessment/Training    [place] Level (Toileting)  toileting skills;adjust/manage clothing;perform perineal hygiene;independent  -BB      Toileting Position  unsupported sitting  -BB      Recorded by [BB] Selena Simpson COTA/L 07/01/19 1552      Row Name 07/01/19 1448 07/01/19 1039          Positioning and Restraints    Pre-Treatment Position  in bed  -BB  in bed  -LF     Post Treatment Position  bed  -BB  bed  -LF     In Bed  fowlers;call light within reach;encouraged to call for assist;exit alarm on  -BB  fowlers;call light within reach;encouraged to call for assist;exit alarm on;side rails up x2  -LF     Recorded by [BB] Selena Simpson COTA/DANRDE 07/01/19 1552 [LF] Fatimah Gavin, PT 07/01/19 1152     Row Name 07/01/19 1448 07/01/19 1039          Pain Scale: Numbers Pre/Post-Treatment    Pain Scale: Numbers, Pretreatment  0/10 - no pain  -BB  0/10 - no pain  -LF     Pain Scale: Numbers, Post-Treatment  0/10 - no pain  -BB  0/10 - no pain  -LF     Recorded by [BB] Selena Simpson COTA/DANDRE 07/01/19 1552 [LF] Fatimah Gavin, PT 07/01/19 1152     Row Name 07/01/19 1448 07/01/19 1039          Plan of Care Review    Plan of Care Reviewed With  patient  -BB  patient  -LF     Recorded by [BB] Selena Simpson COTA/DANDRE 07/01/19 1552 [LF] Fatimah Gavin, PT 07/01/19 1152     Row Name 07/01/19 1448             Outcome Summary/Treatment Plan (OT)    Daily Summary of Progress (OT)  progress towards functional goals is fair  -BB      Plan for Continued Treatment (OT)  continue POC  -BB      Anticipated Discharge Disposition (OT)  anticipate therapy at next level of care  -BB      Recorded by [BB] Selena Simpson COTA/L 07/01/19 1552      Row Name 07/01/19 1039             Outcome Summary/Treatment Plan (PT)    Daily Summary of Progress (PT)  progress towards functional goals is fair  -LF      Barriers to Overall  Progress (PT)  significant O2 desaturation with mild to moderate activity  -LF      Plan for Continued Treatment (PT)  continue functional mobility training and energy conservation training. RW for energy conservation.  -LF      Anticipated Discharge Disposition (PT)  anticipate therapy at next level of care needs daily assistance  -LF      Recorded by [LF] LemuelFatimah guerra, PT 07/01/19 1156        User Key  (r) = Recorded By, (t) = Taken By, (c) = Cosigned By    Initials Name Effective Dates Discipline    BB Selena Simpson, COLÓN/L 03/07/18 -  OT    LF Fatimah Gavin, PT 07/23/18 -  PT        Wound Left posterior arm skin tear (Active)   Dressing Appearance open to air 6/30/2019  9:30 PM   Closure Open to air 6/30/2019  9:30 PM   Base scab 6/30/2019  9:30 PM     Rehab Goal Summary     Row Name 07/01/19 1448 07/01/19 1039          Physical Therapy Goals    Bed Mobility Goal Selection (PT)  --  bed mobility, PT goal 1  -LF     Transfer Goal Selection (PT)  --  transfer, PT goal 1  -LF     Gait Training Goal Selection (PT)  --  gait training, PT goal 1  -LF        Bed Mobility Goal 1 (PT)    Activity/Assistive Device (Bed Mobility Goal 1, PT)  --  sit to supine/supine to sit  -LF     [place] Level/Cues Needed (Bed Mobility Goal 1, PT)  --  conditional independence  -LF     Time Frame (Bed Mobility Goal 1, PT)  --  long term goal (LTG);by discharge  -LF     Barriers (Bed Mobility Goal 1, PT)  --  HOB flat, no bed rails.   -LF     Progress/Outcomes (Bed Mobility Goal 1, PT)  --  goal partially met  -LF        Transfer Goal 1 (PT)    Activity/Assistive Device (Transfer Goal 1, PT)  --  sit-to-stand/stand-to-sit;bed-to-chair/chair-to-bed  -LF     [place] Level/Cues Needed (Transfer Goal 1, PT)  --  conditional independence  -LF     Time Frame (Transfer Goal 1, PT)  --  long term goal (LTG);by discharge  -LF     Barriers (Transfers Goal 1, PT)  --  Maintain SpO2 >88%  -LF     Progress/Outcome (Transfer Goal 1, PT)   --  goal not met;goal ongoing  -LF        Gait Training Goal 1 (PT)    Activity/Assistive Device (Gait Training Goal 1, PT)  --  walker, rolling  -LF     [place] Level (Gait Training Goal 1, PT)  --  conditional independence  -LF     Distance (Gait Goal 1, PT)  --  25'x2  -LF     Time Frame (Gait Training Goal 1, PT)  --  long term goal (LTG);by discharge  -LF     Barriers (Gait Training Goal 1, PT)  --  Maintain SpO2 >88%  -LF     Progress/Outcome (Gait Training Goal 1, PT)  --  goal partially met;goal ongoing met distance; not met level of independence  -LF        Occupational Therapy Goals    Transfer Goal Selection (OT)  transfer, OT goal 1  -BB  --     Bathing Goal Selection (OT)  bathing, OT goal 1  -BB  --     Dressing Goal Selection (OT)  dressing, OT goal 1  -BB  --     Toileting Goal Selection (OT)  toileting, OT goal 1  -BB  --     Activity Tolerance Goal Selection (OT)  activity tolerance, OT goal 1  -BB  --        Transfer Goal 1 (OT)    Activity/Assistive Device (Transfer Goal 1, OT)  sit-to-stand/stand-to-sit;toilet;walker, rolling  -BB  --     [place] Level/Cues Needed (Transfer Goal 1, OT)  conditional independence  -BB  --     Time Frame (Transfer Goal 1, OT)  long term goal (LTG);by discharge  -BB  --     Progress/Outcome (Transfer Goal 1, OT)  goal not met  -BB  --        Bathing Goal 1 (OT)    Activity/Assistive Device (Bathing Goal 1, OT)  bathing skills, all  -BB  --     [place] Level/Cues Needed (Bathing Goal 1, OT)  set-up required;supervision required  -BB  --     Time Frame (Bathing Goal 1, OT)  long term goal (LTG);by discharge  -BB  --     Progress/Outcomes (Bathing Goal 1, OT)  goal not met  -BB  --        Dressing Goal 1 (OT)    Activity/Assistive Device (Dressing Goal 1, OT)  dressing skills, all  -BB  --     [place]/Cues Needed (Dressing Goal 1, OT)  conditional independence  -BB  --     Time Frame (Dressing Goal 1, OT)  long term goal (LTG);by discharge  -BB  --      Progress/Outcome (Dressing Goal 1, OT)  goal not met  -BB  --        Toileting Goal 1 (OT)    Activity/Device (Toileting Goal 1, OT)  toileting skills, all  -BB  --     [place] Level/Cues Needed (Toileting Goal 1, OT)  conditional independence  -BB  --     Time Frame (Toileting Goal 1, OT)  long term goal (LTG);by discharge  -BB  --     Progress/Outcome (Toileting Goal 1, OT)  goal not met  -BB  --         Activity Tolerance Goal 1 (OT)    Activity Level (Endurance Goal 1, OT)  5 min activity;O2 sat >/ equal to 88%  -BB  --     Time Frame (Activity Tolerance Goal 1, OT)  long term goal (LTG);by discharge  -BB  --     Progress/Outcome (Activity Tolerance Goal 1, OT)  goal met  -BB  --       User Key  (r) = Recorded By, (t) = Taken By, (c) = Cosigned By    Initials Name Provider Type Discipline    BB Selena Simpson, ELDON/L Occupational Therapy Assistant OT    Fatimah Melgar PT Physical Therapist PT        Occupational Therapy Education     Title: PT OT SLP Therapies (In Progress)     Topic: Occupational Therapy (In Progress)     Point: ADL training (Done)     Description: Instruct learner(s) on proper safety adaptation and remediation techniques during self care or transfers.   Instruct in proper use of assistive devices.    Learning Progress Summary           Patient Acceptance, E, VU by BB at 7/1/2019  3:55 PM    Acceptance, E, VU by BB at 6/28/2019  2:44 PM    Acceptance, E,TB, VU by MR at 6/26/2019  1:41 PM    Comment:  Role of OT and POC, benefit of activity, PLB, EC/WS                   Point: Precautions (Done)     Description: Instruct learner(s) on prescribed precautions during self-care and functional transfers.    Learning Progress Summary           Patient Acceptance, E,TB, VU by MR at 6/26/2019  1:41 PM    Comment:  Role of OT and POC, benefit of activity, PLB, EC/WS                   Point: Body mechanics (Done)     Description: Instruct learner(s) on proper positioning and spine alignment  during self-care, functional mobility activities and/or exercises.    Learning Progress Summary           Patient Acceptance, E, VU by FRANCES at 7/1/2019  3:55 PM    Acceptance, E, VU by FRANCES at 6/28/2019  2:44 PM                               User Key     Initials Effective Dates Name Provider Type Discipline    BB 03/07/18 -  Selena Simpson COTA/L Occupational Therapy Assistant OT    MR 04/03/18 -  Diasy Duong, OT Occupational Therapist OT                OT Recommendation and Plan  Outcome Summary/Treatment Plan (OT)  Daily Summary of Progress (OT): progress towards functional goals is fair  Plan for Continued Treatment (OT): continue POC  Anticipated Discharge Disposition (OT): anticipate therapy at next level of care  Therapy Frequency (OT Eval): other (see comments)(5-7 days/wk)  Daily Summary of Progress (OT): progress towards functional goals is fair  Plan of Care Review  Plan of Care Reviewed With: patient  Plan of Care Reviewed With: patient  Outcome Summary: Pt requesting to go to bathroom. Pt supervision for bed<>BSC t/f. Pt to get blood this pm and U/S of L UE.  Outcome Measures     Row Name 07/01/19 1448 07/01/19 1039 06/30/19 1300       How much help from another person do you currently need...    Turning from your back to your side while in flat bed without using bedrails?  --  4  -LF  4  -TA    Moving from lying on back to sitting on the side of a flat bed without bedrails?  --  4  -LF  3  -TA    Moving to and from a bed to a chair (including a wheelchair)?  --  3  -LF  3  -TA    Standing up from a chair using your arms (e.g., wheelchair, bedside chair)?  --  3  -LF  3  -TA    Climbing 3-5 steps with a railing?  --  3  -LF  3  -TA    To walk in hospital room?  --  3  -LF  3  -TA    AM-PAC 6 Clicks Score  --  20  -LF  19  -TA       How much help from another is currently needed...    Putting on and taking off regular lower body clothing?  3  -BB  --  --    Bathing (including washing, rinsing,  and drying)  3  -BB  --  --    Toileting (which includes using toilet bed pan or urinal)  3  -BB  --  --    Putting on and taking off regular upper body clothing  3  -BB  --  --    Taking care of personal grooming (such as brushing teeth)  4  -BB  --  --    Eating meals  4  -BB  --  --    Score  20  -BB  --  --       Functional Assessment    Outcome Measure Options  --  AM-PAC 6 Clicks Basic Mobility (PT)  -LF  AM-PAC 6 Clicks Basic Mobility (PT)  -TA    Row Name 06/29/19 1500             How much help from another person do you currently need...    Turning from your back to your side while in flat bed without using bedrails?  3  -TA      Moving from lying on back to sitting on the side of a flat bed without bedrails?  3  -TA      Moving to and from a bed to a chair (including a wheelchair)?  3  -TA      Standing up from a chair using your arms (e.g., wheelchair, bedside chair)?  3  -TA      Climbing 3-5 steps with a railing?  3  -TA      To walk in hospital room?  3  -TA      AM-PAC 6 Clicks Score  18  -TA         Functional Assessment    Outcome Measure Options  AM-PAC 6 Clicks Basic Mobility (PT)  -TA        User Key  (r) = Recorded By, (t) = Taken By, (c) = Cosigned By    Initials Name Provider Type    TA Mary Jo Johnson, PTA Physical Therapy Assistant    BB Selena Simpson, ELDON/L Occupational Therapy Assistant    LF Fatimah Gavin, PT Physical Therapist         Non-skid socks and gait belt in place. Toileting offered. Call light and needs within reach. Pt advised to not get up alone and call the nurse for assistance.  Bed alarm on.     Time Calculation:   Time Calculation- OT     Row Name 07/01/19 1600             Time Calculation- OT    OT Start Time  1448  -BB      OT Stop Time  1503  -BB      OT Time Calculation (min)  15 min  -BB      Total Timed Code Minutes- OT  15 minute(s)  -BB      OT Received On  07/01/19  -BB        User Key  (r) = Recorded By, (t) = Taken By, (c) = Cosigned By    Initials Name  Provider Type    BB Selena Simpson COTA/L Occupational Therapy Assistant        Therapy Charges for Today     Code Description Service Date Service Provider Modifiers Qty    [id number] HC OT SELF CARE/MGMT/TRAIN EA 15 MIN 7/1/2019 Selena Simpson COTA/L GO 1               YSABEL Mary  7/1/2019

## 2019-07-01 NOTE — THERAPY TREATMENT NOTE
Acute Care - Physical Therapy Treatment Note  [facility]     Patient Name: Brendon Skelton  : 1945  MRN: [id number]  Today's Date: 2019  Onset of Illness/Injury or Date of Surgery: 19  Date of Referral to PT: 19  Referring Physician: LATISHA Reaves MD    Admit Date: 2019    Visit Dx:    ICD-10-CM ICD-9-CM   1. Gastrointestinal hemorrhage, unspecified gastrointestinal hemorrhage type K92.2 578.9   2. Supratherapeutic INR R79.1 790.92   3. SSS (sick sinus syndrome) (CMS/HCC) I49.5 427.81   4. Atherosclerosis of native coronary artery of native heart, angina presence unspecified I25.10 414.01   5. 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 S/P AVR Z95.2 V43.3   7. Impaired physical mobility Z74.09 781.99   8. Impaired mobility and activities of daily living Z74.09 799.89     Patient Active Problem List   Diagnosis   • Long term current use of anticoagulant therapy   • Personal history of heart valve replacement   • Atrial fibrillation [I48.91]   • Emphysema of lung (CMS/[facility])   • On anticoagulant therapy   • Hyperlipidemia   • Diastolic heart failure (CMS/HCC)   • Depressive disorder   • COPD (chronic obstructive pulmonary disease) (CMS/HCC)   • Diabetes mellitus (CMS/HCC)   • Myopia   • Astigmatism   • Bleeding from open wound of chest wall   • Follow-up surgery care   • Encounter for screening for malignant neoplasm of colon   • Positive colorectal cancer screening using DNA-based stool test   • Nodule of left lung   • Chronic hypoxemic respiratory failure (CMS/HCC)   • Physical deconditioning   • Heart failure with preserved left ventricular function (HFpEF) (CMS/HCC)   • Essential hypertension   • Coronary artery disease involving native coronary artery without angina pectoris   • Hx of CABG   • SSS (sick sinus syndrome) (CMS/HCC)   • Pacemaker   • CKD (chronic kidney disease) stage 3, GFR 30-59 ml/min (CMS/HCC)    • Personal history of tobacco use, presenting hazards to health   • Gastrointestinal hemorrhage   • Morbid obesity (CMS/HCC)   • Gastritis   • Colon polyp   • Coumadin toxicity       Therapy Treatment    Rehabilitation Treatment Summary     Row Name 07/01/19 1039             Treatment Time/Intention    Discipline  physical therapist  -LF      Document Type  therapy note (daily note)  -LF      Subjective Information  complains of;dyspnea  -LF      Mode of Treatment  individual therapy;physical therapy  -LF      Patient/Family Observations  no family present; pt sitting on EOB, O2 at 4L, IV  -LF      Care Plan Review  evaluation/treatment results reviewed;care plan/treatment goals reviewed;risks/benefits reviewed;current/potential barriers reviewed;patient/other agree to care plan  -LF      Patient Effort  good  -LF2      Comment  SpO2 was mostly 86-88% at rest, but would briefly raise to 90% when resting.  -LF2      Existing Precautions/Restrictions  oxygen therapy device and L/min  -LF2      Recorded by [LF] Fatimah Gavin, PT 07/01/19 1052  [LF2] Fatimah Gavin, PT 07/01/19 1152      Row Name 07/01/19 1039             Vital Signs    Pre SpO2 (%)  90  -LF      O2 Delivery Pre Treatment  supplemental O2  -LF      Intra SpO2 (%)  72 after commode to bed t/f  -LF      O2 Delivery Intra Treatment  supplemental O2  -LF      Post SpO2 (%)  88  -LF2      O2 Delivery Post Treatment  supplemental O2  -LF2      Pre Patient Position  Sitting  -LF2      Intra Patient Position  Sitting after commode to bed t/f  -LF2      Post Patient Position  Sitting  -LF2      Recorded by [LF] Fatimah Gavin, PT 07/01/19 1121  [LF2] Fatimah Gavin, PT 07/01/19 1152      Row Name 07/01/19 1039             Cognitive Assessment/Intervention- PT/OT    Affect/Mental Status (Cognitive)  WFL frustrated with medical situation  -LF      Orientation Status (Cognition)  oriented x 4  -LF2      Follows Commands (Cognition)  WNL  -LF      Personal Safety  Interventions  fall prevention program maintained;gait belt;muscle strengthening facilitated;nonskid shoes/slippers when out of bed;supervised activity  -LF      Recorded by [LF] Fatimah Gavin, PT 07/01/19 1152  [LF2] Fatimah Gavin, PT 07/01/19 1052      Row Name 07/01/19 1039             Safety Issues, Functional Mobility    Impairments Affecting Function (Mobility)  shortness of breath;endurance/activity tolerance  -LF      Recorded by [LF] Fatimah Gavin, PT 07/01/19 1152      Row Name 07/01/19 1039             Bed Mobility Assessment/Treatment    Sit-Supine [place] (Bed Mobility)  conditional independence  -LF      Assistive Device (Bed Mobility)  bed rails;head of bed elevated  -LF      Recorded by [LF] Fatimah Gavin, PT 07/01/19 1152      Row Name 07/01/19 1039             Sit-Stand Transfer    Sit-Stand [place] (Transfers)  supervision  -LF      Assistive Device (Sit-Stand Transfers)  walker, front-wheeled  -LF      Recorded by [LF] Fatimah Gavin, PT 07/01/19 1121      Row Name 07/01/19 1039             Stand-Sit Transfer    Stand-Sit [place] (Transfers)  supervision  -LF      Assistive Device (Stand-Sit Transfers)  walker, front-wheeled  -LF      Recorded by [LF] Fatimah Gavin, PT 07/01/19 1121      Row Name 07/01/19 1039             Toilet Transfer    Type (Toilet Transfer)  stand pivot/stand step to BSC  -LF      [place] Level (Toilet Transfer)  supervision  -LF      Assistive Device (Toilet Transfer)  -- pt held arm of BSC and bedrail  -LF      Recorded by [LF] Fatimah Gavin, PT 07/01/19 1121      Row Name 07/01/19 1039             Gait/Stairs Assessment/Training    [place] Level (Gait)  stand by assist  -LF      Assistive Device (Gait)  walker, front-wheeled  -LF      Distance in Feet (Gait)  28 feet x2 with extended rest break in between to allow SpO2 recover  -LF2      Comment (Gait/Stairs)  SpO2 77% after ambulation of 28 feet. 2.5 minutes to recover to 88%. Pt reported 6/10 shortness of  breath after ambulation.  -LF      Recorded by [LF] Fatimah Gavin, PT 07/01/19 1121  [LF2] Fatimah Gavin, PT 07/01/19 1152      Row Name 07/01/19 1039             Positioning and Restraints    Pre-Treatment Position  in bed  -LF      Post Treatment Position  bed  -LF      In Bed  fowlers;call light within reach;encouraged to call for assist;exit alarm on;side rails up x2  -LF      Recorded by [LF] Fatimah Gavin, PT 07/01/19 1152      Row Name 07/01/19 1039             Pain Scale: Numbers Pre/Post-Treatment    Pain Scale: Numbers, Pretreatment  0/10 - no pain  -LF      Pain Scale: Numbers, Post-Treatment  0/10 - no pain  -LF      Recorded by [LF] Fatimah Gavin, PT 07/01/19 1152      Row Name 07/01/19 1039             Plan of Care Review    Plan of Care Reviewed With  patient  -LF      Recorded by [LF] Fatimah Gavin, PT 07/01/19 1152      Row Name 07/01/19 1039             Outcome Summary/Treatment Plan (PT)    Daily Summary of Progress (PT)  progress towards functional goals is fair  -LF      Barriers to Overall Progress (PT)  significant O2 desaturation with mild to moderate activity  -LF      Plan for Continued Treatment (PT)  continue functional mobility training and energy conservation training. RW for energy conservation.  -LF      Anticipated Discharge Disposition (PT)  anticipate therapy at next level of care needs daily assistance  -LF      Recorded by [LF] Fatimah Gavin, PT 07/01/19 1156        User Key  (r) = Recorded By, (t) = Taken By, (c) = Cosigned By    Initials Name Effective Dates Discipline    LF Fatimah Gavin, PT 07/23/18 -  PT          Wound Left posterior arm skin tear (Active)   Dressing Appearance open to air 6/30/2019  9:30 PM   Closure Open to air 6/30/2019  9:30 PM   Base scab 6/30/2019  9:30 PM       Rehab Goal Summary     Row Name 07/01/19 1039             Physical Therapy Goals    Bed Mobility Goal Selection (PT)  bed mobility, PT goal 1  -LF      Transfer Goal Selection (PT)  transfer, PT goal 1  -LF       Gait Training Goal Selection (PT)  gait training, PT goal 1  -LF         Bed Mobility Goal 1 (PT)    Activity/Assistive Device (Bed Mobility Goal 1, PT)  sit to supine/supine to sit  -LF      [place] Level/Cues Needed (Bed Mobility Goal 1, PT)  conditional independence  -LF      Time Frame (Bed Mobility Goal 1, PT)  long term goal (LTG);by discharge  -LF      Barriers (Bed Mobility Goal 1, PT)  HOB flat, no bed rails.   -LF      Progress/Outcomes (Bed Mobility Goal 1, PT)  goal partially met  -LF         Transfer Goal 1 (PT)    Activity/Assistive Device (Transfer Goal 1, PT)  sit-to-stand/stand-to-sit;bed-to-chair/chair-to-bed  -LF      [place] Level/Cues Needed (Transfer Goal 1, PT)  conditional independence  -LF      Time Frame (Transfer Goal 1, PT)  long term goal (LTG);by discharge  -LF      Barriers (Transfers Goal 1, PT)  Maintain SpO2 >88%  -LF      Progress/Outcome (Transfer Goal 1, PT)  goal not met;goal ongoing  -LF         Gait Training Goal 1 (PT)    Activity/Assistive Device (Gait Training Goal 1, PT)  walker, rolling  -LF      [place] Level (Gait Training Goal 1, PT)  conditional independence  -LF      Distance (Gait Goal 1, PT)  25'x2  -LF      Time Frame (Gait Training Goal 1, PT)  long term goal (LTG);by discharge  -LF      Barriers (Gait Training Goal 1, PT)  Maintain SpO2 >88%  -LF      Progress/Outcome (Gait Training Goal 1, PT)  goal partially met;goal ongoing met distance; not met level of independence  -LF        User Key  (r) = Recorded By, (t) = Taken By, (c) = Cosigned By    Initials Name Provider Type Discipline    LF Fatimah Gavin PT Physical Therapist PT          Physical Therapy Education     Title: PT OT SLP Therapies (In Progress)     Topic: Physical Therapy (In Progress)     Point: Mobility training (Done)     Learning Progress Summary           Patient Acceptance, EROLLYNR by LF at 7/1/2019 12:52 PM    Comment:  use of RW for energy conservation    Acceptance, E,  VU by ÓSACR at 6/26/2019 11:04 AM    Comment:  Educated on proper hand placement with transfers; educated on energy conservation to improve O2 saturation.                   Point: Precautions (Done)     Learning Progress Summary           Patient Acceptance, E, VU by LF at 7/1/2019 12:53 PM    Comment:  PLB for maintaining and recovering O2 saturation    Acceptance, E,D, VU by TA at 6/28/2019  1:32 PM    Comment:  PLB & energy consevation                               User Key     Initials Effective Dates Name Provider Type Discipline    MICHAEL 03/07/18 -  Mary Jo Johnson, PTA Physical Therapy Assistant PT    ÓSCAR 04/03/18 -  Titi Portillo, PT Physical Therapist PT     07/23/18 -  Fatimah Gavin, PT Physical Therapist PT                PT Recommendation and Plan  Anticipated Discharge Disposition (PT): anticipate therapy at next level of care(needs daily assistance)  Outcome Summary/Treatment Plan (PT)  Daily Summary of Progress (PT): progress towards functional goals is fair  Barriers to Overall Progress (PT): significant O2 desaturation with mild to moderate activity  Plan for Continued Treatment (PT): continue functional mobility training and energy conservation training. RW for energy conservation.  Anticipated Discharge Disposition (PT): anticipate therapy at next level of care(needs daily assistance)  Plan of Care Reviewed With: patient  Outcome Summary: PT treatment completed. Patient was Modified independent with bed mobility, Supervision with transfers, and SBA with RW to ambulate 28 feet x2. Patient's tolerance for any functional mobility is limited by significant O2 desaturation (72% with transfer and 77% with ambuation this session). Pt needs ongoing PT for energy conservation training with functional mobility.  Outcome Measures     Row Name 07/01/19 1039 06/30/19 1300 06/29/19 1500       How much help from another person do you currently need...    Turning from your back to your side while in flat bed without  using bedrails?  4  -LF  4  -TA  3  -TA    Moving from lying on back to sitting on the side of a flat bed without bedrails?  4  -LF  3  -TA  3  -TA    Moving to and from a bed to a chair (including a wheelchair)?  3  -LF  3  -TA  3  -TA    Standing up from a chair using your arms (e.g., wheelchair, bedside chair)?  3  -LF  3  -TA  3  -TA    Climbing 3-5 steps with a railing?  3  -LF  3  -TA  3  -TA    To walk in hospital room?  3  -LF  3  -TA  3  -TA    AM-PAC 6 Clicks Score  20  -LF  19  -TA  18  -TA       Functional Assessment    Outcome Measure Options  AM-PAC 6 Clicks Basic Mobility (PT)  -LF  AM-PAC 6 Clicks Basic Mobility (PT)  -TA  AM-PAC 6 Clicks Basic Mobility (PT)  -TA      User Key  (r) = Recorded By, (t) = Taken By, (c) = Cosigned By    Initials Name Provider Type     Mary Jo Johnson, PTA Physical Therapy Assistant     Fatimah Gavin, PT Physical Therapist         Time Calculation:   PT Charges     Row Name 07/01/19 1039             Time Calculation    Start Time  1039  -LF      Stop Time  1121  -LF      Time Calculation (min)  42 min  -LF      PT Received On  07/01/19  -LF         Time Calculation- PT    Total Timed Code Minutes- PT  42 minute(s)  -LF         Timed Charges    58407 - PT Therapeutic Activity Minutes  42  -LF        User Key  (r) = Recorded By, (t) = Taken By, (c) = Cosigned By    Initials Name Provider Type     Fatimah Gavin, PT Physical Therapist        Therapy Charges for Today     Code Description Service Date Service Provider Modifiers Qty    22280019259  PT THERAPEUTIC ACT EA 15 MIN 7/1/2019 Fatimah Gavin, PT GP 3          PT G-Codes  Outcome Measure Options: AM-PAC 6 Clicks Basic Mobility (PT)  AM-PAC 6 Clicks Score: 20  Score: 20    Fatimah Gavin PT  7/1/2019

## 2019-07-01 NOTE — SIGNIFICANT NOTE
Risk and benefits of blood transfusion discussed with patient. Patient understands risks and benefits and is agreeable to blood transfusion as needed.          This document has been electronically signed by Kasi Reaves MD on July 1, 2019 2:27 PM

## 2019-07-01 NOTE — PROGRESS NOTES
MEDICINE DAILY PROGRESS NOTE  NAME: Brendon Skelton  : 1945  MRN: [id number]     LOS: 11 days     PROVIDER OF SERVICE: Kasi Reaves MD    Chief Complaint: Gastrointestinal hemorrhage    Subjective:   HPI: 73 year old white female with history of SSS s/p PPM, aortic valve replacement on coumadin, PAF, COPD, and chronic hypoxic respiratory failure presented with LGIB. Patient has been evaluated by GI. She has had EGD and c-scope x2. Repeat scope today with medium polyp noted.    Interval History:  History taken from: patient chart RN  . Patient feeling some better, but still with desaturations with ambulation. LUE edematous.  . Patient resting comfortably. Still with intermittent spells of SOA. Otherwise overall feeling about the same.  . Patient on bedside commode at start of exam. Breathing stable per patient. Had one episode of dyspnea overnight.   . Patient states that her breathing is improving today. No acute events overnight.  . Weaning oxygen. No issues at rest. Still with ALVAREZ.   . Breathing some better today. Again no blood noted per rectum. No acute events overnight.  . Patient resting comfortably. Feeling some better today. No blood noted in stools overnight per patient.  . Patient back from c-scope. Hungry. Desires to eat.    Review of Systems:   Review of Systems   Constitutional: Positive for activity change and fatigue. Negative for appetite change and fever.   Respiratory: Positive for shortness of breath (ALVAREZ). Negative for cough.    Cardiovascular: Negative for chest pain and palpitations.   Gastrointestinal: Negative for abdominal pain, blood in stool, nausea and vomiting.   Genitourinary: Negative for difficulty urinating and dysuria.   Musculoskeletal: Negative for arthralgias and gait problem.   Skin: Positive for pallor. Negative for color change and rash.   Neurological: Positive for weakness. Negative for dizziness and headaches.    Psychiatric/Behavioral: Negative for agitation, confusion and sleep disturbance.       Objective:     Vital Signs  Vitals:    07/01/19 0736 07/01/19 0836 07/01/19 0847 07/01/19 1100   BP:   99/52 91/44   BP Location:   Left leg Left arm   Patient Position:   Lying Lying   Pulse: 82 94 87 73   Resp:   20 18   Temp:   96.6 °F (35.9 °C) 97.6 °F (36.4 °C)   TempSrc:   Oral Oral   SpO2:   90% 90%   Weight:       Height:           Physical Exam  Physical Exam   Constitutional: She is oriented to person, place, and time. She appears well-developed and well-nourished. No distress.   Obese.   HENT:   Head: Normocephalic and atraumatic.   Right Ear: External ear normal.   Left Ear: External ear normal.   Nose: Nose normal.   Eyes: Conjunctivae and EOM are normal. Pupils are equal, round, and reactive to light.   Neck: Neck supple. No thyromegaly present.   Cardiovascular: Normal rate, regular rhythm and normal heart sounds.   Pulmonary/Chest: Effort normal. No respiratory distress. She has no wheezes. She has no rales. She exhibits no tenderness.   Nasal cannula in place. Diminished bibasilar.   Abdominal: Soft. Bowel sounds are normal. She exhibits no distension and no mass. There is no tenderness. There is no rebound and no guarding.   Musculoskeletal: She exhibits no edema.   Neurological: She is alert and oriented to person, place, and time.   Skin: Skin is warm and dry. No rash noted. She is not diaphoretic. No erythema. There is pallor.   Psychiatric: She has a normal mood and affect. Her behavior is normal.   Nursing note and vitals reviewed.      Medication Review    Current Facility-Administered Medications:   •  acetaminophen (TYLENOL) tablet 650 mg, 650 mg, Oral, Q6H PRN, Colin Coreas MD, 650 mg at 06/29/19 0151  •  cholecalciferol (VITAMIN D3) tablet 1,000 Units, 1,000 Units, Oral, Daily, Sandy Caputo MD, 1,000 Units at 07/01/19 1146  •  citalopram (CeleXA) tablet 20 mg, 20 mg, Oral, Daily, Colin Coreas  MD, 20 mg at 07/01/19 0851  •  dextrose (D50W) 25 g/ 50mL Intravenous Solution 25 g, 25 g, Intravenous, Q15 Min PRN, Dudley Alfredo MD  •  dextrose (GLUTOSE) oral gel 15 g, 15 g, Oral, Q15 Min PRN, Dudley Alfredo MD  •  dextrose 5 % and sodium chloride 0.45 % infusion, 30 mL/hr, Intravenous, Continuous PRN, Alfredo Guerrero MD  •  diltiaZEM CD (CARDIZEM CD) 24 hr capsule 240 mg, 240 mg, Oral, Daily, Colin Coreas MD, 240 mg at 07/01/19 0851  •  ferric gluconate (FERRLECIT) 125 mg in sodium chloride 0.9 % 110 mL IVPB, 125 mg, Intravenous, Daily, Rocio Mack MD, Last Rate: 110 mL/hr at 07/01/19 1146, 125 mg at 07/01/19 1146  •  furosemide (LASIX) tablet 40 mg, 40 mg, Oral, BID, Rocio Mack MD, 40 mg at 07/01/19 0851  •  glucagon (human recombinant) (GLUCAGEN DIAGNOSTIC) injection 1 mg, 1 mg, Subcutaneous, PRN, Dudley Alfredo MD  •  guaiFENesin (MUCINEX) 12 hr tablet 600 mg, 600 mg, Oral, Q12H, Sandy Caputo MD, 600 mg at 07/01/19 1146  •  insulin aspart (novoLOG) injection 0-9 Units, 0-9 Units, Subcutaneous, 4x Daily AC & at Bedtime, Dudley Alfredo MD, 2 Units at 06/30/19 2135  •  ipratropium-albuterol (DUO-NEB) nebulizer solution 3 mL, 3 mL, Nebulization, Q8H - RT, Colin Coreas MD, 3 mL at 07/01/19 0729  •  ipratropium-albuterol (DUO-NEB) nebulizer solution 3 mL, 3 mL, Nebulization, Q4H PRN, Hermann Richardson MD  •  magic butt ointment, , Topical, BID, Colin Coreas MD  •  metoprolol tartrate (LOPRESSOR) injection 2.5 mg, 2.5 mg, Intravenous, Q6H PRN, Kasi Reaves MD  •  nystatin (MYCOSTATIN) powder, , Topical, Q12H, Colin Coreas MD  •  ondansetron (ZOFRAN) injection 4 mg, 4 mg, Intravenous, Q6H PRN, Dudley Alfredo MD  •  Pharmacy to dose warfarin, , Does not apply, Continuous PRN, Colin Coreas MD  •  prenatal vitamin 27-0.8 tablet 1 tablet, 1 tablet, Oral, Daily, Sandy Caputo MD, 1 tablet at 07/01/19 1146  •  rOPINIRole (REQUIP) tablet 0.25 mg, 0.25 mg, Oral,  Nightly, Colin Coreas MD, 0.25 mg at 06/30/19 2135  •  sodium chloride 0.9 % flush 10 mL, 10 mL, Intravenous, PRN, Lobo Amezquita PA-C, 10 mL at 06/24/19 0829  •  sodium chloride 0.9 % flush 3 mL, 3 mL, Intravenous, Q12H, Dudley Alfredo MD, 3 mL at 07/01/19 0853  •  sodium chloride 0.9 % flush 3-10 mL, 3-10 mL, Intravenous, PRN, Dudley Alfredo MD, 10 mL at 06/27/19 1534  •  traZODone (DESYREL) tablet 300 mg, 300 mg, Oral, Nightly, Colin Coreas MD, 300 mg at 06/30/19 2135  •  vitamin C (ASCORBIC ACID) tablet 500 mg, 500 mg, Oral, Daily, Sandy Caputo MD, 500 mg at 07/01/19 1146  •  warfarin (COUMADIN) tablet 2.5 mg, 2.5 mg, Oral, Every Other Day, Alfredo Guerrero MD, 2.5 mg at 06/30/19 1712  •  warfarin (COUMADIN) tablet 5 mg, 5 mg, Oral, Every Other Day, Alfredo Guerrero MD, 5 mg at 06/29/19 1730     Diagnostic Data    Lab Results (last 24 hours)     Procedure Component Value Units Date/Time    POC Glucose Once [[id number]]  (Abnormal) Collected:  07/01/19 1032    Specimen:  Blood Updated:  07/01/19 1106     Glucose 131 mg/dL      Comment: RN NotifiedOperator: [id number] ZipRecruiterter ID: [id number]       POC Glucose Once [[id number]]  (Normal) Collected:  07/01/19 0757    Specimen:  Blood Updated:  07/01/19 0811     Glucose 118 mg/dL      Comment: RN NotifiedOperator: [id number] ZipRecruiterter ID: [id number]       Protime-INR [[id number]]  (Abnormal) Collected:  07/01/19 0551    Specimen:  Blood Updated:  07/01/19 0625     Protime 21.7 Seconds      INR 1.92    Narrative:       Therapeutic range for most indications is 2.0-3.0 INR,  or 2.5-3.5 for mechanical heart valves.    Basic Metabolic Panel [[id number]]  (Abnormal) Collected:  07/01/19 0551    Specimen:  Blood Updated:  07/01/19 0617     Glucose 121 mg/dL      BUN 59 mg/dL      Creatinine 2.02 mg/dL      Sodium 145 mmol/L      Potassium 4.2 mmol/L      Chloride 105 mmol/L      CO2 29.0 mmol/L      Calcium 9.0 mg/dL      eGFR  Non  Amer 24 mL/min/1.73      BUN/Creatinine Ratio 29.2     Anion Gap 11.0 mmol/L     Narrative:       GFR Normal >60  Chronic Kidney Disease <60  Kidney Failure <15    CBC (No Diff) [[id number]]  (Abnormal) Collected:  07/01/19 0551    Specimen:  Blood Updated:  07/01/19 0609     WBC 8.43 10*3/mm3      RBC 2.55 10*6/mm3      Hemoglobin 7.9 g/dL      Hematocrit 25.3 %      MCV 99.2 fL      MCH 31.0 pg      MCHC 31.2 g/dL      RDW 19.3 %      RDW-SD 67.2 fl      MPV 11.1 fL      Platelets 216 10*3/mm3     POC Glucose Once [[id number]]  (Abnormal) Collected:  06/30/19 2041    Specimen:  Blood Updated:  06/30/19 2056     Glucose 170 mg/dL      Comment: RN NotifiedOperator: [id number] DICK SARAHMeter ID: [id number]       POC Glucose Once [[id number]]  (Normal) Collected:  06/30/19 1637    Specimen:  Blood Updated:  06/30/19 1719     Glucose 116 mg/dL      Comment: RN NotifiedOperator: [id number] APRIL CRYSTALMeter ID: [id number]             I reviewed the patient's new clinical results.    Assessment/Plan:     Active Hospital Problems    Diagnosis POA   • **Gastrointestinal hemorrhage [K92.2] Yes   • Gastritis [K29.70] Yes   • Colon polyp [K63.5] Yes   • Coumadin toxicity [T45.511A] Yes   • Morbid obesity (CMS/HCC) [E66.01] Yes   • CKD (chronic kidney disease) stage 3, GFR 30-59 ml/min (CMS/HCC) [N18.3] Yes   • Diabetes mellitus (CMS/HCC) [E11.9] Yes   • Emphysema of lung (CMS/HCC) [J43.9] Yes       #. LGIB.   7/1. H/h stable.  No bleeding noted. Plan for 1 unit pRBC with continued symptoms. 10 mg IV lasix post 1 unit.  6/30. IV iron per nephrology. H/h stable.  6/27 - 6/29. H/h stable.  6/26. Surgery following. H/h stable.   6/25. General surgery to see. Per GI adenocarcinoma at 30 cm on colonoscopy.   6/24. Repeat colonoscopy today. Medium polyp removed today and clip placed. Per GI no coumadin for 3 days. Will discuss with cardiology.  Results from last 7 days   Lab Units 07/01/19  0551 06/30/19  0804  06/29/19  0704 06/28/19  0535 06/27/19  0606 06/26/19  0628 06/25/19  0919   HEMOGLOBIN g/dL 7.9* 8.1* 8.2* 8.2* 8.1* 8.7* 8.0*   HEMATOCRIT % 25.3* 26.2* 26.5* 25.8* 26.3* 28.4* 26.0*   #. DM. SSI.  #. CKD III. Stable. Near baseline. Monitor. Nephrology following.  Results from last 7 days   Lab Units 07/01/19  0551 06/30/19  0804 06/29/19  0704 06/28/19  0535 06/27/19  0606 06/26/19  0628 06/25/19  0546   CREATININE mg/dL 2.02* 2.05* 2.21* 2.10* 1.91* 1.94* 1.79*   #. PAF. Paced.  7/1. INR improving. Pharmacy dosing.  6/30. Coumadin.   6/26 - 6/29. Coumadin restarted.  6/25. Coumadin on hold after colonoscopy.   #. H/o aortic valve replacement.  6/28. Pharmacy dosing coumadin.  6/27. Coumadin. Cards following.  6/26. Coumadin restarted today. INR peaked. No bleeding noted.   6/25. Subtherapeutic. Plan was to restart in 3 days post c-scope. Patient to get evaluated by general surgery for possible resection. Will continue to hold until evaluation.  6/24. Chronic anticoagulation. Holding coumadin at present after colonoscopy and polyp removal. No coumadin for 3 days per GI. Will discuss with cards.  Results from last 7 days   Lab Units 07/01/19  0551 06/30/19  0804 06/29/19  0704 06/28/19  0535 06/27/19  0606 06/26/19  0628 06/25/19  0546   PROTIME Seconds 21.7* 20.1* 17.8* 18.8* 19.3* 19.0* 20.2*   INR  1.92* 1.74* 1.49* 1.60* 1.65* 1.62* 1.75*   #. Morbid obesity. Dietary.  Body mass index is 43.29 kg/m².  #. Chronic hypoxic respiratory failure. Supplemental oxygen titrated to maintain > 88%.  #. HFpEF.   6/28 - 7/1. Diuretics. Per nephrology.  6/27. IV lasix.   6/26. Patient near euvolemia. Nephrology and cardiology following. Lasix IV today.      Will monitor patient's hospital course and adjust treatment as hospital course dictates.    DVT prophylaxis: SCDs/coumadin.  Code status is   Code Status and Medical Interventions:   Ordered at: 06/18/19 1229     Level Of Support Discussed With:    Patient     Code  Status:    CPR     Medical Interventions (Level of Support Prior to Arrest):    Full       Plan for disposition:Where: home and home health and When:  2-3 days      Time:           This document has been electronically signed by Kasi Reaves MD on July 1, 2019 2:22 PM

## 2019-07-01 NOTE — PLAN OF CARE
Problem: Patient Care Overview  Goal: Plan of Care Review  Outcome: Ongoing (interventions implemented as appropriate)   07/01/19 1039   Coping/Psychosocial   Plan of Care Reviewed With patient   OTHER   Outcome Summary PT treatment completed. Patient was Modified independent with bed mobility, Supervision with transfers, and SBA with RW to ambulate 28 feet x2. Patient's tolerance for any functional mobility is limited by significant O2 desaturation (72% with transfer and 77% with ambuation this session). Pt needs ongoing PT for energy conservation training with functional mobility.

## 2019-07-01 NOTE — PLAN OF CARE
Problem: Patient Care Overview  Goal: Plan of Care Review  Outcome: Ongoing (interventions implemented as appropriate)   06/30/19 5925   Coping/Psychosocial   Plan of Care Reviewed With patient   Plan of Care Review   Progress improving   OTHER   Outcome Summary Pt resting. VSS. No new issues noted. Will cotinue to monitor.      Goal: Individualization and Mutuality  Outcome: Ongoing (interventions implemented as appropriate)    Goal: Discharge Needs Assessment  Outcome: Ongoing (interventions implemented as appropriate)      Problem: Fall Risk (Adult)  Goal: Absence of Fall  Outcome: Ongoing (interventions implemented as appropriate)      Problem: Chronic Obstructive Pulmonary Disease (Adult)  Goal: Signs and Symptoms of Listed Potential Problems Will be Absent, Minimized or Managed (Chronic Obstructive Pulmonary Disease)  Outcome: Ongoing (interventions implemented as appropriate)      Problem: Cardiac: Heart Failure (Adult)  Goal: Signs and Symptoms of Listed Potential Problems Will be Absent, Minimized or Managed (Cardiac: Heart Failure)  Outcome: Ongoing (interventions implemented as appropriate)      Problem: Skin Injury Risk (Adult)  Goal: Identify Related Risk Factors and Signs and Symptoms  Outcome: Outcome(s) achieved Date Met: 06/30/19    Goal: Skin Health and Integrity  Outcome: Ongoing (interventions implemented as appropriate)

## 2019-07-01 NOTE — PLAN OF CARE
Problem: Patient Care Overview  Goal: Plan of Care Review  Outcome: Ongoing (interventions implemented as appropriate)   07/01/19 4475   Coping/Psychosocial   Plan of Care Reviewed With patient   Plan of Care Review   Progress no change   OTHER   Outcome Summary Pt requesting to go to bathroom. Pt supervision for bed<>BSC t/f. Pt to get blood this pm and U/S of L UE.

## 2019-07-01 NOTE — PROGRESS NOTES
"[facility]  [address] [place] [address]. [address]  T - [phone]  F - [phone]     Progress Note          PATIENT  DEMOGRAPHICS   PATIENT NAME: Brendon Skelton                      PHYSICIAN: Sandy Caputo MD  : 1945  MRN: [id number]   LOS: 11 days    Patient Care Team:  Josefa Pozo APRN as PCP - General (Nurse Practitioner)  Meme Duckworth APRN as PCP - Claims Attributed  Aby Stout RN as Care Coordinator (Population Health)  Subjective   SUBJECTIVE   Shortness of breath with minimal exertion - sats upto 72 % exertion         Objective   OBJECTIVE   Vital Signs  Temp:  [96.6 °F (35.9 °C)-97.7 °F (36.5 °C)] 96.6 °F (35.9 °C)  Heart Rate:  [64-94] 87  Resp:  [18-22] 20  BP: ()/(52-90) 99/52    Flowsheet Rows      First Filed Value   Admission Height  165.1 cm (65\") Documented at 2019 0934   Admission Weight  111 kg (244 lb) Documented at 2019 0934           I/O last 3 completed shifts:  In: 1040 [P.O.:1040]  Out: -     PHYSICAL EXAM    Physical Exam   Constitutional: She is oriented to person, place, and time. She appears well-developed.   Eyes: EOM are normal. Pupils are equal, round, and reactive to light.   Cardiovascular: Normal rate, regular rhythm and normal heart sounds. Exam reveals no gallop and no friction rub.   No murmur heard.  Pulmonary/Chest: Effort normal. No respiratory distress. She has wheezes.   Abdominal: Soft. Bowel sounds are normal. She exhibits no distension and no mass. There is no tenderness. There is no guarding.   Musculoskeletal: She exhibits edema. She exhibits no tenderness.   Neurological: She is alert and oriented to person, place, and time.   Skin: Skin is warm and dry.       RESULTS   Results Review:    Results from last 7 days   Lab Units 19  0551 19  0804 19  0704   SODIUM mmol/L 145 142 141   POTASSIUM mmol/L 4.2 4.5 4.5   CHLORIDE mmol/L 105 104 104   CO2 mmol/L 29.0 28.0 29.0 "   BUN mg/dL 59* 58* 60*   CREATININE mg/dL 2.02* 2.05* 2.21*   CALCIUM mg/dL 9.0 9.2 9.5   GLUCOSE mg/dL 121* 123* 127*       Estimated Creatinine Clearance: 31.9 mL/min (A) (by C-G formula based on SCr of 2.02 mg/dL (H)).        Results from last 7 days   Lab Units 07/01/19  0551 06/30/19  0804 06/29/19  0704 06/28/19  0535 06/27/19  0606   WBC 10*3/mm3 8.43 8.24 8.26 8.63 8.45   HEMOGLOBIN g/dL 7.9* 8.1* 8.2* 8.2* 8.1*   PLATELETS 10*3/mm3 216 235 213 203 208       Results from last 7 days   Lab Units 07/01/19  0551 06/30/19  0804 06/29/19  0704 06/28/19  0535 06/27/19  0606   INR  1.92* 1.74* 1.49* 1.60* 1.65*         Imaging Results (last 24 hours)     ** No results found for the last 24 hours. **           MEDICATIONS      citalopram 20 mg Oral Daily   diltiaZEM  mg Oral Daily   ferric gluconate (FERRLECIT) IVPB 125 mg Intravenous Daily   ferrous sulfate 324 mg Oral Daily With Breakfast   furosemide 40 mg Oral BID   insulin aspart 0-9 Units Subcutaneous 4x Daily AC & at Bedtime   ipratropium-albuterol 3 mL Nebulization Q8H - RT   magic butt ointment  Topical BID   nystatin  Topical Q12H   rOPINIRole 0.25 mg Oral Nightly   sodium chloride 3 mL Intravenous Q12H   traZODone 300 mg Oral Nightly   warfarin 2.5 mg Oral Every Other Day   warfarin 5 mg Oral Every Other Day       dextrose 5 % and sodium chloride 0.45 % 30 mL/hr   Pharmacy to dose warfarin        Assessment/Plan   ASSESSMENT / PLAN      Gastrointestinal hemorrhage    Emphysema of lung (CMS/HCC)    Diabetes mellitus (CMS/HCC)    CKD (chronic kidney disease) stage 3, GFR 30-59 ml/min (CMS/HCC)    Morbid obesity (CMS/HCC)    Gastritis    Colon polyp    Coumadin toxicity    1.  CKD 4: baseline creatinine 1.9-2.0 mg/dl   - Creatinine better at 2.0 mg/dl.  Keep lasix 40 mg PO bid may need iv lasix. Check probnp and cxr in am     2.  Hypertension:  - Well-controlled     3.  Anemia  - Hemoglobin is low at 8.2  - Ferritin 211, Tsat 13%. On IV iron. Hold po  jason canales rnow     4.  Colon polyp/adenocarcinoma:  - GI and general surgery following. High risk for surgery from pulmonary standpoint per Dr. Romero.      5.  DM2           This document has been electronically signed by Sandy Caputo MD on July 1, 2019 10:53 AM

## 2019-07-02 ENCOUNTER — APPOINTMENT (OUTPATIENT)
Dept: GENERAL RADIOLOGY | Facility: HOSPITAL | Age: 74
End: 2019-07-02

## 2019-07-02 LAB
ANION GAP SERPL CALCULATED.3IONS-SCNC: 10 MMOL/L (ref 5–15)
BUN BLD-MCNC: 60 MG/DL (ref 8–23)
BUN/CREAT SERPL: 27.6 (ref 7–25)
CALCIUM SPEC-SCNC: 8.9 MG/DL (ref 8.6–10.5)
CHLORIDE SERPL-SCNC: 104 MMOL/L (ref 98–107)
CO2 SERPL-SCNC: 27 MMOL/L (ref 22–29)
CREAT BLD-MCNC: 2.17 MG/DL (ref 0.57–1)
DEPRECATED RDW RBC AUTO: 67.6 FL (ref 37–54)
ERYTHROCYTE [DISTWIDTH] IN BLOOD BY AUTOMATED COUNT: 20.1 % (ref 12.3–15.4)
GFR SERPL CREATININE-BSD FRML MDRD: 22 ML/MIN/1.73
GLUCOSE BLD-MCNC: 118 MG/DL (ref 65–99)
GLUCOSE BLDC GLUCOMTR-MCNC: 118 MG/DL (ref 70–130)
GLUCOSE BLDC GLUCOMTR-MCNC: 133 MG/DL (ref 70–130)
GLUCOSE BLDC GLUCOMTR-MCNC: 136 MG/DL (ref 70–130)
GLUCOSE BLDC GLUCOMTR-MCNC: 146 MG/DL (ref 70–130)
HCT VFR BLD AUTO: 27 % (ref 34–46.6)
HGB BLD-MCNC: 8.8 G/DL (ref 12–15.9)
INR PPP: 2.41 (ref 0.8–1.2)
MCH RBC QN AUTO: 31.1 PG (ref 26.6–33)
MCHC RBC AUTO-ENTMCNC: 32.6 G/DL (ref 31.5–35.7)
MCV RBC AUTO: 95.4 FL (ref 79–97)
NT-PROBNP SERPL-MCNC: ABNORMAL PG/ML (ref 5–900)
PLATELET # BLD AUTO: 212 10*3/MM3 (ref 140–450)
PMV BLD AUTO: 12.7 FL (ref 6–12)
POTASSIUM BLD-SCNC: 4.3 MMOL/L (ref 3.5–5.2)
PROTHROMBIN TIME: 26 SECONDS (ref 11.1–15.3)
RBC # BLD AUTO: 2.83 10*6/MM3 (ref 3.77–5.28)
SODIUM BLD-SCNC: 141 MMOL/L (ref 136–145)
WBC NRBC COR # BLD: 8.27 10*3/MM3 (ref 3.4–10.8)

## 2019-07-02 PROCEDURE — 25010000002 NA FERRIC GLUC CPLX PER 12.5 MG: Performed by: INTERNAL MEDICINE

## 2019-07-02 PROCEDURE — 25010000002 FUROSEMIDE PER 20 MG: Performed by: INTERNAL MEDICINE

## 2019-07-02 PROCEDURE — 94799 UNLISTED PULMONARY SVC/PX: CPT

## 2019-07-02 PROCEDURE — 85027 COMPLETE CBC AUTOMATED: CPT | Performed by: FAMILY MEDICINE

## 2019-07-02 PROCEDURE — 97116 GAIT TRAINING THERAPY: CPT

## 2019-07-02 PROCEDURE — 99232 SBSQ HOSP IP/OBS MODERATE 35: CPT | Performed by: INTERNAL MEDICINE

## 2019-07-02 PROCEDURE — 85610 PROTHROMBIN TIME: CPT | Performed by: INTERNAL MEDICINE

## 2019-07-02 PROCEDURE — 97110 THERAPEUTIC EXERCISES: CPT

## 2019-07-02 PROCEDURE — 94760 N-INVAS EAR/PLS OXIMETRY 1: CPT

## 2019-07-02 PROCEDURE — 82962 GLUCOSE BLOOD TEST: CPT

## 2019-07-02 PROCEDURE — 71045 X-RAY EXAM CHEST 1 VIEW: CPT

## 2019-07-02 PROCEDURE — 80048 BASIC METABOLIC PNL TOTAL CA: CPT | Performed by: INTERNAL MEDICINE

## 2019-07-02 PROCEDURE — 83880 ASSAY OF NATRIURETIC PEPTIDE: CPT | Performed by: INTERNAL MEDICINE

## 2019-07-02 RX ORDER — METOLAZONE 2.5 MG/1
2.5 TABLET ORAL DAILY
Status: DISCONTINUED | OUTPATIENT
Start: 2019-07-02 | End: 2019-07-05 | Stop reason: HOSPADM

## 2019-07-02 RX ORDER — FUROSEMIDE 10 MG/ML
20 INJECTION INTRAMUSCULAR; INTRAVENOUS ONCE
Status: COMPLETED | OUTPATIENT
Start: 2019-07-02 | End: 2019-07-02

## 2019-07-02 RX ADMIN — FUROSEMIDE 40 MG: 40 TABLET ORAL at 17:05

## 2019-07-02 RX ADMIN — WARFARIN SODIUM 2.5 MG: 2.5 TABLET ORAL at 17:05

## 2019-07-02 RX ADMIN — IPRATROPIUM BROMIDE AND ALBUTEROL SULFATE 3 ML: 2.5; .5 SOLUTION RESPIRATORY (INHALATION) at 22:32

## 2019-07-02 RX ADMIN — DILTIAZEM HYDROCHLORIDE 240 MG: 240 CAPSULE, COATED, EXTENDED RELEASE ORAL at 10:47

## 2019-07-02 RX ADMIN — VITAMIN D, TAB 1000IU (100/BT) 1000 UNITS: 25 TAB at 10:46

## 2019-07-02 RX ADMIN — NYSTATIN: 100000 POWDER TOPICAL at 20:33

## 2019-07-02 RX ADMIN — ROPINIROLE HYDROCHLORIDE 0.25 MG: 0.25 TABLET, FILM COATED ORAL at 20:32

## 2019-07-02 RX ADMIN — SODIUM CHLORIDE, PRESERVATIVE FREE 3 ML: 5 INJECTION INTRAVENOUS at 10:48

## 2019-07-02 RX ADMIN — IPRATROPIUM BROMIDE AND ALBUTEROL SULFATE 3 ML: 2.5; .5 SOLUTION RESPIRATORY (INHALATION) at 07:23

## 2019-07-02 RX ADMIN — FUROSEMIDE 20 MG: 10 INJECTION, SOLUTION INTRAVENOUS at 10:45

## 2019-07-02 RX ADMIN — IPRATROPIUM BROMIDE AND ALBUTEROL SULFATE 3 ML: 2.5; .5 SOLUTION RESPIRATORY (INHALATION) at 15:19

## 2019-07-02 RX ADMIN — NYSTATIN: 100000 POWDER TOPICAL at 10:51

## 2019-07-02 RX ADMIN — SODIUM CHLORIDE 125 MG: 9 INJECTION, SOLUTION INTRAVENOUS at 10:45

## 2019-07-02 RX ADMIN — HYDROCORTISONE: 1 CREAM TOPICAL at 20:33

## 2019-07-02 RX ADMIN — SODIUM CHLORIDE, PRESERVATIVE FREE 3 ML: 5 INJECTION INTRAVENOUS at 20:33

## 2019-07-02 RX ADMIN — FUROSEMIDE 40 MG: 40 TABLET ORAL at 10:46

## 2019-07-02 RX ADMIN — GUAIFENESIN 600 MG: 600 TABLET, EXTENDED RELEASE ORAL at 20:32

## 2019-07-02 RX ADMIN — GUAIFENESIN 600 MG: 600 TABLET, EXTENDED RELEASE ORAL at 10:46

## 2019-07-02 RX ADMIN — TRAZODONE HYDROCHLORIDE 300 MG: 150 TABLET ORAL at 20:32

## 2019-07-02 RX ADMIN — CITALOPRAM HYDROBROMIDE 20 MG: 20 TABLET ORAL at 10:47

## 2019-07-02 RX ADMIN — METOLAZONE 2.5 MG: 2.5 TABLET ORAL at 10:46

## 2019-07-02 RX ADMIN — OXYCODONE HYDROCHLORIDE AND ACETAMINOPHEN 500 MG: 500 TABLET ORAL at 10:46

## 2019-07-02 RX ADMIN — HYDROCORTISONE: 1 CREAM TOPICAL at 10:51

## 2019-07-02 RX ADMIN — PRENATAL VIT W/ FE FUMARATE-FA TAB 27-0.8 MG 1 TABLET: 27-0.8 TAB at 10:46

## 2019-07-02 NOTE — PROGRESS NOTES
SUBJECTIVE:   7/2/2019  Chief Complaint:     Subjective      Patient is 73 y.o. female presents with blood in the stool.  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  Patient at this time needs conditioning prior to surgery.  Patient has had no blood in the stool at this time..     History:  Past Medical History:   Diagnosis Date   • Acute bronchitis    • Anxiety    • Atrial fibrillation (CMS/HCC)    • C. difficile colitis    • Callosity     under metatarsal head      • Cardiac pacemaker in situ    • CHF (congestive heart failure) (CMS/HCC)    • Chronic obstructive lung disease (CMS/HCC)    • Corns and callus    • Depressive disorder    • Diabetes mellitus (CMS/HCC)    • Diastolic heart failure (CMS/HCC)    • Essential hypertension    • Foot pain    • Hyperlipidemia    • Long term current use of anticoagulant    • On anticoagulant therapy    • Pulmonary emphysema (CMS/HCC)    • Rectal hemorrhage    • Type 2 diabetes mellitus (CMS/HCC)      Past Surgical History:   Procedure Laterality Date   • AORTIC VALVE REPAIR/REPLACEMENT  1999   • CARDIAC ELECTROPHYSIOLOGY PROCEDURE N/A 3/20/2017    Procedure: PPM generator change - dual;  Surgeon: Bereket Gates MD;  Location: [facility] CATH INVASIVE LOCATION;  Service:    • CARDIAC PACEMAKER PLACEMENT  1999   • CATARACT EXTRACTION W/ INTRAOCULAR LENS IMPLANT Left 2/1/2019    Procedure: REMOVE CATARACT AND IMPLANT INTRAOCULAR LENS I;  Surgeon: Jose L Clay MD;  Location: [facility] OR;  Service: Ophthalmology   • CATARACT EXTRACTION W/ INTRAOCULAR LENS IMPLANT Right 2/8/2019    Procedure: REMOVE CATARACT AND IMPLANT  INTRAOCULAR LENS;  Surgeon: Jose L Clay MD;  Location: [facility] OR;  Service: Ophthalmology   • COLONOSCOPY N/A 6/19/2019    Procedure: COLONOSCOPY WITH CONTROL OF BLEED;  Surgeon: Alfredo Guerrero MD;  Location: [facility] ENDOSCOPY;  Service: Gastroenterology   • COLONOSCOPY N/A 6/24/2019    Procedure: COLONOSCOPY;   Surgeon: Alfredo Guerrero MD;  Location: [facility] ENDOSCOPY;  Service: Gastroenterology   • ECHO - CONVERTED  2013    There is mild to moderate left atrial enlargement EF 50-55%   • ENDOSCOPY N/A 2019    Procedure: ESOPHAGOGASTRODUODENOSCOPY WITH CONTROL OF BLEED;  Surgeon: Alfredo Guerrero MD;  Location: [facility] ENDOSCOPY;  Service: Gastroenterology   • FOOT SURGERY     • OTHER SURGICAL HISTORY  10/26/2015    PARING CORN/CALLUS    • PACEMAKER REPLACEMENT N/A 3/21/2017    Procedure: revision pacemaker pocket, evacuation hematoma, control of bleeding;  Surgeon: Polo Feliz MD;  Location: [facility] OR;  Service:    • TRANSESOPHAGEAL ECHOCARDIOGRAM (MITZY)  2014    With color flow-Mild left atrial enlargement with mild concentric LV hypertrophy with top normal aortic root size. EF 45-50%. Mild mitral regurgitation and mild aortic insufficiency and trivial amount of tricuspid regurgation   • TUBAL ABDOMINAL LIGATION       Family History   Problem Relation Age of Onset   • Heart disease Mother    • Hyperlipidemia Mother    • Hypertension Mother    • Cancer Other      Social History     Tobacco Use   • Smoking status: Former Smoker     Last attempt to quit: 3/21/1999     Years since quittin.2   • Smokeless tobacco: Never Used   Substance Use Topics   • Alcohol use: No     Comment: quit in    • Drug use: No     Medications Prior to Admission   Medication Sig Dispense Refill Last Dose   • albuterol (PROVENTIL) (2.5 MG/3ML) 0.083% nebulizer solution Take 2.5 mg by nebulization Every 4 (Four) Hours As Needed for Wheezing.   2019 at Unknown time   • albuterol sulfate  (90 Base) MCG/ACT inhaler Inhale 2 puffs Every 4 (Four) Hours As Needed for Wheezing or Shortness of Air. 18 g 11 2019 at Unknown time   • Ascorbic Acid (VITAMIN C WITH OCHOA HIPS) 250 MG tablet Take 500 mg by mouth Daily.   2019 at Unknown time   • aspirin 81 MG chewable tablet Chew 81 mg Daily.   2019  at Unknown time   • cholecalciferol (VITAMIN D3) 1000 units tablet Take 2,000 Units by mouth Daily.   6/18/2019 at Unknown time   • citalopram (CeleXA) 20 MG tablet Take 1 tablet by mouth Daily. 90 tablet 3 6/18/2019 at Unknown time   • diltiaZEM CD (CARDIZEM CD) 240 MG 24 hr capsule Take 1 capsule by mouth Daily. 90 capsule 3 6/18/2019 at Unknown time   • ezetimibe (ZETIA) 10 MG tablet Take 1 tablet by mouth Daily. 90 tablet 0 6/18/2019 at Unknown time   • ferrous sulfate 325 (65 FE) MG tablet Take 325 mg by mouth Daily With Breakfast.   6/18/2019 at Unknown time   • furosemide (LASIX) 40 MG tablet Take 40 mg by mouth Daily.   6/18/2019 at Unknown time   • glucose blood test strip 1 each by Other route 3 (three) times a day. Use as instructed   6/18/2019 at Unknown time   • Insulin Glargine (BASAGLAR KWIKPEN) 100 UNIT/ML injection pen Inject 30 Units under the skin into the appropriate area as directed Daily. 1 pen 11 6/17/2019 at Unknown time   • Insulin Pen Needle (BD PEN NEEDLE DEREK U/F) 32G X 4 MM misc 1 each 4 (Four) Times a Day. 120 each 5 6/17/2019 at Unknown time   • Prenatal Vit-Fe Fumarate-FA (PRENATAL VITAMIN) 27-0.8 MG tablet Take 1 tablet by mouth daily.   6/18/2019 at Unknown time   • raNITIdine (ZANTAC) 150 MG tablet Take 1 tablet by mouth Every Night. 180 tablet 1 6/18/2019 at Unknown time   • rOPINIRole (REQUIP) 0.25 MG tablet Take 1 tablet by mouth Every Night. Take 1 hour before bedtime. 90 tablet 3 6/17/2019 at Unknown time   • traZODone (DESYREL) 150 MG tablet Take 2 tablets by mouth Every Night. 180 tablet 3 6/17/2019 at Unknown time   • umeclidinium-vilanterol (ANORO ELLIPTA) 62.5-25 MCG/INH aerosol powder  inhaler Inhale 1 puff Daily. 1 each 6 6/17/2019 at Unknown time   • warfarin (COUMADIN) 5 MG tablet Take 1 tablet nightly except on Tuesday and Friday take 1/2 tablet OR as directed 90 tablet 1 6/17/2019 at Unknown time   • acetaminophen (TYLENOL) 325 MG tablet Take 650 mg by mouth every 6  (six) hours as needed for mild pain (1-3).   Unknown at Unknown time     Allergies:  Crestor [rosuvastatin calcium]; Lipitor [atorvastatin]; Lortab [hydrocodone-acetaminophen]; Adhesive tape; and Hydrocodone-acetaminophen     CURRENT MEDICATIONS/OBJECTIVE/VS/PE:     Current Medications:     Current Facility-Administered Medications   Medication Dose Route Frequency Provider Last Rate Last Dose   • acetaminophen (TYLENOL) tablet 650 mg  650 mg Oral Q6H PRN Colin Coreas MD   650 mg at 06/29/19 0151   • cholecalciferol (VITAMIN D3) tablet 1,000 Units  1,000 Units Oral Daily Sandy Caputo MD   1,000 Units at 07/02/19 1046   • citalopram (CeleXA) tablet 20 mg  20 mg Oral Daily Colin Coreas MD   20 mg at 07/02/19 1047   • dextrose (D50W) 25 g/ 50mL Intravenous Solution 25 g  25 g Intravenous Q15 Min PRN Dudley Alfredo MD       • dextrose (GLUTOSE) oral gel 15 g  15 g Oral Q15 Min PRN Dudley Alfredo MD       • dextrose 5 % and sodium chloride 0.45 % infusion  30 mL/hr Intravenous Continuous PRN Alfredo Guerrero MD       • diltiaZEM CD (CARDIZEM CD) 24 hr capsule 240 mg  240 mg Oral Daily Colin Coreas MD   240 mg at 07/02/19 1047   • ferric gluconate (FERRLECIT) 125 mg in sodium chloride 0.9 % 110 mL IVPB  125 mg Intravenous Daily Rocio Mack  mL/hr at 07/02/19 1045 125 mg at 07/02/19 1045   • furosemide (LASIX) tablet 40 mg  40 mg Oral BID Rocio Mack MD   40 mg at 07/02/19 1046   • glucagon (human recombinant) (GLUCAGEN DIAGNOSTIC) injection 1 mg  1 mg Subcutaneous PRN Dudley Alfredo MD       • guaiFENesin (MUCINEX) 12 hr tablet 600 mg  600 mg Oral Q12H Sandy Caputo MD   600 mg at 07/02/19 1046   • insulin aspart (novoLOG) injection 0-9 Units  0-9 Units Subcutaneous 4x Daily AC & at Bedtime Dudley Alfredo MD   2 Units at 07/01/19 2020   • ipratropium-albuterol (DUO-NEB) nebulizer solution 3 mL  3 mL Nebulization Q8H - RT Colin Coreas MD   3 mL at 07/02/19 0723   •  ipratropium-albuterol (DUO-NEB) nebulizer solution 3 mL  3 mL Nebulization Q4H PRN Hermann Richardson MD       • magic butt ointment   Topical BID Colin Coreas MD       • metOLazone (ZAROXOLYN) tablet 2.5 mg  2.5 mg Oral Daily Sandy Caputo MD   2.5 mg at 07/02/19 1046   • metoprolol tartrate (LOPRESSOR) injection 2.5 mg  2.5 mg Intravenous Q6H PRN Kasi Reaves MD       • nystatin (MYCOSTATIN) powder   Topical Q12H Colin Coreas MD       • ondansetron (ZOFRAN) injection 4 mg  4 mg Intravenous Q6H PRN Dudley Alfredo MD       • Pharmacy to dose warfarin   Does not apply Continuous PRN Colin Coreas MD       • prenatal vitamin 27-0.8 tablet 1 tablet  1 tablet Oral Daily Sandy Caputo MD   1 tablet at 07/02/19 1046   • rOPINIRole (REQUIP) tablet 0.25 mg  0.25 mg Oral Nightly Colin Coreas MD   0.25 mg at 07/01/19 2020   • sodium chloride 0.9 % flush 10 mL  10 mL Intravenous PRN Lobo Amezquita PA-C   10 mL at 06/24/19 0829   • sodium chloride 0.9 % flush 3 mL  3 mL Intravenous Q12H Dudley Alfredo MD   3 mL at 07/02/19 1048   • sodium chloride 0.9 % flush 3-10 mL  3-10 mL Intravenous PRN Dudley Alfredo MD   10 mL at 06/27/19 1534   • traZODone (DESYREL) tablet 300 mg  300 mg Oral Nightly Colin Coreas MD   300 mg at 07/01/19 2020   • vitamin C (ASCORBIC ACID) tablet 500 mg  500 mg Oral Daily Sandy Caputo MD   500 mg at 07/02/19 1046   • warfarin (COUMADIN) tablet 2.5 mg  2.5 mg Oral Every Other Day Alfredo Guerrero MD   2.5 mg at 06/30/19 1712   • warfarin (COUMADIN) tablet 5 mg  5 mg Oral Every Other Day Alfredo Guerrero MD   5 mg at 07/01/19 1732       Objective     Review of Systems:   Review of Systems    Physical Exam:   Temp:  [97.4 °F (36.3 °C)-99.3 °F (37.4 °C)] 97.8 °F (36.6 °C)  Heart Rate:  [] 107  Resp:  [18-28] 18  BP: (110-153)/(59-81) 143/67     Physical Exam:  General Appearance:    Alert, cooperative, in no acute distress   Head:    Normocephalic, without  obvious abnormality, atraumatic   Eyes:            Lids and lashes normal, conjunctivae and sclerae normal, no   icterus, no pallor, corneas clear, PERRLA   Ears:    Ears appear intact with no abnormalities noted   Throat:   No oral lesions, no thrush, oral mucosa moist   Neck:   No adenopathy, supple, trachea midline, no thyromegaly, no     carotid bruit, no JVD   Back:     No kyphosis present, no scoliosis present, no skin lesions,       erythema or scars, no tenderness to percussion or                   palpation,   range of motion normal   Lungs:     Clear to auscultation,respirations regular, even and                   unlabored    Heart:    Regular rhythm and normal rate, normal S1 and S2, no            murmur, no gallop, no rub, no click   Breast Exam:    Deferred   Abdomen:     Normal bowel sounds, no masses, no organomegaly, soft        non-tender, non-distended, no guarding, no rebound                 tenderness   Genitalia:    Deferred   Extremities:   Moves all extremities well, no edema, no cyanosis, no              redness   Pulses:   Pulses palpable and equal bilaterally   Skin:   No bleeding, bruising or rash   Lymph nodes:   No palpable adenopathy   Neurologic:   Cranial nerves 2 - 12 grossly intact, sensation intact, DTR        present and equal bilaterally      Results Review:     Lab Results (last 24 hours)     Procedure Component Value Units Date/Time    POC Glucose Once [[id number]]  (Abnormal) Collected:  07/02/19 1038    Specimen:  Blood Updated:  07/02/19 1101     Glucose 133 mg/dL      Comment: RN NotifiedOperator: [id number] GERALD [employer] ID: [id number]       POC Glucose Once [[id number]]  (Normal) Collected:  07/02/19 0746    Specimen:  Blood Updated:  07/02/19 0803     Glucose 118 mg/dL      Comment: RN NotifiedOperator: [id number] [employer] ID: [id number]       BNP [[id number]]  (Abnormal) Collected:  07/02/19 0629    Specimen:  Blood Updated:  07/02/19 0758     proBNP  10,619.0 pg/mL     Narrative:       Among patients with dyspnea, NT-proBNP is highly sensitive for the detection of acute congestive heart failure. In addition NT-proBNP of <300 pg/ml effectively rules out acute congestive heart failure with 99% negative predictive value.    Basic Metabolic Panel [[id number]]  (Abnormal) Collected:  07/02/19 0629    Specimen:  Blood Updated:  07/02/19 0714     Glucose 118 mg/dL      BUN 60 mg/dL      Creatinine 2.17 mg/dL      Sodium 141 mmol/L      Potassium 4.3 mmol/L      Chloride 104 mmol/L      CO2 27.0 mmol/L      Calcium 8.9 mg/dL      eGFR Non African Amer 22 mL/min/1.73      BUN/Creatinine Ratio 27.6     Anion Gap 10.0 mmol/L     Narrative:       GFR Normal >60  Chronic Kidney Disease <60  Kidney Failure <15    Protime-INR [[id number]]  (Abnormal) Collected:  07/02/19 0629    Specimen:  Blood Updated:  07/02/19 0705     Protime 26.0 Seconds      INR 2.41    Narrative:       Therapeutic range for most indications is 2.0-3.0 INR,  or 2.5-3.5 for mechanical heart valves.    CBC (No Diff) [[id number]]  (Abnormal) Collected:  07/02/19 0629    Specimen:  Blood Updated:  07/02/19 0651     WBC 8.27 10*3/mm3      RBC 2.83 10*6/mm3      Hemoglobin 8.8 g/dL      Hematocrit 27.0 %      MCV 95.4 fL      MCH 31.1 pg      MCHC 32.6 g/dL      RDW 20.1 %      RDW-SD 67.6 fl      MPV 12.7 fL      Platelets 212 10*3/mm3     POC Glucose Once [[id number]]  (Abnormal) Collected:  07/01/19 1923    Specimen:  Blood Updated:  07/01/19 1957     Glucose 168 mg/dL      Comment: RN NotifiedOperator: [id number] COLIN URBANOAHMeter ID: [id number]       POC Glucose Once [[id number]]  (Normal) Collected:  07/01/19 1702    Specimen:  Blood Updated:  07/01/19 1715     Glucose 125 mg/dL      Comment: RN NotifiedOperator: [id number] CAMI DYSONFERMeter ID: [id number]       Extra Tubes [[id number]] Collected:  07/01/19 1522    Specimen:  Blood, Venous Line Updated:  07/01/19 7650    Narrative:       The  following orders were created for panel order Extra Tubes.  Procedure                               Abnormality         Status                     ---------                               -----------         ------                     Lavender Top[[id number]]                                     Final result               Gold Top - SST[[id number]]                                   Final result               Green Top (Gel)[[id number]]                                  Final result                 Please view results for these tests on the individual orders.    Lavender Top [[id number]] Collected:  07/01/19 1522    Specimen:  Blood Updated:  07/01/19 1630     Extra Tube hold for add-on     Comment: Auto resulted       Gold Top - SST [[id number]] Collected:  07/01/19 1522    Specimen:  Blood Updated:  07/01/19 1630     Extra Tube Hold for add-ons.     Comment: Auto resulted.       Green Top (Gel) [[id number]] Collected:  07/01/19 1522    Specimen:  Blood Updated:  07/01/19 1630     Extra Tube Hold for add-ons.     Comment: Auto resulted.              I reviewed the patient's new clinical results.  I reviewed the patient's new imaging results and agree with the interpretation.     ASSESSMENT/PLAN:   ASSESSMENT: Patient with a cancer in the sigmoid colon.  Surgery at this time is not able to operate because of patient's deconditioning.  Patient is working on this.  Will need patient to follow-up as an outpatient in GI at that time will possibly need to scope if patient cannot undergo surgery for sigmoid colon cancer    PLAN: #1 patient can be discharged from a GI standpoint.  #2 patient to follow-up in GI as an outpatient to evaluate for possible sigmoidoscopy.  #3 we will sign off of this patient at this time.  Please reconsult GI if further evaluation is needed  The risks, benefits, and alternatives of this procedure have been discussed with the patient or the responsible party- the patient understands and agrees to  proceed.         Alfredo Guerrero MD  07/02/19  3:14 PM           This document has been electronically signed by Alfredo Guerrero MD on July 2, 2019 3:14 PM

## 2019-07-02 NOTE — PLAN OF CARE
Problem: Patient Care Overview  Goal: Plan of Care Review  Outcome: Ongoing (interventions implemented as appropriate)   07/02/19 0010 07/02/19 2473   Coping/Psychosocial   Plan of Care Reviewed With patient --    Plan of Care Review   Progress no change --    OTHER   Outcome Summary --  pt sit>sup with independence, sit<>stand with SBA, pt ambulated 40` without AD with SBA

## 2019-07-02 NOTE — PLAN OF CARE
Problem: Patient Care Overview  Goal: Plan of Care Review  Outcome: Ongoing (interventions implemented as appropriate)   07/02/19 0010   Coping/Psychosocial   Plan of Care Reviewed With patient   Plan of Care Review   Progress no change       Problem: Fall Risk (Adult)  Goal: Absence of Fall  Outcome: Ongoing (interventions implemented as appropriate)   07/02/19 0010   Fall Risk (Adult)   Absence of Fall achieves outcome       Problem: Chronic Obstructive Pulmonary Disease (Adult)  Goal: Signs and Symptoms of Listed Potential Problems Will be Absent, Minimized or Managed (Chronic Obstructive Pulmonary Disease)  Outcome: Ongoing (interventions implemented as appropriate)   07/02/19 0010   Goal/Outcome Evaluation   Problems Assessed (Chronic Obstructive Pulmonary Disease (COPD)) all   Problems Present (COPD, Bronch/Emphy) respiratory compromise;situational response       Problem: Cardiac: Heart Failure (Adult)  Goal: Signs and Symptoms of Listed Potential Problems Will be Absent, Minimized or Managed (Cardiac: Heart Failure)  Outcome: Ongoing (interventions implemented as appropriate)   07/02/19 0010   Goal/Outcome Evaluation   Problems Assessed (Heart Failure) all   Problems Present (Heart Failure) respiratory compromise;situational response;functional decline/self-care deficit       Problem: Skin Injury Risk (Adult)  Goal: Skin Health and Integrity  Outcome: Ongoing (interventions implemented as appropriate)   07/02/19 0010   Skin Injury Risk (Adult)   Skin Health and Integrity making progress toward outcome

## 2019-07-02 NOTE — PROGRESS NOTES
Cardiology Progress Note     LOS: 11 days   Patient Care Team:  Josefa Pozo APRN as PCP - General (Nurse Practitioner)  Meme Duckworth APRN as PCP - Claims Attributed  Aby Stout, RN as Care Coordinator (Population Health)    Subjective:    Chart reviewed. Patient seen and examined. Patient appears to be comfortable in bed.  Patient complains of having symptoms of shortness of breath.  Patient denies any symptoms of chest pain.  Hemoglobin is 8.1 with a BUN of 59 and a creatinine of 2.02.    Objective:  Temp:  [96.6 °F (35.9 °C)-99.3 °F (37.4 °C)] 99.3 °F (37.4 °C)  Heart Rate:  [] 83  Resp:  [18-28] 28  BP: ()/(44-81) 153/65    Intake/Output Summary (Last 24 hours) at 7/1/2019 2138  Last data filed at 7/1/2019 1700  Gross per 24 hour   Intake 580 ml   Output --   Net 580 ml       Physical Exam:   General Appearance:    Alert, oriented, cooperative, in no acute distress.   Head:    Normocephalic, atraumatic, without obvious abnormality   Eyes:             RENE. Lids and lashes normal, conjunctivae and sclerae normal, no icterus, no pallor.   Ears:    Ears appear intact with no abnormalities noted.   Throat:   Mucous membranes pink and moist.   Neck:  Supple, trachea midline, no carotid bruit, no organomegaly or JVD.   Lungs:    Clear to auscultation and percussion.  Respirations regular, even and unlabored. No wheezes, rales, or rhonchi.    Heart:   Regular S1 with a mechanical click with a soft systolic murmur best heard at the base   Abdomen:    Soft, non-tender, non-distended, no guarding, no rebound tenderness. Normal bowel sounds in all four quadrants, no masses, liver and spleen nonpalpable.    Genitalia:    Deferred.   Extremities:   Moves all extremities well, no edema, no cyanosis, no       redness, no clubbing.   Pulses:   Pulses palpable and equal bilaterally.   Skin:   Moist and warm. No bleeding, bruising or rash.   Neurologic/Psychiatric:   Alert and oriented to person,  place, and time.  Motor, power and tone in upper and lower extremities are grossly intact.  No focal neurological deficits. Normal cognitive function. No psychomotor reaction or tangential thought. No depression, homicidal ideations and suicidal ideations.          Results Review:    Results from last 7 days   Lab Units 07/01/19  0551   SODIUM mmol/L 145   POTASSIUM mmol/L 4.2   CHLORIDE mmol/L 105   CO2 mmol/L 29.0   BUN mg/dL 59*   CREATININE mg/dL 2.02*   CALCIUM mg/dL 9.0   GLUCOSE mg/dL 121*             Results from last 7 days   Lab Units 07/01/19  0551   WBC 10*3/mm3 8.43   HEMOGLOBIN g/dL 7.9*   HEMATOCRIT % 25.3*   PLATELETS 10*3/mm3 216     Results from last 7 days   Lab Units 07/01/19  0551 06/30/19  0804 06/29/19  0704   INR  1.92* 1.74* 1.49*                       ECHO:  Results for orders placed during the hospital encounter of 06/18/19   Adult Transthoracic Echo Complete W/ Cont if Necessary Per Protocol    Narrative · Left atrial cavity size is moderately dilated.  · The left ventricular cavity is mildly dilated.  · Left ventricular wall thickness is consistent with borderline concentric   hypertrophy.  · Mild mitral valve regurgitation is present  · Mild to moderate tricuspid valve regurgitation is present.          ECG 12 Lead   Preliminary Result   Test Reason : afib   Blood Pressure : **/** mmHG   Vent. Rate : 067 BPM     Atrial Rate : 067 BPM      P-R Int : 232 ms          QRS Dur : 088 ms       QT Int : 456 ms       P-R-T Axes : 072 -08 044 degrees      QTc Int : 481 ms      Sinus rhythm with 1st degree AV block   Low voltage QRS   Cannot rule out Anteroseptal infarct , age undetermined   Abnormal ECG   When compared with ECG of 01-OCT-2018 10:27,   Sinus rhythm has replaced Electronic ventricular pacemaker      Referred By:             Confirmed By:            Medication Review:   Current Facility-Administered Medications   Medication Dose Route Frequency Provider Last Rate Last Dose   •  acetaminophen (TYLENOL) tablet 650 mg  650 mg Oral Q6H PRN Colin Coreas MD   650 mg at 06/29/19 0151   • cholecalciferol (VITAMIN D3) tablet 1,000 Units  1,000 Units Oral Daily Sandy Caputo MD   1,000 Units at 07/01/19 1146   • citalopram (CeleXA) tablet 20 mg  20 mg Oral Daily Colin Coreas MD   20 mg at 07/01/19 0851   • dextrose (D50W) 25 g/ 50mL Intravenous Solution 25 g  25 g Intravenous Q15 Min PRN Dudley Alfredo MD       • dextrose (GLUTOSE) oral gel 15 g  15 g Oral Q15 Min PRN Dudley Alfredo MD       • dextrose 5 % and sodium chloride 0.45 % infusion  30 mL/hr Intravenous Continuous PRN Alfredo Guerrero MD       • diltiaZEM CD (CARDIZEM CD) 24 hr capsule 240 mg  240 mg Oral Daily Colin Coreas MD   240 mg at 07/01/19 0851   • ferric gluconate (FERRLECIT) 125 mg in sodium chloride 0.9 % 110 mL IVPB  125 mg Intravenous Daily Rocio Mack  mL/hr at 07/01/19 1146 125 mg at 07/01/19 1146   • furosemide (LASIX) injection 10 mg  10 mg Intravenous Once Kasi Reaves MD       • furosemide (LASIX) tablet 40 mg  40 mg Oral BID Rocio Mack MD   40 mg at 07/01/19 0851   • glucagon (human recombinant) (GLUCAGEN DIAGNOSTIC) injection 1 mg  1 mg Subcutaneous PRN Dudley Alfredo MD       • guaiFENesin (MUCINEX) 12 hr tablet 600 mg  600 mg Oral Q12H Sandy Caputo MD   600 mg at 07/01/19 2020   • insulin aspart (novoLOG) injection 0-9 Units  0-9 Units Subcutaneous 4x Daily AC & at Bedtime Dudley Alfredo MD   2 Units at 07/01/19 2020   • ipratropium-albuterol (DUO-NEB) nebulizer solution 3 mL  3 mL Nebulization Q8H - RT Colin Coreas MD   3 mL at 07/01/19 1507   • ipratropium-albuterol (DUO-NEB) nebulizer solution 3 mL  3 mL Nebulization Q4H PRN Hermann Richardson MD       • magic butt ointment   Topical BID Colin Coreas MD       • metoprolol tartrate (LOPRESSOR) injection 2.5 mg  2.5 mg Intravenous Q6H PRN Kasi Reaves MD       • nystatin (MYCOSTATIN) powder   Topical  Q12H Colin Coreas MD       • ondansetron (ZOFRAN) injection 4 mg  4 mg Intravenous Q6H PRN Dudley Alfredo MD       • Pharmacy to dose warfarin   Does not apply Continuous PRN Colin Coreas MD       • prenatal vitamin 27-0.8 tablet 1 tablet  1 tablet Oral Daily Sandy Caputo MD   1 tablet at 07/01/19 1146   • rOPINIRole (REQUIP) tablet 0.25 mg  0.25 mg Oral Nightly Colin Coreas MD   0.25 mg at 07/01/19 2020   • sodium chloride 0.9 % flush 10 mL  10 mL Intravenous PRN Lobo Amezquita PA-C   10 mL at 06/24/19 0829   • sodium chloride 0.9 % flush 3 mL  3 mL Intravenous Q12H Dudley Alfredo MD   3 mL at 07/01/19 2021   • sodium chloride 0.9 % flush 3-10 mL  3-10 mL Intravenous PRN Dudley Alfredo MD   10 mL at 06/27/19 1534   • traZODone (DESYREL) tablet 300 mg  300 mg Oral Nightly Colin Coreas MD   300 mg at 07/01/19 2020   • vitamin C (ASCORBIC ACID) tablet 500 mg  500 mg Oral Daily Sandy Caputo MD   500 mg at 07/01/19 1146   • warfarin (COUMADIN) tablet 2.5 mg  2.5 mg Oral Every Other Day Alfredo Guerrero MD   2.5 mg at 06/30/19 1712   • warfarin (COUMADIN) tablet 5 mg  5 mg Oral Every Other Day Alfredo Guerrero MD   5 mg at 07/01/19 1731       Assessment and Plan:      Gastrointestinal hemorrhage    Emphysema of lung (CMS/HCC)    Diabetes mellitus (CMS/HCC)    CKD (chronic kidney disease) stage 3, GFR 30-59 ml/min (CMS/HCC)    Morbid obesity (CMS/HCC)    Gastritis    Colon polyp    Coumadin toxicity  1.  Shortness of breath.  Patient last echocardiogram had revealed an ejection fraction of 45 to 50% with prostatic aortic valve.  Patient complains of having symptoms of shortness of breath with a hemoglobin of 8.2.  .  Patient is currently euvolemic and would decrease the Lasix to 40 mg daily with rising creatinine and BUN.    2.  Sick sinus syndrome.  Status post [employer] permanent pacemaker implantation.  Patient pacemaker interrogation and reveal appropriate sensing and pacing  function.  Patient has not complained of having any symptoms of lightheaded dizziness or near syncope.    3.  Chronic anticoagulation with Coumadin status post [facility] [employer] mechanical aortic valve prosthesis done in December 2004 in [facility].  Patient was found to have sigmoid carcinoma and was evaluated by Dr. Gonzalez.  Patient has been started back on anticoagulation.    4.  Renal insufficiency.  Patient has an elevated BUN and creatinine and would decrease the Lasix to 40 mg daily .    5.  Sigmoid carcinoma involving a polyp.  Patient would need surgical resection of the sigmoid colon along with associated lymph node.  Patient has complained of having symptoms of shortness of breath and currently not stable to undergo surgical intervention.    6.  Atherosclerotic coronary artery disease.  Patient had undergone single-vessel coronary artery bypass grafting.  Patient had not complained of having any symptoms of chest pain.    7.  Anemia.  Patient's hemoglobin is 7.9.  Patient is on anticoagulation with Coumadin secondary to the prostatic aortic valve.    8.  Anticoagulation with Coumadin.  Patient PT/INR is 1.92.  Patient does have a mechanical valve.    The above plan of management were discussed with the patient            Bereket Gates MD  07/01/19  9:38 PM      Time: Spent on face-to-face interaction 20 minutes      Dictated utilizing Dragon dictation.

## 2019-07-02 NOTE — PROGRESS NOTES
MEDICINE DAILY PROGRESS NOTE  NAME: Brendon Skelton  : 1945  MRN: [id number]     LOS: 12 days     PROVIDER OF SERVICE: Kasi Reaves MD    Chief Complaint: Gastrointestinal hemorrhage    Subjective:   HPI: 73 year old white female with history of SSS s/p PPM, aortic valve replacement on coumadin, PAF, COPD, and chronic hypoxic respiratory failure presented with LGIB. Patient has been evaluated by GI. She has had EGD and c-scope x2. Repeat scope today with medium polyp noted.    Interval History:  History taken from: patient chart RN  . Patient stable. Still with ALVAREZ. LUE improving.  . Patient feeling some better, but still with desaturations with ambulation. LUE edematous.  . Patient resting comfortably. Still with intermittent spells of SOA. Otherwise overall feeling about the same.  . Patient on bedside commode at start of exam. Breathing stable per patient. Had one episode of dyspnea overnight.   . Patient states that her breathing is improving today. No acute events overnight.  . Weaning oxygen. No issues at rest. Still with ALVAREZ.   . Breathing some better today. Again no blood noted per rectum. No acute events overnight.  . Patient resting comfortably. Feeling some better today. No blood noted in stools overnight per patient.  . Patient back from c-scope. Hungry. Desires to eat.    Review of Systems:   Review of Systems   Constitutional: Positive for activity change and fatigue. Negative for appetite change and fever.   Respiratory: Positive for shortness of breath (ALVAREZ). Negative for cough.    Cardiovascular: Negative for chest pain and palpitations.   Gastrointestinal: Negative for abdominal pain, blood in stool, nausea and vomiting.   Genitourinary: Negative for difficulty urinating and dysuria.   Musculoskeletal: Negative for arthralgias and gait problem.   Skin: Positive for pallor. Negative for color change and rash.   Neurological: Positive for weakness.  Negative for dizziness and headaches.   Psychiatric/Behavioral: Negative for agitation, confusion and sleep disturbance.       Objective:     Vital Signs  Vitals:    07/02/19 0401 07/02/19 0500 07/02/19 0723 07/02/19 0758   BP: 110/75      BP Location: Left leg      Patient Position: Sitting      Pulse: 86  88 89   Resp: 20  20    Temp: 97.9 °F (36.6 °C)      TempSrc: Oral      SpO2: 92%  94%    Weight:  119 kg (263 lb 1.6 oz)     Height:           Physical Exam  Physical Exam   Constitutional: She is oriented to person, place, and time. She appears well-developed and well-nourished. No distress.   Obese.   HENT:   Head: Normocephalic and atraumatic.   Right Ear: External ear normal.   Left Ear: External ear normal.   Nose: Nose normal.   Eyes: Conjunctivae and EOM are normal. Pupils are equal, round, and reactive to light.   Neck: Neck supple. No thyromegaly present.   Cardiovascular: Normal rate, regular rhythm and normal heart sounds.   Pulmonary/Chest: Effort normal. No respiratory distress. She has no wheezes. She has no rales. She exhibits no tenderness.   Nasal cannula in place. Diminished bibasilar.   Abdominal: Soft. Bowel sounds are normal. She exhibits no distension and no mass. There is no tenderness. There is no rebound and no guarding.   Musculoskeletal: She exhibits no edema.   Neurological: She is alert and oriented to person, place, and time.   Skin: Skin is warm and dry. No rash noted. She is not diaphoretic. No erythema. There is pallor.   Psychiatric: She has a normal mood and affect. Her behavior is normal.   Nursing note and vitals reviewed.      Medication Review    Current Facility-Administered Medications:   •  acetaminophen (TYLENOL) tablet 650 mg, 650 mg, Oral, Q6H PRN, Colin Coreas MD, 650 mg at 06/29/19 0151  •  cholecalciferol (VITAMIN D3) tablet 1,000 Units, 1,000 Units, Oral, Daily, Sandy Caputo MD, 1,000 Units at 07/01/19 1146  •  citalopram (CeleXA) tablet 20 mg, 20 mg, Oral,  Daily, Colin Coreas MD, 20 mg at 07/01/19 0851  •  dextrose (D50W) 25 g/ 50mL Intravenous Solution 25 g, 25 g, Intravenous, Q15 Min PRN, Dudley Alfredo MD  •  dextrose (GLUTOSE) oral gel 15 g, 15 g, Oral, Q15 Min PRN, Dudley Alfredo MD  •  dextrose 5 % and sodium chloride 0.45 % infusion, 30 mL/hr, Intravenous, Continuous PRN, Alfredo Guerrero MD  •  diltiaZEM CD (CARDIZEM CD) 24 hr capsule 240 mg, 240 mg, Oral, Daily, Colin Coreas MD, 240 mg at 07/01/19 0851  •  ferric gluconate (FERRLECIT) 125 mg in sodium chloride 0.9 % 110 mL IVPB, 125 mg, Intravenous, Daily, Rocio Mack MD, Last Rate: 110 mL/hr at 07/01/19 1146, 125 mg at 07/01/19 1146  •  furosemide (LASIX) tablet 40 mg, 40 mg, Oral, BID, Rocio Mack MD, 40 mg at 07/01/19 0851  •  glucagon (human recombinant) (GLUCAGEN DIAGNOSTIC) injection 1 mg, 1 mg, Subcutaneous, PRN, Dudley Alfredo MD  •  guaiFENesin (MUCINEX) 12 hr tablet 600 mg, 600 mg, Oral, Q12H, Sandy Caputo MD, 600 mg at 07/01/19 2020  •  insulin aspart (novoLOG) injection 0-9 Units, 0-9 Units, Subcutaneous, 4x Daily AC & at Bedtime, Dudley Alfredo MD, 2 Units at 07/01/19 2020  •  ipratropium-albuterol (DUO-NEB) nebulizer solution 3 mL, 3 mL, Nebulization, Q8H - RT, Colin Coreas MD, 3 mL at 07/02/19 0723  •  ipratropium-albuterol (DUO-NEB) nebulizer solution 3 mL, 3 mL, Nebulization, Q4H PRN, Hermann Richardson MD  •  magic butt ointment, , Topical, BID, Colin Coreas MD  •  metoprolol tartrate (LOPRESSOR) injection 2.5 mg, 2.5 mg, Intravenous, Q6H PRN, Kasi Reaves MD  •  nystatin (MYCOSTATIN) powder, , Topical, Q12H, Colin Coreas MD  •  ondansetron (ZOFRAN) injection 4 mg, 4 mg, Intravenous, Q6H PRN, Dudley Alfredo MD  •  Pharmacy to dose warfarin, , Does not apply, Continuous PRN, Colin Coreas MD  •  prenatal vitamin 27-0.8 tablet 1 tablet, 1 tablet, Oral, Daily, Sandy Caputo MD, 1 tablet at 07/01/19 1146  •  rOPINIRole (REQUIP) tablet  0.25 mg, 0.25 mg, Oral, Nightly, Colin Coreas MD, 0.25 mg at 07/01/19 2020  •  sodium chloride 0.9 % flush 10 mL, 10 mL, Intravenous, PRN, Lobo Amezquita PA-C, 10 mL at 06/24/19 0829  •  sodium chloride 0.9 % flush 3 mL, 3 mL, Intravenous, Q12H, Dudley Alfredo MD, 3 mL at 07/01/19 2021  •  sodium chloride 0.9 % flush 3-10 mL, 3-10 mL, Intravenous, PRN, Dudley Alfredo MD, 10 mL at 06/27/19 1534  •  traZODone (DESYREL) tablet 300 mg, 300 mg, Oral, Nightly, Colin Coreas MD, 300 mg at 07/01/19 2020  •  vitamin C (ASCORBIC ACID) tablet 500 mg, 500 mg, Oral, Daily, Sandy Caputo MD, 500 mg at 07/01/19 1146  •  warfarin (COUMADIN) tablet 2.5 mg, 2.5 mg, Oral, Every Other Day, Alfredo Guerrero MD, 2.5 mg at 06/30/19 1712  •  warfarin (COUMADIN) tablet 5 mg, 5 mg, Oral, Every Other Day, Alfredo Guerrero MD, 5 mg at 07/01/19 1731     Diagnostic Data    Lab Results (last 24 hours)     Procedure Component Value Units Date/Time    BNP [[id number]]  (Abnormal) Collected:  07/02/19 0629    Specimen:  Blood Updated:  07/02/19 0758     proBNP 10,619.0 pg/mL     Narrative:       Among patients with dyspnea, NT-proBNP is highly sensitive for the detection of acute congestive heart failure. In addition NT-proBNP of <300 pg/ml effectively rules out acute congestive heart failure with 99% negative predictive value.    Basic Metabolic Panel [[id number]]  (Abnormal) Collected:  07/02/19 0629    Specimen:  Blood Updated:  07/02/19 0714     Glucose 118 mg/dL      BUN 60 mg/dL      Creatinine 2.17 mg/dL      Sodium 141 mmol/L      Potassium 4.3 mmol/L      Chloride 104 mmol/L      CO2 27.0 mmol/L      Calcium 8.9 mg/dL      eGFR Non African Amer 22 mL/min/1.73      BUN/Creatinine Ratio 27.6     Anion Gap 10.0 mmol/L     Narrative:       GFR Normal >60  Chronic Kidney Disease <60  Kidney Failure <15    Protime-INR [[id number]]  (Abnormal) Collected:  07/02/19 0629    Specimen:  Blood Updated:  07/02/19 0705     Protime  26.0 Seconds      INR 2.41    Narrative:       Therapeutic range for most indications is 2.0-3.0 INR,  or 2.5-3.5 for mechanical heart valves.    CBC (No Diff) [[id number]]  (Abnormal) Collected:  07/02/19 0629    Specimen:  Blood Updated:  07/02/19 0651     WBC 8.27 10*3/mm3      RBC 2.83 10*6/mm3      Hemoglobin 8.8 g/dL      Hematocrit 27.0 %      MCV 95.4 fL      MCH 31.1 pg      MCHC 32.6 g/dL      RDW 20.1 %      RDW-SD 67.6 fl      MPV 12.7 fL      Platelets 212 10*3/mm3     POC Glucose Once [[id number]]  (Abnormal) Collected:  07/01/19 1923    Specimen:  Blood Updated:  07/01/19 1957     Glucose 168 mg/dL      Comment: RN NotifiedOperator: [id number] COLIN SARAHMeter ID: [id number]       POC Glucose Once [[id number]]  (Normal) Collected:  07/01/19 1702    Specimen:  Blood Updated:  07/01/19 1715     Glucose 125 mg/dL      Comment: RN NotifiedOperator: [id number] CAMI DYSONFERMeter ID: [id number]       Extra Tubes [[id number]] Collected:  07/01/19 1522    Specimen:  Blood, Venous Line Updated:  07/01/19 1630    Narrative:       The following orders were created for panel order Extra Tubes.  Procedure                               Abnormality         Status                     ---------                               -----------         ------                     Lavender Top[[id number]]                                     Final result               Gold Top - SST[[id number]]                                   Final result               Green Top (Gel)[[id number]]                                  Final result                 Please view results for these tests on the individual orders.    Lavender Top [[id number]] Collected:  07/01/19 1522    Specimen:  Blood Updated:  07/01/19 1630     Extra Tube hold for add-on     Comment: Auto resulted       Gold Top - SST [[id number]] Collected:  07/01/19 1522    Specimen:  Blood Updated:  07/01/19 1630     Extra Tube Hold for add-ons.     Comment: Auto resulted.        Green Top (Gel) [[id number]] Collected:  07/01/19 1522    Specimen:  Blood Updated:  07/01/19 1630     Extra Tube Hold for add-ons.     Comment: Auto resulted.       POC Glucose Once [[id number]]  (Abnormal) Collected:  07/01/19 1032    Specimen:  Blood Updated:  07/01/19 1106     Glucose 131 mg/dL      Comment: RN NotifiedOperator: [id number] [employer] HiveSHAMeter ID: [id number]       POC Glucose Once [[id number]]  (Normal) Collected:  07/01/19 0757    Specimen:  Blood Updated:  07/01/19 0811     Glucose 118 mg/dL      Comment: RN NotifiedOperator: [id number] McGinley InnovationsAMeter ID: [id number]             I reviewed the patient's new clinical results.    Assessment/Plan:     Active Hospital Problems    Diagnosis POA   • **Gastrointestinal hemorrhage [K92.2] Yes   • Gastritis [K29.70] Yes   • Colon polyp [K63.5] Yes   • Coumadin toxicity [T45.511A] Yes   • Morbid obesity (CMS/HCC) [E66.01] Yes   • CKD (chronic kidney disease) stage 3, GFR 30-59 ml/min (CMS/HCC) [N18.3] Yes   • Diabetes mellitus (CMS/HCC) [E11.9] Yes   • Emphysema of lung (CMS/HCC) [J43.9] Yes       #. LGIB.   7/2. S/p 1 unit pRBC. Still with ALVAREZ, but patient feels symptomatically improved.  7/1. H/h stable.  No bleeding noted. Plan for 1 unit pRBC with continued symptoms. 10 mg IV lasix post 1 unit.  6/30. IV iron per nephrology. H/h stable.  6/27 - 6/29. H/h stable.  6/26. Surgery following. H/h stable.   6/25. General surgery to see. Per GI adenocarcinoma at 30 cm on colonoscopy.   6/24. Repeat colonoscopy today. Medium polyp removed today and clip placed. Per GI no coumadin for 3 days. Will discuss with cardiology.  Results from last 7 days   Lab Units 07/02/19  0629 07/01/19  0551 06/30/19  0804 06/29/19  0704 06/28/19  0535 06/27/19  0606 06/26/19  0628 06/25/19  0919   HEMOGLOBIN g/dL 8.8* 7.9* 8.1* 8.2* 8.2* 8.1* 8.7* 8.0*   HEMATOCRIT % 27.0* 25.3* 26.2* 26.5* 25.8* 26.3* 28.4* 26.0*   #. DM. SSI.  #. CKD III. Stable. Near baseline. Monitor.  Nephrology following.  Results from last 7 days   Lab Units 07/02/19  0629 07/01/19  0551 06/30/19  0804 06/29/19  0704 06/28/19  0535 06/27/19  0606 06/26/19  0628   CREATININE mg/dL 2.17* 2.02* 2.05* 2.21* 2.10* 1.91* 1.94*   #. PAF. Paced.  7/2. Pharmacy dosing.  7/1. INR improving. Pharmacy dosing.  6/30. Coumadin.   6/26 - 6/29. Coumadin restarted.  6/25. Coumadin on hold after colonoscopy.   #. H/o aortic valve replacement.  6/28. Pharmacy dosing coumadin.  6/27. Coumadin. Cards following.  6/26. Coumadin restarted today. INR peaked. No bleeding noted.   6/25. Subtherapeutic. Plan was to restart in 3 days post c-scope. Patient to get evaluated by general surgery for possible resection. Will continue to hold until evaluation.  6/24. Chronic anticoagulation. Holding coumadin at present after colonoscopy and polyp removal. No coumadin for 3 days per GI. Will discuss with cards.  Results from last 7 days   Lab Units 07/02/19  0629 07/01/19  0551 06/30/19  0804 06/29/19  0704 06/28/19  0535 06/27/19  0606 06/26/19  0628   PROTIME Seconds 26.0* 21.7* 20.1* 17.8* 18.8* 19.3* 19.0*   INR  2.41* 1.92* 1.74* 1.49* 1.60* 1.65* 1.62*   #. Morbid obesity. Dietary.  Body mass index is 43.78 kg/m².  #. Chronic hypoxic respiratory failure. Supplemental oxygen titrated to maintain > 88%.  #. HFpEF.   6/28 - 7/2. Diuretics. Per nephrology.  6/27. IV lasix.   6/26. Patient near euvolemia. Nephrology and cardiology following. Lasix IV today.      Will monitor patient's hospital course and adjust treatment as hospital course dictates.    DVT prophylaxis: SCDs/coumadin.  Code status is   Code Status and Medical Interventions:   Ordered at: 06/18/19 1229     Level Of Support Discussed With:    Patient     Code Status:    CPR     Medical Interventions (Level of Support Prior to Arrest):    Full       Plan for disposition:ARU consult.      Time:           This document has been electronically signed by Kasi Reaves MD on July 2, 2019  8:00 AM

## 2019-07-02 NOTE — PROGRESS NOTES
Adult Nutrition  Assessment    Patient Name:  Brendon Skelton  YOB: 1945  MRN: [id number]  Admit Date:  6/18/2019    Assessment Date:  7/2/2019    Comments:  Pt is s/p GI hemorrhage and EGD/colonoscpy-noted adenocarcinoma in sigmoid colon, MD notes indicate no surgery at this time as pt is not a candidate for surgery. GFR low at 22. Meds noted. Reg renal cardiac ADA diet- intake approx 75% or better most meals. RD to follow hospital course and make recs as indicated.    Reason for Assessment     Row Name 07/02/19 1158          Reason for Assessment    Reason For Assessment  follow-up protocol     Diagnosis  gastrointestinal disease     Identified At Risk by Screening Criteria  need for education           Anthropometrics     Row Name 07/02/19 0500          Anthropometrics    Weight  119 kg (263 lb 1.6 oz)         Labs/Tests/Procedures/Meds     Row Name 07/02/19 1158          Labs/Procedures/Meds    Lab Results Reviewed  reviewed     Lab Results Comments  Glu x24 hrs 118-168, Bun 60H/Cr 2.1H and GFR 22L, H&H low        Diagnostic Tests/Procedures    Diagnostic Test/Procedure Reviewed  reviewed        Medications    Pertinent Medications Reviewed  reviewed     Pertinent Medications Comments  Lasix IV and po, Vit D, Vit C, zaroxolyn, coumadin             Nutrition Prescription Ordered     Row Name 07/02/19 1200          Nutrition Prescription PO    Current PO Diet  Regular     Common Modifiers  Consistent Carbohydrate;Cardiac;Renal         Evaluation of Received Nutrient/Fluid Intake     Row Name 07/02/19 1200          PO Evaluation    Number of Days PO Intake Evaluated  2 days     Number of Meals  3     % PO Intake  50x1, 75x2, 100x4               Electronically signed by:  Carly Carter RD  07/02/19 12:02 PM

## 2019-07-02 NOTE — PROGRESS NOTES
"Anticoagulation by Pharmacy - Warfarin    Brendon Skelton is a 73 y.o.female being continued on warfarin for aortic valve replacement  Home regimen: Alternating of 5 and 2.5 mg daily   INR Goal: 2-3  Today's INR:     Lab Results   Component Value Date    INR 2.41 (H) 07/02/2019       Objective:  [Ht: 165.1 cm (65\"); Wt: 119 kg (263 lb 1.6 oz)]  Lab Results   Component Value Date    INR 2.41 (H) 07/02/2019    INR 1.92 (H) 07/01/2019    INR 1.74 (H) 06/30/2019    PROTIME 26.0 (H) 07/02/2019    PROTIME 21.7 (H) 07/01/2019    PROTIME 20.1 (H) 06/30/2019     Lab Results   Component Value Date    HGB 8.8 (L) 07/02/2019    HGB 7.9 (L) 07/01/2019    HGB 8.1 (L) 06/30/2019    HCT 27.0 (L) 07/02/2019    HCT 25.3 (L) 07/01/2019    HCT 26.2 (L) 06/30/2019     07/02/2019     07/01/2019     06/30/2019       Recent Warfarin Administrations       The 5 most recent administrations since 06/25/2019 are shown below each listed medication.    Warfarin Sodium         Order Dose Date Given     warfarin (COUMADIN) tablet 5 mg 5 mg 07/01/2019     warfarin (COUMADIN) tablet 2.5 mg 2.5 mg 06/30/2019     warfarin (COUMADIN) tablet 5 mg 5 mg 06/29/2019     warfarin (COUMADIN) tablet 3 mg 3 mg 06/28/2019     warfarin (COUMADIN) tablet 2.5 mg 2.5 mg 06/27/2019                      Assessment  Interacting medications: No significant interactions noted  INR is therapeutic, but trending up quickly from yesterday - will continue current regimen which is 2.5 mg today and monitor trend  Anemia - nos/sx of bleeding documented      Plan:  1.  Give warfarin 2.5 mg tablet PO @ 1800 tonight  2.  Draw a PT/INR in AM  3.  Pharmacy will continue to follow    Cristino Ness [facility]  07/02/19 3:08 PM     "

## 2019-07-02 NOTE — PROGRESS NOTES
"[facility]  [address] [place] [address]. [address]   - [phone]  F - [phone]     Progress Note          PATIENT  DEMOGRAPHICS   PATIENT NAME: Brendon Skelton                      PHYSICIAN: Sandy Caputo MD  : 1945  MRN: [id number]   LOS: 12 days    Patient Care Team:  Josefa Pozo APRN as PCP - General (Nurse Practitioner)  Meme Duckworth APRN as PCP - Claims Attributed  Aby Stout RN as Care Coordinator (Population Health)  Subjective   SUBJECTIVE   Shortness of breath better today          Objective   OBJECTIVE   Vital Signs  Temp:  [97.4 °F (36.3 °C)-99.3 °F (37.4 °C)] 97.9 °F (36.6 °C)  Heart Rate:  [] 89  Resp:  [18-28] 20  BP: ()/(44-81) 110/75    Flowsheet Rows      First Filed Value   Admission Height  165.1 cm (65\") Documented at 2019 0934   Admission Weight  111 kg (244 lb) Documented at 2019 0934           I/O last 3 completed shifts:  In: 1120 [P.O.:710; Blood:300; IV Piggyback:110]  Out: -     PHYSICAL EXAM    Physical Exam   Constitutional: She is oriented to person, place, and time. She appears well-developed.   Eyes: EOM are normal. Pupils are equal, round, and reactive to light.   Cardiovascular: Normal rate, regular rhythm and normal heart sounds. Exam reveals no gallop and no friction rub.   No murmur heard.  Pulmonary/Chest: Effort normal. No respiratory distress. She has wheezes.   Abdominal: Soft. Bowel sounds are normal. She exhibits no distension and no mass. There is no tenderness. There is no guarding.   Musculoskeletal: She exhibits edema. She exhibits no tenderness.   Neurological: She is alert and oriented to person, place, and time.   Skin: Skin is warm and dry.       RESULTS   Results Review:    Results from last 7 days   Lab Units 19  0629 19  0551 19  0804   SODIUM mmol/L 141 145 142   POTASSIUM mmol/L 4.3 4.2 4.5   CHLORIDE mmol/L 104 105 104   CO2 mmol/L 27.0 29.0 28.0   BUN " mg/dL 60* 59* 58*   CREATININE mg/dL 2.17* 2.02* 2.05*   CALCIUM mg/dL 8.9 9.0 9.2   GLUCOSE mg/dL 118* 121* 123*       Estimated Creatinine Clearance: 29.8 mL/min (A) (by C-G formula based on SCr of 2.17 mg/dL (H)).        Results from last 7 days   Lab Units 07/02/19 0629 07/01/19  0551 06/30/19  0804 06/29/19  0704 06/28/19  0535   WBC 10*3/mm3 8.27 8.43 8.24 8.26 8.63   HEMOGLOBIN g/dL 8.8* 7.9* 8.1* 8.2* 8.2*   PLATELETS 10*3/mm3 212 216 235 213 203       Results from last 7 days   Lab Units 07/02/19  0629 07/01/19  0551 06/30/19  0804 06/29/19  0704 06/28/19  0535   INR  2.41* 1.92* 1.74* 1.49* 1.60*         Imaging Results (last 24 hours)     Procedure Component Value Units Date/Time    XR Chest 1 View [[id number]] Collected:  07/02/19 0519     Updated:  07/02/19 0603    Narrative:         Chest single view on  7/2/2019     CLINICAL INDICATION: Shortness of breath, pulmonary edema    COMPARISON: 6/25/2019    FINDINGS: 2-lead left subclavian pacemaker is noted in place. The  patient is status post median sternotomy. Cardiomegaly is noted.  There are trace bilateral pleural effusions. There has been no  significant change in bilateral interstitial opacities. Some or  all of this could represent chronic interstitial changes but  component of edema is favored. Vascular calcification is noted in  the aorta.      Impression:       No significant change in the appearance of the chest.    Electronically signed by:  Quinn Howell  7/2/2019 6:02 AM CDT  Workstation: [id number]    US Venous Doppler Upper Extremity Left (duplex) [[id number]] Collected:  07/01/19 1608     Updated:  07/01/19 1648    Narrative:         PROCEDURE: Left upper extremity venous duplex    COMPARISON: None    HISTORY: Pain and swelling in the left arm    FINDINGS: Realtime grayscale and color-flow imaging was performed  of the left upper extremity(s).    The deep veins demonstrate normal compressibility, respiratory  variation and  augmentation with distal compression. No thrombus  is identified.      Impression:       CONCLUSION:   No DVT    Electronically signed by:  Dave More MD  7/1/2019 4:42 PM CDT  Workstation: [id number]           MEDICATIONS      cholecalciferol 1,000 Units Oral Daily   citalopram 20 mg Oral Daily   diltiaZEM  mg Oral Daily   ferric gluconate (FERRLECIT) IVPB 125 mg Intravenous Daily   furosemide 40 mg Oral BID   guaiFENesin 600 mg Oral Q12H   insulin aspart 0-9 Units Subcutaneous 4x Daily AC & at Bedtime   ipratropium-albuterol 3 mL Nebulization Q8H - RT   magic butt ointment  Topical BID   nystatin  Topical Q12H   prenatal vitamin 27-0.8 1 tablet Oral Daily   rOPINIRole 0.25 mg Oral Nightly   sodium chloride 3 mL Intravenous Q12H   traZODone 300 mg Oral Nightly   vitamin C 500 mg Oral Daily   warfarin 2.5 mg Oral Every Other Day   warfarin 5 mg Oral Every Other Day       dextrose 5 % and sodium chloride 0.45 % 30 mL/hr   Pharmacy to dose warfarin        Assessment/Plan   ASSESSMENT / PLAN      Gastrointestinal hemorrhage    Emphysema of lung (CMS/HCC)    Diabetes mellitus (CMS/HCC)    CKD (chronic kidney disease) stage 3, GFR 30-59 ml/min (CMS/HCC)    Morbid obesity (CMS/HCC)    Gastritis    Colon polyp    Coumadin toxicity    1.  CKD 4: baseline creatinine 1.9-2.0 mg/dl     - Creatinine better at 2.0 mg/dl.  Keep lasix 40 mg PO bid and add metolazone. Add lasix 20mg iv x 1.    2.  Hypertension:  - Well-controlled     3.  Anemia  - Hemoglobin is low   - Ferritin 211, Tsat 13%. On IV iron. Hold po fe fo rnow     4.  Colon polyp/adenocarcinoma:  - GI and general surgery following. High risk for surgery from pulmonary standpoint per Dr. Romero.      5.  DM2    6. dispo pt is now interested in going to rehab short term at [facility]           This document has been electronically signed by Sandy Caputo MD on July 2, 2019 10:02 AM

## 2019-07-02 NOTE — DISCHARGE PLACEMENT REQUEST
"Brendon Kamara (73 y.o. Female)     Date of Birth Social Security Number Address Home Phone MRN    1945  [address] [facility] [address] [facility] [address] [phone] [id number]    L[employer]-day Marital Status          None        Admission Date Admission Type Admitting Provider Attending Provider Department, Room/Bed    6/18/19 Emergency Colin Coreas MD Echendu, Anthony W, MD [address], 433/1    Discharge Date Discharge Disposition Discharge Destination                       Attending Provider:  Dudley Alfredo MD    Allergies:  Crestor [Rosuvastatin Calcium], Lipitor [Atorvastatin], Lortab [Hydrocodone-acetaminophen], Adhesive Tape, Hydrocodone-acetaminophen    Isolation:  None   Infection:  None   Code Status:  CPR    Ht:  165.1 cm (65\")   Wt:  119 kg (263 lb 1.6 oz)    Admission Cmt:  None   Principal Problem:  Gastrointestinal hemorrhage [K92.2]                 Active Insurance as of 6/18/2019     Primary Coverage     Payor Plan Insurance Group Employer/Plan Group    MEDICARE MEDICARE A & B      Payor Plan Address Payor Plan Phone Number Payor Plan Fax Number Effective Dates    [address] [phone]  9/1/2010 - None Entered    [facility] [id number]       Subscriber Name Subscriber Birth Date Member ID       BRENDON KAMARA 1945 [id number]           Secondary Coverage     Payor Plan Insurance Group Employer/Plan Group    ANTHEM MEDICAID ANTH MEDICAID [id number]     Payor Plan Address Payor Plan Phone Number Payor Plan Fax Number Effective Dates    [address] [address] [phone]  4/2/2019 - None Entered    [facility] [id number]       Subscriber Name Subscriber Birth Date Member ID       BRENDON KAMARA 1945 [id number]                 Emergency Contacts      (Rel.) Home Phone Work Phone Mobile Phone    Hermann Marrufo (Son) [phone] -- [phone]    Carleen Fraser (Relative) [phone] -- --              "

## 2019-07-02 NOTE — PLAN OF CARE
Problem: Patient Care Overview  Goal: Plan of Care Review   07/02/19 1431   OTHER   Outcome Summary pt sit>sup with independence, sit<>stand with SBA, pt ambulated 40` without AD with SBA , pt would benefit from 24/7 care & continued pT services @ D/C

## 2019-07-03 ENCOUNTER — TELEPHONE (OUTPATIENT)
Dept: FAMILY MEDICINE CLINIC | Facility: CLINIC | Age: 74
End: 2019-07-03

## 2019-07-03 LAB
ABO + RH BLD: NORMAL
ANION GAP SERPL CALCULATED.3IONS-SCNC: 11 MMOL/L (ref 5–15)
BH BB BLOOD EXPIRATION DATE: NORMAL
BH BB BLOOD TYPE BARCODE: 5100
BH BB DISPENSE STATUS: NORMAL
BH BB PRODUCT CODE: NORMAL
BH BB UNIT NUMBER: NORMAL
BUN BLD-MCNC: 62 MG/DL (ref 8–23)
BUN/CREAT SERPL: 29.5 (ref 7–25)
CALCIUM SPEC-SCNC: 9.4 MG/DL (ref 8.6–10.5)
CHLORIDE SERPL-SCNC: 100 MMOL/L (ref 98–107)
CO2 SERPL-SCNC: 30 MMOL/L (ref 22–29)
CREAT BLD-MCNC: 2.1 MG/DL (ref 0.57–1)
DEPRECATED RDW RBC AUTO: 71.6 FL (ref 37–54)
ERYTHROCYTE [DISTWIDTH] IN BLOOD BY AUTOMATED COUNT: 20.8 % (ref 12.3–15.4)
GFR SERPL CREATININE-BSD FRML MDRD: 23 ML/MIN/1.73
GLUCOSE BLD-MCNC: 123 MG/DL (ref 65–99)
GLUCOSE BLDC GLUCOMTR-MCNC: 124 MG/DL (ref 70–130)
GLUCOSE BLDC GLUCOMTR-MCNC: 128 MG/DL (ref 70–130)
GLUCOSE BLDC GLUCOMTR-MCNC: 145 MG/DL (ref 70–130)
GLUCOSE BLDC GLUCOMTR-MCNC: 178 MG/DL (ref 70–130)
HCT VFR BLD AUTO: 28.8 % (ref 34–46.6)
HGB BLD-MCNC: 9.1 G/DL (ref 12–15.9)
INR PPP: 3.04 (ref 0.8–1.2)
MCH RBC QN AUTO: 30.6 PG (ref 26.6–33)
MCHC RBC AUTO-ENTMCNC: 31.6 G/DL (ref 31.5–35.7)
MCV RBC AUTO: 97 FL (ref 79–97)
PLATELET # BLD AUTO: 190 10*3/MM3 (ref 140–450)
PMV BLD AUTO: 11.5 FL (ref 6–12)
POTASSIUM BLD-SCNC: 3.8 MMOL/L (ref 3.5–5.2)
PROTHROMBIN TIME: 31.2 SECONDS (ref 11.1–15.3)
RBC # BLD AUTO: 2.97 10*6/MM3 (ref 3.77–5.28)
SODIUM BLD-SCNC: 141 MMOL/L (ref 136–145)
UNIT  ABO: NORMAL
UNIT  RH: NORMAL
WBC NRBC COR # BLD: 8.31 10*3/MM3 (ref 3.4–10.8)

## 2019-07-03 PROCEDURE — 94799 UNLISTED PULMONARY SVC/PX: CPT

## 2019-07-03 PROCEDURE — 94760 N-INVAS EAR/PLS OXIMETRY 1: CPT

## 2019-07-03 PROCEDURE — 85027 COMPLETE CBC AUTOMATED: CPT | Performed by: INTERNAL MEDICINE

## 2019-07-03 PROCEDURE — 85610 PROTHROMBIN TIME: CPT | Performed by: INTERNAL MEDICINE

## 2019-07-03 PROCEDURE — 25010000002 NA FERRIC GLUC CPLX PER 12.5 MG: Performed by: INTERNAL MEDICINE

## 2019-07-03 PROCEDURE — 82962 GLUCOSE BLOOD TEST: CPT

## 2019-07-03 PROCEDURE — 63710000001 INSULIN ASPART PER 5 UNITS: Performed by: INTERNAL MEDICINE

## 2019-07-03 PROCEDURE — 80048 BASIC METABOLIC PNL TOTAL CA: CPT | Performed by: INTERNAL MEDICINE

## 2019-07-03 PROCEDURE — 97110 THERAPEUTIC EXERCISES: CPT

## 2019-07-03 PROCEDURE — 97535 SELF CARE MNGMENT TRAINING: CPT

## 2019-07-03 RX ORDER — WARFARIN SODIUM 1 MG/1
1 TABLET ORAL ONCE
Status: COMPLETED | OUTPATIENT
Start: 2019-07-03 | End: 2019-07-03

## 2019-07-03 RX ADMIN — METOLAZONE 2.5 MG: 2.5 TABLET ORAL at 08:23

## 2019-07-03 RX ADMIN — WARFARIN SODIUM 1 MG: 1 TABLET ORAL at 17:27

## 2019-07-03 RX ADMIN — NYSTATIN: 100000 POWDER TOPICAL at 08:24

## 2019-07-03 RX ADMIN — SODIUM CHLORIDE, PRESERVATIVE FREE 3 ML: 5 INJECTION INTRAVENOUS at 08:19

## 2019-07-03 RX ADMIN — FUROSEMIDE 40 MG: 40 TABLET ORAL at 17:27

## 2019-07-03 RX ADMIN — TRAZODONE HYDROCHLORIDE 300 MG: 150 TABLET ORAL at 20:08

## 2019-07-03 RX ADMIN — HYDROCORTISONE: 1 CREAM TOPICAL at 20:08

## 2019-07-03 RX ADMIN — DILTIAZEM HYDROCHLORIDE 240 MG: 240 CAPSULE, COATED, EXTENDED RELEASE ORAL at 08:23

## 2019-07-03 RX ADMIN — PRENATAL VIT W/ FE FUMARATE-FA TAB 27-0.8 MG 1 TABLET: 27-0.8 TAB at 08:23

## 2019-07-03 RX ADMIN — HYDROCORTISONE: 1 CREAM TOPICAL at 08:23

## 2019-07-03 RX ADMIN — CITALOPRAM HYDROBROMIDE 20 MG: 20 TABLET ORAL at 08:23

## 2019-07-03 RX ADMIN — IPRATROPIUM BROMIDE AND ALBUTEROL SULFATE 3 ML: 2.5; .5 SOLUTION RESPIRATORY (INHALATION) at 07:20

## 2019-07-03 RX ADMIN — INSULIN ASPART 2 UNITS: 100 INJECTION, SOLUTION INTRAVENOUS; SUBCUTANEOUS at 20:09

## 2019-07-03 RX ADMIN — GUAIFENESIN 600 MG: 600 TABLET, EXTENDED RELEASE ORAL at 20:08

## 2019-07-03 RX ADMIN — OXYCODONE HYDROCHLORIDE AND ACETAMINOPHEN 500 MG: 500 TABLET ORAL at 08:23

## 2019-07-03 RX ADMIN — IPRATROPIUM BROMIDE AND ALBUTEROL SULFATE 3 ML: 2.5; .5 SOLUTION RESPIRATORY (INHALATION) at 15:19

## 2019-07-03 RX ADMIN — NYSTATIN: 100000 POWDER TOPICAL at 20:08

## 2019-07-03 RX ADMIN — IPRATROPIUM BROMIDE AND ALBUTEROL SULFATE 3 ML: 2.5; .5 SOLUTION RESPIRATORY (INHALATION) at 22:24

## 2019-07-03 RX ADMIN — SODIUM CHLORIDE 125 MG: 9 INJECTION, SOLUTION INTRAVENOUS at 08:19

## 2019-07-03 RX ADMIN — SODIUM CHLORIDE, PRESERVATIVE FREE 3 ML: 5 INJECTION INTRAVENOUS at 20:09

## 2019-07-03 RX ADMIN — VITAMIN D, TAB 1000IU (100/BT) 1000 UNITS: 25 TAB at 08:23

## 2019-07-03 RX ADMIN — ROPINIROLE HYDROCHLORIDE 0.25 MG: 0.25 TABLET, FILM COATED ORAL at 20:08

## 2019-07-03 RX ADMIN — GUAIFENESIN 600 MG: 600 TABLET, EXTENDED RELEASE ORAL at 08:22

## 2019-07-03 RX ADMIN — FUROSEMIDE 40 MG: 40 TABLET ORAL at 08:22

## 2019-07-03 NOTE — PROGRESS NOTES
"Anticoagulation by Pharmacy - Warfarin    Brendon Skelton is a 73 y.o.female being continued on warfarin for aortic valve replacement    Home regimen: Alternating of 5 mg and 2.5 mg daily   INR Goal: 2-3        Today's INR:   Lab Results   Component Value Date    INR 3.04 (H) 07/03/2019       Objective:  [Ht: 165.1 cm (65\"); Wt: 120 kg (265 lb 3.2 oz)]  Lab Results   Component Value Date    INR 3.04 (H) 07/03/2019    INR 2.41 (H) 07/02/2019    INR 1.92 (H) 07/01/2019    PROTIME 31.2 (H) 07/03/2019    PROTIME 26.0 (H) 07/02/2019    PROTIME 21.7 (H) 07/01/2019     Lab Results   Component Value Date    HGB 9.1 (L) 07/03/2019    HGB 8.8 (L) 07/02/2019    HGB 7.9 (L) 07/01/2019    HCT 28.8 (L) 07/03/2019    HCT 27.0 (L) 07/02/2019    HCT 25.3 (L) 07/01/2019     07/03/2019     07/02/2019     07/01/2019       Recent Warfarin Administrations       The 5 most recent administrations since 06/26/2019 are shown below each listed medication.    Warfarin Sodium         Order Dose Date Given     warfarin (COUMADIN) tablet 2.5 mg 2.5 mg 07/02/2019     warfarin (COUMADIN) tablet 5 mg 5 mg 07/01/2019     warfarin (COUMADIN) tablet 2.5 mg 2.5 mg 06/30/2019     warfarin (COUMADIN) tablet 5 mg 5 mg 06/29/2019     warfarin (COUMADIN) tablet 3 mg 3 mg 06/28/2019                      Assessment  Interacting medications: citalopram  INR is 3.04, slightly supratherapeutic, trended up quickly past two days.  No significant interactions.  Will reduce to 1 mg tonight to prevent steady state from being interrupted, but I expect INR to rise slightly tonight as well.  H/H trending upwards    Plan:  1.  Give warfarin 1 mg tablet PO @ 1800 tonight x 1  2.  Draw a PT/INR in AM  3.  Pharmacy will continue to follow    Quinn Mercer RPH  07/03/19 1:55 PM     "

## 2019-07-03 NOTE — PROGRESS NOTES
"[facility]  [address] [place] [address]. [address]  T - [phone]  F - [phone]     Progress Note          PATIENT  DEMOGRAPHICS   PATIENT NAME: Brendon Skelton                      PHYSICIAN: Sandy Caputo MD  : 1945  MRN: [id number]   LOS: 13 days    Patient Care Team:  Josefa Pozo APRN as PCP - General (Nurse Practitioner)  Meme Duckworth APRN as PCP - Claims Attributed  Aby Stout RN as Care Coordinator ([facility])  Subjective   SUBJECTIVE   Shortness of breath better, plan for snf          Objective   OBJECTIVE   Vital Signs  Temp:  [97.6 °F (36.4 °C)-98.6 °F (37 °C)] 97.9 °F (36.6 °C)  Heart Rate:  [] 80  Resp:  [18] 18  BP: (111-145)/(62-80) 116/80    Flowsheet Rows      First Filed Value   Admission Height  165.1 cm (65\") Documented at 2019 0934   Admission Weight  111 kg (244 lb) Documented at 2019 0934           I/O last 3 completed shifts:  In: 1020 [P.O.:720; Blood:300]  Out: 1400 [Urine:1400]    PHYSICAL EXAM    Physical Exam   Constitutional: She is oriented to person, place, and time. She appears well-developed.   Eyes: EOM are normal. Pupils are equal, round, and reactive to light.   Cardiovascular: Normal rate, regular rhythm and normal heart sounds. Exam reveals no gallop and no friction rub.   No murmur heard.  Pulmonary/Chest: Effort normal. No respiratory distress. She has wheezes.   Abdominal: Soft. Bowel sounds are normal. She exhibits no distension and no mass. There is no tenderness. There is no guarding.   Musculoskeletal: She exhibits edema. She exhibits no tenderness.   Neurological: She is alert and oriented to person, place, and time.   Skin: Skin is warm and dry.       RESULTS   Results Review:    Results from last 7 days   Lab Units 19  0556 19  0629 19  0551   SODIUM mmol/L 141 141 145   POTASSIUM mmol/L 3.8 4.3 4.2   CHLORIDE mmol/L 100 104 105   CO2 mmol/L 30.0* 27.0 29.0   BUN " mg/dL 62* 60* 59*   CREATININE mg/dL 2.10* 2.17* 2.02*   CALCIUM mg/dL 9.4 8.9 9.0   GLUCOSE mg/dL 123* 118* 121*       Estimated Creatinine Clearance: 31 mL/min (A) (by C-G formula based on SCr of 2.1 mg/dL (H)).        Results from last 7 days   Lab Units 07/03/19  0556 07/02/19  0629 07/01/19  0551 06/30/19  0804 06/29/19  0704   WBC 10*3/mm3 8.31 8.27 8.43 8.24 8.26   HEMOGLOBIN g/dL 9.1* 8.8* 7.9* 8.1* 8.2*   PLATELETS 10*3/mm3 190 212 216 235 213       Results from last 7 days   Lab Units 07/03/19  0556 07/02/19  0629 07/01/19  0551 06/30/19  0804 06/29/19  0704   INR  3.04* 2.41* 1.92* 1.74* 1.49*         Imaging Results (last 24 hours)     ** No results found for the last 24 hours. **           MEDICATIONS      cholecalciferol 1,000 Units Oral Daily   citalopram 20 mg Oral Daily   diltiaZEM  mg Oral Daily   ferric gluconate (FERRLECIT) IVPB 125 mg Intravenous Daily   furosemide 40 mg Oral BID   guaiFENesin 600 mg Oral Q12H   insulin aspart 0-9 Units Subcutaneous 4x Daily AC & at Bedtime   ipratropium-albuterol 3 mL Nebulization Q8H - RT   magic butt ointment  Topical BID   metOLazone 2.5 mg Oral Daily   nystatin  Topical Q12H   prenatal vitamin 27-0.8 1 tablet Oral Daily   rOPINIRole 0.25 mg Oral Nightly   sodium chloride 3 mL Intravenous Q12H   traZODone 300 mg Oral Nightly   vitamin C 500 mg Oral Daily   warfarin 2.5 mg Oral Every Other Day   warfarin 5 mg Oral Every Other Day       dextrose 5 % and sodium chloride 0.45 % 30 mL/hr   Pharmacy to dose warfarin        Assessment/Plan   ASSESSMENT / PLAN      Gastrointestinal hemorrhage    Emphysema of lung (CMS/HCC)    Diabetes mellitus (CMS/HCC)    CKD (chronic kidney disease) stage 3, GFR 30-59 ml/min (CMS/HCC)    Morbid obesity (CMS/HCC)    Gastritis    Colon polyp    Coumadin toxicity    1.  CKD 4: baseline creatinine 1.9-2.0 mg/dl     - Creatinine stable overall. Keep lasix 40 mg PO bid and metolazone.     2.  Hypertension:  -  Well-controlled     3.  Anemia  - Hemoglobin is low   - Ferritin 211, Tsat 13%. On IV iron.      4.  Colon polyp/adenocarcinoma:  - GI and general surgery following. High risk for surgery from pulmonary standpoint per Dr. Romero.      5.  DM2    6. dispo to snf on Friday after iv fe infusions are finished         This document has been electronically signed by Sandy Caputo MD on July 3, 2019 11:31 AM

## 2019-07-03 NOTE — PROGRESS NOTES
Cardiology Progress Note     LOS: 12 days   Patient Care Team:  Josefa Pozo APRN as PCP - General (Nurse Practitioner)  Meme Duckworth APRN as PCP - Claims Attributed  Aby Stout, RN as Care Coordinator ([facility])    Subjective:    Chart reviewed. Patient seen and examined. Patient appears to be comfortable in bed.  Patient complains of having symptoms of shortness of breath.  Patient denies any symptoms of chest pain.  Hemoglobin is 8.8 with a BUN of 60 and a creatinine of 2.17.    Patient PT/INR is 2.4.  Patient is to undergo rehab at [facility] prior to consideration for surgical intervention for the sigmoid carcinoma.    Objective:  Temp:  [97.6 °F (36.4 °C)-97.9 °F (36.6 °C)] 97.6 °F (36.4 °C)  Heart Rate:  [] 87  Resp:  [18-20] 18  BP: (110-143)/(63-75) 126/63    Intake/Output Summary (Last 24 hours) at 7/2/2019 2140  Last data filed at 7/2/2019 2025  Gross per 24 hour   Intake 720 ml   Output 1400 ml   Net -680 ml       Physical Exam:   General Appearance:    Alert, oriented, cooperative, in no acute distress.   Head:    Normocephalic, atraumatic, without obvious abnormality   Eyes:             RENE. Lids and lashes normal, conjunctivae and sclerae normal, no icterus, no pallor.   Ears:    Ears appear intact with no abnormalities noted.   Throat:   Mucous membranes pink and moist.   Neck:  Supple, trachea midline, no carotid bruit, no organomegaly or JVD.   Lungs:    Clear to auscultation and percussion.  Respirations regular, even and unlabored. No wheezes, rales, or rhonchi.    Heart:   Regular S1 with a mechanical click with a soft systolic murmur best heard at the base   Abdomen:    Soft, non-tender, non-distended, no guarding, no rebound tenderness. Normal bowel sounds in all four quadrants, no masses, liver and spleen nonpalpable.    Genitalia:    Deferred.   Extremities:   Moves all extremities well, no edema, no cyanosis, no       redness, no clubbing.    Pulses:   Pulses palpable and equal bilaterally.   Skin:   Moist and warm. No bleeding, bruising or rash.   Neurologic/Psychiatric:   Alert and oriented to person, place, and time.  Motor, power and tone in upper and lower extremities are grossly intact.  No focal neurological deficits. Normal cognitive function. No psychomotor reaction or tangential thought. No depression, homicidal ideations and suicidal ideations.          Results Review:    Results from last 7 days   Lab Units 07/02/19  0629   SODIUM mmol/L 141   POTASSIUM mmol/L 4.3   CHLORIDE mmol/L 104   CO2 mmol/L 27.0   BUN mg/dL 60*   CREATININE mg/dL 2.17*   CALCIUM mg/dL 8.9   GLUCOSE mg/dL 118*             Results from last 7 days   Lab Units 07/02/19  0629   WBC 10*3/mm3 8.27   HEMOGLOBIN g/dL 8.8*   HEMATOCRIT % 27.0*   PLATELETS 10*3/mm3 212     Results from last 7 days   Lab Units 07/02/19  0629 07/01/19  0551 06/30/19  0804   INR  2.41* 1.92* 1.74*                       ECHO:  Results for orders placed during the hospital encounter of 06/18/19   Adult Transthoracic Echo Complete W/ Cont if Necessary Per Protocol    Narrative · Left atrial cavity size is moderately dilated.  · The left ventricular cavity is mildly dilated.  · Left ventricular wall thickness is consistent with borderline concentric   hypertrophy.  · Mild mitral valve regurgitation is present  · Mild to moderate tricuspid valve regurgitation is present.          ECG 12 Lead   Preliminary Result   Test Reason : afib   Blood Pressure : **/** mmHG   Vent. Rate : 067 BPM     Atrial Rate : 067 BPM      P-R Int : 232 ms          QRS Dur : 088 ms       QT Int : 456 ms       P-R-T Axes : 072 -08 044 degrees      QTc Int : 481 ms      Sinus rhythm with 1st degree AV block   Low voltage QRS   Cannot rule out Anteroseptal infarct , age undetermined   Abnormal ECG   When compared with ECG of 01-OCT-2018 10:27,   Sinus rhythm has replaced Electronic ventricular pacemaker      Referred By:              Confirmed By:            Medication Review:   Current Facility-Administered Medications   Medication Dose Route Frequency Provider Last Rate Last Dose   • acetaminophen (TYLENOL) tablet 650 mg  650 mg Oral Q6H PRN Colin Coreas MD   650 mg at 06/29/19 0151   • cholecalciferol (VITAMIN D3) tablet 1,000 Units  1,000 Units Oral Daily Sandy Caputo MD   1,000 Units at 07/02/19 1046   • citalopram (CeleXA) tablet 20 mg  20 mg Oral Daily Colin Coreas MD   20 mg at 07/02/19 1047   • dextrose (D50W) 25 g/ 50mL Intravenous Solution 25 g  25 g Intravenous Q15 Min PRN Dudley Alfredo MD       • dextrose (GLUTOSE) oral gel 15 g  15 g Oral Q15 Min PRN Dudley Alfredo MD       • dextrose 5 % and sodium chloride 0.45 % infusion  30 mL/hr Intravenous Continuous PRN Alfredo Guerrero MD       • diltiaZEM CD (CARDIZEM CD) 24 hr capsule 240 mg  240 mg Oral Daily Colin Coreas MD   240 mg at 07/02/19 1047   • ferric gluconate (FERRLECIT) 125 mg in sodium chloride 0.9 % 110 mL IVPB  125 mg Intravenous Daily Rocio Mack  mL/hr at 07/02/19 1045 125 mg at 07/02/19 1045   • furosemide (LASIX) tablet 40 mg  40 mg Oral BID Rocio Mack MD   40 mg at 07/02/19 1705   • glucagon (human recombinant) (GLUCAGEN DIAGNOSTIC) injection 1 mg  1 mg Subcutaneous PRN Dudley Alfredo MD       • guaiFENesin (MUCINEX) 12 hr tablet 600 mg  600 mg Oral Q12H Sandy Caputo MD   600 mg at 07/02/19 2032   • insulin aspart (novoLOG) injection 0-9 Units  0-9 Units Subcutaneous 4x Daily AC & at Bedtime Dudley Alfredo MD   2 Units at 07/01/19 2020   • ipratropium-albuterol (DUO-NEB) nebulizer solution 3 mL  3 mL Nebulization Q8H - RT Colin Coreas MD   3 mL at 07/02/19 1519   • ipratropium-albuterol (DUO-NEB) nebulizer solution 3 mL  3 mL Nebulization Q4H PRN Hermann Richardson MD       • magic butt ointment   Topical BID Colin Coreas MD       • metOLazone (ZAROXOLYN) tablet 2.5 mg  2.5 mg Oral Daily Sandy Caputo,  MD   2.5 mg at 07/02/19 1046   • metoprolol tartrate (LOPRESSOR) injection 2.5 mg  2.5 mg Intravenous Q6H PRN Kasi Reaves MD       • nystatin (MYCOSTATIN) powder   Topical Q12H Colin Coreas MD       • ondansetron (ZOFRAN) injection 4 mg  4 mg Intravenous Q6H PRN Dudley Alfredo MD       • Pharmacy to dose warfarin   Does not apply Continuous PRN Colin Coreas MD       • prenatal vitamin 27-0.8 tablet 1 tablet  1 tablet Oral Daily Sandy Caputo MD   1 tablet at 07/02/19 1046   • rOPINIRole (REQUIP) tablet 0.25 mg  0.25 mg Oral Nightly Colin Coreas MD   0.25 mg at 07/02/19 2032   • sodium chloride 0.9 % flush 10 mL  10 mL Intravenous PRN Lobo Amezquita PA-C   10 mL at 06/24/19 0829   • sodium chloride 0.9 % flush 3 mL  3 mL Intravenous Q12H Dudley Alfredo MD   3 mL at 07/02/19 2033   • sodium chloride 0.9 % flush 3-10 mL  3-10 mL Intravenous PRN Dudley Alfredo MD   10 mL at 06/27/19 1534   • traZODone (DESYREL) tablet 300 mg  300 mg Oral Nightly Colin Coreas MD   300 mg at 07/02/19 2032   • vitamin C (ASCORBIC ACID) tablet 500 mg  500 mg Oral Daily Sandy Caputo MD   500 mg at 07/02/19 1046   • warfarin (COUMADIN) tablet 2.5 mg  2.5 mg Oral Every Other Day Alfredo Guerrero MD   2.5 mg at 07/02/19 1705   • warfarin (COUMADIN) tablet 5 mg  5 mg Oral Every Other Day Alfredo Guerrero MD   5 mg at 07/01/19 1731       Assessment and Plan:      Gastrointestinal hemorrhage    Emphysema of lung (CMS/HCC)    Diabetes mellitus (CMS/HCC)    CKD (chronic kidney disease) stage 3, GFR 30-59 ml/min (CMS/HCC)    Morbid obesity (CMS/HCC)    Gastritis    Colon polyp    Coumadin toxicity  1.  Shortness of breath.  Patient last echocardiogram had revealed an ejection fraction of 45 to 50% with prostatic aortic valve.  Patient complains of having symptoms of shortness of breath with a hemoglobin of 8.8.  .  Patient is currently euvolemic and would decrease the Lasix to 40 mg daily with rising creatinine  and BUN.    2.  Sick sinus syndrome.  Status post [employer] permanent pacemaker implantation.  Patient pacemaker interrogation and reveal appropriate sensing and pacing function.  Patient has not complained of having any symptoms of lightheaded dizziness or near syncope.    3.  Chronic anticoagulation with Coumadin status post [employer] mechanical aortic valve prosthesis done in December 2004 in [facility].  Patient was found to have sigmoid carcinoma and was evaluated by Dr. Gonzalez.  Patient has been started back on anticoagulation.    4.  Renal insufficiency.  Patient has an elevated BUN and creatinine and would decrease the Lasix to 40 mg daily .    5.  Sigmoid carcinoma involving a polyp.  Patient would need surgical resection of the sigmoid colon along with associated lymph node.  Patient has complained of having symptoms of shortness of breath and currently not stable to undergo surgical intervention.  Patient would be transferred to [facility] for further rehab prior to consideration for surgical intervention.    6.  Atherosclerotic coronary artery disease.  Patient had undergone single-vessel coronary artery bypass grafting.  Patient had not complained of having any symptoms of chest pain.    7.  Anemia.  Patient's hemoglobin is 8.8.  Patient is on anticoagulation with Coumadin secondary to the prostatic aortic valve.    8.  Anticoagulation with Coumadin.  Patient PT/INR is 2.41.  Patient does have a mechanical valve.    The above plan of management were discussed with the patient            Bereket Gates MD  07/02/19  9:40 PM      Time: Spent on face-to-face interaction 20 minutes      Dictated utilizing Dragon dictation.

## 2019-07-03 NOTE — PROGRESS NOTES
[facility]  INPATIENT PROGRESS NOTE    Length of Stay: 13  Date of Admission: 6/18/2019  Primary Care Physician: Josefa Pozo APRN    Subjective   Chief Complaint: Lower GI bleed  HPI: 73-year-old woman with past medical history of status post permanent pacemaker, status post aortic valve replacement on Coumadin, PAF, COPD, chronic hypoxemic respiratory failure, came to the hospital with lower GI bleed, patient had endoscopy which showed colon polyps, general surgery saw the patient patient is not a candidate for surgery, GI saw the patient patient is cleared from GI point of view to be seen as outpatient for possible sigmoidoscopy by Dr. Guerrero, because of iron deficiency anemia nephrology started IVR patient will finish a course of 1 g on Friday after that patient can be discharged to rehab(rehab does not give IV iron)    Review of Systems   Constitutional: Positive for fatigue. Negative for activity change, appetite change, chills, diaphoresis and fever.   HENT: Negative for congestion, dental problem, drooling, postnasal drip and sinus pain.    Eyes: Negative for pain, discharge, redness and itching.   Respiratory: Negative for apnea, shortness of breath and stridor.    Cardiovascular: Negative for chest pain and palpitations.   Gastrointestinal: Negative for abdominal distention, abdominal pain, nausea, rectal pain and vomiting.   Genitourinary: Negative for dysuria and frequency.   Musculoskeletal: Positive for myalgias. Negative for arthralgias, back pain, joint swelling and neck stiffness.   Skin: Negative for color change, pallor, rash and wound.   Neurological: Negative for dizziness.   Psychiatric/Behavioral: Positive for confusion. Negative for behavioral problems, decreased concentration, dysphoric mood and hallucinations. The patient is not hyperactive.         All pertinent negatives and positives are as above. All other systems have been  reviewed and are negative unless otherwise stated.     Objective    Temp:  [97.6 °F (36.4 °C)-98.6 °F (37 °C)] 97.9 °F (36.6 °C)  Heart Rate:  [] 80  Resp:  [18] 18  BP: (111-145)/(62-80) 116/80    Physical Exam   Constitutional: She is oriented to person, place, and time. She appears well-developed and well-nourished. No distress.   HENT:   Head: Normocephalic and atraumatic.   Right Ear: External ear normal.   Left Ear: External ear normal.   Nose: Nose normal.   Mouth/Throat: Oropharynx is clear and moist.   Eyes: Conjunctivae and EOM are normal. Pupils are equal, round, and reactive to light. Right eye exhibits no discharge. Left eye exhibits no discharge. No scleral icterus.   Neck: Normal range of motion. Neck supple. No JVD present. No thyromegaly present.   Cardiovascular: Normal rate, regular rhythm, normal heart sounds and intact distal pulses.   Pulmonary/Chest: Effort normal. She has no wheezes.   Abdominal: Soft. Bowel sounds are normal. She exhibits no distension. There is no tenderness. There is no rebound.   Musculoskeletal: Normal range of motion. She exhibits no edema or deformity.   Neurological: She is alert and oriented to person, place, and time. She displays normal reflexes. No cranial nerve deficit or sensory deficit. She exhibits normal muscle tone. Coordination normal.   Skin: Skin is warm and dry. No erythema. No pallor.   Psychiatric: She has a normal mood and affect. Her behavior is normal. Judgment and thought content normal.           Results Review:  I have reviewed the labs, radiology results, and diagnostic studies.    Laboratory Data:   Results from last 7 days   Lab Units 07/03/19  0556 07/02/19  0629 07/01/19  0551   SODIUM mmol/L 141 141 145   POTASSIUM mmol/L 3.8 4.3 4.2   CHLORIDE mmol/L 100 104 105   CO2 mmol/L 30.0* 27.0 29.0   BUN mg/dL 62* 60* 59*   CREATININE mg/dL 2.10* 2.17* 2.02*   GLUCOSE mg/dL 123* 118* 121*   CALCIUM mg/dL 9.4 8.9 9.0   ANION GAP mmol/L 11.0  10.0 11.0     Estimated Creatinine Clearance: 31 mL/min (A) (by C-G formula based on SCr of 2.1 mg/dL (H)).          Results from last 7 days   Lab Units 07/03/19  0556 07/02/19  0629 07/01/19  0551 06/30/19  0804 06/29/19  0704   WBC 10*3/mm3 8.31 8.27 8.43 8.24 8.26   HEMOGLOBIN g/dL 9.1* 8.8* 7.9* 8.1* 8.2*   HEMATOCRIT % 28.8* 27.0* 25.3* 26.2* 26.5*   PLATELETS 10*3/mm3 190 212 216 235 213     Results from last 7 days   Lab Units 07/03/19  0556 07/02/19  0629 07/01/19  0551 06/30/19  0804 06/29/19  0704   INR  3.04* 2.41* 1.92* 1.74* 1.49*       Culture Data:   No results found for: BLOODCX  No results found for: URINECX  No results found for: RESPCX  No results found for: WOUNDCX  No results found for: STOOLCX  No components found for: BODYFLD    Radiology Data:   Imaging Results (last 24 hours)     ** No results found for the last 24 hours. **          Scheduled Meds:  cholecalciferol 1,000 Units Oral Daily   citalopram 20 mg Oral Daily   diltiaZEM  mg Oral Daily   ferric gluconate (FERRLECIT) IVPB 125 mg Intravenous Daily   furosemide 40 mg Oral BID   guaiFENesin 600 mg Oral Q12H   insulin aspart 0-9 Units Subcutaneous 4x Daily AC & at Bedtime   ipratropium-albuterol 3 mL Nebulization Q8H - RT   magic butt ointment  Topical BID   metOLazone 2.5 mg Oral Daily   nystatin  Topical Q12H   prenatal vitamin 27-0.8 1 tablet Oral Daily   rOPINIRole 0.25 mg Oral Nightly   sodium chloride 3 mL Intravenous Q12H   traZODone 300 mg Oral Nightly   vitamin C 500 mg Oral Daily   warfarin 2.5 mg Oral Every Other Day   warfarin 5 mg Oral Every Other Day     Continuous Infusions:  dextrose 5 % and sodium chloride 0.45 % 30 mL/hr   Pharmacy to dose warfarin      PRN Meds:.•  acetaminophen  •  dextrose  •  dextrose  •  dextrose 5 % and sodium chloride 0.45 %  •  glucagon (human recombinant)  •  ipratropium-albuterol  •  metoprolol tartrate  •  ondansetron  •  Pharmacy to dose warfarin  •  sodium chloride  •  sodium  chloride    Assessment/Plan       Gastrointestinal hemorrhage    Emphysema of lung (CMS/HCC)    Diabetes mellitus (CMS/HCC)    CKD (chronic kidney disease) stage 3, GFR 30-59 ml/min (CMS/HCC)    Morbid obesity (CMS/HCC)    Gastritis    Colon polyp    Coumadin toxicity        Plan:    1.  Lower GI bleed secondary to colon polyp adenocarcinoma at 30 cm on colonoscopy status post polyp removal patient is not a candidate for surgery, patient is cleared from GI appointment for discharge patient need to be followed by Dr. Guerrero as outpatient for possible sigmoidoscopy as outpatient    2.  Anemia onset of 13%  Patient will finish a course of 1 g of iron IV iron on Friday after that patient can be discharged to rehab    3, status post permanent pacemaker, aortic valve replacement, proximal A. fib  On Coumadin    . CKD stage IV  Follow-up with nephrology as outpatient  Continue diuretic with Lasix and metolazone  Continue IV iron    4 hypertension  Continue current home medication  Follow-up pressure    5.  Generalized weakness  Patient is accepted to rehab  Friday after friends) patient can be discharged to rehab              Discharge Planning: I expect patient to be discharged to rehab in 2 days.    Kerrie Harris MD  07/03/19  1:12 PM

## 2019-07-03 NOTE — PLAN OF CARE
Problem: Patient Care Overview  Goal: Plan of Care Review  Outcome: Ongoing (interventions implemented as appropriate)   07/03/19 1153   Coping/Psychosocial   Plan of Care Reviewed With patient   Plan of Care Review   Progress no change   OTHER   Outcome Summary Pt t/f from bed to [facility] then back to bed with Supervision. Pt performed toilet tasks with [place]. Performed grooming with Cond I, Pt deferred bathing stating she is leaving tody anyway.

## 2019-07-03 NOTE — THERAPY TREATMENT NOTE
Acute Care - Physical Therapy Treatment Note  [facility]     Patient Name: Brendon Skelton  : 1945  MRN: [id number]  Today's Date: 7/3/2019  Onset of Illness/Injury or Date of Surgery: 19  Date of Referral to PT: 19  Referring Physician: LATISHA Reaves MD    Admit Date: 2019    Visit Dx:    ICD-10-CM ICD-9-CM   1. Gastrointestinal hemorrhage, unspecified gastrointestinal hemorrhage type K92.2 578.9   2. Supratherapeutic INR R79.1 790.92   3. SSS (sick sinus syndrome) (CMS/HCC) I49.5 427.81   4. Atherosclerosis of native coronary artery of native heart, angina presence unspecified I25.10 414.01   5. 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 S/P AVR Z95.2 V43.3   7. Impaired physical mobility Z74.09 781.99   8. Impaired mobility and activities of daily living Z74.09 799.89     Patient Active Problem List   Diagnosis   • Long term current use of anticoagulant therapy   • Personal history of heart valve replacement   • Atrial fibrillation [I48.91]   • Emphysema of lung (CMS/[facility])   • On anticoagulant therapy   • Hyperlipidemia   • Diastolic heart failure (CMS/HCC)   • Depressive disorder   • COPD (chronic obstructive pulmonary disease) (CMS/HCC)   • Diabetes mellitus (CMS/HCC)   • Myopia   • Astigmatism   • Bleeding from open wound of chest wall   • Follow-up surgery care   • Encounter for screening for malignant neoplasm of colon   • Positive colorectal cancer screening using DNA-based stool test   • Nodule of left lung   • Chronic hypoxemic respiratory failure (CMS/HCC)   • Physical deconditioning   • Heart failure with preserved left ventricular function (HFpEF) (CMS/HCC)   • Essential hypertension   • Coronary artery disease involving native coronary artery without angina pectoris   • Hx of CABG   • SSS (sick sinus syndrome) (CMS/HCC)   • Pacemaker   • CKD (chronic kidney disease) stage 3, GFR 30-59 ml/min (CMS/HCC)  "  • Personal history of tobacco use, presenting hazards to health   • Gastrointestinal hemorrhage   • Morbid obesity (CMS/HCC)   • Gastritis   • Colon polyp   • Coumadin toxicity       Therapy Treatment    Rehabilitation Treatment Summary     Row Name 07/03/19 0922             Treatment Time/Intention    Discipline  physical therapy assistant  -TW      Document Type  therapy note (daily note)  -TW      Subjective Information  complains of;fatigue  -TW      Mode of Treatment  physical therapy;individual therapy  -TW      Patient/Family Observations  No family present.  Pt presents irritated stating,\"I just want to get some rest PLEASE.\"  Pt was agreeable to bed ex's eventually.  -TW      Patient Effort  adequate  -TW      Existing Precautions/Restrictions  oxygen therapy device and L/min  -TW      Recorded by [TW] Reece Rocha \[facility] [place] [facility]\"" 07/03/19 0957      Row Name 07/03/19 0922             Vital Signs    Pretreatment Heart Rate (beats/min)  71  -TW      Posttreatment Heart Rate (beats/min)  74  -TW      Pre SpO2 (%)  92  -TW      O2 Delivery Pre Treatment  supplemental O2 4L  -TW      Post SpO2 (%)  94  -TW      Pre Patient Position  Supine  -TW      Intra Patient Position  Supine  -TW      Post Patient Position  Supine  -TW      Recorded by [TW] Reece Rocha PTA 07/03/19 0957      Row Name 07/03/19 0922             Cognitive Assessment/Intervention- PT/OT    Affect/Mental Status (Cognitive)  WFL  -TW      Orientation Status (Cognition)  oriented x 4  -TW      Follows Commands (Cognition)  WFL  -TW      Personal Safety Interventions  muscle strengthening facilitated  -TW      Recorded by [TW] Reece Rocha PTA 07/03/19 0957      Row Name 07/03/19 0922             Bed Mobility Assessment/Treatment    Comment (Bed Mobility)  Not tested due to pt declined OOB/EOB actiivty.  -TW      Recorded by [TW] Reece Rocha PTA 07/03/19 0957      Row Name 07/03/19 0922             Therapeutic Exercise    " Lower Extremity (Therapeutic Exercise)  gluteal sets;quad sets, bilateral;heel slides, bilateral;SAQ (short arc quad), bilateral  -TW      Lower Extremity Range of Motion (Therapeutic Exercise)  hip abduction/adduction, bilateral;hip internal/external rotation, bilateral;ankle dorsiflexion/plantar flexion, bilateral  -TW      Exercise Type (Therapeutic Exercise)  AROM (active range of motion)  -TW      Sets/Reps (Therapeutic Exercise)  2/10-15  -TW      Recorded by [TW] Reece Rocha PTA 07/03/19 0957      Row Name 07/03/19 0922             Pain Scale: Numbers Pre/Post-Treatment    Pain Scale: Numbers, Pretreatment  0/10 - no pain  -TW      Pain Scale: Numbers, Post-Treatment  0/10 - no pain  -TW      Recorded by [TW] Reece Rocha PTA 07/03/19 0957      Row Name 07/03/19 0922             Outcome Summary/Treatment Plan (PT)    Daily Summary of Progress (PT)  progress towards functional goals is fair  -TW      Plan for Continued Treatment (PT)  Cont as pt able.  -TW      Anticipated Discharge Disposition (PT)  anticipate therapy at next level of care  -TW      Recorded by [TW] Reece Rocha, YOSEPH 07/03/19 0957        User Key  (r) = Recorded By, (t) = Taken By, (c) = Cosigned By    Initials Name Effective Dates Discipline    TW Reece Rocha, PTA 03/07/18 -  PT          Wound Left posterior arm skin tear (Active)   Dressing Appearance open to air 7/3/2019  8:21 AM   Closure Approximated;Open to air 7/3/2019  8:21 AM   Base scab 7/3/2019  8:21 AM       Rehab Goal Summary     Row Name 07/03/19 0922             Physical Therapy Goals    Bed Mobility Goal Selection (PT)  bed mobility, PT goal 1  -TW      Transfer Goal Selection (PT)  transfer, PT goal 1  -TW      Gait Training Goal Selection (PT)  gait training, PT goal 1  -TW         Bed Mobility Goal 1 (PT)    Activity/Assistive Device (Bed Mobility Goal 1, PT)  sit to supine/supine to sit  -TW      Power Level/Cues Needed (Bed Mobility  Goal 1, PT)  conditional independence  -TW      Time Frame (Bed Mobility Goal 1, PT)  long term goal (LTG);by discharge  -TW      Barriers (Bed Mobility Goal 1, PT)  HOB flat, no bed rails.   -TW      Progress/Outcomes (Bed Mobility Goal 1, PT)  goal partially met  -TW         Transfer Goal 1 (PT)    Activity/Assistive Device (Transfer Goal 1, PT)  sit-to-stand/stand-to-sit;bed-to-chair/chair-to-bed  -TW      [place] Level/Cues Needed (Transfer Goal 1, PT)  conditional independence  -TW      Time Frame (Transfer Goal 1, PT)  long term goal (LTG);by discharge  -TW      Barriers (Transfers Goal 1, PT)  Maintain SpO2 >88%  -TW      Progress/Outcome (Transfer Goal 1, PT)  goal not met;goal ongoing  -TW         Gait Training Goal 1 (PT)    Activity/Assistive Device (Gait Training Goal 1, PT)  walker, rolling  -TW      [place] Level (Gait Training Goal 1, PT)  conditional independence  -TW      Distance (Gait Goal 1, PT)  25'x2  -TW      Time Frame (Gait Training Goal 1, PT)  long term goal (LTG);by discharge  -TW      Barriers (Gait Training Goal 1, PT)  Maintain SpO2 >88%  -TW      Progress/Outcome (Gait Training Goal 1, PT)  goal partially met;goal ongoing met distance; not met level of independence  -TW        User Key  (r) = Recorded By, (t) = Taken By, (c) = Cosigned By    Initials Name Provider Type Discipline    TW Reece Rocha, PTA Physical Therapy Assistant PT          Physical Therapy Education     Title: PT OT SLP Therapies (In Progress)     Topic: Physical Therapy (In Progress)     Point: Mobility training (Done)     Learning Progress Summary           Patient Acceptance, E, VU,NR by LF at 7/1/2019 12:52 PM    Comment:  use of RW for energy conservation    Acceptance, E, VU by CZ at 6/26/2019 11:04 AM    Comment:  Educated on proper hand placement with transfers; educated on energy conservation to improve O2 saturation.                   Point: Precautions (Done)     Learning Progress Summary            Patient Acceptance, SARAH VU by  at 7/1/2019 12:53 PM    Comment:  PLB for maintaining and recovering O2 saturation    Acceptance, E,D, VU by  at 6/28/2019  1:32 PM    Comment:  PLB & energy consevation                               User Key     Initials Effective Dates Name Provider Type Discipline     03/07/18 -  Mary Jo Johnson, PTA Physical Therapy Assistant PT     04/03/18 -  Titi Portillo, PT Physical Therapist PT     07/23/18 -  Fatimah Gavin, OLIVIA Physical Therapist PT                PT Recommendation and Plan  Anticipated Discharge Disposition (PT): anticipate therapy at next level of care  Outcome Summary/Treatment Plan (PT)  Daily Summary of Progress (PT): progress towards functional goals is fair  Plan for Continued Treatment (PT): Cont as pt able.  Anticipated Discharge Disposition (PT): anticipate therapy at next level of care  Plan of Care Reviewed With: patient  Progress: no change  Outcome Summary: Pt irritated initially but agreeable to bed ex's and not agreeable to OOB/EOB activity this am. Pt performed 2 sets of supine B LE ex's for 10-15 reps. Pt will require PT after DC.  Outcome Measures     Row Name 07/03/19 0922 07/02/19 1600 07/01/19 1448       How much help from another person do you currently need...    Turning from your back to your side while in flat bed without using bedrails?  4  -TW  4  -TA  --    Moving from lying on back to sitting on the side of a flat bed without bedrails?  4  -TW  4  -TA  --    Moving to and from a bed to a chair (including a wheelchair)?  3  -TW  3  -TA  --    Standing up from a chair using your arms (e.g., wheelchair, bedside chair)?  3  -TW  3  -TA  --    Climbing 3-5 steps with a railing?  3  -TW  3  -TA  --    To walk in hospital room?  3  -TW  3  -TA  --    AM-PAC 6 Clicks Score (PT)  20  -TW  20  -TA  --       How much help from another is currently needed...    Putting on and taking off regular lower body clothing?  --  --  3  -BB     Bathing (including washing, rinsing, and drying)  --  --  3  -BB    Toileting (which includes using toilet bed pan or urinal)  --  --  3  -BB    Putting on and taking off regular upper body clothing  --  --  3  -BB    Taking care of personal grooming (such as brushing teeth)  --  --  4  -BB    Eating meals  --  --  4  -BB    AM-PAC 6 Clicks Score (OT)  --  --  20  -BB       Functional Assessment    Outcome Measure Options  AM-PAC 6 Clicks Basic Mobility (PT)  -TW  -[facility] 6 Clicks Basic Mobility (PT)  -TA  --    Row Name 07/01/19 1039 06/30/19 1300          How much help from another person do you currently need...    Turning from your back to your side while in flat bed without using bedrails?  4  -LF  4  -TA     Moving from lying on back to sitting on the side of a flat bed without bedrails?  4  -LF  3  -TA     Moving to and from a bed to a chair (including a wheelchair)?  3  -LF  3  -TA     Standing up from a chair using your arms (e.g., wheelchair, bedside chair)?  3  -LF  3  -TA     Climbing 3-5 steps with a railing?  3  -LF  3  -TA     To walk in hospital room?  3  -LF  3  -TA     AM-PAC 6 Clicks Score (PT)  20  -LF  19  -TA        Functional Assessment    Outcome Measure Options  AM-PAC 6 Clicks Basic Mobility (PT)  -LF  AM-PAC 6 Clicks Basic Mobility (PT)  -TA       User Key  (r) = Recorded By, (t) = Taken By, (c) = Cosigned By    Initials Name Provider Type    Mary Jo Santiago, YOSEPH Physical Therapy Assistant    TW Reece Rocha PTA Physical Therapy Assistant    BB Selena Simpson, ELDON/L Occupational Therapy Assistant    LF Fatimah Gavin, PT Physical Therapist         Time Calculation:   PT Charges     Row Name 07/03/19 1000             Time Calculation    Start Time  0922 -TW      Stop Time  0945 -TW      Time Calculation (min)  23 min  -TW      PT Received On  07/03/19 -TW      PT Goal Re-Cert Due Date  07/09/19 -TW         Time Calculation- PT    Total Timed Code Minutes- PT  23 minute(s)   -TW        User Key  (r) = Recorded By, (t) = Taken By, (c) = Cosigned By    Initials Name Provider Type    TW Reece Rocha PTA Physical Therapy Assistant        Therapy Charges for Today     Code Description Service Date Service Provider Modifiers Qty    16320008327 HC PT THER PROC EA 15 MIN 7/3/2019 Reece Rocha PTA GP 2          PT G-Codes  Outcome Measure Options: AM-PAC 6 Clicks Basic Mobility (PT)  AM-PAC 6 Clicks Score (PT): 20  AM-PAC 6 Clicks Score (OT): 20    Reece Rocha PTA  7/3/2019

## 2019-07-03 NOTE — PLAN OF CARE
Problem: Patient Care Overview  Goal: Plan of Care Review  Outcome: Ongoing (interventions implemented as appropriate)   07/03/19 0052   Coping/Psychosocial   Plan of Care Reviewed With patient   Plan of Care Review   Progress improving       Problem: Fall Risk (Adult)  Goal: Absence of Fall  Outcome: Ongoing (interventions implemented as appropriate)   07/03/19 0052   Fall Risk (Adult)   Absence of Fall achieves outcome       Problem: Chronic Obstructive Pulmonary Disease (Adult)  Goal: Signs and Symptoms of Listed Potential Problems Will be Absent, Minimized or Managed (Chronic Obstructive Pulmonary Disease)  Outcome: Ongoing (interventions implemented as appropriate)   07/03/19 0052   Goal/Outcome Evaluation   Problems Assessed (Chronic Obstructive Pulmonary Disease (COPD)) all   Problems Present (COPD, Bronch/Emphy) respiratory compromise;situational response       Problem: Cardiac: Heart Failure (Adult)  Goal: Signs and Symptoms of Listed Potential Problems Will be Absent, Minimized or Managed (Cardiac: Heart Failure)  Outcome: Ongoing (interventions implemented as appropriate)   07/03/19 0052   Goal/Outcome Evaluation   Problems Assessed (Heart Failure) all   Problems Present (Heart Failure) situational response;functional decline/self-care deficit;respiratory compromise       Problem: Skin Injury Risk (Adult)  Goal: Skin Health and Integrity  Outcome: Ongoing (interventions implemented as appropriate)   07/03/19 0052   Skin Injury Risk (Adult)   Skin Health and Integrity achieves outcome

## 2019-07-03 NOTE — PROGRESS NOTES
Continued Stay Note  [facility]     Patient Name: Brendon Skelton  MRN: [id number]  Today's Date: 7/3/2019    Admit Date: 6/18/2019    Discharge Plan     Row Name 07/03/19 1041       Plan    Plan Comments  MD has asked RN DENNIS if patient can receive IV Iron @ SNF. SUZY called and spoke with Esther. Per Esther, her DON and ADON both state they can not do that at their facility. PEr MD, plan to d/c on Friday. Informed Esther of this information. Informed Esther patient will need facility van with 02 tank or PACS with 02 tank. Informed clinical leader that CM not present on Friday and they will have to coordinate transportation. Numbers are in for [facility] in [place]...CLARITA Karimi    Row Name 07/03/19 5637       Plan    Plan Comments  Per Esther, patient can come to her facility today. Patient has 20 fully covered days with Medicare. Esther has called patient and explained information. Left numbers for RN to call report/ fax AVS to [facility] @ d/c. Will inform provider and patient. Will ask patient if transportation will be available or will need PACS transportation...CLARITA Karimi    Row Name 07/03/19 0807       Plan    Plan  [facility]    Patient/Family in Agreement with Plan  yes    Plan Comments  Left message for Esther @ [facility] re: patient d/c to her facility today....CLARITA Karimi        Discharge Codes    No documentation.       Expected Discharge Date and Time     Expected Discharge Date Expected Discharge Time    Jul 3, 2019             Meme Will

## 2019-07-03 NOTE — TELEPHONE ENCOUNTER
I called Mr. Skelton to let her know Josefa would be back in next week and that we were going to canceled her apt with dr. Stevenson on the 8th  and that we are going to get with arturo office to get her an apt

## 2019-07-03 NOTE — PLAN OF CARE
Problem: Patient Care Overview  Goal: Plan of Care Review  Outcome: Ongoing (interventions implemented as appropriate)   07/03/19 0992   Coping/Psychosocial   Plan of Care Reviewed With patient   Plan of Care Review   Progress no change   OTHER   Outcome Summary Pt irritated initially but agreeable to bed ex's and not agreeable to OOB/EOB activity this am. Pt performed 2 sets of supine B LE ex's for 10-15 reps. Pt will require PT after DC.

## 2019-07-03 NOTE — THERAPY TREATMENT NOTE
Acute Care - Occupational Therapy Treatment Note  [facility]     Patient Name: Brendon Skelton  : 1945  MRN: [id number]  Today's Date: 7/3/2019  Onset of Illness/Injury or Date of Surgery: 19  Date of Referral to OT: 19  Referring Physician: LATISHA Reaves MD    Admit Date: 2019       ICD-10-CM ICD-9-CM   1. Gastrointestinal hemorrhage, unspecified gastrointestinal hemorrhage type K92.2 578.9   2. Supratherapeutic INR R79.1 790.92   3. SSS (sick sinus syndrome) (CMS/HCC) I49.5 427.81   4. Atherosclerosis of native coronary artery of native heart, angina presence unspecified I25.10 414.01   5. 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 S/P AVR Z95.2 V43.3   7. Impaired physical mobility Z74.09 781.99   8. Impaired mobility and activities of daily living Z74.09 799.89     Patient Active Problem List   Diagnosis   • Long term current use of anticoagulant therapy   • Personal history of heart valve replacement   • Atrial fibrillation [I48.91]   • Emphysema of lung (CMS/HCC)   • On anticoagulant therapy   • Hyperlipidemia   • Diastolic heart failure (CMS/HCC)   • Depressive disorder   • COPD (chronic obstructive pulmonary disease) (CMS/HCC)   • Diabetes mellitus (CMS/HCC)   • Myopia   • Astigmatism   • Bleeding from open wound of chest wall   • Follow-up surgery care   • Encounter for screening for malignant neoplasm of colon   • Positive colorectal cancer screening using DNA-based stool test   • Nodule of left lung   • Chronic hypoxemic respiratory failure (CMS/HCC)   • Physical deconditioning   • Heart failure with preserved left ventricular function (HFpEF) (CMS/HCC)   • Essential hypertension   • Coronary artery disease involving native coronary artery without angina pectoris   • Hx of CABG   • SSS (sick sinus syndrome) (CMS/HCC)   • Pacemaker   • CKD (chronic kidney disease) stage 3, GFR 30-59 ml/min (CMS/HCC)   •  Personal history of tobacco use, presenting hazards to health   • Gastrointestinal hemorrhage   • Morbid obesity (CMS/HCC)   • Gastritis   • Colon polyp   • Coumadin toxicity     Past Medical History:   Diagnosis Date   • Acute bronchitis    • Anxiety    • Atrial fibrillation (CMS/HCC)    • C. difficile colitis    • Callosity     under metatarsal head      • Cardiac pacemaker in situ    • CHF (congestive heart failure) (CMS/HCC)    • Chronic obstructive lung disease (CMS/HCC)    • Corns and callus    • Depressive disorder    • Diabetes mellitus (CMS/HCC)    • Diastolic heart failure (CMS/HCC)    • Essential hypertension    • Foot pain    • Hyperlipidemia    • Long term current use of anticoagulant    • On anticoagulant therapy    • Pulmonary emphysema (CMS/HCC)    • Rectal hemorrhage    • Type 2 diabetes mellitus (CMS/HCC)      Past Surgical History:   Procedure Laterality Date   • AORTIC VALVE REPAIR/REPLACEMENT  1999   • CARDIAC ELECTROPHYSIOLOGY PROCEDURE N/A 3/20/2017    Procedure: PPM generator change - dual;  Surgeon: Bereket Gates MD;  Location: [facility] CATH INVASIVE LOCATION;  Service:    • CARDIAC PACEMAKER PLACEMENT  1999   • CATARACT EXTRACTION W/ INTRAOCULAR LENS IMPLANT Left 2/1/2019    Procedure: REMOVE CATARACT AND IMPLANT INTRAOCULAR LENS I;  Surgeon: Jose L Clay MD;  Location: [facility] OR;  Service: Ophthalmology   • CATARACT EXTRACTION W/ INTRAOCULAR LENS IMPLANT Right 2/8/2019    Procedure: REMOVE CATARACT AND IMPLANT  INTRAOCULAR LENS;  Surgeon: Jose L Clay MD;  Location: [facility] OR;  Service: Ophthalmology   • COLONOSCOPY N/A 6/19/2019    Procedure: COLONOSCOPY WITH CONTROL OF BLEED;  Surgeon: Alfredo Guerrero MD;  Location: [facility] ENDOSCOPY;  Service: Gastroenterology   • COLONOSCOPY N/A 6/24/2019    Procedure: COLONOSCOPY;  Surgeon: Alfredo Guerrero MD;  Location: [facility] ENDOSCOPY;  Service: Gastroenterology   • ECHO - CONVERTED  11/12/2013    There is mild to moderate  "left atrial enlargement EF 50-55%   • ENDOSCOPY N/A 6/19/2019    Procedure: ESOPHAGOGASTRODUODENOSCOPY WITH CONTROL OF BLEED;  Surgeon: Alfredo Guerrero MD;  Location: [facility] ENDOSCOPY;  Service: Gastroenterology   • FOOT SURGERY  1990   • OTHER SURGICAL HISTORY  10/26/2015    PARING CORN/CALLUS    • PACEMAKER REPLACEMENT N/A 3/21/2017    Procedure: revision pacemaker pocket, evacuation hematoma, control of bleeding;  Surgeon: Polo Feliz MD;  Location: [facility] OR;  Service:    • TRANSESOPHAGEAL ECHOCARDIOGRAM (MITZY)  05/01/2014    With color flow-Mild left atrial enlargement with mild concentric LV hypertrophy with top normal aortic root size. EF 45-50%. Mild mitral regurgitation and mild aortic insufficiency and trivial amount of tricuspid regurgation   • TUBAL ABDOMINAL LIGATION         Therapy Treatment    Rehabilitation Treatment Summary     Row Name 07/03/19 0922 07/03/19 0715          Treatment Time/Intention    Discipline  physical therapy assistant  -TW  occupational therapy assistant  -LW     Document Type  therapy note (daily note)  -TW  therapy note (daily note)  -LW     Subjective Information  complains of;fatigue  -TW  no complaints  -LW     Mode of Treatment  physical therapy;individual therapy  -TW  occupational therapy  -LW     Patient/Family Observations  No family present.  Pt presents irritated stating,\"I just want to get some rest PLEASE.\"  Pt was agreeable to bed ex's eventually.  -TW  Pt long sitting in bed No family present.  -LW     Care Plan Review  --  care plan/treatment goals reviewed  -LW     Total Minutes, Occupational Therapy Treatment  --  30  -LW     Therapy Frequency (OT Eval)  --  other (see comments) 5-7 days per week  -LW     Patient Effort  adequate  -TW  adequate  -LW     Existing Precautions/Restrictions  oxygen therapy device and L/min  -TW  oxygen therapy device and L/min  -LW     Equipment Issued to Patient  --  gait belt  -LW     Recorded by [TW] Aj, " Reece AMOS, PTA 07/03/19 0957 [LW] Marisela Medeiros COLÓN/L 07/03/19 1151     Row Name 07/03/19 0922 07/03/19 0715          Vital Signs    Pre Systolic BP Rehab  --  126  -LW     Pre Treatment Diastolic BP  --  73  -LW     Pretreatment Heart Rate (beats/min)  71  -TW  72  -LW     Posttreatment Heart Rate (beats/min)  74  -TW  79  -LW     Pre SpO2 (%)  92  -TW  94  -LW     O2 Delivery Pre Treatment  supplemental O2 4L  -TW  supplemental O2  -LW     Post SpO2 (%)  94  -TW  90  -LW     O2 Delivery Post Treatment  --  supplemental O2  -LW     Pre Patient Position  Supine  -TW  Supine  -LW     Intra Patient Position  Supine  -TW  --     Post Patient Position  Supine  -TW  Sitting  -LW     Recorded by [TW] Reece Rocha, PTA 07/03/19 0957 [LW] Marisela Medeiros COTA/L 07/03/19 1151     Row Name 07/03/19 0922 07/03/19 0715          Cognitive Assessment/Intervention- PT/OT    Affect/Mental Status (Cognitive)  WFL  -TW  WFL  -LW     Orientation Status (Cognition)  oriented x 4  -TW  oriented x 4  -LW     Follows Commands (Cognition)  WFL  -TW  WFL  -LW     Personal Safety Interventions  muscle strengthening facilitated  -TW  fall prevention program maintained;gait belt;nonskid shoes/slippers when out of bed  -LW     Recorded by [TW] Reece Rocha, PTA 07/03/19 0957 [LW] Marisela Medeiros COTA/L 07/03/19 1151     Row Name 07/03/19 0715             Safety Issues, Functional Mobility    Impairments Affecting Function (Mobility)  shortness of breath  -LW      Recorded by [LW] Marisela Medeiros COTA/L 07/03/19 1151      Row Name 07/03/19 0922 07/03/19 0715          Bed Mobility Assessment/Treatment    Bed Mobility Assessment/Treatment  --  supine-sit;sit-supine  -LW     Supine-Sit [place] (Bed Mobility)  --  independent  -LW     Sit-Supine [place] (Bed Mobility)  --  conditional independence  -LW     Assistive Device (Bed Mobility)  --  bed rails  -LW     Comment (Bed Mobility)  Not tested due to pt declined  OOB/EOB actiivty.  -TW  --     Recorded by [TW] Reece Rocha, YOSEPH 07/03/19 0957 [LW] Marisela Medeiros COTA/L 07/03/19 1151     Row Name 07/03/19 0715             Transfer Assessment/Treatment    Transfer Assessment/Treatment  sit-stand transfer;stand-sit transfer;toilet transfer  -LW      Recorded by [LW] Marisela Medeiros COTA/L 07/03/19 1151      Row Name 07/03/19 0715             Sit-Stand Transfer    Sit-Stand [place] (Transfers)  supervision  -LW      Assistive Device (Sit-Stand Transfers)  walker, front-wheeled  -LW      Recorded by [LW] Marisela Medeiros COTA/L 07/03/19 1151      Row Name 07/03/19 0715             Stand-Sit Transfer    Stand-Sit [place] (Transfers)  supervision  -LW      Assistive Device (Stand-Sit Transfers)  walker, front-wheeled  -LW      Recorded by [LW] Marisela Medeiros COTA/L 07/03/19 1151      Row Name 07/03/19 0715             Toilet Transfer    Type (Toilet Transfer)  sit-stand;stand-sit  -LW      [place] Level (Toilet Transfer)  supervision  -LW      Assistive Device (Toilet Transfer)  walker, front-wheeled  -LW      Recorded by [LW] Marisela Medeiros COTA/L 07/03/19 1151      Row Name 07/03/19 0715             ADL Assessment/Intervention    BADL Assessment/Intervention  grooming;toileting  -LW      Recorded by [LW] Marisela Medeiros COTA/L 07/03/19 1151      Row Name 07/03/19 0715             Grooming Assessment/Training    [place] Level (Grooming)  grooming skills;oral care regimen;wash face, hands;conditional independence;set up  -LW      Grooming Position  edge of bed sitting  -LW      Recorded by [LW] Marisela Medeiros COTA/L 07/03/19 1151      Row Name 07/03/19 0715             Toileting Assessment/Training    [place] Level (Toileting)  toileting skills;bridging;perform perineal hygiene;independent  -LW      Assistive Devices (Toileting)  commode, bedside with drop arms  -LW      Toileting Position  supported sitting  -LW      Recorded by [LW]  Marisela Medeiros COTA/L 07/03/19 1151      Row Name 07/03/19 0715             BADL Safety/Performance    Impairments, BADL Safety/Performance  endurance/activity tolerance  -LW      Recorded by [LW] Marisela Medeiros COTA/L 07/03/19 1151      Row Name 07/03/19 0922             Therapeutic Exercise    Lower Extremity (Therapeutic Exercise)  gluteal sets;quad sets, bilateral;heel slides, bilateral;SAQ (short arc quad), bilateral  -TW      Lower Extremity Range of Motion (Therapeutic Exercise)  hip abduction/adduction, bilateral;hip internal/external rotation, bilateral;ankle dorsiflexion/plantar flexion, bilateral  -TW      Exercise Type (Therapeutic Exercise)  AROM (active range of motion)  -TW      Sets/Reps (Therapeutic Exercise)  2/10-15  -TW      Recorded by [TW] Reece Rocha PTA 07/03/19 0957      Row Name 07/03/19 0715             Positioning and Restraints    Pre-Treatment Position  in bed  -LW      Post Treatment Position  bed  -LW      In Bed  fowlers;call light within reach;encouraged to call for assist;exit alarm on  -LW      Recorded by [LW] Marisela Medeiros COTA/L 07/03/19 1151      Row Name 07/03/19 0922 07/03/19 0715          Pain Scale: Numbers Pre/Post-Treatment    Pain Scale: Numbers, Pretreatment  0/10 - no pain  -TW  0/10 - no pain  -LW     Pain Scale: Numbers, Post-Treatment  0/10 - no pain  -TW  0/10 - no pain  -LW     Recorded by [TW] Reece Rocha PTA 07/03/19 0957 [LW] Marisela Medeiros COTA/L 07/03/19 1151     Row Name 07/03/19 0715             Coping    Observed Emotional State  accepting;calm;cooperative  -LW      Verbalized Emotional State  acceptance  -LW      Recorded by [LW] Marisela Medeiros COTA/L 07/03/19 1151      Row Name 07/03/19 0715             Plan of Care Review    Plan of Care Reviewed With  patient  -LW      Recorded by [LW] Marisela Medeiros COTA/L 07/03/19 1151      Row Name 07/03/19 0922 07/03/19 0715          Outcome Summary/Treatment Plan (OT)    Daily  Summary of Progress (OT)  --  progress toward functional goals is good  -LW     Plan for Continued Treatment (OT)  --  Continue POC  -LW     Anticipated Discharge Disposition (OT)  anticipate therapy at next level of care  -LW  --     Recorded by [LW] Marisela Medeiros COTA/DANDRE 07/03/19 1151 [LW] Marisela Medeiros COTA/L 07/03/19 1151     Row Name 07/03/19 0922             Outcome Summary/Treatment Plan (PT)    Daily Summary of Progress (PT)  progress towards functional goals is fair  -TW      Plan for Continued Treatment (PT)  Cont as pt able.  -TW      Anticipated Discharge Disposition (PT)  anticipate therapy at next level of care  -TW      Recorded by [TW] Reece Rocha, PTA 07/03/19 0957        User Key  (r) = Recorded By, (t) = Taken By, (c) = Cosigned By    Initials Name Effective Dates Discipline    TW Reece Rocha, PTA 03/07/18 -  PT    LW Marisela Medeiros COTA/L 03/07/18 -  OT        Wound Left posterior arm skin tear (Active)   Dressing Appearance open to air 7/3/2019  8:21 AM   Closure Approximated;Open to air 7/3/2019  8:21 AM   Base scab 7/3/2019  8:21 AM     Rehab Goal Summary     Row Name 07/03/19 0922 07/03/19 0715          Physical Therapy Goals    Bed Mobility Goal Selection (PT)  bed mobility, PT goal 1  -TW  --     Transfer Goal Selection (PT)  transfer, PT goal 1  -TW  --     Gait Training Goal Selection (PT)  gait training, PT goal 1  -TW  --        Bed Mobility Goal 1 (PT)    Activity/Assistive Device (Bed Mobility Goal 1, PT)  sit to supine/supine to sit  -TW  --     [place] Level/Cues Needed (Bed Mobility Goal 1, PT)  conditional independence  -TW  --     Time Frame (Bed Mobility Goal 1, PT)  long term goal (LTG);by discharge  -TW  --     Barriers (Bed Mobility Goal 1, PT)  HOB flat, no bed rails.   -TW  --     Progress/Outcomes (Bed Mobility Goal 1, PT)  goal partially met  -TW  --        Transfer Goal 1 (PT)    Activity/Assistive Device (Transfer Goal 1, PT)   sit-to-stand/stand-to-sit;bed-to-chair/chair-to-bed  -TW  --     [place]astian Level/Cues Needed (Transfer Goal 1, PT)  conditional independence  -TW  --     Time Frame (Transfer Goal 1, PT)  long term goal (LTG);by discharge  -TW  --     Barriers (Transfers Goal 1, PT)  Maintain SpO2 >88%  -TW  --     Progress/Outcome (Transfer Goal 1, PT)  goal not met;goal ongoing  -TW  --        Gait Training Goal 1 (PT)    Activity/Assistive Device (Gait Training Goal 1, PT)  walker, rolling  -TW  --     Sebastian Level (Gait Training Goal 1, PT)  conditional independence  -TW  --     Distance (Gait Goal 1, PT)  25'x2  -TW  --     Time Frame (Gait Training Goal 1, PT)  long term goal (LTG);by discharge  -TW  --     Barriers (Gait Training Goal 1, PT)  Maintain SpO2 >88%  -TW  --     Progress/Outcome (Gait Training Goal 1, PT)  goal partially met;goal ongoing met distance; not met level of independence  -TW  --        Occupational Therapy Goals    Transfer Goal Selection (OT)  --  transfer, OT goal 1  -LW     Bathing Goal Selection (OT)  --  bathing, OT goal 1  -LW     Dressing Goal Selection (OT)  --  dressing, OT goal 1  -LW     Toileting Goal Selection (OT)  --  toileting, OT goal 1  -LW     Activity Tolerance Goal Selection (OT)  --  activity tolerance, OT goal 1  -LW        Transfer Goal 1 (OT)    Activity/Assistive Device (Transfer Goal 1, OT)  --  sit-to-stand/stand-to-sit;toilet;walker, rolling  -LW     [place]astian Level/Cues Needed (Transfer Goal 1, OT)  --  conditional independence  -LW     Time Frame (Transfer Goal 1, OT)  --  long term goal (LTG);by discharge  -LW     Progress/Outcome (Transfer Goal 1, OT)  --  goal not met  -LW        Bathing Goal 1 (OT)    Activity/Assistive Device (Bathing Goal 1, OT)  --  bathing skills, all  -LW     [place]astian Level/Cues Needed (Bathing Goal 1, OT)  --  set-up required;supervision required  -LW     Time Frame (Bathing Goal 1, OT)  --  long term goal (LTG);by discharge  -LW      Progress/Outcomes (Bathing Goal 1, OT)  --  goal not met  -LW        Dressing Goal 1 (OT)    Activity/Assistive Device (Dressing Goal 1, OT)  --  dressing skills, all  -LW     [place]/Cues Needed (Dressing Goal 1, OT)  --  conditional independence  -LW     Time Frame (Dressing Goal 1, OT)  --  long term goal (LTG);by discharge  -LW     Progress/Outcome (Dressing Goal 1, OT)  --  goal not met  -LW        Toileting Goal 1 (OT)    Activity/Device (Toileting Goal 1, OT)  --  toileting skills, all  -LW     [place] Level/Cues Needed (Toileting Goal 1, OT)  --  conditional independence  -LW     Time Frame (Toileting Goal 1, OT)  --  long term goal (LTG);by discharge  -LW     Progress/Outcome (Toileting Goal 1, OT)  --  goal met  (Significant)   -LW         Activity Tolerance Goal 1 (OT)    Activity Level (Endurance Goal 1, OT)  --  5 min activity;O2 sat >/ equal to 88%  -LW     Time Frame (Activity Tolerance Goal 1, OT)  --  long term goal (LTG)  -LW     Progress/Outcome (Activity Tolerance Goal 1, OT)  --  goal met  (Significant)   -LW       User Key  (r) = Recorded By, (t) = Taken By, (c) = Cosigned By    Initials Name Provider Type Discipline    TW Reece Rocha, PTA Physical Therapy Assistant PT    Marisela Tafoya COTA/L Occupational Therapy Assistant OT        Occupational Therapy Education     Title: PT OT SLP Therapies (In Progress)     Topic: Occupational Therapy (Done)     Point: ADL training (Done)     Description: Instruct learner(s) on proper safety adaptation and remediation techniques during self care or transfers.   Instruct in proper use of assistive devices.    Learning Progress Summary           Patient Acceptance, E,TB, VU by DONG at 7/3/2019 11:53 AM    Acceptance, E, VU by FRANCES at 7/1/2019  3:55 PM    Acceptance, E, VU by FRANCES at 6/28/2019  2:44 PM    Acceptance, E,TB, VU by  at 6/26/2019  1:41 PM    Comment:  Role of OT and POC, benefit of activity, PLB, EC/WS                    Point: Home exercise program (Done)     Description: Instruct learner(s) on appropriate technique for monitoring, assisting and/or progressing therapeutic exercises/activities.    Learning Progress Summary           Patient Acceptance, E,TB, VU by LW at 7/3/2019 11:53 AM                   Point: Precautions (Done)     Description: Instruct learner(s) on prescribed precautions during self-care and functional transfers.    Learning Progress Summary           Patient Acceptance, E,TB, VU by LW at 7/3/2019 11:53 AM    Acceptance, E,TB, VU by MR at 6/26/2019  1:41 PM    Comment:  Role of OT and POC, benefit of activity, PLB, EC/WS                   Point: Body mechanics (Done)     Description: Instruct learner(s) on proper positioning and spine alignment during self-care, functional mobility activities and/or exercises.    Learning Progress Summary           Patient Acceptance, E,TB, VU by LW at 7/3/2019 11:53 AM    Acceptance, E, VU by BB at 7/1/2019  3:55 PM    Acceptance, E, VU by BB at 6/28/2019  2:44 PM                               User Key     Initials Effective Dates Name Provider Type Discipline     03/07/18 -  Selena Simpson COTA/L Occupational Therapy Assistant OT    LW 03/07/18 -  Marisela Medeiros COTA/L Occupational Therapy Assistant OT    MR 04/03/18 -  Daisy Duong, OT Occupational Therapist OT                OT Recommendation and Plan  Outcome Summary/Treatment Plan (OT)  Daily Summary of Progress (OT): progress toward functional goals is good  Plan for Continued Treatment (OT): Continue POC  Anticipated Discharge Disposition (OT): anticipate therapy at next level of care  Therapy Frequency (OT Eval): other (see comments)(5-7 days per week)  Daily Summary of Progress (OT): progress toward functional goals is good  Plan of Care Review  Plan of Care Reviewed With: patient  Plan of Care Reviewed With: patient  Outcome Summary: Pt t/f from bed to BSC then back to bed with Supervision. Pt  performed toilet tasks with Green. Performed grooming with Cond I, Pt deferred bathing stating she is leaving to anyway.   Outcome Measures     Row Name 07/03/19 1100 07/03/19 0922 07/02/19 1600       How much help from another person do you currently need...    Turning from your back to your side while in flat bed without using bedrails?  --  4  -TW  4  -TA    Moving from lying on back to sitting on the side of a flat bed without bedrails?  --  4  -TW  4  -TA    Moving to and from a bed to a chair (including a wheelchair)?  --  3  -TW  3  -TA    Standing up from a chair using your arms (e.g., wheelchair, bedside chair)?  --  3  -TW  3  -TA    Climbing 3-5 steps with a railing?  --  3  -TW  3  -TA    To walk in hospital room?  --  3  -TW  3  -TA    AM-PAC 6 Clicks Score (PT)  --  20  -TW  20  -TA       How much help from another is currently needed...    Putting on and taking off regular lower body clothing?  3  -LW  --  --    Bathing (including washing, rinsing, and drying)  3  -LW  --  --    Toileting (which includes using toilet bed pan or urinal)  4  -LW  --  --    Putting on and taking off regular upper body clothing  3  -LW  --  --    Taking care of personal grooming (such as brushing teeth)  4  -LW  --  --    Eating meals  4  -LW  --  --    AM-PAC 6 Clicks Score (OT)  21  -LW  --  --       Functional Assessment    Outcome Measure Options  --  AM-PAC 6 Clicks Basic Mobility (PT)  -TW  AM-PAC 6 Clicks Basic Mobility (PT)  -TA    Row Name 07/01/19 1448 07/01/19 1039 06/30/19 1300       How much help from another person do you currently need...    Turning from your back to your side while in flat bed without using bedrails?  --  4  -LF  4  -TA    Moving from lying on back to sitting on the side of a flat bed without bedrails?  --  4  -LF  3  -TA    Moving to and from a bed to a chair (including a wheelchair)?  --  3  -LF  3  -TA    Standing up from a chair using your arms (e.g., wheelchair, bedside  chair)?  --  3  -LF  3  -TA    Climbing 3-5 steps with a railing?  --  3  -LF  3  -TA    To walk in hospital room?  --  3  -LF  3  -TA    AM-PAC 6 Clicks Score (PT)  --  20  -LF  19  -TA       How much help from another is currently needed...    Putting on and taking off regular lower body clothing?  3  -BB  --  --    Bathing (including washing, rinsing, and drying)  3  -BB  --  --    Toileting (which includes using toilet bed pan or urinal)  3  -BB  --  --    Putting on and taking off regular upper body clothing  3  -BB  --  --    Taking care of personal grooming (such as brushing teeth)  4  -BB  --  --    Eating meals  4  -BB  --  --    AM-PAC 6 Clicks Score (OT)  20  -BB  --  --       Functional Assessment    Outcome Measure Options  --  AM-PAC 6 Clicks Basic Mobility (PT)  -LF  AM-PAC 6 Clicks Basic Mobility (PT)  -TA      User Key  (r) = Recorded By, (t) = Taken By, (c) = Cosigned By    Initials Name Provider Type    TA Mary Jo Johnson, PTA Physical Therapy Assistant    TW Reece Rocha, PTA Physical Therapy Assistant    BB Selena Simpson COTA/L Occupational Therapy Assistant    Marisela Tafoya COTA/L Occupational Therapy Assistant    LF Fatimah Gavin, PT Physical Therapist           Time Calculation:   Time Calculation- OT     Row Name 07/03/19 1155             Time Calculation- OT    OT Start Time  0715  -LW      OT Stop Time  0745  -LW      OT Time Calculation (min)  30 min  -LW      Total Timed Code Minutes- OT  30 minute(s)  -LW      OT Received On  07/03/19  -        User Key  (r) = Recorded By, (t) = Taken By, (c) = Cosigned By    Initials Name Provider Type    Marisela Tafoya COTA/L Occupational Therapy Assistant        Therapy Charges for Today     Code Description Service Date Service Provider Modifiers Qty    [id number]  OT SELF CARE/MGMT/TRAIN EA 15 MIN 7/3/2019 Marisela Medeiros COTA/L GO 2      Non-skid socks and gait belt in place. Toileting offered. Call light and needs  within reach. Pt advised to not get up alone and call the nurse for assistance.  Bed alarm on.            YSABEL Mejia  7/3/2019

## 2019-07-04 LAB
ANION GAP SERPL CALCULATED.3IONS-SCNC: 11 MMOL/L (ref 5–15)
BUN BLD-MCNC: 73 MG/DL (ref 8–23)
BUN/CREAT SERPL: 32.4 (ref 7–25)
CALCIUM SPEC-SCNC: 9.6 MG/DL (ref 8.6–10.5)
CHLORIDE SERPL-SCNC: 100 MMOL/L (ref 98–107)
CO2 SERPL-SCNC: 31 MMOL/L (ref 22–29)
CREAT BLD-MCNC: 2.25 MG/DL (ref 0.57–1)
DEPRECATED RDW RBC AUTO: 70.7 FL (ref 37–54)
ERYTHROCYTE [DISTWIDTH] IN BLOOD BY AUTOMATED COUNT: 20.6 % (ref 12.3–15.4)
GFR SERPL CREATININE-BSD FRML MDRD: 21 ML/MIN/1.73
GLUCOSE BLD-MCNC: 118 MG/DL (ref 65–99)
GLUCOSE BLDC GLUCOMTR-MCNC: 114 MG/DL (ref 70–130)
GLUCOSE BLDC GLUCOMTR-MCNC: 117 MG/DL (ref 70–130)
GLUCOSE BLDC GLUCOMTR-MCNC: 120 MG/DL (ref 70–130)
GLUCOSE BLDC GLUCOMTR-MCNC: 187 MG/DL (ref 70–130)
HCT VFR BLD AUTO: 29.6 % (ref 34–46.6)
HGB BLD-MCNC: 9.5 G/DL (ref 12–15.9)
INR PPP: 3.06 (ref 0.8–1.2)
MCH RBC QN AUTO: 31 PG (ref 26.6–33)
MCHC RBC AUTO-ENTMCNC: 32.1 G/DL (ref 31.5–35.7)
MCV RBC AUTO: 96.7 FL (ref 79–97)
PLATELET # BLD AUTO: 205 10*3/MM3 (ref 140–450)
PMV BLD AUTO: 10.4 FL (ref 6–12)
POTASSIUM BLD-SCNC: 4.6 MMOL/L (ref 3.5–5.2)
PROTHROMBIN TIME: 31.4 SECONDS (ref 11.1–15.3)
RBC # BLD AUTO: 3.06 10*6/MM3 (ref 3.77–5.28)
SODIUM BLD-SCNC: 142 MMOL/L (ref 136–145)
WBC NRBC COR # BLD: 8.25 10*3/MM3 (ref 3.4–10.8)

## 2019-07-04 PROCEDURE — 82962 GLUCOSE BLOOD TEST: CPT

## 2019-07-04 PROCEDURE — 85610 PROTHROMBIN TIME: CPT | Performed by: INTERNAL MEDICINE

## 2019-07-04 PROCEDURE — 63710000001 INSULIN ASPART PER 5 UNITS: Performed by: INTERNAL MEDICINE

## 2019-07-04 PROCEDURE — 97110 THERAPEUTIC EXERCISES: CPT

## 2019-07-04 PROCEDURE — 85027 COMPLETE CBC AUTOMATED: CPT | Performed by: FAMILY MEDICINE

## 2019-07-04 PROCEDURE — 80048 BASIC METABOLIC PNL TOTAL CA: CPT | Performed by: INTERNAL MEDICINE

## 2019-07-04 PROCEDURE — 94799 UNLISTED PULMONARY SVC/PX: CPT

## 2019-07-04 PROCEDURE — 97535 SELF CARE MNGMENT TRAINING: CPT

## 2019-07-04 PROCEDURE — 94760 N-INVAS EAR/PLS OXIMETRY 1: CPT

## 2019-07-04 PROCEDURE — 25010000002 NA FERRIC GLUC CPLX PER 12.5 MG: Performed by: INTERNAL MEDICINE

## 2019-07-04 RX ORDER — FUROSEMIDE 40 MG/1
40 TABLET ORAL DAILY
Status: DISCONTINUED | OUTPATIENT
Start: 2019-07-05 | End: 2019-07-05 | Stop reason: HOSPADM

## 2019-07-04 RX ADMIN — CITALOPRAM HYDROBROMIDE 20 MG: 20 TABLET ORAL at 10:14

## 2019-07-04 RX ADMIN — SODIUM CHLORIDE 125 MG: 9 INJECTION, SOLUTION INTRAVENOUS at 10:17

## 2019-07-04 RX ADMIN — NYSTATIN: 100000 POWDER TOPICAL at 10:18

## 2019-07-04 RX ADMIN — ROPINIROLE HYDROCHLORIDE 0.25 MG: 0.25 TABLET, FILM COATED ORAL at 20:20

## 2019-07-04 RX ADMIN — GUAIFENESIN 600 MG: 600 TABLET, EXTENDED RELEASE ORAL at 20:20

## 2019-07-04 RX ADMIN — SODIUM CHLORIDE, PRESERVATIVE FREE 3 ML: 5 INJECTION INTRAVENOUS at 10:17

## 2019-07-04 RX ADMIN — IPRATROPIUM BROMIDE AND ALBUTEROL SULFATE 3 ML: 2.5; .5 SOLUTION RESPIRATORY (INHALATION) at 16:02

## 2019-07-04 RX ADMIN — VITAMIN D, TAB 1000IU (100/BT) 1000 UNITS: 25 TAB at 10:13

## 2019-07-04 RX ADMIN — SODIUM CHLORIDE, PRESERVATIVE FREE 3 ML: 5 INJECTION INTRAVENOUS at 20:20

## 2019-07-04 RX ADMIN — TRAZODONE HYDROCHLORIDE 300 MG: 150 TABLET ORAL at 20:20

## 2019-07-04 RX ADMIN — FUROSEMIDE 40 MG: 40 TABLET ORAL at 10:14

## 2019-07-04 RX ADMIN — GUAIFENESIN 600 MG: 600 TABLET, EXTENDED RELEASE ORAL at 10:14

## 2019-07-04 RX ADMIN — HYDROCORTISONE: 1 CREAM TOPICAL at 10:17

## 2019-07-04 RX ADMIN — NYSTATIN: 100000 POWDER TOPICAL at 20:23

## 2019-07-04 RX ADMIN — IPRATROPIUM BROMIDE AND ALBUTEROL SULFATE 3 ML: 2.5; .5 SOLUTION RESPIRATORY (INHALATION) at 07:36

## 2019-07-04 RX ADMIN — OXYCODONE HYDROCHLORIDE AND ACETAMINOPHEN 500 MG: 500 TABLET ORAL at 10:13

## 2019-07-04 RX ADMIN — DILTIAZEM HYDROCHLORIDE 240 MG: 240 CAPSULE, COATED, EXTENDED RELEASE ORAL at 10:14

## 2019-07-04 RX ADMIN — PRENATAL VIT W/ FE FUMARATE-FA TAB 27-0.8 MG 1 TABLET: 27-0.8 TAB at 10:13

## 2019-07-04 RX ADMIN — INSULIN ASPART 2 UNITS: 100 INJECTION, SOLUTION INTRAVENOUS; SUBCUTANEOUS at 20:20

## 2019-07-04 RX ADMIN — METOLAZONE 2.5 MG: 2.5 TABLET ORAL at 10:13

## 2019-07-04 RX ADMIN — HYDROCORTISONE: 1 CREAM TOPICAL at 20:23

## 2019-07-04 NOTE — PROGRESS NOTES
"Anticoagulation by Pharmacy - Warfarin    Brendon Skelton is a 73 y.o.female  [Ht: 165.1 cm (65\"); Wt: 118 kg (261 lb 1.6 oz)] on Warfarin for indication of aortic valve replacement.    Goal INR: 2-3  Home regimen: Alternating of 5 mg and 2.5 mg daily     Today's INR:   Lab Results   Component Value Date    INR 3.06 (H) 07/04/2019         Lab Results   Component Value Date    INR 3.06 (H) 07/04/2019    INR 3.04 (H) 07/03/2019    INR 2.41 (H) 07/02/2019    PROTIME 31.4 (H) 07/04/2019    PROTIME 31.2 (H) 07/03/2019    PROTIME 26.0 (H) 07/02/2019     Lab Results   Component Value Date    HGB 9.5 (L) 07/04/2019    HGB 9.1 (L) 07/03/2019    HGB 8.8 (L) 07/02/2019     Lab Results   Component Value Date    HCT 29.6 (L) 07/04/2019    HCT 28.8 (L) 07/03/2019    HCT 27.0 (L) 07/02/2019     Lab Results   Component Value Date     07/04/2019     07/03/2019     07/02/2019       Recent Warfarin Administrations       The 5 most recent administrations since 06/27/2019 are shown below each listed medication.    Warfarin Sodium         Order Dose Date Given     warfarin (COUMADIN) tablet 1 mg 1 mg 07/03/2019     warfarin (COUMADIN) tablet 2.5 mg 2.5 mg 07/02/2019     warfarin (COUMADIN) tablet 5 mg 5 mg 07/01/2019     warfarin (COUMADIN) tablet 2.5 mg 2.5 mg 06/30/2019     warfarin (COUMADIN) tablet 5 mg 5 mg 06/29/2019                      Assessment/Plan:  Reviewed above labs. INR is 3.06.  INR is above goal. No changes in medication list. Concurrent medications include citalopram and trazodone. Pt did receive dose of warfarin 1 mg last night.  Will HOLD warfarin tonight. Rx will continue to follow and adjust dose accordingly.      Carleen Ashby, [facility]  07/04/19 2:21 PM     "

## 2019-07-04 NOTE — THERAPY TREATMENT NOTE
Acute Care - Physical Therapy Treatment Note  [facility]     Patient Name: Brendon Skelton  : 1945  MRN: [id number]  Today's Date: 2019  Onset of Illness/Injury or Date of Surgery: 19  Date of Referral to PT: 19  Referring Physician: LATISHA Reaves MD    Admit Date: 2019    Visit Dx:    ICD-10-CM ICD-9-CM   1. Gastrointestinal hemorrhage, unspecified gastrointestinal hemorrhage type K92.2 578.9   2. Supratherapeutic INR R79.1 790.92   3. SSS (sick sinus syndrome) (CMS/HCC) I49.5 427.81   4. Atherosclerosis of native coronary artery of native heart, angina presence unspecified I25.10 414.01   5. 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 S/P AVR Z95.2 V43.3   7. Impaired physical mobility Z74.09 781.99   8. Impaired mobility and activities of daily living Z74.09 799.89     Patient Active Problem List   Diagnosis   • Long term current use of anticoagulant therapy   • Personal history of heart valve replacement   • Atrial fibrillation [I48.91]   • Emphysema of lung (CMS/[facility])   • On anticoagulant therapy   • Hyperlipidemia   • Diastolic heart failure (CMS/HCC)   • Depressive disorder   • COPD (chronic obstructive pulmonary disease) (CMS/HCC)   • Diabetes mellitus (CMS/HCC)   • Myopia   • Astigmatism   • Bleeding from open wound of chest wall   • Follow-up surgery care   • Encounter for screening for malignant neoplasm of colon   • Positive colorectal cancer screening using DNA-based stool test   • Nodule of left lung   • Chronic hypoxemic respiratory failure (CMS/HCC)   • Physical deconditioning   • Heart failure with preserved left ventricular function (HFpEF) (CMS/HCC)   • Essential hypertension   • Coronary artery disease involving native coronary artery without angina pectoris   • Hx of CABG   • SSS (sick sinus syndrome) (CMS/HCC)   • Pacemaker   • CKD (chronic kidney disease) stage 3, GFR 30-59 ml/min (CMS/HCC)    • Personal history of tobacco use, presenting hazards to health   • Gastrointestinal hemorrhage   • Morbid obesity (CMS/HCC)   • Gastritis   • Colon polyp   • Coumadin toxicity       Therapy Treatment    Rehabilitation Treatment Summary     Row Name 07/04/19 1055             Treatment Time/Intention    Discipline  physical therapy assistant  -TA      Document Type  therapy note (daily note)  -TA      Subjective Information  no complaints;fatigue;dyspnea  -TA      Mode of Treatment  physical therapy;individual therapy  -TA      Patient Effort  adequate  -TA      Existing Precautions/Restrictions  oxygen therapy device and L/min  -TA      Patient Response to Treatment  pt defered EOB/OOB  -TA      Recorded by [TA] Mary Jo Johnson, [facility] 07/04/19 1151      Row Name 07/04/19 1055             Vital Signs    Pre Systolic BP Rehab  108  -TA      Pre Treatment Diastolic BP  58  -TA      Post Systolic BP Rehab  115  -TA      Post Treatment Diastolic BP  58  -TA      Pretreatment Heart Rate (beats/min)  79  -TA      Intratreatment Heart Rate (beats/min)  90  -TA      Posttreatment Heart Rate (beats/min)  91  -TA      Pre SpO2 (%)  93  -TA      O2 Delivery Pre Treatment  supplemental O2  -TA      Intra SpO2 (%)  91  -TA      O2 Delivery Intra Treatment  supplemental O2  -TA      Post SpO2 (%)  92  -TA      O2 Delivery Post Treatment  supplemental O2  -TA      Pre Patient Position  Supine  -TA      Intra Patient Position  Supine  -TA      Post Patient Position  Supine  -TA      Recorded by [TA] Mary Jo Johnson [facility] 07/04/19 1151      Row Name 07/04/19 1055             Cognitive Assessment/Intervention- PT/OT    Affect/Mental Status (Cognitive)  WFL  -TA      Orientation Status (Cognition)  oriented x 4  -TA      Follows Commands (Cognition)  WFL  -TA      Personal Safety Interventions  muscle strengthening facilitated  -TA      Recorded by [TA] Mary Jo Johnson, PTA 07/04/19 1151      Row Name 07/04/19 1055             Bed  Mobility Assessment/Treatment    Supine-Sit [place] (Bed Mobility)  not tested  -TA      Sit-Supine [place] (Bed Mobility)  not tested  -TA      Recorded by [TA] Mary Jo Johnson, PTA 07/04/19 1151      Row Name 07/04/19 1055             Sit-Stand Transfer    Sit-Stand [place] (Transfers)  not tested  -TA      Recorded by [TA] Mary Jo Johnson, PTA 07/04/19 1151      Row Name 07/04/19 1055             Stand-Sit Transfer    Stand-Sit [place] (Transfers)  not tested  -TA      Recorded by [TA] Mary Jo Johnson, PTA 07/04/19 1151      Row Name 07/04/19 1055             Therapeutic Exercise    Lower Extremity (Therapeutic Exercise)  gluteal sets;heel slides, bilateral;quad sets, bilateral  -TA      Lower Extremity Range of Motion (Therapeutic Exercise)  hip abduction/adduction, bilateral;ankle dorsiflexion/plantar flexion, bilateral  -TA      Exercise Type (Therapeutic Exercise)  AROM (active range of motion)  -TA      Position (Therapeutic Exercise)  supine  -TA      Sets/Reps (Therapeutic Exercise)  20  -TA      Expected Outcome (Therapeutic Exercise)  facilitate normal movement patterns;improve functional stability;improve motor control;increase active range of motion  -TA      Recorded by [TA] Mary Jo Johnson, PTA 07/04/19 1151      Row Name 07/04/19 1055             Positioning and Restraints    Pre-Treatment Position  in bed  -TA      Post Treatment Position  bed  -TA      In Bed  supine;call light within reach  -TA      Recorded by [TA] Mary Jo Johnson, PTA 07/04/19 1151      Row Name 07/04/19 1055             Pain Scale: Numbers Pre/Post-Treatment    Pain Scale: Numbers, Pretreatment  0/10 - no pain  -TA      Pain Scale: Numbers, Post-Treatment  0/10 - no pain  -TA      Recorded by [TA] Mary Jo Johnson, PTA 07/04/19 1151      Row Name 07/04/19 1055             Outcome Summary/Treatment Plan (OT)    Anticipated Discharge Disposition (OT)  anticipate therapy at next level of care  -TA      Recorded  by [TA] Susan Johnsona RENE, PTA 07/04/19 1151      Row Name 07/04/19 1055             Outcome Summary/Treatment Plan (PT)    Daily Summary of Progress (PT)  progress towards functional goals is fair  -TA      Plan for Continued Treatment (PT)  continue  -TA      Anticipated Discharge Disposition (PT)  anticipate therapy at next level of care;skilled nursing facility  -TA      Recorded by [TA] Susan Johnsonligia LÓPEZ, PTA 07/04/19 1151        User Key  (r) = Recorded By, (t) = Taken By, (c) = Cosigned By    Initials Name Effective Dates Discipline    TA Mary Jo Johnson, PTA 03/07/18 -  PT          Wound Left posterior arm skin tear (Active)   Dressing Appearance open to air 7/3/2019  8:30 PM   Closure Approximated;Open to air 7/3/2019  8:30 PM   Base scab 7/3/2019  8:30 PM       Rehab Goal Summary     Row Name 07/04/19 1055             Physical Therapy Goals    Bed Mobility Goal Selection (PT)  bed mobility, PT goal 1  -TA      Transfer Goal Selection (PT)  transfer, PT goal 1  -TA      Gait Training Goal Selection (PT)  gait training, PT goal 1  -TA         Bed Mobility Goal 1 (PT)    Activity/Assistive Device (Bed Mobility Goal 1, PT)  sit to supine/supine to sit  -TA      [place] Level/Cues Needed (Bed Mobility Goal 1, PT)  conditional independence  -TA      Time Frame (Bed Mobility Goal 1, PT)  long term goal (LTG);by discharge  -TA      Barriers (Bed Mobility Goal 1, PT)  HOB flat, no bed rails.   -TA      Progress/Outcomes (Bed Mobility Goal 1, PT)  goal partially met  -TA         Transfer Goal 1 (PT)    Activity/Assistive Device (Transfer Goal 1, PT)  sit-to-stand/stand-to-sit;bed-to-chair/chair-to-bed  -TA      [place] Level/Cues Needed (Transfer Goal 1, PT)  conditional independence  -TA      Time Frame (Transfer Goal 1, PT)  long term goal (LTG);by discharge  -TA      Barriers (Transfers Goal 1, PT)  Maintain SpO2 >88%  -TA      Progress/Outcome (Transfer Goal 1, PT)  goal not met;goal ongoing  -TA          Gait Training Goal 1 (PT)    Activity/Assistive Device (Gait Training Goal 1, PT)  walker, rolling  -TA      [place] Level (Gait Training Goal 1, PT)  conditional independence  -TA      Distance (Gait Goal 1, PT)  25'x2  -TA      Time Frame (Gait Training Goal 1, PT)  long term goal (LTG);by discharge  -TA      Barriers (Gait Training Goal 1, PT)  Maintain SpO2 >88%  -TA      Progress/Outcome (Gait Training Goal 1, PT)  goal partially met;goal ongoing met distance; not met level of independence  -TA        User Key  (r) = Recorded By, (t) = Taken By, (c) = Cosigned By    Initials Name Provider Type Discipline    Mary Jo Santiago PTA Physical Therapy Assistant PT          Physical Therapy Education     Title: PT OT SLP Therapies (In Progress)     Topic: Physical Therapy (In Progress)     Point: Mobility training (Done)     Learning Progress Summary           Patient Acceptance, E, VU,NR by  at 7/1/2019 12:52 PM    Comment:  use of RW for energy conservation    Acceptance, E, VU by  at 6/26/2019 11:04 AM    Comment:  Educated on proper hand placement with transfers; educated on energy conservation to improve O2 saturation.                   Point: Precautions (Done)     Learning Progress Summary           Patient Acceptance, E, VU by  at 7/1/2019 12:53 PM    Comment:  PLB for maintaining and recovering O2 saturation    Acceptance, E,D, VU by TA at 6/28/2019  1:32 PM    Comment:  PLB & energy consevation                               User Key     Initials Effective Dates Name Provider Type Discipline     03/07/18 -  Mary Jo Johnson PTA Physical Therapy Assistant PT    CZ 04/03/18 -  Titi Portillo, PT Physical Therapist PT     07/23/18 -  Fatimah Gavin, PT Physical Therapist PT                PT Recommendation and Plan  Anticipated Discharge Disposition (PT): anticipate therapy at next level of care, skilled nursing facility  Therapy Frequency (PT Clinical Impression): daily  Outcome  Summary/Treatment Plan (PT)  Daily Summary of Progress (PT): progress towards functional goals is fair  Barriers to Overall Progress (PT): SOA  Plan for Continued Treatment (PT): continue  Anticipated Discharge Disposition (PT): anticipate therapy at next level of care, skilled nursing facility  Progress: improving  Outcome Summary: pt completed AROM B LE exercises in supine, pt would benefit from 24/7 care & continued PT services  Outcome Measures     Row Name 07/04/19 1100 07/03/19 1100 07/03/19 0922       How much help from another person do you currently need...    Turning from your back to your side while in flat bed without using bedrails?  4  -TA  --  4  -TW    Moving from lying on back to sitting on the side of a flat bed without bedrails?  4  -TA  --  4  -TW    Moving to and from a bed to a chair (including a wheelchair)?  3  -TA  --  3  -TW    Standing up from a chair using your arms (e.g., wheelchair, bedside chair)?  3  -TA  --  3  -TW    Climbing 3-5 steps with a railing?  3  -TA  --  3  -TW    To walk in hospital room?  3  -TA  --  3  -TW    AM-PAC 6 Clicks Score (PT)  20  -TA  --  20  -TW       How much help from another is currently needed...    Putting on and taking off regular lower body clothing?  --  3  -LW  --    Bathing (including washing, rinsing, and drying)  --  3  -LW  --    Toileting (which includes using toilet bed pan or urinal)  --  4  -LW  --    Putting on and taking off regular upper body clothing  --  3  -LW  --    Taking care of personal grooming (such as brushing teeth)  --  4  -LW  --    Eating meals  --  4  -LW  --    AM-PAC 6 Clicks Score (OT)  --  21  -LW  --       Functional Assessment    Outcome Measure Options  AM-PAC 6 Clicks Basic Mobility (PT)  -TA  --  AM-PAC 6 Clicks Basic Mobility (PT)  -TW    Row Name 07/02/19 1600 07/01/19 1448          How much help from another person do you currently need...    Turning from your back to your side while in flat bed without using  bedrails?  4  -TA  --     Moving from lying on back to sitting on the side of a flat bed without bedrails?  4  -TA  --     Moving to and from a bed to a chair (including a wheelchair)?  3  -TA  --     Standing up from a chair using your arms (e.g., wheelchair, bedside chair)?  3  -TA  --     Climbing 3-5 steps with a railing?  3  -TA  --     To walk in hospital room?  3  -TA  --     AM-PAC 6 Clicks Score (PT)  20  -TA  --        How much help from another is currently needed...    Putting on and taking off regular lower body clothing?  --  3  -BB     Bathing (including washing, rinsing, and drying)  --  3  -BB     Toileting (which includes using toilet bed pan or urinal)  --  3  -BB     Putting on and taking off regular upper body clothing  --  3  -BB     Taking care of personal grooming (such as brushing teeth)  --  4  -BB     Eating meals  --  4  -BB     AM-PAC 6 Clicks Score (OT)  --  20  -BB        Functional Assessment    Outcome Measure Options  AM-PAC 6 Clicks Basic Mobility (PT)  -TA  --       User Key  (r) = Recorded By, (t) = Taken By, (c) = Cosigned By    Initials Name Provider Type    Mary Jo Santiago PTA Physical Therapy Assistant    TW Reece Rocha, YOSEPH Physical Therapy Assistant    BB Selena Simpson, COLÓN/L Occupational Therapy Assistant    LW Marisela Medeiros, COLÓN/L Occupational Therapy Assistant         Time Calculation:   PT Charges     Row Name 07/04/19 1154             Time Calculation    Start Time  1055  -TA      Stop Time  1125  -TA      Time Calculation (min)  30 min  -TA         Time Calculation- PT    Total Timed Code Minutes- PT  30 minute(s)  -TA        User Key  (r) = Recorded By, (t) = Taken By, (c) = Cosigned By    Initials Name Provider Type    Mary Jo Santiago PTA Physical Therapy Assistant        Therapy Charges for Today     Code Description Service Date Service Provider Modifiers Qty    [id number] HC PT THER PROC EA 15 MIN 7/4/2019 Mary Jo Johnson PTA GP 2           PT G-Codes  Outcome Measure Options: AM-PAC 6 Clicks Basic Mobility (PT)  AM-PAC 6 Clicks Score (PT): 20  AM-PAC 6 Clicks Score (OT): 21    Mary Jo Johnson, PTA  7/4/2019

## 2019-07-04 NOTE — PLAN OF CARE
Problem: Patient Care Overview  Goal: Plan of Care Review  Outcome: Ongoing (interventions implemented as appropriate)   07/04/19 6343   Coping/Psychosocial   Plan of Care Reviewed With patient   Plan of Care Review   Progress improving   OTHER   Outcome Summary pt oxygen has been weaned down to 3.5 lpm. Pt still c/o SOA getting up to BSC but has improved over the last week. Will continue to monitor.     Goal: Individualization and Mutuality  Outcome: Ongoing (interventions implemented as appropriate)    Goal: Discharge Needs Assessment  Outcome: Ongoing (interventions implemented as appropriate)    Goal: Interprofessional Rounds/Family Conf  Outcome: Ongoing (interventions implemented as appropriate)      Problem: Fall Risk (Adult)  Goal: Absence of Fall  Outcome: Ongoing (interventions implemented as appropriate)      Problem: Chronic Obstructive Pulmonary Disease (Adult)  Goal: Signs and Symptoms of Listed Potential Problems Will be Absent, Minimized or Managed (Chronic Obstructive Pulmonary Disease)  Outcome: Ongoing (interventions implemented as appropriate)      Problem: Cardiac: Heart Failure (Adult)  Goal: Signs and Symptoms of Listed Potential Problems Will be Absent, Minimized or Managed (Cardiac: Heart Failure)  Outcome: Ongoing (interventions implemented as appropriate)      Problem: Skin Injury Risk (Adult)  Goal: Skin Health and Integrity  Outcome: Ongoing (interventions implemented as appropriate)

## 2019-07-04 NOTE — PLAN OF CARE
Problem: Patient Care Overview  Goal: Plan of Care Review  Outcome: Ongoing (interventions implemented as appropriate)   07/04/19 6207   Coping/Psychosocial   Plan of Care Reviewed With patient   Plan of Care Review   Progress improving   OTHER   Outcome Summary Pt was able to work on ADL's with [place]. Pt met all goals.

## 2019-07-04 NOTE — PLAN OF CARE
Problem: Patient Care Overview  Goal: Plan of Care Review  Outcome: Ongoing (interventions implemented as appropriate)   07/04/19 0239   Coping/Psychosocial   Plan of Care Reviewed With patient   Plan of Care Review   Progress no change   OTHER   Outcome Summary Patient currently resting with no complaints. VSS. Will continue to monitor      Goal: Individualization and Mutuality  Outcome: Ongoing (interventions implemented as appropriate)    Goal: Discharge Needs Assessment  Outcome: Ongoing (interventions implemented as appropriate)    Goal: Interprofessional Rounds/Family Conf  Outcome: Ongoing (interventions implemented as appropriate)      Problem: Fall Risk (Adult)  Goal: Absence of Fall  Outcome: Outcome(s) achieved Date Met: 07/04/19      Problem: Chronic Obstructive Pulmonary Disease (Adult)  Goal: Signs and Symptoms of Listed Potential Problems Will be Absent, Minimized or Managed (Chronic Obstructive Pulmonary Disease)  Outcome: Ongoing (interventions implemented as appropriate)      Problem: Cardiac: Heart Failure (Adult)  Goal: Signs and Symptoms of Listed Potential Problems Will be Absent, Minimized or Managed (Cardiac: Heart Failure)  Outcome: Ongoing (interventions implemented as appropriate)      Problem: Skin Injury Risk (Adult)  Goal: Skin Health and Integrity  Outcome: Ongoing (interventions implemented as appropriate)

## 2019-07-04 NOTE — PLAN OF CARE
Problem: Patient Care Overview  Goal: Plan of Care Review  Outcome: Ongoing (interventions implemented as appropriate)   07/04/19 0239 07/04/19 1055   Coping/Psychosocial   Plan of Care Reviewed With patient --    Plan of Care Review   Progress no change --    OTHER   Outcome Summary --  pt completed AROM B LE exercises in supine, pt would benefit from 24/7 care & continued PT services

## 2019-07-04 NOTE — PROGRESS NOTES
[facility] Medicine Services  INPATIENT PROGRESS NOTE    Length of Stay: 14  Date of Admission: 6/18/2019  Primary Care Physician: Josefa Pozo APRN    Subjective   Chief Complaint: No new complaints.    HPI:    73 year old white female with history of SSS s/p PPM, aortic valve replacement (on coumadin), PAF, O2 dependent COPD, and chronic hypoxic respiratory failure presented with LGIB. Patient has been evaluated by GI. She has had EGD and colonoscopy showed sigmoid polyp consistent with adenocarcinoma.    Patient is currently not a surgical candidate for now secondary to deconditioning.  She is currently receiving iron infusions due to iron deficiency.  She is doing better and voices no new complaints.    Review of Systems   Constitutional: Positive for activity change and fatigue. Negative for appetite change, chills, diaphoresis and fever.   HENT: Negative for trouble swallowing and voice change.    Eyes: Negative for photophobia and visual disturbance.   Respiratory: Negative for cough, choking, chest tightness, shortness of breath, wheezing and stridor.    Cardiovascular: Negative for chest pain, palpitations and leg swelling.   Gastrointestinal: Negative for abdominal distention, abdominal pain, blood in stool, constipation, diarrhea, nausea and vomiting.   Endocrine: Negative for cold intolerance, heat intolerance, polydipsia, polyphagia and polyuria.   Genitourinary: Negative for decreased urine volume, difficulty urinating, dysuria, enuresis, flank pain, frequency, hematuria and urgency.   Musculoskeletal: Positive for arthralgias and myalgias. Negative for gait problem, neck pain and neck stiffness.   Skin: Negative for pallor, rash and wound.   Neurological: Negative for dizziness, tremors, seizures, syncope, facial asymmetry, speech difficulty, weakness, light-headedness, numbness and headaches.   Hematological: Does not bruise/bleed easily.    Psychiatric/Behavioral: Negative for agitation, behavioral problems and confusion.       Objective    Temp:  [96.2 °F (35.7 °C)-97.7 °F (36.5 °C)] 96.2 °F (35.7 °C)  Heart Rate:  [77-98] 84  Resp:  [18-24] 20  BP: ()/(57-78) 99/70    Physical Exam   Constitutional: She is oriented to person, place, and time. She appears well-developed and well-nourished. She is cooperative. No distress.   Patient is morbidly obese and has a BMI of 43.45.   HENT:   Head: Normocephalic and atraumatic.   Right Ear: External ear normal.   Left Ear: External ear normal.   Nose: Nose normal.   Mouth/Throat: Oropharynx is clear and moist.   Eyes: Conjunctivae and EOM are normal. Pupils are equal, round, and reactive to light.   Neck: Normal range of motion. Neck supple. No JVD present. No thyromegaly present.   Cardiovascular: Normal rate, regular rhythm, normal heart sounds and intact distal pulses. Exam reveals no gallop and no friction rub.   No murmur heard.  Pulmonary/Chest: Effort normal and breath sounds normal. No stridor. No respiratory distress. She has no wheezes. She has no rales. She exhibits no tenderness.   Abdominal: Soft. Bowel sounds are normal. She exhibits no distension and no mass. There is no tenderness. There is no rebound and no guarding. No hernia.   Musculoskeletal: Normal range of motion. She exhibits no edema, tenderness or deformity.   Neurological: She is alert and oriented to person, place, and time. She has normal reflexes. No cranial nerve deficit or sensory deficit. She exhibits normal muscle tone.   Skin: Skin is warm and dry. No rash noted. She is not diaphoretic. No erythema. No pallor.   Psychiatric: She has a normal mood and affect. Her behavior is normal. Judgment and thought content normal.   Nursing note and vitals reviewed.        Medication Review:    Current Facility-Administered Medications:   •  acetaminophen (TYLENOL) tablet 650 mg, 650 mg, Oral, Q6H PRN, Colin Coreas MD, 650 mg at  06/29/19 0151  •  cholecalciferol (VITAMIN D3) tablet 1,000 Units, 1,000 Units, Oral, Daily, Sandy Caputo MD, 1,000 Units at 07/04/19 1013  •  citalopram (CeleXA) tablet 20 mg, 20 mg, Oral, Daily, Colin Coreas MD, 20 mg at 07/04/19 1014  •  dextrose (D50W) 25 g/ 50mL Intravenous Solution 25 g, 25 g, Intravenous, Q15 Min PRN, Dudley Alfredo MD  •  dextrose (GLUTOSE) oral gel 15 g, 15 g, Oral, Q15 Min PRN, Dudley Alfredo MD  •  dextrose 5 % and sodium chloride 0.45 % infusion, 30 mL/hr, Intravenous, Continuous PRN, Alfredo Guerrero MD  •  diltiaZEM CD (CARDIZEM CD) 24 hr capsule 240 mg, 240 mg, Oral, Daily, Colin Coreas MD, 240 mg at 07/04/19 1014  •  ferric gluconate (FERRLECIT) 125 mg in sodium chloride 0.9 % 110 mL IVPB, 125 mg, Intravenous, Daily, Rocio Mack MD, Last Rate: 110 mL/hr at 07/04/19 1017, 125 mg at 07/04/19 1017  •  [START ON 7/5/2019] furosemide (LASIX) tablet 40 mg, 40 mg, Oral, Daily, Sandy Caputo MD  •  glucagon (human recombinant) (GLUCAGEN DIAGNOSTIC) injection 1 mg, 1 mg, Subcutaneous, PRN, Dudley Alfredo MD  •  guaiFENesin (MUCINEX) 12 hr tablet 600 mg, 600 mg, Oral, Q12H, Sandy Caputo MD, 600 mg at 07/04/19 1014  •  insulin aspart (novoLOG) injection 0-9 Units, 0-9 Units, Subcutaneous, 4x Daily AC & at Bedtime, Dudley Alfredo MD, 2 Units at 07/03/19 2009  •  ipratropium-albuterol (DUO-NEB) nebulizer solution 3 mL, 3 mL, Nebulization, Q8H - RT, Colin Coreas MD, 3 mL at 07/04/19 0736  •  ipratropium-albuterol (DUO-NEB) nebulizer solution 3 mL, 3 mL, Nebulization, Q4H PRN, Hermann Richardson MD  •  magic butt ointment, , Topical, BID, Colin Coreas MD  •  metOLazone (ZAROXOLYN) tablet 2.5 mg, 2.5 mg, Oral, Daily, Sandy Caputo MD, 2.5 mg at 07/04/19 1013  •  metoprolol tartrate (LOPRESSOR) injection 2.5 mg, 2.5 mg, Intravenous, Q6H PRN, Kasi Reaves MD  •  nystatin (MYCOSTATIN) powder, , Topical, Q12H, Colin Coreas MD  •  ondansetron (ZOFRAN)  injection 4 mg, 4 mg, Intravenous, Q6H PRN, Dudley Alfredo MD  •  Pharmacy to dose warfarin, , Does not apply, Continuous PRN, Colin Coreas MD  •  prenatal vitamin 27-0.8 tablet 1 tablet, 1 tablet, Oral, Daily, Sandy Caputo MD, 1 tablet at 07/04/19 1013  •  rOPINIRole (REQUIP) tablet 0.25 mg, 0.25 mg, Oral, Nightly, Colin Coreas MD, 0.25 mg at 07/03/19 2008  •  sodium chloride 0.9 % flush 10 mL, 10 mL, Intravenous, PRN, Lobo Amezquita PA-C, 10 mL at 06/24/19 0829  •  sodium chloride 0.9 % flush 3 mL, 3 mL, Intravenous, Q12H, Dudley Alfredo MD, 3 mL at 07/04/19 1017  •  sodium chloride 0.9 % flush 3-10 mL, 3-10 mL, Intravenous, PRN, Dudley Alfredo MD, 10 mL at 06/27/19 1534  •  traZODone (DESYREL) tablet 300 mg, 300 mg, Oral, Nightly, Colin Coreas MD, 300 mg at 07/03/19 2008  •  vitamin C (ASCORBIC ACID) tablet 500 mg, 500 mg, Oral, Daily, Sandy Caputo MD, 500 mg at 07/04/19 1013    Results Review:  I have reviewed the labs, radiology results, and diagnostic studies.    Laboratory Data:   Results from last 7 days   Lab Units 07/04/19  0658 07/03/19  0556 07/02/19  0629   SODIUM mmol/L 142 141 141   POTASSIUM mmol/L 4.6 3.8 4.3   CHLORIDE mmol/L 100 100 104   CO2 mmol/L 31.0* 30.0* 27.0   BUN mg/dL 73* 62* 60*   CREATININE mg/dL 2.25* 2.10* 2.17*   GLUCOSE mg/dL 118* 123* 118*   CALCIUM mg/dL 9.6 9.4 8.9   ANION GAP mmol/L 11.0 11.0 10.0     Estimated Creatinine Clearance: 28.6 mL/min (A) (by C-G formula based on SCr of 2.25 mg/dL (H)).          Results from last 7 days   Lab Units 07/04/19  0658 07/03/19  0556 07/02/19  0629 07/01/19  0551 06/30/19  0804   WBC 10*3/mm3 8.25 8.31 8.27 8.43 8.24   HEMOGLOBIN g/dL 9.5* 9.1* 8.8* 7.9* 8.1*   HEMATOCRIT % 29.6* 28.8* 27.0* 25.3* 26.2*   PLATELETS 10*3/mm3 205 190 212 216 235     Results from last 7 days   Lab Units 07/04/19  0659 07/03/19  0556 07/02/19  0629 07/01/19  0551 06/30/19  0804   INR  3.06* 3.04* 2.41* 1.92* 1.74*        Culture Data:   No results found for: BLOODCX  No results found for: URINECX  No results found for: RESPCX  No results found for: WOUNDCX  No results found for: STOOLCX  No components found for: BODYFLD    Radiology Data:   Imaging Results (last 24 hours)     ** No results found for the last 24 hours. **          I have reviewed the patient's current medications.     Assessment/Plan     Hospital Problem List:  Principal Problem:    Gastrointestinal hemorrhage  Active Problems:    Emphysema of lung (CMS/[facility])    Diabetes mellitus (CMS/[facility])    CKD (chronic kidney disease) stage 3, GFR 30-59 ml/min (CMS/[facility])    Morbid obesity (CMS/[facility])    Gastritis    Colon polyp    Coumadin toxicity    Lower GI bleed/acute blood loss anemia (secondary to colonic adenocarcinoma): Patient is status post EGD and colonoscopy.  Hemoglobin remains stable posttransfusion patient is receiving iron supplementation.  She is currently not a candidate for surgery secondary to deconditioning/pulmonary standpoint.    Diabetes mellitus: Stable.  Continue anti-glycemic with Accu-Cheks and sliding scale insulin.    Acute on chronic kidney disease stage III: Patient's baseline creatinine is between 1.9-2.0.  Patient is currently on metolazone and lower dose of Lasix.  Continue to monitor renal function.  Nephrologist is following.    Hypertension: Stable.    Paroxysmal atrial fibrillation: Patient is in paced rhythm.  Anticoagulation has been restarted post endoscopy.  INR is 3.06 today.     Status post aortic valve replacement: Continue anticoagulation.    Chronic hypoxic respiratory failure: Continue supplemental oxygen and bronchodilators.    Heart failure with preserved ejection fraction: Patient is euvolemic.  Continue diuretics with strict I's and O's, daily weights, fluid and salt restriction.  Chest x-ray done 7/2/2019 have been noted.  Cardiologist is following.    Morbid obesity: Dietitian is following.    Deconditioning: Continue PT and  OT.      Discharge Planning: Discharge in a.m.    Dudley Alfredo MD   07/04/19   11:53 AM

## 2019-07-04 NOTE — THERAPY TREATMENT NOTE
Acute Care - Occupational Therapy Treatment Note  [facility]     Patient Name: Brendon Skelton  : 1945  MRN: [id number]  Today's Date: 2019  Onset of Illness/Injury or Date of Surgery: 19  Date of Referral to OT: 19  Referring Physician: LATISHA Reaves MD    Admit Date: 2019       ICD-10-CM ICD-9-CM   1. Gastrointestinal hemorrhage, unspecified gastrointestinal hemorrhage type K92.2 578.9   2. Supratherapeutic INR R79.1 790.92   3. SSS (sick sinus syndrome) (CMS/HCC) I49.5 427.81   4. Atherosclerosis of native coronary artery of native heart, angina presence unspecified I25.10 414.01   5. 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 S/P AVR Z95.2 V43.3   7. Impaired physical mobility Z74.09 781.99   8. Impaired mobility and activities of daily living Z74.09 799.89     Patient Active Problem List   Diagnosis   • Long term current use of anticoagulant therapy   • Personal history of heart valve replacement   • Atrial fibrillation [I48.91]   • Emphysema of lung (CMS/HCC)   • On anticoagulant therapy   • Hyperlipidemia   • Diastolic heart failure (CMS/HCC)   • Depressive disorder   • COPD (chronic obstructive pulmonary disease) (CMS/HCC)   • Diabetes mellitus (CMS/HCC)   • Myopia   • Astigmatism   • Bleeding from open wound of chest wall   • Follow-up surgery care   • Encounter for screening for malignant neoplasm of colon   • Positive colorectal cancer screening using DNA-based stool test   • Nodule of left lung   • Chronic hypoxemic respiratory failure (CMS/HCC)   • Physical deconditioning   • Heart failure with preserved left ventricular function (HFpEF) (CMS/HCC)   • Essential hypertension   • Coronary artery disease involving native coronary artery without angina pectoris   • Hx of CABG   • SSS (sick sinus syndrome) (CMS/HCC)   • Pacemaker   • CKD (chronic kidney disease) stage 3, GFR 30-59 ml/min (CMS/HCC)   •  Personal history of tobacco use, presenting hazards to health   • Gastrointestinal hemorrhage   • Morbid obesity (CMS/HCC)   • Gastritis   • Colon polyp   • Coumadin toxicity     Past Medical History:   Diagnosis Date   • Acute bronchitis    • Anxiety    • Atrial fibrillation (CMS/HCC)    • C. difficile colitis    • Callosity     under metatarsal head      • Cardiac pacemaker in situ    • CHF (congestive heart failure) (CMS/HCC)    • Chronic obstructive lung disease (CMS/HCC)    • Corns and callus    • Depressive disorder    • Diabetes mellitus (CMS/HCC)    • Diastolic heart failure (CMS/HCC)    • Essential hypertension    • Foot pain    • Hyperlipidemia    • Long term current use of anticoagulant    • On anticoagulant therapy    • Pulmonary emphysema (CMS/HCC)    • Rectal hemorrhage    • Type 2 diabetes mellitus (CMS/HCC)      Past Surgical History:   Procedure Laterality Date   • AORTIC VALVE REPAIR/REPLACEMENT  1999   • CARDIAC ELECTROPHYSIOLOGY PROCEDURE N/A 3/20/2017    Procedure: PPM generator change - dual;  Surgeon: Bereket Gates MD;  Location: [facility] CATH INVASIVE LOCATION;  Service:    • CARDIAC PACEMAKER PLACEMENT  1999   • CATARACT EXTRACTION W/ INTRAOCULAR LENS IMPLANT Left 2/1/2019    Procedure: REMOVE CATARACT AND IMPLANT INTRAOCULAR LENS I;  Surgeon: Jose L Clay MD;  Location: [facility] OR;  Service: Ophthalmology   • CATARACT EXTRACTION W/ INTRAOCULAR LENS IMPLANT Right 2/8/2019    Procedure: REMOVE CATARACT AND IMPLANT  INTRAOCULAR LENS;  Surgeon: Jose L Clay MD;  Location: [facility] OR;  Service: Ophthalmology   • COLONOSCOPY N/A 6/19/2019    Procedure: COLONOSCOPY WITH CONTROL OF BLEED;  Surgeon: Alfredo Guerrero MD;  Location: [facility] ENDOSCOPY;  Service: Gastroenterology   • COLONOSCOPY N/A 6/24/2019    Procedure: COLONOSCOPY;  Surgeon: Alfredo Guerrero MD;  Location: [facility] ENDOSCOPY;  Service: Gastroenterology   • ECHO - CONVERTED  11/12/2013    There is mild to moderate  left atrial enlargement EF 50-55%   • ENDOSCOPY N/A 6/19/2019    Procedure: ESOPHAGOGASTRODUODENOSCOPY WITH CONTROL OF BLEED;  Surgeon: Alfredo Guerrero MD;  Location: [facility] ENDOSCOPY;  Service: Gastroenterology   • FOOT SURGERY  1990   • OTHER SURGICAL HISTORY  10/26/2015    PARING CORN/CALLUS    • PACEMAKER REPLACEMENT N/A 3/21/2017    Procedure: revision pacemaker pocket, evacuation hematoma, control of bleeding;  Surgeon: Polo Feliz MD;  Location: [facility] OR;  Service:    • TRANSESOPHAGEAL ECHOCARDIOGRAM (MITZY)  05/01/2014    With color flow-Mild left atrial enlargement with mild concentric LV hypertrophy with top normal aortic root size. EF 45-50%. Mild mitral regurgitation and mild aortic insufficiency and trivial amount of tricuspid regurgation   • TUBAL ABDOMINAL LIGATION         Therapy Treatment    Rehabilitation Treatment Summary     Row Name 07/04/19 1055 07/04/19 0805          Treatment Time/Intention    Discipline  physical therapy assistant  -TA  occupational therapy assistant  -LW     Document Type  therapy note (daily note)  -TA  therapy note (daily note)  -LW     Subjective Information  no complaints;fatigue;dyspnea  -TA  complains of;fatigue  -LW     Mode of Treatment  physical therapy;individual therapy  -TA  occupational therapy  -LW     Patient/Family Observations  --  Pt supine in bed. No family present.   -LW     Care Plan Review  --  care plan/treatment goals reviewed  -LW     Total Minutes, Occupational Therapy Treatment  --  55  -LW     Therapy Frequency (OT Eval)  --  other (see comments) 5-7 days per week  -LW     Patient Effort  adequate  -TA  adequate  -LW     Existing Precautions/Restrictions  oxygen therapy device and L/min  -TA  oxygen therapy device and L/min  -LW     Equipment Issued to Patient  --  gait belt  -LW     Patient Response to Treatment  pt defered EOB/OOB  -TA  --     Recorded by [TA] Mary Jo Johnson PTA 07/04/19 1151 [LW] Marisela Medeiros COTA/DANDRE 07/04/19  1323     Row Name 07/04/19 1055 07/04/19 0805          Vital Signs    Pre Systolic BP Rehab  108  -TA  119  -LW     Pre Treatment Diastolic BP  58  -TA  62  -LW     Post Systolic BP Rehab  115  -TA  --     Post Treatment Diastolic BP  58  -TA  --     Pretreatment Heart Rate (beats/min)  79  -TA  77  -LW     Intratreatment Heart Rate (beats/min)  90  -TA  82  -LW     Posttreatment Heart Rate (beats/min)  91  -TA  79  -LW     Pre SpO2 (%)  93  -TA  94  -LW     O2 Delivery Pre Treatment  supplemental O2  -TA  supplemental O2  -LW     Intra SpO2 (%)  91  -TA  90  -LW     O2 Delivery Intra Treatment  supplemental O2  -TA  supplemental O2  -LW     Post SpO2 (%)  92  -TA  96  -LW     O2 Delivery Post Treatment  supplemental O2  -TA  supplemental O2  -LW     Pre Patient Position  Supine  -TA  Supine  -LW     Intra Patient Position  Supine  -TA  Supine  -LW     Post Patient Position  Supine  -TA  Sitting  -LW     Recorded by [TA] Mary Jo Johnson, PTA 07/04/19 1151 [LW] Marisela Medeiros COTA/L 07/04/19 1323     Row Name 07/04/19 1055 07/04/19 0805          Cognitive Assessment/Intervention- PT/OT    Affect/Mental Status (Cognitive)  WFL  -TA  WFL  -LW     Orientation Status (Cognition)  oriented x 4  -TA  oriented x 4  -LW     Follows Commands (Cognition)  WFL  -TA  WFL  -LW     Personal Safety Interventions  muscle strengthening facilitated  -TA  fall prevention program maintained;gait belt;nonskid shoes/slippers when out of bed  -LW     Recorded by [TA] Mary Jo Johnson, PTA 07/04/19 1151 [LW] Marisela Medeiros COTA/L 07/04/19 1323     Row Name 07/04/19 0805             Safety Issues, Functional Mobility    Impairments Affecting Function (Mobility)  shortness of breath  -LW      Recorded by [LW] Marisela Medeiros COTA/L 07/04/19 1323      Row Name 07/04/19 1055 07/04/19 0805          Bed Mobility Assessment/Treatment    Bed Mobility Assessment/Treatment  --  supine-sit;sit-supine  -LW     Supine-Sit [place] (Bed  Mobility)  not tested  -TA  independent  -LW     Sit-Supine [place] (Bed Mobility)  not tested  -TA  independent  -LW     Assistive Device (Bed Mobility)  --  bed rails  -LW     Recorded by [TA] Mary Jo Johnson, PTA 07/04/19 1151 [LW] Marisela Medeiros COLÓN/L 07/04/19 1331     Row Name 07/04/19 0805             Functional Mobility    Functional Mobility- Ind. Level  independent  -LW      Functional Mobility- Device  -- No AD  -LW      Functional Mobility-Distance (Feet)  5  -LW      Functional Mobility- Safety Issues  supplemental O2  -LW      Recorded by [LW] Marisela Medeiros COLÓN/L 07/04/19 1331      Row Name 07/04/19 0805             Transfer Assessment/Treatment    Transfer Assessment/Treatment  sit-stand transfer;stand-sit transfer;toilet transfer  -LW      Recorded by [LW] Marisela Medeiros COLÓN/L 07/04/19 1331      Row Name 07/04/19 1055 07/04/19 0805          Sit-Stand Transfer    Sit-Stand [place] (Transfers)  not tested  -TA  independent  -LW     Assistive Device (Sit-Stand Transfers)  --  -- No AD  -LW     Recorded by [TA] Mary Jo Jhonson, PTA 07/04/19 1151 [LW] Marisela Medeiros COLÓN/L 07/04/19 1331     Row Name 07/04/19 1055 07/04/19 0805          Stand-Sit Transfer    Stand-Sit [place] (Transfers)  not tested  -TA  independent  -LW     Assistive Device (Stand-Sit Transfers)  --  -- No AD  -LW     Recorded by [TA] Mary Jo Johnson, PTA 07/04/19 1151 [LW] Marisela Medeiros COLÓN/L 07/04/19 1331     Row Name 07/04/19 0805             Toilet Transfer    Type (Toilet Transfer)  sit-stand;stand-sit  -LW      [place] Level (Toilet Transfer)  independent  -LW      Assistive Device (Toilet Transfer)  commode, bedside with drop arms No AD  -LW      Recorded by [LW] Marisela Medeiros COLÓN/L 07/04/19 1331      Row Name 07/04/19 0805             ADL Assessment/Intervention    BADL Assessment/Intervention  bathing;upper body dressing;lower body dressing;grooming;toileting  -LW      Recorded by  [LW] Marisela Medeiros COTA/L 07/04/19 1331      Row Name 07/04/19 0805             Bathing Assessment/Intervention    Bathing [place] Level  lower body;upper body;conditional independence;set up  -LW      Assistive Devices (Bathing)  other (see comments) Pan bath  -LW      Bathing Position  edge of bed sitting  -LW      Recorded by [LW] Marisela Medeiros COTA/L 07/04/19 1331      Row Name 07/04/19 0805             Upper Body Dressing Assessment/Training    Upper Body Dressing [place] Level  doff;don;independent;other (see comments) Hospital gown  -LW      Upper Body Dressing Position  edge of bed sitting  -LW      Recorded by [LW] Marisela Medeiros COTA/L 07/04/19 1331      Row Name 07/04/19 0805             Lower Body Dressing Assessment/Training    Lower Body Dressing [place] Level  doff;don;socks;conditional independence  -LW      Lower Body Dressing Position  edge of bed sitting  -LW      Recorded by [LW] Marisela Medeiros COTA/L 07/04/19 1331      Row Name 07/04/19 0805             Grooming Assessment/Training    [place] Level (Grooming)  grooming skills;hair care, combing/brushing;oral care regimen;wash face, hands;independent  -LW      Grooming Position  edge of bed sitting  -LW      Recorded by [LW] Marisela Medeiros COTA/L 07/04/19 1331      Row Name 07/04/19 0805             Toileting Assessment/Training    [place] Level (Toileting)  toileting skills;adjust/manage clothing;perform perineal hygiene;independent  -LW      Assistive Devices (Toileting)  commode, bedside with drop arms No AD  -LW      Toileting Position  supported sitting  -LW      Recorded by [LW] Marisela Medeiros COTA/L 07/04/19 1331      Row Name 07/04/19 0805             BADL Safety/Performance    Impairments, BADL Safety/Performance  endurance/activity tolerance  -LW      Recorded by [LW] Marisela Medeiros COTA/L 07/04/19 1331      Row Name 07/04/19 1055             Therapeutic Exercise    Lower Extremity  (Therapeutic Exercise)  gluteal sets;heel slides, bilateral;quad sets, bilateral  -TA      Lower Extremity Range of Motion (Therapeutic Exercise)  hip abduction/adduction, bilateral;ankle dorsiflexion/plantar flexion, bilateral  -TA      Exercise Type (Therapeutic Exercise)  AROM (active range of motion)  -TA      Position (Therapeutic Exercise)  supine  -TA      Sets/Reps (Therapeutic Exercise)  20  -TA      Expected Outcome (Therapeutic Exercise)  facilitate normal movement patterns;improve functional stability;improve motor control;increase active range of motion  -TA      Recorded by [TA] Mary Jo Johnson, [facility] [place] 07/04/19 1151      Row Name 07/04/19 1055 07/04/19 0805          Positioning and Restraints    Pre-Treatment Position  in bed  -TA  in bed  -LW     Post Treatment Position  bed  -TA  bed  -LW     In Bed  supine;call light within reach  -TA  supine;call light within reach;encouraged to call for assist  -LW     Recorded by [TA] Mary Jo Johnson, [facility] [place] 07/04/19 1151 [LW] Marisela Medeiros COTA/L 07/04/19 1331     Row Name 07/04/19 1055 07/04/19 0805          Pain Scale: Numbers Pre/Post-Treatment    Pain Scale: Numbers, Pretreatment  0/10 - no pain  -TA  0/10 - no pain  -LW     Pain Scale: Numbers, Post-Treatment  0/10 - no pain  -TA  0/10 - no pain  -LW     Recorded by [TA] Mary Jo Johnson, [facility] [place] 07/04/19 1151 [LW] Mairsela Medeiros COTA/L 07/04/19 1331     Row Name 07/04/19 0805             Plan of Care Review    Plan of Care Reviewed With  patient  -LW      Recorded by [LW] Marisela Medeiros COTA/L 07/04/19 1331      Row Name 07/04/19 1055 07/04/19 0805          Outcome Summary/Treatment Plan (OT)    Daily Summary of Progress (OT)  --  progress toward functional goals as expected  -LW     Plan for Continued Treatment (OT)  --  Continue POC  -LW     Anticipated Discharge Disposition (OT)  anticipate therapy at next level of care  -TA  anticipate therapy at next level of care  -LW     Recorded by [TA] Alex  Mary Jo LÓPEZ, PTA 07/04/19 1151 [LW] Marisela Medeiros COTA/L 07/04/19 1331     Row Name 07/04/19 1055             Outcome Summary/Treatment Plan (PT)    Daily Summary of Progress (PT)  progress towards functional goals is fair  -TA      Plan for Continued Treatment (PT)  continue  -TA      Anticipated Discharge Disposition (PT)  anticipate therapy at next level of care;skilled nursing facility  -TA      Recorded by [TA] Mary Jo Johnson, PTA 07/04/19 1151        User Key  (r) = Recorded By, (t) = Taken By, (c) = Cosigned By    Initials Name Effective Dates Discipline    TA Mary Jo Johnson, PTA 03/07/18 -  PT    LW Marisela Medeiros COTA/L 03/07/18 -  OT        Wound Left posterior arm skin tear (Active)   Dressing Appearance open to air 7/3/2019  8:30 PM   Closure Approximated;Open to air 7/3/2019  8:30 PM   Base scab 7/3/2019  8:30 PM     Rehab Goal Summary     Row Name 07/04/19 1055 07/04/19 0805          Physical Therapy Goals    Bed Mobility Goal Selection (PT)  bed mobility, PT goal 1  -TA  --     Transfer Goal Selection (PT)  transfer, PT goal 1  -TA  --     Gait Training Goal Selection (PT)  gait training, PT goal 1  -TA  --        Bed Mobility Goal 1 (PT)    Activity/Assistive Device (Bed Mobility Goal 1, PT)  sit to supine/supine to sit  -TA  --     [place] Level/Cues Needed (Bed Mobility Goal 1, PT)  conditional independence  -TA  --     Time Frame (Bed Mobility Goal 1, PT)  long term goal (LTG);by discharge  -TA  --     Barriers (Bed Mobility Goal 1, PT)  HOB flat, no bed rails.   -TA  --     Progress/Outcomes (Bed Mobility Goal 1, PT)  goal partially met  -TA  --        Transfer Goal 1 (PT)    Activity/Assistive Device (Transfer Goal 1, PT)  sit-to-stand/stand-to-sit;bed-to-chair/chair-to-bed  -TA  --     [place] Level/Cues Needed (Transfer Goal 1, PT)  conditional independence  -TA  --     Time Frame (Transfer Goal 1, PT)  long term goal (LTG);by discharge  -TA  --     Barriers (Transfers Goal 1,  PT)  Maintain SpO2 >88%  -TA  --     Progress/Outcome (Transfer Goal 1, PT)  goal not met;goal ongoing  -TA  --        Gait Training Goal 1 (PT)    Activity/Assistive Device (Gait Training Goal 1, PT)  walker, rolling  -TA  --     [place] Level (Gait Training Goal 1, PT)  conditional independence  -TA  --     Distance (Gait Goal 1, PT)  25'x2  -TA  --     Time Frame (Gait Training Goal 1, PT)  long term goal (LTG);by discharge  -TA  --     Barriers (Gait Training Goal 1, PT)  Maintain SpO2 >88%  -TA  --     Progress/Outcome (Gait Training Goal 1, PT)  goal partially met;goal ongoing met distance; not met level of independence  -TA  --        Occupational Therapy Goals    Transfer Goal Selection (OT)  --  transfer, OT goal 1  -LW     Bathing Goal Selection (OT)  --  bathing, OT goal 1  -LW     Dressing Goal Selection (OT)  --  dressing, OT goal 1  -LW     Toileting Goal Selection (OT)  --  toileting, OT goal 1  -LW     Activity Tolerance Goal Selection (OT)  --  activity tolerance, OT goal 1  -LW        Transfer Goal 1 (OT)    Activity/Assistive Device (Transfer Goal 1, OT)  --  sit-to-stand/stand-to-sit;toilet;walker, rolling  -LW     [place] Level/Cues Needed (Transfer Goal 1, OT)  --  conditional independence  -LW     Time Frame (Transfer Goal 1, OT)  --  long term goal (LTG);by discharge  -LW     Progress/Outcome (Transfer Goal 1, OT)  --  goal met  (Significant)   -LW        Bathing Goal 1 (OT)    Activity/Assistive Device (Bathing Goal 1, OT)  --  bathing skills, all  -LW     [place] Level/Cues Needed (Bathing Goal 1, OT)  --  set-up required;supervision required  -LW     Time Frame (Bathing Goal 1, OT)  --  long term goal (LTG);by discharge  -LW     Progress/Outcomes (Bathing Goal 1, OT)  --  goal met  (Significant)   -LW        Dressing Goal 1 (OT)    Activity/Assistive Device (Dressing Goal 1, OT)  --  dressing skills, all  -LW     [place]/Cues Needed (Dressing Goal 1, OT)  --   conditional independence  -LW     Time Frame (Dressing Goal 1, OT)  --  long term goal (LTG);by discharge  -LW     Progress/Outcome (Dressing Goal 1, OT)  --  goal met  (Significant)   -LW        Toileting Goal 1 (OT)    Activity/Device (Toileting Goal 1, OT)  --  toileting skills, all  -LW     [place] Level/Cues Needed (Toileting Goal 1, OT)  --  conditional independence  -LW     Time Frame (Toileting Goal 1, OT)  --  long term goal (LTG);by discharge  -LW     Progress/Outcome (Toileting Goal 1, OT)  --  goal met  (Significant)   -LW         Activity Tolerance Goal 1 (OT)    Activity Tolerance Goal 1 (OT)  --  Functional Activity/ADL tasks  -LW     Activity Level (Endurance Goal 1, OT)  --  5 min activity;O2 sat >/ equal to 88%  -LW     Time Frame (Activity Tolerance Goal 1, OT)  --  long term goal (LTG)  -LW     Progress/Outcome (Activity Tolerance Goal 1, OT)  --  goal met  (Significant)   -LW       User Key  (r) = Recorded By, (t) = Taken By, (c) = Cosigned By    Initials Name Provider Type Discipline    TA Mary Jo Johnson, PTA Physical Therapy Assistant PT    Marisela Tafoya, ELDON/L Occupational Therapy Assistant OT        Occupational Therapy Education     Title: PT OT SLP Therapies (In Progress)     Topic: Occupational Therapy (Done)     Point: ADL training (Done)     Description: Instruct learner(s) on proper safety adaptation and remediation techniques during self care or transfers.   Instruct in proper use of assistive devices.    Learning Progress Summary           Patient Acceptance, E,TB, VU by DONG at 7/4/2019  1:34 PM    Acceptance, E,TB, VU by DONG at 7/3/2019 11:53 AM    Acceptance, E, VU by FRANCES at 7/1/2019  3:55 PM    Acceptance, E, VU by BB at 6/28/2019  2:44 PM    Acceptance, E,TB, VU by MR at 6/26/2019  1:41 PM    Comment:  Role of OT and POC, benefit of activity, PLB, EC/WS                   Point: Home exercise program (Done)     Description: Instruct learner(s) on appropriate technique  for monitoring, assisting and/or progressing therapeutic exercises/activities.    Learning Progress Summary           Patient Acceptance, E,TB, VU by LW at 7/4/2019  1:34 PM    Acceptance, E,TB, VU by LW at 7/3/2019 11:53 AM                   Point: Precautions (Done)     Description: Instruct learner(s) on prescribed precautions during self-care and functional transfers.    Learning Progress Summary           Patient Acceptance, E,TB, VU by LW at 7/4/2019  1:34 PM    Acceptance, E,TB, VU by LW at 7/3/2019 11:53 AM    Acceptance, E,TB, VU by MR at 6/26/2019  1:41 PM    Comment:  Role of OT and POC, benefit of activity, PLB, EC/WS                   Point: Body mechanics (Done)     Description: Instruct learner(s) on proper positioning and spine alignment during self-care, functional mobility activities and/or exercises.    Learning Progress Summary           Patient Acceptance, E,TB, VU by LW at 7/4/2019  1:34 PM    Acceptance, E,TB, VU by LW at 7/3/2019 11:53 AM    Acceptance, E, VU by BB at 7/1/2019  3:55 PM    Acceptance, E, VU by BB at 6/28/2019  2:44 PM                               User Key     Initials Effective Dates Name Provider Type Discipline     03/07/18 -  Selena Simpson COTA/L Occupational Therapy Assistant OT    LW 03/07/18 -  Marisela Medeiros COTA/L Occupational Therapy Assistant OT    MR 04/03/18 -  Daisy Duong, OT Occupational Therapist OT                OT Recommendation and Plan  Outcome Summary/Treatment Plan (OT)  Daily Summary of Progress (OT): progress toward functional goals as expected  Plan for Continued Treatment (OT): Continue POC  Anticipated Discharge Disposition (OT): anticipate therapy at next level of care  Therapy Frequency (OT Eval): other (see comments)(5-7 days per week)  Daily Summary of Progress (OT): progress toward functional goals as expected  Plan of Care Review  Plan of Care Reviewed With: patient  Plan of Care Reviewed With: patient  Outcome Summary: Pt  was able to work on ADL's with [place]. Pt met all goals.  Outcome Measures     Row Name 07/04/19 1100 07/04/19 0805 07/03/19 1100       How much help from another person do you currently need...    Turning from your back to your side while in flat bed without using bedrails?  4  -TA  --  --    Moving from lying on back to sitting on the side of a flat bed without bedrails?  4  -TA  --  --    Moving to and from a bed to a chair (including a wheelchair)?  3  -TA  --  --    Standing up from a chair using your arms (e.g., wheelchair, bedside chair)?  3  -TA  --  --    Climbing 3-5 steps with a railing?  3  -TA  --  --    To walk in hospital room?  3  -TA  --  --    AM-PAC 6 Clicks Score (PT)  20  -TA  --  --       How much help from another is currently needed...    Putting on and taking off regular lower body clothing?  --  4  -LW  3  -LW    Bathing (including washing, rinsing, and drying)  --  4  -LW  3  -LW    Toileting (which includes using toilet bed pan or urinal)  --  4  -LW  4  -LW    Putting on and taking off regular upper body clothing  --  4  -LW  3  -LW    Taking care of personal grooming (such as brushing teeth)  --  4  -LW  4  -LW    Eating meals  --  4  -LW  4  -LW    AM-PAC 6 Clicks Score (OT)  --  24  -LW  21  -LW       Functional Assessment    Outcome Measure Options  AM-PAC 6 Clicks Basic Mobility (PT)  -TA  --  --    Row Name 07/03/19 0922 07/02/19 1600 07/01/19 1448       How much help from another person do you currently need...    Turning from your back to your side while in flat bed without using bedrails?  4  -TW  4  -TA  --    Moving from lying on back to sitting on the side of a flat bed without bedrails?  4  -TW  4  -TA  --    Moving to and from a bed to a chair (including a wheelchair)?  3  -TW  3  -TA  --    Standing up from a chair using your arms (e.g., wheelchair, bedside chair)?  3  -TW  3  -TA  --    Climbing 3-5 steps with a railing?  3  -TW  3  -TA  --    To walk in hospital  room?  3  -TW  3  -TA  --    AM-PAC 6 Clicks Score (PT)  20  -TW  20  -TA  --       How much help from another is currently needed...    Putting on and taking off regular lower body clothing?  --  --  3  -BB    Bathing (including washing, rinsing, and drying)  --  --  3  -BB    Toileting (which includes using toilet bed pan or urinal)  --  --  3  -BB    Putting on and taking off regular upper body clothing  --  --  3  -BB    Taking care of personal grooming (such as brushing teeth)  --  --  4  -BB    Eating meals  --  --  4  -BB    AM-PAC 6 Clicks Score (OT)  --  --  20  -BB       Functional Assessment    Outcome Measure Options  AM-PAC 6 Clicks Basic Mobility (PT)  -TW  AM-PAC 6 Clicks Basic Mobility (PT)  -TA  --      User Key  (r) = Recorded By, (t) = Taken By, (c) = Cosigned By    Initials Name Provider Type    TA Mary Jo Johnson, YOSEPH Physical Therapy Assistant    TW Reece Rocha, YOSEPH Physical Therapy Assistant    BB Selena Simpson COLÓN/L Occupational Therapy Assistant    Marisela Tafoya COTA/L Occupational Therapy Assistant           Time Calculation:   Time Calculation- OT     Row Name 07/04/19 1336             Time Calculation- OT    OT Start Time  0805  -LW      OT Stop Time  0900  -LW      OT Time Calculation (min)  55 min  -LW      Total Timed Code Minutes- OT  55 minute(s)  -LW      OT Received On  07/04/19  -        User Key  (r) = Recorded By, (t) = Taken By, (c) = Cosigned By    Initials Name Provider Type    Marisela Tafoya COTA/L Occupational Therapy Assistant        Therapy Charges for Today     Code Description Service Date Service Provider Modifiers Qty    13746573745 HC OT SELF CARE/MGMT/TRAIN EA 15 MIN 7/3/2019 Marisela Medeiros COTA/L GO 2    49617196814 HC OT SELF CARE/MGMT/TRAIN EA 15 MIN 7/4/2019 Marisela Medeiros COTA/L GO 4               YSABEL Mejia  7/4/2019

## 2019-07-04 NOTE — PROGRESS NOTES
Cardiology Progress Note     LOS: 14 days   Patient Care Team:  Josefa Pozo APRN as PCP - General (Nurse Practitioner)  Meme Duckworth APRN as PCP - Claims Attributed  Aby Stout, RN as Care Coordinator ([facility])    Subjective:    Chart reviewed. Patient seen and examined. Patient appears to be comfortable in bed.  Patient complains of having symptoms of shortness of breath.  Patient denies any symptoms of chest pain.  Hemoglobin is 9.5 with a BUN of 73 and a creatinine of 2.25    Patient PT/INR is 3.1.  Patient is to go rehab at [facility] on Friday prior to  surgical intervention for the sigmoid carcinoma.    Objective:  Temp:  [96.2 °F (35.7 °C)-97.7 °F (36.5 °C)] 96.2 °F (35.7 °C)  Heart Rate:  [77-98] 84  Resp:  [18-24] 20  BP: ()/(57-78) 99/70    Intake/Output Summary (Last 24 hours) at 7/4/2019 1416  Last data filed at 7/4/2019 1351  Gross per 24 hour   Intake 1290 ml   Output 1000 ml   Net 290 ml       Physical Exam:   General Appearance:    Alert, oriented, cooperative, in no acute distress.   Head:    Normocephalic, atraumatic, without obvious abnormality   Eyes:             RENE. Lids and lashes normal, conjunctivae and sclerae normal, no icterus, no pallor.   Ears:    Ears appear intact with no abnormalities noted.   Throat:   Mucous membranes pink and moist.   Neck:  Supple, trachea midline, no carotid bruit, no organomegaly or JVD.   Lungs:    Clear to auscultation and percussion.  Respirations regular, even and unlabored. No wheezes, rales, or rhonchi.    Heart:   Regular S1 with a mechanical click with a soft systolic murmur best heard at the base   Abdomen:    Soft, non-tender, non-distended, no guarding, no rebound tenderness. Normal bowel sounds in all four quadrants, no masses, liver and spleen nonpalpable.    Genitalia:    Deferred.   Extremities:   Moves all extremities well, no edema, no cyanosis, no       redness, no clubbing.   Pulses:   Pulses  palpable and equal bilaterally.   Skin:   Moist and warm. No bleeding, bruising or rash.   Neurologic/Psychiatric:   Alert and oriented to person, place, and time.  Motor, power and tone in upper and lower extremities are grossly intact.  No focal neurological deficits. Normal cognitive function. No psychomotor reaction or tangential thought. No depression, homicidal ideations and suicidal ideations.          Results Review:    Results from last 7 days   Lab Units 07/04/19  0658   SODIUM mmol/L 142   POTASSIUM mmol/L 4.6   CHLORIDE mmol/L 100   CO2 mmol/L 31.0*   BUN mg/dL 73*   CREATININE mg/dL 2.25*   CALCIUM mg/dL 9.6   GLUCOSE mg/dL 118*             Results from last 7 days   Lab Units 07/04/19  0658   WBC 10*3/mm3 8.25   HEMOGLOBIN g/dL 9.5*   HEMATOCRIT % 29.6*   PLATELETS 10*3/mm3 205     Results from last 7 days   Lab Units 07/04/19  0659 07/03/19  0556 07/02/19  0629   INR  3.06* 3.04* 2.41*                       ECHO:  Results for orders placed during the hospital encounter of 06/18/19   Adult Transthoracic Echo Complete W/ Cont if Necessary Per Protocol    Narrative · Left atrial cavity size is moderately dilated.  · The left ventricular cavity is mildly dilated.  · Left ventricular wall thickness is consistent with borderline concentric   hypertrophy.  · Mild mitral valve regurgitation is present  · Mild to moderate tricuspid valve regurgitation is present.          ECG 12 Lead   Preliminary Result   Test Reason : afib   Blood Pressure : **/** mmHG   Vent. Rate : 067 BPM     Atrial Rate : 067 BPM      P-R Int : 232 ms          QRS Dur : 088 ms       QT Int : 456 ms       P-R-T Axes : 072 -08 044 degrees      QTc Int : 481 ms      Sinus rhythm with 1st degree AV block   Low voltage QRS   Cannot rule out Anteroseptal infarct , age undetermined   Abnormal ECG   When compared with ECG of 01-OCT-2018 10:27,   Sinus rhythm has replaced Electronic ventricular pacemaker      Referred By:             Confirmed  By:            Medication Review:   Current Facility-Administered Medications   Medication Dose Route Frequency Provider Last Rate Last Dose   • acetaminophen (TYLENOL) tablet 650 mg  650 mg Oral Q6H PRN Colin Coreas MD   650 mg at 06/29/19 0151   • cholecalciferol (VITAMIN D3) tablet 1,000 Units  1,000 Units Oral Daily Sandy Caputo MD   1,000 Units at 07/04/19 1013   • citalopram (CeleXA) tablet 20 mg  20 mg Oral Daily Colin Coreas MD   20 mg at 07/04/19 1014   • dextrose (D50W) 25 g/ 50mL Intravenous Solution 25 g  25 g Intravenous Q15 Min PRN Dudley Alfredo MD       • dextrose (GLUTOSE) oral gel 15 g  15 g Oral Q15 Min PRN Dudley Alfredo MD       • dextrose 5 % and sodium chloride 0.45 % infusion  30 mL/hr Intravenous Continuous PRN Alfredo Guerrero MD       • diltiaZEM CD (CARDIZEM CD) 24 hr capsule 240 mg  240 mg Oral Daily Colin Coreas MD   240 mg at 07/04/19 1014   • ferric gluconate (FERRLECIT) 125 mg in sodium chloride 0.9 % 110 mL IVPB  125 mg Intravenous Daily Rocio Mack  mL/hr at 07/04/19 1017 125 mg at 07/04/19 1017   • [START ON 7/5/2019] furosemide (LASIX) tablet 40 mg  40 mg Oral Daily Sandy Caputo MD       • glucagon (human recombinant) (GLUCAGEN DIAGNOSTIC) injection 1 mg  1 mg Subcutaneous PRN Dudley Alfredo MD       • guaiFENesin (MUCINEX) 12 hr tablet 600 mg  600 mg Oral Q12H Sandy Caputo MD   600 mg at 07/04/19 1014   • insulin aspart (novoLOG) injection 0-9 Units  0-9 Units Subcutaneous 4x Daily AC & at Bedtime Dudley Alfredo MD   2 Units at 07/03/19 2009   • ipratropium-albuterol (DUO-NEB) nebulizer solution 3 mL  3 mL Nebulization Q8H - RT Colin Coreas MD   3 mL at 07/04/19 0736   • ipratropium-albuterol (DUO-NEB) nebulizer solution 3 mL  3 mL Nebulization Q4H PRN Hermann Richardson MD       • magic butt ointment   Topical BID Colin Coreas MD       • metOLazone (ZAROXOLYN) tablet 2.5 mg  2.5 mg Oral Daily Sandy Caputo MD   2.5 mg at  07/04/19 1013   • metoprolol tartrate (LOPRESSOR) injection 2.5 mg  2.5 mg Intravenous Q6H PRN Kasi Reaves MD       • nystatin (MYCOSTATIN) powder   Topical Q12H Colin Coreas MD       • ondansetron (ZOFRAN) injection 4 mg  4 mg Intravenous Q6H PRN Dudley Alfredo MD       • Pharmacy to dose warfarin   Does not apply Continuous PRN Colin Coreas MD       • prenatal vitamin 27-0.8 tablet 1 tablet  1 tablet Oral Daily Sandy Caputo MD   1 tablet at 07/04/19 1013   • rOPINIRole (REQUIP) tablet 0.25 mg  0.25 mg Oral Nightly Colin Coreas MD   0.25 mg at 07/03/19 2008   • sodium chloride 0.9 % flush 10 mL  10 mL Intravenous PRN Lobo Amezquita PA-C   10 mL at 06/24/19 0829   • sodium chloride 0.9 % flush 3 mL  3 mL Intravenous Q12H Dudley Alfredo MD   3 mL at 07/04/19 1017   • sodium chloride 0.9 % flush 3-10 mL  3-10 mL Intravenous PRN Dudley Alfredo MD   10 mL at 06/27/19 1534   • traZODone (DESYREL) tablet 300 mg  300 mg Oral Nightly Colin Coreas MD   300 mg at 07/03/19 2008   • vitamin C (ASCORBIC ACID) tablet 500 mg  500 mg Oral Daily Sandy Caputo MD   500 mg at 07/04/19 1013       Assessment and Plan:      Gastrointestinal hemorrhage    Emphysema of lung (CMS/HCC)    Diabetes mellitus (CMS/HCC)    CKD (chronic kidney disease) stage 3, GFR 30-59 ml/min (CMS/HCC)    Morbid obesity (CMS/HCC)    Gastritis    Colon polyp    Coumadin toxicity  1.  Shortness of breath.  Patient last echocardiogram had revealed an ejection fraction of 45 to 50% with prostatic aortic valve.  Patient complains of having symptoms of shortness of breath with a hemoglobin of 9.5.  Patient is currently euvolemic and would decrease the Lasix to 20 mg daily with rising creatinine and BUN.    2.  Sick sinus syndrome.  Status post [employer] permanent pacemaker implantation.  Patient pacemaker interrogation and reveal appropriate sensing and pacing function.  Patient has not complained of having any symptoms of  lightheaded dizziness or near syncope.    3.  Chronic anticoagulation with Coumadin status post [employer] mechanical aortic valve prosthesis done in December 2004 in [facility].  Patient was found to have sigmoid carcinoma and was evaluated by Dr. Gonzalez.  Patient has been started back on anticoagulation.    4.  Renal insufficiency.  Patient has an elevated BUN and creatinine and would decrease the Lasix to 20 mg daily .    5.  Sigmoid carcinoma involving a polyp.  Patient would need surgical resection of the sigmoid colon along with associated lymph node.  Patient has complained of having symptoms of shortness of breath and currently not stable to undergo surgical intervention.  Patient would be transferred to [facility] on Friday for rehab prior to consideration for surgical intervention.    6.  Atherosclerotic coronary artery disease.  Patient had undergone single-vessel coronary artery bypass grafting.  Patient had not complained of having any symptoms of chest pain.    7.  Anemia.  Patient's hemoglobin is 9.1.  Patient is on anticoagulation with Coumadin secondary to the prostatic aortic valve.    8.  Anticoagulation with Coumadin.  Patient PT/INR is 3.06.  Patient does have a mechanical valve.    The above plan of management were discussed with the patient            Bereket Gates MD  07/04/19  2:16 PM      Time: Spent on face-to-face interaction 20 minutes      Dictated utilizing Dragon dictation.

## 2019-07-04 NOTE — PROGRESS NOTES
"[facility]  [address] [place] [address]. [address]  T - [phone]  F - [phone]     Progress Note          PATIENT  DEMOGRAPHICS   PATIENT NAME: Brendon Skelton                      PHYSICIAN: Sandy Caputo MD  : 1945  MRN: [id number]   LOS: 14 days    Patient Care Team:  Josefa Pozo APRN as PCP - General (Nurse Practitioner)  Meme Duckworth APRN as PCP - Claims Attributed  Aby Stout RN as Care Coordinator ([facility])  Subjective   SUBJECTIVE   Shortness of breath better, wt down 4 lbs         Objective   OBJECTIVE   Vital Signs  Temp:  [96.2 °F (35.7 °C)-97.7 °F (36.5 °C)] 96.2 °F (35.7 °C)  Heart Rate:  [77-98] 94  Resp:  [18-24] 18  BP: (110-128)/(57-78) 123/62    Flowsheet Rows      First Filed Value   Admission Height  165.1 cm (65\") Documented at 2019 0934   Admission Weight  111 kg (244 lb) Documented at 2019 0934           I/O last 3 completed shifts:  In: 840 [P.O.:840]  Out: 2150 [Urine:2150]    PHYSICAL EXAM    Physical Exam   Constitutional: She is oriented to person, place, and time. She appears well-developed.   Eyes: EOM are normal. Pupils are equal, round, and reactive to light.   Cardiovascular: Normal rate, regular rhythm and normal heart sounds. Exam reveals no gallop and no friction rub.   No murmur heard.  Pulmonary/Chest: Effort normal. No respiratory distress. She has wheezes.   Abdominal: Soft. Bowel sounds are normal. She exhibits no distension and no mass. There is no tenderness. There is no guarding.   Musculoskeletal: She exhibits edema. She exhibits no tenderness.   Neurological: She is alert and oriented to person, place, and time.   Skin: Skin is warm and dry.       RESULTS   Results Review:    Results from last 7 days   Lab Units 19  0658 19  0556 19  0629   SODIUM mmol/L 142 141 141   POTASSIUM mmol/L 4.6 3.8 4.3   CHLORIDE mmol/L 100 100 104   CO2 mmol/L 31.0* 30.0* 27.0   BUN mg/dL " 73* 62* 60*   CREATININE mg/dL 2.25* 2.10* 2.17*   CALCIUM mg/dL 9.6 9.4 8.9   GLUCOSE mg/dL 118* 123* 118*       Estimated Creatinine Clearance: 28.6 mL/min (A) (by C-G formula based on SCr of 2.25 mg/dL (H)).        Results from last 7 days   Lab Units 07/04/19  0658 07/03/19  0556 07/02/19  0629 07/01/19  0551 06/30/19  0804   WBC 10*3/mm3 8.25 8.31 8.27 8.43 8.24   HEMOGLOBIN g/dL 9.5* 9.1* 8.8* 7.9* 8.1*   PLATELETS 10*3/mm3 205 190 212 216 235       Results from last 7 days   Lab Units 07/04/19  0659 07/03/19  0556 07/02/19  0629 07/01/19  0551 06/30/19  0804   INR  3.06* 3.04* 2.41* 1.92* 1.74*         Imaging Results (last 24 hours)     ** No results found for the last 24 hours. **           MEDICATIONS      cholecalciferol 1,000 Units Oral Daily   citalopram 20 mg Oral Daily   diltiaZEM  mg Oral Daily   ferric gluconate (FERRLECIT) IVPB 125 mg Intravenous Daily   furosemide 40 mg Oral BID   guaiFENesin 600 mg Oral Q12H   insulin aspart 0-9 Units Subcutaneous 4x Daily AC & at Bedtime   ipratropium-albuterol 3 mL Nebulization Q8H - RT   magic butt ointment  Topical BID   metOLazone 2.5 mg Oral Daily   nystatin  Topical Q12H   prenatal vitamin 27-0.8 1 tablet Oral Daily   rOPINIRole 0.25 mg Oral Nightly   sodium chloride 3 mL Intravenous Q12H   traZODone 300 mg Oral Nightly   vitamin C 500 mg Oral Daily       dextrose 5 % and sodium chloride 0.45 % 30 mL/hr   Pharmacy to dose warfarin        Assessment/Plan   ASSESSMENT / PLAN      Gastrointestinal hemorrhage    Emphysema of lung (CMS/HCC)    Diabetes mellitus (CMS/HCC)    CKD (chronic kidney disease) stage 3, GFR 30-59 ml/min (CMS/HCC)    Morbid obesity (CMS/HCC)    Gastritis    Colon polyp    Coumadin toxicity    1.  CKD 4: baseline creatinine 1.9-2.0 mg/dl     - Creatinine slightly high and will lower lasix to daily , keep metolazone. Lost 4 lbs from yesterday    2.  Hypertension:  - Well-controlled     3.  Anemia  - Hemoglobin is low   - Ferritin  211, Tsat 13%. On IV iron.      4.  Colon polyp/adenocarcinoma:  - GI and general surgery following. High risk for surgery from pulmonary standpoint per Dr. Romero.      5.  DM2    6. dispo to [facility] on Friday after iv fe infusions are finished         This document has been electronically signed by Sandy Caputo MD on July 4, 2019 9:48 AM

## 2019-07-04 NOTE — PROGRESS NOTES
Cardiology Progress Note     LOS: 13 days   Patient Care Team:  Josefa Pozo APRN as PCP - General (Nurse Practitioner)  Meme Duckworth APRN as PCP - Claims Attributed  Aby Stout, RN as Care Coordinator ([facility])    Subjective:    Chart reviewed. Patient seen and examined. Patient appears to be comfortable in bed.  Patient complains of having symptoms of shortness of breath.  Patient denies any symptoms of chest pain.  Hemoglobin is 8.1 with a BUN of 62 and a creatinine of 2.10.    Patient PT/INR is 3.1.  Patient is to go rehab at [facility] on Friday prior to  surgical intervention for the sigmoid carcinoma.    Objective:  Temp:  [97.6 °F (36.4 °C)-98.6 °F (37 °C)] 97.7 °F (36.5 °C)  Heart Rate:  [] 98  Resp:  [18] 18  BP: (114-145)/(57-80) 128/57    Intake/Output Summary (Last 24 hours) at 7/3/2019 2105  Last data filed at 7/3/2019 1700  Gross per 24 hour   Intake 480 ml   Output 1000 ml   Net -520 ml       Physical Exam:   General Appearance:    Alert, oriented, cooperative, in no acute distress.   Head:    Normocephalic, atraumatic, without obvious abnormality   Eyes:             RENE. Lids and lashes normal, conjunctivae and sclerae normal, no icterus, no pallor.   Ears:    Ears appear intact with no abnormalities noted.   Throat:   Mucous membranes pink and moist.   Neck:  Supple, trachea midline, no carotid bruit, no organomegaly or JVD.   Lungs:    Clear to auscultation and percussion.  Respirations regular, even and unlabored. No wheezes, rales, or rhonchi.    Heart:   Regular S1 with a mechanical click with a soft systolic murmur best heard at the base   Abdomen:    Soft, non-tender, non-distended, no guarding, no rebound tenderness. Normal bowel sounds in all four quadrants, no masses, liver and spleen nonpalpable.    Genitalia:    Deferred.   Extremities:   Moves all extremities well, no edema, no cyanosis, no       redness, no clubbing.   Pulses:   Pulses  palpable and equal bilaterally.   Skin:   Moist and warm. No bleeding, bruising or rash.   Neurologic/Psychiatric:   Alert and oriented to person, place, and time.  Motor, power and tone in upper and lower extremities are grossly intact.  No focal neurological deficits. Normal cognitive function. No psychomotor reaction or tangential thought. No depression, homicidal ideations and suicidal ideations.          Results Review:    Results from last 7 days   Lab Units 07/03/19  0556   SODIUM mmol/L 141   POTASSIUM mmol/L 3.8   CHLORIDE mmol/L 100   CO2 mmol/L 30.0*   BUN mg/dL 62*   CREATININE mg/dL 2.10*   CALCIUM mg/dL 9.4   GLUCOSE mg/dL 123*             Results from last 7 days   Lab Units 07/03/19  0556   WBC 10*3/mm3 8.31   HEMOGLOBIN g/dL 9.1*   HEMATOCRIT % 28.8*   PLATELETS 10*3/mm3 190     Results from last 7 days   Lab Units 07/03/19  0556 07/02/19  0629 07/01/19  0551   INR  3.04* 2.41* 1.92*                       ECHO:  Results for orders placed during the hospital encounter of 06/18/19   Adult Transthoracic Echo Complete W/ Cont if Necessary Per Protocol    Narrative · Left atrial cavity size is moderately dilated.  · The left ventricular cavity is mildly dilated.  · Left ventricular wall thickness is consistent with borderline concentric   hypertrophy.  · Mild mitral valve regurgitation is present  · Mild to moderate tricuspid valve regurgitation is present.          ECG 12 Lead   Preliminary Result   Test Reason : afib   Blood Pressure : **/** mmHG   Vent. Rate : 067 BPM     Atrial Rate : 067 BPM      P-R Int : 232 ms          QRS Dur : 088 ms       QT Int : 456 ms       P-R-T Axes : 072 -08 044 degrees      QTc Int : 481 ms      Sinus rhythm with 1st degree AV block   Low voltage QRS   Cannot rule out Anteroseptal infarct , age undetermined   Abnormal ECG   When compared with ECG of 01-OCT-2018 10:27,   Sinus rhythm has replaced Electronic ventricular pacemaker      Referred By:             Confirmed  By:            Medication Review:   Current Facility-Administered Medications   Medication Dose Route Frequency Provider Last Rate Last Dose   • acetaminophen (TYLENOL) tablet 650 mg  650 mg Oral Q6H PRN Colin Coreas MD   650 mg at 06/29/19 0151   • cholecalciferol (VITAMIN D3) tablet 1,000 Units  1,000 Units Oral Daily Sandy Caputo MD   1,000 Units at 07/03/19 0823   • citalopram (CeleXA) tablet 20 mg  20 mg Oral Daily Colin Coreas MD   20 mg at 07/03/19 0823   • dextrose (D50W) 25 g/ 50mL Intravenous Solution 25 g  25 g Intravenous Q15 Min PRN Dudley Alfredo MD       • dextrose (GLUTOSE) oral gel 15 g  15 g Oral Q15 Min PRN Dudley Alfredo MD       • dextrose 5 % and sodium chloride 0.45 % infusion  30 mL/hr Intravenous Continuous PRN Alfredo Guerrero MD       • diltiaZEM CD (CARDIZEM CD) 24 hr capsule 240 mg  240 mg Oral Daily Colin Coreas MD   240 mg at 07/03/19 0823   • ferric gluconate (FERRLECIT) 125 mg in sodium chloride 0.9 % 110 mL IVPB  125 mg Intravenous Daily Rocio Mack  mL/hr at 07/03/19 0819 125 mg at 07/03/19 0819   • furosemide (LASIX) tablet 40 mg  40 mg Oral BID Rocio Mack MD   40 mg at 07/03/19 1727   • glucagon (human recombinant) (GLUCAGEN DIAGNOSTIC) injection 1 mg  1 mg Subcutaneous PRN Dudley Alfredo MD       • guaiFENesin (MUCINEX) 12 hr tablet 600 mg  600 mg Oral Q12H Sandy Caputo MD   600 mg at 07/03/19 2008   • insulin aspart (novoLOG) injection 0-9 Units  0-9 Units Subcutaneous 4x Daily AC & at Bedtime Dudley Alfredo MD   2 Units at 07/03/19 2009   • ipratropium-albuterol (DUO-NEB) nebulizer solution 3 mL  3 mL Nebulization Q8H - RT Colin Coreas MD   3 mL at 07/03/19 1519   • ipratropium-albuterol (DUO-NEB) nebulizer solution 3 mL  3 mL Nebulization Q4H PRN Hermann Richardson MD       • magic butt ointment   Topical BID Colin Coreas MD       • metOLazone (ZAROXOLYN) tablet 2.5 mg  2.5 mg Oral Daily Sandy Caputo MD   2.5 mg at  07/03/19 0823   • metoprolol tartrate (LOPRESSOR) injection 2.5 mg  2.5 mg Intravenous Q6H PRN Kasi Reaves MD       • nystatin (MYCOSTATIN) powder   Topical Q12H Colin Coreas MD       • ondansetron (ZOFRAN) injection 4 mg  4 mg Intravenous Q6H PRN Dudley Alfredo MD       • Pharmacy to dose warfarin   Does not apply Continuous PRN Colin Coreas MD       • prenatal vitamin 27-0.8 tablet 1 tablet  1 tablet Oral Daily Sandy Caputo MD   1 tablet at 07/03/19 0823   • rOPINIRole (REQUIP) tablet 0.25 mg  0.25 mg Oral Nightly Colin Coreas MD   0.25 mg at 07/03/19 2008   • sodium chloride 0.9 % flush 10 mL  10 mL Intravenous PRN Lobo Amezquita PA-C   10 mL at 06/24/19 0829   • sodium chloride 0.9 % flush 3 mL  3 mL Intravenous Q12H Dudley Alfredo MD   3 mL at 07/03/19 2009   • sodium chloride 0.9 % flush 3-10 mL  3-10 mL Intravenous PRN Dudley Alfredo MD   10 mL at 06/27/19 1534   • traZODone (DESYREL) tablet 300 mg  300 mg Oral Nightly Colin Coreas MD   300 mg at 07/03/19 2008   • vitamin C (ASCORBIC ACID) tablet 500 mg  500 mg Oral Daily Sandy Caputo MD   500 mg at 07/03/19 0823       Assessment and Plan:      Gastrointestinal hemorrhage    Emphysema of lung (CMS/HCC)    Diabetes mellitus (CMS/HCC)    CKD (chronic kidney disease) stage 3, GFR 30-59 ml/min (CMS/HCC)    Morbid obesity (CMS/HCC)    Gastritis    Colon polyp    Coumadin toxicity  1.  Shortness of breath.  Patient last echocardiogram had revealed an ejection fraction of 45 to 50% with prostatic aortic valve.  Patient complains of having symptoms of shortness of breath with a hemoglobin of 8.8.  .  Patient is currently euvolemic and would decrease the Lasix to 40 mg daily with rising creatinine and BUN.    2.  Sick sinus syndrome.  Status post [employer] permanent pacemaker implantation.  Patient pacemaker interrogation and reveal appropriate sensing and pacing function.  Patient has not complained of having any symptoms of  lightheaded dizziness or near syncope.    3.  Chronic anticoagulation with Coumadin status post [employer] mechanical aortic valve prosthesis done in December 2004 in [facility].  Patient was found to have sigmoid carcinoma and was evaluated by Dr. Gonzalez.  Patient has been started back on anticoagulation.    4.  Renal insufficiency.  Patient has an elevated BUN and creatinine and would decrease the Lasix to 40 mg daily .    5.  Sigmoid carcinoma involving a polyp.  Patient would need surgical resection of the sigmoid colon along with associated lymph node.  Patient has complained of having symptoms of shortness of breath and currently not stable to undergo surgical intervention.  Patient would be transferred to [facility] on Friday for rehab prior to consideration for surgical intervention.    6.  Atherosclerotic coronary artery disease.  Patient had undergone single-vessel coronary artery bypass grafting.  Patient had not complained of having any symptoms of chest pain.    7.  Anemia.  Patient's hemoglobin is 9.1.  Patient is on anticoagulation with Coumadin secondary to the prostatic aortic valve.    8.  Anticoagulation with Coumadin.  Patient PT/INR is 3.04.  Patient does have a mechanical valve.    The above plan of management were discussed with the patient            Bereket Gtaes MD  07/03/19  9:05 PM      Time: Spent on face-to-face interaction 20 minutes      Dictated utilizing Dragon dictation.

## 2019-07-05 VITALS
HEART RATE: 93 BPM | TEMPERATURE: 98.8 F | SYSTOLIC BLOOD PRESSURE: 108 MMHG | HEIGHT: 65 IN | BODY MASS INDEX: 44.08 KG/M2 | DIASTOLIC BLOOD PRESSURE: 67 MMHG | RESPIRATION RATE: 20 BRPM | WEIGHT: 264.6 LBS | OXYGEN SATURATION: 90 %

## 2019-07-05 LAB
ANION GAP SERPL CALCULATED.3IONS-SCNC: 12 MMOL/L (ref 5–15)
BUN BLD-MCNC: 78 MG/DL (ref 8–23)
BUN/CREAT SERPL: 35 (ref 7–25)
CALCIUM SPEC-SCNC: 9.6 MG/DL (ref 8.6–10.5)
CHLORIDE SERPL-SCNC: 98 MMOL/L (ref 98–107)
CO2 SERPL-SCNC: 32 MMOL/L (ref 22–29)
CREAT BLD-MCNC: 2.23 MG/DL (ref 0.57–1)
DEPRECATED RDW RBC AUTO: 72.9 FL (ref 37–54)
ERYTHROCYTE [DISTWIDTH] IN BLOOD BY AUTOMATED COUNT: 21.1 % (ref 12.3–15.4)
GFR SERPL CREATININE-BSD FRML MDRD: 22 ML/MIN/1.73
GLUCOSE BLD-MCNC: 122 MG/DL (ref 65–99)
GLUCOSE BLDC GLUCOMTR-MCNC: 126 MG/DL (ref 70–130)
GLUCOSE BLDC GLUCOMTR-MCNC: 154 MG/DL (ref 70–130)
HCT VFR BLD AUTO: 30.2 % (ref 34–46.6)
HGB BLD-MCNC: 9.4 G/DL (ref 12–15.9)
INR PPP: 2.26 (ref 0.8–1.2)
MCH RBC QN AUTO: 30.8 PG (ref 26.6–33)
MCHC RBC AUTO-ENTMCNC: 31.1 G/DL (ref 31.5–35.7)
MCV RBC AUTO: 99 FL (ref 79–97)
PLATELET # BLD AUTO: 234 10*3/MM3 (ref 140–450)
PMV BLD AUTO: 12.6 FL (ref 6–12)
POTASSIUM BLD-SCNC: 3.9 MMOL/L (ref 3.5–5.2)
PROTHROMBIN TIME: 24.7 SECONDS (ref 11.1–15.3)
RBC # BLD AUTO: 3.05 10*6/MM3 (ref 3.77–5.28)
SODIUM BLD-SCNC: 142 MMOL/L (ref 136–145)
WBC NRBC COR # BLD: 7.91 10*3/MM3 (ref 3.4–10.8)

## 2019-07-05 PROCEDURE — 94799 UNLISTED PULMONARY SVC/PX: CPT

## 2019-07-05 PROCEDURE — 97110 THERAPEUTIC EXERCISES: CPT

## 2019-07-05 PROCEDURE — 97535 SELF CARE MNGMENT TRAINING: CPT

## 2019-07-05 PROCEDURE — 80048 BASIC METABOLIC PNL TOTAL CA: CPT | Performed by: INTERNAL MEDICINE

## 2019-07-05 PROCEDURE — 94760 N-INVAS EAR/PLS OXIMETRY 1: CPT

## 2019-07-05 PROCEDURE — 85610 PROTHROMBIN TIME: CPT | Performed by: INTERNAL MEDICINE

## 2019-07-05 PROCEDURE — 85027 COMPLETE CBC AUTOMATED: CPT | Performed by: FAMILY MEDICINE

## 2019-07-05 PROCEDURE — 25010000002 NA FERRIC GLUC CPLX PER 12.5 MG: Performed by: INTERNAL MEDICINE

## 2019-07-05 PROCEDURE — 97530 THERAPEUTIC ACTIVITIES: CPT

## 2019-07-05 PROCEDURE — 82962 GLUCOSE BLOOD TEST: CPT

## 2019-07-05 RX ORDER — METOLAZONE 2.5 MG/1
2.5 TABLET ORAL DAILY
Qty: 30 TABLET | Refills: 1 | Status: ON HOLD | OUTPATIENT
Start: 2019-07-06 | End: 2019-07-22 | Stop reason: SDUPTHER

## 2019-07-05 RX ADMIN — PRENATAL VIT W/ FE FUMARATE-FA TAB 27-0.8 MG 1 TABLET: 27-0.8 TAB at 10:00

## 2019-07-05 RX ADMIN — IPRATROPIUM BROMIDE AND ALBUTEROL SULFATE 3 ML: 2.5; .5 SOLUTION RESPIRATORY (INHALATION) at 08:49

## 2019-07-05 RX ADMIN — DILTIAZEM HYDROCHLORIDE 240 MG: 240 CAPSULE, COATED, EXTENDED RELEASE ORAL at 10:00

## 2019-07-05 RX ADMIN — GUAIFENESIN 600 MG: 600 TABLET, EXTENDED RELEASE ORAL at 10:00

## 2019-07-05 RX ADMIN — FUROSEMIDE 40 MG: 40 TABLET ORAL at 10:00

## 2019-07-05 RX ADMIN — SODIUM CHLORIDE, PRESERVATIVE FREE 3 ML: 5 INJECTION INTRAVENOUS at 10:00

## 2019-07-05 RX ADMIN — METOLAZONE 2.5 MG: 2.5 TABLET ORAL at 10:00

## 2019-07-05 RX ADMIN — SODIUM CHLORIDE 125 MG: 9 INJECTION, SOLUTION INTRAVENOUS at 10:00

## 2019-07-05 RX ADMIN — VITAMIN D, TAB 1000IU (100/BT) 1000 UNITS: 25 TAB at 10:00

## 2019-07-05 RX ADMIN — ACETAMINOPHEN 650 MG: 325 TABLET, FILM COATED ORAL at 10:09

## 2019-07-05 RX ADMIN — CITALOPRAM HYDROBROMIDE 20 MG: 20 TABLET ORAL at 10:00

## 2019-07-05 RX ADMIN — OXYCODONE HYDROCHLORIDE AND ACETAMINOPHEN 500 MG: 500 TABLET ORAL at 10:00

## 2019-07-05 NOTE — THERAPY TREATMENT NOTE
Acute Care - Occupational Therapy Treatment Note  [facility]     Patient Name: Brendon Skelton  : 1945  MRN: [id number]  Today's Date: 2019  Onset of Illness/Injury or Date of Surgery: 19  Date of Referral to OT: 19  Referring Physician: LATISHA Reaves MD    Admit Date: 2019       ICD-10-CM ICD-9-CM   1. Gastrointestinal hemorrhage, unspecified gastrointestinal hemorrhage type K92.2 578.9   2. Supratherapeutic INR R79.1 790.92   3. SSS (sick sinus syndrome) (CMS/HCC) I49.5 427.81   4. Atherosclerosis of native coronary artery of native heart, angina presence unspecified I25.10 414.01   5. 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 S/P AVR Z95.2 V43.3   7. Impaired physical mobility Z74.09 781.99   8. Impaired mobility and activities of daily living Z74.09 799.89     Patient Active Problem List   Diagnosis   • Long term current use of anticoagulant therapy   • Personal history of heart valve replacement   • Atrial fibrillation [I48.91]   • Emphysema of lung (CMS/HCC)   • On anticoagulant therapy   • Hyperlipidemia   • Diastolic heart failure (CMS/HCC)   • Depressive disorder   • COPD (chronic obstructive pulmonary disease) (CMS/HCC)   • Diabetes mellitus (CMS/HCC)   • Myopia   • Astigmatism   • Bleeding from open wound of chest wall   • Follow-up surgery care   • Encounter for screening for malignant neoplasm of colon   • Positive colorectal cancer screening using DNA-based stool test   • Nodule of left lung   • Chronic hypoxemic respiratory failure (CMS/HCC)   • Physical deconditioning   • Heart failure with preserved left ventricular function (HFpEF) (CMS/HCC)   • Essential hypertension   • Coronary artery disease involving native coronary artery without angina pectoris   • Hx of CABG   • SSS (sick sinus syndrome) (CMS/HCC)   • Pacemaker   • CKD (chronic kidney disease) stage 3, GFR 30-59 ml/min (CMS/HCC)   •  Personal history of tobacco use, presenting hazards to health   • Gastrointestinal hemorrhage   • Morbid obesity (CMS/HCC)   • Gastritis   • Colon polyp   • Coumadin toxicity     Past Medical History:   Diagnosis Date   • Acute bronchitis    • Anxiety    • Atrial fibrillation (CMS/HCC)    • C. difficile colitis    • Callosity     under metatarsal head      • Cardiac pacemaker in situ    • CHF (congestive heart failure) (CMS/HCC)    • Chronic obstructive lung disease (CMS/HCC)    • Corns and callus    • Depressive disorder    • Diabetes mellitus (CMS/HCC)    • Diastolic heart failure (CMS/HCC)    • Essential hypertension    • Foot pain    • Hyperlipidemia    • Long term current use of anticoagulant    • On anticoagulant therapy    • Pulmonary emphysema (CMS/HCC)    • Rectal hemorrhage    • Type 2 diabetes mellitus (CMS/HCC)      Past Surgical History:   Procedure Laterality Date   • AORTIC VALVE REPAIR/REPLACEMENT  1999   • CARDIAC ELECTROPHYSIOLOGY PROCEDURE N/A 3/20/2017    Procedure: PPM generator change - dual;  Surgeon: Bereket Gates MD;  Location: [facility] CATH INVASIVE LOCATION;  Service:    • CARDIAC PACEMAKER PLACEMENT  1999   • CATARACT EXTRACTION W/ INTRAOCULAR LENS IMPLANT Left 2/1/2019    Procedure: REMOVE CATARACT AND IMPLANT INTRAOCULAR LENS I;  Surgeon: Jos eL Clay MD;  Location: [facility] OR;  Service: Ophthalmology   • CATARACT EXTRACTION W/ INTRAOCULAR LENS IMPLANT Right 2/8/2019    Procedure: REMOVE CATARACT AND IMPLANT  INTRAOCULAR LENS;  Surgeon: Jose L Clay MD;  Location: [facility] OR;  Service: Ophthalmology   • COLONOSCOPY N/A 6/19/2019    Procedure: COLONOSCOPY WITH CONTROL OF BLEED;  Surgeon: Alfredo Guerrero MD;  Location: [facility] ENDOSCOPY;  Service: Gastroenterology   • COLONOSCOPY N/A 6/24/2019    Procedure: COLONOSCOPY;  Surgeon: Alfredo Guerrero MD;  Location: [facility] ENDOSCOPY;  Service: Gastroenterology   • ECHO - CONVERTED  11/12/2013    There is mild to moderate  left atrial enlargement EF 50-55%   • ENDOSCOPY N/A 6/19/2019    Procedure: ESOPHAGOGASTRODUODENOSCOPY WITH CONTROL OF BLEED;  Surgeon: Alfredo Guerrero MD;  Location: [facility] ENDOSCOPY;  Service: Gastroenterology   • FOOT SURGERY  1990   • OTHER SURGICAL HISTORY  10/26/2015    PARING CORN/CALLUS    • PACEMAKER REPLACEMENT N/A 3/21/2017    Procedure: revision pacemaker pocket, evacuation hematoma, control of bleeding;  Surgeon: Polo Feliz MD;  Location: [facility] OR;  Service:    • TRANSESOPHAGEAL ECHOCARDIOGRAM (MITZY)  05/01/2014    With color flow-Mild left atrial enlargement with mild concentric LV hypertrophy with top normal aortic root size. EF 45-50%. Mild mitral regurgitation and mild aortic insufficiency and trivial amount of tricuspid regurgation   • TUBAL ABDOMINAL LIGATION         Therapy Treatment    Rehabilitation Treatment Summary     Row Name 07/05/19 1043 07/05/19 0915          Treatment Time/Intention    Discipline  physical therapy assistant  -TA  occupational therapy assistant  -LW     Document Type  therapy note (daily note)  -TA  therapy note (daily note)  -LW     Subjective Information  complains of;no complaints  -TA  no complaints  -LW     Mode of Treatment  physical therapy;individual therapy  -TA  occupational therapy  -LW     Patient/Family Observations  --  Pt supine in bed No family present.  -LW     Care Plan Review  --  care plan/treatment goals reviewed  -LW     Total Minutes, Occupational Therapy Treatment  --  25  -LW     Therapy Frequency (OT Eval)  --  other (see comments) 5-7 days per week  -LW     Patient Effort  adequate  -TA  adequate  -LW     Existing Precautions/Restrictions  oxygen therapy device and L/min  -TA  oxygen therapy device and L/min  -LW     Equipment Issued to Patient  --  gait belt  -LW     Recorded by [TA] Mary Jo Johnson PTA 07/05/19 1146 [LW] Marisela Medeiros COTA/L 07/05/19 1306     Row Name 07/05/19 1043 07/05/19 0915          Vital Signs    Pre  Systolic BP Rehab  --  113  -LW     Pre Treatment Diastolic BP  --  55  -LW     Intra Systolic BP Rehab  94  -TA  --     Intra Treatment Diastolic BP  55  -TA  --     Pretreatment Heart Rate (beats/min)  92  -TA  91  -LW     Intratreatment Heart Rate (beats/min)  87  -TA  --     Posttreatment Heart Rate (beats/min)  87  -TA  92  -LW     Pre SpO2 (%)  92  -TA  91  -LW     O2 Delivery Pre Treatment  supplemental O2  -TA  supplemental O2  -LW     Intra SpO2 (%)  83  -TA  --     O2 Delivery Intra Treatment  supplemental O2  -TA  --     Post SpO2 (%)  91  -TA  92  -LW     O2 Delivery Post Treatment  supplemental O2  -TA  supplemental O2  -LW     Pre Patient Position  Supine  -TA  Supine  -LW     Post Patient Position  Supine  -TA  Sitting  -LW     Recorded by [TA] Mary Jo Johnson, PTA 07/05/19 1146 [LW] Marisela Medeiros COTA/L 07/05/19 1306     Row Name 07/05/19 1043 07/05/19 0915          Cognitive Assessment/Intervention- PT/OT    Affect/Mental Status (Cognitive)  WFL  -TA  WFL  -LW     Orientation Status (Cognition)  oriented x 4  -TA  oriented x 4  -LW     Follows Commands (Cognition)  WFL  -TA  WFL  -LW     Personal Safety Interventions  fall prevention program maintained;nonskid shoes/slippers when out of bed;supervised activity  -TA  fall prevention program maintained;gait belt;nonskid shoes/slippers when out of bed  -LW     Recorded by [TA] Mary Jo Johnson, PTA 07/05/19 1146 [LW] Marisela Medeiros COTA/L 07/05/19 1306     Row Name 07/05/19 0915             Safety Issues, Functional Mobility    Impairments Affecting Function (Mobility)  shortness of breath  -LW      Comment, Safety Issues/Impairments (Mobility)  Educated pt on Home safety and fall prevention  -LW      Recorded by [LW] Marisela Medeiros COTA/L 07/05/19 1308      Row Name 07/05/19 1043             Bed Mobility Assessment/Treatment    Supine-Sit [place] (Bed Mobility)  conditional independence  -TA      Sit-Supine [place] (Bed Mobility)   conditional independence  -TA      Recorded by [TA] Mary Jo Johnson, PTA 07/05/19 1146      Row Name 07/05/19 1043             Functional Mobility    Functional Mobility- Ind. Level  conditional independence  -TA      Functional Mobility- Device  -- no AD  -TA      Functional Mobility-Distance (Feet)  5 x 2  -TA      Recorded by [TA] Mary Jo Johnson, PTA 07/05/19 1146      Row Name 07/05/19 1043             Sit-Stand Transfer    Sit-Stand [place] (Transfers)  independent  -TA      Assistive Device (Sit-Stand Transfers)  -- no AD  -TA      Recorded by [TA] Mary Jo Johnson, PTA 07/05/19 1146      Row Name 07/05/19 1043             Stand-Sit Transfer    Stand-Sit [place] (Transfers)  not tested;independent  -TA      Assistive Device (Stand-Sit Transfers)  -- no AD  -TA      Recorded by [TA] Mary Jo Johnson, PTA 07/05/19 1146      Row Name 07/05/19 1043             Toilet Transfer    Type (Toilet Transfer)  sit-stand;stand-sit  -TA      [place] Level (Toilet Transfer)  independent  -TA      Assistive Device (Toilet Transfer)  commode, bedside with drop arms  -TA      Recorded by [TA] Mary Jo Johnson, PTA 07/05/19 1146      Row Name 07/05/19 1043             Therapeutic Exercise    Lower Extremity (Therapeutic Exercise)  LAQ (long arc quad), bilateral;gluteal sets  -TA      Lower Extremity Range of Motion (Therapeutic Exercise)  ankle dorsiflexion/plantar flexion, bilateral;hip abduction/adduction, bilateral  -TA      Exercise Type (Therapeutic Exercise)  AROM (active range of motion)  -TA      Position (Therapeutic Exercise)  seated  -TA      Sets/Reps (Therapeutic Exercise)  10-15  -TA      Expected Outcome (Therapeutic Exercise)  facilitate normal movement patterns;improve functional stability;improve motor control;increase active range of motion  -TA      Recorded by [TA] Mary Jo Johnson, PTA 07/05/19 1146      Row Name 07/05/19 1043 07/05/19 0915          Positioning and Restraints    Pre-Treatment  Position  in bed  -TA  in bed  -LW     Post Treatment Position  bed  -TA  bed  -LW     In Bed  supine;call light within reach  -TA  fowlers;call light within reach;encouraged to call for assist;exit alarm on  -LW     Recorded by [TA] Mary Jo Johnson, PTA 07/05/19 1146 [LW] Marisela Medeiros COLÓN/L 07/05/19 1308     Row Name 07/05/19 1043 07/05/19 0915          Pain Scale: Numbers Pre/Post-Treatment    Pain Scale: Numbers, Pretreatment  0/10 - no pain  -TA  0/10 - no pain  -LW     Pain Scale: Numbers, Post-Treatment  0/10 - no pain  -TA  0/10 - no pain  -LW     Recorded by [TA] Mary Jo Johnson, PTA 07/05/19 1146 [LW] Marisela Medeiros COLÓN/L 07/05/19 1308     Row Name 07/05/19 0915             Coping    Observed Emotional State  accepting;calm;cooperative  -LW      Verbalized Emotional State  acceptance  -LW      Recorded by [LW] Marisela Medeiros COTA/L 07/05/19 1308      Row Name 07/05/19 0915             Plan of Care Review    Plan of Care Reviewed With  patient  -LW      Recorded by [LW] Marisela Medeiros COLÓN/L 07/05/19 1308      Row Name 07/05/19 1043 07/05/19 0915          Outcome Summary/Treatment Plan (OT)    Daily Summary of Progress (OT)  --  progress toward functional goals as expected  -LW     Plan for Continued Treatment (OT)  --  Continue POC  -LW     Anticipated Discharge Disposition (OT)  anticipate therapy at next level of care  -TA  anticipate therapy at next level of care  -LW     Recorded by [TA] Mary Jo Johnson, PTA 07/05/19 1146 [LW] Marisela Medeiros COLÓN/L 07/05/19 1308     Row Name 07/05/19 1043             Outcome Summary/Treatment Plan (PT)    Daily Summary of Progress (PT)  progress toward functional goals is gradual  -TA      Barriers to Overall Progress (PT)  decreased O2 stats  -TA      Plan for Continued Treatment (PT)  continue  -TA      Anticipated Discharge Disposition (PT)  anticipate therapy at next level of care;skilled nursing facility  -TA      Recorded by [TA] Mary Jo Johnson  M, PTA 07/05/19 1146        User Key  (r) = Recorded By, (t) = Taken By, (c) = Cosigned By    Initials Name Effective Dates Discipline    TA Mary Jo Johnson, PTA 03/07/18 -  PT    LW Marisela Medeiros, COLÓN/L 03/07/18 -  OT           Rehab Goal Summary     Row Name 07/05/19 1043 07/05/19 0915          Physical Therapy Goals    Bed Mobility Goal Selection (PT)  bed mobility, PT goal 1  -TA  --     Transfer Goal Selection (PT)  transfer, PT goal 1  -TA  --     Gait Training Goal Selection (PT)  gait training, PT goal 1  -TA  --        Bed Mobility Goal 1 (PT)    Activity/Assistive Device (Bed Mobility Goal 1, PT)  sit to supine/supine to sit  -TA  --     [place] Level/Cues Needed (Bed Mobility Goal 1, PT)  conditional independence  -TA  --     Time Frame (Bed Mobility Goal 1, PT)  long term goal (LTG);by discharge  -TA  --     Barriers (Bed Mobility Goal 1, PT)  HOB flat, no bed rails.   -TA  --     Progress/Outcomes (Bed Mobility Goal 1, PT)  goal partially met  -TA  --        Transfer Goal 1 (PT)    Activity/Assistive Device (Transfer Goal 1, PT)  sit-to-stand/stand-to-sit;bed-to-chair/chair-to-bed  -TA  --     [place] Level/Cues Needed (Transfer Goal 1, PT)  conditional independence  -TA  --     Time Frame (Transfer Goal 1, PT)  long term goal (LTG);by discharge  -TA  --     Barriers (Transfers Goal 1, PT)  Maintain SpO2 >88%  -TA  --     Progress/Outcome (Transfer Goal 1, PT)  goal not met;goal ongoing  -TA  --        Gait Training Goal 1 (PT)    Activity/Assistive Device (Gait Training Goal 1, PT)  walker, rolling  -TA  --     [place] Level (Gait Training Goal 1, PT)  conditional independence  -TA  --     Distance (Gait Goal 1, PT)  25'x2  -TA  --     Time Frame (Gait Training Goal 1, PT)  long term goal (LTG);by discharge  -TA  --     Barriers (Gait Training Goal 1, PT)  Maintain SpO2 >88%  -TA  --     Progress/Outcome (Gait Training Goal 1, PT)  goal partially met;goal ongoing met distance; not  met level of independence  -TA  --        Occupational Therapy Goals    Transfer Goal Selection (OT)  --  transfer, OT goal 1  -LW     Bathing Goal Selection (OT)  --  bathing, OT goal 1  -LW     Dressing Goal Selection (OT)  --  dressing, OT goal 1  -LW     Toileting Goal Selection (OT)  --  toileting, OT goal 1  -LW     Activity Tolerance Goal Selection (OT)  --  activity tolerance, OT goal 1  -LW        Transfer Goal 1 (OT)    Activity/Assistive Device (Transfer Goal 1, OT)  --  sit-to-stand/stand-to-sit;toilet;walker, rolling  -LW     [place] Level/Cues Needed (Transfer Goal 1, OT)  --  conditional independence  -LW     Time Frame (Transfer Goal 1, OT)  --  long term goal (LTG);by discharge  -LW     Progress/Outcome (Transfer Goal 1, OT)  --  goal met  (Significant)   -LW        Bathing Goal 1 (OT)    Activity/Assistive Device (Bathing Goal 1, OT)  --  bathing skills, all  -LW     [place] Level/Cues Needed (Bathing Goal 1, OT)  --  set-up required;supervision required  -LW     Time Frame (Bathing Goal 1, OT)  --  long term goal (LTG);by discharge  -LW     Progress/Outcomes (Bathing Goal 1, OT)  --  goal met  (Significant)   -LW        Dressing Goal 1 (OT)    Activity/Assistive Device (Dressing Goal 1, OT)  --  dressing skills, all  -LW     [place]/Cues Needed (Dressing Goal 1, OT)  --  conditional independence  -LW     Time Frame (Dressing Goal 1, OT)  --  long term goal (LTG);by discharge  -LW     Progress/Outcome (Dressing Goal 1, OT)  --  goal met  (Significant)   -LW        Toileting Goal 1 (OT)    Activity/Device (Toileting Goal 1, OT)  --  toileting skills, all  -LW     [place] Level/Cues Needed (Toileting Goal 1, OT)  --  conditional independence  -LW     Time Frame (Toileting Goal 1, OT)  --  long term goal (LTG);by discharge  -LW     Progress/Outcome (Toileting Goal 1, OT)  --  goal met  (Significant)   -LW         Activity Tolerance Goal 1 (OT)    Activity Level (Endurance Goal 1,  OT)  --  5 min activity;O2 sat >/ equal to 88%  -LW     Progress/Outcome (Activity Tolerance Goal 1, OT)  --  goal met  (Significant)   -LW       User Key  (r) = Recorded By, (t) = Taken By, (c) = Cosigned By    Initials Name Provider Type Discipline    TA Mary Jo Johnson, PTA Physical Therapy Assistant PT    Marisela Tafoya, COLÓN/L Occupational Therapy Assistant OT        Occupational Therapy Education     Title: PT OT SLP Therapies (Resolved)     Topic: Occupational Therapy (Resolved)     Point: ADL training (Resolved)     Description: Instruct learner(s) on proper safety adaptation and remediation techniques during self care or transfers.   Instruct in proper use of assistive devices.    Learning Progress Summary           Patient Acceptance, E,TB, VU by LW at 7/4/2019  1:34 PM    Acceptance, E,TB, VU by LW at 7/3/2019 11:53 AM    Acceptance, E, VU by BB at 7/1/2019  3:55 PM    Acceptance, E, VU by BB at 6/28/2019  2:44 PM    Acceptance, E,TB, VU by MR at 6/26/2019  1:41 PM    Comment:  Role of OT and POC, benefit of activity, PLB, EC/WS                   Point: Home exercise program (Resolved)     Description: Instruct learner(s) on appropriate technique for monitoring, assisting and/or progressing therapeutic exercises/activities.    Learning Progress Summary           Patient Acceptance, E,TB, VU by LW at 7/4/2019  1:34 PM    Acceptance, E,TB, VU by LW at 7/3/2019 11:53 AM                   Point: Precautions (Resolved)     Description: Instruct learner(s) on prescribed precautions during self-care and functional transfers.    Learning Progress Summary           Patient Acceptance, E,TB, VU by LW at 7/4/2019  1:34 PM    Acceptance, E,TB, VU by LW at 7/3/2019 11:53 AM    Acceptance, E,TB, VU by MR at 6/26/2019  1:41 PM    Comment:  Role of OT and POC, benefit of activity, PLB, EC/WS                   Point: Body mechanics (Resolved)     Description: Instruct learner(s) on proper positioning and spine  alignment during self-care, functional mobility activities and/or exercises.    Learning Progress Summary           Patient Acceptance, E,TB, VU by LW at 7/4/2019  1:34 PM    Acceptance, E,TB, VU by DONG at 7/3/2019 11:53 AM    Acceptance, E, VU by FRANCES at 7/1/2019  3:55 PM    Acceptance, E, VU by FRANCES at 6/28/2019  2:44 PM                               User Key     Initials Effective Dates Name Provider Type Discipline    BB 03/07/18 -  Selena Simpson COTA/L Occupational Therapy Assistant OT    LW 03/07/18 -  Marisela Medeiros COTA/L Occupational Therapy Assistant OT    MR 04/03/18 -  Daisy Duong OT Occupational Therapist OT                OT Recommendation and Plan  Outcome Summary/Treatment Plan (OT)  Daily Summary of Progress (OT): progress toward functional goals as expected  Plan for Continued Treatment (OT): Continue POC  Anticipated Discharge Disposition (OT): anticipate therapy at next level of care  Therapy Frequency (OT Eval): other (see comments)(5-7 days per week)  Daily Summary of Progress (OT): progress toward functional goals as expected  Plan of Care Review  Plan of Care Reviewed With: patient  Plan of Care Reviewed With: patient  Outcome Summary: Pt was able to work on ADL's with [place]. Pt met all goals.  Outcome Measures     Row Name 07/05/19 1100 07/05/19 0915 07/04/19 1100       How much help from another person do you currently need...    Turning from your back to your side while in flat bed without using bedrails?  4  -TA  --  4  -TA    Moving from lying on back to sitting on the side of a flat bed without bedrails?  4  -TA  --  4  -TA    Moving to and from a bed to a chair (including a wheelchair)?  3  -TA  --  3  -TA    Standing up from a chair using your arms (e.g., wheelchair, bedside chair)?  3  -TA  --  3  -TA    Climbing 3-5 steps with a railing?  3  -TA  --  3  -TA    To walk in hospital room?  3  -TA  --  3  -TA    AM-PAC 6 Clicks Score (PT)  20  -TA  --  20  -TA        How much help from another is currently needed...    Putting on and taking off regular lower body clothing?  --  4  -LW  --    Bathing (including washing, rinsing, and drying)  --  4  -LW  --    Toileting (which includes using toilet bed pan or urinal)  --  4  -LW  --    Putting on and taking off regular upper body clothing  --  4  -LW  --    Taking care of personal grooming (such as brushing teeth)  --  4  -LW  --    Eating meals  --  4  -LW  --    AM-PAC 6 Clicks Score (OT)  --  24  -LW  --       Functional Assessment    Outcome Measure Options  AM-PAC 6 Clicks Basic Mobility (PT)  -TA  --  AM-PAC 6 Clicks Basic Mobility (PT)  -TA    Row Name 07/04/19 0805 07/03/19 1100 07/03/19 0922       How much help from another person do you currently need...    Turning from your back to your side while in flat bed without using bedrails?  --  --  4  -TW    Moving from lying on back to sitting on the side of a flat bed without bedrails?  --  --  4  -TW    Moving to and from a bed to a chair (including a wheelchair)?  --  --  3  -TW    Standing up from a chair using your arms (e.g., wheelchair, bedside chair)?  --  --  3  -TW    Climbing 3-5 steps with a railing?  --  --  3  -TW    To walk in hospital room?  --  --  3  -TW    AM-PAC 6 Clicks Score (PT)  --  --  20  -TW       How much help from another is currently needed...    Putting on and taking off regular lower body clothing?  4  -LW  3  -LW  --    Bathing (including washing, rinsing, and drying)  4  -LW  3  -LW  --    Toileting (which includes using toilet bed pan or urinal)  4  -LW  4  -LW  --    Putting on and taking off regular upper body clothing  4  -LW  3  -LW  --    Taking care of personal grooming (such as brushing teeth)  4  -LW  4  -LW  --    Eating meals  4  -LW  4  -LW  --    AM-PAC 6 Clicks Score (OT)  24  -LW  21  -LW  --       Functional Assessment    Outcome Measure Options  --  --  AM-PAC 6 Clicks Basic Mobility (PT)  -TW    Row Name 07/02/19 1600              How much help from another person do you currently need...    Turning from your back to your side while in flat bed without using bedrails?  4  -TA      Moving from lying on back to sitting on the side of a flat bed without bedrails?  4  -TA      Moving to and from a bed to a chair (including a wheelchair)?  3  -TA      Standing up from a chair using your arms (e.g., wheelchair, bedside chair)?  3  -TA      Climbing 3-5 steps with a railing?  3  -TA      To walk in hospital room?  3  -TA      AM-PAC 6 Clicks Score (PT)  20  -TA         Functional Assessment    Outcome Measure Options  AM-PAC 6 Clicks Basic Mobility (PT)  -TA        User Key  (r) = Recorded By, (t) = Taken By, (c) = Cosigned By    Initials Name Provider Type    TA Mary Jo Johnson, PTA Physical Therapy Assistant    TW Reece Rocha, PTA Physical Therapy Assistant    Marisela Tafoya COTA/L Occupational Therapy Assistant           Time Calculation:   Time Calculation- OT     Row Name 07/05/19 1309             Time Calculation- OT    OT Start Time  0915  -LW      OT Stop Time  0940  -LW      OT Time Calculation (min)  25 min  -LW      Total Timed Code Minutes- OT  25 minute(s)  -LW      OT Received On  07/05/19  -        User Key  (r) = Recorded By, (t) = Taken By, (c) = Cosigned By    Initials Name Provider Type    Marislea Tafoya COTA/L Occupational Therapy Assistant        Therapy Charges for Today     Code Description Service Date Service Provider Modifiers Qty    68191551710 HC OT SELF CARE/MGMT/TRAIN EA 15 MIN 7/4/2019 Marisela Medeiros COTA/L GO 4    54011272179 HC OT SELF CARE/MGMT/TRAIN EA 15 MIN 7/5/2019 Marisela Medeiros COTA/L GO 2        Non-skid socks and gait belt in place. Toileting offered. Call light and needs within reach. Pt advised to not get up alone and call the nurse for assistance.  Bed alarm on.          YSABEL Mejia  7/5/2019

## 2019-07-05 NOTE — THERAPY TREATMENT NOTE
Acute Care - Physical Therapy Treatment Note  [facility]     Patient Name: Brendon Skelton  : 1945  MRN: [id number]  Today's Date: 2019  Onset of Illness/Injury or Date of Surgery: 19  Date of Referral to PT: 19  Referring Physician: LATISHA Reaves MD    Admit Date: 2019    Visit Dx:    ICD-10-CM ICD-9-CM   1. Gastrointestinal hemorrhage, unspecified gastrointestinal hemorrhage type K92.2 578.9   2. Supratherapeutic INR R79.1 790.92   3. SSS (sick sinus syndrome) (CMS/HCC) I49.5 427.81   4. Atherosclerosis of native coronary artery of native heart, angina presence unspecified I25.10 414.01   5. 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 S/P AVR Z95.2 V43.3   7. Impaired physical mobility Z74.09 781.99   8. Impaired mobility and activities of daily living Z74.09 799.89     Patient Active Problem List   Diagnosis   • Long term current use of anticoagulant therapy   • Personal history of heart valve replacement   • Atrial fibrillation [I48.91]   • Emphysema of lung (CMS/[facility])   • On anticoagulant therapy   • Hyperlipidemia   • Diastolic heart failure (CMS/HCC)   • Depressive disorder   • COPD (chronic obstructive pulmonary disease) (CMS/HCC)   • Diabetes mellitus (CMS/HCC)   • Myopia   • Astigmatism   • Bleeding from open wound of chest wall   • Follow-up surgery care   • Encounter for screening for malignant neoplasm of colon   • Positive colorectal cancer screening using DNA-based stool test   • Nodule of left lung   • Chronic hypoxemic respiratory failure (CMS/HCC)   • Physical deconditioning   • Heart failure with preserved left ventricular function (HFpEF) (CMS/HCC)   • Essential hypertension   • Coronary artery disease involving native coronary artery without angina pectoris   • Hx of CABG   • SSS (sick sinus syndrome) (CMS/HCC)   • Pacemaker   • CKD (chronic kidney disease) stage 3, GFR 30-59 ml/min (CMS/HCC)    • Personal history of tobacco use, presenting hazards to health   • Gastrointestinal hemorrhage   • Morbid obesity (CMS/HCC)   • Gastritis   • Colon polyp   • Coumadin toxicity       Therapy Treatment    Rehabilitation Treatment Summary     Row Name 07/05/19 1043             Treatment Time/Intention    Discipline  physical therapy assistant  -TA      Document Type  therapy note (daily note)  -TA      Subjective Information  complains of;no complaints  -TA      Mode of Treatment  physical therapy;individual therapy  -TA      Patient Effort  adequate  -TA      Existing Precautions/Restrictions  oxygen therapy device and L/min  -TA      Recorded by [TA] Mary Jo Johnson, PTA 07/05/19 1146      Row Name 07/05/19 1043             Vital Signs    Intra Systolic BP Rehab  94  -TA      Intra Treatment Diastolic BP  55  -TA      Pretreatment Heart Rate (beats/min)  92  -TA      Intratreatment Heart Rate (beats/min)  87  -TA      Posttreatment Heart Rate (beats/min)  87  -TA      Pre SpO2 (%)  92  -TA      O2 Delivery Pre Treatment  supplemental O2  -TA      Intra SpO2 (%)  83  -TA      O2 Delivery Intra Treatment  supplemental O2  -TA      Post SpO2 (%)  91  -TA      O2 Delivery Post Treatment  supplemental O2  -TA      Pre Patient Position  Supine  -TA      Post Patient Position  Supine  -TA      Recorded by [TA] Mary Jo Johnson, PTA 07/05/19 1146      Row Name 07/05/19 1043             Cognitive Assessment/Intervention- PT/OT    Affect/Mental Status (Cognitive)  WFL  -TA      Orientation Status (Cognition)  oriented x 4  -TA      Follows Commands (Cognition)  WFL  -TA      Personal Safety Interventions  fall prevention program maintained;nonskid shoes/slippers when out of bed;supervised activity  -TA      Recorded by [TA] Mary Jo Johnson PTA 07/05/19 1146      Row Name 07/05/19 1043             Bed Mobility Assessment/Treatment    Supine-Sit [place] (Bed Mobility)  conditional independence  -TA      Sit-Supine  [place] (Bed Mobility)  conditional independence  -TA      Recorded by [TA] Mary Jo Johnson, PTA 07/05/19 1146      Row Name 07/05/19 1043             Functional Mobility    Functional Mobility- Ind. Level  conditional independence  -TA      Functional Mobility- Device  -- no AD  -TA      Functional Mobility-Distance (Feet)  5 x 2  -TA      Recorded by [TA] Mary Jo Johnson, PTA 07/05/19 1146      Row Name 07/05/19 1043             Sit-Stand Transfer    Sit-Stand [place] (Transfers)  independent  -TA      Assistive Device (Sit-Stand Transfers)  -- no AD  -TA      Recorded by [TA] Mary Jo Johnson, PTA 07/05/19 1146      Row Name 07/05/19 1043             Stand-Sit Transfer    Stand-Sit [place] (Transfers)  not tested;independent  -TA      Assistive Device (Stand-Sit Transfers)  -- no AD  -TA      Recorded by [TA] Mary Jo Johnson, PTA 07/05/19 1146      Row Name 07/05/19 1043             Toilet Transfer    Type (Toilet Transfer)  sit-stand;stand-sit  -TA      [place] Level (Toilet Transfer)  independent  -TA      Assistive Device (Toilet Transfer)  commode, bedside with drop arms  -TA      Recorded by [TA] Mary Jo Johnson, PTA 07/05/19 1146      Row Name 07/05/19 1043             Therapeutic Exercise    Lower Extremity (Therapeutic Exercise)  LAQ (long arc quad), bilateral;gluteal sets  -TA      Lower Extremity Range of Motion (Therapeutic Exercise)  ankle dorsiflexion/plantar flexion, bilateral;hip abduction/adduction, bilateral  -TA      Exercise Type (Therapeutic Exercise)  AROM (active range of motion)  -TA      Position (Therapeutic Exercise)  seated  -TA      Sets/Reps (Therapeutic Exercise)  10-15  -TA      Expected Outcome (Therapeutic Exercise)  facilitate normal movement patterns;improve functional stability;improve motor control;increase active range of motion  -TA      Recorded by [TA] Mary Jo Johnson, PTA 07/05/19 1146      Row Name 07/05/19 1043             Positioning and Restraints     Pre-Treatment Position  in bed  -TA      Post Treatment Position  bed  -TA      In Bed  supine;call light within reach  -TA      Recorded by [TA] Mary Jo Johnson, PTA 07/05/19 1146      Row Name 07/05/19 1043             Pain Scale: Numbers Pre/Post-Treatment    Pain Scale: Numbers, Pretreatment  0/10 - no pain  -TA      Pain Scale: Numbers, Post-Treatment  0/10 - no pain  -TA      Recorded by [TA] Mary Jo Johnson, PTA 07/05/19 1146      Row Name 07/05/19 1043             Outcome Summary/Treatment Plan (OT)    Anticipated Discharge Disposition (OT)  anticipate therapy at next level of care  -TA      Recorded by [TA] Mary Jo Johnson, PTA 07/05/19 1146      Row Name 07/05/19 1043             Outcome Summary/Treatment Plan (PT)    Daily Summary of Progress (PT)  progress toward functional goals is gradual  -TA      Barriers to Overall Progress (PT)  decreased O2 stats  -TA      Plan for Continued Treatment (PT)  continue  -TA      Anticipated Discharge Disposition (PT)  anticipate therapy at next level of care;skilled nursing facility  -TA      Recorded by [TA] Mary Jo Johnson, PTA 07/05/19 1146        User Key  (r) = Recorded By, (t) = Taken By, (c) = Cosigned By    Initials Name Effective Dates Discipline    TA Mary Jo Jonhson, PTA 03/07/18 -  PT               Rehab Goal Summary     Row Name 07/05/19 1043             Physical Therapy Goals    Bed Mobility Goal Selection (PT)  bed mobility, PT goal 1  -TA      Transfer Goal Selection (PT)  transfer, PT goal 1  -TA      Gait Training Goal Selection (PT)  gait training, PT goal 1  -TA         Bed Mobility Goal 1 (PT)    Activity/Assistive Device (Bed Mobility Goal 1, PT)  sit to supine/supine to sit  -TA      [place] Level/Cues Needed (Bed Mobility Goal 1, PT)  conditional independence  -TA      Time Frame (Bed Mobility Goal 1, PT)  long term goal (LTG);by discharge  -TA      Barriers (Bed Mobility Goal 1, PT)  HOB flat, no bed rails.   -TA       Progress/Outcomes (Bed Mobility Goal 1, PT)  goal partially met  -TA         Transfer Goal 1 (PT)    Activity/Assistive Device (Transfer Goal 1, PT)  sit-to-stand/stand-to-sit;bed-to-chair/chair-to-bed  -TA      [place] Level/Cues Needed (Transfer Goal 1, PT)  conditional independence  -TA      Time Frame (Transfer Goal 1, PT)  long term goal (LTG);by discharge  -TA      Barriers (Transfers Goal 1, PT)  Maintain SpO2 >88%  -TA      Progress/Outcome (Transfer Goal 1, PT)  goal not met;goal ongoing  -TA         Gait Training Goal 1 (PT)    Activity/Assistive Device (Gait Training Goal 1, PT)  walker, rolling  -TA      [place] Level (Gait Training Goal 1, PT)  conditional independence  -TA      Distance (Gait Goal 1, PT)  25'x2  -TA      Time Frame (Gait Training Goal 1, PT)  long term goal (LTG);by discharge  -TA      Barriers (Gait Training Goal 1, PT)  Maintain SpO2 >88%  -TA      Progress/Outcome (Gait Training Goal 1, PT)  goal partially met;goal ongoing met distance; not met level of independence  -TA        User Key  (r) = Recorded By, (t) = Taken By, (c) = Cosigned By    Initials Name Provider Type Discipline    Mary Jo Santiago, PTA Physical Therapy Assistant PT          Physical Therapy Education     Title: PT OT SLP Therapies (In Progress)     Topic: Physical Therapy (In Progress)     Point: Mobility training (Done)     Learning Progress Summary           Patient Acceptance, E, VU,NR by LF at 7/1/2019 12:52 PM    Comment:  use of RW for energy conservation    Acceptance, E, VU by CZ at 6/26/2019 11:04 AM    Comment:  Educated on proper hand placement with transfers; educated on energy conservation to improve O2 saturation.                   Point: Precautions (Done)     Learning Progress Summary           Patient Acceptance, E, VU by LF at 7/1/2019 12:53 PM    Comment:  PLB for maintaining and recovering O2 saturation    Acceptance, E,D, VU by TA at 6/28/2019  1:32 PM    Comment:  PLB & energy  consevation                               User Key     Initials Effective Dates Name Provider Type Discipline    TA 03/07/18 -  Mary Jo Johnson, PTA Physical Therapy Assistant PT    CZ 04/03/18 -  Titi Portillo, PT Physical Therapist PT    LF 07/23/18 -  Fatimah Gavin, OLIVIA Physical Therapist PT                PT Recommendation and Plan  Anticipated Discharge Disposition (PT): anticipate therapy at next level of care, skilled nursing facility  Therapy Frequency (PT Clinical Impression): daily  Outcome Summary/Treatment Plan (PT)  Daily Summary of Progress (PT): progress toward functional goals is gradual  Barriers to Overall Progress (PT): decreased O2 stats  Plan for Continued Treatment (PT): continue  Anticipated Discharge Disposition (PT): anticipate therapy at next level of care, skilled nursing facility  Progress: improving  Outcome Summary: pt sup<>sit with independence, sit<>stand with independence, pt transfered bed<>BSC with SBA. pt would benefit from 24/7 care & continued PT services  Outcome Measures     Row Name 07/05/19 1100 07/04/19 1100 07/04/19 0805       How much help from another person do you currently need...    Turning from your back to your side while in flat bed without using bedrails?  4  -TA  4  -TA  --    Moving from lying on back to sitting on the side of a flat bed without bedrails?  4  -TA  4  -TA  --    Moving to and from a bed to a chair (including a wheelchair)?  3  -TA  3  -TA  --    Standing up from a chair using your arms (e.g., wheelchair, bedside chair)?  3  -TA  3  -TA  --    Climbing 3-5 steps with a railing?  3  -TA  3  -TA  --    To walk in hospital room?  3  -TA  3  -TA  --    AM-PAC 6 Clicks Score (PT)  20  -TA  20  -TA  --       How much help from another is currently needed...    Putting on and taking off regular lower body clothing?  --  --  4  -LW    Bathing (including washing, rinsing, and drying)  --  --  4  -LW    Toileting (which includes using toilet bed pan or  urinal)  --  --  4  -LW    Putting on and taking off regular upper body clothing  --  --  4  -LW    Taking care of personal grooming (such as brushing teeth)  --  --  4  -LW    Eating meals  --  --  4  -LW    AM-PAC 6 Clicks Score (OT)  --  --  24  -LW       Functional Assessment    Outcome Measure Options  AM-PAC 6 Clicks Basic Mobility (PT)  -TA  AM-[facility] 6 Clicks Basic Mobility (PT)  -TA  --    Row Name 07/03/19 1100 07/03/19 0922 07/02/19 1600       How much help from another person do you currently need...    Turning from your back to your side while in flat bed without using bedrails?  --  4  -TW  4  -TA    Moving from lying on back to sitting on the side of a flat bed without bedrails?  --  4  -TW  4  -TA    Moving to and from a bed to a chair (including a wheelchair)?  --  3  -TW  3  -TA    Standing up from a chair using your arms (e.g., wheelchair, bedside chair)?  --  3  -TW  3  -TA    Climbing 3-5 steps with a railing?  --  3  -TW  3  -TA    To walk in hospital room?  --  3  -TW  3  -TA    AM-PAC 6 Clicks Score (PT)  --  20  -TW  20  -TA       How much help from another is currently needed...    Putting on and taking off regular lower body clothing?  3  -LW  --  --    Bathing (including washing, rinsing, and drying)  3  -LW  --  --    Toileting (which includes using toilet bed pan or urinal)  4  -LW  --  --    Putting on and taking off regular upper body clothing  3  -LW  --  --    Taking care of personal grooming (such as brushing teeth)  4  -LW  --  --    Eating meals  4  -LW  --  --    AM-PAC 6 Clicks Score (OT)  21  -LW  --  --       Functional Assessment    Outcome Measure Options  --  AM-PAC 6 Clicks Basic Mobility (PT)  -TW  AM-PAC 6 Clicks Basic Mobility (PT)  -TA      User Key  (r) = Recorded By, (t) = Taken By, (c) = Cosigned By    Initials Name Provider Type    Mary Jo Santiago, YOSEPH Physical Therapy Assistant    TW Reece Rocha, YOSEPH Physical Therapy Assistant    LW Medeiros, Marisela J,  COLÓN/L Occupational Therapy Assistant         Time Calculation:   PT Charges     Row Name 07/05/19 1154             Time Calculation    Start Time  1043  -TA      Stop Time  1122  -TA      Time Calculation (min)  39 min  -TA         Time Calculation- PT    Total Timed Code Minutes- PT  39 minute(s)  -TA        User Key  (r) = Recorded By, (t) = Taken By, (c) = Cosigned By    Initials Name Provider Type    Mary Jo Santiago PTA Physical Therapy Assistant        Therapy Charges for Today     Code Description Service Date Service Provider Modifiers Qty    62692956283 HC PT THER PROC EA 15 MIN 7/4/2019 Mary Jo Johnson, YOSEPH GP 2    65485359621 HC PT THERAPEUTIC ACT EA 15 MIN 7/5/2019 Mary Jo Johnson, YOSEPH GP 2    57247647397 HC PT THER PROC EA 15 MIN 7/5/2019 Mary Jo Johnson, YOSEPH GP 1          PT G-Codes  Outcome Measure Options: AM-PAC 6 Clicks Basic Mobility (PT)  AM-PAC 6 Clicks Score (PT): 20  AM-PAC 6 Clicks Score (OT): 24    Mary Jo Johnson PTA  7/5/2019

## 2019-07-05 NOTE — PLAN OF CARE
Problem: Patient Care Overview  Goal: Plan of Care Review  Outcome: Ongoing (interventions implemented as appropriate)   07/05/19 0520   Coping/Psychosocial   Plan of Care Reviewed With patient   Plan of Care Review   Progress no change       Problem: Fall Risk (Adult)  Goal: Absence of Fall  Outcome: Ongoing (interventions implemented as appropriate)      Problem: Chronic Obstructive Pulmonary Disease (Adult)  Goal: Signs and Symptoms of Listed Potential Problems Will be Absent, Minimized or Managed (Chronic Obstructive Pulmonary Disease)  Outcome: Ongoing (interventions implemented as appropriate)      Problem: Cardiac: Heart Failure (Adult)  Goal: Signs and Symptoms of Listed Potential Problems Will be Absent, Minimized or Managed (Cardiac: Heart Failure)  Outcome: Ongoing (interventions implemented as appropriate)      Problem: Skin Injury Risk (Adult)  Goal: Skin Health and Integrity  Outcome: Ongoing (interventions implemented as appropriate)

## 2019-07-05 NOTE — DISCHARGE SUMMARY
[facility] Medicine Services  DISCHARGE SUMMARY       Date of Admission: 6/18/2019  Date of Discharge:  7/5/2019  Primary Care Physician: Josefa Pozo APRN    Presenting Problem/History of Present Illness:  Supratherapeutic INR [R79.1]  Gastrointestinal hemorrhage, unspecified gastrointestinal hemorrhage type [K92.2]  Gastrointestinal hemorrhage, unspecified gastrointestinal hemorrhage type [K92.2]   Patient is a 73 y.o. female with history of atrial fibrillation (on Coumadin), congestive heart failure, depressive disorder, chronic kidney disease, diabetes mellitus, COPD, essential hypertension who presented with of passage of bright red blood per rectum.  She denies nausea, vomiting, hematemesis, chest pain, shortness of air, palpitation, syncope or presyncope.     On triage she was hemodynamically stable except for elevated blood pressure of 203/86.  Hemoglobin was 9.4 and INR was 3.97.  Patient was started on Protonix infusion and referred for admission        Final Discharge Diagnoses:  Active Hospital Problems    Diagnosis   • **Gastrointestinal hemorrhage   • Gastritis   • Colon polyp   • Coumadin toxicity   • Morbid obesity (CMS/[facility])   • CKD (chronic kidney disease) stage 3, GFR 30-59 ml/min (CMS/[facility])   • Diabetes mellitus (CMS/H[facility])   • Emphysema of lung (CMS/[facility])       Consults:   Consults     Date and Time Order Name Status Description    6/27/2019 1145 Inpatient Pulmonology Consult      6/25/2019 1710 Inpatient Nephrology Consult Completed     6/25/2019 1116 Inpatient General Surgery Consult      6/24/2019 1529 Inpatient Cardiology Consult      6/20/2019 0948 Inpatient Cardiology Consult Completed     6/18/2019 1250 Inpatient Gastroenterology Consult Completed     6/18/2019 1142 Hospitalist (on-call MD unless specified)            Procedures Performed: Procedure(s):  COLONOSCOPY                Pertinent Test Results:   Lab Results (last 7 days)     Procedure  Component Value Units Date/Time    POC Glucose Once [[id number]]  (Normal) Collected:  07/05/19 0735    Specimen:  Blood Updated:  07/05/19 0826     Glucose 126 mg/dL      Comment: RN NotifiedOperator: [id number] [facility] ID: [id number]       Basic Metabolic Panel [[id number]]  (Abnormal) Collected:  07/05/19 0637    Specimen:  Blood Updated:  07/05/19 0738     Glucose 122 mg/dL      BUN 78 mg/dL      Creatinine 2.23 mg/dL      Sodium 142 mmol/L      Potassium 3.9 mmol/L      Chloride 98 mmol/L      CO2 32.0 mmol/L      Calcium 9.6 mg/dL      eGFR Non African Amer 22 mL/min/1.73      BUN/Creatinine Ratio 35.0     Anion Gap 12.0 mmol/L     Narrative:       GFR Normal >60  Chronic Kidney Disease <60  Kidney Failure <15    Protime-INR [[id number]]  (Abnormal) Collected:  07/05/19 0637    Specimen:  Blood Updated:  07/05/19 0736     Protime 24.7 Seconds      INR 2.26    Narrative:       Therapeutic range for most indications is 2.0-3.0 INR,  or 2.5-3.5 for mechanical heart valves.    CBC (No Diff) [[id number]]  (Abnormal) Collected:  07/05/19 0637    Specimen:  Blood Updated:  07/05/19 0711     WBC 7.91 10*3/mm3      RBC 3.05 10*6/mm3      Hemoglobin 9.4 g/dL      Hematocrit 30.2 %      MCV 99.0 fL      MCH 30.8 pg      MCHC 31.1 g/dL      RDW 21.1 %      RDW-SD 72.9 fl      MPV 12.6 fL      Platelets 234 10*3/mm3     POC Glucose Once [[id number]]  (Abnormal) Collected:  07/04/19 1947    Specimen:  Blood Updated:  07/04/19 2001     Glucose 187 mg/dL      Comment: RN NotifiedOperator: [id number] JOEY REBECCAMeter ID: [id number]       POC Glucose Once [[id number]]  (Normal) Collected:  07/04/19 1654    Specimen:  Blood Updated:  07/04/19 1738     Glucose 117 mg/dL      Comment: RN NotifiedOperator: [id number] [facility] ID: [id number]       POC Glucose Once [[id number]]  (Normal) Collected:  07/04/19 1147    Specimen:  Blood Updated:  07/04/19 1201     Glucose 114 mg/dL      Comment: RN NotifiedOperator:  [id number] [facility] [id number]       POC Glucose Once [[id number]]  (Normal) Collected:  07/04/19 0809    Specimen:  Blood Updated:  07/04/19 0822     Glucose 120 mg/dL      Comment: RN NotifiedOperator: [id number] [facility] [id number]       Protime-INR [[id number]]  (Abnormal) Collected:  07/04/19 0659    Specimen:  Blood Updated:  07/04/19 0758     Protime 31.4 Seconds      INR 3.06    Narrative:       Therapeutic range for most indications is 2.0-3.0 INR,  or 2.5-3.5 for mechanical heart valves.    Basic Metabolic Panel [[id number]]  (Abnormal) Collected:  07/04/19 0658    Specimen:  Blood Updated:  07/04/19 0740     Glucose 118 mg/dL      BUN 73 mg/dL      Creatinine 2.25 mg/dL      Sodium 142 mmol/L      Potassium 4.6 mmol/L      Chloride 100 mmol/L      CO2 31.0 mmol/L      Calcium 9.6 mg/dL      eGFR Non African Amer 21 mL/min/1.73      BUN/Creatinine Ratio 32.4     Anion Gap 11.0 mmol/L     Narrative:       GFR Normal >60  Chronic Kidney Disease <60  Kidney Failure <15    CBC (No Diff) [[id number]]  (Abnormal) Collected:  07/04/19 0658    Specimen:  Blood Updated:  07/04/19 0720     WBC 8.25 10*3/mm3      RBC 3.06 10*6/mm3      Hemoglobin 9.5 g/dL      Hematocrit 29.6 %      MCV 96.7 fL      MCH 31.0 pg      MCHC 32.1 g/dL      RDW 20.6 %      RDW-SD 70.7 fl      MPV 10.4 fL      Platelets 205 10*3/mm3     POC Glucose Once [[id number]]  (Abnormal) Collected:  07/03/19 1955    Specimen:  Blood Updated:  07/03/19 2036     Glucose 178 mg/dL      Comment: RN NotifiedOperator: [id number] MIKKI RIVERMeter ID: [id number]       POC Glucose Once [[id number]]  (Normal) Collected:  07/03/19 1645    Specimen:  Blood Updated:  07/03/19 1659     Glucose 124 mg/dL      Comment: RN NotifiedOperator: [id number] APRIL CRYSTALMeter ID: [id number]       POC Glucose Once [[id number]]  (Abnormal) Collected:  07/03/19 1034    Specimen:  Blood Updated:  07/03/19 1050     Glucose 145 mg/dL       Comment: RN NotifiedOperator: [id number] GERALD RIVERMeter ID: [id number]       POC Glucose Once [[id number]]  (Normal) Collected:  07/03/19 0735    Specimen:  Blood Updated:  07/03/19 0750     Glucose 128 mg/dL      Comment: RN NotifiedOperator: [id number] GERALD RIVERMeter ID: [id number]       CBC (No Diff) [[id number]]  (Abnormal) Collected:  07/03/19 0556    Specimen:  Blood Updated:  07/03/19 0744     WBC 8.31 10*3/mm3      RBC 2.97 10*6/mm3      Hemoglobin 9.1 g/dL      Hematocrit 28.8 %      MCV 97.0 fL      MCH 30.6 pg      MCHC 31.6 g/dL      RDW 20.8 %      RDW-SD 71.6 fl      MPV 11.5 fL      Platelets 190 10*3/mm3     Protime-INR [[id number]]  (Abnormal) Collected:  07/03/19 0556    Specimen:  Blood Updated:  07/03/19 0720     Protime 31.2 Seconds      INR 3.04    Narrative:       Therapeutic range for most indications is 2.0-3.0 INR,  or 2.5-3.5 for mechanical heart valves.    Basic Metabolic Panel [[id number]]  (Abnormal) Collected:  07/03/19 0556    Specimen:  Blood Updated:  07/03/19 0629     Glucose 123 mg/dL      BUN 62 mg/dL      Creatinine 2.10 mg/dL      Sodium 141 mmol/L      Potassium 3.8 mmol/L      Chloride 100 mmol/L      CO2 30.0 mmol/L      Calcium 9.4 mg/dL      eGFR Non African Amer 23 mL/min/1.73      BUN/Creatinine Ratio 29.5     Anion Gap 11.0 mmol/L     Narrative:       GFR Normal >60  Chronic Kidney Disease <60  Kidney Failure <15    POC Glucose Once [[id number]]  (Abnormal) Collected:  07/02/19 1933    Specimen:  Blood Updated:  07/02/19 2028     Glucose 146 mg/dL      Comment: RN NotifiedOperator: [id number] DIDIER Sims ID: [id number]       POC Glucose Once [[id number]]  (Abnormal) Collected:  07/02/19 1605    Specimen:  Blood Updated:  07/02/19 1644     Glucose 136 mg/dL      Comment: RN NotifiedOperator: [id number] GERALD RVIERMeter ID: [id number]       POC Glucose Once [[id number]]  (Abnormal) Collected:  07/02/19 1038    Specimen:  Blood Updated:  07/02/19 1101      Glucose 133 mg/dL      Comment: RN NotifiedOperator: [id number] GERALD RIVERMeter ID: [id number]       POC Glucose Once [[id number]]  (Normal) Collected:  07/02/19 0746    Specimen:  Blood Updated:  07/02/19 0803     Glucose 118 mg/dL      Comment: RN NotifiedOperator: [id number] GERALD RIVERMeter ID: [id number]       BNP [[id number]]  (Abnormal) Collected:  07/02/19 0629    Specimen:  Blood Updated:  07/02/19 0758     proBNP 10,619.0 pg/mL     Narrative:       Among patients with dyspnea, NT-proBNP is highly sensitive for the detection of acute congestive heart failure. In addition NT-proBNP of <300 pg/ml effectively rules out acute congestive heart failure with 99% negative predictive value.    Basic Metabolic Panel [[id number]]  (Abnormal) Collected:  07/02/19 0629    Specimen:  Blood Updated:  07/02/19 0714     Glucose 118 mg/dL      BUN 60 mg/dL      Creatinine 2.17 mg/dL      Sodium 141 mmol/L      Potassium 4.3 mmol/L      Chloride 104 mmol/L      CO2 27.0 mmol/L      Calcium 8.9 mg/dL      eGFR Non African Amer 22 mL/min/1.73      BUN/Creatinine Ratio 27.6     Anion Gap 10.0 mmol/L     Narrative:       GFR Normal >60  Chronic Kidney Disease <60  Kidney Failure <15    Protime-INR [[id number]]  (Abnormal) Collected:  07/02/19 0629    Specimen:  Blood Updated:  07/02/19 0705     Protime 26.0 Seconds      INR 2.41    Narrative:       Therapeutic range for most indications is 2.0-3.0 INR,  or 2.5-3.5 for mechanical heart valves.    CBC (No Diff) [[id number]]  (Abnormal) Collected:  07/02/19 0629    Specimen:  Blood Updated:  07/02/19 0651     WBC 8.27 10*3/mm3      RBC 2.83 10*6/mm3      Hemoglobin 8.8 g/dL      Hematocrit 27.0 %      MCV 95.4 fL      MCH 31.1 pg      MCHC 32.6 g/dL      RDW 20.1 %      RDW-SD 67.6 fl      MPV 12.7 fL      Platelets 212 10*3/mm3     POC Glucose Once [[id number]]  (Abnormal) Collected:  07/01/19 1923    Specimen:  Blood Updated:  07/01/19 1957     Glucose 168 mg/dL       Comment: RN NotifiedOperator: [id number] COLIN BOSSMeter ID: [id number]       POC Glucose Once [[id number]]  (Normal) Collected:  07/01/19 1702    Specimen:  Blood Updated:  07/01/19 1715     Glucose 125 mg/dL      Comment: RN NotifiedOperator: [id number] CAMI SILVAMeter ID: [id number]       Extra Tubes [[id number]] Collected:  07/01/19 1522    Specimen:  Blood, Venous Line Updated:  07/01/19 1630    Narrative:       The following orders were created for panel order Extra Tubes.  Procedure                               Abnormality         Status                     ---------                               -----------         ------                     Lavender Top[[id number]]                                     Final result               Gold Top - SST[[id number]]                                   Final result               Green Top (Gel)[[id number]]                                  Final result                 Please view results for these tests on the individual orders.    Lavender Top [[id number]] Collected:  07/01/19 1522    Specimen:  Blood Updated:  07/01/19 1630     Extra Tube hold for add-on     Comment: Auto resulted       Gold Top - SST [[id number]] Collected:  07/01/19 1522    Specimen:  Blood Updated:  07/01/19 1630     Extra Tube Hold for add-ons.     Comment: Auto resulted.       Green Top (Gel) [[id number]] Collected:  07/01/19 1522    Specimen:  Blood Updated:  07/01/19 1630     Extra Tube Hold for add-ons.     Comment: Auto resulted.       POC Glucose Once [[id number]]  (Abnormal) Collected:  07/01/19 1032    Specimen:  Blood Updated:  07/01/19 1106     Glucose 131 mg/dL      Comment: RN NotifiedOperator: [id number] [employer] DAISHAMeter ID: [id number]       POC Glucose Once [[id number]]  (Normal) Collected:  07/01/19 0757    Specimen:  Blood Updated:  07/01/19 0811     Glucose 118 mg/dL      Comment: RN NotifiedOperator: [id number] [employer] DAISHAMeter ID: [id number]       Protime-INR [[id number]]   (Abnormal) Collected:  07/01/19 0551    Specimen:  Blood Updated:  07/01/19 0625     Protime 21.7 Seconds      INR 1.92    Narrative:       Therapeutic range for most indications is 2.0-3.0 INR,  or 2.5-3.5 for mechanical heart valves.    Basic Metabolic Panel [[id number]]  (Abnormal) Collected:  07/01/19 0551    Specimen:  Blood Updated:  07/01/19 0617     Glucose 121 mg/dL      BUN 59 mg/dL      Creatinine 2.02 mg/dL      Sodium 145 mmol/L      Potassium 4.2 mmol/L      Chloride 105 mmol/L      CO2 29.0 mmol/L      Calcium 9.0 mg/dL      eGFR Non African Amer 24 mL/min/1.73      BUN/Creatinine Ratio 29.2     Anion Gap 11.0 mmol/L     Narrative:       GFR Normal >60  Chronic Kidney Disease <60  Kidney Failure <15    CBC (No Diff) [[id number]]  (Abnormal) Collected:  07/01/19 0551    Specimen:  Blood Updated:  07/01/19 0609     WBC 8.43 10*3/mm3      RBC 2.55 10*6/mm3      Hemoglobin 7.9 g/dL      Hematocrit 25.3 %      MCV 99.2 fL      MCH 31.0 pg      MCHC 31.2 g/dL      RDW 19.3 %      RDW-SD 67.2 fl      MPV 11.1 fL      Platelets 216 10*3/mm3     POC Glucose Once [[id number]]  (Abnormal) Collected:  06/30/19 2041    Specimen:  Blood Updated:  06/30/19 2056     Glucose 170 mg/dL      Comment: RN NotifiedOperator: [id number] DICK URBANOAHMeter ID: [id number]       POC Glucose Once [[id number]]  (Normal) Collected:  06/30/19 1637    Specimen:  Blood Updated:  06/30/19 1719     Glucose 116 mg/dL      Comment: RN NotifiedOperator: [id number] APRIL CRYSTALMeter ID: [id number]       POC Glucose Once [[id number]]  (Abnormal) Collected:  06/30/19 1035    Specimen:  Blood Updated:  06/30/19 1052     Glucose 169 mg/dL      Comment: RN NotifiedOperator: [id number] APRIL CRYSTALMeter ID: [id number]       Protime-INR [[id number]]  (Abnormal) Collected:  06/30/19 0804    Specimen:  Blood Updated:  06/30/19 0926     Protime 20.1 Seconds      INR 1.74    Narrative:       Therapeutic range for most indications is 2.0-3.0  INR,  or 2.5-3.5 for mechanical heart valves.    Basic Metabolic Panel [[id number]]  (Abnormal) Collected:  06/30/19 0804    Specimen:  Blood Updated:  06/30/19 0850     Glucose 123 mg/dL      BUN 58 mg/dL      Creatinine 2.05 mg/dL      Sodium 142 mmol/L      Potassium 4.5 mmol/L      Chloride 104 mmol/L      CO2 28.0 mmol/L      Calcium 9.2 mg/dL      eGFR Non African Amer 24 mL/min/1.73      BUN/Creatinine Ratio 28.3     Anion Gap 10.0 mmol/L     Narrative:       GFR Normal >60  Chronic Kidney Disease <60  Kidney Failure <15    CBC (No Diff) [[id number]]  (Abnormal) Collected:  06/30/19 0804    Specimen:  Blood Updated:  06/30/19 0831     WBC 8.24 10*3/mm3      RBC 2.62 10*6/mm3      Hemoglobin 8.1 g/dL      Hematocrit 26.2 %      .0 fL      MCH 30.9 pg      MCHC 30.9 g/dL      RDW 19.3 %      RDW-SD 69.1 fl      MPV 12.3 fL      Platelets 235 10*3/mm3     POC Glucose Once [[id number]]  (Normal) Collected:  06/30/19 0744    Specimen:  Blood Updated:  06/30/19 0801     Glucose 118 mg/dL      Comment: RN NotifiedOperator: [id number] APRIL CRYSTALMeter ID: [id number]       POC Glucose Once [[id number]]  (Abnormal) Collected:  06/29/19 1948    Specimen:  Blood Updated:  06/29/19 2009     Glucose 172 mg/dL      Comment: RN NotifiedOperator: [id number] MIKKI GEIGERETDMeter ID: [id number]       POC Glucose Once [[id number]]  (Normal) Collected:  06/29/19 1644    Specimen:  Blood Updated:  06/29/19 1703     Glucose 120 mg/dL      Comment: RN NotifiedOperator: [id number] APRIL CRYSTALMeter ID: [id number]       POC Glucose Once [[id number]]  (Normal) Collected:  06/29/19 1057    Specimen:  Blood Updated:  06/29/19 1128     Glucose 123 mg/dL      Comment: RN NotifiedOperator: [id number] APRIL CRYSTALMeter ID: [id number]       POC Glucose Once [[id number]]  (Normal) Collected:  06/29/19 0759    Specimen:  Blood Updated:  06/29/19 0819     Glucose 127 mg/dL      Comment: RN NotifiedOperator: [id number] APRIL  CRYSTALMeter ID: [id number]       Basic Metabolic Panel [[id number]]  (Abnormal) Collected:  06/29/19 0704    Specimen:  Blood Updated:  06/29/19 0759     Glucose 127 mg/dL      BUN 60 mg/dL      Creatinine 2.21 mg/dL      Sodium 141 mmol/L      Potassium 4.5 mmol/L      Chloride 104 mmol/L      CO2 29.0 mmol/L      Calcium 9.5 mg/dL      eGFR Non African Amer 22 mL/min/1.73      BUN/Creatinine Ratio 27.1     Anion Gap 8.0 mmol/L     Narrative:       GFR Normal >60  Chronic Kidney Disease <60  Kidney Failure <15    Protime-INR [[id number]]  (Abnormal) Collected:  06/29/19 0704    Specimen:  Blood Updated:  06/29/19 0755     Protime 17.8 Seconds      INR 1.49    Narrative:       Therapeutic range for most indications is 2.0-3.0 INR,  or 2.5-3.5 for mechanical heart valves.    CBC (No Diff) [[id number]]  (Abnormal) Collected:  06/29/19 0704    Specimen:  Blood Updated:  06/29/19 0754     WBC 8.26 10*3/mm3      RBC 2.68 10*6/mm3      Hemoglobin 8.2 g/dL      Hematocrit 26.5 %      MCV 98.9 fL      MCH 30.6 pg      MCHC 30.9 g/dL      RDW 18.6 %      RDW-SD 65.9 fl      MPV 10.9 fL      Platelets 213 10*3/mm3     POC Glucose Once [[id number]]  (Abnormal) Collected:  06/28/19 1944    Specimen:  Blood Updated:  06/28/19 2003     Glucose 150 mg/dL      Comment: RN NotifiedOperator: [id number] MIKKI GEIGERTEDMeter ID: [id number]       POC Glucose Once [[id number]]  (Abnormal) Collected:  06/28/19 1633    Specimen:  Blood Updated:  06/28/19 1703     Glucose 134 mg/dL      Comment: RN NotifiedOperator: [id number] LEISA WOOMeter ID: [id number]       POC Glucose Once [[id number]]  (Normal) Collected:  06/28/19 1048    Specimen:  Blood Updated:  06/28/19 1105     Glucose 130 mg/dL      Comment: RN NotifiedOperator: [id number] TRACEY Patel ID: [id number]           Imaging Results (all)     Procedure Component Value Units Date/Time    XR Chest 1 View [[id number]] Collected:  07/02/19 0519     Updated:  07/02/19 0603     Narrative:         Chest single view on  7/2/2019     CLINICAL INDICATION: Shortness of breath, pulmonary edema    COMPARISON: 6/25/2019    FINDINGS: 2-lead left subclavian pacemaker is noted in place. The  patient is status post median sternotomy. Cardiomegaly is noted.  There are trace bilateral pleural effusions. There has been no  significant change in bilateral interstitial opacities. Some or  all of this could represent chronic interstitial changes but  component of edema is favored. Vascular calcification is noted in  the aorta.      Impression:       No significant change in the appearance of the chest.    Electronically signed by:  Quinn Howell  7/2/2019 6:02 AM CDT  Workstation: [id number]     Venous Doppler Upper Extremity Left (duplex) [[id number]] Collected:  07/01/19 1608     Updated:  07/01/19 1648    Narrative:         PROCEDURE: Left upper extremity venous duplex    COMPARISON: None    HISTORY: Pain and swelling in the left arm    FINDINGS: Realtime grayscale and color-flow imaging was performed  of the left upper extremity(s).    The deep veins demonstrate normal compressibility, respiratory  variation and augmentation with distal compression. No thrombus  is identified.      Impression:       CONCLUSION:   No DVT    Electronically signed by:  Dave More MD  7/1/2019 4:42 PM CDT  Workstation: [id number]    XR Chest 1 View [[id number]] Collected:  06/25/19 1721     Updated:  06/25/19 1745    Narrative:         EXAM:         Radiograph(s), Chest   VIEWS:   Frontal  ; 1       DATE/TIME:  6/25/2019 5:42 PM CDT                INDICATION:   Dyspnea, K92.2 Gastrointestinal hemorrhage,  unspecified R79.1 Abnormal coagulation profile I49.5 Sick sinus  syndrome I25.10 Atherosclerotic heart disease of native coronary  artery without angina pectoris I25.810 Atherosclerosis of  coronary artery bypass graft(s) without angina pectoris Z95.2  Presence of prosthetic heart valve    COMPARISON:  CXR: 6/18/19              FINDINGS:             - lines/tubes:    Left approach dual-lead pacemaker, unchanged.      - cardiac:         size within normal limits   ; status post  valvular replacement      - mediastinum: contour within normal limits         - lungs:         Prominence of the pulmonary interstitium likely  attributable to mild pulmonary interstitial edema.             - pleura:         Minimal blunting of the left costophrenic angle  may be attributable to the presence of a small amount of pleural  fluid.                  - osseous:         unremarkable for age ; status post median  sternotomy                 - misc.:         Impression:       CONCLUSION:        1. Prominence of the interstitial markings suggests the presence  of mild pulmonary interstitial edema.                                                             Electronically signed by:  MARY Sampson MD  6/25/2019 5:44  PM CDT Workstation: [id number]    XR Abdomen KUB [[id number]] Collected:  06/18/19 1016     Updated:  06/18/19 1048    Narrative:       Procedure: Supine abdomen    Reason for exam: Abdominal pain    FINDINGS: No acute osseous abnormality. Moderate degenerative  changes of the lumbar spine. Soft tissue structures are normal.  Small 6.8 mm circular calcification in the lower central pelvis  may represent small calcified phlebolith versus small bladder  calculi. Nonobstructive bowel gas pattern.      Impression:       1.  Small 6.8 mm circular calcification in the lower central  pelvis may represent small calcified phlebolith versus small  bladder calculi.  2.  Moderate lumbar spine degenerative changes.  3.  Otherwise unremarkable abdomen.    Electronically signed by:  Ke Zarate MD  6/18/2019 10:47 AM CDT  Workstation: [id number]    XR Chest PA & Lateral [[id number]] Collected:  06/18/19 1016     Updated:  06/18/19 1043    Narrative:           PROCEDURE: Chest PA and lateral    REASON FOR EXAM: shortness of breath    FINDINGS: Comparison  study dated December 29, 2017.  Stable  postsurgical changes consistent with cardiac valve repair. Mild  cardiomegaly. Pulmonary vasculature redistribution. Lungs are  clear. Very small bilateral pleural effusions. No acute osseous  abnormality.      Impression:       1.  Mild cardiomegaly with associated pulmonary vascular  redistribution suggesting pulmonary vascular congestion.  2.  Very small bilateral pleural effusions.  3.  Otherwise unremarkable chest.    Electronically signed by:  Ke Zarate MD  6/18/2019 10:42 AM CDT  Workstation: [id number]            Chief Complaint on Day of Discharge: None.    Hospital Course:  The patient was admitted and managed as follows:    Lower GI bleed: Patient was admitted, made n.p.o. and started on Protonix infusion. She was seen by the gastroenterologist and had endoscopies done.  Notably she was found to have sigmoid colon adenocarcinoma.  However she was not a candidate for surgery secondary to severe deconditioning and  and from pulmonary standpoint.  Surgical option will be reassessed following rehabilitation at skilled nursing facility.  Hemoglobin remained stable and she did receive transfusion of iron prior to discharge.  She is to continue oral iron supplementation as outpatient.      Diabetes mellitus: She was continued on anti-glycemic with Accu-Cheks and sliding scale insulin.     Acute on chronic kidney disease stage III: Patient's baseline creatinine is between 1.9-2.0.    Creatinine was 2.23 on discharge.  She was seen by the nephrologist on consult and appropriate recommendations were made.      Hypertension: Blood pressure remained stable with medication adjustments.       Paroxysmal atrial fibrillation: Patient is in paced rhythm.  Anticoagulation was restarted post endoscopy and INR was therapeutic on discharge.      Status post aortic valve replacement: She was continued on anticoagulation as dictated above.       Chronic hypoxic respiratory failure: She was  "stable and continued on supplemental oxygen and bronchodilators.     Heart failure with preserved ejection fraction: Patient remains on discharge.  He was continued on diuretics with strict I's and O's, daily weights, fluid and salt restriction.     Condition on Discharge: Stable and improved.    Physical Exam on Discharge:  /67 (BP Location: Left leg, Patient Position: Lying)   Pulse 93   Temp 98.8 °F (37.1 °C) (Oral)   Resp 20   Ht 165.1 cm (65\")   Wt 120 kg (264 lb 9.6 oz)   LMP  (LMP Unknown)   SpO2 90%   Breastfeeding? No   BMI 44.03 kg/m²   Physical Exam   Constitutional: She is oriented to person, place, and time. She appears well-developed and well-nourished. She is cooperative. No distress.   Patient is morbidly obese and has a BMI of 44.03.   HENT:   Head: Normocephalic and atraumatic.   Right Ear: External ear normal.   Left Ear: External ear normal.   Nose: Nose normal.   Mouth/Throat: Oropharynx is clear and moist.   Eyes: Conjunctivae and EOM are normal. Pupils are equal, round, and reactive to light.   Neck: Normal range of motion. Neck supple. No JVD present. No thyromegaly present.   Cardiovascular: Normal rate, regular rhythm, normal heart sounds and intact distal pulses. Exam reveals no gallop and no friction rub.   No murmur heard.  Pulmonary/Chest: Effort normal. No stridor. No respiratory distress. She has decreased breath sounds. She has no wheezes. She has rhonchi. She has no rales. She exhibits no tenderness.   Abdominal: Soft. Bowel sounds are normal. She exhibits no distension and no mass. There is no tenderness. There is no rebound and no guarding. No hernia.   Musculoskeletal: Normal range of motion. She exhibits no edema, tenderness or deformity.   Neurological: She is alert and oriented to person, place, and time. She has normal reflexes. No cranial nerve deficit or sensory deficit. She exhibits normal muscle tone.   Skin: Skin is warm and dry. No rash noted. She is not " diaphoretic. No erythema. No pallor.   Psychiatric: She has a normal mood and affect. Her behavior is normal. Judgment and thought content normal.   Nursing note and vitals reviewed.        Discharge Disposition:  Skilled Nursing Facility (DC - External)    Discharge Medications:     Discharge Medications      New Medications      Instructions Start Date   metOLazone 2.5 MG tablet  Commonly known as:  ZAROXOLYN   2.5 mg, Oral, Daily   Start Date:  7/6/2019        Continue These Medications      Instructions Start Date   acetaminophen 325 MG tablet  Commonly known as:  TYLENOL   650 mg, Oral, Every 6 Hours PRN      albuterol (2.5 MG/3ML) 0.083% nebulizer solution  Commonly known as:  PROVENTIL   2.5 mg, Nebulization, Every 4 Hours PRN      albuterol sulfate  (90 Base) MCG/ACT inhaler  Commonly known as:  PROVENTIL HFA;VENTOLIN HFA;PROAIR HFA   2 puffs, Inhalation, Every 4 Hours PRN      aspirin 81 MG chewable tablet   81 mg, Oral, Daily      cholecalciferol 1000 units tablet  Commonly known as:  VITAMIN D3   2,000 Units, Oral, Daily      citalopram 20 MG tablet  Commonly known as:  CeleXA   20 mg, Oral, Daily      diltiaZEM  MG 24 hr capsule  Commonly known as:  CARDIZEM CD   240 mg, Oral, Daily      ezetimibe 10 MG tablet  Commonly known as:  ZETIA   10 mg, Oral, Daily      ferrous sulfate 325 (65 FE) MG tablet   325 mg, Oral, Daily With Breakfast      furosemide 40 MG tablet  Commonly known as:  LASIX   40 mg, Oral, Daily      glucose blood test strip   1 each, Other, 3 Times Daily, Use as instructed       Insulin Glargine 100 UNIT/ML injection pen  Commonly known as:  BASAGLAR KWIKPEN   30 Units, Subcutaneous, Daily      Insulin Pen Needle 32G X 4 MM misc  Commonly known as:  BD PEN NEEDLE DEREK U/F   1 each, Does not apply, 4 Times Daily      Prenatal Vitamin 27-0.8 MG tablet   1 tablet, Oral, Daily      raNITIdine 150 MG tablet  Commonly known as:  ZANTAC   150 mg, Oral, Nightly      rOPINIRole 0.25  MG tablet  Commonly known as:  REQUIP   0.25 mg, Oral, Nightly, Take 1 hour before bedtime.      traZODone 150 MG tablet  Commonly known as:  DESYREL   300 mg, Oral, Nightly      umeclidinium-vilanterol 62.5-25 MCG/INH aerosol powder  inhaler  Commonly known as:  ANORO ELLIPTA   1 puff, Inhalation, Daily      vitamin C with bob hips 250 MG tablet   500 mg, Oral, Daily      warfarin 5 MG tablet  Commonly known as:  COUMADIN   Take 1 tablet nightly except on Tuesday and Friday take 1/2 tablet OR as directed             Discharge Diet: Cardiac, diabetic and renal.    Activity at Discharge: As tolerated.    Discharge Care Plan/Instructions: Patient has been advised to take her medications as prescribed and to return to the emergency room in the event of any worsening symptoms.    Follow-up Appointments:   Future Appointments   Date Time Provider Department Center   8/1/2019  2:15 PM Nel Grant MD [facility] None   12/10/2019 10:15 AM Josefa Pozo APRN [facility] None       Test Results Pending at Discharge:     Dudley Alfredo MD  07/05/19  10:39 AM    Time: 35 minutes.

## 2019-07-05 NOTE — PLAN OF CARE
Problem: Patient Care Overview  Goal: Plan of Care Review  Outcome: Ongoing (interventions implemented as appropriate)   07/05/19 0520 07/05/19 1043   Coping/Psychosocial   Plan of Care Reviewed With patient --    Plan of Care Review   Progress no change --    OTHER   Outcome Summary --  pt sup<>sit with independence, sit<>stand with independence, pt transfered bed<>BSC with SBA. pt would benefit from 24/7 care & continued PT services

## 2019-07-10 ENCOUNTER — OUTSIDE FACILITY SERVICE (OUTPATIENT)
Dept: FAMILY MEDICINE CLINIC | Facility: CLINIC | Age: 74
End: 2019-07-10

## 2019-07-10 PROCEDURE — 99304 1ST NF CARE SF/LOW MDM 25: CPT | Performed by: FAMILY MEDICINE

## 2019-07-13 ENCOUNTER — HOSPITAL ENCOUNTER (INPATIENT)
Facility: HOSPITAL | Age: 74
LOS: 6 days | Discharge: HOME HEALTH | End: 2019-07-22
Attending: FAMILY MEDICINE | Admitting: FAMILY MEDICINE

## 2019-07-13 ENCOUNTER — APPOINTMENT (OUTPATIENT)
Dept: GENERAL RADIOLOGY | Facility: HOSPITAL | Age: 74
End: 2019-07-13

## 2019-07-13 DIAGNOSIS — N18.4 STAGE 4 CHRONIC KIDNEY DISEASE (HCC): ICD-10-CM

## 2019-07-13 DIAGNOSIS — Z74.09 IMPAIRED MOBILITY AND ACTIVITIES OF DAILY LIVING: ICD-10-CM

## 2019-07-13 DIAGNOSIS — Z74.09 IMPAIRED PHYSICAL MOBILITY: ICD-10-CM

## 2019-07-13 DIAGNOSIS — J44.1 COPD EXACERBATION (HCC): ICD-10-CM

## 2019-07-13 DIAGNOSIS — Z78.9 IMPAIRED MOBILITY AND ACTIVITIES OF DAILY LIVING: ICD-10-CM

## 2019-07-13 DIAGNOSIS — I50.9 ACUTE ON CHRONIC CONGESTIVE HEART FAILURE, UNSPECIFIED HEART FAILURE TYPE (HCC): Primary | ICD-10-CM

## 2019-07-13 DIAGNOSIS — I50.33 ACUTE ON CHRONIC DIASTOLIC CONGESTIVE HEART FAILURE (HCC): ICD-10-CM

## 2019-07-13 DIAGNOSIS — R53.81 PHYSICAL DECONDITIONING: ICD-10-CM

## 2019-07-13 PROBLEM — D64.9 ANEMIA: Status: ACTIVE | Noted: 2019-07-13

## 2019-07-13 LAB
ALBUMIN SERPL-MCNC: 3.5 G/DL (ref 3.5–5.2)
ALBUMIN/GLOB SERPL: 1 G/DL
ALP SERPL-CCNC: 76 U/L (ref 39–117)
ALT SERPL W P-5'-P-CCNC: 27 U/L (ref 1–33)
ANION GAP SERPL CALCULATED.3IONS-SCNC: 12 MMOL/L (ref 5–15)
ARTERIAL PATENCY WRIST A: ABNORMAL
AST SERPL-CCNC: 31 U/L (ref 1–32)
ATMOSPHERIC PRESS: 749 MMHG
BASE EXCESS BLDA CALC-SCNC: 4.9 MMOL/L (ref 0–2)
BASOPHILS # BLD AUTO: 0.03 10*3/MM3 (ref 0–0.2)
BASOPHILS NFR BLD AUTO: 0.4 % (ref 0–1.5)
BDY SITE: ABNORMAL
BILIRUB SERPL-MCNC: 0.6 MG/DL (ref 0.2–1.2)
BUN BLD-MCNC: 79 MG/DL (ref 8–23)
BUN/CREAT SERPL: 35.6 (ref 7–25)
CALCIUM SPEC-SCNC: 9 MG/DL (ref 8.6–10.5)
CHLORIDE SERPL-SCNC: 101 MMOL/L (ref 98–107)
CO2 SERPL-SCNC: 30 MMOL/L (ref 22–29)
CREAT BLD-MCNC: 2.22 MG/DL (ref 0.57–1)
DEPRECATED RDW RBC AUTO: 73.9 FL (ref 37–54)
EOSINOPHIL # BLD AUTO: 0.51 10*3/MM3 (ref 0–0.4)
EOSINOPHIL NFR BLD AUTO: 6.7 % (ref 0.3–6.2)
ERYTHROCYTE [DISTWIDTH] IN BLOOD BY AUTOMATED COUNT: 21.3 % (ref 12.3–15.4)
GAS FLOW AIRWAY: 6 LPM
GFR SERPL CREATININE-BSD FRML MDRD: 22 ML/MIN/1.73
GLOBULIN UR ELPH-MCNC: 3.4 GM/DL
GLUCOSE BLD-MCNC: 128 MG/DL (ref 65–99)
GLUCOSE BLDC GLUCOMTR-MCNC: 170 MG/DL (ref 70–130)
GLUCOSE BLDC GLUCOMTR-MCNC: 215 MG/DL (ref 70–130)
HCO3 BLDA-SCNC: 29.8 MMOL/L (ref 20–26)
HCT VFR BLD AUTO: 28.9 % (ref 34–46.6)
HGB BLD-MCNC: 8.9 G/DL (ref 12–15.9)
HOLD SPECIMEN: NORMAL
HOLD SPECIMEN: NORMAL
IMM GRANULOCYTES # BLD AUTO: 0.05 10*3/MM3 (ref 0–0.05)
IMM GRANULOCYTES NFR BLD AUTO: 0.7 % (ref 0–0.5)
INR PPP: 3.97 (ref 0.8–1.2)
LYMPHOCYTES # BLD AUTO: 0.26 10*3/MM3 (ref 0.7–3.1)
LYMPHOCYTES NFR BLD AUTO: 3.4 % (ref 19.6–45.3)
Lab: ABNORMAL
MCH RBC QN AUTO: 30.6 PG (ref 26.6–33)
MCHC RBC AUTO-ENTMCNC: 30.8 G/DL (ref 31.5–35.7)
MCV RBC AUTO: 99.3 FL (ref 79–97)
MODALITY: ABNORMAL
MONOCYTES # BLD AUTO: 0.39 10*3/MM3 (ref 0.1–0.9)
MONOCYTES NFR BLD AUTO: 5.1 % (ref 5–12)
NEUTROPHILS # BLD AUTO: 6.34 10*3/MM3 (ref 1.7–7)
NEUTROPHILS NFR BLD AUTO: 83.7 % (ref 42.7–76)
NRBC BLD AUTO-RTO: 0 /100 WBC (ref 0–0.2)
NT-PROBNP SERPL-MCNC: 8913 PG/ML (ref 5–900)
PCO2 BLDA: 44.7 MM HG (ref 35–45)
PH BLDA: 7.43 PH UNITS (ref 7.35–7.45)
PLATELET # BLD AUTO: 282 10*3/MM3 (ref 140–450)
PMV BLD AUTO: 10.2 FL (ref 6–12)
PO2 BLDA: 60.9 MM HG (ref 83–108)
POTASSIUM BLD-SCNC: 3.9 MMOL/L (ref 3.5–5.2)
PROT SERPL-MCNC: 6.9 G/DL (ref 6–8.5)
PROTHROMBIN TIME: 38.5 SECONDS (ref 11.1–15.3)
RBC # BLD AUTO: 2.91 10*6/MM3 (ref 3.77–5.28)
SAO2 % BLDCOA: 91.8 % (ref 94–99)
SODIUM BLD-SCNC: 143 MMOL/L (ref 136–145)
TROPONIN T SERPL-MCNC: 0.02 NG/ML (ref 0–0.03)
VENTILATOR MODE: ABNORMAL
WBC NRBC COR # BLD: 7.58 10*3/MM3 (ref 3.4–10.8)
WHOLE BLOOD HOLD SPECIMEN: NORMAL
WHOLE BLOOD HOLD SPECIMEN: NORMAL

## 2019-07-13 PROCEDURE — 84484 ASSAY OF TROPONIN QUANT: CPT | Performed by: FAMILY MEDICINE

## 2019-07-13 PROCEDURE — 93010 ELECTROCARDIOGRAM REPORT: CPT | Performed by: INTERNAL MEDICINE

## 2019-07-13 PROCEDURE — G0378 HOSPITAL OBSERVATION PER HR: HCPCS

## 2019-07-13 PROCEDURE — 82803 BLOOD GASES ANY COMBINATION: CPT

## 2019-07-13 PROCEDURE — 36600 WITHDRAWAL OF ARTERIAL BLOOD: CPT

## 2019-07-13 PROCEDURE — 63710000001 INSULIN ASPART PER 5 UNITS: Performed by: STUDENT IN AN ORGANIZED HEALTH CARE EDUCATION/TRAINING PROGRAM

## 2019-07-13 PROCEDURE — 94799 UNLISTED PULMONARY SVC/PX: CPT

## 2019-07-13 PROCEDURE — 25010000002 FUROSEMIDE PER 20 MG: Performed by: FAMILY MEDICINE

## 2019-07-13 PROCEDURE — 94760 N-INVAS EAR/PLS OXIMETRY 1: CPT

## 2019-07-13 PROCEDURE — 71046 X-RAY EXAM CHEST 2 VIEWS: CPT

## 2019-07-13 PROCEDURE — 99285 EMERGENCY DEPT VISIT HI MDM: CPT

## 2019-07-13 PROCEDURE — 85025 COMPLETE CBC W/AUTO DIFF WBC: CPT | Performed by: FAMILY MEDICINE

## 2019-07-13 PROCEDURE — 85610 PROTHROMBIN TIME: CPT | Performed by: FAMILY MEDICINE

## 2019-07-13 PROCEDURE — 93005 ELECTROCARDIOGRAM TRACING: CPT | Performed by: FAMILY MEDICINE

## 2019-07-13 PROCEDURE — 99219 PR INITIAL OBSERVATION CARE/DAY 50 MINUTES: CPT | Performed by: STUDENT IN AN ORGANIZED HEALTH CARE EDUCATION/TRAINING PROGRAM

## 2019-07-13 PROCEDURE — 83880 ASSAY OF NATRIURETIC PEPTIDE: CPT | Performed by: FAMILY MEDICINE

## 2019-07-13 PROCEDURE — 94640 AIRWAY INHALATION TREATMENT: CPT

## 2019-07-13 PROCEDURE — 80053 COMPREHEN METABOLIC PANEL: CPT | Performed by: FAMILY MEDICINE

## 2019-07-13 PROCEDURE — 82962 GLUCOSE BLOOD TEST: CPT

## 2019-07-13 RX ORDER — FUROSEMIDE 10 MG/ML
80 INJECTION INTRAMUSCULAR; INTRAVENOUS ONCE
Status: COMPLETED | OUTPATIENT
Start: 2019-07-13 | End: 2019-07-13

## 2019-07-13 RX ORDER — NICOTINE POLACRILEX 4 MG
15 LOZENGE BUCCAL
Status: DISCONTINUED | OUTPATIENT
Start: 2019-07-13 | End: 2019-07-22 | Stop reason: HOSPADM

## 2019-07-13 RX ORDER — ALBUTEROL SULFATE 2.5 MG/3ML
2.5 SOLUTION RESPIRATORY (INHALATION) EVERY 4 HOURS PRN
Status: DISCONTINUED | OUTPATIENT
Start: 2019-07-13 | End: 2019-07-22 | Stop reason: HOSPADM

## 2019-07-13 RX ORDER — ROPINIROLE 0.25 MG/1
0.25 TABLET, FILM COATED ORAL NIGHTLY
Status: DISCONTINUED | OUTPATIENT
Start: 2019-07-13 | End: 2019-07-22 | Stop reason: HOSPADM

## 2019-07-13 RX ORDER — FAMOTIDINE 20 MG/1
20 TABLET, FILM COATED ORAL DAILY
Status: DISCONTINUED | OUTPATIENT
Start: 2019-07-14 | End: 2019-07-22 | Stop reason: HOSPADM

## 2019-07-13 RX ORDER — CITALOPRAM 20 MG/1
20 TABLET ORAL DAILY
Status: DISCONTINUED | OUTPATIENT
Start: 2019-07-14 | End: 2019-07-22 | Stop reason: HOSPADM

## 2019-07-13 RX ORDER — FERROUS SULFATE TAB EC 324 MG (65 MG FE EQUIVALENT) 324 (65 FE) MG
324 TABLET DELAYED RESPONSE ORAL
Status: DISCONTINUED | OUTPATIENT
Start: 2019-07-14 | End: 2019-07-22 | Stop reason: HOSPADM

## 2019-07-13 RX ORDER — ACETAMINOPHEN 325 MG/1
650 TABLET ORAL EVERY 4 HOURS PRN
Status: DISCONTINUED | OUTPATIENT
Start: 2019-07-13 | End: 2019-07-22 | Stop reason: HOSPADM

## 2019-07-13 RX ORDER — DOCUSATE SODIUM 100 MG/1
100 CAPSULE, LIQUID FILLED ORAL 2 TIMES DAILY PRN
Status: DISCONTINUED | OUTPATIENT
Start: 2019-07-13 | End: 2019-07-22 | Stop reason: HOSPADM

## 2019-07-13 RX ORDER — SODIUM CHLORIDE 0.9 % (FLUSH) 0.9 %
10 SYRINGE (ML) INJECTION AS NEEDED
Status: DISCONTINUED | OUTPATIENT
Start: 2019-07-13 | End: 2019-07-22 | Stop reason: HOSPADM

## 2019-07-13 RX ORDER — DILTIAZEM HYDROCHLORIDE 240 MG/1
240 CAPSULE, COATED, EXTENDED RELEASE ORAL DAILY
Status: DISCONTINUED | OUTPATIENT
Start: 2019-07-14 | End: 2019-07-22 | Stop reason: HOSPADM

## 2019-07-13 RX ORDER — DEXTROSE MONOHYDRATE 25 G/50ML
25 INJECTION, SOLUTION INTRAVENOUS
Status: DISCONTINUED | OUTPATIENT
Start: 2019-07-13 | End: 2019-07-22 | Stop reason: HOSPADM

## 2019-07-13 RX ORDER — TRAZODONE HYDROCHLORIDE 150 MG/1
300 TABLET ORAL NIGHTLY
Status: DISCONTINUED | OUTPATIENT
Start: 2019-07-13 | End: 2019-07-22 | Stop reason: HOSPADM

## 2019-07-13 RX ORDER — SODIUM CHLORIDE 0.9 % (FLUSH) 0.9 %
3 SYRINGE (ML) INJECTION EVERY 12 HOURS SCHEDULED
Status: DISCONTINUED | OUTPATIENT
Start: 2019-07-13 | End: 2019-07-22 | Stop reason: HOSPADM

## 2019-07-13 RX ORDER — ONDANSETRON 4 MG/1
4 TABLET, FILM COATED ORAL EVERY 6 HOURS PRN
Status: DISCONTINUED | OUTPATIENT
Start: 2019-07-13 | End: 2019-07-22 | Stop reason: HOSPADM

## 2019-07-13 RX ORDER — GUAIFENESIN 600 MG/1
600 TABLET, EXTENDED RELEASE ORAL EVERY 12 HOURS SCHEDULED
Status: DISCONTINUED | OUTPATIENT
Start: 2019-07-13 | End: 2019-07-22 | Stop reason: HOSPADM

## 2019-07-13 RX ORDER — IPRATROPIUM BROMIDE AND ALBUTEROL SULFATE 2.5; .5 MG/3ML; MG/3ML
3 SOLUTION RESPIRATORY (INHALATION)
Status: DISCONTINUED | OUTPATIENT
Start: 2019-07-13 | End: 2019-07-22 | Stop reason: HOSPADM

## 2019-07-13 RX ORDER — FUROSEMIDE 10 MG/ML
40 INJECTION INTRAMUSCULAR; INTRAVENOUS ONCE
Status: COMPLETED | OUTPATIENT
Start: 2019-07-14 | End: 2019-07-14

## 2019-07-13 RX ORDER — SODIUM CHLORIDE 0.9 % (FLUSH) 0.9 %
3-10 SYRINGE (ML) INJECTION AS NEEDED
Status: DISCONTINUED | OUTPATIENT
Start: 2019-07-13 | End: 2019-07-22 | Stop reason: HOSPADM

## 2019-07-13 RX ORDER — ASPIRIN 81 MG/1
81 TABLET, CHEWABLE ORAL DAILY
Status: DISCONTINUED | OUTPATIENT
Start: 2019-07-14 | End: 2019-07-22 | Stop reason: HOSPADM

## 2019-07-13 RX ORDER — PRENATAL VIT/IRON FUM/FOLIC AC 27MG-0.8MG
1 TABLET ORAL DAILY
Status: DISCONTINUED | OUTPATIENT
Start: 2019-07-14 | End: 2019-07-22 | Stop reason: HOSPADM

## 2019-07-13 RX ORDER — MELATONIN
2000 DAILY
Status: DISCONTINUED | OUTPATIENT
Start: 2019-07-14 | End: 2019-07-22 | Stop reason: HOSPADM

## 2019-07-13 RX ORDER — IPRATROPIUM BROMIDE AND ALBUTEROL SULFATE 2.5; .5 MG/3ML; MG/3ML
1.5 SOLUTION RESPIRATORY (INHALATION)
Status: COMPLETED | OUTPATIENT
Start: 2019-07-13 | End: 2019-07-13

## 2019-07-13 RX ORDER — ASCORBIC ACID 500 MG
500 TABLET ORAL DAILY
Status: DISCONTINUED | OUTPATIENT
Start: 2019-07-14 | End: 2019-07-22 | Stop reason: HOSPADM

## 2019-07-13 RX ORDER — ALBUTEROL SULFATE 2.5 MG/3ML
7.5 SOLUTION RESPIRATORY (INHALATION) ONCE
Status: DISCONTINUED | OUTPATIENT
Start: 2019-07-13 | End: 2019-07-13

## 2019-07-13 RX ADMIN — INSULIN ASPART 3 UNITS: 100 INJECTION, SOLUTION INTRAVENOUS; SUBCUTANEOUS at 20:56

## 2019-07-13 RX ADMIN — IPRATROPIUM BROMIDE AND ALBUTEROL SULFATE 1.5 ML: 2.5; .5 SOLUTION RESPIRATORY (INHALATION) at 11:50

## 2019-07-13 RX ADMIN — SODIUM CHLORIDE, PRESERVATIVE FREE 3 ML: 5 INJECTION INTRAVENOUS at 20:30

## 2019-07-13 RX ADMIN — TRAZODONE HYDROCHLORIDE 300 MG: 150 TABLET ORAL at 20:26

## 2019-07-13 RX ADMIN — FUROSEMIDE 80 MG: 10 INJECTION, SOLUTION INTRAMUSCULAR; INTRAVENOUS at 13:28

## 2019-07-13 RX ADMIN — ROPINIROLE HYDROCHLORIDE 0.25 MG: 0.25 TABLET, FILM COATED ORAL at 20:26

## 2019-07-13 RX ADMIN — IPRATROPIUM BROMIDE AND ALBUTEROL SULFATE 1.5 ML: 2.5; .5 SOLUTION RESPIRATORY (INHALATION) at 12:00

## 2019-07-13 RX ADMIN — IPRATROPIUM BROMIDE AND ALBUTEROL SULFATE 1.5 ML: 2.5; .5 SOLUTION RESPIRATORY (INHALATION) at 12:19

## 2019-07-13 RX ADMIN — IPRATROPIUM BROMIDE AND ALBUTEROL SULFATE 3 ML: 2.5; .5 SOLUTION RESPIRATORY (INHALATION) at 21:23

## 2019-07-13 RX ADMIN — GUAIFENESIN 600 MG: 600 TABLET, EXTENDED RELEASE ORAL at 21:55

## 2019-07-13 NOTE — PROGRESS NOTES
"Anticoagulation by Pharmacy - Warfarin    Brendon Skelton is a 73 y.o.female being continued on warfarin for atrial fibrillation      Home regimen: performing med rec for dosing  INR Goal: 2-3  Last INR:   Lab Results   Component Value Date    INR 3.97 (H) 07/13/2019       Objective:  [Ht: 165.1 cm (65\"); Wt: 119 kg (261 lb 6.4 oz)]  Lab Results   Component Value Date    INR 3.97 (H) 07/13/2019    INR 2.26 (H) 07/05/2019    INR 3.06 (H) 07/04/2019    PROTIME 38.5 (H) 07/13/2019    PROTIME 24.7 (H) 07/05/2019    PROTIME 31.4 (H) 07/04/2019     Lab Results   Component Value Date    HGB 8.9 (L) 07/13/2019    HGB 9.4 (L) 07/05/2019    HGB 9.5 (L) 07/04/2019    HCT 28.9 (L) 07/13/2019    HCT 30.2 (L) 07/05/2019    HCT 29.6 (L) 07/04/2019     07/13/2019     07/05/2019     07/04/2019           Assessment  Interacting medications: aspirin, citalopram  INR is 3.97, supratherapeutic, will hold warfarin tonight.  H/H noted, no trend yet    Plan:  1.  HOLD warfarin tonight  2.  Draw a PT/INR in AM  3.  Pharmacy will continue to follow    Quinn Mercer RPH  07/13/19 4:34 PM     "

## 2019-07-13 NOTE — ED NOTES
"Pt arrived to ED bed and immediately reported that she needed to use the restroom. Bedside commode placed in room; pt refused to sit in it. Pt also refused to sit on bedpan to pee stating \"I could sit on it for hours and not be able to pee\". Pt transported to bathroom via wheelchair on 6L NC O2     Roseline Iqbal, RN  07/13/19 7002    "

## 2019-07-13 NOTE — NURSING NOTE
Patient refused bed alarm and when asked to sign a refusal sheet she refused to sign it. She wanted her yellow bracelet cut off. Educated on the importance of calling staff if help is needed she stated that she would.

## 2019-07-13 NOTE — H&P
HISTORY AND PHYSICAL  NAME: Brendon Skelton  : 1945  MRN: [id number]    DATE OF ADMISSION: 19    DATE & TIME SEEN: 19 3:14 PM    PCP: Josefa Pozo APRN    CODE STATUS: Full code    CHIEF COMPLAINT shortness of breath    HPI:  Brendon Skelton is a 73 y.o. female from [facility] for acute rehab services with a CMH of atrial fibrillation/sick sinus syndrome status post pacemaker placement, chronic diastolic heart failure, emphysema requiring 3 L home oxygen use, diabetes who presents complaining of 3 days of worsening shortness of breath.  She was discharged from [facility] on 2019 after an extended stay for GI bleed.  During that time she was given 1 unit of packed red blood cells and had a colonoscopy which revealed a sessile polyp.  An upper endoscopy was also obtained at that time which showed moderate inflammation of the stomach but no active bleeding.  She reports having to have several runs of IV iron which is what caused her to have an extended stay.  She was discharged to [facility] for rehabilitation services.  She reports    Worsening shortness of breath and abdominal bloating for the last several days.  She denies chest pain, palpitations, fevers, chills, productive cough, nausea, vomiting, diarrhea, constipation, abdominal pain, dysuria.  She endorses shortness of breath and lower extremity edema worse on the left than the right.  She is able to ambulate at baseline but states she cannot walk very far.  She is on 3 L of home oxygen at baseline.  She denies any blood in the stool currently.    In the emergency room today, she is on her normal 3 L of oxygen.  BNP was above 8000.  She is being admitted for acute on chronic diastolic heart failure.    CONCURRENT MEDICAL HISTORY:  Past Medical History:   Diagnosis Date   • Acute bronchitis    • Anxiety    • Atrial fibrillation (CMS/HCC)    • C. difficile colitis    • Callosity      under metatarsal head      • Cardiac pacemaker in situ    • CHF (congestive heart failure) (CMS/HCC)    • Chronic obstructive lung disease (CMS/HCC)    • Corns and callus    • Depressive disorder    • Diabetes mellitus (CMS/HCC)    • Diastolic heart failure (CMS/HCC)    • Essential hypertension    • Foot pain    • Hyperlipidemia    • Long term current use of anticoagulant    • On anticoagulant therapy    • Pulmonary emphysema (CMS/HCC)    • Rectal hemorrhage    • Type 2 diabetes mellitus (CMS/HCC)        PAST SURGICAL HISTORY:  Past Surgical History:   Procedure Laterality Date   • AORTIC VALVE REPAIR/REPLACEMENT  1999   • CARDIAC ELECTROPHYSIOLOGY PROCEDURE N/A 3/20/2017    Procedure: PPM generator change - dual;  Surgeon: Bereket Gates MD;  Location: [facility] CATH INVASIVE LOCATION;  Service:    • CARDIAC PACEMAKER PLACEMENT  1999   • CATARACT EXTRACTION W/ INTRAOCULAR LENS IMPLANT Left 2/1/2019    Procedure: REMOVE CATARACT AND IMPLANT INTRAOCULAR LENS I;  Surgeon: Jose L Clay MD;  Location: [facility] OR;  Service: Ophthalmology   • CATARACT EXTRACTION W/ INTRAOCULAR LENS IMPLANT Right 2/8/2019    Procedure: REMOVE CATARACT AND IMPLANT  INTRAOCULAR LENS;  Surgeon: Jose L Clay MD;  Location: [facility] OR;  Service: Ophthalmology   • COLONOSCOPY N/A 6/19/2019    Procedure: COLONOSCOPY WITH CONTROL OF BLEED;  Surgeon: Alfredo Guerrero MD;  Location: [facility] ENDOSCOPY;  Service: Gastroenterology   • COLONOSCOPY N/A 6/24/2019    Procedure: COLONOSCOPY;  Surgeon: Alfredo Guerrero MD;  Location: [facility] ENDOSCOPY;  Service: Gastroenterology   • ECHO - CONVERTED  11/12/2013    There is mild to moderate left atrial enlargement EF 50-55%   • ENDOSCOPY N/A 6/19/2019    Procedure: ESOPHAGOGASTRODUODENOSCOPY WITH CONTROL OF BLEED;  Surgeon: Alfredo Guerrero MD;  Location: [facility] ENDOSCOPY;  Service: Gastroenterology   • FOOT SURGERY  1990   • OTHER SURGICAL HISTORY  10/26/2015    PARING CORN/CALLUS    •  PACEMAKER REPLACEMENT N/A 3/21/2017    Procedure: revision pacemaker pocket, evacuation hematoma, control of bleeding;  Surgeon: Polo Feliz MD;  Location: [facility];  Service:    • TRANSESOPHAGEAL ECHOCARDIOGRAM (MITZY)  2014    With color flow-Mild left atrial enlargement with mild concentric LV hypertrophy with top normal aortic root size. EF 45-50%. Mild mitral regurgitation and mild aortic insufficiency and trivial amount of tricuspid regurgation   • TUBAL ABDOMINAL LIGATION         FAMILY HISTORY:  Family History   Problem Relation Age of Onset   • Heart disease Mother    • Hyperlipidemia Mother    • Hypertension Mother    • Cancer Other         SOCIAL HISTORY:  Social History     Socioeconomic History   • Marital status:      Spouse name: Not on file   • Number of children: Not on file   • Years of education: Not on file   • Highest education level: Not on file   Tobacco Use   • Smoking status: Former Smoker     Last attempt to quit: 3/21/1999     Years since quittin.3   • Smokeless tobacco: Never Used   Substance and Sexual Activity   • Alcohol use: No     Comment: quit in 2016   • Drug use: No   • Sexual activity: No     Birth control/protection: Post-menopausal       HOME MEDICATIONS:  Prior to Admission medications    Medication Sig Start Date End Date Taking? Authorizing Provider   acetaminophen (TYLENOL) 325 MG tablet Take 650 mg by mouth every 6 (six) hours as needed for mild pain (1-3).   Yes ProviderAndrés MD   albuterol (PROVENTIL) (2.5 MG/3ML) 0.083% nebulizer solution Take 2.5 mg by nebulization Every 4 (Four) Hours As Needed for Wheezing.   Yes ProviderAndrés MD   albuterol sulfate  (90 Base) MCG/ACT inhaler Inhale 2 puffs Every 4 (Four) Hours As Needed for Wheezing or Shortness of Air. 3/15/19  Yes Nel Grant MD   Ascorbic Acid (VITAMIN C WITH OCHOA HIPS) 250 MG tablet Take 500 mg by mouth Daily.   Yes ProviderAndrés MD   aspirin 81  MG chewable tablet Chew 81 mg Daily.   Yes Andrés Waldrop MD   cholecalciferol (VITAMIN D3) 1000 units tablet Take 2,000 Units by mouth Daily.   Yes Andrés Waldrop MD   citalopram (CeleXA) 20 MG tablet Take 1 tablet by mouth Daily. 5/8/19   Josefa Pozo APRN   diltiaZEM CD (CARDIZEM CD) 240 MG 24 hr capsule Take 1 capsule by mouth Daily. 6/4/19   Josefa Pozo APRN   ezetimibe (ZETIA) 10 MG tablet Take 1 tablet by mouth Daily. 4/8/19   Josefa Pozo APRN   ferrous sulfate 325 (65 FE) MG tablet Take 325 mg by mouth Daily With Breakfast.    Andrés Waldrop MD   furosemide (LASIX) 40 MG tablet Take 40 mg by mouth Daily.    Andrés Waldrop MD   glucose blood test strip 1 each by Other route 3 (three) times a day. Use as instructed    Andrés Waldrop MD   Insulin Glargine (BASAGLAR KWIKPEN) 100 UNIT/ML injection pen Inject 30 Units under the skin into the appropriate area as directed Daily. 9/10/18   Josefa Pozo APRN   Insulin Pen Needle (BD PEN NEEDLE DEREK U/F) 32G X 4 MM misc 1 each 4 (Four) Times a Day. 3/22/18   Josefa Pozo APRN   metOLazone (ZAROXOLYN) 2.5 MG tablet Take 1 tablet by mouth Daily. 7/6/19   Dudley Alfredo MD   Prenatal Vit-Fe Fumarate-FA (PRENATAL VITAMIN) 27-0.8 MG tablet Take 1 tablet by mouth daily.    Andrés Waldrop MD   raNITIdine (ZANTAC) 150 MG tablet Take 1 tablet by mouth Every Night. 8/15/18   Josefa Pozo APRN   rOPINIRole (REQUIP) 0.25 MG tablet Take 1 tablet by mouth Every Night. Take 1 hour before bedtime. 9/12/18   Josefa Pozo APRN   traZODone (DESYREL) 150 MG tablet Take 2 tablets by mouth Every Night. 9/12/18   Josefa Pozo APRN   umeclidinium-vilanterol (ANORO ELLIPTA) 62.5-25 MCG/INH aerosol powder  inhaler Inhale 1 puff Daily. 2/18/19   Nel Grant MD   warfarin (COUMADIN) 5 MG tablet Take 1 tablet nightly except on Tuesday and Friday take 1/2 tablet OR as directed 5/8/19   Meme Duckworth APRN        ALLERGIES:  Crestor [rosuvastatin calcium]; Lipitor [atorvastatin]; Lortab [hydrocodone-acetaminophen]; Adhesive tape; and Hydrocodone-acetaminophen    REVIEW OF SYSTEMS  Review of Systems   Constitutional: Negative for activity change, appetite change, chills, fatigue and fever.   HENT: Negative for hearing loss, sneezing, sore throat and trouble swallowing.    Respiratory: Positive for cough (nonproductive) and shortness of breath. Negative for chest tightness and wheezing.    Cardiovascular: Positive for leg swelling. Negative for chest pain and palpitations.   Gastrointestinal: Positive for abdominal distention. Negative for abdominal pain, blood in stool, constipation, diarrhea, nausea and vomiting.   Genitourinary: Negative for difficulty urinating, dysuria and hematuria.   Musculoskeletal: Negative for arthralgias and back pain.   Skin: Negative for pallor and rash.   Allergic/Immunologic: Negative for environmental allergies and food allergies.   Neurological: Negative for dizziness, light-headedness, numbness and headaches.   Psychiatric/Behavioral: Negative for agitation, confusion, hallucinations and suicidal ideas.       PHYSICAL EXAM:  Temp:  [96.9 °F (36.1 °C)-98.4 °F (36.9 °C)] 96.9 °F (36.1 °C)  Heart Rate:  [] 100  Resp:  [22-24] 22  BP: (137-140)/(83-97) 137/83  Body mass index is 43.5 kg/m².  Physical Exam   Constitutional: She is oriented to person, place, and time. She appears well-developed and well-nourished. No distress.   HENT:   Head: Normocephalic and atraumatic.   Right Ear: External ear normal.   Left Ear: External ear normal.   Eyes: Right eye exhibits no discharge. Left eye exhibits no discharge. No scleral icterus.   Neck: Normal range of motion. Neck supple.   Cardiovascular: Normal rate, regular rhythm and normal heart sounds.   Pulmonary/Chest: Effort normal. No respiratory distress. She has decreased breath sounds (diffusely). She has no wheezes. She has no rhonchi.    Pursed lip breathing, mild crackles at lung bases bilaterally   Abdominal: Soft. Bowel sounds are normal. She exhibits no distension. There is no tenderness.   Musculoskeletal: Normal range of motion. She exhibits edema (+2 pitting edema lower extremities bilaterally).   Neurological: She is alert and oriented to person, place, and time. She has normal reflexes.   Skin: Skin is warm and dry. She is not diaphoretic. No erythema.   Psychiatric: She has a normal mood and affect. Her behavior is normal.       DIAGNOSTIC DATA:   Lab Results (last 24 hours)     Procedure Component Value Units Date/Time    Homer Draw [[id number]] Collected:  07/13/19 1227    Specimen:  Blood Updated:  07/13/19 1330    Narrative:       The following orders were created for panel order Homer Draw.  Procedure                               Abnormality         Status                     ---------                               -----------         ------                     Light Blue Top[[id number]]                                   Final result               Green Top (Gel)[[id number]]                                  Final result               Lavender Top[[id number]]                                     Final result               Gold Top - SST[[id number]]                                   Final result                 Please view results for these tests on the individual orders.    Light Blue Top [[id number]] Collected:  07/13/19 1227    Specimen:  Blood from Arm, Left Updated:  07/13/19 1330     Extra Tube hold for add-on     Comment: Auto resulted       Green Top (Gel) [[id number]] Collected:  07/13/19 1227    Specimen:  Blood from Arm, Left Updated:  07/13/19 1330     Extra Tube Hold for add-ons.     Comment: Auto resulted.       Lavender Top [[id number]] Collected:  07/13/19 1227    Specimen:  Blood from Arm, Left Updated:  07/13/19 1330     Extra Tube hold for add-on     Comment: Auto resulted       Gold Top - SST [[id number]] Collected:   07/13/19 1227    Specimen:  Blood from Arm, Left Updated:  07/13/19 1330     Extra Tube Hold for add-ons.     Comment: Auto resulted.       Troponin [[id number]]  (Normal) Collected:  07/13/19 1227    Specimen:  Blood from Arm, Left Updated:  07/13/19 1258     Troponin T 0.024 ng/mL     Narrative:       Troponin T Reference Range:  <= 0.03 ng/mL-   Negative for AMI  >0.03 ng/mL-     Abnormal for myocardial necrosis.  Clinicians would have to utilize clinical acumen, EKG, Troponin and serial changes to determine if it is an Acute Myocardial Infarction or myocardial injury due to an underlying chronic condition.     Protime-INR [[id number]]  (Abnormal) Collected:  07/13/19 1227    Specimen:  Blood from Arm, Left Updated:  07/13/19 1253     Protime 38.5 Seconds      INR 3.97    Narrative:       Therapeutic range for most indications is 2.0-3.0 INR,  or 2.5-3.5 for mechanical heart valves.    Comprehensive Metabolic Panel [[id number]]  (Abnormal) Collected:  07/13/19 1227    Specimen:  Blood from Arm, Left Updated:  07/13/19 1249     Glucose 128 mg/dL      BUN 79 mg/dL      Creatinine 2.22 mg/dL      Sodium 143 mmol/L      Potassium 3.9 mmol/L      Chloride 101 mmol/L      CO2 30.0 mmol/L      Calcium 9.0 mg/dL      Total Protein 6.9 g/dL      Albumin 3.50 g/dL      ALT (SGPT) 27 U/L      AST (SGOT) 31 U/L      Alkaline Phosphatase 76 U/L      Total Bilirubin 0.6 mg/dL      eGFR Non African Amer 22 mL/min/1.73      Globulin 3.4 gm/dL      A/G Ratio 1.0 g/dL      BUN/Creatinine Ratio 35.6     Anion Gap 12.0 mmol/L     Narrative:       GFR Normal >60  Chronic Kidney Disease <60  Kidney Failure <15    BNP [[id number]]  (Abnormal) Collected:  07/13/19 1227    Specimen:  Blood from Arm, Left Updated:  07/13/19 1246     proBNP 8,913.0 pg/mL     Narrative:       Among patients with dyspnea, NT-proBNP is highly sensitive for the detection of acute congestive heart failure. In addition NT-proBNP of <300 pg/ml effectively rules out  acute congestive heart failure with 99% negative predictive value.    CBC & Differential [[id number]] Collected:  07/13/19 1227    Specimen:  Blood Updated:  07/13/19 1245    Narrative:       The following orders were created for panel order CBC & Differential.  Procedure                               Abnormality         Status                     ---------                               -----------         ------                     CBC Auto Differential[[id number]]        Abnormal            Final result                 Please view results for these tests on the individual orders.    CBC Auto Differential [[id number]]  (Abnormal) Collected:  07/13/19 1227    Specimen:  Blood from Arm, Left Updated:  07/13/19 1245     WBC 7.58 10*3/mm3      RBC 2.91 10*6/mm3      Hemoglobin 8.9 g/dL      Hematocrit 28.9 %      MCV 99.3 fL      MCH 30.6 pg      MCHC 30.8 g/dL      RDW 21.3 %      RDW-SD 73.9 fl      MPV 10.2 fL      Platelets 282 10*3/mm3      Neutrophil % 83.7 %      Lymphocyte % 3.4 %      Monocyte % 5.1 %      Eosinophil % 6.7 %      Basophil % 0.4 %      Immature Grans % 0.7 %      Neutrophils, Absolute 6.34 10*3/mm3      Lymphocytes, Absolute 0.26 10*3/mm3      Monocytes, Absolute 0.39 10*3/mm3      Eosinophils, Absolute 0.51 10*3/mm3      Basophils, Absolute 0.03 10*3/mm3      Immature Grans, Absolute 0.05 10*3/mm3      nRBC 0.0 /100 WBC     Blood Gas, Arterial [[id number]]  (Abnormal) Collected:  07/13/19 1208    Specimen:  Arterial Blood Updated:  07/13/19 1215     Site Right Brachial     Ramses's Test N/A     pH, Arterial 7.432 pH units      pCO2, Arterial 44.7 mm Hg      pO2, Arterial 60.9 mm Hg      Comment: 84 Value below reference range        HCO3, Arterial 29.8 mmol/L      Comment: 83 Value above reference range        Base Excess, Arterial 4.9 mmol/L      Comment: 83 Value above reference range        O2 Saturation, Arterial 91.8 %      Comment: 84 Value below reference range        Barometric Pressure for  Blood Gas 749 mmHg      Modality Nasal Cannula     Flow Rate 6.0 lpm      Ventilator Mode NA     Collected by      Comment: Meter: [id number]     :  [id number]              Imaging Results (last 24 hours)     Procedure Component Value Units Date/Time    XR Chest 2 View [[id number]] Collected:  07/13/19 1250     Updated:  07/13/19 1317    Narrative:         TWO VIEW CHEST    HISTORY: Shortness of breath    Frontal and lateral films of the chest were obtained.  COMPARISON: June 18, 2019    EKG leads.  Pacemaker remains in place.  Sternotomy with prosthetic aortic valve.  Cardiomegaly, pulmonary vascular congestion and minimal  interstitial edema with perhaps very small bilateral pleural  effusions.  Chronic obstructive pulmonary disease.  No acute osseous abnormality.  Degenerative changes are present in the thoracic spine.      Impression:       CONCLUSION:  Pacemaker remains in place.  Sternotomy with prosthetic aortic valve.  Cardiomegaly, pulmonary vascular congestion and minimal  interstitial edema with perhaps very small bilateral pleural  effusions.  Chronic obstructive pulmonary disease.    [id number]    Electronically signed by:  William Grant MD  7/13/2019 1:16 PM CDT  Workstation: [id number]            I reviewed the patient's new clinical results.    ASSESSMENT AND PLAN: This is a 73 y.o. female with:    Active Hospital Problems    Diagnosis POA   • **Acute on chronic diastolic congestive heart failure (CMS/HCC) [I50.33] Yes     -last echo 6/18/19 shows EF of 46-50% with normal diastolic dysfunction, improved from echo in 2017 which showed grade 1 diastolic dysfunction  -BNP on admission was 8913, CXR shows pulmonary congestion  -on lasix 40 mg daily and metolazone 2.5 mg daily at home   -s/p lasix 80 mg IV in ER, will continue with lasix 40 mg IV tomorrow  -strict I/Os  -daily weights  -1500 mL fluid restriction, sodium restriction 2g  -initial troponin 0.024, will trend     • Anemia [D64.9] Yes      -H/H on admission 8.9/28.9, trend with daily CBC  -recent GI bleed requiring transfusion of 1 unit of PRBCs  -on oral iron supplement at home  -continue oral iron supplement  -transfuse for hemoglobin less than 7     • Stage 4 chronic kidney disease (CMS/[facility]) [N18.4] Yes     -Baseline creatinine ~ 1.9-2.1  -trend renal function  -follows with Dr. Caputo outpatient  -avoid nephrotoxic agents     • SSS (sick sinus syndrome) (CMS/[facility]) [I49.5] Yes     -atrial fibrillation with rate of 85 on EKG in ER  -pacemaker in place  -continue with cardizem  -telemetry  -pharm to dose coumadin     • Essential hypertension [I10] Yes     -continue lasix, cardizem     • Chronic hypoxemic respiratory failure (CMS/[facility]) [J96.11] Yes     -on 3L O2 via nasal cannula at home  -ABG in ER shows pO2 of 60.9, pCO2 of 44.7  -will continue oxygen via nasal cannula at home rate     • Type 2 diabetes mellitus with stage 4 chronic kidney disease, with long-term current use of insulin (CMS/[facility]) [E11.22, N18.4, Z79.4] Not Applicable     -on basaglar 30 units at night  -continue, adjust as necessary     • Emphysema of lung (CMS/[facility]) [J43.9] Yes     -continue with supplemental oxygen  -continue albuterol nebs  -symbicort, duonebs      • Atrial fibrillation [I48.91] [I48.91] Yes     -EKG in ER shows atrial fibrillation with rate of 85  -pacemaker in place  -continue cardizem  -telemetry  -pharm to dose coumadin         DVT prophylaxis: Coumadin     ELIZABETH # [id number], reviewed and consistent with patient reported medications.    Expected Length of Stay: Anticipate discharge either to home or back to SNF and 2 to 4 days pending improvement in breathing and fluid status    I discussed the patients findings and my recommendations with patient.     Dr. Mares is the attending on record at time of admission, He is aware of the patient's status and agrees with the above history and physical.          Kenna Chawla MD PGY3  [facility]  Medicine Residency  This document has been electronically signed by Kenna Chawla MD on July 13, 2019 3:40 PM

## 2019-07-13 NOTE — ED PROVIDER NOTES
Subjective     History provided by:  Patient   used: No    Patient is a 73 years old female with history of congestive heart failure, COPD, diabetes, hypertension, and cardiac pacemaker/A. fib who presents today because of shortness of breath which progressively getting worse since this morning.  Patient is on 4 L of oxygen at home nasal cannula.  When the EMS arrived patient O2 sat was in low 80s.  Patient denied any chest pain or sweating or nausea or vomiting.    Review of Systems   All other systems reviewed and are negative.      Past Medical History:   Diagnosis Date   • Acute bronchitis    • Anxiety    • Atrial fibrillation (CMS/HCC)    • C. difficile colitis    • Callosity     under metatarsal head      • Cardiac pacemaker in situ    • CHF (congestive heart failure) (CMS/HCC)    • Chronic obstructive lung disease (CMS/HCC)    • Corns and callus    • Depressive disorder    • Diabetes mellitus (CMS/HCC)    • Diastolic heart failure (CMS/HCC)    • Essential hypertension    • Foot pain    • Hyperlipidemia    • Long term current use of anticoagulant    • On anticoagulant therapy    • Pulmonary emphysema (CMS/HCC)    • Rectal hemorrhage    • Type 2 diabetes mellitus (CMS/HCC)        Allergies   Allergen Reactions   • Crestor [Rosuvastatin Calcium] Anaphylaxis     Hurts liver   • Lipitor [Atorvastatin] Other (See Comments)     Can mess with kidneys    • Lortab [Hydrocodone-Acetaminophen] Hallucinations   • Adhesive Tape Other (See Comments)     Only paper tape takes off her skin    • Hydrocodone-Acetaminophen Hallucinations       Past Surgical History:   Procedure Laterality Date   • AORTIC VALVE REPAIR/REPLACEMENT  1999   • CARDIAC ELECTROPHYSIOLOGY PROCEDURE N/A 3/20/2017    Procedure: PPM generator change - dual;  Surgeon: Bereket Gates MD;  Location: [facility] INVASIVE LOCATION;  Service:    • CARDIAC PACEMAKER PLACEMENT  1999   • CATARACT EXTRACTION W/ INTRAOCULAR LENS IMPLANT Left  2/1/2019    Procedure: REMOVE CATARACT AND IMPLANT INTRAOCULAR LENS I;  Surgeon: Jose L Clay MD;  Location: [facility] OR;  Service: Ophthalmology   • CATARACT EXTRACTION W/ INTRAOCULAR LENS IMPLANT Right 2/8/2019    Procedure: REMOVE CATARACT AND IMPLANT  INTRAOCULAR LENS;  Surgeon: Jose L Clay MD;  Location: [facility] OR;  Service: Ophthalmology   • COLONOSCOPY N/A 6/19/2019    Procedure: COLONOSCOPY WITH CONTROL OF BLEED;  Surgeon: Alfredo Guerrero MD;  Location: [facility] ENDOSCOPY;  Service: Gastroenterology   • COLONOSCOPY N/A 6/24/2019    Procedure: COLONOSCOPY;  Surgeon: Alfredo Guerrero MD;  Location: [facility] ENDOSCOPY;  Service: Gastroenterology   • ECHO - CONVERTED  11/12/2013    There is mild to moderate left atrial enlargement EF 50-55%   • ENDOSCOPY N/A 6/19/2019    Procedure: ESOPHAGOGASTRODUODENOSCOPY WITH CONTROL OF BLEED;  Surgeon: Alfredo Guerrero MD;  Location: [facility] ENDOSCOPY;  Service: Gastroenterology   • FOOT SURGERY  1990   • OTHER SURGICAL HISTORY  10/26/2015    PARING CORN/CALLUS    • PACEMAKER REPLACEMENT N/A 3/21/2017    Procedure: revision pacemaker pocket, evacuation hematoma, control of bleeding;  Surgeon: Polo Feliz MD;  Location: [facility] OR;  Service:    • TRANSESOPHAGEAL ECHOCARDIOGRAM (MITZY)  05/01/2014    With color flow-Mild left atrial enlargement with mild concentric LV hypertrophy with top normal aortic root size. EF 45-50%. Mild mitral regurgitation and mild aortic insufficiency and trivial amount of tricuspid regurgation   • TUBAL ABDOMINAL LIGATION         Family History   Problem Relation Age of Onset   • Heart disease Mother    • Hyperlipidemia Mother    • Hypertension Mother    • Cancer Other        Social History     Socioeconomic History   • Marital status:      Spouse name: Not on file   • Number of children: Not on file   • Years of education: Not on file   • Highest education level: Not on file   Tobacco Use   • Smoking status:  Former Smoker     Last attempt to quit: 3/21/1999     Years since quittin.3   • Smokeless tobacco: Never Used   Substance and Sexual Activity   • Alcohol use: No     Comment: quit in 2016   • Drug use: No   • Sexual activity: No     Birth control/protection: Post-menopausal       /97 (BP Location: Left leg, Patient Position: Sitting) Comment: pt refused to have BP taken on arms   Pulse 86   Temp 98.4 °F (36.9 °C) (Oral)   Resp 22   LMP  (LMP Unknown)   SpO2 96%     Objective   Physical Exam   Constitutional: She appears well-developed and well-nourished.   HENT:   Head: Normocephalic and atraumatic.   Mouth/Throat: Oropharynx is clear and moist.   Neck: Normal range of motion. Neck supple.   Cardiovascular: Normal rate and normal pulses. An irregular rhythm present.   Pulmonary/Chest: Effort normal and breath sounds normal.   Abdominal: Soft. Bowel sounds are normal.   Musculoskeletal: Normal range of motion.        Right lower leg: Normal.        Left lower leg: Normal.   Neurological: She is alert.   Skin: Skin is warm. Capillary refill takes less than 2 seconds.   Psychiatric: She has a normal mood and affect.   Nursing note and vitals reviewed.      Procedures           ED Course      Labs Reviewed   PROTIME-INR - Abnormal; Notable for the following components:       Result Value    Protime 38.5 (*)     INR 3.97 (*)     All other components within normal limits    Narrative:     Therapeutic range for most indications is 2.0-3.0 INR,  or 2.5-3.5 for mechanical heart valves.   COMPREHENSIVE METABOLIC PANEL - Abnormal; Notable for the following components:    Glucose 128 (*)     BUN 79 (*)     Creatinine 2.22 (*)     CO2 30.0 (*)     eGFR Non African Amer 22 (*)     BUN/Creatinine Ratio 35.6 (*)     All other components within normal limits    Narrative:     GFR Normal >60  Chronic Kidney Disease <60  Kidney Failure <15   BNP (IN-HOUSE) - Abnormal; Notable for the following components:    proBNP  8,913.0 (*)     All other components within normal limits    Narrative:     Among patients with dyspnea, NT-proBNP is highly sensitive for the detection of acute congestive heart failure. In addition NT-proBNP of <300 pg/ml effectively rules out acute congestive heart failure with 99% negative predictive value.   CBC WITH AUTO DIFFERENTIAL - Abnormal; Notable for the following components:    RBC 2.91 (*)     Hemoglobin 8.9 (*)     Hematocrit 28.9 (*)     MCV 99.3 (*)     MCHC 30.8 (*)     RDW 21.3 (*)     RDW-SD 73.9 (*)     Neutrophil % 83.7 (*)     Lymphocyte % 3.4 (*)     Eosinophil % 6.7 (*)     Immature Grans % 0.7 (*)     Lymphocytes, Absolute 0.26 (*)     Eosinophils, Absolute 0.51 (*)     All other components within normal limits   BLOOD GAS, ARTERIAL - Abnormal; Notable for the following components:    pO2, Arterial 60.9 (*)     HCO3, Arterial 29.8 (*)     Base Excess, Arterial 4.9 (*)     O2 Saturation, Arterial 91.8 (*)     All other components within normal limits   TROPONIN (IN-HOUSE) - Normal    Narrative:     Troponin T Reference Range:  <= 0.03 ng/mL-   Negative for AMI  >0.03 ng/mL-     Abnormal for myocardial necrosis.  Clinicians would have to utilize clinical acumen, EKG, Troponin and serial changes to determine if it is an Acute Myocardial Infarction or myocardial injury due to an underlying chronic condition.    BLOOD GAS, ARTERIAL   RAINBOW DRAW    Narrative:     The following orders were created for panel order [place] Draw.  Procedure                               Abnormality         Status                     ---------                               -----------         ------                     Light Blue Top[[id number]]                                   Final result               Green Top (Gel)[[id number]]                                  Final result               Lavender Top[[id number]]                                     Final result               Gold Top - SST[[id number]]                                    Final result                 Please view results for these tests on the individual orders.   CBC AND DIFFERENTIAL    Narrative:     The following orders were created for panel order CBC & Differential.  Procedure                               Abnormality         Status                     ---------                               -----------         ------                     CBC Auto Differential[[id number]]        Abnormal            Final result                 Please view results for these tests on the individual orders.   LIGHT BLUE TOP   GREEN TOP   LAVENDER TOP   GOLD TOP - SST       XR Chest 2 View   Final Result   CONCLUSION:   Pacemaker remains in place.   Sternotomy with prosthetic aortic valve.   Cardiomegaly, pulmonary vascular congestion and minimal   interstitial edema with perhaps very small bilateral pleural   effusions.   Chronic obstructive pulmonary disease.      [id number]      Electronically signed by:  William Grant MD  7/13/2019 1:16 PM CDT   Workstation: [id number]      Result was discussed with patient.  And also the need for admission.  Dr. heard family medicine resident was called who accepted patient.              MDM      Final diagnoses:   Acute on chronic congestive heart failure, unspecified heart failure type (CMS/HCC)   COPD exacerbation (CMS/[facility])            Gilles Griffin MD  07/13/19 1403       Gilles Griffin MD  07/13/19 1402

## 2019-07-13 NOTE — PLAN OF CARE
Problem: Fall Risk (Adult)  Goal: Identify Related Risk Factors and Signs and Symptoms  Outcome: Ongoing (interventions implemented as appropriate)    Goal: Absence of Fall  Outcome: Ongoing (interventions implemented as appropriate)      Problem: Cardiac: Heart Failure (Adult)  Goal: Signs and Symptoms of Listed Potential Problems Will be Absent, Minimized or Managed (Cardiac: Heart Failure)  Outcome: Ongoing (interventions implemented as appropriate)      Problem: Cardiac: ACS (Acute Coronary Syndrome) (Adult)  Goal: Signs and Symptoms of Listed Potential Problems Will be Absent, Minimized or Managed (Cardiac: ACS)  Outcome: Ongoing (interventions implemented as appropriate)      Problem: Patient Care Overview  Goal: Plan of Care Review  Outcome: Outcome(s) achieved Date Met: 07/13/19    Goal: Individualization and Mutuality  Outcome: Ongoing (interventions implemented as appropriate)      Problem: Skin Injury Risk (Adult)  Goal: Identify Related Risk Factors and Signs and Symptoms  Outcome: Ongoing (interventions implemented as appropriate)    Goal: Skin Health and Integrity  Outcome: Ongoing (interventions implemented as appropriate)

## 2019-07-14 LAB
ANION GAP SERPL CALCULATED.3IONS-SCNC: 12 MMOL/L (ref 5–15)
BASOPHILS # BLD AUTO: 0 10*3/MM3 (ref 0–0.2)
BASOPHILS NFR BLD AUTO: 0 % (ref 0–1.5)
BUN BLD-MCNC: 82 MG/DL (ref 8–23)
BUN/CREAT SERPL: 34.6 (ref 7–25)
CALCIUM SPEC-SCNC: 8.7 MG/DL (ref 8.6–10.5)
CHLORIDE SERPL-SCNC: 99 MMOL/L (ref 98–107)
CO2 SERPL-SCNC: 30 MMOL/L (ref 22–29)
CREAT BLD-MCNC: 2.37 MG/DL (ref 0.57–1)
DEPRECATED RDW RBC AUTO: 73.7 FL (ref 37–54)
EOSINOPHIL # BLD AUTO: 0 10*3/MM3 (ref 0–0.4)
EOSINOPHIL NFR BLD AUTO: 0 % (ref 0.3–6.2)
ERYTHROCYTE [DISTWIDTH] IN BLOOD BY AUTOMATED COUNT: 21 % (ref 12.3–15.4)
GFR SERPL CREATININE-BSD FRML MDRD: 20 ML/MIN/1.73
GLUCOSE BLD-MCNC: 158 MG/DL (ref 65–99)
GLUCOSE BLDC GLUCOMTR-MCNC: 120 MG/DL (ref 70–130)
GLUCOSE BLDC GLUCOMTR-MCNC: 150 MG/DL (ref 70–130)
GLUCOSE BLDC GLUCOMTR-MCNC: 220 MG/DL (ref 70–130)
GLUCOSE BLDC GLUCOMTR-MCNC: 273 MG/DL (ref 70–130)
HCT VFR BLD AUTO: 27.7 % (ref 34–46.6)
HGB BLD-MCNC: 8.6 G/DL (ref 12–15.9)
IMM GRANULOCYTES # BLD AUTO: 0.03 10*3/MM3 (ref 0–0.05)
IMM GRANULOCYTES NFR BLD AUTO: 0.6 % (ref 0–0.5)
INR PPP: 4.57 (ref 0.8–1.2)
LYMPHOCYTES # BLD AUTO: 0.18 10*3/MM3 (ref 0.7–3.1)
LYMPHOCYTES NFR BLD AUTO: 3.4 % (ref 19.6–45.3)
MCH RBC QN AUTO: 30.8 PG (ref 26.6–33)
MCHC RBC AUTO-ENTMCNC: 31 G/DL (ref 31.5–35.7)
MCV RBC AUTO: 99.3 FL (ref 79–97)
MONOCYTES # BLD AUTO: 0.17 10*3/MM3 (ref 0.1–0.9)
MONOCYTES NFR BLD AUTO: 3.2 % (ref 5–12)
NEUTROPHILS # BLD AUTO: 4.91 10*3/MM3 (ref 1.7–7)
NEUTROPHILS NFR BLD AUTO: 92.8 % (ref 42.7–76)
NRBC BLD AUTO-RTO: 0 /100 WBC (ref 0–0.2)
PLATELET # BLD AUTO: 292 10*3/MM3 (ref 140–450)
PMV BLD AUTO: 12.1 FL (ref 6–12)
POTASSIUM BLD-SCNC: 3.9 MMOL/L (ref 3.5–5.2)
PROTHROMBIN TIME: 43 SECONDS (ref 11.1–15.3)
RBC # BLD AUTO: 2.79 10*6/MM3 (ref 3.77–5.28)
SODIUM BLD-SCNC: 141 MMOL/L (ref 136–145)
WBC NRBC COR # BLD: 5.29 10*3/MM3 (ref 3.4–10.8)

## 2019-07-14 PROCEDURE — 94760 N-INVAS EAR/PLS OXIMETRY 1: CPT

## 2019-07-14 PROCEDURE — 82962 GLUCOSE BLOOD TEST: CPT

## 2019-07-14 PROCEDURE — G0378 HOSPITAL OBSERVATION PER HR: HCPCS

## 2019-07-14 PROCEDURE — 25010000002 FUROSEMIDE PER 20 MG: Performed by: STUDENT IN AN ORGANIZED HEALTH CARE EDUCATION/TRAINING PROGRAM

## 2019-07-14 PROCEDURE — 94799 UNLISTED PULMONARY SVC/PX: CPT

## 2019-07-14 PROCEDURE — 63710000001 INSULIN DETEMIR PER 5 UNITS: Performed by: STUDENT IN AN ORGANIZED HEALTH CARE EDUCATION/TRAINING PROGRAM

## 2019-07-14 PROCEDURE — 85610 PROTHROMBIN TIME: CPT | Performed by: STUDENT IN AN ORGANIZED HEALTH CARE EDUCATION/TRAINING PROGRAM

## 2019-07-14 PROCEDURE — 85025 COMPLETE CBC W/AUTO DIFF WBC: CPT | Performed by: STUDENT IN AN ORGANIZED HEALTH CARE EDUCATION/TRAINING PROGRAM

## 2019-07-14 PROCEDURE — 97166 OT EVAL MOD COMPLEX 45 MIN: CPT

## 2019-07-14 PROCEDURE — 99225 PR SBSQ OBSERVATION CARE/DAY 25 MINUTES: CPT | Performed by: STUDENT IN AN ORGANIZED HEALTH CARE EDUCATION/TRAINING PROGRAM

## 2019-07-14 PROCEDURE — 63710000001 INSULIN ASPART PER 5 UNITS: Performed by: STUDENT IN AN ORGANIZED HEALTH CARE EDUCATION/TRAINING PROGRAM

## 2019-07-14 PROCEDURE — 80048 BASIC METABOLIC PNL TOTAL CA: CPT | Performed by: STUDENT IN AN ORGANIZED HEALTH CARE EDUCATION/TRAINING PROGRAM

## 2019-07-14 PROCEDURE — 97162 PT EVAL MOD COMPLEX 30 MIN: CPT

## 2019-07-14 RX ORDER — NYSTATIN 100000 [USP'U]/G
POWDER TOPICAL EVERY 12 HOURS SCHEDULED
Status: DISCONTINUED | OUTPATIENT
Start: 2019-07-14 | End: 2019-07-22 | Stop reason: HOSPADM

## 2019-07-14 RX ORDER — FUROSEMIDE 40 MG/1
40 TABLET ORAL DAILY
Status: DISCONTINUED | OUTPATIENT
Start: 2019-07-15 | End: 2019-07-15

## 2019-07-14 RX ORDER — WARFARIN SODIUM 4 MG/1
4 TABLET ORAL NIGHTLY
COMMUNITY
End: 2019-07-30

## 2019-07-14 RX ADMIN — CITALOPRAM HYDROBROMIDE 20 MG: 20 TABLET ORAL at 09:04

## 2019-07-14 RX ADMIN — NYSTATIN: 100000 POWDER TOPICAL at 22:56

## 2019-07-14 RX ADMIN — IPRATROPIUM BROMIDE AND ALBUTEROL SULFATE 3 ML: 2.5; .5 SOLUTION RESPIRATORY (INHALATION) at 15:29

## 2019-07-14 RX ADMIN — FERROUS SULFATE TAB EC 324 MG (65 MG FE EQUIVALENT) 324 MG: 324 (65 FE) TABLET DELAYED RESPONSE at 09:05

## 2019-07-14 RX ADMIN — GUAIFENESIN 600 MG: 600 TABLET, EXTENDED RELEASE ORAL at 21:20

## 2019-07-14 RX ADMIN — FAMOTIDINE 20 MG: 20 TABLET ORAL at 09:04

## 2019-07-14 RX ADMIN — INSULIN ASPART 3 UNITS: 100 INJECTION, SOLUTION INTRAVENOUS; SUBCUTANEOUS at 11:19

## 2019-07-14 RX ADMIN — ACETAMINOPHEN 650 MG: 325 TABLET, FILM COATED ORAL at 21:20

## 2019-07-14 RX ADMIN — VITAMIN D, TAB 1000IU (100/BT) 2000 UNITS: 25 TAB at 09:04

## 2019-07-14 RX ADMIN — NYSTATIN: 100000 POWDER TOPICAL at 11:19

## 2019-07-14 RX ADMIN — GUAIFENESIN 600 MG: 600 TABLET, EXTENDED RELEASE ORAL at 09:05

## 2019-07-14 RX ADMIN — INSULIN ASPART 4 UNITS: 100 INJECTION, SOLUTION INTRAVENOUS; SUBCUTANEOUS at 21:21

## 2019-07-14 RX ADMIN — PRENATAL VIT W/ FE FUMARATE-FA TAB 27-0.8 MG 1 TABLET: 27-0.8 TAB at 09:05

## 2019-07-14 RX ADMIN — FUROSEMIDE 40 MG: 10 INJECTION, SOLUTION INTRAMUSCULAR; INTRAVENOUS at 09:05

## 2019-07-14 RX ADMIN — SODIUM CHLORIDE, PRESERVATIVE FREE 3 ML: 5 INJECTION INTRAVENOUS at 09:06

## 2019-07-14 RX ADMIN — INSULIN DETEMIR 25 UNITS: 100 INJECTION, SOLUTION SUBCUTANEOUS at 21:21

## 2019-07-14 RX ADMIN — INSULIN ASPART 2 UNITS: 100 INJECTION, SOLUTION INTRAVENOUS; SUBCUTANEOUS at 09:06

## 2019-07-14 RX ADMIN — ROPINIROLE HYDROCHLORIDE 0.25 MG: 0.25 TABLET, FILM COATED ORAL at 21:20

## 2019-07-14 RX ADMIN — TRAZODONE HYDROCHLORIDE 300 MG: 150 TABLET ORAL at 21:20

## 2019-07-14 RX ADMIN — DILTIAZEM HYDROCHLORIDE 240 MG: 240 CAPSULE, COATED, EXTENDED RELEASE ORAL at 09:04

## 2019-07-14 RX ADMIN — SODIUM CHLORIDE, PRESERVATIVE FREE 3 ML: 5 INJECTION INTRAVENOUS at 21:20

## 2019-07-14 RX ADMIN — OXYCODONE HYDROCHLORIDE AND ACETAMINOPHEN 500 MG: 500 TABLET ORAL at 09:05

## 2019-07-14 RX ADMIN — IPRATROPIUM BROMIDE AND ALBUTEROL SULFATE 3 ML: 2.5; .5 SOLUTION RESPIRATORY (INHALATION) at 22:41

## 2019-07-14 RX ADMIN — IPRATROPIUM BROMIDE AND ALBUTEROL SULFATE 3 ML: 2.5; .5 SOLUTION RESPIRATORY (INHALATION) at 07:29

## 2019-07-14 RX ADMIN — ASPIRIN 81 MG 81 MG: 81 TABLET ORAL at 09:05

## 2019-07-14 NOTE — PROGRESS NOTES
"Anticoagulation by Pharmacy - Warfarin    Brendon Skelton is a 73 y.o.female being continued on warfarin for atrial fibrillation     Home regimen: 4 mg nightly  INR Goal: 2-3      Last INR:   Lab Results   Component Value Date    INR 4.57 (H) 07/14/2019       Objective:  [Ht: 165.1 cm (65\"); Wt: 118 kg (260 lb 4.8 oz)]  Lab Results   Component Value Date    INR 4.57 (H) 07/14/2019    INR 3.97 (H) 07/13/2019    INR 2.26 (H) 07/05/2019    PROTIME 43.0 (H) 07/14/2019    PROTIME 38.5 (H) 07/13/2019    PROTIME 24.7 (H) 07/05/2019     Lab Results   Component Value Date    HGB 8.6 (L) 07/14/2019    HGB 8.9 (L) 07/13/2019    HGB 9.4 (L) 07/05/2019    HCT 27.7 (L) 07/14/2019    HCT 28.9 (L) 07/13/2019    HCT 30.2 (L) 07/05/2019     07/14/2019     07/13/2019     07/05/2019           Assessment  Interacting medications: aspirin, citalopram  INR is 4.57, supratherapeutic, trending upwards.  Warfarin currently on hold.  H/H trending down slightly.    Plan:  1.  HOLD warfarin tonight  2.  Draw a PT/INR in AM  3.  Pharmacy will continue to follow    Quinn Mercer [facility]  07/14/19 12:48 PM     "

## 2019-07-14 NOTE — THERAPY EVALUATION
Acute Care - Physical Therapy Initial Evaluation  [facility]     Patient Name: Brendon Skelton  : 1945  MRN: [id number]  Today's Date: 2019   Onset of Illness/Injury or Date of Surgery: 19  Date of Referral to PT: 19  Referring Physician: BRITT Chawla MD.      Admit Date: 2019    Visit Dx:     ICD-10-CM ICD-9-CM   1. Acute on chronic congestive heart failure, unspecified heart failure type (CMS/HCC) I50.9 428.0   2. COPD exacerbation (CMS/[facility]) J44.1 491.21   3. Impaired physical mobility Z74.09 781.99     Patient Active Problem List   Diagnosis   • Long term current use of anticoagulant therapy   • Personal history of heart valve replacement   • Atrial fibrillation [I48.91]   • Emphysema of lung (CMS/HCC)   • On anticoagulant therapy   • Hyperlipidemia   • Diastolic heart failure (CMS/HCC)   • Depressive disorder   • COPD (chronic obstructive pulmonary disease) (CMS/HCC)   • Type 2 diabetes mellitus with stage 4 chronic kidney disease, with long-term current use of insulin (CMS/HCC)   • Myopia   • Astigmatism   • Bleeding from open wound of chest wall   • Follow-up surgery care   • Encounter for screening for malignant neoplasm of colon   • Positive colorectal cancer screening using DNA-based stool test   • Nodule of left lung   • Chronic hypoxemic respiratory failure (CMS/HCC)   • Physical deconditioning   • Heart failure with preserved left ventricular function (HFpEF) (CMS/HCC)   • Essential hypertension   • Coronary artery disease involving native coronary artery without angina pectoris   • Hx of CABG   • SSS (sick sinus syndrome) (CMS/HCC)   • Pacemaker   • Stage 4 chronic kidney disease (CMS/HCC)   • Personal history of tobacco use, presenting hazards to health   • Gastrointestinal hemorrhage   • Morbid obesity (CMS/HCC)   • Gastritis   • Colon polyp   • Coumadin toxicity   • Acute on chronic diastolic congestive heart failure (CMS/HCC)   • Anemia     Past Medical History:    Diagnosis Date   • Acute bronchitis    • Anxiety    • Atrial fibrillation (CMS/[facility])    • C. difficile colitis    • Callosity     under metatarsal head      • Cardiac pacemaker in situ    • CHF (congestive heart failure) (CMS/[facility])    • Chronic obstructive lung disease (CMS/[facility])    • Corns and callus    • Depressive disorder    • Diabetes mellitus (CMS/[facility])    • Diastolic heart failure (CMS/[facility])    • Essential hypertension    • Foot pain    • Hyperlipidemia    • Long term current use of anticoagulant    • On anticoagulant therapy    • Pulmonary emphysema (CMS/[facility])    • Rectal hemorrhage    • Type 2 diabetes mellitus (CMS/[facility])      Past Surgical History:   Procedure Laterality Date   • AORTIC VALVE REPAIR/REPLACEMENT  1999   • CARDIAC ELECTROPHYSIOLOGY PROCEDURE N/A 3/20/2017    Procedure: PPM generator change - dual;  Surgeon: Bereket Gates MD;  Location: [facility] CATH INVASIVE LOCATION;  Service:    • CARDIAC PACEMAKER PLACEMENT  1999   • CATARACT EXTRACTION W/ INTRAOCULAR LENS IMPLANT Left 2/1/2019    Procedure: REMOVE CATARACT AND IMPLANT INTRAOCULAR LENS I;  Surgeon: Jose L Clay MD;  Location: [facility] OR;  Service: Ophthalmology   • CATARACT EXTRACTION W/ INTRAOCULAR LENS IMPLANT Right 2/8/2019    Procedure: REMOVE CATARACT AND IMPLANT  INTRAOCULAR LENS;  Surgeon: Jose L Clay MD;  Location: [facility] OR;  Service: Ophthalmology   • COLONOSCOPY N/A 6/19/2019    Procedure: COLONOSCOPY WITH CONTROL OF BLEED;  Surgeon: Alfredo Guerrero MD;  Location: [facility] ENDOSCOPY;  Service: Gastroenterology   • COLONOSCOPY N/A 6/24/2019    Procedure: COLONOSCOPY;  Surgeon: Alfredo Guerrero MD;  Location: [facility] ENDOSCOPY;  Service: Gastroenterology   • ECHO - CONVERTED  11/12/2013    There is mild to moderate left atrial enlargement EF 50-55%   • ENDOSCOPY N/A 6/19/2019    Procedure: ESOPHAGOGASTRODUODENOSCOPY WITH CONTROL OF BLEED;  Surgeon: Alfredo Guerrero MD;  Location: [facility] ENDOSCOPY;  Service:  Gastroenterology   • FOOT SURGERY  1990   • OTHER SURGICAL HISTORY  10/26/2015    PARING CORN/CALLUS    • PACEMAKER REPLACEMENT N/A 3/21/2017    Procedure: revision pacemaker pocket, evacuation hematoma, control of bleeding;  Surgeon: Polo Feliz MD;  Location: [facility];  Service:    • TRANSESOPHAGEAL ECHOCARDIOGRAM (MITZY)  05/01/2014    With color flow-Mild left atrial enlargement with mild concentric LV hypertrophy with top normal aortic root size. EF 45-50%. Mild mitral regurgitation and mild aortic insufficiency and trivial amount of tricuspid regurgation   • TUBAL ABDOMINAL LIGATION          PT ASSESSMENT (last 12 hours)      Physical Therapy Evaluation     Row Name 07/14/19 0910          PT Evaluation Time/Intention    Subjective Information  complains of;weakness;fatigue  -CZ     Document Type  evaluation  -CZ     Total Evaluation Minutes, Physical Therapy  62  -CZ     Patient Effort  good  -CZ     Symptoms Noted During/After Treatment  shortness of breath  -CZ     Comment  SpO2 drops to 86% on 3.5 LPM, returns to >90% with seated rest, approximately 90 sec.   -CZ     Row Name 07/14/19 0910          General Information    Patient Profile Reviewed?  yes  -CZ     Onset of Illness/Injury or Date of Surgery  07/13/19  -CZ     Referring Physician  BRITT Chawla MD.  -CZ     Patient Observations  alert;cooperative;agree to therapy  -CZ     Patient/Family Observations  No family present.   -CZ     General Observations of Patient  Supine in bed, O2, telemetry,  no apparent distress.   -CZ     Prior Level of Function  independent:;all household mobility;community mobility  -CZ     Equipment Currently Used at Home  rollator;wheelchair, motorized  -CZ     Pertinent History of Current Functional Problem  6/18/19-7/5/19: hospitalized with GI bleed, discharged to [facility]. 7/13/19: to ED from SNF with dyspnea and increased LE edema x 3 days: CHF.   -CZ     Existing Precautions/Restrictions  fall;oxygen  "therapy device and L/min  -CZ     Equipment Issued to Patient  -- Refuses gait belt.   -CZ     Benefits Reviewed  patient:;improve function;increase independence;increase strength  -CZ     Row Name 07/14/19 0910          Relationship/Environment    Lives With  alone  -CZ     Concerns About Impact on Relationships  Son and daughter-in-law live in duplex next door.  -CZ     Row Name 07/14/19 0910          Resource/Environmental Concerns    Current Living Arrangements  home/apartment/condo duplex  -CZ     Row Name 07/14/19 0910          Home Main Entrance    Number of Stairs, Main Entrance  five  -CZ     Stairs Comment, Main Entrance  rear entrance has ramp to son's house, then \"bridge\" to patient's house; uses e-w/c on ramp.   -CZ     Row Name 07/14/19 0910          Cognitive Assessment/Intervention- PT/OT    Orientation Status (Cognition)  oriented x 4  -CZ     Follows Commands (Cognition)  WFL  -CZ     Row Name 07/14/19 0910          Bed Mobility Assessment/Treatment    Bed Mobility Assessment/Treatment  supine-sit;sit-supine  -CZ     Supine-Sit [place] (Bed Mobility)  conditional independence  -CZ     Sit-Supine [place] (Bed Mobility)  conditional independence  -CZ     Row Name 07/14/19 0910          Transfer Assessment/Treatment    Transfer Assessment/Treatment  sit-stand transfer;stand-sit transfer  -CZ     Sit-Stand [place] (Transfers)  supervision  -CZ     Stand-Sit [place] (Transfers)  supervision  -CZ     Row Name 07/14/19 0910          Gait/Stairs Assessment/Training    [place] Level (Gait)  supervision  -CZ     Assistive Device (Gait)  other (see comments) Refuses FWW.  -CZ     Distance in Feet (Gait)  15' x 2  -CZ     Comment (Gait/Stairs)  Refuses gait belt, refuses FWW; reaches for UE support, no falls. Distance limited by dyspnea.   -CZ     Row Name 07/14/19 0910          General ROM    GENERAL ROM COMMENTS  BLEs WFL  -CZ     Row Name 07/14/19 0910          MMT (Manual Muscle " Testing)    General MMT Comments  BLEs: 3+/5 grossly.  -CZ     Row Name 07/14/19 0910          Sensory Assessment/Intervention    Sensory General Assessment  no sensation deficits identified  -CZ     Row Name 07/14/19 0910          Vision Assessment/Intervention    Visual Impairment/Limitations  corrective lenses for reading  -CZ     Row Name 07/14/19 0910          Physical Therapy Clinical Impression    Date of Referral to PT  07/13/19  -CZ     PT Diagnosis (PT Clinical Impression)  impaired physical mobility  -CZ     Prognosis (PT Clinical Impression)  good  -CZ     Criteria for Skilled Interventions Met (PT Clinical Impression)  yes;treatment indicated  -CZ     Pathology/Pathophysiology Noted (Describe Specifically for Each System)  musculoskeletal;pulmonary  -CZ     Impairments Found (describe specific impairments)  aerobic capacity/endurance;gait, locomotion, and balance;ventilation and respiration/gas exchange  -CZ     Rehab Potential (PT Clinical Summary)  good, to achieve stated therapy goals  -CZ     Predicted Duration of Therapy (PT)  2-4 days plus HHPT.  -CZ     Row Name 07/14/19 0910          Vital Signs    Pre Systolic BP Rehab  123  -CZ     Pre Treatment Diastolic BP  56  -CZ     Post Systolic BP Rehab  106  -CZ     Post Treatment Diastolic BP  66  -CZ     Pretreatment Heart Rate (beats/min)  98  -CZ     Intratreatment Heart Rate (beats/min)  125  -CZ     Posttreatment Heart Rate (beats/min)  76  -CZ     Pre SpO2 (%)  96  -CZ     O2 Delivery Pre Treatment  supplemental O2 3.5 LPM  -CZ     Intra SpO2 (%)  86  -CZ     O2 Delivery Intra Treatment  nasal cannula  -CZ     Post SpO2 (%)  91  -CZ     O2 Delivery Post Treatment  nasal cannula  -CZ     Pre Patient Position  Supine  -CZ     Intra Patient Position  Sitting  -CZ     Post Patient Position  Supine  -CZ     Row Name 07/14/19 0910          Physical Therapy Goals    Transfer Goal Selection (PT)  transfer, PT goal 1  -CZ     Gait Training Goal Selection  (PT)  gait training, PT goal 1;gait training, PT goal (free text)  -CZ     Row Name 07/14/19 0910          Transfer Goal 1 (PT)    Activity/Assistive Device (Transfer Goal 1, PT)  sit-to-stand/stand-to-sit;bed-to-chair/chair-to-bed  -CZ     [place] Level/Cues Needed (Transfer Goal 1, PT)  conditional independence  -CZ     Time Frame (Transfer Goal 1, PT)  long term goal (LTG);by discharge  -CZ     Barriers (Transfers Goal 1, PT)  Maintain SpO2 > 88%  -CZ     Progress/Outcome (Transfer Goal 1, PT)  goal not met  -CZ     Row Name 07/14/19 0910          Gait Training Goal 1 (PT)    Activity/Assistive Device (Gait Training Goal 1, PT)  gait (walking locomotion)  -CZ     [place] Level (Gait Training Goal 1, PT)  independent  -CZ     Distance (Gait Goal 1, PT)  50'x2  -CZ     Time Frame (Gait Training Goal 1, PT)  long term goal (LTG);by discharge  -CZ     Barriers (Gait Training Goal 1, PT)  Maintain SpO2 > 88%  -CZ     Progress/Outcome (Gait Training Goal 1, PT)  goal not met  -CZ     Row Name 07/14/19 0910          Gait Training Goal (PT)    Gait Training Goal (PT)  Tinetti fall risk, score: 27/28  -CZ     Time Frame (Gait Training Goal, PT)  long term goal (LTG);by discharge  -CZ     Barriers (Gait Training Goal, PT)  Decreased stepping response to perturbation.   -CZ     Progress/Outcome (Gait Training Goal, PT)  goal not met  -CZ     Row Name 07/14/19 0910          Positioning and Restraints    Pre-Treatment Position  in bed  -CZ     Post Treatment Position  bed  -CZ     In Bed  supine;call light within reach;encouraged to call for assist;side rails up x2 Refuses bed alarm.   -CZ     Row Name 07/14/19 0910          Living Environment    Home Accessibility  stairs to enter home  -CZ       User Key  (r) = Recorded By, (t) = Taken By, (c) = Cosigned By    Initials Name Provider Type    Titi Barragan, PT Physical Therapist        Physical Therapy Education     Title: PT OT SLP Therapies (In Progress)      Topic: Physical Therapy (In Progress)     Point: Mobility training (Done)     Learning Progress Summary           Patient Acceptance, E, VU by ÓSCAR at 7/14/2019 10:27 AM    Comment:  Thelma assessed: 25/28 or low fall risk.  Patient demonstrates decreased stepping response to perturbation and reaches for UE support.  She would benefit from use of FWW but refuses.  Also refuses gait belt and bed alarm.                               User Key     Initials Effective Dates Name Provider Type Discipline     04/03/18 -  Titi Portillo, PT Physical Therapist PT              PT Recommendation and Plan  Anticipated Discharge Disposition (PT): home with home health  Planned Therapy Interventions (PT Eval): balance training, bed mobility training, gait training, patient/family education, strengthening, stretching, transfer training  Therapy Frequency (PT Clinical Impression): daily  Outcome Summary/Treatment Plan (PT)  Anticipated Discharge Disposition (PT): home with home health  Plan of Care Reviewed With: patient  Outcome Summary: Initial PT evaluation complete.  Patient is alert and cooperative with assessment.  Patient demonstrates (I) with bed mobility, requires SPV with transfers and gait. Patient ambulates 15'x2 without an AD, reaches for UE support, no falls. [place] assessed: 25/28 or low fall risk.  Patient demonstrates decreased stepping response to perturbation, but refuses gait belt, FWW or bed alarm.  Nurse aware. SpO2 86% on 3.5 LPM with activity, returns to 92% with seated rest break.  Patient would benefit from HHPT upon discharge home.  Continue skilled PT.   Outcome Measures     Row Name 07/14/19 0910             How much help from another person do you currently need...    Turning from your back to your side while in flat bed without using bedrails?  4  -CZ      Moving from lying on back to sitting on the side of a flat bed without bedrails?  4  -CZ      Moving to and from a bed to a chair (including a  "wheelchair)?  3  -CZ      Standing up from a chair using your arms (e.g., wheelchair, bedside chair)?  3  -CZ      Climbing 3-5 steps with a railing?  3  -CZ      To walk in hospital room?  3  -CZ      AM-PAC 6 Clicks Score (PT)  20  -CZ         Tinetti Assessment    Tinetti Assessment  yes  -CZ      Sitting Balance  1  -CZ      Arises  2  -CZ      Attempts to Rise  2  -CZ      Immediate Standing Balance (first 5 sec)  2  -CZ      Standing Balance  2  -CZ      Sternal Nudge (feet close together)  1  -CZ      Eyes Closed (feet close together)  1  -CZ      Turning 360 Degrees- Steps  1  -CZ      Turning 360 Degrees- Steadiness  1  -CZ      Sitting Down  2  -CZ      Tinetti Balance Score  15  -CZ      Gait Initiation (immediate after told \"go\")  1  -CZ      Step Length- Right Swing  1  -CZ      Step Length- Left Swing  1  -CZ      Foot Clearance- Right Foot  1  -CZ      Foot Clearance- Left Foot  1  -CZ      Step Symmetry  1  -CZ      Step Continuity  1  -CZ      Path (excursion)  1  -CZ      Trunk  1  -CZ      Base of Support  1  -CZ      Gait Score  10  -CZ      Tinetti Total Score  25  -CZ      Tinetti Assistive Device  -- No AD.  -CZ      Tinetti Assessment Comments  Decreased stepping response to perturbation.   -CZ         Functional Assessment    Outcome Measure Options  AM-PAC 6 Clicks Basic Mobility (PT);Tinetti  -CZ        User Key  (r) = Recorded By, (t) = Taken By, (c) = Cosigned By    Initials Name Provider Type    CZ Titi Portillo, PT Physical Therapist         Time Calculation:   PT Charges     Row Name 07/14/19 1033             Time Calculation    Start Time  0910  -CZ      Stop Time  1012  -CZ      Time Calculation (min)  62 min  -CZ      PT Received On  07/14/19  -CZ      PT Goal Re-Cert Due Date  07/27/19  -CZ        User Key  (r) = Recorded By, (t) = Taken By, (c) = Cosigned By    Initials Name Provider Type    CZ Titi Portillo, PT Physical Therapist        Therapy Charges for Today     Code " Description Service Date Service Provider Modifiers Qty    [id number] HC PT EVAL MOD COMPLEXITY 4 7/14/2019 Titi Portillo, PT GP 1          PT G-Codes  Outcome Measure Options: AM-PAC 6 Clicks Basic Mobility (PT), Tinetti  AM-PAC 6 Clicks Score (PT): 20  Tinetti Total Score: 25      Titi Portillo, PT  7/14/2019

## 2019-07-14 NOTE — THERAPY EVALUATION
Acute Care - Occupational Therapy Initial Evaluation  [facility]     Patient Name: Brendon Skelton  : 1945  MRN: [id number]  Today's Date: 2019  Onset of Illness/Injury or Date of Surgery: 19  Date of Referral to OT: 19  Referring Physician: MONICA Romero MD    Admit Date: 2019       ICD-10-CM ICD-9-CM   1. Acute on chronic congestive heart failure, unspecified heart failure type (CMS/HCC) I50.9 428.0   2. COPD exacerbation (CMS/Formerly [facility]) J44.1 491.21   3. Impaired physical mobility Z74.09 781.99   4. Impaired mobility and activities of daily living Z74.09 799.89     Patient Active Problem List   Diagnosis   • Long term current use of anticoagulant therapy   • Personal history of heart valve replacement   • Atrial fibrillation [I48.91]   • Emphysema of lung (CMS/[facility])   • On anticoagulant therapy   • Hyperlipidemia   • Diastolic heart failure (CMS/[facility])   • Depressive disorder   • COPD (chronic obstructive pulmonary disease) (CMS/[facility])   • Type 2 diabetes mellitus with stage 4 chronic kidney disease, with long-term current use of insulin (CMS/HCC)   • Myopia   • Astigmatism   • Bleeding from open wound of chest wall   • Follow-up surgery care   • Encounter for screening for malignant neoplasm of colon   • Positive colorectal cancer screening using DNA-based stool test   • Nodule of left lung   • Chronic hypoxemic respiratory failure (CMS/HCC)   • Physical deconditioning   • Heart failure with preserved left ventricular function (HFpEF) (CMS/[facility])   • Essential hypertension   • Coronary artery disease involving native coronary artery without angina pectoris   • Hx of CABG   • SSS (sick sinus syndrome) (CMS/HCC)   • Pacemaker   • Stage 4 chronic kidney disease (CMS/HCC)   • Personal history of tobacco use, presenting hazards to health   • Gastrointestinal hemorrhage   • Morbid obesity (CMS/HCC)   • Gastritis   • Colon polyp   • Coumadin toxicity   • Acute on chronic diastolic congestive heart  failure (CMS/[facility])   • Anemia     Past Medical History:   Diagnosis Date   • Acute bronchitis    • Anxiety    • Atrial fibrillation (CMS/[facility])    • C. difficile colitis    • Callosity     under metatarsal head      • Cardiac pacemaker in situ    • CHF (congestive heart failure) (CMS/[facility])    • Chronic obstructive lung disease (CMS/HCC)    • Corns and callus    • Depressive disorder    • Diabetes mellitus (CMS/[facility])    • Diastolic heart failure (CMS/[facility])    • Essential hypertension    • Foot pain    • Hyperlipidemia    • Long term current use of anticoagulant    • On anticoagulant therapy    • Pulmonary emphysema (CMS/[facility])    • Rectal hemorrhage    • Type 2 diabetes mellitus (CMS/[facility])      Past Surgical History:   Procedure Laterality Date   • AORTIC VALVE REPAIR/REPLACEMENT  1999   • CARDIAC ELECTROPHYSIOLOGY PROCEDURE N/A 3/20/2017    Procedure: PPM generator change - dual;  Surgeon: Bereket Gates MD;  Location: [facility] CATH INVASIVE LOCATION;  Service:    • CARDIAC PACEMAKER PLACEMENT  1999   • CATARACT EXTRACTION W/ INTRAOCULAR LENS IMPLANT Left 2/1/2019    Procedure: REMOVE CATARACT AND IMPLANT INTRAOCULAR LENS I;  Surgeon: Jose L Clay MD;  Location: [facility] OR;  Service: Ophthalmology   • CATARACT EXTRACTION W/ INTRAOCULAR LENS IMPLANT Right 2/8/2019    Procedure: REMOVE CATARACT AND IMPLANT  INTRAOCULAR LENS;  Surgeon: Jose L Clay MD;  Location: [facility] OR;  Service: Ophthalmology   • COLONOSCOPY N/A 6/19/2019    Procedure: COLONOSCOPY WITH CONTROL OF BLEED;  Surgeon: Alfredo Guerrero MD;  Location: [facility] ENDOSCOPY;  Service: Gastroenterology   • COLONOSCOPY N/A 6/24/2019    Procedure: COLONOSCOPY;  Surgeon: Alfredo Guerrero MD;  Location: [facility] ENDOSCOPY;  Service: Gastroenterology   • ECHO - CONVERTED  11/12/2013    There is mild to moderate left atrial enlargement EF 50-55%   • ENDOSCOPY N/A 6/19/2019    Procedure: ESOPHAGOGASTRODUODENOSCOPY WITH CONTROL OF BLEED;  Surgeon: Yolanda  Alfredo MARIN MD;  Location: [facility] ENDOSCOPY;  Service: Gastroenterology   • FOOT SURGERY  1990   • OTHER SURGICAL HISTORY  10/26/2015    PARING CORN/CALLUS    • PACEMAKER REPLACEMENT N/A 3/21/2017    Procedure: revision pacemaker pocket, evacuation hematoma, control of bleeding;  Surgeon: Polo Feliz MD;  Location: [facility] OR;  Service:    • TRANSESOPHAGEAL ECHOCARDIOGRAM (MITZY)  05/01/2014    With color flow-Mild left atrial enlargement with mild concentric LV hypertrophy with top normal aortic root size. EF 45-50%. Mild mitral regurgitation and mild aortic insufficiency and trivial amount of tricuspid regurgation   • TUBAL ABDOMINAL LIGATION            OT ASSESSMENT FLOWSHEET (last 12 hours)      Occupational Therapy Evaluation     Row Name 07/14/19 1052                   OT Evaluation Time/Intention    Subjective Information  complains of;fatigue  -RB        Document Type  evaluation  -RB        Mode of Treatment  occupational therapy  -RB        Patient Effort  good  -RB           General Information    Patient Profile Reviewed?  yes  -RB        Onset of Illness/Injury or Date of Surgery  07/13/19  -RB        Referring Physician  MONICA Romero MD  -RB        Patient Observations  alert;cooperative;agree to therapy  -RB        General Observations of Patient  Supine in bed with O2, IV and tele.  -RB        Prior Level of Function  independent:;all household mobility;gait;transfer;ADL's;max assist:;home management;dependent:;driving  -RB        Equipment Currently Used at Home  rollator;wheelchair, motorized  -RB        Pertinent History of Current Functional Problem  Hosp from SNF with dyspnea, LE edema and CHF.  -RB        Existing Precautions/Restrictions  fall;oxygen therapy device and L/min  -RB        Equipment Issued to Patient  gait belt  -RB        Risks Reviewed  patient:;LOB  -RB        Benefits Reviewed  patient:;improve function;increase independence;increase strength  -RB            Relationship/Environment    Lives With  alone  -RB        Family Caregiver if Needed  child(leona), adult  -RB        Concerns About Impact on Relationships  Son and dtr in law in duplex nx door.  -RB           Resource/Environmental Concerns    Current Living Arrangements  home/apartment/condo  -RB           Home Main Entrance    Number of Stairs, Main Entrance  five  -RB        Stairs Comment, Main Entrance  Ramp in rear intrance.  -RB           Cognitive Assessment/Intervention- PT/OT    Orientation Status (Cognition)  oriented x 4  -RB        Follows Commands (Cognition)  WFL  -RB           Bed Mobility Assessment/Treatment    Bed Mobility Assessment/Treatment  supine-sit;sit-supine  -RB        Supine-Sit [place] (Bed Mobility)  conditional independence  -RB        Sit-Supine [place] (Bed Mobility)  conditional independence  -RB           Functional Mobility    Functional Mobility- Ind. Level  supervision required  -RB        Functional Mobility-Distance (Feet)  16  -RB           Transfer Assessment/Treatment    Transfer Assessment/Treatment  sit-stand transfer;stand-sit transfer  -RB           Sit-Stand Transfer    Sit-Stand [place] (Transfers)  supervision  -RB           Stand-Sit Transfer    Stand-Sit [place] (Transfers)  supervision  -RB           ADL Assessment/Intervention    BADL Assessment/Intervention  lower body dressing  -RB           Lower Body Dressing Assessment/Training    Lower Body Dressing [place] Level  minimum assist (75% patient effort)  -RB        Lower Body Dressing Position  edge of bed sitting  -RB           BADL Safety/Performance    Impairments, BADL Safety/Performance  coordination;endurance/activity tolerance;strength  -RB           General ROM    GENERAL ROM COMMENTS  B UE AROM was WNLs.  -RB           MMT (Manual Muscle Testing)    General MMT Comments  B shld flex was 4/5 and rest of B UE strength was 4 to 4+/5 grossly.  -RB           Sensory  Assessment/Intervention    Sensory General Assessment  no sensation deficits identified  -RB           Vision Assessment/Intervention    Visual Impairment/Limitations  corrective lenses for reading  -RB           Positioning and Restraints    Pre-Treatment Position  in bed  -RB        Post Treatment Position  bed  -RB        In Bed  supine;call light within reach;encouraged to call for assist  -RB           Plan of Care Review    Plan of Care Reviewed With  patient  -RB           Clinical Impression (OT)    Date of Referral to OT  07/13/19  -RB        OT Diagnosis  Impaired mobility and ADLs.  -RB        Functional Level at Time of Evaluation (OT Eval)  Impaired mobility and ADLs.  -RB        Criteria for Skilled Therapeutic Interventions Met (OT Eval)  yes;treatment indicated  -RB        Rehab Potential (OT Eval)  good, to achieve stated therapy goals  -RB        Therapy Frequency (OT Eval)  other (see comments) 5-7 days/wk.  -RB        Predicted Duration of Therapy Intervention (Therapy Eval)  Until D/C or goals met.  -RB        Care Plan Review (OT)  evaluation/treatment results reviewed;care plan/treatment goals reviewed;risks/benefits reviewed;patient/other agree to care plan  -RB        Anticipated Discharge Disposition (OT)  home with home health  -RB           Vital Signs    Pre Systolic BP Rehab  112  -RB        Pre Treatment Diastolic BP  71  -RB        Post Systolic BP Rehab  109  -RB        Post Treatment Diastolic BP  65  -RB        Pretreatment Heart Rate (beats/min)  93  -RB        Intratreatment Heart Rate (beats/min)  109  -RB        Posttreatment Heart Rate (beats/min)  95  -RB        Pre SpO2 (%)  95  -RB        O2 Delivery Pre Treatment  supplemental O2 3 L  -RB        Intra SpO2 (%)  86  -RB        O2 Delivery Intra Treatment  supplemental O2  -RB        Post SpO2 (%)  93  -RB        O2 Delivery Post Treatment  supplemental O2  -RB        Pre Patient Position  Supine  -RB        Intra Patient  Position  Standing  -RB        Post Patient Position  Supine  -RB           Planned OT Interventions    Planned Therapy Interventions (OT Eval)  activity tolerance training;adaptive equipment training;BADL retraining;functional balance retraining;occupation/activity based interventions;transfer/mobility retraining;strengthening exercise;ROM/therapeutic exercise;patient/caregiver education/training  -RB           OT Goals    Transfer Goal Selection (OT)  transfer, OT goal 1  -RB        Bathing Goal Selection (OT)  bathing, OT goal 1  -RB        Dressing Goal Selection (OT)  dressing, OT goal 1  -RB        Toileting Goal Selection (OT)  --  -RB        Activity Tolerance Goal Selection (OT)  activity tolerance, OT goal 1  -RB        Additional Documentation  Activity Tolerance Goal Selection (OT) (Row)  -RB           Transfer Goal 1 (OT)    Activity/Assistive Device (Transfer Goal 1, OT)  transfers, all  -RB        [place] Level/Cues Needed (Transfer Goal 1, OT)  conditional independence  -RB        Time Frame (Transfer Goal 1, OT)  long term goal (LTG)  -RB        Progress/Outcome (Transfer Goal 1, OT)  goal not met  -RB           Bathing Goal 1 (OT)    Activity/Assistive Device (Bathing Goal 1, OT)  bathing skills, all  -RB        [place] Level/Cues Needed (Bathing Goal 1, OT)  contact guard assist With 02 90% or above.  -RB        Time Frame (Bathing Goal 1, OT)  long term goal (LTG)  -RB        Progress/Outcomes (Bathing Goal 1, OT)  goal not met  -RB           Dressing Goal 1 (OT)    Activity/Assistive Device (Dressing Goal 1, OT)  dressing skills, all  -RB        [place]/Cues Needed (Dressing Goal 1, OT)  contact guard assist with 02 90% or above.  -RB        Time Frame (Dressing Goal 1, OT)  long term goal (LTG)  -RB        Progress/Outcome (Dressing Goal 1, OT)  goal not met  -RB           Toileting Goal 1 (OT)    Activity/Device (Toileting Goal 1, OT)  toileting skills, all  -RB         [place] Level/Cues Needed (Toileting Goal 1, OT)  conditional independence  -RB        Time Frame (Toileting Goal 1, OT)  long term goal (LTG)  -RB        Progress/Outcome (Toileting Goal 1, OT)  goal not met  -RB            Activity Tolerance Goal 1 (OT)    Activity Tolerance Goal 1 (OT)  20 minute activity with 2-3 restbreaks.  -RB        Activity Level (Endurance Goal 1, OT)  20 min activity;O2 sat >/ equal to 88%  -RB        Time Frame (Activity Tolerance Goal 1, OT)  long term goal (LTG)  -RB        Progress/Outcome (Activity Tolerance Goal 1, OT)  goal not met  -RB           Patient Education Goal (OT)    Activity (Patient Education Goal, OT)  Home safety/fall prev and EC/WS.  -RB        [place]/Cues/Accuracy (Memory Goal 2, OT)  demonstrates adequately;verbalizes understanding  -RB        Time Frame (Patient Education Goal, OT)  long term goal (LTG)  -RB        Progress/Outcome (Patient Education Goal, OT)  goal not met  -RB           Living Environment    Home Accessibility  stairs to enter home  -RB          User Key  (r) = Recorded By, (t) = Taken By, (c) = Cosigned By    Initials Name Effective Dates    RB Beto Carney OT 06/15/16 -          Occupational Therapy Education     Title: PT OT SLP Therapies (In Progress)     Topic: Occupational Therapy (In Progress)     Point: Precautions (Done)     Description: Instruct learner(s) on prescribed precautions during self-care and functional transfers.    Learning Progress Summary           Patient Acceptance, E, VU by  at 7/14/2019  2:02 PM    Comment:  Edu pt on  use of non skid socks when OOB.                               User Key     Initials Effective Dates Name Provider Type Discipline     06/15/16 -  Beto Carney OT Occupational Therapist OT                  OT Recommendation and Plan  Outcome Summary/Treatment Plan (OT)  Anticipated Discharge Disposition (OT): home with home health  Planned Therapy Interventions (OT Eval): activity  "tolerance training, adaptive equipment training, BADL retraining, functional balance retraining, occupation/activity based interventions, transfer/mobility retraining, strengthening exercise, ROM/therapeutic exercise, patient/caregiver education/training  Therapy Frequency (OT Eval): other (see comments)(5-7 days/wk.)  Plan of Care Review  Plan of Care Reviewed With: patient  Plan of Care Reviewed With: patient  Outcome Summary: OT eval on this date.  Pt was independent with bed mobility and SBA for tiolet transfer and gait.  She needed min A for LB dressing.  OT down to 86% when pt x-shawn to toilet.  Pt comes from SNF and plans to go home.  She could benefit from OT services to increase independence and safety with ADLs and functional mobility.  Rec H/H when D/C home.    Outcome Measures     Row Name 07/14/19 0910             How much help from another person do you currently need...    Turning from your back to your side while in flat bed without using bedrails?  4  -CZ      Moving from lying on back to sitting on the side of a flat bed without bedrails?  4  -CZ      Moving to and from a bed to a chair (including a wheelchair)?  3  -CZ      Standing up from a chair using your arms (e.g., wheelchair, bedside chair)?  3  -CZ      Climbing 3-5 steps with a railing?  3  -CZ      To walk in hospital room?  3  -CZ      AM-PAC 6 Clicks Score (PT)  20  -CZ         Tinetti Assessment    T[place]etti Assessment  yes  -CZ      Sitting Balance  1  -CZ      Arises  2  -CZ      Attempts to Rise  2  -CZ      Immediate Standing Balance (first 5 sec)  2  -CZ      Standing Balance  2  -CZ      Sternal Nudge (feet close together)  1  -CZ      Eyes Closed (feet close together)  1  -CZ      Turning 360 Degrees- Steps  1  -CZ      Turning 360 Degrees- Steadiness  1  -CZ      Sitting Down  2  -CZ      [place]etti Balance Score  15  -CZ      Gait Initiation (immediate after told \"go\")  1  -CZ      Step Length- Right Swing  1  -CZ      Step Length- " Left Swing  1  -CZ      Foot Clearance- Right Foot  1  -CZ      Foot Clearance- Left Foot  1  -CZ      Step Symmetry  1  -CZ      Step Continuity  1  -CZ      Path (excursion)  1  -CZ      Trunk  1  -CZ      Base of Support  1  -CZ      Gait Score  10  -CZ      [place] Total Score  25  -CZ      T[place]etti Assistive Device  -- No AD.  -CZ      [place] Assessment Comments  Decreased stepping response to perturbation.   -CZ         Functional Assessment    Outcome Measure Options  AM-PAC 6 Clicks Basic Mobility (PT);[place]  -CZ        User Key  (r) = Recorded By, (t) = Taken By, (c) = Cosigned By    Initials Name Provider Type    CZ Titi Portillo, PT Physical Therapist          Time Calculation:   Time Calculation- OT     Row Name 07/14/19 1408             Time Calculation- OT    OT Start Time  1052  -RB      OT Stop Time  1125  -RB      OT Time Calculation (min)  33 min  -RB      OT Received On  07/14/19  -RB      OT Goal Re-Cert Due Date  07/27/19  -RB        User Key  (r) = Recorded By, (t) = Taken By, (c) = Cosigned By    Initials Name Provider Type    RB Beto Carney OT Occupational Therapist        Therapy Charges for Today     Code Description Service Date Service Provider Modifiers Qty    [id number]  OT EVAL MOD COMPLEXITY 2 7/14/2019 Beot Carney OT GO 1               Beto Carney OT  7/14/2019

## 2019-07-14 NOTE — PLAN OF CARE
Problem: Fall Risk (Adult)  Goal: Identify Related Risk Factors and Signs and Symptoms  Outcome: Ongoing (interventions implemented as appropriate)    Goal: Absence of Fall  Outcome: Ongoing (interventions implemented as appropriate)      Problem: Cardiac: Heart Failure (Adult)  Goal: Signs and Symptoms of Listed Potential Problems Will be Absent, Minimized or Managed (Cardiac: Heart Failure)  Outcome: Ongoing (interventions implemented as appropriate)      Problem: Cardiac: ACS (Acute Coronary Syndrome) (Adult)  Goal: Signs and Symptoms of Listed Potential Problems Will be Absent, Minimized or Managed (Cardiac: ACS)  Outcome: Ongoing (interventions implemented as appropriate)      Problem: Patient Care Overview  Goal: Individualization and Mutuality  Outcome: Ongoing (interventions implemented as appropriate)    Goal: Discharge Needs Assessment  Outcome: Ongoing (interventions implemented as appropriate)    Goal: Interprofessional Rounds/Family Conf  Outcome: Ongoing (interventions implemented as appropriate)      Problem: Skin Injury Risk (Adult)  Goal: Identify Related Risk Factors and Signs and Symptoms  Outcome: Ongoing (interventions implemented as appropriate)    Goal: Skin Health and Integrity  Outcome: Ongoing (interventions implemented as appropriate)

## 2019-07-14 NOTE — PROGRESS NOTES
FAMILY MEDICINE DAILY PROGRESS NOTE  NAME: Brendon Skelton  : 1945  MRN: [id number]      LOS: 0 days     PROVIDER OF SERVICE: Kenna Chawla MD    Chief Complaint: Acute on chronic diastolic congestive heart failure (CMS/HCC)    Subjective:     Interval History:  History taken from: patient chart    No acute events overnight. Patient reports she feels like her breathing is much better. She is down 1 pound overnight. Baseline weight appears to be 250. She is up 10 pounds total. She reports abdominal bloating and KYLAH have also improved significantly. She otherwise has no complaints or concerns at this time.     Review of Systems:   Review of Systems   Constitutional: Negative for activity change, appetite change, chills, fatigue and fever.   HENT: Negative for hearing loss, sneezing, sore throat and trouble swallowing.    Eyes: Negative for visual disturbance.   Respiratory: Positive for shortness of breath (chronic, improving). Negative for cough and chest tightness.    Cardiovascular: Positive for leg swelling (improving). Negative for chest pain and palpitations.   Gastrointestinal: Positive for abdominal distention (improving). Negative for abdominal pain, blood in stool, constipation, diarrhea, nausea and vomiting.   Genitourinary: Negative for difficulty urinating and dysuria.   Musculoskeletal: Negative for arthralgias and back pain.   Skin: Negative for pallor and rash.   Allergic/Immunologic: Negative for environmental allergies and food allergies.   Neurological: Negative for dizziness, light-headedness, numbness and headaches.   Psychiatric/Behavioral: Negative for agitation, confusion, hallucinations and suicidal ideas.       Objective:     Vital Signs  Temp:  [96.3 °F (35.7 °C)-98.4 °F (36.9 °C)] 97.5 °F (36.4 °C)  Heart Rate:  [] 94  Resp:  [18-24] 18  BP: (103-140)/(57-97) 107/68  Body mass index is 43.32 kg/m².        19  1527 19  0525   Weight: 119 kg (261 lb 6.4  oz) 118 kg (260 lb 4.8 oz)       Physical Exam  Physical Exam   Constitutional: She is oriented to person, place, and time. She appears well-developed and well-nourished. No distress.   HENT:   Head: Normocephalic and atraumatic.   Right Ear: External ear normal.   Left Ear: External ear normal.   Neck: Normal range of motion. Neck supple.   Cardiovascular: Normal rate, regular rhythm and normal heart sounds.   Pulmonary/Chest: No respiratory distress. She has no wheezes. She has no rales.   Diminished breath sounds, pursed lip breathing   Abdominal: Soft. Bowel sounds are normal. She exhibits no distension. There is no tenderness.   Musculoskeletal: Normal range of motion. She exhibits edema (+1 pitting edema lower extremities bilaterally).   Neurological: She is alert and oriented to person, place, and time. She has normal reflexes.   Skin: Skin is warm and dry. She is not diaphoretic. No erythema.   Psychiatric: She has a normal mood and affect. Her behavior is normal.       Medication Review    Current Facility-Administered Medications:   •  acetaminophen (TYLENOL) tablet 650 mg, 650 mg, Oral, Q4H PRN, Kenna Chawla MD  •  albuterol (PROVENTIL) nebulizer solution 0.083% 2.5 mg/3mL, 2.5 mg, Nebulization, Q4H PRN, Kenna Chawla MD  •  aspirin chewable tablet 81 mg, 81 mg, Oral, Daily, Kenna Chawla MD  •  cholecalciferol (VITAMIN D3) tablet 2,000 Units, 2,000 Units, Oral, Daily, Kenna Chawla MD  •  citalopram (CeleXA) tablet 20 mg, 20 mg, Oral, Daily, Kenna Chawla MD  •  dextrose (D50W) 25 g/ 50mL Intravenous Solution 25 g, 25 g, Intravenous, Q15 Min PRN, Kenna Chawla MD  •  dextrose (GLUTOSE) oral gel 15 g, 15 g, Oral, Q15 Min PRN, Kenna Chawla MD  •  diltiaZEM CD (CARDIZEM CD) 24 hr capsule 240 mg, 240 mg, Oral, Daily, Kenna Chawla MD  •  docusate sodium (COLACE) capsule 100 mg, 100 mg, Oral, BID PRN, Kenna Chawla MD  •  famotidine (PEPCID) tablet 20 mg, 20 mg, Oral,  Daily, Kenna Chawla MD  •  ferrous sulfate EC tablet 324 mg, 324 mg, Oral, Daily With Breakfast, Kenna Chawla MD  •  furosemide (LASIX) injection 40 mg, 40 mg, Intravenous, Once, Kenna Chawla MD  •  glucagon (human recombinant) (GLUCAGEN DIAGNOSTIC) injection 1 mg, 1 mg, Subcutaneous, PRN, Kenna Chawla MD  •  guaiFENesin (MUCINEX) 12 hr tablet 600 mg, 600 mg, Oral, Q12H, Hermann Richardson MD, 600 mg at 07/13/19 2155  •  insulin aspart (novoLOG) injection 0-7 Units, 0-7 Units, Subcutaneous, 4x Daily AC & at Bedtime, Kenna Chawla MD, 3 Units at 07/13/19 2056  •  insulin detemir (LEVEMIR) injection 25 Units, 25 Units, Subcutaneous, Nightly, Kenna Chawla MD  •  ipratropium-albuterol (DUO-NEB) nebulizer solution 3 mL, 3 mL, Nebulization, Q8H - RT, Kenna Chawla MD, 3 mL at 07/14/19 0729  •  ondansetron (ZOFRAN) tablet 4 mg, 4 mg, Oral, Q6H PRN, Kenna Chawla MD  •  PATIENT SUPPLIED MEDICATION, 10 mg, Oral, Daily, Kenna Chawla MD  •  Pharmacy to dose warfarin, , Does not apply, Continuous PRN, Kenna Chawla MD  •  prenatal vitamin 27-0.8 tablet 1 tablet, 1 tablet, Oral, Daily, Kenna Chawla MD  •  rOPINIRole (REQUIP) tablet 0.25 mg, 0.25 mg, Oral, Nightly, Kenna Chawla MD, 0.25 mg at 07/13/19 2026  •  sodium chloride 0.9 % flush 10 mL, 10 mL, Intravenous, PRN, Gilles Griffin MD  •  sodium chloride 0.9 % flush 3 mL, 3 mL, Intravenous, Q12H, Kenna Chawla MD, 3 mL at 07/13/19 2030  •  sodium chloride 0.9 % flush 3-10 mL, 3-10 mL, Intravenous, PRN, Kenna Chawla MD  •  traZODone (DESYREL) tablet 300 mg, 300 mg, Oral, Nightly, Kenna Chawla MD, 300 mg at 07/13/19 2026  •  vitamin C (ASCORBIC ACID) tablet 500 mg, 500 mg, Oral, Daily, Kenna Chawla MD     Diagnostic Data    Lab Results (last 24 hours)     Procedure Component Value Units Date/Time    Protime-INR [[id number]]  (Abnormal) Collected:  07/14/19 0649    Specimen:  Blood Updated:  07/14/19  0725     Protime 43.0 Seconds      INR 4.57    Narrative:       Therapeutic range for most indications is 2.0-3.0 INR,  or 2.5-3.5 for mechanical heart valves.    Basic Metabolic Panel [[id number]]  (Abnormal) Collected:  07/14/19 0649    Specimen:  Blood Updated:  07/14/19 0724     Glucose 158 mg/dL      BUN 82 mg/dL      Creatinine 2.37 mg/dL      Sodium 141 mmol/L      Potassium 3.9 mmol/L      Chloride 99 mmol/L      CO2 30.0 mmol/L      Calcium 8.7 mg/dL      eGFR Non African Amer 20 mL/min/1.73      BUN/Creatinine Ratio 34.6     Anion Gap 12.0 mmol/L     Narrative:       GFR Normal >60  Chronic Kidney Disease <60  Kidney Failure <15    CBC & Differential [[id number]] Collected:  07/14/19 0649    Specimen:  Blood Updated:  07/14/19 0713    Narrative:       The following orders were created for panel order CBC & Differential.  Procedure                               Abnormality         Status                     ---------                               -----------         ------                     CBC Auto Differential[[id number]]        Abnormal            Final result                 Please view results for these tests on the individual orders.    CBC Auto Differential [[id number]]  (Abnormal) Collected:  07/14/19 0649    Specimen:  Blood Updated:  07/14/19 0713     WBC 5.29 10*3/mm3      RBC 2.79 10*6/mm3      Hemoglobin 8.6 g/dL      Hematocrit 27.7 %      MCV 99.3 fL      MCH 30.8 pg      MCHC 31.0 g/dL      RDW 21.0 %      RDW-SD 73.7 fl      MPV 12.1 fL      Platelets 292 10*3/mm3      Neutrophil % 92.8 %      Lymphocyte % 3.4 %      Monocyte % 3.2 %      Eosinophil % 0.0 %      Basophil % 0.0 %      Immature Grans % 0.6 %      Neutrophils, Absolute 4.91 10*3/mm3      Lymphocytes, Absolute 0.18 10*3/mm3      Monocytes, Absolute 0.17 10*3/mm3      Eosinophils, Absolute 0.00 10*3/mm3      Basophils, Absolute 0.00 10*3/mm3      Immature Grans, Absolute 0.03 10*3/mm3      nRBC 0.0 /100 WBC     POC Glucose Once  [[id number]]  (Abnormal) Collected:  07/14/19 0609    Specimen:  Blood Updated:  07/14/19 0641     Glucose 150 mg/dL      Comment: RN NotifiedOperator: [id number] NEPTALI ELRITAMeter ID: [id number]       POC Glucose Once [[id number]]  (Abnormal) Collected:  07/13/19 2042    Specimen:  Blood Updated:  07/13/19 2115     Glucose 215 mg/dL      Comment: RN NotifiedOperator: [id number] NEPTALI ELRITAMeter ID: [id number]       POC Glucose Once [[id number]]  (Abnormal) Collected:  07/13/19 1549    Specimen:  Blood Updated:  07/13/19 1741     Glucose 170 mg/dL      Comment: RN NotifiedOperator: [id number] YEISON CASTREJONISSAMeter ID: [id number]       West Draw [[id number]] Collected:  07/13/19 1227    Specimen:  Blood Updated:  07/13/19 1330    Narrative:       The following orders were created for panel order West Draw.  Procedure                               Abnormality         Status                     ---------                               -----------         ------                     Light Blue Top[[id number]]                                   Final result               Green Top (Gel)[[id number]]                                  Final result               Lavender Top[[id number]]                                     Final result               Gold Top - SST[[id number]]                                   Final result                 Please view results for these tests on the individual orders.    Light Blue Top [[id number]] Collected:  07/13/19 1227    Specimen:  Blood from Arm, Left Updated:  07/13/19 1330     Extra Tube hold for add-on     Comment: Auto resulted       Green Top (Gel) [[id number]] Collected:  07/13/19 1227    Specimen:  Blood from Arm, Left Updated:  07/13/19 1330     Extra Tube Hold for add-ons.     Comment: Auto resulted.       Lavender Top [[id number]] Collected:  07/13/19 1227    Specimen:  Blood from Arm, Left Updated:  07/13/19 1330     Extra Tube hold for add-on     Comment: Auto resulted        [facility] [[id number]] Collected:  07/13/19 1227    Specimen:  Blood from Arm, Left Updated:  07/13/19 1330     Extra Tube Hold for add-ons.     Comment: Auto resulted.       Troponin [[id number]]  (Normal) Collected:  07/13/19 1227    Specimen:  Blood from Arm, Left Updated:  07/13/19 1258     Troponin T 0.024 ng/mL     Narrative:       Troponin T Reference Range:  <= 0.03 ng/mL-   Negative for AMI  >0.03 ng/mL-     Abnormal for myocardial necrosis.  Clinicians would have to utilize clinical acumen, EKG, Troponin and serial changes to determine if it is an Acute Myocardial Infarction or myocardial injury due to an underlying chronic condition.     Protime-INR [[id number]]  (Abnormal) Collected:  07/13/19 1227    Specimen:  Blood from Arm, Left Updated:  07/13/19 1253     Protime 38.5 Seconds      INR 3.97    Narrative:       Therapeutic range for most indications is 2.0-3.0 INR,  or 2.5-3.5 for mechanical heart valves.    Comprehensive Metabolic Panel [[id number]]  (Abnormal) Collected:  07/13/19 1227    Specimen:  Blood from Arm, Left Updated:  07/13/19 1249     Glucose 128 mg/dL      BUN 79 mg/dL      Creatinine 2.22 mg/dL      Sodium 143 mmol/L      Potassium 3.9 mmol/L      Chloride 101 mmol/L      CO2 30.0 mmol/L      Calcium 9.0 mg/dL      Total Protein 6.9 g/dL      Albumin 3.50 g/dL      ALT (SGPT) 27 U/L      AST (SGOT) 31 U/L      Alkaline Phosphatase 76 U/L      Total Bilirubin 0.6 mg/dL      eGFR Non African Amer 22 mL/min/1.73      Globulin 3.4 gm/dL      A/G Ratio 1.0 g/dL      BUN/Creatinine Ratio 35.6     Anion Gap 12.0 mmol/L     Narrative:       GFR Normal >60  Chronic Kidney Disease <60  Kidney Failure <15    BNP [[id number]]  (Abnormal) Collected:  07/13/19 1227    Specimen:  Blood from Arm, Left Updated:  07/13/19 1246     proBNP 8,913.0 pg/mL     Narrative:       Among patients with dyspnea, NT-proBNP is highly sensitive for the detection of acute congestive heart failure. In addition  NT-proBNP of <300 pg/ml effectively rules out acute congestive heart failure with 99% negative predictive value.    CBC & Differential [[id number]] Collected:  07/13/19 1227    Specimen:  Blood Updated:  07/13/19 1245    Narrative:       The following orders were created for panel order CBC & Differential.  Procedure                               Abnormality         Status                     ---------                               -----------         ------                     CBC Auto Differential[[id number]]        Abnormal            Final result                 Please view results for these tests on the individual orders.    CBC Auto Differential [[id number]]  (Abnormal) Collected:  07/13/19 1227    Specimen:  Blood from Arm, Left Updated:  07/13/19 1245     WBC 7.58 10*3/mm3      RBC 2.91 10*6/mm3      Hemoglobin 8.9 g/dL      Hematocrit 28.9 %      MCV 99.3 fL      MCH 30.6 pg      MCHC 30.8 g/dL      RDW 21.3 %      RDW-SD 73.9 fl      MPV 10.2 fL      Platelets 282 10*3/mm3      Neutrophil % 83.7 %      Lymphocyte % 3.4 %      Monocyte % 5.1 %      Eosinophil % 6.7 %      Basophil % 0.4 %      Immature Grans % 0.7 %      Neutrophils, Absolute 6.34 10*3/mm3      Lymphocytes, Absolute 0.26 10*3/mm3      Monocytes, Absolute 0.39 10*3/mm3      Eosinophils, Absolute 0.51 10*3/mm3      Basophils, Absolute 0.03 10*3/mm3      Immature Grans, Absolute 0.05 10*3/mm3      nRBC 0.0 /100 WBC     Blood Gas, Arterial [[id number]]  (Abnormal) Collected:  07/13/19 1208    Specimen:  Arterial Blood Updated:  07/13/19 1215     Site Right Brachial     Ramses's Test N/A     pH, Arterial 7.432 pH units      pCO2, Arterial 44.7 mm Hg      pO2, Arterial 60.9 mm Hg      Comment: 84 Value below reference range        HCO3, Arterial 29.8 mmol/L      Comment: 83 Value above reference range        Base Excess, Arterial 4.9 mmol/L      Comment: 83 Value above reference range        O2 Saturation, Arterial 91.8 %      Comment: 84 Value below  reference range        Barometric Pressure for Blood Gas 749 mmHg      Modality Nasal Cannula     Flow Rate 6.0 lpm      Ventilator Mode NA     Collected by      Comment: Meter: [id number]     :  [id number]               I reviewed the patient's new clinical results.    Assessment/Plan:     Active Hospital Problems    Diagnosis POA   • **Acute on chronic diastolic congestive heart failure (CMS/HCC) [I50.33] Yes     -last echo 6/18/19 shows EF of 46-50% with normal diastolic dysfunction, improved from echo in 2017 which showed grade 1 diastolic dysfunction  -BNP on admission was 8913, CXR shows pulmonary congestion  -on lasix 40 mg daily and metolazone 2.5 mg daily at home   -s/p lasix 80 mg IV in ER, will continue with lasix 40 mg IV   -strict I/Os  -daily weights  -1500 mL fluid restriction, sodium restriction 2g  -initial troponin 0.024, will trend     • Anemia [D64.9] Yes     -H/H on admission 8.9/28.9, trend with daily CBC  -recent GI bleed requiring transfusion of 1 unit of PRBCs  -on oral iron supplement at home  -continue oral iron supplement  -transfuse for hemoglobin less than 7     • Stage 4 chronic kidney disease (CMS/[facility]) [N18.4] Yes     -Baseline creatinine ~ 1.9-2.1  -trend renal function  -follows with Dr. Caputo outpatient  -avoid nephrotoxic agents     • SSS (sick sinus syndrome) (CMS/[facility]) [I49.5] Yes     -atrial fibrillation with rate of 85 on EKG in ER  -pacemaker in place  -continue with cardizem  -telemetry  -pharm to dose coumadin     • Essential hypertension [I10] Yes     -continue lasix, cardizem     • Chronic hypoxemic respiratory failure (CMS/[facility]) [J96.11] Yes     -on 3L O2 via nasal cannula at home  -ABG in ER shows pO2 of 60.9, pCO2 of 44.7  -will continue oxygen via nasal cannula at home rate     • Type 2 diabetes mellitus with stage 4 chronic kidney disease, with long-term current use of insulin (CMS/[facility]) [E11.22, N18.4, Z79.4] Not Applicable     -on basaglar 30 units at  night  -continue, adjust as necessary     • Emphysema of lung (CMS/HCC) [J43.9] Yes     -continue with supplemental oxygen  -continue albuterol nebs  -symbicort, duonebs      • Atrial fibrillation [I48.91] [I48.91] Yes     -EKG in ER shows atrial fibrillation with rate of 85  -pacemaker in place  -continue cardizem  -telemetry  -pharm to dose coumadin         DVT prophylaxis: warfarin  Code status is   Code Status and Medical Interventions:   Ordered at: 07/13/19 1539     Level Of Support Discussed With:    Patient     Code Status:    CPR     Medical Interventions (Level of Support Prior to Arrest):    Full       Plan for disposition: Anticipate discharge back to SNF in 1-3 days pending further diuresis      Time: < 30 mins        This document has been electronically signed by Kenna Chawla MD on July 14, 2019 8:11 AM

## 2019-07-14 NOTE — PLAN OF CARE
Problem: Fall Risk (Adult)  Goal: Identify Related Risk Factors and Signs and Symptoms  Outcome: Outcome(s) achieved Date Met: 07/14/19    Goal: Absence of Fall  Outcome: Ongoing (interventions implemented as appropriate)      Problem: Cardiac: Heart Failure (Adult)  Goal: Signs and Symptoms of Listed Potential Problems Will be Absent, Minimized or Managed (Cardiac: Heart Failure)  Outcome: Ongoing (interventions implemented as appropriate)      Problem: Cardiac: ACS (Acute Coronary Syndrome) (Adult)  Goal: Signs and Symptoms of Listed Potential Problems Will be Absent, Minimized or Managed (Cardiac: ACS)  Outcome: Ongoing (interventions implemented as appropriate)      Problem: Patient Care Overview  Goal: Plan of Care Review  Outcome: Ongoing (interventions implemented as appropriate)    Goal: Individualization and Mutuality  Outcome: Ongoing (interventions implemented as appropriate)    Goal: Discharge Needs Assessment  Outcome: Ongoing (interventions implemented as appropriate)    Goal: Interprofessional Rounds/Family Conf  Outcome: Ongoing (interventions implemented as appropriate)      Problem: Skin Injury Risk (Adult)  Goal: Identify Related Risk Factors and Signs and Symptoms  Outcome: Outcome(s) achieved Date Met: 07/14/19    Goal: Skin Health and Integrity  Outcome: Ongoing (interventions implemented as appropriate)

## 2019-07-14 NOTE — PROGRESS NOTES
Discharge Planning Assessment  [facility]     Patient Name: Brendon Skelton  MRN: [id number]  Today's Date: 7/14/2019    Admit Date: 7/13/2019    Discharge Needs Assessment     Row Name 07/14/19 1328       Living Environment    Lives With  alone    Current Living Arrangements  home/apartment/condo Information on face sheet confirmed with patient.     Primary Care Provided by  self    Provides Primary Care For  no one    Family Caregiver if Needed  child(leona), adult Patient voiced support/assistance from her son and daughter-in-law.     Quality of Family Relationships  involved;helpful    Able to Return to Prior Arrangements  yes    Living Arrangement Comments  Patient resides alone. However, she voiced that she lives in a duplex that is attached to her son's duplex.        Resource/Environmental Concerns    Resource/Environmental Concerns  none    Transportation Concerns  car, none       Transition Planning    Patient/Family Anticipates Transition to  home with help/services Patient reports that her son and daughter-in-law will be assisting her at d/c.     Patient/Family Anticipated Services at Transition  home health care    Transportation Anticipated  family or friend will provide;other (see comments) Patient voiced that she uses [facility] for MD appointments. Patient stated that her son will pick her up at d/c. She voiced that he does not own a vehicle but will use a friend's vehicle at d/c.        Discharge Needs Assessment    Readmission Within the Last 30 Days  no previous admission in last 30 days    Concerns to be Addressed  denies needs/concerns at this time    Equipment Currently Used at Home  glucometer;nebulizer;oxygen;other (see comments);shower chair Patient has home/portable oxygen provided by [employer]. SWRK confirmed with patient that home/portable oxygen is available for use at d/c.     Anticipated Changes Related to Illness  none    Equipment Needed After Discharge  none    Discharge  Facility/Level of Care Needs  home with home health    Offered/Gave Vendor List  yes    Patient's Choice of Community Agency(s)  [facility]     Current Discharge Risk  chronically ill        Discharge Plan     Row Name 07/14/19 7289       Plan    Plan  home with home health     Plan Comments  LACE complete. Patient was at [facility] for short term rehab before returing to the hospital. Patient reports that she was at the NH for short term rehab. Patient voiced that she does not plan on returning there at d/c. Patient voiced that the plan is for her to return to her home. Patient voiced that she will have support from her son and daughter-in-law. Patient voiced that they have agreed to assist her with ADL's and IADL's as needed. Patient denies any concerns re: returning home at d/c and feels she is strong enough to return home. Patient voiced interest in home health. ROSALVAK discussed home health services with patient.  Note left for MD with request for home health skilled nursing: respiratory services, medication management and PT/OT. CM will assist with arranging HH with [facility], patient's choice. No additional needs presented and/or voiced at this time. Continue with medical management......Sabrina Montelongo [facility]        Destination      No service coordination in this encounter.      Durable Medical Equipment      No service coordination in this encounter.      Dialysis/Infusion      No service coordination in this encounter.      Home Medical Care      No service coordination in this encounter.      Therapy      No service coordination in this encounter.      Community Resources      No service coordination in this encounter.          Demographic Summary     Row Name 07/14/19 1357       General Information    Admission Type  observation    Arrived From  emergency department    Referral Source  high risk screening    Reason for Consult  discharge planning    Preferred Language  English     Used During This  Interaction  no       Contact Information    Contact Information Obtained for          Functional Status     Row Name 07/14/19 6286       Functional Status    Functional Status Comments  Patient resides at home alone. She voiced that she recently d/c to [facility] for short term rehab. At baseline, she reports being independent with her ADL's.  She voiced that she uses a rollator for short distances. Patient uses an electric scooter PRN within her home and outside of her home.        Functional Status, IADL    Medications  independent Pharmacy, [facility], and rx coverage confirmed. Pharmacy delivers her home medications.      Meal Preparation  independent    Housekeeping  independent    Laundry  independent    Shopping  independent       Mental Status Summary    Recent Changes in Mental Status/Cognitive Functioning  no changes       Employment/    Employment Status  retired        Psychosocial    No documentation.       Abuse/Neglect    No documentation.       Legal    No documentation.       Substance Abuse    No documentation.       Patient Forms    No documentation.           CLARITA Chicas

## 2019-07-14 NOTE — PLAN OF CARE
Problem: Patient Care Overview  Goal: Plan of Care Review  Outcome: Ongoing (interventions implemented as appropriate)   07/14/19 5827   Coping/Psychosocial   Plan of Care Reviewed With patient   OTHER   Outcome Summary OT eval on this date. Pt was independent with bed mobility and SBA for tiolet transfer and gait. She needed min A for LB dressing. OT down to 86% when pt x-shawn to toilet. Pt comes from SNF and plans to go home. She could benefit from OT services to increase independence and safety with ADLs and functional mobility. Rec H/H when D/C home.

## 2019-07-14 NOTE — PLAN OF CARE
Problem: Patient Care Overview  Goal: Plan of Care Review  Outcome: Ongoing (interventions implemented as appropriate)   07/14/19 0949   Coping/Psychosocial   Plan of Care Reviewed With patient   OTHER   Outcome Summary Initial PT evaluation complete. Patient is alert and cooperative with assessment. Patient demonstrates (I) with bed mobility, requires SPV with transfers and gait. Patient ambulates 15'x2 without an AD, reaches for UE support, no falls. Tinetti assessed: 25/28 or low fall risk. Patient demonstrates decreased stepping response to perturbation, but refuses gait belt, FWW or bed alarm. Nurse aware. SpO2 86% on 3.5 LPM with activity, returns to 92% with seated rest break. Patient would benefit from HHPT upon discharge home. Continue skilled PT.

## 2019-07-15 LAB
ANION GAP SERPL CALCULATED.3IONS-SCNC: 14 MMOL/L (ref 5–15)
BASOPHILS # BLD AUTO: 0.02 10*3/MM3 (ref 0–0.2)
BASOPHILS NFR BLD AUTO: 0.2 % (ref 0–1.5)
BUN BLD-MCNC: 95 MG/DL (ref 8–23)
BUN/CREAT SERPL: 38.8 (ref 7–25)
CALCIUM SPEC-SCNC: 8.6 MG/DL (ref 8.6–10.5)
CHLORIDE SERPL-SCNC: 99 MMOL/L (ref 98–107)
CO2 SERPL-SCNC: 32 MMOL/L (ref 22–29)
CREAT BLD-MCNC: 2.45 MG/DL (ref 0.57–1)
DEPRECATED RDW RBC AUTO: 74 FL (ref 37–54)
EOSINOPHIL # BLD AUTO: 0.2 10*3/MM3 (ref 0–0.4)
EOSINOPHIL NFR BLD AUTO: 2.4 % (ref 0.3–6.2)
ERYTHROCYTE [DISTWIDTH] IN BLOOD BY AUTOMATED COUNT: 20.9 % (ref 12.3–15.4)
GFR SERPL CREATININE-BSD FRML MDRD: 19 ML/MIN/1.73
GLUCOSE BLD-MCNC: 57 MG/DL (ref 65–99)
GLUCOSE BLDC GLUCOMTR-MCNC: 124 MG/DL (ref 70–130)
GLUCOSE BLDC GLUCOMTR-MCNC: 182 MG/DL (ref 70–130)
GLUCOSE BLDC GLUCOMTR-MCNC: 261 MG/DL (ref 70–130)
GLUCOSE BLDC GLUCOMTR-MCNC: 64 MG/DL (ref 70–130)
HCT VFR BLD AUTO: 27.6 % (ref 34–46.6)
HGB BLD-MCNC: 8.5 G/DL (ref 12–15.9)
IMM GRANULOCYTES # BLD AUTO: 0.05 10*3/MM3 (ref 0–0.05)
IMM GRANULOCYTES NFR BLD AUTO: 0.6 % (ref 0–0.5)
INR PPP: 4.66 (ref 0.8–1.2)
LYMPHOCYTES # BLD AUTO: 0.58 10*3/MM3 (ref 0.7–3.1)
LYMPHOCYTES NFR BLD AUTO: 6.9 % (ref 19.6–45.3)
MCH RBC QN AUTO: 30.6 PG (ref 26.6–33)
MCHC RBC AUTO-ENTMCNC: 30.8 G/DL (ref 31.5–35.7)
MCV RBC AUTO: 99.3 FL (ref 79–97)
MONOCYTES # BLD AUTO: 0.69 10*3/MM3 (ref 0.1–0.9)
MONOCYTES NFR BLD AUTO: 8.2 % (ref 5–12)
NEUTROPHILS # BLD AUTO: 6.88 10*3/MM3 (ref 1.7–7)
NEUTROPHILS NFR BLD AUTO: 81.7 % (ref 42.7–76)
NRBC BLD AUTO-RTO: 0 /100 WBC (ref 0–0.2)
PLATELET # BLD AUTO: 316 10*3/MM3 (ref 140–450)
PMV BLD AUTO: 11.8 FL (ref 6–12)
POTASSIUM BLD-SCNC: 3.5 MMOL/L (ref 3.5–5.2)
PROTHROMBIN TIME: 43.7 SECONDS (ref 11.1–15.3)
RBC # BLD AUTO: 2.78 10*6/MM3 (ref 3.77–5.28)
SODIUM BLD-SCNC: 145 MMOL/L (ref 136–145)
WBC NRBC COR # BLD: 8.42 10*3/MM3 (ref 3.4–10.8)

## 2019-07-15 PROCEDURE — 97530 THERAPEUTIC ACTIVITIES: CPT

## 2019-07-15 PROCEDURE — 94799 UNLISTED PULMONARY SVC/PX: CPT

## 2019-07-15 PROCEDURE — 85610 PROTHROMBIN TIME: CPT | Performed by: STUDENT IN AN ORGANIZED HEALTH CARE EDUCATION/TRAINING PROGRAM

## 2019-07-15 PROCEDURE — 82962 GLUCOSE BLOOD TEST: CPT

## 2019-07-15 PROCEDURE — 94760 N-INVAS EAR/PLS OXIMETRY 1: CPT

## 2019-07-15 PROCEDURE — 63710000001 INSULIN DETEMIR PER 5 UNITS: Performed by: STUDENT IN AN ORGANIZED HEALTH CARE EDUCATION/TRAINING PROGRAM

## 2019-07-15 PROCEDURE — G0378 HOSPITAL OBSERVATION PER HR: HCPCS

## 2019-07-15 PROCEDURE — 63710000001 INSULIN ASPART PER 5 UNITS: Performed by: STUDENT IN AN ORGANIZED HEALTH CARE EDUCATION/TRAINING PROGRAM

## 2019-07-15 PROCEDURE — 99225 PR SBSQ OBSERVATION CARE/DAY 25 MINUTES: CPT | Performed by: STUDENT IN AN ORGANIZED HEALTH CARE EDUCATION/TRAINING PROGRAM

## 2019-07-15 PROCEDURE — 80048 BASIC METABOLIC PNL TOTAL CA: CPT | Performed by: STUDENT IN AN ORGANIZED HEALTH CARE EDUCATION/TRAINING PROGRAM

## 2019-07-15 PROCEDURE — 97140 MANUAL THERAPY 1/> REGIONS: CPT

## 2019-07-15 PROCEDURE — 97116 GAIT TRAINING THERAPY: CPT

## 2019-07-15 PROCEDURE — 85025 COMPLETE CBC W/AUTO DIFF WBC: CPT | Performed by: STUDENT IN AN ORGANIZED HEALTH CARE EDUCATION/TRAINING PROGRAM

## 2019-07-15 RX ORDER — BUMETANIDE 0.25 MG/ML
2 INJECTION INTRAMUSCULAR; INTRAVENOUS 2 TIMES DAILY
Status: DISCONTINUED | OUTPATIENT
Start: 2019-07-15 | End: 2019-07-17

## 2019-07-15 RX ORDER — ALBUTEROL SULFATE 90 UG/1
2 AEROSOL, METERED RESPIRATORY (INHALATION) EVERY 4 HOURS PRN
Status: DISCONTINUED | OUTPATIENT
Start: 2019-07-15 | End: 2019-07-15 | Stop reason: ALTCHOICE

## 2019-07-15 RX ORDER — METOLAZONE 2.5 MG/1
2.5 TABLET ORAL DAILY
Status: DISCONTINUED | OUTPATIENT
Start: 2019-07-15 | End: 2019-07-20

## 2019-07-15 RX ADMIN — FUROSEMIDE 40 MG: 40 TABLET ORAL at 08:16

## 2019-07-15 RX ADMIN — INSULIN ASPART 4 UNITS: 100 INJECTION, SOLUTION INTRAVENOUS; SUBCUTANEOUS at 20:51

## 2019-07-15 RX ADMIN — ASPIRIN 81 MG 81 MG: 81 TABLET ORAL at 08:16

## 2019-07-15 RX ADMIN — BUMETANIDE 2 MG: 0.25 INJECTION INTRAMUSCULAR; INTRAVENOUS at 17:47

## 2019-07-15 RX ADMIN — CITALOPRAM HYDROBROMIDE 20 MG: 20 TABLET ORAL at 08:17

## 2019-07-15 RX ADMIN — INSULIN ASPART 2 UNITS: 100 INJECTION, SOLUTION INTRAVENOUS; SUBCUTANEOUS at 11:19

## 2019-07-15 RX ADMIN — PRENATAL VIT W/ FE FUMARATE-FA TAB 27-0.8 MG 1 TABLET: 27-0.8 TAB at 08:17

## 2019-07-15 RX ADMIN — INSULIN DETEMIR 25 UNITS: 100 INJECTION, SOLUTION SUBCUTANEOUS at 20:51

## 2019-07-15 RX ADMIN — NYSTATIN: 100000 POWDER TOPICAL at 08:19

## 2019-07-15 RX ADMIN — TRAZODONE HYDROCHLORIDE 300 MG: 150 TABLET ORAL at 20:52

## 2019-07-15 RX ADMIN — IPRATROPIUM BROMIDE AND ALBUTEROL SULFATE 3 ML: 2.5; .5 SOLUTION RESPIRATORY (INHALATION) at 06:48

## 2019-07-15 RX ADMIN — SODIUM CHLORIDE, PRESERVATIVE FREE 3 ML: 5 INJECTION INTRAVENOUS at 08:19

## 2019-07-15 RX ADMIN — OXYCODONE HYDROCHLORIDE AND ACETAMINOPHEN 500 MG: 500 TABLET ORAL at 08:16

## 2019-07-15 RX ADMIN — NYSTATIN: 100000 POWDER TOPICAL at 20:53

## 2019-07-15 RX ADMIN — FAMOTIDINE 20 MG: 20 TABLET ORAL at 08:16

## 2019-07-15 RX ADMIN — SODIUM CHLORIDE, PRESERVATIVE FREE 3 ML: 5 INJECTION INTRAVENOUS at 20:52

## 2019-07-15 RX ADMIN — BUMETANIDE 2 MG: 0.25 INJECTION INTRAMUSCULAR; INTRAVENOUS at 13:34

## 2019-07-15 RX ADMIN — GUAIFENESIN 600 MG: 600 TABLET, EXTENDED RELEASE ORAL at 08:16

## 2019-07-15 RX ADMIN — METOLAZONE 2.5 MG: 2.5 TABLET ORAL at 13:34

## 2019-07-15 RX ADMIN — VITAMIN D, TAB 1000IU (100/BT) 2000 UNITS: 25 TAB at 08:17

## 2019-07-15 RX ADMIN — ROPINIROLE HYDROCHLORIDE 0.25 MG: 0.25 TABLET, FILM COATED ORAL at 20:52

## 2019-07-15 RX ADMIN — IPRATROPIUM BROMIDE AND ALBUTEROL SULFATE 3 ML: 2.5; .5 SOLUTION RESPIRATORY (INHALATION) at 23:15

## 2019-07-15 RX ADMIN — IPRATROPIUM BROMIDE AND ALBUTEROL SULFATE 3 ML: 2.5; .5 SOLUTION RESPIRATORY (INHALATION) at 15:10

## 2019-07-15 RX ADMIN — DILTIAZEM HYDROCHLORIDE 240 MG: 240 CAPSULE, COATED, EXTENDED RELEASE ORAL at 08:16

## 2019-07-15 RX ADMIN — FERROUS SULFATE TAB EC 324 MG (65 MG FE EQUIVALENT) 324 MG: 324 (65 FE) TABLET DELAYED RESPONSE at 08:17

## 2019-07-15 RX ADMIN — GUAIFENESIN 600 MG: 600 TABLET, EXTENDED RELEASE ORAL at 20:52

## 2019-07-15 NOTE — PROGRESS NOTES
"[facility]  [address] [place] [address]. [id number]   - [phone]  F - [phone]     Progress Note          PATIENT  DEMOGRAPHICS   PATIENT NAME: Brendon Skelton                      PHYSICIAN: Nirali Carrion, Medical Student  : 1945  MRN: [id number]   LOS: 0 days    Patient Care Team:  Josefa Pozo APRN as PCP - General (Nurse Practitioner)  Meme Duckworth APRN as PCP - Claims Attributed  Aby Stout RN as Care Coordinator ([facility])  Subjective   SUBJECTIVE   HPI :    72y/o white female presented to the ER on Saturday morning  with severe soa on minimal exertion. She has a history of diastolic CHF. The soa had been worsening for the past few weeks. She is on continuous oxygen and recently increased from 2 to 3 Liters. Her baseline weight appears to be 250. She is up to 262 today which is 2 pounds up from yesterday. She says that the soa has improved since being admitted on Saturday, but was better yesterday. She still has soa on minimal exertion. She denies cough, diarrhea, vomiting, fever, chills. She has noticed swelling in her abdomen and Lower extremities.   PMH significant for CKD4, DMT2, Diastolic CHF, Emphysema, Essential HTN, Afib.    Pertinent labs: Cr = 2.45, BUN = 95, co2 = 32          Objective   OBJECTIVE   Vital Signs  Temp:  [97.3 °F (36.3 °C)-97.9 °F (36.6 °C)] 97.7 °F (36.5 °C)  Heart Rate:  [] 67  Resp:  [16-20] 16  BP: ()/(54-76) 119/68    Flowsheet Rows      First Filed Value   Admission Height  165.1 cm (65\") Documented at 2019 1527   Admission Weight  119 kg (261 lb 6.4 oz) Documented at 2019 1527           I/O last 3 completed shifts:  In: 1020 [P.O.:1020]  Out: -     PHYSICAL EXAM    Physical Exam   Constitutional: She is oriented to person, place, and time. She appears well-developed and well-nourished. No distress.   HENT:   Head: Normocephalic and atraumatic.   Nose: Nose normal.   "   Mouth/Throat: Oropharynx is clear and moist. No oropharyngeal exudate.   Eyes: Conjunctivae and EOM are normal. Pupils are equal, round, and reactive to light.   Neck: Normal range of motion. Neck supple.   Cardiovascular: Normal rate, regular rhythm, normal heart sounds and intact distal pulses.   Pulmonary/Chest: She has rales.   Abdominal: Soft. Bowel sounds are normal. She exhibits distension. She exhibits no mass. There is no tenderness. There is no guarding.   Musculoskeletal: She exhibits edema. She exhibits no tenderness or deformity.   Neurological: She is alert and oriented to person, place, and time.   Skin: Skin is warm and dry. No rash noted. She is not diaphoretic. No erythema. No pallor.   Psychiatric: She has a normal mood and affect. Her behavior is normal. Thought content normal.       RESULTS   Results Review:    Results from last 7 days   Lab Units 07/15/19  0535 07/14/19  0649 07/13/19  1227   SODIUM mmol/L 145 141 143   POTASSIUM mmol/L 3.5 3.9 3.9   CHLORIDE mmol/L 99 99 101   CO2 mmol/L 32.0* 30.0* 30.0*   BUN mg/dL 95* 82* 79*   CREATININE mg/dL 2.45* 2.37* 2.22*   CALCIUM mg/dL 8.6 8.7 9.0   BILIRUBIN mg/dL  --   --  0.6   ALK PHOS U/L  --   --  76   ALT (SGPT) U/L  --   --  27   AST (SGOT) U/L  --   --  31   GLUCOSE mg/dL 57* 158* 128*       Estimated Creatinine Clearance: 26.4 mL/min (A) (by C-G formula based on SCr of 2.45 mg/dL (H)).                Results from last 7 days   Lab Units 07/15/19  0535 07/14/19  0649 07/13/19  1227   WBC 10*3/mm3 8.42 5.29 7.58   HEMOGLOBIN g/dL 8.5* 8.6* 8.9*   PLATELETS 10*3/mm3 316 292 282       Results from last 7 days   Lab Units 07/15/19  0535 07/14/19  0649 07/13/19  1227   INR  4.66* 4.57* 3.97*         Imaging Results (last 24 hours)     ** No results found for the last 24 hours. **           MEDICATIONS      aspirin 81 mg Oral Daily   cholecalciferol 2,000 Units Oral Daily   citalopram 20 mg Oral Daily   diltiaZEM  mg Oral Daily    famotidine 20 mg Oral Daily   ferrous sulfate 324 mg Oral Daily With Breakfast   guaiFENesin 600 mg Oral Q12H   insulin aspart 0-7 Units Subcutaneous 4x Daily AC & at Bedtime   insulin detemir 25 Units Subcutaneous Nightly   ipratropium-albuterol 3 mL Nebulization Q8H - RT   nystatin  Topical Q12H   prenatal vitamin 27-0.8 1 tablet Oral Daily   rOPINIRole 0.25 mg Oral Nightly   sodium chloride 3 mL Intravenous Q12H   traZODone 300 mg Oral Nightly   vitamin C with bob hips 500 mg Oral Daily       Pharmacy to dose warfarin        Assessment/Plan   ASSESSMENT / PLAN      Acute on chronic diastolic congestive heart failure (CMS/HCC)    Atrial fibrillation [I48.91]    Emphysema of lung (CMS/[facility])    Type 2 diabetes mellitus with stage 4 chronic kidney disease, with long-term current use of insulin (CMS/[facility])    Chronic hypoxemic respiratory failure (CMS/[facility])    Essential hypertension    SSS (sick sinus syndrome) (CMS/[facility])    Stage 4 chronic kidney disease (CMS/[facility])    Anemia    1.CKD stage 4: -Baseline creatinine = 1.9-2.1. Today Cr 2.45, Bun 95. This is slightly worse from yesterday. BUN/Cr = 38.8. The patient is on lasix for fluid overload. This could be contributing to the worsening renal function. - also her BP has gotten down to 95/54 which could be contributing to prerenal etiology.   We will get a urinalysis. check FENa.  Her bicarb is elevated at 32. This could be due to loop diuretic use. Contraction alkalosis. -Consider checking ABG.     2. Anemia: Hemoglobin = 8.5, Observe.   Continue iron supplement  Transfuse if hgb less than 7     3. Acute on Chronic Diastolic heart failure : Patient is still needing diuresis.  Last echo 6/18/19 showed EF 46-50%, BNP on admission was 8913, CXR shows pulmonary congestion.   -on lasix 40 mg daily and metolazone 2.5 mg daily at home   -s/p lasix 80 mg IV in ER and lasix 40 mg IV once; kidney function worsening  -will consult nephrology for recommendations of further diuresis  with worsening renal function  -strict I/Os  -daily weights  -1500 mL fluid restriction, sodium restriction 2g                 This document has been electronically signed by Nirali Carrion, Medical Student on July 15, 2019 9:37 AM

## 2019-07-15 NOTE — PLAN OF CARE
Problem: Patient Care Overview  Goal: Plan of Care Review  Outcome: Ongoing (interventions implemented as appropriate)   07/15/19 1132   Coping/Psychosocial   Plan of Care Reviewed With patient   OTHER   Outcome Summary OT tx on this date. Pt performed B UE therex with arm bike 2 sets of 2 and 1/2 to 3 minutes with minimal resistance. She also performed UE therex with 2 lb dumbell - 1 set of 10 - 15 reps all planes. 02 level monitored during UE therex with 02 dropping to 87% after arm bike exercises. 02 up quickly after rest break. Will continue with OT services to increase endurance and strength with functional activities.

## 2019-07-15 NOTE — PROGRESS NOTES
"Anticoagulation by Pharmacy - Warfarin    Brendon Skelton is a 73 y.o.female on warfarin for atrial fibrillation  Patient is also receiving no other anticoagulation    Home regimen: 4 mg nightly  INR Goal: 2-3  Last INR:   Lab Results   Component Value Date    INR 4.66 (H) 07/15/2019       Objective:  [Ht: 165.1 cm (65\"); Wt: 119 kg (262 lb 2 oz)]  Lab Results   Component Value Date    INR 4.66 (H) 07/15/2019    INR 4.57 (H) 07/14/2019    INR 3.97 (H) 07/13/2019    PROTIME 43.7 (H) 07/15/2019    PROTIME 43.0 (H) 07/14/2019    PROTIME 38.5 (H) 07/13/2019     Lab Results   Component Value Date    HGB 8.5 (L) 07/15/2019    HGB 8.6 (L) 07/14/2019    HGB 8.9 (L) 07/13/2019    HCT 27.6 (L) 07/15/2019    HCT 27.7 (L) 07/14/2019    HCT 28.9 (L) 07/13/2019     07/15/2019     07/14/2019     07/13/2019           Assessment  Interacting medications: aspirin and citalopram  INR is SUPRAtherapeutic  Warfarin has been held since patient presented to [facility] as INR was elevated on arival  Will continue to hold warfarin and monitor for bleeding  Hemoglobin is somewhat stable 8.9->8.6->8.5  Notes indicate a recent GI bleed, but nothing acutely    Plan:  1.  HOLD warfarin 7/15  2.  Draw a PT/INR in AM  3.  Pharmacy will continue to follow    Milan Mcintosh RPH  07/15/19 10:22 AM     "

## 2019-07-15 NOTE — THERAPY TREATMENT NOTE
Acute Care - Physical Therapy Treatment Note  [facility]     Patient Name: Brendon Skelton  : 1945  MRN: [id number]  Today's Date: 7/15/2019  Onset of Illness/Injury or Date of Surgery: 19  Date of Referral to PT: 19  Referring Physician: MONICA Romero MD    Admit Date: 2019    Visit Dx:    ICD-10-CM ICD-9-CM   1. Acute on chronic congestive heart failure, unspecified heart failure type (CMS/HCC) I50.9 428.0   2. COPD exacerbation (CMS/[facility]) J44.1 491.21   3. Impaired physical mobility Z74.09 781.99   4. Impaired mobility and activities of daily living Z74.09 799.89     Patient Active Problem List   Diagnosis   • Long term current use of anticoagulant therapy   • Personal history of heart valve replacement   • Atrial fibrillation [I48.91]   • Emphysema of lung (CMS/[facility])   • On anticoagulant therapy   • Hyperlipidemia   • Diastolic heart failure (CMS/[facility])   • Depressive disorder   • COPD (chronic obstructive pulmonary disease) (CMS/[facility])   • Type 2 diabetes mellitus with stage 4 chronic kidney disease, with long-term current use of insulin (CMS/[facility])   • Myopia   • Astigmatism   • Bleeding from open wound of chest wall   • Follow-up surgery care   • Encounter for screening for malignant neoplasm of colon   • Positive colorectal cancer screening using DNA-based stool test   • Nodule of left lung   • Chronic hypoxemic respiratory failure (CMS/[facility])   • Physical deconditioning   • Heart failure with preserved left ventricular function (HFpEF) (CMS/[facility])   • Essential hypertension   • Coronary artery disease involving native coronary artery without angina pectoris   • Hx of CABG   • SSS (sick sinus syndrome) (CMS/HCC)   • Pacemaker   • Stage 4 chronic kidney disease (CMS/HCC)   • Personal history of tobacco use, presenting hazards to health   • Gastrointestinal hemorrhage   • Morbid obesity (CMS/HCC)   • Gastritis   • Colon polyp   • Coumadin toxicity   • Acute on chronic diastolic congestive  heart failure (CMS/HCC)   • Anemia       Therapy Treatment    Rehabilitation Treatment Summary     Row Name 07/15/19 1404 07/15/19 0955          Treatment Time/Intention    Discipline  physical therapy assistant  -LN  --     Document Type  therapy note (daily note) not really wanting to walk but knows she needs to  -LN  therapy note (daily note)  -RB     Subjective Information  complains of;weakness  -LN  complains of;dyspnea  -RB     Mode of Treatment  physical therapy  -LN  occupational therapy  -RB     Therapy Frequency (PT Clinical Impression)  daily  -LN  --     Therapy Frequency (OT Eval)  --  other (see comments) 5-7 days/wk.  -RB     Patient Effort  good  -LN  good  -RB     Comment  declines gt belt  -LN  --     Existing Precautions/Restrictions  fall;oxygen therapy device and L/min  -LN  fall;oxygen therapy device and L/min  -RB     Recorded by [LN] Cecy Lara, PTA 07/15/19 1515 [RB] Beto Carney, OT 07/15/19 1129     Row Name 07/15/19 1404 07/15/19 0955          Vital Signs    Pre Systolic BP Rehab  120  -LN  100  -RB     Pre Treatment Diastolic BP  82  -LN  57  -RB     Post Systolic BP Rehab  106  -LN  87  -RB     Post Treatment Diastolic BP  60  -LN  56  -RB     Pretreatment Heart Rate (beats/min)  72  -LN  100  -RB     Intratreatment Heart Rate (beats/min)  102  -LN  --     Posttreatment Heart Rate (beats/min)  95  -LN  105  -RB     Pre SpO2 (%)  92  -LN  83  -RB     O2 Delivery Pre Treatment  supplemental O2  -LN  supplemental O2  -RB     Intra SpO2 (%)  81 with 75',83% with 62'  -LN  91  -RB     O2 Delivery Intra Treatment  --  supplemental O2  -RB     Post SpO2 (%)  94  -LN  91  -RB     O2 Delivery Post Treatment  --  supplemental O2  -RB     Pre Patient Position  Supine  -LN  Sitting  -RB     Intra Patient Position  Standing  -LN  Sitting  -RB     Post Patient Position  Supine  -LN  Sitting  -RB     Recorded by [LN] Cecy Lara, PTA 07/15/19 1515 [RB] Beto Carney, OT 07/15/19 1129     [facility]  Name 07/15/19 1404 07/15/19 0955          Cognitive Assessment/Intervention- PT/OT    Orientation Status (Cognition)  oriented x 4  -LN  oriented x 4  -RB     Follows Commands (Cognition)  WFL  -LN  WFL  -RB     Recorded by [LN] Cecy Lara, PTA 07/15/19 1515 [RB] Beto Carney, OT 07/15/19 1129     Row Name 07/15/19 1404 07/15/19 0955          Bed Mobility Assessment/Treatment    Bed Mobility Assessment/Treatment  supine-sit;sit-supine  -LN  --  -RB     Supine-Sit [place] (Bed Mobility)  conditional independence  -LN  --  -RB     Sit-Supine [place] (Bed Mobility)  conditional independence  -LN  --  -RB     Recorded by [LN] Cecy Lara, PTA 07/15/19 1515 [RB] Beto Carney, OT 07/15/19 1129     Row Name 07/15/19 0955             Functional Mobility    Functional Mobility- Ind. Level  --  -RB      Recorded by [RB] Beto Carney, OT 07/15/19 1129      Row Name 07/15/19 1404 07/15/19 0955          Transfer Assessment/Treatment    Transfer Assessment/Treatment  sit-stand transfer;stand-sit transfer  -LN  --  -RB     Recorded by [LN] Cecy Lara, PTA 07/15/19 1515 [RB] Beto Carney, OT 07/15/19 1129     Row Name 07/15/19 1404 07/15/19 0955          Sit-Stand Transfer    Sit-Stand [place] (Transfers)  independent  -LN  --  -RB     Recorded by [LN] Cecy Lara, PTA 07/15/19 1515 [RB] Beto Carney, OT 07/15/19 1129     Row Name 07/15/19 1404 07/15/19 0955          Stand-Sit Transfer    Stand-Sit [place] (Transfers)  independent  -LN  --  -RB     Recorded by [LN] Cecy Lara, PTA 07/15/19 1515 [RB] Beto Carney, OT 07/15/19 1129     Row Name 07/15/19 1404             Toilet Transfer    Type (Toilet Transfer)  sit-stand;stand-sit  -LN      [place] Level (Toilet Transfer)  independent  -LN      Assistive Device (Toilet Transfer)  raised toilet seat pt went to the br x 2 during rx  -LN      Recorded by [LN] Cecy Lara, PTA 07/15/19 1519      Row Name 07/15/19 1404             Gait/Stairs  Assessment/Training    [place] Level (Gait)  supervision;independent  -LN      Assistive Device (Gait)  other (see comments) Refuses FWW.  -LN      Distance in Feet (Gait)  75,62  -LN      Deviations/Abnormal Patterns (Gait)  base of support, wide  -LN      Recorded by [LN] Ceyc Lara, PTA 07/15/19 1515      Row Name 07/15/19 0955             Lower Body Dressing Assessment/Training    Lower Body Dressing [place] Level  --  -RB      Lower Body Dressing Position  --  -RB      Recorded by [RB] Beto Carney OT 07/15/19 1129      Row Name 07/15/19 0955             BADL Safety/Performance    Impairments, BADL Safety/Performance  endurance/activity tolerance;shortness of breath  -RB      Skilled BADL Treatment/Intervention  energy conservation  -RB      Progress in BADL Status  use of compensatory strategies  -RB      Recorded by [RB] Beto Carney OT 07/15/19 1129      Row Name 07/15/19 0955             Motor Skills Assessment/Interventions    Additional Documentation  Therapeutic Exercise (Group)  -RB      Recorded by [RB] Beto Carney OT 07/15/19 1129      Row Name 07/15/19 0955             Therapeutic Exercise    Upper Extremity (Therapeutic Exercise)  bicep curl, bilateral;other (see comments) Arm bike and 2# dumbell. Shld flex and punch outs.  -RB      Upper Extremity Range of Motion (Therapeutic Exercise)  shoulder flexion/extension, bilateral;elbow flexion/extension, bilateral  -RB      Position (Therapeutic Exercise)  seated  -RB      Sets/Reps (Therapeutic Exercise)  B UE exercises 1 set of 10-15 reps for B UEs.  -RB      Comment (Therapeutic Exercise)  Arm bike with min resistance 2 sets of  2 and 1/2 to 3 minutes.  -RB      Recorded by [RB] Beto Carney OT 07/15/19 1129      Row Name 07/15/19 1404 07/15/19 0955          Positioning and Restraints    Pre-Treatment Position  --  in bed  -RB     Post Treatment Position  bed  -LN  bed  -RB     In Bed  supine;call light within reach;encouraged to  call for assist pt refuses bed alarm-nsg aware  -LN  sitting EOB;call light within reach  -RB     Recorded by [LN] Cecy Lara, PTA 07/15/19 1515 [RB] Beto Carney, OT 07/15/19 1129     Row Name 07/15/19 0955             Pain Assessment    Additional Documentation  Pain Scale: Numbers Pre/Post-Treatment (Group)  -RB      Recorded by [RB] Beto Carney, OT 07/15/19 1129      Row Name 07/15/19 1404 07/15/19 0955          Pain Scale: Numbers Pre/Post-Treatment    Pain Scale: Numbers, Pretreatment  0/10 - no pain  -LN  0/10 - no pain  -RB     Pain Scale: Numbers, Post-Treatment  0/10 - no pain  -LN  0/10 - no pain  -RB     Recorded by [LN] Cecy Lara, PTA 07/15/19 1515 [RB] Beto Carney, OT 07/15/19 1129     Row Name 07/15/19 0955             Sensory Assessment/Intervention    Sensory General Assessment  --  -RB      Recorded by [RB] Beto Carney, OT 07/15/19 1129      Row Name 07/15/19 1404 07/15/19 0955          Vision Assessment/Intervention    Visual Impairment/Limitations  corrective lenses for reading  -LN  --  -RB     Recorded by [LN] Cecy Lara, PTA 07/15/19 1515 [RB] Beto Carney, OT 07/15/19 1129     Row Name 07/15/19 1404             Plan of Care Review    Plan of Care Reviewed With  patient  -LN      Recorded by [LN] Cecy Lara, PTA 07/15/19 1515      Row Name 07/15/19 0955             Outcome Summary/Treatment Plan (OT)    Daily Summary of Progress (OT)  progress toward functional goals is gradual  -RB      Barriers to Overall Progress (OT)  SOB with activity  -RB      Plan for Continued Treatment (OT)  Cont with POC.  -RB      Anticipated Discharge Disposition (OT)  home with home health  -RB      Recorded by [RB] Beto Carney, OT 07/15/19 1129      Row Name 07/15/19 1404             Outcome Summary/Treatment Plan (PT)    Plan for Continued Treatment (PT)  cont  -LN      Anticipated Discharge Disposition (PT)  home with home health  -LN      Recorded by [LN] Cecy Lara, PTA 07/15/19 1514         User Key  (r) = Recorded By, (t) = Taken By, (c) = Cosigned By    Initials Name Effective Dates Discipline    RB RommelBeto ragland, HARVEY 06/15/16 -  OT    Cecy Loyola PTA 03/07/18 -  PT               Rehab Goal Summary     Row Name 07/15/19 5798             Physical Therapy Goals    Transfer Goal Selection (PT)  transfer, PT goal 1  -LN      Gait Training Goal Selection (PT)  gait training, PT goal 1;gait training, PT goal (free text)  -LN         Transfer Goal 1 (PT)    Activity/Assistive Device (Transfer Goal 1, PT)  sit-to-stand/stand-to-sit;bed-to-chair/chair-to-bed  -LN      [place] Level/Cues Needed (Transfer Goal 1, PT)  conditional independence  -LN      Time Frame (Transfer Goal 1, PT)  long term goal (LTG);by discharge  -LN      Barriers (Transfers Goal 1, PT)  Maintain SpO2 > 88%  -LN      Progress/Outcome (Transfer Goal 1, PT)  goal not met  -LN         Gait Training Goal 1 (PT)    Activity/Assistive Device (Gait Training Goal 1, PT)  gait (walking locomotion)  -LN      [place] Level (Gait Training Goal 1, PT)  independent  -LN      Distance (Gait Goal 1, PT)  50'x2  -LN      Time Frame (Gait Training Goal 1, PT)  long term goal (LTG);by discharge  -LN      Barriers (Gait Training Goal 1, PT)  Maintain SpO2 > 88%  -LN      Progress/Outcome (Gait Training Goal 1, PT)  goal not met  -LN         Gait Training Goal (PT)    Gait Training Goal (PT)  Tinetti fall risk, score: 27/28  -LN      Time Frame (Gait Training Goal, PT)  long term goal (LTG);by discharge  -LN      Barriers (Gait Training Goal, PT)  Decreased stepping response to perturbation.   -LN      Progress/Outcome (Gait Training Goal, PT)  goal not met  -LN        User Key  (r) = Recorded By, (t) = Taken By, (c) = Cosigned By    Initials Name Provider Type Discipline    Cecy Loyola PTA Physical Therapy Assistant PT          Physical Therapy Education     Title: PT OT SLP Therapies (In Progress)     Topic: Physical Therapy (In  Progress)     Point: Mobility training (Done)     Learning Progress Summary           Patient Acceptance, E,TB, VU by LN at 7/15/2019  3:16 PM    Acceptance, E, VU by CZ at 7/14/2019 10:27 AM    Comment:  Danotyrone assessed: 25/28 or low fall risk.  Patient demonstrates decreased stepping response to perturbation and reaches for UE support.  She would benefit from use of FWW but refuses.  Also refuses gait belt and bed alarm.                   Point: Body mechanics (Done)     Learning Progress Summary           Patient Acceptance, E,TB, VU by LN at 7/15/2019  3:16 PM                   Point: Precautions (Done)     Learning Progress Summary           Patient Acceptance, E,TB, VU by LN at 7/15/2019  3:16 PM                               User Key     Initials Effective Dates Name Provider Type Discipline    LN 03/07/18 -  Cecy Lara, PTA Physical Therapy Assistant PT    CZ 04/03/18 -  Titi Portillo, PT Physical Therapist PT                PT Recommendation and Plan  Anticipated Discharge Disposition (PT): home with home health  Therapy Frequency (PT Clinical Impression): daily  Outcome Summary/Treatment Plan (PT)  Plan for Continued Treatment (PT): cont  Anticipated Discharge Disposition (PT): home with home health  Plan of Care Reviewed With: patient  Outcome Summary: sup-sit-sup cond ind,sit-stand-sit ind,amb 75,62 w/o ad and sba of 1,sats dropped to 81-83% with gt-no goals met  Outcome Measures     Row Name 07/15/19 1404 07/15/19 0955 07/14/19 0910       How much help from another person do you currently need...    Turning from your back to your side while in flat bed without using bedrails?  4  -LN  --  4  -CZ    Moving from lying on back to sitting on the side of a flat bed without bedrails?  4  -LN  --  4  -CZ    Moving to and from a bed to a chair (including a wheelchair)?  4  -LN  --  3  -CZ    Standing up from a chair using your arms (e.g., wheelchair, bedside chair)?  4  -LN  --  3  -CZ    Climbing 3-5  "steps with a railing?  3  -LN  --  3  -CZ    To walk in hospital room?  3  -LN  --  3  -CZ    AM-[facility] 6 Clicks Score (PT)  22  -LN  --  20  -CZ       How much help from another is currently needed...    Putting on and taking off regular lower body clothing?  --  2  -RB  --    Bathing (including washing, rinsing, and drying)  --  3  -RB  --    Toileting (which includes using toilet bed pan or urinal)  --  3  -RB  --    Putting on and taking off regular upper body clothing  --  4  -RB  --    Taking care of personal grooming (such as brushing teeth)  --  4  -RB  --    Eating meals  --  4  -RB  --    AM-[facility] 6 Clicks Score (OT)  --  20  -RB  --       Tinetti Assessment    Tinetti Assessment  --  --  yes  -CZ    Sitting Balance  --  --  1  -CZ    Arises  --  --  2  -CZ    Attempts to Rise  --  --  2  -CZ    Immediate Standing Balance (first 5 sec)  --  --  2  -CZ    Standing Balance  --  --  2  -CZ    Sternal Nudge (feet close together)  --  --  1  -CZ    Eyes Closed (feet close together)  --  --  1  -CZ    Turning 360 Degrees- Steps  --  --  1  -CZ    Turning 360 Degrees- Steadiness  --  --  1  -CZ    Sitting Down  --  --  2  -CZ    Tinetti Balance Score  --  --  15  -CZ    Gait Initiation (immediate after told \"go\")  --  --  1  -CZ    Step Length- Right Swing  --  --  1  -CZ    Step Length- Left Swing  --  --  1  -CZ    Foot Clearance- Right Foot  --  --  1  -CZ    Foot Clearance- Left Foot  --  --  1  -CZ    Step Symmetry  --  --  1  -CZ    Step Continuity  --  --  1  -CZ    Path (excursion)  --  --  1  -CZ    Trunk  --  --  1  -CZ    Base of Support  --  --  1  -CZ    Gait Score  --  --  10  -CZ    Tinetti Total Score  --  --  25  -CZ    Tinetti Assistive Device  --  --  -- No AD.  -CZ    Tinetti Assessment Comments  --  --  Decreased stepping response to perturbation.   -CZ       Functional Assessment    Outcome Measure Options  AM-[facility] 6 Clicks Basic Mobility (PT)  -LN  AM-[facility] 6 Clicks Daily Activity (OT)  -RB  AM-PAC " 6 Clicks Basic Mobility (PT);Tinetti  -CZ      User Key  (r) = Recorded By, (t) = Taken By, (c) = Cosigned By    Initials Name Provider Type    RB Beto Carney, OT Occupational Therapist    LN Cecy Lara, PTA Physical Therapy Assistant    CZ Titi Portillo, PT Physical Therapist         Time Calculation:   PT Charges     Row Name 07/15/19 1517             Time Calculation    Start Time  1404  -LN      Stop Time  1507  -LN      Time Calculation (min)  63 min  -LN      PT Received On  07/15/19  -LN         Time Calculation- PT    Total Timed Code Minutes- PT  63 minute(s)  -LN        User Key  (r) = Recorded By, (t) = Taken By, (c) = Cosigned By    Initials Name Provider Type    Cecy Loyola PTA Physical Therapy Assistant        Therapy Charges for Today     Code Description Service Date Service Provider Modifiers Qty    46418831766 HC GAIT TRAINING EA 15 MIN 7/15/2019 Cecy Lara, YOSEPH GP 1    95465399158 HC PT THERAPEUTIC ACT EA 15 MIN 7/15/2019 Cecy Lara PTA GP 3          PT G-Codes  Outcome Measure Options: AM-PAC 6 Clicks Basic Mobility (PT)  AM-PAC 6 Clicks Score (PT): 22  AM-PAC 6 Clicks Score (OT): 20  Tinetti Total Score: 25    Cecy Lara PTA  7/15/2019

## 2019-07-15 NOTE — THERAPY TREATMENT NOTE
Acute Care - Occupational Therapy Treatment Note  [facility]     Patient Name: Brendon Skelton  : 1945  MRN: [id number]  Today's Date: 7/15/2019  Onset of Illness/Injury or Date of Surgery: 19  Date of Referral to OT: 19  Referring Physician: MONICA Romero MD    Admit Date: 2019       ICD-10-CM ICD-9-CM   1. Acute on chronic congestive heart failure, unspecified heart failure type (CMS/HCC) I50.9 428.0   2. COPD exacerbation (CMS/[facility]) J44.1 491.21   3. Impaired physical mobility Z74.09 781.99   4. Impaired mobility and activities of daily living Z74.09 799.89     Patient Active Problem List   Diagnosis   • Long term current use of anticoagulant therapy   • Personal history of heart valve replacement   • Atrial fibrillation [I48.91]   • Emphysema of lung (CMS/[facility])   • On anticoagulant therapy   • Hyperlipidemia   • Diastolic heart failure (CMS/[facility])   • Depressive disorder   • COPD (chronic obstructive pulmonary disease) (CMS/[facility])   • Type 2 diabetes mellitus with stage 4 chronic kidney disease, with long-term current use of insulin (CMS/[facility])   • Myopia   • Astigmatism   • Bleeding from open wound of chest wall   • Follow-up surgery care   • Encounter for screening for malignant neoplasm of colon   • Positive colorectal cancer screening using DNA-based stool test   • Nodule of left lung   • Chronic hypoxemic respiratory failure (CMS/HCC)   • Physical deconditioning   • Heart failure with preserved left ventricular function (HFpEF) (CMS/[facility])   • Essential hypertension   • Coronary artery disease involving native coronary artery without angina pectoris   • Hx of CABG   • SSS (sick sinus syndrome) (CMS/HCC)   • Pacemaker   • Stage 4 chronic kidney disease (CMS/HCC)   • Personal history of tobacco use, presenting hazards to health   • Gastrointestinal hemorrhage   • Morbid obesity (CMS/HCC)   • Gastritis   • Colon polyp   • Coumadin toxicity   • Acute on chronic diastolic congestive heart  failure (CMS/[facility])   • Anemia     Past Medical History:   Diagnosis Date   • Acute bronchitis    • Anxiety    • Atrial fibrillation (CMS/[facility])    • C. difficile colitis    • Callosity     under metatarsal head      • Cardiac pacemaker in situ    • CHF (congestive heart failure) (CMS/[facility])    • Chronic obstructive lung disease (CMS/HCC)    • Corns and callus    • Depressive disorder    • Diabetes mellitus (CMS/[facility])    • Diastolic heart failure (CMS/[facility])    • Essential hypertension    • Foot pain    • Hyperlipidemia    • Long term current use of anticoagulant    • On anticoagulant therapy    • Pulmonary emphysema (CMS/[facility])    • Rectal hemorrhage    • Type 2 diabetes mellitus (CMS/[facility])      Past Surgical History:   Procedure Laterality Date   • AORTIC VALVE REPAIR/REPLACEMENT  1999   • CARDIAC ELECTROPHYSIOLOGY PROCEDURE N/A 3/20/2017    Procedure: PPM generator change - dual;  Surgeon: Bereket Gates MD;  Location: [facility] CATH INVASIVE LOCATION;  Service:    • CARDIAC PACEMAKER PLACEMENT  1999   • CATARACT EXTRACTION W/ INTRAOCULAR LENS IMPLANT Left 2/1/2019    Procedure: REMOVE CATARACT AND IMPLANT INTRAOCULAR LENS I;  Surgeon: Jose L Clay MD;  Location: [facility] OR;  Service: Ophthalmology   • CATARACT EXTRACTION W/ INTRAOCULAR LENS IMPLANT Right 2/8/2019    Procedure: REMOVE CATARACT AND IMPLANT  INTRAOCULAR LENS;  Surgeon: Jose L Clay MD;  Location: [facility] OR;  Service: Ophthalmology   • COLONOSCOPY N/A 6/19/2019    Procedure: COLONOSCOPY WITH CONTROL OF BLEED;  Surgeon: Alfredo Guerrero MD;  Location: [facility] ENDOSCOPY;  Service: Gastroenterology   • COLONOSCOPY N/A 6/24/2019    Procedure: COLONOSCOPY;  Surgeon: Alfredo Guerrero MD;  Location: [facility] ENDOSCOPY;  Service: Gastroenterology   • ECHO - CONVERTED  11/12/2013    There is mild to moderate left atrial enlargement EF 50-55%   • ENDOSCOPY N/A 6/19/2019    Procedure: ESOPHAGOGASTRODUODENOSCOPY WITH CONTROL OF BLEED;  Surgeon: Yolanda  Alfredo MARIN MD;  Location: [facility] ENDOSCOPY;  Service: Gastroenterology   • FOOT SURGERY  1990   • OTHER SURGICAL HISTORY  10/26/2015    PARING CORN/CALLUS    • PACEMAKER REPLACEMENT N/A 3/21/2017    Procedure: revision pacemaker pocket, evacuation hematoma, control of bleeding;  Surgeon: Polo Feliz MD;  Location: [facility] OR;  Service:    • TRANSESOPHAGEAL ECHOCARDIOGRAM (MITZY)  05/01/2014    With color flow-Mild left atrial enlargement with mild concentric LV hypertrophy with top normal aortic root size. EF 45-50%. Mild mitral regurgitation and mild aortic insufficiency and trivial amount of tricuspid regurgation   • TUBAL ABDOMINAL LIGATION         Therapy Treatment    Rehabilitation Treatment Summary     Row Name 07/15/19 0955             Treatment Time/Intention    Document Type  therapy note (daily note)  -RB      Subjective Information  complains of;dyspnea  -RB      Mode of Treatment  occupational therapy  -RB      Therapy Frequency (OT Eval)  other (see comments) 5-7 days/wk.  -RB      Patient Effort  good  -RB      Existing Precautions/Restrictions  fall;oxygen therapy device and L/min  -RB      Recorded by [RB] Beto Carney OT 07/15/19 1129      Row Name 07/15/19 0955             Vital Signs    Pre Systolic BP Rehab  100  -RB      Pre Treatment Diastolic BP  57  -RB      Post Systolic BP Rehab  87  -RB      Post Treatment Diastolic BP  56  -RB      Pretreatment Heart Rate (beats/min)  100  -RB      Posttreatment Heart Rate (beats/min)  105  -RB      Pre SpO2 (%)  83  -RB      O2 Delivery Pre Treatment  supplemental O2  -RB      Intra SpO2 (%)  91  -RB      O2 Delivery Intra Treatment  supplemental O2  -RB      Post SpO2 (%)  91  -RB      O2 Delivery Post Treatment  supplemental O2  -RB      Pre Patient Position  Sitting  -RB      Intra Patient Position  Sitting  -RB      Post Patient Position  Sitting  -RB      Recorded by [RB] Beto Carney, OT 07/15/19 1129      Row Name 07/15/19 0955              Cognitive Assessment/Intervention- PT/OT    Orientation Status (Cognition)  oriented x 4  -RB      Follows Commands (Cognition)  WFL  -RB      Recorded by [RB] Beto Carney OT 07/15/19 1129      Row Name 07/15/19 0955             Bed Mobility Assessment/Treatment    Bed Mobility Assessment/Treatment  --  -RB      Supine-Sit [place] (Bed Mobility)  --  -RB      Sit-Supine [place] (Bed Mobility)  --  -RB      Recorded by [RB] Beto Carney OT 07/15/19 1129      Row Name 07/15/19 0955             Functional Mobility    Functional Mobility- Ind. Level  --  -RB      Recorded by [RB] Beto Carney OT 07/15/19 1129      Row Name 07/15/19 0955             Transfer Assessment/Treatment    Transfer Assessment/Treatment  --  -RB      Recorded by [RB] Beto Carney OT 07/15/19 1129      Row Name 07/15/19 0955             Sit-Stand Transfer    Sit-Stand [place] (Transfers)  --  -RB      Recorded by [RB] Beto Carney OT 07/15/19 1129      Row Name 07/15/19 0955             Stand-Sit Transfer    Stand-Sit [place] (Transfers)  --  -RB      Recorded by [RB] Beto Carney OT 07/15/19 1129      Row Name 07/15/19 0955             Lower Body Dressing Assessment/Training    Lower Body Dressing [place] Level  --  -RB      Lower Body Dressing Position  --  -RB      Recorded by [RB] Beto Carney OT 07/15/19 1129      Row Name 07/15/19 0955             BADL Safety/Performance    Impairments, BADL Safety/Performance  endurance/activity tolerance;shortness of breath  -RB      Skilled BADL Treatment/Intervention  energy conservation  -RB      Progress in BADL Status  use of compensatory strategies  -RB      Recorded by [RB] Beto Carney OT 07/15/19 1129      Row Name 07/15/19 0955             Motor Skills Assessment/Interventions    Additional Documentation  Therapeutic Exercise (Group)  -RB      Recorded by [RB] Beto Carney OT 07/15/19 1129      Row Name 07/15/19 0955             Therapeutic Exercise     Upper Extremity (Therapeutic Exercise)  bicep curl, bilateral;other (see comments) Arm bike and 2# dumbell. Shld flex and punch outs.  -RB      Upper Extremity Range of Motion (Therapeutic Exercise)  shoulder flexion/extension, bilateral;elbow flexion/extension, bilateral  -RB      Position (Therapeutic Exercise)  seated  -RB      Sets/Reps (Therapeutic Exercise)  B UE exercises 1 set of 10-15 reps for B UEs.  -RB      Comment (Therapeutic Exercise)  Arm bike with min resistance 2 sets of  2 and 1/2 to 3 minutes.  -RB      Recorded by [RB] Beto Carney OT 07/15/19 1129      Row Name 07/15/19 0955             Positioning and Restraints    Pre-Treatment Position  in bed  -RB      Post Treatment Position  bed  -RB      In Bed  sitting EOB;call light within reach  -RB      Recorded by [RB] Beto Carney OT 07/15/19 1129      Row Name 07/15/19 0955             Pain Assessment    Additional Documentation  Pain Scale: Numbers Pre/Post-Treatment (Group)  -RB      Recorded by [RB] Beto Carney OT 07/15/19 1129      Row Name 07/15/19 0955             Pain Scale: Numbers Pre/Post-Treatment    Pain Scale: Numbers, Pretreatment  0/10 - no pain  -RB      Pain Scale: Numbers, Post-Treatment  0/10 - no pain  -RB      Recorded by [RB] Beto Carney OT 07/15/19 1129      Row Name 07/15/19 0955             Sensory Assessment/Intervention    Sensory General Assessment  --  -RB      Recorded by [RB] Beto Carney OT 07/15/19 1129      Row Name 07/15/19 0955             Vision Assessment/Intervention    Visual Impairment/Limitations  --  -RB      Recorded by [RB] Beto Carney OT 07/15/19 1129      Row Name 07/15/19 0955             Outcome Summary/Treatment Plan (OT)    Daily Summary of Progress (OT)  progress toward functional goals is gradual  -RB      Barriers to Overall Progress (OT)  SOB with activity  -RB      Plan for Continued Treatment (OT)  Cont with POC.  -RB      Anticipated Discharge Disposition (OT)  home with home  health  -RB      Recorded by [RB] Beto Carney OT 07/15/19 1129        User Key  (r) = Recorded By, (t) = Taken By, (c) = Cosigned By    Initials Name Effective Dates Discipline    RB Beto Carney OT 06/15/16 -  OT             Occupational Therapy Education     Title: PT OT SLP Therapies (In Progress)     Topic: Occupational Therapy (In Progress)     Point: Precautions (Done)     Description: Instruct learner(s) on prescribed precautions during self-care and functional transfers.    Learning Progress Summary           Patient Acceptance, E, VU by RB at 7/15/2019 11:31 AM    Comment:  Reviewed use of non skid socks when OOB and use of gait belt for distance ambulation.    Acceptance, E, VU by RB at 7/14/2019  2:02 PM    Comment:  Edu pt on  use of non skid socks when OOB.                               User Key     Initials Effective Dates Name Provider Type Discipline     06/15/16 -  Beto Carney OT Occupational Therapist OT                OT Recommendation and Plan  Outcome Summary/Treatment Plan (OT)  Daily Summary of Progress (OT): progress toward functional goals is gradual  Barriers to Overall Progress (OT): SOB with activity  Plan for Continued Treatment (OT): Cont with POC.  Anticipated Discharge Disposition (OT): home with home health  Planned Therapy Interventions (OT Eval): activity tolerance training, adaptive equipment training, BADL retraining, functional balance retraining, occupation/activity based interventions, transfer/mobility retraining, strengthening exercise, ROM/therapeutic exercise, patient/caregiver education/training  Therapy Frequency (OT Eval): other (see comments)(5-7 days/wk.)  Daily Summary of Progress (OT): progress toward functional goals is gradual  Plan of Care Review  Plan of Care Reviewed With: patient  Plan of Care Reviewed With: patient  Outcome Summary: OT tx on this date.  Pt performed B UE therex with arm bike 2 sets of 2 and 1/2 to 3 minutes with minimal resistance.   She also performed UE therex with 2 lb dumbell - 1 set of 10 - 15 reps all planes.  02 level monitored during UE therex with 02 dropping to 87% after arm bike exercises.  02 up quickly after rest break.  Will continue with OT services to increase endurance and strength with functional activities.  Outcome Measures     Row Name 07/15/19 0955 07/14/19 0910          How much help from another person do you currently need...    Turning from your back to your side while in flat bed without using bedrails?  --  4  -CZ     Moving from lying on back to sitting on the side of a flat bed without bedrails?  --  4  -CZ     Moving to and from a bed to a chair (including a wheelchair)?  --  3  -CZ     Standing up from a chair using your arms (e.g., wheelchair, bedside chair)?  --  3  -CZ     Climbing 3-5 steps with a railing?  --  3  -CZ     To walk in hospital room?  --  3  -CZ     AM-PAC 6 Clicks Score (PT)  --  20  -CZ        How much help from another is currently needed...    Putting on and taking off regular lower body clothing?  2  -RB  --     Bathing (including washing, rinsing, and drying)  3  -RB  --     Toileting (which includes using toilet bed pan or urinal)  3  -RB  --     Putting on and taking off regular upper body clothing  4  -RB  --     Taking care of personal grooming (such as brushing teeth)  4  -RB  --     Eating meals  4  -RB  --     AM-PAC 6 Clicks Score (OT)  20  -RB  --        Tinetti Assessment    Tinetti Assessment  --  yes  -CZ     Sitting Balance  --  1  -CZ     Arises  --  2  -CZ     Attempts to Rise  --  2  -CZ     Immediate Standing Balance (first 5 sec)  --  2  -CZ     Standing Balance  --  2  -CZ     Sternal Nudge (feet close together)  --  1  -CZ     Eyes Closed (feet close together)  --  1  -CZ     Turning 360 Degrees- Steps  --  1  -CZ     Turning 360 Degrees- Steadiness  --  1  -CZ     Sitting Down  --  2  -CZ     Tinetti Balance Score  --  15  -CZ     Gait Initiation (immediate after told  "\"go\")  --  1  -CZ     Step Length- Right Swing  --  1  -CZ     Step Length- Left Swing  --  1  -CZ     Foot Clearance- Right Foot  --  1  -CZ     Foot Clearance- Left Foot  --  1  -CZ     Step Symmetry  --  1  -CZ     Step Continuity  --  1  -CZ     Path (excursion)  --  1  -CZ     Trunk  --  1  -CZ     Base of Support  --  1  -CZ     Gait Score  --  10  -CZ     Tinetti Total Score  --  25  -CZ     Tinetti Assistive Device  --  -- No AD.  -CZ     Tinetti Assessment Comments  --  Decreased stepping response to perturbation.   -CZ        Functional Assessment    Outcome Measure Options  AM-PAC 6 Clicks Daily Activity (OT)  -RB  AM-PAC 6 Clicks Basic Mobility (PT);Tinetti  -CZ       User Key  (r) = Recorded By, (t) = Taken By, (c) = Cosigned By    Initials Name Provider Type    Beto Jackson OT Occupational Therapist    CZ Titi Portillo, PT Physical Therapist           Time Calculation:   Time Calculation- OT     Row Name 07/15/19 1139             Time Calculation- OT    OT Start Time  0955  -RB      OT Stop Time  1037  -RB      OT Time Calculation (min)  42 min  -RB      OT Received On  07/15/19  -RB        User Key  (r) = Recorded By, (t) = Taken By, (c) = Cosigned By    Initials Name Provider Type    Beto Jackson OT Occupational Therapist        Therapy Charges for Today     Code Description Service Date Service Provider Modifiers Qty    [id number]  OT EVAL MOD COMPLEXITY 2 7/14/2019 Beto Carney OT GO 1    [id number]  OT MANUAL THERAPY EA 15 MIN 7/15/2019 Beto Carney OT GO 3               Beto Carney OT  7/15/2019  "

## 2019-07-15 NOTE — PLAN OF CARE
Problem: Patient Care Overview  Goal: Plan of Care Review   07/15/19 1301 07/15/19 1516   Coping/Psychosocial   Plan of Care Reviewed With --  patient   Plan of Care Review   Progress no change --    OTHER   Outcome Summary --  sup-sit-sup cond ind,sit-stand-sit ind,amb 75,62 w/o ad and sba of 1,sats dropped to 81-83% with gt-no goals met     Goal: Discharge Needs Assessment   07/14/19 1328   Discharge Needs Assessment   Readmission Within the Last 30 Days no previous admission in last 30 days   Concerns to be Addressed denies needs/concerns at this time   Patient/Family Anticipates Transition to home with help/services  (Patient reports that her son and daughter-in-law will be assisting her at d/c. )   Patient/Family Anticipated Services at Transition home health care   Transportation Concerns car, none   Transportation Anticipated family or friend will provide;other (see comments)  (Patient voiced that she uses [facility] for MD appointments. Patient stated that her son will pick her up at d/c. She voiced that he does not own a vehicle but will use a friend's vehicle at d/c. )   Anticipated Changes Related to Illness none   Equipment Needed After Discharge none   Discharge Facility/Level of Care Needs home with home health   Offered/Gave Vendor List yes   Patient's Choice of Community Agency(s) [facility] home health    Current Discharge Risk chronically ill   Disability   Equipment Currently Used at Home glucometer;nebulizer;oxygen;other (see comments);shower chair  (Patient has home/portable oxygen provided by [employer]. ROSALVAK confirmed with patient that home/portable oxygen is available for use at d/c. )

## 2019-07-15 NOTE — PROGRESS NOTES
FAMILY MEDICINE DAILY PROGRESS NOTE  NAME: Brendon Skelton  : 1945  MRN: [id number]      LOS: 0 days     PROVIDER OF SERVICE: Kenna Chawla MD    Chief Complaint: Acute on chronic diastolic congestive heart failure (CMS/HCC)    Subjective:     Interval History:  History taken from: patient chart    No acute events overnight. Patient reports she feels like her breathing, KYLAH, and abdominal distension are much better. She is up 2 pounds overnight. Baseline weight appears to be 250. Due to worsening renal function and weight gain overnight despite symptomatic improvement, will consult nephrology for further recommendations. Would like for patient to be closer to baseline weight prior to discharge even though symptoms have improved.    Review of Systems:   Review of Systems   Constitutional: Negative for activity change, appetite change, chills, fatigue and fever.   HENT: Negative for hearing loss, sneezing, sore throat and trouble swallowing.    Eyes: Negative for visual disturbance.   Respiratory: Positive for shortness of breath (chronic, improving). Negative for cough and chest tightness.    Cardiovascular: Positive for leg swelling (improving). Negative for chest pain and palpitations.   Gastrointestinal: Positive for abdominal distention (improving). Negative for abdominal pain, blood in stool, constipation, diarrhea, nausea and vomiting.   Genitourinary: Negative for difficulty urinating and dysuria.   Musculoskeletal: Negative for arthralgias and back pain.   Skin: Negative for pallor and rash.   Allergic/Immunologic: Negative for environmental allergies and food allergies.   Neurological: Negative for dizziness, light-headedness, numbness and headaches.   Psychiatric/Behavioral: Negative for agitation, confusion, hallucinations and suicidal ideas.       Objective:     Vital Signs  Temp:  [97.3 °F (36.3 °C)-97.9 °F (36.6 °C)] 97.7 °F (36.5 °C)  Heart Rate:  [] 67  Resp:  [16-20] 16  BP:  ()/(54-76) 119/68  Body mass index is 43.62 kg/m².        07/14/19  0525 07/15/19  0442   Weight: 118 kg (260 lb 4.8 oz) 119 kg (262 lb 2 oz)       Physical Exam  Physical Exam   Constitutional: She is oriented to person, place, and time. She appears well-developed and well-nourished. No distress.   HENT:   Head: Normocephalic and atraumatic.   Right Ear: External ear normal.   Left Ear: External ear normal.   Neck: Normal range of motion. Neck supple.   Cardiovascular: Normal rate, regular rhythm and normal heart sounds.   Pulmonary/Chest: No respiratory distress. She has no wheezes. She has no rales.   Diminished breath sounds, pursed lip breathing   Abdominal: Soft. Bowel sounds are normal. She exhibits no distension. There is no tenderness.   Musculoskeletal: Normal range of motion. She exhibits edema (+1 pitting edema lower extremities bilaterally).   Neurological: She is alert and oriented to person, place, and time. She has normal reflexes.   Skin: Skin is warm and dry. She is not diaphoretic. No erythema.   Psychiatric: She has a normal mood and affect. Her behavior is normal.       Medication Review    Current Facility-Administered Medications:   •  acetaminophen (TYLENOL) tablet 650 mg, 650 mg, Oral, Q4H PRN, Kenna Chawla MD, 650 mg at 07/14/19 2120  •  albuterol (PROVENTIL) nebulizer solution 0.083% 2.5 mg/3mL, 2.5 mg, Nebulization, Q4H PRN, Kenna Chawla MD  •  aspirin chewable tablet 81 mg, 81 mg, Oral, Daily, Kenna Chawla MD, 81 mg at 07/15/19 0816  •  cholecalciferol (VITAMIN D3) tablet 2,000 Units, 2,000 Units, Oral, Daily, Kenna Chawla MD, 2,000 Units at 07/15/19 0817  •  citalopram (CeleXA) tablet 20 mg, 20 mg, Oral, Daily, Kenna Chawla MD, 20 mg at 07/15/19 0817  •  dextrose (D50W) 25 g/ 50mL Intravenous Solution 25 g, 25 g, Intravenous, Q15 Min PRN, Kenna Chawla MD  •  dextrose (GLUTOSE) oral gel 15 g, 15 g, Oral, Q15 Min PRN, Kenna Chawla MD  •  diltiaZEM  CD (CARDIZEM CD) 24 hr capsule 240 mg, 240 mg, Oral, Daily, Kenna Chawla MD, 240 mg at 07/15/19 0816  •  docusate sodium (COLACE) capsule 100 mg, 100 mg, Oral, BID PRN, Kenna Chawla MD  •  famotidine (PEPCID) tablet 20 mg, 20 mg, Oral, Daily, Kenna Chawla MD, 20 mg at 07/15/19 0816  •  ferrous sulfate EC tablet 324 mg, 324 mg, Oral, Daily With Breakfast, Kenna Chawla MD, 324 mg at 07/15/19 0817  •  glucagon (human recombinant) (GLUCAGEN DIAGNOSTIC) injection 1 mg, 1 mg, Subcutaneous, PRN, Kenna Chawla MD  •  guaiFENesin (MUCINEX) 12 hr tablet 600 mg, 600 mg, Oral, Q12H, Hermann Richardson MD, 600 mg at 07/15/19 0816  •  insulin aspart (novoLOG) injection 0-7 Units, 0-7 Units, Subcutaneous, 4x Daily AC & at Bedtime, Kenna Chawla MD, 4 Units at 07/14/19 2121  •  insulin detemir (LEVEMIR) injection 25 Units, 25 Units, Subcutaneous, Nightly, Kenna Chawla MD, 25 Units at 07/14/19 2121  •  ipratropium-albuterol (DUO-NEB) nebulizer solution 3 mL, 3 mL, Nebulization, Q8H - RT, Kenna Chawla MD, 3 mL at 07/15/19 0648  •  nystatin (MYCOSTATIN) powder, , Topical, Q12H, Kenna Chawla MD  •  ondansetron (ZOFRAN) tablet 4 mg, 4 mg, Oral, Q6H PRN, Kenna Chawla MD  •  Pharmacy to dose warfarin, , Does not apply, Continuous PRN, Kenna Chawla MD  •  prenatal vitamin 27-0.8 tablet 1 tablet, 1 tablet, Oral, Daily, Kenna Chawla MD, 1 tablet at 07/15/19 0817  •  rOPINIRole (REQUIP) tablet 0.25 mg, 0.25 mg, Oral, Nightly, Kenna Chawla MD, 0.25 mg at 07/14/19 2120  •  sodium chloride 0.9 % flush 10 mL, 10 mL, Intravenous, PRN, Gilles Griffin MD  •  sodium chloride 0.9 % flush 3 mL, 3 mL, Intravenous, Q12H, Kenna Chawla MD, 3 mL at 07/15/19 0819  •  sodium chloride 0.9 % flush 3-10 mL, 3-10 mL, Intravenous, PRN, Kenna Chawla MD  •  traZODone (DESYREL) tablet 300 mg, 300 mg, Oral, Nightly, Kenna Chawla MD, 300 mg at 07/14/19 2120  •  vitamin C (ASCORBIC  ACID) tablet 500 mg, 500 mg, Oral, Daily, Kenna Chawla MD, 500 mg at 07/15/19 0816     Diagnostic Data    Lab Results (last 24 hours)     Procedure Component Value Units Date/Time    POC Glucose Once [[id number]]  (Abnormal) Collected:  07/15/19 0615    Specimen:  Blood Updated:  07/15/19 0628     Glucose 64 mg/dL      Comment: : [id number] EITAN BHATTNAMeter ID: [id number]       Basic Metabolic Panel [[id number]]  (Abnormal) Collected:  07/15/19 0535    Specimen:  Blood Updated:  07/15/19 0625     Glucose 57 mg/dL      BUN 95 mg/dL      Creatinine 2.45 mg/dL      Sodium 145 mmol/L      Potassium 3.5 mmol/L      Chloride 99 mmol/L      CO2 32.0 mmol/L      Calcium 8.6 mg/dL      eGFR Non African Amer 19 mL/min/1.73      BUN/Creatinine Ratio 38.8     Anion Gap 14.0 mmol/L     Narrative:       GFR Normal >60  Chronic Kidney Disease <60  Kidney Failure <15    CBC & Differential [[id number]] Collected:  07/15/19 0535    Specimen:  Blood Updated:  07/15/19 0615    Narrative:       The following orders were created for panel order CBC & Differential.  Procedure                               Abnormality         Status                     ---------                               -----------         ------                     CBC Auto Differential[[id number]]        Abnormal            Final result                 Please view results for these tests on the individual orders.    CBC Auto Differential [[id number]]  (Abnormal) Collected:  07/15/19 0535    Specimen:  Blood Updated:  07/15/19 0615     WBC 8.42 10*3/mm3      RBC 2.78 10*6/mm3      Hemoglobin 8.5 g/dL      Hematocrit 27.6 %      MCV 99.3 fL      MCH 30.6 pg      MCHC 30.8 g/dL      RDW 20.9 %      RDW-SD 74.0 fl      MPV 11.8 fL      Platelets 316 10*3/mm3      Neutrophil % 81.7 %      Lymphocyte % 6.9 %      Monocyte % 8.2 %      Eosinophil % 2.4 %      Basophil % 0.2 %      Immature Grans % 0.6 %      Neutrophils, Absolute 6.88 10*3/mm3       Lymphocytes, Absolute 0.58 10*3/mm3      Monocytes, Absolute 0.69 10*3/mm3      Eosinophils, Absolute 0.20 10*3/mm3      Basophils, Absolute 0.02 10*3/mm3      Immature Grans, Absolute 0.05 10*3/mm3      nRBC 0.0 /100 WBC     Protime-INR [[id number]]  (Abnormal) Collected:  07/15/19 0535    Specimen:  Blood Updated:  07/15/19 0615     Protime 43.7 Seconds      INR 4.66    Narrative:       Therapeutic range for most indications is 2.0-3.0 INR,  or 2.5-3.5 for mechanical heart valves.    POC Glucose Once [[id number]]  (Abnormal) Collected:  07/14/19 1947    Specimen:  Blood Updated:  07/14/19 2009     Glucose 273 mg/dL      Comment: RN NotifiedOperator: [id number] EITAN MONTANOWNAMeter ID: [id number]       POC Glucose Once [[id number]]  (Normal) Collected:  07/14/19 1635    Specimen:  Blood Updated:  07/14/19 1648     Glucose 120 mg/dL      Comment: RN NotifiedOperator: [id number] DEONNA CRABTREEMeter ID: [id number]       POC Glucose Once [[id number]]  (Abnormal) Collected:  07/14/19 1018    Specimen:  Blood Updated:  07/14/19 1034     Glucose 220 mg/dL      Comment: Sliding Scale AdminRN NotifiedOperator: [id number] SUMMERS ANDREAMeter ID: [id number]               I reviewed the patient's new clinical results.    Assessment/Plan:     Active Hospital Problems    Diagnosis POA   • **Acute on chronic diastolic congestive heart failure (CMS/HCC) [I50.33] Yes     -last echo 6/18/19 shows EF of 46-50% with normal diastolic dysfunction, improved from echo in 2017 which showed grade 1 diastolic dysfunction  -BNP on admission was 8913, CXR shows pulmonary congestion  -on lasix 40 mg daily and metolazone 2.5 mg daily at home   -s/p lasix 80 mg IV in ER and lasix 40 mg IV once; kidney function worsening  -will consult nephrology for recommendations of further diuresis with worsening renal function  -strict I/Os  -daily weights  -1500 mL fluid restriction, sodium restriction 2g     • Anemia [D64.9] Yes     -H/H on admission  8.9/28.9, trend with daily CBC  -stable  -recent GI bleed requiring transfusion of 1 unit of PRBCs  -on oral iron supplement at home  -continue oral iron supplement  -transfuse for hemoglobin less than 7     • Stage 4 chronic kidney disease (CMS/[facility]) [N18.4] Yes     -Baseline creatinine ~ 1.9-2.1  -trend renal function  -follows with Dr. Caputo outpatient  -avoid nephrotoxic agents  -will consult Dr. Caputo as her renal function is continuing to worsen and she is still needing diuresis     • SSS (sick sinus syndrome) (CMS/[facility]) [I49.5] Yes     -atrial fibrillation with rate of 85 on EKG in ER  -pacemaker in place  -continue with cardizem  -telemetry  -pharm to dose coumadin     • Essential hypertension [I10] Yes     -continue lasix, cardizem     • Chronic hypoxemic respiratory failure (CMS/[facility]) [J96.11] Yes     -on 3L O2 via nasal cannula at home  -ABG in ER shows pO2 of 60.9, pCO2 of 44.7  -will continue oxygen via nasal cannula at home rate     • Type 2 diabetes mellitus with stage 4 chronic kidney disease, with long-term current use of insulin (CMS/[facility]) [E11.22, N18.4, Z79.4] Not Applicable     -on basaglar 30 units at night  -continue, adjust as necessary     • Emphysema of lung (CMS/[facility]) [J43.9] Yes     -continue with supplemental oxygen  -continue albuterol nebs  -symbicort, duonebs      • Atrial fibrillation [I48.91] [I48.91] Yes     -EKG in ER shows atrial fibrillation with rate of 85  -pacemaker in place  -continue cardizem  -telemetry  -pharm to dose coumadin         DVT prophylaxis: warfarin  Code status is   Code Status and Medical Interventions:   Ordered at: 07/13/19 8872     Level Of Support Discussed With:    Patient     Code Status:    CPR     Medical Interventions (Level of Support Prior to Arrest):    Full       Plan for disposition: Anticipate discharge back to SNF in 1-3 days pending further diuresis      Time: < 30 mins        This document has been electronically signed by Kenna Chawla MD  on July 15, 2019 8:31 AM

## 2019-07-15 NOTE — PLAN OF CARE
Problem: Fall Risk (Adult)  Goal: Absence of Fall  Outcome: Ongoing (interventions implemented as appropriate)      Problem: Cardiac: Heart Failure (Adult)  Goal: Signs and Symptoms of Listed Potential Problems Will be Absent, Minimized or Managed (Cardiac: Heart Failure)  Outcome: Ongoing (interventions implemented as appropriate)      Problem: Cardiac: ACS (Acute Coronary Syndrome) (Adult)  Goal: Signs and Symptoms of Listed Potential Problems Will be Absent, Minimized or Managed (Cardiac: ACS)  Outcome: Ongoing (interventions implemented as appropriate)      Problem: Patient Care Overview  Goal: Plan of Care Review  Outcome: Ongoing (interventions implemented as appropriate)    Goal: Individualization and Mutuality  Outcome: Ongoing (interventions implemented as appropriate)    Goal: Discharge Needs Assessment  Outcome: Ongoing (interventions implemented as appropriate)    Goal: Interprofessional Rounds/Family Conf  Outcome: Ongoing (interventions implemented as appropriate)      Problem: Skin Injury Risk (Adult)  Goal: Skin Health and Integrity  Outcome: Ongoing (interventions implemented as appropriate)

## 2019-07-15 NOTE — CONSULTS
"Adult Nutrition  Assessment    Patient Name:  Brendon Skelton  YOB: 1945  MRN: [id number]  Admit Date:  7/13/2019    Assessment Date:  7/15/2019    Comments:  Pt presents at BMI 43 and 209% IBW which is compatible w/morbid obesity. Pt also on RD list for low na diet ed r/t CHF. Pt declines education, stating \"i've had that before\". Pt's main concern during RD visit was being able to order broccoli for coumadin regulation. Pt resides in a nursing home at this time and diet is monitored there. RD following.     Reason for Assessment     Row Name 07/15/19 0907          Reason for Assessment    Reason For Assessment  per organizational policy     Diagnosis  cardiac disease     Identified At Risk by Screening Criteria  need for education;BMI         Nutrition/Diet History     Row Name 07/15/19 0907          Nutrition/Diet History    Typical Food/Fluid Intake  Pt resides at a nursing home at this time. She says she doesn't need any diet education, her main concern when I was speaking w/pt was her getting to order leafy greens for coumadin regulation.         Anthropometrics     Row Name 07/15/19 0442          Anthropometrics    Height  165.1 cm (65\")     Weight  119 kg (262 lb 2 oz)        Ideal Body Weight (IBW)    Ideal Body Weight (IBW) (kg)  57.29     % Ideal Body Weight  207.54        Body Mass Index (BMI)    BMI (kg/m2)  43.71        IBW Adjustment, Para/Tetraplegia    5% Adjustment, Para (IBW)  54.43     10% Adjustment, Para (IBW)  51.56     10% Adjustment, Tetra (IBW)  51.56     15% Adjustment, Tetra (IBW)  48.7           Physical Findings     Row Name 07/15/19 0908          Physical Findings    Overall Physical Appearance  obese         Estimated/Assessed Needs     Row Name 07/15/19 0442          Calculation Measurements    Height  165.1 cm (65\")         Nutrition Prescription Ordered     Row Name 07/15/19 0908          Nutrition Prescription PO    Current PO Diet  Regular     Fluid " "Consistency  Thin     Common Modifiers  Cardiac;Consistent Carbohydrate;Renal         Evaluation of Received Nutrient/Fluid Intake     Row Name 07/15/19 0442          Calculation Measurements    Height  165.1 cm (65\")         Evaluation of Prescribed Nutrient/Fluid Intake     Row Name 07/15/19 0442          Calculation Measurements    Height  165.1 cm (65\")             Electronically signed by:  Maeve John RD  07/15/19 9:10 AM  "

## 2019-07-16 LAB
ANION GAP SERPL CALCULATED.3IONS-SCNC: 12 MMOL/L (ref 5–15)
BASOPHILS # BLD AUTO: 0.03 10*3/MM3 (ref 0–0.2)
BASOPHILS NFR BLD AUTO: 0.4 % (ref 0–1.5)
BUN BLD-MCNC: 96 MG/DL (ref 8–23)
BUN/CREAT SERPL: 37.6 (ref 7–25)
CALCIUM SPEC-SCNC: 8.9 MG/DL (ref 8.6–10.5)
CHLORIDE SERPL-SCNC: 98 MMOL/L (ref 98–107)
CO2 SERPL-SCNC: 34 MMOL/L (ref 22–29)
CREAT BLD-MCNC: 2.55 MG/DL (ref 0.57–1)
DEPRECATED RDW RBC AUTO: 73 FL (ref 37–54)
EOSINOPHIL # BLD AUTO: 0.5 10*3/MM3 (ref 0–0.4)
EOSINOPHIL NFR BLD AUTO: 6.1 % (ref 0.3–6.2)
ERYTHROCYTE [DISTWIDTH] IN BLOOD BY AUTOMATED COUNT: 20.7 % (ref 12.3–15.4)
GFR SERPL CREATININE-BSD FRML MDRD: 18 ML/MIN/1.73
GLUCOSE BLD-MCNC: 73 MG/DL (ref 65–99)
GLUCOSE BLDC GLUCOMTR-MCNC: 116 MG/DL (ref 70–130)
GLUCOSE BLDC GLUCOMTR-MCNC: 146 MG/DL (ref 70–130)
GLUCOSE BLDC GLUCOMTR-MCNC: 211 MG/DL (ref 70–130)
GLUCOSE BLDC GLUCOMTR-MCNC: 70 MG/DL (ref 70–130)
HCT VFR BLD AUTO: 29.4 % (ref 34–46.6)
HGB BLD-MCNC: 8.9 G/DL (ref 12–15.9)
IMM GRANULOCYTES # BLD AUTO: 0.03 10*3/MM3 (ref 0–0.05)
IMM GRANULOCYTES NFR BLD AUTO: 0.4 % (ref 0–0.5)
INR PPP: 3.14 (ref 0.8–1.2)
LYMPHOCYTES # BLD AUTO: 0.39 10*3/MM3 (ref 0.7–3.1)
LYMPHOCYTES NFR BLD AUTO: 4.7 % (ref 19.6–45.3)
MCH RBC QN AUTO: 30.4 PG (ref 26.6–33)
MCHC RBC AUTO-ENTMCNC: 30.3 G/DL (ref 31.5–35.7)
MCV RBC AUTO: 100.3 FL (ref 79–97)
MONOCYTES # BLD AUTO: 0.8 10*3/MM3 (ref 0.1–0.9)
MONOCYTES NFR BLD AUTO: 9.7 % (ref 5–12)
NEUTROPHILS # BLD AUTO: 6.51 10*3/MM3 (ref 1.7–7)
NEUTROPHILS NFR BLD AUTO: 78.7 % (ref 42.7–76)
NRBC BLD AUTO-RTO: 0 /100 WBC (ref 0–0.2)
PLATELET # BLD AUTO: 289 10*3/MM3 (ref 140–450)
PMV BLD AUTO: 11.2 FL (ref 6–12)
POTASSIUM BLD-SCNC: 3.4 MMOL/L (ref 3.5–5.2)
PROTHROMBIN TIME: 32 SECONDS (ref 11.1–15.3)
RBC # BLD AUTO: 2.93 10*6/MM3 (ref 3.77–5.28)
SODIUM BLD-SCNC: 144 MMOL/L (ref 136–145)
WBC NRBC COR # BLD: 8.26 10*3/MM3 (ref 3.4–10.8)

## 2019-07-16 PROCEDURE — 99232 SBSQ HOSP IP/OBS MODERATE 35: CPT | Performed by: STUDENT IN AN ORGANIZED HEALTH CARE EDUCATION/TRAINING PROGRAM

## 2019-07-16 PROCEDURE — 97535 SELF CARE MNGMENT TRAINING: CPT

## 2019-07-16 PROCEDURE — 85610 PROTHROMBIN TIME: CPT | Performed by: STUDENT IN AN ORGANIZED HEALTH CARE EDUCATION/TRAINING PROGRAM

## 2019-07-16 PROCEDURE — 94760 N-INVAS EAR/PLS OXIMETRY 1: CPT

## 2019-07-16 PROCEDURE — 97116 GAIT TRAINING THERAPY: CPT

## 2019-07-16 PROCEDURE — 85025 COMPLETE CBC W/AUTO DIFF WBC: CPT | Performed by: STUDENT IN AN ORGANIZED HEALTH CARE EDUCATION/TRAINING PROGRAM

## 2019-07-16 PROCEDURE — 97110 THERAPEUTIC EXERCISES: CPT

## 2019-07-16 PROCEDURE — 80048 BASIC METABOLIC PNL TOTAL CA: CPT | Performed by: STUDENT IN AN ORGANIZED HEALTH CARE EDUCATION/TRAINING PROGRAM

## 2019-07-16 PROCEDURE — 94799 UNLISTED PULMONARY SVC/PX: CPT

## 2019-07-16 PROCEDURE — 97530 THERAPEUTIC ACTIVITIES: CPT

## 2019-07-16 PROCEDURE — 82962 GLUCOSE BLOOD TEST: CPT

## 2019-07-16 RX ORDER — PEN NEEDLE, DIABETIC 30 GX3/16"
1 NEEDLE, DISPOSABLE MISCELLANEOUS EVERY 24 HOURS
Status: DISCONTINUED | OUTPATIENT
Start: 2019-07-17 | End: 2019-07-22 | Stop reason: HOSPADM

## 2019-07-16 RX ORDER — EZETIMIBE 10 MG/1
10 TABLET ORAL DAILY
Status: DISCONTINUED | OUTPATIENT
Start: 2019-07-16 | End: 2019-07-22 | Stop reason: HOSPADM

## 2019-07-16 RX ORDER — INSULIN GLARGINE 100 [IU]/ML
30 INJECTION, SOLUTION SUBCUTANEOUS EVERY 24 HOURS
Status: DISCONTINUED | OUTPATIENT
Start: 2019-07-16 | End: 2019-07-22 | Stop reason: HOSPADM

## 2019-07-16 RX ORDER — POTASSIUM CHLORIDE 750 MG/1
40 CAPSULE, EXTENDED RELEASE ORAL ONCE
Status: COMPLETED | OUTPATIENT
Start: 2019-07-16 | End: 2019-07-16

## 2019-07-16 RX ORDER — WARFARIN SODIUM 1 MG/1
1 TABLET ORAL
Status: COMPLETED | OUTPATIENT
Start: 2019-07-16 | End: 2019-07-16

## 2019-07-16 RX ADMIN — PRENATAL VIT W/ FE FUMARATE-FA TAB 27-0.8 MG 1 TABLET: 27-0.8 TAB at 09:06

## 2019-07-16 RX ADMIN — ROPINIROLE HYDROCHLORIDE 0.25 MG: 0.25 TABLET, FILM COATED ORAL at 20:27

## 2019-07-16 RX ADMIN — BUMETANIDE 2 MG: 0.25 INJECTION INTRAMUSCULAR; INTRAVENOUS at 17:29

## 2019-07-16 RX ADMIN — ASPIRIN 81 MG 81 MG: 81 TABLET ORAL at 09:06

## 2019-07-16 RX ADMIN — IPRATROPIUM BROMIDE AND ALBUTEROL SULFATE 3 ML: 2.5; .5 SOLUTION RESPIRATORY (INHALATION) at 22:59

## 2019-07-16 RX ADMIN — BUMETANIDE 2 MG: 0.25 INJECTION INTRAMUSCULAR; INTRAVENOUS at 06:19

## 2019-07-16 RX ADMIN — WARFARIN SODIUM 1 MG: 1 TABLET ORAL at 17:29

## 2019-07-16 RX ADMIN — CITALOPRAM HYDROBROMIDE 20 MG: 20 TABLET ORAL at 09:06

## 2019-07-16 RX ADMIN — SODIUM CHLORIDE, PRESERVATIVE FREE 3 ML: 5 INJECTION INTRAVENOUS at 20:29

## 2019-07-16 RX ADMIN — VITAMIN D, TAB 1000IU (100/BT) 2000 UNITS: 25 TAB at 09:06

## 2019-07-16 RX ADMIN — NYSTATIN: 100000 POWDER TOPICAL at 20:28

## 2019-07-16 RX ADMIN — NYSTATIN: 100000 POWDER TOPICAL at 09:07

## 2019-07-16 RX ADMIN — FAMOTIDINE 20 MG: 20 TABLET ORAL at 09:06

## 2019-07-16 RX ADMIN — DILTIAZEM HYDROCHLORIDE 240 MG: 240 CAPSULE, COATED, EXTENDED RELEASE ORAL at 09:06

## 2019-07-16 RX ADMIN — FERROUS SULFATE TAB EC 324 MG (65 MG FE EQUIVALENT) 324 MG: 324 (65 FE) TABLET DELAYED RESPONSE at 09:06

## 2019-07-16 RX ADMIN — ACETAMINOPHEN 650 MG: 325 TABLET, FILM COATED ORAL at 20:54

## 2019-07-16 RX ADMIN — IPRATROPIUM BROMIDE AND ALBUTEROL SULFATE 3 ML: 2.5; .5 SOLUTION RESPIRATORY (INHALATION) at 15:59

## 2019-07-16 RX ADMIN — POTASSIUM CHLORIDE 40 MEQ: 750 CAPSULE, EXTENDED RELEASE ORAL at 09:06

## 2019-07-16 RX ADMIN — EZETIMIBE 10 MG: 10 TABLET ORAL at 15:46

## 2019-07-16 RX ADMIN — TRAZODONE HYDROCHLORIDE 300 MG: 150 TABLET ORAL at 20:27

## 2019-07-16 RX ADMIN — OXYCODONE HYDROCHLORIDE AND ACETAMINOPHEN 500 MG: 500 TABLET ORAL at 09:06

## 2019-07-16 RX ADMIN — METOLAZONE 2.5 MG: 2.5 TABLET ORAL at 09:06

## 2019-07-16 RX ADMIN — GUAIFENESIN 600 MG: 600 TABLET, EXTENDED RELEASE ORAL at 09:06

## 2019-07-16 RX ADMIN — IPRATROPIUM BROMIDE AND ALBUTEROL SULFATE 3 ML: 2.5; .5 SOLUTION RESPIRATORY (INHALATION) at 08:51

## 2019-07-16 RX ADMIN — SODIUM CHLORIDE, PRESERVATIVE FREE 10 ML: 5 INJECTION INTRAVENOUS at 17:34

## 2019-07-16 RX ADMIN — GUAIFENESIN 600 MG: 600 TABLET, EXTENDED RELEASE ORAL at 20:27

## 2019-07-16 RX ADMIN — INSULIN GLARGINE 30 UNITS: 100 INJECTION, SOLUTION SUBCUTANEOUS at 20:27

## 2019-07-16 RX ADMIN — SODIUM CHLORIDE, PRESERVATIVE FREE 3 ML: 5 INJECTION INTRAVENOUS at 09:07

## 2019-07-16 NOTE — PROGRESS NOTES
FAMILY MEDICINE DAILY PROGRESS NOTE  NAME: Brendon Skelton  : 1945  MRN: [id number]      LOS: 0 days     PROVIDER OF SERVICE: Kenna Chawla MD    Chief Complaint: Acute on chronic diastolic congestive heart failure (CMS/HCC)    Subjective:     Interval History:  History taken from: patient chart    No acute events overnight. Patient reports she feels like her breathing, KYLAH, and abdominal distension are much better. She is down 2 pounds overnight. Baseline weight appears to be 250. Nephrology consulted and has placed her on IV bumex instead of IV lasix. Creatinine continues to trend upward.     Patient reports walking 170+ feet yesterday without getting SOA, which she states is much farther than she has walked in some time.      Review of Systems:   Review of Systems   Constitutional: Negative for activity change, appetite change, chills, fatigue and fever.   HENT: Negative for hearing loss, sneezing, sore throat and trouble swallowing.    Eyes: Negative for visual disturbance.   Respiratory: Positive for shortness of breath (chronic, improving). Negative for cough and chest tightness.    Cardiovascular: Positive for leg swelling (improving). Negative for chest pain and palpitations.   Gastrointestinal: Negative for abdominal distention, abdominal pain, blood in stool, constipation, diarrhea, nausea and vomiting.   Genitourinary: Negative for difficulty urinating and dysuria.   Musculoskeletal: Negative for arthralgias and back pain.   Skin: Negative for pallor and rash.   Allergic/Immunologic: Negative for environmental allergies and food allergies.   Neurological: Negative for dizziness, light-headedness, numbness and headaches.   Psychiatric/Behavioral: Negative for agitation, confusion, hallucinations and suicidal ideas.       Objective:     Vital Signs  Temp:  [97.2 °F (36.2 °C)-97.9 °F (36.6 °C)] 97.2 °F (36.2 °C)  Heart Rate:  [] 90  Resp:  [16-22] 20  BP: (112-126)/(58-79)  114/66  Body mass index is 43.38 kg/m².        07/15/19  0442 07/16/19  0638   Weight: 119 kg (262 lb 2 oz) 118 kg (260 lb 11.2 oz)       Physical Exam  Physical Exam   Constitutional: She is oriented to person, place, and time. She appears well-developed and well-nourished. No distress.   HENT:   Head: Normocephalic and atraumatic.   Right Ear: External ear normal.   Left Ear: External ear normal.   Neck: Normal range of motion. Neck supple.   Cardiovascular: Normal rate, regular rhythm and normal heart sounds.   Pulmonary/Chest: No respiratory distress. She has no wheezes. She has no rales.   Diminished breath sounds, pursed lip breathing   Abdominal: Soft. Bowel sounds are normal. She exhibits no distension. There is no tenderness.   Musculoskeletal: Normal range of motion. She exhibits edema (+1 pitting edema lower extremities bilaterally).   Neurological: She is alert and oriented to person, place, and time. She has normal reflexes.   Skin: Skin is warm and dry. She is not diaphoretic. No erythema.   Psychiatric: She has a normal mood and affect. Her behavior is normal.       Medication Review    Current Facility-Administered Medications:   •  acetaminophen (TYLENOL) tablet 650 mg, 650 mg, Oral, Q4H PRN, Kenna Chawla MD, 650 mg at 07/14/19 2120  •  albuterol (PROVENTIL) nebulizer solution 0.083% 2.5 mg/3mL, 2.5 mg, Nebulization, Q4H PRN, Kenna Chawla MD  •  aspirin chewable tablet 81 mg, 81 mg, Oral, Daily, Kenna Chawla MD, 81 mg at 07/15/19 0816  •  bumetanide (BUMEX) injection 2 mg, 2 mg, Intravenous, BID, Sandy Caputo MD, 2 mg at 07/16/19 0619  •  cholecalciferol (VITAMIN D3) tablet 2,000 Units, 2,000 Units, Oral, Daily, Kenna Chawla MD, 2,000 Units at 07/15/19 0817  •  citalopram (CeleXA) tablet 20 mg, 20 mg, Oral, Daily, Kenna Chawla MD, 20 mg at 07/15/19 0817  •  dextrose (D50W) 25 g/ 50mL Intravenous Solution 25 g, 25 g, Intravenous, Q15 Min PRN, Kenna Chawla MD  •   dextrose (GLUTOSE) oral gel 15 g, 15 g, Oral, Q15 Min PRN, Kenna Chawla MD  •  diltiaZEM CD (CARDIZEM CD) 24 hr capsule 240 mg, 240 mg, Oral, Daily, Kenna Chawla MD, 240 mg at 07/15/19 0816  •  docusate sodium (COLACE) capsule 100 mg, 100 mg, Oral, BID PRN, Kenna Chawla MD  •  famotidine (PEPCID) tablet 20 mg, 20 mg, Oral, Daily, Kenna hCawla MD, 20 mg at 07/15/19 0816  •  ferrous sulfate EC tablet 324 mg, 324 mg, Oral, Daily With Breakfast, Kenna Chawla MD, 324 mg at 07/15/19 0817  •  glucagon (human recombinant) (GLUCAGEN DIAGNOSTIC) injection 1 mg, 1 mg, Subcutaneous, PRN, Kenna Chawla MD  •  guaiFENesin (MUCINEX) 12 hr tablet 600 mg, 600 mg, Oral, Q12H, Hermann Richardson MD, 600 mg at 07/15/19 2052  •  insulin aspart (novoLOG) injection 0-7 Units, 0-7 Units, Subcutaneous, 4x Daily AC & at Bedtime, Kenna Chawla MD, 4 Units at 07/15/19 2051  •  insulin detemir (LEVEMIR) injection 25 Units, 25 Units, Subcutaneous, Nightly, Kenna Chawla MD, 25 Units at 07/15/19 2051  •  ipratropium-albuterol (DUO-NEB) nebulizer solution 3 mL, 3 mL, Nebulization, Q8H - RT, Kenna Chawla MD, 3 mL at 07/15/19 2315  •  metOLazone (ZAROXOLYN) tablet 2.5 mg, 2.5 mg, Oral, Daily, Sandy Caputo MD, 2.5 mg at 07/15/19 1334  •  nystatin (MYCOSTATIN) powder, , Topical, Q12H, Kenna Chawla MD  •  ondansetron (ZOFRAN) tablet 4 mg, 4 mg, Oral, Q6H PRN, Kenna Chawla MD  •  Pharmacy to dose warfarin, , Does not apply, Continuous PRN, Kenna Chawla MD  •  potassium chloride (MICRO-K) CR capsule 40 mEq, 40 mEq, Oral, Once, Alfredo Charles Jr., MD  •  prenatal vitamin 27-0.8 tablet 1 tablet, 1 tablet, Oral, Daily, Kenna Chawla MD, 1 tablet at 07/15/19 0817  •  rOPINIRole (REQUIP) tablet 0.25 mg, 0.25 mg, Oral, Nightly, Kenna Chawla MD, 0.25 mg at 07/15/19 2052  •  sodium chloride 0.9 % flush 10 mL, 10 mL, Intravenous, PRN, Gilles Griffin MD  •  sodium chloride 0.9 % flush 3  mL, 3 mL, Intravenous, Q12H, Kenna Chawla MD, 3 mL at 07/15/19 2052  •  sodium chloride 0.9 % flush 3-10 mL, 3-10 mL, Intravenous, PRN, Kenna Chawla MD  •  traZODone (DESYREL) tablet 300 mg, 300 mg, Oral, Nightly, Kenna Chawla MD, 300 mg at 07/15/19 2052  •  vitamin C (ASCORBIC ACID) tablet 500 mg, 500 mg, Oral, Daily, Kenna Chawla MD, 500 mg at 07/15/19 0816     Diagnostic Data    Lab Results (last 24 hours)     Procedure Component Value Units Date/Time    Protime-INR [[id number]]  (Abnormal) Collected:  07/16/19 0604    Specimen:  Blood Updated:  07/16/19 0701     Protime 32.0 Seconds      INR 3.14    Narrative:       Therapeutic range for most indications is 2.0-3.0 INR,  or 2.5-3.5 for mechanical heart valves.    Basic Metabolic Panel [[id number]]  (Abnormal) Collected:  07/16/19 0604    Specimen:  Blood Updated:  07/16/19 0656     Glucose 73 mg/dL      BUN 96 mg/dL      Creatinine 2.55 mg/dL      Sodium 144 mmol/L      Potassium 3.4 mmol/L      Chloride 98 mmol/L      CO2 34.0 mmol/L      Calcium 8.9 mg/dL      eGFR Non African Amer 18 mL/min/1.73      BUN/Creatinine Ratio 37.6     Anion Gap 12.0 mmol/L     Narrative:       GFR Normal >60  Chronic Kidney Disease <60  Kidney Failure <15    POC Glucose Once [[id number]]  (Normal) Collected:  07/16/19 0623    Specimen:  Blood Updated:  07/16/19 0650     Glucose 70 mg/dL      Comment: : [id number] [facility] DUSTYWNAMeter ID: [id number]       CBC & Differential [[id number]] Collected:  07/16/19 0604    Specimen:  Blood Updated:  07/16/19 0623    Narrative:       The following orders were created for panel order CBC & Differential.  Procedure                               Abnormality         Status                     ---------                               -----------         ------                     CBC Auto Differential[[id number]]        Abnormal            Final result                 Please view results for these tests on the individual  orders.    CBC Auto Differential [[id number]]  (Abnormal) Collected:  07/16/19 0604    Specimen:  Blood Updated:  07/16/19 0623     WBC 8.26 10*3/mm3      RBC 2.93 10*6/mm3      Hemoglobin 8.9 g/dL      Hematocrit 29.4 %      .3 fL      MCH 30.4 pg      MCHC 30.3 g/dL      RDW 20.7 %      RDW-SD 73.0 fl      MPV 11.2 fL      Platelets 289 10*3/mm3      Neutrophil % 78.7 %      Lymphocyte % 4.7 %      Monocyte % 9.7 %      Eosinophil % 6.1 %      Basophil % 0.4 %      Immature Grans % 0.4 %      Neutrophils, Absolute 6.51 10*3/mm3      Lymphocytes, Absolute 0.39 10*3/mm3      Monocytes, Absolute 0.80 10*3/mm3      Eosinophils, Absolute 0.50 10*3/mm3      Basophils, Absolute 0.03 10*3/mm3      Immature Grans, Absolute 0.03 10*3/mm3      nRBC 0.0 /100 WBC     POC Glucose Once [[id number]]  (Abnormal) Collected:  07/15/19 2004    Specimen:  Blood Updated:  07/15/19 2050     Glucose 261 mg/dL      Comment: Result Not ConfirmedOperator: [id number] [facility]NAM[facility] [id number]       POC Glucose Once [[id number]]  (Normal) Collected:  07/15/19 1628    Specimen:  Blood Updated:  07/15/19 1745     Glucose 124 mg/dL      Comment: : [id number] EVE CLARKESSICAMeter ID: [id number]       POC Glucose Once [[id number]]  (Abnormal) Collected:  07/15/19 1027    Specimen:  Blood Updated:  07/15/19 1044     Glucose 182 mg/dL      Comment: RN NotifiedOperator: [id number] DEONNA NOAHMeter ID: [id number]               I reviewed the patient's new clinical results.    Assessment/Plan:     Active Hospital Problems    Diagnosis POA   • **Acute on chronic diastolic congestive heart failure (CMS/HCC) [I50.33] Yes     -last echo 6/18/19 shows EF of 46-50% with normal diastolic dysfunction, improved from echo in 2017 which showed grade 1 diastolic dysfunction  -BNP on admission was 8913, CXR shows pulmonary congestion  -on lasix 40 mg daily and metolazone 2.5 mg daily at home   -s/p lasix 80 mg IV in ER and lasix 40 mg IV  once; kidney function worsening  -will consult nephrology for recommendations of further diuresis with worsening renal function   -IV bumex and oral metolazone instead of lasix  -strict I/Os  -daily weights  -1500 mL fluid restriction, sodium restriction 2g     • Anemia [D64.9] Yes     -H/H on admission 8.9/28.9, trend with daily CBC  -stable  -recent GI bleed requiring transfusion of 1 unit of PRBCs  -on oral iron supplement at home  -continue oral iron supplement  -transfuse for hemoglobin less than 7     • Stage 4 chronic kidney disease (CMS/[facility]) [N18.4] Yes     -Baseline creatinine ~ 1.9-2.1  -trend renal function  -follows with Dr. Caputo outpatient  -avoid nephrotoxic agents  -will consult Dr. Caputo as her renal function is continuing to worsen and she is still needing diuresis     • SSS (sick sinus syndrome) (CMS/[facility]) [I49.5] Yes     -atrial fibrillation with rate of 85 on EKG in ER  -pacemaker in place  -continue with cardizem  -telemetry  -pharm to dose coumadin     • Essential hypertension [I10] Yes     -continue lasix, cardizem     • Chronic hypoxemic respiratory failure (CMS/[facility]) [J96.11] Yes     -on 3L O2 via nasal cannula at home  -ABG in ER shows pO2 of 60.9, pCO2 of 44.7  -will continue oxygen via nasal cannula at home rate     • Type 2 diabetes mellitus with stage 4 chronic kidney disease, with long-term current use of insulin (CMS/[facility]) [E11.22, N18.4, Z79.4] Not Applicable     -on basaglar 30 units at night  -continue, adjust as necessary     • Emphysema of lung (CMS/[facility]) [J43.9] Yes     -continue with supplemental oxygen  -continue albuterol nebs  -symbicort, duonebs      • Atrial fibrillation [I48.91] [I48.91] Yes     -EKG in ER shows atrial fibrillation with rate of 85  -pacemaker in place  -continue cardizem  -telemetry  -pharm to dose coumadin         DVT prophylaxis: warfarin  Code status is   Code Status and Medical Interventions:   Ordered at: 07/13/19 4144     Level Of Support Discussed  With:    Patient     Code Status:    CPR     Medical Interventions (Level of Support Prior to Arrest):    Full       Plan for disposition: Anticipate discharge back to SNF in 1-3 days pending further diuresis      Time: < 30 mins        This document has been electronically signed by Kenna Chawla MD on July 16, 2019 7:17 AM

## 2019-07-16 NOTE — CONSULTS
[facility]  [address] [place] [address]. [address]   - [phone]  F  [phone]     Consultation         PATIENT  DEMOGRAPHICS   PATIENT NAME: Brendon Skelton                      PHYSICIAN: Nirali Carrion, Medical Student  : 1945  MRN: [id number]    Subjective   SUBJECTIVE   Referring Provider: Kenna Wright   Reason for Consultation: anya ckd 3  History of present illness:      72y/o white female presented to the ER on Saturday morning  with severe soa on minimal exertion. She has a history of diastolic CHF. The soa had been worsening for the past few weeks. She is on continuous oxygen and recently increased from 2 to 3 Liters. Her baseline weight appears to be 250. She is up to 262 today which is 2 pounds up from yesterday. She says that the soa has improved since being admitted on Saturday, but was better yesterday. She still has soa on minimal exertion. She denies cough, diarrhea, vomiting, fever, chills. She has noticed swelling in her abdomen and Lower extremities.   PMH significant for CKD4, DMT2, Diastolic CHF, Emphysema, Essential HTN, Afib.     Pertinent labs: Cr = 2.45, BUN = 95, co2 = 32      Past Medical History:   Diagnosis Date   • Acute bronchitis    • Anxiety    • Atrial fibrillation (CMS/HCC)    • C. difficile colitis    • Callosity     under metatarsal head      • Cardiac pacemaker in situ    • CHF (congestive heart failure) (CMS/HCC)    • Chronic obstructive lung disease (CMS/HCC)    • Corns and callus    • Depressive disorder    • Diabetes mellitus (CMS/HCC)    • Diastolic heart failure (CMS/HCC)    • Essential hypertension    • Foot pain    • Hyperlipidemia    • Long term current use of anticoagulant    • On anticoagulant therapy    • Pulmonary emphysema (CMS/HCC)    • Rectal hemorrhage    • Type 2 diabetes mellitus (CMS/HCC)      Past Surgical History:   Procedure Laterality Date   • AORTIC VALVE REPAIR/REPLACEMENT     • CARDIAC  ELECTROPHYSIOLOGY PROCEDURE N/A 3/20/2017    Procedure: PPM generator change - dual;  Surgeon: Bereket Gates MD;  Location: [facility] CATH INVASIVE LOCATION;  Service:    • CARDIAC PACEMAKER PLACEMENT  1999   • CATARACT EXTRACTION W/ INTRAOCULAR LENS IMPLANT Left 2/1/2019    Procedure: REMOVE CATARACT AND IMPLANT INTRAOCULAR LENS I;  Surgeon: Jose L Clay MD;  Location: [facility] OR;  Service: Ophthalmology   • CATARACT EXTRACTION W/ INTRAOCULAR LENS IMPLANT Right 2/8/2019    Procedure: REMOVE CATARACT AND IMPLANT  INTRAOCULAR LENS;  Surgeon: Jose L Clay MD;  Location: [facility] OR;  Service: Ophthalmology   • COLONOSCOPY N/A 6/19/2019    Procedure: COLONOSCOPY WITH CONTROL OF BLEED;  Surgeon: Alfredo Guerrero MD;  Location: [facility] ENDOSCOPY;  Service: Gastroenterology   • COLONOSCOPY N/A 6/24/2019    Procedure: COLONOSCOPY;  Surgeon: Alfredo Guerrero MD;  Location: [facility] ENDOSCOPY;  Service: Gastroenterology   • ECHO - CONVERTED  11/12/2013    There is mild to moderate left atrial enlargement EF 50-55%   • ENDOSCOPY N/A 6/19/2019    Procedure: ESOPHAGOGASTRODUODENOSCOPY WITH CONTROL OF BLEED;  Surgeon: Alfredo Guerrero MD;  Location: [facility] ENDOSCOPY;  Service: Gastroenterology   • FOOT SURGERY  1990   • OTHER SURGICAL HISTORY  10/26/2015    PARING CORN/CALLUS    • PACEMAKER REPLACEMENT N/A 3/21/2017    Procedure: revision pacemaker pocket, evacuation hematoma, control of bleeding;  Surgeon: Polo Feliz MD;  Location: [facility] OR;  Service:    • TRANSESOPHAGEAL ECHOCARDIOGRAM (MITZY)  05/01/2014    With color flow-Mild left atrial enlargement with mild concentric LV hypertrophy with top normal aortic root size. EF 45-50%. Mild mitral regurgitation and mild aortic insufficiency and trivial amount of tricuspid regurgation   • TUBAL ABDOMINAL LIGATION       Family History   Problem Relation Age of Onset   • Heart disease Mother    • Hyperlipidemia Mother    • Hypertension Mother    • Cancer  "Other      Social History     Tobacco Use   • Smoking status: Former Smoker     Last attempt to quit: 3/21/1999     Years since quittin.3   • Smokeless tobacco: Never Used   Substance Use Topics   • Alcohol use: No     Comment: quit in 2016   • Drug use: No     Allergies:  Crestor [rosuvastatin calcium]; Lipitor [atorvastatin]; Lortab [hydrocodone-acetaminophen]; Adhesive tape; and Hydrocodone-acetaminophen     REVIEW OF SYSTEMS    Review of Systems   Constitutional: Positive for activity change and fatigue. Negative for chills, diaphoresis and fever.   Respiratory: Positive for shortness of breath. Negative for chest tightness.    Cardiovascular: Positive for leg swelling. Negative for chest pain.   Gastrointestinal: Negative for abdominal pain, nausea and vomiting.   Genitourinary: Negative for difficulty urinating, dysuria, flank pain and frequency.   All other systems reviewed and are negative.         Objective   OBJECTIVE   Vital Signs  Temp:  [97.2 °F (36.2 °C)-97.9 °F (36.6 °C)] 97.2 °F (36.2 °C)  Heart Rate:  [] 96  Resp:  [16-24] 20  BP: (112-130)/(58-86) 130/86    Flowsheet Rows      First Filed Value   Admission Height  165.1 cm (65\") Documented at 2019 1527   Admission Weight  119 kg (261 lb 6.4 oz) Documented at 2019 1527           I/O last 3 completed shifts:  In: 1200 [P.O.:1200]  Out: -     PHYSICAL EXAM    Physical Exam   Constitutional: She is oriented to person, place, and time. She appears well-developed and well-nourished.   HENT:   Head: Normocephalic.   Eyes: Pupils are equal, round, and reactive to light.   Cardiovascular: Normal rate, regular rhythm and normal heart sounds.   Pulmonary/Chest: Effort normal. She has wheezes.   Abdominal: Soft. Bowel sounds are normal.   Musculoskeletal: She exhibits edema.   Neurological: She is alert and oriented to person, place, and time.       RESULTS   Results Review:    Results from last 7 days   Lab Units 19  0604 " 07/15/19  0535 07/14/19  0649 07/13/19  1227   SODIUM mmol/L 144 145 141 143   POTASSIUM mmol/L 3.4* 3.5 3.9 3.9   CHLORIDE mmol/L 98 99 99 101   CO2 mmol/L 34.0* 32.0* 30.0* 30.0*   BUN mg/dL 96* 95* 82* 79*   CREATININE mg/dL 2.55* 2.45* 2.37* 2.22*   CALCIUM mg/dL 8.9 8.6 8.7 9.0   BILIRUBIN mg/dL  --   --   --  0.6   ALK PHOS U/L  --   --   --  76   ALT (SGPT) U/L  --   --   --  27   AST (SGOT) U/L  --   --   --  31   GLUCOSE mg/dL 73 57* 158* 128*       Estimated Creatinine Clearance: 25.2 mL/min (A) (by C-G formula based on SCr of 2.55 mg/dL (H)).                Results from last 7 days   Lab Units 07/16/19  0604 07/15/19  0535 07/14/19  0649 07/13/19  1227   WBC 10*3/mm3 8.26 8.42 5.29 7.58   HEMOGLOBIN g/dL 8.9* 8.5* 8.6* 8.9*   PLATELETS 10*3/mm3 289 316 292 282       Results from last 7 days   Lab Units 07/16/19  0604 07/15/19  0535 07/14/19  0649 07/13/19  1227   INR  3.14* 4.66* 4.57* 3.97*        MEDICATIONS      aspirin 81 mg Oral Daily   bumetanide 2 mg Intravenous BID   cholecalciferol 2,000 Units Oral Daily   citalopram 20 mg Oral Daily   diltiaZEM  mg Oral Daily   famotidine 20 mg Oral Daily   ferrous sulfate 324 mg Oral Daily With Breakfast   guaiFENesin 600 mg Oral Q12H   insulin aspart 0-7 Units Subcutaneous 4x Daily AC & at Bedtime   insulin detemir 25 Units Subcutaneous Nightly   ipratropium-albuterol 3 mL Nebulization Q8H - RT   metOLazone 2.5 mg Oral Daily   nystatin  Topical Q12H   prenatal vitamin 27-0.8 1 tablet Oral Daily   rOPINIRole 0.25 mg Oral Nightly   sodium chloride 3 mL Intravenous Q12H   traZODone 300 mg Oral Nightly   vitamin C with bob hips 500 mg Oral Daily   warfarin 1 mg Oral Once       Pharmacy to dose warfarin      Medications Prior to Admission   Medication Sig Dispense Refill Last Dose   • acetaminophen (TYLENOL) 325 MG tablet Take 650 mg by mouth every 6 (six) hours as needed for mild pain (1-3).   Past Month at Unknown time   • albuterol (PROVENTIL) (2.5  MG/3ML) 0.083% nebulizer solution Take 2.5 mg by nebulization Every 4 (Four) Hours As Needed for Wheezing.   7/13/2019 at Unknown time   • albuterol sulfate  (90 Base) MCG/ACT inhaler Inhale 2 puffs Every 4 (Four) Hours As Needed for Wheezing or Shortness of Air. 18 g 11 Past Month at Unknown time   • Ascorbic Acid (VITAMIN C WITH OCHOA HIPS) 250 MG tablet Take 500 mg by mouth Daily.   7/13/2019   • aspirin 81 MG chewable tablet Chew 81 mg Daily.   7/13/2019   • cholecalciferol (VITAMIN D3) 1000 units tablet Take 2,000 Units by mouth Daily.   7/13/2019 at Unknown time   • citalopram (CeleXA) 20 MG tablet Take 1 tablet by mouth Daily. 90 tablet 3 7/13/2019 at Unknown time   • diltiaZEM CD (CARDIZEM CD) 240 MG 24 hr capsule Take 1 capsule by mouth Daily. 90 capsule 3 7/13/2019 at Unknown time   • ezetimibe (ZETIA) 10 MG tablet Take 1 tablet by mouth Daily. 90 tablet 0 7/13/2019 at Unknown time   • ferrous sulfate 325 (65 FE) MG tablet Take 325 mg by mouth Daily With Breakfast.   7/13/2019 at Unknown time   • furosemide (LASIX) 40 MG tablet Take 40 mg by mouth Daily.   7/13/2019 at Unknown time   • Insulin Glargine (BASAGLAR KWIKPEN) 100 UNIT/ML injection pen Inject 30 Units under the skin into the appropriate area as directed Daily. 1 pen 11 7/13/2019 at 0900   • Prenatal Vit-Fe Fumarate-FA (PRENATAL VITAMIN) 27-0.8 MG tablet Take 1 tablet by mouth daily.   7/13/2019 at Unknown time   • raNITIdine (ZANTAC) 150 MG tablet Take 1 tablet by mouth Every Night. 180 tablet 1 7/13/2019 at Unknown time   • rOPINIRole (REQUIP) 0.25 MG tablet Take 1 tablet by mouth Every Night. Take 1 hour before bedtime. 90 tablet 3 7/12/2019 at 2000   • traZODone (DESYREL) 150 MG tablet Take 2 tablets by mouth Every Night. 180 tablet 3 7/12/2019 at 2000   • umeclidinium-vilanterol (ANORO ELLIPTA) 62.5-25 MCG/INH aerosol powder  inhaler Inhale 1 puff Daily. 1 each 6 7/13/2019 at Unknown time   • warfarin (COUMADIN) 4 MG tablet Take 4 mg  by mouth Every Night.   7/12/2019   • glucose blood test strip 1 each by Other route 3 (three) times a day. Use as instructed   Unknown at Unknown time   • Insulin Pen Needle (BD PEN NEEDLE DEREK U/F) 32G X 4 MM misc 1 each 4 (Four) Times a Day. 120 each 5 Unknown at Unknown time   • metOLazone (ZAROXOLYN) 2.5 MG tablet Take 1 tablet by mouth Daily. 30 tablet 1 Unknown at Unknown time     Assessment/Plan   ASSESSMENT / PLAN      Acute on chronic diastolic congestive heart failure (CMS/[facility])    Atrial fibrillation [I48.91]    Emphysema of lung (CMS/[facility])    Type 2 diabetes mellitus with stage 4 chronic kidney disease, with long-term current use of insulin (CMS/[facility])    Chronic hypoxemic respiratory failure (CMS/[facility])    Essential hypertension    SSS (sick sinus syndrome) (CMS/[facility])    Stage 4 chronic kidney disease (CMS/[facility])    Anemia    1.CKD stage 4:  -Baseline creatinine = 1.9-2.1. Today Cr 2.45, Bun 95. This is slightly worse from yesterday. BUN/Cr = 38.8. The patient is on lasix for fluid overload.- We will switch the patient from lasix  to IV bumex and oral metolazone . Continue to diuresis to improve fluid overload status.   Her bicarb is elevated at 32.     2. Anemia: Hemoglobin = 8.5.  Observe.   Continue iron supplement  Transfuse if hgb less than 7      3. Acute on Chronic Diastolic heart failure : Patient is still needing diuresis.  Last echo 6/18/19 showed EF 46-50%, BNP on admission was 8913, CXR shows pulmonary congestion.   -on lasix 40 mg daily and metolazone 2.5 mg daily at home   -s/p lasix 80 mg IV in ER and lasix 40 mg IV once; kidney function worsening - switched to IV bumex and oral metolazone on 7/15   -strict I/Os  -daily weights  -1500 mL fluid restriction, sodium restriction 2g     4. Chronic hypoxemic respiratory failure -   5. SSS -pacemaker in place   6. Essential hypertension   7. Type 2 Diabetes Mellitus with stage 4 chronic kidney disease. - long-term insulin use.   8. Emphysema of  lung                  I discussed the patients findings and my recommendations with patient and primary care team      This document has been electronically signed by Nirali Carrion, Medical Student on July 16, 2019 10:08 AM      As the teaching physician, I have personally re-performed the physical exam and medical decision making activities included in the student note. Pt was seen yesterday on 7/15/19 at 11am    Sandy Caputo MD  7/16/2019  12:19 PM

## 2019-07-16 NOTE — PLAN OF CARE
Problem: Fall Risk (Adult)  Goal: Absence of Fall  Outcome: Ongoing (interventions implemented as appropriate)      Problem: Cardiac: Heart Failure (Adult)  Goal: Signs and Symptoms of Listed Potential Problems Will be Absent, Minimized or Managed (Cardiac: Heart Failure)  Outcome: Ongoing (interventions implemented as appropriate)      Problem: Cardiac: ACS (Acute Coronary Syndrome) (Adult)  Goal: Signs and Symptoms of Listed Potential Problems Will be Absent, Minimized or Managed (Cardiac: ACS)  Outcome: Ongoing (interventions implemented as appropriate)      Problem: Patient Care Overview  Goal: Plan of Care Review  Outcome: Ongoing (interventions implemented as appropriate)   07/16/19 0545   Coping/Psychosocial   Plan of Care Reviewed With patient   Plan of Care Review   Progress no change       Problem: Skin Injury Risk (Adult)  Goal: Skin Health and Integrity  Outcome: Ongoing (interventions implemented as appropriate)

## 2019-07-16 NOTE — PROGRESS NOTES
"Anticoagulation by Pharmacy - Warfarin    Brendon Skelton is a 73 y.o.female  [Ht: 165.1 cm (65\"); Wt: 118 kg (260 lb 11.2 oz)] on Warfarin for indication of A. Fib.    Goal INR: 2-3  Home regimen: 4 mg Nightly    Today's INR:   Lab Results   Component Value Date    INR 3.14 (H) 07/16/2019         Lab Results   Component Value Date    INR 3.14 (H) 07/16/2019    INR 4.66 (H) 07/15/2019    INR 4.57 (H) 07/14/2019    PROTIME 32.0 (H) 07/16/2019    PROTIME 43.7 (H) 07/15/2019    PROTIME 43.0 (H) 07/14/2019     Lab Results   Component Value Date    HGB 8.9 (L) 07/16/2019    HGB 8.5 (L) 07/15/2019    HGB 8.6 (L) 07/14/2019     Lab Results   Component Value Date    HCT 29.4 (L) 07/16/2019    HCT 27.6 (L) 07/15/2019    HCT 27.7 (L) 07/14/2019     Lab Results   Component Value Date     07/16/2019     07/15/2019     07/14/2019           Assessment/Plan:  Reviewed above labs. INR is 3.14.  INR is SUPRAtherapeutic, but trending down.   H/H stable.  Patient did NOT receive dose of warfarin last night.  Patient admitted with elevated INR and dose has been held for 3 days.  Interacting medications include aspirin, citalopram.  Will give 1x dose of 1 mg tonight.   Rx will continue to follow and adjust dose accordingly.       Sarah Dennis [facility]  07/16/19 9:54 AM      "

## 2019-07-16 NOTE — PLAN OF CARE
Problem: Patient Care Overview  Goal: Plan of Care Review  Outcome: Ongoing (interventions implemented as appropriate)   07/16/19 5399   Coping/Psychosocial   Plan of Care Reviewed With patient   Plan of Care Review   Progress no change   OTHER   Outcome Summary Pt Independent bed <>toilet t/f this tx. Pt defers ADL, however agrees to B UE exercises with fair tolerance. One goal met this tx.

## 2019-07-16 NOTE — PROGRESS NOTES
"[facility]  [address] [place] [address]. [address]  T - [phone]  F - [phone]     Progress Note          PATIENT  DEMOGRAPHICS   PATIENT NAME: Brendon Skelton                      PHYSICIAN: Nirali Carrion, Medical Student  : 1945  MRN: [id number]   LOS: 0 days    Patient Care Team:  Josefa Pozo APRN as PCP - General (Nurse Practitioner)  Meme Duckworth APRN as PCP - Claims Attributed  Aby Stout RN as Care Coordinator (Population Health)  Subjective   SUBJECTIVE     Patient says that soa and abdominal edema has improved. She has gotten up multiple times to use the restroom with less soa. She is down 2 pounds from yesterday.        Objective   OBJECTIVE   Vital Signs  Temp:  [97.2 °F (36.2 °C)-97.9 °F (36.6 °C)] 97.2 °F (36.2 °C)  Heart Rate:  [] 114  Resp:  [16-22] 20  BP: (112-130)/(58-86) 130/86    Flowsheet Rows      First Filed Value   Admission Height  165.1 cm (65\") Documented at 2019 1527   Admission Weight  119 kg (261 lb 6.4 oz) Documented at 2019 1527           I/O last 3 completed shifts:  In: 1200 [P.O.:1200]  Out: -     PHYSICAL EXAM    Physical Exam   Constitutional: She is oriented to person, place, and time. She appears well-developed and well-nourished. No distress.   Eyes: Conjunctivae and EOM are normal. Pupils are equal, round, and reactive to light.   Neck: JVD present.   JVD still present but less prominent than yesterday.    Cardiovascular: Normal rate, regular rhythm, normal heart sounds and intact distal pulses.   Pulmonary/Chest: Effort normal and breath sounds normal. No respiratory distress. She has no wheezes. She has no rales. She exhibits no tenderness.   Abdominal: Soft. Bowel sounds are normal. She exhibits distension. There is no tenderness.   Musculoskeletal: She exhibits edema. She exhibits no tenderness or deformity.   Neurological: She is alert and oriented to person, place, and time.   Skin: " Skin is warm and dry. No rash noted. She is not diaphoretic. No erythema. No pallor.   Psychiatric: She has a normal mood and affect. Her behavior is normal.       RESULTS   Results Review:    Results from last 7 days   Lab Units 07/16/19  0604 07/15/19  0535 07/14/19  0649 07/13/19  1227   SODIUM mmol/L 144 145 141 143   POTASSIUM mmol/L 3.4* 3.5 3.9 3.9   CHLORIDE mmol/L 98 99 99 101   CO2 mmol/L 34.0* 32.0* 30.0* 30.0*   BUN mg/dL 96* 95* 82* 79*   CREATININE mg/dL 2.55* 2.45* 2.37* 2.22*   CALCIUM mg/dL 8.9 8.6 8.7 9.0   BILIRUBIN mg/dL  --   --   --  0.6   ALK PHOS U/L  --   --   --  76   ALT (SGPT) U/L  --   --   --  27   AST (SGOT) U/L  --   --   --  31   GLUCOSE mg/dL 73 57* 158* 128*       Estimated Creatinine Clearance: 25.2 mL/min (A) (by C-G formula based on SCr of 2.55 mg/dL (H)).                Results from last 7 days   Lab Units 07/16/19  0604 07/15/19  0535 07/14/19  0649 07/13/19  1227   WBC 10*3/mm3 8.26 8.42 5.29 7.58   HEMOGLOBIN g/dL 8.9* 8.5* 8.6* 8.9*   PLATELETS 10*3/mm3 289 316 292 282       Results from last 7 days   Lab Units 07/16/19  0604 07/15/19  0535 07/14/19  0649 07/13/19  1227   INR  3.14* 4.66* 4.57* 3.97*         Imaging Results (last 24 hours)     ** No results found for the last 24 hours. **           MEDICATIONS      aspirin 81 mg Oral Daily   bumetanide 2 mg Intravenous BID   cholecalciferol 2,000 Units Oral Daily   citalopram 20 mg Oral Daily   diltiaZEM  mg Oral Daily   famotidine 20 mg Oral Daily   ferrous sulfate 324 mg Oral Daily With Breakfast   guaiFENesin 600 mg Oral Q12H   insulin aspart 0-7 Units Subcutaneous 4x Daily AC & at Bedtime   insulin detemir 25 Units Subcutaneous Nightly   ipratropium-albuterol 3 mL Nebulization Q8H - RT   metOLazone 2.5 mg Oral Daily   nystatin  Topical Q12H   potassium chloride 40 mEq Oral Once   prenatal vitamin 27-0.8 1 tablet Oral Daily   rOPINIRole 0.25 mg Oral Nightly   sodium chloride 3 mL Intravenous Q12H   traZODone 300 mg  Oral Nightly   vitamin C with bob hips 500 mg Oral Daily       Pharmacy to dose warfarin        Assessment/Plan   ASSESSMENT / PLAN      Acute on chronic diastolic congestive heart failure (CMS/HCC)    Atrial fibrillation [I48.91]    Emphysema of lung (CMS/[facility])    Type 2 diabetes mellitus with stage 4 chronic kidney disease, with long-term current use of insulin (CMS/[facility])    Chronic hypoxemic respiratory failure (CMS/[facility])    Essential hypertension    SSS (sick sinus syndrome) (CMS/[facility])    Stage 4 chronic kidney disease (CMS/[facility])    Anemia    1.CKD stage 4: -Baseline creatinine = 1.9-2.1. Cr still trending up, today cr = 2.55. The patient was switched from lasix  to IV bumex and oral metolazone . Continue to diuresis to improve fluid overload status. Replace kcl, check mg in am    2. Anemia: Hemoglobin = 8.9, improving  Observe.   Continue iron supplement  Transfuse if hgb less than 7      3. Acute on Chronic Diastolic heart failure : Patient is still needing diuresis.  Last echo 6/18/19 showed EF 46-50%, BNP on admission was 8913, CXR shows pulmonary congestion.   -on lasix 40 mg daily and metolazone 2.5 mg daily at home   -s/p lasix 80 mg IV in ER and lasix 40 mg IV once; kidney function worsening - switched to IV bumex and oral metolazone on 7/15   -strict I/Os  -daily weights  -1500 mL fluid restriction, sodium restriction 2g    4. Chronic hypoxemic respiratory failure -   5. SSS -pacemaker in place   6. Essential hypertension   7. Type 2 Diabetes Mellitus with stage 4 chronic kidney disease. - long-term insulin use.   8. Emphysema of lung                 This document has been electronically signed by Nirali Carrion, Medical Student on July 16, 2019 8:55 AM        As the teaching physician, I have personally re-performed the physical exam and medical decision making activities included in the student note.    Sandy Caputo MD  7/16/2019  10:53 AM

## 2019-07-16 NOTE — PLAN OF CARE
Problem: Patient Care Overview  Goal: Plan of Care Review   07/16/19 1453 07/16/19 1555   Coping/Psychosocial   Plan of Care Reviewed With --  patient   Plan of Care Review   Progress no change --    OTHER   Outcome Summary --  sup-sit-sup cond ind,sit-stand-sit ind,amb 56,64 w/o ad and sba of 1 and followed with w/c;pt on 4L today instead of 3L but sats dropped to 78% with gt-nsg aware-no goals met     Goal: Discharge Needs Assessment   07/14/19 1328   Discharge Needs Assessment   Readmission Within the Last 30 Days no previous admission in last 30 days   Concerns to be Addressed denies needs/concerns at this time   Patient/Family Anticipates Transition to home with help/services  (Patient reports that her son and daughter-in-law will be assisting her at d/c. )   Patient/Family Anticipated Services at Transition home health care   Transportation Concerns car, none   Transportation Anticipated family or friend will provide;other (see comments)  (Patient voiced that she uses [facility] for MD appointments. Patient stated that her son will pick her up at d/c. She voiced that he does not own a vehicle but will use a friend's vehicle at d/c. )   Anticipated Changes Related to Illness none   Equipment Needed After Discharge none   Discharge Facility/Level of Care Needs home with home health   Offered/Gave Vendor List yes   Patient's Choice of Community Agency([facility]) [facility]    Current Discharge Risk chronically ill   Disability   Equipment Currently Used at Home glucometer;nebulizer;oxygen;other (see comments);shower chair  (Patient has home/portable oxygen provided by [employer]. MICHELE confirmed with patient that home/portable oxygen is available for use at d/c. )

## 2019-07-16 NOTE — THERAPY TREATMENT NOTE
Acute Care - Occupational Therapy Treatment Note  [facility]     Patient Name: Brendon Skelton  : 1945  MRN: [id number]  Today's Date: 2019  Onset of Illness/Injury or Date of Surgery: 19  Date of Referral to OT: 19  Referring Physician: MONICA Romero MD    Admit Date: 2019       ICD-10-CM ICD-9-CM   1. Acute on chronic congestive heart failure, unspecified heart failure type (CMS/HCC) I50.9 428.0   2. COPD exacerbation (CMS/[facility]) J44.1 491.21   3. Impaired physical mobility Z74.09 781.99   4. Impaired mobility and activities of daily living Z74.09 799.89     Patient Active Problem List   Diagnosis   • Long term current use of anticoagulant therapy   • Personal history of heart valve replacement   • Atrial fibrillation [I48.91]   • Emphysema of lung (CMS/[facility])   • On anticoagulant therapy   • Hyperlipidemia   • Diastolic heart failure (CMS/[facility])   • Depressive disorder   • COPD (chronic obstructive pulmonary disease) (CMS/[facility])   • Type 2 diabetes mellitus with stage 4 chronic kidney disease, with long-term current use of insulin (CMS/[facility])   • Myopia   • Astigmatism   • Bleeding from open wound of chest wall   • Follow-up surgery care   • Encounter for screening for malignant neoplasm of colon   • Positive colorectal cancer screening using DNA-based stool test   • Nodule of left lung   • Chronic hypoxemic respiratory failure (CMS/HCC)   • Physical deconditioning   • Heart failure with preserved left ventricular function (HFpEF) (CMS/[facility])   • Essential hypertension   • Coronary artery disease involving native coronary artery without angina pectoris   • Hx of CABG   • SSS (sick sinus syndrome) (CMS/HCC)   • Pacemaker   • Stage 4 chronic kidney disease (CMS/HCC)   • Personal history of tobacco use, presenting hazards to health   • Gastrointestinal hemorrhage   • Morbid obesity (CMS/HCC)   • Gastritis   • Colon polyp   • Coumadin toxicity   • Acute on chronic diastolic congestive heart  failure (CMS/[facility])   • Anemia     Past Medical History:   Diagnosis Date   • Acute bronchitis    • Anxiety    • Atrial fibrillation (CMS/[facility])    • C. difficile colitis    • Callosity     under metatarsal head      • Cardiac pacemaker in situ    • CHF (congestive heart failure) (CMS/[facility])    • Chronic obstructive lung disease (CMS/HCC)    • Corns and callus    • Depressive disorder    • Diabetes mellitus (CMS/[facility])    • Diastolic heart failure (CMS/[facility])    • Essential hypertension    • Foot pain    • Hyperlipidemia    • Long term current use of anticoagulant    • On anticoagulant therapy    • Pulmonary emphysema (CMS/[facility])    • Rectal hemorrhage    • Type 2 diabetes mellitus (CMS/[facility])      Past Surgical History:   Procedure Laterality Date   • AORTIC VALVE REPAIR/REPLACEMENT  1999   • CARDIAC ELECTROPHYSIOLOGY PROCEDURE N/A 3/20/2017    Procedure: PPM generator change - dual;  Surgeon: Bereket Gates MD;  Location: [facility] CATH INVASIVE LOCATION;  Service:    • CARDIAC PACEMAKER PLACEMENT  1999   • CATARACT EXTRACTION W/ INTRAOCULAR LENS IMPLANT Left 2/1/2019    Procedure: REMOVE CATARACT AND IMPLANT INTRAOCULAR LENS I;  Surgeon: Jose L Clay MD;  Location: [facility] OR;  Service: Ophthalmology   • CATARACT EXTRACTION W/ INTRAOCULAR LENS IMPLANT Right 2/8/2019    Procedure: REMOVE CATARACT AND IMPLANT  INTRAOCULAR LENS;  Surgeon: Jsoe L Clay MD;  Location: [facility] OR;  Service: Ophthalmology   • COLONOSCOPY N/A 6/19/2019    Procedure: COLONOSCOPY WITH CONTROL OF BLEED;  Surgeon: Alfredo Guerrero MD;  Location: [facility] ENDOSCOPY;  Service: Gastroenterology   • COLONOSCOPY N/A 6/24/2019    Procedure: COLONOSCOPY;  Surgeon: Alfredo Guerrero MD;  Location: [facility] ENDOSCOPY;  Service: Gastroenterology   • ECHO - CONVERTED  11/12/2013    There is mild to moderate left atrial enlargement EF 50-55%   • ENDOSCOPY N/A 6/19/2019    Procedure: ESOPHAGOGASTRODUODENOSCOPY WITH CONTROL OF BLEED;  Surgeon: Yolanda  Alfredo MARIN MD;  Location: [facility] ENDOSCOPY;  Service: Gastroenterology   • FOOT SURGERY  1990   • OTHER SURGICAL HISTORY  10/26/2015    PARING CORN/CALLUS    • PACEMAKER REPLACEMENT N/A 3/21/2017    Procedure: revision pacemaker pocket, evacuation hematoma, control of bleeding;  Surgeon: Polo Feliz MD;  Location: [facility] OR;  Service:    • TRANSESOPHAGEAL ECHOCARDIOGRAM (MITZY)  05/01/2014    With color flow-Mild left atrial enlargement with mild concentric LV hypertrophy with top normal aortic root size. EF 45-50%. Mild mitral regurgitation and mild aortic insufficiency and trivial amount of tricuspid regurgation   • TUBAL ABDOMINAL LIGATION         Therapy Treatment    Rehabilitation Treatment Summary     Row Name 07/16/19 1245             Treatment Time/Intention    Discipline  occupational therapy assistant  -BB      Document Type  therapy note (daily note)  -BB      Subjective Information  no complaints  -BB      Mode of Treatment  individual therapy;occupational therapy  -BB      Patient/Family Observations  no family present  -BB      Total Minutes, Occupational Therapy Treatment  39  -BB2      Therapy Frequency (OT Eval)  other (see comments) 5-7 days/wk  -BB2      Patient Effort  good  -BB2      Existing Precautions/Restrictions  fall;oxygen therapy device and L/min  -BB2      Recorded by [BB] Selena Simpson, COLÓN/L 07/16/19 1443  [BB2] Selena Simpson, COLÓN/L 07/16/19 1452      Row Name 07/16/19 1245             Vital Signs    Pretreatment Heart Rate (beats/min)  100  -BB      Intratreatment Heart Rate (beats/min)  102  -BB      Posttreatment Heart Rate (beats/min)  100  -BB      Pre SpO2 (%)  92  -BB      O2 Delivery Pre Treatment  supplemental O2  -BB      Intra SpO2 (%)  88  -BB      O2 Delivery Intra Treatment  supplemental O2  -BB      Post SpO2 (%)  90  -BB      O2 Delivery Post Treatment  supplemental O2  -BB      Pre Patient Position  Sitting  -BB      Intra Patient Position  Sitting   -BB      Post Patient Position  Sitting  -BB      Recorded by [BB] Selena Simpson COLÓN/L 07/16/19 1452      Row Name 07/16/19 1245             Cognitive Assessment/Intervention- PT/OT    Orientation Status (Cognition)  oriented x 4  -BB      Follows Commands (Cognition)  WFL  -BB      Recorded by [BB] Selena Simpson COTA/L 07/16/19 1452      Row Name 07/16/19 1245             Sit-Stand Transfer    Sit-Stand [place] (Transfers)  independent  -BB      Assistive Device (Sit-Stand Transfers)  -- no AD  -BB      Recorded by [BB] Selena Simpson COTA/L 07/16/19 1452      Row Name 07/16/19 1245             Stand-Sit Transfer    Stand-Sit [place] (Transfers)  independent  -BB      Assistive Device (Stand-Sit Transfers)  -- No AD  -BB      Recorded by [BB] Selena Simpson COTA/L 07/16/19 1452      Row Name 07/16/19 1245             Toilet Transfer    Type (Toilet Transfer)  sit-stand;stand-sit  -BB      [place] Level (Toilet Transfer)  independent  -BB      Assistive Device (Toilet Transfer)  raised toilet seat Pt bed toilet t/f 2 times during tx  -BB      Recorded by [BB] Selena Simpson COTA/L 07/16/19 1452      Row Name 07/16/19 1245             Therapeutic Exercise    Upper Extremity Range of Motion (Therapeutic Exercise)  shoulder flexion/extension, bilateral;elbow flexion/extension, bilateral;wrist flexion/extension, bilateral;shoulder internal/external rotation, bilateral chest press  -BB      Hand (Therapeutic Exercise)  finger flexion/extension, bilateral  -BB      Position (Therapeutic Exercise)  seated  -BB      Sets/Reps (Therapeutic Exercise)  1x15  -BB      Recorded by [BB] Selena Simpson COTA/L 07/16/19 1452      Row Name 07/16/19 1245             Positioning and Restraints    Pre-Treatment Position  in bed  -BB      Post Treatment Position  bed  -BB      In Bed  sitting EOB;call light within reach;encouraged to call for assist pt signed paper no bed alarm for  safety  -BB      Recorded by [BB] Selena Simpson COTA/L 07/16/19 1452      Row Name 07/16/19 1245             Pain Scale: Numbers Pre/Post-Treatment    Pain Scale: Numbers, Pretreatment  0/10 - no pain  -BB      Pain Scale: Numbers, Post-Treatment  0/10 - no pain  -BB      Recorded by [BB] Selena Simpson COTA/L 07/16/19 1452      Row Name 07/16/19 1245             Plan of Care Review    Plan of Care Reviewed With  patient  -BB      Recorded by [BB] Selena Simpson COTA/L 07/16/19 1452      Row Name 07/16/19 1245             Outcome Summary/Treatment Plan (OT)    Daily Summary of Progress (OT)  progress toward functional goals is good  -BB      Plan for Continued Treatment (OT)  continue POC  -BB      Anticipated Discharge Disposition (OT)  anticipate therapy at next level of care  -BB      Recorded by [BB] Selena Simpson COTA/DANDRE 07/16/19 1452        User Key  (r) = Recorded By, (t) = Taken By, (c) = Cosigned By    Initials Name Effective Dates Discipline    BB Selena Simpson COTA/L 03/07/18 -  OT           Rehab Goal Summary     Row Name 07/16/19 1345             Occupational Therapy Goals    Transfer Goal Selection (OT)  transfer, OT goal 1  -BB      Bathing Goal Selection (OT)  bathing, OT goal 1  -BB      Dressing Goal Selection (OT)  dressing, OT goal 1  -BB      Activity Tolerance Goal Selection (OT)  activity tolerance, OT goal 1  -BB         Transfer Goal 1 (OT)    Activity/Assistive Device (Transfer Goal 1, OT)  transfers, all  -BB      [place] Level/Cues Needed (Transfer Goal 1, OT)  conditional independence  -BB      Time Frame (Transfer Goal 1, OT)  long term goal (LTG)  -BB      Progress/Outcome (Transfer Goal 1, OT)  goal not met  -BB         Bathing Goal 1 (OT)    Activity/Assistive Device (Bathing Goal 1, OT)  bathing skills, all  -BB      [place] Level/Cues Needed (Bathing Goal 1, OT)  contact guard assist With 02 90% or above.  -BB      Time Frame (Bathing Goal  1, OT)  long term goal (LTG)  -BB      Progress/Outcomes (Bathing Goal 1, OT)  goal not met  -BB         Dressing Goal 1 (OT)    Activity/Assistive Device (Dressing Goal 1, OT)  dressing skills, all  -BB      [place]/Cues Needed (Dressing Goal 1, OT)  contact guard assist with 02 90% or above.  -BB      Time Frame (Dressing Goal 1, OT)  long term goal (LTG)  -BB      Progress/Outcome (Dressing Goal 1, OT)  goal not met  -BB         Toileting Goal 1 (OT)    Activity/Device (Toileting Goal 1, OT)  toileting skills, all  -BB      [place] Level/Cues Needed (Toileting Goal 1, OT)  conditional independence  -BB      Time Frame (Toileting Goal 1, OT)  long term goal (LTG)  -BB      Progress/Outcome (Toileting Goal 1, OT)  goal met  -BB          Activity Tolerance Goal 1 (OT)    Activity Level (Endurance Goal 1, OT)  20 min activity;O2 sat >/ equal to 88%  -BB      Time Frame (Activity Tolerance Goal 1, OT)  long term goal (LTG)  -BB      Progress/Outcome (Activity Tolerance Goal 1, OT)  goal not met  -BB         Patient Education Goal (OT)    Activity (Patient Education Goal, OT)  Home safety/fall prev and EC/WS.  -BB      [place]/Cues/Accuracy (Memory Goal 2, OT)  demonstrates adequately;verbalizes understanding  -BB      Time Frame (Patient Education Goal, OT)  long term goal (LTG)  -BB      Progress/Outcome (Patient Education Goal, OT)  goal not met  -BB        User Key  (r) = Recorded By, (t) = Taken By, (c) = Cosigned By    Initials Name Provider Type Discipline    BB Selena Simpson COTA/L Occupational Therapy Assistant OT        Occupational Therapy Education     Title: PT OT SLP Therapies (In Progress)     Topic: Occupational Therapy (In Progress)     Point: ADL training (Done)     Description: Instruct learner(s) on proper safety adaptation and remediation techniques during self care or transfers.   Instruct in proper use of assistive devices.    Learning Progress Summary           Patient  Acceptance, SARAH, ROLLY,DU by BB at 7/16/2019  2:53 PM                   Point: Precautions (Done)     Description: Instruct learner(s) on prescribed precautions during self-care and functional transfers.    Learning Progress Summary           Patient Acceptance, SARAH, VU by RB at 7/15/2019 11:31 AM    Comment:  Reviewed use of non skid socks when OOB and use of gait belt for distance ambulation.    AcceptanceSARAH VU by RB at 7/14/2019  2:02 PM    Comment:  Edu pt on  use of non skid socks when OOB.                               User Key     Initials Effective Dates Name Provider Type Discipline    RB 06/15/16 -  Beto Carney, OT Occupational Therapist OT    FRANCES 03/07/18 -  Selena Simpson COTA/L Occupational Therapy Assistant OT                OT Recommendation and Plan  Outcome Summary/Treatment Plan (OT)  Daily Summary of Progress (OT): progress toward functional goals is good  Plan for Continued Treatment (OT): continue POC  Anticipated Discharge Disposition (OT): anticipate therapy at next level of care  Therapy Frequency (OT Eval): other (see comments)(5-7 days/wk)  Daily Summary of Progress (OT): progress toward functional goals is good  Plan of Care Review  Plan of Care Reviewed With: patient  Plan of Care Reviewed With: patient  Outcome Summary: Pt Independent bed <>toilet t/f this tx. Pt defers ADL, however agrees to B UE exercises with fair tolerance. One goal met this tx.   Outcome Measures     Row Name 07/16/19 1345 07/15/19 1404 07/15/19 0955       How much help from another person do you currently need...    Turning from your back to your side while in flat bed without using bedrails?  --  4  -LN  --    Moving from lying on back to sitting on the side of a flat bed without bedrails?  --  4  -LN  --    Moving to and from a bed to a chair (including a wheelchair)?  --  4  -LN  --    Standing up from a chair using your arms (e.g., wheelchair, bedside chair)?  --  4  -LN  --    Climbing 3-5 steps with a  "railing?  --  3  -LN  --    To walk in hospital room?  --  3  -LN  --    AM-PAC 6 Clicks Score (PT)  --  22  -LN  --       How much help from another is currently needed...    Putting on and taking off regular lower body clothing?  2  -BB  --  2  -RB    Bathing (including washing, rinsing, and drying)  3  -BB  --  3  -RB    Toileting (which includes using toilet bed pan or urinal)  4  -BB  --  3  -RB    Putting on and taking off regular upper body clothing  4  -BB  --  4  -RB    Taking care of personal grooming (such as brushing teeth)  4  -BB  --  4  -RB    Eating meals  4  -BB  --  4  -RB    AM-PAC 6 Clicks Score (OT)  21  -BB  --  20  -RB       Functional Assessment    Outcome Measure Options  --  AM-PAC 6 Clicks Basic Mobility (PT)  -LN  AM-PAC 6 Clicks Daily Activity (OT)  -RB    Row Name 07/14/19 0910             How much help from another person do you currently need...    Turning from your back to your side while in flat bed without using bedrails?  4  -CZ      Moving from lying on back to sitting on the side of a flat bed without bedrails?  4  -CZ      Moving to and from a bed to a chair (including a wheelchair)?  3  -CZ      Standing up from a chair using your arms (e.g., wheelchair, bedside chair)?  3  -CZ      Climbing 3-5 steps with a railing?  3  -CZ      To walk in hospital room?  3  -CZ      AM-PAC 6 Clicks Score (PT)  20  -CZ         Tinetti Assessment    T[place]etti Assessment  yes  -CZ      Sitting Balance  1  -CZ      Arises  2  -CZ      Attempts to Rise  2  -CZ      Immediate Standing Balance (first 5 sec)  2  -CZ      Standing Balance  2  -CZ      Sternal Nudge (feet close together)  1  -CZ      Eyes Closed (feet close together)  1  -CZ      Turning 360 Degrees- Steps  1  -CZ      Turning 360 Degrees- Steadiness  1  -CZ      Sitting Down  2  -CZ      T[place]etti Balance Score  15  -CZ      Gait Initiation (immediate after told \"go\")  1  -CZ      Step Length- Right Swing  1  -CZ      Step Length- Left " Swing  1  -CZ      Foot Clearance- Right Foot  1  -CZ      Foot Clearance- Left Foot  1  -CZ      Step Symmetry  1  -CZ      Step Continuity  1  -CZ      Path (excursion)  1  -CZ      Trunk  1  -CZ      Base of Support  1  -CZ      Gait Score  10  -CZ      [place] Total Score  25  -CZ      T[place]etti Assistive Device  -- No AD.  -CZ      T[place]etti Assessment Comments  Decreased stepping response to perturbation.   -CZ         Functional Assessment    Outcome Measure Options  AM-PAC 6 Clicks Basic Mobility (PT);[place]  -CZ        User Key  (r) = Recorded By, (t) = Taken By, (c) = Cosigned By    Initials Name Provider Type    RB Beto Carney, OT Occupational Therapist    LN Cecy Lara, PTA Physical Therapy Assistant    BB Selena Simpson COTA/L Occupational Therapy Assistant    CZ Titi Portillo, PT Physical Therapist           Time Calculation:   Time Calculation- OT     Row Name 07/16/19 1455             Time Calculation- OT    OT Start Time  1245  -BB      OT Stop Time  1324  -BB      OT Time Calculation (min)  39 min  -BB      Total Timed Code Minutes- OT  39 minute(s)  -BB      OT Received On  07/16/19  -BB        User Key  (r) = Recorded By, (t) = Taken By, (c) = Cosigned By    Initials Name Provider Type    BB Selena Simpson COTA/L Occupational Therapy Assistant        Therapy Charges for Today     Code Description Service Date Service Provider Modifiers Qty    74252465689 HC OT SELF CARE/MGMT/TRAIN EA 15 MIN 7/16/2019 Selena Simpson COTA/L GO 1    10846408748 HC OT THER PROC EA 15 MIN 7/16/2019 Selena Simpson COTA/L GO 2               YSABEL Mary  7/16/2019

## 2019-07-16 NOTE — THERAPY TREATMENT NOTE
Acute Care - Physical Therapy Treatment Note  [facility]     Patient Name: Brendon Skelton  : 1945  MRN: [id number]  Today's Date: 2019  Onset of Illness/Injury or Date of Surgery: 19  Date of Referral to PT: 19  Referring Physician: MONICA Romero MD    Admit Date: 2019    Visit Dx:    ICD-10-CM ICD-9-CM   1. Acute on chronic congestive heart failure, unspecified heart failure type (CMS/HCC) I50.9 428.0   2. COPD exacerbation (CMS/[facility]) J44.1 491.21   3. Impaired physical mobility Z74.09 781.99   4. Impaired mobility and activities of daily living Z74.09 799.89     Patient Active Problem List   Diagnosis   • Long term current use of anticoagulant therapy   • Personal history of heart valve replacement   • Atrial fibrillation [I48.91]   • Emphysema of lung (CMS/[facility])   • On anticoagulant therapy   • Hyperlipidemia   • Diastolic heart failure (CMS/[facility])   • Depressive disorder   • COPD (chronic obstructive pulmonary disease) (CMS/[facility])   • Type 2 diabetes mellitus with stage 4 chronic kidney disease, with long-term current use of insulin (CMS/[facility])   • Myopia   • Astigmatism   • Bleeding from open wound of chest wall   • Follow-up surgery care   • Encounter for screening for malignant neoplasm of colon   • Positive colorectal cancer screening using DNA-based stool test   • Nodule of left lung   • Chronic hypoxemic respiratory failure (CMS/[facility])   • Physical deconditioning   • Heart failure with preserved left ventricular function (HFpEF) (CMS/[facility])   • Essential hypertension   • Coronary artery disease involving native coronary artery without angina pectoris   • Hx of CABG   • SSS (sick sinus syndrome) (CMS/HCC)   • Pacemaker   • Stage 4 chronic kidney disease (CMS/HCC)   • Personal history of tobacco use, presenting hazards to health   • Gastrointestinal hemorrhage   • Morbid obesity (CMS/HCC)   • Gastritis   • Colon polyp   • Coumadin toxicity   • Acute on chronic diastolic congestive  heart failure (CMS/HCC)   • Anemia       Therapy Treatment    Rehabilitation Treatment Summary     Row Name 07/16/19 1515 07/16/19 1245          Treatment Time/Intention    Discipline  physical therapy assistant  -LN  occupational therapy assistant  -BB     Document Type  therapy note (daily note)  -LN  therapy note (daily note)  -BB     Subjective Information  -- not feeling as well today  -LN  no complaints  -BB     Mode of Treatment  physical therapy  -LN  individual therapy;occupational therapy  -BB     Patient/Family Observations  --  no family present  -BB     Therapy Frequency (PT Clinical Impression)  daily  -LN  --     Total Minutes, Occupational Therapy Treatment  --  39  -BB2     Therapy Frequency (OT Eval)  --  other (see comments) 5-7 days/wk  -BB2     Patient Effort  good  -LN  good  -BB2     Existing Precautions/Restrictions  fall;oxygen therapy device and L/min  -LN  fall;oxygen therapy device and L/min  -BB2     Recorded by [LN] Cecy Lara, PTA 07/16/19 1667 [BB] Selena Simpson COTA/L 07/16/19 1443  [BB2] Selena Simpson COTA/L 07/16/19 1452     Row Name 07/16/19 1515 07/16/19 1245          Vital Signs    Post Systolic BP Rehab  111  -LN  --     Post Treatment Diastolic BP  65  -LN  --     Pretreatment Heart Rate (beats/min)  82  -LN  100  -BB     Intratreatment Heart Rate (beats/min)  --  102  -BB     Posttreatment Heart Rate (beats/min)  79  -LN  100  -BB     Pre SpO2 (%)  92  -LN  92  -BB     O2 Delivery Pre Treatment  supplemental O2  -LN  supplemental O2  -BB     Intra SpO2 (%)  78  -LN  88  -BB     O2 Delivery Intra Treatment  --  supplemental O2  -BB     Post SpO2 (%)  92  -LN  90  -BB     O2 Delivery Post Treatment  --  supplemental O2  -BB     Pre Patient Position  Sitting  -LN  Sitting  -BB     Intra Patient Position  Standing  -LN  Sitting  -BB     Post Patient Position  Sitting  -LN  Sitting  -BB     Recorded by [LN] Cecy Lara, PTA 07/16/19 4931 [BB] Selena Simpson  ALKA, COLÓN/L 07/16/19 1452     Row Name 07/16/19 1515 07/16/19 1245          Cognitive Assessment/Intervention- PT/OT    Orientation Status (Cognition)  oriented x 4  -LN  oriented x 4  -BB     Follows Commands (Cognition)  WFL  -LN  WFL  -BB     Recorded by [LN] Cecy Lara, PTA 07/16/19 1553 [BB] Selena Simpson, COLÓN/L 07/16/19 1452     Row Name 07/16/19 1515             Bed Mobility Assessment/Treatment    Bed Mobility Assessment/Treatment  supine-sit;sit-supine  -LN      Supine-Sit [place] (Bed Mobility)  conditional independence  -LN      Sit-Supine [place] (Bed Mobility)  conditional independence  -LN      Recorded by [LN] Cecy Lara, PTA 07/16/19 1553      Row Name 07/16/19 1515             Transfer Assessment/Treatment    Transfer Assessment/Treatment  sit-stand transfer;stand-sit transfer  -LN      Recorded by [LN] Cecy Lara, PTA 07/16/19 1553      Row Name 07/16/19 1515 07/16/19 1245          Sit-Stand Transfer    Sit-Stand [place] (Transfers)  independent  -LN  independent  -BB     Assistive Device (Sit-Stand Transfers)  --  -- no AD  -BB     Recorded by [LN] Cecy Lara, PTA 07/16/19 1553 [BB] Selena Simpson COLÓN/L 07/16/19 1452     Row Name 07/16/19 1515 07/16/19 1245          Stand-Sit Transfer    Stand-Sit [place] (Transfers)  independent  -LN  independent  -BB     Assistive Device (Stand-Sit Transfers)  --  -- No AD  -BB     Recorded by [LN] Cecy Lara, PTA 07/16/19 1553 [BB] Selena Simpson COLÓN/L 07/16/19 1452     Row Name 07/16/19 1515 07/16/19 1245          Toilet Transfer    Type (Toilet Transfer)  sit-stand;stand-sit  -LN  sit-stand;stand-sit  -BB     [place] Level (Toilet Transfer)  independent  -LN  independent  -BB     Assistive Device (Toilet Transfer)  raised toilet seat  -LN  raised toilet seat Pt bed toilet t/f 2 times during tx  -BB     Recorded by [LN] Cecy Lara, YOSEPH 07/16/19 1718 [BB] Selena Simpson, COLÓN/DANDRE 07/16/19 7962      Row Name 07/16/19 1515             Gait/Stairs Assessment/Training    [place] Level (Gait)  supervision;independent  -LN      Assistive Device (Gait)  other (see comments) Refuses FWW.  -LN      Distance in Feet (Gait)  56,64  -LN      Deviations/Abnormal Patterns (Gait)  base of support, wide  -LN      Comment (Gait/Stairs)  sats dropped lower this rx on higher 02 with gt  -LN      Recorded by [LN] Cecy Lara PTA 07/16/19 0643      Row Name 07/16/19 1245             Therapeutic Exercise    Upper Extremity Range of Motion (Therapeutic Exercise)  shoulder flexion/extension, bilateral;elbow flexion/extension, bilateral;wrist flexion/extension, bilateral;shoulder internal/external rotation, bilateral chest press  -BB      Hand (Therapeutic Exercise)  finger flexion/extension, bilateral  -BB      Position (Therapeutic Exercise)  seated  -BB      Sets/Reps (Therapeutic Exercise)  1x15  -BB      Recorded by [BB] Selena Simpson COTA/L 07/16/19 1452      Row Name 07/16/19 1245             Positioning and Restraints    Pre-Treatment Position  in bed  -BB      Post Treatment Position  bed  -BB      In Bed  sitting EOB;call light within reach;encouraged to call for assist pt signed paper no bed alarm for safety  -BB      Recorded by [BB] Selena Simpson COTA/L 07/16/19 1452      Row Name 07/16/19 1515 07/16/19 1245          Pain Scale: Numbers Pre/Post-Treatment    Pain Scale: Numbers, Pretreatment  0/10 - no pain  -LN  0/10 - no pain  -BB     Pain Scale: Numbers, Post-Treatment  0/10 - no pain  -LN  0/10 - no pain  -BB     Recorded by [LN] Cecy Lara PTA 07/16/19 1553 [BB] Selena Simpson COTA/L 07/16/19 1452     Row Name 07/16/19 1515             Vision Assessment/Intervention    Visual Impairment/Limitations  corrective lenses for reading  -LN      Recorded by [LN] Cecy Lara PTA 07/16/19 1553      Row Name 07/16/19 1515 07/16/19 1245          Plan of Care Review    Plan of Care Reviewed  With  patient  -LN  patient  -BB     Recorded by [LN] Cecy Lara, PTA 07/16/19 1553 [BB] Selena Simpson COTA/L 07/16/19 1452     Row Name 07/16/19 1245             Outcome Summary/Treatment Plan (OT)    Daily Summary of Progress (OT)  progress toward functional goals is good  -BB      Plan for Continued Treatment (OT)  continue POC  -BB      Anticipated Discharge Disposition (OT)  anticipate therapy at next level of care  -BB      Recorded by [BB] Selena Simpson COTA/L 07/16/19 1452      Row Name 07/16/19 1510             Outcome Summary/Treatment Plan (PT)    Plan for Continued Treatment (PT)  cont  -LN      Anticipated Discharge Disposition (PT)  home with home health  -LN      Recorded by [LN] Cecy Lara, PTA 07/16/19 3186        User Key  (r) = Recorded By, (t) = Taken By, (c) = Cosigned By    Initials Name Effective Dates Discipline    LN Cecy Lara, PTA 03/07/18 -  PT    BB Selena Simpson COTA/L 03/07/18 -  OT               Rehab Goal Summary     Row Name 07/16/19 1515 07/16/19 1345          Physical Therapy Goals    Transfer Goal Selection (PT)  transfer, PT goal 1  -LN  --     Gait Training Goal Selection (PT)  gait training, PT goal 1;gait training, PT goal (free text)  -LN  --        Transfer Goal 1 (PT)    Activity/Assistive Device (Transfer Goal 1, PT)  sit-to-stand/stand-to-sit;bed-to-chair/chair-to-bed  -LN  --     [place] Level/Cues Needed (Transfer Goal 1, PT)  conditional independence  -LN  --     Time Frame (Transfer Goal 1, PT)  long term goal (LTG);by discharge  -LN  --     Barriers (Transfers Goal 1, PT)  Maintain SpO2 > 88%  -LN  --     Progress/Outcome (Transfer Goal 1, PT)  goal not met  -LN  --        Gait Training Goal 1 (PT)    Activity/Assistive Device (Gait Training Goal 1, PT)  gait (walking locomotion)  -LN  --     [place] Level (Gait Training Goal 1, PT)  independent  -LN  --     Distance (Gait Goal 1, PT)  50'x2  -LN  --     Time Frame (Gait  Training Goal 1, PT)  long term goal (LTG);by discharge  -LN  --     Barriers (Gait Training Goal 1, PT)  Maintain SpO2 > 88%  -LN  --     Progress/Outcome (Gait Training Goal 1, PT)  goal not met  -LN  --        Gait Training Goal (PT)    Gait Training Goal (PT)  Tinetti fall risk, score: 27/28  -LN  --     Time Frame (Gait Training Goal, PT)  long term goal (LTG);by discharge  -LN  --     Barriers (Gait Training Goal, PT)  Decreased stepping response to perturbation.   -LN  --     Progress/Outcome (Gait Training Goal, PT)  goal not met  -LN  --        Occupational Therapy Goals    Transfer Goal Selection (OT)  --  transfer, OT goal 1  -BB     Bathing Goal Selection (OT)  --  bathing, OT goal 1  -BB     Dressing Goal Selection (OT)  --  dressing, OT goal 1  -BB     Activity Tolerance Goal Selection (OT)  --  activity tolerance, OT goal 1  -BB        Transfer Goal 1 (OT)    Activity/Assistive Device (Transfer Goal 1, OT)  --  transfers, all  -BB     [place] Level/Cues Needed (Transfer Goal 1, OT)  --  conditional independence  -BB     Time Frame (Transfer Goal 1, OT)  --  long term goal (LTG)  -BB     Progress/Outcome (Transfer Goal 1, OT)  --  goal not met  -BB        Bathing Goal 1 (OT)    Activity/Assistive Device (Bathing Goal 1, OT)  --  bathing skills, all  -BB     [place] Level/Cues Needed (Bathing Goal 1, OT)  --  contact guard assist With 02 90% or above.  -BB     Time Frame (Bathing Goal 1, OT)  --  long term goal (LTG)  -BB     Progress/Outcomes (Bathing Goal 1, OT)  --  goal not met  -BB        Dressing Goal 1 (OT)    Activity/Assistive Device (Dressing Goal 1, OT)  --  dressing skills, all  -BB     [place]/Cues Needed (Dressing Goal 1, OT)  --  contact guard assist with 02 90% or above.  -BB     Time Frame (Dressing Goal 1, OT)  --  long term goal (LTG)  -BB     Progress/Outcome (Dressing Goal 1, OT)  --  goal not met  -BB        Toileting Goal 1 (OT)    Activity/Device (Toileting Goal 1,  OT)  --  toileting skills, all  -BB     [place] Level/Cues Needed (Toileting Goal 1, OT)  --  conditional independence  -BB     Time Frame (Toileting Goal 1, OT)  --  long term goal (LTG)  -BB     Progress/Outcome (Toileting Goal 1, OT)  --  goal met  -BB         Activity Tolerance Goal 1 (OT)    Activity Level (Endurance Goal 1, OT)  --  20 min activity;O2 sat >/ equal to 88%  -BB     Time Frame (Activity Tolerance Goal 1, OT)  --  long term goal (LTG)  -BB     Progress/Outcome (Activity Tolerance Goal 1, OT)  --  goal not met  -BB        Patient Education Goal (OT)    Activity (Patient Education Goal, OT)  --  Home safety/fall prev and EC/WS.  -BB     [place]/Cues/Accuracy (Memory Goal 2, OT)  --  demonstrates adequately;verbalizes understanding  -BB     Time Frame (Patient Education Goal, OT)  --  long term goal (LTG)  -BB     Progress/Outcome (Patient Education Goal, OT)  --  goal not met  -BB       User Key  (r) = Recorded By, (t) = Taken By, (c) = Cosigned By    Initials Name Provider Type Discipline    Cecy Loyola, PTA Physical Therapy Assistant PT    BB Selena Simpson COTA/L Occupational Therapy Assistant OT          Physical Therapy Education     Title: PT OT SLP Therapies (In Progress)     Topic: Physical Therapy (In Progress)     Point: Mobility training (Done)     Learning Progress Summary           Patient Acceptance, E,TB, VU by STEFANIE at 7/16/2019  3:55 PM    Acceptance, E,TB, VU by STEFANIE at 7/15/2019  3:16 PM    Acceptance, E, VU by ÓSCAR at 7/14/2019 10:27 AM    Comment:  Thelma assessed: 25/28 or low fall risk.  Patient demonstrates decreased stepping response to perturbation and reaches for UE support.  She would benefit from use of FWW but refuses.  Also refuses gait belt and bed alarm.                   Point: Body mechanics (Done)     Learning Progress Summary           Patient Acceptance, E,TB, VU by STEFANIE at 7/16/2019  3:55 PM    Acceptance, E,TB, VU by STEFANIE at 7/15/2019  3:16 PM                    Point: Precautions (Done)     Learning Progress Summary           Patient Acceptance, E,TB, VU by LN at 7/16/2019  3:55 PM    Acceptance, E,TB, VU by LN at 7/15/2019  3:16 PM                               User Key     Initials Effective Dates Name Provider Type Discipline    LN 03/07/18 -  Cecy Lara, PTA Physical Therapy Assistant PT    CZ 04/03/18 -  Titi Portillo, PT Physical Therapist PT                PT Recommendation and Plan  Anticipated Discharge Disposition (PT): home with home health  Therapy Frequency (PT Clinical Impression): daily  Outcome Summary/Treatment Plan (PT)  Plan for Continued Treatment (PT): cont  Anticipated Discharge Disposition (PT): home with home health  Plan of Care Reviewed With: patient  Outcome Summary: sup-sit-sup cond ind,sit-stand-sit ind,amb 56,64 w/o ad and sba of 1 and followed with w/c;pt on 4L today instead of 3L but sats dropped to 78% with gt-nsg aware-no goals met  Outcome Measures     Row Name 07/16/19 1515 07/16/19 1345 07/15/19 1404       How much help from another person do you currently need...    Turning from your back to your side while in flat bed without using bedrails?  4  -LN  --  4  -LN    Moving from lying on back to sitting on the side of a flat bed without bedrails?  4  -LN  --  4  -LN    Moving to and from a bed to a chair (including a wheelchair)?  4  -LN  --  4  -LN    Standing up from a chair using your arms (e.g., wheelchair, bedside chair)?  4  -LN  --  4  -LN    Climbing 3-5 steps with a railing?  3  -LN  --  3  -LN    To walk in hospital room?  3  -LN  --  3  -LN    AM-PAC 6 Clicks Score (PT)  22  -LN  --  22  -LN       How much help from another is currently needed...    Putting on and taking off regular lower body clothing?  --  2  -BB  --    Bathing (including washing, rinsing, and drying)  --  3  -BB  --    Toileting (which includes using toilet bed pan or urinal)  --  4  -BB  --    Putting on and taking off regular upper body  clothing  --  4  -BB  --    Taking care of personal grooming (such as brushing teeth)  --  4  -BB  --    Eating meals  --  4  -BB  --    AM-[facility] 6 Clicks Score (OT)  --  21  -BB  --       Functional Assessment    Outcome Measure Options  -[facility] 6 Clicks Basic Mobility (PT)  -LN  --  -[facility] 6 Clicks Basic Mobility (PT)  -LN    Row Name 07/15/19 0955 07/14/19 0910          How much help from another person do you currently need...    Turning from your back to your side while in flat bed without using bedrails?  --  4  -CZ     Moving from lying on back to sitting on the side of a flat bed without bedrails?  --  4  -CZ     Moving to and from a bed to a chair (including a wheelchair)?  --  3  -CZ     Standing up from a chair using your arms (e.g., wheelchair, bedside chair)?  --  3  -CZ     Climbing 3-5 steps with a railing?  --  3  -CZ     To walk in hospital room?  --  3  -CZ     -[facility] 6 Clicks Score (PT)  --  20  -CZ        How much help from another is currently needed...    Putting on and taking off regular lower body clothing?  2  -RB  --     Bathing (including washing, rinsing, and drying)  3  -RB  --     Toileting (which includes using toilet bed pan or urinal)  3  -RB  --     Putting on and taking off regular upper body clothing  4  -RB  --     Taking care of personal grooming (such as brushing teeth)  4  -RB  --     Eating meals  4  -RB  --     AM-[facility] 6 Clicks Score (OT)  20  -RB  --        Tinetti Assessment    Tinetti Assessment  --  yes  -CZ     Sitting Balance  --  1  -CZ     Arises  --  2  -CZ     Attempts to Rise  --  2  -CZ     Immediate Standing Balance (first 5 sec)  --  2  -CZ     Standing Balance  --  2  -CZ     Sternal Nudge (feet close together)  --  1  -CZ     Eyes Closed (feet close together)  --  1  -CZ     Turning 360 Degrees- Steps  --  1  -CZ     Turning 360 Degrees- Steadiness  --  1  -CZ     Sitting Down  --  2  -CZ     Tinetti Balance Score  --  15  -CZ     Gait Initiation (immediate after  "told \"go\")  --  1  -CZ     Step Length- Right Swing  --  1  -CZ     Step Length- Left Swing  --  1  -CZ     Foot Clearance- Right Foot  --  1  -CZ     Foot Clearance- Left Foot  --  1  -CZ     Step Symmetry  --  1  -CZ     Step Continuity  --  1  -CZ     Path (excursion)  --  1  -CZ     Trunk  --  1  -CZ     Base of Support  --  1  -CZ     Gait Score  --  10  -CZ     Tinetti Total Score  --  25  -CZ     Tinetti Assistive Device  --  -- No AD.  -CZ     Tinetti Assessment Comments  --  Decreased stepping response to perturbation.   -CZ        Functional Assessment    Outcome Measure Options  AM-PAC 6 Clicks Daily Activity (OT)  -RB  AM-PAC 6 Clicks Basic Mobility (PT);Tinetti  -CZ       User Key  (r) = Recorded By, (t) = Taken By, (c) = Cosigned By    Initials Name Provider Type    RB Beto Carney, OT Occupational Therapist    LN Cecy Lara PTA Physical Therapy Assistant    BB Selena Simpson, ELDON/L Occupational Therapy Assistant    CZ Titi Portillo, PT Physical Therapist         Time Calculation:   PT Charges     Row Name 07/16/19 1557             Time Calculation    Start Time  1515  -LN      Stop Time  1545  -LN      Time Calculation (min)  30 min  -LN      PT Received On  07/16/19  -LN         Time Calculation- PT    Total Timed Code Minutes- PT  30 minute(s)  -LN        User Key  (r) = Recorded By, (t) = Taken By, (c) = Cosigned By    Initials Name Provider Type    LN Cecy Lara PTA Physical Therapy Assistant        Therapy Charges for Today     Code Description Service Date Service Provider Modifiers Qty    68903693429 HC GAIT TRAINING EA 15 MIN 7/15/2019 Cecy Lara, PTA GP 1    83927546735 HC PT THERAPEUTIC ACT EA 15 MIN 7/15/2019 Ccey Lara, PTA GP 3    77529610541 HC PT THERAPEUTIC ACT EA 15 MIN 7/16/2019 Cecy Lara S, PTA GP 1    21876798771 HC GAIT TRAINING EA 15 MIN 7/16/2019 Cecy Lara S, PTA GP 1          PT G-Codes  Outcome Measure Options: AM-PAC 6 Clicks Basic Mobility " (PT)  AM-PAC 6 Clicks Score (PT): 22  AM-PAC 6 Clicks Score (OT): 21  Tinetti Total Score: 25    Cecy Lara, PTA  7/16/2019

## 2019-07-17 ENCOUNTER — APPOINTMENT (OUTPATIENT)
Dept: GENERAL RADIOLOGY | Facility: HOSPITAL | Age: 74
End: 2019-07-17

## 2019-07-17 LAB
ANION GAP SERPL CALCULATED.3IONS-SCNC: 12 MMOL/L (ref 5–15)
BASOPHILS # BLD AUTO: 0.03 10*3/MM3 (ref 0–0.2)
BASOPHILS NFR BLD AUTO: 0.4 % (ref 0–1.5)
BUN BLD-MCNC: 97 MG/DL (ref 8–23)
BUN/CREAT SERPL: 37.9 (ref 7–25)
CALCIUM SPEC-SCNC: 9 MG/DL (ref 8.6–10.5)
CHLORIDE SERPL-SCNC: 99 MMOL/L (ref 98–107)
CO2 SERPL-SCNC: 33 MMOL/L (ref 22–29)
CREAT BLD-MCNC: 2.56 MG/DL (ref 0.57–1)
DEPRECATED RDW RBC AUTO: 71.4 FL (ref 37–54)
EOSINOPHIL # BLD AUTO: 0.68 10*3/MM3 (ref 0–0.4)
EOSINOPHIL NFR BLD AUTO: 9.9 % (ref 0.3–6.2)
ERYTHROCYTE [DISTWIDTH] IN BLOOD BY AUTOMATED COUNT: 20.7 % (ref 12.3–15.4)
GFR SERPL CREATININE-BSD FRML MDRD: 18 ML/MIN/1.73
GLUCOSE BLD-MCNC: 74 MG/DL (ref 65–99)
GLUCOSE BLDC GLUCOMTR-MCNC: 104 MG/DL (ref 70–130)
GLUCOSE BLDC GLUCOMTR-MCNC: 141 MG/DL (ref 70–130)
GLUCOSE BLDC GLUCOMTR-MCNC: 223 MG/DL (ref 70–130)
GLUCOSE BLDC GLUCOMTR-MCNC: 81 MG/DL (ref 70–130)
HCT VFR BLD AUTO: 27.2 % (ref 34–46.6)
HGB BLD-MCNC: 8.6 G/DL (ref 12–15.9)
IMM GRANULOCYTES # BLD AUTO: 0.05 10*3/MM3 (ref 0–0.05)
IMM GRANULOCYTES NFR BLD AUTO: 0.7 % (ref 0–0.5)
INR PPP: 2.28 (ref 0.8–1.2)
LYMPHOCYTES # BLD AUTO: 0.4 10*3/MM3 (ref 0.7–3.1)
LYMPHOCYTES NFR BLD AUTO: 5.8 % (ref 19.6–45.3)
MAGNESIUM SERPL-MCNC: 2.1 MG/DL (ref 1.6–2.4)
MCH RBC QN AUTO: 30.9 PG (ref 26.6–33)
MCHC RBC AUTO-ENTMCNC: 31.6 G/DL (ref 31.5–35.7)
MCV RBC AUTO: 97.8 FL (ref 79–97)
MONOCYTES # BLD AUTO: 0.7 10*3/MM3 (ref 0.1–0.9)
MONOCYTES NFR BLD AUTO: 10.2 % (ref 5–12)
NEUTROPHILS # BLD AUTO: 5.01 10*3/MM3 (ref 1.7–7)
NEUTROPHILS NFR BLD AUTO: 73 % (ref 42.7–76)
NRBC BLD AUTO-RTO: 0 /100 WBC (ref 0–0.2)
PLATELET # BLD AUTO: 281 10*3/MM3 (ref 140–450)
PMV BLD AUTO: 12.2 FL (ref 6–12)
POTASSIUM BLD-SCNC: 3.5 MMOL/L (ref 3.5–5.2)
PROTHROMBIN TIME: 24.9 SECONDS (ref 11.1–15.3)
RBC # BLD AUTO: 2.78 10*6/MM3 (ref 3.77–5.28)
SODIUM BLD-SCNC: 144 MMOL/L (ref 136–145)
WBC NRBC COR # BLD: 6.87 10*3/MM3 (ref 3.4–10.8)

## 2019-07-17 PROCEDURE — 85025 COMPLETE CBC W/AUTO DIFF WBC: CPT | Performed by: STUDENT IN AN ORGANIZED HEALTH CARE EDUCATION/TRAINING PROGRAM

## 2019-07-17 PROCEDURE — 25010000002 FUROSEMIDE PER 20 MG: Performed by: INTERNAL MEDICINE

## 2019-07-17 PROCEDURE — 71046 X-RAY EXAM CHEST 2 VIEWS: CPT

## 2019-07-17 PROCEDURE — 99232 SBSQ HOSP IP/OBS MODERATE 35: CPT | Performed by: STUDENT IN AN ORGANIZED HEALTH CARE EDUCATION/TRAINING PROGRAM

## 2019-07-17 PROCEDURE — 82962 GLUCOSE BLOOD TEST: CPT

## 2019-07-17 PROCEDURE — 97535 SELF CARE MNGMENT TRAINING: CPT

## 2019-07-17 PROCEDURE — 83735 ASSAY OF MAGNESIUM: CPT | Performed by: INTERNAL MEDICINE

## 2019-07-17 PROCEDURE — 94799 UNLISTED PULMONARY SVC/PX: CPT

## 2019-07-17 PROCEDURE — 63710000001 INSULIN ASPART PER 5 UNITS: Performed by: STUDENT IN AN ORGANIZED HEALTH CARE EDUCATION/TRAINING PROGRAM

## 2019-07-17 PROCEDURE — 85610 PROTHROMBIN TIME: CPT | Performed by: STUDENT IN AN ORGANIZED HEALTH CARE EDUCATION/TRAINING PROGRAM

## 2019-07-17 PROCEDURE — 80048 BASIC METABOLIC PNL TOTAL CA: CPT | Performed by: STUDENT IN AN ORGANIZED HEALTH CARE EDUCATION/TRAINING PROGRAM

## 2019-07-17 RX ORDER — WARFARIN SODIUM 2 MG/1
2 TABLET ORAL
Status: DISCONTINUED | OUTPATIENT
Start: 2019-07-17 | End: 2019-07-20 | Stop reason: DRUGHIGH

## 2019-07-17 RX ORDER — POTASSIUM CHLORIDE 750 MG/1
20 CAPSULE, EXTENDED RELEASE ORAL 2 TIMES DAILY WITH MEALS
Status: DISCONTINUED | OUTPATIENT
Start: 2019-07-17 | End: 2019-07-22 | Stop reason: HOSPADM

## 2019-07-17 RX ADMIN — VITAMIN D, TAB 1000IU (100/BT) 2000 UNITS: 25 TAB at 08:41

## 2019-07-17 RX ADMIN — GUAIFENESIN 600 MG: 600 TABLET, EXTENDED RELEASE ORAL at 20:56

## 2019-07-17 RX ADMIN — OXYCODONE HYDROCHLORIDE AND ACETAMINOPHEN 500 MG: 500 TABLET ORAL at 08:41

## 2019-07-17 RX ADMIN — INSULIN GLARGINE 30 UNITS: 100 INJECTION, SOLUTION SUBCUTANEOUS at 20:51

## 2019-07-17 RX ADMIN — ASPIRIN 81 MG 81 MG: 81 TABLET ORAL at 08:41

## 2019-07-17 RX ADMIN — SODIUM CHLORIDE, PRESERVATIVE FREE 3 ML: 5 INJECTION INTRAVENOUS at 08:42

## 2019-07-17 RX ADMIN — WARFARIN SODIUM 2 MG: 2 TABLET ORAL at 17:21

## 2019-07-17 RX ADMIN — METOLAZONE 2.5 MG: 2.5 TABLET ORAL at 08:41

## 2019-07-17 RX ADMIN — FAMOTIDINE 20 MG: 20 TABLET ORAL at 08:41

## 2019-07-17 RX ADMIN — BUMETANIDE 2 MG: 0.25 INJECTION INTRAMUSCULAR; INTRAVENOUS at 06:46

## 2019-07-17 RX ADMIN — Medication 1 EACH: at 20:56

## 2019-07-17 RX ADMIN — GUAIFENESIN 600 MG: 600 TABLET, EXTENDED RELEASE ORAL at 08:41

## 2019-07-17 RX ADMIN — DILTIAZEM HYDROCHLORIDE 240 MG: 240 CAPSULE, COATED, EXTENDED RELEASE ORAL at 08:41

## 2019-07-17 RX ADMIN — FUROSEMIDE 10 MG/HR: 10 INJECTION, SOLUTION INTRAVENOUS at 14:34

## 2019-07-17 RX ADMIN — PRENATAL VIT W/ FE FUMARATE-FA TAB 27-0.8 MG 1 TABLET: 27-0.8 TAB at 08:41

## 2019-07-17 RX ADMIN — NYSTATIN: 100000 POWDER TOPICAL at 08:42

## 2019-07-17 RX ADMIN — EZETIMIBE 10 MG: 10 TABLET ORAL at 08:41

## 2019-07-17 RX ADMIN — TRAZODONE HYDROCHLORIDE 300 MG: 150 TABLET ORAL at 20:56

## 2019-07-17 RX ADMIN — FERROUS SULFATE TAB EC 324 MG (65 MG FE EQUIVALENT) 324 MG: 324 (65 FE) TABLET DELAYED RESPONSE at 08:41

## 2019-07-17 RX ADMIN — NYSTATIN: 100000 POWDER TOPICAL at 20:57

## 2019-07-17 RX ADMIN — POTASSIUM CHLORIDE 20 MEQ: 750 CAPSULE, EXTENDED RELEASE ORAL at 14:36

## 2019-07-17 RX ADMIN — INSULIN ASPART 3 UNITS: 100 INJECTION, SOLUTION INTRAVENOUS; SUBCUTANEOUS at 20:58

## 2019-07-17 RX ADMIN — ROPINIROLE HYDROCHLORIDE 0.25 MG: 0.25 TABLET, FILM COATED ORAL at 20:56

## 2019-07-17 RX ADMIN — IPRATROPIUM BROMIDE AND ALBUTEROL SULFATE 3 ML: 2.5; .5 SOLUTION RESPIRATORY (INHALATION) at 22:38

## 2019-07-17 RX ADMIN — FUROSEMIDE 10 MG/HR: 10 INJECTION, SOLUTION INTRAVENOUS at 22:48

## 2019-07-17 RX ADMIN — CITALOPRAM HYDROBROMIDE 20 MG: 20 TABLET ORAL at 08:41

## 2019-07-17 NOTE — THERAPY TREATMENT NOTE
Acute Care - Occupational Therapy Treatment Note  [facility]     Patient Name: Brendon Skelton  : 1945  MRN: [id number]  Today's Date: 2019  Onset of Illness/Injury or Date of Surgery: 19  Date of Referral to OT: 19  Referring Physician: MONICA Romero MD    Admit Date: 2019       ICD-10-CM ICD-9-CM   1. Acute on chronic congestive heart failure, unspecified heart failure type (CMS/HCC) I50.9 428.0   2. COPD exacerbation (CMS/[facility]) J44.1 491.21   3. Impaired physical mobility Z74.09 781.99   4. Impaired mobility and activities of daily living Z74.09 799.89     Patient Active Problem List   Diagnosis   • Long term current use of anticoagulant therapy   • Personal history of heart valve replacement   • Atrial fibrillation [I48.91]   • Emphysema of lung (CMS/[facility])   • On anticoagulant therapy   • Hyperlipidemia   • Diastolic heart failure (CMS/[facility])   • Depressive disorder   • COPD (chronic obstructive pulmonary disease) (CMS/[facility])   • Type 2 diabetes mellitus with stage 4 chronic kidney disease, with long-term current use of insulin (CMS/[facility])   • Myopia   • Astigmatism   • Bleeding from open wound of chest wall   • Follow-up surgery care   • Encounter for screening for malignant neoplasm of colon   • Positive colorectal cancer screening using DNA-based stool test   • Nodule of left lung   • Chronic hypoxemic respiratory failure (CMS/HCC)   • Physical deconditioning   • Heart failure with preserved left ventricular function (HFpEF) (CMS/[facility])   • Essential hypertension   • Coronary artery disease involving native coronary artery without angina pectoris   • Hx of CABG   • SSS (sick sinus syndrome) (CMS/HCC)   • Pacemaker   • Stage 4 chronic kidney disease (CMS/HCC)   • Personal history of tobacco use, presenting hazards to health   • Gastrointestinal hemorrhage   • Morbid obesity (CMS/HCC)   • Gastritis   • Colon polyp   • Coumadin toxicity   • Acute on chronic diastolic congestive heart  failure (CMS/[facility])   • Anemia     Past Medical History:   Diagnosis Date   • Acute bronchitis    • Anxiety    • Atrial fibrillation (CMS/[facility])    • C. difficile colitis    • Callosity     under metatarsal head      • Cardiac pacemaker in situ    • CHF (congestive heart failure) (CMS/[facility])    • Chronic obstructive lung disease (CMS/HCC)    • Corns and callus    • Depressive disorder    • Diabetes mellitus (CMS/[facility])    • Diastolic heart failure (CMS/[facility])    • Essential hypertension    • Foot pain    • Hyperlipidemia    • Long term current use of anticoagulant    • On anticoagulant therapy    • Pulmonary emphysema (CMS/[facility])    • Rectal hemorrhage    • Type 2 diabetes mellitus (CMS/[facility])      Past Surgical History:   Procedure Laterality Date   • AORTIC VALVE REPAIR/REPLACEMENT  1999   • CARDIAC ELECTROPHYSIOLOGY PROCEDURE N/A 3/20/2017    Procedure: PPM generator change - dual;  Surgeon: Bereket Gates MD;  Location: [facility] CATH INVASIVE LOCATION;  Service:    • CARDIAC PACEMAKER PLACEMENT  1999   • CATARACT EXTRACTION W/ INTRAOCULAR LENS IMPLANT Left 2/1/2019    Procedure: REMOVE CATARACT AND IMPLANT INTRAOCULAR LENS I;  Surgeon: Jose L Clay MD;  Location: [facility] OR;  Service: Ophthalmology   • CATARACT EXTRACTION W/ INTRAOCULAR LENS IMPLANT Right 2/8/2019    Procedure: REMOVE CATARACT AND IMPLANT  INTRAOCULAR LENS;  Surgeon: Jose L Clay MD;  Location: [facility] OR;  Service: Ophthalmology   • COLONOSCOPY N/A 6/19/2019    Procedure: COLONOSCOPY WITH CONTROL OF BLEED;  Surgeon: Alfredo Guerrero MD;  Location: [facility] ENDOSCOPY;  Service: Gastroenterology   • COLONOSCOPY N/A 6/24/2019    Procedure: COLONOSCOPY;  Surgeon: Alfredo Guerrero MD;  Location: [facility] ENDOSCOPY;  Service: Gastroenterology   • ECHO - CONVERTED  11/12/2013    There is mild to moderate left atrial enlargement EF 50-55%   • ENDOSCOPY N/A 6/19/2019    Procedure: ESOPHAGOGASTRODUODENOSCOPY WITH CONTROL OF BLEED;  Surgeon: Yolanda  Alfredo MARIN MD;  Location: [facility] ENDOSCOPY;  Service: Gastroenterology   • FOOT SURGERY  1990   • OTHER SURGICAL HISTORY  10/26/2015    PARING CORN/CALLUS    • PACEMAKER REPLACEMENT N/A 3/21/2017    Procedure: revision pacemaker pocket, evacuation hematoma, control of bleeding;  Surgeon: Polo Feliz MD;  Location: [facility] OR;  Service:    • TRANSESOPHAGEAL ECHOCARDIOGRAM (MITZY)  05/01/2014    With color flow-Mild left atrial enlargement with mild concentric LV hypertrophy with top normal aortic root size. EF 45-50%. Mild mitral regurgitation and mild aortic insufficiency and trivial amount of tricuspid regurgation   • TUBAL ABDOMINAL LIGATION         Therapy Treatment    Rehabilitation Treatment Summary     Row Name 07/17/19 0815             Treatment Time/Intention    Discipline  occupational therapy assistant  -KD      Document Type  therapy note (daily note)  -KD      Subjective Information  no complaints  -KD      Mode of Treatment  occupational therapy  -KD      Patient/Family Observations  no family present  -KD      Therapy Frequency (OT Eval)  other (see comments) 5-7x/wk  -KD      Patient Effort  good  -KD      Existing Precautions/Restrictions  fall;oxygen therapy device and L/min  -KD      Equipment Issued to Patient  gait belt  -KD      Recorded by [KD] Akua Henderson COTA/L 07/17/19 1137      Row Name 07/17/19 0815             Vital Signs    Pre Systolic BP Rehab  138  -KD      Pre Treatment Diastolic BP  94  -KD      Pretreatment Heart Rate (beats/min)  80  -KD      Posttreatment Heart Rate (beats/min)  79  -KD      Pre SpO2 (%)  92  -KD      O2 Delivery Pre Treatment  supplemental O2  -KD      Post SpO2 (%)  92  -KD      O2 Delivery Post Treatment  supplemental O2  -KD      Pre Patient Position  Supine  -KD      Intra Patient Position  Standing  -KD      Post Patient Position  Sitting  -KD      Recorded by [KD] Akua Henderson COTA/L 07/17/19 1142      Row Name 07/17/19 0815              Cognitive Assessment/Intervention- PT/OT    Orientation Status (Cognition)  oriented x 4  -KD      Follows Commands (Cognition)  WFL  -KD      Recorded by [KD] Akua Henderson COLÓN/L 07/17/19 1142      Row Name 07/17/19 0815             Bed Mobility Assessment/Treatment    Supine-Sit [place] (Bed Mobility)  conditional independence  -KD      Sit-Supine [place] (Bed Mobility)  conditional independence  -KD      Recorded by [KD] Akua Henderson COLÓN/L 07/17/19 1142      Row Name 07/17/19 0815             Functional Mobility    Functional Mobility- Ind. Level  supervision required  -KD      Functional Mobility- Device  -- no ad  -KD      Functional Mobility-Distance (Feet)  15  -KD      Recorded by [KD] Akua Henderson COLÓN/L 07/17/19 1142      Row Name 07/17/19 0815             Sit-Stand Transfer    Sit-Stand [place] (Transfers)  independent  -KD      Recorded by [KD] Akua Henderson COLÓN/L 07/17/19 1142      Row Name 07/17/19 0815             Stand-Sit Transfer    Stand-Sit [place] (Transfers)  independent  -KD      Recorded by [KD] Akua Henderson COLÓN/L 07/17/19 1142      Row Name 07/17/19 0815             Toilet Transfer    Type (Toilet Transfer)  sit-stand;stand-sit  -KD      [place] Level (Toilet Transfer)  independent  -KD      Recorded by [KD] Akua Henderson COLÓN/L 07/17/19 1142      Row Name 07/17/19 0815             Bathing Assessment/Intervention    Bathing [place] Level  bathing skills;upper body;independent;lower body  -KD      Assistive Devices (Bathing)  bath mitt;long-handled sponge  -KD      Bathing Position  edge of bed sitting;unsupported sitting  -KD      Recorded by [KD] Akua Henderson COLÓN/L 07/17/19 1142      Row Name 07/17/19 0815             Upper Body Dressing Assessment/Training    Upper Body Dressing [place] Level  upper body dressing skills;doff;don;independent HG  -KD      Upper Body Dressing Position  edge of bed sitting;unsupported sitting   -KD      Recorded by [KD] Akua Henderson COTA/L 07/17/19 1142      Row Name 07/17/19 0815             Lower Body Dressing Assessment/Training    Lower Body Dressing [place] Level  lower body dressing skills;doff;don;socks;undergarment;maximum assist (25% patient effort)  -KD      Lower Body Dressing Position  edge of bed sitting;unsupported sitting  -KD      Comment (Lower Body Dressing)  Pt sba for undergarments; max a to don socks  -KD      Recorded by [KD] Akua Henderson COTA/L 07/17/19 1142      Row Name 07/17/19 0815             Grooming Assessment/Training    [place] Level (Grooming)  grooming skills;hair care, combing/brushing;wash face, hands;independent  -KD      Grooming Position  edge of bed sitting;unsupported sitting  -KD      Recorded by [KD] Akua Henderson COTA/L 07/17/19 1142      Row Name 07/17/19 0815             Toileting Assessment/Training    [place] Level (Toileting)  toileting skills;adjust/manage clothing;perform perineal hygiene;independent  -KD      Assistive Devices (Toileting)  commode;grab bar/safety frame  -KD      Toileting Position  unsupported sitting  -KD      Comment (Toileting)  x 2 trips  -KD      Recorded by [KD] Akua Henderson COTA/L 07/17/19 1142      Row Name 07/17/19 0815             Pain Scale: Numbers Pre/Post-Treatment    Pain Scale: Numbers, Pretreatment  0/10 - no pain  -KD      Pain Scale: Numbers, Post-Treatment  0/10 - no pain  -KD      Recorded by [KD] Akua Henderson COTA/L 07/17/19 1142      Row Name 07/17/19 0815             Outcome Summary/Treatment Plan (OT)    Daily Summary of Progress (OT)  progress toward functional goals as expected  -KD      Plan for Continued Treatment (OT)  cont ot poc  -KD      Anticipated Discharge Disposition (OT)  anticipate therapy at next level of care  -KD      Recorded by [KD] Akua Henderson COTA/L 07/17/19 1142        User Key  (r) = Recorded By, (t) = Taken By, (c) = Cosigned By    Initials Name  Effective Dates Discipline    KD [place] gay[place] ALKA, COLÓN/L 03/07/18 -  OT           Rehab Goal Summary     Row Name 07/17/19 0815             Occupational Therapy Goals    Transfer Goal Selection (OT)  transfer, OT goal 1  -KD      Bathing Goal Selection (OT)  bathing, OT goal 1  -KD      Dressing Goal Selection (OT)  dressing, OT goal 1  -KD      Activity Tolerance Goal Selection (OT)  activity tolerance, OT goal 1  -KD         Transfer Goal 1 (OT)    Activity/Assistive Device (Transfer Goal 1, OT)  transfers, all  -KD      [place] Level/Cues Needed (Transfer Goal 1, OT)  conditional independence  -KD      Time Frame (Transfer Goal 1, OT)  long term goal (LTG)  -KD      Progress/Outcome (Transfer Goal 1, OT)  goal met  (Significant)   -KD         Bathing Goal 1 (OT)    Activity/Assistive Device (Bathing Goal 1, OT)  bathing skills, all  -KD      [place] Level/Cues Needed (Bathing Goal 1, OT)  contact guard assist With 02 90% or above.  -KD      Time Frame (Bathing Goal 1, OT)  long term goal (LTG)  -KD      Progress/Outcomes (Bathing Goal 1, OT)  goal met  (Significant)   -KD         Dressing Goal 1 (OT)    Activity/Assistive Device (Dressing Goal 1, OT)  dressing skills, all  -KD      [place]/Cues Needed (Dressing Goal 1, OT)  contact guard assist with 02 90% or above.  -KD      Time Frame (Dressing Goal 1, OT)  long term goal (LTG)  -KD      Progress/Outcome (Dressing Goal 1, OT)  goal not met  -KD         Toileting Goal 1 (OT)    Activity/Device (Toileting Goal 1, OT)  toileting skills, all  -KD      [place] Level/Cues Needed (Toileting Goal 1, OT)  conditional independence  -KD      Time Frame (Toileting Goal 1, OT)  long term goal (LTG)  -KD      Progress/Outcome (Toileting Goal 1, OT)  goal met  (Significant)   -KD          Activity Tolerance Goal 1 (OT)    Activity Tolerance Goal 1 (OT)  20 min activity w/ 2-3 RB's  -KD      Activity Level (Endurance Goal 1, OT)  20 min activity;O2 sat >/  equal to 88%  -KD      Progress/Outcome (Activity Tolerance Goal 1, OT)  goal not met  -KD         Patient Education Goal (OT)    Activity (Patient Education Goal, OT)  Home safety/fall prev and EC/WS.  -KD      [place]/Cues/Accuracy (Memory Goal 2, OT)  demonstrates adequately;verbalizes understanding  -KD      Time Frame (Patient Education Goal, OT)  long term goal (LTG)  -KD      Progress/Outcome (Patient Education Goal, OT)  goal not met  -KD        User Key  (r) = Recorded By, (t) = Taken By, (c) = Cosigned By    Initials Name Provider Type Discipline     Akua Henderson COTA/L Occupational Therapy Assistant OT        Occupational Therapy Education     Title: PT OT SLP Therapies (In Progress)     Topic: Occupational Therapy (In Progress)     Point: ADL training (Done)     Description: Instruct learner(s) on proper safety adaptation and remediation techniques during self care or transfers.   Instruct in proper use of assistive devices.    Learning Progress Summary           Patient Acceptance, E, VU,DU by  at 7/16/2019  2:53 PM                   Point: Precautions (Done)     Description: Instruct learner(s) on prescribed precautions during self-care and functional transfers.    Learning Progress Summary           Patient Acceptance, E, VU by  at 7/15/2019 11:31 AM    Comment:  Reviewed use of non skid socks when OOB and use of gait belt for distance ambulation.    Acceptance, E, VU by  at 7/14/2019  2:02 PM    Comment:  Edu pt on  use of non skid socks when OOB.                               User Key     Initials Effective Dates Name Provider Type Discipline     06/15/16 -  Beto Carney, OT Occupational Therapist OT     03/07/18 -  Selena Simpson COTA/L Occupational Therapy Assistant OT                OT Recommendation and Plan  Outcome Summary/Treatment Plan (OT)  Daily Summary of Progress (OT): progress toward functional goals as expected  Plan for Continued Treatment (OT): cont ot  poc  Anticipated Discharge Disposition (OT): anticipate therapy at next level of care  Therapy Frequency (OT Eval): other (see comments)(5-7x/wk)  Daily Summary of Progress (OT): progress toward functional goals as expected  Plan of Care Review  Plan of Care Reviewed With: patient  Plan of Care Reviewed With: patient  Outcome Summary: sup-sit-sup-cond ind, sit-stand-sit-ind, amb ~15' sba of 1 w/ no AD. pt sat eob and completed adl. ub bathing/drerssing-ind, lb bathing-sba w. lh sponge, lb dressing-mod a. pt very demanding. pt met 2 new goals this date. cont ot poc..  Outcome Measures     Row Name 07/17/19 0815 07/16/19 1515 07/16/19 1345       How much help from another person do you currently need...    Turning from your back to your side while in flat bed without using bedrails?  --  4  -LN  --    Moving from lying on back to sitting on the side of a flat bed without bedrails?  --  4  -LN  --    Moving to and from a bed to a chair (including a wheelchair)?  --  4  -LN  --    Standing up from a chair using your arms (e.g., wheelchair, bedside chair)?  --  4  -LN  --    Climbing 3-5 steps with a railing?  --  3  -LN  --    To walk in hospital room?  --  3  -LN  --    AM-PAC 6 Clicks Score (PT)  --  22  -LN  --       How much help from another is currently needed...    Putting on and taking off regular lower body clothing?  2  -KD  --  2  -BB    Bathing (including washing, rinsing, and drying)  3  -KD  --  3  -BB    Toileting (which includes using toilet bed pan or urinal)  4  -KD  --  4  -BB    Putting on and taking off regular upper body clothing  4  -KD  --  4  -BB    Taking care of personal grooming (such as brushing teeth)  4  -KD  --  4  -BB    Eating meals  4  -KD  --  4  -BB    AM-PAC 6 Clicks Score (OT)  21  -KD  --  21  -BB       Functional Assessment    Outcome Measure Options  --  AM-PAC 6 Clicks Basic Mobility (PT)  -LN  --    Row Name 07/15/19 1404 07/15/19 0955          How much help from another  person do you currently need...    Turning from your back to your side while in flat bed without using bedrails?  4  -LN  --     Moving from lying on back to sitting on the side of a flat bed without bedrails?  4  -LN  --     Moving to and from a bed to a chair (including a wheelchair)?  4  -LN  --     Standing up from a chair using your arms (e.g., wheelchair, bedside chair)?  4  -LN  --     Climbing 3-5 steps with a railing?  3  -LN  --     To walk in hospital room?  3  -LN  --     AM-PAC 6 Clicks Score (PT)  22  -LN  --        How much help from another is currently needed...    Putting on and taking off regular lower body clothing?  --  2  -RB     Bathing (including washing, rinsing, and drying)  --  3  -RB     Toileting (which includes using toilet bed pan or urinal)  --  3  -RB     Putting on and taking off regular upper body clothing  --  4  -RB     Taking care of personal grooming (such as brushing teeth)  --  4  -RB     Eating meals  --  4  -RB     AM-PAC 6 Clicks Score (OT)  --  20  -RB        Functional Assessment    Outcome Measure Options  AM-PAC 6 Clicks Basic Mobility (PT)  -LN  AM-PAC 6 Clicks Daily Activity (OT)  -RB       User Key  (r) = Recorded By, (t) = Taken By, (c) = Cosigned By    Initials Name Provider Type    RB Beto Carney, OT Occupational Therapist    LN Cecy Lara, PTA Physical Therapy Assistant    Selena Steele COTA/L Occupational Therapy Assistant    Akua Maki COTA/L Occupational Therapy Assistant           Time Calculation:   Time Calculation- OT     Row Name 07/17/19 1147             Time Calculation- OT    OT Start Time  0815  -KD      OT Stop Time  0853  -KD      OT Time Calculation (min)  38 min  -KD      Total Timed Code Minutes- OT  38 minute(s)  -KD      OT Received On  07/17/19  -KD        User Key  (r) = Recorded By, (t) = Taken By, (c) = Cosigned By    Initials Name Provider Type    Akua Maki COTA/L Occupational Therapy Assistant         Therapy Charges for Today     Code Description Service Date Service Provider Modifiers Qty    [id number] HC OT SELF CARE/MGMT/TRAIN EA 15 MIN 7/17/2019 Akua Henderson, ELDON/L  3               ELDON [place]/DANDRE  7/17/2019

## 2019-07-17 NOTE — PLAN OF CARE
Problem: Patient Care Overview  Goal: Plan of Care Review  Outcome: Ongoing (interventions implemented as appropriate)   07/17/19 0815   Coping/Psychosocial   Plan of Care Reviewed With patient   Plan of Care Review   Progress improving   OTHER   Outcome Summary sup-sit-sup-cond ind, sit-stand-sit-ind, amb ~15' sba of 1 w/ no AD. pt sat eob and completed adl. ub bathing/drerssing-ind, lb bathing-sba w. lh sponge, lb dressing-mod a. pt very demanding. pt met 2 new goals this date. cont ot poc..

## 2019-07-17 NOTE — PLAN OF CARE
Problem: Fall Risk (Adult)  Goal: Absence of Fall  Outcome: Ongoing (interventions implemented as appropriate)      Problem: Cardiac: Heart Failure (Adult)  Goal: Signs and Symptoms of Listed Potential Problems Will be Absent, Minimized or Managed (Cardiac: Heart Failure)  Outcome: Ongoing (interventions implemented as appropriate)      Problem: Cardiac: ACS (Acute Coronary Syndrome) (Adult)  Goal: Signs and Symptoms of Listed Potential Problems Will be Absent, Minimized or Managed (Cardiac: ACS)  Outcome: Ongoing (interventions implemented as appropriate)      Problem: Patient Care Overview  Goal: Plan of Care Review  Outcome: Ongoing (interventions implemented as appropriate)   07/17/19 0449   Plan of Care Review   Progress no change   OTHER   Outcome Summary Pt on 4L sats dropped to 82%, bumped O2 5L sats 94%       Problem: Skin Injury Risk (Adult)  Goal: Skin Health and Integrity  Outcome: Ongoing (interventions implemented as appropriate)

## 2019-07-17 NOTE — PROGRESS NOTES
FAMILY MEDICINE DAILY PROGRESS NOTE  NAME: Brendon Skelton  : 1945  MRN: [id number]      LOS: 1 day     PROVIDER OF SERVICE: Kenna Chawla MD    Chief Complaint: Acute on chronic diastolic congestive heart failure (CMS/HCC)    Subjective:     Interval History:  History taken from: patient chart    Overnight patient desatted after walking. She required 5L to maintain appropriate saturations. She is down 11.2 ounces overnight. Baseline weight appears to be 250. Nephrology consulted and has placed her on IV bumex instead of IV lasix. Creatinine is stable but still above her baseline.    She is ambulating but it makes her shortness of breath worse. She is currently on 5L NC. Baseline is 3L NC.    Review of Systems:   Review of Systems   Constitutional: Negative for activity change, appetite change, chills, fatigue and fever.   HENT: Negative for hearing loss, sneezing, sore throat and trouble swallowing.    Eyes: Negative for visual disturbance.   Respiratory: Positive for shortness of breath (chronic, improving). Negative for cough and chest tightness.    Cardiovascular: Positive for leg swelling (improving). Negative for chest pain and palpitations.   Gastrointestinal: Positive for abdominal distention (improving, still present on right side). Negative for abdominal pain, blood in stool, constipation, diarrhea, nausea and vomiting.   Genitourinary: Negative for difficulty urinating and dysuria.   Musculoskeletal: Negative for arthralgias and back pain.   Skin: Negative for pallor and rash.   Allergic/Immunologic: Negative for environmental allergies and food allergies.   Neurological: Negative for dizziness, light-headedness, numbness and headaches.   Psychiatric/Behavioral: Negative for agitation, confusion, hallucinations and suicidal ideas.       Objective:     Vital Signs  Temp:  [97.3 °F (36.3 °C)-98.1 °F (36.7 °C)] 97.3 °F (36.3 °C)  Heart Rate:  [] 103  Resp:  [18-24] 18  BP:  (113-138)/(56-94) 138/94  Body mass index is 43.27 kg/m².        07/16/19  0638 07/17/19  0500   Weight: 118 kg (260 lb 11.2 oz) 118 kg (260 lb)       Physical Exam  Physical Exam   Constitutional: She is oriented to person, place, and time. She appears well-developed and well-nourished. No distress.   HENT:   Head: Normocephalic and atraumatic.   Right Ear: External ear normal.   Left Ear: External ear normal.   Neck: Normal range of motion. Neck supple.   Cardiovascular: Regular rhythm and normal heart sounds. Tachycardia present.   Pulmonary/Chest: No respiratory distress. She has no wheezes. She has no rales.   Diminished breath sounds, pursed lip breathing   Abdominal: Soft. Bowel sounds are normal. She exhibits no distension. There is no tenderness.   Musculoskeletal: Normal range of motion. She exhibits edema (trace pitting edema lower extremities bilaterally, improved from yesterday).   Neurological: She is alert and oriented to person, place, and time. She has normal reflexes.   Skin: Skin is warm and dry. She is not diaphoretic. No erythema.   Psychiatric: She has a normal mood and affect. Her behavior is normal.       Medication Review    Current Facility-Administered Medications:   •  acetaminophen (TYLENOL) tablet 650 mg, 650 mg, Oral, Q4H PRN, Kenna Chawla MD, 650 mg at 07/16/19 2054  •  albuterol (PROVENTIL) nebulizer solution 0.083% 2.5 mg/3mL, 2.5 mg, Nebulization, Q4H PRN, Kenna Chawla MD  •  aspirin chewable tablet 81 mg, 81 mg, Oral, Daily, Kenna Chawla MD, 81 mg at 07/16/19 0906  •  bumetanide (BUMEX) injection 2 mg, 2 mg, Intravenous, BID, Sandy Caputo MD, 2 mg at 07/17/19 0646  •  cholecalciferol (VITAMIN D3) tablet 2,000 Units, 2,000 Units, Oral, Daily, Kenna Chawla MD, 2,000 Units at 07/16/19 0906  •  citalopram (CeleXA) tablet 20 mg, 20 mg, Oral, Daily, Kenna Chawla MD, 20 mg at 07/16/19 0906  •  dextrose (D50W) 25 g/ 50mL Intravenous Solution 25 g, 25 g,  Intravenous, Q15 Min PRN, Kenna Chawla MD  •  dextrose (GLUTOSE) oral gel 15 g, 15 g, Oral, Q15 Min PRN, Kenna Chawla MD  •  diltiaZEM CD (CARDIZEM CD) 24 hr capsule 240 mg, 240 mg, Oral, Daily, Kenna Chawla MD, 240 mg at 07/16/19 0906  •  docusate sodium (COLACE) capsule 100 mg, 100 mg, Oral, BID PRN, Kenna Chawla MD  •  ezetimibe (ZETIA) tablet 10 mg, 10 mg, Oral, Daily, Kenna Chawla MD, 10 mg at 07/16/19 1546  •  famotidine (PEPCID) tablet 20 mg, 20 mg, Oral, Daily, Kenna Chawla MD, 20 mg at 07/16/19 0906  •  ferrous sulfate EC tablet 324 mg, 324 mg, Oral, Daily With Breakfast, Kenna Chawla MD, 324 mg at 07/16/19 0906  •  glucagon (human recombinant) (GLUCAGEN DIAGNOSTIC) injection 1 mg, 1 mg, Subcutaneous, PRN, Kenna Chawla MD  •  guaiFENesin (MUCINEX) 12 hr tablet 600 mg, 600 mg, Oral, Q12H, Hermann Richardson MD, 600 mg at 07/16/19 2027  •  insulin aspart (novoLOG) injection 0-7 Units, 0-7 Units, Subcutaneous, 4x Daily AC & at Bedtime, Kenna Chawla MD, 4 Units at 07/15/19 2051  •  Insulin Glargine (BASAGLAR KWIKPEN) injection pen solution pen-injector 30 Units, 30 Units, Subcutaneous, Q24H, Alfredo Charles Jr., MD, 30 Units at 07/16/19 2027  •  ipratropium-albuterol (DUO-NEB) nebulizer solution 3 mL, 3 mL, Nebulization, Q8H - RT, Kenna Chawla MD, 3 mL at 07/16/19 2259  •  metOLazone (ZAROXOLYN) tablet 2.5 mg, 2.5 mg, Oral, Daily, Sandy Caputo MD, 2.5 mg at 07/16/19 0906  •  nystatin (MYCOSTATIN) powder, , Topical, Q12H, Kenna Chawla MD  •  ondansetron (ZOFRAN) tablet 4 mg, 4 mg, Oral, Q6H PRN, Kenna Chawla MD  •  Pen [place] misc 1 each, 1 each, Does not apply, Q24H, Alfredo Charles Jr., MD  •  Pharmacy to dose warfarin, , Does not apply, Continuous PRN, Kenna Chawla MD  •  prenatal vitamin 27-0.8 tablet 1 tablet, 1 tablet, Oral, Daily, Kenna Chawla MD, 1 tablet at 07/16/19 0906  •  rOPINIRole (REQUIP) tablet 0.25 mg, 0.25 mg, Oral, Nightly,  Kenna Chawla MD, 0.25 mg at 07/16/19 2027  •  sodium chloride 0.9 % flush 10 mL, 10 mL, Intravenous, PRN, Ozor, Gilles Toney MD, 10 mL at 07/16/19 1734  •  sodium chloride 0.9 % flush 3 mL, 3 mL, Intravenous, Q12H, Kenna Chawla MD, 3 mL at 07/16/19 2029  •  sodium chloride 0.9 % flush 3-10 mL, 3-10 mL, Intravenous, PRN, Kenna Chawla MD  •  traZODone (DESYREL) tablet 300 mg, 300 mg, Oral, Nightly, Kenna Chawla MD, 300 mg at 07/16/19 2027  •  vitamin C (ASCORBIC ACID) tablet 500 mg, 500 mg, Oral, Daily, Kenna Chawla MD, 500 mg at 07/16/19 0906     Diagnostic Data    Lab Results (last 24 hours)     Procedure Component Value Units Date/Time    POC Glucose Once [[id number]]  (Normal) Collected:  07/17/19 0736    Specimen:  Blood Updated:  07/17/19 0748     Glucose 81 mg/dL      Comment: : [id number] PARRA JACQUELINEMeter ID: [id number]       Protime-INR [[id number]]  (Abnormal) Collected:  07/17/19 0522    Specimen:  Blood Updated:  07/17/19 0636     Protime 24.9 Seconds      INR 2.28    Narrative:       Therapeutic range for most indications is 2.0-3.0 INR,  or 2.5-3.5 for mechanical heart valves.    Basic Metabolic Panel [[id number]]  (Abnormal) Collected:  07/17/19 0522    Specimen:  Blood Updated:  07/17/19 0630     Glucose 74 mg/dL      BUN 97 mg/dL      Creatinine 2.56 mg/dL      Sodium 144 mmol/L      Potassium 3.5 mmol/L      Chloride 99 mmol/L      CO2 33.0 mmol/L      Calcium 9.0 mg/dL      eGFR Non African Amer 18 mL/min/1.73      BUN/Creatinine Ratio 37.9     Anion Gap 12.0 mmol/L     Narrative:       GFR Normal >60  Chronic Kidney Disease <60  Kidney Failure <15    Magnesium [[id number]]  (Normal) Collected:  07/17/19 0522    Specimen:  Blood Updated:  07/17/19 0630     Magnesium 2.1 mg/dL     CBC & Differential [[id number]] Collected:  07/17/19 0522    Specimen:  Blood Updated:  07/17/19 0626    Narrative:       The following orders were created for panel order CBC &  Differential.  Procedure                               Abnormality         Status                     ---------                               -----------         ------                     CBC Auto Differential[[id number]]        Abnormal            Final result                 Please view results for these tests on the individual orders.    CBC Auto Differential [[id number]]  (Abnormal) Collected:  07/17/19 0522    Specimen:  Blood Updated:  07/17/19 0626     WBC 6.87 10*3/mm3      RBC 2.78 10*6/mm3      Hemoglobin 8.6 g/dL      Hematocrit 27.2 %      MCV 97.8 fL      MCH 30.9 pg      MCHC 31.6 g/dL      RDW 20.7 %      RDW-SD 71.4 fl      MPV 12.2 fL      Platelets 281 10*3/mm3      Neutrophil % 73.0 %      Lymphocyte % 5.8 %      Monocyte % 10.2 %      Eosinophil % 9.9 %      Basophil % 0.4 %      Immature Grans % 0.7 %      Neutrophils, Absolute 5.01 10*3/mm3      Lymphocytes, Absolute 0.40 10*3/mm3      Monocytes, Absolute 0.70 10*3/mm3      Eosinophils, Absolute 0.68 10*3/mm3      Basophils, Absolute 0.03 10*3/mm3      Immature Grans, Absolute 0.05 10*3/mm3      nRBC 0.0 /100 WBC     POC Glucose Once [[id number]]  (Abnormal) Collected:  07/16/19 2010    Specimen:  Blood Updated:  07/16/19 2034     Glucose 211 mg/dL      Comment: RN NotifiedOperator: [id number] EITAN MONTANOWNAMeter ID: [id number]       POC Glucose Once [[id number]]  (Normal) Collected:  07/16/19 1720    Specimen:  Blood Updated:  07/16/19 1753     Glucose 116 mg/dL      Comment: RN NotifiedOperator: [id number] YOSVANYMARIVEL HEIDIJADAMeter ID: [id number]       POC Glucose Once [[id number]]  (Abnormal) Collected:  07/16/19 1101    Specimen:  Blood Updated:  07/16/19 1125     Glucose 146 mg/dL      Comment: RN NotifiedOperator: [id number] OCHOA BRANDYMeter ID: [id number]               I reviewed the patient's new clinical results.    Assessment/Plan:     Active Hospital Problems    Diagnosis POA   • **Acute on chronic diastolic congestive heart failure  (CMS/[facility]) [I50.33] Yes     -last echo 6/18/19 shows EF of 46-50% with normal diastolic dysfunction, improved from echo in 2017 which showed grade 1 diastolic dysfunction  -BNP on admission was 8913, CXR shows pulmonary congestion  -on lasix 40 mg daily and metolazone 2.5 mg daily at home   -s/p lasix 80 mg IV in ER and lasix 40 mg IV once; kidney function worsening  -will consult nephrology for recommendations of further diuresis with worsening renal function   -IV bumex and oral metolazone instead of lasix  -strict I/Os  -daily weights  -1500 mL fluid restriction, sodium restriction 2g  -will obtain repeat chest xray due to desaturation to 82% overnight     • Anemia [D64.9] Yes     -H/H on admission 8.9/28.9, trend with daily CBC  -stable  -recent GI bleed requiring transfusion of 1 unit of PRBCs  -on oral iron supplement at home  -continue oral iron supplement  -transfuse for hemoglobin less than 7     • Stage 4 chronic kidney disease (CMS/[facility]) [N18.4] Yes     -Baseline creatinine ~ 1.9-2.1  -trend renal function  -follows with Dr. Caputo outpatient  -avoid nephrotoxic agents  -will consult Dr. Caputo as her renal function is continuing to worsen and she is still needing diuresis  -currently on bumex and metolazone     • SSS (sick sinus syndrome) (CMS/[facility]) [I49.5] Yes     -atrial fibrillation with rate of 85 on EKG in ER  -pacemaker in place  -continue with cardizem  -telemetry  -pharm to dose coumadin     • Essential hypertension [I10] Yes     -continue lasix, cardizem     • Chronic hypoxemic respiratory failure (CMS/[facility]) [J96.11] Yes     -on 3L O2 via nasal cannula at home  -ABG in ER shows pO2 of 60.9, pCO2 of 44.7  -will continue oxygen via nasal cannula at home rate     • Type 2 diabetes mellitus with stage 4 chronic kidney disease, with long-term current use of insulin (CMS/[facility]) [E11.22, N18.4, Z79.4] Not Applicable     -on basaglar 30 units at night  -continue, adjust as necessary     • Emphysema of lung  (CMS/[facility]) [J43.9] Yes     -continue with supplemental oxygen  -continue albuterol nebs  -symbicort, duonebs      • Atrial fibrillation [I48.91] [I48.91] Yes     -EKG in ER shows atrial fibrillation with rate of 85  -pacemaker in place  -continue cardizem  -telemetry  -pharm to dose coumadin         DVT prophylaxis: warfarin  Code status is   Code Status and Medical Interventions:   Ordered at: 07/13/19 7278     Level Of Support Discussed With:    Patient     Code Status:    CPR     Medical Interventions (Level of Support Prior to Arrest):    Full       Plan for disposition: Anticipate discharge back to SNF in 2-4 days pending further diuresis      Time: < 30 mins        This document has been electronically signed by Kenna Chawla MD on July 17, 2019 8:20 AM

## 2019-07-17 NOTE — PROGRESS NOTES
"[facility]  [address] [place] [address]. [id number]  T - [phone]  F - [phone]     Progress Note          PATIENT  DEMOGRAPHICS   PATIENT NAME: Brendon Skelton                      PHYSICIAN: Sandy Caputo MD  : 1945  MRN: [id number]   LOS: 1 day    Patient Care Team:  Josefa Pozo APRN as PCP - General (Nurse Practitioner)  Meme Duckworth APRN as PCP - Claims Attributed  Aby Stout RN as Care Coordinator (Population Health)  Subjective   SUBJECTIVE   STILL SOA on minimal exertion       Objective   OBJECTIVE   Vital Signs  Temp:  [97.3 °F (36.3 °C)-98.1 °F (36.7 °C)] 98.1 °F (36.7 °C)  Heart Rate:  [] 87  Resp:  [18-20] 18  BP: (113-138)/(56-94) 133/91    Flowsheet Rows      First Filed Value   Admission Height  165.1 cm (65\") Documented at 2019 1527   Admission Weight  119 kg (261 lb 6.4 oz) Documented at 2019 1527           I/O last 3 completed shifts:  In: 720 [P.O.:720]  Out: -     PHYSICAL EXAM    Physical Exam   Constitutional: She is oriented to person, place, and time. She appears well-developed and well-nourished. No distress.   Eyes: Conjunctivae and EOM are normal. Pupils are equal, round, and reactive to light.   Neck: JVD present.   JVD still present but less prominent than yesterday.    Cardiovascular: Normal rate, regular rhythm, normal heart sounds and intact distal pulses.   Pulmonary/Chest: Effort normal and breath sounds normal. No respiratory distress. She has no wheezes. She has no rales. She exhibits no tenderness.   Abdominal: Soft. Bowel sounds are normal. She exhibits distension. There is no tenderness.   Musculoskeletal: She exhibits edema. She exhibits no tenderness or deformity.   Neurological: She is alert and oriented to person, place, and time.   Skin: Skin is warm and dry. No rash noted. She is not diaphoretic. No erythema. No pallor.   Psychiatric: She has a normal mood and affect. Her behavior is normal. "       RESULTS   Results Review:    Results from last 7 days   Lab Units 07/17/19  0522 07/16/19  0604 07/15/19  0535  07/13/19  1227   SODIUM mmol/L 144 144 145   < > 143   POTASSIUM mmol/L 3.5 3.4* 3.5   < > 3.9   CHLORIDE mmol/L 99 98 99   < > 101   CO2 mmol/L 33.0* 34.0* 32.0*   < > 30.0*   BUN mg/dL 97* 96* 95*   < > 79*   CREATININE mg/dL 2.56* 2.55* 2.45*   < > 2.22*   CALCIUM mg/dL 9.0 8.9 8.6   < > 9.0   BILIRUBIN mg/dL  --   --   --   --  0.6   ALK PHOS U/L  --   --   --   --  76   ALT (SGPT) U/L  --   --   --   --  27   AST (SGOT) U/L  --   --   --   --  31   GLUCOSE mg/dL 74 73 57*   < > 128*    < > = values in this interval not displayed.       Estimated Creatinine Clearance: 25.2 mL/min (A) (by C-G formula based on SCr of 2.56 mg/dL (H)).    Results from last 7 days   Lab Units 07/17/19  0522   MAGNESIUM mg/dL 2.1             Results from last 7 days   Lab Units 07/17/19  0522 07/16/19  0604 07/15/19  0535 07/14/19  0649 07/13/19  1227   WBC 10*3/mm3 6.87 8.26 8.42 5.29 7.58   HEMOGLOBIN g/dL 8.6* 8.9* 8.5* 8.6* 8.9*   PLATELETS 10*3/mm3 281 289 316 292 282       Results from last 7 days   Lab Units 07/17/19  0522 07/16/19  0604 07/15/19  0535 07/14/19  0649 07/13/19  1227   INR  2.28* 3.14* 4.66* 4.57* 3.97*         Imaging Results (last 24 hours)     Procedure Component Value Units Date/Time    XR Chest PA & Lateral [[id number]] Collected:  07/17/19 1106     Updated:  07/17/19 1147    Narrative:       Chest x-ray PA and lateral.    Two views.    CLINICAL INDICATION: Worsening shortness of breath    COMPARISON: Chest July 13, 2019.    FINDINGS: Cardiac silhouette is enlarged in size. Pulmonary  vascularity is increased.   Midline sternal sutures from prior cardiac surgery. Cardiac  valvular prosthesis. Pacemaker leads right atrium, right  ventricle.  No focal infiltrate or consolidation. Small, minimal left-sided  pleural effusion.    No significant changes since prior examination allowing  for  differences in technique.      Impression:       CONCLUSION: Cardiomegaly. Pulmonary vascular prominence  suggesting congestive failure. Small left-sided effusion. Midline  sternal sutures from prior cardiac surgery. Cardiac valvular  prosthesis. Pacemaker leads right atrium and right ventricle. No  significant change since prior exam.    Electronically signed by:  Ishmael Lees MD  7/17/2019 11:46 AM CDT  Workstation: [id number]           MEDICATIONS      aspirin 81 mg Oral Daily   bumetanide 2 mg Intravenous BID   cholecalciferol 2,000 Units Oral Daily   citalopram 20 mg Oral Daily   diltiaZEM  mg Oral Daily   ezetimibe 10 mg Oral Daily   famotidine 20 mg Oral Daily   ferrous sulfate 324 mg Oral Daily With Breakfast   guaiFENesin 600 mg Oral Q12H   insulin aspart 0-7 Units Subcutaneous 4x Daily AC & at Bedtime   Insulin Glargine 30 Units Subcutaneous Q24H   ipratropium-albuterol 3 mL Nebulization Q8H - RT   metOLazone 2.5 mg Oral Daily   nystatin  Topical Q12H   Pen Needles 1 each Does not apply Q24H   prenatal vitamin 27-0.8 1 tablet Oral Daily   rOPINIRole 0.25 mg Oral Nightly   sodium chloride 3 mL Intravenous Q12H   traZODone 300 mg Oral Nightly   vitamin C with bob hips 500 mg Oral Daily       Pharmacy to dose warfarin        Assessment/Plan   ASSESSMENT / PLAN      Acute on chronic diastolic congestive heart failure (CMS/HCC)    Atrial fibrillation [I48.91]    Emphysema of lung (CMS/[facility])    Type 2 diabetes mellitus with stage 4 chronic kidney disease, with long-term current use of insulin (CMS/[facility])    Chronic hypoxemic respiratory failure (CMS/[facility])    Essential hypertension    SSS (sick sinus syndrome) (CMS/[facility])    Stage 4 chronic kidney disease (CMS/[facility])    Anemia    1.CKD stage 4: -Baseline creatinine = 1.9-2.1. Cr still trending up, today cr = 2.55. The patient was switched from lasix  to IV bumex and oral metolazone . Wt not change significantly, add lasix drip. Stop bumex. Keep metolazone. Add  kcl , mg normal.     2. Anemia: Hemoglobin stable observe.   Continue iron supplement  Transfuse if hgb less than 7      3. Acute on Chronic Diastolic heart failure : Patient is still needing diuresis.  Last echo 6/18/19 showed EF 46-50%, BNP on admission was 8913, CXR shows pulmonary congestion. repeat also chf  Plan as above  -strict I/Os  -daily weights  -1500 mL fluid restriction, sodium restriction 2g    4. Chronic hypoxemic respiratory failure -   5. SSS -pacemaker in place   6. Essential hypertension   7. Type 2 Diabetes Mellitus with stage 4 chronic kidney disease. - long-term insulin use.   8. Emphysema of lung                 This document has been electronically signed by Sandy Caputo MD on July 17, 2019 12:49 PM

## 2019-07-17 NOTE — SIGNIFICANT NOTE
"   07/17/19 1418   Rehab Treatment   Discipline physical therapy assistant   Reason Treatment Not Performed patient/family declined treatment  (Pt adamently refused stating she just got back to bed from this am and \"IS NOT\" getting back out today. Pt okayed to check back on tomorrow.)     "

## 2019-07-17 NOTE — PLAN OF CARE
Problem: Fall Risk (Adult)  Goal: Absence of Fall  Outcome: Ongoing (interventions implemented as appropriate)      Problem: Cardiac: Heart Failure (Adult)  Goal: Signs and Symptoms of Listed Potential Problems Will be Absent, Minimized or Managed (Cardiac: Heart Failure)  Outcome: Ongoing (interventions implemented as appropriate)      Problem: Cardiac: ACS (Acute Coronary Syndrome) (Adult)  Goal: Signs and Symptoms of Listed Potential Problems Will be Absent, Minimized or Managed (Cardiac: ACS)  Outcome: Ongoing (interventions implemented as appropriate)      Problem: Patient Care Overview  Goal: Plan of Care Review  Outcome: Ongoing (interventions implemented as appropriate)   07/17/19 0941   Coping/Psychosocial   Plan of Care Reviewed With patient   Plan of Care Review   Progress no change   OTHER   Outcome Summary patient very demanding       Problem: Skin Injury Risk (Adult)  Goal: Skin Health and Integrity  Outcome: Ongoing (interventions implemented as appropriate)

## 2019-07-18 DIAGNOSIS — E78.5 HYPERLIPIDEMIA, UNSPECIFIED HYPERLIPIDEMIA TYPE: ICD-10-CM

## 2019-07-18 LAB
ANION GAP SERPL CALCULATED.3IONS-SCNC: 14 MMOL/L (ref 5–15)
BASOPHILS # BLD AUTO: 0.05 10*3/MM3 (ref 0–0.2)
BASOPHILS NFR BLD AUTO: 0.6 % (ref 0–1.5)
BUN BLD-MCNC: 99 MG/DL (ref 8–23)
BUN/CREAT SERPL: 39.4 (ref 7–25)
CALCIUM SPEC-SCNC: 9.5 MG/DL (ref 8.6–10.5)
CHLORIDE SERPL-SCNC: 97 MMOL/L (ref 98–107)
CO2 SERPL-SCNC: 33 MMOL/L (ref 22–29)
CREAT BLD-MCNC: 2.51 MG/DL (ref 0.57–1)
DEPRECATED RDW RBC AUTO: 74.4 FL (ref 37–54)
EOSINOPHIL # BLD AUTO: 0.56 10*3/MM3 (ref 0–0.4)
EOSINOPHIL NFR BLD AUTO: 6.2 % (ref 0.3–6.2)
ERYTHROCYTE [DISTWIDTH] IN BLOOD BY AUTOMATED COUNT: 21.3 % (ref 12.3–15.4)
GFR SERPL CREATININE-BSD FRML MDRD: 19 ML/MIN/1.73
GLUCOSE BLD-MCNC: 59 MG/DL (ref 65–99)
GLUCOSE BLDC GLUCOMTR-MCNC: 120 MG/DL (ref 70–130)
GLUCOSE BLDC GLUCOMTR-MCNC: 143 MG/DL (ref 70–130)
GLUCOSE BLDC GLUCOMTR-MCNC: 172 MG/DL (ref 70–130)
GLUCOSE BLDC GLUCOMTR-MCNC: 62 MG/DL (ref 70–130)
GLUCOSE BLDC GLUCOMTR-MCNC: 94 MG/DL (ref 70–130)
HCT VFR BLD AUTO: 29.8 % (ref 34–46.6)
HGB BLD-MCNC: 9.2 G/DL (ref 12–15.9)
IMM GRANULOCYTES # BLD AUTO: 0.05 10*3/MM3 (ref 0–0.05)
IMM GRANULOCYTES NFR BLD AUTO: 0.6 % (ref 0–0.5)
INR PPP: 2.1 (ref 0.8–1.2)
LYMPHOCYTES # BLD AUTO: 0.42 10*3/MM3 (ref 0.7–3.1)
LYMPHOCYTES NFR BLD AUTO: 4.7 % (ref 19.6–45.3)
MCH RBC QN AUTO: 31.1 PG (ref 26.6–33)
MCHC RBC AUTO-ENTMCNC: 30.9 G/DL (ref 31.5–35.7)
MCV RBC AUTO: 100.7 FL (ref 79–97)
MONOCYTES # BLD AUTO: 0.76 10*3/MM3 (ref 0.1–0.9)
MONOCYTES NFR BLD AUTO: 8.4 % (ref 5–12)
NEUTROPHILS # BLD AUTO: 7.16 10*3/MM3 (ref 1.7–7)
NEUTROPHILS NFR BLD AUTO: 79.5 % (ref 42.7–76)
NRBC BLD AUTO-RTO: 0.2 /100 WBC (ref 0–0.2)
PLATELET # BLD AUTO: 237 10*3/MM3 (ref 140–450)
PMV BLD AUTO: 12.4 FL (ref 6–12)
POTASSIUM BLD-SCNC: 3.6 MMOL/L (ref 3.5–5.2)
PROTHROMBIN TIME: ABNORMAL SECONDS (ref 11–15)
RBC # BLD AUTO: 2.96 10*6/MM3 (ref 3.77–5.28)
SODIUM BLD-SCNC: 144 MMOL/L (ref 136–145)
WBC NRBC COR # BLD: 9 10*3/MM3 (ref 3.4–10.8)

## 2019-07-18 PROCEDURE — 94760 N-INVAS EAR/PLS OXIMETRY 1: CPT

## 2019-07-18 PROCEDURE — 25010000002 FUROSEMIDE PER 20 MG: Performed by: INTERNAL MEDICINE

## 2019-07-18 PROCEDURE — 85025 COMPLETE CBC W/AUTO DIFF WBC: CPT | Performed by: STUDENT IN AN ORGANIZED HEALTH CARE EDUCATION/TRAINING PROGRAM

## 2019-07-18 PROCEDURE — 94799 UNLISTED PULMONARY SVC/PX: CPT

## 2019-07-18 PROCEDURE — 97530 THERAPEUTIC ACTIVITIES: CPT

## 2019-07-18 PROCEDURE — 85610 PROTHROMBIN TIME: CPT | Performed by: STUDENT IN AN ORGANIZED HEALTH CARE EDUCATION/TRAINING PROGRAM

## 2019-07-18 PROCEDURE — 97110 THERAPEUTIC EXERCISES: CPT

## 2019-07-18 PROCEDURE — 82962 GLUCOSE BLOOD TEST: CPT

## 2019-07-18 PROCEDURE — 97535 SELF CARE MNGMENT TRAINING: CPT

## 2019-07-18 PROCEDURE — 99232 SBSQ HOSP IP/OBS MODERATE 35: CPT | Performed by: STUDENT IN AN ORGANIZED HEALTH CARE EDUCATION/TRAINING PROGRAM

## 2019-07-18 PROCEDURE — 80048 BASIC METABOLIC PNL TOTAL CA: CPT | Performed by: STUDENT IN AN ORGANIZED HEALTH CARE EDUCATION/TRAINING PROGRAM

## 2019-07-18 PROCEDURE — 97116 GAIT TRAINING THERAPY: CPT

## 2019-07-18 RX ORDER — EZETIMIBE 10 MG/1
TABLET ORAL
Qty: 90 TABLET | Refills: 1 | Status: SHIPPED | OUTPATIENT
Start: 2019-07-18 | End: 2020-01-01

## 2019-07-18 RX ADMIN — EZETIMIBE 10 MG: 10 TABLET ORAL at 09:06

## 2019-07-18 RX ADMIN — TRAZODONE HYDROCHLORIDE 300 MG: 150 TABLET ORAL at 20:18

## 2019-07-18 RX ADMIN — OXYCODONE HYDROCHLORIDE AND ACETAMINOPHEN 500 MG: 500 TABLET ORAL at 09:06

## 2019-07-18 RX ADMIN — PRENATAL VIT W/ FE FUMARATE-FA TAB 27-0.8 MG 1 TABLET: 27-0.8 TAB at 09:05

## 2019-07-18 RX ADMIN — DILTIAZEM HYDROCHLORIDE 240 MG: 240 CAPSULE, COATED, EXTENDED RELEASE ORAL at 09:06

## 2019-07-18 RX ADMIN — METOLAZONE 2.5 MG: 2.5 TABLET ORAL at 09:06

## 2019-07-18 RX ADMIN — INSULIN GLARGINE 30 UNITS: 100 INJECTION, SOLUTION SUBCUTANEOUS at 20:15

## 2019-07-18 RX ADMIN — FUROSEMIDE 10 MG/HR: 10 INJECTION, SOLUTION INTRAVENOUS at 09:04

## 2019-07-18 RX ADMIN — NYSTATIN: 100000 POWDER TOPICAL at 20:19

## 2019-07-18 RX ADMIN — GUAIFENESIN 600 MG: 600 TABLET, EXTENDED RELEASE ORAL at 20:17

## 2019-07-18 RX ADMIN — ASPIRIN 81 MG 81 MG: 81 TABLET ORAL at 09:05

## 2019-07-18 RX ADMIN — FERROUS SULFATE TAB EC 324 MG (65 MG FE EQUIVALENT) 324 MG: 324 (65 FE) TABLET DELAYED RESPONSE at 09:05

## 2019-07-18 RX ADMIN — IPRATROPIUM BROMIDE AND ALBUTEROL SULFATE 3 ML: 2.5; .5 SOLUTION RESPIRATORY (INHALATION) at 15:09

## 2019-07-18 RX ADMIN — POTASSIUM CHLORIDE 20 MEQ: 750 CAPSULE, EXTENDED RELEASE ORAL at 18:05

## 2019-07-18 RX ADMIN — SODIUM CHLORIDE, PRESERVATIVE FREE 3 ML: 5 INJECTION INTRAVENOUS at 20:20

## 2019-07-18 RX ADMIN — WARFARIN SODIUM 2 MG: 2 TABLET ORAL at 18:05

## 2019-07-18 RX ADMIN — POTASSIUM CHLORIDE 20 MEQ: 750 CAPSULE, EXTENDED RELEASE ORAL at 09:05

## 2019-07-18 RX ADMIN — DOCUSATE SODIUM 100 MG: 100 CAPSULE, LIQUID FILLED ORAL at 18:05

## 2019-07-18 RX ADMIN — CITALOPRAM HYDROBROMIDE 20 MG: 20 TABLET ORAL at 09:05

## 2019-07-18 RX ADMIN — IPRATROPIUM BROMIDE AND ALBUTEROL SULFATE 3 ML: 2.5; .5 SOLUTION RESPIRATORY (INHALATION) at 06:43

## 2019-07-18 RX ADMIN — NYSTATIN: 100000 POWDER TOPICAL at 09:08

## 2019-07-18 RX ADMIN — ROPINIROLE HYDROCHLORIDE 0.25 MG: 0.25 TABLET, FILM COATED ORAL at 20:17

## 2019-07-18 RX ADMIN — FAMOTIDINE 20 MG: 20 TABLET ORAL at 09:06

## 2019-07-18 RX ADMIN — VITAMIN D, TAB 1000IU (100/BT) 2000 UNITS: 25 TAB at 09:06

## 2019-07-18 RX ADMIN — GUAIFENESIN 600 MG: 600 TABLET, EXTENDED RELEASE ORAL at 09:06

## 2019-07-18 RX ADMIN — FUROSEMIDE 10 MG/HR: 10 INJECTION, SOLUTION INTRAVENOUS at 20:57

## 2019-07-18 NOTE — PLAN OF CARE
Problem: Patient Care Overview  Goal: Plan of Care Review  Outcome: Ongoing (interventions implemented as appropriate)   07/18/19 0608 07/18/19 1309   Coping/Psychosocial   Plan of Care Reviewed With --  patient   Plan of Care Review   Progress --  improving   OTHER   Outcome Summary initial assessment; VSS; o2 @ 4L; 5 lb weight loss; no complaints throughout the night; will continue to monitor --       07/18/19 1309   Coping/Psychosocial   Plan of Care Reviewed With patient   Plan of Care Review   Progress improving   OTHER   Outcome Summary Pt cond ind with bed mobility, toilet t/f, sit<>stands and pericare this date. pt required assistance of 1 for equipment to BR. No new goals met this date. Cont OT POC.

## 2019-07-18 NOTE — PLAN OF CARE
Problem: Patient Care Overview  Goal: Plan of Care Review   07/18/19 1309 07/18/19 1654   Coping/Psychosocial   Plan of Care Reviewed With --  patient   Plan of Care Review   Progress improving --    OTHER   Outcome Summary --  t/f's ind,amb 14'x2 w/o ad ind/sba of 1 with sats dropping 86%-no goals met     Goal: Discharge Needs Assessment   07/14/19 1328   Discharge Needs Assessment   Readmission Within the Last 30 Days no previous admission in last 30 days   Concerns to be Addressed denies needs/concerns at this time   Patient/Family Anticipates Transition to home with help/services  (Patient reports that her son and daughter-in-law will be assisting her at d/c. )   Patient/Family Anticipated Services at Transition home health care   Transportation Concerns car, none   Transportation Anticipated family or friend will provide;other (see comments)  (Patient voiced that she uses [facility] for MD appointments. Patient stated that her son will pick her up at d/c. She voiced that he does not own a vehicle but will use a friend's vehicle at d/c. )   Anticipated Changes Related to Illness none   Equipment Needed After Discharge none   Discharge Facility/Level of Care Needs home with home health   Offered/Gave Vendor List yes   Patient's Choice of Community Agency(s) [facility]    Current Discharge Risk chronically ill   Disability   Equipment Currently Used at Home glucometer;nebulizer;oxygen;other (see comments);shower chair  (Patient has home/portable oxygen provided by [employer]. MICHELE confirmed with patient that home/portable oxygen is available for use at d/c. )

## 2019-07-18 NOTE — PROGRESS NOTES
"Anticoagulation by Pharmacy - Warfarin    Brendon Skelton is a 73 y.o.female being continued on warfarin for atrial fibrillation  Home regimen: 4 mg nightly  INR Goal: 2-3  Last INR:   Lab Results   Component Value Date    INR 2.10 (H) 07/18/2019       Objective:  [Ht: 165.1 cm (65\"); Wt: 116 kg (255 lb 12.8 oz)]  Lab Results   Component Value Date    INR 2.10 (H) 07/18/2019    INR 2.28 (H) 07/17/2019    INR 3.14 (H) 07/16/2019    PROTIME  07/18/2019      Comment:      Fingerstick inr    PROTIME 24.9 (H) 07/17/2019    PROTIME 32.0 (H) 07/16/2019     Lab Results   Component Value Date    HGB 9.2 (L) 07/18/2019    HGB 8.6 (L) 07/17/2019    HGB 8.9 (L) 07/16/2019    HCT 29.8 (L) 07/18/2019    HCT 27.2 (L) 07/17/2019    HCT 29.4 (L) 07/16/2019     07/18/2019     07/17/2019     07/16/2019       Recent Warfarin Administrations       The 5 most recent administrations since 07/11/2019 are shown below each listed medication.    Warfarin Sodium         Order Dose Date Given     warfarin (COUMADIN) tablet 2 mg 2 mg 07/17/2019     warfarin (COUMADIN) tablet 1 mg 1 mg 07/16/2019                      Assessment  Interacting medications: None  INR is therapeutic but trending down, likely due to held doses.  H/H stable and trending up.  Will continue to trend current dose.    Plan:  1.  Give warfarin 2 mg tablet PO @ 1800 tonight  2.  Draw a PT/INR in AM  3.  Pharmacy will continue to follow    Diaz Jensen RPH  07/18/19 9:02 AM    "

## 2019-07-18 NOTE — PROGRESS NOTES
"[facility]  [address] [place] [address]. [address]  T - [phone]  F - [phone]     Progress Note          PATIENT  DEMOGRAPHICS   PATIENT NAME: Brendon Skelton                      PHYSICIAN: ABRAHAM Smith  : 1945  MRN: [id number]   LOS: 2 days    Patient Care Team:  Josefa Pozo APRN as PCP - General (Nurse Practitioner)  Meme Duckworth APRN as PCP - Claims Attributed  Aby Stout RN as Care Coordinator (Population Health)  Subjective   SUBJECTIVE   Still c/o exertional dyspnea.         Objective   OBJECTIVE   Vital Signs  Temp:  [97.3 °F (36.3 °C)-97.9 °F (36.6 °C)] 97.3 °F (36.3 °C)  Heart Rate:  [] 100  Resp:  [18-22] 18  BP: (115-149)/(56-83) 130/73    Flowsheet Rows      First Filed Value   Admission Height  165.1 cm (65\") Documented at 2019 1527   Admission Weight  119 kg (261 lb 6.4 oz) Documented at 2019 1527           I/O last 3 completed shifts:  In: 480 [P.O.:480]  Out: 2950 [Urine:2950]    PHYSICAL EXAM    Physical Exam   Constitutional: She is oriented to person, place, and time. She appears well-developed and well-nourished.   HENT:   Head: Normocephalic and atraumatic.   Eyes: Conjunctivae and EOM are normal. Pupils are equal, round, and reactive to light.   Neck: JVD present.   Cardiovascular: Normal rate and regular rhythm.   Pulmonary/Chest: Effort normal and breath sounds normal.   Abdominal: Soft. She exhibits distension.   Musculoskeletal: She exhibits edema.   Neurological: She is alert and oriented to person, place, and time.   Skin: Skin is warm and dry.       RESULTS   Results Review:    Results from last 7 days   Lab Units 19  0625 19  0522 19  0604  19  1227   SODIUM mmol/L 144 144 144   < > 143   POTASSIUM mmol/L 3.6 3.5 3.4*   < > 3.9   CHLORIDE mmol/L 97* 99 98   < > 101   CO2 mmol/L 33.0* 33.0* 34.0*   < > 30.0*   BUN mg/dL 99* 97* 96*   < > 79*   CREATININE mg/dL 2.51* 2.56* " 2.55*   < > 2.22*   CALCIUM mg/dL 9.5 9.0 8.9   < > 9.0   BILIRUBIN mg/dL  --   --   --   --  0.6   ALK PHOS U/L  --   --   --   --  76   ALT (SGPT) U/L  --   --   --   --  27   AST (SGOT) U/L  --   --   --   --  31   GLUCOSE mg/dL 59* 74 73   < > 128*    < > = values in this interval not displayed.       Estimated Creatinine Clearance: 25.4 mL/min (A) (by C-G formula based on SCr of 2.51 mg/dL (H)).    Results from last 7 days   Lab Units 07/17/19  0522   MAGNESIUM mg/dL 2.1             Results from last 7 days   Lab Units 07/18/19  0625 07/17/19  0522 07/16/19  0604 07/15/19  0535 07/14/19  0649   WBC 10*3/mm3 9.00 6.87 8.26 8.42 5.29   HEMOGLOBIN g/dL 9.2* 8.6* 8.9* 8.5* 8.6*   PLATELETS 10*3/mm3 237 281 289 316 292       Results from last 7 days   Lab Units 07/18/19  0625 07/17/19  0522 07/16/19  0604 07/15/19  0535 07/14/19  0649   INR  2.10* 2.28* 3.14* 4.66* 4.57*         Imaging Results (last 24 hours)     ** No results found for the last 24 hours. **           MEDICATIONS      aspirin 81 mg Oral Daily   cholecalciferol 2,000 Units Oral Daily   citalopram 20 mg Oral Daily   diltiaZEM  mg Oral Daily   ezetimibe 10 mg Oral Daily   famotidine 20 mg Oral Daily   ferrous sulfate 324 mg Oral Daily With Breakfast   guaiFENesin 600 mg Oral Q12H   insulin aspart 0-7 Units Subcutaneous 4x Daily AC & at Bedtime   Insulin Glargine 30 Units Subcutaneous Q24H   ipratropium-albuterol 3 mL Nebulization Q8H - RT   metOLazone 2.5 mg Oral Daily   nystatin  Topical Q12H   Pen Needles 1 each Does not apply Q24H   potassium chloride 20 mEq Oral BID With Meals   prenatal vitamin 27-0.8 1 tablet Oral Daily   rOPINIRole 0.25 mg Oral Nightly   sodium chloride 3 mL Intravenous Q12H   traZODone 300 mg Oral Nightly   vitamin C with bob hips 500 mg Oral Daily   warfarin 2 mg Oral Daily       furosemide (LASIX) infusion 1 mg/mL 10 mg/hr Last Rate: 10 mg/hr (07/18/19 0904)   Pharmacy to dose warfarin         Assessment/Plan    ASSESSMENT / PLAN      Acute on chronic diastolic congestive heart failure (CMS/[facility])    Atrial fibrillation [I48.91]    Emphysema of lung (CMS/[facility])    Type 2 diabetes mellitus with stage 4 chronic kidney disease, with long-term current use of insulin (CMS/[facility])    Chronic hypoxemic respiratory failure (CMS/[facility])    Essential hypertension    SSS (sick sinus syndrome) (CMS/[facility])    Stage 4 chronic kidney disease (CMS/[facility])    Anemia    1.CKD stage 4: -Baseline creatinine = 1.9-2.1. Cr stable at 2.5. The patient was switched from lasix to IV bumex and oral metolazone . Wt did not change significantly, added lasix drip and stopped bumex. Keep metolazone. On KCl , Mg normal.     -Continue lasix gtt, wt down 4 lbs since yesterday.    2. Anemia: Hemoglobin stable, continue iron     3. Acute on Chronic Diastolic heart failure : Patient is still needing diuresis.  Last echo 6/18/19 showed EF 46-50%, BNP on admission was 8913, CXR shows pulmonary congestion.   -diuretics as above  -strict I/Os  -daily weights  -1500 mL fluid restriction, sodium restriction 2g    4. Chronic hypoxemic respiratory failure     5. SSS- pacemaker in place     6. Essential hypertension     7. Type 2 Diabetes Mellitus- with stage 4 chronic kidney disease and long-term insulin use.     8. Emphysema of lung           This document has been electronically signed by ABRAHAM Smith on July 18, 2019 1:28 PM

## 2019-07-18 NOTE — THERAPY TREATMENT NOTE
Acute Care - Occupational Therapy Treatment Note  [facility]     Patient Name: Brendon Skelton  : 1945  MRN: [id number]  Today's Date: 2019  Onset of Illness/Injury or Date of Surgery: 19  Date of Referral to OT: 19  Referring Physician: MONICA Romero MD    Admit Date: 2019       ICD-10-CM ICD-9-CM   1. Acute on chronic congestive heart failure, unspecified heart failure type (CMS/HCC) I50.9 428.0   2. COPD exacerbation (CMS/[facility]) J44.1 491.21   3. Impaired physical mobility Z74.09 781.99   4. Impaired mobility and activities of daily living Z74.09 799.89     Patient Active Problem List   Diagnosis   • Long term current use of anticoagulant therapy   • Personal history of heart valve replacement   • Atrial fibrillation [I48.91]   • Emphysema of lung (CMS/[facility])   • On anticoagulant therapy   • Hyperlipidemia   • Diastolic heart failure (CMS/[facility])   • Depressive disorder   • COPD (chronic obstructive pulmonary disease) (CMS/[facility])   • Type 2 diabetes mellitus with stage 4 chronic kidney disease, with long-term current use of insulin (CMS/[facility])   • Myopia   • Astigmatism   • Bleeding from open wound of chest wall   • Follow-up surgery care   • Encounter for screening for malignant neoplasm of colon   • Positive colorectal cancer screening using DNA-based stool test   • Nodule of left lung   • Chronic hypoxemic respiratory failure (CMS/HCC)   • Physical deconditioning   • Heart failure with preserved left ventricular function (HFpEF) (CMS/[facility])   • Essential hypertension   • Coronary artery disease involving native coronary artery without angina pectoris   • Hx of CABG   • SSS (sick sinus syndrome) (CMS/HCC)   • Pacemaker   • Stage 4 chronic kidney disease (CMS/HCC)   • Personal history of tobacco use, presenting hazards to health   • Gastrointestinal hemorrhage   • Morbid obesity (CMS/HCC)   • Gastritis   • Colon polyp   • Coumadin toxicity   • Acute on chronic diastolic congestive heart  failure (CMS/[facility])   • Anemia     Past Medical History:   Diagnosis Date   • Acute bronchitis    • Anxiety    • Atrial fibrillation (CMS/[facility])    • C. difficile colitis    • Callosity     under metatarsal head      • Cardiac pacemaker in situ    • CHF (congestive heart failure) (CMS/[facility])    • Chronic obstructive lung disease (CMS/HCC)    • Corns and callus    • Depressive disorder    • Diabetes mellitus (CMS/[facility])    • Diastolic heart failure (CMS/[facility])    • Essential hypertension    • Foot pain    • Hyperlipidemia    • Long term current use of anticoagulant    • On anticoagulant therapy    • Pulmonary emphysema (CMS/[facility])    • Rectal hemorrhage    • Type 2 diabetes mellitus (CMS/[facility])      Past Surgical History:   Procedure Laterality Date   • AORTIC VALVE REPAIR/REPLACEMENT  1999   • CARDIAC ELECTROPHYSIOLOGY PROCEDURE N/A 3/20/2017    Procedure: PPM generator change - dual;  Surgeon: Bereket Gates MD;  Location: [facility] CATH INVASIVE LOCATION;  Service:    • CARDIAC PACEMAKER PLACEMENT  1999   • CATARACT EXTRACTION W/ INTRAOCULAR LENS IMPLANT Left 2/1/2019    Procedure: REMOVE CATARACT AND IMPLANT INTRAOCULAR LENS I;  Surgeon: Jose L Clay MD;  Location: [facility] OR;  Service: Ophthalmology   • CATARACT EXTRACTION W/ INTRAOCULAR LENS IMPLANT Right 2/8/2019    Procedure: REMOVE CATARACT AND IMPLANT  INTRAOCULAR LENS;  Surgeon: Jose L Clay MD;  Location: [facility] OR;  Service: Ophthalmology   • COLONOSCOPY N/A 6/19/2019    Procedure: COLONOSCOPY WITH CONTROL OF BLEED;  Surgeon: Alfredo Guerrero MD;  Location: [facility] ENDOSCOPY;  Service: Gastroenterology   • COLONOSCOPY N/A 6/24/2019    Procedure: COLONOSCOPY;  Surgeon: Alfredo Guerrero MD;  Location: [facility] ENDOSCOPY;  Service: Gastroenterology   • ECHO - CONVERTED  11/12/2013    There is mild to moderate left atrial enlargement EF 50-55%   • ENDOSCOPY N/A 6/19/2019    Procedure: ESOPHAGOGASTRODUODENOSCOPY WITH CONTROL OF BLEED;  Surgeon: Yolanda  Alfredo MARIN MD;  Location: [facility] ENDOSCOPY;  Service: Gastroenterology   • FOOT SURGERY  1990   • OTHER SURGICAL HISTORY  10/26/2015    PARING CORN/CALLUS    • PACEMAKER REPLACEMENT N/A 3/21/2017    Procedure: revision pacemaker pocket, evacuation hematoma, control of bleeding;  Surgeon: Polo Feliz MD;  Location: [facility] OR;  Service:    • TRANSESOPHAGEAL ECHOCARDIOGRAM (MITZY)  05/01/2014    With color flow-Mild left atrial enlargement with mild concentric LV hypertrophy with top normal aortic root size. EF 45-50%. Mild mitral regurgitation and mild aortic insufficiency and trivial amount of tricuspid regurgation   • TUBAL ABDOMINAL LIGATION         Therapy Treatment    Rehabilitation Treatment Summary     Row Name 07/18/19 1050             Treatment Time/Intention    Discipline  occupational therapy assistant  -KD      Document Type  therapy note (daily note)  -KD      Subjective Information  no complaints  -KD      Mode of Treatment  occupational therapy  -KD      Patient/Family Observations  no family present  -KD      Therapy Frequency (OT Eval)  other (see comments) 5-7x/wk  -KD      Patient Effort  good  -KD      Existing Precautions/Restrictions  fall;oxygen therapy device and L/min  -KD      Equipment Issued to Patient  gait belt  -KD      Recorded by [KD] Akua Henderson COTA/L 07/18/19 1305      Row Name 07/18/19 1050             Vital Signs    Pre Systolic BP Rehab  130  -KD      Pre Treatment Diastolic BP  73  -KD      Pretreatment Heart Rate (beats/min)  78  -KD      Posttreatment Heart Rate (beats/min)  82  -KD      Pre SpO2 (%)  92  -KD      O2 Delivery Pre Treatment  supplemental O2  -KD      Post SpO2 (%)  94  -KD      O2 Delivery Post Treatment  supplemental O2  -KD      Pre Patient Position  Supine  -KD      Intra Patient Position  Standing  -KD      Post Patient Position  Sitting  -KD      Recorded by [KD] Akua Henderson COTA/L 07/18/19 1308      Row Name 07/18/19 1050              Cognitive Assessment/Intervention- PT/OT    Orientation Status (Cognition)  oriented x 4  -KD      Follows Commands (Cognition)  WFL  -KD      Recorded by [KD] Akua Henderson COLÓN/L 07/18/19 1308      Row Name 07/18/19 1050             Bed Mobility Assessment/Treatment    Bed Mobility Assessment/Treatment  supine-sit;sit-supine  -KD      Supine-Sit [place] (Bed Mobility)  conditional independence  -KD      Sit-Supine [place] (Bed Mobility)  conditional independence  -KD      Recorded by [KD] Akua Henderson COLÓN/L 07/18/19 1308      Row Name 07/18/19 1050             Sit-Stand Transfer    Sit-Stand [place] (Transfers)  independent  -KD      Assistive Device (Sit-Stand Transfers)  walker, front-wheeled  -KD      Recorded by [KD] Akua Henderson COLÓN/L 07/18/19 1308      Row Name 07/18/19 1050             Stand-Sit Transfer    Stand-Sit [place] (Transfers)  independent  -KD      Assistive Device (Stand-Sit Transfers)  walker, front-wheeled  -KD      Recorded by [KD] Akua Henderson COLÓN/L 07/18/19 1308      Row Name 07/18/19 1050             Toilet Transfer    Type (Toilet Transfer)  sit-stand;stand-sit  -KD      [place] Level (Toilet Transfer)  independent  -KD      Assistive Device (Toilet Transfer)  walker, front-wheeled  -KD      Recorded by [KD] Akua Henderson COLÓN/L 07/18/19 1308      Row Name 07/18/19 1050             Lower Body Dressing Assessment/Training    Lower Body Dressing [place] Level  lower body dressing skills;doff;don;pants/bottoms;undergarment;independent  -KD      Lower Body Dressing Position  unsupported standing  -KD      Recorded by [KD] Akua Henderson COLÓN/L 07/18/19 1308      Row Name 07/18/19 1050             Grooming Assessment/Training    [place] Level (Grooming)  grooming skills;hair care, combing/brushing;wash face, hands;independent  -KD      Grooming Position  sink side  -KD      Recorded by [KD] Akua Henderson COLÓN/L 07/18/19 1308      Row  Name 07/18/19 1050             Toileting Assessment/Training    [place] Level (Toileting)  toileting skills;adjust/manage clothing;perform perineal hygiene;independent  -KD      Assistive Devices (Toileting)  commode  -KD      Toileting Position  unsupported sitting  -KD      Recorded by [KD] Akua Henderson COTA/L 07/18/19 1308      Row Name 07/18/19 1050             Positioning and Restraints    Pre-Treatment Position  in bed  -KD      Post Treatment Position  bed  -KD      In Bed  sitting EOB;call light within reach;encouraged to call for assist  -KD      Recorded by [KD] Akua Henderson COTA/L 07/18/19 1308      Row Name 07/18/19 1050             Pain Scale: Numbers Pre/Post-Treatment    Pain Scale: Numbers, Pretreatment  0/10 - no pain  -KD      Pain Scale: Numbers, Post-Treatment  0/10 - no pain  -KD      Recorded by [MALCOLM] Akua Henderson COTA/L 07/18/19 1308      Row Name 07/18/19 1050             Outcome Summary/Treatment Plan (OT)    Daily Summary of Progress (OT)  progress toward functional goals as expected  -KD      Plan for Continued Treatment (OT)  cont ot poc  -KD      Anticipated Discharge Disposition (OT)  anticipate therapy at next level of care  -KD      Recorded by [MALCOLM] Akua Henderson COTA/DANDRE 07/18/19 1308        User Key  (r) = Recorded By, (t) = Taken By, (c) = Cosigned By    Initials Name Effective Dates Discipline    KD Akua Henderson COTA/L 03/07/18 -  OT           Rehab Goal Summary     Row Name 07/18/19 1050             Occupational Therapy Goals    Transfer Goal Selection (OT)  transfer, OT goal 1  -KD      Bathing Goal Selection (OT)  bathing, OT goal 1  -KD      Dressing Goal Selection (OT)  dressing, OT goal 1  -KD      Activity Tolerance Goal Selection (OT)  activity tolerance, OT goal 1  -KD         Transfer Goal 1 (OT)    Activity/Assistive Device (Transfer Goal 1, OT)  transfers, all  -KD      [place] Level/Cues Needed (Transfer Goal 1, OT)  conditional independence   -KD      Time Frame (Transfer Goal 1, OT)  long term goal (LTG)  -KD      Progress/Outcome (Transfer Goal 1, OT)  goal met  -KD         Bathing Goal 1 (OT)    Activity/Assistive Device (Bathing Goal 1, OT)  bathing skills, all  -KD      [place] Level/Cues Needed (Bathing Goal 1, OT)  contact guard assist With 02 90% or above.  -KD      Time Frame (Bathing Goal 1, OT)  long term goal (LTG)  -KD      Progress/Outcomes (Bathing Goal 1, OT)  goal met  -KD         Dressing Goal 1 (OT)    Activity/Assistive Device (Dressing Goal 1, OT)  dressing skills, all  -KD      [place]/Cues Needed (Dressing Goal 1, OT)  contact guard assist with 02 90% or above.  -KD      Time Frame (Dressing Goal 1, OT)  long term goal (LTG)  -KD      Progress/Outcome (Dressing Goal 1, OT)  goal not met  -KD         Toileting Goal 1 (OT)    Activity/Device (Toileting Goal 1, OT)  toileting skills, all  -KD      [place] Level/Cues Needed (Toileting Goal 1, OT)  conditional independence  -KD      Time Frame (Toileting Goal 1, OT)  long term goal (LTG)  -KD      Progress/Outcome (Toileting Goal 1, OT)  goal met  -KD          Activity Tolerance Goal 1 (OT)    Activity Tolerance Goal 1 (OT)  20 min activity w/ 2-3 RB's  -KD      Activity Level (Endurance Goal 1, OT)  20 min activity;O2 sat >/ equal to 88%  -KD      Time Frame (Activity Tolerance Goal 1, OT)  long term goal (LTG)  -KD      Progress/Outcome (Activity Tolerance Goal 1, OT)  goal not met  -KD         Patient Education Goal (OT)    Activity (Patient Education Goal, OT)  Home safety/fall prev and EC/WS.  -KD      [place]/Cues/Accuracy (Memory Goal 2, OT)  demonstrates adequately;verbalizes understanding  -KD      Time Frame (Patient Education Goal, OT)  long term goal (LTG)  -KD      Progress/Outcome (Patient Education Goal, OT)  goal not met  -KD        User Key  (r) = Recorded By, (t) = Taken By, (c) = Cosigned By    Initials Name Provider Type Discipline    KD Beatriz,  ELDON Alatorre/DANDRE Occupational Therapy Assistant OT        Occupational Therapy Education     Title: PT OT SLP Therapies (In Progress)     Topic: Occupational Therapy (In Progress)     Point: ADL training (Done)     Description: Instruct learner(s) on proper safety adaptation and remediation techniques during self care or transfers.   Instruct in proper use of assistive devices.    Learning Progress Summary           Patient Acceptance, E, VU,DU by FRANCES at 7/16/2019  2:53 PM                   Point: Precautions (Done)     Description: Instruct learner(s) on prescribed precautions during self-care and functional transfers.    Learning Progress Summary           Patient Acceptance, E, VU by RB at 7/15/2019 11:31 AM    Comment:  Reviewed use of non skid socks when OOB and use of gait belt for distance ambulation.    Acceptance, E, VU by RB at 7/14/2019  2:02 PM    Comment:  Edu pt on  use of non skid socks when OOB.                               User Key     Initials Effective Dates Name Provider Type Discipline    VANESSA 06/15/16 -  Beto Carney OT Occupational Therapist OT    FRANCES 03/07/18 -  Selena Simpson COTA/L Occupational Therapy Assistant OT                OT Recommendation and Plan  Outcome Summary/Treatment Plan (OT)  Daily Summary of Progress (OT): progress toward functional goals as expected  Plan for Continued Treatment (OT): cont ot poc  Anticipated Discharge Disposition (OT): anticipate therapy at next level of care  Therapy Frequency (OT Eval): other (see comments)(5-7x/wk)  Daily Summary of Progress (OT): progress toward functional goals as expected  Plan of Care Review  Plan of Care Reviewed With: patient  Plan of Care Reviewed With: patient  Outcome Summary: Pt cond ind with bed mobility, toilet t/f,  sit<>stands and pericare this date. pt required assistance of 1 for equipment sto BR. No new goals met this date. Cont OT POC.   Outcome Measures     Row Name 07/18/19 1050 07/17/19 0815 07/16/19 [id number]        How much help from another person do you currently need...    Turning from your back to your side while in flat bed without using bedrails?  --  --  4  -LN    Moving from lying on back to sitting on the side of a flat bed without bedrails?  --  --  4  -LN    Moving to and from a bed to a chair (including a wheelchair)?  --  --  4  -LN    Standing up from a chair using your arms (e.g., wheelchair, bedside chair)?  --  --  4  -LN    Climbing 3-5 steps with a railing?  --  --  3  -LN    To walk in hospital room?  --  --  3  -LN    AM-PAC 6 Clicks Score (PT)  --  --  22  -LN       How much help from another is currently needed...    Putting on and taking off regular lower body clothing?  3  -KD  2  -KD  --    Bathing (including washing, rinsing, and drying)  3  -KD  3  -KD  --    Toileting (which includes using toilet bed pan or urinal)  4  -KD  4  -KD  --    Putting on and taking off regular upper body clothing  4  -KD  4  -KD  --    Taking care of personal grooming (such as brushing teeth)  4  -KD  4  -KD  --    Eating meals  4  -KD  4  -KD  --    AM-PAC 6 Clicks Score (OT)  22  -KD  21  -KD  --       Functional Assessment    Outcome Measure Options  --  --  AM-PAC 6 Clicks Basic Mobility (PT)  -LN    Row Name 07/16/19 1345 07/15/19 1404          How much help from another person do you currently need...    Turning from your back to your side while in flat bed without using bedrails?  --  4  -LN     Moving from lying on back to sitting on the side of a flat bed without bedrails?  --  4  -LN     Moving to and from a bed to a chair (including a wheelchair)?  --  4  -LN     Standing up from a chair using your arms (e.g., wheelchair, bedside chair)?  --  4  -LN     Climbing 3-5 steps with a railing?  --  3  -LN     To walk in hospital room?  --  3  -LN     AM-PAC 6 Clicks Score (PT)  --  22  -LN        How much help from another is currently needed...    Putting on and taking off regular lower body clothing?  2  -BB   --     Bathing (including washing, rinsing, and drying)  3  -BB  --     Toileting (which includes using toilet bed pan or urinal)  4  -BB  --     Putting on and taking off regular upper body clothing  4  -BB  --     Taking care of personal grooming (such as brushing teeth)  4  -BB  --     Eating meals  4  -BB  --     AM-PAC 6 Clicks Score (OT)  21  -BB  --        Functional Assessment    Outcome Measure Options  --  AM-PAC 6 Clicks Basic Mobility (PT)  -LN       User Key  (r) = Recorded By, (t) = Taken By, (c) = Cosigned By    Initials Name Provider Type    LN Cecy Lara, YOSEPH Physical Therapy Assistant    BB Selena Simpson COTA/L Occupational Therapy Assistant    Akua Maki COTA/L Occupational Therapy Assistant           Time Calculation:   Time Calculation- OT     Row Name 07/18/19 1313             Time Calculation- OT    OT Start Time  1050  -KD      OT Stop Time  1113  -KD      OT Time Calculation (min)  23 min  -KD      Total Timed Code Minutes- OT  23 minute(s)  -KD      OT Received On  07/18/19  -KD        User Key  (r) = Recorded By, (t) = Taken By, (c) = Cosigned By    Initials Name Provider Type    Akua Maki COTA/L Occupational Therapy Assistant        Therapy Charges for Today     Code Description Service Date Service Provider Modifiers Qty    [id number] HC OT SELF CARE/MGMT/TRAIN EA 15 MIN 7/17/2019 Akua Henderson COTA/L GO 3    09933185633 HC OT SELF CARE/MGMT/TRAIN EA 15 MIN 7/18/2019 Akua Henderson COTA/L GO 2               YSABEL Denton  7/18/2019

## 2019-07-18 NOTE — PROGRESS NOTES
FAMILY MEDICINE DAILY PROGRESS NOTE  NAME: Brendon Skelton  : 1945  MRN: [id number]      LOS: 2 days     PROVIDER OF SERVICE: Kenna Chawla MD    Chief Complaint: Acute on chronic diastolic congestive heart failure (CMS/HCC)    Subjective:     Interval History:  History taken from: patient chart    She required 4.5L to maintain appropriate saturations. She is down 5 pounds overnight. Baseline weight appears to be 250. Nephrology consulted and has placed her on lasix drip with metolazone. Creatinine is stable but still above her baseline.    She is ambulating but it makes her shortness of breath worse. She is currently on 4.5L NC. Baseline is 3L NC. She otherwise has no questions or concerns. She reports overall her symptoms have improved.    Review of Systems:   Review of Systems   Constitutional: Negative for activity change, appetite change, chills, fatigue and fever.   HENT: Negative for hearing loss, sneezing, sore throat and trouble swallowing.    Eyes: Negative for visual disturbance.   Respiratory: Positive for shortness of breath (chronic, improving). Negative for cough and chest tightness.    Cardiovascular: Positive for leg swelling (improving). Negative for chest pain and palpitations.   Gastrointestinal: Positive for abdominal distention (improving, still present on right side). Negative for abdominal pain, blood in stool, constipation, diarrhea, nausea and vomiting.   Genitourinary: Negative for difficulty urinating and dysuria.   Musculoskeletal: Negative for arthralgias and back pain.   Skin: Negative for pallor and rash.   Allergic/Immunologic: Negative for environmental allergies and food allergies.   Neurological: Negative for dizziness, light-headedness, numbness and headaches.   Psychiatric/Behavioral: Negative for agitation, confusion, hallucinations and suicidal ideas.       Objective:     Vital Signs  Temp:  [97.3 °F (36.3 °C)-98.1 °F (36.7 °C)] 97.3 °F (36.3 °C)  Heart  Rate:  [72-99] 88  Resp:  [18-22] 18  BP: (115-149)/(56-91) 132/83  Body mass index is 42.57 kg/m².        07/17/19  0500 07/18/19  0607   Weight: 118 kg (260 lb) 116 kg (255 lb 12.8 oz)       Physical Exam  Physical Exam   Constitutional: She is oriented to person, place, and time. She appears well-developed and well-nourished. No distress.   HENT:   Head: Normocephalic and atraumatic.   Right Ear: External ear normal.   Left Ear: External ear normal.   Neck: Normal range of motion. Neck supple.   Cardiovascular: Normal rate, regular rhythm and normal heart sounds.   Pulmonary/Chest: No respiratory distress. She has no wheezes. She has no rales.   Diminished breath sounds, pursed lip breathing; better air movement throughout all lung fields   Abdominal: Soft. Bowel sounds are normal. She exhibits no distension. There is no tenderness.   Musculoskeletal: Normal range of motion. She exhibits edema (trace pitting edema lower extremities bilaterally, improved from yesterday ).   Neurological: She is alert and oriented to person, place, and time. She has normal reflexes.   Skin: Skin is warm and dry. She is not diaphoretic. No erythema.   Psychiatric: She has a normal mood and affect. Her behavior is normal.       Medication Review    Current Facility-Administered Medications:   •  acetaminophen (TYLENOL) tablet 650 mg, 650 mg, Oral, Q4H PRN, Kenna Chawla MD, 650 mg at 07/16/19 2054  •  albuterol (PROVENTIL) nebulizer solution 0.083% 2.5 mg/3mL, 2.5 mg, Nebulization, Q4H PRN, Kenna Cahwla MD  •  aspirin chewable tablet 81 mg, 81 mg, Oral, Daily, Kenna Chawla MD, 81 mg at 07/17/19 0841  •  cholecalciferol (VITAMIN D3) tablet 2,000 Units, 2,000 Units, Oral, Daily, Kenna Chawla MD, 2,000 Units at 07/17/19 0841  •  citalopram (CeleXA) tablet 20 mg, 20 mg, Oral, Daily, Kenna Chawla MD, 20 mg at 07/17/19 0892  •  dextrose (D50W) 25 g/ 50mL Intravenous Solution 25 g, 25 g, Intravenous, Q15 Min PRN,  Kenna Chawla MD  •  dextrose (GLUTOSE) oral gel 15 g, 15 g, Oral, Q15 Min PRN, Kenna Chawla MD  •  diltiaZEM CD (CARDIZEM CD) 24 hr capsule 240 mg, 240 mg, Oral, Daily, Kenna Chawla MD, 240 mg at 07/17/19 0841  •  docusate sodium (COLACE) capsule 100 mg, 100 mg, Oral, BID PRN, Kenna Chawla MD  •  ezetimibe (ZETIA) tablet 10 mg, 10 mg, Oral, Daily, Kenna Chawla MD, 10 mg at 07/17/19 0841  •  famotidine (PEPCID) tablet 20 mg, 20 mg, Oral, Daily, Kenna Chawla MD, 20 mg at 07/17/19 0841  •  ferrous sulfate EC tablet 324 mg, 324 mg, Oral, Daily With Breakfast, Kenna Chawla MD, 324 mg at 07/17/19 0841  •  furosemide (LASIX) 100 mg in sodium chloride 0.9 % 100 mL (1 mg/mL) infusion, 10 mg/hr, Intravenous, Continuous, Sandy Caputo MD, Last Rate: 10 mL/hr at 07/17/19 2248, 10 mg/hr at 07/17/19 2248  •  glucagon (human recombinant) (GLUCAGEN DIAGNOSTIC) injection 1 mg, 1 mg, Subcutaneous, PRN, Kenna Chawla MD  •  guaiFENesin (MUCINEX) 12 hr tablet 600 mg, 600 mg, Oral, Q12H, Hermann Richardson MD, 600 mg at 07/17/19 2056  •  insulin aspart (novoLOG) injection 0-7 Units, 0-7 Units, Subcutaneous, 4x Daily AC & at Bedtime, Kenna Chawla MD, 3 Units at 07/17/19 2058  •  Insulin Glargine (BASAGLAR KWIKPEN) injection pen solution pen-injector 30 Units, 30 Units, Subcutaneous, Q24H, Alfredo Charles Jr., MD, 30 Units at 07/17/19 2051  •  ipratropium-albuterol (DUO-NEB) nebulizer solution 3 mL, 3 mL, Nebulization, Q8H - RT, Kenna Chawla MD, 3 mL at 07/18/19 0643  •  metOLazone (ZAROXOLYN) tablet 2.5 mg, 2.5 mg, Oral, Daily, Sandy Caputo MD, 2.5 mg at 07/17/19 0841  •  nystatin (MYCOSTATIN) powder, , Topical, Q12H, Kenna Chawla MD  •  ondansetron (ZOFRAN) tablet 4 mg, 4 mg, Oral, Q6H PRN, Kenna Chawla MD  •  Pen [place] misc 1 each, 1 each, Does not apply, Q24H, Alfredo Charles Jr., MD, 1 each at 07/17/19 2056  •  Pharmacy to dose warfarin, , Does not apply, Continuous PRN,  Kenna Chawla MD  •  potassium chloride (MICRO-K) CR capsule 20 mEq, 20 mEq, Oral, BID With Meals, Sandy Caputo MD, 20 mEq at 07/17/19 1436  •  prenatal vitamin 27-0.8 tablet 1 tablet, 1 tablet, Oral, Daily, Kenna Chawla MD, 1 tablet at 07/17/19 0841  •  rOPINIRole (REQUIP) tablet 0.25 mg, 0.25 mg, Oral, Nightly, Kenna Chawla MD, 0.25 mg at 07/17/19 2056  •  sodium chloride 0.9 % flush 10 mL, 10 mL, Intravenous, PRN, OzorGilles MD, 10 mL at 07/16/19 1734  •  sodium chloride 0.9 % flush 3 mL, 3 mL, Intravenous, Q12H, Kenna Chawla MD, 3 mL at 07/17/19 0842  •  sodium chloride 0.9 % flush 3-10 mL, 3-10 mL, Intravenous, PRN, Kenna Chawla MD  •  traZODone (DESYREL) tablet 300 mg, 300 mg, Oral, Nightly, Kenna Chawla MD, 300 mg at 07/17/19 2056  •  vitamin C (ASCORBIC ACID) tablet 500 mg, 500 mg, Oral, Daily, Kenna Chawla MD, 500 mg at 07/17/19 0841  •  warfarin (COUMADIN) tablet 2 mg, 2 mg, Oral, Daily, Kenna Chawla MD, 2 mg at 07/17/19 1721     Diagnostic Data    Lab Results (last 24 hours)     Procedure Component Value Units Date/Time    CBC & Differential [[id number]] Collected:  07/18/19 0625    Specimen:  Blood Updated:  07/18/19 0725    Narrative:       The following orders were created for panel order CBC & Differential.  Procedure                               Abnormality         Status                     ---------                               -----------         ------                     Scan Slide[[id number]]                                                                  CBC Auto Differential[[id number]]        Abnormal            Final result                 Please view results for these tests on the individual orders.    Basic Metabolic Panel [[id number]]  (Abnormal) Collected:  07/18/19 0625    Specimen:  Blood Updated:  07/18/19 0718     Glucose 59 mg/dL      BUN 99 mg/dL      Creatinine 2.51 mg/dL      Sodium 144 mmol/L      Potassium 3.6 mmol/L       Chloride 97 mmol/L      CO2 33.0 mmol/L      Calcium 9.5 mg/dL      eGFR Non African Amer 19 mL/min/1.73      BUN/Creatinine Ratio 39.4     Anion Gap 14.0 mmol/L     Narrative:       GFR Normal >60  Chronic Kidney Disease <60  Kidney Failure <15    CBC Auto Differential [[id number]]  (Abnormal) Collected:  07/18/19 0625    Specimen:  Blood Updated:  07/18/19 0715     WBC 9.00 10*3/mm3      RBC 2.96 10*6/mm3      Hemoglobin 9.2 g/dL      Hematocrit 29.8 %      .7 fL      MCH 31.1 pg      MCHC 30.9 g/dL      RDW 21.3 %      RDW-SD 74.4 fl      MPV 12.4 fL      Platelets 237 10*3/mm3      Neutrophil % 79.5 %      Lymphocyte % 4.7 %      Monocyte % 8.4 %      Eosinophil % 6.2 %      Basophil % 0.6 %      Immature Grans % 0.6 %      Neutrophils, Absolute 7.16 10*3/mm3      Lymphocytes, Absolute 0.42 10*3/mm3      Monocytes, Absolute 0.76 10*3/mm3      Eosinophils, Absolute 0.56 10*3/mm3      Basophils, Absolute 0.05 10*3/mm3      Immature Grans, Absolute 0.05 10*3/mm3      nRBC 0.2 /100 WBC     Protime-INR, Fingerstick [[id number]]  (Abnormal) Collected:  07/18/19 0625    Specimen:  Capillary Blood Updated:  07/18/19 0711     Protime -- Seconds      Comment: Fingerstick inr        INR 2.10    Narrative:       Therapeutic range for most indications is 2.0-3.0 INR,  or 2.5-3.5 for mechanical heart valves.    POC Glucose Once [[id number]]  (Normal) Collected:  07/18/19 0647    Specimen:  Blood Updated:  07/18/19 0659     Glucose 94 mg/dL      Comment: : [id number] NICOLE DYSONFERMeter ID: [id number]       POC Glucose Once [[id number]]  (Abnormal) Collected:  07/18/19 0619    Specimen:  Blood Updated:  07/18/19 0643     Glucose 62 mg/dL      Comment: RN NotifiedOperator: [id number] NICOLE DYSONFERMeter ID: [id number]       POC Glucose Once [[id number]]  (Abnormal) Collected:  07/17/19 2008    Specimen:  Blood Updated:  07/17/19 2057     Glucose 223 mg/dL      Comment: Result Not ConfirmedOperator: [id number]  NICOLE SILVAMeter ID: [id number]       POC Glucose Once [[id number]]  (Normal) Collected:  07/17/19 1652    Specimen:  Blood Updated:  07/17/19 1707     Glucose 104 mg/dL      Comment: RN NotifiedOperator: [id number] HEATH BOYDMeter ID: [id number]       POC Glucose Once [[id number]]  (Abnormal) Collected:  07/17/19 1026    Specimen:  Blood Updated:  07/17/19 1047     Glucose 141 mg/dL      Comment: RN NotifiedOperator: [id number] HEATH BOYDMeter ID: [id number]               I reviewed the patient's new clinical results.    Assessment/Plan:     Active Hospital Problems    Diagnosis POA   • **Acute on chronic diastolic congestive heart failure (CMS/HCC) [I50.33] Yes     -last echo 6/18/19 shows EF of 46-50% with normal diastolic dysfunction, improved from echo in 2017 which showed grade 1 diastolic dysfunction  -BNP on admission was 8913, CXR shows pulmonary congestion  -on lasix 40 mg daily and metolazone 2.5 mg daily at home   -s/p lasix 80 mg IV in ER and lasix 40 mg IV once; kidney function worsening  -will consult nephrology for recommendations of further diuresis with worsening renal function   -lasix drip and metolazone per nephro  -strict I/Os  -daily weights  -1500 mL fluid restriction, sodium restriction 2g  -repeat cxr shows no significant change from admission cxr, no evidence of pneumonia or worsening pulmonary edema     • Anemia [D64.9] Yes     -H/H on admission 8.9/28.9, trend with daily CBC  -stable  -recent GI bleed requiring transfusion of 1 unit of PRBCs  -on oral iron supplement at home  -continue oral iron supplement  -transfuse for hemoglobin less than 7     • Stage 4 chronic kidney disease (CMS/HCC) [N18.4] Yes     -Baseline creatinine ~ 1.9-2.1  -trend renal function  -follows with Dr. Caputo outpatient  -avoid nephrotoxic agents  -will consult Dr. Caputo as her renal function is continuing to worsen and she is still needing diuresis  -on lasix drip and metolazone per nephro     •  SSS (sick sinus syndrome) (CMS/[facility]) [I49.5] Yes     -atrial fibrillation with rate of 85 on EKG in ER  -pacemaker in place  -continue with cardizem  -telemetry  -pharm to dose coumadin     • Essential hypertension [I10] Yes     -continue lasix, cardizem     • Chronic hypoxemic respiratory failure (CMS/[facility]) [J96.11] Yes     -on 3L O2 via nasal cannula at home  -ABG in ER shows pO2 of 60.9, pCO2 of 44.7  -will continue oxygen via nasal cannula at home rate     • Type 2 diabetes mellitus with stage 4 chronic kidney disease, with long-term current use of insulin (CMS/[facility]) [E11.22, N18.4, Z79.4] Not Applicable     -on basaglar 30 units at night  -continue, adjust as necessary     • Emphysema of lung (CMS/[facility]) [J43.9] Yes     -continue with supplemental oxygen  -continue albuterol nebs  -symbicort, duonebs      • Atrial fibrillation [I48.91] [I48.91] Yes     -EKG in ER shows atrial fibrillation with rate of 85  -pacemaker in place  -continue cardizem  -telemetry  -pharm to dose coumadin         DVT prophylaxis: warfarin  Code status is   Code Status and Medical Interventions:   Ordered at: 07/13/19 1539     Level Of Support Discussed With:    Patient     Code Status:    CPR     Medical Interventions (Level of Support Prior to Arrest):    Full       Plan for disposition: Anticipate discharge back to SNF in 2-4 days pending further diuresis      Time: < 30 mins        This document has been electronically signed by Kenna Chawla MD on July 18, 2019 8:02 AM

## 2019-07-19 LAB
ANION GAP SERPL CALCULATED.3IONS-SCNC: 11 MMOL/L (ref 5–15)
BASOPHILS # BLD AUTO: 0.03 10*3/MM3 (ref 0–0.2)
BASOPHILS NFR BLD AUTO: 0.4 % (ref 0–1.5)
BUN BLD-MCNC: 94 MG/DL (ref 8–23)
BUN/CREAT SERPL: 37.5 (ref 7–25)
CALCIUM SPEC-SCNC: 9.5 MG/DL (ref 8.6–10.5)
CHLORIDE SERPL-SCNC: 94 MMOL/L (ref 98–107)
CO2 SERPL-SCNC: 37 MMOL/L (ref 22–29)
CREAT BLD-MCNC: 2.51 MG/DL (ref 0.57–1)
DEPRECATED RDW RBC AUTO: 75.2 FL (ref 37–54)
EOSINOPHIL # BLD AUTO: 0.57 10*3/MM3 (ref 0–0.4)
EOSINOPHIL NFR BLD AUTO: 7 % (ref 0.3–6.2)
ERYTHROCYTE [DISTWIDTH] IN BLOOD BY AUTOMATED COUNT: 21.5 % (ref 12.3–15.4)
GFR SERPL CREATININE-BSD FRML MDRD: 19 ML/MIN/1.73
GLUCOSE BLD-MCNC: 71 MG/DL (ref 65–99)
GLUCOSE BLDC GLUCOMTR-MCNC: 125 MG/DL (ref 70–130)
GLUCOSE BLDC GLUCOMTR-MCNC: 173 MG/DL (ref 70–130)
GLUCOSE BLDC GLUCOMTR-MCNC: 262 MG/DL (ref 70–130)
GLUCOSE BLDC GLUCOMTR-MCNC: 87 MG/DL (ref 70–130)
HCT VFR BLD AUTO: 28.1 % (ref 34–46.6)
HGB BLD-MCNC: 8.7 G/DL (ref 12–15.9)
IMM GRANULOCYTES # BLD AUTO: 0.03 10*3/MM3 (ref 0–0.05)
IMM GRANULOCYTES NFR BLD AUTO: 0.4 % (ref 0–0.5)
INR PPP: 2.18 (ref 0.8–1.2)
LYMPHOCYTES # BLD AUTO: 0.43 10*3/MM3 (ref 0.7–3.1)
LYMPHOCYTES NFR BLD AUTO: 5.3 % (ref 19.6–45.3)
MCH RBC QN AUTO: 30.9 PG (ref 26.6–33)
MCHC RBC AUTO-ENTMCNC: 31 G/DL (ref 31.5–35.7)
MCV RBC AUTO: 99.6 FL (ref 79–97)
MONOCYTES # BLD AUTO: 0.72 10*3/MM3 (ref 0.1–0.9)
MONOCYTES NFR BLD AUTO: 8.8 % (ref 5–12)
NEUTROPHILS # BLD AUTO: 6.41 10*3/MM3 (ref 1.7–7)
NEUTROPHILS NFR BLD AUTO: 78.1 % (ref 42.7–76)
NRBC BLD AUTO-RTO: 0 /100 WBC (ref 0–0.2)
PLATELET # BLD AUTO: 273 10*3/MM3 (ref 140–450)
PMV BLD AUTO: 12.1 FL (ref 6–12)
POTASSIUM BLD-SCNC: 3.5 MMOL/L (ref 3.5–5.2)
PROTHROMBIN TIME: 24 SECONDS (ref 11.1–15.3)
RBC # BLD AUTO: 2.82 10*6/MM3 (ref 3.77–5.28)
SODIUM BLD-SCNC: 142 MMOL/L (ref 136–145)
WBC NRBC COR # BLD: 8.19 10*3/MM3 (ref 3.4–10.8)

## 2019-07-19 PROCEDURE — 94799 UNLISTED PULMONARY SVC/PX: CPT

## 2019-07-19 PROCEDURE — 85025 COMPLETE CBC W/AUTO DIFF WBC: CPT | Performed by: STUDENT IN AN ORGANIZED HEALTH CARE EDUCATION/TRAINING PROGRAM

## 2019-07-19 PROCEDURE — 97530 THERAPEUTIC ACTIVITIES: CPT

## 2019-07-19 PROCEDURE — 82962 GLUCOSE BLOOD TEST: CPT

## 2019-07-19 PROCEDURE — 97110 THERAPEUTIC EXERCISES: CPT

## 2019-07-19 PROCEDURE — 99232 SBSQ HOSP IP/OBS MODERATE 35: CPT | Performed by: STUDENT IN AN ORGANIZED HEALTH CARE EDUCATION/TRAINING PROGRAM

## 2019-07-19 PROCEDURE — 97116 GAIT TRAINING THERAPY: CPT

## 2019-07-19 PROCEDURE — 80048 BASIC METABOLIC PNL TOTAL CA: CPT | Performed by: STUDENT IN AN ORGANIZED HEALTH CARE EDUCATION/TRAINING PROGRAM

## 2019-07-19 PROCEDURE — 94760 N-INVAS EAR/PLS OXIMETRY 1: CPT

## 2019-07-19 PROCEDURE — 25010000002 FUROSEMIDE PER 20 MG: Performed by: INTERNAL MEDICINE

## 2019-07-19 PROCEDURE — 85610 PROTHROMBIN TIME: CPT | Performed by: STUDENT IN AN ORGANIZED HEALTH CARE EDUCATION/TRAINING PROGRAM

## 2019-07-19 RX ORDER — BENZONATATE 100 MG/1
100 CAPSULE ORAL 3 TIMES DAILY PRN
Status: DISCONTINUED | OUTPATIENT
Start: 2019-07-19 | End: 2019-07-22 | Stop reason: HOSPADM

## 2019-07-19 RX ADMIN — FAMOTIDINE 20 MG: 20 TABLET ORAL at 09:07

## 2019-07-19 RX ADMIN — EZETIMIBE 10 MG: 10 TABLET ORAL at 09:12

## 2019-07-19 RX ADMIN — VITAMIN D, TAB 1000IU (100/BT) 2000 UNITS: 25 TAB at 09:06

## 2019-07-19 RX ADMIN — Medication 1 EACH: at 21:41

## 2019-07-19 RX ADMIN — PRENATAL VIT W/ FE FUMARATE-FA TAB 27-0.8 MG 1 TABLET: 27-0.8 TAB at 09:07

## 2019-07-19 RX ADMIN — SODIUM CHLORIDE, PRESERVATIVE FREE 3 ML: 5 INJECTION INTRAVENOUS at 09:08

## 2019-07-19 RX ADMIN — TRAZODONE HYDROCHLORIDE 300 MG: 150 TABLET ORAL at 21:41

## 2019-07-19 RX ADMIN — ROPINIROLE HYDROCHLORIDE 0.25 MG: 0.25 TABLET, FILM COATED ORAL at 21:41

## 2019-07-19 RX ADMIN — IPRATROPIUM BROMIDE AND ALBUTEROL SULFATE 3 ML: 2.5; .5 SOLUTION RESPIRATORY (INHALATION) at 14:09

## 2019-07-19 RX ADMIN — FUROSEMIDE 10 MG/HR: 10 INJECTION, SOLUTION INTRAVENOUS at 06:20

## 2019-07-19 RX ADMIN — ALBUTEROL SULFATE 2.5 MG: 2.5 SOLUTION RESPIRATORY (INHALATION) at 19:56

## 2019-07-19 RX ADMIN — POTASSIUM CHLORIDE 20 MEQ: 750 CAPSULE, EXTENDED RELEASE ORAL at 09:07

## 2019-07-19 RX ADMIN — ACETAMINOPHEN 650 MG: 325 TABLET, FILM COATED ORAL at 21:46

## 2019-07-19 RX ADMIN — DILTIAZEM HYDROCHLORIDE 240 MG: 240 CAPSULE, COATED, EXTENDED RELEASE ORAL at 09:06

## 2019-07-19 RX ADMIN — WARFARIN SODIUM 2 MG: 2 TABLET ORAL at 17:24

## 2019-07-19 RX ADMIN — CITALOPRAM HYDROBROMIDE 20 MG: 20 TABLET ORAL at 09:07

## 2019-07-19 RX ADMIN — FUROSEMIDE 10 MG/HR: 10 INJECTION, SOLUTION INTRAVENOUS at 17:23

## 2019-07-19 RX ADMIN — POTASSIUM CHLORIDE 20 MEQ: 750 CAPSULE, EXTENDED RELEASE ORAL at 17:24

## 2019-07-19 RX ADMIN — ASPIRIN 81 MG 81 MG: 81 TABLET ORAL at 09:06

## 2019-07-19 RX ADMIN — ACETAMINOPHEN 650 MG: 325 TABLET, FILM COATED ORAL at 03:08

## 2019-07-19 RX ADMIN — INSULIN GLARGINE 30 UNITS: 100 INJECTION, SOLUTION SUBCUTANEOUS at 21:38

## 2019-07-19 RX ADMIN — NYSTATIN: 100000 POWDER TOPICAL at 09:07

## 2019-07-19 RX ADMIN — DOCUSATE SODIUM 100 MG: 100 CAPSULE, LIQUID FILLED ORAL at 09:06

## 2019-07-19 RX ADMIN — GUAIFENESIN 600 MG: 600 TABLET, EXTENDED RELEASE ORAL at 21:41

## 2019-07-19 RX ADMIN — BENZONATATE 100 MG: 100 CAPSULE ORAL at 21:41

## 2019-07-19 RX ADMIN — ACETAMINOPHEN 650 MG: 325 TABLET, FILM COATED ORAL at 09:12

## 2019-07-19 RX ADMIN — GUAIFENESIN 600 MG: 600 TABLET, EXTENDED RELEASE ORAL at 09:06

## 2019-07-19 RX ADMIN — FERROUS SULFATE TAB EC 324 MG (65 MG FE EQUIVALENT) 324 MG: 324 (65 FE) TABLET DELAYED RESPONSE at 09:07

## 2019-07-19 RX ADMIN — OXYCODONE HYDROCHLORIDE AND ACETAMINOPHEN 500 MG: 500 TABLET ORAL at 09:07

## 2019-07-19 RX ADMIN — NYSTATIN: 100000 POWDER TOPICAL at 21:41

## 2019-07-19 RX ADMIN — METOLAZONE 2.5 MG: 2.5 TABLET ORAL at 09:06

## 2019-07-19 RX ADMIN — IPRATROPIUM BROMIDE AND ALBUTEROL SULFATE 3 ML: 2.5; .5 SOLUTION RESPIRATORY (INHALATION) at 07:09

## 2019-07-19 NOTE — PROGRESS NOTES
"Anticoagulation by Pharmacy - Warfarin    Brendon Skelton is a 73 y.o.female being continued on warfarin for atrial fibrillation  Home regimen: Warfarin 4mg daily  INR Goal: 2-3  Last INR:  Lab Results   Component Value Date    INR 2.18 (H) 07/19/2019       Objective:  [Ht: 165.1 cm (65\"); Wt: 114 kg (250 lb 14.4 oz)]  Lab Results   Component Value Date    INR 2.18 (H) 07/19/2019    INR 2.10 (H) 07/18/2019    INR 2.28 (H) 07/17/2019    PROTIME 24.0 (H) 07/19/2019    PROTIME  07/18/2019      Comment:      Fingerstick inr    PROTIME 24.9 (H) 07/17/2019     Lab Results   Component Value Date    HGB 8.7 (L) 07/19/2019    HGB 9.2 (L) 07/18/2019    HGB 8.6 (L) 07/17/2019    HCT 28.1 (L) 07/19/2019    HCT 29.8 (L) 07/18/2019    HCT 27.2 (L) 07/17/2019     07/19/2019     07/18/2019     07/17/2019       Recent Warfarin Administrations       The 5 most recent administrations since 07/12/2019 are shown below each listed medication.    Warfarin Sodium         Order Dose Date Given     warfarin (COUMADIN) tablet 2 mg 2 mg 07/18/2019      2 mg 07/17/2019     warfarin (COUMADIN) tablet 1 mg 1 mg 07/16/2019                      Assessment  Interacting medications: citalopram 20mg.  Increased risk of bleeding.  Patient was on this medication with warfarin at home.  Goal INR is 2-3, current is 2.18 as of 7/19.  INR is therapeutic and is slightly trending up.  Likely due to recent doses of 1mg on 7/17 and 2mg on 7/18 after doses held previously.  H/H is stable.  Will continue to monitor trend and current dose.    Plan:  1.  Give warfarin 2 mg tablet PO @ 1800 tonight  2.  Draw a PT/INR in AM  3.  Pharmacy will continue to follow    Gilberto Moscoso ENOCH  07/19/19 10:37 AM     "

## 2019-07-19 NOTE — THERAPY TREATMENT NOTE
Acute Care - Physical Therapy Treatment Note  [facility]     Patient Name: Brendon Skelton  : 1945  MRN: [id number]  Today's Date: 2019  Onset of Illness/Injury or Date of Surgery: 19  Date of Referral to PT: 19  Referring Physician: MONICA Romero MD    Admit Date: 2019    Visit Dx:    ICD-10-CM ICD-9-CM   1. Acute on chronic congestive heart failure, unspecified heart failure type (CMS/HCC) I50.9 428.0   2. COPD exacerbation (CMS/[facility]) J44.1 491.21   3. Impaired physical mobility Z74.09 781.99   4. Impaired mobility and activities of daily living Z74.09 799.89     Patient Active Problem List   Diagnosis   • Long term current use of anticoagulant therapy   • Personal history of heart valve replacement   • Atrial fibrillation [I48.91]   • Emphysema of lung (CMS/[facility])   • On anticoagulant therapy   • Hyperlipidemia   • Diastolic heart failure (CMS/[facility])   • Depressive disorder   • COPD (chronic obstructive pulmonary disease) (CMS/[facility])   • Type 2 diabetes mellitus with stage 4 chronic kidney disease, with long-term current use of insulin (CMS/[facility])   • Myopia   • Astigmatism   • Bleeding from open wound of chest wall   • Follow-up surgery care   • Encounter for screening for malignant neoplasm of colon   • Positive colorectal cancer screening using DNA-based stool test   • Nodule of left lung   • Chronic hypoxemic respiratory failure (CMS/[facility])   • Physical deconditioning   • Heart failure with preserved left ventricular function (HFpEF) (CMS/[facility])   • Essential hypertension   • Coronary artery disease involving native coronary artery without angina pectoris   • Hx of CABG   • SSS (sick sinus syndrome) (CMS/HCC)   • Pacemaker   • Stage 4 chronic kidney disease (CMS/HCC)   • Personal history of tobacco use, presenting hazards to health   • Gastrointestinal hemorrhage   • Morbid obesity (CMS/HCC)   • Gastritis   • Colon polyp   • Coumadin toxicity   • Acute on chronic diastolic congestive  heart failure (CMS/HCC)   • Anemia       Therapy Treatment    Rehabilitation Treatment Summary     Row Name 07/19/19 1030             Treatment Time/Intention    Discipline  physical therapy assistant  -TW      Document Type  therapy note (daily note)  -TW      Subjective Information  complains of;fatigue  -TW      Mode of Treatment  physical therapy;individual therapy  -TW      Patient/Family Observations  Pt voices c/o of fatigue but eventually agreeable to tx.  -TW      Therapy Frequency (PT Clinical Impression)  daily  -TW      Patient Effort  good  -TW      Existing Precautions/Restrictions  fall;oxygen therapy device and L/min  -TW      Recorded by [TW] Reece Rocha, YOSEPH 07/19/19 1305      Row Name 07/19/19 1030             Vital Signs    Pretreatment Heart Rate (beats/min)  95  -TW      Intratreatment Heart Rate (beats/min)  102  -TW      Posttreatment Heart Rate (beats/min)  96  -TW      Pre SpO2 (%)  94  -TW      O2 Delivery Pre Treatment  supplemental O2  -TW      Intra SpO2 (%)  82  -TW      O2 Delivery Intra Treatment  supplemental O2  -TW      Post SpO2 (%)  92  -TW      O2 Delivery Post Treatment  supplemental O2  -TW      Pre Patient Position  Side Lying  -TW      Intra Patient Position  Standing  -TW      Post Patient Position  Supine  -TW      Recorded by [TW] Reece Rocha PTA 07/19/19 1305      Row Name 07/19/19 1030             Cognitive Assessment/Intervention- PT/OT    Affect/Mental Status (Cognitive)  WFL  -TW      Orientation Status (Cognition)  oriented x 4  -TW      Follows Commands (Cognition)  WFL  -TW      Cognitive Function (Cognitive)  WFL  -TW      Personal Safety Interventions  fall prevention program maintained;nonskid shoes/slippers when out of bed Pt refuses to allow gait belt to be used during t/f's and ga  -TW      Recorded by [TW] Reece Rocha PTA 07/19/19 1305      Row Name 07/19/19 1030             Bed Mobility Assessment/Treatment    Bed Mobility  Assessment/Treatment  supine-sit;sit-supine  -TW      Supine-Sit [place] (Bed Mobility)  conditional independence  -TW      Sit-Supine [place] (Bed Mobility)  conditional independence  -TW      Assistive Device (Bed Mobility)  bed rails;head of bed elevated  -TW      Recorded by [TW] Reece Rocha, PTA 07/19/19 1305      Row Name 07/19/19 1030             Transfer Assessment/Treatment    Transfer Assessment/Treatment  sit-stand transfer;stand-sit transfer  -TW      Recorded by [TW] Reece Rocha, PTA 07/19/19 1305      Row Name 07/19/19 1030             Sit-Stand Transfer    Sit-Stand [place] (Transfers)  independent  -TW      Recorded by [TW] Reece Rocha, PTA 07/19/19 1305      Row Name 07/19/19 1030             Stand-Sit Transfer    Stand-Sit [place] (Transfers)  independent  -TW      Recorded by [TW] Reece Rocha, PTA 07/19/19 1305      Row Name 07/19/19 1030             Toilet Transfer    Type (Toilet Transfer)  sit-stand;stand-sit  -TW      [place] Level (Toilet Transfer)  independent  -TW      Assistive Device (Toilet Transfer)  raised toilet seat  -TW      Recorded by [TW] Reece Rocha, PTA 07/19/19 1305      Row Name 07/19/19 1030             Gait/Stairs Assessment/Training    Gait/Stairs Assessment/Training  gait/ambulation independence  -TW      [place] Level (Gait)  supervision;independent  -TW      Assistive Device (Gait)  other (see comments) Refuses FWW.  -TW      Distance in Feet (Gait)  14ft to bathroom then 152ft and 120ft  -TW      Deviations/Abnormal Patterns (Gait)  base of support, wide  -TW      Comment (Gait/Stairs)  sats decreased to 82% and took ~3 minutes of seated rest with deep PLB to recover.  -TW      Recorded by [TW] Reece Rocha, PTA 07/19/19 1305      Row Name 07/19/19 1030             Lower Extremity Seated Therapeutic Exercise    Performed, Seated Lower Extremity (Therapeutic Exercise)  hip flexion/extension;hip  abduction/adduction;ankle dorsiflexion/plantarflexion;LAQ (long arc quad), knee extension  -TW      Exercise Type, Seated Lower Extremity (Therapeutic Exercise)  AROM (active range of motion)  -TW      Sets/Reps Detail, Seated Lower Extremity (Therapeutic Exercise)  2/10-15  -TW      Recorded by [TW] Reece Rocha PTA 07/19/19 1305      Row Name 07/19/19 1030             Positioning and Restraints    Pre-Treatment Position  in bed  -TW      Post Treatment Position  bed  -TW      In Bed  supine;call light within reach;encouraged to call for assist;exit alarm on  -TW      Recorded by [TW] Reece Rocha, YOSEPH 07/19/19 1305      Row Name 07/19/19 1030             Pain Scale: Numbers Pre/Post-Treatment    Pain Scale: Numbers, Pretreatment  0/10 - no pain  -TW      Pain Scale: Numbers, Post-Treatment  0/10 - no pain  -TW      Recorded by [TW] Reece Rocha PTA 07/19/19 1305      Row Name 07/19/19 1030             Vision Assessment/Intervention    Visual Impairment/Limitations  corrective lenses for reading  -TW      Recorded by [TW] Reece Rocha, PTA 07/19/19 1305      Row Name 07/19/19 1030             Outcome Summary/Treatment Plan (PT)    Daily Summary of Progress (PT)  progress toward functional goals is good  -TW      Barriers to Overall Progress (PT)  Pt desats quickly with t/f's and gait.  -TW      Plan for Continued Treatment (PT)  Cont  -TW      Anticipated Discharge Disposition (PT)  anticipate therapy at next level of care  -TW      Recorded by [TW] Reece Rocha [facility] 07/19/19 1305        User Key  (r) = Recorded By, (t) = Taken By, (c) = Cosigned By    Initials Name Effective Dates Discipline    TW Reece Rocha, PTA 03/07/18 -  PT               Rehab Goal Summary     Row Name 07/19/19 1030             Physical Therapy Goals    Transfer Goal Selection (PT)  transfer, PT goal 1  -TW      Gait Training Goal Selection (PT)  gait training, PT goal 1;gait training, PT goal  (free text)  -TW         Transfer Goal 1 (PT)    Activity/Assistive Device (Transfer Goal 1, PT)  sit-to-stand/stand-to-sit;bed-to-chair/chair-to-bed  -TW      [place] Level/Cues Needed (Transfer Goal 1, PT)  conditional independence  -TW      Time Frame (Transfer Goal 1, PT)  long term goal (LTG);by discharge  -TW      Barriers (Transfers Goal 1, PT)  Maintain SpO2 > 88%  -TW      Progress/Outcome (Transfer Goal 1, PT)  goal not met  -TW         Gait Training Goal 1 (PT)    Activity/Assistive Device (Gait Training Goal 1, PT)  gait (walking locomotion)  -TW      [place] Level (Gait Training Goal 1, PT)  independent  -TW      Distance (Gait Goal 1, PT)  50'x2  -TW      Time Frame (Gait Training Goal 1, PT)  long term goal (LTG);by discharge  -TW      Barriers (Gait Training Goal 1, PT)  Maintain SpO2 > 88%  -TW      Progress/Outcome (Gait Training Goal 1, PT)  goal not met  -TW         Gait Training Goal (PT)    Gait Training Goal (PT)  Tinetti fall risk, score: 27/28  -TW      Time Frame (Gait Training Goal, PT)  long term goal (LTG);by discharge  -TW      Barriers (Gait Training Goal, PT)  Decreased stepping response to perturbation.   -TW      Progress/Outcome (Gait Training Goal, PT)  goal not met  -TW        User Key  (r) = Recorded By, (t) = Taken By, (c) = Cosigned By    Initials Name Provider Type Discipline    TW Reece Rocha, PTA Physical Therapy Assistant PT          Physical Therapy Education     Title: PT OT SLP Therapies (In Progress)     Topic: Physical Therapy (Done)     Point: Mobility training (Done)     Learning Progress Summary           Patient Acceptance, E,TB, VU by LN at 7/18/2019  4:54 PM    Acceptance, E,TB, VU by LN at 7/16/2019  3:55 PM    Acceptance, E,TB, VU by LN at 7/15/2019  3:16 PM    Acceptance, E, VU by ÓSCAR at 7/14/2019 10:27 AM    Comment:  Thelma assessed: 25/28 or low fall risk.  Patient demonstrates decreased stepping response to perturbation and reaches for  UE support.  She would benefit from use of FWW but refuses.  Also refuses gait belt and bed alarm.                   Point: Home exercise program (Done)     Learning Progress Summary           Patient Acceptance, E,TB, VU by LN at 7/18/2019  4:54 PM                   Point: Body mechanics (Done)     Learning Progress Summary           Patient Acceptance, E,TB, VU by LN at 7/16/2019  3:55 PM    Acceptance, E,TB, VU by LN at 7/15/2019  3:16 PM                   Point: Precautions (Done)     Learning Progress Summary           Patient Acceptance, E,TB, VU by LN at 7/18/2019  4:54 PM    Acceptance, E,TB, VU by LN at 7/16/2019  3:55 PM    Acceptance, E,TB, VU by LN at 7/15/2019  3:16 PM                               User Key     Initials Effective Dates Name Provider Type Discipline    LN 03/07/18 -  Cecy Lara, PTA Physical Therapy Assistant PT     04/03/18 -  Titi Portillo, PT Physical Therapist PT                PT Recommendation and Plan  Anticipated Discharge Disposition (PT): anticipate therapy at next level of care  Therapy Frequency (PT Clinical Impression): daily  Outcome Summary/Treatment Plan (PT)  Daily Summary of Progress (PT): progress toward functional goals is good  Barriers to Overall Progress (PT): Pt desats quickly with t/f's and gait.  Plan for Continued Treatment (PT): Cont  Anticipated Discharge Disposition (PT): anticipate therapy at next level of care  Plan of Care Reviewed With: patient  Progress: improving  Outcome Summary: Pt slowing gait strengthe and endurance with t/f's and gait. Pt abl eto amb 152ft and 120ft with no AD and SBA/CGA but cont to desat to 82% with O2 applied at 4L. Pt will cont to require therapy upon DC.  Outcome Measures     Row Name 07/19/19 1030 07/18/19 1428 07/18/19 1050       How much help from another person do you currently need...    Turning from your back to your side while in flat bed without using bedrails?  4  -TW  4  -LN  --    Moving from lying on back  to sitting on the side of a flat bed without bedrails?  4  -TW  4  -LN  --    Moving to and from a bed to a chair (including a wheelchair)?  4  -TW  4  -LN  --    Standing up from a chair using your arms (e.g., wheelchair, bedside chair)?  4  -TW  4  -LN  --    Climbing 3-5 steps with a railing?  3  -TW  3  -LN  --    To walk in hospital room?  3  -TW  3  -LN  --    AM-PAC 6 Clicks Score (PT)  22 -TW  22  -LN  --       How much help from another is currently needed...    Putting on and taking off regular lower body clothing?  --  --  3  -KD    Bathing (including washing, rinsing, and drying)  --  --  3  -KD    Toileting (which includes using toilet bed pan or urinal)  --  --  4  -KD    Putting on and taking off regular upper body clothing  --  --  4  -KD    Taking care of personal grooming (such as brushing teeth)  --  --  4  -KD    Eating meals  --  --  4  -KD    AM-PAC 6 Clicks Score (OT)  --  --  22  -KD       Functional Assessment    Outcome Measure Options  AM-PAC 6 Clicks Basic Mobility (PT)  -TW  AM-[facility] 6 Clicks Basic Mobility (PT)  -LN  --    Row Name 07/17/19 0815 07/16/19 1515 07/16/19 1345       How much help from another person do you currently need...    Turning from your back to your side while in flat bed without using bedrails?  --  4  -LN  --    Moving from lying on back to sitting on the side of a flat bed without bedrails?  --  4  -LN  --    Moving to and from a bed to a chair (including a wheelchair)?  --  4  -LN  --    Standing up from a chair using your arms (e.g., wheelchair, bedside chair)?  --  4  -LN  --    Climbing 3-5 steps with a railing?  --  3  -LN  --    To walk in hospital room?  --  3  -LN  --    AM-PAC 6 Clicks Score (PT)  --  22  -LN  --       How much help from another is currently needed...    Putting on and taking off regular lower body clothing?  2  -KD  --  2  -BB    Bathing (including washing, rinsing, and drying)  3  -KD  --  3  -BB    Toileting (which includes using  toilet bed pan or urinal)  4  -KD  --  4  -BB    Putting on and taking off regular upper body clothing  4  -KD  --  4  -BB    Taking care of personal grooming (such as brushing teeth)  4  -KD  --  4  -BB    Eating meals  4  -KD  --  4  -BB    AM-PAC 6 Clicks Score (OT)  21  -KD  --  21  -BB       Functional Assessment    Outcome Measure Options  --  AM-PAC 6 Clicks Basic Mobility (PT)  -LN  --      User Key  (r) = Recorded By, (t) = Taken By, (c) = Cosigned By    Initials Name Provider Type    LN Cecy Lara PTA Physical Therapy Assistant    TW Reece Rocha PTA Physical Therapy Assistant    BB Selena Simpson COTA/L Occupational Therapy Assistant    KD Akua Henderson, ELDON/L Occupational Therapy Assistant         Time Calculation:   PT Charges     Row Name 07/19/19 1307             Time Calculation    Start Time  1030  -TW      Stop Time  1123  -TW      Time Calculation (min)  53 min  -TW      PT Received On  07/19/19  -TW      PT Goal Re-Cert Due Date  07/27/19  -TW         Time Calculation- PT    Total Timed Code Minutes- PT  53 minute(s)  -TW        User Key  (r) = Recorded By, (t) = Taken By, (c) = Cosigned By    Initials Name Provider Type     Reece Rocha PTA Physical Therapy Assistant        Therapy Charges for Today     Code Description Service Date Service Provider Modifiers Qty    58789292187 HC GAIT TRAINING EA 15 MIN 7/19/2019 Reece Rocha PTA GP 1    77482043546 HC PT THERAPEUTIC ACT EA 15 MIN 7/19/2019 Reece Rocha PTA GP 2    80901623047 HC PT THER PROC EA 15 MIN 7/19/2019 Reece Rocha PTA GP 1          PT G-Codes  Outcome Measure Options: AM-PAC 6 Clicks Basic Mobility (PT)  AM-PAC 6 Clicks Score (PT): 22  AM-PAC 6 Clicks Score (OT): 22  Tinetti Total Score: 25    Reece Rocha PTA  7/19/2019

## 2019-07-19 NOTE — THERAPY TREATMENT NOTE
Acute Care - Occupational Therapy Treatment Note  [facility]     Patient Name: Brendon Skelton  : 1945  MRN: [id number]  Today's Date: 2019  Onset of Illness/Injury or Date of Surgery: 19  Date of Referral to OT: 19  Referring Physician: MONICA Romero MD    Admit Date: 2019       ICD-10-CM ICD-9-CM   1. Acute on chronic congestive heart failure, unspecified heart failure type (CMS/HCC) I50.9 428.0   2. COPD exacerbation (CMS/[facility]) J44.1 491.21   3. Impaired physical mobility Z74.09 781.99   4. Impaired mobility and activities of daily living Z74.09 799.89     Patient Active Problem List   Diagnosis   • Long term current use of anticoagulant therapy   • Personal history of heart valve replacement   • Atrial fibrillation [I48.91]   • Emphysema of lung (CMS/[facility])   • On anticoagulant therapy   • Hyperlipidemia   • Diastolic heart failure (CMS/[facility])   • Depressive disorder   • COPD (chronic obstructive pulmonary disease) (CMS/[facility])   • Type 2 diabetes mellitus with stage 4 chronic kidney disease, with long-term current use of insulin (CMS/[facility])   • Myopia   • Astigmatism   • Bleeding from open wound of chest wall   • Follow-up surgery care   • Encounter for screening for malignant neoplasm of colon   • Positive colorectal cancer screening using DNA-based stool test   • Nodule of left lung   • Chronic hypoxemic respiratory failure (CMS/HCC)   • Physical deconditioning   • Heart failure with preserved left ventricular function (HFpEF) (CMS/[facility])   • Essential hypertension   • Coronary artery disease involving native coronary artery without angina pectoris   • Hx of CABG   • SSS (sick sinus syndrome) (CMS/HCC)   • Pacemaker   • Stage 4 chronic kidney disease (CMS/HCC)   • Personal history of tobacco use, presenting hazards to health   • Gastrointestinal hemorrhage   • Morbid obesity (CMS/HCC)   • Gastritis   • Colon polyp   • Coumadin toxicity   • Acute on chronic diastolic congestive heart  failure (CMS/[facility])   • Anemia     Past Medical History:   Diagnosis Date   • Acute bronchitis    • Anxiety    • Atrial fibrillation (CMS/[facility])    • C. difficile colitis    • Callosity     under metatarsal head      • Cardiac pacemaker in situ    • CHF (congestive heart failure) (CMS/[facility])    • Chronic obstructive lung disease (CMS/HCC)    • Corns and callus    • Depressive disorder    • Diabetes mellitus (CMS/[facility])    • Diastolic heart failure (CMS/[facility])    • Essential hypertension    • Foot pain    • Hyperlipidemia    • Long term current use of anticoagulant    • On anticoagulant therapy    • Pulmonary emphysema (CMS/[facility])    • Rectal hemorrhage    • Type 2 diabetes mellitus (CMS/[facility])      Past Surgical History:   Procedure Laterality Date   • AORTIC VALVE REPAIR/REPLACEMENT  1999   • CARDIAC ELECTROPHYSIOLOGY PROCEDURE N/A 3/20/2017    Procedure: PPM generator change - dual;  Surgeon: Bereket Gates MD;  Location: [facility] CATH INVASIVE LOCATION;  Service:    • CARDIAC PACEMAKER PLACEMENT  1999   • CATARACT EXTRACTION W/ INTRAOCULAR LENS IMPLANT Left 2/1/2019    Procedure: REMOVE CATARACT AND IMPLANT INTRAOCULAR LENS I;  Surgeon: Jose L Clay MD;  Location: [facility] OR;  Service: Ophthalmology   • CATARACT EXTRACTION W/ INTRAOCULAR LENS IMPLANT Right 2/8/2019    Procedure: REMOVE CATARACT AND IMPLANT  INTRAOCULAR LENS;  Surgeon: Jose L Clay MD;  Location: [facility] OR;  Service: Ophthalmology   • COLONOSCOPY N/A 6/19/2019    Procedure: COLONOSCOPY WITH CONTROL OF BLEED;  Surgeon: Alfredo Guerrero MD;  Location: [facility] ENDOSCOPY;  Service: Gastroenterology   • COLONOSCOPY N/A 6/24/2019    Procedure: COLONOSCOPY;  Surgeon: Alfredo Guerrero MD;  Location: [facility] ENDOSCOPY;  Service: Gastroenterology   • ECHO - CONVERTED  11/12/2013    There is mild to moderate left atrial enlargement EF 50-55%   • ENDOSCOPY N/A 6/19/2019    Procedure: ESOPHAGOGASTRODUODENOSCOPY WITH CONTROL OF BLEED;  Surgeon: Yolanda  Alfredo MARIN MD;  Location: [facility] ENDOSCOPY;  Service: Gastroenterology   • FOOT SURGERY  1990   • OTHER SURGICAL HISTORY  10/26/2015    PARING CORN/CALLUS    • PACEMAKER REPLACEMENT N/A 3/21/2017    Procedure: revision pacemaker pocket, evacuation hematoma, control of bleeding;  Surgeon: Polo Feliz MD;  Location: [facility] OR;  Service:    • TRANSESOPHAGEAL ECHOCARDIOGRAM (MITZY)  05/01/2014    With color flow-Mild left atrial enlargement with mild concentric LV hypertrophy with top normal aortic root size. EF 45-50%. Mild mitral regurgitation and mild aortic insufficiency and trivial amount of tricuspid regurgation   • TUBAL ABDOMINAL LIGATION         Therapy Treatment    Rehabilitation Treatment Summary     Row Name 07/19/19 1335 07/19/19 1030          Treatment Time/Intention    Discipline  occupational therapy assistant  -RC  physical therapy assistant  -TW     Document Type  therapy note (daily note)  -RC2  therapy note (daily note)  -TW     Subjective Information  complains of;fatigue  -RC2  complains of;fatigue  -TW     Mode of Treatment  occupational therapy;individual therapy  -RC2  physical therapy;individual therapy  -TW     Patient/Family Observations  --  Pt voices c/o of fatigue but eventually agreeable to tx.  -TW     Therapy Frequency (PT Clinical Impression)  --  daily  -TW     Total Minutes, Occupational Therapy Treatment  23  -RC3  --     Therapy Frequency (OT Eval)  -- 5-7 days per week   -RC2  --     Patient Effort  adequate  -RC2  good  -TW     Existing Precautions/Restrictions  --  fall;oxygen therapy device and L/min  -TW     Patient Response to Treatment  pt had just been to bathroom and had bath w/ nursing , agreeable to ue ex sitting at eob  -RC2  --     Recorded by [RC] Fiorella Broderick COLÓN/L 07/19/19 1338  [RC2] Fiorella Broderick COLÓN/L 07/19/19 1512  [RC3] Fiorella Broderick COLÓN/L 07/19/19 1518 [TW] Reece Rocha, YOSEPH 07/19/19 1305     Row Name 07/19/19 1335 07/19/19 1030           Vital Signs    Pretreatment Heart Rate (beats/min)  --  95  -TW     Intratreatment Heart Rate (beats/min)  --  102  -TW     Posttreatment Heart Rate (beats/min)  --  96  -TW     Pre SpO2 (%)  92  -RC  94  -TW     O2 Delivery Pre Treatment  supplemental O2  -RC  supplemental O2  -TW     Intra SpO2 (%)  90 decreased to 87 after ex back up to 92 w/ PLB   -RC  82  -TW     O2 Delivery Intra Treatment  supplemental O2  -RC  supplemental O2  -TW     Post SpO2 (%)  92  -RC  92  -TW     O2 Delivery Post Treatment  supplemental O2  -RC  supplemental O2  -TW     Pre Patient Position  Sitting  -RC  Side Lying  -TW     Intra Patient Position  Sitting  -RC  Standing  -TW     Post Patient Position  Sitting  -RC  Supine  -TW     Recorded by [RC] Fiorella Broderick COTA/DANDRE 07/19/19 1512 [TW] Reece Rocha, PTA 07/19/19 1305     Row Name 07/19/19 1335 07/19/19 1030          Cognitive Assessment/Intervention- PT/OT    Affect/Mental Status (Cognitive)  WFL  -RC  WFL  -TW     Orientation Status (Cognition)  oriented x 4  -RC  oriented x 4  -TW     Follows Commands (Cognition)  --  WFL  -TW     Cognitive Function (Cognitive)  --  WFL  -TW     Personal Safety Interventions  --  fall prevention program maintained;nonskid shoes/slippers when out of bed Pt refuses to allow gait belt to be used during t/f's and ga  -TW     Recorded by [RC] Fiorella Broderick COTA/DANDRE 07/19/19 1512 [TW] Reece Rocha PTA 07/19/19 1305     Row Name 07/19/19 1030             Bed Mobility Assessment/Treatment    Bed Mobility Assessment/Treatment  supine-sit;sit-supine  -TW      Supine-Sit [place] (Bed Mobility)  conditional independence  -TW      Sit-Supine [place] (Bed Mobility)  conditional independence  -TW      Assistive Device (Bed Mobility)  bed rails;head of bed elevated  -TW      Recorded by [TW] Reece Rocha PTA 07/19/19 1305      Row Name 07/19/19 1030             Transfer Assessment/Treatment    Transfer  Assessment/Treatment  sit-stand transfer;stand-sit transfer  -TW      Recorded by [TW] Reece Rocha, PTA 07/19/19 1305      Row Name 07/19/19 1030             Sit-Stand Transfer    Sit-Stand [place] (Transfers)  independent  -TW      Recorded by [TW] Reece Rocha, PTA 07/19/19 1305      Row Name 07/19/19 1030             Stand-Sit Transfer    Stand-Sit [place] (Transfers)  independent  -TW      Recorded by [TW] Reece Rocha, [facility] 07/19/19 1305      Row Name 07/19/19 1030             Toilet Transfer    Type (Toilet Transfer)  sit-stand;stand-sit  -TW      [place] Level (Toilet Transfer)  independent  -TW      Assistive Device (Toilet Transfer)  raised toilet seat  -TW      Recorded by [TW] Reece Rocha, [facility] 07/19/19 1305      Row Name 07/19/19 1030             Gait/Stairs Assessment/Training    Gait/Stairs Assessment/Training  gait/ambulation independence  -TW      [place] Level (Gait)  supervision;independent  -TW      Assistive Device (Gait)  other (see comments) Refuses FWW.  -TW      Distance in Feet (Gait)  14ft to bathroom then 152ft and 120ft  -TW      Deviations/Abnormal Patterns (Gait)  base of support, wide  -TW      Comment (Gait/Stairs)  sats decreased to 82% and took ~3 minutes of seated rest with deep PLB to recover.  -TW      Recorded by [TW] Reece Rocha, [facility] 07/19/19 1305      Row Name 07/19/19 1030             Lower Extremity Seated Therapeutic Exercise    Performed, Seated Lower Extremity (Therapeutic Exercise)  hip flexion/extension;hip abduction/adduction;ankle dorsiflexion/plantarflexion;LAQ (long arc quad), knee extension  -TW      Exercise Type, Seated Lower Extremity (Therapeutic Exercise)  AROM (active range of motion)  -TW      Sets/Reps Detail, Seated Lower Extremity (Therapeutic Exercise)  2/10-15  -TW      Recorded by [TW] Reece Rocha, PTA 07/19/19 1305      Row Name 07/19/19 1335             Therapeutic Exercise    Upper  Extremity Range of Motion (Therapeutic Exercise)  shoulder flexion/extension, bilateral;elbow flexion/extension, bilateral;shoulder internal/external rotation, bilateral  -RC      Weight/Resistance (Therapeutic Exercise)  2 pounds  -RC      Exercise Type (Therapeutic Exercise)  resistive exercises  -RC      Position (Therapeutic Exercise)  seated  -RC      Sets/Reps (Therapeutic Exercise)  10-20 reps ad ata   -RC      Expected Outcome (Therapeutic Exercise)  improve functional tolerance, self-care activity;improve performance, BADLs  -RC      Recorded by [RC] Fiorella Broderick COTA/L 07/19/19 1512      Row Name 07/19/19 1335 07/19/19 1030          Positioning and Restraints    Pre-Treatment Position  -- EOB   -RC  in bed  -TW     Post Treatment Position  -- EOB   -RC  bed  -TW     In Bed  call light within reach;encouraged to call for assist;exit alarm on exit was not disarmed during tx   -RC  supine;call light within reach;encouraged to call for assist;exit alarm on  -TW     Recorded by [RC] Fiorella Broderick COTA/DANDRE 07/19/19 1512 [TW] Reece Rocha, [facility] 07/19/19 1305     Row Name 07/19/19 1335 07/19/19 1030          Pain Scale: Numbers Pre/Post-Treatment    Pain Scale: Numbers, Pretreatment  0/10 - no pain  -RC  0/10 - no pain  -TW     Pain Scale: Numbers, Post-Treatment  0/10 - no pain  -RC  0/10 - no pain  -TW     Recorded by [RC] Fiorella Broderick COTA/DANDRE 07/19/19 1512 [TW] Reece Rocha, [facility] 07/19/19 1305     Row Name 07/19/19 1030             Vision Assessment/Intervention    Visual Impairment/Limitations  corrective lenses for reading  -TW      Recorded by [TW] Reece Rocha PTA 07/19/19 1305      Row Name 07/19/19 1335             Outcome Summary/Treatment Plan (OT)    Daily Summary of Progress (OT)  progress toward functional goals as expected  -RC      Barriers to Overall Progress (OT)  SOA   -RC      Plan for Continued Treatment (OT)  cont poc   -RC      Recorded by [RC] Fiorella Broderick  [facility] COLÓN/L 07/19/19 1512      Row Name 07/19/19 1030             Outcome Summary/Treatment Plan (PT)    Daily Summary of Progress (PT)  progress toward functional goals is good  -TW      Barriers to Overall Progress (PT)  Pt desats quickly with t/f's and gait.  -TW      Plan for Continued Treatment (PT)  Cont  -TW      Anticipated Discharge Disposition (PT)  anticipate therapy at next level of care  -TW      Recorded by [TW] Reece Rocha, PTA 07/19/19 1305        User Key  (r) = Recorded By, (t) = Taken By, (c) = Cosigned By    Initials Name Effective Dates Discipline    TW Reece Rocha, PTA 03/07/18 -  PT    RC Meggan Fiorella DORAN COLÓN/L 03/07/18 -  OT           Rehab Goal Summary     Row Name 07/19/19 1335 07/19/19 1030          Physical Therapy Goals    Transfer Goal Selection (PT)  --  transfer, PT goal 1  -TW     Gait Training Goal Selection (PT)  --  gait training, PT goal 1;gait training, PT goal (free text)  -TW        Transfer Goal 1 (PT)    Activity/Assistive Device (Transfer Goal 1, PT)  --  sit-to-stand/stand-to-sit;bed-to-chair/chair-to-bed  -TW     [place] Level/Cues Needed (Transfer Goal 1, PT)  --  conditional independence  -TW     Time Frame (Transfer Goal 1, PT)  --  long term goal (LTG);by discharge  -TW     Barriers (Transfers Goal 1, PT)  --  Maintain SpO2 > 88%  -TW     Progress/Outcome (Transfer Goal 1, PT)  --  goal not met  -TW        Gait Training Goal 1 (PT)    Activity/Assistive Device (Gait Training Goal 1, PT)  --  gait (walking locomotion)  -TW     [place] Level (Gait Training Goal 1, PT)  --  independent  -TW     Distance (Gait Goal 1, PT)  --  50'x2  -TW     Time Frame (Gait Training Goal 1, PT)  --  long term goal (LTG);by discharge  -TW     Barriers (Gait Training Goal 1, PT)  --  Maintain SpO2 > 88%  -TW     Progress/Outcome (Gait Training Goal 1, PT)  --  goal not met  -TW        Gait Training Goal (PT)    Gait Training Goal (PT)  --  Tinetti fall risk,  score: 27/28  -TW     Time Frame (Gait Training Goal, PT)  --  long term goal (LTG);by discharge  -TW     Barriers (Gait Training Goal, PT)  --  Decreased stepping response to perturbation.   -TW     Progress/Outcome (Gait Training Goal, PT)  --  goal not met  -TW        Occupational Therapy Goals    Transfer Goal Selection (OT)  transfer, OT goal 1  -RC  --     Bathing Goal Selection (OT)  bathing, OT goal 1  -RC  --     Dressing Goal Selection (OT)  dressing, OT goal 1  -RC  --     Activity Tolerance Goal Selection (OT)  activity tolerance, OT goal 1  -RC  --        Transfer Goal 1 (OT)    Activity/Assistive Device (Transfer Goal 1, OT)  transfers, all  -RC  --     [place] Level/Cues Needed (Transfer Goal 1, OT)  conditional independence  -RC  --     Time Frame (Transfer Goal 1, OT)  long term goal (LTG)  -RC  --     Progress/Outcome (Transfer Goal 1, OT)  goal met;continuing progress toward goal  -RC  --        Bathing Goal 1 (OT)    Activity/Assistive Device (Bathing Goal 1, OT)  bathing skills, all  -RC  --     [place] Level/Cues Needed (Bathing Goal 1, OT)  contact guard assist With 02 90% or above.  -RC  --     Time Frame (Bathing Goal 1, OT)  long term goal (LTG)  -RC  --     Progress/Outcomes (Bathing Goal 1, OT)  goal met;continuing progress toward goal  -RC  --        Dressing Goal 1 (OT)    Activity/Assistive Device (Dressing Goal 1, OT)  dressing skills, all  -RC  --     [place]/Cues Needed (Dressing Goal 1, OT)  contact guard assist with 02 90% or above.  -RC  --     Time Frame (Dressing Goal 1, OT)  long term goal (LTG)  -RC  --     Progress/Outcome (Dressing Goal 1, OT)  goal not met;continuing progress toward goal  -RC  --        Toileting Goal 1 (OT)    Activity/Device (Toileting Goal 1, OT)  toileting skills, all  -RC  --     [place] Level/Cues Needed (Toileting Goal 1, OT)  conditional independence  -RC  --     Time Frame (Toileting Goal 1, OT)  long term goal (LTG)  -RC  --      Progress/Outcome (Toileting Goal 1, OT)  goal met;continuing progress toward goal  -RC  --         Activity Tolerance Goal 1 (OT)    Activity Level (Endurance Goal 1, OT)  20 min activity;O2 sat >/ equal to 88%  -RC  --     Progress/Outcome (Activity Tolerance Goal 1, OT)  goal not met;continuing progress toward goal  -RC  --        Patient Education Goal (OT)    Activity (Patient Education Goal, OT)  Home safety/fall prev and EC/WS.  -RC  --     [place]/Cues/Accuracy (Memory Goal 2, OT)  demonstrates adequately;verbalizes understanding  -RC  --     Time Frame (Patient Education Goal, OT)  long term goal (LTG)  -RC  --     Progress/Outcome (Patient Education Goal, OT)  goal not met;continuing progress toward goal  -RC  --       User Key  (r) = Recorded By, (t) = Taken By, (c) = Cosigned By    Initials Name Provider Type Discipline    TW Reece Rocha, PTA Physical Therapy Assistant PT    RC Fiorella Broderick COTA/L Occupational Therapy Assistant OT        Occupational Therapy Education     Title: PT OT SLP Therapies (In Progress)     Topic: Occupational Therapy (In Progress)     Point: ADL training (Done)     Description: Instruct learner(s) on proper safety adaptation and remediation techniques during self care or transfers.   Instruct in proper use of assistive devices.    Learning Progress Summary           Patient Acceptance, E, VU,DU by FRANCES at 7/16/2019  2:53 PM                   Point: Precautions (Done)     Description: Instruct learner(s) on prescribed precautions during self-care and functional transfers.    Learning Progress Summary           Patient Acceptance, E, VU by RB at 7/15/2019 11:31 AM    Comment:  Reviewed use of non skid socks when OOB and use of gait belt for distance ambulation.    Acceptance, E, VU by RB at 7/14/2019  2:02 PM    Comment:  Edu pt on  use of non skid socks when OOB.                               User Key     Initials Effective Dates Name Provider Type Discipline     RB 06/15/16 -  Beto Carney, OT Occupational Therapist OT    BB 03/07/18 -  Selena Simpson COTA/L Occupational Therapy Assistant OT                OT Recommendation and Plan  Outcome Summary/Treatment Plan (OT)  Daily Summary of Progress (OT): progress toward functional goals as expected  Barriers to Overall Progress (OT): SOA   Plan for Continued Treatment (OT): cont poc   Therapy Frequency (OT Eval): (5-7 days per week )  Daily Summary of Progress (OT): progress toward functional goals as expected  Plan of Care Review  Plan of Care Reviewed With: patient  Plan of Care Reviewed With: patient  Outcome Summary: pt participate din ue ex after encouragement she becomes SOA w/ ue ex 2#  ata 10-20 reps c/o ex hurting bruise on l medial forearm   cont poc recommed cont OT services at d/c   Outcome Measures     Row Name 07/19/19 1335 07/19/19 1030 07/18/19 1428       How much help from another person do you currently need...    Turning from your back to your side while in flat bed without using bedrails?  --  4  -TW  4  -LN    Moving from lying on back to sitting on the side of a flat bed without bedrails?  --  4  -TW  4  -LN    Moving to and from a bed to a chair (including a wheelchair)?  --  4  -TW  4  -LN    Standing up from a chair using your arms (e.g., wheelchair, bedside chair)?  --  4  -TW  4  -LN    Climbing 3-5 steps with a railing?  --  3  -TW  3  -LN    To walk in hospital room?  --  3  -TW  3  -LN    AM-PAC 6 Clicks Score (PT)  --  22  -TW  22  -LN       How much help from another is currently needed...    Putting on and taking off regular lower body clothing?  3  -RC  --  --    Bathing (including washing, rinsing, and drying)  3  -RC  --  --    Toileting (which includes using toilet bed pan or urinal)  4  -RC  --  --    Putting on and taking off regular upper body clothing  4  -RC  --  --    Taking care of personal grooming (such as brushing teeth)  4  -RC  --  --    Eating meals  4  -RC  --  --     AM-PAC 6 Clicks Score (OT)  22  -RC  --  --       Functional Assessment    Outcome Measure Options  --  AM-PAC 6 Clicks Basic Mobility (PT)  -TW  AM-PAC 6 Clicks Basic Mobility (PT)  -LN    Row Name 07/18/19 1050 07/17/19 0815          How much help from another is currently needed...    Putting on and taking off regular lower body clothing?  3  -KD  2  -KD     Bathing (including washing, rinsing, and drying)  3  -KD  3  -KD     Toileting (which includes using toilet bed pan or urinal)  4  -KD  4  -KD     Putting on and taking off regular upper body clothing  4  -KD  4  -KD     Taking care of personal grooming (such as brushing teeth)  4  -KD  4  -KD     Eating meals  4  -KD  4  -KD     AM-PAC 6 Clicks Score (OT)  22  -KD  21  -KD       User Key  (r) = Recorded By, (t) = Taken By, (c) = Cosigned By    Initials Name Provider Type    LN Cecy Lara, PTA Physical Therapy Assistant    TW Reece Rocha, YOSEPH Physical Therapy Assistant    RC Fiorella Broderick COTA/L Occupational Therapy Assistant    KD Akua Henderson COTA/L Occupational Therapy Assistant           Time Calculation:   Time Calculation- OT     Row Name 07/19/19 1518             Time Calculation- OT    OT Start Time  1335  -RC      OT Stop Time  1400  -      OT Time Calculation (min)  25 min  -      Total Timed Code Minutes- OT  25 minute(s)  -      OT Received On  07/19/19  -        User Key  (r) = Recorded By, (t) = Taken By, (c) = Cosigned By    Initials Name Provider Type     Fiorella Broderick COTA/L Occupational Therapy Assistant        Therapy Charges for Today     Code Description Service Date Service Provider Modifiers Qty    [id number]  OT THER PROC EA 15 MIN 7/19/2019 Fiorella Broderick COTA/DANDRE GO 2               YSABEL Perez  7/19/2019

## 2019-07-19 NOTE — CONSULTS
"Adult Nutrition  Assessment    Patient Name:  Brendon Skelton  YOB: 1945  MRN: [id number]  Admit Date:  7/13/2019    Assessment Date:  7/19/2019    Comments:  Spoke with pt re her meals. She is complimentary and she is eating well per nursing records. She asked for broccoli to keep her INR in thei normal range. Nursing approved. Will honor. Pt denies any education needs saying she and her family who helps with meals are very familiar. She said she doesn't use salt. RDN staff will continue to monitor.    Reason for Assessment     Row Name 07/19/19 1544          Reason for Assessment    Reason For Assessment  follow-up protocol     Diagnosis  cardiac disease     Identified At Risk by Screening Criteria  need for education         Nutrition/Diet History     Row Name 07/19/19 1541          Nutrition/Diet History    Typical Food/Fluid Intake  pt said she knows all about the low sodium diet and follows it. doesn't know how she \"blew up like  balloon\". her family who helps with her meals knows all about the low sodium diet too.     Food Preferences  pt requested broccoli for supper to keep her inr in the normal range. nursing approved. will honor pref.           Labs/Tests/Procedures/Meds     Row Name 07/19/19 1541          Labs/Procedures/Meds    Lab Results Reviewed  reviewed, pertinent        Medications    Pertinent Medications Reviewed  reviewed, pertinent         Physical Findings     Row Name 07/19/19 1541          Physical Findings    Overall Physical Appearance  obese           Nutrition Prescription Ordered     Row Name 07/19/19 1546          Nutrition Prescription PO    Current PO Diet  Regular     Fluid Consistency  Thin     Common Modifiers  Cardiac;Consistent Carbohydrate;Renal         Evaluation of Received Nutrient/Fluid Intake     Row Name 07/19/19 1545          PO Evaluation    Number of Meals  4     % PO Intake  100               Electronically signed by:  Cecy Alvarado, " RD  07/19/19 3:48 PM

## 2019-07-19 NOTE — PLAN OF CARE
Problem: Fall Risk (Adult)  Goal: Absence of Fall  Outcome: Ongoing (interventions implemented as appropriate)      Problem: Cardiac: Heart Failure (Adult)  Goal: Signs and Symptoms of Listed Potential Problems Will be Absent, Minimized or Managed (Cardiac: Heart Failure)  Outcome: Ongoing (interventions implemented as appropriate)      Problem: Cardiac: ACS (Acute Coronary Syndrome) (Adult)  Goal: Signs and Symptoms of Listed Potential Problems Will be Absent, Minimized or Managed (Cardiac: ACS)  Outcome: Ongoing (interventions implemented as appropriate)      Problem: Patient Care Overview  Goal: Plan of Care Review  Outcome: Ongoing (interventions implemented as appropriate)   07/19/19 0337   Coping/Psychosocial   Plan of Care Reviewed With patient   Plan of Care Review   Progress improving   OTHER   Outcome Summary lasix gtt @ 10, pt continuing to diurese appropriately, standing weight shows a 5lb loss from previous day, O2 currently on 3.5L NC        Problem: Skin Injury Risk (Adult)  Goal: Skin Health and Integrity  Outcome: Ongoing (interventions implemented as appropriate)

## 2019-07-19 NOTE — PLAN OF CARE
Problem: Skin Injury Risk (Adult)  Intervention: Promote/Optimize Nutrition   07/19/19 155   Nutrition Interventions   Oral Nutrition Promotion calorie dense foods provided

## 2019-07-19 NOTE — PROGRESS NOTES
FAMILY MEDICINE DAILY PROGRESS NOTE  NAME: Brendon Skelton  : 1945  MRN: [id number]      LOS: 3 days     PROVIDER OF SERVICE: Kenna Chawla MD    Chief Complaint: Acute on chronic diastolic congestive heart failure (CMS/HCC)    Subjective:     Interval History:  History taken from: patient chart    She is back to home of 3L via NC. She is down 5 pounds overnight to weight of 250. Baseline weight appears to be 250. Nephrology consulted and has placed her on lasix drip with metolazone. She is still on lasix drip. Creatinine is stable but still above her baseline.    She is ambulating but it makes her shortness of breath worse. She otherwise has no questions or concerns. She reports overall her symptoms have improved.    Review of Systems:   Review of Systems   Constitutional: Negative for activity change, appetite change, chills, fatigue and fever.   HENT: Negative for hearing loss, sneezing, sore throat and trouble swallowing.    Eyes: Negative for visual disturbance.   Respiratory: Positive for shortness of breath (chronic, improving). Negative for cough and chest tightness.    Cardiovascular: Positive for leg swelling (improving). Negative for chest pain and palpitations.   Gastrointestinal: Positive for abdominal distention (improving, still present on right side). Negative for abdominal pain, blood in stool, constipation, diarrhea, nausea and vomiting.   Genitourinary: Negative for difficulty urinating and dysuria.   Musculoskeletal: Negative for arthralgias and back pain.   Skin: Negative for pallor and rash.   Allergic/Immunologic: Negative for environmental allergies and food allergies.   Neurological: Negative for dizziness, light-headedness, numbness and headaches.   Psychiatric/Behavioral: Negative for agitation, confusion, hallucinations and suicidal ideas.       Objective:     Vital Signs  Temp:  [97.2 °F (36.2 °C)-97.8 °F (36.6 °C)] 97.8 °F (36.6 °C)  Heart Rate:  [] 97  Resp:   [18-20] 20  BP: (130-143)/(62-87) 138/66  Body mass index is 41.75 kg/m².        07/18/19  0607 07/19/19  0404   Weight: 116 kg (255 lb 12.8 oz) 114 kg (250 lb 14.4 oz)       Physical Exam  Physical Exam   Constitutional: She is oriented to person, place, and time. She appears well-developed and well-nourished. No distress.   HENT:   Head: Normocephalic and atraumatic.   Right Ear: External ear normal.   Left Ear: External ear normal.   Neck: Normal range of motion. Neck supple.   Cardiovascular: Normal rate, regular rhythm and normal heart sounds.   Pulmonary/Chest: No respiratory distress. She has no wheezes. She has no rales.   Diminished breath sounds, pursed lip breathing; better air movement throughout all lung fields   Abdominal: Soft. Bowel sounds are normal. She exhibits no distension. There is no tenderness.   Musculoskeletal: Normal range of motion. She exhibits edema (trace pitting edema lower extremities bilaterally, improved from yesterday ).   Neurological: She is alert and oriented to person, place, and time. She has normal reflexes.   Skin: Skin is warm and dry. She is not diaphoretic. No erythema.   Psychiatric: She has a normal mood and affect. Her behavior is normal.       Medication Review    Current Facility-Administered Medications:   •  acetaminophen (TYLENOL) tablet 650 mg, 650 mg, Oral, Q4H PRN, Kenna Chawla MD, 650 mg at 07/19/19 0912  •  albuterol (PROVENTIL) nebulizer solution 0.083% 2.5 mg/3mL, 2.5 mg, Nebulization, Q4H PRN, Kenna Chawla MD  •  aspirin chewable tablet 81 mg, 81 mg, Oral, Daily, Kenna Chawla MD, 81 mg at 07/19/19 0906  •  cholecalciferol (VITAMIN D3) tablet 2,000 Units, 2,000 Units, Oral, Daily, Kenna Chawla MD, 2,000 Units at 07/19/19 0906  •  citalopram (CeleXA) tablet 20 mg, 20 mg, Oral, Daily, Kenna Chawla MD, 20 mg at 07/19/19 0907  •  dextrose (D50W) 25 g/ 50mL Intravenous Solution 25 g, 25 g, Intravenous, Q15 Min PRN, Kenna Chawla,  MD  •  dextrose (GLUTOSE) oral gel 15 g, 15 g, Oral, Q15 Min PRN, Kenna Chawla MD  •  diltiaZEM CD (CARDIZEM CD) 24 hr capsule 240 mg, 240 mg, Oral, Daily, Kenna Chawla MD, 240 mg at 07/19/19 0906  •  docusate sodium (COLACE) capsule 100 mg, 100 mg, Oral, BID PRN, Kenna Chawla MD, 100 mg at 07/19/19 0906  •  ezetimibe (ZETIA) tablet 10 mg, 10 mg, Oral, Daily, Kenna Chawla MD, 10 mg at 07/18/19 0906  •  famotidine (PEPCID) tablet 20 mg, 20 mg, Oral, Daily, Kenna Chawla MD, 20 mg at 07/19/19 0907  •  ferrous sulfate EC tablet 324 mg, 324 mg, Oral, Daily With Breakfast, Kenna Chawla MD, 324 mg at 07/19/19 0907  •  furosemide (LASIX) 100 mg in sodium chloride 0.9 % 100 mL (1 mg/mL) infusion, 10 mg/hr, Intravenous, Continuous, Sandy Caputo MD, Last Rate: 10 mL/hr at 07/19/19 0620, 10 mg/hr at 07/19/19 0620  •  glucagon (human recombinant) (GLUCAGEN DIAGNOSTIC) injection 1 mg, 1 mg, Subcutaneous, PRN, Kenna Chawla MD  •  guaiFENesin (MUCINEX) 12 hr tablet 600 mg, 600 mg, Oral, Q12H, Hermann Richardson MD, 600 mg at 07/19/19 0906  •  insulin aspart (novoLOG) injection 0-7 Units, 0-7 Units, Subcutaneous, 4x Daily AC & at Bedtime, Kenna Chawla MD, 3 Units at 07/17/19 2058  •  Insulin Glargine (BASAGLAR KWIKPEN) injection pen solution pen-injector 30 Units, 30 Units, Subcutaneous, Q24H, Alfredo Charles Jr., MD, 30 Units at 07/18/19 2015  •  ipratropium-albuterol (DUO-NEB) nebulizer solution 3 mL, 3 mL, Nebulization, Q8H - RT, Kenna Chawla MD, 3 mL at 07/19/19 0709  •  metOLazone (ZAROXOLYN) tablet 2.5 mg, 2.5 mg, Oral, Daily, Sandy Caputo MD, 2.5 mg at 07/19/19 0906  •  nystatin (MYCOSTATIN) powder, , Topical, Q12H, Kenna Chawla MD  •  ondansetron (ZOFRAN) tablet 4 mg, 4 mg, Oral, Q6H PRN, Kenna Chawla MD  •  Pen [place] misc 1 each, 1 each, Does not apply, Q24H, Alfredo Charles Jr., MD, 1 each at 07/17/19 2056  •  Pharmacy to dose warfarin, , Does not apply, Continuous  PRN, Kenna Chawla MD  •  potassium chloride (MICRO-K) CR capsule 20 mEq, 20 mEq, Oral, BID With Meals, Sandy Caputo MD, 20 mEq at 07/19/19 0907  •  prenatal vitamin 27-0.8 tablet 1 tablet, 1 tablet, Oral, Daily, Kenna Chawla MD, 1 tablet at 07/19/19 0907  •  rOPINIRole (REQUIP) tablet 0.25 mg, 0.25 mg, Oral, Nightly, Kenna Chawla MD, 0.25 mg at 07/18/19 2017  •  sodium chloride 0.9 % flush 10 mL, 10 mL, Intravenous, PRN, Gilles Griffin MD, 10 mL at 07/16/19 1734  •  sodium chloride 0.9 % flush 3 mL, 3 mL, Intravenous, Q12H, Kenna Chawla MD, 3 mL at 07/19/19 0908  •  sodium chloride 0.9 % flush 3-10 mL, 3-10 mL, Intravenous, PRN, Kenna Chawla MD  •  traZODone (DESYREL) tablet 300 mg, 300 mg, Oral, Nightly, Kenna Chawla MD, 300 mg at 07/18/19 2018  •  vitamin C (ASCORBIC ACID) tablet 500 mg, 500 mg, Oral, Daily, Kenna Chawla MD, 500 mg at 07/19/19 0907  •  warfarin (COUMADIN) tablet 2 mg, 2 mg, Oral, Daily, Kenna Chawla MD, 2 mg at 07/18/19 1805     Diagnostic Data    Lab Results (last 24 hours)     Procedure Component Value Units Date/Time    Basic Metabolic Panel [[id number]]  (Abnormal) Collected:  07/19/19 0659    Specimen:  Blood Updated:  07/19/19 0739     Glucose 71 mg/dL      BUN 94 mg/dL      Creatinine 2.51 mg/dL      Sodium 142 mmol/L      Potassium 3.5 mmol/L      Chloride 94 mmol/L      CO2 37.0 mmol/L      Calcium 9.5 mg/dL      eGFR Non African Amer 19 mL/min/1.73      BUN/Creatinine Ratio 37.5     Anion Gap 11.0 mmol/L     Narrative:       GFR Normal >60  Chronic Kidney Disease <60  Kidney Failure <15    Protime-INR [[id number]]  (Abnormal) Collected:  07/19/19 0659    Specimen:  Blood Updated:  07/19/19 0730     Protime 24.0 Seconds      INR 2.18    Narrative:       Therapeutic range for most indications is 2.0-3.0 INR,  or 2.5-3.5 for mechanical heart valves.    CBC & Differential [[id number]] Collected:  07/19/19 0659    Specimen:  Blood Updated:   07/19/19 0718    Narrative:       The following orders were created for panel order CBC & Differential.  Procedure                               Abnormality         Status                     ---------                               -----------         ------                     CBC Auto Differential[[id number]]        Abnormal            Final result                 Please view results for these tests on the individual orders.    CBC Auto Differential [[id number]]  (Abnormal) Collected:  07/19/19 0659    Specimen:  Blood Updated:  07/19/19 0718     WBC 8.19 10*3/mm3      RBC 2.82 10*6/mm3      Hemoglobin 8.7 g/dL      Hematocrit 28.1 %      MCV 99.6 fL      MCH 30.9 pg      MCHC 31.0 g/dL      RDW 21.5 %      RDW-SD 75.2 fl      MPV 12.1 fL      Platelets 273 10*3/mm3      Neutrophil % 78.1 %      Lymphocyte % 5.3 %      Monocyte % 8.8 %      Eosinophil % 7.0 %      Basophil % 0.4 %      Immature Grans % 0.4 %      Neutrophils, Absolute 6.41 10*3/mm3      Lymphocytes, Absolute 0.43 10*3/mm3      Monocytes, Absolute 0.72 10*3/mm3      Eosinophils, Absolute 0.57 10*3/mm3      Basophils, Absolute 0.03 10*3/mm3      Immature Grans, Absolute 0.03 10*3/mm3      nRBC 0.0 /100 WBC     POC Glucose Once [[id number]]  (Normal) Collected:  07/19/19 0628    Specimen:  Blood Updated:  07/19/19 0644     Glucose 87 mg/dL      Comment: RN NotifiedOperator: [id number] SAMMY ECHAVARRIAAMealisha ID: [id number]       POC Glucose Once [[id number]]  (Abnormal) Collected:  07/18/19 2012    Specimen:  Blood Updated:  07/18/19 2047     Glucose 172 mg/dL      Comment: Result Not ConfirmedOperator: [id number] NICOLE SILVAMeter ID: [id number]       POC Glucose Once [[id number]]  (Abnormal) Collected:  07/18/19 1549    Specimen:  Blood Updated:  07/18/19 1618     Glucose 143 mg/dL      Comment: RN NotifiedOperator: [id number] HEATH BOYDMeter ID: [id number]       POC Glucose Once [[id number]]  (Normal) Collected:  07/18/19 1059    Specimen:   Blood Updated:  07/18/19 1124     Glucose 120 mg/dL      Comment: RN NotifiedOperator: [id number] OCHOA Barroso ID: [id number]               I reviewed the patient's new clinical results.    Assessment/Plan:     Active Hospital Problems    Diagnosis POA   • **Acute on chronic diastolic congestive heart failure (CMS/HCC) [I50.33] Yes     -last echo 6/18/19 shows EF of 46-50% with normal diastolic dysfunction, improved from echo in 2017 which showed grade 1 diastolic dysfunction  -BNP on admission was 8913, CXR shows pulmonary congestion  -on lasix 40 mg daily and metolazone 2.5 mg daily at home   -s/p lasix 80 mg IV in ER and lasix 40 mg IV once; kidney function worsening  -will consult nephrology for recommendations of further diuresis with worsening renal function   -lasix drip and metolazone per nephro  -strict I/Os  -daily weights  -1500 mL fluid restriction, sodium restriction 2g  -repeat cxr shows no significant change from admission cxr, no evidence of pneumonia or worsening pulmonary edema     • Anemia [D64.9] Yes     -H/H on admission 8.9/28.9, trend with daily CBC  -stable  -recent GI bleed requiring transfusion of 1 unit of PRBCs  -on oral iron supplement at home  -continue oral iron supplement  -transfuse for hemoglobin less than 7     • Stage 4 chronic kidney disease (CMS/[facility]) [N18.4] Yes     -Baseline creatinine ~ 1.9-2.1  -trend renal function  -follows with Dr. Caputo outpatient  -avoid nephrotoxic agents  -will consult Dr. Caputo as her renal function is continuing to worsen and she is still needing diuresis  -on lasix drip and metolazone per nephro     • SSS (sick sinus syndrome) (CMS/[facility]) [I49.5] Yes     -atrial fibrillation with rate of 85 on EKG in ER  -pacemaker in place  -continue with cardizem  -telemetry  -pharm to dose coumadin     • Essential hypertension [I10] Yes     -continue lasix, cardizem     • Chronic hypoxemic respiratory failure (CMS/[facility]) [J96.11] Yes     -on 3L O2 via nasal  cannula at home  -ABG in ER shows pO2 of 60.9, pCO2 of 44.7  -will continue oxygen via nasal cannula at home rate     • Type 2 diabetes mellitus with stage 4 chronic kidney disease, with long-term current use of insulin (CMS/[facility]) [E11.22, N18.4, Z79.4] Not Applicable     -on basaglar 30 units at night  -continue, adjust as necessary     • Emphysema of lung (CMS/[facility]) [J43.9] Yes     -continue with supplemental oxygen  -continue albuterol nebs  -symbicort, duonebs      • Atrial fibrillation [I48.91] [I48.91] Yes     -EKG in ER shows atrial fibrillation with rate of 85  -pacemaker in place  -continue cardizem  -telemetry  -pharm to dose coumadin         DVT prophylaxis: warfarin  Code status is   Code Status and Medical Interventions:   Ordered at: 07/13/19 1539     Level Of Support Discussed With:    Patient     Code Status:    CPR     Medical Interventions (Level of Support Prior to Arrest):    Full       Plan for disposition: Anticipate discharge back to SNF in 2-4 days pending further diuresis      Time: < 30 mins        This document has been electronically signed by Kenna Chawla MD on July 19, 2019 9:52 AM

## 2019-07-19 NOTE — PLAN OF CARE
Problem: Patient Care Overview  Goal: Plan of Care Review  Outcome: Ongoing (interventions implemented as appropriate)   07/19/19 1030   Coping/Psychosocial   Plan of Care Reviewed With patient   Plan of Care Review   Progress improving   OTHER   Outcome Summary Pt slowing gait strengthe and endurance with t/f's and gait. Pt abl eto amb 152ft and 120ft with no AD and SBA/CGA but cont to desat to 82% with O2 applied at 4L. Pt will cont to require therapy upon DC.

## 2019-07-19 NOTE — PROGRESS NOTES
"[facility]  [address] [place] [address]. [address]  T - [phone]  F - [phone]     Progress Note          PATIENT  DEMOGRAPHICS   PATIENT NAME: Brendon Skelton                      PHYSICIAN: Sandy Caputo MD  : 1945  MRN: [id number]   LOS: 3 days    Patient Care Team:  Josefa Pozo APRN as PCP - General (Nurse Practitioner)  Meme Duckworth APRN as PCP - Claims Attributed  Aby Stout RN as Care Coordinator (Population Health)  Subjective   SUBJECTIVE   Breathing better wt down 5 lbs         Objective   OBJECTIVE   Vital Signs  Temp:  [97.2 °F (36.2 °C)-97.8 °F (36.6 °C)] 97.8 °F (36.6 °C)  Heart Rate:  [] 97  Resp:  [18-20] 20  BP: (130-143)/(62-87) 138/66    Flowsheet Rows      First Filed Value   Admission Height  165.1 cm (65\") Documented at 2019 1527   Admission Weight  119 kg (261 lb 6.4 oz) Documented at 2019 1527           I/O last 3 completed shifts:  In: 960 [P.O.:960]  Out: 7800 [Urine:7800]    PHYSICAL EXAM    Physical Exam   Constitutional: She is oriented to person, place, and time. She appears well-developed and well-nourished.   HENT:   Head: Normocephalic and atraumatic.   Eyes: Conjunctivae and EOM are normal. Pupils are equal, round, and reactive to light.   Neck: JVD present.   Cardiovascular: Normal rate and regular rhythm.   Pulmonary/Chest: Effort normal and breath sounds normal.   Abdominal: Soft. She exhibits distension.   Musculoskeletal: She exhibits edema.   Neurological: She is alert and oriented to person, place, and time.   Skin: Skin is warm and dry.       RESULTS   Results Review:    Results from last 7 days   Lab Units 19  0659 19  0625 19  0522  19  1227   SODIUM mmol/L 142 144 144   < > 143   POTASSIUM mmol/L 3.5 3.6 3.5   < > 3.9   CHLORIDE mmol/L 94* 97* 99   < > 101   CO2 mmol/L 37.0* 33.0* 33.0*   < > 30.0*   BUN mg/dL 94* 99* 97*   < > 79*   CREATININE mg/dL 2.51* 2.51* " 2.56*   < > 2.22*   CALCIUM mg/dL 9.5 9.5 9.0   < > 9.0   BILIRUBIN mg/dL  --   --   --   --  0.6   ALK PHOS U/L  --   --   --   --  76   ALT (SGPT) U/L  --   --   --   --  27   AST (SGOT) U/L  --   --   --   --  31   GLUCOSE mg/dL 71 59* 74   < > 128*    < > = values in this interval not displayed.       Estimated Creatinine Clearance: 25.1 mL/min (A) (by C-G formula based on SCr of 2.51 mg/dL (H)).    Results from last 7 days   Lab Units 07/17/19  0522   MAGNESIUM mg/dL 2.1             Results from last 7 days   Lab Units 07/19/19  0659 07/18/19  0625 07/17/19  0522 07/16/19  0604 07/15/19  0535   WBC 10*3/mm3 8.19 9.00 6.87 8.26 8.42   HEMOGLOBIN g/dL 8.7* 9.2* 8.6* 8.9* 8.5*   PLATELETS 10*3/mm3 273 237 281 289 316       Results from last 7 days   Lab Units 07/19/19  0659 07/18/19  0625 07/17/19  0522 07/16/19  0604 07/15/19  0535   INR  2.18* 2.10* 2.28* 3.14* 4.66*         Imaging Results (last 24 hours)     ** No results found for the last 24 hours. **           MEDICATIONS      aspirin 81 mg Oral Daily   cholecalciferol 2,000 Units Oral Daily   citalopram 20 mg Oral Daily   diltiaZEM  mg Oral Daily   ezetimibe 10 mg Oral Daily   famotidine 20 mg Oral Daily   ferrous sulfate 324 mg Oral Daily With Breakfast   guaiFENesin 600 mg Oral Q12H   insulin aspart 0-7 Units Subcutaneous 4x Daily AC & at Bedtime   Insulin Glargine 30 Units Subcutaneous Q24H   ipratropium-albuterol 3 mL Nebulization Q8H - RT   metOLazone 2.5 mg Oral Daily   nystatin  Topical Q12H   Pen Needles 1 each Does not apply Q24H   potassium chloride 20 mEq Oral BID With Meals   prenatal vitamin 27-0.8 1 tablet Oral Daily   rOPINIRole 0.25 mg Oral Nightly   sodium chloride 3 mL Intravenous Q12H   traZODone 300 mg Oral Nightly   vitamin C with bob hips 500 mg Oral Daily   warfarin 2 mg Oral Daily       furosemide (LASIX) infusion 1 mg/mL 10 mg/hr Last Rate: 10 mg/hr (07/19/19 0620)   Pharmacy to dose warfarin         Assessment/Plan    ASSESSMENT / PLAN      Acute on chronic diastolic congestive heart failure (CMS/[facility])    Atrial fibrillation [I48.91]    Emphysema of lung (CMS/[facility])    Type 2 diabetes mellitus with stage 4 chronic kidney disease, with long-term current use of insulin (CMS/[facility])    Chronic hypoxemic respiratory failure (CMS/[facility])    Essential hypertension    SSS (sick sinus syndrome) (CMS/[facility])    Stage 4 chronic kidney disease (CMS/[facility])    Anemia    1.CKD stage 4: -Baseline creatinine = 1.9-2.1. Cr stable at 2.5. The patient was switched from lasix to IV bumex and oral metolazone . Wt did not change significantly, added lasix drip and stopped bumex. Keep metolazone. On KCl , Mg normal.     -Continue lasix gtt, wt down 5 lbs since yesterday.    2. Anemia: Hemoglobin stable, continue iron     3. Acute on Chronic Diastolic heart failure : Patient is still needing diuresis.  Last echo 6/18/19 showed EF 46-50%, BNP on admission was 8913, CXR shows pulmonary congestion.   -diuretics as above  -strict I/Os  -daily weights  -1500 mL fluid restriction, sodium restriction 2g    4. Chronic hypoxemic respiratory failure     5. SSS- pacemaker in place     6. Essential hypertension     7. Type 2 Diabetes Mellitus- with stage 4 chronic kidney disease and long-term insulin use.     8. Emphysema of lung           This document has been electronically signed by Sandy Caputo MD on July 19, 2019 10:11 AM

## 2019-07-19 NOTE — PLAN OF CARE
Problem: Patient Care Overview  Goal: Plan of Care Review  Outcome: Ongoing (interventions implemented as appropriate)   07/19/19 3863   Coping/Psychosocial   Plan of Care Reviewed With patient   Plan of Care Review   Progress improving   OTHER   Outcome Summary pt participated in ue ex after encouragement she becomes SOA w/ ue ex 2# ata 10-20 reps c/o ex hurting bruise on l medial forearm cont poc recommed cont OT services at d/c

## 2019-07-20 LAB
ANION GAP SERPL CALCULATED.3IONS-SCNC: 11 MMOL/L (ref 5–15)
BASOPHILS # BLD AUTO: 0.03 10*3/MM3 (ref 0–0.2)
BASOPHILS NFR BLD AUTO: 0.3 % (ref 0–1.5)
BUN BLD-MCNC: 98 MG/DL (ref 8–23)
BUN/CREAT SERPL: 35.8 (ref 7–25)
CALCIUM SPEC-SCNC: 9.9 MG/DL (ref 8.6–10.5)
CHLORIDE SERPL-SCNC: 91 MMOL/L (ref 98–107)
CO2 SERPL-SCNC: 40 MMOL/L (ref 22–29)
CREAT BLD-MCNC: 2.74 MG/DL (ref 0.57–1)
DEPRECATED RDW RBC AUTO: 76.1 FL (ref 37–54)
EOSINOPHIL # BLD AUTO: 0.87 10*3/MM3 (ref 0–0.4)
EOSINOPHIL NFR BLD AUTO: 9.9 % (ref 0.3–6.2)
ERYTHROCYTE [DISTWIDTH] IN BLOOD BY AUTOMATED COUNT: 21.4 % (ref 12.3–15.4)
GFR SERPL CREATININE-BSD FRML MDRD: 17 ML/MIN/1.73
GLUCOSE BLD-MCNC: 97 MG/DL (ref 65–99)
GLUCOSE BLDC GLUCOMTR-MCNC: 122 MG/DL (ref 70–130)
GLUCOSE BLDC GLUCOMTR-MCNC: 163 MG/DL (ref 70–130)
GLUCOSE BLDC GLUCOMTR-MCNC: 165 MG/DL (ref 70–130)
GLUCOSE BLDC GLUCOMTR-MCNC: 62 MG/DL (ref 70–130)
HCT VFR BLD AUTO: 29.7 % (ref 34–46.6)
HGB BLD-MCNC: 9.1 G/DL (ref 12–15.9)
IMM GRANULOCYTES # BLD AUTO: 0.06 10*3/MM3 (ref 0–0.05)
IMM GRANULOCYTES NFR BLD AUTO: 0.7 % (ref 0–0.5)
INR PPP: 1.91 (ref 0.8–1.2)
LYMPHOCYTES # BLD AUTO: 0.44 10*3/MM3 (ref 0.7–3.1)
LYMPHOCYTES NFR BLD AUTO: 5 % (ref 19.6–45.3)
MCH RBC QN AUTO: 30.3 PG (ref 26.6–33)
MCHC RBC AUTO-ENTMCNC: 30.6 G/DL (ref 31.5–35.7)
MCV RBC AUTO: 99 FL (ref 79–97)
MONOCYTES # BLD AUTO: 0.83 10*3/MM3 (ref 0.1–0.9)
MONOCYTES NFR BLD AUTO: 9.4 % (ref 5–12)
NEUTROPHILS # BLD AUTO: 6.6 10*3/MM3 (ref 1.7–7)
NEUTROPHILS NFR BLD AUTO: 74.7 % (ref 42.7–76)
NRBC BLD AUTO-RTO: 0 /100 WBC (ref 0–0.2)
PLATELET # BLD AUTO: 276 10*3/MM3 (ref 140–450)
PMV BLD AUTO: 12.3 FL (ref 6–12)
POTASSIUM BLD-SCNC: 3.7 MMOL/L (ref 3.5–5.2)
PROTHROMBIN TIME: 21.6 SECONDS (ref 11.1–15.3)
RBC # BLD AUTO: 3 10*6/MM3 (ref 3.77–5.28)
SODIUM BLD-SCNC: 142 MMOL/L (ref 136–145)
WBC NRBC COR # BLD: 8.83 10*3/MM3 (ref 3.4–10.8)

## 2019-07-20 PROCEDURE — 80048 BASIC METABOLIC PNL TOTAL CA: CPT | Performed by: STUDENT IN AN ORGANIZED HEALTH CARE EDUCATION/TRAINING PROGRAM

## 2019-07-20 PROCEDURE — 97530 THERAPEUTIC ACTIVITIES: CPT

## 2019-07-20 PROCEDURE — 25010000002 FUROSEMIDE PER 20 MG: Performed by: INTERNAL MEDICINE

## 2019-07-20 PROCEDURE — 85610 PROTHROMBIN TIME: CPT | Performed by: STUDENT IN AN ORGANIZED HEALTH CARE EDUCATION/TRAINING PROGRAM

## 2019-07-20 PROCEDURE — 94799 UNLISTED PULMONARY SVC/PX: CPT

## 2019-07-20 PROCEDURE — 82962 GLUCOSE BLOOD TEST: CPT

## 2019-07-20 PROCEDURE — 97116 GAIT TRAINING THERAPY: CPT

## 2019-07-20 PROCEDURE — 99232 SBSQ HOSP IP/OBS MODERATE 35: CPT | Performed by: STUDENT IN AN ORGANIZED HEALTH CARE EDUCATION/TRAINING PROGRAM

## 2019-07-20 PROCEDURE — 85025 COMPLETE CBC W/AUTO DIFF WBC: CPT | Performed by: STUDENT IN AN ORGANIZED HEALTH CARE EDUCATION/TRAINING PROGRAM

## 2019-07-20 PROCEDURE — 94760 N-INVAS EAR/PLS OXIMETRY 1: CPT

## 2019-07-20 PROCEDURE — 97110 THERAPEUTIC EXERCISES: CPT

## 2019-07-20 RX ORDER — METOLAZONE 2.5 MG/1
2.5 TABLET ORAL EVERY OTHER DAY
Status: DISCONTINUED | OUTPATIENT
Start: 2019-07-22 | End: 2019-07-22

## 2019-07-20 RX ORDER — WARFARIN SODIUM 3 MG/1
3 TABLET ORAL
Status: DISCONTINUED | OUTPATIENT
Start: 2019-07-20 | End: 2019-07-22 | Stop reason: HOSPADM

## 2019-07-20 RX ADMIN — FUROSEMIDE 5 MG/HR: 10 INJECTION, SOLUTION INTRAVENOUS at 16:11

## 2019-07-20 RX ADMIN — EZETIMIBE 10 MG: 10 TABLET ORAL at 08:36

## 2019-07-20 RX ADMIN — DILTIAZEM HYDROCHLORIDE 240 MG: 240 CAPSULE, COATED, EXTENDED RELEASE ORAL at 08:35

## 2019-07-20 RX ADMIN — FUROSEMIDE 10 MG/HR: 10 INJECTION, SOLUTION INTRAVENOUS at 06:17

## 2019-07-20 RX ADMIN — ROPINIROLE HYDROCHLORIDE 0.25 MG: 0.25 TABLET, FILM COATED ORAL at 20:31

## 2019-07-20 RX ADMIN — BENZONATATE 100 MG: 100 CAPSULE ORAL at 18:40

## 2019-07-20 RX ADMIN — Medication 1 EACH: at 20:38

## 2019-07-20 RX ADMIN — ASPIRIN 81 MG 81 MG: 81 TABLET ORAL at 08:35

## 2019-07-20 RX ADMIN — NYSTATIN: 100000 POWDER TOPICAL at 08:36

## 2019-07-20 RX ADMIN — VITAMIN D, TAB 1000IU (100/BT) 2000 UNITS: 25 TAB at 08:35

## 2019-07-20 RX ADMIN — GUAIFENESIN 600 MG: 600 TABLET, EXTENDED RELEASE ORAL at 08:35

## 2019-07-20 RX ADMIN — METOLAZONE 2.5 MG: 2.5 TABLET ORAL at 08:35

## 2019-07-20 RX ADMIN — PRENATAL VIT W/ FE FUMARATE-FA TAB 27-0.8 MG 1 TABLET: 27-0.8 TAB at 08:35

## 2019-07-20 RX ADMIN — NYSTATIN: 100000 POWDER TOPICAL at 20:39

## 2019-07-20 RX ADMIN — OXYCODONE HYDROCHLORIDE AND ACETAMINOPHEN 500 MG: 500 TABLET ORAL at 08:35

## 2019-07-20 RX ADMIN — CITALOPRAM HYDROBROMIDE 20 MG: 20 TABLET ORAL at 08:35

## 2019-07-20 RX ADMIN — POTASSIUM CHLORIDE 20 MEQ: 750 CAPSULE, EXTENDED RELEASE ORAL at 17:45

## 2019-07-20 RX ADMIN — WARFARIN SODIUM 3 MG: 3 TABLET ORAL at 17:45

## 2019-07-20 RX ADMIN — IPRATROPIUM BROMIDE AND ALBUTEROL SULFATE 3 ML: 2.5; .5 SOLUTION RESPIRATORY (INHALATION) at 07:41

## 2019-07-20 RX ADMIN — POTASSIUM CHLORIDE 20 MEQ: 750 CAPSULE, EXTENDED RELEASE ORAL at 08:35

## 2019-07-20 RX ADMIN — IPRATROPIUM BROMIDE AND ALBUTEROL SULFATE 3 ML: 2.5; .5 SOLUTION RESPIRATORY (INHALATION) at 16:20

## 2019-07-20 RX ADMIN — FERROUS SULFATE TAB EC 324 MG (65 MG FE EQUIVALENT) 324 MG: 324 (65 FE) TABLET DELAYED RESPONSE at 08:35

## 2019-07-20 RX ADMIN — TRAZODONE HYDROCHLORIDE 300 MG: 150 TABLET ORAL at 20:31

## 2019-07-20 RX ADMIN — GUAIFENESIN 600 MG: 600 TABLET, EXTENDED RELEASE ORAL at 20:31

## 2019-07-20 RX ADMIN — INSULIN GLARGINE 30 UNITS: 100 INJECTION, SOLUTION SUBCUTANEOUS at 20:30

## 2019-07-20 RX ADMIN — FAMOTIDINE 20 MG: 20 TABLET ORAL at 08:35

## 2019-07-20 NOTE — PLAN OF CARE
Problem: Patient Care Overview  Goal: Plan of Care Review   07/20/19 1431 07/20/19 1622   Coping/Psychosocial   Plan of Care Reviewed With --  patient   Plan of Care Review   Progress improving --    OTHER   Outcome Summary --  t/f's ind,amb 297' w/o ad and sba with 3 sitting rest breaks-sats dropped to 84%-1 new goal met     Goal: Discharge Needs Assessment   07/14/19 1328   Discharge Needs Assessment   Readmission Within the Last 30 Days no previous admission in last 30 days   Concerns to be Addressed denies needs/concerns at this time   Patient/Family Anticipates Transition to home with help/services  (Patient reports that her son and daughter-in-law will be assisting her at d/c. )   Patient/Family Anticipated Services at Transition home health care   Transportation Concerns car, none   Transportation Anticipated family or friend will provide;other (see comments)  (Patient voiced that she uses [facility] for MD appointments. Patient stated that her son will pick her up at d/c. She voiced that he does not own a vehicle but will use a friend's vehicle at d/c. )   Anticipated Changes Related to Illness none   Equipment Needed After Discharge none   Discharge Facility/Level of Care Needs home with home health   Offered/Gave Vendor List yes   Patient's Choice of Community Agency(s) [facility]    Current Discharge Risk chronically ill   Disability   Equipment Currently Used at Home glucometer;nebulizer;oxygen;other (see comments);shower chair  (Patient has home/portable oxygen provided by [employer]. MICHELE confirmed with patient that home/portable oxygen is available for use at d/c. )

## 2019-07-20 NOTE — THERAPY TREATMENT NOTE
Acute Care - Physical Therapy Treatment Note  [facility]     Patient Name: Brendon Skelton  : 1945  MRN: [id number]  Today's Date: 2019  Onset of Illness/Injury or Date of Surgery: 19  Date of Referral to PT: 19  Referring Physician: MONICA Romero MD    Admit Date: 2019    Visit Dx:    ICD-10-CM ICD-9-CM   1. Acute on chronic congestive heart failure, unspecified heart failure type (CMS/HCC) I50.9 428.0   2. COPD exacerbation (CMS/[facility]) J44.1 491.21   3. Impaired physical mobility Z74.09 781.99   4. Impaired mobility and activities of daily living Z74.09 799.89     Patient Active Problem List   Diagnosis   • Long term current use of anticoagulant therapy   • Personal history of heart valve replacement   • Atrial fibrillation [I48.91]   • Emphysema of lung (CMS/[facility])   • On anticoagulant therapy   • Hyperlipidemia   • Diastolic heart failure (CMS/[facility])   • Depressive disorder   • COPD (chronic obstructive pulmonary disease) (CMS/[facility])   • Type 2 diabetes mellitus with stage 4 chronic kidney disease, with long-term current use of insulin (CMS/[facility])   • Myopia   • Astigmatism   • Bleeding from open wound of chest wall   • Follow-up surgery care   • Encounter for screening for malignant neoplasm of colon   • Positive colorectal cancer screening using DNA-based stool test   • Nodule of left lung   • Chronic hypoxemic respiratory failure (CMS/[facility])   • Physical deconditioning   • Heart failure with preserved left ventricular function (HFpEF) (CMS/[facility])   • Essential hypertension   • Coronary artery disease involving native coronary artery without angina pectoris   • Hx of CABG   • SSS (sick sinus syndrome) (CMS/HCC)   • Pacemaker   • Stage 4 chronic kidney disease (CMS/HCC)   • Personal history of tobacco use, presenting hazards to health   • Gastrointestinal hemorrhage   • Morbid obesity (CMS/HCC)   • Gastritis   • Colon polyp   • Coumadin toxicity   • Acute on chronic diastolic congestive  heart failure (CMS/HCC)   • Anemia       Therapy Treatment    Rehabilitation Treatment Summary     Row Name 07/20/19 1450 07/20/19 1050 07/20/19 1047       Treatment Time/Intention    Discipline  physical therapy assistant  -LN  --  -RC  occupational therapy assistant  -RC    Document Type  therapy note (daily note)  -LN  --  therapy note (daily note)  -    Subjective Information  no complaints  -LN  --  no complaints  -RC    Mode of Treatment  physical therapy;individual therapy  -LN  --  occupational therapy;individual therapy  -RC    Patient/Family Observations  --  --  on toilet   -RC    Therapy Frequency (PT Clinical Impression)  daily  -LN  --  --    Therapy Frequency (OT Eval)  --  --  -- 5-7 days per week   -RC    Patient Effort  good  -LN  --  --    Existing Precautions/Restrictions  fall;oxygen therapy device and L/min  -LN  --  --    Recorded by [LN] Cecy Lara, PTA 07/20/19 1621 [RC] Fiorella Broderick COLÓN/L 07/20/19 1122 [RC] Fiorella Broderick COLÓN/L 07/20/19 1122    Row Name 07/20/19 1450 07/20/19 1047          Vital Signs    Pre Systolic BP Rehab  106  -LN  --     Pre Treatment Diastolic BP  53  -LN  --     Post Systolic BP Rehab  117  -LN  --     Post Treatment Diastolic BP  68  -LN  --     Pretreatment Heart Rate (beats/min)  90  -LN  --     Intratreatment Heart Rate (beats/min)  104  -LN  --     Posttreatment Heart Rate (beats/min)  100  -LN  --     Pre SpO2 (%)  93  -LN  94  -RC     O2 Delivery Pre Treatment  supplemental O2  -LN  supplemental O2  -RC     Intra SpO2 (%)  84  -LN  90  -RC     O2 Delivery Intra Treatment  --  supplemental O2  -RC     Post SpO2 (%)  92  -LN  92  -RC     O2 Delivery Post Treatment  --  supplemental O2  -RC     Pre Patient Position  Sitting  -LN  --     Intra Patient Position  Standing  -LN  --     Post Patient Position  Supine  -LN  --     Recorded by [LN] Cecy Lara, PTA 07/20/19 1621 [RC] Fiorella Broderick COLÓN/L 07/20/19 1430     Row Name 07/20/19 1450  07/20/19 1047          Cognitive Assessment/Intervention- PT/OT    Affect/Mental Status (Cognitive)  WFL  -LN  WFL  -RC     Orientation Status (Cognition)  oriented x 4  -LN  --     Follows Commands (Cognition)  WFL  -LN  --     Cognitive Function (Cognitive)  WFL  -LN  --     Recorded by [LN] Cecy Lara, PTA 07/20/19 1621 [RC] Fiorella Broderick COLÓN/L 07/20/19 1430     Row Name 07/20/19 1450             Bed Mobility Assessment/Treatment    Bed Mobility Assessment/Treatment  supine-sit;sit-supine  -LN      Supine-Sit Coffee (Bed Mobility)  conditional independence  -LN      Sit-Supine Coffee (Bed Mobility)  conditional independence  -LN      Assistive Device (Bed Mobility)  bed rails;head of bed elevated  -LN      Recorded by [LN] Cecy Lara, PTA 07/20/19 1621      Row Name 07/20/19 1047             Functional Mobility    Functional Mobility- Ind. Level  supervision required  -RC      Functional Mobility-Distance (Feet)  15  -RC      Recorded by [RC] Fiorella Broderick COLÓN/L 07/20/19 1430      Row Name 07/20/19 1450             Transfer Assessment/Treatment    Transfer Assessment/Treatment  sit-stand transfer;stand-sit transfer  -LN      Recorded by [LN] Cecy Lara, PTA 07/20/19 1621      Row Name 07/20/19 1450             Bed-Chair Transfer    Bed-Chair [place] (Transfers)  independent  -LN      Recorded by [LN] Cecy Lara, PTA 07/20/19 1621      Row Name 07/20/19 1450             Chair-Bed Transfer    Chair-Bed [place] (Transfers)  independent  -LN      Recorded by [LN] Cecy Lara, PTA 07/20/19 1621      Row Name 07/20/19 1450 07/20/19 1047          Sit-Stand Transfer    Sit-Stand [place] (Transfers)  independent  -LN  conditional independence x3  -RC     Assistive Device (Sit-Stand Transfers)  --  walker, front-wheeled  -RC     Recorded by [LN] Cecy Lara, PTA 07/20/19 1621 [RC] Fiorella Broderick COLÓN/L 07/20/19 1430     Row Name 07/20/19 1450 07/20/19 1047           Stand-Sit Transfer    Stand-Sit [place] (Transfers)  independent  -LN  conditional independence x3  -RC     Assistive Device (Stand-Sit Transfers)  --  walker, front-wheeled  -RC     Recorded by [LN] Cecy Lara, PTA 07/20/19 1621 [RC] Fiorella Broderick COLÓN/L 07/20/19 1430     Row Name 07/20/19 1450 07/20/19 1047          Toilet Transfer    Type (Toilet Transfer)  sit-stand;stand-sit  -LN  stand pivot/stand step  -RC     [place] Level (Toilet Transfer)  independent  -LN  supervision  -RC     Assistive Device (Toilet Transfer)  raised toilet seat  -LN  raised toilet seat  -RC     Recorded by [LN] Cecy Lara PTA 07/20/19 1621 [RC] Fiorella Broderick COLÓN/L 07/20/19 1430     Row Name 07/20/19 1450             Gait/Stairs Assessment/Training    Gait/Stairs Assessment/Training  gait/ambulation independence  -LN      [place] Level (Gait)  supervision;independent  -LN      Assistive Device (Gait)  other (see comments) Refuses FWW.  -LN      Distance in Feet (Gait)  297 with 3 sitting rest breaks  -LN      Deviations/Abnormal Patterns (Gait)  base of support, wide  -LN      Recorded by [LN] Cecy Lara, PTA 07/20/19 1621      Row Name 07/20/19 1047             Toileting Assessment/Training    [place] Level (Toileting)  perform perineal hygiene;dependent (less than 25% patient effort)  -RC      Toileting Position  supported standing  -RC      Comment (Toileting)  pt reports she is unable to reach buttocks for wipe   -RC      Recorded by [RC] Fiorella Broderick COLÓN/L 07/20/19 1430      Row Name 07/20/19 1450             Lower Extremity Seated Therapeutic Exercise    Performed, Seated Lower Extremity (Therapeutic Exercise)  hip flexion/extension;hip abduction/adduction;LAQ (long arc quad), knee extension;ankle dorsiflexion/plantarflexion  -LN      Exercise Type, Seated Lower Extremity (Therapeutic Exercise)  AROM (active range of motion)  -LN      Sets/Reps Detail, Seated Lower Extremity  (Therapeutic Exercise)  1/20  -LN      Recorded by [LN] Cecy Lara, PTA 07/20/19 1621      Row Name 07/20/19 1450             Positioning and Restraints    Post Treatment Position  bed  -LN      In Bed  supine;call light within reach;encouraged to call for assist  -LN      Recorded by [LN] Cecy Lara, PTA 07/20/19 1621      Row Name 07/20/19 1450 07/20/19 1047          Pain Scale: Numbers Pre/Post-Treatment    Pain Scale: Numbers, Pretreatment  0/10 - no pain  -LN  0/10 - no pain  -RC     Pain Scale: Numbers, Post-Treatment  0/10 - no pain  -LN  0/10 - no pain  -RC     Recorded by [LN] Cecy Lara, PTA 07/20/19 1621 [RC] Fiorella Broderick COLÓN/L 07/20/19 1430     Row Name 07/20/19 1450             Vision Assessment/Intervention    Visual Impairment/Limitations  corrective lenses for reading  -LN      Recorded by [LN] Cecy Lara, PTA 07/20/19 1621      Row Name 07/20/19 1450             Plan of Care Review    Plan of Care Reviewed With  patient  -LN      Recorded by [LN] Cecy Lara, PTA 07/20/19 1621      Row Name 07/20/19 1047             Outcome Summary/Treatment Plan (OT)    Daily Summary of Progress (OT)  progress toward functional goals as expected  -RC      Plan for Continued Treatment (OT)  cont poc   -RC      Recorded by [RC] Fiorella Broderick COLÓN/L 07/20/19 1430      Row Name 07/20/19 1450             Outcome Summary/Treatment Plan (PT)    Plan for Continued Treatment (PT)  cont  -LN      Anticipated Discharge Disposition (PT)  anticipate therapy at next level of care  -LN      Recorded by [LN] Cecy Lara, PTA 07/20/19 1621        User Key  (r) = Recorded By, (t) = Taken By, (c) = Cosigned By    Initials Name Effective Dates Discipline    LN Cecy Lara, PTA 03/07/18 -  PT    RC Fiorella Broderick, COLÓN/L 03/07/18 -  OT               Rehab Goal Summary     Row Name 07/20/19 1450 07/20/19 1047          Physical Therapy Goals    Transfer Goal Selection (PT)  transfer, PT goal 1  -LN  --      Gait Training Goal Selection (PT)  gait training, PT goal 1;gait training, PT goal (free text)  -LN  --        Transfer Goal 1 (PT)    Activity/Assistive Device (Transfer Goal 1, PT)  sit-to-stand/stand-to-sit;bed-to-chair/chair-to-bed  -LN  --     [place] Level/Cues Needed (Transfer Goal 1, PT)  conditional independence  -LN  --     Time Frame (Transfer Goal 1, PT)  long term goal (LTG);by discharge  -LN  --     Barriers (Transfers Goal 1, PT)  Maintain SpO2 > 88%  -LN  --     Progress/Outcome (Transfer Goal 1, PT)  goal met  -LN  --        Gait Training Goal 1 (PT)    Activity/Assistive Device (Gait Training Goal 1, PT)  gait (walking locomotion)  -LN  --     [place] Level (Gait Training Goal 1, PT)  independent  -LN  --     Distance (Gait Goal 1, PT)  50'x2  -LN  --     Time Frame (Gait Training Goal 1, PT)  long term goal (LTG);by discharge  -LN  --     Barriers (Gait Training Goal 1, PT)  Maintain SpO2 > 88%  -LN  --     Progress/Outcome (Gait Training Goal 1, PT)  goal not met  -LN  --        Gait Training Goal (PT)    Gait Training Goal (PT)  Tinetti fall risk, score: 27/28  -LN  --     Time Frame (Gait Training Goal, PT)  long term goal (LTG);by discharge  -LN  --     Barriers (Gait Training Goal, PT)  Decreased stepping response to perturbation.   -LN  --     Progress/Outcome (Gait Training Goal, PT)  goal not met  -LN  --        Occupational Therapy Goals    Transfer Goal Selection (OT)  --  transfer, OT goal 1  -RC     Bathing Goal Selection (OT)  --  bathing, OT goal 1  -RC     Dressing Goal Selection (OT)  --  dressing, OT goal 1  -RC     Activity Tolerance Goal Selection (OT)  --  activity tolerance, OT goal 1  -RC        Transfer Goal 1 (OT)    Activity/Assistive Device (Transfer Goal 1, OT)  --  transfers, all  -RC     [place] Level/Cues Needed (Transfer Goal 1, OT)  --  conditional independence  -RC     Time Frame (Transfer Goal 1, OT)  --  long term goal (LTG)  -RC      Progress/Outcome (Transfer Goal 1, OT)  --  goal met;continuing progress toward goal  -RC        Bathing Goal 1 (OT)    Activity/Assistive Device (Bathing Goal 1, OT)  --  bathing skills, all  -RC     [place] Level/Cues Needed (Bathing Goal 1, OT)  --  contact guard assist With 02 90% or above.  -RC     Time Frame (Bathing Goal 1, OT)  --  long term goal (LTG)  -RC     Progress/Outcomes (Bathing Goal 1, OT)  --  goal met;continuing progress toward goal  -RC        Dressing Goal 1 (OT)    Activity/Assistive Device (Dressing Goal 1, OT)  --  dressing skills, all  -RC     [place]/Cues Needed (Dressing Goal 1, OT)  --  contact guard assist with 02 90% or above.  -RC     Time Frame (Dressing Goal 1, OT)  --  long term goal (LTG)  -RC     Progress/Outcome (Dressing Goal 1, OT)  --  goal not met;continuing progress toward goal  -RC        Toileting Goal 1 (OT)    Activity/Device (Toileting Goal 1, OT)  --  toileting skills, all  -RC     [place] Level/Cues Needed (Toileting Goal 1, OT)  --  conditional independence  -RC     Time Frame (Toileting Goal 1, OT)  --  long term goal (LTG)  -RC     Progress/Outcome (Toileting Goal 1, OT)  --  goal met;continuing progress toward goal  -RC         Activity Tolerance Goal 1 (OT)    Activity Level (Endurance Goal 1, OT)  --  20 min activity;O2 sat >/ equal to 88%  -RC     Progress/Outcome (Activity Tolerance Goal 1, OT)  --  goal not met;continuing progress toward goal  -RC        Patient Education Goal (OT)    Activity (Patient Education Goal, OT)  --  Home safety/fall prev and EC/WS.  -RC     [place]/Cues/Accuracy (Memory Goal 2, OT)  --  demonstrates adequately;verbalizes understanding  -RC     Time Frame (Patient Education Goal, OT)  --  long term goal (LTG)  -RC     Progress/Outcome (Patient Education Goal, OT)  --  goal not met;continuing progress toward goal  -RC       User Key  (r) = Recorded By, (t) = Taken By, (c) = Cosigned By    Initials Name Provider  Type Discipline    LN Cecy Lara PTA Physical Therapy Assistant PT    Fiorella Kennedy, COLÓN/L Occupational Therapy Assistant OT          Physical Therapy Education     Title: PT OT SLP Therapies (In Progress)     Topic: Physical Therapy (Done)     Point: Mobility training (Done)     Learning Progress Summary           Patient Acceptance, E,TB, VU by STEFANIE at 7/20/2019  4:22 PM    Acceptance, E,TB, VU by LN at 7/18/2019  4:54 PM    Acceptance, E,TB, VU by LN at 7/16/2019  3:55 PM    Acceptance, E,TB, VU by LN at 7/15/2019  3:16 PM    Acceptance, E, VU by ÓSCAR at 7/14/2019 10:27 AM    Comment:  Thelma assessed: 25/28 or low fall risk.  Patient demonstrates decreased stepping response to perturbation and reaches for UE support.  She would benefit from use of FWW but refuses.  Also refuses gait belt and bed alarm.                   Point: Home exercise program (Done)     Learning Progress Summary           Patient Acceptance, E,TB, VU by STEFANIE at 7/20/2019  4:22 PM    Acceptance, E,TB, VU by LN at 7/18/2019  4:54 PM                   Point: Body mechanics (Done)     Learning Progress Summary           Patient Acceptance, E,TB, VU by LN at 7/20/2019  4:22 PM    Acceptance, E,TB, VU by STEFANIE at 7/16/2019  3:55 PM    Acceptance, E,TB, VU by LN at 7/15/2019  3:16 PM                   Point: Precautions (Done)     Learning Progress Summary           Patient Acceptance, E,TB, VU by STEFANIE at 7/20/2019  4:22 PM    Acceptance, E,TB, VU by STEFANIE at 7/18/2019  4:54 PM    Acceptance, E,TB, VU by LN at 7/16/2019  3:55 PM    Acceptance, E,TB, VU by LN at 7/15/2019  3:16 PM                               User Key     Initials Effective Dates Name Provider Type Discipline    STEFANIE 03/07/18 -  Cecy Lara PTA Physical Therapy Assistant PT    CZ 04/03/18 -  Titi Portillo, PT Physical Therapist PT                PT Recommendation and Plan  Anticipated Discharge Disposition (PT): anticipate therapy at next level of care  Therapy Frequency (PT  Clinical Impression): daily  Outcome Summary/Treatment Plan (PT)  Plan for Continued Treatment (PT): cont  Anticipated Discharge Disposition (PT): anticipate therapy at next level of care  Plan of Care Reviewed With: patient  Outcome Summary: t/f's ind,amb 297' w/o ad and sba with 3 sitting rest breaks-sats dropped to 84%-1 new goal met  Outcome Measures     Row Name 07/20/19 1450 07/20/19 1047 07/19/19 1335       How much help from another person do you currently need...    Turning from your back to your side while in flat bed without using bedrails?  4  -LN  --  --    Moving from lying on back to sitting on the side of a flat bed without bedrails?  4  -LN  --  --    Moving to and from a bed to a chair (including a wheelchair)?  4  -LN  --  --    Standing up from a chair using your arms (e.g., wheelchair, bedside chair)?  4  -LN  --  --    Climbing 3-5 steps with a railing?  3  -LN  --  --    To walk in hospital room?  3  -LN  --  --    AM-PAC 6 Clicks Score (PT)  22  -LN  --  --       How much help from another is currently needed...    Putting on and taking off regular lower body clothing?  --  3  -RC  3  -RC    Bathing (including washing, rinsing, and drying)  --  3  -RC  3  -RC    Toileting (which includes using toilet bed pan or urinal)  --  4  -RC  4  -RC    Putting on and taking off regular upper body clothing  --  4  -RC  4  -RC    Taking care of personal grooming (such as brushing teeth)  --  4  -RC  4  -RC    Eating meals  --  4  -RC  4  -RC    AM-PAC 6 Clicks Score (OT)  --  22  -RC  22  -RC       Functional Assessment    Outcome Measure Options  AM-PAC 6 Clicks Basic Mobility (PT)  -LN  --  --    Row Name 07/19/19 1030 07/18/19 1428 07/18/19 1050       How much help from another person do you currently need...    Turning from your back to your side while in flat bed without using bedrails?  4  -TW  4  -LN  --    Moving from lying on back to sitting on the side of a flat bed without bedrails?  4  -TW  4   -LN  --    Moving to and from a bed to a chair (including a wheelchair)?  4  -TW  4  -LN  --    Standing up from a chair using your arms (e.g., wheelchair, bedside chair)?  4  -TW  4  -LN  --    Climbing 3-5 steps with a railing?  3  -TW  3  -LN  --    To walk in hospital room?  3  -TW  3  -LN  --    AM-PAC 6 Clicks Score (PT)  22  -TW  22  -LN  --       How much help from another is currently needed...    Putting on and taking off regular lower body clothing?  --  --  3  -KD    Bathing (including washing, rinsing, and drying)  --  --  3  -KD    Toileting (which includes using toilet bed pan or urinal)  --  --  4  -KD    Putting on and taking off regular upper body clothing  --  --  4  -KD    Taking care of personal grooming (such as brushing teeth)  --  --  4  -KD    Eating meals  --  --  4  -KD    AM-PAC 6 Clicks Score (OT)  --  --  22  -KD       Functional Assessment    Outcome Measure Options  AM-PAC 6 Clicks Basic Mobility (PT)  -TW  AM-PAC 6 Clicks Basic Mobility (PT)  -LN  --      User Key  (r) = Recorded By, (t) = Taken By, (c) = Cosigned By    Initials Name Provider Type    Cecy Loyola PTA Physical Therapy Assistant    Reece Rouse PTA Physical Therapy Assistant    RC Fiorella Broderick, COLÓN/L Occupational Therapy Assistant    KD Akua Henderson, COLÓN/L Occupational Therapy Assistant         Time Calculation:   PT Charges     Row Name 07/20/19 1623             Time Calculation    Start Time  1450  -LN      Stop Time  1550  -LN      Time Calculation (min)  60 min  -LN      PT Received On  07/20/19  -LN         Time Calculation- PT    Total Timed Code Minutes- PT  60 minute(s)  -LN        User Key  (r) = Recorded By, (t) = Taken By, (c) = Cosigned By    Initials Name Provider Type    Cecy Loyola PTA Physical Therapy Assistant        Therapy Charges for Today     Code Description Service Date Service Provider Modifiers Qty    77917793226  PT THERAPEUTIC ACT EA 15 MIN 7/20/2019 Jane  Cecy VU, YOSEPH GP 2    22069325569 HC GAIT TRAINING EA 15 MIN 7/20/2019 Cecy Lara, PTA GP 1    20850795219 HC PT THER PROC EA 15 MIN 7/20/2019 Cecy Lara, PTA GP 1          PT G-Codes  Outcome Measure Options: AM-PAC 6 Clicks Basic Mobility (PT)  AM-PAC 6 Clicks Score (PT): 22  AM-PAC 6 Clicks Score (OT): 22  Tinetti Total Score: 25    Cecy Lara PTA  7/20/2019

## 2019-07-20 NOTE — PLAN OF CARE
Problem: Fall Risk (Adult)  Goal: Absence of Fall  Outcome: Ongoing (interventions implemented as appropriate)      Problem: Cardiac: Heart Failure (Adult)  Goal: Signs and Symptoms of Listed Potential Problems Will be Absent, Minimized or Managed (Cardiac: Heart Failure)  Outcome: Ongoing (interventions implemented as appropriate)      Problem: Cardiac: ACS (Acute Coronary Syndrome) (Adult)  Goal: Signs and Symptoms of Listed Potential Problems Will be Absent, Minimized or Managed (Cardiac: ACS)  Outcome: Ongoing (interventions implemented as appropriate)      Problem: Patient Care Overview  Goal: Individualization and Mutuality  Outcome: Ongoing (interventions implemented as appropriate)    Goal: Discharge Needs Assessment  Outcome: Ongoing (interventions implemented as appropriate)   07/14/19 1328   Discharge Needs Assessment   Readmission Within the Last 30 Days no previous admission in last 30 days   Concerns to be Addressed denies needs/concerns at this time   Patient/Family Anticipates Transition to home with help/services  (Patient reports that her son and daughter-in-law will be assisting her at d/c. )   Patient/Family Anticipated Services at Transition home health care   Transportation Concerns car, none   Transportation Anticipated family or friend will provide;other (see comments)  (Patient voiced that she uses [facility] for MD appointments. Patient stated that her son will pick her up at d/c. She voiced that he does not own a vehicle but will use a friend's vehicle at d/c. )   Anticipated Changes Related to Illness none   Equipment Needed After Discharge none   Discharge Facility/Level of Care Needs home with home health   Offered/Gave Vendor List yes   Patient's Choice of Community Agency(s) [facility]    Current Discharge Risk chronically ill   Disability   Equipment Currently Used at Home glucometer;nebulizer;oxygen;other (see comments);shower chair  (Patient has home/portable oxygen provided by  [facility]. ROSALVAK confirmed with patient that home/portable oxygen is available for use at d/c. )       Problem: Skin Injury Risk (Adult)  Goal: Skin Health and Integrity  Outcome: Ongoing (interventions implemented as appropriate)

## 2019-07-20 NOTE — PLAN OF CARE
Problem: Patient Care Overview  Goal: Plan of Care Review  Outcome: Ongoing (interventions implemented as appropriate)   07/20/19 0519   Coping/Psychosocial   Plan of Care Reviewed With patient   Plan of Care Review   Progress no change

## 2019-07-20 NOTE — PROGRESS NOTES
"Anticoagulation by Pharmacy - Warfarin    Brendon Skelton is a 73 y.o.female  [Ht: 165.1 cm (65\"); Wt: 112 kg (247 lb 3.2 oz)] on Warfarin 2 mg for indication of atrial fibrillation.    Goal INR: 2-3  Home regimen: 4 mg nightly (resulted in elevated INR)    Today's INR:   Lab Results   Component Value Date    INR 1.91 (H) 07/20/2019         Lab Results   Component Value Date    INR 1.91 (H) 07/20/2019    INR 2.18 (H) 07/19/2019    INR 2.10 (H) 07/18/2019    PROTIME 21.6 (H) 07/20/2019    PROTIME 24.0 (H) 07/19/2019    PROTIME  07/18/2019      Comment:      Fingerstick inr     Lab Results   Component Value Date    HGB 9.1 (L) 07/20/2019    HGB 8.7 (L) 07/19/2019    HGB 9.2 (L) 07/18/2019     Lab Results   Component Value Date    HCT 29.7 (L) 07/20/2019    HCT 28.1 (L) 07/19/2019    HCT 29.8 (L) 07/18/2019     Lab Results   Component Value Date     07/20/2019     07/19/2019     07/18/2019       Recent Warfarin Administrations       The 5 most recent administrations since 07/13/2019 are shown below each listed medication.    Warfarin Sodium         Order Dose Date Given     warfarin (COUMADIN) tablet 2 mg 2 mg 07/19/2019      2 mg 07/18/2019      2 mg 07/17/2019     warfarin (COUMADIN) tablet 1 mg 1 mg 07/16/2019                      Assessment/Plan:  Reviewed above labs. INR is 1.91.  INR is below goal and trending down.   H/H trending up.  Patient did receive dose of warfarin last night.   Interacting medications include aspirin and citalopram.  Will increase current dose of warfarin to 3 mg.   Rx will continue to follow and adjust dose accordingly.        Sarah Dennis [facility]  07/20/19 10:06 AM       "

## 2019-07-20 NOTE — PROGRESS NOTES
"[facility]  [address] [place] [address]. [id number]  T - [phone]  F - [phone]     Progress Note          PATIENT  DEMOGRAPHICS   PATIENT NAME: Brendon Skelton                      PHYSICIAN: Sandy Caputo MD  : 1945  MRN: [id number]   LOS: 4 days    Patient Care Team:  Josefa Pozo APRN as PCP - General (Nurse Practitioner)  Meme Duckworth APRN as PCP - Claims Attributed  Aby Stout RN as Care Coordinator (Population Health)  Subjective   SUBJECTIVE   Breathing better feels better today       Objective   OBJECTIVE   Vital Signs  Temp:  [97.4 °F (36.3 °C)-97.8 °F (36.6 °C)] 97.8 °F (36.6 °C)  Heart Rate:  [] 90  Resp:  [18-20] 18  BP: ()/(52-81) 113/73    Flowsheet Rows      First Filed Value   Admission Height  165.1 cm (65\") Documented at 2019 1527   Admission Weight  119 kg (261 lb 6.4 oz) Documented at 2019 1527           I/O last 3 completed shifts:  In: 360 [P.O.:360]  Out: 3850 [Urine:3850]    PHYSICAL EXAM    Physical Exam   Constitutional: She is oriented to person, place, and time. She appears well-developed and well-nourished.   HENT:   Head: Normocephalic and atraumatic.   Eyes: Conjunctivae and EOM are normal. Pupils are equal, round, and reactive to light.   Neck: JVD present.   Cardiovascular: Normal rate and regular rhythm.   Pulmonary/Chest: Effort normal and breath sounds normal.   Abdominal: Soft. She exhibits distension.   Musculoskeletal: She exhibits edema.   Neurological: She is alert and oriented to person, place, and time.   Skin: Skin is warm and dry.       RESULTS   Results Review:    Results from last 7 days   Lab Units 19  0730 19  0659 19  0625  19  1227   SODIUM mmol/L 142 142 144   < > 143   POTASSIUM mmol/L 3.7 3.5 3.6   < > 3.9   CHLORIDE mmol/L 91* 94* 97*   < > 101   CO2 mmol/L 40.0* 37.0* 33.0*   < > 30.0*   BUN mg/dL 98* 94* 99*   < > 79*   CREATININE mg/dL 2.74* 2.51* " 2.51*   < > 2.22*   CALCIUM mg/dL 9.9 9.5 9.5   < > 9.0   BILIRUBIN mg/dL  --   --   --   --  0.6   ALK PHOS U/L  --   --   --   --  76   ALT (SGPT) U/L  --   --   --   --  27   AST (SGOT) U/L  --   --   --   --  31   GLUCOSE mg/dL 97 71 59*   < > 128*    < > = values in this interval not displayed.       Estimated Creatinine Clearance: 22.8 mL/min (A) (by C-G formula based on SCr of 2.74 mg/dL (H)).    Results from last 7 days   Lab Units 07/17/19  0522   MAGNESIUM mg/dL 2.1             Results from last 7 days   Lab Units 07/20/19  0730 07/19/19  0659 07/18/19  0625 07/17/19  0522 07/16/19  0604   WBC 10*3/mm3 8.83 8.19 9.00 6.87 8.26   HEMOGLOBIN g/dL 9.1* 8.7* 9.2* 8.6* 8.9*   PLATELETS 10*3/mm3 276 273 237 281 289       Results from last 7 days   Lab Units 07/20/19  0730 07/19/19  0659 07/18/19  0625 07/17/19  0522 07/16/19  0604   INR  1.91* 2.18* 2.10* 2.28* 3.14*         Imaging Results (last 24 hours)     ** No results found for the last 24 hours. **           MEDICATIONS      aspirin 81 mg Oral Daily   cholecalciferol 2,000 Units Oral Daily   citalopram 20 mg Oral Daily   diltiaZEM  mg Oral Daily   ezetimibe 10 mg Oral Daily   famotidine 20 mg Oral Daily   ferrous sulfate 324 mg Oral Daily With Breakfast   guaiFENesin 600 mg Oral Q12H   insulin aspart 0-7 Units Subcutaneous 4x Daily AC & at Bedtime   Insulin Glargine 30 Units Subcutaneous Q24H   ipratropium-albuterol 3 mL Nebulization Q8H - RT   metOLazone 2.5 mg Oral Daily   nystatin  Topical Q12H   Pen Needles 1 each Does not apply Q24H   potassium chloride 20 mEq Oral BID With Meals   prenatal vitamin 27-0.8 1 tablet Oral Daily   rOPINIRole 0.25 mg Oral Nightly   sodium chloride 3 mL Intravenous Q12H   traZODone 300 mg Oral Nightly   vitamin C with bob hips 500 mg Oral Daily   warfarin 2 mg Oral Daily       furosemide (LASIX) infusion 1 mg/mL 10 mg/hr Last Rate: 10 mg/hr (07/20/19 0617)   Pharmacy to dose warfarin         Assessment/Plan    ASSESSMENT / PLAN      Acute on chronic diastolic congestive heart failure (CMS/[facility])    Atrial fibrillation [I48.91]    Emphysema of lung (CMS/[facility])    Type 2 diabetes mellitus with stage 4 chronic kidney disease, with long-term current use of insulin (CMS/[facility])    Chronic hypoxemic respiratory failure (CMS/[facility])    Essential hypertension    SSS (sick sinus syndrome) (CMS/[facility])    Stage 4 chronic kidney disease (CMS/[facility])    Anemia    1.CKD stage 4: -Baseline creatinine = 1.9-2.1. Cr now 2.7 and contraction alkalosis - lower lasix drip to 5 mg /hr. The patient was switched from lasix to IV bumex and oral metolazone . Wt did not change significantly and therefore we have added lasix drip and stopped bumex. Keep metolazone. On KCl , Mg normal.     2. Anemia: Hemoglobin stable, continue iron     3. Acute on Chronic Diastolic heart failure : Patient is still needing diuresis.  Last echo 6/18/19 showed EF 46-50%, BNP on admission was 8913, CXR shows pulmonary congestion.   -diuretics as above  -strict I/Os  -daily weights  -1500 mL fluid restriction, sodium restriction 2g    4. Chronic hypoxemic respiratory failure     5. SSS- pacemaker in place     6. Essential hypertension     7. Type 2 Diabetes Mellitus- with stage 4 chronic kidney disease and long-term insulin use.     8. Emphysema of lung           This document has been electronically signed by Sandy Caputo MD on July 20, 2019 9:22 AM

## 2019-07-20 NOTE — PLAN OF CARE
Problem: Patient Care Overview  Goal: Plan of Care Review  Outcome: Ongoing (interventions implemented as appropriate)   07/20/19 1431   Coping/Psychosocial   Plan of Care Reviewed With patient   Plan of Care Review   Progress improving   OTHER   Outcome Summary mobility in room w/supervision,toilet transfer supervision cont poc

## 2019-07-20 NOTE — THERAPY TREATMENT NOTE
Acute Care - Occupational Therapy Treatment Note  [facility]     Patient Name: Brendon Skelton  : 1945  MRN: [id number]  Today's Date: 2019  Onset of Illness/Injury or Date of Surgery: 19  Date of Referral to OT: 19  Referring Physician: MONICA Romero MD    Admit Date: 2019       ICD-10-CM ICD-9-CM   1. Acute on chronic congestive heart failure, unspecified heart failure type (CMS/HCC) I50.9 428.0   2. COPD exacerbation (CMS/[facility]) J44.1 491.21   3. Impaired physical mobility Z74.09 781.99   4. Impaired mobility and activities of daily living Z74.09 799.89     Patient Active Problem List   Diagnosis   • Long term current use of anticoagulant therapy   • Personal history of heart valve replacement   • Atrial fibrillation [I48.91]   • Emphysema of lung (CMS/[facility])   • On anticoagulant therapy   • Hyperlipidemia   • Diastolic heart failure (CMS/[facility])   • Depressive disorder   • COPD (chronic obstructive pulmonary disease) (CMS/[facility])   • Type 2 diabetes mellitus with stage 4 chronic kidney disease, with long-term current use of insulin (CMS/[facility])   • Myopia   • Astigmatism   • Bleeding from open wound of chest wall   • Follow-up surgery care   • Encounter for screening for malignant neoplasm of colon   • Positive colorectal cancer screening using DNA-based stool test   • Nodule of left lung   • Chronic hypoxemic respiratory failure (CMS/HCC)   • Physical deconditioning   • Heart failure with preserved left ventricular function (HFpEF) (CMS/[facility])   • Essential hypertension   • Coronary artery disease involving native coronary artery without angina pectoris   • Hx of CABG   • SSS (sick sinus syndrome) (CMS/HCC)   • Pacemaker   • Stage 4 chronic kidney disease (CMS/HCC)   • Personal history of tobacco use, presenting hazards to health   • Gastrointestinal hemorrhage   • Morbid obesity (CMS/HCC)   • Gastritis   • Colon polyp   • Coumadin toxicity   • Acute on chronic diastolic congestive heart  failure (CMS/[facility])   • Anemia     Past Medical History:   Diagnosis Date   • Acute bronchitis    • Anxiety    • Atrial fibrillation (CMS/[facility])    • C. difficile colitis    • Callosity     under metatarsal head      • Cardiac pacemaker in situ    • CHF (congestive heart failure) (CMS/[facility])    • Chronic obstructive lung disease (CMS/HCC)    • Corns and callus    • Depressive disorder    • Diabetes mellitus (CMS/[facility])    • Diastolic heart failure (CMS/[facility])    • Essential hypertension    • Foot pain    • Hyperlipidemia    • Long term current use of anticoagulant    • On anticoagulant therapy    • Pulmonary emphysema (CMS/[facility])    • Rectal hemorrhage    • Type 2 diabetes mellitus (CMS/[facility])      Past Surgical History:   Procedure Laterality Date   • AORTIC VALVE REPAIR/REPLACEMENT  1999   • CARDIAC ELECTROPHYSIOLOGY PROCEDURE N/A 3/20/2017    Procedure: PPM generator change - dual;  Surgeon: Bereket Gates MD;  Location: [facility] CATH INVASIVE LOCATION;  Service:    • CARDIAC PACEMAKER PLACEMENT  1999   • CATARACT EXTRACTION W/ INTRAOCULAR LENS IMPLANT Left 2/1/2019    Procedure: REMOVE CATARACT AND IMPLANT INTRAOCULAR LENS I;  Surgeon: Jose L Clay MD;  Location: [facility] OR;  Service: Ophthalmology   • CATARACT EXTRACTION W/ INTRAOCULAR LENS IMPLANT Right 2/8/2019    Procedure: REMOVE CATARACT AND IMPLANT  INTRAOCULAR LENS;  Surgeon: Jose L Clay MD;  Location: [facility] OR;  Service: Ophthalmology   • COLONOSCOPY N/A 6/19/2019    Procedure: COLONOSCOPY WITH CONTROL OF BLEED;  Surgeon: Alfredo Guerrero MD;  Location: [facility] ENDOSCOPY;  Service: Gastroenterology   • COLONOSCOPY N/A 6/24/2019    Procedure: COLONOSCOPY;  Surgeon: Alfredo Guerrero MD;  Location: [facility] ENDOSCOPY;  Service: Gastroenterology   • ECHO - CONVERTED  11/12/2013    There is mild to moderate left atrial enlargement EF 50-55%   • ENDOSCOPY N/A 6/19/2019    Procedure: ESOPHAGOGASTRODUODENOSCOPY WITH CONTROL OF BLEED;  Surgeon: Yolanda  Alfredo MARIN MD;  Location: [facility] ENDOSCOPY;  Service: Gastroenterology   • FOOT SURGERY  1990   • OTHER SURGICAL HISTORY  10/26/2015    PARING CORN/CALLUS    • PACEMAKER REPLACEMENT N/A 3/21/2017    Procedure: revision pacemaker pocket, evacuation hematoma, control of bleeding;  Surgeon: Polo Feliz MD;  Location: [facility] OR;  Service:    • TRANSESOPHAGEAL ECHOCARDIOGRAM (MITZY)  05/01/2014    With color flow-Mild left atrial enlargement with mild concentric LV hypertrophy with top normal aortic root size. EF 45-50%. Mild mitral regurgitation and mild aortic insufficiency and trivial amount of tricuspid regurgation   • TUBAL ABDOMINAL LIGATION         Therapy Treatment    Rehabilitation Treatment Summary     Row Name 07/20/19 1050 07/20/19 1047          Treatment Time/Intention    Discipline  --  -RC  occupational therapy assistant  -     Document Type  --  therapy note (daily note)  -     Subjective Information  --  no complaints  -     Mode of Treatment  --  occupational therapy;individual therapy  -     Patient/Family Observations  --  on toilet   -     Therapy Frequency (OT Eval)  --  -- 5-7 days per week   -RC     Recorded by [RC] Fiorella Broderick COLÓN/L 07/20/19 1122 [RC] Fiorella Broderick COLÓN/L 07/20/19 1122     Row Name 07/20/19 1047             Vital Signs    Pre SpO2 (%)  94  -RC      O2 Delivery Pre Treatment  supplemental O2  -RC      Intra SpO2 (%)  90  -RC      O2 Delivery Intra Treatment  supplemental O2  -RC      Post SpO2 (%)  92  -RC      O2 Delivery Post Treatment  supplemental O2  -RC      Recorded by [RC] Fiorella Broderick COLÓN/L 07/20/19 1430      Row Name 07/20/19 1047             Cognitive Assessment/Intervention- PT/OT    Affect/Mental Status (Cognitive)  WFL  -RC      Recorded by [RC] Fiorella Broderick COLÓN/L 07/20/19 1430      Row Name 07/20/19 1047             Functional Mobility    Functional Mobility- Ind. Level  supervision required  -      Functional  Mobility-Distance (Feet)  15  -RC      Recorded by [RC] Fiorella Broderick COLÓN/L 07/20/19 1430      Row Name 07/20/19 1047             Sit-Stand Transfer    Sit-Stand [place] (Transfers)  conditional independence x3  -RC      Assistive Device (Sit-Stand Transfers)  walker, front-wheeled  -RC      Recorded by [RC] Fiorella Broderick COLÓN/L 07/20/19 1430      Row Name 07/20/19 1047             Stand-Sit Transfer    Stand-Sit [place] (Transfers)  conditional independence x3  -RC      Assistive Device (Stand-Sit Transfers)  walker, front-wheeled  -RC      Recorded by [RC] Fiorella Broderick COLÓN/L 07/20/19 1430      Row Name 07/20/19 1047             Toilet Transfer    Type (Toilet Transfer)  stand pivot/stand step  -RC      [place] Level (Toilet Transfer)  supervision  -RC      Assistive Device (Toilet Transfer)  raised toilet seat  -RC      Recorded by [RC] Fiorella Broderick COLÓN/L 07/20/19 1430      Row Name 07/20/19 1047             Toileting Assessment/Training    [place] Level (Toileting)  perform perineal hygiene;dependent (less than 25% patient effort)  -RC      Toileting Position  supported standing  -RC      Comment (Toileting)  pt reports she is unable to reach buttocks for wipe   -RC      Recorded by [RC] Fiorella Broderick COLÓN/L 07/20/19 1430      Row Name 07/20/19 1047             Pain Scale: Numbers Pre/Post-Treatment    Pain Scale: Numbers, Pretreatment  0/10 - no pain  -RC      Pain Scale: Numbers, Post-Treatment  0/10 - no pain  -RC      Recorded by [RC] Fiorella Broderick COLÓN/L 07/20/19 1430      Row Name 07/20/19 1047             Outcome Summary/Treatment Plan (OT)    Daily Summary of Progress (OT)  progress toward functional goals as expected  -      Plan for Continued Treatment (OT)  cont poc   -RC      Recorded by [RC] Fiorella Broderick COLÓN/L 07/20/19 1430        User Key  (r) = Recorded By, (t) = Taken By, (c) = Cosigned By    Initials Name Effective Dates Discipline      Meggan Fiorella ESPINOZA, COLÓN/L 03/07/18 -  OT           Rehab Goal Summary     Row Name 07/20/19 1047             Occupational Therapy Goals    Transfer Goal Selection (OT)  transfer, OT goal 1  -RC      Bathing Goal Selection (OT)  bathing, OT goal 1  -RC      Dressing Goal Selection (OT)  dressing, OT goal 1  -RC      Activity Tolerance Goal Selection (OT)  activity tolerance, OT goal 1  -RC         Transfer Goal 1 (OT)    Activity/Assistive Device (Transfer Goal 1, OT)  transfers, all  -RC      [place] Level/Cues Needed (Transfer Goal 1, OT)  conditional independence  -RC      Time Frame (Transfer Goal 1, OT)  long term goal (LTG)  -RC      Progress/Outcome (Transfer Goal 1, OT)  goal met;continuing progress toward goal  -RC         Bathing Goal 1 (OT)    Activity/Assistive Device (Bathing Goal 1, OT)  bathing skills, all  -RC      [place] Level/Cues Needed (Bathing Goal 1, OT)  contact guard assist With 02 90% or above.  -RC      Time Frame (Bathing Goal 1, OT)  long term goal (LTG)  -RC      Progress/Outcomes (Bathing Goal 1, OT)  goal met;continuing progress toward goal  -RC         Dressing Goal 1 (OT)    Activity/Assistive Device (Dressing Goal 1, OT)  dressing skills, all  -RC      [place]/Cues Needed (Dressing Goal 1, OT)  contact guard assist with 02 90% or above.  -RC      Time Frame (Dressing Goal 1, OT)  long term goal (LTG)  -RC      Progress/Outcome (Dressing Goal 1, OT)  goal not met;continuing progress toward goal  -RC         Toileting Goal 1 (OT)    Activity/Device (Toileting Goal 1, OT)  toileting skills, all  -RC      [place] Level/Cues Needed (Toileting Goal 1, OT)  conditional independence  -RC      Time Frame (Toileting Goal 1, OT)  long term goal (LTG)  -RC      Progress/Outcome (Toileting Goal 1, OT)  goal met;continuing progress toward goal  -RC          Activity Tolerance Goal 1 (OT)    Activity Level (Endurance Goal 1, OT)  20 min activity;O2 sat >/ equal to 88%  -RC       Progress/Outcome (Activity Tolerance Goal 1, OT)  goal not met;continuing progress toward goal  -RC         Patient Education Goal (OT)    Activity (Patient Education Goal, OT)  Home safety/fall prev and EC/WS.  -RC      [place]/Cues/Accuracy (Memory Goal 2, OT)  demonstrates adequately;verbalizes understanding  -RC      Time Frame (Patient Education Goal, OT)  long term goal (LTG)  -RC      Progress/Outcome (Patient Education Goal, OT)  goal not met;continuing progress toward goal  -RC        User Key  (r) = Recorded By, (t) = Taken By, (c) = Cosigned By    Initials Name Provider Type Discipline    RC Fiorella Broderick COTA/L Occupational Therapy Assistant OT        Occupational Therapy Education     Title: PT OT SLP Therapies (In Progress)     Topic: Occupational Therapy (In Progress)     Point: ADL training (Done)     Description: Instruct learner(s) on proper safety adaptation and remediation techniques during self care or transfers.   Instruct in proper use of assistive devices.    Learning Progress Summary           Patient Acceptance, E, VU,DU by BB at 7/16/2019  2:53 PM                   Point: Precautions (In Progress)     Description: Instruct learner(s) on prescribed precautions during self-care and functional transfers.    Learning Progress Summary           Patient Acceptance, E, NR by RC at 7/20/2019  2:31 PM    Acceptance, E, VU by RB at 7/15/2019 11:31 AM    Comment:  Reviewed use of non skid socks when OOB and use of gait belt for distance ambulation.    Acceptance, E, VU by RB at 7/14/2019  2:02 PM    Comment:  Edu pt on  use of non skid socks when OOB.                   Point: Body mechanics (In Progress)     Description: Instruct learner(s) on proper positioning and spine alignment during self-care, functional mobility activities and/or exercises.    Learning Progress Summary           Patient Acceptance, E, NR by RC at 7/20/2019  2:31 PM                               User Key     Initials  Effective Dates Name Provider Type Discipline    RB 06/15/16 -  Beto Carney OT Occupational Therapist OT    BB 03/07/18 -  Selena Simpson COTA/L Occupational Therapy Assistant OT    RC 03/07/18 -  Fiorella Broderick COTA/L Occupational Therapy Assistant OT                OT Recommendation and Plan  Outcome Summary/Treatment Plan (OT)  Daily Summary of Progress (OT): progress toward functional goals as expected  Barriers to Overall Progress (OT): SOA   Plan for Continued Treatment (OT): cont poc   Therapy Frequency (OT Eval): (5-7 days per week )  Daily Summary of Progress (OT): progress toward functional goals as expected  Plan of Care Review  Plan of Care Reviewed With: patient  Plan of Care Reviewed With: patient  Outcome Summary: mobility in room w/supervision,toilet transfer supervision   cont poc   Outcome Measures     Row Name 07/20/19 1047 07/19/19 1335 07/19/19 1030       How much help from another person do you currently need...    Turning from your back to your side while in flat bed without using bedrails?  --  --  4  -TW    Moving from lying on back to sitting on the side of a flat bed without bedrails?  --  --  4  -TW    Moving to and from a bed to a chair (including a wheelchair)?  --  --  4  -TW    Standing up from a chair using your arms (e.g., wheelchair, bedside chair)?  --  --  4  -TW    Climbing 3-5 steps with a railing?  --  --  3  -TW    To walk in hospital room?  --  --  3  -TW    AM-PAC 6 Clicks Score (PT)  --  --  22  -TW       How much help from another is currently needed...    Putting on and taking off regular lower body clothing?  3  -RC  3  -RC  --    Bathing (including washing, rinsing, and drying)  3  -RC  3  -RC  --    Toileting (which includes using toilet bed pan or urinal)  4  -RC  4  -RC  --    Putting on and taking off regular upper body clothing  4  -RC  4  -RC  --    Taking care of personal grooming (such as brushing teeth)  4  -RC  4  -RC  --    Eating meals  4  -RC   4  -RC  --    AM-PAC 6 Clicks Score (OT)  22  -RC  22  -RC  --       Functional Assessment    Outcome Measure Options  --  --  AM-PAC 6 Clicks Basic Mobility (PT)  -TW    Row Name 07/18/19 1428 07/18/19 1050          How much help from another person do you currently need...    Turning from your back to your side while in flat bed without using bedrails?  4  -LN  --     Moving from lying on back to sitting on the side of a flat bed without bedrails?  4  -LN  --     Moving to and from a bed to a chair (including a wheelchair)?  4  -LN  --     Standing up from a chair using your arms (e.g., wheelchair, bedside chair)?  4  -LN  --     Climbing 3-5 steps with a railing?  3  -LN  --     To walk in hospital room?  3  -LN  --     AM-PAC 6 Clicks Score (PT)  22  -LN  --        How much help from another is currently needed...    Putting on and taking off regular lower body clothing?  --  3  -KD     Bathing (including washing, rinsing, and drying)  --  3  -KD     Toileting (which includes using toilet bed pan or urinal)  --  4  -KD     Putting on and taking off regular upper body clothing  --  4  -KD     Taking care of personal grooming (such as brushing teeth)  --  4  -KD     Eating meals  --  4  -KD     AM-PAC 6 Clicks Score (OT)  --  22  -KD        Functional Assessment    Outcome Measure Options  AM-PAC 6 Clicks Basic Mobility (PT)  -LN  --       User Key  (r) = Recorded By, (t) = Taken By, (c) = Cosigned By    Initials Name Provider Type    LN Cecy Lara, YOSEPH Physical Therapy Assistant    TW Reece Rocha PTA Physical Therapy Assistant    RC Fiorella Broderick, COLÓN/L Occupational Therapy Assistant    KD Akua Henderson, COLÓN/L Occupational Therapy Assistant           Time Calculation:   Time Calculation- OT     Row Name 07/20/19 1120             Time Calculation- OT    OT Start Time  1047  -RC      OT Stop Time  1110  -RC      OT Time Calculation (min)  23 min  -RC      Total Timed Code Minutes- OT  23  minute(s)  -      OT Received On  07/20/19  -        User Key  (r) = Recorded By, (t) = Taken By, (c) = Cosigned By    Initials Name Provider Type     Fiorella Broderick COTA/L Occupational Therapy Assistant        Therapy Charges for Today     Code Description Service Date Service Provider Modifiers Qty    [id number]  OT THER PROC EA 15 MIN 7/19/2019 Fiorella Broderick COTA/L GO 2    [id number]  OT THERAPEUTIC ACT EA 15 MIN 7/20/2019 Fiorella Broderick COLÓN/DANDRE GO 2               Fiorella Broderick COLÓN/DANDRE  7/20/2019

## 2019-07-20 NOTE — PROGRESS NOTES
FAMILY MEDICINE DAILY PROGRESS NOTE  NAME: Brendon Skelton  : 1945  MRN: [id number]      LOS: 4 days     PROVIDER OF SERVICE: Alfredo Charles Jr, MD    Chief Complaint: Acute on chronic diastolic congestive heart failure (CMS/HCC)    Subjective:     Interval History:  History taken from: patient chart  No acute events overnight.  Back on home O2 of 3L NC.  Down another 4 pounds today.  States she feels like she is breathing better.    Review of Systems:   Review of Systems   Constitutional: Negative for activity change, appetite change, chills, fatigue and fever.   HENT: Negative for sneezing, sore throat and trouble swallowing.    Eyes: Negative for visual disturbance.   Respiratory: Positive for shortness of breath (chronic, improving). Negative for cough and chest tightness.    Cardiovascular: Positive for leg swelling (improving). Negative for chest pain and palpitations.   Gastrointestinal: Positive for abdominal distention (improving). Negative for abdominal pain, blood in stool, constipation, diarrhea, nausea and vomiting.   Genitourinary: Negative for difficulty urinating and dysuria.   Musculoskeletal: Negative for arthralgias and back pain.   Skin: Negative for pallor and rash.   Allergic/Immunologic: Negative for environmental allergies and food allergies.   Neurological: Negative for dizziness, light-headedness, numbness and headaches.   Psychiatric/Behavioral: Negative for agitation, confusion, hallucinations and suicidal ideas.       Objective:     Vital Signs  Temp:  [97.4 °F (36.3 °C)-97.8 °F (36.6 °C)] 97.8 °F (36.6 °C)  Heart Rate:  [] 90  Resp:  [18-20] 18  BP: ()/(52-81) 113/73  Body mass index is 41.14 kg/m².        19  0404 19  0615   Weight: 114 kg (250 lb 14.4 oz) 112 kg (247 lb 3.2 oz)        Physical Exam  Physical Exam   Constitutional: She is oriented to person, place, and time. She appears well-developed and well-nourished. No distress.   HENT:    Head: Normocephalic and atraumatic.   Right Ear: External ear normal.   Left Ear: External ear normal.   Neck: Normal range of motion. Neck supple.   Cardiovascular: Normal rate, regular rhythm and normal heart sounds.   Pulmonary/Chest: No respiratory distress. She has no wheezes. She has no rales.   Diminished breath sounds, pursed lip breathing; better air movement throughout all lung fields   Abdominal: Soft. Bowel sounds are normal. She exhibits no distension. There is no tenderness.   Musculoskeletal: Normal range of motion. She exhibits edema (trace pitting edema lower extremities bilaterally, improved from yesterday ).   Neurological: She is alert and oriented to person, place, and time. She has normal reflexes.   Skin: Skin is warm and dry. She is not diaphoretic. No erythema.   Psychiatric: She has a normal mood and affect. Her behavior is normal.       Medication Review    Current Facility-Administered Medications:   •  acetaminophen (TYLENOL) tablet 650 mg, 650 mg, Oral, Q4H PRN, Kenna Chawla MD, 650 mg at 07/19/19 2146  •  albuterol (PROVENTIL) nebulizer solution 0.083% 2.5 mg/3mL, 2.5 mg, Nebulization, Q4H PRN, Kenna Chawla MD, 2.5 mg at 07/19/19 1956  •  aspirin chewable tablet 81 mg, 81 mg, Oral, Daily, Kenna Chawla MD, 81 mg at 07/20/19 0835  •  benzonatate (TESSALON) capsule 100 mg, 100 mg, Oral, TID PRN, Beatriz Srivastava MD, 100 mg at 07/19/19 2141  •  cholecalciferol (VITAMIN D3) tablet 2,000 Units, 2,000 Units, Oral, Daily, Kenna Chawla MD, 2,000 Units at 07/20/19 0835  •  citalopram (CeleXA) tablet 20 mg, 20 mg, Oral, Daily, Kenna Chawla MD, 20 mg at 07/20/19 0835  •  dextrose (D50W) 25 g/ 50mL Intravenous Solution 25 g, 25 g, Intravenous, Q15 Min PRN, Kenna Chawla MD  •  dextrose (GLUTOSE) oral gel 15 g, 15 g, Oral, Q15 Min PRN, Kenna Chawla MD  •  diltiaZEM CD (CARDIZEM CD) 24 hr capsule 240 mg, 240 mg, Oral, Daily, Kenna Chawla MD, 240 mg at  07/20/19 0835  •  docusate sodium (COLACE) capsule 100 mg, 100 mg, Oral, BID PRN, Kenna Chawla MD, 100 mg at 07/19/19 0906  •  ezetimibe (ZETIA) tablet 10 mg, 10 mg, Oral, Daily, Kenna Chawla MD, 10 mg at 07/20/19 0836  •  famotidine (PEPCID) tablet 20 mg, 20 mg, Oral, Daily, Kenna Chawla MD, 20 mg at 07/20/19 0835  •  ferrous sulfate EC tablet 324 mg, 324 mg, Oral, Daily With Breakfast, Kenna Chawla MD, 324 mg at 07/20/19 0835  •  furosemide (LASIX) 100 mg in sodium chloride 0.9 % 100 mL (1 mg/mL) infusion, 10 mg/hr, Intravenous, Continuous, Sandy Caputo MD, Last Rate: 10 mL/hr at 07/20/19 0617, 10 mg/hr at 07/20/19 0617  •  glucagon (human recombinant) (GLUCAGEN DIAGNOSTIC) injection 1 mg, 1 mg, Subcutaneous, PRN, Kenna Chawla MD  •  guaiFENesin (MUCINEX) 12 hr tablet 600 mg, 600 mg, Oral, Q12H, Hermann Richardson MD, 600 mg at 07/20/19 0835  •  insulin aspart (novoLOG) injection 0-7 Units, 0-7 Units, Subcutaneous, 4x Daily AC & at Bedtime, Kenna Chawla MD, 3 Units at 07/17/19 2058  •  Insulin Glargine (BASAGLAR KWIKPEN) injection pen solution pen-injector 30 Units, 30 Units, Subcutaneous, Q24H, Alfredo Charles Jr., MD, 30 Units at 07/19/19 2138  •  ipratropium-albuterol (DUO-NEB) nebulizer solution 3 mL, 3 mL, Nebulization, Q8H - RT, Kenna Chawla MD, 3 mL at 07/20/19 0741  •  metOLazone (ZAROXOLYN) tablet 2.5 mg, 2.5 mg, Oral, Daily, Sandy Caputo MD, 2.5 mg at 07/20/19 0835  •  nystatin (MYCOSTATIN) powder, , Topical, Q12H, Kenna Chawla MD  •  ondansetron (ZOFRAN) tablet 4 mg, 4 mg, Oral, Q6H PRN, Kenna Chawla MD  •  Pen [place] misc 1 each, 1 each, Does not apply, Q24H, Alfredo Charles Jr., MD, 1 each at 07/19/19 2141  •  Pharmacy to dose warfarin, , Does not apply, Continuous PRN, Kenna Chawla MD  •  potassium chloride (MICRO-K) CR capsule 20 mEq, 20 mEq, Oral, BID With Meals, Sandy Caputo MD, 20 mEq at 07/20/19 0835  •  prenatal vitamin 27-0.8 tablet 1  tablet, 1 tablet, Oral, Daily, Kenna Chawla MD, 1 tablet at 07/20/19 0835  •  rOPINIRole (REQUIP) tablet 0.25 mg, 0.25 mg, Oral, Nightly, Kenna Chawla MD, 0.25 mg at 07/19/19 2141  •  sodium chloride 0.9 % flush 10 mL, 10 mL, Intravenous, PRN, Ozor, Gilles Toney MD, 10 mL at 07/16/19 1734  •  sodium chloride 0.9 % flush 3 mL, 3 mL, Intravenous, Q12H, Kenna Chawla MD, 3 mL at 07/19/19 0908  •  sodium chloride 0.9 % flush 3-10 mL, 3-10 mL, Intravenous, PRN, Kenna Chawla MD  •  traZODone (DESYREL) tablet 300 mg, 300 mg, Oral, Nightly, Kenna Chawla MD, 300 mg at 07/19/19 2141  •  vitamin C (ASCORBIC ACID) tablet 500 mg, 500 mg, Oral, Daily, Kenna Chawla MD, 500 mg at 07/20/19 0835  •  warfarin (COUMADIN) tablet 2 mg, 2 mg, Oral, Daily, Kenna Chawla MD, 2 mg at 07/19/19 1724     Diagnostic Data    Lab Results (last 24 hours)     Procedure Component Value Units Date/Time    Protime-INR [[id number]]  (Abnormal) Collected:  07/20/19 0730    Specimen:  Blood Updated:  07/20/19 0823     Protime 21.6 Seconds      INR 1.91    Narrative:       Therapeutic range for most indications is 2.0-3.0 INR,  or 2.5-3.5 for mechanical heart valves.    Basic Metabolic Panel [[id number]]  (Abnormal) Collected:  07/20/19 0730    Specimen:  Blood Updated:  07/20/19 0823     Glucose 97 mg/dL      BUN 98 mg/dL      Creatinine 2.74 mg/dL      Sodium 142 mmol/L      Potassium 3.7 mmol/L      Chloride 91 mmol/L      CO2 40.0 mmol/L      Calcium 9.9 mg/dL      eGFR Non African Amer 17 mL/min/1.73      BUN/Creatinine Ratio 35.8     Anion Gap 11.0 mmol/L     Narrative:       GFR Normal >60  Chronic Kidney Disease <60  Kidney Failure <15    CBC & Differential [[id number]] Collected:  07/20/19 0730    Specimen:  Blood Updated:  07/20/19 0800    Narrative:       The following orders were created for panel order CBC & Differential.  Procedure                               Abnormality         Status                      ---------                               -----------         ------                     CBC Auto Differential[[id number]]        Abnormal            Final result                 Please view results for these tests on the individual orders.    CBC Auto Differential [[id number]]  (Abnormal) Collected:  07/20/19 0730    Specimen:  Blood Updated:  07/20/19 0800     WBC 8.83 10*3/mm3      RBC 3.00 10*6/mm3      Hemoglobin 9.1 g/dL      Hematocrit 29.7 %      MCV 99.0 fL      MCH 30.3 pg      MCHC 30.6 g/dL      RDW 21.4 %      RDW-SD 76.1 fl      MPV 12.3 fL      Platelets 276 10*3/mm3      Neutrophil % 74.7 %      Lymphocyte % 5.0 %      Monocyte % 9.4 %      Eosinophil % 9.9 %      Basophil % 0.3 %      Immature Grans % 0.7 %      Neutrophils, Absolute 6.60 10*3/mm3      Lymphocytes, Absolute 0.44 10*3/mm3      Monocytes, Absolute 0.83 10*3/mm3      Eosinophils, Absolute 0.87 10*3/mm3      Basophils, Absolute 0.03 10*3/mm3      Immature Grans, Absolute 0.06 10*3/mm3      nRBC 0.0 /100 WBC     POC Glucose Once [[id number]]  (Abnormal) Collected:  07/20/19 0628    Specimen:  Blood Updated:  07/20/19 0653     Glucose 62 mg/dL      Comment: RN NotifiedOperator: [id number] NICOLE DYSONFERMeter ID: [id number]       POC Glucose Once [[id number]]  (Abnormal) Collected:  07/19/19 1932    Specimen:  Blood Updated:  07/19/19 2054     Glucose 262 mg/dL      Comment: Result Not ConfirmedOperator: [id number] NICOLE DYSONFERMeter ID: [id number]       POC Glucose Once [[id number]]  (Abnormal) Collected:  07/19/19 1602    Specimen:  Blood Updated:  07/19/19 1616     Glucose 173 mg/dL      Comment: RN NotifiedOperator: [id number] ANGY Atkinson ID: [id number]       POC Glucose Once [[id number]]  (Normal) Collected:  07/19/19 1013    Specimen:  Blood Updated:  07/19/19 1036     Glucose 125 mg/dL      Comment: RN NotifiedOperator: [id number] ANGY Atkinson ID: [id number]               I reviewed the patient's new clinical  results.    Assessment/Plan:     Active Hospital Problems    Diagnosis POA   • **Acute on chronic diastolic congestive heart failure (CMS/HCC) [I50.33] Yes     -last echo 6/18/19 shows EF of 46-50% with normal diastolic dysfunction, improved from echo in 2017 which showed grade 1 diastolic dysfunction  -BNP on admission was 8913, CXR shows pulmonary congestion  -on lasix 40 mg daily and metolazone 2.5 mg daily at home   -s/p lasix 80 mg IV in ER and lasix 40 mg IV once; kidney function worsening   -will consult nephrology for recommendations of further diuresis with worsening renal function   -lasix drip and metolazone per nephro  -strict I/Os  -daily weights  -1500 mL fluid restriction, sodium restriction 2g  -repeat cxr shows no significant change from admission cxr, no evidence of pneumonia or worsening pulmonary edema     • Anemia [D64.9] Yes     -H/H on admission 8.9/28.9, trend with daily CBC  -stable  -recent GI bleed requiring transfusion of 1 unit of PRBCs  -on oral iron supplement at home  -continue oral iron supplement  -transfuse for hemoglobin less than 7      • Stage 4 chronic kidney disease (CMS/[facility]) [N18.4] Yes     -Baseline creatinine ~ 1.9-2.1  -trend renal function  -follows with Dr. Caputo outpatient  -avoid nephrotoxic agents  -will consult Dr. Caputo as her renal function is continuing to worsen and she is still needing diuresis  -on lasix drip and metolazone per nephro      • SSS (sick sinus syndrome) (CMS/[facility]) [I49.5] Yes     -atrial fibrillation with rate of 85 on EKG in ER  -pacemaker in place  -continue with cardizem  -telemetry  -pharm to dose coumadin      • Essential hypertension [I10] Yes     -continue lasix, cardizem      • Chronic hypoxemic respiratory failure (CMS/[facility]) [J96.11] Yes     -on 3L O2 via nasal cannula at home  -ABG in ER shows pO2 of 60.9, pCO2 of 44.7  -will continue oxygen via nasal cannula at home rate      • Type 2 diabetes mellitus with stage 4 chronic kidney  disease, with long-term current use of insulin (CMS/[facility]) [E11.22, N18.4, Z79.4] Not Applicable     -on basaglar 30 units at night  -continue, adjust as necessary      • Emphysema of lung (CMS/HCC) [J43.9] Yes     -continue with supplemental oxygen 3L  -continue albuterol nebs  -symbicort, duonebs      • Atrial fibrillation [I48.91] [I48.91] Yes     -EKG in ER shows atrial fibrillation with rate of 85  -pacemaker in place  -continue cardizem  -telemetry  -pharm to dose coumadin          DVT prophylaxis: warfarin  Code status is   Code Status and Medical Interventions:   Ordered at: 07/13/19 3863     Level Of Support Discussed With:    Patient     Code Status:    CPR     Medical Interventions (Level of Support Prior to Arrest):    Full       Plan for disposition: Anticipate discharge back to SNF in 1-2 days    Time:  30 mins    Alfredo Charles Jr., M.D.  PGY2  Family Medicine Resident  [address] [place] [address]  Phone: [phone]  Fax: [phone]      This document has been electronically signed by Alfredo Charles Jr, MD on 07/20/19 9:05 AM

## 2019-07-21 LAB
ANION GAP SERPL CALCULATED.3IONS-SCNC: 10 MMOL/L (ref 5–15)
BASOPHILS # BLD AUTO: 0.05 10*3/MM3 (ref 0–0.2)
BASOPHILS NFR BLD AUTO: 0.5 % (ref 0–1.5)
BUN BLD-MCNC: 96 MG/DL (ref 8–23)
BUN/CREAT SERPL: 34.8 (ref 7–25)
CALCIUM SPEC-SCNC: 10.2 MG/DL (ref 8.6–10.5)
CHLORIDE SERPL-SCNC: 88 MMOL/L (ref 98–107)
CO2 SERPL-SCNC: 43 MMOL/L (ref 22–29)
CREAT BLD-MCNC: 2.76 MG/DL (ref 0.57–1)
DEPRECATED RDW RBC AUTO: 74.6 FL (ref 37–54)
EOSINOPHIL # BLD AUTO: 0.92 10*3/MM3 (ref 0–0.4)
EOSINOPHIL NFR BLD AUTO: 9.3 % (ref 0.3–6.2)
ERYTHROCYTE [DISTWIDTH] IN BLOOD BY AUTOMATED COUNT: 20.9 % (ref 12.3–15.4)
GFR SERPL CREATININE-BSD FRML MDRD: 17 ML/MIN/1.73
GLUCOSE BLD-MCNC: 55 MG/DL (ref 65–99)
GLUCOSE BLDC GLUCOMTR-MCNC: 120 MG/DL (ref 70–130)
GLUCOSE BLDC GLUCOMTR-MCNC: 234 MG/DL (ref 70–130)
GLUCOSE BLDC GLUCOMTR-MCNC: 234 MG/DL (ref 70–130)
GLUCOSE BLDC GLUCOMTR-MCNC: 69 MG/DL (ref 70–130)
HCT VFR BLD AUTO: 30.4 % (ref 34–46.6)
HGB BLD-MCNC: 9.6 G/DL (ref 12–15.9)
IMM GRANULOCYTES # BLD AUTO: 0.06 10*3/MM3 (ref 0–0.05)
IMM GRANULOCYTES NFR BLD AUTO: 0.6 % (ref 0–0.5)
INR PPP: 1.64 (ref 0.8–1.2)
LYMPHOCYTES # BLD AUTO: 0.64 10*3/MM3 (ref 0.7–3.1)
LYMPHOCYTES NFR BLD AUTO: 6.5 % (ref 19.6–45.3)
MCH RBC QN AUTO: 31.5 PG (ref 26.6–33)
MCHC RBC AUTO-ENTMCNC: 31.6 G/DL (ref 31.5–35.7)
MCV RBC AUTO: 99.7 FL (ref 79–97)
MONOCYTES # BLD AUTO: 1.13 10*3/MM3 (ref 0.1–0.9)
MONOCYTES NFR BLD AUTO: 11.5 % (ref 5–12)
NEUTROPHILS # BLD AUTO: 7.05 10*3/MM3 (ref 1.7–7)
NEUTROPHILS NFR BLD AUTO: 71.6 % (ref 42.7–76)
NRBC BLD AUTO-RTO: 0 /100 WBC (ref 0–0.2)
PLATELET # BLD AUTO: 291 10*3/MM3 (ref 140–450)
PMV BLD AUTO: 11.5 FL (ref 6–12)
POTASSIUM BLD-SCNC: 3.7 MMOL/L (ref 3.5–5.2)
PROTHROMBIN TIME: 19.2 SECONDS (ref 11.1–15.3)
RBC # BLD AUTO: 3.05 10*6/MM3 (ref 3.77–5.28)
SODIUM BLD-SCNC: 141 MMOL/L (ref 136–145)
WBC NRBC COR # BLD: 9.85 10*3/MM3 (ref 3.4–10.8)

## 2019-07-21 PROCEDURE — 94760 N-INVAS EAR/PLS OXIMETRY 1: CPT

## 2019-07-21 PROCEDURE — 99232 SBSQ HOSP IP/OBS MODERATE 35: CPT | Performed by: STUDENT IN AN ORGANIZED HEALTH CARE EDUCATION/TRAINING PROGRAM

## 2019-07-21 PROCEDURE — 97110 THERAPEUTIC EXERCISES: CPT

## 2019-07-21 PROCEDURE — 85610 PROTHROMBIN TIME: CPT | Performed by: STUDENT IN AN ORGANIZED HEALTH CARE EDUCATION/TRAINING PROGRAM

## 2019-07-21 PROCEDURE — 97530 THERAPEUTIC ACTIVITIES: CPT

## 2019-07-21 PROCEDURE — 80048 BASIC METABOLIC PNL TOTAL CA: CPT | Performed by: STUDENT IN AN ORGANIZED HEALTH CARE EDUCATION/TRAINING PROGRAM

## 2019-07-21 PROCEDURE — 85025 COMPLETE CBC W/AUTO DIFF WBC: CPT | Performed by: STUDENT IN AN ORGANIZED HEALTH CARE EDUCATION/TRAINING PROGRAM

## 2019-07-21 PROCEDURE — 94799 UNLISTED PULMONARY SVC/PX: CPT

## 2019-07-21 PROCEDURE — 82962 GLUCOSE BLOOD TEST: CPT

## 2019-07-21 RX ORDER — BUMETANIDE 0.25 MG/ML
2 INJECTION INTRAMUSCULAR; INTRAVENOUS 2 TIMES DAILY
Status: DISCONTINUED | OUTPATIENT
Start: 2019-07-21 | End: 2019-07-22 | Stop reason: HOSPADM

## 2019-07-21 RX ORDER — CYCLOBENZAPRINE HCL 5 MG
5 TABLET ORAL NIGHTLY PRN
Status: DISCONTINUED | OUTPATIENT
Start: 2019-07-21 | End: 2019-07-22 | Stop reason: HOSPADM

## 2019-07-21 RX ADMIN — ROPINIROLE HYDROCHLORIDE 0.25 MG: 0.25 TABLET, FILM COATED ORAL at 20:19

## 2019-07-21 RX ADMIN — Medication 1 EACH: at 20:23

## 2019-07-21 RX ADMIN — BENZONATATE 100 MG: 100 CAPSULE ORAL at 17:07

## 2019-07-21 RX ADMIN — WARFARIN SODIUM 3 MG: 3 TABLET ORAL at 17:05

## 2019-07-21 RX ADMIN — IPRATROPIUM BROMIDE AND ALBUTEROL SULFATE 3 ML: 2.5; .5 SOLUTION RESPIRATORY (INHALATION) at 07:41

## 2019-07-21 RX ADMIN — ASPIRIN 81 MG 81 MG: 81 TABLET ORAL at 08:14

## 2019-07-21 RX ADMIN — DILTIAZEM HYDROCHLORIDE 240 MG: 240 CAPSULE, COATED, EXTENDED RELEASE ORAL at 08:14

## 2019-07-21 RX ADMIN — PRENATAL VIT W/ FE FUMARATE-FA TAB 27-0.8 MG 1 TABLET: 27-0.8 TAB at 08:14

## 2019-07-21 RX ADMIN — BUMETANIDE 2 MG: 0.25 INJECTION INTRAMUSCULAR; INTRAVENOUS at 17:05

## 2019-07-21 RX ADMIN — FERROUS SULFATE TAB EC 324 MG (65 MG FE EQUIVALENT) 324 MG: 324 (65 FE) TABLET DELAYED RESPONSE at 08:14

## 2019-07-21 RX ADMIN — NYSTATIN: 100000 POWDER TOPICAL at 08:15

## 2019-07-21 RX ADMIN — SODIUM CHLORIDE, PRESERVATIVE FREE 3 ML: 5 INJECTION INTRAVENOUS at 20:19

## 2019-07-21 RX ADMIN — CITALOPRAM HYDROBROMIDE 20 MG: 20 TABLET ORAL at 08:14

## 2019-07-21 RX ADMIN — BENZONATATE 100 MG: 100 CAPSULE ORAL at 08:18

## 2019-07-21 RX ADMIN — CYCLOBENZAPRINE 5 MG: 5 TABLET, FILM COATED ORAL at 21:53

## 2019-07-21 RX ADMIN — POTASSIUM CHLORIDE 20 MEQ: 750 CAPSULE, EXTENDED RELEASE ORAL at 17:05

## 2019-07-21 RX ADMIN — FAMOTIDINE 20 MG: 20 TABLET ORAL at 08:14

## 2019-07-21 RX ADMIN — TRAZODONE HYDROCHLORIDE 300 MG: 150 TABLET ORAL at 20:19

## 2019-07-21 RX ADMIN — ACETAMINOPHEN 650 MG: 325 TABLET, FILM COATED ORAL at 10:13

## 2019-07-21 RX ADMIN — GUAIFENESIN 600 MG: 600 TABLET, EXTENDED RELEASE ORAL at 20:19

## 2019-07-21 RX ADMIN — POTASSIUM CHLORIDE 20 MEQ: 750 CAPSULE, EXTENDED RELEASE ORAL at 08:14

## 2019-07-21 RX ADMIN — NYSTATIN: 100000 POWDER TOPICAL at 20:20

## 2019-07-21 RX ADMIN — EZETIMIBE 10 MG: 10 TABLET ORAL at 08:15

## 2019-07-21 RX ADMIN — IPRATROPIUM BROMIDE AND ALBUTEROL SULFATE 3 ML: 2.5; .5 SOLUTION RESPIRATORY (INHALATION) at 15:29

## 2019-07-21 RX ADMIN — VITAMIN D, TAB 1000IU (100/BT) 2000 UNITS: 25 TAB at 08:14

## 2019-07-21 RX ADMIN — GUAIFENESIN 600 MG: 600 TABLET, EXTENDED RELEASE ORAL at 08:14

## 2019-07-21 RX ADMIN — INSULIN GLARGINE 30 UNITS: 100 INJECTION, SOLUTION SUBCUTANEOUS at 20:18

## 2019-07-21 RX ADMIN — OXYCODONE HYDROCHLORIDE AND ACETAMINOPHEN 500 MG: 500 TABLET ORAL at 08:14

## 2019-07-21 NOTE — PLAN OF CARE
Problem: Fall Risk (Adult)  Goal: Absence of Fall  Outcome: Ongoing (interventions implemented as appropriate)      Problem: Cardiac: Heart Failure (Adult)  Goal: Signs and Symptoms of Listed Potential Problems Will be Absent, Minimized or Managed (Cardiac: Heart Failure)  Outcome: Ongoing (interventions implemented as appropriate)      Problem: Cardiac: ACS (Acute Coronary Syndrome) (Adult)  Goal: Signs and Symptoms of Listed Potential Problems Will be Absent, Minimized or Managed (Cardiac: ACS)  Outcome: Outcome(s) achieved Date Met: 07/21/19      Problem: Patient Care Overview  Goal: Plan of Care Review  Outcome: Ongoing (interventions implemented as appropriate)   07/21/19 0525   Coping/Psychosocial   Plan of Care Reviewed With patient   Plan of Care Review   Progress improving   OTHER   Outcome Summary losing weight on lasix gtt       Problem: Skin Injury Risk (Adult)  Goal: Skin Health and Integrity  Outcome: Ongoing (interventions implemented as appropriate)

## 2019-07-21 NOTE — PROGRESS NOTES
FAMILY MEDICINE DAILY PROGRESS NOTE  NAME: Brendon Skelton  : 1945  MRN: [id number]      LOS: 5 days     PROVIDER OF SERVICE: Alfredo Charles Jr, MD    Chief Complaint: Acute on chronic diastolic congestive heart failure (CMS/HCC)    Subjective:     Interval History:  History taken from: patient chart  No acute overnight events.  Patient continues to improve clinically.  We will follow nephrology's continued diuresis with lasix drip.  Creatinine stable to day vs yesterday.     Review of Systems:   Review of Systems   Constitutional: Negative for activity change, appetite change, chills, fatigue and fever.   HENT: Negative for sneezing, sore throat and trouble swallowing.    Eyes: Negative for visual disturbance.   Respiratory: Positive for shortness of breath (chronic, continues to improve). Negative for cough and chest tightness.    Cardiovascular: Positive for leg swelling (improving). Negative for chest pain and palpitations.   Gastrointestinal: Positive for abdominal distention (improving). Negative for abdominal pain, blood in stool, constipation, diarrhea, nausea and vomiting.   Genitourinary: Negative for difficulty urinating and dysuria.   Musculoskeletal: Negative for arthralgias and back pain.   Skin: Negative for pallor and rash.   Allergic/Immunologic: Negative for environmental allergies and food allergies.   Neurological: Negative for dizziness, light-headedness, numbness and headaches.   Psychiatric/Behavioral: Negative for agitation, confusion, hallucinations and suicidal ideas.       Objective:     Vital Signs  Temp:  [97.2 °F (36.2 °C)-98.1 °F (36.7 °C)] 98.1 °F (36.7 °C)  Heart Rate:  [] 70  Resp:  [16-18] 16  BP: (100-133)/(51-84) 108/62  Body mass index is 40.44 kg/m².        19  0615 19  0451   Weight: 112 kg (247 lb 3.2 oz) 110 kg (243 lb)        Physical Exam  Physical Exam   Constitutional: She is oriented to person, place, and time. She appears  well-developed and well-nourished. No distress.   HENT:   Head: Normocephalic and atraumatic.   Right Ear: External ear normal.   Left Ear: External ear normal.   Neck: Normal range of motion. Neck supple.   Cardiovascular: Normal rate, regular rhythm and normal heart sounds.   Pulmonary/Chest: No respiratory distress. She has no wheezes. She has no rales.   Improved air movement   Abdominal: Soft. Bowel sounds are normal. She exhibits no distension. There is no tenderness.   Musculoskeletal: Normal range of motion. She exhibits edema (trace pitting edema lower extremities bilaterally, improved from yesterday ).   Neurological: She is alert and oriented to person, place, and time. She has normal reflexes.   Skin: Skin is warm and dry. She is not diaphoretic. No erythema.   Psychiatric: She has a normal mood and affect. Her behavior is normal.       Medication Review    Current Facility-Administered Medications:   •  acetaminophen (TYLENOL) tablet 650 mg, 650 mg, Oral, Q4H PRN, Kenna Chawla MD, 650 mg at 07/21/19 1013  •  albuterol (PROVENTIL) nebulizer solution 0.083% 2.5 mg/3mL, 2.5 mg, Nebulization, Q4H PRN, Kenna Chawla MD, 2.5 mg at 07/19/19 1956  •  aspirin chewable tablet 81 mg, 81 mg, Oral, Daily, Kenna Chawla MD, 81 mg at 07/21/19 0814  •  benzonatate (TESSALON) capsule 100 mg, 100 mg, Oral, TID PRN, Beatriz Srivastava MD, 100 mg at 07/21/19 0818  •  bumetanide (BUMEX) injection 2 mg, 2 mg, Intravenous, BID, Sandy Caputo MD  •  cholecalciferol (VITAMIN D3) tablet 2,000 Units, 2,000 Units, Oral, Daily, Kenna Chawla MD, 2,000 Units at 07/21/19 0814  •  citalopram (CeleXA) tablet 20 mg, 20 mg, Oral, Daily, Kenna Chawla MD, 20 mg at 07/21/19 0814  •  dextrose (D50W) 25 g/ 50mL Intravenous Solution 25 g, 25 g, Intravenous, Q15 Min PRN, Kenna Chawla MD  •  dextrose (GLUTOSE) oral gel 15 g, 15 g, Oral, Q15 Min PRN, Kenna Chawla MD  •  diltiaZEM CD (CARDIZEM CD) 24 hr  capsule 240 mg, 240 mg, Oral, Daily, Kenna Chawla MD, 240 mg at 07/21/19 0814  •  docusate sodium (COLACE) capsule 100 mg, 100 mg, Oral, BID PRN, Kenna Chawla MD, 100 mg at 07/19/19 0906  •  ezetimibe (ZETIA) tablet 10 mg, 10 mg, Oral, Daily, Kenna Chawla MD, 10 mg at 07/21/19 0815  •  famotidine (PEPCID) tablet 20 mg, 20 mg, Oral, Daily, Kenna Chawla MD, 20 mg at 07/21/19 0814  •  ferrous sulfate EC tablet 324 mg, 324 mg, Oral, Daily With Breakfast, Kenna Chawla MD, 324 mg at 07/21/19 0814  •  glucagon (human recombinant) (GLUCAGEN DIAGNOSTIC) injection 1 mg, 1 mg, Subcutaneous, PRN, Kenna Chawla MD  •  guaiFENesin (MUCINEX) 12 hr tablet 600 mg, 600 mg, Oral, Q12H, Hermann Richardson MD, 600 mg at 07/21/19 0814  •  insulin aspart (novoLOG) injection 0-7 Units, 0-7 Units, Subcutaneous, 4x Daily AC & at Bedtime, Kenna Chawla MD, 3 Units at 07/17/19 2058  •  Insulin Glargine (BASAGLAR KWIKPEN) injection pen solution pen-injector 30 Units, 30 Units, Subcutaneous, Q24H, Alfredo Charles Jr., MD, 30 Units at 07/20/19 2030  •  ipratropium-albuterol (DUO-NEB) nebulizer solution 3 mL, 3 mL, Nebulization, Q8H - RT, Kenna Chawla MD, 3 mL at 07/21/19 0741  •  [START ON 7/22/2019] metOLazone (ZAROXOLYN) tablet 2.5 mg, 2.5 mg, Oral, Every Other Day, Sandy Caputo MD  •  nystatin (MYCOSTATIN) powder, , Topical, Q12H, Kenna Chawla MD  •  ondansetron (ZOFRAN) tablet 4 mg, 4 mg, Oral, Q6H PRN, Kenna Chawla MD  •  Pen Clark misc 1 each, 1 each, Does not apply, Q24H, Alfredo Charles Jr., MD, 1 each at 07/20/19 2038  •  Pharmacy to dose warfarin, , Does not apply, Continuous PRN, Kenna Chawla MD  •  potassium chloride (MICRO-K) CR capsule 20 mEq, 20 mEq, Oral, BID With Meals, Sandy Caputo MD, 20 mEq at 07/21/19 0814  •  prenatal vitamin 27-0.8 tablet 1 tablet, 1 tablet, Oral, Daily, Kenna Chawla MD, 1 tablet at 07/21/19 0814  •  rOPINIRole (REQUIP) tablet 0.25 mg, 0.25 mg,  Oral, Nightly, Kenna Chawla MD, 0.25 mg at 07/20/19 2031  •  sodium chloride 0.9 % flush 10 mL, 10 mL, Intravenous, PRN, OzorGilles MD, 10 mL at 07/16/19 1734  •  sodium chloride 0.9 % flush 3 mL, 3 mL, Intravenous, Q12H, Kenna Chawla MD, 3 mL at 07/19/19 0908  •  sodium chloride 0.9 % flush 3-10 mL, 3-10 mL, Intravenous, PRN, Kenna Chawla MD  •  traZODone (DESYREL) tablet 300 mg, 300 mg, Oral, Nightly, Kenna Chawla MD, 300 mg at 07/20/19 2031  •  vitamin C (ASCORBIC ACID) tablet 500 mg, 500 mg, Oral, Daily, Kenna Chawla MD, 500 mg at 07/21/19 0814  •  warfarin (COUMADIN) tablet 3 mg, 3 mg, Oral, Daily, Kenna Chawla MD, 3 mg at 07/20/19 1745     Diagnostic Data    Lab Results (last 24 hours)     Procedure Component Value Units Date/Time    POC Glucose Once [[id number]]  (Abnormal) Collected:  07/21/19 1033    Specimen:  Blood Updated:  07/21/19 1052     Glucose 234 mg/dL      Comment: RN NotifiedOperator: [id number] DEONNA NOAHMeter ID: [id number]       POC Glucose Once [[id number]]  (Abnormal) Collected:  07/21/19 0634    Specimen:  Blood Updated:  07/21/19 0708     Glucose 69 mg/dL      Comment: : [id number] NEPTALI ELRITAMeter ID: [id number]       Protime-INR [[id number]]  (Abnormal) Collected:  07/21/19 0616    Specimen:  Blood Updated:  07/21/19 0701     Protime 19.2 Seconds      INR 1.64    Narrative:       Therapeutic range for most indications is 2.0-3.0 INR,  or 2.5-3.5 for mechanical heart valves.    Basic Metabolic Panel [[id number]]  (Abnormal) Collected:  07/21/19 0616    Specimen:  Blood Updated:  07/21/19 0700     Glucose 55 mg/dL      BUN 96 mg/dL      Creatinine 2.76 mg/dL      Sodium 141 mmol/L      Potassium 3.7 mmol/L      Chloride 88 mmol/L      CO2 43.0 mmol/L      Calcium 10.2 mg/dL      eGFR Non African Amer 17 mL/min/1.73      BUN/Creatinine Ratio 34.8     Anion Gap 10.0 mmol/L     Narrative:       GFR Normal >60  Chronic Kidney Disease  <60  Kidney Failure <15    CBC & Differential [[id number]] Collected:  07/21/19 0616    Specimen:  Blood Updated:  07/21/19 0648    Narrative:       The following orders were created for panel order CBC & Differential.  Procedure                               Abnormality         Status                     ---------                               -----------         ------                     CBC Auto Differential[[id number]]        Abnormal            Final result                 Please view results for these tests on the individual orders.    CBC Auto Differential [[id number]]  (Abnormal) Collected:  07/21/19 0616    Specimen:  Blood Updated:  07/21/19 0648     WBC 9.85 10*3/mm3      RBC 3.05 10*6/mm3      Hemoglobin 9.6 g/dL      Hematocrit 30.4 %      MCV 99.7 fL      MCH 31.5 pg      MCHC 31.6 g/dL      RDW 20.9 %      RDW-SD 74.6 fl      MPV 11.5 fL      Platelets 291 10*3/mm3      Neutrophil % 71.6 %      Lymphocyte % 6.5 %      Monocyte % 11.5 %      Eosinophil % 9.3 %      Basophil % 0.5 %      Immature Grans % 0.6 %      Neutrophils, Absolute 7.05 10*3/mm3      Lymphocytes, Absolute 0.64 10*3/mm3      Monocytes, Absolute 1.13 10*3/mm3      Eosinophils, Absolute 0.92 10*3/mm3      Basophils, Absolute 0.05 10*3/mm3      Immature Grans, Absolute 0.06 10*3/mm3      nRBC 0.0 /100 WBC     POC Glucose Once [[id number]]  (Abnormal) Collected:  07/20/19 2036    Specimen:  Blood Updated:  07/20/19 2126     Glucose 163 mg/dL      Comment: RN NotifiedOperator: [id number] BRITTNEY CATEYMeter ID: [id number]       POC Glucose Once [[id number]]  (Normal) Collected:  07/20/19 1653    Specimen:  Blood Updated:  07/20/19 1705     Glucose 122 mg/dL      Comment: RN NotifiedOperator: [id number] ANGY LAMBROJASAkil ID: [id number]               I reviewed the patient's new clinical results.    Assessment/Plan:     Active Hospital Problems    Diagnosis POA   • **Acute on chronic diastolic congestive heart failure (CMS/HCC) [I50.33] Yes      -last echo 6/18/19 shows EF of 46-50% with normal diastolic dysfunction, improved from echo in 2017 which showed grade 1 diastolic dysfunction  -BNP on admission was 8913, CXR shows pulmonary congestion  -on lasix 40 mg daily and metolazone 2.5 mg daily at home   -Following nephrology   -will consult nephrology for recommendations of further diuresis with worsening renal function   -lasix drip and metolazone per nephro  -strict I/Os  -daily weights  -1500 mL fluid restriction, sodium restriction 2g     • Anemia [D64.9] Yes     -H/H on admission 8.9/28.9, trend with daily CBC  -stable  -recent GI bleed requiring transfusion of 1 unit of PRBCs  -on oral iron supplement at home  -continue oral iron supplement  -transfuse for hemoglobin less than 7      • Stage 4 chronic kidney disease (CMS/HCC) [N18.4] Yes     -Baseline creatinine ~ 1.9-2.1  -trend renal function  -follows with Dr. Caputo outpatient  -avoid nephrotoxic agents  -will consult Dr. Caputo as her renal function is continuing to worsen and she is still needing diuresis  -on lasix drip and metolazone per nephro      • SSS (sick sinus syndrome) (CMS/[facility]) [I49.5] Yes     -atrial fibrillation with rate of 85 on EKG in ER  -pacemaker in place  -continue with cardizem  -telemetry  -pharm to dose coumadin      • Essential hypertension [I10] Yes     -continue lasix, cardizem      • Chronic hypoxemic respiratory failure (CMS/[facility]) [J96.11] Yes     -on 3L O2 via nasal cannula at home  -ABG in ER shows pO2 of 60.9, pCO2 of 44.7  -will continue oxygen via nasal cannula at home rate      • Type 2 diabetes mellitus with stage 4 chronic kidney disease, with long-term current use of insulin (CMS/[facility]) [E11.22, N18.4, Z79.4] Not Applicable     -on basaglar 30 units at night  -continue, adjust as necessary      • Emphysema of lung (CMS/[facility]) [J43.9] Yes     -continue with supplemental oxygen 3L  -continue albuterol nebs  -symbicort, duonebs      • Atrial fibrillation  [I48.91] [I48.91] Yes     -EKG in ER shows atrial fibrillation with rate of 85  -pacemaker in place  -continue cardizem  -telemetry  -pharm to dose coumadin          DVT prophylaxis: warfarin  Code status is   Code Status and Medical Interventions:   Ordered at: 07/13/19 1539     Level Of Support Discussed With:    Patient     Code Status:    CPR     Medical Interventions (Level of Support Prior to Arrest):    Full       Plan for disposition: Anticipate discharge back to SNF in 1-2 days    Time:  30 mins    Alfredo Charles Jr., M.D.  PGY2  Family Medicine Resident  [address] [place] [address]  Phone: [phone]  Fax: [phone]      This document has been electronically signed by Alfredo Charles Jr, MD on 07/20/19 9:05 AM

## 2019-07-21 NOTE — PROGRESS NOTES
"[facility]  [address] [place] [address]. [address]  T - [phone]  F - [phone]     Progress Note          PATIENT  DEMOGRAPHICS   PATIENT NAME: Brendon Skelton                      PHYSICIAN: Sandy Caputo MD  : 1945  MRN: [id number]   LOS: 5 days    Patient Care Team:  Josefa Pozo APRN as PCP - General (Nurse Practitioner)  Meme Duckworth APRN as PCP - Claims Attributed  Aby Stout RN as Care Coordinator (Population Health)  Subjective   SUBJECTIVE   Breathing better wt is coming down now       Objective   OBJECTIVE   Vital Signs  Temp:  [97.2 °F (36.2 °C)-98 °F (36.7 °C)] 97.9 °F (36.6 °C)  Heart Rate:  [] 85  Resp:  [16-18] 16  BP: (100-133)/(51-84) 128/82    Flowsheet Rows      First Filed Value   Admission Height  165.1 cm (65\") Documented at 2019 1527   Admission Weight  119 kg (261 lb 6.4 oz) Documented at 2019 1527           I/O last 3 completed shifts:  In: 480 [P.O.:480]  Out: 4100 [Urine:4100]    PHYSICAL EXAM    Physical Exam   Constitutional: She is oriented to person, place, and time. She appears well-developed and well-nourished.   HENT:   Head: Normocephalic and atraumatic.   Eyes: Conjunctivae and EOM are normal. Pupils are equal, round, and reactive to light.   Neck: JVD present.   Cardiovascular: Normal rate and regular rhythm.   Pulmonary/Chest: Effort normal and breath sounds normal.   Abdominal: Soft. She exhibits distension.   Musculoskeletal: She exhibits edema.   Neurological: She is alert and oriented to person, place, and time.   Skin: Skin is warm and dry.       RESULTS   Results Review:    Results from last 7 days   Lab Units 19  0616 19  0730 19  0659   SODIUM mmol/L 141 142 142   POTASSIUM mmol/L 3.7 3.7 3.5   CHLORIDE mmol/L 88* 91* 94*   CO2 mmol/L 43.0* 40.0* 37.0*   BUN mg/dL 96* 98* 94*   CREATININE mg/dL 2.76* 2.74* 2.51*   CALCIUM mg/dL 10.2 9.9 9.5   GLUCOSE mg/dL 55* 97 71 "       Estimated Creatinine Clearance: 22.4 mL/min (A) (by C-G formula based on SCr of 2.76 mg/dL (H)).    Results from last 7 days   Lab Units 07/17/19  0522   MAGNESIUM mg/dL 2.1             Results from last 7 days   Lab Units 07/21/19  0616 07/20/19  0730 07/19/19  0659 07/18/19  0625 07/17/19  0522   WBC 10*3/mm3 9.85 8.83 8.19 9.00 6.87   HEMOGLOBIN g/dL 9.6* 9.1* 8.7* 9.2* 8.6*   PLATELETS 10*3/mm3 291 276 273 237 281       Results from last 7 days   Lab Units 07/21/19  0616 07/20/19  0730 07/19/19  0659 07/18/19  0625 07/17/19  0522   INR  1.64* 1.91* 2.18* 2.10* 2.28*         Imaging Results (last 24 hours)     ** No results found for the last 24 hours. **           MEDICATIONS      aspirin 81 mg Oral Daily   cholecalciferol 2,000 Units Oral Daily   citalopram 20 mg Oral Daily   diltiaZEM  mg Oral Daily   ezetimibe 10 mg Oral Daily   famotidine 20 mg Oral Daily   ferrous sulfate 324 mg Oral Daily With Breakfast   guaiFENesin 600 mg Oral Q12H   insulin aspart 0-7 Units Subcutaneous 4x Daily AC & at Bedtime   Insulin Glargine 30 Units Subcutaneous Q24H   ipratropium-albuterol 3 mL Nebulization Q8H - RT   [START ON 7/22/2019] metOLazone 2.5 mg Oral Every Other Day   nystatin  Topical Q12H   Pen Needles 1 each Does not apply Q24H   potassium chloride 20 mEq Oral BID With Meals   prenatal vitamin 27-0.8 1 tablet Oral Daily   rOPINIRole 0.25 mg Oral Nightly   sodium chloride 3 mL Intravenous Q12H   traZODone 300 mg Oral Nightly   vitamin C with bob hips 500 mg Oral Daily   warfarin 3 mg Oral Daily       furosemide (LASIX) infusion 1 mg/mL 5 mg/hr Last Rate: 5 mg/hr (07/20/19 1611)   Pharmacy to dose warfarin         Assessment/Plan   ASSESSMENT / PLAN      Acute on chronic diastolic congestive heart failure (CMS/HCC)    Atrial fibrillation [I48.91]    Emphysema of lung (CMS/HCC)    Type 2 diabetes mellitus with stage 4 chronic kidney disease, with long-term current use of insulin (CMS/HCC)    Chronic  hypoxemic respiratory failure (CMS/HCC)    Essential hypertension    SSS (sick sinus syndrome) (CMS/HCC)    Stage 4 chronic kidney disease (CMS/HCC)    Anemia    1.CKD stage 4: -Baseline creatinine = 1.9-2.1. Cr now 2.7 and contraction alkalosis - lost 18 lbs so far, change iv lasix drip to iv bumex and may discharge in 1-2 days with po bumex. Keep metolazone. On KCl , Mg normal.     2. Anemia: Hemoglobin stable, continue iron     3. Acute on Chronic Diastolic heart failure : Patient is still needing diuresis.  Last echo 6/18/19 showed EF 46-50%, BNP on admission was 8913, CXR shows pulmonary congestion.   -diuretics as above  -strict I/Os  -daily weights  -1500 mL fluid restriction, sodium restriction 2g    4. Chronic hypoxemic respiratory failure     5. SSS- pacemaker in place     6. Essential hypertension     7. Type 2 Diabetes Mellitus- with stage 4 chronic kidney disease and long-term insulin use.     8. Emphysema of lung           This document has been electronically signed by Sandy Caputo MD on July 21, 2019 10:41 AM

## 2019-07-21 NOTE — PLAN OF CARE
Problem: Fall Risk (Adult)  Goal: Absence of Fall  Outcome: Ongoing (interventions implemented as appropriate)      Problem: Cardiac: Heart Failure (Adult)  Goal: Signs and Symptoms of Listed Potential Problems Will be Absent, Minimized or Managed (Cardiac: Heart Failure)  Outcome: Ongoing (interventions implemented as appropriate)      Problem: Patient Care Overview  Goal: Individualization and Mutuality  Outcome: Ongoing (interventions implemented as appropriate)    Goal: Discharge Needs Assessment  Outcome: Ongoing (interventions implemented as appropriate)   07/14/19 1328   Discharge Needs Assessment   Readmission Within the Last 30 Days no previous admission in last 30 days   Concerns to be Addressed denies needs/concerns at this time   Patient/Family Anticipates Transition to home with help/services  (Patient reports that her son and daughter-in-law will be assisting her at d/c. )   Patient/Family Anticipated Services at Transition home health care   Transportation Concerns car, none   Transportation Anticipated family or friend will provide;other (see comments)  (Patient voiced that she uses [facility] for MD appointments. Patient stated that her son will pick her up at d/c. She voiced that he does not own a vehicle but will use a friend's vehicle at d/c. )   Anticipated Changes Related to Illness none   Equipment Needed After Discharge none   Discharge Facility/Level of Care Needs home with home health   Offered/Gave Vendor List yes   Patient's [facility] of Community Agency([facility]) [facility]    Current Discharge Risk chronically ill   Disability   Equipment Currently Used at Home glucometer;nebulizer;oxygen;other (see comments);shower chair  (Patient has home/portable oxygen provided by [employer]. MICHELE confirmed with patient that home/portable oxygen is available for use at d/c. )       Problem: Skin Injury Risk (Adult)  Goal: Skin Health and Integrity  Outcome: Ongoing (interventions implemented as  appropriate)

## 2019-07-21 NOTE — PROGRESS NOTES
"Anticoagulation by Pharmacy - Warfarin    Brendon Skelton is a 73 y.o.female  [Ht: 165.1 cm (65\"); Wt: 110 kg (243 lb)] on Warfarin 3 mg for indication of atrial fibrillation .    Goal INR: 2-3  Home regimen: 4 mg nightly (resulted in elevated INR)      Today's INR:   Lab Results   Component Value Date    INR 1.64 (H) 07/21/2019         Lab Results   Component Value Date    INR 1.64 (H) 07/21/2019    INR 1.91 (H) 07/20/2019    INR 2.18 (H) 07/19/2019    PROTIME 19.2 (H) 07/21/2019    PROTIME 21.6 (H) 07/20/2019    PROTIME 24.0 (H) 07/19/2019     Lab Results   Component Value Date    HGB 9.6 (L) 07/21/2019    HGB 9.1 (L) 07/20/2019    HGB 8.7 (L) 07/19/2019     Lab Results   Component Value Date    HCT 30.4 (L) 07/21/2019    HCT 29.7 (L) 07/20/2019    HCT 28.1 (L) 07/19/2019     Lab Results   Component Value Date     07/21/2019     07/20/2019     07/19/2019       Recent Warfarin Administrations       The 5 most recent administrations since 07/14/2019 are shown below each listed medication.    Warfarin Sodium         Order Dose Date Given     warfarin (COUMADIN) tablet 3 mg 3 mg 07/20/2019     warfarin (COUMADIN) tablet 2 mg 2 mg 07/19/2019      2 mg 07/18/2019      2 mg 07/17/2019     warfarin (COUMADIN) tablet 1 mg 1 mg 07/16/2019                      Assessment/Plan:  Reviewed above labs. INR is 1.64.  INR is below goal and decreasing.   H/H stable.  Interacting medications include aspirin and citalopram.  Dose was increased yesterday.   Patient did receive dose of warfarin last night.    Will continue current dose of 3 mg for trend.  INR has been subtherapeutic for 2 days - recommend adding a bridging agent.    Rx will continue to follow and adjust dose accordingly.        Sarah Dennis [facility]  07/21/19 11:27 AM       "

## 2019-07-21 NOTE — PLAN OF CARE
Problem: Patient Care Overview  Goal: Plan of Care Review  Outcome: Ongoing (interventions implemented as appropriate)   07/21/19 1492   Coping/Psychosocial   Plan of Care Reviewed With patient   Plan of Care Review   Progress no change   OTHER   Outcome Summary pt was agreeable to ue ex while sitting eob, required frequent rest breaks and extra time to rest. pt declined bathing and dressing, cont poc

## 2019-07-21 NOTE — THERAPY TREATMENT NOTE
Acute Care - Occupational Therapy Treatment Note  [facility]     Patient Name: Brendon Skelton  : 1945  MRN: [id number]  Today's Date: 2019  Onset of Illness/Injury or Date of Surgery: 19  Date of Referral to OT: 19  Referring Physician: MONICA Romero MD    Admit Date: 2019       ICD-10-CM ICD-9-CM   1. Acute on chronic congestive heart failure, unspecified heart failure type (CMS/HCC) I50.9 428.0   2. COPD exacerbation (CMS/[facility]) J44.1 491.21   3. Impaired physical mobility Z74.09 781.99   4. Impaired mobility and activities of daily living Z74.09 799.89     Patient Active Problem List   Diagnosis   • Long term current use of anticoagulant therapy   • Personal history of heart valve replacement   • Atrial fibrillation [I48.91]   • Emphysema of lung (CMS/[facility])   • On anticoagulant therapy   • Hyperlipidemia   • Diastolic heart failure (CMS/[facility])   • Depressive disorder   • COPD (chronic obstructive pulmonary disease) (CMS/[facility])   • Type 2 diabetes mellitus with stage 4 chronic kidney disease, with long-term current use of insulin (CMS/[facility])   • Myopia   • Astigmatism   • Bleeding from open wound of chest wall   • Follow-up surgery care   • Encounter for screening for malignant neoplasm of colon   • Positive colorectal cancer screening using DNA-based stool test   • Nodule of left lung   • Chronic hypoxemic respiratory failure (CMS/HCC)   • Physical deconditioning   • Heart failure with preserved left ventricular function (HFpEF) (CMS/[facility])   • Essential hypertension   • Coronary artery disease involving native coronary artery without angina pectoris   • Hx of CABG   • SSS (sick sinus syndrome) (CMS/HCC)   • Pacemaker   • Stage 4 chronic kidney disease (CMS/HCC)   • Personal history of tobacco use, presenting hazards to health   • Gastrointestinal hemorrhage   • Morbid obesity (CMS/HCC)   • Gastritis   • Colon polyp   • Coumadin toxicity   • Acute on chronic diastolic congestive heart  failure (CMS/[facility])   • Anemia     Past Medical History:   Diagnosis Date   • Acute bronchitis    • Anxiety    • Atrial fibrillation (CMS/[facility])    • C. difficile colitis    • Callosity     under metatarsal head      • Cardiac pacemaker in situ    • CHF (congestive heart failure) (CMS/[facility])    • Chronic obstructive lung disease (CMS/HCC)    • Corns and callus    • Depressive disorder    • Diabetes mellitus (CMS/[facility])    • Diastolic heart failure (CMS/[facility])    • Essential hypertension    • Foot pain    • Hyperlipidemia    • Long term current use of anticoagulant    • On anticoagulant therapy    • Pulmonary emphysema (CMS/[facility])    • Rectal hemorrhage    • Type 2 diabetes mellitus (CMS/[facility])      Past Surgical History:   Procedure Laterality Date   • AORTIC VALVE REPAIR/REPLACEMENT  1999   • CARDIAC ELECTROPHYSIOLOGY PROCEDURE N/A 3/20/2017    Procedure: PPM generator change - dual;  Surgeon: Bereket Gates MD;  Location: [facility] CATH INVASIVE LOCATION;  Service:    • CARDIAC PACEMAKER PLACEMENT  1999   • CATARACT EXTRACTION W/ INTRAOCULAR LENS IMPLANT Left 2/1/2019    Procedure: REMOVE CATARACT AND IMPLANT INTRAOCULAR LENS I;  Surgeon: Jose L Clay MD;  Location: [facility] OR;  Service: Ophthalmology   • CATARACT EXTRACTION W/ INTRAOCULAR LENS IMPLANT Right 2/8/2019    Procedure: REMOVE CATARACT AND IMPLANT  INTRAOCULAR LENS;  Surgeon: Jose L Clay MD;  Location: [facility] OR;  Service: Ophthalmology   • COLONOSCOPY N/A 6/19/2019    Procedure: COLONOSCOPY WITH CONTROL OF BLEED;  Surgeon: Alfredo Guerrero MD;  Location: [facility] ENDOSCOPY;  Service: Gastroenterology   • COLONOSCOPY N/A 6/24/2019    Procedure: COLONOSCOPY;  Surgeon: Alfredo Guerrero MD;  Location: [facility] ENDOSCOPY;  Service: Gastroenterology   • ECHO - CONVERTED  11/12/2013    There is mild to moderate left atrial enlargement EF 50-55%   • ENDOSCOPY N/A 6/19/2019    Procedure: ESOPHAGOGASTRODUODENOSCOPY WITH CONTROL OF BLEED;  Surgeon: Yolanda  Alfredo MARIN MD;  Location: [facility] ENDOSCOPY;  Service: Gastroenterology   • FOOT SURGERY  1990   • OTHER SURGICAL HISTORY  10/26/2015    PARING CORN/CALLUS    • PACEMAKER REPLACEMENT N/A 3/21/2017    Procedure: revision pacemaker pocket, evacuation hematoma, control of bleeding;  Surgeon: Polo Feliz MD;  Location: [facility] OR;  Service:    • TRANSESOPHAGEAL ECHOCARDIOGRAM (MITZY)  05/01/2014    With color flow-Mild left atrial enlargement with mild concentric LV hypertrophy with top normal aortic root size. EF 45-50%. Mild mitral regurgitation and mild aortic insufficiency and trivial amount of tricuspid regurgation   • TUBAL ABDOMINAL LIGATION         Therapy Treatment    Rehabilitation Treatment Summary     Row Name 07/21/19 1350             Treatment Time/Intention    Total Minutes, Occupational Therapy Treatment  23  -RC      Therapy Frequency (OT Eval)  -- 5-7 days per wek   -      Patient Effort  good  -RC      Comment  declined amb/transfers  -RC      Recorded by [RC] Fiorella Broderick, COLÓN/L 07/21/19 1426      Row Name 07/21/19 1350             Vital Signs    Post Treatment Diastolic BP  --  -RC      Pretreatment Heart Rate (beats/min)  70  -RC      Posttreatment Heart Rate (beats/min)  82  -RC      Pre SpO2 (%)  96  -RC2      O2 Delivery Pre Treatment  supplemental O2  -RC2      Intra SpO2 (%)  92  -RC2      O2 Delivery Intra Treatment  supplemental O2  -RC2      Post SpO2 (%)  95  -RC3      O2 Delivery Post Treatment  supplemental O2  -RC3      Recorded by [RC] Fiorella Broderick, COLÓN/L 07/21/19 1426  [RC2] Fiorella Broderick, COLÓN/L 07/21/19 1403  [RC3] Fiorella Broderick, COLÓN/L 07/21/19 1412      Row Name 07/21/19 1350             Bed Mobility Assessment/Treatment    Sit-Supine [place] (Bed Mobility)  conditional independence  -RC      Recorded by [RC] Fiorella Broderick COLÓN/L 07/21/19 1426      Row Name 07/21/19 1350             Bathing Assessment/Intervention    Comment (Bathing)  declines  bathing and dressing   -RC      Recorded by [RC] Fiorella Broderick COTA/L 07/21/19 1426      Row Name 07/21/19 1350             Therapeutic Exercise    Upper Extremity Range of Motion (Therapeutic Exercise)  shoulder flexion/extension, bilateral;shoulder internal/external rotation, bilateral;elbow flexion/extension, bilateral  -RC      Weight/Resistance (Therapeutic Exercise)  -- 1-2 lb as ata pt c/o pain in l forearm from bruise   -RC2      Exercise Type (Therapeutic Exercise)  resistive exercises  -RC2      Sets/Reps (Therapeutic Exercise)  20  -RC2      Expected Outcome (Therapeutic Exercise)  improve functional tolerance, self-care activity;improve performance, BADLs  -RC2      Recorded by [RC] Fiorelal Broderick COTA/L 07/21/19 1412  [RC2] Fiorella Broderick COTA/L 07/21/19 1426      Row Name 07/21/19 1350             Positioning and Restraints    Pre-Treatment Position  -- eob   -RC      Post Treatment Position  bed  -RC      In Bed  fowlers;call light within reach;encouraged to call for assist did not change setting on bed alarm   -RC      Recorded by [RC] Fiorella Broderick COTA/L 07/21/19 1426      Row Name 07/21/19 1350             Pain Scale: Numbers Pre/Post-Treatment    Pain Scale: Numbers, Pretreatment  0/10 - no pain  -RC      Pain Scale: Numbers, Post-Treatment  0/10 - no pain  -RC      Recorded by [RC] Fiorella Broderick COTA/L 07/21/19 1426      Row Name 07/21/19 1350             Outcome Summary/Treatment Plan (OT)    Daily Summary of Progress (OT)  progress toward functional goals as expected  -      Plan for Continued Treatment (OT)  cont poc   -RC      Recorded by [RC] Fiorella Broderick COTA/DANDRE 07/21/19 1426        User Key  (r) = Recorded By, (t) = Taken By, (c) = Cosigned By    Initials Name Effective Dates Discipline     Fiorella Broderick COTA/L 03/07/18 -  OT           Rehab Goal Summary     Row Name 07/21/19 1350             Occupational Therapy Goals    Transfer Goal Selection (OT)   transfer, OT goal 1  -RC      Bathing Goal Selection (OT)  bathing, OT goal 1  -RC      Dressing Goal Selection (OT)  dressing, OT goal 1  -RC      Activity Tolerance Goal Selection (OT)  activity tolerance, OT goal 1  -RC         Transfer Goal 1 (OT)    Activity/Assistive Device (Transfer Goal 1, OT)  transfers, all  -RC      [place] Level/Cues Needed (Transfer Goal 1, OT)  conditional independence  -RC      Time Frame (Transfer Goal 1, OT)  long term goal (LTG)  -RC      Progress/Outcome (Transfer Goal 1, OT)  goal met;continuing progress toward goal  -RC         Bathing Goal 1 (OT)    Activity/Assistive Device (Bathing Goal 1, OT)  bathing skills, all  -RC      [place] Level/Cues Needed (Bathing Goal 1, OT)  contact guard assist With 02 90% or above.  -RC      Time Frame (Bathing Goal 1, OT)  long term goal (LTG)  -RC      Progress/Outcomes (Bathing Goal 1, OT)  goal met;continuing progress toward goal  -RC         Dressing Goal 1 (OT)    Activity/Assistive Device (Dressing Goal 1, OT)  dressing skills, all  -RC      [place]/Cues Needed (Dressing Goal 1, OT)  contact guard assist with 02 90% or above.  -RC      Time Frame (Dressing Goal 1, OT)  long term goal (LTG)  -RC      Progress/Outcome (Dressing Goal 1, OT)  goal not met;continuing progress toward goal  -RC         Toileting Goal 1 (OT)    Activity/Device (Toileting Goal 1, OT)  toileting skills, all  -RC      [place] Level/Cues Needed (Toileting Goal 1, OT)  conditional independence  -RC      Time Frame (Toileting Goal 1, OT)  long term goal (LTG)  -RC      Progress/Outcome (Toileting Goal 1, OT)  goal met;continuing progress toward goal  -RC          Activity Tolerance Goal 1 (OT)    Activity Level (Endurance Goal 1, OT)  20 min activity;O2 sat >/ equal to 88%  -RC      Progress/Outcome (Activity Tolerance Goal 1, OT)  goal not met;continuing progress toward goal  -RC         Patient Education Goal (OT)    Activity (Patient Education  Goal, OT)  Home safety/fall prev and EC/WS.  -RC      [place]/Cues/Accuracy (Memory Goal 2, OT)  demonstrates adequately;verbalizes understanding  -RC      Time Frame (Patient Education Goal, OT)  long term goal (LTG)  -RC      Progress/Outcome (Patient Education Goal, OT)  goal not met;continuing progress toward goal  -        User Key  (r) = Recorded By, (t) = Taken By, (c) = Cosigned By    Initials Name Provider Type Discipline    Fiorella Kennedy COTA/L Occupational Therapy Assistant OT        Occupational Therapy Education     Title: PT OT SLP Therapies (In Progress)     Topic: Occupational Therapy (In Progress)     Point: ADL training (Done)     Description: Instruct learner(s) on proper safety adaptation and remediation techniques during self care or transfers.   Instruct in proper use of assistive devices.    Learning Progress Summary           Patient Acceptance, E, VU,DU by FRANCES at 7/16/2019  2:53 PM                   Point: Precautions (In Progress)     Description: Instruct learner(s) on prescribed precautions during self-care and functional transfers.    Learning Progress Summary           Patient Acceptance, E, NR by RC at 7/20/2019  2:31 PM    Acceptance, E, VU by VANESSA at 7/15/2019 11:31 AM    Comment:  Reviewed use of non skid socks when OOB and use of gait belt for distance ambulation.    Acceptance, E, VU by VANESSA at 7/14/2019  2:02 PM    Comment:  Edu pt on  use of non skid socks when OOB.                   Point: Body mechanics (In Progress)     Description: Instruct learner(s) on proper positioning and spine alignment during self-care, functional mobility activities and/or exercises.    Learning Progress Summary           Patient Acceptance, E, NR by  at 7/20/2019  2:31 PM                               User Key     Initials Effective Dates Name Provider Type Discipline     06/15/16 -  Beto Carney, OT Occupational Therapist OT    BB 03/07/18 -  Selena Simpson COTA/DANDER Occupational  Therapy Assistant OT     03/07/18 -  Fiorella Broderick COTA/L Occupational Therapy Assistant OT                OT Recommendation and Plan  Outcome Summary/Treatment Plan (OT)  Daily Summary of Progress (OT): progress toward functional goals as expected  Barriers to Overall Progress (OT): SOA   Plan for Continued Treatment (OT): cont poc   Therapy Frequency (OT Eval): (5-7 days per wek )  Daily Summary of Progress (OT): progress toward functional goals as expected  Plan of Care Review  Plan of Care Reviewed With: patient  Plan of Care Reviewed With: patient  Outcome Summary: pt was agreeable to ue ex while sitting eob, required frequent rest breaks and extra time to rest. pt declined bathing and dressing,  cont poc   Outcome Measures     Row Name 07/21/19 1350 07/20/19 1450 07/20/19 1047       How much help from another person do you currently need...    Turning from your back to your side while in flat bed without using bedrails?  --  4  -LN  --    Moving from lying on back to sitting on the side of a flat bed without bedrails?  --  4  -LN  --    Moving to and from a bed to a chair (including a wheelchair)?  --  4  -LN  --    Standing up from a chair using your arms (e.g., wheelchair, bedside chair)?  --  4  -LN  --    Climbing 3-5 steps with a railing?  --  3  -LN  --    To walk in hospital room?  --  3  -LN  --    AM-PAC 6 Clicks Score (PT)  --  22  -LN  --       How much help from another is currently needed...    Putting on and taking off regular lower body clothing?  3  -RC  --  3  -RC    Bathing (including washing, rinsing, and drying)  3  -RC  --  3  -RC    Toileting (which includes using toilet bed pan or urinal)  4  -RC  --  4  -RC    Putting on and taking off regular upper body clothing  4  -RC  --  4  -RC    Taking care of personal grooming (such as brushing teeth)  4  -RC  --  4  -RC    Eating meals  4  -RC  --  4  -RC    AM-PAC 6 Clicks Score (OT)  22  -RC  --  22  -RC       Functional Assessment     Outcome Measure Options  --  AM-PAC 6 Clicks Basic Mobility (PT)  -LN  --    Row Name 07/19/19 1335 07/19/19 1030          How much help from another person do you currently need...    Turning from your back to your side while in flat bed without using bedrails?  --  4  -TW     Moving from lying on back to sitting on the side of a flat bed without bedrails?  --  4  -TW     Moving to and from a bed to a chair (including a wheelchair)?  --  4  -TW     Standing up from a chair using your arms (e.g., wheelchair, bedside chair)?  --  4  -TW     Climbing 3-5 steps with a railing?  --  3  -TW     To walk in hospital room?  --  3  -TW     AM-PAC 6 Clicks Score (PT)  --  22  -TW        How much help from another is currently needed...    Putting on and taking off regular lower body clothing?  3  -RC  --     Bathing (including washing, rinsing, and drying)  3  -RC  --     Toileting (which includes using toilet bed pan or urinal)  4  -RC  --     Putting on and taking off regular upper body clothing  4  -RC  --     Taking care of personal grooming (such as brushing teeth)  4  -RC  --     Eating meals  4  -RC  --     AM-PAC 6 Clicks Score (OT)  22  -RC  --        Functional Assessment    Outcome Measure Options  --  AM-PAC 6 Clicks Basic Mobility (PT)  -TW       User Key  (r) = Recorded By, (t) = Taken By, (c) = Cosigned By    Initials Name Provider Type    LN Cecy Lara PTA Physical Therapy Assistant    TW Reece Rocha PTA Physical Therapy Assistant    RC Fiorella Broderick COTA/L Occupational Therapy Assistant           Time Calculation:   Time Calculation- OT     Row Name 07/21/19 1419             Time Calculation- OT    OT Start Time  1350  -RC      OT Stop Time  1413  -      OT Time Calculation (min)  23 min  -      Total Timed Code Minutes- OT  23 minute(s)  -      OT Received On  07/21/19  -        User Key  (r) = Recorded By, (t) = Taken By, (c) = Cosigned By    Initials Name Provider Type      Meggan Fiorella G, COLÓN/L Occupational Therapy Assistant        Therapy Charges for Today     Code Description Service Date Service Provider Modifiers Qty    55739252402 HC OT THERAPEUTIC ACT EA 15 MIN 7/20/2019 Fiorella Broderick, COLÓN/L GO 2    [id number]  OT THER PROC EA 15 MIN 7/21/2019 Joanna Brodericka ESPINOZA, COLÓN/L GO 1    72256881467 HC OT THERAPEUTIC ACT EA 15 MIN 7/21/2019 Meggan Fiorella G, COLÓN/L GO 1               Fiorellaligia Broderick COLÓN/L  7/21/2019

## 2019-07-22 VITALS
HEART RATE: 90 BPM | TEMPERATURE: 98.1 F | HEIGHT: 65 IN | SYSTOLIC BLOOD PRESSURE: 118 MMHG | WEIGHT: 244.4 LBS | OXYGEN SATURATION: 90 % | BODY MASS INDEX: 40.72 KG/M2 | DIASTOLIC BLOOD PRESSURE: 56 MMHG | RESPIRATION RATE: 16 BRPM

## 2019-07-22 LAB
ANION GAP SERPL CALCULATED.3IONS-SCNC: 11 MMOL/L (ref 5–15)
BASOPHILS # BLD AUTO: 0.04 10*3/MM3 (ref 0–0.2)
BASOPHILS NFR BLD AUTO: 0.5 % (ref 0–1.5)
BUN BLD-MCNC: 94 MG/DL (ref 8–23)
BUN/CREAT SERPL: 33.1 (ref 7–25)
CALCIUM SPEC-SCNC: 9.3 MG/DL (ref 8.6–10.5)
CHLORIDE SERPL-SCNC: 90 MMOL/L (ref 98–107)
CO2 SERPL-SCNC: 42 MMOL/L (ref 22–29)
CREAT BLD-MCNC: 2.84 MG/DL (ref 0.57–1)
DEPRECATED RDW RBC AUTO: 73.5 FL (ref 37–54)
EOSINOPHIL # BLD AUTO: 0.88 10*3/MM3 (ref 0–0.4)
EOSINOPHIL NFR BLD AUTO: 10.2 % (ref 0.3–6.2)
ERYTHROCYTE [DISTWIDTH] IN BLOOD BY AUTOMATED COUNT: 20.6 % (ref 12.3–15.4)
GFR SERPL CREATININE-BSD FRML MDRD: 16 ML/MIN/1.73
GLUCOSE BLD-MCNC: 68 MG/DL (ref 65–99)
GLUCOSE BLDC GLUCOMTR-MCNC: 120 MG/DL (ref 70–130)
GLUCOSE BLDC GLUCOMTR-MCNC: 73 MG/DL (ref 70–130)
HCT VFR BLD AUTO: 30 % (ref 34–46.6)
HGB BLD-MCNC: 9.3 G/DL (ref 12–15.9)
IMM GRANULOCYTES # BLD AUTO: 0.04 10*3/MM3 (ref 0–0.05)
IMM GRANULOCYTES NFR BLD AUTO: 0.5 % (ref 0–0.5)
INR PPP: 1.86 (ref 0.8–1.2)
LYMPHOCYTES # BLD AUTO: 0.44 10*3/MM3 (ref 0.7–3.1)
LYMPHOCYTES NFR BLD AUTO: 5.1 % (ref 19.6–45.3)
MCH RBC QN AUTO: 31.2 PG (ref 26.6–33)
MCHC RBC AUTO-ENTMCNC: 31 G/DL (ref 31.5–35.7)
MCV RBC AUTO: 100.7 FL (ref 79–97)
MONOCYTES # BLD AUTO: 0.87 10*3/MM3 (ref 0.1–0.9)
MONOCYTES NFR BLD AUTO: 10.1 % (ref 5–12)
NEUTROPHILS # BLD AUTO: 6.37 10*3/MM3 (ref 1.7–7)
NEUTROPHILS NFR BLD AUTO: 73.6 % (ref 42.7–76)
NRBC BLD AUTO-RTO: 0 /100 WBC (ref 0–0.2)
PLATELET # BLD AUTO: 267 10*3/MM3 (ref 140–450)
PMV BLD AUTO: 12.2 FL (ref 6–12)
POTASSIUM BLD-SCNC: 3.4 MMOL/L (ref 3.5–5.2)
PROTHROMBIN TIME: 21.2 SECONDS (ref 11.1–15.3)
RBC # BLD AUTO: 2.98 10*6/MM3 (ref 3.77–5.28)
SODIUM BLD-SCNC: 143 MMOL/L (ref 136–145)
WBC NRBC COR # BLD: 8.64 10*3/MM3 (ref 3.4–10.8)

## 2019-07-22 PROCEDURE — 94799 UNLISTED PULMONARY SVC/PX: CPT

## 2019-07-22 PROCEDURE — 97535 SELF CARE MNGMENT TRAINING: CPT

## 2019-07-22 PROCEDURE — 94760 N-INVAS EAR/PLS OXIMETRY 1: CPT

## 2019-07-22 PROCEDURE — 85025 COMPLETE CBC W/AUTO DIFF WBC: CPT | Performed by: STUDENT IN AN ORGANIZED HEALTH CARE EDUCATION/TRAINING PROGRAM

## 2019-07-22 PROCEDURE — 82962 GLUCOSE BLOOD TEST: CPT

## 2019-07-22 PROCEDURE — 80048 BASIC METABOLIC PNL TOTAL CA: CPT | Performed by: STUDENT IN AN ORGANIZED HEALTH CARE EDUCATION/TRAINING PROGRAM

## 2019-07-22 PROCEDURE — 85610 PROTHROMBIN TIME: CPT | Performed by: STUDENT IN AN ORGANIZED HEALTH CARE EDUCATION/TRAINING PROGRAM

## 2019-07-22 PROCEDURE — 97530 THERAPEUTIC ACTIVITIES: CPT

## 2019-07-22 PROCEDURE — 99232 SBSQ HOSP IP/OBS MODERATE 35: CPT | Performed by: STUDENT IN AN ORGANIZED HEALTH CARE EDUCATION/TRAINING PROGRAM

## 2019-07-22 RX ORDER — METOLAZONE 5 MG/1
5 TABLET ORAL DAILY
Qty: 30 TABLET | Refills: 0 | Status: SHIPPED | OUTPATIENT
Start: 2019-07-22 | End: 2019-08-22 | Stop reason: SDUPTHER

## 2019-07-22 RX ORDER — POTASSIUM CHLORIDE 750 MG/1
20 CAPSULE, EXTENDED RELEASE ORAL 2 TIMES DAILY WITH MEALS
Qty: 60 CAPSULE | Refills: 0 | Status: SHIPPED | OUTPATIENT
Start: 2019-07-22 | End: 2019-09-24 | Stop reason: DRUGHIGH

## 2019-07-22 RX ORDER — ONDANSETRON 4 MG/1
4 TABLET, FILM COATED ORAL EVERY 6 HOURS PRN
Qty: 30 TABLET | Refills: 0 | Status: SHIPPED | OUTPATIENT
Start: 2019-07-22

## 2019-07-22 RX ORDER — METOLAZONE 5 MG/1
5 TABLET ORAL DAILY
Status: DISCONTINUED | OUTPATIENT
Start: 2019-07-23 | End: 2019-07-22 | Stop reason: HOSPADM

## 2019-07-22 RX ORDER — BUMETANIDE 2 MG/1
2 TABLET ORAL 2 TIMES DAILY
Qty: 60 TABLET | Refills: 0 | Status: SHIPPED | OUTPATIENT
Start: 2019-07-22 | End: 2019-08-02 | Stop reason: SDUPTHER

## 2019-07-22 RX ORDER — METOPROLOL SUCCINATE 25 MG/1
25 TABLET, EXTENDED RELEASE ORAL DAILY
Qty: 30 TABLET | Refills: 0 | Status: SHIPPED | OUTPATIENT
Start: 2019-07-22 | End: 2019-08-22 | Stop reason: SDUPTHER

## 2019-07-22 RX ADMIN — NYSTATIN: 100000 POWDER TOPICAL at 08:09

## 2019-07-22 RX ADMIN — VITAMIN D, TAB 1000IU (100/BT) 2000 UNITS: 25 TAB at 08:03

## 2019-07-22 RX ADMIN — PRENATAL VIT W/ FE FUMARATE-FA TAB 27-0.8 MG 1 TABLET: 27-0.8 TAB at 08:03

## 2019-07-22 RX ADMIN — POTASSIUM CHLORIDE 20 MEQ: 750 CAPSULE, EXTENDED RELEASE ORAL at 08:03

## 2019-07-22 RX ADMIN — OXYCODONE HYDROCHLORIDE AND ACETAMINOPHEN 500 MG: 500 TABLET ORAL at 08:03

## 2019-07-22 RX ADMIN — EZETIMIBE 10 MG: 10 TABLET ORAL at 08:03

## 2019-07-22 RX ADMIN — CITALOPRAM HYDROBROMIDE 20 MG: 20 TABLET ORAL at 08:03

## 2019-07-22 RX ADMIN — METOLAZONE 2.5 MG: 2.5 TABLET ORAL at 08:03

## 2019-07-22 RX ADMIN — BUMETANIDE 2 MG: 0.25 INJECTION INTRAMUSCULAR; INTRAVENOUS at 06:00

## 2019-07-22 RX ADMIN — FERROUS SULFATE TAB EC 324 MG (65 MG FE EQUIVALENT) 324 MG: 324 (65 FE) TABLET DELAYED RESPONSE at 08:03

## 2019-07-22 RX ADMIN — DILTIAZEM HYDROCHLORIDE 240 MG: 240 CAPSULE, COATED, EXTENDED RELEASE ORAL at 08:03

## 2019-07-22 RX ADMIN — ASPIRIN 81 MG 81 MG: 81 TABLET ORAL at 08:03

## 2019-07-22 RX ADMIN — FAMOTIDINE 20 MG: 20 TABLET ORAL at 08:03

## 2019-07-22 RX ADMIN — IPRATROPIUM BROMIDE AND ALBUTEROL SULFATE 3 ML: 2.5; .5 SOLUTION RESPIRATORY (INHALATION) at 07:37

## 2019-07-22 RX ADMIN — SODIUM CHLORIDE, PRESERVATIVE FREE 3 ML: 5 INJECTION INTRAVENOUS at 08:02

## 2019-07-22 RX ADMIN — GUAIFENESIN 600 MG: 600 TABLET, EXTENDED RELEASE ORAL at 08:02

## 2019-07-22 NOTE — PLAN OF CARE
Problem: Fall Risk (Adult)  Goal: Absence of Fall  Outcome: Ongoing (interventions implemented as appropriate)      Problem: Cardiac: Heart Failure (Adult)  Goal: Signs and Symptoms of Listed Potential Problems Will be Absent, Minimized or Managed (Cardiac: Heart Failure)  Outcome: Ongoing (interventions implemented as appropriate)      Problem: Patient Care Overview  Goal: Plan of Care Review  Outcome: Ongoing (interventions implemented as appropriate)   07/22/19 0520   Coping/Psychosocial   Plan of Care Reviewed With patient   Plan of Care Review   Progress no change       Problem: Skin Injury Risk (Adult)  Goal: Skin Health and Integrity  Outcome: Ongoing (interventions implemented as appropriate)

## 2019-07-22 NOTE — PROGRESS NOTES
FAMILY MEDICINE DAILY PROGRESS NOTE  NAME: Brendon Skelton  : 1945  MRN: [id number]      LOS: 6 days     PROVIDER OF SERVICE: Kenna Chawla MD    Chief Complaint: Acute on chronic diastolic congestive heart failure (CMS/HCC)    Subjective:     Interval History:  History taken from: patient chart     No acute events overnight.  Back on home O2 of 3L NC.  At base weight. States she feels like she is breathing better. Overall doing well. Per nephro, would like another 1-2 days of IV diuretics before discharge. Patient would like to go home with home health instead of back to SNF at discharge.    Review of Systems:   Review of Systems   Constitutional: Negative for activity change, appetite change, chills, fatigue and fever.   HENT: Negative for sneezing, sore throat and trouble swallowing.    Eyes: Negative for visual disturbance.   Respiratory: Positive for shortness of breath (chronic, improving). Negative for cough and chest tightness.    Cardiovascular: Positive for leg swelling (improving). Negative for chest pain and palpitations.   Gastrointestinal: Positive for abdominal distention (improving). Negative for abdominal pain, blood in stool, constipation, diarrhea, nausea and vomiting.   Genitourinary: Negative for difficulty urinating and dysuria.   Musculoskeletal: Negative for arthralgias and back pain.   Skin: Negative for pallor and rash.   Allergic/Immunologic: Negative for environmental allergies and food allergies.   Neurological: Negative for dizziness, light-headedness, numbness and headaches.   Psychiatric/Behavioral: Negative for agitation, confusion, hallucinations and suicidal ideas.       Objective:     Vital Signs  Temp:  [97.6 °F (36.4 °C)-98.2 °F (36.8 °C)] 97.6 °F (36.4 °C)  Heart Rate:  [] 83  Resp:  [16-18] 18  BP: ()/(51-89) 126/69  Body mass index is 40.67 kg/m².        19  0451 19  0300   Weight: 110 kg (243 lb) 111 kg (244 lb 6.4 oz)         Physical Exam  Physical Exam   Constitutional: She is oriented to person, place, and time. She appears well-developed and well-nourished. No distress.   HENT:   Head: Normocephalic and atraumatic.   Right Ear: External ear normal.   Left Ear: External ear normal.   Neck: Normal range of motion. Neck supple.   Cardiovascular: Normal rate, regular rhythm and normal heart sounds.   Pulmonary/Chest: No respiratory distress. She has no wheezes. She has no rales.   Diminished breath sounds, pursed lip breathing; better air movement throughout all lung fields   Abdominal: Soft. Bowel sounds are normal. She exhibits no distension. There is no tenderness.   Musculoskeletal: Normal range of motion. She exhibits no edema.   Neurological: She is alert and oriented to person, place, and time. She has normal reflexes.   Skin: Skin is warm and dry. She is not diaphoretic. No erythema.   Psychiatric: She has a normal mood and affect. Her behavior is normal.       Medication Review    Current Facility-Administered Medications:   •  acetaminophen (TYLENOL) tablet 650 mg, 650 mg, Oral, Q4H PRN, Kenna Chawla MD, 650 mg at 07/21/19 1013  •  albuterol (PROVENTIL) nebulizer solution 0.083% 2.5 mg/3mL, 2.5 mg, Nebulization, Q4H PRN, Kenna Chawla MD, 2.5 mg at 07/19/19 1956  •  aspirin chewable tablet 81 mg, 81 mg, Oral, Daily, Kenna Chawla MD, 81 mg at 07/21/19 0814  •  benzonatate (TESSALON) capsule 100 mg, 100 mg, Oral, TID PRN, Beatriz Srivastava MD, 100 mg at 07/21/19 1707  •  bumetanide (BUMEX) injection 2 mg, 2 mg, Intravenous, BID, Sandy Caputo MD, 2 mg at 07/22/19 0600  •  cholecalciferol (VITAMIN D3) tablet 2,000 Units, 2,000 Units, Oral, Daily, Kenna Chawla MD, 2,000 Units at 07/21/19 0814  •  citalopram (CeleXA) tablet 20 mg, 20 mg, Oral, Daily, Kenna Chawla MD, 20 mg at 07/21/19 0814  •  cyclobenzaprine (FLEXERIL) tablet 5 mg, 5 mg, Oral, Nightly PRN, Vanna Angeles MD, 5 mg at  07/21/19 2153  •  dextrose (D50W) 25 g/ 50mL Intravenous Solution 25 g, 25 g, Intravenous, Q15 Min PRN, Kenna Chawla MD  •  dextrose (GLUTOSE) oral gel 15 g, 15 g, Oral, Q15 Min PRN, Kenna Chawla MD  •  diltiaZEM CD (CARDIZEM CD) 24 hr capsule 240 mg, 240 mg, Oral, Daily, Kenna Chawla MD, 240 mg at 07/21/19 0814  •  docusate sodium (COLACE) capsule 100 mg, 100 mg, Oral, BID PRN, Kenna Chawla MD, 100 mg at 07/19/19 0906  •  ezetimibe (ZETIA) tablet 10 mg, 10 mg, Oral, Daily, Kenna Chawla MD, 10 mg at 07/21/19 0815  •  famotidine (PEPCID) tablet 20 mg, 20 mg, Oral, Daily, Kenna Chawla MD, 20 mg at 07/21/19 0814  •  ferrous sulfate EC tablet 324 mg, 324 mg, Oral, Daily With Breakfast, Kenna Chawla MD, 324 mg at 07/21/19 0814  •  glucagon (human recombinant) (GLUCAGEN DIAGNOSTIC) injection 1 mg, 1 mg, Subcutaneous, PRN, Kenna Chawla MD  •  guaiFENesin (MUCINEX) 12 hr tablet 600 mg, 600 mg, Oral, Q12H, Hermann Richardson MD, 600 mg at 07/21/19 2019  •  insulin aspart (novoLOG) injection 0-7 Units, 0-7 Units, Subcutaneous, 4x Daily AC & at Bedtime, Kenna Chawla MD, 3 Units at 07/17/19 2058  •  Insulin Glargine (BASAGLAR KWIKPEN) injection pen solution pen-injector 30 Units, 30 Units, Subcutaneous, Q24H, Alfredo Charles Jr., MD, 30 Units at 07/21/19 2018  •  ipratropium-albuterol (DUO-NEB) nebulizer solution 3 mL, 3 mL, Nebulization, Q8H - RT, Kenna Chawla MD, 3 mL at 07/21/19 1529  •  metOLazone (ZAROXOLYN) tablet 2.5 mg, 2.5 mg, Oral, Every Other Day, Sandy Caputo MD  •  nystatin (MYCOSTATIN) powder, , Topical, Q12H, Kenna Chawla MD  •  ondansetron (ZOFRAN) tablet 4 mg, 4 mg, Oral, Q6H PRN, Kenna Chawla MD  •  Pen [place] misc 1 each, 1 each, Does not apply, Q24H, Alfredo Charles Jr., MD, 1 each at 07/21/19 2023  •  Pharmacy to dose warfarin, , Does not apply, Continuous PRN, Kenna Chawla MD  •  potassium chloride (MICRO-K) CR capsule 20 mEq, 20 mEq, Oral,  BID With Meals, Sandy Caputo MD, 20 mEq at 07/21/19 1705  •  prenatal vitamin 27-0.8 tablet 1 tablet, 1 tablet, Oral, Daily, Kenna Chawla MD, 1 tablet at 07/21/19 0814  •  rOPINIRole (REQUIP) tablet 0.25 mg, 0.25 mg, Oral, Nightly, Kenna Chawla MD, 0.25 mg at 07/21/19 2019  •  sodium chloride 0.9 % flush 10 mL, 10 mL, Intravenous, PRN, Ozor, Gilles Toney MD, 10 mL at 07/16/19 1734  •  sodium chloride 0.9 % flush 3 mL, 3 mL, Intravenous, Q12H, Kenna Chawla MD, 3 mL at 07/21/19 2019  •  sodium chloride 0.9 % flush 3-10 mL, 3-10 mL, Intravenous, PRN, Kenna Chawla MD  •  traZODone (DESYREL) tablet 300 mg, 300 mg, Oral, Nightly, Kenna Chawla MD, 300 mg at 07/21/19 2019  •  vitamin C (ASCORBIC ACID) tablet 500 mg, 500 mg, Oral, Daily, Kenna Chawla MD, 500 mg at 07/21/19 0814  •  warfarin (COUMADIN) tablet 3 mg, 3 mg, Oral, Daily, Kenna Chawla MD, 3 mg at 07/21/19 1705     Diagnostic Data    Lab Results (last 24 hours)     Procedure Component Value Units Date/Time    POC Glucose Once [[id number]]  (Normal) Collected:  07/22/19 0630    Specimen:  Blood Updated:  07/22/19 0643     Glucose 73 mg/dL      Comment: : [id number] MtimeNAMeter ID: [id number]       POC Glucose Once [[id number]]  (Abnormal) Collected:  07/21/19 1944    Specimen:  Blood Updated:  07/21/19 2009     Glucose 234 mg/dL      Comment: RN NotifiedOperator: [id number] WIRELESS MEDCAREeter ID: [id number]       POC Glucose Once [[id number]]  (Normal) Collected:  07/21/19 1634    Specimen:  Blood Updated:  07/21/19 1656     Glucose 120 mg/dL      Comment: RN NotifiedOperator: [id number] ValenTxMeter ID: [id number]       POC Glucose Once [[id number]]  (Abnormal) Collected:  07/21/19 1033    Specimen:  Blood Updated:  07/21/19 1052     Glucose 234 mg/dL      Comment: RN NotifiedOperator: [id number] First Meta ID: [id number]               I reviewed the patient's new clinical  results.    Assessment/Plan:     Active Hospital Problems    Diagnosis POA   • **Acute on chronic diastolic congestive heart failure (CMS/HCC) [I50.33] Yes     -last echo 6/18/19 shows EF of 46-50% with normal diastolic dysfunction, improved from echo in 2017 which showed grade 1 diastolic dysfunction  -BNP on admission was 8913, CXR shows pulmonary congestion  -on lasix 40 mg daily and metolazone 2.5 mg daily at home   -Following nephrology   -will consult nephrology for recommendations of further diuresis with worsening renal function   -bumex drip and metolazone per nephro  -strict I/Os  -daily weights  -1500 mL fluid restriction, sodium restriction 2g     • Anemia [D64.9] Yes     -H/H on admission 8.9/28.9, trend with daily CBC  -stable  -recent GI bleed requiring transfusion of 1 unit of PRBCs  -on oral iron supplement at home  -continue oral iron supplement  -transfuse for hemoglobin less than 7      • Stage 4 chronic kidney disease (CMS/[facility]) [N18.4] Yes     -Baseline creatinine ~ 1.9-2.1  -trend renal function  -follows with Dr. Caputo outpatient  -avoid nephrotoxic agents  -will consult Dr. Caputo as her renal function is continuing to worsen and she is still needing diuresis  -on lasix drip and metolazone per nephro      • SSS (sick sinus syndrome) (CMS/[facility]) [I49.5] Yes     -atrial fibrillation with rate of 85 on EKG in ER  -pacemaker in place  -continue with cardizem  -telemetry  -pharm to dose coumadin      • Essential hypertension [I10] Yes     -continue lasix, cardizem      • Chronic hypoxemic respiratory failure (CMS/[facility]) [J96.11] Yes     -on 3L O2 via nasal cannula at home  -ABG in ER shows pO2 of 60.9, pCO2 of 44.7  -will continue oxygen via nasal cannula at home rate      • Type 2 diabetes mellitus with stage 4 chronic kidney disease, with long-term current use of insulin (CMS/[facility]) [E11.22, N18.4, Z79.4] Not Applicable     -on basaglar 30 units at night  -continue, adjust as necessary      •  Emphysema of lung (CMS/HCC) [J43.9] Yes     -continue with supplemental oxygen 3L  -continue albuterol nebs  -symbicort, duonebs      • Atrial fibrillation [I48.91] [I48.91] Yes     -EKG in ER shows atrial fibrillation with rate of 85  -pacemaker in place  -continue cardizem  -telemetry  -pharm to dose coumadin          DVT prophylaxis: warfarin  Code status is   Code Status and Medical Interventions:   Ordered at: 07/13/19 9341     Level Of Support Discussed With:    Patient     Code Status:    CPR     Medical Interventions (Level of Support Prior to Arrest):    Full       Plan for disposition: Anticipate discharge back to home with home health in 1-2 days    Time:  30 mins        Kenna Chawla MD PGY3  [facility]  This document has been electronically signed by Kenna Chawla MD on July 22, 2019 7:32 AM

## 2019-07-22 NOTE — PROGRESS NOTES
"[facility]  [address] [place] [address]. [address]  T - [phone]  F - [phone]     Progress Note          PATIENT  DEMOGRAPHICS   PATIENT NAME: Brendon Skelton                      PHYSICIAN: Sandy Caputo MD  : 1945  MRN: [id number]   LOS: 6 days    Patient Care Team:  Josefa Pozo APRN as PCP - General (Nurse Practitioner)  Meme Duckworth APRN as PCP - Claims Attributed  Aby Stout RN as Care Coordinator (Population Health)  Subjective   SUBJECTIVE   Feels well but wt is + 1 lb       Objective   OBJECTIVE   Vital Signs  Temp:  [97.6 °F (36.4 °C)-98.2 °F (36.8 °C)] 98.1 °F (36.7 °C)  Heart Rate:  [] 76  Resp:  [16-18] 18  BP: ()/(51-89) 124/58    Flowsheet Rows      First Filed Value   Admission Height  165.1 cm (65\") Documented at 2019 1527   Admission Weight  119 kg (261 lb 6.4 oz) Documented at 2019 1527           I/O last 3 completed shifts:  In: 720 [P.O.:720]  Out: 5500 [Urine:5500]    PHYSICAL EXAM    Physical Exam   Constitutional: She is oriented to person, place, and time. She appears well-developed and well-nourished.   HENT:   Head: Normocephalic and atraumatic.   Eyes: Conjunctivae and EOM are normal. Pupils are equal, round, and reactive to light.   Neck: JVD present.   Cardiovascular: Normal rate and regular rhythm.   Pulmonary/Chest: Effort normal and breath sounds normal.   Abdominal: Soft. She exhibits distension.   Musculoskeletal: She exhibits edema.   Neurological: She is alert and oriented to person, place, and time.   Skin: Skin is warm and dry.       RESULTS   Results Review:    Results from last 7 days   Lab Units 19  0725 19  0616 19  0730   SODIUM mmol/L 143 141 142   POTASSIUM mmol/L 3.4* 3.7 3.7   CHLORIDE mmol/L 90* 88* 91*   CO2 mmol/L 42.0* 43.0* 40.0*   BUN mg/dL 94* 96* 98*   CREATININE mg/dL 2.84* 2.76* 2.74*   CALCIUM mg/dL 9.3 10.2 9.9   GLUCOSE mg/dL 68 55* 97       Estimated " Creatinine Clearance: 21.9 mL/min (A) (by C-G formula based on SCr of 2.84 mg/dL (H)).    Results from last 7 days   Lab Units 07/17/19  0522   MAGNESIUM mg/dL 2.1             Results from last 7 days   Lab Units 07/22/19  0725 07/21/19  0616 07/20/19  0730 07/19/19  0659 07/18/19  0625   WBC 10*3/mm3 8.64 9.85 8.83 8.19 9.00   HEMOGLOBIN g/dL 9.3* 9.6* 9.1* 8.7* 9.2*   PLATELETS 10*3/mm3 267 291 276 273 237       Results from last 7 days   Lab Units 07/22/19  0726 07/21/19  0616 07/20/19  0730 07/19/19  0659 07/18/19  0625   INR  1.86* 1.64* 1.91* 2.18* 2.10*         Imaging Results (last 24 hours)     ** No results found for the last 24 hours. **           MEDICATIONS      aspirin 81 mg Oral Daily   bumetanide 2 mg Intravenous BID   cholecalciferol 2,000 Units Oral Daily   citalopram 20 mg Oral Daily   diltiaZEM  mg Oral Daily   ezetimibe 10 mg Oral Daily   famotidine 20 mg Oral Daily   ferrous sulfate 324 mg Oral Daily With Breakfast   guaiFENesin 600 mg Oral Q12H   insulin aspart 0-7 Units Subcutaneous 4x Daily AC & at Bedtime   Insulin Glargine 30 Units Subcutaneous Q24H   ipratropium-albuterol 3 mL Nebulization Q8H - RT   metOLazone 2.5 mg Oral Every Other Day   nystatin  Topical Q12H   Pen Needles 1 each Does not apply Q24H   potassium chloride 20 mEq Oral BID With Meals   prenatal vitamin 27-0.8 1 tablet Oral Daily   rOPINIRole 0.25 mg Oral Nightly   sodium chloride 3 mL Intravenous Q12H   traZODone 300 mg Oral Nightly   vitamin C with bob hips 500 mg Oral Daily   warfarin 3 mg Oral Daily       Pharmacy to dose warfarin        Assessment/Plan   ASSESSMENT / PLAN      Acute on chronic diastolic congestive heart failure (CMS/HCC)    Atrial fibrillation [I48.91]    Emphysema of lung (CMS/[facility])    Type 2 diabetes mellitus with stage 4 chronic kidney disease, with long-term current use of insulin (CMS/HCC)    Chronic hypoxemic respiratory failure (CMS/HCC)    Essential hypertension    SSS (sick sinus  syndrome) (CMS/HCC)    Stage 4 chronic kidney disease (CMS/HCC)    Anemia    1.CKD stage 4: -Baseline creatinine = 1.9-2.1. Cr now 2.7 and contraction alkalosis - lost 18 lbs so far, now on po bumex but has some wt gain and not as marked UO as we noticed in lasix drip.     I think we can discharge her with bumex 2 mg bid and metolazone 5mg daily . Keep kcl replacement and f/u in 1 week    2. Anemia: Hemoglobin stable, continue iron     3. Acute on Chronic Diastolic heart failure : Patient is still needing diuresis.  Last echo 6/18/19 showed EF 46-50%, BNP on admission was 8913, CXR shows pulmonary congestion.   -diuretics as above  -strict I/Os  -daily weights  -1500 mL fluid restriction, sodium restriction 2g    4. Chronic hypoxemic respiratory failure     5. SSS- pacemaker in place     6. Essential hypertension     7. Type 2 Diabetes Mellitus- with stage 4 chronic kidney disease and long-term insulin use.     8. Emphysema of lung           This document has been electronically signed by Sandy Captuo MD on July 22, 2019 10:54 AM

## 2019-07-22 NOTE — THERAPY TREATMENT NOTE
Acute Care - Occupational Therapy Treatment Note  [facility]     Patient Name: Brendon Skelton  : 1945  MRN: [id number]  Today's Date: 2019  Onset of Illness/Injury or Date of Surgery: 19  Date of Referral to OT: 19  Referring Physician: MONICA Romero MD    Admit Date: 2019       ICD-10-CM ICD-9-CM   1. Acute on chronic congestive heart failure, unspecified heart failure type (CMS/[facility]) I50.9 428.0   2. COPD exacerbation (CMS/[facility]) J44.1 491.21   3. Impaired physical mobility Z74.09 781.99   4. Impaired mobility and activities of daily living Z74.09 799.89   5. Stage 4 chronic kidney disease (CMS/[facility]) N18.4 585.4   6. Acute on chronic diastolic congestive heart failure (CMS/[facility]) I50.33 428.33     428.0   7. Physical deconditioning R53.81 799.3     Patient Active Problem List   Diagnosis   • Long term current use of anticoagulant therapy   • Personal history of heart valve replacement   • Atrial fibrillation [I48.91]   • Emphysema of lung (CMS/[facility])   • On anticoagulant therapy   • Hyperlipidemia   • Diastolic heart failure (CMS/[facility])   • Depressive disorder   • COPD (chronic obstructive pulmonary disease) (CMS/[facility])   • Type 2 diabetes mellitus with stage 4 chronic kidney disease, with long-term current use of insulin (CMS/[facility])   • Myopia   • Astigmatism   • Bleeding from open wound of chest wall   • Follow-up surgery care   • Encounter for screening for malignant neoplasm of colon   • Positive colorectal cancer screening using DNA-based stool test   • Nodule of left lung   • Chronic hypoxemic respiratory failure (CMS/[facility])   • Physical deconditioning   • Heart failure with preserved left ventricular function (HFpEF) (CMS/[facility])   • Essential hypertension   • Coronary artery disease involving native coronary artery without angina pectoris   • Hx of CABG   • SSS (sick sinus syndrome) (CMS/[facility])   • Pacemaker   • Stage 4 chronic kidney disease (CMS/[facility])   • Personal history of tobacco use,  presenting hazards to health   • Gastrointestinal hemorrhage   • Morbid obesity (CMS/HCC)   • Gastritis   • Colon polyp   • Coumadin toxicity   • Acute on chronic diastolic congestive heart failure (CMS/HCC)   • Anemia     Past Medical History:   Diagnosis Date   • Acute bronchitis    • Anxiety    • Atrial fibrillation (CMS/HCC)    • C. difficile colitis    • Callosity     under metatarsal head      • Cardiac pacemaker in situ    • CHF (congestive heart failure) (CMS/HCC)    • Chronic obstructive lung disease (CMS/HCC)    • Corns and callus    • Depressive disorder    • Diabetes mellitus (CMS/HCC)    • Diastolic heart failure (CMS/HCC)    • Essential hypertension    • Foot pain    • Hyperlipidemia    • Long term current use of anticoagulant    • On anticoagulant therapy    • Pulmonary emphysema (CMS/HCC)    • Rectal hemorrhage    • Type 2 diabetes mellitus (CMS/HCC)      Past Surgical History:   Procedure Laterality Date   • AORTIC VALVE REPAIR/REPLACEMENT  1999   • CARDIAC ELECTROPHYSIOLOGY PROCEDURE N/A 3/20/2017    Procedure: PPM generator change - dual;  Surgeon: Bereket Gates MD;  Location: [facility] CATH INVASIVE LOCATION;  Service:    • CARDIAC PACEMAKER PLACEMENT  1999   • CATARACT EXTRACTION W/ INTRAOCULAR LENS IMPLANT Left 2/1/2019    Procedure: REMOVE CATARACT AND IMPLANT INTRAOCULAR LENS I;  Surgeon: Jose L Clay MD;  Location: [facility] OR;  Service: Ophthalmology   • CATARACT EXTRACTION W/ INTRAOCULAR LENS IMPLANT Right 2/8/2019    Procedure: REMOVE CATARACT AND IMPLANT  INTRAOCULAR LENS;  Surgeon: Jose L Clay MD;  Location: [facility] OR;  Service: Ophthalmology   • COLONOSCOPY N/A 6/19/2019    Procedure: COLONOSCOPY WITH CONTROL OF BLEED;  Surgeon: Alfredo Guerrero MD;  Location: [facility] ENDOSCOPY;  Service: Gastroenterology   • COLONOSCOPY N/A 6/24/2019    Procedure: COLONOSCOPY;  Surgeon: Alfredo Guerrero MD;  Location: [facility] ENDOSCOPY;  Service: Gastroenterology   • ECHO - CONVERTED   11/12/2013    There is mild to moderate left atrial enlargement EF 50-55%   • ENDOSCOPY N/A 6/19/2019    Procedure: ESOPHAGOGASTRODUODENOSCOPY WITH CONTROL OF BLEED;  Surgeon: Alfredo Guerrero MD;  Location: [facility] ENDOSCOPY;  Service: Gastroenterology   • FOOT SURGERY  1990   • OTHER SURGICAL HISTORY  10/26/2015    PARING CORN/CALLUS    • PACEMAKER REPLACEMENT N/A 3/21/2017    Procedure: revision pacemaker pocket, evacuation hematoma, control of bleeding;  Surgeon: Polo Feliz MD;  Location: [facility] OR;  Service:    • TRANSESOPHAGEAL ECHOCARDIOGRAM (MITZY)  05/01/2014    With color flow-Mild left atrial enlargement with mild concentric LV hypertrophy with top normal aortic root size. EF 45-50%. Mild mitral regurgitation and mild aortic insufficiency and trivial amount of tricuspid regurgation   • TUBAL ABDOMINAL LIGATION         Therapy Treatment    Rehabilitation Treatment Summary     Row Name 07/22/19 1000             Treatment Time/Intention    Discipline  occupational therapy assistant  -KD      Document Type  therapy note (daily note)  -KD      Subjective Information  no complaints  -KD      Mode of Treatment  occupational therapy  -KD      Patient/Family Observations  no family present  -KD      Therapy Frequency (OT Eval)  other (see comments) 5-7x/wk  -KD      Patient Effort  good  -KD      Comment  BP to be taken in L LE   -KD      Existing Precautions/Restrictions  fall;oxygen therapy device and L/min  -KD      Recorded by [KD] Akua Henderson COTA/L 07/22/19 1358      Row Name 07/22/19 1000             Vital Signs    Pre Systolic BP Rehab  131  -KD      Pre Treatment Diastolic BP  66  -KD      Pretreatment Heart Rate (beats/min)  73  -KD      Posttreatment Heart Rate (beats/min)  82  -KD      Pre SpO2 (%)  94  -KD      O2 Delivery Pre Treatment  supplemental O2 3L  -KD      Intra SpO2 (%)  81 w/gait  -KD      O2 Delivery Intra Treatment  supplemental O2  -KD      Post SpO2 (%)  95  -KD      O2  Delivery Post Treatment  supplemental O2  -KD      Pre Patient Position  Sitting  -KD      Intra Patient Position  Standing  -KD      Post Patient Position  Sitting  -KD      Recorded by [KD] Akua Henderson COLÓN/L 07/22/19 1358      Row Name 07/22/19 1000             Cognitive Assessment/Intervention- PT/OT    Affect/Mental Status (Cognitive)  WFL  -KD      Orientation Status (Cognition)  oriented x 4  -KD      Follows Commands (Cognition)  WFL  -KD      Cognitive Function (Cognitive)  WFL  -KD      Personal Safety Interventions  fall prevention program maintained pt defers gait belt and bed alarm  -KD      Recorded by [KD] Akua Henderson COLÓN/L 07/22/19 1358      Row Name 07/22/19 1000             Functional Mobility    Functional Mobility- Ind. Level  supervision required  -KD      Functional Mobility- Device  -- No AD. pt used HR in hallway  -KD      Functional Mobility-Distance (Feet)  190  -KD      Functional Mobility- Safety Issues  supplemental O2 02 decreases w/ gait  -KD      Recorded by [KD] Akua Henderson COLÓN/L 07/22/19 1358      Row Name 07/22/19 1000             Transfer Assessment/Treatment    Transfer Assessment/Treatment  sit-stand transfer;stand-sit transfer  -KD      Recorded by [KD] Akua Henderson COLÓN/L 07/22/19 1358      Row Name 07/22/19 1000             Sit-Stand Transfer    Sit-Stand [place] (Transfers)  independent  -KD      Assistive Device (Sit-Stand Transfers)  other (see comments) no ad  -KD      Recorded by [KD] Akua Henderson COLÓN/L 07/22/19 1358      Row Name 07/22/19 1000             Stand-Sit Transfer    Stand-Sit [place] (Transfers)  independent  -KD      Assistive Device (Stand-Sit Transfers)  other (see comments) no ad  -KD      Recorded by [KD] Akua Henderson COLÓN/L 07/22/19 1358      Row Name 07/22/19 1000             Toilet Transfer    Type (Toilet Transfer)  sit-stand;stand-sit  -KD      [place] Level (Toilet Transfer)  independent  -KD       Assistive Device (Toilet Transfer)  commode;grab bars/safety frame;raised toilet seat  -KD      Recorded by [KD] Akua Henderson COTA/L 07/22/19 1358      Row Name 07/22/19 1000             Bathing Assessment/Intervention    Bathing [place] Level  bathing skills;upper body;supervision  -KD      Assistive Devices (Bathing)  bath mitt  -KD      Bathing Position  edge of bed sitting  -KD      Comment (Bathing)  LB mod/max;   -KD      Recorded by [KD] Akua Henderson COTA/L 07/22/19 1358      Row Name 07/22/19 1000             Upper Body Dressing Assessment/Training    Upper Body Dressing [place] Level  upper body dressing skills;doff;don;independent HG  -KD      Upper Body Dressing Position  edge of bed sitting  -KD      Recorded by [KD] Akua Henderson COTA/L 07/22/19 1358      Row Name 07/22/19 1000             Lower Body Dressing Assessment/Training    Lower Body Dressing [place] Level  lower body dressing skills;doff;don;maximum assist (25% patient effort)  -KD      Lower Body Dressing Position  edge of bed sitting  -KD      Comment (Lower Body Dressing)  undergarments sba; socks max  -KD      Recorded by [KD] Akua Henderson COTA/L 07/22/19 1358      Row Name 07/22/19 1000             Grooming Assessment/Training    [place] Level (Grooming)  grooming skills;wash face, hands;hair care, combing/brushing;independent  -KD      Recorded by [KD] Akua Henderson COTA/L 07/22/19 1358      Row Name 07/22/19 1000             Positioning and Restraints    Pre-Treatment Position  in bed  -KD      Post Treatment Position  bed  -KD      In Bed  sitting EOB;call light within reach;encouraged to call for assist;exit alarm on  -KD      Recorded by [KD] Akua Henderson COTA/L 07/22/19 1358      Row Name 07/22/19 1000             Pain Scale: Numbers Pre/Post-Treatment    Pain Scale: Numbers, Pretreatment  0/10 - no pain  -KD      Pain Scale: Numbers, Post-Treatment  0/10 - no pain  -KD      Recorded by [KD]  Akua Henderson COTA/L 07/22/19 1358      Row Name 07/22/19 1000             Outcome Summary/Treatment Plan (OT)    Daily Summary of Progress (OT)  progress toward functional goals as expected  -KD      Plan for Continued Treatment (OT)  cont poc  -KD      Anticipated Discharge Disposition (OT)  anticipate therapy at next level of care  -KD      Recorded by [KD] Akua Henderson COTA/DANDRE 07/22/19 1358        User Key  (r) = Recorded By, (t) = Taken By, (c) = Cosigned By    Initials Name Effective Dates Discipline    KD Akua Henderson COTA/L 03/07/18 -  OT           Rehab Goal Summary     Row Name 07/22/19 1000             Occupational Therapy Goals    Transfer Goal Selection (OT)  transfer, OT goal 1  -KD      Bathing Goal Selection (OT)  bathing, OT goal 1  -KD      Dressing Goal Selection (OT)  dressing, OT goal 1  -KD      Activity Tolerance Goal Selection (OT)  activity tolerance, OT goal 1  -KD         Transfer Goal 1 (OT)    Activity/Assistive Device (Transfer Goal 1, OT)  transfers, all  -KD      [place] Level/Cues Needed (Transfer Goal 1, OT)  conditional independence  -KD      Time Frame (Transfer Goal 1, OT)  long term goal (LTG)  -KD      Progress/Outcome (Transfer Goal 1, OT)  goal met  (Significant)   -KD         Bathing Goal 1 (OT)    Activity/Assistive Device (Bathing Goal 1, OT)  bathing skills, all  -KD      [place] Level/Cues Needed (Bathing Goal 1, OT)  contact guard assist With 02 90% or above.  -KD      Time Frame (Bathing Goal 1, OT)  long term goal (LTG)  -KD      Progress/Outcomes (Bathing Goal 1, OT)  goal met;continuing progress toward goal  -KD         Dressing Goal 1 (OT)    Activity/Assistive Device (Dressing Goal 1, OT)  dressing skills, all  -KD      [place]/Cues Needed (Dressing Goal 1, OT)  contact guard assist with 02 90% or above.  -KD      Time Frame (Dressing Goal 1, OT)  long term goal (LTG)  -KD      Progress/Outcome (Dressing Goal 1, OT)  goal not  met;continuing progress toward goal  -KD         Toileting Goal 1 (OT)    Activity/Device (Toileting Goal 1, OT)  toileting skills, all  -KD      [place] Level/Cues Needed (Toileting Goal 1, OT)  conditional independence  -KD      Time Frame (Toileting Goal 1, OT)  long term goal (LTG)  -KD      Progress/Outcome (Toileting Goal 1, OT)  goal met;continuing progress toward goal  -KD          Activity Tolerance Goal 1 (OT)    Activity Tolerance Goal 1 (OT)  20 min act w/ 2-3 RB's  -KD      Activity Level (Endurance Goal 1, OT)  20 min activity;O2 sat >/ equal to 88%  -KD      Time Frame (Activity Tolerance Goal 1, OT)  long term goal (LTG)  -KD      Progress/Outcome (Activity Tolerance Goal 1, OT)  goal not met;continuing progress toward goal  -KD         Patient Education Goal (OT)    Activity (Patient Education Goal, OT)  Home safety/fall prev and EC/WS.  -KD      [place]/Cues/Accuracy (Memory Goal 2, OT)  demonstrates adequately;verbalizes understanding  -KD      Time Frame (Patient Education Goal, OT)  long term goal (LTG)  -KD      Progress/Outcome (Patient Education Goal, OT)  goal not met;continuing progress toward goal  -KD        User Key  (r) = Recorded By, (t) = Taken By, (c) = Cosigned By    Initials Name Provider Type Discipline     Akua Henderson COTA/L Occupational Therapy Assistant OT        Occupational Therapy Education     Title: PT OT SLP Therapies (In Progress)     Topic: Occupational Therapy (In Progress)     Point: ADL training (Done)     Description: Instruct learner(s) on proper safety adaptation and remediation techniques during self care or transfers.   Instruct in proper use of assistive devices.    Learning Progress Summary           Patient Acceptance, E, ROLLY,DU by BB at 7/16/2019  2:53 PM                   Point: Precautions (In Progress)     Description: Instruct learner(s) on prescribed precautions during self-care and functional transfers.    Learning Progress Summary            Patient Acceptance, E, NR by RC at 7/20/2019  2:31 PM    Acceptance, E, VU by RB at 7/15/2019 11:31 AM    Comment:  Reviewed use of non skid socks when OOB and use of gait belt for distance ambulation.    Acceptance, E, VU by RB at 7/14/2019  2:02 PM    Comment:  Edu pt on  use of non skid socks when OOB.                   Point: Body mechanics (In Progress)     Description: Instruct learner(s) on proper positioning and spine alignment during self-care, functional mobility activities and/or exercises.    Learning Progress Summary           Patient Acceptance, E, NR by RC at 7/20/2019  2:31 PM                               User Key     Initials Effective Dates Name Provider Type Discipline     06/15/16 -  Beto Carney OT Occupational Therapist OT     03/07/18 -  Selena Simpson COTA/L Occupational Therapy Assistant OT     03/07/18 -  Fiorella Broderick COTA/L Occupational Therapy Assistant OT                OT Recommendation and Plan  Outcome Summary/Treatment Plan (OT)  Daily Summary of Progress (OT): progress toward functional goals as expected  Plan for Continued Treatment (OT): cont poc  Anticipated Discharge Disposition (OT): anticipate therapy at next level of care  Therapy Frequency (OT Eval): other (see comments)(5-7x/wk)  Daily Summary of Progress (OT): progress toward functional goals as expected  Plan of Care Review  Plan of Care Reviewed With: patient  Plan of Care Reviewed With: patient  Outcome Summary: Pt cond ind with bed mobility, toilet t/f,  sit<>stands and pericare this date. pt required assistance of 1 for equipment sto BR. No new goals met this date. Cont OT POC.   Outcome Measures     Row Name 07/22/19 1000 07/21/19 1350 07/20/19 1450       How much help from another person do you currently need...    Turning from your back to your side while in flat bed without using bedrails?  --  --  4  -LN    Moving from lying on back to sitting on the side of a flat bed without bedrails?   --  --  4  -LN    Moving to and from a bed to a chair (including a wheelchair)?  --  --  4  -LN    Standing up from a chair using your arms (e.g., wheelchair, bedside chair)?  --  --  4  -LN    Climbing 3-5 steps with a railing?  --  --  3  -LN    To walk in hospital room?  --  --  3  -LN    AM-PAC 6 Clicks Score (PT)  --  --  22  -LN       How much help from another is currently needed...    Putting on and taking off regular lower body clothing?  3  -KD  3  -RC  --    Bathing (including washing, rinsing, and drying)  3  -KD  3  -RC  --    Toileting (which includes using toilet bed pan or urinal)  4  -KD  4  -RC  --    Putting on and taking off regular upper body clothing  4  -KD  4  -RC  --    Taking care of personal grooming (such as brushing teeth)  4  -KD  4  -RC  --    Eating meals  4  -KD  4  -RC  --    AM-PAC 6 Clicks Score (OT)  22  -KD  22  -RC  --       Functional Assessment    Outcome Measure Options  --  --  AM-PAC 6 Clicks Basic Mobility (PT)  -LN    Row Name 07/20/19 1047             How much help from another is currently needed...    Putting on and taking off regular lower body clothing?  3  -RC      Bathing (including washing, rinsing, and drying)  3  -RC      Toileting (which includes using toilet bed pan or urinal)  4  -RC      Putting on and taking off regular upper body clothing  4  -RC      Taking care of personal grooming (such as brushing teeth)  4  -RC      Eating meals  4  -RC      AM-PAC 6 Clicks Score (OT)  22  -RC        User Key  (r) = Recorded By, (t) = Taken By, (c) = Cosigned By    Initials Name Provider Type    LN Cecy Lara, PTA Physical Therapy Assistant    Fiorella Kennedy COTA/L Occupational Therapy Assistant    Akua Maki, ELDON/L Occupational Therapy Assistant           Time Calculation:   Time Calculation- OT     Row Name 07/22/19 1401             Time Calculation- OT    OT Start Time  1000  -KD      OT Stop Time  1059  -KD      OT Time Calculation (min)  59 min   -KD      Total Timed Code Minutes- OT  59 minute(s)  -KD      OT Received On  07/22/19  -KD        User Key  (r) = Recorded By, (t) = Taken By, (c) = Cosigned By    Initials Name Provider Type    Akua Maki COTA/L Occupational Therapy Assistant        Therapy Charges for Today     Code Description Service Date Service Provider Modifiers Qty    41873520950 HC OT SELF CARE/MGMT/TRAIN EA 15 MIN 7/22/2019 Akua Henderson COTA/L GO 3    [id number]  OT THERAPEUTIC ACT EA 15 MIN 7/22/2019 Akua Henderson COTA/L GO 1               YSABEL Denton  7/22/2019

## 2019-07-23 ENCOUNTER — ANTICOAGULATION VISIT (OUTPATIENT)
Dept: CARDIAC SURGERY | Facility: CLINIC | Age: 74
End: 2019-07-23

## 2019-07-23 ENCOUNTER — READMISSION MANAGEMENT (OUTPATIENT)
Dept: CALL CENTER | Facility: HOSPITAL | Age: 74
End: 2019-07-23

## 2019-07-23 ENCOUNTER — EPISODE CHANGES (OUTPATIENT)
Dept: CASE MANAGEMENT | Facility: OTHER | Age: 74
End: 2019-07-23

## 2019-07-23 DIAGNOSIS — Z79.01 LONG TERM CURRENT USE OF ANTICOAGULANT THERAPY: ICD-10-CM

## 2019-07-23 DIAGNOSIS — Z95.2 PERSONAL HISTORY OF HEART VALVE REPLACEMENT: ICD-10-CM

## 2019-07-23 DIAGNOSIS — I48.91 ATRIAL FIBRILLATION, UNSPECIFIED TYPE (HCC): ICD-10-CM

## 2019-07-23 LAB — INR PPP: 2

## 2019-07-23 NOTE — OUTREACH NOTE
Prep Survey      Responses   Facility patient discharged from?  [facility]   Is patient eligible?  Yes   Discharge diagnosis  CHF   Does the patient have one of the following disease processes/diagnoses(primary or secondary)?  CHF   Does the patient have Home health ordered?  Yes   What is the Home health agency?   [facility]   Is there a DME ordered?  No   Medication alerts for this patient  bumex, toprol, zaroxolyn   Prep survey completed?  Yes          Mishel Pozo RN

## 2019-07-23 NOTE — PROGRESS NOTES
We received INR from MAI Valentine with [facility] by phone while she was still in the pt home. Pt had been in the hospital back in June with GI bleed and Coumadin was temporarily held. Pt was discharged to [facility] and then readmitted in July for heart failure. Pt med changes include addition of Metolazone, Bumex, Metoprolol, Zofran, and Potassium. Pt dosing since July 13 admission date was updated on calendar above to reflect what pt took. Meme was given instructions on dosing, to have pt hold green veggies, and to recheck INR on Friday July 26; she repeated back correctly.

## 2019-07-23 NOTE — DISCHARGE SUMMARY
DISCHARGE SUMMARY    PATIENT NAME: Brendon Skelton       PHYSICIAN: Kenna Chawla MD  : 1945  MRN: [id number]    ADMITTED: 2019     DISCHARGED: 19    ADMISSION DIAGNOSES:  Active Hospital Problems    Diagnosis  POA   • **Acute on chronic diastolic congestive heart failure (CMS/HCC) [I50.33]  Yes   • Anemia [D64.9]  Yes   • Stage 4 chronic kidney disease (CMS/HCC) [N18.4]  Yes   • SSS (sick sinus syndrome) (CMS/HCC) [I49.5]  Yes   • Essential hypertension [I10]  Yes   • Chronic hypoxemic respiratory failure (CMS/HCC) [J96.11]  Yes   • Type 2 diabetes mellitus with stage 4 chronic kidney disease, with long-term current use of insulin (CMS/HCC) [E11.22, N18.4, Z79.4]  Not Applicable   • Emphysema of lung (CMS/HCC) [J43.9]  Yes   • Atrial fibrillation [I48.91] [I48.91]  Yes      Resolved Hospital Problems   No resolved problems to display.     DISCHARGE DIAGNOSES:   Active Hospital Problems    Diagnosis  POA   • **Acute on chronic diastolic congestive heart failure (CMS/HCC) [I50.33]  Yes   • Anemia [D64.9]  Yes   • Stage 4 chronic kidney disease (CMS/HCC) [N18.4]  Yes   • SSS (sick sinus syndrome) (CMS/HCC) [I49.5]  Yes   • Essential hypertension [I10]  Yes   • Chronic hypoxemic respiratory failure (CMS/HCC) [J96.11]  Yes   • Type 2 diabetes mellitus with stage 4 chronic kidney disease, with long-term current use of insulin (CMS/[facility]) [E11.22, N18.4, Z79.4]  Not Applicable   • Emphysema of lung (CMS/HCC) [J43.9]  Yes   • Atrial fibrillation [I48.91] [I48.91]  Yes      Resolved Hospital Problems   No resolved problems to display.       SERVICE: Family Medicine.  Attending: Dr. Calhoun  Resident: Kenna Chawla MD    CONSULTS:   Consult Orders (all) (From admission, onward)    Start     Ordered    07/15/19 0826  Inpatient Nephrology Consult  Once,   Status:  Canceled     Specialty:  Nephrology  Provider:  Sandy Caputo MD    07/15/19 0826    19  Inpatient Case Management Social  "Services Consult  Once     Provider:  (Not yet assigned)    07/13/19 2011          PROCEDURES:   none    HISTORY OF PRESENT ILLNESS:   Per the H&P on 7/13/19 by Dr. Chawla:    \"Brendon Skelton is a 73 y.o. female from [facility] for acute rehab services with a CMH of atrial fibrillation/sick sinus syndrome status post pacemaker placement, chronic diastolic heart failure, emphysema requiring 3 L home oxygen use, diabetes who presents complaining of 3 days of worsening shortness of breath.  She was discharged from [facility] on 7/5/2019 after an extended stay for GI bleed.  During that time she was given 1 unit of packed red blood cells and had a colonoscopy which revealed a sessile polyp.  An upper endoscopy was also obtained at that time which showed moderate inflammation of the stomach but no active bleeding.  She reports having to have several runs of IV iron which is what caused her to have an extended stay.  She was discharged to [facility] for rehabilitation services.  She reports     Worsening shortness of breath and abdominal bloating for the last several days.  She denies chest pain, palpitations, fevers, chills, productive cough, nausea, vomiting, diarrhea, constipation, abdominal pain, dysuria.  She endorses shortness of breath and lower extremity edema worse on the left than the right.  She is able to ambulate at baseline but states she cannot walk very far.  She is on 3 L of home oxygen at baseline.  She denies any blood in the stool currently.     In the emergency room today, she is on her normal 3 L of oxygen.  BNP was above 8000.  She is being admitted for acute on chronic diastolic heart failure.\"    DIAGNOSTIC DATA:   Lab Results (last 48 hours)     Procedure Component Value Units Date/Time    POC Glucose Once [[id number]]  (Normal) Collected:  07/22/19 1047    Specimen:  Blood Updated:  07/22/19 1203     Glucose 120 mg/dL      Comment: RN " NotifiedOperator: [id number] DEONNA CRABTREEMeter ID: [id number]       Basic Metabolic Panel [[id number]]  (Abnormal) Collected:  07/22/19 0725    Specimen:  Blood Updated:  07/22/19 0814     Glucose 68 mg/dL      BUN 94 mg/dL      Creatinine 2.84 mg/dL      Sodium 143 mmol/L      Potassium 3.4 mmol/L      Chloride 90 mmol/L      CO2 42.0 mmol/L      Calcium 9.3 mg/dL      eGFR Non African Amer 16 mL/min/1.73      BUN/Creatinine Ratio 33.1     Anion Gap 11.0 mmol/L     Narrative:       GFR Normal >60  Chronic Kidney Disease <60  Kidney Failure <15    Protime-INR [[id number]]  (Abnormal) Collected:  07/22/19 0726    Specimen:  Blood Updated:  07/22/19 0802     Protime 21.2 Seconds      INR 1.86    Narrative:       Therapeutic range for most indications is 2.0-3.0 INR,  or 2.5-3.5 for mechanical heart valves.    CBC & Differential [[id number]] Collected:  07/22/19 0725    Specimen:  Blood Updated:  07/22/19 0744    Narrative:       The following orders were created for panel order CBC & Differential.  Procedure                               Abnormality         Status                     ---------                               -----------         ------                     CBC Auto Differential[[id number]]        Abnormal            Final result                 Please view results for these tests on the individual orders.    CBC Auto Differential [[id number]]  (Abnormal) Collected:  07/22/19 0725    Specimen:  Blood Updated:  07/22/19 0744     WBC 8.64 10*3/mm3      RBC 2.98 10*6/mm3      Hemoglobin 9.3 g/dL      Hematocrit 30.0 %      .7 fL      MCH 31.2 pg      MCHC 31.0 g/dL      RDW 20.6 %      RDW-SD 73.5 fl      MPV 12.2 fL      Platelets 267 10*3/mm3      Neutrophil % 73.6 %      Lymphocyte % 5.1 %      Monocyte % 10.1 %      Eosinophil % 10.2 %      Basophil % 0.5 %      Immature Grans % 0.5 %      Neutrophils, Absolute 6.37 10*3/mm3      Lymphocytes, Absolute 0.44 10*3/mm3      Monocytes, Absolute 0.87 10*3/mm3       Eosinophils, Absolute 0.88 10*3/mm3      Basophils, Absolute 0.04 10*3/mm3      Immature Grans, Absolute 0.04 10*3/mm3      nRBC 0.0 /100 WBC     POC Glucose Once [[id number]]  (Normal) Collected:  07/22/19 0630    Specimen:  Blood Updated:  07/22/19 0643     Glucose 73 mg/dL      Comment: : [id number] [place] [employer] SHAWNAMeter ID: [id number]       POC Glucose Once [[id number]]  (Abnormal) Collected:  07/21/19 1944    Specimen:  Blood Updated:  07/21/19 2009     Glucose 234 mg/dL      Comment: RN NotifiedOperator: [id number] [employer] [employer] SHAWNAMeter ID: [id number]       POC Glucose Once [[id number]]  (Normal) Collected:  07/21/19 1634    Specimen:  Blood Updated:  07/21/19 1656     Glucose 120 mg/dL      Comment: RN NotifiedOperator: [id number] DEONNA NOAHMeter ID: [id number]       POC Glucose Once [[id number]]  (Abnormal) Collected:  07/21/19 1033    Specimen:  Blood Updated:  07/21/19 1052     Glucose 234 mg/dL      Comment: RN NotifiedOperator: [id number] DEONNA NOAHMeter ID: [id number]       POC Glucose Once [[id number]]  (Abnormal) Collected:  07/21/19 0634    Specimen:  Blood Updated:  07/21/19 0708     Glucose 69 mg/dL      Comment: : [id number] NEPTALI ELRITAMeter ID: [id number]       Protime-INR [[id number]]  (Abnormal) Collected:  07/21/19 0616    Specimen:  Blood Updated:  07/21/19 0701     Protime 19.2 Seconds      INR 1.64    Narrative:       Therapeutic range for most indications is 2.0-3.0 INR,  or 2.5-3.5 for mechanical heart valves.    Basic Metabolic Panel [[id number]]  (Abnormal) Collected:  07/21/19 0616    Specimen:  Blood Updated:  07/21/19 0700     Glucose 55 mg/dL      BUN 96 mg/dL      Creatinine 2.76 mg/dL      Sodium 141 mmol/L      Potassium 3.7 mmol/L      Chloride 88 mmol/L      CO2 43.0 mmol/L      Calcium 10.2 mg/dL      eGFR Non African Amer 17 mL/min/1.73      BUN/Creatinine Ratio 34.8     Anion Gap 10.0 mmol/L     Narrative:       GFR Normal >60  Chronic Kidney Disease  <60  Kidney Failure <15    CBC & Differential [[id number]] Collected:  07/21/19 0616    Specimen:  Blood Updated:  07/21/19 0648    Narrative:       The following orders were created for panel order CBC & Differential.  Procedure                               Abnormality         Status                     ---------                               -----------         ------                     CBC Auto Differential[[id number]]        Abnormal            Final result                 Please view results for these tests on the individual orders.    CBC Auto Differential [[id number]]  (Abnormal) Collected:  07/21/19 0616    Specimen:  Blood Updated:  07/21/19 0648     WBC 9.85 10*3/mm3      RBC 3.05 10*6/mm3      Hemoglobin 9.6 g/dL      Hematocrit 30.4 %      MCV 99.7 fL      MCH 31.5 pg      MCHC 31.6 g/dL      RDW 20.9 %      RDW-SD 74.6 fl      MPV 11.5 fL      Platelets 291 10*3/mm3      Neutrophil % 71.6 %      Lymphocyte % 6.5 %      Monocyte % 11.5 %      Eosinophil % 9.3 %      Basophil % 0.5 %      Immature Grans % 0.6 %      Neutrophils, Absolute 7.05 10*3/mm3      Lymphocytes, Absolute 0.64 10*3/mm3      Monocytes, Absolute 1.13 10*3/mm3      Eosinophils, Absolute 0.92 10*3/mm3      Basophils, Absolute 0.05 10*3/mm3      Immature Grans, Absolute 0.06 10*3/mm3      nRBC 0.0 /100 WBC     POC Glucose Once [[id number]]  (Abnormal) Collected:  07/20/19 2036    Specimen:  Blood Updated:  07/20/19 2126     Glucose 163 mg/dL      Comment: RN NotifiedOperator: [id number] BRITTNEY CATEYMeter ID: [id number]       POC Glucose Once [[id number]]  (Normal) Collected:  07/20/19 1653    Specimen:  Blood Updated:  07/20/19 1705     Glucose 122 mg/dL      Comment: RN NotifiedOperator: [id number] ANGY STONEYMeter ID: [id number]           Imaging Results (last 7 days)     Procedure Component Value Units Date/Time    XR Chest PA & Lateral [[id number]] Collected:  07/17/19 1106     Updated:  07/17/19 1147    Narrative:       Chest x-ray  PA and lateral.    Two views.    CLINICAL INDICATION: Worsening shortness of breath    COMPARISON: Chest July 13, 2019.    FINDINGS: Cardiac silhouette is enlarged in size. Pulmonary  vascularity is increased.   Midline sternal sutures from prior cardiac surgery. Cardiac  valvular prosthesis. Pacemaker leads right atrium, right  ventricle.  No focal infiltrate or consolidation. Small, minimal left-sided  pleural effusion.    No significant changes since prior examination allowing for  differences in technique.      Impression:       CONCLUSION: Cardiomegaly. Pulmonary vascular prominence  suggesting congestive failure. Small left-sided effusion. Midline  sternal sutures from prior cardiac surgery. Cardiac valvular  prosthesis. Pacemaker leads right atrium and right ventricle. No  significant change since prior exam.    Electronically signed by:  Ishmael Lees MD  7/17/2019 11:46 AM CDT  Workstation: [id number]             HOSPITAL COURSE:  She was admitted for acute on chronic diastolic heart failure with pulmonary edema.  She was given Lasix 80 mg IV while in the ER and 40 mg Lasix IV the next day of her admission.  Weight continued to be above baseline however and creatinine began to trend up so nephrology was consulted.  Dr. Caputo agreed to follow her while admitted.  He started her on IV Lasix drip as well as oral metolazone which significantly improved her diuresis.  However she began to plateau and he changed her diuretic regimen to IV Bumex with oral metolazone.  On day of discharge she was approximately 6 pounds under her admission weight of 250.  She reported continued improvement in symptoms including shortness of breath and abdominal distention after diuresis.  Electrolytes were monitored and replaced as necessary.  She remained afebrile and otherwise stable during the course of her admission.  She was discharged in stable condition and told to stop her home lasix. She will be started on oral bumex 2 mg daily  and metolazone every other day per nephrology's recommendations.    At discharge she stated she did not want to return to SNF and requested to be discharged home with home health instead, which was ordered at discharge. She is to follow up with her PCP in 1 week.    DISCHARGE CONDITION:   stable    DISPOSITION:  Home or Self Care    DISCHARGE MEDICATIONS     Discharge Medications      New Medications      Instructions Start Date   bumetanide 2 MG tablet  Commonly known as:  BUMEX   2 mg, Oral, 2 Times Daily      metoprolol succinate XL 25 MG 24 hr tablet  Commonly known as:  TOPROL XL   25 mg, Oral, Daily      ondansetron 4 MG tablet  Commonly known as:  ZOFRAN   4 mg, Oral, Every 6 Hours PRN      potassium chloride 10 MEQ CR capsule  Commonly known as:  MICRO-K   20 mEq, Oral, 2 Times Daily With Meals         Changes to Medications      Instructions Start Date   metOLazone 5 MG tablet  Commonly known as:  ZAROXOLYN  What changed:    · medication strength  · how much to take   5 mg, Oral, Daily         Continue These Medications      Instructions Start Date   acetaminophen 325 MG tablet  Commonly known as:  TYLENOL   650 mg, Oral, Every 6 Hours PRN      albuterol (2.5 MG/3ML) 0.083% nebulizer solution  Commonly known as:  PROVENTIL   2.5 mg, Nebulization, Every 4 Hours PRN      albuterol sulfate  (90 Base) MCG/ACT inhaler  Commonly known as:  PROVENTIL HFA;VENTOLIN HFA;PROAIR HFA   2 puffs, Inhalation, Every 4 Hours PRN      aspirin 81 MG chewable tablet   81 mg, Oral, Daily      cholecalciferol 1000 units tablet  Commonly known as:  VITAMIN D3   2,000 Units, Oral, Daily      citalopram 20 MG tablet  Commonly known as:  CeleXA   20 mg, Oral, Daily      diltiaZEM  MG 24 hr capsule  Commonly known as:  CARDIZEM CD   240 mg, Oral, Daily      ezetimibe 10 MG tablet  Commonly known as:  ZETIA   TAKE 1 TABLET BY MOUTH EVERY DAY      ferrous sulfate 325 (65 FE) MG tablet   325 mg, Oral, Daily With Breakfast       glucose blood test strip  Notes to patient:  Use as directed   1 each, Other, 3 Times Daily, Use as instructed       Insulin Glargine 100 UNIT/ML injection pen  Commonly known as:  BASAGLAR KWIKPEN  Notes to patient:  Use as directed   30 Units, Subcutaneous, Daily      Insulin Pen Needle 32G X 4 MM misc  Commonly known as:  BD PEN NEEDLE DEREK U/F  Notes to patient:  Use as directed   1 each, Does not apply, 4 Times Daily      Prenatal Vitamin 27-0.8 MG tablet   1 tablet, Oral, Daily      raNITIdine 150 MG tablet  Commonly known as:  ZANTAC   150 mg, Oral, Nightly      rOPINIRole 0.25 MG tablet  Commonly known as:  REQUIP   0.25 mg, Oral, Nightly, Take 1 hour before bedtime.      traZODone 150 MG tablet  Commonly known as:  DESYREL   300 mg, Oral, Nightly      umeclidinium-vilanterol 62.5-25 MCG/INH aerosol powder  inhaler  Commonly known as:  ANORO ELLIPTA   1 puff, Inhalation, Daily      vitamin C with bob hips 250 MG tablet   500 mg, Oral, Daily      warfarin 4 MG tablet  Commonly known as:  COUMADIN   4 mg, Oral, Nightly         Stop These Medications    furosemide 40 MG tablet  Commonly known as:  LASIX            INSTRUCTIONS:  Activity:   Activity Instructions     Activity as Tolerated          Diet:   Diet Instructions     Diet: Regular      Discharge Diet:  Regular        Special instructions: Patient instructed to call MD or return to ED with worsening shortness of breath, chest pain, fever greater than 100.4 degrees F or any other medical concerns..    FOLLOW UP:   Additional Instructions for the Follow-ups that You Need to Schedule     Discharge Follow-up with PCP   As directed       Currently Documented PCP:    Josefa Pozo APRN    PCP Phone Number:    [phone]     Follow Up Details:  Follow-up with PCP in 1 week         Referral to Home Health   As directed      Face to Face Visit Date:  7/22/2019    Follow-up Provider for Plan of Care?:  I treated the patient in an acute care facility and  will not continue treatment after discharge.    Follow-up Provider:  JOSEFA POZO [[id number]]    Reason/Clinical Findings:  Physical Deconditioning    Describe mobility limitations that make leaving home difficult:  Transportation Difficulty    Nursing/Therapeutic Services Requested:  Physical Therapy Occupational Therapy    PT orders:  Strengthening Therapeutic exercise    Occupational orders:  Activities of daily living Strengthening    Frequency:  1 Week 1         Basic Metabolic Panel    Jul 29, 2019 (Approximate)         Contact information for follow-up providers     Sandy Caputo MD Follow up on 8/1/2019.    Specialty:  Nephrology  Why:  Appointment scheduled for 12:30 p.m.  Contact information:  [address] [facility] [address]  [phone]             Josefa Pozo, APRN Follow up on 7/29/2019.    Specialty:  Nurse Practitioner  Why:  Appointment scheduled for 2:00 p.m.  Contact information:  [address] [place] [address]  [phone]             [facility] HOME CARE REFERRAL Follow up.    Specialty:  Home Health Services  Contact information:  [address]  [place] [address]                 Contact information for after-discharge care     Home Medical Care     [facility] Follow up.    Service:  Home Health Services  Contact information:  [address]   [place] [address]  [phone]                             PENDING TEST RESULTS AT DISCHARGE      Time: greater than 30 minutes were spent in the planning of this discharge.      Dr. Calhoun is the attending at time of discharge, He is aware of the patient's status and agrees with the above discharge summary.        Kenna Chawla MD PGY3  [facility]  This document has been electronically signed by Kenna Chawla MD on July 23, 2019 8:52 AM

## 2019-07-25 ENCOUNTER — READMISSION MANAGEMENT (OUTPATIENT)
Dept: CALL CENTER | Facility: HOSPITAL | Age: 74
End: 2019-07-25

## 2019-07-25 NOTE — OUTREACH NOTE
CHF Week 1 Survey      Responses   Facility patient discharged from?  [facility]   Does the patient have one of the following disease processes/diagnoses(primary or secondary)?  CHF   Is there a successful TCM telephone encounter documented?  No   CHF Week 1 attempt successful?  No   Unsuccessful attempts  Attempt 1          Bianca Michel RN

## 2019-07-26 ENCOUNTER — ANTICOAGULATION VISIT (OUTPATIENT)
Dept: CARDIAC SURGERY | Facility: CLINIC | Age: 74
End: 2019-07-26

## 2019-07-26 ENCOUNTER — LAB REQUISITION (OUTPATIENT)
Dept: LAB | Facility: HOSPITAL | Age: 74
End: 2019-07-26

## 2019-07-26 ENCOUNTER — READMISSION MANAGEMENT (OUTPATIENT)
Dept: CALL CENTER | Facility: HOSPITAL | Age: 74
End: 2019-07-26

## 2019-07-26 DIAGNOSIS — I50.33 ACUTE ON CHRONIC DIASTOLIC CONGESTIVE HEART FAILURE (HCC): ICD-10-CM

## 2019-07-26 DIAGNOSIS — Z95.2 PERSONAL HISTORY OF HEART VALVE REPLACEMENT: ICD-10-CM

## 2019-07-26 DIAGNOSIS — Z79.01 LONG TERM CURRENT USE OF ANTICOAGULANT THERAPY: ICD-10-CM

## 2019-07-26 DIAGNOSIS — E11.9 TYPE 2 DIABETES MELLITUS WITHOUT COMPLICATIONS (HCC): ICD-10-CM

## 2019-07-26 DIAGNOSIS — I48.91 ATRIAL FIBRILLATION, UNSPECIFIED TYPE (HCC): ICD-10-CM

## 2019-07-26 LAB
ANION GAP SERPL CALCULATED.3IONS-SCNC: 11 MMOL/L (ref 5–15)
BUN BLD-MCNC: 85 MG/DL (ref 8–23)
BUN/CREAT SERPL: 30.7 (ref 7–25)
CALCIUM SPEC-SCNC: 9.7 MG/DL (ref 8.6–10.5)
CHLORIDE SERPL-SCNC: 91 MMOL/L (ref 98–107)
CO2 SERPL-SCNC: 41 MMOL/L (ref 22–29)
CREAT BLD-MCNC: 2.77 MG/DL (ref 0.57–1)
GFR SERPL CREATININE-BSD FRML MDRD: 17 ML/MIN/1.73
GLUCOSE BLD-MCNC: 218 MG/DL (ref 65–99)
HBA1C MFR BLD: 4.9 % (ref 4.8–5.6)
INR PPP: 3.4
POTASSIUM BLD-SCNC: 4.1 MMOL/L (ref 3.5–5.2)
SODIUM BLD-SCNC: 143 MMOL/L (ref 136–145)

## 2019-07-26 PROCEDURE — 85610 PROTHROMBIN TIME: CPT | Performed by: THORACIC SURGERY (CARDIOTHORACIC VASCULAR SURGERY)

## 2019-07-26 PROCEDURE — 83036 HEMOGLOBIN GLYCOSYLATED A1C: CPT | Performed by: NURSE PRACTITIONER

## 2019-07-26 PROCEDURE — 80048 BASIC METABOLIC PNL TOTAL CA: CPT | Performed by: NURSE PRACTITIONER

## 2019-07-26 NOTE — OUTREACH NOTE
CHF Week 1 Survey      Responses   Facility patient discharged from?  [facility]   Does the patient have one of the following disease processes/diagnoses(primary or secondary)?  CHF   Is there a successful TCM telephone encounter documented?  No   CHF Week 1 attempt successful?  Yes   Call start time  1633   Call end time  1634   Discharge diagnosis  CHF   Medication alerts for this patient  bumex, toprol, zaroxolyn   What is the patient's perception of their health status since discharge?  Improving    CHF Week 1 call completed?  Yes   Revoked  No further contact(revokes)-requires comment   Graduated/Revoked comments  Pt was irritated at phone call, says she is doing fine and does not need anymore phone calls.          Mary Jane Salinas RN

## 2019-07-26 NOTE — PROGRESS NOTES
We received INR from MAI Fatima with [facility]  by phone while she was still in the pt home. Pt denied med changes or bleeding problems. Dose adjusted due to rapid rise in INR. Akua was instructed on dosing, to have pt eat a serving of green veggies today, and appt made for Tuesday in the [facility] as pt has appt with Dr Grant (will call orders to ); she repeated back correctly.

## 2019-07-29 ENCOUNTER — LAB (OUTPATIENT)
Dept: LAB | Facility: HOSPITAL | Age: 74
End: 2019-07-29

## 2019-07-29 ENCOUNTER — EPISODE CHANGES (OUTPATIENT)
Dept: CASE MANAGEMENT | Facility: OTHER | Age: 74
End: 2019-07-29

## 2019-07-29 ENCOUNTER — LAB REQUISITION (OUTPATIENT)
Dept: LAB | Facility: HOSPITAL | Age: 74
End: 2019-07-29

## 2019-07-29 ENCOUNTER — OFFICE VISIT (OUTPATIENT)
Dept: FAMILY MEDICINE CLINIC | Facility: CLINIC | Age: 74
End: 2019-07-29

## 2019-07-29 VITALS
HEIGHT: 65 IN | BODY MASS INDEX: 40.65 KG/M2 | WEIGHT: 244 LBS | SYSTOLIC BLOOD PRESSURE: 136 MMHG | DIASTOLIC BLOOD PRESSURE: 92 MMHG

## 2019-07-29 DIAGNOSIS — N18.4 STAGE 4 CHRONIC KIDNEY DISEASE (HCC): ICD-10-CM

## 2019-07-29 DIAGNOSIS — I50.33 ACUTE ON CHRONIC DIASTOLIC CONGESTIVE HEART FAILURE (HCC): Primary | ICD-10-CM

## 2019-07-29 DIAGNOSIS — Z09 HOSPITAL DISCHARGE FOLLOW-UP: ICD-10-CM

## 2019-07-29 DIAGNOSIS — Z79.01 LONG TERM CURRENT USE OF ANTICOAGULANT: ICD-10-CM

## 2019-07-29 DIAGNOSIS — I50.33 ACUTE ON CHRONIC DIASTOLIC CONGESTIVE HEART FAILURE (HCC): ICD-10-CM

## 2019-07-29 LAB
ANION GAP SERPL CALCULATED.3IONS-SCNC: 11.1 MMOL/L (ref 5–15)
BUN BLD-MCNC: 78 MG/DL (ref 8–23)
BUN/CREAT SERPL: 27.3 (ref 7–25)
CALCIUM SPEC-SCNC: 10.2 MG/DL (ref 8.6–10.5)
CHLORIDE SERPL-SCNC: 84 MMOL/L (ref 98–107)
CO2 SERPL-SCNC: 41.9 MMOL/L (ref 22–29)
CREAT BLD-MCNC: 2.86 MG/DL (ref 0.57–1)
GFR SERPL CREATININE-BSD FRML MDRD: 16 ML/MIN/1.73
GLUCOSE BLD-MCNC: 160 MG/DL (ref 65–99)
INR PPP: 3.4 (ref 0.8–1.2)
POTASSIUM BLD-SCNC: 4.4 MMOL/L (ref 3.5–5.2)
PROTHROMBIN TIME: 43.7 SECONDS (ref 11–15)
SODIUM BLD-SCNC: 137 MMOL/L (ref 136–145)

## 2019-07-29 PROCEDURE — 80048 BASIC METABOLIC PNL TOTAL CA: CPT

## 2019-07-29 PROCEDURE — 99213 OFFICE O/P EST LOW 20 MIN: CPT | Performed by: NURSE PRACTITIONER

## 2019-07-29 PROCEDURE — 36415 COLL VENOUS BLD VENIPUNCTURE: CPT

## 2019-07-29 NOTE — PROGRESS NOTES
"Zoila Skelton is a 73 y.o. female.  One week hospital discharge follow-up. Recently diagnosed with colon cancer.  \"They want to do surgery but they said I have to wait until him strong enough.\"  MD CAINB: 1945  MRN: [id number]     ADMITTED: 2019                                     DISCHARGED: 19     ADMISSION DIAGNOSES:        Active Hospital Problems     Diagnosis   POA   • **Acute on chronic diastolic congestive heart failure (CMS/HCC) [I50.33]   Yes   • Anemia [D64.9]   Yes   • Stage 4 chronic kidney disease (CMS/HCC) [N18.4]   Yes   • SSS (sick sinus syndrome) (CMS/HCC) [I49.5]   Yes   • Essential hypertension [I10]   Yes   • Chronic hypoxemic respiratory failure (CMS/HCC) [J96.11]   Yes   • Type 2 diabetes mellitus with stage 4 chronic kidney disease, with long-term current use of insulin (CMS/HCC) [E11.22, N18.4, Z79.4]   Not Applicable   • Emphysema of lung (CMS/HCC) [J43.9]   Yes   • Atrial fibrillation [I48.91] [I48.91]   Yes       Resolved Hospital Problems   No resolved problems to display.      DISCHARGE DIAGNOSES:         Active Hospital Problems     Diagnosis   POA   • **Acute on chronic diastolic congestive heart failure (CMS/HCC) [I50.33]   Yes   • Anemia [D64.9]   Yes   • Stage 4 chronic kidney disease (CMS/HCC) [N18.4]   Yes   • SSS (sick sinus syndrome) (CMS/HCC) [I49.5]   Yes   • Essential hypertension [I10]   Yes   • Chronic hypoxemic respiratory failure (CMS/HCC) [J96.11]   Yes   • Type 2 diabetes mellitus with stage 4 chronic kidney disease, with long-term current use of insulin (CMS/HCC) [E11.22, N18.4, Z79.4]   Not Applicable   • Emphysema of lung (CMS/HCC) [J43.9]   Yes   • Atrial fibrillation [I48.91] [I48         Congestive Heart Failure   Presents for follow-up visit. Associated symptoms include fatigue and shortness of breath. Pertinent negatives include no chest pain or chest pressure. The symptoms have been stable. Compliance with total regimen is " 51-75%. Compliance problems include adherence to diet and adherence to exercise.  Compliance with diet is 51-75%. Compliance with exercise is 0-25%. Compliance with medications is %.        The following portions of the patient's history were reviewed and updated as appropriate:     Current Outpatient Medications   Medication Sig Dispense Refill   • acetaminophen (TYLENOL) 325 MG tablet Take 650 mg by mouth every 6 (six) hours as needed for mild pain (1-3).     • albuterol (PROVENTIL) (2.5 MG/3ML) 0.083% nebulizer solution Take 2.5 mg by nebulization Every 4 (Four) Hours As Needed for Wheezing.     • albuterol sulfate  (90 Base) MCG/ACT inhaler Inhale 2 puffs Every 4 (Four) Hours As Needed for Wheezing or Shortness of Air. 18 g 11   • Ascorbic Acid (VITAMIN C WITH OCHOA HIPS) 250 MG tablet Take 500 mg by mouth Daily.     • aspirin 81 MG chewable tablet Chew 81 mg Daily.     • bumetanide (BUMEX) 2 MG tablet Take 1 tablet by mouth 2 (Two) Times a Day. 60 tablet 0   • cholecalciferol (VITAMIN D3) 1000 units tablet Take 2,000 Units by mouth Daily.     • citalopram (CeleXA) 20 MG tablet Take 1 tablet by mouth Daily. 90 tablet 3   • diltiaZEM CD (CARDIZEM CD) 240 MG 24 hr capsule Take 1 capsule by mouth Daily. 90 capsule 3   • ezetimibe (ZETIA) 10 MG tablet TAKE 1 TABLET BY MOUTH EVERY DAY 90 tablet 1   • ferrous sulfate 325 (65 FE) MG tablet Take 325 mg by mouth Daily With Breakfast.     • glucose blood test strip 1 each by Other route 3 (three) times a day. Use as instructed     • Insulin Glargine (BASAGLAR KWIKPEN) 100 UNIT/ML injection pen Inject 30 Units under the skin into the appropriate area as directed Daily. 1 pen 11   • Insulin Pen Needle (BD PEN NEEDLE DEREK U/F) 32G X 4 MM misc 1 each 4 (Four) Times a Day. 120 each 5   • metOLazone (ZAROXOLYN) 5 MG tablet Take 1 tablet by mouth Daily. 30 tablet 0   • metoprolol succinate XL (TOPROL XL) 25 MG 24 hr tablet Take 1 tablet by mouth Daily. 30 tablet 0    • ondansetron (ZOFRAN) 4 MG tablet Take 1 tablet by mouth Every 6 (Six) Hours As Needed for Nausea or Vomiting. 30 tablet 0   • potassium chloride (MICRO-K) 10 MEQ CR capsule Take 2 capsules by mouth 2 (Two) Times a Day With Meals. 60 capsule 0   • Prenatal Vit-Fe Fumarate-FA (PRENATAL VITAMIN) 27-0.8 MG tablet Take 1 tablet by mouth daily.     • raNITIdine (ZANTAC) 150 MG tablet Take 1 tablet by mouth Every Night. 180 tablet 1   • rOPINIRole (REQUIP) 0.25 MG tablet Take 1 tablet by mouth Every Night. Take 1 hour before bedtime. 90 tablet 3   • traZODone (DESYREL) 150 MG tablet Take 2 tablets by mouth Every Night. 180 tablet 3   • umeclidinium-vilanterol (ANORO ELLIPTA) 62.5-25 MCG/INH aerosol powder  inhaler Inhale 1 puff Daily. 1 each 6   • warfarin (COUMADIN) 4 MG tablet Take 4 mg by mouth Every Night.       No current facility-administered medications for this visit.      Current Outpatient Medications on File Prior to Visit   Medication Sig   • acetaminophen (TYLENOL) 325 MG tablet Take 650 mg by mouth every 6 (six) hours as needed for mild pain (1-3).   • albuterol (PROVENTIL) (2.5 MG/3ML) 0.083% nebulizer solution Take 2.5 mg by nebulization Every 4 (Four) Hours As Needed for Wheezing.   • albuterol sulfate  (90 Base) MCG/ACT inhaler Inhale 2 puffs Every 4 (Four) Hours As Needed for Wheezing or Shortness of Air.   • Ascorbic Acid (VITAMIN C WITH OCHAO HIPS) 250 MG tablet Take 500 mg by mouth Daily.   • aspirin 81 MG chewable tablet Chew 81 mg Daily.   • bumetanide (BUMEX) 2 MG tablet Take 1 tablet by mouth 2 (Two) Times a Day.   • cholecalciferol (VITAMIN D3) 1000 units tablet Take 2,000 Units by mouth Daily.   • citalopram (CeleXA) 20 MG tablet Take 1 tablet by mouth Daily.   • diltiaZEM CD (CARDIZEM CD) 240 MG 24 hr capsule Take 1 capsule by mouth Daily.   • ezetimibe (ZETIA) 10 MG tablet TAKE 1 TABLET BY MOUTH EVERY DAY   • ferrous sulfate 325 (65 FE) MG tablet Take 325 mg by mouth Daily With  Breakfast.   • glucose blood test strip 1 each by Other route 3 (three) times a day. Use as instructed   • Insulin Glargine (BASAGLAR KWIKPEN) 100 UNIT/ML injection pen Inject 30 Units under the skin into the appropriate area as directed Daily.   • Insulin Pen Needle (BD PEN NEEDLE DEREK U/F) 32G X 4 MM misc 1 each 4 (Four) Times a Day.   • metOLazone (ZAROXOLYN) 5 MG tablet Take 1 tablet by mouth Daily.   • metoprolol succinate XL (TOPROL XL) 25 MG 24 hr tablet Take 1 tablet by mouth Daily.   • ondansetron (ZOFRAN) 4 MG tablet Take 1 tablet by mouth Every 6 (Six) Hours As Needed for Nausea or Vomiting.   • potassium chloride (MICRO-K) 10 MEQ CR capsule Take 2 capsules by mouth 2 (Two) Times a Day With Meals.   • Prenatal Vit-Fe Fumarate-FA (PRENATAL VITAMIN) 27-0.8 MG tablet Take 1 tablet by mouth daily.   • raNITIdine (ZANTAC) 150 MG tablet Take 1 tablet by mouth Every Night.   • rOPINIRole (REQUIP) 0.25 MG tablet Take 1 tablet by mouth Every Night. Take 1 hour before bedtime.   • traZODone (DESYREL) 150 MG tablet Take 2 tablets by mouth Every Night.   • umeclidinium-vilanterol (ANORO ELLIPTA) 62.5-25 MCG/INH aerosol powder  inhaler Inhale 1 puff Daily.   • warfarin (COUMADIN) 4 MG tablet Take 4 mg by mouth Every Night.     No current facility-administered medications on file prior to visit.      She is allergic to crestor [rosuvastatin calcium]; lipitor [atorvastatin]; lortab [hydrocodone-acetaminophen]; adhesive tape; and hydrocodone-acetaminophen..    Review of Systems   Constitutional: Positive for fatigue. Negative for chills, diaphoresis and fever.   HENT: Negative.    Eyes: Negative.    Respiratory: Positive for shortness of breath.    Cardiovascular: Negative.  Negative for chest pain.   Gastrointestinal: Negative.    Genitourinary: Negative.    Musculoskeletal: Positive for gait problem.   Skin: Negative.    Neurological: Positive for weakness.   Psychiatric/Behavioral: Negative for confusion.  "      Objective    Visit Vitals  /92   Ht 165.1 cm (65\")   Wt 111 kg (244 lb)   LMP  (LMP Unknown)   BMI 40.60 kg/m²       Physical Exam   Constitutional: She is oriented to person, place, and time. She appears well-developed and well-nourished.   Arrives via personal wheelchair    HENT:   Head: Normocephalic.   Right Ear: External ear normal.   Left Ear: External ear normal.   Eyes: EOM are normal. Pupils are equal, round, and reactive to light.   Neck: Normal range of motion. Neck supple.   Cardiovascular: Normal rate, regular rhythm and normal heart sounds.   Pulmonary/Chest: Effort normal and breath sounds normal.   3L per NC    Abdominal: Soft. Bowel sounds are normal.   Musculoskeletal: Normal range of motion.   Neurological: She is alert and oriented to person, place, and time.   Skin: Skin is warm. Capillary refill takes less than 2 seconds.   Psychiatric: She has a normal mood and affect. Her behavior is normal.   Nursing note and vitals reviewed.      Assessment/Plan   Problems Addressed this Visit        Cardiovascular and Mediastinum    Acute on chronic diastolic congestive heart failure (CMS/HCC) - Primary      Other Visit Diagnoses     Hospital discharge follow-up          No orders of the defined types were placed in this encounter.      1.  Acute on chronic diastolic congestive heart failure:  Exacerbation symptoms are improving  Continue on Bumex previously prescribed  Continue on oxygen per nasal cannula  Encouraged to seek emergency medical treatment for any new or worsening shortness of breath or weight gain of greater than 2 to 3 pounds in a 24-hour.    2.  Hospital discharge follow-up:  Current outpatient and discharge medications have been reconciled for the patient.  Reviewed by: ABRAHAM Boone    Continue on current medications as previously prescribed   Return in about 3 months (around 10/29/2019).        This document has been electronically signed by ABRAHAM Boone on July " 29, 2019 2:29 PM

## 2019-07-30 ENCOUNTER — OFFICE VISIT (OUTPATIENT)
Dept: PULMONOLOGY | Facility: CLINIC | Age: 74
End: 2019-07-30

## 2019-07-30 ENCOUNTER — ANTICOAGULATION VISIT (OUTPATIENT)
Dept: CARDIAC SURGERY | Facility: CLINIC | Age: 74
End: 2019-07-30

## 2019-07-30 VITALS
BODY MASS INDEX: 38.82 KG/M2 | HEART RATE: 96 BPM | SYSTOLIC BLOOD PRESSURE: 130 MMHG | OXYGEN SATURATION: 90 % | DIASTOLIC BLOOD PRESSURE: 76 MMHG | WEIGHT: 233 LBS | HEIGHT: 65 IN

## 2019-07-30 VITALS — HEART RATE: 96 BPM | DIASTOLIC BLOOD PRESSURE: 76 MMHG | SYSTOLIC BLOOD PRESSURE: 130 MMHG

## 2019-07-30 DIAGNOSIS — C18.7 ADENOCARCINOMA OF SIGMOID COLON (HCC): ICD-10-CM

## 2019-07-30 DIAGNOSIS — J44.9 CHRONIC OBSTRUCTIVE PULMONARY DISEASE, UNSPECIFIED COPD TYPE (HCC): Primary | ICD-10-CM

## 2019-07-30 DIAGNOSIS — Z79.01 LONG TERM CURRENT USE OF ANTICOAGULANT THERAPY: ICD-10-CM

## 2019-07-30 DIAGNOSIS — J96.11 CHRONIC HYPOXEMIC RESPIRATORY FAILURE (HCC): ICD-10-CM

## 2019-07-30 DIAGNOSIS — I27.20 PULMONARY HYPERTENSION (HCC): ICD-10-CM

## 2019-07-30 DIAGNOSIS — Z95.2 PERSONAL HISTORY OF HEART VALVE REPLACEMENT: ICD-10-CM

## 2019-07-30 DIAGNOSIS — I50.30 HEART FAILURE WITH PRESERVED LEFT VENTRICULAR FUNCTION (HFPEF) (HCC): ICD-10-CM

## 2019-07-30 DIAGNOSIS — I48.91 ATRIAL FIBRILLATION, UNSPECIFIED TYPE (HCC): ICD-10-CM

## 2019-07-30 LAB — INR PPP: 3.3 (ref 0.9–1.1)

## 2019-07-30 PROCEDURE — 99214 OFFICE O/P EST MOD 30 MIN: CPT | Performed by: INTERNAL MEDICINE

## 2019-07-30 PROCEDURE — 85610 PROTHROMBIN TIME: CPT | Performed by: THORACIC SURGERY (CARDIOTHORACIC VASCULAR SURGERY)

## 2019-07-30 RX ORDER — WARFARIN SODIUM 5 MG/1
5 TABLET ORAL
COMMUNITY
End: 2019-10-14 | Stop reason: SDUPTHER

## 2019-07-30 NOTE — PROGRESS NOTES
"Pulmonary Office Follow-up    Subjective     Brendon Skelton is seen today at the office for   Chief Complaint   Patient presents with   • COPD         HPI  Brendon Skelton is a 73 y.o. female with a PMH significant for COPD, chronic hypoxic respiratory failure, past tobacco use, AF, CHF, s/p AVR, obesity, CKD, and DM who presents for follow-up of COPD.  She was last seen on 3/15/19, at which time she remained stable on Anoro and was not requiring her oxygen.  I also recommended a repeat CT chest given presence of a left apical nodule seen in December 2017. Pt states she went to the ED on 6/18 for dyspnea and blood in her stools. She was hospitalized for her COPD and discharged on 7/5 to a SNF. Pt states she \"blew up\" with fluid and had to be hospitalized again recently for diuresis. She states that she lost a lot of weight and is feeling close to normal.  Patient still gets fatigued very easily and states that she is worn out.  Her breathing is close to baseline.  She was discharged on oxygen and is currently wearing it around-the-clock.  Patient is taking Anoro daily and states she is using her albuterol nebulizer 3 times a day as she was instructed to do so.  She reports that she underwent colonoscopy during her initial hospitalization and was told by Dr. Guerrero that she did have colon cancer but she was to sick to undergo surgery.  He recommended follow-up once she had recovered.  She was followed by Dr. Caputo during her last hospitalization due to elevated creatinine and she has a follow-up with him later this week.  Patient denies cough, sputum, fever, or chest pain.  She is concerned about having multiple doctors appointments and states that it is difficult for her to attend these.  She is no longer having blood in her stool.    Patient Active Problem List   Diagnosis   • Long term current use of anticoagulant therapy   • Personal history of heart valve replacement   • Atrial fibrillation [I48.91] "   • Emphysema of lung (CMS/HCC)   • On anticoagulant therapy   • Hyperlipidemia   • Diastolic heart failure (CMS/HCC)   • Depressive disorder   • COPD (chronic obstructive pulmonary disease) (CMS/HCC)   • Type 2 diabetes mellitus with stage 4 chronic kidney disease, with long-term current use of insulin (CMS/HCC)   • Myopia   • Astigmatism   • Bleeding from open wound of chest wall   • Follow-up surgery care   • Encounter for screening for malignant neoplasm of colon   • Positive colorectal cancer screening using DNA-based stool test   • Nodule of left lung   • Chronic hypoxemic respiratory failure (CMS/HCC)   • Physical deconditioning   • Heart failure with preserved left ventricular function (HFpEF) (CMS/HCC)   • Essential hypertension   • Coronary artery disease involving native coronary artery without angina pectoris   • Hx of CABG   • SSS (sick sinus syndrome) (CMS/HCC)   • Pacemaker   • Stage 4 chronic kidney disease (CMS/HCC)   • Personal history of tobacco use, presenting hazards to health   • Gastrointestinal hemorrhage   • Morbid obesity (CMS/HCC)   • Gastritis   • Colon polyp   • Coumadin toxicity   • Acute on chronic diastolic congestive heart failure (CMS/HCC)   • Anemia   • Pulmonary hypertension (CMS/HCC)       Review of Systems  Review of Systems   Constitutional: Positive for fatigue and unexpected weight change. Negative for fever.        Insomnia   HENT: Negative for congestion.    Respiratory: Negative for cough, choking, shortness of breath and wheezing.    Cardiovascular: Negative for chest pain and leg swelling.   Gastrointestinal: Negative for abdominal pain and blood in stool.   Neurological: Positive for weakness.     As described in the HPI. Otherwise, remainder of ROS (14 systems) were negative.    Medications, Allergies, Social, and Family Histories reviewed as per EMR.    Objective     Vitals:    07/30/19 1424   BP: 130/76   Pulse: 96   SpO2: 90%     Physical Exam   Constitutional: She  is oriented to person, place, and time. Vital signs are normal. She appears well-developed and well-nourished.   Morbidly obese   HENT:   Head: Normocephalic and atraumatic.   Nose: Nose normal.   Mouth/Throat: Uvula is midline, oropharynx is clear and moist and mucous membranes are normal.   Mallampati 3   Eyes: Conjunctivae, EOM and lids are normal. Pupils are equal, round, and reactive to light.   Neck: Trachea normal and normal range of motion. No tracheal tenderness present. No thyroid mass present.   Cardiovascular: Normal rate and regular rhythm. PMI is not displaced. Exam reveals no gallop.   Murmur heard.   Systolic murmur is present with a grade of 2/6.  AV click   Pulmonary/Chest: Effort normal and breath sounds normal. No respiratory distress. She has no decreased breath sounds. She has no wheezes. She has no rhonchi. She exhibits deformity (thoracic kyphosis). She exhibits no tenderness.   Abdominal: Soft. Normal appearance and bowel sounds are normal. There is no hepatosplenomegaly. There is no tenderness.   Morbidly obese   Musculoskeletal:   Uses motorized scooter, no extremity edema     Vascular Status -  Her right foot exhibits no edema. Her left foot exhibits no edema.  Lymphadenopathy:        Head (right side): No submandibular adenopathy present.        Head (left side): No submandibular adenopathy present.     She has no cervical adenopathy.        Right: No supraclavicular adenopathy present.        Left: No supraclavicular adenopathy present.   Neurological: She is alert and oriented to person, place, and time. Gait normal.   Skin: Skin is warm and dry. No rash noted. No cyanosis. Nails show no clubbing.   Psychiatric: She has a normal mood and affect. Her speech is normal and behavior is normal. Judgment normal.   Nursing note and vitals reviewed.      IMAGING: CT chest without contrast 3/27/19 (independently reviewed and interpreted by me) showed resolution of left upper lobe nodule and  bilateral effusions, interval decrease in mediastinal adenopathy, emphysematous changes, atherosclerosis    CXR 7/22/19: Stable cardiomegaly, central vascular congestion as well as interstitial thickening consistent with pulmonary edema, small bilateral pleural effusions, unchanged findings compared to 6/18/2019    TTE 6/18/19:  · LVEF 46-50%  · Left atrial cavity size is moderately dilated.  · The left ventricular cavity is mildly dilated.  · Left ventricular wall thickness is consistent with borderline concentric hypertrophy.  · Mild mitral valve regurgitation is present  · Mild to moderate tricuspid valve regurgitation is present  · RVSP 55mmHg    Assessment/Plan     Brendon was seen today for copd.    Diagnoses and all orders for this visit:    Chronic obstructive pulmonary disease, unspecified COPD type (CMS/HCC)    Chronic hypoxemic respiratory failure (CMS/HCC)    Heart failure with preserved left ventricular function (HFpEF) (CMS/HCC)    Pulmonary hypertension (CMS/HCC)    Adenocarcinoma of sigmoid colon (CMS/HCC)  -     Ambulatory Referral to Gastroenterology         Discussion/ Recommendations:   Unfortunately, the patient had a prolonged hospitalization but I do think that she is recovering to her baseline.  I reviewed her chest imaging from hospitalization as well as some other studies performed.  I recommend that she continue on her maintenance Anoro but suggested that she only use her albuterol when needed.  I do think she would benefit from ongoing diuresis, but have counseled her that this will need to be directed by her nephrologist given her elevated creatinine.  I did express concern that she does not have follow-up regarding her recently diagnosed colon cancer and have recommended that a follow-up appointment be made with Dr. Guerrero as he recommended in the hospital.  While she does have multiple chronic comorbidities which increase her risk associated with undergoing general anesthesia and  partial colon resection, I think she is nearing baseline.  I did  her that if the sigmoid adenocarcinoma is not addressed that it will likely spread and may become inoperable.  While she did have hypervolemia, she appears relatively euvolemic and well perfused on exam today.    -Referral to Dr. Guerrero for follow-up of sigmoid adenocarcinoma.  Counseled patient that when she is contacted to schedule appointment that she can inform them of her availability.  Stressed that this not be put off due to risk of spread.  -Continue Anoro daily and albuterol as needed for dyspnea or wheeze.  -Use oxygen to maintain oxygen saturations greater than 88%.  -Continue diuretics as directed by Dr. Caputo.  Encouraged her to keep her scheduled follow-up appoint for later this week.  -She would benefit from pulmonary rehabilitation, but given her limited mobility she is not able to tolerate.  -Does not meet criteria for LD CT screening.  -Up-to-date with flu and pneumococcal vaccines.    Patient's Body mass index is 38.77 kg/m². BMI is above normal parameters. Recommendations include: exercise counseling and nutrition counseling.      Return in about 3 months (around 10/30/2019) for Recheck COPD.          This document has been electronically signed by Nel Grant MD on July 30, 2019 3:48 PM      Dictated using Dragon

## 2019-07-31 ENCOUNTER — TELEPHONE (OUTPATIENT)
Dept: FAMILY MEDICINE CLINIC | Facility: CLINIC | Age: 74
End: 2019-07-31

## 2019-07-31 NOTE — TELEPHONE ENCOUNTER
Per ABRAHAM Calixto, Ms. Skelton has been called with recent lab results & recommendations.  Continue current medications and follow-up as planned or sooner if any problems.    Josefa,   Yes, She did see a nephrologist and is seeing Dr. Caputo tomorrow. 08/01/19        ----- Message from ABRAHAM Boone sent at 7/30/2019 12:43 PM CDT -----  Glucose remains elevated at 160 but has improved from 4 days ago.  Kidney functions are still elevated but do continue to improve slightly.  Can you please ask her if she saw a nephrologist while she was in the hospital.  If not I need to refer her to one for further evaluation.  Thank you.

## 2019-08-02 RX ORDER — BUMETANIDE 2 MG/1
2 TABLET ORAL 2 TIMES DAILY
Qty: 60 TABLET | Refills: 3 | Status: SHIPPED | OUTPATIENT
Start: 2019-08-02 | End: 2019-08-22 | Stop reason: SDUPTHER

## 2019-08-05 ENCOUNTER — ANTICOAGULATION VISIT (OUTPATIENT)
Dept: CARDIAC SURGERY | Facility: CLINIC | Age: 74
End: 2019-08-05

## 2019-08-05 DIAGNOSIS — Z79.01 LONG TERM CURRENT USE OF ANTICOAGULANT THERAPY: ICD-10-CM

## 2019-08-05 DIAGNOSIS — Z95.2 PERSONAL HISTORY OF HEART VALVE REPLACEMENT: ICD-10-CM

## 2019-08-05 DIAGNOSIS — I48.91 ATRIAL FIBRILLATION, UNSPECIFIED TYPE (HCC): ICD-10-CM

## 2019-08-05 LAB — INR PPP: 2.5

## 2019-08-05 NOTE — PROGRESS NOTES
Received INR from May RN [facility] over the phone who is still in pt's home. Per May, pt denies any med changes or bleeding issues. Due to drop in INR, dose was increased slightly and INR will be rechecked on 8/12. Patient instructed regarding medication; results given and questions answered. Nutritional counseling given.  Dietary factors affecting therapy addressed.  Patient instructed to monitor for excessive bruising or bleeding. May repeated back correctly and will inform pt.

## 2019-08-07 DIAGNOSIS — E08.9 DIABETES MELLITUS DUE TO UNDERLYING CONDITION WITHOUT COMPLICATION, WITH LONG-TERM CURRENT USE OF INSULIN (HCC): ICD-10-CM

## 2019-08-07 DIAGNOSIS — Z79.4 DIABETES MELLITUS DUE TO UNDERLYING CONDITION WITHOUT COMPLICATION, WITH LONG-TERM CURRENT USE OF INSULIN (HCC): ICD-10-CM

## 2019-08-07 RX ORDER — PEN NEEDLE, DIABETIC
NEEDLE, DISPOSABLE MISCELLANEOUS
Qty: 130 EACH | Refills: 5 | Status: SHIPPED | OUTPATIENT
Start: 2019-08-07 | End: 2020-01-01

## 2019-08-08 ENCOUNTER — TELEPHONE (OUTPATIENT)
Dept: FAMILY MEDICINE CLINIC | Facility: CLINIC | Age: 74
End: 2019-08-08

## 2019-08-08 ENCOUNTER — PATIENT OUTREACH (OUTPATIENT)
Dept: CASE MANAGEMENT | Facility: OTHER | Age: 74
End: 2019-08-08

## 2019-08-08 NOTE — OUTREACH NOTE
"Patient Outreach Note    Patient discharged from [facility] on 7-22-19 for acute on chronic diastolic heart failure. She was really very short with conversation mostly giving only single word responses. She was very irritated with the call center outreach. She stated she has medications in the home and had no questions about them at this time. She denies falls. She monitors her BP and blood sugar at home. I asked how her readings on these ran and she stated \"they are fine\". I asked if she followed a diabetic diet and she stated \"I eat what I want to but not to much of it\". Hgb A1C was 4.90 on 7-26-19. She received flu vaccine in 2018 and pneumonia vaccine in 2017. Her colonoscopy was done 6-24-19. She refuses a mammogram \"I don't want one\". She does not smoke. Provided my contact information for needs.     Aby Stout RN    8/8/2019, 11:56 AM      "

## 2019-08-08 NOTE — TELEPHONE ENCOUNTER
QUIANA KAMARA IS NEEDING YOU TO CALL [facility] AND TELL THEM SHE IS ABLE TO WALK TO THEIR VAN..THEY WILL THEN TRANSPORT HER AND GET A WHEELCHAIR AT THE DESTINATION.   THE WHEELCHAIR SHE HAS -THEY CANNOT USE [place] [employer]...SHE HAS APPTS COMING UP THIS TUESDAY AND NEEDS THE CALL ..  HER# [phone]

## 2019-08-08 NOTE — OUTREACH NOTE
Care Coordination Assessment    Documented/Reviewed By:  Aby Stout RN Date/time:  8/8/2019 11:54 AM   Assessment completed with:  patient  Enrolled in care management program:  No  Living arrangement:  alone  Support system:  children  Type of residence:  apartment  Home care services:  No  Equipment used at home:  walker, tub seat, wheelchair, hospital bed, nebulizer  Communication device:  No  Bed or wheelchair confined:  No  Inadequate nutrition:  No  Medication adherence problem:  No  Experiencing side effects from current medications:  No  History of fall(s) in last 6 months:  No  Difficulty keeping appointments:  No

## 2019-08-12 ENCOUNTER — ANTICOAGULATION VISIT (OUTPATIENT)
Dept: CARDIAC SURGERY | Facility: CLINIC | Age: 74
End: 2019-08-12

## 2019-08-12 DIAGNOSIS — I48.91 ATRIAL FIBRILLATION, UNSPECIFIED TYPE (HCC): ICD-10-CM

## 2019-08-12 DIAGNOSIS — Z79.01 LONG TERM CURRENT USE OF ANTICOAGULANT THERAPY: ICD-10-CM

## 2019-08-12 DIAGNOSIS — Z95.2 PERSONAL HISTORY OF HEART VALVE REPLACEMENT: ICD-10-CM

## 2019-08-12 LAB — INR PPP: 2.9 (ref 0.9–1.1)

## 2019-08-12 NOTE — PROGRESS NOTES
Received INR from Akua [facility] over the phone who is still in pt's home. Per Akua, pt denies any med changes or bleeding issues. Instructed on dosing and to recheck INR on 8/22. Patient instructed regarding medication; results given and questions answered. Nutritional counseling given.  Dietary factors affecting therapy addressed.  Patient instructed to monitor for excessive bruising or bleeding. Akua repeated back correctly and will inform pt/family.

## 2019-08-13 ENCOUNTER — EPISODE CHANGES (OUTPATIENT)
Dept: CASE MANAGEMENT | Facility: OTHER | Age: 74
End: 2019-08-13

## 2019-08-13 ENCOUNTER — OFFICE VISIT (OUTPATIENT)
Dept: GASTROENTEROLOGY | Facility: CLINIC | Age: 74
End: 2019-08-13

## 2019-08-13 VITALS
OXYGEN SATURATION: 94 % | HEIGHT: 65 IN | HEART RATE: 60 BPM | SYSTOLIC BLOOD PRESSURE: 140 MMHG | DIASTOLIC BLOOD PRESSURE: 72 MMHG | WEIGHT: 223.8 LBS | BODY MASS INDEX: 37.29 KG/M2

## 2019-08-13 DIAGNOSIS — C18.7 MALIGNANT NEOPLASM OF SIGMOID COLON (HCC): Primary | ICD-10-CM

## 2019-08-13 PROCEDURE — 99214 OFFICE O/P EST MOD 30 MIN: CPT | Performed by: INTERNAL MEDICINE

## 2019-08-13 NOTE — PATIENT INSTRUCTIONS

## 2019-08-13 NOTE — PROGRESS NOTES
[facility]      Chief Complaint:   Chief Complaint   Patient presents with   • adenocarcinoma of sigmoid colon       Subjective     HPI:   She recently admitted to the hospital for anemia patient was found to have a large polyp in the sigmoid colon which was adenocarcinoma.  She was evaluated by surgery and they determined patient other medical problems made it too risky to do surgery at this time patient was to go home to rehabilitate prior to surgical evaluation.  Patient states she feels much better at this time.  Patient will need repeat flexible sigmoidoscopy to evaluate the area where the polyp was removed.  Patient also need surgical evaluation for possible resection of this area.  Because of patient's elevated creatinine will not order CT scan of the abdomen at this time but will allow surgery to determine whether they would like to see a CAT scan of the abdomen.    Plan; we will have patient follow-up with surgery will do flexible sigmoidoscopy will also tattoo the area of the polyp.    Past Medical History:   Past Medical History:   Diagnosis Date   • Acute bronchitis    • Anxiety    • Atrial fibrillation (CMS/HCC)    • C. difficile colitis    • Callosity     under metatarsal head      • Cardiac pacemaker in situ    • CHF (congestive heart failure) (CMS/HCC)    • Chronic obstructive lung disease (CMS/HCC)    • Corns and callus    • Depressive disorder    • Diabetes mellitus (CMS/HCC)    • Diastolic heart failure (CMS/HCC)    • Essential hypertension    • Foot pain    • Hyperlipidemia    • Long term current use of anticoagulant    • On anticoagulant therapy    • Pulmonary emphysema (CMS/HCC)    • Rectal hemorrhage    • Type 2 diabetes mellitus (CMS/HCC)        Past Surgical History:  Past Surgical History:   Procedure Laterality Date   • AORTIC VALVE REPAIR/REPLACEMENT  1999   • CARDIAC ELECTROPHYSIOLOGY PROCEDURE N/A 3/20/2017    Procedure: PPM generator change - dual;  Surgeon:  Bereket Gates MD;  Location: [facility] CATH INVASIVE LOCATION;  Service:    • CARDIAC PACEMAKER PLACEMENT  1999   • CATARACT EXTRACTION W/ INTRAOCULAR LENS IMPLANT Left 2/1/2019    Procedure: REMOVE CATARACT AND IMPLANT INTRAOCULAR LENS I;  Surgeon: Jose L Clay MD;  Location: [facility] OR;  Service: Ophthalmology   • CATARACT EXTRACTION W/ INTRAOCULAR LENS IMPLANT Right 2/8/2019    Procedure: REMOVE CATARACT AND IMPLANT  INTRAOCULAR LENS;  Surgeon: Jos eL Clay MD;  Location: [facility] OR;  Service: Ophthalmology   • COLONOSCOPY N/A 6/19/2019    Procedure: COLONOSCOPY WITH CONTROL OF BLEED;  Surgeon: Alfredo Guerrero MD;  Location: [facility] ENDOSCOPY;  Service: Gastroenterology   • COLONOSCOPY N/A 6/24/2019    Procedure: COLONOSCOPY;  Surgeon: Alfredo Guerrero MD;  Location: [facility] ENDOSCOPY;  Service: Gastroenterology   • ECHO - CONVERTED  11/12/2013    There is mild to moderate left atrial enlargement EF 50-55%   • ENDOSCOPY N/A 6/19/2019    Procedure: ESOPHAGOGASTRODUODENOSCOPY WITH CONTROL OF BLEED;  Surgeon: Alfredo Guerrero MD;  Location: [facility] ENDOSCOPY;  Service: Gastroenterology   • FOOT SURGERY  1990   • OTHER SURGICAL HISTORY  10/26/2015    PARING CORN/CALLUS    • PACEMAKER REPLACEMENT N/A 3/21/2017    Procedure: revision pacemaker pocket, evacuation hematoma, control of bleeding;  Surgeon: Polo Feliz MD;  Location: [facility] OR;  Service:    • TRANSESOPHAGEAL ECHOCARDIOGRAM (MITZY)  05/01/2014    With color flow-Mild left atrial enlargement with mild concentric LV hypertrophy with top normal aortic root size. EF 45-50%. Mild mitral regurgitation and mild aortic insufficiency and trivial amount of tricuspid regurgation   • TUBAL ABDOMINAL LIGATION         Family History:  Family History   Problem Relation Age of Onset   • Heart disease Mother    • Hyperlipidemia Mother    • Hypertension Mother    • Cancer Other        Social History:   reports that she quit smoking about 20 years  ago. She has never used smokeless tobacco. She reports that she does not drink alcohol or use drugs.    Medications:   Prior to Admission medications    Medication Sig Start Date End Date Taking? Authorizing Provider   acetaminophen (TYLENOL) 325 MG tablet Take 650 mg by mouth every 6 (six) hours as needed for mild pain (1-3).   Yes Andrés Waldrop MD   albuterol (PROVENTIL) (2.5 MG/3ML) 0.083% nebulizer solution Take 2.5 mg by nebulization Every 4 (Four) Hours As Needed for Wheezing.   Yes Andrés Waldrop MD   albuterol sulfate  (90 Base) MCG/ACT inhaler Inhale 2 puffs Every 4 (Four) Hours As Needed for Wheezing or Shortness of Air. 3/15/19  Yes Nel Grant MD   Ascorbic Acid (VITAMIN C WITH OCHOA HIPS) 250 MG tablet Take 500 mg by mouth Daily.   Yes Andrés Waldrop MD   aspirin 81 MG chewable tablet Chew 81 mg Daily.   Yes Andrés Waldrop MD   bumetanide (BUMEX) 2 MG tablet Take 1 tablet by mouth 2 (Two) Times a Day. 8/2/19  Yes Josefa Pozo APRN   cholecalciferol (VITAMIN D3) 1000 units tablet Take 2,000 Units by mouth Daily.   Yes Andrés Waldrop MD   citalopram (CeleXA) 20 MG tablet Take 1 tablet by mouth Daily. 5/8/19  Yes Josefa Pozo APRN   diltiaZEM CD (CARDIZEM CD) 240 MG 24 hr capsule Take 1 capsule by mouth Daily. 6/4/19  Yes Josefa Pozo APRN   ezetimibe (ZETIA) 10 MG tablet TAKE 1 TABLET BY MOUTH EVERY DAY 7/18/19  Yes Josefa Pozo APRN   ferrous sulfate 325 (65 FE) MG tablet Take 325 mg by mouth Daily With Breakfast.   Yes Andrés Waldrop MD   glucose blood test strip 1 each by Other route 3 (three) times a day. Use as instructed   Yes Andrés Waldrop MD   Insulin Glargine (BASAGLAR KWIKPEN) 100 UNIT/ML injection pen Inject 30 Units under the skin into the appropriate area as directed Daily. 9/10/18  Yes Josefa Pozo APRN   metOLazone (ZAROXOLYN) 5 MG tablet Take 1 tablet by mouth Daily. 7/22/19  Yes Alfredo Charles Jr., MD   metoprolol  succinate XL (TOPROL XL) 25 MG 24 hr tablet Take 1 tablet by mouth Daily. 7/22/19  Yes Alfredo Charles Jr., MD   ondansetron (ZOFRAN) 4 MG tablet Take 1 tablet by mouth Every 6 (Six) Hours As Needed for Nausea or Vomiting. 7/22/19  Yes Alfredo Charles Jr., MD   potassium chloride (MICRO-K) 10 MEQ CR capsule Take 2 capsules by mouth 2 (Two) Times a Day With Meals. 7/22/19  Yes Alfredo Charles Jr., MD   Prenatal Vit-Fe Fumarate-FA (PRENATAL VITAMIN) 27-0.8 MG tablet Take 1 tablet by mouth daily.   Yes Provider, MD Andrés   raNITIdine (ZANTAC) 150 MG tablet Take 1 tablet by mouth Every Night. 8/15/18  Yes Josefa Pozo APRN   rOPINIRole (REQUIP) 0.25 MG tablet Take 1 tablet by mouth Every Night. Take 1 hour before bedtime. 9/12/18  Yes Josefa Pozo APRN   traZODone (DESYREL) 150 MG tablet Take 2 tablets by mouth Every Night. 9/12/18  Yes Josefa Pozo APRN   umeclidinium-vilanterol (ANORO ELLIPTA) 62.5-25 MCG/INH aerosol powder  inhaler Inhale 1 puff Daily. 2/18/19  Yes Nel Grant MD   UNIFINE PENTIPS 31G X 6 MM misc USE 1 FOUR (4) TIMES DAILY WITH INSULIN 8/7/19  Yes Josefa Pozo APRN   warfarin (COUMADIN) 5 MG tablet Take 5 mg by mouth Daily.   Yes Provider, MD Andrés       Allergies:  Crestor [rosuvastatin calcium]; Lipitor [atorvastatin]; Lortab [hydrocodone-acetaminophen]; Adhesive tape; and Hydrocodone-acetaminophen    ROS:    Review of Systems   Constitutional: Negative for activity change, appetite change, chills, diaphoresis, fatigue, fever and unexpected weight change.   HENT: Negative for sore throat and trouble swallowing.    Respiratory: Negative for shortness of breath.    Gastrointestinal: Negative for abdominal distention, abdominal pain, anal bleeding, blood in stool, constipation, diarrhea, nausea, rectal pain and vomiting.   Endocrine: Negative for polydipsia, polyphagia and polyuria.   Genitourinary: Negative for difficulty urinating.   Musculoskeletal: Negative for arthralgias.  "  Skin: Negative for pallor.   Allergic/Immunologic: Negative for food allergies.   Neurological: Negative for weakness and light-headedness.   Psychiatric/Behavioral: Negative for behavioral problems.     Objective     Blood pressure 140/72, pulse 60, height 165.1 cm (65\"), weight 102 kg (223 lb 12.8 oz), SpO2 94 %, not currently breastfeeding.    Physical Exam   Constitutional: She is oriented to person, place, and time. She appears well-developed and well-nourished. No distress.   HENT:   Head: Normocephalic and atraumatic.   Cardiovascular: Normal rate, regular rhythm, normal heart sounds and intact distal pulses. Exam reveals no gallop and no friction rub.   No murmur heard.  Pulmonary/Chest: Breath sounds normal. No respiratory distress. She has no wheezes. She has no rales. She exhibits no tenderness.   Abdominal: Soft. Bowel sounds are normal. She exhibits no distension and no mass. There is no tenderness. There is no rebound and no guarding. No hernia.   Musculoskeletal: Normal range of motion. She exhibits no edema.   Neurological: She is alert and oriented to person, place, and time.   Skin: Skin is warm and dry. No rash noted. She is not diaphoretic. No erythema. No pallor.   Psychiatric: She has a normal mood and affect. Her behavior is normal. Judgment and thought content normal.        Assessment/Plan   Brendon was seen today for adenocarcinoma of sigmoid colon.    Diagnoses and all orders for this visit:    Malignant neoplasm of sigmoid colon (CMS/HCC)  -     Ambulatory Referral to General Surgery  -     Case Request; Standing  -     Case Request        SIGMOIDOSCOPY FLEXIBLE (N/A)     Diagnosis Plan   1. Malignant neoplasm of sigmoid colon (CMS/HCC)  Ambulatory Referral to General Surgery    Case Request    Case Request       Anticipated Surgical Procedure:  Orders Placed This Encounter   Procedures   • Ambulatory Referral to General Surgery     Referral Priority:   Routine     Referral Type:   " Consultation     Referral Reason:   Specialty Services Required     Referred to Provider:   Luis Maher MD     Requested Specialty:   General Surgery     Number of Visits Requested:   1       The risks, benefits, and alternatives of this procedure have been discussed with the patient or the responsible party- the patient understands and agrees to proceed.

## 2019-08-14 ENCOUNTER — OUTSIDE FACILITY SERVICE (OUTPATIENT)
Dept: FAMILY MEDICINE CLINIC | Facility: CLINIC | Age: 74
End: 2019-08-14

## 2019-08-14 PROCEDURE — G0180 MD CERTIFICATION HHA PATIENT: HCPCS | Performed by: FAMILY MEDICINE

## 2019-08-19 ENCOUNTER — TELEPHONE (OUTPATIENT)
Dept: FAMILY MEDICINE CLINIC | Facility: CLINIC | Age: 74
End: 2019-08-19

## 2019-08-19 NOTE — TELEPHONE ENCOUNTER
Patient was seen by Roney Charles in the hospital and wasn't sure if she needed to get refills on these meds or stop taking them.    bumetanide (BUMEX) 2 MG tablet  metOLazone (ZAROXOLYN) 5 MG tablet  metoprolol succinate XL (TOPROL XL) 25 MG 24 hr tablet.    Thanks,  Veronica    Call back number for patient is [phone]

## 2019-08-22 ENCOUNTER — ANTICOAGULATION VISIT (OUTPATIENT)
Dept: CARDIAC SURGERY | Facility: CLINIC | Age: 74
End: 2019-08-22

## 2019-08-22 DIAGNOSIS — Z95.2 PERSONAL HISTORY OF HEART VALVE REPLACEMENT: ICD-10-CM

## 2019-08-22 DIAGNOSIS — I48.91 ATRIAL FIBRILLATION, UNSPECIFIED TYPE (HCC): ICD-10-CM

## 2019-08-22 DIAGNOSIS — Z79.01 LONG TERM CURRENT USE OF ANTICOAGULANT THERAPY: ICD-10-CM

## 2019-08-22 LAB — INR PPP: 3.7

## 2019-08-22 RX ORDER — METOLAZONE 5 MG/1
5 TABLET ORAL DAILY
Qty: 30 TABLET | Refills: 0 | Status: SHIPPED | OUTPATIENT
Start: 2019-08-22 | End: 2019-09-18 | Stop reason: SDUPTHER

## 2019-08-22 RX ORDER — BUMETANIDE 2 MG/1
2 TABLET ORAL 2 TIMES DAILY
Qty: 60 TABLET | Refills: 3 | Status: SHIPPED | OUTPATIENT
Start: 2019-08-22 | End: 2020-01-01

## 2019-08-22 RX ORDER — METOPROLOL SUCCINATE 25 MG/1
25 TABLET, EXTENDED RELEASE ORAL DAILY
Qty: 30 TABLET | Refills: 0 | Status: SHIPPED | OUTPATIENT
Start: 2019-08-22 | End: 2019-09-18 | Stop reason: SDUPTHER

## 2019-08-22 NOTE — PROGRESS NOTES
I received INR from May, RN with [facility] by phone while she was still in the pt home. Pt denied med changes or bleeding problems but stated she has not eaten any green veggies yet this week. May was instructed on dosing, to have pt eat a serving of green veggies today and then every 3-4 days, and recheck INR on Thursday August 29; she repeated back correctly.

## 2019-08-28 ENCOUNTER — CONSULT (OUTPATIENT)
Dept: SURGERY | Facility: CLINIC | Age: 74
End: 2019-08-28

## 2019-08-28 VITALS
HEIGHT: 65 IN | WEIGHT: 221 LBS | BODY MASS INDEX: 36.82 KG/M2 | DIASTOLIC BLOOD PRESSURE: 80 MMHG | TEMPERATURE: 97 F | SYSTOLIC BLOOD PRESSURE: 128 MMHG | HEART RATE: 60 BPM

## 2019-08-28 DIAGNOSIS — C18.7 MALIGNANT NEOPLASM OF SIGMOID COLON (HCC): Primary | ICD-10-CM

## 2019-08-28 PROCEDURE — 99203 OFFICE O/P NEW LOW 30 MIN: CPT | Performed by: SURGERY

## 2019-08-29 ENCOUNTER — ANTICOAGULATION VISIT (OUTPATIENT)
Dept: CARDIAC SURGERY | Facility: CLINIC | Age: 74
End: 2019-08-29

## 2019-08-29 DIAGNOSIS — I48.91 ATRIAL FIBRILLATION, UNSPECIFIED TYPE (HCC): ICD-10-CM

## 2019-08-29 DIAGNOSIS — Z95.2 PERSONAL HISTORY OF HEART VALVE REPLACEMENT: ICD-10-CM

## 2019-08-29 DIAGNOSIS — Z79.01 LONG TERM CURRENT USE OF ANTICOAGULANT THERAPY: ICD-10-CM

## 2019-08-29 LAB — INR PPP: 5.1

## 2019-08-30 ENCOUNTER — ANTICOAGULATION VISIT (OUTPATIENT)
Dept: CARDIAC SURGERY | Facility: CLINIC | Age: 74
End: 2019-08-30

## 2019-08-30 DIAGNOSIS — Z79.01 LONG TERM CURRENT USE OF ANTICOAGULANT THERAPY: ICD-10-CM

## 2019-08-30 DIAGNOSIS — Z95.2 PERSONAL HISTORY OF HEART VALVE REPLACEMENT: ICD-10-CM

## 2019-08-30 DIAGNOSIS — I48.91 ATRIAL FIBRILLATION, UNSPECIFIED TYPE (HCC): ICD-10-CM

## 2019-08-30 LAB — INR PPP: 3.2

## 2019-09-04 DIAGNOSIS — C18.7 MALIGNANT NEOPLASM OF SIGMOID COLON (HCC): Primary | ICD-10-CM

## 2019-09-05 ENCOUNTER — ANTICOAGULATION VISIT (OUTPATIENT)
Dept: CARDIAC SURGERY | Facility: CLINIC | Age: 74
End: 2019-09-05

## 2019-09-05 ENCOUNTER — HOSPITAL ENCOUNTER (OUTPATIENT)
Dept: CT IMAGING | Facility: HOSPITAL | Age: 74
Discharge: HOME | End: 2019-09-05
Admitting: SURGERY

## 2019-09-05 ENCOUNTER — LAB (OUTPATIENT)
Dept: LAB | Facility: HOSPITAL | Age: 74
End: 2019-09-05

## 2019-09-05 VITALS — OXYGEN SATURATION: 96 % | HEART RATE: 61 BPM

## 2019-09-05 DIAGNOSIS — C18.7 MALIGNANT NEOPLASM OF SIGMOID COLON (HCC): ICD-10-CM

## 2019-09-05 DIAGNOSIS — Z79.01 LONG TERM CURRENT USE OF ANTICOAGULANT THERAPY: ICD-10-CM

## 2019-09-05 DIAGNOSIS — Z95.2 PERSONAL HISTORY OF HEART VALVE REPLACEMENT: ICD-10-CM

## 2019-09-05 DIAGNOSIS — I48.91 ATRIAL FIBRILLATION, UNSPECIFIED TYPE (HCC): ICD-10-CM

## 2019-09-05 LAB
ALBUMIN SERPL-MCNC: 3.6 G/DL (ref 3.5–5.2)
ALBUMIN/GLOB SERPL: 0.9 G/DL
ALP SERPL-CCNC: 72 U/L (ref 39–117)
ALT SERPL W P-5'-P-CCNC: 14 U/L (ref 1–33)
ANION GAP SERPL CALCULATED.3IONS-SCNC: 16 MMOL/L (ref 5–15)
AST SERPL-CCNC: 27 U/L (ref 1–32)
BASOPHILS # BLD AUTO: 0.04 10*3/MM3 (ref 0–0.2)
BASOPHILS NFR BLD AUTO: 0.5 % (ref 0–1.5)
BILIRUB SERPL-MCNC: 0.6 MG/DL (ref 0.2–1.2)
BUN BLD-MCNC: 122 MG/DL (ref 8–23)
BUN/CREAT SERPL: 36.9 (ref 7–25)
CALCIUM SPEC-SCNC: 10.6 MG/DL (ref 8.6–10.5)
CHLORIDE SERPL-SCNC: 90 MMOL/L (ref 98–107)
CO2 SERPL-SCNC: 33 MMOL/L (ref 22–29)
CREAT BLD-MCNC: 3.31 MG/DL (ref 0.57–1)
DEPRECATED RDW RBC AUTO: 55.4 FL (ref 37–54)
EOSINOPHIL # BLD AUTO: 0.1 10*3/MM3 (ref 0–0.4)
EOSINOPHIL NFR BLD AUTO: 1.2 % (ref 0.3–6.2)
ERYTHROCYTE [DISTWIDTH] IN BLOOD BY AUTOMATED COUNT: 17.3 % (ref 12.3–15.4)
GFR SERPL CREATININE-BSD FRML MDRD: 14 ML/MIN/1.73
GFR SERPL CREATININE-BSD FRML MDRD: ABNORMAL ML/MIN/{1.73_M2}
GLOBULIN UR ELPH-MCNC: 3.9 GM/DL
GLUCOSE BLD-MCNC: 130 MG/DL (ref 65–99)
HCT VFR BLD AUTO: 32.1 % (ref 34–46.6)
HGB BLD-MCNC: 10.6 G/DL (ref 12–15.9)
IMM GRANULOCYTES # BLD AUTO: 0.04 10*3/MM3 (ref 0–0.05)
IMM GRANULOCYTES NFR BLD AUTO: 0.5 % (ref 0–0.5)
INR PPP: 1.3 (ref 0.9–1.1)
LYMPHOCYTES # BLD AUTO: 0.57 10*3/MM3 (ref 0.7–3.1)
LYMPHOCYTES NFR BLD AUTO: 7.1 % (ref 19.6–45.3)
MCH RBC QN AUTO: 31.2 PG (ref 26.6–33)
MCHC RBC AUTO-ENTMCNC: 33 G/DL (ref 31.5–35.7)
MCV RBC AUTO: 94.4 FL (ref 79–97)
MONOCYTES # BLD AUTO: 0.7 10*3/MM3 (ref 0.1–0.9)
MONOCYTES NFR BLD AUTO: 8.7 % (ref 5–12)
NEUTROPHILS # BLD AUTO: 6.59 10*3/MM3 (ref 1.7–7)
NEUTROPHILS NFR BLD AUTO: 82 % (ref 42.7–76)
NRBC BLD AUTO-RTO: 0.2 /100 WBC (ref 0–0.2)
PLATELET # BLD AUTO: 252 10*3/MM3 (ref 140–450)
PMV BLD AUTO: 11.6 FL (ref 6–12)
POTASSIUM BLD-SCNC: 3.3 MMOL/L (ref 3.5–5.2)
PROT SERPL-MCNC: 7.5 G/DL (ref 6–8.5)
RBC # BLD AUTO: 3.4 10*6/MM3 (ref 3.77–5.28)
SODIUM BLD-SCNC: 139 MMOL/L (ref 136–145)
WBC NRBC COR # BLD: 8.04 10*3/MM3 (ref 3.4–10.8)

## 2019-09-05 PROCEDURE — 99211 OFF/OP EST MAY X REQ PHY/QHP: CPT | Performed by: NURSE PRACTITIONER

## 2019-09-05 PROCEDURE — 74176 CT ABD & PELVIS W/O CONTRAST: CPT

## 2019-09-05 PROCEDURE — 85610 PROTHROMBIN TIME: CPT | Performed by: NURSE PRACTITIONER

## 2019-09-05 PROCEDURE — 85025 COMPLETE CBC W/AUTO DIFF WBC: CPT

## 2019-09-05 PROCEDURE — 36415 COLL VENOUS BLD VENIPUNCTURE: CPT

## 2019-09-05 PROCEDURE — 80053 COMPREHEN METABOLIC PANEL: CPT

## 2019-09-13 ENCOUNTER — ANTICOAGULATION VISIT (OUTPATIENT)
Dept: CARDIAC SURGERY | Facility: CLINIC | Age: 74
End: 2019-09-13

## 2019-09-13 VITALS — DIASTOLIC BLOOD PRESSURE: 66 MMHG | OXYGEN SATURATION: 97 % | SYSTOLIC BLOOD PRESSURE: 104 MMHG | HEART RATE: 60 BPM

## 2019-09-13 DIAGNOSIS — I48.91 ATRIAL FIBRILLATION, UNSPECIFIED TYPE (HCC): ICD-10-CM

## 2019-09-13 DIAGNOSIS — Z95.2 PERSONAL HISTORY OF HEART VALVE REPLACEMENT: ICD-10-CM

## 2019-09-13 DIAGNOSIS — Z79.01 LONG TERM CURRENT USE OF ANTICOAGULANT THERAPY: ICD-10-CM

## 2019-09-13 LAB — INR PPP: 2.1 (ref 0.9–1.1)

## 2019-09-13 PROCEDURE — 85610 PROTHROMBIN TIME: CPT | Performed by: NURSE PRACTITIONER

## 2019-09-13 PROCEDURE — 99211 OFF/OP EST MAY X REQ PHY/QHP: CPT | Performed by: NURSE PRACTITIONER

## 2019-09-13 NOTE — PROGRESS NOTES
Today's INR is 2.1.  Pt denies med changes or bleeding problems. Dose adjusted today only and pt instructed to hold green veggies until Monday; pt verbalized. Patient instructed regarding medication; results given and questions answered. Nutritional counseling given.  Dietary factors affecting therapy addressed.  Patient instructed to monitor for excessive bruising or bleeding. Will recheck next week.         This document has been electronically signed by Meme Duckworth, DANIEL @ on September 13, 2019 2:04 PM

## 2019-09-18 RX ORDER — METOLAZONE 5 MG/1
5 TABLET ORAL DAILY
Qty: 30 TABLET | Refills: 0 | Status: SHIPPED | OUTPATIENT
Start: 2019-09-18 | End: 2019-10-14 | Stop reason: SDUPTHER

## 2019-09-18 RX ORDER — METOPROLOL SUCCINATE 25 MG/1
25 TABLET, EXTENDED RELEASE ORAL DAILY
Qty: 30 TABLET | Refills: 0 | Status: SHIPPED | OUTPATIENT
Start: 2019-09-18 | End: 2019-10-14 | Stop reason: SDUPTHER

## 2019-09-19 ENCOUNTER — ANTICOAGULATION VISIT (OUTPATIENT)
Dept: CARDIAC SURGERY | Facility: CLINIC | Age: 74
End: 2019-09-19

## 2019-09-19 DIAGNOSIS — Z79.01 LONG TERM CURRENT USE OF ANTICOAGULANT THERAPY: ICD-10-CM

## 2019-09-19 DIAGNOSIS — Z95.2 PERSONAL HISTORY OF HEART VALVE REPLACEMENT: ICD-10-CM

## 2019-09-19 DIAGNOSIS — I48.91 ATRIAL FIBRILLATION, UNSPECIFIED TYPE (HCC): ICD-10-CM

## 2019-09-19 LAB — INR PPP: 3 (ref 0.9–1.1)

## 2019-09-19 PROCEDURE — 85610 PROTHROMBIN TIME: CPT | Performed by: NURSE PRACTITIONER

## 2019-09-19 NOTE — PROGRESS NOTES
Today's INR is 3.0. Denies any new medications or bleeding issues. Denies any s/s of blood clot. PT instructed to continue same dose and make sure she eats green on Thursday and Sunday. Recheck INR on 9/30 when she returns to see Dr Maher. Patient instructed regarding medication; results given and questions answered. Nutritional counseling given.  Dietary factors affecting therapy addressed.  Patient instructed to monitor for excessive bruising or bleeding. PT verbalizes understanding.       This document has been electronically signed by MIKI Pan-BC @  On September 19, 2019 12:57 PM

## 2019-09-24 ENCOUNTER — OFFICE VISIT (OUTPATIENT)
Dept: GASTROENTEROLOGY | Facility: CLINIC | Age: 74
End: 2019-09-24

## 2019-09-24 VITALS
BODY MASS INDEX: 35.55 KG/M2 | WEIGHT: 213.38 LBS | DIASTOLIC BLOOD PRESSURE: 62 MMHG | SYSTOLIC BLOOD PRESSURE: 140 MMHG | HEART RATE: 60 BPM | HEIGHT: 65 IN

## 2019-09-24 DIAGNOSIS — C18.7 MALIGNANT NEOPLASM OF SIGMOID COLON (HCC): Primary | ICD-10-CM

## 2019-09-24 PROCEDURE — 99214 OFFICE O/P EST MOD 30 MIN: CPT | Performed by: INTERNAL MEDICINE

## 2019-09-24 RX ORDER — POTASSIUM CHLORIDE 20 MEQ/1
1 TABLET, EXTENDED RELEASE ORAL DAILY
COMMUNITY
Start: 2019-08-01 | End: 2020-01-01

## 2019-09-24 NOTE — PROGRESS NOTES
[facility]      Chief Complaint:   Chief Complaint   Patient presents with   • Follow-up       Subjective     HPI:   Patient with adenocarcinoma of the sigmoid colon found on previous admission for GI bleed.  Patient states that she has had no more bleeding.  Patient is being evaluated by surgery.  Will need flexible sigmoidoscopy which will be done on Monday.  Patient will have area evaluated and injected if found.  Will have patient follow-up with Dr. Maher following this procedure.    Past Medical History:   Past Medical History:   Diagnosis Date   • Acute bronchitis    • Anxiety    • Atrial fibrillation (CMS/HCC)    • C. difficile colitis    • Callosity     under metatarsal head      • Cardiac pacemaker in situ    • CHF (congestive heart failure) (CMS/HCC)    • Chronic obstructive lung disease (CMS/HCC)    • Corns and callus    • Depressive disorder    • Diabetes mellitus (CMS/HCC)    • Diastolic heart failure (CMS/HCC)    • Essential hypertension    • Foot pain    • Hyperlipidemia    • Long term current use of anticoagulant    • Malignant neoplasm of sigmoid colon (CMS/HCC) 8/13/2019    Added automatically from request for surgery [id number]   • On anticoagulant therapy    • Pulmonary emphysema (CMS/HCC)    • Rectal hemorrhage    • Type 2 diabetes mellitus (CMS/HCC)        Past Surgical History:  Past Surgical History:   Procedure Laterality Date   • AORTIC VALVE REPAIR/REPLACEMENT  1999   • CARDIAC ELECTROPHYSIOLOGY PROCEDURE N/A 3/20/2017    Procedure: PPM generator change - dual;  Surgeon: Bereket Gates MD;  Location: [facility] CATH INVASIVE LOCATION;  Service:    • CARDIAC PACEMAKER PLACEMENT  1999   • CATARACT EXTRACTION W/ INTRAOCULAR LENS IMPLANT Left 2/1/2019    Procedure: REMOVE CATARACT AND IMPLANT INTRAOCULAR LENS I;  Surgeon: Jose L Clay MD;  Location: [facility] OR;  Service: Ophthalmology   • CATARACT EXTRACTION W/ INTRAOCULAR LENS IMPLANT Right 2/8/2019    Procedure:  REMOVE CATARACT AND IMPLANT  INTRAOCULAR LENS;  Surgeon: Jose L Clay MD;  Location: [facility] OR;  Service: Ophthalmology   • COLONOSCOPY N/A 6/19/2019    Procedure: COLONOSCOPY WITH CONTROL OF BLEED;  Surgeon: Alfredo Guerrero MD;  Location: [facility] ENDOSCOPY;  Service: Gastroenterology   • COLONOSCOPY N/A 6/24/2019    Procedure: COLONOSCOPY;  Surgeon: Alfredo Guerrero MD;  Location: [facility] ENDOSCOPY;  Service: Gastroenterology   • ECHO - CONVERTED  11/12/2013    There is mild to moderate left atrial enlargement EF 50-55%   • ENDOSCOPY N/A 6/19/2019    Procedure: ESOPHAGOGASTRODUODENOSCOPY WITH CONTROL OF BLEED;  Surgeon: Alfredo Guerrero MD;  Location: [facility] ENDOSCOPY;  Service: Gastroenterology   • FOOT SURGERY  1990   • OTHER SURGICAL HISTORY  10/26/2015    PARING CORN/CALLUS    • PACEMAKER REPLACEMENT N/A 3/21/2017    Procedure: revision pacemaker pocket, evacuation hematoma, control of bleeding;  Surgeon: Polo Feliz MD;  Location: [facility] OR;  Service:    • TRANSESOPHAGEAL ECHOCARDIOGRAM (MITZY)  05/01/2014    With color flow-Mild left atrial enlargement with mild concentric LV hypertrophy with top normal aortic root size. EF 45-50%. Mild mitral regurgitation and mild aortic insufficiency and trivial amount of tricuspid regurgation   • TUBAL ABDOMINAL LIGATION         Family History:  Family History   Problem Relation Age of Onset   • Heart disease Mother    • Hyperlipidemia Mother    • Hypertension Mother    • Cancer Other        Social History:   reports that she quit smoking about 20 years ago. She has never used smokeless tobacco. She reports that she does not drink alcohol or use drugs.    Medications:   Prior to Admission medications    Medication Sig Start Date End Date Taking? Authorizing Provider   acetaminophen (TYLENOL) 325 MG tablet Take 650 mg by mouth every 6 (six) hours as needed for mild pain (1-3).   Yes Provider, MD Andrés   albuterol (PROVENTIL) (2.5 MG/3ML) 0.083%  nebulizer solution Take 2.5 mg by nebulization Every 4 (Four) Hours As Needed for Wheezing.   Yes Andrés Waldrop MD   albuterol sulfate  (90 Base) MCG/ACT inhaler Inhale 2 puffs Every 4 (Four) Hours As Needed for Wheezing or Shortness of Air. 3/15/19  Yes Nel Grant MD   Ascorbic Acid (VITAMIN C WITH OCHOA HIPS) 250 MG tablet Take 500 mg by mouth Daily.   Yes Andrés Waldrop MD   aspirin 81 MG chewable tablet Chew 81 mg Daily.   Yes Andrés Waldrop MD   bumetanide (BUMEX) 2 MG tablet Take 1 tablet by mouth 2 (Two) Times a Day. 8/22/19  Yes Josefa Pozo APRN   cholecalciferol (VITAMIN D3) 1000 units tablet Take 2,000 Units by mouth Daily.   Yes Andrés Waldrop MD   citalopram (CeleXA) 20 MG tablet Take 1 tablet by mouth Daily. 5/8/19  Yes Josefa Pozo APRN   diltiaZEM CD (CARDIZEM CD) 240 MG 24 hr capsule Take 1 capsule by mouth Daily. 6/4/19  Yes Josefa Pozo APRN   ezetimibe (ZETIA) 10 MG tablet TAKE 1 TABLET BY MOUTH EVERY DAY 7/18/19  Yes Josefa Pozo APRN   ferrous sulfate 325 (65 FE) MG tablet Take 325 mg by mouth Daily With Breakfast.   Yes Andrés Waldrop MD   glucose blood test strip 1 each by Other route 3 (three) times a day. Use as instructed   Yes Andrés Waldrop MD   Insulin Glargine (BASAGLAR KWIKPEN) 100 UNIT/ML injection pen Inject 30 Units under the skin into the appropriate area as directed Daily. 9/10/18  Yes Josefa Pozo APRN   metOLazone (ZAROXOLYN) 5 MG tablet TAKE 1 TABLET BY MOUTH DAILY. 9/18/19  Yes Josefa Pozo APRN   metoprolol succinate XL (TOPROL-XL) 25 MG 24 hr tablet TAKE 1 TABLET BY MOUTH DAILY. 9/18/19  Yes Josefa Pozo APRN   ondansetron (ZOFRAN) 4 MG tablet Take 1 tablet by mouth Every 6 (Six) Hours As Needed for Nausea or Vomiting. 7/22/19  Yes Alfredo Charles Jr., MD   potassium chloride (K-DUR,KLOR-CON) 20 MEQ CR tablet Take 1 tablet by mouth Daily. 8/1/19  Yes Provider, Historical, MD   Prenatal Vit-Fe Fumarate-FA  "(PRENATAL VITAMIN) 27-0.8 MG tablet Take 1 tablet by mouth daily.   Yes ProviderAndrés MD   raNITIdine (ZANTAC) 150 MG tablet Take 1 tablet by mouth Every Night. 8/15/18  Yes Josefa Pozo APRN   rOPINIRole (REQUIP) 0.25 MG tablet Take 1 tablet by mouth Every Night. Take 1 hour before bedtime. 9/12/18  Yes Josefa Pozo APRN   traZODone (DESYREL) 150 MG tablet Take 2 tablets by mouth Every Night. 9/12/18  Yes Josefa Pozo APRN   umeclidinium-vilanterol (ANORO ELLIPTA) 62.5-25 MCG/INH aerosol powder  inhaler Inhale 1 puff Daily. 2/18/19  Yes Nel Grant MD   UNIFINE PENTIPS 31G X 6 MM misc USE 1 FOUR (4) TIMES DAILY WITH INSULIN 8/7/19  Yes Josefa Pozo APRN   warfarin (COUMADIN) 5 MG tablet Take 5 mg by mouth Daily.   Yes Provider, MD Andrés   potassium chloride (MICRO-K) 10 MEQ CR capsule Take 2 capsules by mouth 2 (Two) Times a Day With Meals. 7/22/19 9/24/19  Alfredo Charles Jr., MD       Allergies:  Crestor [rosuvastatin calcium]; Lipitor [atorvastatin]; Lortab [hydrocodone-acetaminophen]; Adhesive tape; and Hydrocodone-acetaminophen    ROS:    Review of Systems   Constitutional: Negative for activity change, appetite change, chills, diaphoresis, fatigue, fever and unexpected weight change.   HENT: Negative for sore throat and trouble swallowing.    Respiratory: Negative for shortness of breath.    Gastrointestinal: Negative for abdominal distention, abdominal pain, anal bleeding, blood in stool, constipation, diarrhea, nausea, rectal pain and vomiting.   Endocrine: Negative for polydipsia, polyphagia and polyuria.   Genitourinary: Negative for difficulty urinating.   Musculoskeletal: Negative for arthralgias.   Skin: Negative for pallor.   Allergic/Immunologic: Negative for food allergies.   Neurological: Negative for weakness and light-headedness.   Psychiatric/Behavioral: Negative for behavioral problems.     Objective     Blood pressure 140/62, pulse 60, height 165.1 cm (65\"), weight " 96.8 kg (213 lb 6 oz), not currently breastfeeding.    Physical Exam   Constitutional: She is oriented to person, place, and time. She appears well-developed and well-nourished. No distress.   HENT:   Head: Normocephalic and atraumatic.   Cardiovascular: Normal rate, regular rhythm, normal heart sounds and intact distal pulses. Exam reveals no gallop and no friction rub.   No murmur heard.  Pulmonary/Chest: Breath sounds normal. No respiratory distress. She has no wheezes. She has no rales. She exhibits no tenderness.   Abdominal: Soft. Bowel sounds are normal. She exhibits no distension and no mass. There is no tenderness. There is no rebound and no guarding. No hernia.   Musculoskeletal: Normal range of motion. She exhibits no edema.   Neurological: She is alert and oriented to person, place, and time.   Skin: Skin is warm and dry. No rash noted. She is not diaphoretic. No erythema. No pallor.   Psychiatric: She has a normal mood and affect. Her behavior is normal. Judgment and thought content normal.        Assessment/Plan   Brendon was seen today for follow-up.    Diagnoses and all orders for this visit:    Malignant neoplasm of sigmoid colon (CMS/HCC)  -     Case Request; Standing  -     Case Request        SIGMOIDOSCOPY FLEXIBLE (N/A)     Diagnosis Plan   1. Malignant neoplasm of sigmoid colon (CMS/HCC)  Case Request    Case Request       Anticipated Surgical Procedure:  No orders of the defined types were placed in this encounter.      The risks, benefits, and alternatives of this procedure have been discussed with the patient or the responsible party- the patient understands and agrees to proceed.

## 2019-09-25 ENCOUNTER — TRANSCRIBE ORDERS (OUTPATIENT)
Dept: LAB | Facility: HOSPITAL | Age: 74
End: 2019-09-25

## 2019-09-25 ENCOUNTER — APPOINTMENT (OUTPATIENT)
Dept: LAB | Facility: HOSPITAL | Age: 74
End: 2019-09-25

## 2019-09-25 DIAGNOSIS — I50.9 CHRONIC CONGESTIVE HEART FAILURE, UNSPECIFIED HEART FAILURE TYPE (HCC): ICD-10-CM

## 2019-09-25 DIAGNOSIS — N18.30 CHRONIC KIDNEY DISEASE, STAGE III (MODERATE) (HCC): ICD-10-CM

## 2019-09-25 DIAGNOSIS — N18.9 ANEMIA OF CHRONIC RENAL FAILURE, UNSPECIFIED CKD STAGE: ICD-10-CM

## 2019-09-25 DIAGNOSIS — A04.72 INTESTINAL INFECTION DUE TO CLOSTRIDIUM DIFFICILE: ICD-10-CM

## 2019-09-25 DIAGNOSIS — E21.1 SECONDARY HYPERPARATHYROIDISM, NON-RENAL (HCC): Primary | ICD-10-CM

## 2019-09-25 DIAGNOSIS — D63.1 ANEMIA OF CHRONIC RENAL FAILURE, UNSPECIFIED CKD STAGE: ICD-10-CM

## 2019-09-25 LAB
ANION GAP SERPL CALCULATED.3IONS-SCNC: 15.9 MMOL/L (ref 5–15)
BUN BLD-MCNC: 119 MG/DL (ref 8–23)
BUN/CREAT SERPL: 31.6 (ref 7–25)
CALCIUM SPEC-SCNC: 9.6 MG/DL (ref 8.6–10.5)
CHLORIDE SERPL-SCNC: 90 MMOL/L (ref 98–107)
CO2 SERPL-SCNC: 33.1 MMOL/L (ref 22–29)
CREAT BLD-MCNC: 3.76 MG/DL (ref 0.57–1)
GFR SERPL CREATININE-BSD FRML MDRD: 12 ML/MIN/1.73
GFR SERPL CREATININE-BSD FRML MDRD: ABNORMAL ML/MIN/{1.73_M2}
GLUCOSE BLD-MCNC: 146 MG/DL (ref 65–99)
HCT VFR BLD AUTO: 37.1 % (ref 34–46.6)
HGB BLD-MCNC: 12.1 G/DL (ref 12–15.9)
MAGNESIUM SERPL-MCNC: 2.3 MG/DL (ref 1.6–2.4)
POTASSIUM BLD-SCNC: 3.2 MMOL/L (ref 3.5–5.2)
SODIUM BLD-SCNC: 139 MMOL/L (ref 136–145)

## 2019-09-25 PROCEDURE — 80048 BASIC METABOLIC PNL TOTAL CA: CPT | Performed by: INTERNAL MEDICINE

## 2019-09-25 PROCEDURE — 85018 HEMOGLOBIN: CPT | Performed by: INTERNAL MEDICINE

## 2019-09-25 PROCEDURE — 85014 HEMATOCRIT: CPT | Performed by: INTERNAL MEDICINE

## 2019-09-25 PROCEDURE — 83735 ASSAY OF MAGNESIUM: CPT | Performed by: INTERNAL MEDICINE

## 2019-09-30 ENCOUNTER — DOCUMENTATION (OUTPATIENT)
Dept: CARDIAC SURGERY | Facility: CLINIC | Age: 74
End: 2019-09-30

## 2019-09-30 ENCOUNTER — ANESTHESIA (OUTPATIENT)
Dept: GASTROENTEROLOGY | Facility: HOSPITAL | Age: 74
End: 2019-09-30

## 2019-09-30 ENCOUNTER — HOSPITAL ENCOUNTER (OUTPATIENT)
Facility: HOSPITAL | Age: 74
Setting detail: HOSPITAL OUTPATIENT SURGERY
Discharge: HOME | End: 2019-09-30
Attending: INTERNAL MEDICINE | Admitting: INTERNAL MEDICINE

## 2019-09-30 ENCOUNTER — ANESTHESIA EVENT (OUTPATIENT)
Dept: GASTROENTEROLOGY | Facility: HOSPITAL | Age: 74
End: 2019-09-30

## 2019-09-30 VITALS
HEART RATE: 61 BPM | OXYGEN SATURATION: 93 % | BODY MASS INDEX: 35.32 KG/M2 | RESPIRATION RATE: 18 BRPM | HEIGHT: 65 IN | WEIGHT: 212 LBS | TEMPERATURE: 96.8 F | SYSTOLIC BLOOD PRESSURE: 166 MMHG | DIASTOLIC BLOOD PRESSURE: 67 MMHG

## 2019-09-30 VITALS — HEART RATE: 60 BPM | DIASTOLIC BLOOD PRESSURE: 76 MMHG | SYSTOLIC BLOOD PRESSURE: 110 MMHG | OXYGEN SATURATION: 98 %

## 2019-09-30 DIAGNOSIS — Z95.2 PERSONAL HISTORY OF HEART VALVE REPLACEMENT: ICD-10-CM

## 2019-09-30 DIAGNOSIS — C18.7 MALIGNANT NEOPLASM OF SIGMOID COLON (HCC): ICD-10-CM

## 2019-09-30 DIAGNOSIS — Z79.01 LONG TERM CURRENT USE OF ANTICOAGULANT THERAPY: ICD-10-CM

## 2019-09-30 LAB
GLUCOSE BLDC GLUCOMTR-MCNC: 100 MG/DL (ref 70–130)
GLUCOSE BLDC GLUCOMTR-MCNC: 83 MG/DL (ref 70–130)

## 2019-09-30 PROCEDURE — 45331 SIGMOIDOSCOPY AND BIOPSY: CPT | Performed by: INTERNAL MEDICINE

## 2019-09-30 PROCEDURE — 45335 SIGMOIDOSCOPY W/SUBMUC INJ: CPT | Performed by: INTERNAL MEDICINE

## 2019-09-30 PROCEDURE — 88305 TISSUE EXAM BY PATHOLOGIST: CPT | Performed by: PATHOLOGY

## 2019-09-30 PROCEDURE — 25010000002 PROPOFOL 10 MG/ML EMULSION: Performed by: NURSE ANESTHETIST, CERTIFIED REGISTERED

## 2019-09-30 PROCEDURE — 82962 GLUCOSE BLOOD TEST: CPT

## 2019-09-30 PROCEDURE — 88305 TISSUE EXAM BY PATHOLOGIST: CPT | Performed by: INTERNAL MEDICINE

## 2019-09-30 RX ORDER — LIDOCAINE HYDROCHLORIDE 20 MG/ML
INJECTION, SOLUTION EPIDURAL; INFILTRATION; INTRACAUDAL; PERINEURAL AS NEEDED
Status: DISCONTINUED | OUTPATIENT
Start: 2019-09-30 | End: 2019-09-30 | Stop reason: SURG

## 2019-09-30 RX ORDER — DEXTROSE AND SODIUM CHLORIDE 5; .45 G/100ML; G/100ML
20 INJECTION, SOLUTION INTRAVENOUS CONTINUOUS
Status: DISCONTINUED | OUTPATIENT
Start: 2019-09-30 | End: 2019-09-30 | Stop reason: HOSPADM

## 2019-09-30 RX ORDER — PROMETHAZINE HYDROCHLORIDE 25 MG/1
25 TABLET ORAL ONCE AS NEEDED
Status: DISCONTINUED | OUTPATIENT
Start: 2019-09-30 | End: 2019-09-30 | Stop reason: HOSPADM

## 2019-09-30 RX ORDER — PROMETHAZINE HYDROCHLORIDE 25 MG/1
25 SUPPOSITORY RECTAL ONCE AS NEEDED
Status: DISCONTINUED | OUTPATIENT
Start: 2019-09-30 | End: 2019-09-30 | Stop reason: HOSPADM

## 2019-09-30 RX ORDER — ONDANSETRON 2 MG/ML
4 INJECTION INTRAMUSCULAR; INTRAVENOUS ONCE AS NEEDED
Status: DISCONTINUED | OUTPATIENT
Start: 2019-09-30 | End: 2019-09-30 | Stop reason: HOSPADM

## 2019-09-30 RX ORDER — PROPOFOL 10 MG/ML
VIAL (ML) INTRAVENOUS AS NEEDED
Status: DISCONTINUED | OUTPATIENT
Start: 2019-09-30 | End: 2019-09-30 | Stop reason: SURG

## 2019-09-30 RX ORDER — PROMETHAZINE HYDROCHLORIDE 25 MG/ML
12.5 INJECTION, SOLUTION INTRAMUSCULAR; INTRAVENOUS ONCE AS NEEDED
Status: DISCONTINUED | OUTPATIENT
Start: 2019-09-30 | End: 2019-09-30 | Stop reason: HOSPADM

## 2019-09-30 RX ADMIN — LIDOCAINE HYDROCHLORIDE 80 MG: 20 INJECTION, SOLUTION EPIDURAL; INFILTRATION; INTRACAUDAL; PERINEURAL at 11:21

## 2019-09-30 RX ADMIN — DEXTROSE AND SODIUM CHLORIDE 20 ML/HR: 5; 450 INJECTION, SOLUTION INTRAVENOUS at 10:17

## 2019-09-30 RX ADMIN — PROPOFOL 80 MG: 10 INJECTION, EMULSION INTRAVENOUS at 11:21

## 2019-09-30 RX ADMIN — PROPOFOL 20 MG: 10 INJECTION, EMULSION INTRAVENOUS at 11:25

## 2019-09-30 RX ADMIN — PROPOFOL 20 MG: 10 INJECTION, EMULSION INTRAVENOUS at 11:28

## 2019-09-30 NOTE — PROGRESS NOTES
PT states she was told to hold Coumadin x3 days for sigmoidoscopy today per Dr Guerrero. PT has MAV but was not instructed to take Lovenox. PT can not come to [facility] for 3 days. Unable to initiate Lovenox bridging due to lack of transportation. PT instructed to watch for s/s of blood clot while INR is low. Adjusted pt's dose and instructed to hold green vegs for 3 days. Recheck INR on Monday, 10/7. PT verbalizes understanding.

## 2019-09-30 NOTE — ANESTHESIA PREPROCEDURE EVALUATION
Anesthesia Evaluation     Patient summary reviewed and Nursing notes reviewed   NPO Solid Status: > 8 hours  NPO Liquid Status: > 2 hours           Airway   Mallampati: II  TM distance: >3 FB  Neck ROM: full  No difficulty expected  Dental - normal exam     Pulmonary - normal exam   (+) a smoker Former, COPD moderate,   Cardiovascular - normal exam    PT is on anticoagulation therapy  Patient on routine beta blocker    (+) hypertension well controlled less than 2 medications, CAD, CABG >6 Months, dysrhythmias Atrial Fib, CHF, hyperlipidemia,       Neuro/Psych  (+) psychiatric history Anxiety and Depression,     GI/Hepatic/Renal/Endo    (+) obesity,   renal disease CRI, diabetes mellitus type 2 well controlled,     Musculoskeletal     Abdominal   (+) obese,    Substance History      OB/GYN          Other      history of cancer remission                    Anesthesia Plan    ASA 3     MAC     intravenous induction

## 2019-09-30 NOTE — ANESTHESIA POSTPROCEDURE EVALUATION
Patient: Brendon Skelton    Procedure Summary     Date:  09/30/19 Room / Location:  [facility] ENDOSCOPY 3 / [facility] ENDOSCOPY    Anesthesia Start:  1121 Anesthesia Stop:  1131    Procedure:  SIGMOIDOSCOPY FLEXIBLE (N/A ) Diagnosis:       Malignant neoplasm of sigmoid colon (CMS/HCC)      (Malignant neoplasm of sigmoid colon (CMS/HCC) [C18.7])    Surgeon:  Alfredo Guerrero MD Provider:  Madelyn Lopez CRNA    Anesthesia Type:  MAC ASA Status:  3          Anesthesia Type: MAC  Last vitals  BP   (!) 201/77 (09/30/19 0853)   Temp   96.9 °F (36.1 °C) (09/30/19 0853)   Pulse   77 (09/30/19 0853)   Resp   21 (09/30/19 0853)     SpO2   98 % (09/30/19 0853)     Post Anesthesia Care and Evaluation    Patient location during evaluation: bedside  Patient participation: waiting for patient participation  Level of consciousness: sleepy but conscious  Pain score: 0  Pain management: adequate  Airway patency: patent  Anesthetic complications: No anesthetic complications  PONV Status: none  Cardiovascular status: acceptable  Respiratory status: acceptable  Hydration status: acceptable

## 2019-10-01 LAB
LAB AP CASE REPORT: NORMAL
PATH REPORT.FINAL DX SPEC: NORMAL
PATH REPORT.GROSS SPEC: NORMAL

## 2019-10-07 ENCOUNTER — ANTICOAGULATION VISIT (OUTPATIENT)
Dept: CARDIAC SURGERY | Facility: CLINIC | Age: 74
End: 2019-10-07

## 2019-10-07 VITALS — OXYGEN SATURATION: 96 % | DIASTOLIC BLOOD PRESSURE: 64 MMHG | HEART RATE: 60 BPM | SYSTOLIC BLOOD PRESSURE: 111 MMHG

## 2019-10-07 DIAGNOSIS — I48.91 ATRIAL FIBRILLATION, UNSPECIFIED TYPE (HCC): ICD-10-CM

## 2019-10-07 DIAGNOSIS — Z95.2 PERSONAL HISTORY OF HEART VALVE REPLACEMENT: ICD-10-CM

## 2019-10-07 DIAGNOSIS — Z79.01 LONG TERM CURRENT USE OF ANTICOAGULANT THERAPY: ICD-10-CM

## 2019-10-07 LAB — INR PPP: 1.8 (ref 0.9–1.1)

## 2019-10-07 PROCEDURE — 99211 OFF/OP EST MAY X REQ PHY/QHP: CPT | Performed by: NURSE PRACTITIONER

## 2019-10-07 PROCEDURE — 85610 PROTHROMBIN TIME: CPT | Performed by: NURSE PRACTITIONER

## 2019-10-07 RX ORDER — INSULIN GLARGINE 100 [IU]/ML
30 INJECTION, SOLUTION SUBCUTANEOUS DAILY
Qty: 15 ML | Refills: 11 | Status: SHIPPED | OUTPATIENT
Start: 2019-10-07 | End: 2020-01-01

## 2019-10-14 ENCOUNTER — ANTICOAGULATION VISIT (OUTPATIENT)
Dept: CARDIAC SURGERY | Facility: CLINIC | Age: 74
End: 2019-10-14

## 2019-10-14 VITALS — OXYGEN SATURATION: 94 % | SYSTOLIC BLOOD PRESSURE: 108 MMHG | HEART RATE: 60 BPM | DIASTOLIC BLOOD PRESSURE: 64 MMHG

## 2019-10-14 DIAGNOSIS — I48.91 ATRIAL FIBRILLATION, UNSPECIFIED TYPE (HCC): ICD-10-CM

## 2019-10-14 DIAGNOSIS — Z95.2 PERSONAL HISTORY OF HEART VALVE REPLACEMENT: ICD-10-CM

## 2019-10-14 DIAGNOSIS — Z79.01 LONG TERM CURRENT USE OF ANTICOAGULANT THERAPY: ICD-10-CM

## 2019-10-14 LAB — INR PPP: 3.1 (ref 0.9–1.1)

## 2019-10-14 PROCEDURE — 99211 OFF/OP EST MAY X REQ PHY/QHP: CPT | Performed by: NURSE PRACTITIONER

## 2019-10-14 PROCEDURE — 85610 PROTHROMBIN TIME: CPT | Performed by: NURSE PRACTITIONER

## 2019-10-14 RX ORDER — METOPROLOL SUCCINATE 25 MG/1
25 TABLET, EXTENDED RELEASE ORAL DAILY
Qty: 30 TABLET | Refills: 0 | Status: SHIPPED | OUTPATIENT
Start: 2019-10-14 | End: 2019-11-18 | Stop reason: SDUPTHER

## 2019-10-14 RX ORDER — METOLAZONE 5 MG/1
5 TABLET ORAL DAILY
Qty: 30 TABLET | Refills: 0 | Status: SHIPPED | OUTPATIENT
Start: 2019-10-14 | End: 2019-11-18 | Stop reason: SDUPTHER

## 2019-10-14 RX ORDER — WARFARIN SODIUM 5 MG/1
5 TABLET ORAL
Qty: 30 TABLET | Refills: 5 | Status: SHIPPED | OUTPATIENT
Start: 2019-10-14 | End: 2020-01-01 | Stop reason: HOSPADM

## 2019-10-14 NOTE — PROGRESS NOTES
Today's INR is 3.1. Denies any new medications or bleeding issues. Denies any s/s of blood clot. Due to rapid rise in INR and pt's previous therapeutic dose, dose was cut back slightly and pt will be seen next week. Patient instructed regarding medication; results given and questions answered. Nutritional counseling given.  Dietary factors affecting therapy addressed.  Patient instructed to monitor for excessive bruising or bleeding. PT verbalizes understanding.         This document has been electronically signed by Meme Duckworth, DANIEL @ on October 14, 2019 10:41 AM

## 2019-10-15 DIAGNOSIS — G25.81 RESTLESS LEG SYNDROME: ICD-10-CM

## 2019-10-15 RX ORDER — ROPINIROLE 0.25 MG/1
0.25 TABLET, FILM COATED ORAL NIGHTLY
Qty: 90 TABLET | Refills: 3 | OUTPATIENT
Start: 2019-10-15

## 2019-10-15 RX ORDER — RANITIDINE 150 MG/1
150 TABLET ORAL NIGHTLY
Qty: 30 TABLET | Refills: 0 | Status: SHIPPED | OUTPATIENT
Start: 2019-10-15 | End: 2019-01-01

## 2019-10-15 RX ORDER — RANITIDINE 150 MG/1
150 TABLET ORAL NIGHTLY
Qty: 180 TABLET | Refills: 1 | OUTPATIENT
Start: 2019-10-15

## 2019-10-15 RX ORDER — ROPINIROLE 0.25 MG/1
0.25 TABLET, FILM COATED ORAL NIGHTLY
Qty: 30 TABLET | Refills: 0 | Status: SHIPPED | OUTPATIENT
Start: 2019-10-15 | End: 2019-11-18 | Stop reason: SDUPTHER

## 2019-10-21 ENCOUNTER — ANTICOAGULATION VISIT (OUTPATIENT)
Dept: CARDIAC SURGERY | Facility: CLINIC | Age: 74
End: 2019-10-21

## 2019-10-21 ENCOUNTER — DOCUMENTATION (OUTPATIENT)
Dept: CARDIAC SURGERY | Facility: CLINIC | Age: 74
End: 2019-10-21

## 2019-10-21 VITALS — DIASTOLIC BLOOD PRESSURE: 59 MMHG | HEART RATE: 60 BPM | OXYGEN SATURATION: 97 % | SYSTOLIC BLOOD PRESSURE: 122 MMHG

## 2019-10-21 DIAGNOSIS — I48.91 ATRIAL FIBRILLATION, UNSPECIFIED TYPE (HCC): ICD-10-CM

## 2019-10-21 DIAGNOSIS — Z95.2 PERSONAL HISTORY OF HEART VALVE REPLACEMENT: ICD-10-CM

## 2019-10-21 DIAGNOSIS — Z79.01 LONG TERM CURRENT USE OF ANTICOAGULANT THERAPY: ICD-10-CM

## 2019-10-21 LAB — INR PPP: 2.4 (ref 0.9–1.1)

## 2019-10-21 PROCEDURE — 99211 OFF/OP EST MAY X REQ PHY/QHP: CPT | Performed by: NURSE PRACTITIONER

## 2019-10-21 PROCEDURE — 85610 PROTHROMBIN TIME: CPT | Performed by: NURSE PRACTITIONER

## 2019-10-21 NOTE — PROGRESS NOTES
Today's INR is 2.4.  Pt denies med changes or bleeding problems. Dose adjusted and pt instructed to continue eating green veggies every 3 days; pt verbalized and calendar marked for green veggies. Patient instructed regarding medication; results given and questions answered. Nutritional counseling given.  Dietary factors affecting therapy addressed.  Patient instructed to monitor for excessive bruising or bleeding. Will recheck next week.       This document has been electronically signed by MIKI Pan-BC Stephanie  On October 21, 2019 12:41 PM

## 2019-10-21 NOTE — PROGRESS NOTES
PT called and states she can not come to [facility] today ( and is unsure when she will be able to) due to issues with paperwork with PACS. PT instructed to call [facility] as soon as she can return. PT instructed to continue same dose until she can return. PT verbalizes understanding.

## 2019-10-29 ENCOUNTER — LAB (OUTPATIENT)
Dept: LAB | Facility: HOSPITAL | Age: 74
End: 2019-10-29

## 2019-10-29 ENCOUNTER — OFFICE VISIT (OUTPATIENT)
Dept: FAMILY MEDICINE CLINIC | Facility: CLINIC | Age: 74
End: 2019-10-29

## 2019-10-29 ENCOUNTER — TRANSCRIBE ORDERS (OUTPATIENT)
Dept: LAB | Facility: HOSPITAL | Age: 74
End: 2019-10-29

## 2019-10-29 ENCOUNTER — ANTICOAGULATION VISIT (OUTPATIENT)
Dept: CARDIAC SURGERY | Facility: CLINIC | Age: 74
End: 2019-10-29

## 2019-10-29 ENCOUNTER — OFFICE VISIT (OUTPATIENT)
Dept: GASTROENTEROLOGY | Facility: CLINIC | Age: 74
End: 2019-10-29

## 2019-10-29 VITALS
HEIGHT: 65 IN | TEMPERATURE: 98 F | SYSTOLIC BLOOD PRESSURE: 132 MMHG | DIASTOLIC BLOOD PRESSURE: 62 MMHG | WEIGHT: 220 LBS | BODY MASS INDEX: 36.65 KG/M2

## 2019-10-29 VITALS
SYSTOLIC BLOOD PRESSURE: 148 MMHG | DIASTOLIC BLOOD PRESSURE: 62 MMHG | HEART RATE: 61 BPM | WEIGHT: 220.8 LBS | HEIGHT: 65 IN | BODY MASS INDEX: 36.79 KG/M2 | OXYGEN SATURATION: 94 %

## 2019-10-29 VITALS — HEART RATE: 68 BPM | DIASTOLIC BLOOD PRESSURE: 58 MMHG | SYSTOLIC BLOOD PRESSURE: 139 MMHG | OXYGEN SATURATION: 93 %

## 2019-10-29 DIAGNOSIS — E21.1 SECONDARY HYPERPARATHYROIDISM, NON-RENAL (HCC): Primary | ICD-10-CM

## 2019-10-29 DIAGNOSIS — N18.30 CHRONIC KIDNEY DISEASE, STAGE III (MODERATE) (HCC): ICD-10-CM

## 2019-10-29 DIAGNOSIS — N18.9 ANEMIA OF CHRONIC RENAL FAILURE, UNSPECIFIED CKD STAGE: ICD-10-CM

## 2019-10-29 DIAGNOSIS — E21.1 SECONDARY HYPERPARATHYROIDISM, NON-RENAL (HCC): ICD-10-CM

## 2019-10-29 DIAGNOSIS — I50.9 HEART FAILURE, UNSPECIFIED HF CHRONICITY, UNSPECIFIED HEART FAILURE TYPE (HCC): ICD-10-CM

## 2019-10-29 DIAGNOSIS — Z95.2 PERSONAL HISTORY OF HEART VALVE REPLACEMENT: ICD-10-CM

## 2019-10-29 DIAGNOSIS — I48.91 ATRIAL FIBRILLATION, UNSPECIFIED TYPE (HCC): ICD-10-CM

## 2019-10-29 DIAGNOSIS — I10 ESSENTIAL HYPERTENSION: Primary | ICD-10-CM

## 2019-10-29 DIAGNOSIS — J44.9 CHRONIC OBSTRUCTIVE PULMONARY DISEASE, UNSPECIFIED COPD TYPE (HCC): ICD-10-CM

## 2019-10-29 DIAGNOSIS — A04.72 INTESTINAL INFECTION DUE TO CLOSTRIDIUM DIFFICILE: ICD-10-CM

## 2019-10-29 DIAGNOSIS — Z79.4 TYPE 2 DIABETES MELLITUS WITH STAGE 4 CHRONIC KIDNEY DISEASE, WITH LONG-TERM CURRENT USE OF INSULIN (HCC): ICD-10-CM

## 2019-10-29 DIAGNOSIS — C18.7 MALIGNANT NEOPLASM OF SIGMOID COLON (HCC): Primary | ICD-10-CM

## 2019-10-29 DIAGNOSIS — E11.22 TYPE 2 DIABETES MELLITUS WITH STAGE 4 CHRONIC KIDNEY DISEASE, WITH LONG-TERM CURRENT USE OF INSULIN (HCC): ICD-10-CM

## 2019-10-29 DIAGNOSIS — Z79.01 LONG TERM CURRENT USE OF ANTICOAGULANT THERAPY: ICD-10-CM

## 2019-10-29 DIAGNOSIS — Z23 FLU VACCINE NEED: ICD-10-CM

## 2019-10-29 DIAGNOSIS — Z23 PNEUMOCOCCAL VACCINATION ADMINISTERED AT CURRENT VISIT: ICD-10-CM

## 2019-10-29 DIAGNOSIS — E78.2 MIXED HYPERLIPIDEMIA: ICD-10-CM

## 2019-10-29 DIAGNOSIS — F32.A DEPRESSIVE DISORDER: ICD-10-CM

## 2019-10-29 DIAGNOSIS — I50.30 HEART FAILURE WITH PRESERVED LEFT VENTRICULAR FUNCTION (HFPEF) (HCC): ICD-10-CM

## 2019-10-29 DIAGNOSIS — N18.4 TYPE 2 DIABETES MELLITUS WITH STAGE 4 CHRONIC KIDNEY DISEASE, WITH LONG-TERM CURRENT USE OF INSULIN (HCC): ICD-10-CM

## 2019-10-29 DIAGNOSIS — D63.1 ANEMIA OF CHRONIC RENAL FAILURE, UNSPECIFIED CKD STAGE: ICD-10-CM

## 2019-10-29 LAB
ANION GAP SERPL CALCULATED.3IONS-SCNC: 7.8 MMOL/L (ref 5–15)
BUN BLD-MCNC: 94 MG/DL (ref 8–23)
BUN/CREAT SERPL: 33.6 (ref 7–25)
CALCIUM SPEC-SCNC: 9.9 MG/DL (ref 8.6–10.5)
CHLORIDE SERPL-SCNC: 92 MMOL/L (ref 98–107)
CO2 SERPL-SCNC: 39.2 MMOL/L (ref 22–29)
CREAT BLD-MCNC: 2.8 MG/DL (ref 0.57–1)
GFR SERPL CREATININE-BSD FRML MDRD: 17 ML/MIN/1.73
GLUCOSE BLD-MCNC: 81 MG/DL (ref 65–99)
INR PPP: 2.9 (ref 0.9–1.1)
MAGNESIUM SERPL-MCNC: 2.8 MG/DL (ref 1.6–2.4)
POTASSIUM BLD-SCNC: 4 MMOL/L (ref 3.5–5.2)
SODIUM BLD-SCNC: 139 MMOL/L (ref 136–145)

## 2019-10-29 PROCEDURE — 85610 PROTHROMBIN TIME: CPT | Performed by: NURSE PRACTITIONER

## 2019-10-29 PROCEDURE — G0009 ADMIN PNEUMOCOCCAL VACCINE: HCPCS | Performed by: NURSE PRACTITIONER

## 2019-10-29 PROCEDURE — 99214 OFFICE O/P EST MOD 30 MIN: CPT | Performed by: NURSE PRACTITIONER

## 2019-10-29 PROCEDURE — 36415 COLL VENOUS BLD VENIPUNCTURE: CPT

## 2019-10-29 PROCEDURE — 80048 BASIC METABOLIC PNL TOTAL CA: CPT

## 2019-10-29 PROCEDURE — 90653 IIV ADJUVANT VACCINE IM: CPT | Performed by: NURSE PRACTITIONER

## 2019-10-29 PROCEDURE — 90670 PCV13 VACCINE IM: CPT | Performed by: NURSE PRACTITIONER

## 2019-10-29 PROCEDURE — 83735 ASSAY OF MAGNESIUM: CPT

## 2019-10-29 PROCEDURE — 99214 OFFICE O/P EST MOD 30 MIN: CPT | Performed by: INTERNAL MEDICINE

## 2019-10-29 PROCEDURE — G0008 ADMIN INFLUENZA VIRUS VAC: HCPCS | Performed by: NURSE PRACTITIONER

## 2019-10-29 RX ORDER — TRAZODONE HYDROCHLORIDE 150 MG/1
300 TABLET ORAL NIGHTLY
Qty: 180 TABLET | Refills: 3 | Status: SHIPPED | OUTPATIENT
Start: 2019-10-29 | End: 2020-01-01 | Stop reason: HOSPADM

## 2019-10-29 NOTE — PROGRESS NOTES
Subjective   Brendon Skelton is a 74 y.o. female.  Three month follow-up for DM, HTN, COPD, CHF and Hyperlipdemia.     Depression   Visit Type: follow-up  Patient presents with the following symptoms: decreased concentration, depressed mood, restlessness and shortness of breath.  Patient is not experiencing: confusion, suicidal ideas, suicidal planning and thoughts of death.  Frequency of symptoms: occasionally   Severity: mild   Sleep quality: good  Nighttime awakenings: occasional  Patient has a history of: CHF  Compliance with medications:  %        Diabetes   She has type 2 diabetes mellitus. Pertinent negatives for hypoglycemia include no confusion. Associated symptoms include fatigue and weakness. Pertinent negatives for diabetes include no chest pain. Symptoms are stable. Risk factors for coronary artery disease include diabetes mellitus, hypertension, obesity, post-menopausal, sedentary lifestyle and dyslipidemia. Current diabetic treatment includes insulin injections. Her weight is stable. She is following a generally healthy diet. Meal planning includes avoidance of concentrated sweets. She never participates in exercise. Her home blood glucose trend is fluctuating minimally.   Hypertension   This is a chronic problem. The current episode started more than 1 year ago. The problem is unchanged. The problem is controlled. Associated symptoms include shortness of breath. Pertinent negatives include no chest pain. There are no associated agents to hypertension. Risk factors for coronary artery disease include obesity, dyslipidemia, sedentary lifestyle and post-menopausal state. Past treatments include calcium channel blockers. Current antihypertension treatment includes calcium channel blockers. The current treatment provides significant improvement. Compliance problems include exercise and diet.  Hypertensive end-organ damage includes CAD/MI and heart failure.   Hyperlipidemia   This is a chronic  problem. The current episode started more than 1 year ago. The problem is controlled. Exacerbating diseases include diabetes and obesity. Associated symptoms include shortness of breath. Pertinent negatives include no chest pain. Current antihyperlipidemic treatment includes ezetimibe. The current treatment provides moderate improvement of lipids. There are no compliance problems.  Risk factors for coronary artery disease include a sedentary lifestyle, hypertension and diabetes mellitus.   COPD   She complains of cough and shortness of breath. This is a chronic problem. The current episode started more than 1 year ago. The problem occurs constantly. The problem has been waxing and waning. Pertinent negatives include no chest pain or fever. Her symptoms are aggravated by walking and exertion.   Congestive Heart Failure   Presents for follow-up visit. Associated symptoms include fatigue and shortness of breath. Pertinent negatives include no chest pain or chest pressure. The symptoms have been stable. Compliance with total regimen is 51-75%. Compliance problems include adherence to diet and adherence to exercise.  Compliance with diet is 51-75%. Compliance with exercise is 0-25%. Compliance with medications is %.        The following portions of the patient's history were reviewed and updated as appropriate:   Current Outpatient Medications   Medication Sig Dispense Refill   • acetaminophen (TYLENOL) 325 MG tablet Take 650 mg by mouth every 6 (six) hours as needed for mild pain (1-3).     • albuterol (PROVENTIL) (2.5 MG/3ML) 0.083% nebulizer solution Take 2.5 mg by nebulization Every 4 (Four) Hours As Needed for Wheezing.     • albuterol sulfate  (90 Base) MCG/ACT inhaler Inhale 2 puffs Every 4 (Four) Hours As Needed for Wheezing or Shortness of Air. 18 g 11   • Ascorbic Acid (VITAMIN C WITH OCHOA HIPS) 250 MG tablet Take 500 mg by mouth Daily.     • aspirin 81 MG chewable tablet Chew 81 mg Daily.     •  bumetanide (BUMEX) 2 MG tablet Take 1 tablet by mouth 2 (Two) Times a Day. 60 tablet 3   • cholecalciferol (VITAMIN D3) 1000 units tablet Take 2,000 Units by mouth Daily.     • citalopram (CeleXA) 20 MG tablet Take 1 tablet by mouth Daily. 90 tablet 3   • diltiaZEM CD (CARDIZEM CD) 240 MG 24 hr capsule Take 1 capsule by mouth Daily. 90 capsule 3   • ezetimibe (ZETIA) 10 MG tablet TAKE 1 TABLET BY MOUTH EVERY DAY 90 tablet 1   • ferrous sulfate 325 (65 FE) MG tablet Take 325 mg by mouth Daily With Breakfast.     • glucose blood test strip 1 each by Other route 3 (three) times a day. Use as instructed     • Insulin Glargine (BASAGLAR KWIKPEN) 100 UNIT/ML injection pen INJECT 30 UNITS UNDER THE SKIN INTO THE APPROPRIATE AREA AS DIRECTED DAILY. 15 mL 11   • metOLazone (ZAROXOLYN) 5 MG tablet TAKE 1 TABLET BY MOUTH DAILY. 30 tablet 0   • metoprolol succinate XL (TOPROL-XL) 25 MG 24 hr tablet TAKE 1 TABLET BY MOUTH DAILY. 30 tablet 0   • ondansetron (ZOFRAN) 4 MG tablet Take 1 tablet by mouth Every 6 (Six) Hours As Needed for Nausea or Vomiting. 30 tablet 0   • potassium chloride (K-DUR,KLOR-CON) 20 MEQ CR tablet Take 1 tablet by mouth Daily.     • Prenatal Vit-Fe Fumarate-FA (PRENATAL VITAMIN) 27-0.8 MG tablet Take 1 tablet by mouth daily.     • raNITIdine (ZANTAC) 150 MG tablet Take 1 tablet by mouth Every Night. 30 tablet 0   • rOPINIRole (REQUIP) 0.25 MG tablet Take 1 tablet by mouth Every Night. Take 1 hour before bedtime. 30 tablet 0   • traZODone (DESYREL) 150 MG tablet Take 2 tablets by mouth Every Night. 180 tablet 3   • umeclidinium-vilanterol (ANORO ELLIPTA) 62.5-25 MCG/INH aerosol powder  inhaler Inhale 1 puff Daily. 1 each 6   • UNIFINE PENTIPS 31G X 6 MM misc USE 1 FOUR (4) TIMES DAILY WITH INSULIN 130 each 5   • warfarin (COUMADIN) 5 MG tablet Take 1 tablet by mouth Daily. Take 1 tablet nightly or AS DIRECTED PER COUMADIN CLINIC 30 tablet 5     No current facility-administered medications for this visit.       Current Outpatient Medications on File Prior to Visit   Medication Sig   • acetaminophen (TYLENOL) 325 MG tablet Take 650 mg by mouth every 6 (six) hours as needed for mild pain (1-3).   • albuterol (PROVENTIL) (2.5 MG/3ML) 0.083% nebulizer solution Take 2.5 mg by nebulization Every 4 (Four) Hours As Needed for Wheezing.   • albuterol sulfate  (90 Base) MCG/ACT inhaler Inhale 2 puffs Every 4 (Four) Hours As Needed for Wheezing or Shortness of Air.   • Ascorbic Acid (VITAMIN C WITH OCHOA HIPS) 250 MG tablet Take 500 mg by mouth Daily.   • aspirin 81 MG chewable tablet Chew 81 mg Daily.   • bumetanide (BUMEX) 2 MG tablet Take 1 tablet by mouth 2 (Two) Times a Day.   • cholecalciferol (VITAMIN D3) 1000 units tablet Take 2,000 Units by mouth Daily.   • citalopram (CeleXA) 20 MG tablet Take 1 tablet by mouth Daily.   • diltiaZEM CD (CARDIZEM CD) 240 MG 24 hr capsule Take 1 capsule by mouth Daily.   • ezetimibe (ZETIA) 10 MG tablet TAKE 1 TABLET BY MOUTH EVERY DAY   • ferrous sulfate 325 (65 FE) MG tablet Take 325 mg by mouth Daily With Breakfast.   • glucose blood test strip 1 each by Other route 3 (three) times a day. Use as instructed   • Insulin Glargine (BASAGLAR KWIKPEN) 100 UNIT/ML injection pen INJECT 30 UNITS UNDER THE SKIN INTO THE APPROPRIATE AREA AS DIRECTED DAILY.   • metOLazone (ZAROXOLYN) 5 MG tablet TAKE 1 TABLET BY MOUTH DAILY.   • metoprolol succinate XL (TOPROL-XL) 25 MG 24 hr tablet TAKE 1 TABLET BY MOUTH DAILY.   • ondansetron (ZOFRAN) 4 MG tablet Take 1 tablet by mouth Every 6 (Six) Hours As Needed for Nausea or Vomiting.   • potassium chloride (K-DUR,KLOR-CON) 20 MEQ CR tablet Take 1 tablet by mouth Daily.   • Prenatal Vit-Fe Fumarate-FA (PRENATAL VITAMIN) 27-0.8 MG tablet Take 1 tablet by mouth daily.   • raNITIdine (ZANTAC) 150 MG tablet Take 1 tablet by mouth Every Night.   • rOPINIRole (REQUIP) 0.25 MG tablet Take 1 tablet by mouth Every Night. Take 1 hour before bedtime.   •  "umeclidinium-vilanterol (ANORO ELLIPTA) 62.5-25 MCG/INH aerosol powder  inhaler Inhale 1 puff Daily.   • UNIFINE PENTIPS 31G X 6 MM misc USE 1 FOUR (4) TIMES DAILY WITH INSULIN   • warfarin (COUMADIN) 5 MG tablet Take 1 tablet by mouth Daily. Take 1 tablet nightly or AS DIRECTED PER COUMADIN CLINIC   • [DISCONTINUED] traZODone (DESYREL) 150 MG tablet Take 2 tablets by mouth Every Night.     No current facility-administered medications on file prior to visit.      She is allergic to crestor [rosuvastatin calcium]; lipitor [atorvastatin]; lortab [hydrocodone-acetaminophen]; adhesive tape; and hydrocodone-acetaminophen..    Review of Systems   Constitutional: Positive for fatigue. Negative for chills, diaphoresis and fever.   HENT: Negative.    Eyes: Negative.    Respiratory: Positive for cough and shortness of breath.    Cardiovascular: Negative.  Negative for chest pain.   Gastrointestinal: Negative.    Genitourinary: Negative.    Musculoskeletal: Positive for gait problem.   Skin: Negative.    Neurological: Positive for weakness.   Psychiatric/Behavioral: Positive for decreased concentration. Negative for confusion and suicidal ideas.       Objective    Visit Vitals  /62   Temp 98 °F (36.7 °C)   Ht 165.1 cm (65\")   Wt 99.8 kg (220 lb)   LMP  (LMP Unknown)   BMI 36.61 kg/m²       Physical Exam   Constitutional: She is oriented to person, place, and time. She appears well-developed and well-nourished.   Arrives via personal wheelchair    HENT:   Head: Normocephalic.   Right Ear: External ear normal.   Left Ear: External ear normal.   Eyes: EOM are normal. Pupils are equal, round, and reactive to light.   Neck: Normal range of motion. Neck supple.   Cardiovascular: Normal rate, regular rhythm and normal heart sounds.   Pulmonary/Chest: Effort normal and breath sounds normal.   3L per NC    Abdominal: Soft. Bowel sounds are normal.   Musculoskeletal: Normal range of motion.   Neurological: She is alert and " oriented to person, place, and time.   Skin: Skin is warm. Capillary refill takes less than 2 seconds.   Psychiatric: She has a normal mood and affect. Her behavior is normal.   Nursing note and vitals reviewed.      Assessment/Plan   Problems Addressed this Visit        Cardiovascular and Mediastinum    Hyperlipidemia    Heart failure with preserved left ventricular function (HFpEF) (CMS/[facility], [facility])    Essential hypertension - Primary       Respiratory    COPD (chronic obstructive pulmonary disease) (CMS/[facility])       Endocrine    Type 2 diabetes mellitus with stage 4 chronic kidney disease, with long-term current use of insulin (CMS/[facility] located [facility])    Relevant Orders    Microalbumin / Creatinine Urine Ratio - Urine, Clean Catch       Other    Depressive disorder    Relevant Medications    traZODone (DESYREL) 150 MG tablet      Other Visit Diagnoses     Flu vaccine need        Relevant Orders    [employer] High Dose 65YR+ (4413-6745) (Completed)    Pneumococcal vaccination administered at current visit        Relevant Orders    Pneumococcal Conjugate Vaccine 13-Valent All (PCV13) (Completed)        New Medications Ordered This Visit   Medications   • traZODone (DESYREL) 150 MG tablet     Sig: Take 2 tablets by mouth Every Night.     Dispense:  180 tablet     Refill:  3       1. Essential Hypertension:  Continue Cardizem and Lasix use as previously prescribed  Complete CMP and CBC as ordered will notify results when available  Adhere to no more than 2 g sodium diet daily     2.  Hyperlipidemia:  Continue Zetia as previously prescribed  Complete fasting lipid panel as ordered will notify results when available     3.  COPD:  Continue Anoro, albuterol inhaler and nebulizer as needed  Continue Lasix as previously prescribed  Continue use of Hoveround to prevent exertional dyspnea and prevent further COPD exacerbations     4.  Diabetes mellitus with stage IV chronic kidney disease, with long-term current use of insulin::  Continue insulin use  as previously prescribed  Complete hemoglobin A1c as ordered and will notify results when available     5. Depressive disorder:  Continue Celexa as previously prescribed  Continue on trazodone as previously prescribed and refill prescription sent to pharmacy  Educated on possible side of this medication including but not limited possible suicidal ideations  Encouraged to discontinue medication immediately if suicidal ideations occur and seek emergency medical treatment    6.  Heart failure with preserved left ventricular function:  Continue on Bumex previously prescribed  Continue on oxygen per nasal cannula  Encouraged to seek emergency medical treatment for any new or worsening shortness of breath or weight gain of greater than 2 to 3 pounds in a 24-hour.    7.  Flu vaccine need:  Influenza vaccine given IM in office  Educated on possible side effects of this medication including but not limited to pain, swelling and redness of injection site as well as flulike symptoms    8.  Pneumococcal vaccination administered at current visit:  Pneumococcal 13 given IM in office  Educated on possible side effects of this medication including but not limited to increased risk for pain, swelling and redness of injection site    Continue on current medications as previously prescribed   I spent 26 minutes in direct face to face contact with patient.  Greater than 50% of this time was spent counseling patient and discussing plan of care.  Return in about 3 months (around 1/29/2020) for Medicare Wellness.        This document has been electronically signed by ABRAHAM Boone on October 29, 2019 1:45 PM

## 2019-10-29 NOTE — PATIENT INSTRUCTIONS

## 2019-10-29 NOTE — PROGRESS NOTES
Today's INR is 2.9.   Patient states no med changes or bleeding problems or unexplained bruising. Patient instructed to continue current dosing schedule. Verbalizes understanding. Will recheck in 2 wks.  Patient instructed regarding medication; results given and questions answered. Nutritional counseling given.  Dietary factors affecting therapy addressed.  Patient instructed to monitor for excessive bruising or bleeding.        This document has been electronically signed by DANIEL Pavon @ on October 29, 2019 9:39 AM

## 2019-10-29 NOTE — PROGRESS NOTES
[facility]      Chief Complaint:   Chief Complaint   Patient presents with   • malignant neoplasm of sigmoid colon       Subjective     HPI:   Patient for follow-up after flexible sigmoidoscopy.  Patient has had a malignant lesion in the sigmoid colon.  Repeat flexible sigmoidoscopy shows the area showing dysplastic gland suggestive of a malignancy.  On flexible sigmoidoscopy the scar from previous polypectomy was seen this area was tattooed.  This area is at 30 cm from the anus.  Biopsies were also taken around this polyp which showed dysplastic gland suggestive of malignancy.    Plan; with these findings I will have patient follow-up with Dr. Maher.  I will allow him to make the decision if patient should have surgery versus continued surveillance of this area.  If patient did not have so many medical problems I would suggest surgery be done.  Because patient is a high risk we will allow Dr. Maher to make this decision    Past Medical History:   Past Medical History:   Diagnosis Date   • Acute bronchitis    • Anxiety    • Atrial fibrillation (CMS/HCC)    • C. difficile colitis    • Callosity     under metatarsal head      • Cardiac pacemaker in situ    • CHF (congestive heart failure) (CMS/HCC)    • Chronic obstructive lung disease (CMS/HCC)    • Corns and callus    • Depressive disorder    • Diabetes mellitus (CMS/HCC)    • Diastolic heart failure (CMS/HCC)    • Essential hypertension    • Foot pain    • History of transfusion    • Hyperlipidemia    • Long term current use of anticoagulant    • Malignant neoplasm of sigmoid colon (CMS/HCC) 8/13/2019    Added automatically from request for surgery [id number]   • On anticoagulant therapy    • Pulmonary emphysema (CMS/HCC)    • Rectal hemorrhage    • Type 2 diabetes mellitus (CMS/HCC)        Past Surgical History:  Past Surgical History:   Procedure Laterality Date   • AORTIC VALVE REPAIR/REPLACEMENT  1999   • CARDIAC ELECTROPHYSIOLOGY PROCEDURE  N/A 3/20/2017    Procedure: PPM generator change - dual;  Surgeon: Bereket Gates MD;  Location: [facility] CATH INVASIVE LOCATION;  Service:    • CARDIAC PACEMAKER PLACEMENT  1999   • CATARACT EXTRACTION W/ INTRAOCULAR LENS IMPLANT Left 2/1/2019    Procedure: REMOVE CATARACT AND IMPLANT INTRAOCULAR LENS I;  Surgeon: Jose L Clay MD;  Location: [facility] OR;  Service: Ophthalmology   • CATARACT EXTRACTION W/ INTRAOCULAR LENS IMPLANT Right 2/8/2019    Procedure: REMOVE CATARACT AND IMPLANT  INTRAOCULAR LENS;  Surgeon: Jose L Clay MD;  Location: [facility] OR;  Service: Ophthalmology   • COLONOSCOPY N/A 6/19/2019    Procedure: COLONOSCOPY WITH CONTROL OF BLEED;  Surgeon: Alfredo Guerrero MD;  Location: [facility] ENDOSCOPY;  Service: Gastroenterology   • COLONOSCOPY N/A 6/24/2019    Procedure: COLONOSCOPY;  Surgeon: Alfredo Guerrero MD;  Location: [facility] ENDOSCOPY;  Service: Gastroenterology   • ECHO - CONVERTED  11/12/2013    There is mild to moderate left atrial enlargement EF 50-55%   • ENDOSCOPY N/A 6/19/2019    Procedure: ESOPHAGOGASTRODUODENOSCOPY WITH CONTROL OF BLEED;  Surgeon: Alfredo Guerrero MD;  Location: [facility] ENDOSCOPY;  Service: Gastroenterology   • FOOT SURGERY  1990   • OTHER SURGICAL HISTORY  10/26/2015    PARING CORN/CALLUS    • PACEMAKER REPLACEMENT N/A 3/21/2017    Procedure: revision pacemaker pocket, evacuation hematoma, control of bleeding;  Surgeon: Polo Feliz MD;  Location: [facility] OR;  Service:    • SIGMOIDOSCOPY N/A 9/30/2019    Procedure: SIGMOIDOSCOPY FLEXIBLE;  Surgeon: Alfredo Guerrero MD;  Location: [facility] ENDOSCOPY;  Service: Gastroenterology   • TRANSESOPHAGEAL ECHOCARDIOGRAM (MITZY)  05/01/2014    With color flow-Mild left atrial enlargement with mild concentric LV hypertrophy with top normal aortic root size. EF 45-50%. Mild mitral regurgitation and mild aortic insufficiency and trivial amount of tricuspid regurgation   • TUBAL ABDOMINAL LIGATION         Family  History:  Family History   Problem Relation Age of Onset   • Heart disease Mother    • Hyperlipidemia Mother    • Hypertension Mother    • Cancer Other        Social History:   reports that she quit smoking about 20 years ago. She has never used smokeless tobacco. She reports that she does not drink alcohol or use drugs.    Medications:   Prior to Admission medications    Medication Sig Start Date End Date Taking? Authorizing Provider   acetaminophen (TYLENOL) 325 MG tablet Take 650 mg by mouth every 6 (six) hours as needed for mild pain (1-3).   Yes Andrés Waldrop MD   albuterol (PROVENTIL) (2.5 MG/3ML) 0.083% nebulizer solution Take 2.5 mg by nebulization Every 4 (Four) Hours As Needed for Wheezing.   Yes Andrés Waldrop MD   albuterol sulfate  (90 Base) MCG/ACT inhaler Inhale 2 puffs Every 4 (Four) Hours As Needed for Wheezing or Shortness of Air. 3/15/19  Yes Nel Grant MD   Ascorbic Acid (VITAMIN C WITH OCHOA HIPS) 250 MG tablet Take 500 mg by mouth Daily.   Yes Andrés Waldrop MD   aspirin 81 MG chewable tablet Chew 81 mg Daily.   Yes Andrés Waldrop MD   bumetanide (BUMEX) 2 MG tablet Take 1 tablet by mouth 2 (Two) Times a Day. 8/22/19  Yes Josefa Pozo APRN   cholecalciferol (VITAMIN D3) 1000 units tablet Take 2,000 Units by mouth Daily.   Yes Andrés Waldrop MD   citalopram (CeleXA) 20 MG tablet Take 1 tablet by mouth Daily. 5/8/19  Yes Josefa Pozo APRN   diltiaZEM CD (CARDIZEM CD) 240 MG 24 hr capsule Take 1 capsule by mouth Daily. 6/4/19  Yes Josefa Pozo APRN   ezetimibe (ZETIA) 10 MG tablet TAKE 1 TABLET BY MOUTH EVERY DAY 7/18/19  Yes Josefa Pozo APRN   ferrous sulfate 325 (65 FE) MG tablet Take 325 mg by mouth Daily With Breakfast.   Yes Andrés Waldrop MD   glucose blood test strip 1 each by Other route 3 (three) times a day. Use as instructed   Yes Andrés Waldrop MD   Insulin Glargine (BASAGLAR KWIKPEN) 100 UNIT/ML injection pen  INJECT 30 UNITS UNDER THE SKIN INTO THE APPROPRIATE AREA AS DIRECTED DAILY. 10/7/19  Yes Josefa Pozo APRN   metOLazone (ZAROXOLYN) 5 MG tablet TAKE 1 TABLET BY MOUTH DAILY. 10/14/19  Yes Josefa Pozo APRN   metoprolol succinate XL (TOPROL-XL) 25 MG 24 hr tablet TAKE 1 TABLET BY MOUTH DAILY. 10/14/19  Yes Josefa Pozo APRN   ondansetron (ZOFRAN) 4 MG tablet Take 1 tablet by mouth Every 6 (Six) Hours As Needed for Nausea or Vomiting. 7/22/19  Yes Alfredo Charles Jr., MD   potassium chloride (K-DUR,KLOR-CON) 20 MEQ CR tablet Take 1 tablet by mouth Daily. 8/1/19  Yes ProviderAndrés MD   Prenatal Vit-Fe Fumarate-FA (PRENATAL VITAMIN) 27-0.8 MG tablet Take 1 tablet by mouth daily.   Yes Provider, MD Andrés   raNITIdine (ZANTAC) 150 MG tablet Take 1 tablet by mouth Every Night. 10/15/19  Yes Lee Baca,    rOPINIRole (REQUIP) 0.25 MG tablet Take 1 tablet by mouth Every Night. Take 1 hour before bedtime. 10/15/19  Yes Lee Baca DO   traZODone (DESYREL) 150 MG tablet Take 2 tablets by mouth Every Night. 9/12/18  Yes Josefa Pozo APRN   umeclidinium-vilanterol (ANORO ELLIPTA) 62.5-25 MCG/INH aerosol powder  inhaler Inhale 1 puff Daily. 2/18/19  Yes Nel Grant MD   UNIFINE PENTIPS 31G X 6 MM misc USE 1 FOUR (4) TIMES DAILY WITH INSULIN 8/7/19  Yes Josefa Pozo APRN   warfarin (COUMADIN) 5 MG tablet Take 1 tablet by mouth Daily. Take 1 tablet nightly or AS DIRECTED PER COUMADIN CLINIC 10/14/19  Yes Meme Duckworth APRN       Allergies:  Crestor [rosuvastatin calcium]; Lipitor [atorvastatin]; Lortab [hydrocodone-acetaminophen]; Adhesive tape; and Hydrocodone-acetaminophen    ROS:    Review of Systems   Constitutional: Negative for activity change, appetite change, chills, diaphoresis, fatigue, fever and unexpected weight change.   HENT: Negative for sore throat and trouble swallowing.    Respiratory: Negative for shortness of breath.    Gastrointestinal: Negative for abdominal  "distention, abdominal pain, anal bleeding, blood in stool, constipation, diarrhea, nausea, rectal pain and vomiting.   Endocrine: Negative for polydipsia, polyphagia and polyuria.   Genitourinary: Negative for difficulty urinating.   Musculoskeletal: Negative for arthralgias.   Skin: Negative for pallor.   Allergic/Immunologic: Negative for food allergies.   Neurological: Negative for weakness and light-headedness.   Psychiatric/Behavioral: Negative for behavioral problems.     Objective     Blood pressure 148/62, pulse 61, height 165.1 cm (65\"), weight 100 kg (220 lb 12.8 oz), SpO2 94 %, not currently breastfeeding.    Physical Exam   Constitutional: She is oriented to person, place, and time. She appears well-developed and well-nourished. No distress.   HENT:   Head: Normocephalic and atraumatic.   Cardiovascular: Normal rate, regular rhythm, normal heart sounds and intact distal pulses. Exam reveals no gallop and no friction rub.   No murmur heard.  Pulmonary/Chest: Breath sounds normal. No respiratory distress. She has no wheezes. She has no rales. She exhibits no tenderness.   Abdominal: Soft. Bowel sounds are normal. She exhibits no distension and no mass. There is no tenderness. There is no rebound and no guarding. No hernia.   Musculoskeletal: Normal range of motion. She exhibits no edema.   Neurological: She is alert and oriented to person, place, and time.   Skin: Skin is warm and dry. No rash noted. She is not diaphoretic. No erythema. No pallor.   Psychiatric: She has a normal mood and affect. Her behavior is normal. Judgment and thought content normal.        Assessment/Plan   Brendon was seen today for malignant neoplasm of sigmoid colon.    Diagnoses and all orders for this visit:    Malignant neoplasm of sigmoid colon (CMS/HCC)  -     Ambulatory Referral to General Surgery        * Surgery not found *     Diagnosis Plan   1. Malignant neoplasm of sigmoid colon (CMS/HCC)  Ambulatory Referral to General " Surgery       Anticipated Surgical Procedure:  Orders Placed This Encounter   Procedures   • Ambulatory Referral to General Surgery     Referral Priority:   Routine     Referral Type:   Consultation     Referral Reason:   Specialty Services Required     Referred to Provider:   Luis Maher MD     Requested Specialty:   General Surgery     Number of Visits Requested:   1       The risks, benefits, and alternatives of this procedure have been discussed with the patient or the responsible party- the patient understands and agrees to proceed.

## 2019-11-13 ENCOUNTER — ANTICOAGULATION VISIT (OUTPATIENT)
Dept: CARDIAC SURGERY | Facility: CLINIC | Age: 74
End: 2019-11-13

## 2019-11-13 ENCOUNTER — OFFICE VISIT (OUTPATIENT)
Dept: SURGERY | Facility: CLINIC | Age: 74
End: 2019-11-13

## 2019-11-13 VITALS — SYSTOLIC BLOOD PRESSURE: 148 MMHG | HEART RATE: 73 BPM | DIASTOLIC BLOOD PRESSURE: 72 MMHG | OXYGEN SATURATION: 94 %

## 2019-11-13 VITALS
HEART RATE: 60 BPM | SYSTOLIC BLOOD PRESSURE: 126 MMHG | BODY MASS INDEX: 36.65 KG/M2 | HEIGHT: 65 IN | WEIGHT: 220 LBS | DIASTOLIC BLOOD PRESSURE: 70 MMHG | TEMPERATURE: 97 F

## 2019-11-13 DIAGNOSIS — Z79.01 LONG TERM CURRENT USE OF ANTICOAGULANT THERAPY: ICD-10-CM

## 2019-11-13 DIAGNOSIS — I48.91 ATRIAL FIBRILLATION, UNSPECIFIED TYPE (HCC): ICD-10-CM

## 2019-11-13 DIAGNOSIS — C18.7 CANCER OF SIGMOID (HCC): Primary | ICD-10-CM

## 2019-11-13 DIAGNOSIS — Z95.2 PERSONAL HISTORY OF HEART VALVE REPLACEMENT: ICD-10-CM

## 2019-11-13 LAB — INR PPP: 2.6 (ref 0.9–1.1)

## 2019-11-13 PROCEDURE — 85610 PROTHROMBIN TIME: CPT | Performed by: NURSE PRACTITIONER

## 2019-11-13 PROCEDURE — 99212 OFFICE O/P EST SF 10 MIN: CPT | Performed by: SURGERY

## 2019-11-13 NOTE — PROGRESS NOTES
Today's INR is 2.6.   Patient states no med changes or bleeding problems or unexplained bruising. Patient instructed to continue current dosing schedule. Verbalizes understanding. Will recheck in 3 wks.  Patient instructed regarding medication; results given and questions answered. Nutritional counseling given.  Dietary factors affecting therapy addressed.  Patient instructed to monitor for excessive bruising or bleeding.  Findings reported by Ree Jade RN.         This document has been electronically signed by Meme Duckworth, DANIEL @ on November 13, 2019 11:08 AM

## 2019-11-13 NOTE — PATIENT INSTRUCTIONS

## 2019-11-16 NOTE — PROGRESS NOTES
Chief Complaint   Patient presents with   • Follow-up     Recheck Colon Cancer        HPI  Flex sig:    A healed ulcer was found in the sigmoid colon. There was no evidence of the previous polyp. Area was tattooed with  an injection of 3 mL of Gisela ink. This area was at 30 cms.    She is off oxygen. Heart appointment with Dr. Gates. Initially I thought she was not a candidate for resection but she is improved.  Past Medical History:   Diagnosis Date   • Acute bronchitis    • Anxiety    • Atrial fibrillation (CMS/HCC)    • C. difficile colitis    • Callosity     under metatarsal head      • Cardiac pacemaker in situ    • CHF (congestive heart failure) (CMS/HCC)    • Chronic obstructive lung disease (CMS/HCC)    • Corns and callus    • Depressive disorder    • Diabetes mellitus (CMS/HCC)    • Diastolic heart failure (CMS/HCC)    • Essential hypertension    • Foot pain    • History of transfusion    • Hyperlipidemia    • Long term current use of anticoagulant    • Malignant neoplasm of sigmoid colon (CMS/HCC) 8/13/2019    Added automatically from request for surgery [id number]   • On anticoagulant therapy    • Pulmonary emphysema (CMS/HCC)    • Rectal hemorrhage    • Type 2 diabetes mellitus (CMS/HCC)        Past Surgical History:   Procedure Laterality Date   • AORTIC VALVE REPAIR/REPLACEMENT  1999   • CARDIAC ELECTROPHYSIOLOGY PROCEDURE N/A 3/20/2017    Procedure: PPM generator change - dual;  Surgeon: Bereket Gates MD;  Location: [facility] CATH INVASIVE LOCATION;  Service:    • CARDIAC PACEMAKER PLACEMENT  1999   • CATARACT EXTRACTION W/ INTRAOCULAR LENS IMPLANT Left 2/1/2019    Procedure: REMOVE CATARACT AND IMPLANT INTRAOCULAR LENS I;  Surgeon: Jose L Clay MD;  Location: [facility] OR;  Service: Ophthalmology   • CATARACT EXTRACTION W/ INTRAOCULAR LENS IMPLANT Right 2/8/2019    Procedure: REMOVE CATARACT AND IMPLANT  INTRAOCULAR LENS;  Surgeon: Jose L Clay MD;  Location: [facility] OR;  Service:  Ophthalmology   • COLONOSCOPY N/A 6/19/2019    Procedure: COLONOSCOPY WITH CONTROL OF BLEED;  Surgeon: Alfredo Guerrero MD;  Location: [facility] ENDOSCOPY;  Service: Gastroenterology   • COLONOSCOPY N/A 6/24/2019    Procedure: COLONOSCOPY;  Surgeon: Alfredo Guerrero MD;  Location: [facility] ENDOSCOPY;  Service: Gastroenterology   • ECHO - CONVERTED  11/12/2013    There is mild to moderate left atrial enlargement EF 50-55%   • ENDOSCOPY N/A 6/19/2019    Procedure: ESOPHAGOGASTRODUODENOSCOPY WITH CONTROL OF BLEED;  Surgeon: Alfredo Guerrero MD;  Location: [facility] ENDOSCOPY;  Service: Gastroenterology   • FOOT SURGERY  1990   • OTHER SURGICAL HISTORY  10/26/2015    PARING CORN/CALLUS    • PACEMAKER REPLACEMENT N/A 3/21/2017    Procedure: revision pacemaker pocket, evacuation hematoma, control of bleeding;  Surgeon: Polo Feliz MD;  Location: [facility] OR;  Service:    • SIGMOIDOSCOPY N/A 9/30/2019    Procedure: SIGMOIDOSCOPY FLEXIBLE;  Surgeon: Alfredo Guerrero MD;  Location: [facility] ENDOSCOPY;  Service: Gastroenterology   • TRANSESOPHAGEAL ECHOCARDIOGRAM (MITZY)  05/01/2014    With color flow-Mild left atrial enlargement with mild concentric LV hypertrophy with top normal aortic root size. EF 45-50%. Mild mitral regurgitation and mild aortic insufficiency and trivial amount of tricuspid regurgation   • TUBAL ABDOMINAL LIGATION           Current Outpatient Medications:   •  acetaminophen (TYLENOL) 325 MG tablet, Take 650 mg by mouth every 6 (six) hours as needed for mild pain (1-3)., Disp: , Rfl:   •  albuterol (PROVENTIL) (2.5 MG/3ML) 0.083% nebulizer solution, Take 2.5 mg by nebulization Every 4 (Four) Hours As Needed for Wheezing., Disp: , Rfl:   •  albuterol sulfate  (90 Base) MCG/ACT inhaler, Inhale 2 puffs Every 4 (Four) Hours As Needed for Wheezing or Shortness of Air., Disp: 18 g, Rfl: 11  •  Ascorbic Acid (VITAMIN C WITH OCHOA HIPS) 250 MG tablet, Take 500 mg by mouth Daily., Disp: , Rfl:   •  aspirin 81  MG chewable tablet, Chew 81 mg Daily., Disp: , Rfl:   •  bumetanide (BUMEX) 2 MG tablet, Take 1 tablet by mouth 2 (Two) Times a Day. (Patient taking differently: Take 1 mg by mouth Daily.), Disp: 60 tablet, Rfl: 3  •  cholecalciferol (VITAMIN D3) 1000 units tablet, Take 2,000 Units by mouth Daily., Disp: , Rfl:   •  citalopram (CeleXA) 20 MG tablet, Take 1 tablet by mouth Daily., Disp: 90 tablet, Rfl: 3  •  diltiaZEM CD (CARDIZEM CD) 240 MG 24 hr capsule, Take 1 capsule by mouth Daily., Disp: 90 capsule, Rfl: 3  •  ezetimibe (ZETIA) 10 MG tablet, TAKE 1 TABLET BY MOUTH EVERY DAY, Disp: 90 tablet, Rfl: 1  •  ferrous sulfate 325 (65 FE) MG tablet, Take 325 mg by mouth Daily With Breakfast., Disp: , Rfl:   •  glucose blood test strip, 1 each by Other route 3 (three) times a day. Use as instructed, Disp: , Rfl:   •  Insulin Glargine (BASAGLAR KWIKPEN) 100 UNIT/ML injection pen, INJECT 30 UNITS UNDER THE SKIN INTO THE APPROPRIATE AREA AS DIRECTED DAILY., Disp: 15 mL, Rfl: 11  •  metOLazone (ZAROXOLYN) 5 MG tablet, TAKE 1 TABLET BY MOUTH DAILY., Disp: 30 tablet, Rfl: 0  •  metoprolol succinate XL (TOPROL-XL) 25 MG 24 hr tablet, TAKE 1 TABLET BY MOUTH DAILY., Disp: 30 tablet, Rfl: 0  •  ondansetron (ZOFRAN) 4 MG tablet, Take 1 tablet by mouth Every 6 (Six) Hours As Needed for Nausea or Vomiting., Disp: 30 tablet, Rfl: 0  •  potassium chloride (K-DUR,KLOR-CON) 20 MEQ CR tablet, Take 1 tablet by mouth Daily., Disp: , Rfl:   •  Prenatal Vit-Fe Fumarate-FA (PRENATAL VITAMIN) 27-0.8 MG tablet, Take 1 tablet by mouth daily., Disp: , Rfl:   •  raNITIdine (ZANTAC) 150 MG tablet, Take 1 tablet by mouth Every Night., Disp: 30 tablet, Rfl: 0  •  rOPINIRole (REQUIP) 0.25 MG tablet, Take 1 tablet by mouth Every Night. Take 1 hour before bedtime., Disp: 30 tablet, Rfl: 0  •  traZODone (DESYREL) 150 MG tablet, Take 2 tablets by mouth Every Night., Disp: 180 tablet, Rfl: 3  •  umeclidinium-vilanterol (ANORO ELLIPTA) 62.5-25 MCG/INH  aerosol powder  inhaler, Inhale 1 puff Daily., Disp: 1 each, Rfl: 6  •  UNIFINE PENTIPS 31G X 6 MM misc, USE 1 FOUR (4) TIMES DAILY WITH INSULIN, Disp: 130 each, Rfl: 5  •  warfarin (COUMADIN) 5 MG tablet, Take 1 tablet by mouth Daily. Take 1 tablet nightly or AS DIRECTED PER COUMADIN CLINIC, Disp: 30 tablet, Rfl: 5    Allergies   Allergen Reactions   • Crestor [Rosuvastatin Calcium] Anaphylaxis     Hurts liver   • Lipitor [Atorvastatin] Other (See Comments)     Can mess with kidneys    • Lortab [Hydrocodone-Acetaminophen] Hallucinations   • Adhesive Tape Other (See Comments)     Only paper tape takes off her skin    • Hydrocodone-Acetaminophen Hallucinations       Family History   Problem Relation Age of Onset   • Heart disease Mother    • Hyperlipidemia Mother    • Hypertension Mother    • Cancer Other        Social History     Socioeconomic History   • Marital status:      Spouse name: Not on file   • Number of children: Not on file   • Years of education: Not on file   • Highest education level: Not on file   Tobacco Use   • Smoking status: Former Smoker     Last attempt to quit: 3/21/1999     Years since quittin.6   • Smokeless tobacco: Never Used   Substance and Sexual Activity   • Alcohol use: No     Comment: quit in 2016   • Drug use: No   • Sexual activity: No     Birth control/protection: Post-menopausal       Review of Systems  Not repeated  Physical Exam  Not repeated    ASSESSMENT    Brendon was seen today for follow-up.    Diagnoses and all orders for this visit:    Cancer of sigmoid (CMS/HCC)        PLAN    1. Scheduled for cardiology evaluation; ideally, she should have sigmoid colectomy but can get by with polypectomy if unable.              This document has been electronically signed by Luis Maher MD on 2019 4:13 PM

## 2019-11-18 ENCOUNTER — TELEPHONE (OUTPATIENT)
Dept: FAMILY MEDICINE CLINIC | Facility: CLINIC | Age: 74
End: 2019-11-18

## 2019-11-18 DIAGNOSIS — G25.81 RESTLESS LEG SYNDROME: ICD-10-CM

## 2019-11-18 RX ORDER — METOLAZONE 5 MG/1
5 TABLET ORAL DAILY
Qty: 30 TABLET | Refills: 5 | Status: SHIPPED | OUTPATIENT
Start: 2019-11-18 | End: 2020-01-01 | Stop reason: HOSPADM

## 2019-11-18 RX ORDER — METOPROLOL SUCCINATE 25 MG/1
25 TABLET, EXTENDED RELEASE ORAL DAILY
Qty: 30 TABLET | Refills: 5 | Status: SHIPPED | OUTPATIENT
Start: 2019-11-18 | End: 2020-01-01

## 2019-11-18 RX ORDER — ROPINIROLE 0.25 MG/1
0.25 TABLET, FILM COATED ORAL NIGHTLY
Qty: 30 TABLET | Refills: 0 | Status: SHIPPED | OUTPATIENT
Start: 2019-11-18 | End: 2019-01-01

## 2019-11-20 NOTE — PROGRESS NOTES
"Pulmonary Office Follow-up    Subjective     Brendon Skelton is seen today at the office for   Chief Complaint   Patient presents with   • COPD         HPI  Brendon Skelton is a 74 y.o. female with a PMH significant for COPD, chronic hypoxic respiratory failure, past tobacco use, AF, CHF, s/p AVR, obesity, CKD, and DM who presents for follow-up of COPD.      7/30/19: She is recovering from prolonged hospitalization during which time she was diagnosed with adenocarcinoma of the sigmoid colon.  She was stable on her Anoro so recommend continue with it and to continue diuretics as directed by Dr. Caputo.    11/21/19: She states she has been doing well but she has had \"sniffles\" for the past week.  She reports nasal drainage and a slight worsening in her breathing.  Patient continues on Anoro daily but states she has not felt the need to use her albuterol.  She denies cough, sputum, chest pain, fevers, or other recent illnesses.  Patient reports she had a checkup with Dr. Gates the other day and he told her that her lungs were clear.  She does report getting the flu and pneumonia vaccines.      Tobacco use history:  Type: cigarettes  Amount: 1 ppd  Duration: 45 years  Cessation: 1999   Willing to quit: N/A      Patient Active Problem List   Diagnosis   • Long term current use of anticoagulant therapy   • Personal history of heart valve replacement   • Atrial fibrillation [I48.91]   • Emphysema of lung (CMS/HCC)   • On anticoagulant therapy   • Hyperlipidemia   • Diastolic heart failure (CMS/HCC)   • Depressive disorder   • COPD (chronic obstructive pulmonary disease) (CMS/HCC)   • Type 2 diabetes mellitus with stage 4 chronic kidney disease, with long-term current use of insulin (CMS/HCC)   • Myopia   • Astigmatism   • Bleeding from open wound of chest wall   • Follow-up surgery care   • Encounter for screening for malignant neoplasm of colon   • Positive colorectal cancer screening using DNA-based stool test "   • Nodule of left lung   • Chronic hypoxemic respiratory failure (CMS/HCC)   • Physical deconditioning   • Heart failure with preserved left ventricular function (HFpEF) (CMS/HCC)   • Essential hypertension   • Coronary artery disease involving native coronary artery without angina pectoris   • Hx of CABG   • SSS (sick sinus syndrome) (CMS/HCC)   • Pacemaker   • Stage 4 chronic kidney disease (CMS/HCC)   • Personal history of tobacco use, presenting hazards to health   • Gastrointestinal hemorrhage   • Class 2 severe obesity due to excess calories with serious comorbidity and body mass index (BMI) of 36.0 to 36.9 in adult (CMS/HCC)   • Gastritis   • Colon polyp   • Coumadin toxicity   • Acute on chronic diastolic congestive heart failure (CMS/HCC)   • Anemia   • Pulmonary hypertension (CMS/HCC)   • Malignant neoplasm of sigmoid colon (CMS/HCC)       Review of Systems  Review of Systems   Constitutional: Positive for fatigue. Negative for fever and unexpected weight change.   HENT: Positive for congestion and rhinorrhea. Negative for postnasal drip.    Respiratory: Negative for cough, choking, shortness of breath and wheezing.    Cardiovascular: Negative for chest pain and leg swelling.     As described in the HPI. Otherwise, remainder of ROS (14 systems) were negative.    Medications, Allergies, Social, and Family Histories reviewed as per EMR.    Objective     Vitals:    11/21/19 1530   BP: 162/76   Pulse: 68   SpO2: 91%     Physical Exam   Constitutional: She is oriented to person, place, and time. Vital signs are normal. She appears well-developed and well-nourished.   Morbidly obese   HENT:   Head: Normocephalic and atraumatic.   Nose: Nose normal.   Mouth/Throat: Uvula is midline, oropharynx is clear and moist and mucous membranes are normal.   Mallampati 3   Eyes: Conjunctivae, EOM and lids are normal. Pupils are equal, round, and reactive to light.   Neck: Trachea normal and normal range of motion. No  tracheal tenderness present. No thyroid mass present.   Cardiovascular: Normal rate and regular rhythm. PMI is not displaced. Exam reveals no gallop.   Murmur heard.   Systolic murmur is present with a grade of 2/6.  AV click   Pulmonary/Chest: Effort normal and breath sounds normal. No respiratory distress. She has no decreased breath sounds. She has no wheezes. She has no rhonchi. She exhibits deformity (thoracic kyphosis). She exhibits no tenderness.   Abdominal: Soft. Normal appearance and bowel sounds are normal. There is no hepatosplenomegaly. There is no tenderness.   Morbidly obese   Musculoskeletal:   Uses motorized scooter, mild BLE edema     Vascular Status -  Her right foot exhibits abnormal foot edema. Her left foot exhibits abnormal foot edema.  Lymphadenopathy:        Head (right side): No submandibular adenopathy present.        Head (left side): No submandibular adenopathy present.     She has no cervical adenopathy.        Right: No supraclavicular adenopathy present.        Left: No supraclavicular adenopathy present.   Neurological: She is alert and oriented to person, place, and time. Gait normal.   Skin: Skin is warm and dry. No rash noted. No cyanosis. Nails show no clubbing.   Psychiatric: She has a normal mood and affect. Her speech is normal and behavior is normal. Judgment normal.   Nursing note and vitals reviewed.      IMAGING: CT chest without contrast 3/27/19 (independently reviewed and interpreted by me) showed resolution of left upper lobe nodule and bilateral effusions, interval decrease in mediastinal adenopathy, emphysematous changes, atherosclerosis    CXR 7/22/19: Stable cardiomegaly, central vascular congestion as well as interstitial thickening consistent with pulmonary edema, small bilateral pleural effusions, unchanged findings compared to 6/18/2019    TTE 6/18/19:  · LVEF 46-50%  · Left atrial cavity size is moderately dilated.  · The left ventricular cavity is mildly  dilated.  · Left ventricular wall thickness is consistent with borderline concentric hypertrophy.  · Mild mitral valve regurgitation is present  · Mild to moderate tricuspid valve regurgitation is present  · RVSP 55mmHg    Assessment/Plan     Brendon was seen today for copd.    Diagnoses and all orders for this visit:    Chronic obstructive pulmonary disease, unspecified COPD type (CMS/[facility])  -     albuterol sulfate  (90 Base) MCG/ACT inhaler; Inhale 2 puffs Every 4 (Four) Hours As Needed for Wheezing or Shortness of Air.  -     umeclidinium-vilanterol (ANORO ELLIPTA) 62.5-25 MCG/INH aerosol powder  inhaler; Inhale 1 puff Daily.    Class 2 severe obesity due to excess calories with serious comorbidity and body mass index (BMI) of 36.0 to 36.9 in adult (CMS/[facility])    Personal history of tobacco use, presenting hazards to health    Heart failure with preserved left ventricular function (HFpEF) (CMS/[facility])    Pulmonary hypertension (CMS/HCC)         Discussion/ Recommendations:   I think she is likely having a mild viral URI but since her symptoms are improving and she is really having lack of other respiratory symptoms I would not recommend steroids at this time.  Otherwise she remains stable and I have encouraged her to continue with her current therapy.  She appears relatively euvolemic and well perfused on exam.    -Symptomatic management of current viral URI.  Counseled that if symptoms do not improve or if they worsen she should call for reevaluation.  -Continue Anoro daily and albuterol as needed for dyspnea or wheeze.  -Use oxygen to maintain oxygen saturations greater than 88%.  -She would benefit from pulmonary rehabilitation, but given her limited mobility she is not able to tolerate.  -Continue current Bumex and cardiac medications.  Monitor edema.  -Does not meet criteria for LD CT screening.  -Up-to-date with flu and pneumococcal vaccines.    Patient's Body mass index is 38.61 kg/m². BMI is above normal  parameters. Recommendations include: exercise counseling and nutrition counseling.      Return in about 3 months (around 2/21/2020) for Recheck COPD.          This document has been electronically signed by Nel Grant MD on November 21, 2019 3:53 PM      Dictated using Dragon

## 2019-11-26 NOTE — PATIENT INSTRUCTIONS

## 2019-11-26 NOTE — PROGRESS NOTES
[facility]      Chief Complaint:   Chief Complaint   Patient presents with   • malignant neoplasm of sigmoid colon       Subjective     HPI:   Patient with previous malignancy in the sigmoid colon.  Patient had repeat evaluation of this area and area of tattoo was seen in the area of previous polypectomy was seen biopsies from the base of this show malignant cells.  Ultimately patient should have surgery for this.  Patient has seen cardiology but they state that they are unsure if patient is a candidate for surgery.  Patient does have follow-up with cardiology in the next few weeks.  Will have patient follow-up in 4 weeks hopefully with decision as to whether patient can undergo surgery which would be the ultimate therapy for this.  If patient is not a surgical candidate we will repeat flex will sigmoidoscopy and attempt to elevate this area with saline and try to extract malignant cells.    Plan; patient follow-up in 4 weeks.      Past Medical History:   Past Medical History:   Diagnosis Date   • Acute bronchitis    • Anxiety    • Atrial fibrillation (CMS/HCC)    • C. difficile colitis    • Callosity     under metatarsal head      • Cardiac pacemaker in situ    • CHF (congestive heart failure) (CMS/HCC)    • Chronic obstructive lung disease (CMS/HCC)    • Corns and callus    • Depressive disorder    • Diabetes mellitus (CMS/HCC)    • Diastolic heart failure (CMS/HCC)    • Essential hypertension    • Foot pain    • History of transfusion    • Hyperlipidemia    • Long term current use of anticoagulant    • Malignant neoplasm of sigmoid colon (CMS/HCC) 8/13/2019    Added automatically from request for surgery [id number]   • On anticoagulant therapy    • Pulmonary emphysema (CMS/HCC)    • Rectal hemorrhage    • Type 2 diabetes mellitus (CMS/HCC)        Past Surgical History:    Past Surgical History:   Procedure Laterality Date   • AORTIC VALVE REPAIR/REPLACEMENT  1999   • CARDIAC  ELECTROPHYSIOLOGY PROCEDURE N/A 3/20/2017    Procedure: PPM generator change - dual;  Surgeon: Bereket Gates MD;  Location: [facility] CATH INVASIVE LOCATION;  Service:    • CARDIAC PACEMAKER PLACEMENT  1999   • CATARACT EXTRACTION W/ INTRAOCULAR LENS IMPLANT Left 2/1/2019    Procedure: REMOVE CATARACT AND IMPLANT INTRAOCULAR LENS I;  Surgeon: Jose L Clay MD;  Location: [facility] OR;  Service: Ophthalmology   • CATARACT EXTRACTION W/ INTRAOCULAR LENS IMPLANT Right 2/8/2019    Procedure: REMOVE CATARACT AND IMPLANT  INTRAOCULAR LENS;  Surgeon: Jose L Clay MD;  Location: [facility] OR;  Service: Ophthalmology   • COLONOSCOPY N/A 6/19/2019    Procedure: COLONOSCOPY WITH CONTROL OF BLEED;  Surgeon: Alfredo Guerrero MD;  Location: [facility] ENDOSCOPY;  Service: Gastroenterology   • COLONOSCOPY N/A 6/24/2019    Procedure: COLONOSCOPY;  Surgeon: Alfredo Guerrero MD;  Location: [facility] ENDOSCOPY;  Service: Gastroenterology   • ECHO - CONVERTED  11/12/2013    There is mild to moderate left atrial enlargement EF 50-55%   • ENDOSCOPY N/A 6/19/2019    Procedure: ESOPHAGOGASTRODUODENOSCOPY WITH CONTROL OF BLEED;  Surgeon: Alfredo Guerrero MD;  Location: [facility] ENDOSCOPY;  Service: Gastroenterology   • FOOT SURGERY  1990   • OTHER SURGICAL HISTORY  10/26/2015    PARING CORN/CALLUS    • PACEMAKER REPLACEMENT N/A 3/21/2017    Procedure: revision pacemaker pocket, evacuation hematoma, control of bleeding;  Surgeon: Polo Feliz MD;  Location: [facility] OR;  Service:    • SIGMOIDOSCOPY N/A 9/30/2019    Procedure: SIGMOIDOSCOPY FLEXIBLE;  Surgeon: Alfredo Guerrero MD;  Location: [facility] ENDOSCOPY;  Service: Gastroenterology   • TRANSESOPHAGEAL ECHOCARDIOGRAM (MITZY)  05/01/2014    With color flow-Mild left atrial enlargement with mild concentric LV hypertrophy with top normal aortic root size. EF 45-50%. Mild mitral regurgitation and mild aortic insufficiency and trivial amount of tricuspid regurgation   • TUBAL  ABDOMINAL LIGATION         Family History:  Family History   Problem Relation Age of Onset   • Heart disease Mother    • Hyperlipidemia Mother    • Hypertension Mother    • Cancer Other        Social History:   reports that she quit smoking about 20 years ago. She has never used smokeless tobacco. She reports that she does not drink alcohol or use drugs.    Medications:   Prior to Admission medications    Medication Sig Start Date End Date Taking? Authorizing Provider   acetaminophen (TYLENOL) 325 MG tablet Take 650 mg by mouth every 6 (six) hours as needed for mild pain (1-3).   Yes Andrés Waldrop MD   albuterol (PROVENTIL) (2.5 MG/3ML) 0.083% nebulizer solution Take 2.5 mg by nebulization Every 4 (Four) Hours As Needed for Wheezing.   Yes Andrés Waldrop MD   albuterol sulfate  (90 Base) MCG/ACT inhaler Inhale 2 puffs Every 4 (Four) Hours As Needed for Wheezing or Shortness of Air. 11/21/19  Yes Nel Grant MD   Ascorbic Acid (VITAMIN C WITH OCHOA HIPS) 250 MG tablet Take 500 mg by mouth Daily.   Yes Andrés Waldrop MD   aspirin 81 MG chewable tablet Chew 81 mg Daily.   Yes Andrés Waldrop MD   bumetanide (BUMEX) 2 MG tablet Take 1 tablet by mouth 2 (Two) Times a Day.  Patient taking differently: Take 1 mg by mouth Daily. 8/22/19  Yes Josefa Pozo APRN   cholecalciferol (VITAMIN D3) 1000 units tablet Take 2,000 Units by mouth Daily.   Yes Andrés Waldrop MD   citalopram (CeleXA) 20 MG tablet Take 1 tablet by mouth Daily. 5/8/19  Yes Josefa Pozo APRN   diltiaZEM CD (CARDIZEM CD) 240 MG 24 hr capsule Take 1 capsule by mouth Daily. 6/4/19  Yes Josefa Pozo APRN   ezetimibe (ZETIA) 10 MG tablet TAKE 1 TABLET BY MOUTH EVERY DAY 7/18/19  Yes Josefa Pozo APRN   ferrous sulfate 325 (65 FE) MG tablet Take 325 mg by mouth Daily With Breakfast.   Yes Andrés Waldrop MD   glucose blood test strip 1 each by Other route 3 (three) times a day. Use as instructed   Yes  Provider, MD Andrés   Insulin Glargine (BASAGLAR KWIKPEN) 100 UNIT/ML injection pen INJECT 30 UNITS UNDER THE SKIN INTO THE APPROPRIATE AREA AS DIRECTED DAILY. 10/7/19  Yes Josefa Pozo APRN   metOLazone (ZAROXOLYN) 5 MG tablet Take 1 tablet by mouth Daily. 11/18/19  Yes Josefa Pozo APRN   metoprolol succinate XL (TOPROL-XL) 25 MG 24 hr tablet Take 1 tablet by mouth Daily. 11/18/19  Yes Josefa Pozo APRN   ondansetron (ZOFRAN) 4 MG tablet Take 1 tablet by mouth Every 6 (Six) Hours As Needed for Nausea or Vomiting. 7/22/19  Yes Alfredo Charles Jr., MD   potassium chloride (K-DUR,KLOR-CON) 20 MEQ CR tablet Take 1 tablet by mouth Daily. 8/1/19  Yes Andrés Waldrop MD   Prenatal Vit-Fe Fumarate-FA (PRENATAL VITAMIN) 27-0.8 MG tablet Take 1 tablet by mouth daily.   Yes Andrés Waldrop MD   rOPINIRole (REQUIP) 0.25 MG tablet TAKE 1 TABLET BY MOUTH EVERY NIGHT. TAKE 1 HOUR BEFORE BEDTIME. 11/18/19  Yes Josefa Pozo APRN   traZODone (DESYREL) 150 MG tablet Take 2 tablets by mouth Every Night. 10/29/19  Yes Josefa Pozo APRN   umeclidinium-vilanterol (ANORO ELLIPTA) 62.5-25 MCG/INH aerosol powder  inhaler Inhale 1 puff Daily. 11/21/19  Yes Nel Grant MD   UNIFINE PENTIPS 31G X 6 MM misc USE 1 FOUR (4) TIMES DAILY WITH INSULIN 8/7/19  Yes Josefa Pozo APRN   warfarin (COUMADIN) 5 MG tablet Take 1 tablet by mouth Daily. Take 1 tablet nightly or AS DIRECTED PER COUMADIN CLINIC 10/14/19  Yes Meme Duckworth APRN       Allergies:  Crestor [rosuvastatin calcium]; Lipitor [atorvastatin]; Lortab [hydrocodone-acetaminophen]; Adhesive tape; and Hydrocodone-acetaminophen    ROS:    Review of Systems   Constitutional: Negative for activity change, appetite change, chills, diaphoresis, fatigue, fever and unexpected weight change.   HENT: Negative for sore throat and trouble swallowing.    Respiratory: Negative for shortness of breath.    Gastrointestinal: Negative for abdominal distention,  "abdominal pain, anal bleeding, blood in stool, constipation, diarrhea, nausea, rectal pain and vomiting.   Endocrine: Negative for polydipsia, polyphagia and polyuria.   Genitourinary: Negative for difficulty urinating.   Musculoskeletal: Negative for arthralgias.   Skin: Negative for pallor.   Allergic/Immunologic: Negative for food allergies.   Neurological: Negative for weakness and light-headedness.   Psychiatric/Behavioral: Negative for behavioral problems.     Objective     Blood pressure 148/80, pulse 89, height 165.1 cm (65\"), weight 106 kg (234 lb 9.6 oz), SpO2 92 %, not currently breastfeeding.    Physical Exam   Constitutional: She is oriented to person, place, and time. She appears well-developed and well-nourished. No distress.   HENT:   Head: Normocephalic and atraumatic.   Cardiovascular: Normal rate, regular rhythm, normal heart sounds and intact distal pulses. Exam reveals no gallop and no friction rub.   No murmur heard.  Pulmonary/Chest: Breath sounds normal. No respiratory distress. She has no wheezes. She has no rales. She exhibits no tenderness.   Abdominal: Soft. Bowel sounds are normal. She exhibits no distension and no mass. There is no tenderness. There is no rebound and no guarding. No hernia.   Musculoskeletal: Normal range of motion. She exhibits no edema.   Neurological: She is alert and oriented to person, place, and time.   Skin: Skin is warm and dry. No rash noted. She is not diaphoretic. No erythema. No pallor.   Psychiatric: She has a normal mood and affect. Her behavior is normal. Judgment and thought content normal.        Assessment/Plan   Brendon was seen today for malignant neoplasm of sigmoid colon.    Diagnoses and all orders for this visit:    Malignant neoplasm of sigmoid colon (CMS/HCC)        * Surgery not found *     Diagnosis Plan   1. Malignant neoplasm of sigmoid colon (CMS/HCC)         Anticipated Surgical Procedure:  No orders of the defined types were placed in this " encounter.      The risks, benefits, and alternatives of this procedure have been discussed with the patient or the responsible party- the patient understands and agrees to proceed.

## 2019-12-04 NOTE — PROGRESS NOTES
Today's INR is 3.5.  Patient states no med changes or bleeding problems or unexplained bruising. Patient instructed to continue current dosing schedule. Verbalizes understanding. Will recheck 1 month.  Pt states she is due for green Paprika [employer] today. Patient instructed regarding medication; results given and questions answered. Nutritional counseling given.  Dietary factors affecting therapy addressed.  Patient instructed to monitor for excessive bruising or bleeding. Findings reported by Dixie Bermeo RN.        This document has been electronically signed by ERIKA PanCNP-BC @  On December 4, 2019 10:33 AM

## 2020-01-01 ENCOUNTER — TELEPHONE (OUTPATIENT)
Dept: FAMILY MEDICINE CLINIC | Facility: CLINIC | Age: 75
End: 2020-01-01

## 2020-01-01 ENCOUNTER — OFFICE VISIT (OUTPATIENT)
Dept: FAMILY MEDICINE CLINIC | Facility: CLINIC | Age: 75
End: 2020-01-01

## 2020-01-01 ENCOUNTER — ANTICOAGULATION VISIT (OUTPATIENT)
Dept: CARDIAC SURGERY | Facility: CLINIC | Age: 75
End: 2020-01-01

## 2020-01-01 ENCOUNTER — HOSPITAL ENCOUNTER (INPATIENT)
Facility: HOSPITAL | Age: 75
LOS: 14 days | Discharge: NURSING, REHAB OR LONG-TERM CARE | End: 2020-03-09
Attending: EMERGENCY MEDICINE | Admitting: INTERNAL MEDICINE

## 2020-01-01 ENCOUNTER — EPISODE CHANGES (OUTPATIENT)
Dept: CASE MANAGEMENT | Facility: OTHER | Age: 75
End: 2020-01-01

## 2020-01-01 ENCOUNTER — READMISSION MANAGEMENT (OUTPATIENT)
Dept: CALL CENTER | Facility: HOSPITAL | Age: 75
End: 2020-01-01

## 2020-01-01 ENCOUNTER — PATIENT OUTREACH (OUTPATIENT)
Dept: CASE MANAGEMENT | Facility: OTHER | Age: 75
End: 2020-01-01

## 2020-01-01 ENCOUNTER — APPOINTMENT (OUTPATIENT)
Dept: GENERAL RADIOLOGY | Facility: HOSPITAL | Age: 75
End: 2020-01-01

## 2020-01-01 ENCOUNTER — TELEPHONE (OUTPATIENT)
Dept: CARDIAC SURGERY | Facility: CLINIC | Age: 75
End: 2020-01-01

## 2020-01-01 ENCOUNTER — OFFICE VISIT (OUTPATIENT)
Dept: SURGERY | Facility: CLINIC | Age: 75
End: 2020-01-01

## 2020-01-01 ENCOUNTER — ANESTHESIA (OUTPATIENT)
Dept: PERIOP | Facility: HOSPITAL | Age: 75
End: 2020-01-01

## 2020-01-01 ENCOUNTER — TRANSCRIBE ORDERS (OUTPATIENT)
Dept: LAB | Facility: HOSPITAL | Age: 75
End: 2020-01-01

## 2020-01-01 ENCOUNTER — LAB (OUTPATIENT)
Dept: LAB | Facility: HOSPITAL | Age: 75
End: 2020-01-01

## 2020-01-01 ENCOUNTER — APPOINTMENT (OUTPATIENT)
Dept: ULTRASOUND IMAGING | Facility: HOSPITAL | Age: 75
End: 2020-01-01

## 2020-01-01 ENCOUNTER — HOSPITAL ENCOUNTER (INPATIENT)
Facility: HOSPITAL | Age: 75
LOS: 8 days | Discharge: HOME HEALTH | End: 2020-07-22
Attending: EMERGENCY MEDICINE | Admitting: INTERNAL MEDICINE

## 2020-01-01 ENCOUNTER — ANESTHESIA EVENT (OUTPATIENT)
Dept: PERIOP | Facility: HOSPITAL | Age: 75
End: 2020-01-01

## 2020-01-01 ENCOUNTER — APPOINTMENT (OUTPATIENT)
Dept: INTERVENTIONAL RADIOLOGY/VASCULAR | Facility: HOSPITAL | Age: 75
End: 2020-01-01

## 2020-01-01 ENCOUNTER — OUTSIDE FACILITY SERVICE (OUTPATIENT)
Dept: FAMILY MEDICINE CLINIC | Facility: CLINIC | Age: 75
End: 2020-01-01

## 2020-01-01 ENCOUNTER — APPOINTMENT (OUTPATIENT)
Dept: LAB | Facility: HOSPITAL | Age: 75
End: 2020-01-01

## 2020-01-01 ENCOUNTER — LAB REQUISITION (OUTPATIENT)
Dept: LAB | Facility: HOSPITAL | Age: 75
End: 2020-01-01

## 2020-01-01 ENCOUNTER — OFFICE VISIT (OUTPATIENT)
Dept: PULMONOLOGY | Facility: CLINIC | Age: 75
End: 2020-01-01

## 2020-01-01 ENCOUNTER — TELEPHONE (OUTPATIENT)
Dept: PULMONOLOGY | Facility: CLINIC | Age: 75
End: 2020-01-01

## 2020-01-01 ENCOUNTER — HOSPITAL ENCOUNTER (OUTPATIENT)
Facility: HOSPITAL | Age: 75
Setting detail: HOSPITAL OUTPATIENT SURGERY
Discharge: HOME | End: 2020-09-04
Attending: THORACIC SURGERY (CARDIOTHORACIC VASCULAR SURGERY) | Admitting: THORACIC SURGERY (CARDIOTHORACIC VASCULAR SURGERY)

## 2020-01-01 ENCOUNTER — OFFICE VISIT (OUTPATIENT)
Dept: CARDIAC SURGERY | Facility: CLINIC | Age: 75
End: 2020-01-01

## 2020-01-01 ENCOUNTER — PREP FOR SURGERY (OUTPATIENT)
Dept: OTHER | Facility: HOSPITAL | Age: 75
End: 2020-01-01

## 2020-01-01 ENCOUNTER — DOCUMENTATION (OUTPATIENT)
Dept: CARDIAC SURGERY | Facility: CLINIC | Age: 75
End: 2020-01-01

## 2020-01-01 ENCOUNTER — HOSPITAL ENCOUNTER (INPATIENT)
Facility: HOSPITAL | Age: 75
LOS: 9 days | Discharge: NURSING, REHAB OR LONG-TERM CARE | End: 2020-02-19
Attending: SURGERY | Admitting: SURGERY

## 2020-01-01 ENCOUNTER — TRANSITIONAL CARE MANAGEMENT TELEPHONE ENCOUNTER (OUTPATIENT)
Dept: CALL CENTER | Facility: HOSPITAL | Age: 75
End: 2020-01-01

## 2020-01-01 ENCOUNTER — OFFICE VISIT (OUTPATIENT)
Dept: GASTROENTEROLOGY | Facility: CLINIC | Age: 75
End: 2020-01-01

## 2020-01-01 ENCOUNTER — HOSPITAL ENCOUNTER (OUTPATIENT)
Facility: HOSPITAL | Age: 75
Setting detail: HOSPITAL OUTPATIENT SURGERY
End: 2020-01-01
Attending: THORACIC SURGERY (CARDIOTHORACIC VASCULAR SURGERY) | Admitting: THORACIC SURGERY (CARDIOTHORACIC VASCULAR SURGERY)

## 2020-01-01 ENCOUNTER — APPOINTMENT (OUTPATIENT)
Dept: CT IMAGING | Facility: HOSPITAL | Age: 75
End: 2020-01-01

## 2020-01-01 ENCOUNTER — APPOINTMENT (OUTPATIENT)
Dept: PREADMISSION TESTING | Facility: HOSPITAL | Age: 75
End: 2020-01-01

## 2020-01-01 VITALS
RESPIRATION RATE: 20 BRPM | SYSTOLIC BLOOD PRESSURE: 140 MMHG | BODY MASS INDEX: 39.49 KG/M2 | WEIGHT: 237 LBS | HEART RATE: 60 BPM | HEIGHT: 65 IN | DIASTOLIC BLOOD PRESSURE: 80 MMHG | OXYGEN SATURATION: 85 %

## 2020-01-01 VITALS
HEIGHT: 65 IN | SYSTOLIC BLOOD PRESSURE: 122 MMHG | DIASTOLIC BLOOD PRESSURE: 64 MMHG | BODY MASS INDEX: 34.66 KG/M2 | WEIGHT: 208 LBS

## 2020-01-01 VITALS — BODY MASS INDEX: 38.49 KG/M2 | HEIGHT: 65 IN | WEIGHT: 231 LBS

## 2020-01-01 VITALS — OXYGEN SATURATION: 91 % | HEART RATE: 65 BPM

## 2020-01-01 VITALS
TEMPERATURE: 97.6 F | HEART RATE: 71 BPM | RESPIRATION RATE: 16 BRPM | SYSTOLIC BLOOD PRESSURE: 141 MMHG | OXYGEN SATURATION: 96 % | DIASTOLIC BLOOD PRESSURE: 63 MMHG | WEIGHT: 215 LBS | BODY MASS INDEX: 35.82 KG/M2 | HEIGHT: 65 IN

## 2020-01-01 VITALS
SYSTOLIC BLOOD PRESSURE: 132 MMHG | HEART RATE: 69 BPM | SYSTOLIC BLOOD PRESSURE: 128 MMHG | DIASTOLIC BLOOD PRESSURE: 69 MMHG | HEIGHT: 65 IN | BODY MASS INDEX: 38.49 KG/M2 | OXYGEN SATURATION: 90 % | HEART RATE: 68 BPM | DIASTOLIC BLOOD PRESSURE: 72 MMHG | WEIGHT: 231 LBS | OXYGEN SATURATION: 89 %

## 2020-01-01 VITALS
TEMPERATURE: 98 F | HEIGHT: 65 IN | WEIGHT: 237 LBS | HEART RATE: 60 BPM | SYSTOLIC BLOOD PRESSURE: 140 MMHG | DIASTOLIC BLOOD PRESSURE: 80 MMHG | BODY MASS INDEX: 39.49 KG/M2

## 2020-01-01 VITALS — OXYGEN SATURATION: 93 % | HEART RATE: 75 BPM

## 2020-01-01 VITALS — TEMPERATURE: 99 F

## 2020-01-01 VITALS — WEIGHT: 231 LBS | BODY MASS INDEX: 38.49 KG/M2 | HEIGHT: 65 IN

## 2020-01-01 VITALS — TEMPERATURE: 97.5 F | HEART RATE: 65 BPM | OXYGEN SATURATION: 90 %

## 2020-01-01 VITALS
WEIGHT: 235 LBS | HEIGHT: 65 IN | HEART RATE: 65 BPM | OXYGEN SATURATION: 95 % | OXYGEN SATURATION: 98 % | HEART RATE: 70 BPM | BODY MASS INDEX: 39.15 KG/M2 | OXYGEN SATURATION: 95 % | DIASTOLIC BLOOD PRESSURE: 60 MMHG | HEART RATE: 63 BPM | SYSTOLIC BLOOD PRESSURE: 116 MMHG

## 2020-01-01 VITALS
HEART RATE: 75 BPM | BODY MASS INDEX: 33.82 KG/M2 | WEIGHT: 203 LBS | SYSTOLIC BLOOD PRESSURE: 130 MMHG | OXYGEN SATURATION: 95 % | HEIGHT: 65 IN | DIASTOLIC BLOOD PRESSURE: 68 MMHG

## 2020-01-01 VITALS
DIASTOLIC BLOOD PRESSURE: 68 MMHG | OXYGEN SATURATION: 95 % | BODY MASS INDEX: 38.93 KG/M2 | HEIGHT: 65 IN | RESPIRATION RATE: 20 BRPM | HEART RATE: 77 BPM | WEIGHT: 233.69 LBS | TEMPERATURE: 97.8 F | SYSTOLIC BLOOD PRESSURE: 162 MMHG

## 2020-01-01 VITALS — HEART RATE: 61 BPM | OXYGEN SATURATION: 96 %

## 2020-01-01 VITALS — HEART RATE: 60 BPM | OXYGEN SATURATION: 87 % | SYSTOLIC BLOOD PRESSURE: 135 MMHG | DIASTOLIC BLOOD PRESSURE: 65 MMHG

## 2020-01-01 VITALS
WEIGHT: 207.6 LBS | RESPIRATION RATE: 18 BRPM | OXYGEN SATURATION: 94 % | TEMPERATURE: 96.7 F | HEIGHT: 65 IN | SYSTOLIC BLOOD PRESSURE: 120 MMHG | DIASTOLIC BLOOD PRESSURE: 59 MMHG | BODY MASS INDEX: 34.59 KG/M2 | HEART RATE: 76 BPM

## 2020-01-01 VITALS
WEIGHT: 231 LBS | RESPIRATION RATE: 16 BRPM | DIASTOLIC BLOOD PRESSURE: 69 MMHG | HEART RATE: 75 BPM | SYSTOLIC BLOOD PRESSURE: 151 MMHG | BODY MASS INDEX: 38.49 KG/M2 | OXYGEN SATURATION: 96 % | TEMPERATURE: 97.7 F | HEIGHT: 65 IN

## 2020-01-01 VITALS
HEART RATE: 64 BPM | SYSTOLIC BLOOD PRESSURE: 122 MMHG | OXYGEN SATURATION: 83 % | DIASTOLIC BLOOD PRESSURE: 68 MMHG | BODY MASS INDEX: 39.11 KG/M2 | TEMPERATURE: 98.2 F | HEIGHT: 65 IN

## 2020-01-01 VITALS
HEIGHT: 65 IN | DIASTOLIC BLOOD PRESSURE: 78 MMHG | BODY MASS INDEX: 39.55 KG/M2 | WEIGHT: 237.4 LBS | SYSTOLIC BLOOD PRESSURE: 144 MMHG | HEART RATE: 87 BPM | OXYGEN SATURATION: 90 %

## 2020-01-01 VITALS
SYSTOLIC BLOOD PRESSURE: 144 MMHG | HEIGHT: 65 IN | WEIGHT: 237 LBS | BODY MASS INDEX: 39.49 KG/M2 | DIASTOLIC BLOOD PRESSURE: 72 MMHG

## 2020-01-01 VITALS — WEIGHT: 203 LBS | BODY MASS INDEX: 33.82 KG/M2 | HEIGHT: 65 IN

## 2020-01-01 VITALS — SYSTOLIC BLOOD PRESSURE: 138 MMHG | HEART RATE: 60 BPM | OXYGEN SATURATION: 98 % | DIASTOLIC BLOOD PRESSURE: 58 MMHG

## 2020-01-01 VITALS — OXYGEN SATURATION: 96 % | HEART RATE: 80 BPM

## 2020-01-01 VITALS — TEMPERATURE: 98.1 F

## 2020-01-01 VITALS — OXYGEN SATURATION: 95 % | HEART RATE: 68 BPM

## 2020-01-01 VITALS — TEMPERATURE: 97.8 F

## 2020-01-01 DIAGNOSIS — I48.91 ATRIAL FIBRILLATION, UNSPECIFIED TYPE (HCC): ICD-10-CM

## 2020-01-01 DIAGNOSIS — Z79.4 TYPE 2 DIABETES MELLITUS WITH DIABETIC POLYNEUROPATHY, WITH LONG-TERM CURRENT USE OF INSULIN (HCC): Chronic | ICD-10-CM

## 2020-01-01 DIAGNOSIS — F32.A DEPRESSIVE DISORDER: ICD-10-CM

## 2020-01-01 DIAGNOSIS — Z79.01 LONG TERM CURRENT USE OF ANTICOAGULANT THERAPY: ICD-10-CM

## 2020-01-01 DIAGNOSIS — Z79.4 TYPE 2 DIABETES MELLITUS WITH STAGE 4 CHRONIC KIDNEY DISEASE, WITH LONG-TERM CURRENT USE OF INSULIN (HCC): ICD-10-CM

## 2020-01-01 DIAGNOSIS — G25.81 RESTLESS LEG SYNDROME: ICD-10-CM

## 2020-01-01 DIAGNOSIS — J44.9 CHRONIC OBSTRUCTIVE PULMONARY DISEASE, UNSPECIFIED COPD TYPE (HCC): Chronic | ICD-10-CM

## 2020-01-01 DIAGNOSIS — Z95.2 PERSONAL HISTORY OF HEART VALVE REPLACEMENT: ICD-10-CM

## 2020-01-01 DIAGNOSIS — I48.0 PAROXYSMAL ATRIAL FIBRILLATION (HCC): ICD-10-CM

## 2020-01-01 DIAGNOSIS — I10 ESSENTIAL HYPERTENSION: Chronic | ICD-10-CM

## 2020-01-01 DIAGNOSIS — Z99.2 ESRD (END STAGE RENAL DISEASE) ON DIALYSIS (HCC): ICD-10-CM

## 2020-01-01 DIAGNOSIS — J44.9 CHRONIC OBSTRUCTIVE PULMONARY DISEASE, UNSPECIFIED COPD TYPE (HCC): Primary | Chronic | ICD-10-CM

## 2020-01-01 DIAGNOSIS — A04.72: ICD-10-CM

## 2020-01-01 DIAGNOSIS — Z79.4 TYPE 2 DIABETES MELLITUS WITH DIABETIC POLYNEUROPATHY, WITH LONG-TERM CURRENT USE OF INSULIN (HCC): ICD-10-CM

## 2020-01-01 DIAGNOSIS — R91.1 NODULE OF LEFT LUNG: ICD-10-CM

## 2020-01-01 DIAGNOSIS — F33.0 MILD EPISODE OF RECURRENT MAJOR DEPRESSIVE DISORDER (HCC): ICD-10-CM

## 2020-01-01 DIAGNOSIS — Z99.2 HEMODIALYSIS ACCESS SITE WITH ARTERIOVENOUS GRAFT (HCC): ICD-10-CM

## 2020-01-01 DIAGNOSIS — Z09 HOSPITAL DISCHARGE FOLLOW-UP: ICD-10-CM

## 2020-01-01 DIAGNOSIS — N18.4 STAGE 4 CHRONIC KIDNEY DISEASE (HCC): Chronic | ICD-10-CM

## 2020-01-01 DIAGNOSIS — I27.20 PULMONARY HYPERTENSION (HCC): Chronic | ICD-10-CM

## 2020-01-01 DIAGNOSIS — G47.00 INSOMNIA, UNSPECIFIED TYPE: ICD-10-CM

## 2020-01-01 DIAGNOSIS — I50.30 HEART FAILURE WITH PRESERVED LEFT VENTRICULAR FUNCTION (HFPEF) (HCC): ICD-10-CM

## 2020-01-01 DIAGNOSIS — E11.42 TYPE 2 DIABETES MELLITUS WITH DIABETIC POLYNEUROPATHY, WITH LONG-TERM CURRENT USE OF INSULIN (HCC): ICD-10-CM

## 2020-01-01 DIAGNOSIS — J44.9 OBSTRUCTIVE CHRONIC BRONCHITIS WITHOUT EXACERBATION (HCC): ICD-10-CM

## 2020-01-01 DIAGNOSIS — C18.7 MALIGNANT NEOPLASM OF SIGMOID COLON (HCC): Primary | ICD-10-CM

## 2020-01-01 DIAGNOSIS — J44.9 CHRONIC OBSTRUCTIVE PULMONARY DISEASE, UNSPECIFIED COPD TYPE (HCC): ICD-10-CM

## 2020-01-01 DIAGNOSIS — D63.1 ANEMIA OF CHRONIC RENAL FAILURE, UNSPECIFIED CKD STAGE: ICD-10-CM

## 2020-01-01 DIAGNOSIS — E21.1 SECONDARY HYPERPARATHYROIDISM, NON-RENAL (HCC): Primary | ICD-10-CM

## 2020-01-01 DIAGNOSIS — E66.01 CLASS 2 SEVERE OBESITY DUE TO EXCESS CALORIES WITH SERIOUS COMORBIDITY AND BODY MASS INDEX (BMI) OF 39.0 TO 39.9 IN ADULT (HCC): Chronic | ICD-10-CM

## 2020-01-01 DIAGNOSIS — I48.91 ATRIAL FIBRILLATION, UNSPECIFIED TYPE (HCC): Chronic | ICD-10-CM

## 2020-01-01 DIAGNOSIS — N18.6 ESRD (END STAGE RENAL DISEASE) ON DIALYSIS (HCC): Primary | ICD-10-CM

## 2020-01-01 DIAGNOSIS — I10 ESSENTIAL HYPERTENSION: Primary | ICD-10-CM

## 2020-01-01 DIAGNOSIS — Z99.2 ESRD (END STAGE RENAL DISEASE) ON DIALYSIS (HCC): Primary | ICD-10-CM

## 2020-01-01 DIAGNOSIS — I48.0 PAROXYSMAL ATRIAL FIBRILLATION (HCC): Chronic | ICD-10-CM

## 2020-01-01 DIAGNOSIS — E78.49 OTHER HYPERLIPIDEMIA: ICD-10-CM

## 2020-01-01 DIAGNOSIS — Z87.891 PERSONAL HISTORY OF TOBACCO USE, PRESENTING HAZARDS TO HEALTH: ICD-10-CM

## 2020-01-01 DIAGNOSIS — E11.22 TYPE 2 DIABETES MELLITUS WITH STAGE 4 CHRONIC KIDNEY DISEASE, WITH LONG-TERM CURRENT USE OF INSULIN (HCC): ICD-10-CM

## 2020-01-01 DIAGNOSIS — Z01.818 PREOPERATIVE TESTING: Primary | ICD-10-CM

## 2020-01-01 DIAGNOSIS — J96.11 CHRONIC HYPOXEMIC RESPIRATORY FAILURE (HCC): Chronic | ICD-10-CM

## 2020-01-01 DIAGNOSIS — E78.5 HYPERLIPIDEMIA, UNSPECIFIED HYPERLIPIDEMIA TYPE: ICD-10-CM

## 2020-01-01 DIAGNOSIS — I50.9 CONGESTIVE HEART FAILURE, UNSPECIFIED HF CHRONICITY, UNSPECIFIED HEART FAILURE TYPE (HCC): ICD-10-CM

## 2020-01-01 DIAGNOSIS — C18.7 CANCER OF SIGMOID (HCC): Primary | ICD-10-CM

## 2020-01-01 DIAGNOSIS — Z78.9 IMPAIRED MOBILITY AND ADLS: ICD-10-CM

## 2020-01-01 DIAGNOSIS — Z13.29 SCREENING FOR THYROID DISORDER: ICD-10-CM

## 2020-01-01 DIAGNOSIS — N17.9 ACUTE NONTRAUMATIC KIDNEY INJURY (HCC): ICD-10-CM

## 2020-01-01 DIAGNOSIS — Z79.4 DIABETES MELLITUS DUE TO UNDERLYING CONDITION WITHOUT COMPLICATION, WITH LONG-TERM CURRENT USE OF INSULIN (HCC): ICD-10-CM

## 2020-01-01 DIAGNOSIS — Z51.81 ENCOUNTER FOR THERAPEUTIC DRUG MONITORING: ICD-10-CM

## 2020-01-01 DIAGNOSIS — E66.01 CLASS 2 SEVERE OBESITY DUE TO EXCESS CALORIES WITH SERIOUS COMORBIDITY AND BODY MASS INDEX (BMI) OF 36.0 TO 36.9 IN ADULT (HCC): ICD-10-CM

## 2020-01-01 DIAGNOSIS — Z51.81 ENCOUNTER FOR THERAPEUTIC DRUG LEVEL MONITORING: ICD-10-CM

## 2020-01-01 DIAGNOSIS — E11.42 TYPE 2 DIABETES MELLITUS WITH DIABETIC POLYNEUROPATHY, WITH LONG-TERM CURRENT USE OF INSULIN (HCC): Chronic | ICD-10-CM

## 2020-01-01 DIAGNOSIS — E08.9 DIABETES MELLITUS DUE TO UNDERLYING CONDITION WITHOUT COMPLICATION, WITH LONG-TERM CURRENT USE OF INSULIN (HCC): ICD-10-CM

## 2020-01-01 DIAGNOSIS — N18.9 ANEMIA OF CHRONIC RENAL FAILURE, UNSPECIFIED CKD STAGE: ICD-10-CM

## 2020-01-01 DIAGNOSIS — I25.10 CORONARY ARTERY DISEASE INVOLVING NATIVE CORONARY ARTERY OF NATIVE HEART WITHOUT ANGINA PECTORIS: ICD-10-CM

## 2020-01-01 DIAGNOSIS — N18.4 CHRONIC KIDNEY DISEASE, STAGE IV (SEVERE) (HCC): ICD-10-CM

## 2020-01-01 DIAGNOSIS — J96.21 ACUTE ON CHRONIC RESPIRATORY FAILURE WITH HYPOXIA (HCC): Primary | ICD-10-CM

## 2020-01-01 DIAGNOSIS — N18.6 ESRD (END STAGE RENAL DISEASE) ON DIALYSIS (HCC): ICD-10-CM

## 2020-01-01 DIAGNOSIS — C18.7 CANCER OF SIGMOID (HCC): ICD-10-CM

## 2020-01-01 DIAGNOSIS — J96.10 CHRONIC RESPIRATORY FAILURE, UNSPECIFIED WHETHER WITH HYPOXIA OR HYPERCAPNIA (HCC): Primary | ICD-10-CM

## 2020-01-01 DIAGNOSIS — G89.4 CHRONIC PAIN SYNDROME: ICD-10-CM

## 2020-01-01 DIAGNOSIS — E78.2 MIXED HYPERLIPIDEMIA: ICD-10-CM

## 2020-01-01 DIAGNOSIS — R53.81 PHYSICAL DECONDITIONING: ICD-10-CM

## 2020-01-01 DIAGNOSIS — J43.1 PANLOBULAR EMPHYSEMA (HCC): Chronic | ICD-10-CM

## 2020-01-01 DIAGNOSIS — N18.4 STAGE 4 CHRONIC KIDNEY DISEASE (HCC): Primary | ICD-10-CM

## 2020-01-01 DIAGNOSIS — N18.4 TYPE 2 DIABETES MELLITUS WITH STAGE 4 CHRONIC KIDNEY DISEASE, WITH LONG-TERM CURRENT USE OF INSULIN (HCC): ICD-10-CM

## 2020-01-01 DIAGNOSIS — J18.9 PNEUMONIA DUE TO INFECTIOUS ORGANISM, UNSPECIFIED LATERALITY, UNSPECIFIED PART OF LUNG: ICD-10-CM

## 2020-01-01 DIAGNOSIS — Z74.09 IMPAIRED FUNCTIONAL MOBILITY, BALANCE, GAIT, AND ENDURANCE: ICD-10-CM

## 2020-01-01 DIAGNOSIS — R10.32 LEFT LOWER QUADRANT ABDOMINAL PAIN: ICD-10-CM

## 2020-01-01 DIAGNOSIS — E21.1 SECONDARY HYPERPARATHYROIDISM, NON-RENAL (HCC): ICD-10-CM

## 2020-01-01 DIAGNOSIS — Z20.822 COVID-19 RULED OUT: ICD-10-CM

## 2020-01-01 DIAGNOSIS — Z74.09 IMPAIRED PHYSICAL MOBILITY: ICD-10-CM

## 2020-01-01 DIAGNOSIS — Z74.09 IMPAIRED MOBILITY AND ADLS: ICD-10-CM

## 2020-01-01 DIAGNOSIS — Z00.00 MEDICARE ANNUAL WELLNESS VISIT, SUBSEQUENT: Primary | ICD-10-CM

## 2020-01-01 DIAGNOSIS — I10 ESSENTIAL HYPERTENSION: Primary | Chronic | ICD-10-CM

## 2020-01-01 DIAGNOSIS — C18.7 CANCER OF SIGMOID (HCC): Primary | Chronic | ICD-10-CM

## 2020-01-01 DIAGNOSIS — N28.9 RENAL INSUFFICIENCY: ICD-10-CM

## 2020-01-01 DIAGNOSIS — I49.5 SSS (SICK SINUS SYNDROME) (HCC): ICD-10-CM

## 2020-01-01 DIAGNOSIS — R79.1 ELEVATED INR: ICD-10-CM

## 2020-01-01 DIAGNOSIS — Z01.818 PREOP TESTING: Primary | ICD-10-CM

## 2020-01-01 DIAGNOSIS — J96.11 CHRONIC HYPOXEMIC RESPIRATORY FAILURE (HCC): Primary | ICD-10-CM

## 2020-01-01 DIAGNOSIS — Z79.01 LONG TERM (CURRENT) USE OF ANTICOAGULANTS: ICD-10-CM

## 2020-01-01 DIAGNOSIS — I50.23 ACUTE ON CHRONIC SYSTOLIC HEART FAILURE (HCC): Chronic | ICD-10-CM

## 2020-01-01 DIAGNOSIS — Z79.01 LONG TERM CURRENT USE OF ANTICOAGULANT THERAPY: Primary | ICD-10-CM

## 2020-01-01 DIAGNOSIS — J96.11 CHRONIC HYPOXEMIC RESPIRATORY FAILURE (HCC): ICD-10-CM

## 2020-01-01 DIAGNOSIS — Z95.2 PERSONAL HISTORY OF HEART VALVE REPLACEMENT: Primary | ICD-10-CM

## 2020-01-01 DIAGNOSIS — N18.6 ESRF (END STAGE RENAL FAILURE) (HCC): Chronic | ICD-10-CM

## 2020-01-01 DIAGNOSIS — I50.9 ACUTE ON CHRONIC CONGESTIVE HEART FAILURE, UNSPECIFIED HEART FAILURE TYPE (HCC): ICD-10-CM

## 2020-01-01 LAB
25(OH)D3 SERPL-MCNC: 43.2 NG/ML (ref 30–100)
A-A DO2: 7.9 MMHG
ABO + RH BLD: NORMAL
ABO GROUP BLD: NORMAL
ALBUMIN SERPL-MCNC: 3.1 G/DL (ref 3.5–5.2)
ALBUMIN SERPL-MCNC: 3.2 G/DL (ref 3.5–5.2)
ALBUMIN SERPL-MCNC: 3.4 G/DL (ref 3.5–5.2)
ALBUMIN SERPL-MCNC: 3.6 G/DL (ref 3.5–5.2)
ALBUMIN SERPL-MCNC: 3.6 G/DL (ref 3.5–5.2)
ALBUMIN SERPL-MCNC: 3.8 G/DL (ref 3.5–5.2)
ALBUMIN/GLOB SERPL: 1.1 G/DL
ALBUMIN/GLOB SERPL: 1.2 G/DL
ALBUMIN/GLOB SERPL: 1.3 G/DL
ALBUMIN/GLOB SERPL: 1.3 G/DL
ALBUMIN/GLOB SERPL: 1.6 G/DL
ALP SERPL-CCNC: 58 U/L (ref 39–117)
ALP SERPL-CCNC: 60 U/L (ref 39–117)
ALP SERPL-CCNC: 63 U/L (ref 39–117)
ALP SERPL-CCNC: 74 U/L (ref 39–117)
ALP SERPL-CCNC: 85 U/L (ref 39–117)
ALT SERPL W P-5'-P-CCNC: 14 U/L (ref 1–33)
ALT SERPL W P-5'-P-CCNC: 17 U/L (ref 1–33)
ALT SERPL W P-5'-P-CCNC: 19 U/L (ref 1–33)
ALT SERPL W P-5'-P-CCNC: 227 U/L (ref 1–33)
ALT SERPL W P-5'-P-CCNC: 53 U/L (ref 1–33)
ANION GAP SERPL CALCULATED.3IONS-SCNC: 10 MMOL/L (ref 5–15)
ANION GAP SERPL CALCULATED.3IONS-SCNC: 10 MMOL/L (ref 5–15)
ANION GAP SERPL CALCULATED.3IONS-SCNC: 11 MMOL/L (ref 5–15)
ANION GAP SERPL CALCULATED.3IONS-SCNC: 12 MMOL/L (ref 5–15)
ANION GAP SERPL CALCULATED.3IONS-SCNC: 13 MMOL/L (ref 5–15)
ANION GAP SERPL CALCULATED.3IONS-SCNC: 14 MMOL/L (ref 5–15)
ANION GAP SERPL CALCULATED.3IONS-SCNC: 15 MMOL/L (ref 5–15)
ANION GAP SERPL CALCULATED.3IONS-SCNC: 15.3 MMOL/L (ref 5–15)
ANION GAP SERPL CALCULATED.3IONS-SCNC: 16 MMOL/L (ref 5–15)
ANION GAP SERPL CALCULATED.3IONS-SCNC: 17 MMOL/L (ref 5–15)
ANION GAP SERPL CALCULATED.3IONS-SCNC: 17 MMOL/L (ref 5–15)
ANION GAP SERPL CALCULATED.3IONS-SCNC: 18 MMOL/L (ref 5–15)
ANION GAP SERPL CALCULATED.3IONS-SCNC: 19 MMOL/L (ref 5–15)
ANION GAP SERPL CALCULATED.3IONS-SCNC: 20 MMOL/L (ref 5–15)
ANION GAP SERPL CALCULATED.3IONS-SCNC: 9 MMOL/L (ref 5–15)
ANION GAP SERPL CALCULATED.3IONS-SCNC: 9 MMOL/L (ref 5–15)
ANISOCYTOSIS BLD QL: ABNORMAL
ANISOCYTOSIS BLD QL: NORMAL
ARTERIAL PATENCY WRIST A: ABNORMAL
ARTERIAL PATENCY WRIST A: NEGATIVE
ARTERIAL PATENCY WRIST A: POSITIVE
AST SERPL-CCNC: 161 U/L (ref 1–32)
AST SERPL-CCNC: 23 U/L (ref 1–32)
AST SERPL-CCNC: 25 U/L (ref 1–32)
AST SERPL-CCNC: 27 U/L (ref 1–32)
AST SERPL-CCNC: 34 U/L (ref 1–32)
ATMOSPHERIC PRESS: 749 MMHG
ATMOSPHERIC PRESS: 750 MMHG
ATMOSPHERIC PRESS: 761 MMHG
B PERT DNA SPEC QL NAA+PROBE: NOT DETECTED
BACTERIA BLD CULT: ABNORMAL
BACTERIA BLD CULT: NORMAL
BACTERIA SPEC AEROBE CULT: ABNORMAL
BACTERIA SPEC AEROBE CULT: ABNORMAL
BACTERIA SPEC AEROBE CULT: NO GROWTH
BACTERIA SPEC AEROBE CULT: NORMAL
BACTERIA UR QL AUTO: ABNORMAL /HPF
BACTERIA UR QL AUTO: ABNORMAL /HPF
BASE EXCESS BLDA CALC-SCNC: -2.5 MMOL/L (ref 0–2)
BASE EXCESS BLDA CALC-SCNC: 0.7 MMOL/L (ref 0–2)
BASE EXCESS BLDA CALC-SCNC: 2.7 MMOL/L (ref 0–2)
BASE EXCESS BLDA CALC-SCNC: 2.7 MMOL/L (ref 0–2)
BASE EXCESS BLDA CALC-SCNC: 2.9 MMOL/L (ref 0–2)
BASOPHILS # BLD AUTO: 0.01 10*3/MM3 (ref 0–0.2)
BASOPHILS # BLD AUTO: 0.02 10*3/MM3 (ref 0–0.2)
BASOPHILS # BLD AUTO: 0.02 10*3/MM3 (ref 0–0.2)
BASOPHILS # BLD AUTO: 0.03 10*3/MM3 (ref 0–0.2)
BASOPHILS # BLD AUTO: 0.04 10*3/MM3 (ref 0–0.2)
BASOPHILS # BLD AUTO: 0.05 10*3/MM3 (ref 0–0.2)
BASOPHILS # BLD AUTO: 0.05 10*3/MM3 (ref 0–0.2)
BASOPHILS # BLD AUTO: 0.06 10*3/MM3 (ref 0–0.2)
BASOPHILS # BLD AUTO: 0.07 10*3/MM3 (ref 0–0.2)
BASOPHILS # BLD AUTO: 0.07 10*3/MM3 (ref 0–0.2)
BASOPHILS # BLD AUTO: 0.08 10*3/MM3 (ref 0–0.2)
BASOPHILS NFR BLD AUTO: 0.1 % (ref 0–1.5)
BASOPHILS NFR BLD AUTO: 0.2 % (ref 0–1.5)
BASOPHILS NFR BLD AUTO: 0.3 % (ref 0–1.5)
BASOPHILS NFR BLD AUTO: 0.4 % (ref 0–1.5)
BASOPHILS NFR BLD AUTO: 0.5 % (ref 0–1.5)
BASOPHILS NFR BLD AUTO: 0.5 % (ref 0–1.5)
BASOPHILS NFR BLD AUTO: 0.6 % (ref 0–1.5)
BASOPHILS NFR BLD AUTO: 0.7 % (ref 0–1.5)
BASOPHILS NFR BLD AUTO: 0.8 % (ref 0–1.5)
BDY SITE: ABNORMAL
BH BB BLOOD EXPIRATION DATE: NORMAL
BH BB BLOOD TYPE BARCODE: 5100
BH BB BLOOD TYPE BARCODE: NORMAL
BH BB DISPENSE STATUS: NORMAL
BH BB PRODUCT CODE: NORMAL
BH BB UNIT NUMBER: NORMAL
BILIRUB SERPL-MCNC: 0.5 MG/DL (ref 0–1.2)
BILIRUB SERPL-MCNC: 0.6 MG/DL (ref 0.2–1.2)
BILIRUB SERPL-MCNC: 0.6 MG/DL (ref 0.2–1.2)
BILIRUB SERPL-MCNC: 1.5 MG/DL (ref 0.2–1.2)
BILIRUB SERPL-MCNC: 1.7 MG/DL (ref 0.2–1.2)
BILIRUB UR QL STRIP: NEGATIVE
BILIRUB UR QL STRIP: NEGATIVE
BLD GP AB SCN SERPL QL: NEGATIVE
BUN BLD-MCNC: 100 MG/DL (ref 8–23)
BUN BLD-MCNC: 104 MG/DL (ref 8–23)
BUN BLD-MCNC: 115 MG/DL (ref 8–23)
BUN BLD-MCNC: 118 MG/DL (ref 8–23)
BUN BLD-MCNC: 122 MG/DL (ref 8–23)
BUN BLD-MCNC: 126 MG/DL (ref 8–23)
BUN BLD-MCNC: 26 MG/DL (ref 8–23)
BUN BLD-MCNC: 34 MG/DL (ref 8–23)
BUN BLD-MCNC: 35 MG/DL (ref 8–23)
BUN BLD-MCNC: 41 MG/DL (ref 8–23)
BUN BLD-MCNC: 45 MG/DL (ref 8–23)
BUN BLD-MCNC: 47 MG/DL (ref 8–23)
BUN BLD-MCNC: 49 MG/DL (ref 8–23)
BUN BLD-MCNC: 61 MG/DL (ref 8–23)
BUN BLD-MCNC: 61 MG/DL (ref 8–23)
BUN BLD-MCNC: 64 MG/DL (ref 8–23)
BUN BLD-MCNC: 64 MG/DL (ref 8–23)
BUN BLD-MCNC: 66 MG/DL (ref 8–23)
BUN BLD-MCNC: 70 MG/DL (ref 8–23)
BUN BLD-MCNC: 74 MG/DL (ref 8–23)
BUN BLD-MCNC: 81 MG/DL (ref 8–23)
BUN BLD-MCNC: 84 MG/DL (ref 8–23)
BUN BLD-MCNC: 84 MG/DL (ref 8–23)
BUN BLD-MCNC: 87 MG/DL (ref 8–23)
BUN BLD-MCNC: 88 MG/DL (ref 8–23)
BUN BLD-MCNC: 89 MG/DL (ref 8–23)
BUN BLD-MCNC: 91 MG/DL (ref 8–23)
BUN SERPL-MCNC: 25 MG/DL (ref 8–23)
BUN SERPL-MCNC: 26 MG/DL (ref 8–23)
BUN SERPL-MCNC: 41 MG/DL (ref 8–23)
BUN SERPL-MCNC: 42 MG/DL (ref 8–23)
BUN SERPL-MCNC: 44 MG/DL (ref 8–23)
BUN SERPL-MCNC: 49 MG/DL (ref 8–23)
BUN SERPL-MCNC: 56 MG/DL (ref 8–23)
BUN SERPL-MCNC: 72 MG/DL (ref 8–23)
BUN/CREAT SERPL: 10.4 (ref 7–25)
BUN/CREAT SERPL: 11 (ref 7–25)
BUN/CREAT SERPL: 11.1 (ref 7–25)
BUN/CREAT SERPL: 11.1 (ref 7–25)
BUN/CREAT SERPL: 11.3 (ref 7–25)
BUN/CREAT SERPL: 11.4 (ref 7–25)
BUN/CREAT SERPL: 14.4 (ref 7–25)
BUN/CREAT SERPL: 15.2 (ref 7–25)
BUN/CREAT SERPL: 19 (ref 7–25)
BUN/CREAT SERPL: 20.2 (ref 7–25)
BUN/CREAT SERPL: 20.5 (ref 7–25)
BUN/CREAT SERPL: 22.5 (ref 7–25)
BUN/CREAT SERPL: 23 (ref 7–25)
BUN/CREAT SERPL: 23.5 (ref 7–25)
BUN/CREAT SERPL: 25.7 (ref 7–25)
BUN/CREAT SERPL: 27.6 (ref 7–25)
BUN/CREAT SERPL: 29 (ref 7–25)
BUN/CREAT SERPL: 30.3 (ref 7–25)
BUN/CREAT SERPL: 31.1 (ref 7–25)
BUN/CREAT SERPL: 31.3 (ref 7–25)
BUN/CREAT SERPL: 34.3 (ref 7–25)
BUN/CREAT SERPL: 38.2 (ref 7–25)
BUN/CREAT SERPL: 38.3 (ref 7–25)
BUN/CREAT SERPL: 39.9 (ref 7–25)
BUN/CREAT SERPL: 43.7 (ref 7–25)
BUN/CREAT SERPL: 51.2 (ref 7–25)
BUN/CREAT SERPL: 7.1 (ref 7–25)
BUN/CREAT SERPL: 7.2 (ref 7–25)
BUN/CREAT SERPL: 8 (ref 7–25)
BUN/CREAT SERPL: 8 (ref 7–25)
BUN/CREAT SERPL: 8.7 (ref 7–25)
BUN/CREAT SERPL: 8.8 (ref 7–25)
BUN/CREAT SERPL: 8.9 (ref 7–25)
BUN/CREAT SERPL: 9 (ref 7–25)
BUN/CREAT SERPL: 9.6 (ref 7–25)
C PNEUM DNA NPH QL NAA+NON-PROBE: NOT DETECTED
CA-I BLD-MCNC: 4.48 MG/DL (ref 4.6–5.6)
CALCIUM SPEC-SCNC: 7.9 MG/DL (ref 8.6–10.5)
CALCIUM SPEC-SCNC: 8.2 MG/DL (ref 8.6–10.5)
CALCIUM SPEC-SCNC: 8.4 MG/DL (ref 8.6–10.5)
CALCIUM SPEC-SCNC: 8.4 MG/DL (ref 8.6–10.5)
CALCIUM SPEC-SCNC: 8.5 MG/DL (ref 8.6–10.5)
CALCIUM SPEC-SCNC: 8.5 MG/DL (ref 8.6–10.5)
CALCIUM SPEC-SCNC: 8.8 MG/DL (ref 8.6–10.5)
CALCIUM SPEC-SCNC: 8.8 MG/DL (ref 8.6–10.5)
CALCIUM SPEC-SCNC: 8.9 MG/DL (ref 8.6–10.5)
CALCIUM SPEC-SCNC: 8.9 MG/DL (ref 8.6–10.5)
CALCIUM SPEC-SCNC: 9 MG/DL (ref 8.6–10.5)
CALCIUM SPEC-SCNC: 9 MG/DL (ref 8.6–10.5)
CALCIUM SPEC-SCNC: 9.1 MG/DL (ref 8.6–10.5)
CALCIUM SPEC-SCNC: 9.2 MG/DL (ref 8.6–10.5)
CALCIUM SPEC-SCNC: 9.3 MG/DL (ref 8.6–10.5)
CALCIUM SPEC-SCNC: 9.4 MG/DL (ref 8.6–10.5)
CALCIUM SPEC-SCNC: 9.5 MG/DL (ref 8.6–10.5)
CALCIUM SPEC-SCNC: 9.6 MG/DL (ref 8.6–10.5)
CALCIUM SPEC-SCNC: 9.7 MG/DL (ref 8.6–10.5)
CALCIUM SPEC-SCNC: 9.8 MG/DL (ref 8.6–10.5)
CEA SERPL-MCNC: 2.58 NG/ML
CHLORIDE SERPL-SCNC: 100 MMOL/L (ref 98–107)
CHLORIDE SERPL-SCNC: 100 MMOL/L (ref 98–107)
CHLORIDE SERPL-SCNC: 101 MMOL/L (ref 98–107)
CHLORIDE SERPL-SCNC: 82 MMOL/L (ref 98–107)
CHLORIDE SERPL-SCNC: 87 MMOL/L (ref 98–107)
CHLORIDE SERPL-SCNC: 89 MMOL/L (ref 98–107)
CHLORIDE SERPL-SCNC: 89 MMOL/L (ref 98–107)
CHLORIDE SERPL-SCNC: 90 MMOL/L (ref 98–107)
CHLORIDE SERPL-SCNC: 91 MMOL/L (ref 98–107)
CHLORIDE SERPL-SCNC: 91 MMOL/L (ref 98–107)
CHLORIDE SERPL-SCNC: 92 MMOL/L (ref 98–107)
CHLORIDE SERPL-SCNC: 93 MMOL/L (ref 98–107)
CHLORIDE SERPL-SCNC: 94 MMOL/L (ref 98–107)
CHLORIDE SERPL-SCNC: 95 MMOL/L (ref 98–107)
CHLORIDE SERPL-SCNC: 95 MMOL/L (ref 98–107)
CHLORIDE SERPL-SCNC: 96 MMOL/L (ref 98–107)
CHLORIDE SERPL-SCNC: 97 MMOL/L (ref 98–107)
CHLORIDE SERPL-SCNC: 97 MMOL/L (ref 98–107)
CHLORIDE SERPL-SCNC: 98 MMOL/L (ref 98–107)
CHLORIDE SERPL-SCNC: 98 MMOL/L (ref 98–107)
CHLORIDE SERPL-SCNC: 99 MMOL/L (ref 98–107)
CHOLEST SERPL-MCNC: 145 MG/DL (ref 0–200)
CLARITY UR: ABNORMAL
CLARITY UR: ABNORMAL
CO2 SERPL-SCNC: 21 MMOL/L (ref 22–29)
CO2 SERPL-SCNC: 21 MMOL/L (ref 22–29)
CO2 SERPL-SCNC: 22 MMOL/L (ref 22–29)
CO2 SERPL-SCNC: 23 MMOL/L (ref 22–29)
CO2 SERPL-SCNC: 24 MMOL/L (ref 22–29)
CO2 SERPL-SCNC: 25 MMOL/L (ref 22–29)
CO2 SERPL-SCNC: 25 MMOL/L (ref 22–29)
CO2 SERPL-SCNC: 25.7 MMOL/L (ref 22–29)
CO2 SERPL-SCNC: 26 MMOL/L (ref 22–29)
CO2 SERPL-SCNC: 26 MMOL/L (ref 22–29)
CO2 SERPL-SCNC: 27 MMOL/L (ref 22–29)
CO2 SERPL-SCNC: 28 MMOL/L (ref 22–29)
CO2 SERPL-SCNC: 30 MMOL/L (ref 22–29)
CO2 SERPL-SCNC: 31 MMOL/L (ref 22–29)
CO2 SERPL-SCNC: 31 MMOL/L (ref 22–29)
COHGB MFR BLD: 2.2 % (ref 0–5)
COLOR UR: ABNORMAL
COLOR UR: YELLOW
COVID LABCORP PRIORITY: NORMAL
CRE SCREEN PCR: NOT DETECTED
CREAT BLD-MCNC: 1.96 MG/DL (ref 0.57–1)
CREAT BLD-MCNC: 2.03 MG/DL (ref 0.57–1)
CREAT BLD-MCNC: 2.11 MG/DL (ref 0.57–1)
CREAT BLD-MCNC: 2.28 MG/DL (ref 0.57–1)
CREAT BLD-MCNC: 2.45 MG/DL (ref 0.57–1)
CREAT BLD-MCNC: 2.61 MG/DL (ref 0.57–1)
CREAT BLD-MCNC: 2.63 MG/DL (ref 0.57–1)
CREAT BLD-MCNC: 2.65 MG/DL (ref 0.57–1)
CREAT BLD-MCNC: 2.9 MG/DL (ref 0.57–1)
CREAT BLD-MCNC: 3.09 MG/DL (ref 0.57–1)
CREAT BLD-MCNC: 3.12 MG/DL (ref 0.57–1)
CREAT BLD-MCNC: 3.46 MG/DL (ref 0.57–1)
CREAT BLD-MCNC: 3.6 MG/DL (ref 0.57–1)
CREAT BLD-MCNC: 3.6 MG/DL (ref 0.57–1)
CREAT BLD-MCNC: 3.68 MG/DL (ref 0.57–1)
CREAT BLD-MCNC: 3.69 MG/DL (ref 0.57–1)
CREAT BLD-MCNC: 3.71 MG/DL (ref 0.57–1)
CREAT BLD-MCNC: 3.79 MG/DL (ref 0.57–1)
CREAT BLD-MCNC: 4.03 MG/DL (ref 0.57–1)
CREAT BLD-MCNC: 4.21 MG/DL (ref 0.57–1)
CREAT BLD-MCNC: 4.29 MG/DL (ref 0.57–1)
CREAT BLD-MCNC: 4.35 MG/DL (ref 0.57–1)
CREAT BLD-MCNC: 4.36 MG/DL (ref 0.57–1)
CREAT BLD-MCNC: 4.57 MG/DL (ref 0.57–1)
CREAT BLD-MCNC: 4.73 MG/DL (ref 0.57–1)
CREAT BLD-MCNC: 5.14 MG/DL (ref 0.57–1)
CREAT BLD-MCNC: 5.2 MG/DL (ref 0.57–1)
CREAT SERPL-MCNC: 3.23 MG/DL (ref 0.57–1)
CREAT SERPL-MCNC: 3.51 MG/DL (ref 0.57–1)
CREAT SERPL-MCNC: 3.95 MG/DL (ref 0.57–1)
CREAT SERPL-MCNC: 4.33 MG/DL (ref 0.57–1)
CREAT SERPL-MCNC: 4.68 MG/DL (ref 0.57–1)
CREAT SERPL-MCNC: 4.73 MG/DL (ref 0.57–1)
CREAT SERPL-MCNC: 5.83 MG/DL (ref 0.57–1)
CREAT SERPL-MCNC: 6.34 MG/DL (ref 0.57–1)
CREAT UR-MCNC: 93.6 MG/DL
CRP SERPL-MCNC: 1.84 MG/DL (ref 0–0.5)
D-LACTATE SERPL-SCNC: 0.9 MMOL/L (ref 0.5–2)
D-LACTATE SERPL-SCNC: 0.9 MMOL/L (ref 0.5–2)
DEPRECATED RDW RBC AUTO: 51.8 FL (ref 37–54)
DEPRECATED RDW RBC AUTO: 52.1 FL (ref 37–54)
DEPRECATED RDW RBC AUTO: 52.2 FL (ref 37–54)
DEPRECATED RDW RBC AUTO: 56.5 FL (ref 37–54)
DEPRECATED RDW RBC AUTO: 58.5 FL (ref 37–54)
DEPRECATED RDW RBC AUTO: 58.5 FL (ref 37–54)
DEPRECATED RDW RBC AUTO: 58.8 FL (ref 37–54)
DEPRECATED RDW RBC AUTO: 60.3 FL (ref 37–54)
DEPRECATED RDW RBC AUTO: 61.8 FL (ref 37–54)
DEPRECATED RDW RBC AUTO: 62.6 FL (ref 37–54)
DEPRECATED RDW RBC AUTO: 62.7 FL (ref 37–54)
DEPRECATED RDW RBC AUTO: 65.5 FL (ref 37–54)
DEPRECATED RDW RBC AUTO: 66 FL (ref 37–54)
DEPRECATED RDW RBC AUTO: 66.4 FL (ref 37–54)
DEPRECATED RDW RBC AUTO: 66.5 FL (ref 37–54)
DEPRECATED RDW RBC AUTO: 66.6 FL (ref 37–54)
DEPRECATED RDW RBC AUTO: 67.3 FL (ref 37–54)
DEPRECATED RDW RBC AUTO: 67.9 FL (ref 37–54)
DEPRECATED RDW RBC AUTO: 69.2 FL (ref 37–54)
DEPRECATED RDW RBC AUTO: 69.8 FL (ref 37–54)
DEPRECATED RDW RBC AUTO: 70.1 FL (ref 37–54)
DEPRECATED RDW RBC AUTO: 71.7 FL (ref 37–54)
DEPRECATED RDW RBC AUTO: 71.9 FL (ref 37–54)
DEPRECATED RDW RBC AUTO: 72.3 FL (ref 37–54)
DEPRECATED RDW RBC AUTO: 72.6 FL (ref 37–54)
DEPRECATED RDW RBC AUTO: 73.6 FL (ref 37–54)
DEPRECATED RDW RBC AUTO: 74.2 FL (ref 37–54)
EOSINOPHIL # BLD AUTO: 0 10*3/MM3 (ref 0–0.4)
EOSINOPHIL # BLD AUTO: 0 10*3/MM3 (ref 0–0.4)
EOSINOPHIL # BLD AUTO: 0.03 10*3/MM3 (ref 0–0.4)
EOSINOPHIL # BLD AUTO: 0.05 10*3/MM3 (ref 0–0.4)
EOSINOPHIL # BLD AUTO: 0.07 10*3/MM3 (ref 0–0.4)
EOSINOPHIL # BLD AUTO: 0.07 10*3/MM3 (ref 0–0.4)
EOSINOPHIL # BLD AUTO: 0.08 10*3/MM3 (ref 0–0.4)
EOSINOPHIL # BLD AUTO: 0.1 10*3/MM3 (ref 0–0.4)
EOSINOPHIL # BLD AUTO: 0.12 10*3/MM3 (ref 0–0.4)
EOSINOPHIL # BLD AUTO: 0.12 10*3/MM3 (ref 0–0.4)
EOSINOPHIL # BLD AUTO: 0.15 10*3/MM3 (ref 0–0.4)
EOSINOPHIL # BLD AUTO: 0.17 10*3/MM3 (ref 0–0.4)
EOSINOPHIL # BLD AUTO: 0.24 10*3/MM3 (ref 0–0.4)
EOSINOPHIL # BLD AUTO: 0.32 10*3/MM3 (ref 0–0.4)
EOSINOPHIL # BLD AUTO: 0.36 10*3/MM3 (ref 0–0.4)
EOSINOPHIL # BLD AUTO: 0.36 10*3/MM3 (ref 0–0.4)
EOSINOPHIL # BLD AUTO: 0.37 10*3/MM3 (ref 0–0.4)
EOSINOPHIL # BLD AUTO: 0.37 10*3/MM3 (ref 0–0.4)
EOSINOPHIL # BLD AUTO: 0.38 10*3/MM3 (ref 0–0.4)
EOSINOPHIL # BLD AUTO: 0.39 10*3/MM3 (ref 0–0.4)
EOSINOPHIL # BLD AUTO: 0.39 10*3/MM3 (ref 0–0.4)
EOSINOPHIL # BLD AUTO: 0.4 10*3/MM3 (ref 0–0.4)
EOSINOPHIL # BLD AUTO: 0.4 10*3/MM3 (ref 0–0.4)
EOSINOPHIL # BLD AUTO: 0.42 10*3/MM3 (ref 0–0.4)
EOSINOPHIL # BLD AUTO: 0.46 10*3/MM3 (ref 0–0.4)
EOSINOPHIL # BLD AUTO: 0.52 10*3/MM3 (ref 0–0.4)
EOSINOPHIL NFR BLD AUTO: 0 % (ref 0.3–6.2)
EOSINOPHIL NFR BLD AUTO: 0 % (ref 0.3–6.2)
EOSINOPHIL NFR BLD AUTO: 0.2 % (ref 0.3–6.2)
EOSINOPHIL NFR BLD AUTO: 0.4 % (ref 0.3–6.2)
EOSINOPHIL NFR BLD AUTO: 0.7 % (ref 0.3–6.2)
EOSINOPHIL NFR BLD AUTO: 0.9 % (ref 0.3–6.2)
EOSINOPHIL NFR BLD AUTO: 0.9 % (ref 0.3–6.2)
EOSINOPHIL NFR BLD AUTO: 1.1 % (ref 0.3–6.2)
EOSINOPHIL NFR BLD AUTO: 1.2 % (ref 0.3–6.2)
EOSINOPHIL NFR BLD AUTO: 1.4 % (ref 0.3–6.2)
EOSINOPHIL NFR BLD AUTO: 1.7 % (ref 0.3–6.2)
EOSINOPHIL NFR BLD AUTO: 2.3 % (ref 0.3–6.2)
EOSINOPHIL NFR BLD AUTO: 2.6 % (ref 0.3–6.2)
EOSINOPHIL NFR BLD AUTO: 3 % (ref 0.3–6.2)
EOSINOPHIL NFR BLD AUTO: 3.3 % (ref 0.3–6.2)
EOSINOPHIL NFR BLD AUTO: 3.6 % (ref 0.3–6.2)
EOSINOPHIL NFR BLD AUTO: 4.6 % (ref 0.3–6.2)
EOSINOPHIL NFR BLD AUTO: 4.7 % (ref 0.3–6.2)
EOSINOPHIL NFR BLD AUTO: 4.8 % (ref 0.3–6.2)
EOSINOPHIL NFR BLD AUTO: 4.9 % (ref 0.3–6.2)
EPAP: 6
EPAP: 8
ERYTHROCYTE [DISTWIDTH] IN BLOOD BY AUTOMATED COUNT: 15.9 % (ref 12.3–15.4)
ERYTHROCYTE [DISTWIDTH] IN BLOOD BY AUTOMATED COUNT: 15.9 % (ref 12.3–15.4)
ERYTHROCYTE [DISTWIDTH] IN BLOOD BY AUTOMATED COUNT: 16.2 % (ref 12.3–15.4)
ERYTHROCYTE [DISTWIDTH] IN BLOOD BY AUTOMATED COUNT: 16.3 % (ref 12.3–15.4)
ERYTHROCYTE [DISTWIDTH] IN BLOOD BY AUTOMATED COUNT: 16.7 % (ref 12.3–15.4)
ERYTHROCYTE [DISTWIDTH] IN BLOOD BY AUTOMATED COUNT: 16.9 % (ref 12.3–15.4)
ERYTHROCYTE [DISTWIDTH] IN BLOOD BY AUTOMATED COUNT: 17.3 % (ref 12.3–15.4)
ERYTHROCYTE [DISTWIDTH] IN BLOOD BY AUTOMATED COUNT: 18 % (ref 12.3–15.4)
ERYTHROCYTE [DISTWIDTH] IN BLOOD BY AUTOMATED COUNT: 18.1 % (ref 12.3–15.4)
ERYTHROCYTE [DISTWIDTH] IN BLOOD BY AUTOMATED COUNT: 18.6 % (ref 12.3–15.4)
ERYTHROCYTE [DISTWIDTH] IN BLOOD BY AUTOMATED COUNT: 18.6 % (ref 12.3–15.4)
ERYTHROCYTE [DISTWIDTH] IN BLOOD BY AUTOMATED COUNT: 18.7 % (ref 12.3–15.4)
ERYTHROCYTE [DISTWIDTH] IN BLOOD BY AUTOMATED COUNT: 18.7 % (ref 12.3–15.4)
ERYTHROCYTE [DISTWIDTH] IN BLOOD BY AUTOMATED COUNT: 18.8 % (ref 12.3–15.4)
ERYTHROCYTE [DISTWIDTH] IN BLOOD BY AUTOMATED COUNT: 19 % (ref 12.3–15.4)
ERYTHROCYTE [DISTWIDTH] IN BLOOD BY AUTOMATED COUNT: 19.3 % (ref 12.3–15.4)
ERYTHROCYTE [DISTWIDTH] IN BLOOD BY AUTOMATED COUNT: 19.3 % (ref 12.3–15.4)
ERYTHROCYTE [DISTWIDTH] IN BLOOD BY AUTOMATED COUNT: 19.5 % (ref 12.3–15.4)
ERYTHROCYTE [DISTWIDTH] IN BLOOD BY AUTOMATED COUNT: 19.6 % (ref 12.3–15.4)
ERYTHROCYTE [DISTWIDTH] IN BLOOD BY AUTOMATED COUNT: 19.8 % (ref 12.3–15.4)
ERYTHROCYTE [DISTWIDTH] IN BLOOD BY AUTOMATED COUNT: 19.9 % (ref 12.3–15.4)
ERYTHROCYTE [DISTWIDTH] IN BLOOD BY AUTOMATED COUNT: 20 % (ref 12.3–15.4)
ERYTHROCYTE [DISTWIDTH] IN BLOOD BY AUTOMATED COUNT: 20.3 % (ref 12.3–15.4)
ERYTHROCYTE [DISTWIDTH] IN BLOOD BY AUTOMATED COUNT: 20.4 % (ref 12.3–15.4)
ERYTHROCYTE [DISTWIDTH] IN BLOOD BY AUTOMATED COUNT: 20.8 % (ref 12.3–15.4)
ERYTHROCYTE [DISTWIDTH] IN BLOOD BY AUTOMATED COUNT: 21.2 % (ref 12.3–15.4)
ERYTHROCYTE [DISTWIDTH] IN BLOOD BY AUTOMATED COUNT: 21.6 % (ref 12.3–15.4)
FERRITIN SERPL-MCNC: 579.6 NG/ML (ref 13–150)
FLUAV H1 2009 PAND RNA NPH QL NAA+PROBE: NOT DETECTED
FLUAV H1 HA GENE NPH QL NAA+PROBE: NOT DETECTED
FLUAV H3 RNA NPH QL NAA+PROBE: NOT DETECTED
FLUAV SUBTYP SPEC NAA+PROBE: NOT DETECTED
FLUBV RNA ISLT QL NAA+PROBE: NOT DETECTED
GAS FLOW AIRWAY: 15 LPM
GAS FLOW AIRWAY: 5.5 LPM
GFR SERPL CREATININE-BSD FRML MDRD: 10 ML/MIN/1.73
GFR SERPL CREATININE-BSD FRML MDRD: 11 ML/MIN/1.73
GFR SERPL CREATININE-BSD FRML MDRD: 11 ML/MIN/1.73
GFR SERPL CREATININE-BSD FRML MDRD: 12 ML/MIN/1.73
GFR SERPL CREATININE-BSD FRML MDRD: 13 ML/MIN/1.73
GFR SERPL CREATININE-BSD FRML MDRD: 13 ML/MIN/1.73
GFR SERPL CREATININE-BSD FRML MDRD: 14 ML/MIN/1.73
GFR SERPL CREATININE-BSD FRML MDRD: 15 ML/MIN/1.73
GFR SERPL CREATININE-BSD FRML MDRD: 15 ML/MIN/1.73
GFR SERPL CREATININE-BSD FRML MDRD: 16 ML/MIN/1.73
GFR SERPL CREATININE-BSD FRML MDRD: 18 ML/MIN/1.73
GFR SERPL CREATININE-BSD FRML MDRD: 19 ML/MIN/1.73
GFR SERPL CREATININE-BSD FRML MDRD: 21 ML/MIN/1.73
GFR SERPL CREATININE-BSD FRML MDRD: 23 ML/MIN/1.73
GFR SERPL CREATININE-BSD FRML MDRD: 24 ML/MIN/1.73
GFR SERPL CREATININE-BSD FRML MDRD: 25 ML/MIN/1.73
GFR SERPL CREATININE-BSD FRML MDRD: 6 ML/MIN/1.73
GFR SERPL CREATININE-BSD FRML MDRD: 7 ML/MIN/1.73
GFR SERPL CREATININE-BSD FRML MDRD: 8 ML/MIN/1.73
GFR SERPL CREATININE-BSD FRML MDRD: 8 ML/MIN/1.73
GFR SERPL CREATININE-BSD FRML MDRD: 9 ML/MIN/1.73
GFR SERPL CREATININE-BSD FRML MDRD: ABNORMAL ML/MIN/{1.73_M2}
GLOBULIN UR ELPH-MCNC: 2.2 GM/DL
GLOBULIN UR ELPH-MCNC: 2.8 GM/DL
GLOBULIN UR ELPH-MCNC: 2.8 GM/DL
GLOBULIN UR ELPH-MCNC: 2.9 GM/DL
GLOBULIN UR ELPH-MCNC: 3 GM/DL
GLUCOSE BLD-MCNC: 103 MG/DL (ref 65–99)
GLUCOSE BLD-MCNC: 106 MG/DL (ref 65–99)
GLUCOSE BLD-MCNC: 107 MG/DL (ref 65–99)
GLUCOSE BLD-MCNC: 110 MG/DL (ref 65–99)
GLUCOSE BLD-MCNC: 111 MG/DL (ref 65–99)
GLUCOSE BLD-MCNC: 112 MG/DL (ref 65–99)
GLUCOSE BLD-MCNC: 115 MG/DL (ref 65–99)
GLUCOSE BLD-MCNC: 117 MG/DL (ref 65–99)
GLUCOSE BLD-MCNC: 125 MG/DL (ref 65–99)
GLUCOSE BLD-MCNC: 128 MG/DL (ref 65–99)
GLUCOSE BLD-MCNC: 132 MG/DL (ref 65–99)
GLUCOSE BLD-MCNC: 134 MG/DL (ref 65–99)
GLUCOSE BLD-MCNC: 138 MG/DL (ref 65–99)
GLUCOSE BLD-MCNC: 138 MG/DL (ref 65–99)
GLUCOSE BLD-MCNC: 140 MG/DL (ref 65–99)
GLUCOSE BLD-MCNC: 143 MG/DL (ref 65–99)
GLUCOSE BLD-MCNC: 152 MG/DL (ref 65–99)
GLUCOSE BLD-MCNC: 161 MG/DL (ref 65–99)
GLUCOSE BLD-MCNC: 173 MG/DL (ref 65–99)
GLUCOSE BLD-MCNC: 68 MG/DL (ref 65–99)
GLUCOSE BLD-MCNC: 78 MG/DL (ref 65–99)
GLUCOSE BLD-MCNC: 83 MG/DL (ref 65–99)
GLUCOSE BLD-MCNC: 89 MG/DL (ref 65–99)
GLUCOSE BLD-MCNC: 89 MG/DL (ref 65–99)
GLUCOSE BLD-MCNC: 91 MG/DL (ref 65–99)
GLUCOSE BLD-MCNC: 97 MG/DL (ref 65–99)
GLUCOSE BLD-MCNC: 98 MG/DL (ref 65–99)
GLUCOSE BLDA-MCNC: 102 MMOL/L (ref 65–95)
GLUCOSE BLDC GLUCOMTR-MCNC: 100 MG/DL (ref 70–130)
GLUCOSE BLDC GLUCOMTR-MCNC: 100 MG/DL (ref 70–130)
GLUCOSE BLDC GLUCOMTR-MCNC: 101 MG/DL (ref 70–130)
GLUCOSE BLDC GLUCOMTR-MCNC: 101 MG/DL (ref 70–130)
GLUCOSE BLDC GLUCOMTR-MCNC: 103 MG/DL (ref 70–130)
GLUCOSE BLDC GLUCOMTR-MCNC: 103 MG/DL (ref 70–130)
GLUCOSE BLDC GLUCOMTR-MCNC: 104 MG/DL (ref 70–130)
GLUCOSE BLDC GLUCOMTR-MCNC: 105 MG/DL (ref 70–130)
GLUCOSE BLDC GLUCOMTR-MCNC: 109 MG/DL (ref 70–130)
GLUCOSE BLDC GLUCOMTR-MCNC: 111 MG/DL (ref 70–130)
GLUCOSE BLDC GLUCOMTR-MCNC: 113 MG/DL (ref 70–130)
GLUCOSE BLDC GLUCOMTR-MCNC: 114 MG/DL (ref 70–130)
GLUCOSE BLDC GLUCOMTR-MCNC: 114 MG/DL (ref 70–130)
GLUCOSE BLDC GLUCOMTR-MCNC: 117 MG/DL (ref 70–130)
GLUCOSE BLDC GLUCOMTR-MCNC: 118 MG/DL (ref 70–130)
GLUCOSE BLDC GLUCOMTR-MCNC: 119 MG/DL (ref 70–130)
GLUCOSE BLDC GLUCOMTR-MCNC: 120 MG/DL (ref 70–130)
GLUCOSE BLDC GLUCOMTR-MCNC: 122 MG/DL (ref 70–130)
GLUCOSE BLDC GLUCOMTR-MCNC: 123 MG/DL (ref 70–130)
GLUCOSE BLDC GLUCOMTR-MCNC: 124 MG/DL (ref 70–130)
GLUCOSE BLDC GLUCOMTR-MCNC: 125 MG/DL (ref 70–130)
GLUCOSE BLDC GLUCOMTR-MCNC: 126 MG/DL (ref 70–130)
GLUCOSE BLDC GLUCOMTR-MCNC: 126 MG/DL (ref 70–130)
GLUCOSE BLDC GLUCOMTR-MCNC: 127 MG/DL (ref 70–130)
GLUCOSE BLDC GLUCOMTR-MCNC: 128 MG/DL (ref 70–130)
GLUCOSE BLDC GLUCOMTR-MCNC: 129 MG/DL (ref 70–130)
GLUCOSE BLDC GLUCOMTR-MCNC: 131 MG/DL (ref 70–130)
GLUCOSE BLDC GLUCOMTR-MCNC: 131 MG/DL (ref 70–130)
GLUCOSE BLDC GLUCOMTR-MCNC: 132 MG/DL (ref 70–130)
GLUCOSE BLDC GLUCOMTR-MCNC: 133 MG/DL (ref 70–130)
GLUCOSE BLDC GLUCOMTR-MCNC: 134 MG/DL (ref 70–130)
GLUCOSE BLDC GLUCOMTR-MCNC: 134 MG/DL (ref 70–130)
GLUCOSE BLDC GLUCOMTR-MCNC: 136 MG/DL (ref 70–130)
GLUCOSE BLDC GLUCOMTR-MCNC: 140 MG/DL (ref 70–130)
GLUCOSE BLDC GLUCOMTR-MCNC: 140 MG/DL (ref 70–130)
GLUCOSE BLDC GLUCOMTR-MCNC: 141 MG/DL (ref 70–130)
GLUCOSE BLDC GLUCOMTR-MCNC: 141 MG/DL (ref 70–130)
GLUCOSE BLDC GLUCOMTR-MCNC: 143 MG/DL (ref 70–130)
GLUCOSE BLDC GLUCOMTR-MCNC: 145 MG/DL (ref 70–130)
GLUCOSE BLDC GLUCOMTR-MCNC: 146 MG/DL (ref 70–130)
GLUCOSE BLDC GLUCOMTR-MCNC: 150 MG/DL (ref 70–130)
GLUCOSE BLDC GLUCOMTR-MCNC: 152 MG/DL (ref 70–130)
GLUCOSE BLDC GLUCOMTR-MCNC: 154 MG/DL (ref 70–130)
GLUCOSE BLDC GLUCOMTR-MCNC: 161 MG/DL (ref 70–130)
GLUCOSE BLDC GLUCOMTR-MCNC: 161 MG/DL (ref 70–130)
GLUCOSE BLDC GLUCOMTR-MCNC: 163 MG/DL (ref 70–130)
GLUCOSE BLDC GLUCOMTR-MCNC: 168 MG/DL (ref 70–130)
GLUCOSE BLDC GLUCOMTR-MCNC: 179 MG/DL (ref 70–130)
GLUCOSE BLDC GLUCOMTR-MCNC: 184 MG/DL (ref 70–130)
GLUCOSE BLDC GLUCOMTR-MCNC: 188 MG/DL (ref 70–130)
GLUCOSE BLDC GLUCOMTR-MCNC: 199 MG/DL (ref 70–130)
GLUCOSE BLDC GLUCOMTR-MCNC: 225 MG/DL (ref 70–130)
GLUCOSE BLDC GLUCOMTR-MCNC: 226 MG/DL (ref 70–130)
GLUCOSE BLDC GLUCOMTR-MCNC: 58 MG/DL (ref 70–130)
GLUCOSE BLDC GLUCOMTR-MCNC: 92 MG/DL (ref 70–130)
GLUCOSE BLDC GLUCOMTR-MCNC: 94 MG/DL (ref 70–130)
GLUCOSE BLDC GLUCOMTR-MCNC: 96 MG/DL (ref 70–130)
GLUCOSE BLDC GLUCOMTR-MCNC: 97 MG/DL (ref 70–130)
GLUCOSE SERPL-MCNC: 111 MG/DL (ref 65–99)
GLUCOSE SERPL-MCNC: 114 MG/DL (ref 65–99)
GLUCOSE SERPL-MCNC: 133 MG/DL (ref 65–99)
GLUCOSE SERPL-MCNC: 137 MG/DL (ref 65–99)
GLUCOSE SERPL-MCNC: 140 MG/DL (ref 65–99)
GLUCOSE SERPL-MCNC: 144 MG/DL (ref 65–99)
GLUCOSE SERPL-MCNC: 166 MG/DL (ref 65–99)
GLUCOSE SERPL-MCNC: 62 MG/DL (ref 65–99)
GLUCOSE UR STRIP-MCNC: ABNORMAL MG/DL
GLUCOSE UR STRIP-MCNC: NEGATIVE MG/DL
GRAM STN SPEC: ABNORMAL
GRAM STN SPEC: ABNORMAL
HADV DNA SPEC NAA+PROBE: NOT DETECTED
HANSEL STAIN: NEGATIVE
HBA1C MFR BLD: 5.1 % (ref 4.8–5.6)
HBV SURFACE AG SERPL QL IA: NORMAL
HCO3 BLDA-SCNC: 23.9 MMOL/L (ref 20–26)
HCO3 BLDA-SCNC: 25.6 MMOL/L (ref 20–26)
HCO3 BLDA-SCNC: 30.5 MMOL/L (ref 20–26)
HCO3 BLDA-SCNC: 31 MMOL/L (ref 20–26)
HCO3 BLDA-SCNC: 31.4 MMOL/L (ref 20–26)
HCOV 229E RNA SPEC QL NAA+PROBE: NOT DETECTED
HCOV HKU1 RNA SPEC QL NAA+PROBE: NOT DETECTED
HCOV NL63 RNA SPEC QL NAA+PROBE: NOT DETECTED
HCOV OC43 RNA SPEC QL NAA+PROBE: NOT DETECTED
HCT VFR BLD AUTO: 24.7 % (ref 34–46.6)
HCT VFR BLD AUTO: 24.8 % (ref 34–46.6)
HCT VFR BLD AUTO: 25 % (ref 34–46.6)
HCT VFR BLD AUTO: 25.4 % (ref 34–46.6)
HCT VFR BLD AUTO: 25.5 % (ref 34–46.6)
HCT VFR BLD AUTO: 25.7 % (ref 34–46.6)
HCT VFR BLD AUTO: 25.8 % (ref 34–46.6)
HCT VFR BLD AUTO: 25.8 % (ref 34–46.6)
HCT VFR BLD AUTO: 26.2 % (ref 34–46.6)
HCT VFR BLD AUTO: 26.6 % (ref 34–46.6)
HCT VFR BLD AUTO: 27.1 % (ref 34–46.6)
HCT VFR BLD AUTO: 27.8 % (ref 34–46.6)
HCT VFR BLD AUTO: 27.9 % (ref 34–46.6)
HCT VFR BLD AUTO: 29.2 % (ref 34–46.6)
HCT VFR BLD AUTO: 29.3 % (ref 34–46.6)
HCT VFR BLD AUTO: 29.4 % (ref 34–46.6)
HCT VFR BLD AUTO: 29.4 % (ref 34–46.6)
HCT VFR BLD AUTO: 29.6 % (ref 34–46.6)
HCT VFR BLD AUTO: 29.7 % (ref 34–46.6)
HCT VFR BLD AUTO: 29.8 % (ref 34–46.6)
HCT VFR BLD AUTO: 29.9 % (ref 34–46.6)
HCT VFR BLD AUTO: 30.4 % (ref 34–46.6)
HCT VFR BLD AUTO: 30.7 % (ref 34–46.6)
HCT VFR BLD AUTO: 30.8 % (ref 34–46.6)
HCT VFR BLD AUTO: 30.9 % (ref 34–46.6)
HCT VFR BLD AUTO: 31.5 % (ref 34–46.6)
HCT VFR BLD AUTO: 33.5 % (ref 34–46.6)
HCT VFR BLD AUTO: 35.1 % (ref 34–46.6)
HCT VFR BLD CALC: 31.5 % (ref 38–51)
HDLC SERPL-MCNC: 54 MG/DL (ref 40–60)
HGB BLD-MCNC: 10.1 G/DL (ref 12–15.9)
HGB BLD-MCNC: 10.2 G/DL (ref 12–15.9)
HGB BLD-MCNC: 10.4 G/DL (ref 12–15.9)
HGB BLD-MCNC: 10.4 G/DL (ref 12–15.9)
HGB BLD-MCNC: 11 G/DL (ref 12–15.9)
HGB BLD-MCNC: 11.2 G/DL (ref 12–15.9)
HGB BLD-MCNC: 7.7 G/DL (ref 12–15.9)
HGB BLD-MCNC: 7.7 G/DL (ref 12–15.9)
HGB BLD-MCNC: 7.9 G/DL (ref 12–15.9)
HGB BLD-MCNC: 8.1 G/DL (ref 12–15.9)
HGB BLD-MCNC: 8.1 G/DL (ref 12–15.9)
HGB BLD-MCNC: 8.3 G/DL (ref 12–15.9)
HGB BLD-MCNC: 8.4 G/DL (ref 12–15.9)
HGB BLD-MCNC: 8.5 G/DL (ref 12–15.9)
HGB BLD-MCNC: 8.7 G/DL (ref 12–15.9)
HGB BLD-MCNC: 8.7 G/DL (ref 12–15.9)
HGB BLD-MCNC: 8.8 G/DL (ref 12–15.9)
HGB BLD-MCNC: 8.8 G/DL (ref 12–15.9)
HGB BLD-MCNC: 9.1 G/DL (ref 12–15.9)
HGB BLD-MCNC: 9.3 G/DL (ref 12–15.9)
HGB BLD-MCNC: 9.4 G/DL (ref 12–15.9)
HGB BLD-MCNC: 9.5 G/DL (ref 12–15.9)
HGB BLD-MCNC: 9.6 G/DL (ref 12–15.9)
HGB BLD-MCNC: 9.7 G/DL (ref 12–15.9)
HGB BLD-MCNC: 9.8 G/DL (ref 12–15.9)
HGB BLD-MCNC: 9.8 G/DL (ref 12–15.9)
HGB BLD-MCNC: 9.9 G/DL (ref 12–15.9)
HGB BLD-MCNC: 9.9 G/DL (ref 12–15.9)
HGB BLDA-MCNC: 10.3 G/DL (ref 13.5–17.5)
HGB UR QL STRIP.AUTO: ABNORMAL
HGB UR QL STRIP.AUTO: ABNORMAL
HMPV RNA NPH QL NAA+NON-PROBE: NOT DETECTED
HOLD SPECIMEN: NORMAL
HPIV1 RNA SPEC QL NAA+PROBE: NOT DETECTED
HPIV2 RNA SPEC QL NAA+PROBE: NOT DETECTED
HPIV3 RNA NPH QL NAA+PROBE: NOT DETECTED
HPIV4 P GENE NPH QL NAA+PROBE: NOT DETECTED
HYALINE CASTS UR QL AUTO: ABNORMAL /LPF
HYALINE CASTS UR QL AUTO: ABNORMAL /LPF
IMM GRANULOCYTES # BLD AUTO: 0.03 10*3/MM3 (ref 0–0.05)
IMM GRANULOCYTES # BLD AUTO: 0.05 10*3/MM3 (ref 0–0.05)
IMM GRANULOCYTES # BLD AUTO: 0.06 10*3/MM3 (ref 0–0.05)
IMM GRANULOCYTES # BLD AUTO: 0.07 10*3/MM3 (ref 0–0.05)
IMM GRANULOCYTES # BLD AUTO: 0.07 10*3/MM3 (ref 0–0.05)
IMM GRANULOCYTES # BLD AUTO: 0.08 10*3/MM3 (ref 0–0.05)
IMM GRANULOCYTES # BLD AUTO: 0.09 10*3/MM3 (ref 0–0.05)
IMM GRANULOCYTES # BLD AUTO: 0.09 10*3/MM3 (ref 0–0.05)
IMM GRANULOCYTES # BLD AUTO: 0.1 10*3/MM3 (ref 0–0.05)
IMM GRANULOCYTES # BLD AUTO: 0.13 10*3/MM3 (ref 0–0.05)
IMM GRANULOCYTES # BLD AUTO: 0.17 10*3/MM3 (ref 0–0.05)
IMM GRANULOCYTES # BLD AUTO: 0.18 10*3/MM3 (ref 0–0.05)
IMM GRANULOCYTES # BLD AUTO: 0.23 10*3/MM3 (ref 0–0.05)
IMM GRANULOCYTES # BLD AUTO: 0.23 10*3/MM3 (ref 0–0.05)
IMM GRANULOCYTES # BLD AUTO: 0.24 10*3/MM3 (ref 0–0.05)
IMM GRANULOCYTES # BLD AUTO: 0.26 10*3/MM3 (ref 0–0.05)
IMM GRANULOCYTES # BLD AUTO: 0.27 10*3/MM3 (ref 0–0.05)
IMM GRANULOCYTES # BLD AUTO: 0.28 10*3/MM3 (ref 0–0.05)
IMM GRANULOCYTES # BLD AUTO: 0.3 10*3/MM3 (ref 0–0.05)
IMM GRANULOCYTES # BLD AUTO: 0.32 10*3/MM3 (ref 0–0.05)
IMM GRANULOCYTES # BLD AUTO: 0.34 10*3/MM3 (ref 0–0.05)
IMM GRANULOCYTES # BLD AUTO: 0.5 10*3/MM3 (ref 0–0.05)
IMM GRANULOCYTES NFR BLD AUTO: 0.4 % (ref 0–0.5)
IMM GRANULOCYTES NFR BLD AUTO: 0.5 % (ref 0–0.5)
IMM GRANULOCYTES NFR BLD AUTO: 0.5 % (ref 0–0.5)
IMM GRANULOCYTES NFR BLD AUTO: 0.6 % (ref 0–0.5)
IMM GRANULOCYTES NFR BLD AUTO: 0.8 % (ref 0–0.5)
IMM GRANULOCYTES NFR BLD AUTO: 0.9 % (ref 0–0.5)
IMM GRANULOCYTES NFR BLD AUTO: 0.9 % (ref 0–0.5)
IMM GRANULOCYTES NFR BLD AUTO: 1.4 % (ref 0–0.5)
IMM GRANULOCYTES NFR BLD AUTO: 1.5 % (ref 0–0.5)
IMM GRANULOCYTES NFR BLD AUTO: 2 % (ref 0–0.5)
IMM GRANULOCYTES NFR BLD AUTO: 2 % (ref 0–0.5)
IMM GRANULOCYTES NFR BLD AUTO: 2.1 % (ref 0–0.5)
IMM GRANULOCYTES NFR BLD AUTO: 2.2 % (ref 0–0.5)
IMM GRANULOCYTES NFR BLD AUTO: 2.2 % (ref 0–0.5)
IMM GRANULOCYTES NFR BLD AUTO: 2.3 % (ref 0–0.5)
IMM GRANULOCYTES NFR BLD AUTO: 2.3 % (ref 0–0.5)
IMM GRANULOCYTES NFR BLD AUTO: 2.6 % (ref 0–0.5)
IMM GRANULOCYTES NFR BLD AUTO: 2.7 % (ref 0–0.5)
IMM GRANULOCYTES NFR BLD AUTO: 3.1 % (ref 0–0.5)
IMM GRANULOCYTES NFR BLD AUTO: 3.8 % (ref 0–0.5)
IMP STRAIN: NOT DETECTED
INHALED O2 CONCENTRATION: 100 %
INHALED O2 CONCENTRATION: 50 %
INHALED O2 CONCENTRATION: 75 %
INR PPP: 1.1 (ref 0.9–1.1)
INR PPP: 1.12 (ref 0.8–1.2)
INR PPP: 1.2
INR PPP: 1.2 (ref 0.8–1.2)
INR PPP: 1.2 (ref 0.8–1.2)
INR PPP: 1.35 (ref 0.8–1.2)
INR PPP: 1.4 (ref 0.8–1.2)
INR PPP: 1.5 (ref 0.8–1.2)
INR PPP: 1.51 (ref 0.8–1.2)
INR PPP: 1.52 (ref 0.8–1.2)
INR PPP: 1.6 (ref 0.8–1.2)
INR PPP: 1.7
INR PPP: 1.7 (ref 0.9–1.1)
INR PPP: 1.75 (ref 0.8–1.2)
INR PPP: 1.76 (ref 0.8–1.2)
INR PPP: 1.88 (ref 0.8–1.2)
INR PPP: 1.9 (ref 0.9–1.1)
INR PPP: 10.68 (ref 0.8–1.2)
INR PPP: 14.01 (ref 0.8–1.2)
INR PPP: 2 (ref 0.9–1.1)
INR PPP: 2.01 (ref 0.8–1.2)
INR PPP: 2.04 (ref 0.8–1.2)
INR PPP: 2.1 (ref 0.8–1.2)
INR PPP: 2.1 (ref 0.9–1.1)
INR PPP: 2.17 (ref 0.8–1.2)
INR PPP: 2.2 (ref 0.9–1.1)
INR PPP: 2.2 (ref 0.9–1.1)
INR PPP: 2.22 (ref 0.8–1.2)
INR PPP: 2.22 (ref 0.8–1.2)
INR PPP: 2.3 (ref 0.9–1.1)
INR PPP: 2.4 (ref 0.9–1.1)
INR PPP: 2.48 (ref 0.8–1.2)
INR PPP: 2.57 (ref 0.8–1.2)
INR PPP: 2.58 (ref 0.8–1.2)
INR PPP: 2.6
INR PPP: 2.6 (ref 0.8–1.2)
INR PPP: 2.6 (ref 0.9–1.1)
INR PPP: 2.61 (ref 0.8–1.2)
INR PPP: 2.9
INR PPP: 2.9 (ref 0.9–1.1)
INR PPP: 3.06 (ref 0.8–1.2)
INR PPP: 3.1 (ref 0.9–1.1)
INR PPP: 3.2
INR PPP: 3.2 (ref 0.8–1.2)
INR PPP: 3.2 (ref 0.9–1.1)
INR PPP: 3.3 (ref 0.9–1.1)
INR PPP: 3.5 (ref 0.9–1.1)
INR PPP: 3.87 (ref 0.8–1.2)
INR PPP: 3.89 (ref 0.8–1.2)
INR PPP: 4 (ref 0.9–1.1)
INR PPP: 4.04 (ref 0.8–1.2)
INR PPP: 4.19 (ref 0.8–1.2)
INR PPP: 4.4
INR PPP: 4.67 (ref 0.8–1.2)
INR PPP: 5.08 (ref 0.8–1.2)
INR PPP: 6.4
INR PPP: 6.4 (ref 0.8–1.2)
IPAP: 14
IRON 24H UR-MRATE: 31 MCG/DL (ref 37–145)
IRON SATN MFR SERPL: 13 % (ref 20–50)
ISOLATED FROM: ABNORMAL
ISOLATED FROM: ABNORMAL
KETONES UR QL STRIP: ABNORMAL
KETONES UR QL STRIP: NEGATIVE
KPC STRAIN: NOT DETECTED
LAB AP CASE REPORT: NORMAL
LAB AP CLINICAL INFORMATION: NORMAL
LAB AP INTRADEPARTMENTAL CONSULT: NORMAL
LAB AP SYNOPTIC CHECKLIST: NORMAL
LARGE PLATELETS: NORMAL
LDH SERPL-CCNC: 400 U/L (ref 135–214)
LDLC SERPL CALC-MCNC: 74 MG/DL (ref 0–100)
LDLC/HDLC SERPL: 1.37 {RATIO}
LEUKOCYTE ESTERASE UR QL STRIP.AUTO: ABNORMAL
LEUKOCYTE ESTERASE UR QL STRIP.AUTO: ABNORMAL
LIPASE SERPL-CCNC: 21 U/L (ref 13–60)
LYMPHOCYTES # BLD AUTO: 0.1 10*3/MM3 (ref 0.7–3.1)
LYMPHOCYTES # BLD AUTO: 0.16 10*3/MM3 (ref 0.7–3.1)
LYMPHOCYTES # BLD AUTO: 0.17 10*3/MM3 (ref 0.7–3.1)
LYMPHOCYTES # BLD AUTO: 0.17 10*3/MM3 (ref 0.7–3.1)
LYMPHOCYTES # BLD AUTO: 0.22 10*3/MM3 (ref 0.7–3.1)
LYMPHOCYTES # BLD AUTO: 0.27 10*3/MM3 (ref 0.7–3.1)
LYMPHOCYTES # BLD AUTO: 0.27 10*3/MM3 (ref 0.7–3.1)
LYMPHOCYTES # BLD AUTO: 0.29 10*3/MM3 (ref 0.7–3.1)
LYMPHOCYTES # BLD AUTO: 0.3 10*3/MM3 (ref 0.7–3.1)
LYMPHOCYTES # BLD AUTO: 0.33 10*3/MM3 (ref 0.7–3.1)
LYMPHOCYTES # BLD AUTO: 0.34 10*3/MM3 (ref 0.7–3.1)
LYMPHOCYTES # BLD AUTO: 0.35 10*3/MM3 (ref 0.7–3.1)
LYMPHOCYTES # BLD AUTO: 0.36 10*3/MM3 (ref 0.7–3.1)
LYMPHOCYTES # BLD AUTO: 0.37 10*3/MM3 (ref 0.7–3.1)
LYMPHOCYTES # BLD AUTO: 0.39 10*3/MM3 (ref 0.7–3.1)
LYMPHOCYTES # BLD AUTO: 0.42 10*3/MM3 (ref 0.7–3.1)
LYMPHOCYTES # BLD AUTO: 0.44 10*3/MM3 (ref 0.7–3.1)
LYMPHOCYTES # BLD AUTO: 0.45 10*3/MM3 (ref 0.7–3.1)
LYMPHOCYTES # BLD AUTO: 0.47 10*3/MM3 (ref 0.7–3.1)
LYMPHOCYTES # BLD AUTO: 0.47 10*3/MM3 (ref 0.7–3.1)
LYMPHOCYTES # BLD AUTO: 0.54 10*3/MM3 (ref 0.7–3.1)
LYMPHOCYTES # BLD AUTO: 0.6 10*3/MM3 (ref 0.7–3.1)
LYMPHOCYTES # BLD AUTO: 0.63 10*3/MM3 (ref 0.7–3.1)
LYMPHOCYTES # BLD AUTO: 0.65 10*3/MM3 (ref 0.7–3.1)
LYMPHOCYTES # BLD MANUAL: 0.21 10*3/MM3 (ref 0.7–3.1)
LYMPHOCYTES NFR BLD AUTO: 0.6 % (ref 19.6–45.3)
LYMPHOCYTES NFR BLD AUTO: 1.2 % (ref 19.6–45.3)
LYMPHOCYTES NFR BLD AUTO: 1.4 % (ref 19.6–45.3)
LYMPHOCYTES NFR BLD AUTO: 1.6 % (ref 19.6–45.3)
LYMPHOCYTES NFR BLD AUTO: 1.7 % (ref 19.6–45.3)
LYMPHOCYTES NFR BLD AUTO: 1.9 % (ref 19.6–45.3)
LYMPHOCYTES NFR BLD AUTO: 2.3 % (ref 19.6–45.3)
LYMPHOCYTES NFR BLD AUTO: 2.3 % (ref 19.6–45.3)
LYMPHOCYTES NFR BLD AUTO: 2.7 % (ref 19.6–45.3)
LYMPHOCYTES NFR BLD AUTO: 2.8 % (ref 19.6–45.3)
LYMPHOCYTES NFR BLD AUTO: 3 % (ref 19.6–45.3)
LYMPHOCYTES NFR BLD AUTO: 3.2 % (ref 19.6–45.3)
LYMPHOCYTES NFR BLD AUTO: 3.7 % (ref 19.6–45.3)
LYMPHOCYTES NFR BLD AUTO: 3.7 % (ref 19.6–45.3)
LYMPHOCYTES NFR BLD AUTO: 3.8 % (ref 19.6–45.3)
LYMPHOCYTES NFR BLD AUTO: 4.7 % (ref 19.6–45.3)
LYMPHOCYTES NFR BLD AUTO: 4.8 % (ref 19.6–45.3)
LYMPHOCYTES NFR BLD AUTO: 5 % (ref 19.6–45.3)
LYMPHOCYTES NFR BLD AUTO: 5 % (ref 19.6–45.3)
LYMPHOCYTES NFR BLD AUTO: 5.4 % (ref 19.6–45.3)
LYMPHOCYTES NFR BLD AUTO: 5.4 % (ref 19.6–45.3)
LYMPHOCYTES NFR BLD AUTO: 5.5 % (ref 19.6–45.3)
LYMPHOCYTES NFR BLD AUTO: 5.5 % (ref 19.6–45.3)
LYMPHOCYTES NFR BLD AUTO: 5.9 % (ref 19.6–45.3)
LYMPHOCYTES NFR BLD MANUAL: 2 % (ref 19.6–45.3)
LYMPHOCYTES NFR BLD MANUAL: 2 % (ref 5–12)
Lab: ABNORMAL
Lab: NORMAL
M PNEUMO IGG SER IA-ACNC: NOT DETECTED
MACROCYTES BLD QL SMEAR: ABNORMAL
MAGNESIUM SERPL-MCNC: 2.1 MG/DL (ref 1.6–2.4)
MAGNESIUM SERPL-MCNC: 2.2 MG/DL (ref 1.6–2.4)
MAGNESIUM SERPL-MCNC: 2.2 MG/DL (ref 1.6–2.4)
MAGNESIUM SERPL-MCNC: 2.3 MG/DL (ref 1.6–2.4)
MCH RBC QN AUTO: 30.4 PG (ref 26.6–33)
MCH RBC QN AUTO: 30.6 PG (ref 26.6–33)
MCH RBC QN AUTO: 30.7 PG (ref 26.6–33)
MCH RBC QN AUTO: 30.8 PG (ref 26.6–33)
MCH RBC QN AUTO: 30.9 PG (ref 26.6–33)
MCH RBC QN AUTO: 31 PG (ref 26.6–33)
MCH RBC QN AUTO: 31.1 PG (ref 26.6–33)
MCH RBC QN AUTO: 31.2 PG (ref 26.6–33)
MCH RBC QN AUTO: 31.3 PG (ref 26.6–33)
MCH RBC QN AUTO: 31.4 PG (ref 26.6–33)
MCH RBC QN AUTO: 32.4 PG (ref 26.6–33)
MCH RBC QN AUTO: 33.1 PG (ref 26.6–33)
MCH RBC QN AUTO: 33.6 PG (ref 26.6–33)
MCH RBC QN AUTO: 33.7 PG (ref 26.6–33)
MCH RBC QN AUTO: 33.8 PG (ref 26.6–33)
MCH RBC QN AUTO: 33.8 PG (ref 26.6–33)
MCHC RBC AUTO-ENTMCNC: 31 G/DL (ref 31.5–35.7)
MCHC RBC AUTO-ENTMCNC: 31 G/DL (ref 31.5–35.7)
MCHC RBC AUTO-ENTMCNC: 31.3 G/DL (ref 31.5–35.7)
MCHC RBC AUTO-ENTMCNC: 31.4 G/DL (ref 31.5–35.7)
MCHC RBC AUTO-ENTMCNC: 31.6 G/DL (ref 31.5–35.7)
MCHC RBC AUTO-ENTMCNC: 31.7 G/DL (ref 31.5–35.7)
MCHC RBC AUTO-ENTMCNC: 31.8 G/DL (ref 31.5–35.7)
MCHC RBC AUTO-ENTMCNC: 31.9 G/DL (ref 31.5–35.7)
MCHC RBC AUTO-ENTMCNC: 31.9 G/DL (ref 31.5–35.7)
MCHC RBC AUTO-ENTMCNC: 32.2 G/DL (ref 31.5–35.7)
MCHC RBC AUTO-ENTMCNC: 32.5 G/DL (ref 31.5–35.7)
MCHC RBC AUTO-ENTMCNC: 32.6 G/DL (ref 31.5–35.7)
MCHC RBC AUTO-ENTMCNC: 32.7 G/DL (ref 31.5–35.7)
MCHC RBC AUTO-ENTMCNC: 32.7 G/DL (ref 31.5–35.7)
MCHC RBC AUTO-ENTMCNC: 32.8 G/DL (ref 31.5–35.7)
MCHC RBC AUTO-ENTMCNC: 32.8 G/DL (ref 31.5–35.7)
MCHC RBC AUTO-ENTMCNC: 33 G/DL (ref 31.5–35.7)
MCHC RBC AUTO-ENTMCNC: 33.1 G/DL (ref 31.5–35.7)
MCHC RBC AUTO-ENTMCNC: 33.2 G/DL (ref 31.5–35.7)
MCHC RBC AUTO-ENTMCNC: 33.3 G/DL (ref 31.5–35.7)
MCHC RBC AUTO-ENTMCNC: 33.4 G/DL (ref 31.5–35.7)
MCHC RBC AUTO-ENTMCNC: 33.7 G/DL (ref 31.5–35.7)
MCHC RBC AUTO-ENTMCNC: 34 G/DL (ref 31.5–35.7)
MCV RBC AUTO: 100.3 FL (ref 79–97)
MCV RBC AUTO: 101 FL (ref 79–97)
MCV RBC AUTO: 102.3 FL (ref 79–97)
MCV RBC AUTO: 103.1 FL (ref 79–97)
MCV RBC AUTO: 104.7 FL (ref 79–97)
MCV RBC AUTO: 106.1 FL (ref 79–97)
MCV RBC AUTO: 91.6 FL (ref 79–97)
MCV RBC AUTO: 92.1 FL (ref 79–97)
MCV RBC AUTO: 92.5 FL (ref 79–97)
MCV RBC AUTO: 92.5 FL (ref 79–97)
MCV RBC AUTO: 93.3 FL (ref 79–97)
MCV RBC AUTO: 93.6 FL (ref 79–97)
MCV RBC AUTO: 93.9 FL (ref 79–97)
MCV RBC AUTO: 94.1 FL (ref 79–97)
MCV RBC AUTO: 94.3 FL (ref 79–97)
MCV RBC AUTO: 95.5 FL (ref 79–97)
MCV RBC AUTO: 95.8 FL (ref 79–97)
MCV RBC AUTO: 96.2 FL (ref 79–97)
MCV RBC AUTO: 96.6 FL (ref 79–97)
MCV RBC AUTO: 97.7 FL (ref 79–97)
MCV RBC AUTO: 97.7 FL (ref 79–97)
MCV RBC AUTO: 98.1 FL (ref 79–97)
MCV RBC AUTO: 98.3 FL (ref 79–97)
MCV RBC AUTO: 98.3 FL (ref 79–97)
MCV RBC AUTO: 98.8 FL (ref 79–97)
MCV RBC AUTO: 99.2 FL (ref 79–97)
MCV RBC AUTO: 99.6 FL (ref 79–97)
METHGB BLD QL: 1.1 % (ref 0–3)
MODALITY: ABNORMAL
MONOCYTES # BLD AUTO: 0.21 10*3/MM3 (ref 0.1–0.9)
MONOCYTES # BLD AUTO: 0.32 10*3/MM3 (ref 0.1–0.9)
MONOCYTES # BLD AUTO: 0.49 10*3/MM3 (ref 0.1–0.9)
MONOCYTES # BLD AUTO: 0.51 10*3/MM3 (ref 0.1–0.9)
MONOCYTES # BLD AUTO: 0.6 10*3/MM3 (ref 0.1–0.9)
MONOCYTES # BLD AUTO: 0.61 10*3/MM3 (ref 0.1–0.9)
MONOCYTES # BLD AUTO: 0.63 10*3/MM3 (ref 0.1–0.9)
MONOCYTES # BLD AUTO: 0.64 10*3/MM3 (ref 0.1–0.9)
MONOCYTES # BLD AUTO: 0.78 10*3/MM3 (ref 0.1–0.9)
MONOCYTES # BLD AUTO: 0.82 10*3/MM3 (ref 0.1–0.9)
MONOCYTES # BLD AUTO: 0.83 10*3/MM3 (ref 0.1–0.9)
MONOCYTES # BLD AUTO: 0.93 10*3/MM3 (ref 0.1–0.9)
MONOCYTES # BLD AUTO: 0.93 10*3/MM3 (ref 0.1–0.9)
MONOCYTES # BLD AUTO: 0.99 10*3/MM3 (ref 0.1–0.9)
MONOCYTES # BLD AUTO: 1.02 10*3/MM3 (ref 0.1–0.9)
MONOCYTES # BLD AUTO: 1.03 10*3/MM3 (ref 0.1–0.9)
MONOCYTES # BLD AUTO: 1.06 10*3/MM3 (ref 0.1–0.9)
MONOCYTES # BLD AUTO: 1.06 10*3/MM3 (ref 0.1–0.9)
MONOCYTES # BLD AUTO: 1.09 10*3/MM3 (ref 0.1–0.9)
MONOCYTES # BLD AUTO: 1.11 10*3/MM3 (ref 0.1–0.9)
MONOCYTES # BLD AUTO: 1.16 10*3/MM3 (ref 0.1–0.9)
MONOCYTES # BLD AUTO: 1.17 10*3/MM3 (ref 0.1–0.9)
MONOCYTES # BLD AUTO: 1.18 10*3/MM3 (ref 0.1–0.9)
MONOCYTES # BLD AUTO: 1.19 10*3/MM3 (ref 0.1–0.9)
MONOCYTES # BLD AUTO: 1.33 10*3/MM3 (ref 0.1–0.9)
MONOCYTES # BLD AUTO: 1.35 10*3/MM3 (ref 0.1–0.9)
MONOCYTES # BLD AUTO: 1.43 10*3/MM3 (ref 0.1–0.9)
MONOCYTES NFR BLD AUTO: 10 % (ref 5–12)
MONOCYTES NFR BLD AUTO: 10.4 % (ref 5–12)
MONOCYTES NFR BLD AUTO: 10.7 % (ref 5–12)
MONOCYTES NFR BLD AUTO: 11.7 % (ref 5–12)
MONOCYTES NFR BLD AUTO: 11.9 % (ref 5–12)
MONOCYTES NFR BLD AUTO: 13.3 % (ref 5–12)
MONOCYTES NFR BLD AUTO: 3 % (ref 5–12)
MONOCYTES NFR BLD AUTO: 3.2 % (ref 5–12)
MONOCYTES NFR BLD AUTO: 4.1 % (ref 5–12)
MONOCYTES NFR BLD AUTO: 5.8 % (ref 5–12)
MONOCYTES NFR BLD AUTO: 6.6 % (ref 5–12)
MONOCYTES NFR BLD AUTO: 7 % (ref 5–12)
MONOCYTES NFR BLD AUTO: 7.1 % (ref 5–12)
MONOCYTES NFR BLD AUTO: 7.5 % (ref 5–12)
MONOCYTES NFR BLD AUTO: 7.8 % (ref 5–12)
MONOCYTES NFR BLD AUTO: 8 % (ref 5–12)
MONOCYTES NFR BLD AUTO: 8.3 % (ref 5–12)
MONOCYTES NFR BLD AUTO: 8.3 % (ref 5–12)
MONOCYTES NFR BLD AUTO: 8.4 % (ref 5–12)
MONOCYTES NFR BLD AUTO: 8.4 % (ref 5–12)
MONOCYTES NFR BLD AUTO: 8.9 % (ref 5–12)
MONOCYTES NFR BLD AUTO: 9.1 % (ref 5–12)
MONOCYTES NFR BLD AUTO: 9.2 % (ref 5–12)
MONOCYTES NFR BLD AUTO: 9.7 % (ref 5–12)
NDM STRAIN: NOT DETECTED
NEUTROPHILS # BLD AUTO: 10.11 10*3/MM3 (ref 1.7–7)
NEUTROPHILS # BLD AUTO: 10.54 10*3/MM3 (ref 1.7–7)
NEUTROPHILS # BLD AUTO: 10.65 10*3/MM3 (ref 1.7–7)
NEUTROPHILS # BLD AUTO: 10.83 10*3/MM3 (ref 1.7–7)
NEUTROPHILS # BLD AUTO: 10.91 10*3/MM3 (ref 1.7–7)
NEUTROPHILS # BLD AUTO: 11.12 10*3/MM3 (ref 1.7–7)
NEUTROPHILS # BLD AUTO: 11.54 10*3/MM3 (ref 1.7–7)
NEUTROPHILS # BLD AUTO: 12.69 10*3/MM3 (ref 1.7–7)
NEUTROPHILS # BLD AUTO: 13.33 10*3/MM3 (ref 1.7–7)
NEUTROPHILS # BLD AUTO: 14.31 10*3/MM3 (ref 1.7–7)
NEUTROPHILS # BLD AUTO: 15.41 10*3/MM3 (ref 1.7–7)
NEUTROPHILS # BLD AUTO: 5.84 10*3/MM3 (ref 1.7–7)
NEUTROPHILS # BLD AUTO: 6.14 10*3/MM3 (ref 1.7–7)
NEUTROPHILS # BLD AUTO: 7.32 10*3/MM3 (ref 1.7–7)
NEUTROPHILS # BLD AUTO: 8.1 10*3/MM3 (ref 1.7–7)
NEUTROPHILS # BLD AUTO: 8.72 10*3/MM3 (ref 1.7–7)
NEUTROPHILS # BLD AUTO: 8.86 10*3/MM3 (ref 1.7–7)
NEUTROPHILS # BLD AUTO: 9.06 10*3/MM3 (ref 1.7–7)
NEUTROPHILS # BLD AUTO: 9.15 10*3/MM3 (ref 1.7–7)
NEUTROPHILS # BLD AUTO: 9.16 10*3/MM3 (ref 1.7–7)
NEUTROPHILS # BLD AUTO: 9.45 10*3/MM3 (ref 1.7–7)
NEUTROPHILS # BLD AUTO: 9.63 10*3/MM3 (ref 1.7–7)
NEUTROPHILS NFR BLD AUTO: 10.78 10*3/MM3 (ref 1.7–7)
NEUTROPHILS NFR BLD AUTO: 5.42 10*3/MM3 (ref 1.7–7)
NEUTROPHILS NFR BLD AUTO: 5.97 10*3/MM3 (ref 1.7–7)
NEUTROPHILS NFR BLD AUTO: 6.31 10*3/MM3 (ref 1.7–7)
NEUTROPHILS NFR BLD AUTO: 6.35 10*3/MM3 (ref 1.7–7)
NEUTROPHILS NFR BLD AUTO: 72.8 % (ref 42.7–76)
NEUTROPHILS NFR BLD AUTO: 76.5 % (ref 42.7–76)
NEUTROPHILS NFR BLD AUTO: 76.8 % (ref 42.7–76)
NEUTROPHILS NFR BLD AUTO: 77 % (ref 42.7–76)
NEUTROPHILS NFR BLD AUTO: 77.3 % (ref 42.7–76)
NEUTROPHILS NFR BLD AUTO: 78.3 % (ref 42.7–76)
NEUTROPHILS NFR BLD AUTO: 79.2 % (ref 42.7–76)
NEUTROPHILS NFR BLD AUTO: 79.8 % (ref 42.7–76)
NEUTROPHILS NFR BLD AUTO: 80.7 % (ref 42.7–76)
NEUTROPHILS NFR BLD AUTO: 82.7 % (ref 42.7–76)
NEUTROPHILS NFR BLD AUTO: 82.7 % (ref 42.7–76)
NEUTROPHILS NFR BLD AUTO: 83 % (ref 42.7–76)
NEUTROPHILS NFR BLD AUTO: 84.3 % (ref 42.7–76)
NEUTROPHILS NFR BLD AUTO: 84.5 % (ref 42.7–76)
NEUTROPHILS NFR BLD AUTO: 85 % (ref 42.7–76)
NEUTROPHILS NFR BLD AUTO: 85.3 % (ref 42.7–76)
NEUTROPHILS NFR BLD AUTO: 85.5 % (ref 42.7–76)
NEUTROPHILS NFR BLD AUTO: 86.1 % (ref 42.7–76)
NEUTROPHILS NFR BLD AUTO: 86.2 % (ref 42.7–76)
NEUTROPHILS NFR BLD AUTO: 86.9 % (ref 42.7–76)
NEUTROPHILS NFR BLD AUTO: 87.5 % (ref 42.7–76)
NEUTROPHILS NFR BLD AUTO: 90.5 % (ref 42.7–76)
NEUTROPHILS NFR BLD AUTO: 91.1 % (ref 42.7–76)
NEUTROPHILS NFR BLD AUTO: 92.2 % (ref 42.7–76)
NEUTROPHILS NFR BLD AUTO: 94.6 % (ref 42.7–76)
NEUTROPHILS NFR BLD AUTO: 95.2 % (ref 42.7–76)
NEUTROPHILS NFR BLD MANUAL: 94 % (ref 42.7–76)
NEUTS BAND NFR BLD MANUAL: 2 % (ref 0–5)
NITRITE UR QL STRIP: NEGATIVE
NITRITE UR QL STRIP: NEGATIVE
NOTE: ABNORMAL
NRBC BLD AUTO-RTO: 0 /100 WBC (ref 0–0.2)
NRBC BLD AUTO-RTO: 0.1 /100 WBC (ref 0–0.2)
NRBC BLD AUTO-RTO: 0.2 /100 WBC (ref 0–0.2)
NRBC BLD AUTO-RTO: 0.3 /100 WBC (ref 0–0.2)
NRBC BLD AUTO-RTO: 0.5 /100 WBC (ref 0–0.2)
NRBC BLD AUTO-RTO: 0.9 /100 WBC (ref 0–0.2)
NRBC BLD AUTO-RTO: 1.8 /100 WBC (ref 0–0.2)
NRBC BLD AUTO-RTO: 3.9 /100 WBC (ref 0–0.2)
NT-PROBNP SERPL-MCNC: 5859 PG/ML (ref 5–900)
NT-PROBNP SERPL-MCNC: ABNORMAL PG/ML (ref 0–900)
NT-PROBNP SERPL-MCNC: ABNORMAL PG/ML (ref 5–900)
NT-PROBNP SERPL-MCNC: ABNORMAL PG/ML (ref 5–900)
OXA 48 STRAIN: NOT DETECTED
OXYHGB MFR BLDV: 94.5 % (ref 94–99)
PATH REPORT.FINAL DX SPEC: NORMAL
PATH REPORT.GROSS SPEC: NORMAL
PCO2 BLDA: 40.9 MM HG (ref 35–45)
PCO2 BLDA: 48.1 MM HG (ref 35–45)
PCO2 BLDA: 64.3 MM HG (ref 35–45)
PCO2 BLDA: 68.7 MM HG (ref 35–45)
PCO2 BLDA: 71.4 MM HG (ref 35–45)
PCO2 TEMP ADJ BLD: ABNORMAL MM[HG]
PH BLDA: 7.25 PH UNITS (ref 7.35–7.45)
PH BLDA: 7.26 PH UNITS (ref 7.35–7.45)
PH BLDA: 7.28 PH UNITS (ref 7.35–7.45)
PH BLDA: 7.31 PH UNITS (ref 7.35–7.45)
PH BLDA: 7.4 PH UNITS (ref 7.35–7.45)
PH UR STRIP.AUTO: <=5 [PH] (ref 5–9)
PH UR STRIP.AUTO: <=5 [PH] (ref 5–9)
PH, TEMP CORRECTED: ABNORMAL
PHOSPHATE SERPL-MCNC: 4.5 MG/DL (ref 2.5–4.5)
PHOSPHATE SERPL-MCNC: 4.6 MG/DL (ref 2.5–4.5)
PHOSPHATE SERPL-MCNC: 4.8 MG/DL (ref 2.5–4.5)
PHOSPHATE SERPL-MCNC: 4.8 MG/DL (ref 2.5–4.5)
PHOSPHATE SERPL-MCNC: 4.9 MG/DL (ref 2.5–4.5)
PHOSPHATE SERPL-MCNC: 5.3 MG/DL (ref 2.5–4.5)
PHOSPHATE SERPL-MCNC: 6.8 MG/DL (ref 2.5–4.5)
PHOSPHATE SERPL-MCNC: 6.9 MG/DL (ref 2.5–4.5)
PLATELET # BLD AUTO: 101 10*3/MM3 (ref 140–450)
PLATELET # BLD AUTO: 119 10*3/MM3 (ref 140–450)
PLATELET # BLD AUTO: 125 10*3/MM3 (ref 140–450)
PLATELET # BLD AUTO: 133 10*3/MM3 (ref 140–450)
PLATELET # BLD AUTO: 137 10*3/MM3 (ref 140–450)
PLATELET # BLD AUTO: 139 10*3/MM3 (ref 140–450)
PLATELET # BLD AUTO: 156 10*3/MM3 (ref 140–450)
PLATELET # BLD AUTO: 162 10*3/MM3 (ref 140–450)
PLATELET # BLD AUTO: 164 10*3/MM3 (ref 140–450)
PLATELET # BLD AUTO: 167 10*3/MM3 (ref 140–450)
PLATELET # BLD AUTO: 168 10*3/MM3 (ref 140–450)
PLATELET # BLD AUTO: 169 10*3/MM3 (ref 140–450)
PLATELET # BLD AUTO: 182 10*3/MM3 (ref 140–450)
PLATELET # BLD AUTO: 200 10*3/MM3 (ref 140–450)
PLATELET # BLD AUTO: 212 10*3/MM3 (ref 140–450)
PLATELET # BLD AUTO: 239 10*3/MM3 (ref 140–450)
PLATELET # BLD AUTO: 252 10*3/MM3 (ref 140–450)
PLATELET # BLD AUTO: 255 10*3/MM3 (ref 140–450)
PLATELET # BLD AUTO: 259 10*3/MM3 (ref 140–450)
PLATELET # BLD AUTO: 261 10*3/MM3 (ref 140–450)
PLATELET # BLD AUTO: 265 10*3/MM3 (ref 140–450)
PLATELET # BLD AUTO: 278 10*3/MM3 (ref 140–450)
PLATELET # BLD AUTO: 304 10*3/MM3 (ref 140–450)
PLATELET # BLD AUTO: 308 10*3/MM3 (ref 140–450)
PLATELET # BLD AUTO: 309 10*3/MM3 (ref 140–450)
PLATELET # BLD AUTO: 314 10*3/MM3 (ref 140–450)
PLATELET # BLD AUTO: 328 10*3/MM3 (ref 140–450)
PMV BLD AUTO: 10.1 FL (ref 6–12)
PMV BLD AUTO: 10.2 FL (ref 6–12)
PMV BLD AUTO: 10.3 FL (ref 6–12)
PMV BLD AUTO: 10.8 FL (ref 6–12)
PMV BLD AUTO: 10.9 FL (ref 6–12)
PMV BLD AUTO: 11 FL (ref 6–12)
PMV BLD AUTO: 11 FL (ref 6–12)
PMV BLD AUTO: 11.1 FL (ref 6–12)
PMV BLD AUTO: 11.1 FL (ref 6–12)
PMV BLD AUTO: 11.2 FL (ref 6–12)
PMV BLD AUTO: 11.3 FL (ref 6–12)
PMV BLD AUTO: 11.4 FL (ref 6–12)
PMV BLD AUTO: 11.5 FL (ref 6–12)
PMV BLD AUTO: 11.7 FL (ref 6–12)
PMV BLD AUTO: 11.8 FL (ref 6–12)
PMV BLD AUTO: 12 FL (ref 6–12)
PMV BLD AUTO: 12.5 FL (ref 6–12)
PMV BLD AUTO: 12.5 FL (ref 6–12)
PMV BLD AUTO: 12.6 FL (ref 6–12)
PMV BLD AUTO: 12.8 FL (ref 6–12)
PMV BLD AUTO: 13.1 FL (ref 6–12)
PO2 BLDA: 137 MM HG (ref 83–108)
PO2 BLDA: 68.5 MM HG (ref 83–108)
PO2 BLDA: 78.5 MM HG (ref 83–108)
PO2 BLDA: 93.2 MM HG (ref 83–108)
PO2 BLDA: 93.3 MM HG (ref 83–108)
PO2 TEMP ADJ BLD: ABNORMAL MM[HG]
POLYCHROMASIA BLD QL SMEAR: ABNORMAL
POTASSIUM BLD-SCNC: 3.7 MMOL/L (ref 3.5–5.2)
POTASSIUM BLD-SCNC: 3.8 MMOL/L (ref 3.5–5.2)
POTASSIUM BLD-SCNC: 4 MMOL/L (ref 3.5–5.2)
POTASSIUM BLD-SCNC: 4.1 MMOL/L (ref 3.5–5.2)
POTASSIUM BLD-SCNC: 4.1 MMOL/L (ref 3.5–5.2)
POTASSIUM BLD-SCNC: 4.2 MMOL/L (ref 3.5–5.2)
POTASSIUM BLD-SCNC: 4.3 MMOL/L (ref 3.5–5.2)
POTASSIUM BLD-SCNC: 4.4 MMOL/L (ref 3.5–5.2)
POTASSIUM BLD-SCNC: 4.5 MMOL/L (ref 3.5–5.2)
POTASSIUM BLD-SCNC: 4.5 MMOL/L (ref 3.5–5.2)
POTASSIUM BLD-SCNC: 4.8 MMOL/L (ref 3.5–5.2)
POTASSIUM BLD-SCNC: 4.8 MMOL/L (ref 3.5–5.2)
POTASSIUM BLD-SCNC: 5 MMOL/L (ref 3.5–5.2)
POTASSIUM BLD-SCNC: 5.1 MMOL/L (ref 3.5–5.2)
POTASSIUM BLD-SCNC: 5.3 MMOL/L (ref 3.5–5.2)
POTASSIUM BLDA-SCNC: 4.1 MMOL/L (ref 3.4–4.5)
POTASSIUM SERPL-SCNC: 3.5 MMOL/L (ref 3.5–5.2)
POTASSIUM SERPL-SCNC: 3.8 MMOL/L (ref 3.5–5.2)
POTASSIUM SERPL-SCNC: 4.2 MMOL/L (ref 3.5–5.2)
POTASSIUM SERPL-SCNC: 4.2 MMOL/L (ref 3.5–5.2)
POTASSIUM SERPL-SCNC: 4.3 MMOL/L (ref 3.5–5.2)
POTASSIUM SERPL-SCNC: 4.4 MMOL/L (ref 3.5–5.2)
POTASSIUM SERPL-SCNC: 4.7 MMOL/L (ref 3.5–5.2)
POTASSIUM SERPL-SCNC: 4.9 MMOL/L (ref 3.5–5.2)
PROCALCITONIN SERPL-MCNC: 0.2 NG/ML (ref 0–0.25)
PROT SERPL-MCNC: 5.8 G/DL (ref 6–8.5)
PROT SERPL-MCNC: 6.2 G/DL (ref 6–8.5)
PROT SERPL-MCNC: 6.2 G/DL (ref 6–8.5)
PROT SERPL-MCNC: 6.4 G/DL (ref 6–8.5)
PROT SERPL-MCNC: 6.7 G/DL (ref 6–8.5)
PROT UR QL STRIP: ABNORMAL
PROT UR QL STRIP: ABNORMAL
PROTHROMBIN TIME: 103.9 SECONDS (ref 11.1–15.3)
PROTHROMBIN TIME: 14.2 SECONDS (ref 11.1–15.3)
PROTHROMBIN TIME: 17.4 SECONDS (ref 11.1–15.3)
PROTHROMBIN TIME: 17.9 SECONDS (ref 11.1–15.3)
PROTHROMBIN TIME: 18 SECONDS (ref 11.1–15.3)
PROTHROMBIN TIME: 18.8 SECONDS (ref 11.1–15.3)
PROTHROMBIN TIME: 19.1 SECONDS (ref 11.1–15.3)
PROTHROMBIN TIME: 20.2 SECONDS (ref 11.1–15.3)
PROTHROMBIN TIME: 21.4 SECONDS (ref 11.1–15.3)
PROTHROMBIN TIME: 21.5 SECONDS (ref 11.1–15.3)
PROTHROMBIN TIME: 22.5 SECONDS (ref 11.1–15.3)
PROTHROMBIN TIME: 23.9 SECONDS (ref 11.1–15.3)
PROTHROMBIN TIME: 24.3 SECONDS (ref 11.1–15.3)
PROTHROMBIN TIME: 24.4 SECONDS (ref 11.1–15.3)
PROTHROMBIN TIME: 24.4 SECONDS (ref 11.1–15.3)
PROTHROMBIN TIME: 26.6 SECONDS (ref 11.1–15.3)
PROTHROMBIN TIME: 27.3 SECONDS (ref 11.1–15.3)
PROTHROMBIN TIME: 27.4 SECONDS (ref 11.1–15.3)
PROTHROMBIN TIME: 27.7 SECONDS (ref 11.1–15.3)
PROTHROMBIN TIME: 30.7 SECONDS (ref 11–15)
PROTHROMBIN TIME: 31.4 SECONDS (ref 11.1–15.3)
PROTHROMBIN TIME: 37.7 SECONDS (ref 11.1–15.3)
PROTHROMBIN TIME: 37.9 SECONDS (ref 11.1–15.3)
PROTHROMBIN TIME: 38.1 SECONDS (ref 11–15)
PROTHROMBIN TIME: 39 SECONDS (ref 11.1–15.3)
PROTHROMBIN TIME: 40.2 SECONDS (ref 11.1–15.3)
PROTHROMBIN TIME: 47.6 SECONDS (ref 11.1–15.3)
PROTHROMBIN TIME: 51 SECONDS (ref 11.1–15.3)
PROTHROMBIN TIME: 56.1 SECONDS (ref 11.1–15.3)
PROTHROMBIN TIME: 83.9 SECONDS (ref 11.1–15.3)
PROTHROMBIN TIME: ABNORMAL S
RBC # BLD AUTO: 2.5 10*6/MM3 (ref 3.77–5.28)
RBC # BLD AUTO: 2.58 10*6/MM3 (ref 3.77–5.28)
RBC # BLD AUTO: 2.59 10*6/MM3 (ref 3.77–5.28)
RBC # BLD AUTO: 2.59 10*6/MM3 (ref 3.77–5.28)
RBC # BLD AUTO: 2.67 10*6/MM3 (ref 3.77–5.28)
RBC # BLD AUTO: 2.73 10*6/MM3 (ref 3.77–5.28)
RBC # BLD AUTO: 2.73 10*6/MM3 (ref 3.77–5.28)
RBC # BLD AUTO: 2.79 10*6/MM3 (ref 3.77–5.28)
RBC # BLD AUTO: 2.79 10*6/MM3 (ref 3.77–5.28)
RBC # BLD AUTO: 2.8 10*6/MM3 (ref 3.77–5.28)
RBC # BLD AUTO: 2.82 10*6/MM3 (ref 3.77–5.28)
RBC # BLD AUTO: 2.83 10*6/MM3 (ref 3.77–5.28)
RBC # BLD AUTO: 2.87 10*6/MM3 (ref 3.77–5.28)
RBC # BLD AUTO: 2.89 10*6/MM3 (ref 3.77–5.28)
RBC # BLD AUTO: 2.92 10*6/MM3 (ref 3.77–5.28)
RBC # BLD AUTO: 2.97 10*6/MM3 (ref 3.77–5.28)
RBC # BLD AUTO: 2.99 10*6/MM3 (ref 3.77–5.28)
RBC # BLD AUTO: 3.03 10*6/MM3 (ref 3.77–5.28)
RBC # BLD AUTO: 3.05 10*6/MM3 (ref 3.77–5.28)
RBC # BLD AUTO: 3.06 10*6/MM3 (ref 3.77–5.28)
RBC # BLD AUTO: 3.08 10*6/MM3 (ref 3.77–5.28)
RBC # BLD AUTO: 3.14 10*6/MM3 (ref 3.77–5.28)
RBC # BLD AUTO: 3.18 10*6/MM3 (ref 3.77–5.28)
RBC # BLD AUTO: 3.22 10*6/MM3 (ref 3.77–5.28)
RBC # BLD AUTO: 3.29 10*6/MM3 (ref 3.77–5.28)
RBC # BLD AUTO: 3.34 10*6/MM3 (ref 3.77–5.28)
RBC # BLD AUTO: 3.57 10*6/MM3 (ref 3.77–5.28)
RBC # UR: ABNORMAL /HPF
RBC # UR: ABNORMAL /HPF
REF LAB TEST METHOD: ABNORMAL
REF LAB TEST METHOD: ABNORMAL
RH BLD: POSITIVE
RHINOVIRUS RNA SPEC NAA+PROBE: NOT DETECTED
RSV RNA NPH QL NAA+NON-PROBE: NOT DETECTED
SAO2 % BLDCOA: 93 % (ref 94–99)
SAO2 % BLDCOA: 95.3 % (ref 94–99)
SAO2 % BLDCOA: 96.8 % (ref 94–99)
SAO2 % BLDCOA: 97.8 % (ref 94–99)
SAO2 % BLDCOA: 99.2 % (ref 94–99)
SARS-COV-2 N GENE RESP QL NAA+PROBE: NOT DETECTED
SARS-COV-2 N GENE RESP QL NAA+PROBE: NOT DETECTED
SARS-COV-2 RNA RESP QL NAA+PROBE: NOT DETECTED
SET MECH RESP RATE: 20
SMALL PLATELETS BLD QL SMEAR: ABNORMAL
SODIUM BLD-SCNC: 125 MMOL/L (ref 136–145)
SODIUM BLD-SCNC: 125 MMOL/L (ref 136–145)
SODIUM BLD-SCNC: 126 MMOL/L (ref 136–145)
SODIUM BLD-SCNC: 127 MMOL/L (ref 136–145)
SODIUM BLD-SCNC: 129 MMOL/L (ref 136–145)
SODIUM BLD-SCNC: 130 MMOL/L (ref 136–145)
SODIUM BLD-SCNC: 130 MMOL/L (ref 136–145)
SODIUM BLD-SCNC: 131 MMOL/L (ref 136–145)
SODIUM BLD-SCNC: 133 MMOL/L (ref 136–145)
SODIUM BLD-SCNC: 134 MMOL/L (ref 136–145)
SODIUM BLD-SCNC: 135 MMOL/L (ref 136–145)
SODIUM BLD-SCNC: 135 MMOL/L (ref 136–145)
SODIUM BLD-SCNC: 136 MMOL/L (ref 136–145)
SODIUM BLD-SCNC: 137 MMOL/L (ref 136–145)
SODIUM BLD-SCNC: 137 MMOL/L (ref 136–145)
SODIUM BLD-SCNC: 139 MMOL/L (ref 136–145)
SODIUM BLD-SCNC: 139 MMOL/L (ref 136–145)
SODIUM BLD-SCNC: 140 MMOL/L (ref 136–145)
SODIUM BLD-SCNC: 141 MMOL/L (ref 136–145)
SODIUM BLD-SCNC: 141 MMOL/L (ref 136–145)
SODIUM BLDA-SCNC: 141 MMOL/L (ref 136–146)
SODIUM SERPL-SCNC: 123 MMOL/L (ref 136–145)
SODIUM SERPL-SCNC: 125 MMOL/L (ref 136–145)
SODIUM SERPL-SCNC: 128 MMOL/L (ref 136–145)
SODIUM SERPL-SCNC: 130 MMOL/L (ref 136–145)
SODIUM SERPL-SCNC: 132 MMOL/L (ref 136–145)
SODIUM SERPL-SCNC: 135 MMOL/L (ref 136–145)
SODIUM SERPL-SCNC: 135 MMOL/L (ref 136–145)
SODIUM SERPL-SCNC: 137 MMOL/L (ref 136–145)
SODIUM UR-SCNC: 22 MMOL/L
SODIUM UR-SCNC: 76 MMOL/L
SP GR UR STRIP: 1.01 (ref 1–1.03)
SP GR UR STRIP: 1.02 (ref 1–1.03)
SQUAMOUS #/AREA URNS HPF: ABNORMAL /HPF
SQUAMOUS #/AREA URNS HPF: ABNORMAL /HPF
T&S EXPIRATION DATE: NORMAL
TIBC SERPL-MCNC: 243 MCG/DL (ref 298–536)
TRANSFERRIN SERPL-MCNC: 163 MG/DL (ref 200–360)
TRIGL SERPL-MCNC: 85 MG/DL (ref 0–150)
TROPONIN T SERPL-MCNC: 0.02 NG/ML (ref 0–0.03)
TROPONIN T SERPL-MCNC: 0.02 NG/ML (ref 0–0.03)
TROPONIN T SERPL-MCNC: 0.04 NG/ML (ref 0–0.03)
TSH SERPL DL<=0.05 MIU/L-ACNC: 1.73 UIU/ML (ref 0.27–4.2)
UNIT  ABO: NORMAL
UNIT  RH: NORMAL
UROBILINOGEN UR QL STRIP: ABNORMAL
UROBILINOGEN UR QL STRIP: ABNORMAL
VANCOMYCIN SERPL-MCNC: 13.4 MCG/ML (ref 5–40)
VENTILATOR MODE: ABNORMAL
VIM STRAIN: NOT DETECTED
VLDLC SERPL-MCNC: 17 MG/DL (ref 5–40)
VT ON VENT VENT: 500 ML
WBC # BLD AUTO: 11.83 10*3/MM3 (ref 3.4–10.8)
WBC # BLD AUTO: 6.55 10*3/MM3 (ref 3.4–10.8)
WBC # BLD AUTO: 7.33 10*3/MM3 (ref 3.4–10.8)
WBC # BLD AUTO: 7.47 10*3/MM3 (ref 3.4–10.8)
WBC # BLD AUTO: 7.73 10*3/MM3 (ref 3.4–10.8)
WBC MORPH BLD: NORMAL
WBC MORPH BLD: NORMAL
WBC NRBC COR # BLD: 10.17 10*3/MM3 (ref 3.4–10.8)
WBC NRBC COR # BLD: 10.53 10*3/MM3 (ref 3.4–10.8)
WBC NRBC COR # BLD: 11.2 10*3/MM3 (ref 3.4–10.8)
WBC NRBC COR # BLD: 11.34 10*3/MM3 (ref 3.4–10.8)
WBC NRBC COR # BLD: 11.36 10*3/MM3 (ref 3.4–10.8)
WBC NRBC COR # BLD: 11.57 10*3/MM3 (ref 3.4–10.8)
WBC NRBC COR # BLD: 11.61 10*3/MM3 (ref 3.4–10.8)
WBC NRBC COR # BLD: 11.97 10*3/MM3 (ref 3.4–10.8)
WBC NRBC COR # BLD: 12.33 10*3/MM3 (ref 3.4–10.8)
WBC NRBC COR # BLD: 12.64 10*3/MM3 (ref 3.4–10.8)
WBC NRBC COR # BLD: 12.66 10*3/MM3 (ref 3.4–10.8)
WBC NRBC COR # BLD: 12.81 10*3/MM3 (ref 3.4–10.8)
WBC NRBC COR # BLD: 13.1 10*3/MM3 (ref 3.4–10.8)
WBC NRBC COR # BLD: 13.2 10*3/MM3 (ref 3.4–10.8)
WBC NRBC COR # BLD: 14.04 10*3/MM3 (ref 3.4–10.8)
WBC NRBC COR # BLD: 15.52 10*3/MM3 (ref 3.4–10.8)
WBC NRBC COR # BLD: 15.77 10*3/MM3 (ref 3.4–10.8)
WBC NRBC COR # BLD: 16.21 10*3/MM3 (ref 3.4–10.8)
WBC NRBC COR # BLD: 7.2 10*3/MM3 (ref 3.4–10.8)
WBC NRBC COR # BLD: 8 10*3/MM3 (ref 3.4–10.8)
WBC NRBC COR # BLD: 9.51 10*3/MM3 (ref 3.4–10.8)
WBC NRBC COR # BLD: 9.99 10*3/MM3 (ref 3.4–10.8)
WBC UR QL AUTO: ABNORMAL /HPF
WBC UR QL AUTO: ABNORMAL /HPF
WHOLE BLOOD HOLD SPECIMEN: NORMAL

## 2020-01-01 PROCEDURE — 99214 OFFICE O/P EST MOD 30 MIN: CPT | Performed by: THORACIC SURGERY (CARDIOTHORACIC VASCULAR SURGERY)

## 2020-01-01 PROCEDURE — 82962 GLUCOSE BLOOD TEST: CPT

## 2020-01-01 PROCEDURE — 85610 PROTHROMBIN TIME: CPT | Performed by: INTERNAL MEDICINE

## 2020-01-01 PROCEDURE — 85610 PROTHROMBIN TIME: CPT | Performed by: ANESTHESIOLOGY

## 2020-01-01 PROCEDURE — 25010000002 PIPERACILLIN-TAZOBACTAM: Performed by: STUDENT IN AN ORGANIZED HEALTH CARE EDUCATION/TRAINING PROGRAM

## 2020-01-01 PROCEDURE — 99285 EMERGENCY DEPT VISIT HI MDM: CPT

## 2020-01-01 PROCEDURE — 63710000001 INSULIN ASPART PER 5 UNITS: Performed by: INTERNAL MEDICINE

## 2020-01-01 PROCEDURE — 99024 POSTOP FOLLOW-UP VISIT: CPT | Performed by: SURGERY

## 2020-01-01 PROCEDURE — 94799 UNLISTED PULMONARY SVC/PX: CPT

## 2020-01-01 PROCEDURE — 80048 BASIC METABOLIC PNL TOTAL CA: CPT | Performed by: INTERNAL MEDICINE

## 2020-01-01 PROCEDURE — 85025 COMPLETE CBC W/AUTO DIFF WBC: CPT | Performed by: SURGERY

## 2020-01-01 PROCEDURE — 85610 PROTHROMBIN TIME: CPT | Performed by: SURGERY

## 2020-01-01 PROCEDURE — 25010000002 CEFTRIAXONE PER 250 MG: Performed by: INTERNAL MEDICINE

## 2020-01-01 PROCEDURE — 85610 PROTHROMBIN TIME: CPT | Performed by: THORACIC SURGERY (CARDIOTHORACIC VASCULAR SURGERY)

## 2020-01-01 PROCEDURE — 76937 US GUIDE VASCULAR ACCESS: CPT

## 2020-01-01 PROCEDURE — 86140 C-REACTIVE PROTEIN: CPT | Performed by: EMERGENCY MEDICINE

## 2020-01-01 PROCEDURE — 94760 N-INVAS EAR/PLS OXIMETRY 1: CPT

## 2020-01-01 PROCEDURE — 77001 FLUOROGUIDE FOR VEIN DEVICE: CPT | Performed by: THORACIC SURGERY (CARDIOTHORACIC VASCULAR SURGERY)

## 2020-01-01 PROCEDURE — S0260 H&P FOR SURGERY: HCPCS | Performed by: THORACIC SURGERY (CARDIOTHORACIC VASCULAR SURGERY)

## 2020-01-01 PROCEDURE — 93010 ELECTROCARDIOGRAM REPORT: CPT | Performed by: INTERNAL MEDICINE

## 2020-01-01 PROCEDURE — 25010000002 ONDANSETRON PER 1 MG: Performed by: SURGERY

## 2020-01-01 PROCEDURE — 85610 PROTHROMBIN TIME: CPT | Performed by: NURSE PRACTITIONER

## 2020-01-01 PROCEDURE — 85014 HEMATOCRIT: CPT | Performed by: SURGERY

## 2020-01-01 PROCEDURE — 36415 COLL VENOUS BLD VENIPUNCTURE: CPT

## 2020-01-01 PROCEDURE — 84484 ASSAY OF TROPONIN QUANT: CPT | Performed by: STUDENT IN AN ORGANIZED HEALTH CARE EDUCATION/TRAINING PROGRAM

## 2020-01-01 PROCEDURE — 0JH63XZ INSERTION OF TUNNELED VASCULAR ACCESS DEVICE INTO CHEST SUBCUTANEOUS TISSUE AND FASCIA, PERCUTANEOUS APPROACH: ICD-10-PCS | Performed by: THORACIC SURGERY (CARDIOTHORACIC VASCULAR SURGERY)

## 2020-01-01 PROCEDURE — 85025 COMPLETE CBC W/AUTO DIFF WBC: CPT | Performed by: THORACIC SURGERY (CARDIOTHORACIC VASCULAR SURGERY)

## 2020-01-01 PROCEDURE — 84100 ASSAY OF PHOSPHORUS: CPT | Performed by: INTERNAL MEDICINE

## 2020-01-01 PROCEDURE — 93793 ANTICOAG MGMT PT WARFARIN: CPT | Performed by: NURSE PRACTITIONER

## 2020-01-01 PROCEDURE — 94660 CPAP INITIATION&MGMT: CPT

## 2020-01-01 PROCEDURE — 83880 ASSAY OF NATRIURETIC PEPTIDE: CPT | Performed by: STUDENT IN AN ORGANIZED HEALTH CARE EDUCATION/TRAINING PROGRAM

## 2020-01-01 PROCEDURE — 99211 OFF/OP EST MAY X REQ PHY/QHP: CPT | Performed by: NURSE PRACTITIONER

## 2020-01-01 PROCEDURE — 87086 URINE CULTURE/COLONY COUNT: CPT | Performed by: STUDENT IN AN ORGANIZED HEALTH CARE EDUCATION/TRAINING PROGRAM

## 2020-01-01 PROCEDURE — 97110 THERAPEUTIC EXERCISES: CPT

## 2020-01-01 PROCEDURE — 82375 ASSAY CARBOXYHB QUANT: CPT

## 2020-01-01 PROCEDURE — 87635 SARS-COV-2 COVID-19 AMP PRB: CPT | Performed by: THORACIC SURGERY (CARDIOTHORACIC VASCULAR SURGERY)

## 2020-01-01 PROCEDURE — 97530 THERAPEUTIC ACTIVITIES: CPT

## 2020-01-01 PROCEDURE — 71046 X-RAY EXAM CHEST 2 VIEWS: CPT

## 2020-01-01 PROCEDURE — 85025 COMPLETE CBC W/AUTO DIFF WBC: CPT | Performed by: INTERNAL MEDICINE

## 2020-01-01 PROCEDURE — 36556 INSERT NON-TUNNEL CV CATH: CPT | Performed by: THORACIC SURGERY (CARDIOTHORACIC VASCULAR SURGERY)

## 2020-01-01 PROCEDURE — 25010000002 HEPARIN (PORCINE) PER 1000 UNITS: Performed by: INTERNAL MEDICINE

## 2020-01-01 PROCEDURE — 94640 AIRWAY INHALATION TREATMENT: CPT

## 2020-01-01 PROCEDURE — 25010000002 NA FERRIC GLUC CPLX PER 12.5 MG: Performed by: INTERNAL MEDICINE

## 2020-01-01 PROCEDURE — P9041 ALBUMIN (HUMAN),5%, 50ML: HCPCS | Performed by: NURSE ANESTHETIST, CERTIFIED REGISTERED

## 2020-01-01 PROCEDURE — 83735 ASSAY OF MAGNESIUM: CPT | Performed by: EMERGENCY MEDICINE

## 2020-01-01 PROCEDURE — 97535 SELF CARE MNGMENT TRAINING: CPT

## 2020-01-01 PROCEDURE — 25010000002 MORPHINE PER 10 MG: Performed by: INTERNAL MEDICINE

## 2020-01-01 PROCEDURE — 86900 BLOOD TYPING SEROLOGIC ABO: CPT | Performed by: SURGERY

## 2020-01-01 PROCEDURE — 84484 ASSAY OF TROPONIN QUANT: CPT | Performed by: EMERGENCY MEDICINE

## 2020-01-01 PROCEDURE — 80048 BASIC METABOLIC PNL TOTAL CA: CPT | Performed by: SURGERY

## 2020-01-01 PROCEDURE — 25010000002 ALBUMIN HUMAN 5% PER 50 ML: Performed by: NURSE ANESTHETIST, CERTIFIED REGISTERED

## 2020-01-01 PROCEDURE — 80048 BASIC METABOLIC PNL TOTAL CA: CPT | Performed by: THORACIC SURGERY (CARDIOTHORACIC VASCULAR SURGERY)

## 2020-01-01 PROCEDURE — 83880 ASSAY OF NATRIURETIC PEPTIDE: CPT | Performed by: EMERGENCY MEDICINE

## 2020-01-01 PROCEDURE — U0003 INFECTIOUS AGENT DETECTION BY NUCLEIC ACID (DNA OR RNA); SEVERE ACUTE RESPIRATORY SYNDROME CORONAVIRUS 2 (SARS-COV-2) (CORONAVIRUS DISEASE [COVID-19]), AMPLIFIED PROBE TECHNIQUE, MAKING USE OF HIGH THROUGHPUT TECHNOLOGIES AS DESCRIBED BY CMS-2020-01-R: HCPCS | Performed by: THORACIC SURGERY (CARDIOTHORACIC VASCULAR SURGERY)

## 2020-01-01 PROCEDURE — 80061 LIPID PANEL: CPT | Performed by: NURSE PRACTITIONER

## 2020-01-01 PROCEDURE — 25010000002 FENTANYL CITRATE (PF) 100 MCG/2ML SOLUTION: Performed by: THORACIC SURGERY (CARDIOTHORACIC VASCULAR SURGERY)

## 2020-01-01 PROCEDURE — 93005 ELECTROCARDIOGRAM TRACING: CPT

## 2020-01-01 PROCEDURE — 25010000002 METHYLPREDNISOLONE PER 40 MG: Performed by: INTERNAL MEDICINE

## 2020-01-01 PROCEDURE — 85014 HEMATOCRIT: CPT | Performed by: INTERNAL MEDICINE

## 2020-01-01 PROCEDURE — 25010000002 AZITHROMYCIN PER 500 MG: Performed by: INTERNAL MEDICINE

## 2020-01-01 PROCEDURE — 83735 ASSAY OF MAGNESIUM: CPT | Performed by: SURGERY

## 2020-01-01 PROCEDURE — 87081 CULTURE SCREEN ONLY: CPT | Performed by: INTERNAL MEDICINE

## 2020-01-01 PROCEDURE — 25010000002 SUCCINYLCHOLINE PER 20 MG: Performed by: NURSE ANESTHETIST, CERTIFIED REGISTERED

## 2020-01-01 PROCEDURE — G0439 PPPS, SUBSEQ VISIT: HCPCS | Performed by: NURSE PRACTITIONER

## 2020-01-01 PROCEDURE — 93005 ELECTROCARDIOGRAM TRACING: CPT | Performed by: STUDENT IN AN ORGANIZED HEALTH CARE EDUCATION/TRAINING PROGRAM

## 2020-01-01 PROCEDURE — 97116 GAIT TRAINING THERAPY: CPT

## 2020-01-01 PROCEDURE — 25010000002 FUROSEMIDE PER 20 MG: Performed by: STUDENT IN AN ORGANIZED HEALTH CARE EDUCATION/TRAINING PROGRAM

## 2020-01-01 PROCEDURE — 86923 COMPATIBILITY TEST ELECTRIC: CPT

## 2020-01-01 PROCEDURE — 82805 BLOOD GASES W/O2 SATURATION: CPT

## 2020-01-01 PROCEDURE — 81001 URINALYSIS AUTO W/SCOPE: CPT | Performed by: INTERNAL MEDICINE

## 2020-01-01 PROCEDURE — 25010000002 EPOETIN ALFA PER 1000 UNITS: Performed by: INTERNAL MEDICINE

## 2020-01-01 PROCEDURE — 85610 PROTHROMBIN TIME: CPT | Performed by: STUDENT IN AN ORGANIZED HEALTH CARE EDUCATION/TRAINING PROGRAM

## 2020-01-01 PROCEDURE — C1752 CATH,HEMODIALYSIS,SHORT-TERM: HCPCS

## 2020-01-01 PROCEDURE — 25010000002 VITAMIN K1 PER 1 MG: Performed by: INTERNAL MEDICINE

## 2020-01-01 PROCEDURE — 0 DIATRIZOATE MEGLUMINE & SODIUM PER 1 ML: Performed by: EMERGENCY MEDICINE

## 2020-01-01 PROCEDURE — 86850 RBC ANTIBODY SCREEN: CPT | Performed by: SURGERY

## 2020-01-01 PROCEDURE — 88309 TISSUE EXAM BY PATHOLOGIST: CPT | Performed by: SURGERY

## 2020-01-01 PROCEDURE — 99443 PR PHYS/QHP TELEPHONE EVALUATION 21-30 MIN: CPT | Performed by: NURSE PRACTITIONER

## 2020-01-01 PROCEDURE — C9803 HOPD COVID-19 SPEC COLLECT: HCPCS | Performed by: THORACIC SURGERY (CARDIOTHORACIC VASCULAR SURGERY)

## 2020-01-01 PROCEDURE — 99213 OFFICE O/P EST LOW 20 MIN: CPT | Performed by: SURGERY

## 2020-01-01 PROCEDURE — 87040 BLOOD CULTURE FOR BACTERIA: CPT | Performed by: INTERNAL MEDICINE

## 2020-01-01 PROCEDURE — 25010000002 NALOXONE PER 1 MG

## 2020-01-01 PROCEDURE — 36416 COLLJ CAPILLARY BLOOD SPEC: CPT | Performed by: NURSE PRACTITIONER

## 2020-01-01 PROCEDURE — 36558 INSERT TUNNELED CV CATH: CPT | Performed by: THORACIC SURGERY (CARDIOTHORACIC VASCULAR SURGERY)

## 2020-01-01 PROCEDURE — 93005 ELECTROCARDIOGRAM TRACING: CPT | Performed by: EMERGENCY MEDICINE

## 2020-01-01 PROCEDURE — 25010000002 DEXAMETHASONE PER 1 MG: Performed by: NURSE ANESTHETIST, CERTIFIED REGISTERED

## 2020-01-01 PROCEDURE — 82803 BLOOD GASES ANY COMBINATION: CPT

## 2020-01-01 PROCEDURE — 25010000002 HEPARIN (PORCINE) PER 1000 UNITS: Performed by: STUDENT IN AN ORGANIZED HEALTH CARE EDUCATION/TRAINING PROGRAM

## 2020-01-01 PROCEDURE — 25010000002 HYDRALAZINE PER 20 MG: Performed by: ANESTHESIOLOGY

## 2020-01-01 PROCEDURE — 25010000002 HEPARIN (PORCINE) PER 1000 UNITS: Performed by: SURGERY

## 2020-01-01 PROCEDURE — 80053 COMPREHEN METABOLIC PANEL: CPT | Performed by: INTERNAL MEDICINE

## 2020-01-01 PROCEDURE — 99307 SBSQ NF CARE SF MDM 10: CPT | Performed by: FAMILY MEDICINE

## 2020-01-01 PROCEDURE — B519ZZA FLUOROSCOPY OF INFERIOR VENA CAVA, GUIDANCE: ICD-10-PCS | Performed by: THORACIC SURGERY (CARDIOTHORACIC VASCULAR SURGERY)

## 2020-01-01 PROCEDURE — 81001 URINALYSIS AUTO W/SCOPE: CPT | Performed by: STUDENT IN AN ORGANIZED HEALTH CARE EDUCATION/TRAINING PROGRAM

## 2020-01-01 PROCEDURE — 25010000002 NEOSTIGMINE 4 MG/4ML SOLUTION PREFILLED SYRINGE: Performed by: NURSE ANESTHETIST, CERTIFIED REGISTERED

## 2020-01-01 PROCEDURE — 25010000002 VANCOMYCIN 5 G RECONSTITUTED SOLUTION: Performed by: INTERNAL MEDICINE

## 2020-01-01 PROCEDURE — 36430 TRANSFUSION BLD/BLD COMPNT: CPT

## 2020-01-01 PROCEDURE — 84100 ASSAY OF PHOSPHORUS: CPT | Performed by: SURGERY

## 2020-01-01 PROCEDURE — P9016 RBC LEUKOCYTES REDUCED: HCPCS

## 2020-01-01 PROCEDURE — 86900 BLOOD TYPING SEROLOGIC ABO: CPT

## 2020-01-01 PROCEDURE — 83880 ASSAY OF NATRIURETIC PEPTIDE: CPT | Performed by: INTERNAL MEDICINE

## 2020-01-01 PROCEDURE — 0DTN4ZZ RESECTION OF SIGMOID COLON, PERCUTANEOUS ENDOSCOPIC APPROACH: ICD-10-PCS | Performed by: SURGERY

## 2020-01-01 PROCEDURE — 97162 PT EVAL MOD COMPLEX 30 MIN: CPT

## 2020-01-01 PROCEDURE — 85018 HEMOGLOBIN: CPT | Performed by: SURGERY

## 2020-01-01 PROCEDURE — 97166 OT EVAL MOD COMPLEX 45 MIN: CPT

## 2020-01-01 PROCEDURE — 25010000002 MORPHINE PER 10 MG: Performed by: STUDENT IN AN ORGANIZED HEALTH CARE EDUCATION/TRAINING PROGRAM

## 2020-01-01 PROCEDURE — 74176 CT ABD & PELVIS W/O CONTRAST: CPT

## 2020-01-01 PROCEDURE — C9803 HOPD COVID-19 SPEC COLLECT: HCPCS

## 2020-01-01 PROCEDURE — 71045 X-RAY EXAM CHEST 1 VIEW: CPT

## 2020-01-01 PROCEDURE — 83050 HGB METHEMOGLOBIN QUAN: CPT

## 2020-01-01 PROCEDURE — 25010000002 CEFOXITIN: Performed by: SURGERY

## 2020-01-01 PROCEDURE — 80048 BASIC METABOLIC PNL TOTAL CA: CPT | Performed by: FAMILY MEDICINE

## 2020-01-01 PROCEDURE — C1750 CATH, HEMODIALYSIS,LONG-TERM: HCPCS | Performed by: THORACIC SURGERY (CARDIOTHORACIC VASCULAR SURGERY)

## 2020-01-01 PROCEDURE — 82570 ASSAY OF URINE CREATININE: CPT | Performed by: INTERNAL MEDICINE

## 2020-01-01 PROCEDURE — 86901 BLOOD TYPING SEROLOGIC RH(D): CPT | Performed by: SURGERY

## 2020-01-01 PROCEDURE — 63710000001 DIPHENHYDRAMINE PER 50 MG: Performed by: INTERNAL MEDICINE

## 2020-01-01 PROCEDURE — 84466 ASSAY OF TRANSFERRIN: CPT | Performed by: INTERNAL MEDICINE

## 2020-01-01 PROCEDURE — 25010000002 PIPERACILLIN SOD-TAZOBACTAM PER 1 G: Performed by: EMERGENCY MEDICINE

## 2020-01-01 PROCEDURE — 84300 ASSAY OF URINE SODIUM: CPT | Performed by: INTERNAL MEDICINE

## 2020-01-01 PROCEDURE — 85007 BL SMEAR W/DIFF WBC COUNT: CPT | Performed by: INTERNAL MEDICINE

## 2020-01-01 PROCEDURE — 25010000002 ONDANSETRON PER 1 MG: Performed by: NURSE ANESTHETIST, CERTIFIED REGISTERED

## 2020-01-01 PROCEDURE — P9041 ALBUMIN (HUMAN),5%, 50ML: HCPCS | Performed by: SURGERY

## 2020-01-01 PROCEDURE — 25010000002 ONDANSETRON PER 1 MG: Performed by: INTERNAL MEDICINE

## 2020-01-01 PROCEDURE — 80053 COMPREHEN METABOLIC PANEL: CPT | Performed by: NURSE PRACTITIONER

## 2020-01-01 PROCEDURE — 99214 OFFICE O/P EST MOD 30 MIN: CPT | Performed by: INTERNAL MEDICINE

## 2020-01-01 PROCEDURE — C1894 INTRO/SHEATH, NON-LASER: HCPCS | Performed by: THORACIC SURGERY (CARDIOTHORACIC VASCULAR SURGERY)

## 2020-01-01 PROCEDURE — 85610 PROTHROMBIN TIME: CPT | Performed by: EMERGENCY MEDICINE

## 2020-01-01 PROCEDURE — 80053 COMPREHEN METABOLIC PANEL: CPT | Performed by: STUDENT IN AN ORGANIZED HEALTH CARE EDUCATION/TRAINING PROGRAM

## 2020-01-01 PROCEDURE — 88329 PATH CONSLTJ DRG SURG: CPT | Performed by: SURGERY

## 2020-01-01 PROCEDURE — 87150 DNA/RNA AMPLIFIED PROBE: CPT | Performed by: STUDENT IN AN ORGANIZED HEALTH CARE EDUCATION/TRAINING PROGRAM

## 2020-01-01 PROCEDURE — 44204 LAPARO PARTIAL COLECTOMY: CPT | Performed by: SURGERY

## 2020-01-01 PROCEDURE — 87040 BLOOD CULTURE FOR BACTERIA: CPT | Performed by: EMERGENCY MEDICINE

## 2020-01-01 PROCEDURE — 87340 HEPATITIS B SURFACE AG IA: CPT | Performed by: INTERNAL MEDICINE

## 2020-01-01 PROCEDURE — 5A1D70Z PERFORMANCE OF URINARY FILTRATION, INTERMITTENT, LESS THAN 6 HOURS PER DAY: ICD-10-PCS | Performed by: INTERNAL MEDICINE

## 2020-01-01 PROCEDURE — 85025 COMPLETE CBC W/AUTO DIFF WBC: CPT | Performed by: FAMILY MEDICINE

## 2020-01-01 PROCEDURE — 85025 COMPLETE CBC W/AUTO DIFF WBC: CPT | Performed by: EMERGENCY MEDICINE

## 2020-01-01 PROCEDURE — 83615 LACTATE (LD) (LDH) ENZYME: CPT | Performed by: EMERGENCY MEDICINE

## 2020-01-01 PROCEDURE — 83605 ASSAY OF LACTIC ACID: CPT | Performed by: STUDENT IN AN ORGANIZED HEALTH CARE EDUCATION/TRAINING PROGRAM

## 2020-01-01 PROCEDURE — 97162 PT EVAL MOD COMPLEX 30 MIN: CPT | Performed by: PHYSICAL THERAPIST

## 2020-01-01 PROCEDURE — 83036 HEMOGLOBIN GLYCOSYLATED A1C: CPT

## 2020-01-01 PROCEDURE — 99442 PR PHYS/QHP TELEPHONE EVALUATION 11-20 MIN: CPT | Performed by: NURSE PRACTITIONER

## 2020-01-01 PROCEDURE — 83690 ASSAY OF LIPASE: CPT | Performed by: STUDENT IN AN ORGANIZED HEALTH CARE EDUCATION/TRAINING PROGRAM

## 2020-01-01 PROCEDURE — 25010000002 ONDANSETRON PER 1 MG: Performed by: STUDENT IN AN ORGANIZED HEALTH CARE EDUCATION/TRAINING PROGRAM

## 2020-01-01 PROCEDURE — 80069 RENAL FUNCTION PANEL: CPT | Performed by: INTERNAL MEDICINE

## 2020-01-01 PROCEDURE — 76705 ECHO EXAM OF ABDOMEN: CPT

## 2020-01-01 PROCEDURE — 94002 VENT MGMT INPAT INIT DAY: CPT

## 2020-01-01 PROCEDURE — 25010000002 EPOETIN ALFA PER 1000 UNITS: Performed by: THORACIC SURGERY (CARDIOTHORACIC VASCULAR SURGERY)

## 2020-01-01 PROCEDURE — 0099U HC BIOFIRE FILMARRAY RESP PANEL 1: CPT | Performed by: EMERGENCY MEDICINE

## 2020-01-01 PROCEDURE — 25010000002 PROPOFOL 10 MG/ML EMULSION: Performed by: NURSE ANESTHETIST, CERTIFIED REGISTERED

## 2020-01-01 PROCEDURE — 06H033Z INSERTION OF INFUSION DEVICE INTO INFERIOR VENA CAVA, PERCUTANEOUS APPROACH: ICD-10-PCS | Performed by: THORACIC SURGERY (CARDIOTHORACIC VASCULAR SURGERY)

## 2020-01-01 PROCEDURE — 83605 ASSAY OF LACTIC ACID: CPT | Performed by: EMERGENCY MEDICINE

## 2020-01-01 PROCEDURE — 87635 SARS-COV-2 COVID-19 AMP PRB: CPT | Performed by: EMERGENCY MEDICINE

## 2020-01-01 PROCEDURE — 25010000002 AZITHROMYCIN PER 500 MG: Performed by: EMERGENCY MEDICINE

## 2020-01-01 PROCEDURE — 85025 COMPLETE CBC W/AUTO DIFF WBC: CPT | Performed by: STUDENT IN AN ORGANIZED HEALTH CARE EDUCATION/TRAINING PROGRAM

## 2020-01-01 PROCEDURE — 84443 ASSAY THYROID STIM HORMONE: CPT

## 2020-01-01 PROCEDURE — 25010000002 HYDROMORPHONE PER 4 MG: Performed by: NURSE ANESTHETIST, CERTIFIED REGISTERED

## 2020-01-01 PROCEDURE — 82378 CARCINOEMBRYONIC ANTIGEN: CPT | Performed by: SURGERY

## 2020-01-01 PROCEDURE — 36600 WITHDRAWAL OF ARTERIAL BLOOD: CPT

## 2020-01-01 PROCEDURE — G0180 MD CERTIFICATION HHA PATIENT: HCPCS | Performed by: NURSE PRACTITIONER

## 2020-01-01 PROCEDURE — 25010000002 ALBUMIN HUMAN 5% PER 50 ML: Performed by: SURGERY

## 2020-01-01 PROCEDURE — 80053 COMPREHEN METABOLIC PANEL: CPT | Performed by: EMERGENCY MEDICINE

## 2020-01-01 PROCEDURE — 84145 PROCALCITONIN (PCT): CPT | Performed by: EMERGENCY MEDICINE

## 2020-01-01 PROCEDURE — 82306 VITAMIN D 25 HYDROXY: CPT | Performed by: INTERNAL MEDICINE

## 2020-01-01 PROCEDURE — 85018 HEMOGLOBIN: CPT | Performed by: INTERNAL MEDICINE

## 2020-01-01 PROCEDURE — 99214 OFFICE O/P EST MOD 30 MIN: CPT | Performed by: NURSE PRACTITIONER

## 2020-01-01 PROCEDURE — 83540 ASSAY OF IRON: CPT | Performed by: INTERNAL MEDICINE

## 2020-01-01 PROCEDURE — 25010000002 PROMETHAZINE PER 50 MG: Performed by: INTERNAL MEDICINE

## 2020-01-01 PROCEDURE — 25010000002 PIPERACILLIN SOD-TAZOBACTAM PER 1 G: Performed by: INTERNAL MEDICINE

## 2020-01-01 PROCEDURE — 80202 ASSAY OF VANCOMYCIN: CPT | Performed by: INTERNAL MEDICINE

## 2020-01-01 PROCEDURE — 63710000001 INSULIN DETEMIR PER 5 UNITS: Performed by: INTERNAL MEDICINE

## 2020-01-01 PROCEDURE — 88309 TISSUE EXAM BY PATHOLOGIST: CPT | Performed by: PATHOLOGY

## 2020-01-01 PROCEDURE — 80053 COMPREHEN METABOLIC PANEL: CPT | Performed by: SURGERY

## 2020-01-01 PROCEDURE — 87040 BLOOD CULTURE FOR BACTERIA: CPT | Performed by: STUDENT IN AN ORGANIZED HEALTH CARE EDUCATION/TRAINING PROGRAM

## 2020-01-01 PROCEDURE — 87205 SMEAR GRAM STAIN: CPT | Performed by: INTERNAL MEDICINE

## 2020-01-01 PROCEDURE — 82728 ASSAY OF FERRITIN: CPT | Performed by: INTERNAL MEDICINE

## 2020-01-01 PROCEDURE — 25010000002 MIDAZOLAM PER 1 MG: Performed by: THORACIC SURGERY (CARDIOTHORACIC VASCULAR SURGERY)

## 2020-01-01 PROCEDURE — 85007 BL SMEAR W/DIFF WBC COUNT: CPT | Performed by: SURGERY

## 2020-01-01 PROCEDURE — 87186 SC STD MICRODIL/AGAR DIL: CPT | Performed by: STUDENT IN AN ORGANIZED HEALTH CARE EDUCATION/TRAINING PROGRAM

## 2020-01-01 PROCEDURE — 99213 OFFICE O/P EST LOW 20 MIN: CPT | Performed by: NURSE PRACTITIONER

## 2020-01-01 DEVICE — PROXIMATE RELOADABLE LINEAR CUTTER WITH SAFETY LOCK-OUT, 75MM
Type: IMPLANTABLE DEVICE | Site: SIGMOID COLON | Status: FUNCTIONAL
Brand: PROXIMATE

## 2020-01-01 RX ORDER — ROPINIROLE 0.25 MG/1
TABLET, FILM COATED ORAL
Qty: 30 TABLET | Refills: 0 | Status: SHIPPED | OUTPATIENT
Start: 2020-01-01 | End: 2020-01-01

## 2020-01-01 RX ORDER — WARFARIN SODIUM 5 MG/1
TABLET ORAL
Qty: 25 TABLET | Refills: 3 | Status: SHIPPED | OUTPATIENT
Start: 2020-01-01 | End: 2020-01-01

## 2020-01-01 RX ORDER — METOLAZONE 5 MG/1
5 TABLET ORAL DAILY
Status: DISCONTINUED | OUTPATIENT
Start: 2020-01-01 | End: 2020-01-01

## 2020-01-01 RX ORDER — LIDOCAINE HYDROCHLORIDE AND EPINEPHRINE 10; 10 MG/ML; UG/ML
INJECTION, SOLUTION INFILTRATION; PERINEURAL
Status: DISPENSED
Start: 2020-01-01 | End: 2020-01-01

## 2020-01-01 RX ORDER — ALBUMIN (HUMAN) 12.5 G/50ML
25 SOLUTION INTRAVENOUS AS NEEDED
Status: DISCONTINUED | OUTPATIENT
Start: 2020-01-01 | End: 2020-01-01 | Stop reason: HOSPADM

## 2020-01-01 RX ORDER — POTASSIUM CHLORIDE 20 MEQ/1
TABLET, EXTENDED RELEASE ORAL
Qty: 30 TABLET | Refills: 3 | Status: SHIPPED | OUTPATIENT
Start: 2020-01-01 | End: 2020-01-01

## 2020-01-01 RX ORDER — SODIUM CHLORIDE 9 MG/ML
INJECTION, SOLUTION INTRAVENOUS
Status: COMPLETED
Start: 2020-01-01 | End: 2020-01-01

## 2020-01-01 RX ORDER — CITALOPRAM 20 MG/1
TABLET ORAL
Qty: 30 TABLET | Refills: 3 | Status: SHIPPED | OUTPATIENT
Start: 2020-01-01 | End: 2020-01-01

## 2020-01-01 RX ORDER — MIDAZOLAM HYDROCHLORIDE 1 MG/ML
INJECTION INTRAMUSCULAR; INTRAVENOUS AS NEEDED
Status: DISCONTINUED | OUTPATIENT
Start: 2020-01-01 | End: 2020-01-01 | Stop reason: HOSPADM

## 2020-01-01 RX ORDER — PEN NEEDLE, DIABETIC
NEEDLE, DISPOSABLE MISCELLANEOUS
Qty: 130 EACH | Refills: 5 | Status: SHIPPED | OUTPATIENT
Start: 2020-01-01

## 2020-01-01 RX ORDER — ALBUMIN (HUMAN) 12.5 G/50ML
12.5 SOLUTION INTRAVENOUS AS NEEDED
Status: DISCONTINUED | OUTPATIENT
Start: 2020-01-01 | End: 2020-01-01

## 2020-01-01 RX ORDER — LIDOCAINE HYDROCHLORIDE 20 MG/ML
INJECTION, SOLUTION INFILTRATION; PERINEURAL AS NEEDED
Status: DISCONTINUED | OUTPATIENT
Start: 2020-01-01 | End: 2020-01-01 | Stop reason: SURG

## 2020-01-01 RX ORDER — SODIUM CHLORIDE 0.9 % (FLUSH) 0.9 %
10 SYRINGE (ML) INJECTION AS NEEDED
Status: DISCONTINUED | OUTPATIENT
Start: 2020-01-01 | End: 2020-01-01 | Stop reason: HOSPADM

## 2020-01-01 RX ORDER — WARFARIN SODIUM 5 MG/1
5 TABLET ORAL
Status: DISCONTINUED | OUTPATIENT
Start: 2020-01-01 | End: 2020-01-01 | Stop reason: DRUGHIGH

## 2020-01-01 RX ORDER — TRAZODONE HYDROCHLORIDE 50 MG/1
50 TABLET ORAL NIGHTLY
Status: DISCONTINUED | OUTPATIENT
Start: 2020-01-01 | End: 2020-01-01 | Stop reason: HOSPADM

## 2020-01-01 RX ORDER — ONDANSETRON 2 MG/ML
4 INJECTION INTRAMUSCULAR; INTRAVENOUS EVERY 6 HOURS PRN
Status: DISCONTINUED | OUTPATIENT
Start: 2020-01-01 | End: 2020-01-01 | Stop reason: HOSPADM

## 2020-01-01 RX ORDER — HYDROMORPHONE HCL 110MG/55ML
PATIENT CONTROLLED ANALGESIA SYRINGE INTRAVENOUS AS NEEDED
Status: DISCONTINUED | OUTPATIENT
Start: 2020-01-01 | End: 2020-01-01 | Stop reason: SURG

## 2020-01-01 RX ORDER — DEXAMETHASONE SODIUM PHOSPHATE 4 MG/ML
INJECTION, SOLUTION INTRA-ARTICULAR; INTRALESIONAL; INTRAMUSCULAR; INTRAVENOUS; SOFT TISSUE AS NEEDED
Status: DISCONTINUED | OUTPATIENT
Start: 2020-01-01 | End: 2020-01-01 | Stop reason: SURG

## 2020-01-01 RX ORDER — NALOXONE HCL 0.4 MG/ML
0.4 VIAL (ML) INJECTION
Status: DISCONTINUED | OUTPATIENT
Start: 2020-01-01 | End: 2020-01-01 | Stop reason: HOSPADM

## 2020-01-01 RX ORDER — FUROSEMIDE 10 MG/ML
40 INJECTION INTRAMUSCULAR; INTRAVENOUS ONCE
Status: COMPLETED | OUTPATIENT
Start: 2020-01-01 | End: 2020-01-01

## 2020-01-01 RX ORDER — METOLAZONE 2.5 MG/1
2.5 TABLET ORAL EVERY OTHER DAY
COMMUNITY
End: 2020-01-01 | Stop reason: HOSPADM

## 2020-01-01 RX ORDER — SODIUM CHLORIDE 0.9 % (FLUSH) 0.9 %
10 SYRINGE (ML) INJECTION EVERY 12 HOURS SCHEDULED
Status: DISCONTINUED | OUTPATIENT
Start: 2020-01-01 | End: 2020-01-01 | Stop reason: HOSPADM

## 2020-01-01 RX ORDER — ASPIRIN 81 MG/1
81 TABLET, CHEWABLE ORAL DAILY
Status: DISCONTINUED | OUTPATIENT
Start: 2020-01-01 | End: 2020-01-01 | Stop reason: HOSPADM

## 2020-01-01 RX ORDER — LOPERAMIDE HYDROCHLORIDE 2 MG/1
2 CAPSULE ORAL 4 TIMES DAILY PRN
COMMUNITY
End: 2020-01-01

## 2020-01-01 RX ORDER — NICOTINE POLACRILEX 4 MG
15 LOZENGE BUCCAL
Status: DISCONTINUED | OUTPATIENT
Start: 2020-01-01 | End: 2020-01-01 | Stop reason: HOSPADM

## 2020-01-01 RX ORDER — SODIUM CHLORIDE, SODIUM LACTATE, POTASSIUM CHLORIDE, CALCIUM CHLORIDE 600; 310; 30; 20 MG/100ML; MG/100ML; MG/100ML; MG/100ML
100 INJECTION, SOLUTION INTRAVENOUS CONTINUOUS
Status: CANCELLED | OUTPATIENT
Start: 2020-01-01

## 2020-01-01 RX ORDER — METOPROLOL SUCCINATE 25 MG/1
TABLET, EXTENDED RELEASE ORAL
Qty: 30 TABLET | Refills: 0 | Status: SHIPPED | OUTPATIENT
Start: 2020-01-01 | End: 2020-01-01

## 2020-01-01 RX ORDER — ACETAMINOPHEN 650 MG/1
650 SUPPOSITORY RECTAL ONCE AS NEEDED
Status: DISCONTINUED | OUTPATIENT
Start: 2020-01-01 | End: 2020-01-01 | Stop reason: HOSPADM

## 2020-01-01 RX ORDER — ROPINIROLE 0.25 MG/1
0.25 TABLET, FILM COATED ORAL NIGHTLY
COMMUNITY
End: 2020-01-01

## 2020-01-01 RX ORDER — SUCRALFATE 1 G/1
TABLET ORAL
Qty: 120 TABLET | Refills: 0 | Status: SHIPPED | OUTPATIENT
Start: 2020-01-01 | End: 2020-01-01

## 2020-01-01 RX ORDER — PROPOFOL 10 MG/ML
VIAL (ML) INTRAVENOUS AS NEEDED
Status: DISCONTINUED | OUTPATIENT
Start: 2020-01-01 | End: 2020-01-01 | Stop reason: SURG

## 2020-01-01 RX ORDER — ALBUMIN, HUMAN INJ 5% 5 %
500 SOLUTION INTRAVENOUS ONCE
Status: COMPLETED | OUTPATIENT
Start: 2020-01-01 | End: 2020-01-01

## 2020-01-01 RX ORDER — WARFARIN SODIUM 2.5 MG/1
TABLET ORAL
Qty: 30 TABLET | Refills: 2 | Status: SHIPPED | OUTPATIENT
Start: 2020-01-01 | End: 2020-01-01 | Stop reason: DRUGHIGH

## 2020-01-01 RX ORDER — SCOLOPAMINE TRANSDERMAL SYSTEM 1 MG/1
1 PATCH, EXTENDED RELEASE TRANSDERMAL CONTINUOUS
Status: DISCONTINUED | OUTPATIENT
Start: 2020-01-01 | End: 2020-01-01 | Stop reason: HOSPADM

## 2020-01-01 RX ORDER — ROPINIROLE 0.25 MG/1
0.25 TABLET, FILM COATED ORAL NIGHTLY
Status: DISCONTINUED | OUTPATIENT
Start: 2020-01-01 | End: 2020-01-01 | Stop reason: HOSPADM

## 2020-01-01 RX ORDER — KETAMINE HYDROCHLORIDE 100 MG/ML
INJECTION INTRAMUSCULAR; INTRAVENOUS AS NEEDED
Status: DISCONTINUED | OUTPATIENT
Start: 2020-01-01 | End: 2020-01-01 | Stop reason: SURG

## 2020-01-01 RX ORDER — ALBUTEROL SULFATE 90 UG/1
AEROSOL, METERED RESPIRATORY (INHALATION) AS NEEDED
Status: DISCONTINUED | OUTPATIENT
Start: 2020-01-01 | End: 2020-01-01 | Stop reason: SURG

## 2020-01-01 RX ORDER — ESCITALOPRAM OXALATE 20 MG/1
20 TABLET ORAL DAILY
Status: CANCELLED | OUTPATIENT
Start: 2020-01-01

## 2020-01-01 RX ORDER — METOPROLOL SUCCINATE 50 MG/1
50 TABLET, EXTENDED RELEASE ORAL DAILY
Status: DISCONTINUED | OUTPATIENT
Start: 2020-01-01 | End: 2020-01-01 | Stop reason: HOSPADM

## 2020-01-01 RX ORDER — METOLAZONE 2.5 MG/1
2.5 TABLET ORAL EVERY OTHER DAY
Status: DISCONTINUED | OUTPATIENT
Start: 2020-01-01 | End: 2020-01-01

## 2020-01-01 RX ORDER — INSULIN GLARGINE 100 [IU]/ML
30 INJECTION, SOLUTION SUBCUTANEOUS NIGHTLY
COMMUNITY
End: 2020-01-01 | Stop reason: HOSPADM

## 2020-01-01 RX ORDER — BUPIVACAINE HCL/0.9 % NACL/PF 0.1 %
2 PLASTIC BAG, INJECTION (ML) EPIDURAL ONCE
Status: CANCELLED | OUTPATIENT
Start: 2020-01-01 | End: 2020-01-01

## 2020-01-01 RX ORDER — HYDRALAZINE HYDROCHLORIDE 20 MG/ML
2.5 INJECTION INTRAMUSCULAR; INTRAVENOUS ONCE
Status: COMPLETED | OUTPATIENT
Start: 2020-01-01 | End: 2020-01-01

## 2020-01-01 RX ORDER — ACETAMINOPHEN 500 MG
1000 TABLET ORAL EVERY 6 HOURS
Status: DISPENSED | OUTPATIENT
Start: 2020-01-01 | End: 2020-01-01

## 2020-01-01 RX ORDER — WARFARIN SODIUM 2.5 MG/1
2.5 TABLET ORAL
Status: DISCONTINUED | OUTPATIENT
Start: 2020-01-01 | End: 2020-01-01 | Stop reason: HOSPADM

## 2020-01-01 RX ORDER — FAMOTIDINE 20 MG/1
20 TABLET, FILM COATED ORAL 2 TIMES DAILY
Status: DISCONTINUED | OUTPATIENT
Start: 2020-01-01 | End: 2020-01-01

## 2020-01-01 RX ORDER — BUMETANIDE 0.25 MG/ML
2 INJECTION INTRAMUSCULAR; INTRAVENOUS 2 TIMES DAILY
Status: COMPLETED | OUTPATIENT
Start: 2020-01-01 | End: 2020-01-01

## 2020-01-01 RX ORDER — FENTANYL CITRATE 50 UG/ML
INJECTION, SOLUTION INTRAMUSCULAR; INTRAVENOUS AS NEEDED
Status: DISCONTINUED | OUTPATIENT
Start: 2020-01-01 | End: 2020-01-01 | Stop reason: HOSPADM

## 2020-01-01 RX ORDER — METOLAZONE 2.5 MG/1
TABLET ORAL
Qty: 45 TABLET | Refills: 0 | Status: SHIPPED | OUTPATIENT
Start: 2020-01-01 | End: 2020-01-01

## 2020-01-01 RX ORDER — SUCRALFATE 1 G/1
TABLET ORAL
Qty: 120 TABLET | Refills: 0 | OUTPATIENT
Start: 2020-01-01

## 2020-01-01 RX ORDER — LABETALOL HYDROCHLORIDE 5 MG/ML
INJECTION, SOLUTION INTRAVENOUS AS NEEDED
Status: DISCONTINUED | OUTPATIENT
Start: 2020-01-01 | End: 2020-01-01 | Stop reason: SURG

## 2020-01-01 RX ORDER — EZETIMIBE 10 MG/1
TABLET ORAL
Qty: 90 TABLET | Refills: 1 | Status: SHIPPED | OUTPATIENT
Start: 2020-01-01 | End: 2020-01-01

## 2020-01-01 RX ORDER — ALVIMOPAN 12 MG/1
12 CAPSULE ORAL 2 TIMES DAILY
Status: DISCONTINUED | OUTPATIENT
Start: 2020-01-01 | End: 2020-01-01

## 2020-01-01 RX ORDER — CITALOPRAM 20 MG/1
20 TABLET ORAL DAILY
COMMUNITY
End: 2020-01-01

## 2020-01-01 RX ORDER — ACETAMINOPHEN 325 MG/1
650 TABLET ORAL EVERY 6 HOURS PRN
Status: DISCONTINUED | OUTPATIENT
Start: 2020-01-01 | End: 2020-01-01 | Stop reason: HOSPADM

## 2020-01-01 RX ORDER — ACETAMINOPHEN 325 MG/1
650 TABLET ORAL EVERY 6 HOURS PRN
COMMUNITY

## 2020-01-01 RX ORDER — BUMETANIDE 1 MG/1
2 TABLET ORAL DAILY
Status: DISCONTINUED | OUTPATIENT
Start: 2020-01-01 | End: 2020-01-01

## 2020-01-01 RX ORDER — SODIUM CHLORIDE 9 MG/ML
75 INJECTION, SOLUTION INTRAVENOUS CONTINUOUS
Status: DISCONTINUED | OUTPATIENT
Start: 2020-01-01 | End: 2020-01-01

## 2020-01-01 RX ORDER — MEPERIDINE HYDROCHLORIDE 100 MG/ML
12.5 INJECTION INTRAMUSCULAR; INTRAVENOUS; SUBCUTANEOUS
Status: DISCONTINUED | OUTPATIENT
Start: 2020-01-01 | End: 2020-01-01 | Stop reason: HOSPADM

## 2020-01-01 RX ORDER — WARFARIN SODIUM 5 MG/1
5 TABLET ORAL
Status: COMPLETED | OUTPATIENT
Start: 2020-01-01 | End: 2020-01-01

## 2020-01-01 RX ORDER — AMITRIPTYLINE HYDROCHLORIDE 150 MG/1
150 TABLET, FILM COATED ORAL NIGHTLY
Qty: 30 TABLET | Refills: 3 | Status: SHIPPED | OUTPATIENT
Start: 2020-01-01

## 2020-01-01 RX ORDER — HEPARIN SODIUM 5000 [USP'U]/ML
5000 INJECTION, SOLUTION INTRAVENOUS; SUBCUTANEOUS EVERY 8 HOURS SCHEDULED
Status: DISCONTINUED | OUTPATIENT
Start: 2020-01-01 | End: 2020-01-01

## 2020-01-01 RX ORDER — WARFARIN SODIUM 5 MG/1
5 TABLET ORAL
Status: DISCONTINUED | OUTPATIENT
Start: 2020-01-01 | End: 2020-01-01 | Stop reason: HOSPADM

## 2020-01-01 RX ORDER — OXYCODONE HYDROCHLORIDE 10 MG/1
10 TABLET ORAL EVERY 6 HOURS PRN
Qty: 12 TABLET | Refills: 0 | Status: SHIPPED | OUTPATIENT
Start: 2020-01-01 | End: 2020-01-01

## 2020-01-01 RX ORDER — ACETAMINOPHEN 325 MG/1
650 TABLET ORAL ONCE AS NEEDED
Status: DISCONTINUED | OUTPATIENT
Start: 2020-01-01 | End: 2020-01-01 | Stop reason: HOSPADM

## 2020-01-01 RX ORDER — IPRATROPIUM BROMIDE AND ALBUTEROL SULFATE 2.5; .5 MG/3ML; MG/3ML
3 SOLUTION RESPIRATORY (INHALATION)
Status: DISCONTINUED | OUTPATIENT
Start: 2020-01-01 | End: 2020-01-01 | Stop reason: HOSPADM

## 2020-01-01 RX ORDER — ALBUTEROL SULFATE 2.5 MG/3ML
2.5 SOLUTION RESPIRATORY (INHALATION) ONCE
Status: COMPLETED | OUTPATIENT
Start: 2020-01-01 | End: 2020-01-01

## 2020-01-01 RX ORDER — IPRATROPIUM BROMIDE AND ALBUTEROL SULFATE 2.5; .5 MG/3ML; MG/3ML
3 SOLUTION RESPIRATORY (INHALATION)
Status: DISCONTINUED | OUTPATIENT
Start: 2020-01-01 | End: 2020-01-01

## 2020-01-01 RX ORDER — DILTIAZEM HYDROCHLORIDE 240 MG/1
CAPSULE, EXTENDED RELEASE ORAL
Qty: 30 CAPSULE | Refills: 0 | Status: SHIPPED | OUTPATIENT
Start: 2020-01-01 | End: 2020-01-01 | Stop reason: HOSPADM

## 2020-01-01 RX ORDER — FLUMAZENIL 0.1 MG/ML
0.2 INJECTION INTRAVENOUS AS NEEDED
Status: DISCONTINUED | OUTPATIENT
Start: 2020-01-01 | End: 2020-01-01 | Stop reason: HOSPADM

## 2020-01-01 RX ORDER — METOPROLOL SUCCINATE 25 MG/1
25 TABLET, EXTENDED RELEASE ORAL DAILY
Status: DISCONTINUED | OUTPATIENT
Start: 2020-01-01 | End: 2020-01-01 | Stop reason: HOSPADM

## 2020-01-01 RX ORDER — ALUMINA, MAGNESIA, AND SIMETHICONE 2400; 2400; 240 MG/30ML; MG/30ML; MG/30ML
15 SUSPENSION ORAL EVERY 6 HOURS PRN
Status: DISCONTINUED | OUTPATIENT
Start: 2020-01-01 | End: 2020-01-01 | Stop reason: HOSPADM

## 2020-01-01 RX ORDER — SODIUM CHLORIDE, SODIUM GLUCONATE, SODIUM ACETATE, POTASSIUM CHLORIDE, AND MAGNESIUM CHLORIDE 526; 502; 368; 37; 30 MG/100ML; MG/100ML; MG/100ML; MG/100ML; MG/100ML
INJECTION, SOLUTION INTRAVENOUS CONTINUOUS PRN
Status: DISCONTINUED | OUTPATIENT
Start: 2020-01-01 | End: 2020-01-01 | Stop reason: SURG

## 2020-01-01 RX ORDER — ACETAMINOPHEN 325 MG/1
325 TABLET ORAL EVERY 6 HOURS PRN
Status: DISCONTINUED | OUTPATIENT
Start: 2020-01-01 | End: 2020-01-01 | Stop reason: HOSPADM

## 2020-01-01 RX ORDER — SODIUM CHLORIDE 9 MG/ML
INJECTION, SOLUTION INTRAVENOUS CONTINUOUS PRN
Status: DISCONTINUED | OUTPATIENT
Start: 2020-01-01 | End: 2020-01-01 | Stop reason: SURG

## 2020-01-01 RX ORDER — HEPARIN SODIUM 1000 [USP'U]/ML
4000 INJECTION, SOLUTION INTRAVENOUS; SUBCUTANEOUS AS NEEDED
Status: DISCONTINUED | OUTPATIENT
Start: 2020-01-01 | End: 2020-01-01 | Stop reason: HOSPADM

## 2020-01-01 RX ORDER — ALBUMIN (HUMAN) 12.5 G/50ML
12.5 SOLUTION INTRAVENOUS AS NEEDED
Status: ACTIVE | OUTPATIENT
Start: 2020-01-01 | End: 2020-01-01

## 2020-01-01 RX ORDER — PROMETHAZINE HYDROCHLORIDE 25 MG/ML
12.5 INJECTION, SOLUTION INTRAMUSCULAR; INTRAVENOUS ONCE AS NEEDED
Status: DISCONTINUED | OUTPATIENT
Start: 2020-01-01 | End: 2020-01-01 | Stop reason: HOSPADM

## 2020-01-01 RX ORDER — SODIUM CHLORIDE 9 MG/ML
50 INJECTION, SOLUTION INTRAVENOUS CONTINUOUS
Status: CANCELLED | OUTPATIENT
Start: 2020-01-01

## 2020-01-01 RX ORDER — NEOMYCIN SULFATE 500 MG/1
TABLET ORAL
Qty: 4 TABLET | Refills: 0 | Status: SHIPPED | OUTPATIENT
Start: 2020-01-01 | End: 2020-01-01 | Stop reason: HOSPADM

## 2020-01-01 RX ORDER — AMITRIPTYLINE HYDROCHLORIDE 75 MG/1
75 TABLET, FILM COATED ORAL NIGHTLY
Qty: 30 TABLET | Refills: 3 | Status: CANCELLED | OUTPATIENT
Start: 2020-01-01

## 2020-01-01 RX ORDER — MORPHINE SULFATE 2 MG/ML
2 INJECTION, SOLUTION INTRAMUSCULAR; INTRAVENOUS EVERY 4 HOURS PRN
Status: DISPENSED | OUTPATIENT
Start: 2020-01-01 | End: 2020-01-01

## 2020-01-01 RX ORDER — PANTOPRAZOLE SODIUM 40 MG/1
TABLET, DELAYED RELEASE ORAL
Qty: 30 TABLET | Refills: 0 | OUTPATIENT
Start: 2020-01-01

## 2020-01-01 RX ORDER — SODIUM CHLORIDE 0.9 % (FLUSH) 0.9 %
3 SYRINGE (ML) INJECTION EVERY 12 HOURS SCHEDULED
Status: DISCONTINUED | OUTPATIENT
Start: 2020-01-01 | End: 2020-01-01 | Stop reason: HOSPADM

## 2020-01-01 RX ORDER — CITALOPRAM 20 MG/1
20 TABLET ORAL DAILY
Status: DISCONTINUED | OUTPATIENT
Start: 2020-01-01 | End: 2020-01-01 | Stop reason: HOSPADM

## 2020-01-01 RX ORDER — HEPARIN SODIUM 1000 [USP'U]/ML
2000 INJECTION, SOLUTION INTRAVENOUS; SUBCUTANEOUS AS NEEDED
Status: DISCONTINUED | OUTPATIENT
Start: 2020-01-01 | End: 2020-01-01 | Stop reason: SDUPTHER

## 2020-01-01 RX ORDER — NALOXONE HCL 0.4 MG/ML
0.4 VIAL (ML) INJECTION
Status: DISCONTINUED | OUTPATIENT
Start: 2020-01-01 | End: 2020-01-01

## 2020-01-01 RX ORDER — CITALOPRAM 20 MG/1
TABLET ORAL
Qty: 30 TABLET | Refills: 0 | OUTPATIENT
Start: 2020-01-01

## 2020-01-01 RX ORDER — HEPARIN SODIUM 1000 [USP'U]/ML
2000 INJECTION, SOLUTION INTRAVENOUS; SUBCUTANEOUS AS NEEDED
Status: DISCONTINUED | OUTPATIENT
Start: 2020-01-01 | End: 2020-01-01 | Stop reason: HOSPADM

## 2020-01-01 RX ORDER — ONDANSETRON 2 MG/ML
4 INJECTION INTRAMUSCULAR; INTRAVENOUS ONCE AS NEEDED
Status: DISCONTINUED | OUTPATIENT
Start: 2020-01-01 | End: 2020-01-01 | Stop reason: HOSPADM

## 2020-01-01 RX ORDER — PROMETHAZINE HYDROCHLORIDE 25 MG/ML
12.5 INJECTION, SOLUTION INTRAMUSCULAR; INTRAVENOUS ONCE
Status: COMPLETED | OUTPATIENT
Start: 2020-01-01 | End: 2020-01-01

## 2020-01-01 RX ORDER — ACETAMINOPHEN 325 MG/1
650 TABLET ORAL EVERY 4 HOURS PRN
Status: DISCONTINUED | OUTPATIENT
Start: 2020-01-01 | End: 2020-01-01 | Stop reason: HOSPADM

## 2020-01-01 RX ORDER — INSULIN GLARGINE 100 [IU]/ML
30 INJECTION, SOLUTION SUBCUTANEOUS NIGHTLY
Qty: 3 PEN | Refills: 5 | Status: SHIPPED | OUTPATIENT
Start: 2020-01-01

## 2020-01-01 RX ORDER — BUDESONIDE AND FORMOTEROL FUMARATE DIHYDRATE 160; 4.5 UG/1; UG/1
2 AEROSOL RESPIRATORY (INHALATION)
Status: DISCONTINUED | OUTPATIENT
Start: 2020-01-01 | End: 2020-01-01 | Stop reason: HOSPADM

## 2020-01-01 RX ORDER — FENTANYL CITRATE 50 UG/ML
INJECTION, SOLUTION INTRAMUSCULAR; INTRAVENOUS
Status: DISPENSED
Start: 2020-01-01 | End: 2020-01-01

## 2020-01-01 RX ORDER — DILTIAZEM HYDROCHLORIDE 240 MG/1
240 CAPSULE, COATED, EXTENDED RELEASE ORAL DAILY
Status: DISCONTINUED | OUTPATIENT
Start: 2020-01-01 | End: 2020-01-01 | Stop reason: HOSPADM

## 2020-01-01 RX ORDER — SODIUM CHLORIDE, SODIUM LACTATE, POTASSIUM CHLORIDE, CALCIUM CHLORIDE 600; 310; 30; 20 MG/100ML; MG/100ML; MG/100ML; MG/100ML
100 INJECTION, SOLUTION INTRAVENOUS CONTINUOUS
Status: DISCONTINUED | OUTPATIENT
Start: 2020-01-01 | End: 2020-01-01

## 2020-01-01 RX ORDER — SODIUM CHLORIDE 0.9 % (FLUSH) 0.9 %
10 SYRINGE (ML) INJECTION AS NEEDED
Status: CANCELLED | OUTPATIENT
Start: 2020-01-01

## 2020-01-01 RX ORDER — HYDROCODONE BITARTRATE AND ACETAMINOPHEN 5; 325 MG/1; MG/1
1 TABLET ORAL EVERY 4 HOURS PRN
Status: DISCONTINUED | OUTPATIENT
Start: 2020-01-01 | End: 2020-01-01

## 2020-01-01 RX ORDER — WARFARIN SODIUM 2.5 MG/1
TABLET ORAL
Qty: 30 TABLET | Refills: 0 | Status: SHIPPED | OUTPATIENT
Start: 2020-01-01 | End: 2020-01-01 | Stop reason: SDUPTHER

## 2020-01-01 RX ORDER — ALBUMIN (HUMAN) 12.5 G/50ML
12.5 SOLUTION INTRAVENOUS AS NEEDED
Status: DISCONTINUED | OUTPATIENT
Start: 2020-01-01 | End: 2020-01-01 | Stop reason: SDUPTHER

## 2020-01-01 RX ORDER — LIDOCAINE HYDROCHLORIDE AND EPINEPHRINE 10; 10 MG/ML; UG/ML
INJECTION, SOLUTION INFILTRATION; PERINEURAL AS NEEDED
Status: DISCONTINUED | OUTPATIENT
Start: 2020-01-01 | End: 2020-01-01 | Stop reason: HOSPADM

## 2020-01-01 RX ORDER — WARFARIN SODIUM 2.5 MG/1
2.5 TABLET ORAL
Status: DISCONTINUED | OUTPATIENT
Start: 2020-01-01 | End: 2020-01-01

## 2020-01-01 RX ORDER — FAMOTIDINE 40 MG/1
40 TABLET, FILM COATED ORAL DAILY
COMMUNITY
End: 2020-01-01 | Stop reason: HOSPADM

## 2020-01-01 RX ORDER — BUPIVACAINE HYDROCHLORIDE AND EPINEPHRINE 5; 5 MG/ML; UG/ML
INJECTION, SOLUTION EPIDURAL; INTRACAUDAL; PERINEURAL AS NEEDED
Status: DISCONTINUED | OUTPATIENT
Start: 2020-01-01 | End: 2020-01-01 | Stop reason: HOSPADM

## 2020-01-01 RX ORDER — METOPROLOL SUCCINATE 25 MG/1
25 TABLET, EXTENDED RELEASE ORAL ONCE
Status: COMPLETED | OUTPATIENT
Start: 2020-01-01 | End: 2020-01-01

## 2020-01-01 RX ORDER — BUMETANIDE 0.25 MG/ML
2 INJECTION INTRAMUSCULAR; INTRAVENOUS ONCE
Status: COMPLETED | OUTPATIENT
Start: 2020-01-01 | End: 2020-01-01

## 2020-01-01 RX ORDER — SODIUM CHLORIDE 0.9 % (FLUSH) 0.9 %
3 SYRINGE (ML) INJECTION EVERY 12 HOURS SCHEDULED
Status: CANCELLED | OUTPATIENT
Start: 2020-01-01

## 2020-01-01 RX ORDER — MORPHINE SULFATE 2 MG/ML
2 INJECTION, SOLUTION INTRAMUSCULAR; INTRAVENOUS ONCE
Status: COMPLETED | OUTPATIENT
Start: 2020-01-01 | End: 2020-01-01

## 2020-01-01 RX ORDER — HYDROMORPHONE HCL 110MG/55ML
PATIENT CONTROLLED ANALGESIA SYRINGE INTRAVENOUS AS NEEDED
Status: DISCONTINUED | OUTPATIENT
Start: 2020-01-01 | End: 2020-01-01

## 2020-01-01 RX ORDER — PROMETHAZINE HYDROCHLORIDE 25 MG/1
25 TABLET ORAL ONCE AS NEEDED
Status: DISCONTINUED | OUTPATIENT
Start: 2020-01-01 | End: 2020-01-01 | Stop reason: HOSPADM

## 2020-01-01 RX ORDER — DILTIAZEM HYDROCHLORIDE 240 MG/1
240 CAPSULE, COATED, EXTENDED RELEASE ORAL
Status: DISCONTINUED | OUTPATIENT
Start: 2020-01-01 | End: 2020-01-01 | Stop reason: HOSPADM

## 2020-01-01 RX ORDER — BUMETANIDE 2 MG/1
2 TABLET ORAL DAILY
COMMUNITY
End: 2020-01-01 | Stop reason: HOSPADM

## 2020-01-01 RX ORDER — ROCURONIUM BROMIDE 10 MG/ML
INJECTION, SOLUTION INTRAVENOUS AS NEEDED
Status: DISCONTINUED | OUTPATIENT
Start: 2020-01-01 | End: 2020-01-01 | Stop reason: SURG

## 2020-01-01 RX ORDER — DIPHENHYDRAMINE HYDROCHLORIDE 50 MG/ML
12.5 INJECTION INTRAMUSCULAR; INTRAVENOUS
Status: DISCONTINUED | OUTPATIENT
Start: 2020-01-01 | End: 2020-01-01 | Stop reason: HOSPADM

## 2020-01-01 RX ORDER — SODIUM CHLORIDE 9 MG/ML
50 INJECTION, SOLUTION INTRAVENOUS CONTINUOUS
Status: DISCONTINUED | OUTPATIENT
Start: 2020-01-01 | End: 2020-01-01 | Stop reason: HOSPADM

## 2020-01-01 RX ORDER — MULTIVIT WITH MINERALS/LUTEIN
250 TABLET ORAL DAILY
COMMUNITY

## 2020-01-01 RX ORDER — SUCRALFATE 1 G/1
1 TABLET ORAL
Status: DISCONTINUED | OUTPATIENT
Start: 2020-01-01 | End: 2020-01-01 | Stop reason: HOSPADM

## 2020-01-01 RX ORDER — CITALOPRAM 20 MG/1
20 TABLET ORAL DAILY
Status: DISCONTINUED | OUTPATIENT
Start: 2020-01-01 | End: 2020-01-01

## 2020-01-01 RX ORDER — OXYCODONE HYDROCHLORIDE 10 MG/1
10 TABLET ORAL EVERY 6 HOURS PRN
Qty: 12 TABLET | Refills: 0 | OUTPATIENT
Start: 2020-01-01 | End: 2020-01-01

## 2020-01-01 RX ORDER — NALOXONE HCL 0.4 MG/ML
0.4 VIAL (ML) INJECTION AS NEEDED
Status: DISCONTINUED | OUTPATIENT
Start: 2020-01-01 | End: 2020-01-01 | Stop reason: HOSPADM

## 2020-01-01 RX ORDER — PANTOPRAZOLE SODIUM 40 MG/1
40 TABLET, DELAYED RELEASE ORAL DAILY
Qty: 90 TABLET | Refills: 2 | Status: SHIPPED | OUTPATIENT
Start: 2020-01-01

## 2020-01-01 RX ORDER — FAMOTIDINE 20 MG/1
20 TABLET, FILM COATED ORAL
Status: DISCONTINUED | OUTPATIENT
Start: 2020-01-01 | End: 2020-01-01

## 2020-01-01 RX ORDER — BUDESONIDE AND FORMOTEROL FUMARATE DIHYDRATE 160; 4.5 UG/1; UG/1
2 AEROSOL RESPIRATORY (INHALATION)
Qty: 6 G | Refills: 0
Start: 2020-01-01 | End: 2020-01-01 | Stop reason: HOSPADM

## 2020-01-01 RX ORDER — GUAIFENESIN AND DEXTROMETHORPHAN HYDROBROMIDE 600; 30 MG/1; MG/1
1 TABLET, EXTENDED RELEASE ORAL 2 TIMES DAILY
COMMUNITY
Start: 2020-01-01 | End: 2020-01-01 | Stop reason: HOSPADM

## 2020-01-01 RX ORDER — ONDANSETRON 2 MG/ML
INJECTION INTRAMUSCULAR; INTRAVENOUS AS NEEDED
Status: DISCONTINUED | OUTPATIENT
Start: 2020-01-01 | End: 2020-01-01 | Stop reason: SURG

## 2020-01-01 RX ORDER — PANTOPRAZOLE SODIUM 40 MG/1
40 TABLET, DELAYED RELEASE ORAL DAILY
Status: DISCONTINUED | OUTPATIENT
Start: 2020-01-01 | End: 2020-01-01 | Stop reason: HOSPADM

## 2020-01-01 RX ORDER — ALBUMIN, HUMAN INJ 5% 5 %
SOLUTION INTRAVENOUS CONTINUOUS PRN
Status: DISCONTINUED | OUTPATIENT
Start: 2020-01-01 | End: 2020-01-01 | Stop reason: SURG

## 2020-01-01 RX ORDER — INSULIN GLARGINE 100 [IU]/ML
INJECTION, SOLUTION SUBCUTANEOUS
Qty: 15 ML | Refills: 0 | Status: SHIPPED | OUTPATIENT
Start: 2020-01-01 | End: 2020-01-01

## 2020-01-01 RX ORDER — ASPIRIN 81 MG/1
81 TABLET, CHEWABLE ORAL DAILY
Status: DISCONTINUED | OUTPATIENT
Start: 2020-01-01 | End: 2020-01-01

## 2020-01-01 RX ORDER — WARFARIN SODIUM 2.5 MG/1
TABLET ORAL
Qty: 45 TABLET | Refills: 2 | Status: SHIPPED | OUTPATIENT
Start: 2020-01-01 | End: 2020-01-01 | Stop reason: SDUPTHER

## 2020-01-01 RX ORDER — BUPIVACAINE HCL/0.9 % NACL/PF 0.1 %
2 PLASTIC BAG, INJECTION (ML) EPIDURAL ONCE
Status: DISCONTINUED | OUTPATIENT
Start: 2020-01-01 | End: 2020-01-01 | Stop reason: HOSPADM

## 2020-01-01 RX ORDER — OXYCODONE HYDROCHLORIDE 5 MG/1
10 TABLET ORAL EVERY 6 HOURS PRN
Status: DISCONTINUED | OUTPATIENT
Start: 2020-01-01 | End: 2020-01-01 | Stop reason: HOSPADM

## 2020-01-01 RX ORDER — WARFARIN SODIUM 5 MG/1
TABLET ORAL
Qty: 30 TABLET | Refills: 2 | Status: SHIPPED | OUTPATIENT
Start: 2020-01-01 | End: 2020-01-01 | Stop reason: SDUPTHER

## 2020-01-01 RX ORDER — LANOLIN ALCOHOL/MO/W.PET/CERES
3 CREAM (GRAM) TOPICAL NIGHTLY PRN
Status: DISCONTINUED | OUTPATIENT
Start: 2020-01-01 | End: 2020-01-01 | Stop reason: HOSPADM

## 2020-01-01 RX ORDER — NALOXONE HYDROCHLORIDE 0.4 MG/ML
INJECTION, SOLUTION INTRAMUSCULAR; INTRAVENOUS; SUBCUTANEOUS
Status: COMPLETED
Start: 2020-01-01 | End: 2020-01-01

## 2020-01-01 RX ORDER — ALBUTEROL SULFATE 90 UG/1
2 AEROSOL, METERED RESPIRATORY (INHALATION) EVERY 4 HOURS PRN
Status: DISCONTINUED | OUTPATIENT
Start: 2020-01-01 | End: 2020-01-01

## 2020-01-01 RX ORDER — ALBUTEROL SULFATE 2.5 MG/3ML
2.5 SOLUTION RESPIRATORY (INHALATION) EVERY 4 HOURS PRN
Status: DISCONTINUED | OUTPATIENT
Start: 2020-01-01 | End: 2020-01-01 | Stop reason: HOSPADM

## 2020-01-01 RX ORDER — MORPHINE SULFATE 2 MG/ML
1 INJECTION, SOLUTION INTRAMUSCULAR; INTRAVENOUS EVERY 4 HOURS PRN
Status: DISCONTINUED | OUTPATIENT
Start: 2020-01-01 | End: 2020-01-01

## 2020-01-01 RX ORDER — ALBUMIN (HUMAN) 12.5 G/50ML
12.5 SOLUTION INTRAVENOUS AS NEEDED
Status: DISCONTINUED | OUTPATIENT
Start: 2020-01-01 | End: 2020-01-01 | Stop reason: HOSPADM

## 2020-01-01 RX ORDER — ROPINIROLE 0.25 MG/1
0.25 TABLET, FILM COATED ORAL NIGHTLY
Qty: 30 TABLET | Refills: 0 | Status: SHIPPED | OUTPATIENT
Start: 2020-01-01 | End: 2020-01-01

## 2020-01-01 RX ORDER — ESCITALOPRAM OXALATE 20 MG/1
20 TABLET ORAL DAILY
Qty: 30 TABLET | Refills: 2 | Status: SHIPPED | OUTPATIENT
Start: 2020-01-01

## 2020-01-01 RX ORDER — PANTOPRAZOLE SODIUM 40 MG/1
40 TABLET, DELAYED RELEASE ORAL
Status: DISCONTINUED | OUTPATIENT
Start: 2020-01-01 | End: 2020-01-01 | Stop reason: HOSPADM

## 2020-01-01 RX ORDER — MELOXICAM 15 MG/1
15 TABLET ORAL ONCE
Status: COMPLETED | OUTPATIENT
Start: 2020-01-01 | End: 2020-01-01

## 2020-01-01 RX ORDER — DIPHENHYDRAMINE HCL 25 MG
25 CAPSULE ORAL 3 TIMES DAILY PRN
Status: DISCONTINUED | OUTPATIENT
Start: 2020-01-01 | End: 2020-01-01 | Stop reason: HOSPADM

## 2020-01-01 RX ORDER — PHENAZOPYRIDINE HYDROCHLORIDE 100 MG/1
100 TABLET, FILM COATED ORAL
Status: DISPENSED | OUTPATIENT
Start: 2020-01-01 | End: 2020-01-01

## 2020-01-01 RX ORDER — METOPROLOL SUCCINATE 25 MG/1
TABLET, EXTENDED RELEASE ORAL
Qty: 30 TABLET | Refills: 3 | Status: SHIPPED | OUTPATIENT
Start: 2020-01-01

## 2020-01-01 RX ORDER — PANTOPRAZOLE SODIUM 40 MG/1
TABLET, DELAYED RELEASE ORAL
Qty: 30 TABLET | Refills: 0 | Status: SHIPPED | OUTPATIENT
Start: 2020-01-01 | End: 2020-01-01 | Stop reason: SDUPTHER

## 2020-01-01 RX ORDER — TRAZODONE HYDROCHLORIDE 150 MG/1
150 TABLET ORAL NIGHTLY
Status: DISCONTINUED | OUTPATIENT
Start: 2020-01-01 | End: 2020-01-01

## 2020-01-01 RX ORDER — WARFARIN SODIUM 5 MG/1
TABLET ORAL
COMMUNITY
Start: 2020-01-01

## 2020-01-01 RX ORDER — MULTIVIT WITH MINERALS/LUTEIN
250 TABLET ORAL DAILY
Status: DISCONTINUED | OUTPATIENT
Start: 2020-01-01 | End: 2020-01-01 | Stop reason: HOSPADM

## 2020-01-01 RX ORDER — OXYCODONE HYDROCHLORIDE 5 MG/1
10 TABLET ORAL EVERY 6 HOURS PRN
Status: DISCONTINUED | OUTPATIENT
Start: 2020-01-01 | End: 2020-01-01

## 2020-01-01 RX ORDER — NEOSTIGMINE METHYLSULFATE 4 MG/4 ML
SYRINGE (ML) INTRAVENOUS AS NEEDED
Status: DISCONTINUED | OUTPATIENT
Start: 2020-01-01 | End: 2020-01-01 | Stop reason: SURG

## 2020-01-01 RX ORDER — ACETAMINOPHEN 500 MG
1000 TABLET ORAL EVERY 6 HOURS
Status: COMPLETED | OUTPATIENT
Start: 2020-01-01 | End: 2020-01-01

## 2020-01-01 RX ORDER — HEPARIN SODIUM 1000 [USP'U]/ML
2000 INJECTION, SOLUTION INTRAVENOUS; SUBCUTANEOUS AS NEEDED
Status: DISCONTINUED | OUTPATIENT
Start: 2020-01-01 | End: 2020-01-01

## 2020-01-01 RX ORDER — PRENATAL VIT/IRON FUM/FOLIC AC 27MG-0.8MG
1 TABLET ORAL DAILY
Status: DISCONTINUED | OUTPATIENT
Start: 2020-01-01 | End: 2020-01-01 | Stop reason: HOSPADM

## 2020-01-01 RX ORDER — METHYLPREDNISOLONE SODIUM SUCCINATE 40 MG/ML
20 INJECTION, POWDER, LYOPHILIZED, FOR SOLUTION INTRAMUSCULAR; INTRAVENOUS EVERY 8 HOURS
Status: DISCONTINUED | OUTPATIENT
Start: 2020-01-01 | End: 2020-01-01

## 2020-01-01 RX ORDER — METOPROLOL SUCCINATE 25 MG/1
25 TABLET, EXTENDED RELEASE ORAL DAILY
Status: DISCONTINUED | OUTPATIENT
Start: 2020-01-01 | End: 2020-01-01

## 2020-01-01 RX ORDER — MELATONIN
2000 DAILY
Status: DISCONTINUED | OUTPATIENT
Start: 2020-01-01 | End: 2020-01-01 | Stop reason: HOSPADM

## 2020-01-01 RX ORDER — WARFARIN SODIUM 5 MG/1
5 TABLET ORAL
Status: DISCONTINUED | OUTPATIENT
Start: 2020-01-01 | End: 2020-01-01

## 2020-01-01 RX ORDER — FAMOTIDINE 20 MG/1
TABLET, FILM COATED ORAL
Qty: 60 TABLET | Refills: 11 | Status: SHIPPED | OUTPATIENT
Start: 2020-01-01 | End: 2020-01-01

## 2020-01-01 RX ORDER — LOPERAMIDE HYDROCHLORIDE 2 MG/1
CAPSULE ORAL
Qty: 60 CAPSULE | Refills: 0 | Status: SHIPPED | OUTPATIENT
Start: 2020-01-01

## 2020-01-01 RX ORDER — EZETIMIBE 10 MG/1
10 TABLET ORAL DAILY
COMMUNITY

## 2020-01-01 RX ORDER — FERROUS SULFATE TAB EC 324 MG (65 MG FE EQUIVALENT) 324 (65 FE) MG
324 TABLET DELAYED RESPONSE ORAL
Status: DISCONTINUED | OUTPATIENT
Start: 2020-01-01 | End: 2020-01-01 | Stop reason: HOSPADM

## 2020-01-01 RX ORDER — SODIUM CHLORIDE, SODIUM LACTATE, POTASSIUM CHLORIDE, CALCIUM CHLORIDE 600; 310; 30; 20 MG/100ML; MG/100ML; MG/100ML; MG/100ML
100 INJECTION, SOLUTION INTRAVENOUS CONTINUOUS
Status: DISCONTINUED | OUTPATIENT
Start: 2020-01-01 | End: 2020-01-01 | Stop reason: HOSPADM

## 2020-01-01 RX ORDER — SODIUM CHLORIDE 9 MG/ML
60 INJECTION, SOLUTION INTRAVENOUS CONTINUOUS
Status: DISCONTINUED | OUTPATIENT
Start: 2020-01-01 | End: 2020-01-01

## 2020-01-01 RX ORDER — ALVIMOPAN 12 MG/1
12 CAPSULE ORAL ONCE
Status: COMPLETED | OUTPATIENT
Start: 2020-01-01 | End: 2020-01-01

## 2020-01-01 RX ORDER — ALBUTEROL SULFATE 90 UG/1
4 AEROSOL, METERED RESPIRATORY (INHALATION)
Status: DISCONTINUED | OUTPATIENT
Start: 2020-01-01 | End: 2020-01-01

## 2020-01-01 RX ORDER — PANTOPRAZOLE SODIUM 40 MG/1
40 TABLET, DELAYED RELEASE ORAL DAILY
Qty: 30 TABLET | Refills: 0
Start: 2020-01-01 | End: 2020-01-01

## 2020-01-01 RX ORDER — ROPINIROLE 0.25 MG/1
0.25 TABLET, FILM COATED ORAL NIGHTLY
Status: DISCONTINUED | OUTPATIENT
Start: 2020-01-01 | End: 2020-01-01

## 2020-01-01 RX ORDER — EPHEDRINE SULFATE 50 MG/ML
5 INJECTION, SOLUTION INTRAVENOUS ONCE AS NEEDED
Status: DISCONTINUED | OUTPATIENT
Start: 2020-01-01 | End: 2020-01-01 | Stop reason: HOSPADM

## 2020-01-01 RX ORDER — HEPARIN SODIUM 1000 [USP'U]/ML
INJECTION, SOLUTION INTRAVENOUS; SUBCUTANEOUS
Status: DISPENSED
Start: 2020-01-01 | End: 2020-01-01

## 2020-01-01 RX ORDER — SUCRALFATE 1 G/1
1 TABLET ORAL 2 TIMES DAILY
COMMUNITY
End: 2020-01-01 | Stop reason: HOSPADM

## 2020-01-01 RX ORDER — OXYCODONE HYDROCHLORIDE 5 MG/1
5 TABLET ORAL EVERY 4 HOURS PRN
Status: DISCONTINUED | OUTPATIENT
Start: 2020-01-01 | End: 2020-01-01

## 2020-01-01 RX ORDER — SODIUM CHLORIDE, SODIUM LACTATE, POTASSIUM CHLORIDE, CALCIUM CHLORIDE 600; 310; 30; 20 MG/100ML; MG/100ML; MG/100ML; MG/100ML
INJECTION, SOLUTION INTRAVENOUS CONTINUOUS PRN
Status: DISCONTINUED | OUTPATIENT
Start: 2020-01-01 | End: 2020-01-01 | Stop reason: SURG

## 2020-01-01 RX ORDER — PROMETHAZINE HYDROCHLORIDE 25 MG/1
25 SUPPOSITORY RECTAL ONCE AS NEEDED
Status: DISCONTINUED | OUTPATIENT
Start: 2020-01-01 | End: 2020-01-01 | Stop reason: HOSPADM

## 2020-01-01 RX ORDER — ASPIRIN 325 MG
325 TABLET ORAL ONCE
Status: COMPLETED | OUTPATIENT
Start: 2020-01-01 | End: 2020-01-01

## 2020-01-01 RX ORDER — SUCCINYLCHOLINE CHLORIDE 20 MG/ML
INJECTION INTRAMUSCULAR; INTRAVENOUS AS NEEDED
Status: DISCONTINUED | OUTPATIENT
Start: 2020-01-01 | End: 2020-01-01 | Stop reason: SURG

## 2020-01-01 RX ORDER — SODIUM CHLORIDE 9 MG/ML
50 INJECTION, SOLUTION INTRAVENOUS CONTINUOUS
Status: DISCONTINUED | OUTPATIENT
Start: 2020-01-01 | End: 2020-01-01

## 2020-01-01 RX ORDER — MIDAZOLAM HYDROCHLORIDE 1 MG/ML
INJECTION INTRAMUSCULAR; INTRAVENOUS
Status: DISPENSED
Start: 2020-01-01 | End: 2020-01-01

## 2020-01-01 RX ORDER — DEXTROSE MONOHYDRATE 25 G/50ML
25 INJECTION, SOLUTION INTRAVENOUS
Status: DISCONTINUED | OUTPATIENT
Start: 2020-01-01 | End: 2020-01-01 | Stop reason: HOSPADM

## 2020-01-01 RX ORDER — FERROUS SULFATE TAB EC 324 MG (65 MG FE EQUIVALENT) 324 (65 FE) MG
324 TABLET DELAYED RESPONSE ORAL
Status: DISCONTINUED | OUTPATIENT
Start: 2020-01-01 | End: 2020-01-01

## 2020-01-01 RX ORDER — METRONIDAZOLE 500 MG/1
500 TABLET ORAL 3 TIMES DAILY
Qty: 21 TABLET | Refills: 0 | Status: SHIPPED | OUTPATIENT
Start: 2020-01-01 | End: 2020-01-01

## 2020-01-01 RX ORDER — TRAZODONE HYDROCHLORIDE 150 MG/1
300 TABLET ORAL NIGHTLY
Status: DISCONTINUED | OUTPATIENT
Start: 2020-01-01 | End: 2020-01-01 | Stop reason: HOSPADM

## 2020-01-01 RX ORDER — INSULIN GLARGINE 100 [IU]/ML
INJECTION, SOLUTION SUBCUTANEOUS
Qty: 60 ML | Refills: 3 | Status: SHIPPED | OUTPATIENT
Start: 2020-01-01 | End: 2020-01-01

## 2020-01-01 RX ORDER — TRAZODONE HYDROCHLORIDE 150 MG/1
300 TABLET ORAL NIGHTLY
COMMUNITY
End: 2020-01-01

## 2020-01-01 RX ORDER — ROPINIROLE 0.25 MG/1
TABLET, FILM COATED ORAL
Qty: 30 TABLET | Refills: 0 | Status: SHIPPED | OUTPATIENT
Start: 2020-01-01

## 2020-01-01 RX ORDER — HEPARIN SODIUM 5000 [USP'U]/ML
5000 INJECTION, SOLUTION INTRAVENOUS; SUBCUTANEOUS EVERY 8 HOURS SCHEDULED
Status: DISCONTINUED | OUTPATIENT
Start: 2020-01-01 | End: 2020-01-01 | Stop reason: HOSPADM

## 2020-01-01 RX ORDER — NALOXONE HYDROCHLORIDE 0.4 MG/ML
0.4 INJECTION, SOLUTION INTRAMUSCULAR; INTRAVENOUS; SUBCUTANEOUS ONCE
Status: DISCONTINUED | OUTPATIENT
Start: 2020-01-01 | End: 2020-01-01 | Stop reason: HOSPADM

## 2020-01-01 RX ORDER — FAMOTIDINE 20 MG/1
20 TABLET, FILM COATED ORAL DAILY
Status: DISCONTINUED | OUTPATIENT
Start: 2020-01-01 | End: 2020-01-01 | Stop reason: HOSPADM

## 2020-01-01 RX ADMIN — SODIUM CHLORIDE, PRESERVATIVE FREE 10 ML: 5 INJECTION INTRAVENOUS at 20:03

## 2020-01-01 RX ADMIN — HEPARIN SODIUM 5000 UNITS: 5000 INJECTION INTRAVENOUS; SUBCUTANEOUS at 15:12

## 2020-01-01 RX ADMIN — MORPHINE SULFATE 2 MG: 2 INJECTION, SOLUTION INTRAMUSCULAR; INTRAVENOUS at 10:03

## 2020-01-01 RX ADMIN — CITALOPRAM HYDROBROMIDE 20 MG: 20 TABLET ORAL at 12:07

## 2020-01-01 RX ADMIN — CEFTRIAXONE 2 G: 2 INJECTION, POWDER, FOR SOLUTION INTRAMUSCULAR; INTRAVENOUS at 14:24

## 2020-01-01 RX ADMIN — MELATONIN 2000 UNITS: at 10:06

## 2020-01-01 RX ADMIN — LABETALOL HYDROCHLORIDE 40 MG: 5 INJECTION, SOLUTION INTRAVENOUS at 15:05

## 2020-01-01 RX ADMIN — ROPINIROLE HYDROCHLORIDE 0.25 MG: 0.25 TABLET, FILM COATED ORAL at 21:11

## 2020-01-01 RX ADMIN — HYDROMORPHONE HYDROCHLORIDE 0.5 MG: 2 INJECTION INTRAMUSCULAR; INTRAVENOUS; SUBCUTANEOUS at 14:23

## 2020-01-01 RX ADMIN — DILTIAZEM HYDROCHLORIDE 240 MG: 240 CAPSULE, COATED, EXTENDED RELEASE ORAL at 09:05

## 2020-01-01 RX ADMIN — ONDANSETRON 4 MG: 2 INJECTION INTRAMUSCULAR; INTRAVENOUS at 21:03

## 2020-01-01 RX ADMIN — ROPINIROLE HYDROCHLORIDE 0.25 MG: 0.25 TABLET, FILM COATED ORAL at 20:34

## 2020-01-01 RX ADMIN — ALVIMOPAN 12 MG: 12 CAPSULE ORAL at 20:14

## 2020-01-01 RX ADMIN — ALBUMIN HUMAN 500 ML: 0.05 INJECTION, SOLUTION INTRAVENOUS at 22:21

## 2020-01-01 RX ADMIN — IPRATROPIUM BROMIDE AND ALBUTEROL SULFATE 3 ML: 2.5; .5 SOLUTION RESPIRATORY (INHALATION) at 19:03

## 2020-01-01 RX ADMIN — FAMOTIDINE 20 MG: 20 TABLET ORAL at 08:10

## 2020-01-01 RX ADMIN — Medication 1 G: at 12:40

## 2020-01-01 RX ADMIN — ROCURONIUM BROMIDE 15 MG: 10 INJECTION INTRAVENOUS at 15:18

## 2020-01-01 RX ADMIN — METOPROLOL SUCCINATE 25 MG: 25 TABLET, FILM COATED, EXTENDED RELEASE ORAL at 08:13

## 2020-01-01 RX ADMIN — WARFARIN SODIUM 5 MG: 5 TABLET ORAL at 18:07

## 2020-01-01 RX ADMIN — ROPINIROLE HYDROCHLORIDE 0.25 MG: 0.25 TABLET, FILM COATED ORAL at 21:28

## 2020-01-01 RX ADMIN — SODIUM CHLORIDE 250 ML: 9 INJECTION, SOLUTION INTRAVENOUS at 18:31

## 2020-01-01 RX ADMIN — OXYCODONE HYDROCHLORIDE 5 MG: 5 TABLET ORAL at 01:21

## 2020-01-01 RX ADMIN — METOLAZONE 5 MG: 5 TABLET ORAL at 09:24

## 2020-01-01 RX ADMIN — EPOETIN ALFA 6000 UNITS: 10000 SOLUTION INTRAVENOUS; SUBCUTANEOUS at 08:43

## 2020-01-01 RX ADMIN — METOPROLOL SUCCINATE 25 MG: 25 TABLET, FILM COATED, EXTENDED RELEASE ORAL at 08:41

## 2020-01-01 RX ADMIN — BUDESONIDE AND FORMOTEROL FUMARATE DIHYDRATE 2 PUFF: 160; 4.5 AEROSOL RESPIRATORY (INHALATION) at 06:51

## 2020-01-01 RX ADMIN — IPRATROPIUM BROMIDE AND ALBUTEROL SULFATE 3 ML: 2.5; .5 SOLUTION RESPIRATORY (INHALATION) at 15:04

## 2020-01-01 RX ADMIN — CITALOPRAM HYDROBROMIDE 20 MG: 20 TABLET ORAL at 09:27

## 2020-01-01 RX ADMIN — IPRATROPIUM BROMIDE AND ALBUTEROL SULFATE 3 ML: 2.5; .5 SOLUTION RESPIRATORY (INHALATION) at 14:32

## 2020-01-01 RX ADMIN — ASPIRIN 81 MG 81 MG: 81 TABLET ORAL at 09:43

## 2020-01-01 RX ADMIN — CITALOPRAM HYDROBROMIDE 20 MG: 20 TABLET ORAL at 08:55

## 2020-01-01 RX ADMIN — MELATONIN 3 MG: at 20:50

## 2020-01-01 RX ADMIN — PANTOPRAZOLE SODIUM 40 MG: 40 TABLET, DELAYED RELEASE ORAL at 13:05

## 2020-01-01 RX ADMIN — OXYCODONE HYDROCHLORIDE 10 MG: 5 TABLET ORAL at 12:15

## 2020-01-01 RX ADMIN — MELATONIN 2000 UNITS: at 12:40

## 2020-01-01 RX ADMIN — OXYCODONE HYDROCHLORIDE AND ACETAMINOPHEN 250 MG: 500 TABLET ORAL at 09:28

## 2020-01-01 RX ADMIN — IPRATROPIUM BROMIDE AND ALBUTEROL SULFATE 3 ML: 2.5; .5 SOLUTION RESPIRATORY (INHALATION) at 19:00

## 2020-01-01 RX ADMIN — OXYCODONE HYDROCHLORIDE AND ACETAMINOPHEN 250 MG: 500 TABLET ORAL at 09:21

## 2020-01-01 RX ADMIN — SODIUM CHLORIDE, PRESERVATIVE FREE 10 ML: 5 INJECTION INTRAVENOUS at 21:47

## 2020-01-01 RX ADMIN — MELATONIN 2000 UNITS: at 12:02

## 2020-01-01 RX ADMIN — CITALOPRAM HYDROBROMIDE 20 MG: 20 TABLET ORAL at 10:05

## 2020-01-01 RX ADMIN — PRENATAL VIT W/ FE FUMARATE-FA TAB 27-0.8 MG 1 TABLET: 27-0.8 TAB at 12:02

## 2020-01-01 RX ADMIN — CITALOPRAM HYDROBROMIDE 20 MG: 20 TABLET ORAL at 12:39

## 2020-01-01 RX ADMIN — SODIUM CHLORIDE, PRESERVATIVE FREE 10 ML: 5 INJECTION INTRAVENOUS at 21:28

## 2020-01-01 RX ADMIN — SODIUM CHLORIDE, PRESERVATIVE FREE 10 ML: 5 INJECTION INTRAVENOUS at 21:50

## 2020-01-01 RX ADMIN — IPRATROPIUM BROMIDE AND ALBUTEROL SULFATE 3 ML: 2.5; .5 SOLUTION RESPIRATORY (INHALATION) at 06:51

## 2020-01-01 RX ADMIN — METOPROLOL SUCCINATE 25 MG: 25 TABLET, FILM COATED, EXTENDED RELEASE ORAL at 11:54

## 2020-01-01 RX ADMIN — PRENATAL VIT W/ FE FUMARATE-FA TAB 27-0.8 MG 1 TABLET: 27-0.8 TAB at 12:39

## 2020-01-01 RX ADMIN — INSULIN DETEMIR 25 UNITS: 100 INJECTION, SOLUTION SUBCUTANEOUS at 20:59

## 2020-01-01 RX ADMIN — SODIUM CHLORIDE, PRESERVATIVE FREE 10 ML: 5 INJECTION INTRAVENOUS at 22:04

## 2020-01-01 RX ADMIN — HEPARIN SODIUM 4000 UNITS: 1000 INJECTION INTRAVENOUS; SUBCUTANEOUS at 12:37

## 2020-01-01 RX ADMIN — SODIUM CHLORIDE, PRESERVATIVE FREE 10 ML: 5 INJECTION INTRAVENOUS at 20:05

## 2020-01-01 RX ADMIN — PRENATAL VIT W/ FE FUMARATE-FA TAB 27-0.8 MG 1 TABLET: 27-0.8 TAB at 09:37

## 2020-01-01 RX ADMIN — ROPINIROLE HYDROCHLORIDE 0.25 MG: 0.25 TABLET, FILM COATED ORAL at 20:50

## 2020-01-01 RX ADMIN — CITALOPRAM HYDROBROMIDE 20 MG: 20 TABLET ORAL at 09:28

## 2020-01-01 RX ADMIN — METOPROLOL SUCCINATE 25 MG: 25 TABLET, EXTENDED RELEASE ORAL at 09:26

## 2020-01-01 RX ADMIN — CEFTRIAXONE SODIUM 1 G: 1 INJECTION, POWDER, FOR SOLUTION INTRAMUSCULAR; INTRAVENOUS at 11:24

## 2020-01-01 RX ADMIN — DILTIAZEM HYDROCHLORIDE 240 MG: 240 CAPSULE, COATED, EXTENDED RELEASE ORAL at 09:26

## 2020-01-01 RX ADMIN — SODIUM CHLORIDE, PRESERVATIVE FREE 10 ML: 5 INJECTION INTRAVENOUS at 09:09

## 2020-01-01 RX ADMIN — IPRATROPIUM BROMIDE AND ALBUTEROL SULFATE 3 ML: 2.5; .5 SOLUTION RESPIRATORY (INHALATION) at 15:01

## 2020-01-01 RX ADMIN — SUCRALFATE 1 G: 1 TABLET ORAL at 17:17

## 2020-01-01 RX ADMIN — ASPIRIN 81 MG 81 MG: 81 TABLET ORAL at 12:03

## 2020-01-01 RX ADMIN — BUDESONIDE AND FORMOTEROL FUMARATE DIHYDRATE 2 PUFF: 160; 4.5 AEROSOL RESPIRATORY (INHALATION) at 20:53

## 2020-01-01 RX ADMIN — FERROUS SULFATE TAB EC 324 MG (65 MG FE EQUIVALENT) 324 MG: 324 (65 FE) TABLET DELAYED RESPONSE at 11:54

## 2020-01-01 RX ADMIN — MORPHINE SULFATE 2 MG: 2 INJECTION, SOLUTION INTRAMUSCULAR; INTRAVENOUS at 14:43

## 2020-01-01 RX ADMIN — ROCURONIUM BROMIDE 5 MG: 10 INJECTION INTRAVENOUS at 16:45

## 2020-01-01 RX ADMIN — BUDESONIDE AND FORMOTEROL FUMARATE DIHYDRATE 2 PUFF: 160; 4.5 AEROSOL RESPIRATORY (INHALATION) at 07:15

## 2020-01-01 RX ADMIN — SODIUM CHLORIDE, PRESERVATIVE FREE 10 ML: 5 INJECTION INTRAVENOUS at 23:56

## 2020-01-01 RX ADMIN — OXYCODONE HYDROCHLORIDE 10 MG: 5 TABLET ORAL at 20:03

## 2020-01-01 RX ADMIN — DILTIAZEM HYDROCHLORIDE 240 MG: 240 CAPSULE, COATED, EXTENDED RELEASE ORAL at 09:34

## 2020-01-01 RX ADMIN — Medication 1 G: at 21:28

## 2020-01-01 RX ADMIN — SUCRALFATE 1 G: 1 TABLET ORAL at 21:23

## 2020-01-01 RX ADMIN — HEPARIN SODIUM 5000 UNITS: 5000 INJECTION INTRAVENOUS; SUBCUTANEOUS at 08:46

## 2020-01-01 RX ADMIN — IPRATROPIUM BROMIDE AND ALBUTEROL SULFATE 3 ML: 2.5; .5 SOLUTION RESPIRATORY (INHALATION) at 14:12

## 2020-01-01 RX ADMIN — ASPIRIN 325 MG: 325 TABLET ORAL at 20:40

## 2020-01-01 RX ADMIN — ROPINIROLE HYDROCHLORIDE 0.25 MG: 0.25 TABLET, FILM COATED ORAL at 21:27

## 2020-01-01 RX ADMIN — METOPROLOL SUCCINATE 25 MG: 25 TABLET, FILM COATED, EXTENDED RELEASE ORAL at 09:38

## 2020-01-01 RX ADMIN — MELATONIN 2000 UNITS: at 09:29

## 2020-01-01 RX ADMIN — CITALOPRAM HYDROBROMIDE 20 MG: 20 TABLET ORAL at 11:53

## 2020-01-01 RX ADMIN — SODIUM CHLORIDE: 9 INJECTION, SOLUTION INTRAVENOUS at 21:00

## 2020-01-01 RX ADMIN — TRAZODONE HYDROCHLORIDE 300 MG: 150 TABLET ORAL at 21:06

## 2020-01-01 RX ADMIN — DILTIAZEM HYDROCHLORIDE 240 MG: 240 CAPSULE, COATED, EXTENDED RELEASE ORAL at 15:07

## 2020-01-01 RX ADMIN — METOPROLOL SUCCINATE 25 MG: 25 TABLET, EXTENDED RELEASE ORAL at 12:40

## 2020-01-01 RX ADMIN — ALBUTEROL SULFATE 2.5 MG: 2.5 SOLUTION RESPIRATORY (INHALATION) at 10:38

## 2020-01-01 RX ADMIN — SUCRALFATE 1 G: 1 TABLET ORAL at 13:50

## 2020-01-01 RX ADMIN — OXYCODONE HYDROCHLORIDE 10 MG: 5 TABLET ORAL at 04:54

## 2020-01-01 RX ADMIN — MELATONIN 2000 UNITS: at 09:12

## 2020-01-01 RX ADMIN — SUCRALFATE 1 G: 1 TABLET ORAL at 17:47

## 2020-01-01 RX ADMIN — METOPROLOL SUCCINATE 25 MG: 25 TABLET, EXTENDED RELEASE ORAL at 13:13

## 2020-01-01 RX ADMIN — DILTIAZEM HYDROCHLORIDE 240 MG: 240 CAPSULE, COATED, EXTENDED RELEASE ORAL at 09:11

## 2020-01-01 RX ADMIN — TRAZODONE HYDROCHLORIDE 150 MG: 150 TABLET ORAL at 22:02

## 2020-01-01 RX ADMIN — MELATONIN 3 MG: at 23:11

## 2020-01-01 RX ADMIN — ALUMINUM HYDROXIDE, MAGNESIUM HYDROXIDE, AND DIMETHICONE 15 ML: 400; 400; 40 SUSPENSION ORAL at 01:36

## 2020-01-01 RX ADMIN — CEFTRIAXONE 2 G: 2 INJECTION, POWDER, FOR SOLUTION INTRAMUSCULAR; INTRAVENOUS at 21:45

## 2020-01-01 RX ADMIN — IPRATROPIUM BROMIDE AND ALBUTEROL SULFATE 3 ML: 2.5; .5 SOLUTION RESPIRATORY (INHALATION) at 14:14

## 2020-01-01 RX ADMIN — METOPROLOL SUCCINATE 50 MG: 50 TABLET, EXTENDED RELEASE ORAL at 08:33

## 2020-01-01 RX ADMIN — TRAZODONE HYDROCHLORIDE 300 MG: 150 TABLET ORAL at 21:49

## 2020-01-01 RX ADMIN — CITALOPRAM HYDROBROMIDE 20 MG: 20 TABLET ORAL at 08:41

## 2020-01-01 RX ADMIN — TRAZODONE HYDROCHLORIDE 150 MG: 150 TABLET ORAL at 20:14

## 2020-01-01 RX ADMIN — ROCURONIUM BROMIDE 45 MG: 10 INJECTION INTRAVENOUS at 13:40

## 2020-01-01 RX ADMIN — PANTOPRAZOLE SODIUM 40 MG: 40 TABLET, DELAYED RELEASE ORAL at 15:34

## 2020-01-01 RX ADMIN — METOPROLOL SUCCINATE 25 MG: 25 TABLET, FILM COATED, EXTENDED RELEASE ORAL at 12:06

## 2020-01-01 RX ADMIN — DILTIAZEM HYDROCHLORIDE 240 MG: 240 CAPSULE, COATED, EXTENDED RELEASE ORAL at 09:38

## 2020-01-01 RX ADMIN — ACETAMINOPHEN 1000 MG: 500 TABLET ORAL at 03:21

## 2020-01-01 RX ADMIN — ROPINIROLE HYDROCHLORIDE 0.25 MG: 0.25 TABLET, FILM COATED ORAL at 20:51

## 2020-01-01 RX ADMIN — CITALOPRAM HYDROBROMIDE 20 MG: 20 TABLET ORAL at 07:35

## 2020-01-01 RX ADMIN — HYDROMORPHONE HYDROCHLORIDE 1 MG: 2 INJECTION INTRAMUSCULAR; INTRAVENOUS; SUBCUTANEOUS at 13:40

## 2020-01-01 RX ADMIN — DILTIAZEM HYDROCHLORIDE 240 MG: 240 CAPSULE, COATED, EXTENDED RELEASE ORAL at 09:24

## 2020-01-01 RX ADMIN — EPOETIN ALFA 6000 UNITS: 10000 SOLUTION INTRAVENOUS; SUBCUTANEOUS at 09:34

## 2020-01-01 RX ADMIN — AZITHROMYCIN MONOHYDRATE 500 MG: 500 INJECTION, POWDER, LYOPHILIZED, FOR SOLUTION INTRAVENOUS at 04:31

## 2020-01-01 RX ADMIN — IPRATROPIUM BROMIDE AND ALBUTEROL SULFATE 3 ML: 2.5; .5 SOLUTION RESPIRATORY (INHALATION) at 19:05

## 2020-01-01 RX ADMIN — ALBUTEROL SULFATE 2.5 MG: 2.5 SOLUTION RESPIRATORY (INHALATION) at 21:12

## 2020-01-01 RX ADMIN — ACETAMINOPHEN 1000 MG: 500 TABLET ORAL at 22:02

## 2020-01-01 RX ADMIN — DIATRIZOATE MEGLUMINE AND DIATRIZOATE SODIUM 15 ML: 660; 100 LIQUID ORAL; RECTAL at 16:19

## 2020-01-01 RX ADMIN — ASPIRIN 81 MG 81 MG: 81 TABLET ORAL at 09:37

## 2020-01-01 RX ADMIN — SCOPALAMINE 1 PATCH: 1 PATCH, EXTENDED RELEASE TRANSDERMAL at 12:45

## 2020-01-01 RX ADMIN — BUDESONIDE AND FORMOTEROL FUMARATE DIHYDRATE 2 PUFF: 160; 4.5 AEROSOL RESPIRATORY (INHALATION) at 19:00

## 2020-01-01 RX ADMIN — DILTIAZEM HYDROCHLORIDE 240 MG: 240 CAPSULE, COATED, EXTENDED RELEASE ORAL at 08:10

## 2020-01-01 RX ADMIN — FAMOTIDINE 20 MG: 20 TABLET ORAL at 09:44

## 2020-01-01 RX ADMIN — ASPIRIN 81 MG 81 MG: 81 TABLET ORAL at 09:27

## 2020-01-01 RX ADMIN — SODIUM CHLORIDE, PRESERVATIVE FREE 10 ML: 5 INJECTION INTRAVENOUS at 21:04

## 2020-01-01 RX ADMIN — FAMOTIDINE 20 MG: 20 TABLET ORAL at 08:51

## 2020-01-01 RX ADMIN — FERROUS SULFATE TAB EC 324 MG (65 MG FE EQUIVALENT) 324 MG: 324 (65 FE) TABLET DELAYED RESPONSE at 08:27

## 2020-01-01 RX ADMIN — SODIUM CHLORIDE, PRESERVATIVE FREE 10 ML: 5 INJECTION INTRAVENOUS at 09:27

## 2020-01-01 RX ADMIN — INSULIN ASPART 4 UNITS: 100 INJECTION, SOLUTION INTRAVENOUS; SUBCUTANEOUS at 20:58

## 2020-01-01 RX ADMIN — PROMETHAZINE HYDROCHLORIDE 12.5 MG: 25 INJECTION INTRAMUSCULAR; INTRAVENOUS at 14:04

## 2020-01-01 RX ADMIN — METOPROLOL SUCCINATE 25 MG: 25 TABLET, EXTENDED RELEASE ORAL at 12:36

## 2020-01-01 RX ADMIN — MELATONIN 2000 UNITS: at 09:37

## 2020-01-01 RX ADMIN — Medication 1 G: at 21:00

## 2020-01-01 RX ADMIN — MELATONIN 2000 UNITS: at 11:53

## 2020-01-01 RX ADMIN — HEPARIN SODIUM 5000 UNITS: 5000 INJECTION INTRAVENOUS; SUBCUTANEOUS at 21:10

## 2020-01-01 RX ADMIN — ONDANSETRON 4 MG: 2 INJECTION INTRAMUSCULAR; INTRAVENOUS at 14:43

## 2020-01-01 RX ADMIN — DILTIAZEM HYDROCHLORIDE 240 MG: 240 CAPSULE, COATED, EXTENDED RELEASE ORAL at 12:02

## 2020-01-01 RX ADMIN — SODIUM CHLORIDE 50 ML/HR: 9 INJECTION, SOLUTION INTRAVENOUS at 13:53

## 2020-01-01 RX ADMIN — HEPARIN SODIUM 5000 UNITS: 5000 INJECTION INTRAVENOUS; SUBCUTANEOUS at 05:39

## 2020-01-01 RX ADMIN — IPRATROPIUM BROMIDE AND ALBUTEROL SULFATE 3 ML: 2.5; .5 SOLUTION RESPIRATORY (INHALATION) at 23:48

## 2020-01-01 RX ADMIN — IPRATROPIUM BROMIDE AND ALBUTEROL SULFATE 3 ML: 2.5; .5 SOLUTION RESPIRATORY (INHALATION) at 06:54

## 2020-01-01 RX ADMIN — SODIUM CHLORIDE, PRESERVATIVE FREE 10 ML: 5 INJECTION INTRAVENOUS at 08:56

## 2020-01-01 RX ADMIN — ALBUTEROL SULFATE 4 PUFF: 90 AEROSOL, METERED RESPIRATORY (INHALATION) at 21:39

## 2020-01-01 RX ADMIN — SUCRALFATE 1 G: 1 TABLET ORAL at 22:04

## 2020-01-01 RX ADMIN — SODIUM CHLORIDE, PRESERVATIVE FREE 10 ML: 5 INJECTION INTRAVENOUS at 08:14

## 2020-01-01 RX ADMIN — TRAZODONE HYDROCHLORIDE 50 MG: 50 TABLET ORAL at 21:00

## 2020-01-01 RX ADMIN — ACETAMINOPHEN 650 MG: 325 TABLET, FILM COATED ORAL at 21:56

## 2020-01-01 RX ADMIN — NALOXONE HYDROCHLORIDE 0.4 MG: 0.4 INJECTION, SOLUTION INTRAMUSCULAR; INTRAVENOUS; SUBCUTANEOUS at 14:30

## 2020-01-01 RX ADMIN — FAMOTIDINE 20 MG: 20 TABLET ORAL at 21:38

## 2020-01-01 RX ADMIN — IPRATROPIUM BROMIDE AND ALBUTEROL SULFATE 3 ML: 2.5; .5 SOLUTION RESPIRATORY (INHALATION) at 11:22

## 2020-01-01 RX ADMIN — SODIUM CHLORIDE, PRESERVATIVE FREE 10 ML: 5 INJECTION INTRAVENOUS at 08:34

## 2020-01-01 RX ADMIN — SODIUM CHLORIDE 125 MG: 9 INJECTION, SOLUTION INTRAVENOUS at 08:43

## 2020-01-01 RX ADMIN — BUDESONIDE AND FORMOTEROL FUMARATE DIHYDRATE 2 PUFF: 160; 4.5 AEROSOL RESPIRATORY (INHALATION) at 10:25

## 2020-01-01 RX ADMIN — ASPIRIN 81 MG 81 MG: 81 TABLET ORAL at 11:54

## 2020-01-01 RX ADMIN — SUCRALFATE 1 G: 1 TABLET ORAL at 17:40

## 2020-01-01 RX ADMIN — SODIUM CHLORIDE, PRESERVATIVE FREE 10 ML: 5 INJECTION INTRAVENOUS at 21:39

## 2020-01-01 RX ADMIN — HEPARIN SODIUM 5000 UNITS: 5000 INJECTION INTRAVENOUS; SUBCUTANEOUS at 16:03

## 2020-01-01 RX ADMIN — SODIUM CHLORIDE, POTASSIUM CHLORIDE, SODIUM LACTATE AND CALCIUM CHLORIDE 100 ML/HR: 600; 310; 30; 20 INJECTION, SOLUTION INTRAVENOUS at 01:27

## 2020-01-01 RX ADMIN — DILTIAZEM HYDROCHLORIDE 240 MG: 240 CAPSULE, COATED, EXTENDED RELEASE ORAL at 08:01

## 2020-01-01 RX ADMIN — ASPIRIN 81 MG 81 MG: 81 TABLET ORAL at 13:05

## 2020-01-01 RX ADMIN — METOPROLOL SUCCINATE 25 MG: 25 TABLET, FILM COATED, EXTENDED RELEASE ORAL at 13:13

## 2020-01-01 RX ADMIN — BUMETANIDE 2 MG: 1 TABLET ORAL at 09:24

## 2020-01-01 RX ADMIN — HYDROMORPHONE HYDROCHLORIDE 1 MG: 2 INJECTION INTRAMUSCULAR; INTRAVENOUS; SUBCUTANEOUS at 13:02

## 2020-01-01 RX ADMIN — ASPIRIN 81 MG 81 MG: 81 TABLET ORAL at 09:20

## 2020-01-01 RX ADMIN — OXYCODONE HYDROCHLORIDE AND ACETAMINOPHEN 250 MG: 500 TABLET ORAL at 08:26

## 2020-01-01 RX ADMIN — WARFARIN SODIUM 2.5 MG: 2.5 TABLET ORAL at 18:17

## 2020-01-01 RX ADMIN — ASPIRIN 81 MG 81 MG: 81 TABLET ORAL at 09:38

## 2020-01-01 RX ADMIN — FERROUS SULFATE TAB EC 324 MG (65 MG FE EQUIVALENT) 324 MG: 324 (65 FE) TABLET DELAYED RESPONSE at 10:05

## 2020-01-01 RX ADMIN — ROPINIROLE HYDROCHLORIDE 0.25 MG: 0.25 TABLET, FILM COATED ORAL at 21:06

## 2020-01-01 RX ADMIN — MELATONIN 3 MG: at 21:27

## 2020-01-01 RX ADMIN — OXYCODONE HYDROCHLORIDE AND ACETAMINOPHEN 250 MG: 500 TABLET ORAL at 10:05

## 2020-01-01 RX ADMIN — METOPROLOL SUCCINATE 25 MG: 25 TABLET, FILM COATED, EXTENDED RELEASE ORAL at 07:35

## 2020-01-01 RX ADMIN — CEFTRIAXONE SODIUM 1 G: 1 INJECTION, POWDER, FOR SOLUTION INTRAMUSCULAR; INTRAVENOUS at 11:07

## 2020-01-01 RX ADMIN — ROPINIROLE HYDROCHLORIDE 0.25 MG: 0.25 TABLET, FILM COATED ORAL at 21:23

## 2020-01-01 RX ADMIN — SUCRALFATE 1 G: 1 TABLET ORAL at 12:13

## 2020-01-01 RX ADMIN — CEFTRIAXONE 2 G: 2 INJECTION, POWDER, FOR SOLUTION INTRAMUSCULAR; INTRAVENOUS at 17:29

## 2020-01-01 RX ADMIN — ALBUMIN HUMAN 500 ML: 0.05 INJECTION, SOLUTION INTRAVENOUS at 07:47

## 2020-01-01 RX ADMIN — MELATONIN 3 MG: at 20:59

## 2020-01-01 RX ADMIN — CEFTRIAXONE 2 G: 2 INJECTION, POWDER, FOR SOLUTION INTRAMUSCULAR; INTRAVENOUS at 21:27

## 2020-01-01 RX ADMIN — BUDESONIDE AND FORMOTEROL FUMARATE DIHYDRATE 2 PUFF: 160; 4.5 AEROSOL RESPIRATORY (INHALATION) at 20:14

## 2020-01-01 RX ADMIN — ROPINIROLE HYDROCHLORIDE 0.25 MG: 0.25 TABLET, FILM COATED ORAL at 22:03

## 2020-01-01 RX ADMIN — CITALOPRAM HYDROBROMIDE 20 MG: 20 TABLET ORAL at 13:14

## 2020-01-01 RX ADMIN — HEPARIN SODIUM 5000 UNITS: 5000 INJECTION INTRAVENOUS; SUBCUTANEOUS at 14:30

## 2020-01-01 RX ADMIN — Medication 4 MG: at 17:15

## 2020-01-01 RX ADMIN — Medication 1 G: at 21:45

## 2020-01-01 RX ADMIN — OXYCODONE HYDROCHLORIDE 10 MG: 5 TABLET ORAL at 21:46

## 2020-01-01 RX ADMIN — IPRATROPIUM BROMIDE AND ALBUTEROL SULFATE 3 ML: 2.5; .5 SOLUTION RESPIRATORY (INHALATION) at 17:11

## 2020-01-01 RX ADMIN — FUROSEMIDE 40 MG: 10 INJECTION, SOLUTION INTRAMUSCULAR; INTRAVENOUS at 18:17

## 2020-01-01 RX ADMIN — HEPARIN SODIUM 5000 UNITS: 5000 INJECTION INTRAVENOUS; SUBCUTANEOUS at 12:36

## 2020-01-01 RX ADMIN — SODIUM CHLORIDE, PRESERVATIVE FREE 10 ML: 5 INJECTION INTRAVENOUS at 21:01

## 2020-01-01 RX ADMIN — BUDESONIDE AND FORMOTEROL FUMARATE DIHYDRATE 2 PUFF: 160; 4.5 AEROSOL RESPIRATORY (INHALATION) at 19:03

## 2020-01-01 RX ADMIN — ALBUMIN HUMAN: 0.05 INJECTION, SOLUTION INTRAVENOUS at 16:08

## 2020-01-01 RX ADMIN — MELATONIN 2000 UNITS: at 09:06

## 2020-01-01 RX ADMIN — CITALOPRAM HYDROBROMIDE 20 MG: 20 TABLET ORAL at 09:37

## 2020-01-01 RX ADMIN — IPRATROPIUM BROMIDE AND ALBUTEROL SULFATE 3 ML: 2.5; .5 SOLUTION RESPIRATORY (INHALATION) at 15:21

## 2020-01-01 RX ADMIN — ASPIRIN 81 MG 81 MG: 81 TABLET ORAL at 08:26

## 2020-01-01 RX ADMIN — OXYCODONE HYDROCHLORIDE 10 MG: 5 TABLET ORAL at 09:10

## 2020-01-01 RX ADMIN — ROCURONIUM BROMIDE 10 MG: 10 INJECTION INTRAVENOUS at 15:54

## 2020-01-01 RX ADMIN — PANTOPRAZOLE SODIUM 40 MG: 40 TABLET, DELAYED RELEASE ORAL at 11:53

## 2020-01-01 RX ADMIN — ROPINIROLE HYDROCHLORIDE 0.25 MG: 0.25 TABLET, FILM COATED ORAL at 21:38

## 2020-01-01 RX ADMIN — ASPIRIN 81 MG 81 MG: 81 TABLET ORAL at 10:05

## 2020-01-01 RX ADMIN — SODIUM CHLORIDE, PRESERVATIVE FREE 10 ML: 5 INJECTION INTRAVENOUS at 09:06

## 2020-01-01 RX ADMIN — PRENATAL VIT W/ FE FUMARATE-FA TAB 27-0.8 MG 1 TABLET: 27-0.8 TAB at 08:26

## 2020-01-01 RX ADMIN — HEPARIN SODIUM 5000 UNITS: 5000 INJECTION INTRAVENOUS; SUBCUTANEOUS at 21:03

## 2020-01-01 RX ADMIN — SUCRALFATE 1 G: 1 TABLET ORAL at 16:55

## 2020-01-01 RX ADMIN — METOPROLOL SUCCINATE 25 MG: 25 TABLET, FILM COATED, EXTENDED RELEASE ORAL at 09:34

## 2020-01-01 RX ADMIN — CEFTRIAXONE 2 G: 2 INJECTION, POWDER, FOR SOLUTION INTRAMUSCULAR; INTRAVENOUS at 20:49

## 2020-01-01 RX ADMIN — SODIUM CHLORIDE, PRESERVATIVE FREE 10 ML: 5 INJECTION INTRAVENOUS at 08:11

## 2020-01-01 RX ADMIN — OXYCODONE HYDROCHLORIDE 5 MG: 5 TABLET ORAL at 19:22

## 2020-01-01 RX ADMIN — METOPROLOL SUCCINATE 25 MG: 25 TABLET, EXTENDED RELEASE ORAL at 10:05

## 2020-01-01 RX ADMIN — ROCURONIUM BROMIDE 5 MG: 10 INJECTION INTRAVENOUS at 15:28

## 2020-01-01 RX ADMIN — IPRATROPIUM BROMIDE AND ALBUTEROL SULFATE 3 ML: 2.5; .5 SOLUTION RESPIRATORY (INHALATION) at 14:17

## 2020-01-01 RX ADMIN — PIPERACILLIN SODIUM, TAZOBACTAM SODIUM 4.5 G: 4; .5 INJECTION, POWDER, LYOPHILIZED, FOR SOLUTION INTRAVENOUS at 09:02

## 2020-01-01 RX ADMIN — SODIUM CHLORIDE, PRESERVATIVE FREE 10 ML: 5 INJECTION INTRAVENOUS at 08:10

## 2020-01-01 RX ADMIN — SUCRALFATE 1 G: 1 TABLET ORAL at 07:35

## 2020-01-01 RX ADMIN — OXYCODONE HYDROCHLORIDE AND ACETAMINOPHEN 250 MG: 500 TABLET ORAL at 12:02

## 2020-01-01 RX ADMIN — METOPROLOL SUCCINATE 25 MG: 25 TABLET, EXTENDED RELEASE ORAL at 09:37

## 2020-01-01 RX ADMIN — IPRATROPIUM BROMIDE AND ALBUTEROL SULFATE 3 ML: 2.5; .5 SOLUTION RESPIRATORY (INHALATION) at 20:08

## 2020-01-01 RX ADMIN — BUDESONIDE AND FORMOTEROL FUMARATE DIHYDRATE 2 PUFF: 160; 4.5 AEROSOL RESPIRATORY (INHALATION) at 19:05

## 2020-01-01 RX ADMIN — FAMOTIDINE 20 MG: 20 TABLET ORAL at 08:56

## 2020-01-01 RX ADMIN — ALUMINUM HYDROXIDE, MAGNESIUM HYDROXIDE, AND DIMETHICONE 15 ML: 400; 400; 40 SUSPENSION ORAL at 15:35

## 2020-01-01 RX ADMIN — ROPINIROLE HYDROCHLORIDE 0.25 MG: 0.25 TABLET, FILM COATED ORAL at 21:22

## 2020-01-01 RX ADMIN — OXYCODONE HYDROCHLORIDE 10 MG: 5 TABLET ORAL at 18:20

## 2020-01-01 RX ADMIN — PIPERACILLIN SODIUM,TAZOBACTAM SODIUM 3.38 G: 3; .375 INJECTION, POWDER, FOR SOLUTION INTRAVENOUS at 18:18

## 2020-01-01 RX ADMIN — PRENATAL VIT W/ FE FUMARATE-FA TAB 27-0.8 MG 1 TABLET: 27-0.8 TAB at 09:21

## 2020-01-01 RX ADMIN — OXYCODONE HYDROCHLORIDE AND ACETAMINOPHEN 250 MG: 500 TABLET ORAL at 09:11

## 2020-01-01 RX ADMIN — TRAZODONE HYDROCHLORIDE 50 MG: 50 TABLET ORAL at 20:52

## 2020-01-01 RX ADMIN — SODIUM CHLORIDE, PRESERVATIVE FREE 10 ML: 5 INJECTION INTRAVENOUS at 21:02

## 2020-01-01 RX ADMIN — PHENAZOPYRIDINE HYDROCHLORIDE 100 MG: 100 TABLET ORAL at 12:44

## 2020-01-01 RX ADMIN — MORPHINE SULFATE 2 MG: 2 INJECTION, SOLUTION INTRAMUSCULAR; INTRAVENOUS at 19:23

## 2020-01-01 RX ADMIN — DILTIAZEM HYDROCHLORIDE 240 MG: 240 CAPSULE, COATED, EXTENDED RELEASE ORAL at 11:53

## 2020-01-01 RX ADMIN — AZITHROMYCIN MONOHYDRATE 500 MG: 500 INJECTION, POWDER, LYOPHILIZED, FOR SOLUTION INTRAVENOUS at 21:06

## 2020-01-01 RX ADMIN — ACETAMINOPHEN 1000 MG: 500 TABLET ORAL at 08:13

## 2020-01-01 RX ADMIN — FERROUS SULFATE TAB EC 324 MG (65 MG FE EQUIVALENT) 324 MG: 324 (65 FE) TABLET DELAYED RESPONSE at 09:23

## 2020-01-01 RX ADMIN — IPRATROPIUM BROMIDE AND ALBUTEROL SULFATE 3 ML: 2.5; .5 SOLUTION RESPIRATORY (INHALATION) at 19:39

## 2020-01-01 RX ADMIN — CEFTRIAXONE 2 G: 2 INJECTION, POWDER, FOR SOLUTION INTRAMUSCULAR; INTRAVENOUS at 21:28

## 2020-01-01 RX ADMIN — FERROUS SULFATE TAB EC 324 MG (65 MG FE EQUIVALENT) 324 MG: 324 (65 FE) TABLET DELAYED RESPONSE at 08:26

## 2020-01-01 RX ADMIN — WARFARIN SODIUM 2.5 MG: 2.5 TABLET ORAL at 17:54

## 2020-01-01 RX ADMIN — DILTIAZEM HYDROCHLORIDE 240 MG: 240 CAPSULE, COATED, EXTENDED RELEASE ORAL at 13:13

## 2020-01-01 RX ADMIN — SUCRALFATE 1 G: 1 TABLET ORAL at 06:39

## 2020-01-01 RX ADMIN — IPRATROPIUM BROMIDE AND ALBUTEROL SULFATE 3 ML: 2.5; .5 SOLUTION RESPIRATORY (INHALATION) at 06:58

## 2020-01-01 RX ADMIN — OXYCODONE HYDROCHLORIDE AND ACETAMINOPHEN 250 MG: 500 TABLET ORAL at 09:27

## 2020-01-01 RX ADMIN — BUMETANIDE 2 MG: 1 TABLET ORAL at 09:43

## 2020-01-01 RX ADMIN — SODIUM CHLORIDE, SODIUM GLUCONATE, SODIUM ACETATE, POTASSIUM CHLORIDE, AND MAGNESIUM CHLORIDE: 526; 502; 368; 37; 30 INJECTION, SOLUTION INTRAVENOUS at 16:37

## 2020-01-01 RX ADMIN — SODIUM CHLORIDE, PRESERVATIVE FREE 10 ML: 5 INJECTION INTRAVENOUS at 08:41

## 2020-01-01 RX ADMIN — ACETAMINOPHEN 1000 MG: 500 TABLET ORAL at 13:45

## 2020-01-01 RX ADMIN — SODIUM CHLORIDE, PRESERVATIVE FREE 10 ML: 5 INJECTION INTRAVENOUS at 22:07

## 2020-01-01 RX ADMIN — LABETALOL HYDROCHLORIDE 5 MG: 5 INJECTION, SOLUTION INTRAVENOUS at 14:34

## 2020-01-01 RX ADMIN — ASPIRIN 81 MG 81 MG: 81 TABLET ORAL at 12:39

## 2020-01-01 RX ADMIN — ROPINIROLE HYDROCHLORIDE 0.25 MG: 0.25 TABLET, FILM COATED ORAL at 21:19

## 2020-01-01 RX ADMIN — PANTOPRAZOLE SODIUM 40 MG: 40 TABLET, DELAYED RELEASE ORAL at 08:01

## 2020-01-01 RX ADMIN — METOPROLOL SUCCINATE 25 MG: 25 TABLET, FILM COATED, EXTENDED RELEASE ORAL at 11:09

## 2020-01-01 RX ADMIN — MELATONIN 2000 UNITS: at 09:20

## 2020-01-01 RX ADMIN — LIDOCAINE HYDROCHLORIDE 100 MG: 20 INJECTION, SOLUTION INFILTRATION; PERINEURAL at 13:33

## 2020-01-01 RX ADMIN — SODIUM CHLORIDE, POTASSIUM CHLORIDE, SODIUM LACTATE AND CALCIUM CHLORIDE: 600; 310; 30; 20 INJECTION, SOLUTION INTRAVENOUS at 12:55

## 2020-01-01 RX ADMIN — DILTIAZEM HYDROCHLORIDE 240 MG: 240 CAPSULE, COATED, EXTENDED RELEASE ORAL at 08:46

## 2020-01-01 RX ADMIN — BUMETANIDE 2 MG: 1 TABLET ORAL at 00:47

## 2020-01-01 RX ADMIN — DILTIAZEM HYDROCHLORIDE 240 MG: 240 CAPSULE, COATED, EXTENDED RELEASE ORAL at 09:21

## 2020-01-01 RX ADMIN — SODIUM CHLORIDE: 900 INJECTION, SOLUTION INTRAVENOUS at 13:42

## 2020-01-01 RX ADMIN — CITALOPRAM HYDROBROMIDE 20 MG: 20 TABLET ORAL at 08:01

## 2020-01-01 RX ADMIN — HYDROCODONE BITARTRATE AND ACETAMINOPHEN 1 TABLET: 5; 325 TABLET ORAL at 09:04

## 2020-01-01 RX ADMIN — ASPIRIN 81 MG 81 MG: 81 TABLET ORAL at 08:01

## 2020-01-01 RX ADMIN — SUCRALFATE 1 G: 1 TABLET ORAL at 21:49

## 2020-01-01 RX ADMIN — ROPINIROLE HYDROCHLORIDE 0.25 MG: 0.25 TABLET, FILM COATED ORAL at 20:13

## 2020-01-01 RX ADMIN — METOPROLOL SUCCINATE 25 MG: 25 TABLET, FILM COATED, EXTENDED RELEASE ORAL at 08:56

## 2020-01-01 RX ADMIN — MELATONIN 2000 UNITS: at 08:26

## 2020-01-01 RX ADMIN — Medication 1 G: at 09:05

## 2020-01-01 RX ADMIN — WARFARIN SODIUM 5 MG: 5 TABLET ORAL at 17:40

## 2020-01-01 RX ADMIN — HEPARIN SODIUM 5000 UNITS: 5000 INJECTION INTRAVENOUS; SUBCUTANEOUS at 05:12

## 2020-01-01 RX ADMIN — PHYTONADIONE 5 MG: 10 INJECTION, EMULSION INTRAMUSCULAR; INTRAVENOUS; SUBCUTANEOUS at 20:33

## 2020-01-01 RX ADMIN — OXYCODONE HYDROCHLORIDE 10 MG: 5 TABLET ORAL at 15:34

## 2020-01-01 RX ADMIN — SODIUM BICARBONATE 50 MEQ: 84 INJECTION INTRAVENOUS at 22:25

## 2020-01-01 RX ADMIN — PANTOPRAZOLE SODIUM 40 MG: 40 TABLET, DELAYED RELEASE ORAL at 06:36

## 2020-01-01 RX ADMIN — SODIUM BICARBONATE 50 MEQ: 84 INJECTION INTRAVENOUS at 12:24

## 2020-01-01 RX ADMIN — FAMOTIDINE 20 MG: 20 TABLET ORAL at 08:33

## 2020-01-01 RX ADMIN — TRAZODONE HYDROCHLORIDE 300 MG: 150 TABLET ORAL at 22:05

## 2020-01-01 RX ADMIN — IPRATROPIUM BROMIDE AND ALBUTEROL SULFATE 3 ML: 2.5; .5 SOLUTION RESPIRATORY (INHALATION) at 14:41

## 2020-01-01 RX ADMIN — OXYCODONE HYDROCHLORIDE 10 MG: 5 TABLET ORAL at 20:50

## 2020-01-01 RX ADMIN — METHYLPREDNISOLONE SODIUM SUCCINATE 20 MG: 40 INJECTION, POWDER, FOR SOLUTION INTRAMUSCULAR; INTRAVENOUS at 16:51

## 2020-01-01 RX ADMIN — IPRATROPIUM BROMIDE AND ALBUTEROL SULFATE 3 ML: 2.5; .5 SOLUTION RESPIRATORY (INHALATION) at 05:10

## 2020-01-01 RX ADMIN — MORPHINE SULFATE 2 MG: 2 INJECTION, SOLUTION INTRAMUSCULAR; INTRAVENOUS at 09:02

## 2020-01-01 RX ADMIN — SUCRALFATE 1 G: 1 TABLET ORAL at 08:01

## 2020-01-01 RX ADMIN — OXYCODONE HYDROCHLORIDE 5 MG: 5 TABLET ORAL at 20:58

## 2020-01-01 RX ADMIN — OXYCODONE HYDROCHLORIDE 10 MG: 5 TABLET ORAL at 21:27

## 2020-01-01 RX ADMIN — SUCRALFATE 1 G: 1 TABLET ORAL at 09:38

## 2020-01-01 RX ADMIN — SODIUM CHLORIDE, PRESERVATIVE FREE 10 ML: 5 INJECTION INTRAVENOUS at 12:16

## 2020-01-01 RX ADMIN — SODIUM CHLORIDE, PRESERVATIVE FREE 10 ML: 5 INJECTION INTRAVENOUS at 22:03

## 2020-01-01 RX ADMIN — SUCRALFATE 1 G: 1 TABLET ORAL at 11:07

## 2020-01-01 RX ADMIN — ACETAMINOPHEN 1000 MG: 500 TABLET ORAL at 15:32

## 2020-01-01 RX ADMIN — OXYCODONE HYDROCHLORIDE 5 MG: 5 TABLET ORAL at 09:39

## 2020-01-01 RX ADMIN — ROPINIROLE HYDROCHLORIDE 0.25 MG: 0.25 TABLET, FILM COATED ORAL at 22:26

## 2020-01-01 RX ADMIN — IPRATROPIUM BROMIDE AND ALBUTEROL SULFATE 3 ML: 2.5; .5 SOLUTION RESPIRATORY (INHALATION) at 10:05

## 2020-01-01 RX ADMIN — WARFARIN SODIUM 5 MG: 5 TABLET ORAL at 18:29

## 2020-01-01 RX ADMIN — METHYLPREDNISOLONE SODIUM SUCCINATE 20 MG: 40 INJECTION, POWDER, FOR SOLUTION INTRAMUSCULAR; INTRAVENOUS at 07:36

## 2020-01-01 RX ADMIN — ONDANSETRON 4 MG: 2 INJECTION INTRAMUSCULAR; INTRAVENOUS at 17:06

## 2020-01-01 RX ADMIN — CITALOPRAM HYDROBROMIDE 20 MG: 20 TABLET ORAL at 09:06

## 2020-01-01 RX ADMIN — WARFARIN SODIUM 2.5 MG: 2.5 TABLET ORAL at 17:19

## 2020-01-01 RX ADMIN — SODIUM CHLORIDE, POTASSIUM CHLORIDE, SODIUM LACTATE AND CALCIUM CHLORIDE 100 ML/HR: 600; 310; 30; 20 INJECTION, SOLUTION INTRAVENOUS at 10:37

## 2020-01-01 RX ADMIN — AZITHROMYCIN MONOHYDRATE 500 MG: 500 INJECTION, POWDER, LYOPHILIZED, FOR SOLUTION INTRAVENOUS at 23:51

## 2020-01-01 RX ADMIN — HYDROCORTISONE: 1 CREAM TOPICAL at 21:09

## 2020-01-01 RX ADMIN — METOPROLOL SUCCINATE 25 MG: 25 TABLET, EXTENDED RELEASE ORAL at 09:06

## 2020-01-01 RX ADMIN — EPOETIN ALFA 6000 UNITS: 10000 SOLUTION INTRAVENOUS; SUBCUTANEOUS at 08:58

## 2020-01-01 RX ADMIN — SODIUM CHLORIDE, PRESERVATIVE FREE 10 ML: 5 INJECTION INTRAVENOUS at 21:00

## 2020-01-01 RX ADMIN — MELATONIN 2000 UNITS: at 09:03

## 2020-01-01 RX ADMIN — GLYCOPYRROLATE 0.8 MG: 0.2 INJECTION, SOLUTION INTRAMUSCULAR; INTRAVITREAL at 17:15

## 2020-01-01 RX ADMIN — ROPINIROLE HYDROCHLORIDE 0.25 MG: 0.25 TABLET, FILM COATED ORAL at 21:39

## 2020-01-01 RX ADMIN — ACETAMINOPHEN 1000 MG: 500 TABLET ORAL at 07:45

## 2020-01-01 RX ADMIN — MORPHINE SULFATE 2 MG: 2 INJECTION, SOLUTION INTRAMUSCULAR; INTRAVENOUS at 11:56

## 2020-01-01 RX ADMIN — ASPIRIN 81 MG 81 MG: 81 TABLET ORAL at 09:11

## 2020-01-01 RX ADMIN — SODIUM CHLORIDE, PRESERVATIVE FREE 10 ML: 5 INJECTION INTRAVENOUS at 22:23

## 2020-01-01 RX ADMIN — Medication 1 G: at 09:29

## 2020-01-01 RX ADMIN — ACETAMINOPHEN 1000 MG: 500 TABLET ORAL at 20:14

## 2020-01-01 RX ADMIN — CEFTRIAXONE SODIUM 1 G: 1 INJECTION, POWDER, FOR SOLUTION INTRAMUSCULAR; INTRAVENOUS at 18:23

## 2020-01-01 RX ADMIN — SUCRALFATE 1 G: 1 TABLET ORAL at 16:51

## 2020-01-01 RX ADMIN — SODIUM CHLORIDE 250 ML: 9 INJECTION, SOLUTION INTRAVENOUS at 10:06

## 2020-01-01 RX ADMIN — CITALOPRAM HYDROBROMIDE 20 MG: 20 TABLET ORAL at 09:03

## 2020-01-01 RX ADMIN — PANTOPRAZOLE SODIUM 40 MG: 40 TABLET, DELAYED RELEASE ORAL at 09:38

## 2020-01-01 RX ADMIN — PHYTONADIONE 5 MG: 10 INJECTION, EMULSION INTRAMUSCULAR; INTRAVENOUS; SUBCUTANEOUS at 16:54

## 2020-01-01 RX ADMIN — PHENAZOPYRIDINE HYDROCHLORIDE 100 MG: 100 TABLET ORAL at 09:26

## 2020-01-01 RX ADMIN — METHYLPREDNISOLONE SODIUM SUCCINATE 20 MG: 40 INJECTION, POWDER, FOR SOLUTION INTRAMUSCULAR; INTRAVENOUS at 08:00

## 2020-01-01 RX ADMIN — PRENATAL VIT W/ FE FUMARATE-FA TAB 27-0.8 MG 1 TABLET: 27-0.8 TAB at 09:24

## 2020-01-01 RX ADMIN — ASPIRIN 81 MG 81 MG: 81 TABLET ORAL at 11:09

## 2020-01-01 RX ADMIN — ASPIRIN 81 MG 81 MG: 81 TABLET ORAL at 13:14

## 2020-01-01 RX ADMIN — MELATONIN 2000 UNITS: at 13:13

## 2020-01-01 RX ADMIN — IPRATROPIUM BROMIDE AND ALBUTEROL SULFATE 3 ML: 2.5; .5 SOLUTION RESPIRATORY (INHALATION) at 06:57

## 2020-01-01 RX ADMIN — IPRATROPIUM BROMIDE AND ALBUTEROL SULFATE 3 ML: 2.5; .5 SOLUTION RESPIRATORY (INHALATION) at 15:42

## 2020-01-01 RX ADMIN — HEPARIN SODIUM 5000 UNITS: 5000 INJECTION INTRAVENOUS; SUBCUTANEOUS at 13:53

## 2020-01-01 RX ADMIN — SODIUM CHLORIDE, PRESERVATIVE FREE 10 ML: 5 INJECTION INTRAVENOUS at 15:07

## 2020-01-01 RX ADMIN — SODIUM CHLORIDE, PRESERVATIVE FREE 10 ML: 5 INJECTION INTRAVENOUS at 22:26

## 2020-01-01 RX ADMIN — DILTIAZEM HYDROCHLORIDE 240 MG: 240 CAPSULE, COATED, EXTENDED RELEASE ORAL at 08:41

## 2020-01-01 RX ADMIN — HEPARIN SODIUM 4000 UNITS: 1000 INJECTION INTRAVENOUS; SUBCUTANEOUS at 11:27

## 2020-01-01 RX ADMIN — PHENAZOPYRIDINE HYDROCHLORIDE 100 MG: 100 TABLET ORAL at 17:19

## 2020-01-01 RX ADMIN — HYDROCORTISONE: 1 CREAM TOPICAL at 08:35

## 2020-01-01 RX ADMIN — DILTIAZEM HYDROCHLORIDE 240 MG: 240 CAPSULE, COATED, EXTENDED RELEASE ORAL at 08:51

## 2020-01-01 RX ADMIN — ALVIMOPAN 12 MG: 12 CAPSULE ORAL at 10:39

## 2020-01-01 RX ADMIN — VANCOMYCIN HYDROCHLORIDE 1500 MG: 5 INJECTION, POWDER, LYOPHILIZED, FOR SOLUTION INTRAVENOUS at 11:37

## 2020-01-01 RX ADMIN — FERROUS SULFATE TAB EC 324 MG (65 MG FE EQUIVALENT) 324 MG: 324 (65 FE) TABLET DELAYED RESPONSE at 09:21

## 2020-01-01 RX ADMIN — FAMOTIDINE 20 MG: 20 TABLET ORAL at 20:14

## 2020-01-01 RX ADMIN — IPRATROPIUM BROMIDE AND ALBUTEROL SULFATE 3 ML: 2.5; .5 SOLUTION RESPIRATORY (INHALATION) at 19:04

## 2020-01-01 RX ADMIN — TRAZODONE HYDROCHLORIDE 150 MG: 150 TABLET ORAL at 21:38

## 2020-01-01 RX ADMIN — SODIUM CHLORIDE, PRESERVATIVE FREE 10 ML: 5 INJECTION INTRAVENOUS at 11:56

## 2020-01-01 RX ADMIN — HYDROCODONE BITARTRATE AND ACETAMINOPHEN 1 TABLET: 5; 325 TABLET ORAL at 12:27

## 2020-01-01 RX ADMIN — OXYCODONE HYDROCHLORIDE AND ACETAMINOPHEN 250 MG: 500 TABLET ORAL at 09:24

## 2020-01-01 RX ADMIN — CEFOXITIN 1 G: 2 INJECTION, POWDER, FOR SOLUTION INTRAVENOUS at 15:37

## 2020-01-01 RX ADMIN — CEFTRIAXONE 2 G: 2 INJECTION, POWDER, FOR SOLUTION INTRAMUSCULAR; INTRAVENOUS at 21:38

## 2020-01-01 RX ADMIN — FAMOTIDINE 20 MG: 20 TABLET ORAL at 08:46

## 2020-01-01 RX ADMIN — CITALOPRAM HYDROBROMIDE 20 MG: 20 TABLET ORAL at 13:13

## 2020-01-01 RX ADMIN — SODIUM CHLORIDE, PRESERVATIVE FREE 10 ML: 5 INJECTION INTRAVENOUS at 20:34

## 2020-01-01 RX ADMIN — SODIUM CHLORIDE 60 ML/HR: 9 INJECTION, SOLUTION INTRAVENOUS at 10:34

## 2020-01-01 RX ADMIN — HEPARIN SODIUM 5000 UNITS: 5000 INJECTION INTRAVENOUS; SUBCUTANEOUS at 20:56

## 2020-01-01 RX ADMIN — CEFOXITIN 2 G: 2 INJECTION, POWDER, FOR SOLUTION INTRAVENOUS at 13:44

## 2020-01-01 RX ADMIN — CEFTRIAXONE 2 G: 2 INJECTION, POWDER, FOR SOLUTION INTRAMUSCULAR; INTRAVENOUS at 20:03

## 2020-01-01 RX ADMIN — SODIUM CHLORIDE, PRESERVATIVE FREE 10 ML: 5 INJECTION INTRAVENOUS at 21:29

## 2020-01-01 RX ADMIN — SODIUM CHLORIDE, PRESERVATIVE FREE 10 ML: 5 INJECTION INTRAVENOUS at 08:35

## 2020-01-01 RX ADMIN — PRENATAL VIT W/ FE FUMARATE-FA TAB 27-0.8 MG 1 TABLET: 27-0.8 TAB at 13:13

## 2020-01-01 RX ADMIN — DILTIAZEM HYDROCHLORIDE 240 MG: 240 CAPSULE, COATED, EXTENDED RELEASE ORAL at 08:13

## 2020-01-01 RX ADMIN — ROPINIROLE HYDROCHLORIDE 0.25 MG: 0.25 TABLET, FILM COATED ORAL at 21:46

## 2020-01-01 RX ADMIN — FAMOTIDINE 20 MG: 20 TABLET ORAL at 08:13

## 2020-01-01 RX ADMIN — MELATONIN 2000 UNITS: at 08:01

## 2020-01-01 RX ADMIN — ACETAMINOPHEN 1000 MG: 500 TABLET ORAL at 08:41

## 2020-01-01 RX ADMIN — FAMOTIDINE 20 MG: 20 TABLET ORAL at 09:34

## 2020-01-01 RX ADMIN — SODIUM CHLORIDE 500 ML: 9 INJECTION, SOLUTION INTRAVENOUS at 21:01

## 2020-01-01 RX ADMIN — HEPARIN SODIUM 5000 UNITS: 5000 INJECTION INTRAVENOUS; SUBCUTANEOUS at 05:38

## 2020-01-01 RX ADMIN — Medication 1 G: at 20:52

## 2020-01-01 RX ADMIN — HEPARIN SODIUM 2000 UNITS: 1000 INJECTION INTRAVENOUS; SUBCUTANEOUS at 12:38

## 2020-01-01 RX ADMIN — HEPARIN SODIUM 5000 UNITS: 5000 INJECTION INTRAVENOUS; SUBCUTANEOUS at 05:35

## 2020-01-01 RX ADMIN — SODIUM CHLORIDE, POTASSIUM CHLORIDE, SODIUM LACTATE AND CALCIUM CHLORIDE 100 ML/HR: 600; 310; 30; 20 INJECTION, SOLUTION INTRAVENOUS at 06:45

## 2020-01-01 RX ADMIN — FERROUS SULFATE TAB EC 324 MG (65 MG FE EQUIVALENT) 324 MG: 324 (65 FE) TABLET DELAYED RESPONSE at 11:08

## 2020-01-01 RX ADMIN — SUCRALFATE 1 G: 1 TABLET ORAL at 20:38

## 2020-01-01 RX ADMIN — PANTOPRAZOLE SODIUM 40 MG: 40 TABLET, DELAYED RELEASE ORAL at 07:34

## 2020-01-01 RX ADMIN — IPRATROPIUM BROMIDE AND ALBUTEROL SULFATE 3 ML: 2.5; .5 SOLUTION RESPIRATORY (INHALATION) at 07:04

## 2020-01-01 RX ADMIN — CEFTRIAXONE SODIUM 1 G: 1 INJECTION, POWDER, FOR SOLUTION INTRAMUSCULAR; INTRAVENOUS at 10:47

## 2020-01-01 RX ADMIN — DILTIAZEM HYDROCHLORIDE 240 MG: 240 CAPSULE, COATED, EXTENDED RELEASE ORAL at 08:26

## 2020-01-01 RX ADMIN — CITALOPRAM HYDROBROMIDE 20 MG: 20 TABLET ORAL at 09:11

## 2020-01-01 RX ADMIN — HEPARIN SODIUM 5000 UNITS: 5000 INJECTION INTRAVENOUS; SUBCUTANEOUS at 05:28

## 2020-01-01 RX ADMIN — SODIUM CHLORIDE, PRESERVATIVE FREE 10 ML: 5 INJECTION INTRAVENOUS at 21:23

## 2020-01-01 RX ADMIN — CEFTRIAXONE SODIUM 1 G: 1 INJECTION, POWDER, FOR SOLUTION INTRAMUSCULAR; INTRAVENOUS at 13:50

## 2020-01-01 RX ADMIN — SUCRALFATE 1 G: 1 TABLET ORAL at 16:59

## 2020-01-01 RX ADMIN — MELOXICAM 15 MG: 15 TABLET ORAL at 12:44

## 2020-01-01 RX ADMIN — ALUMINUM HYDROXIDE, MAGNESIUM HYDROXIDE, AND DIMETHICONE 15 ML: 400; 400; 40 SUSPENSION ORAL at 09:44

## 2020-01-01 RX ADMIN — IPRATROPIUM BROMIDE AND ALBUTEROL SULFATE 3 ML: 2.5; .5 SOLUTION RESPIRATORY (INHALATION) at 07:38

## 2020-01-01 RX ADMIN — WARFARIN SODIUM 5 MG: 5 TABLET ORAL at 17:48

## 2020-01-01 RX ADMIN — ONDANSETRON 4 MG: 2 SOLUTION INTRAMUSCULAR; INTRAVENOUS at 03:45

## 2020-01-01 RX ADMIN — METOLAZONE 5 MG: 5 TABLET ORAL at 09:43

## 2020-01-01 RX ADMIN — OXYCODONE HYDROCHLORIDE 10 MG: 5 TABLET ORAL at 14:22

## 2020-01-01 RX ADMIN — ALBUTEROL SULFATE 2.5 MG: 2.5 SOLUTION RESPIRATORY (INHALATION) at 02:39

## 2020-01-01 RX ADMIN — OXYCODONE HYDROCHLORIDE AND ACETAMINOPHEN 250 MG: 500 TABLET ORAL at 12:39

## 2020-01-01 RX ADMIN — PRENATAL VIT W/ FE FUMARATE-FA TAB 27-0.8 MG 1 TABLET: 27-0.8 TAB at 10:06

## 2020-01-01 RX ADMIN — ROPINIROLE HYDROCHLORIDE 0.25 MG: 0.25 TABLET, FILM COATED ORAL at 20:03

## 2020-01-01 RX ADMIN — ROPINIROLE HYDROCHLORIDE 0.25 MG: 0.25 TABLET, FILM COATED ORAL at 20:49

## 2020-01-01 RX ADMIN — FERROUS SULFATE TAB EC 324 MG (65 MG FE EQUIVALENT) 324 MG: 324 (65 FE) TABLET DELAYED RESPONSE at 07:35

## 2020-01-01 RX ADMIN — SODIUM CHLORIDE, PRESERVATIVE FREE 10 ML: 5 INJECTION INTRAVENOUS at 11:57

## 2020-01-01 RX ADMIN — ERYTHROPOIETIN 10000 UNITS: 10000 INJECTION, SOLUTION INTRAVENOUS; SUBCUTANEOUS at 10:30

## 2020-01-01 RX ADMIN — PHENAZOPYRIDINE HYDROCHLORIDE 100 MG: 100 TABLET ORAL at 18:07

## 2020-01-01 RX ADMIN — SUCRALFATE 1 G: 1 TABLET ORAL at 11:53

## 2020-01-01 RX ADMIN — CITALOPRAM HYDROBROMIDE 20 MG: 20 TABLET ORAL at 09:24

## 2020-01-01 RX ADMIN — METOPROLOL SUCCINATE 25 MG: 25 TABLET, FILM COATED, EXTENDED RELEASE ORAL at 08:28

## 2020-01-01 RX ADMIN — SODIUM CHLORIDE, PRESERVATIVE FREE 10 ML: 5 INJECTION INTRAVENOUS at 21:06

## 2020-01-01 RX ADMIN — DILTIAZEM HYDROCHLORIDE 240 MG: 240 CAPSULE, COATED, EXTENDED RELEASE ORAL at 11:32

## 2020-01-01 RX ADMIN — WARFARIN SODIUM 2.5 MG: 2.5 TABLET ORAL at 17:40

## 2020-01-01 RX ADMIN — ACETAMINOPHEN 1000 MG: 500 TABLET ORAL at 17:37

## 2020-01-01 RX ADMIN — SODIUM CHLORIDE, PRESERVATIVE FREE 10 ML: 5 INJECTION INTRAVENOUS at 17:20

## 2020-01-01 RX ADMIN — SODIUM CHLORIDE, PRESERVATIVE FREE 10 ML: 5 INJECTION INTRAVENOUS at 20:49

## 2020-01-01 RX ADMIN — SODIUM CHLORIDE, PRESERVATIVE FREE 10 ML: 5 INJECTION INTRAVENOUS at 22:27

## 2020-01-01 RX ADMIN — OXYCODONE HYDROCHLORIDE AND ACETAMINOPHEN 250 MG: 500 TABLET ORAL at 13:14

## 2020-01-01 RX ADMIN — BUMETANIDE 2 MG: 0.25 INJECTION INTRAMUSCULAR; INTRAVENOUS at 12:16

## 2020-01-01 RX ADMIN — SODIUM CHLORIDE, SODIUM GLUCONATE, SODIUM ACETATE, POTASSIUM CHLORIDE, AND MAGNESIUM CHLORIDE: 526; 502; 368; 37; 30 INJECTION, SOLUTION INTRAVENOUS at 15:10

## 2020-01-01 RX ADMIN — ASPIRIN 81 MG 81 MG: 81 TABLET ORAL at 09:03

## 2020-01-01 RX ADMIN — HEPARIN SODIUM 4000 UNITS: 1000 INJECTION INTRAVENOUS; SUBCUTANEOUS at 10:49

## 2020-01-01 RX ADMIN — PANTOPRAZOLE SODIUM 40 MG: 40 TABLET, DELAYED RELEASE ORAL at 11:09

## 2020-01-01 RX ADMIN — SODIUM BICARBONATE 50 MEQ: 84 INJECTION INTRAVENOUS at 11:59

## 2020-01-01 RX ADMIN — BUDESONIDE AND FORMOTEROL FUMARATE DIHYDRATE 2 PUFF: 160; 4.5 AEROSOL RESPIRATORY (INHALATION) at 07:57

## 2020-01-01 RX ADMIN — OXYCODONE HYDROCHLORIDE 10 MG: 5 TABLET ORAL at 09:37

## 2020-01-01 RX ADMIN — ACETAMINOPHEN 650 MG: 325 TABLET, FILM COATED ORAL at 13:50

## 2020-01-01 RX ADMIN — DILTIAZEM HYDROCHLORIDE 240 MG: 240 CAPSULE, COATED, EXTENDED RELEASE ORAL at 07:35

## 2020-01-01 RX ADMIN — METOPROLOL SUCCINATE 25 MG: 25 TABLET, EXTENDED RELEASE ORAL at 12:02

## 2020-01-01 RX ADMIN — SODIUM CHLORIDE, PRESERVATIVE FREE 10 ML: 5 INJECTION INTRAVENOUS at 20:53

## 2020-01-01 RX ADMIN — DILTIAZEM HYDROCHLORIDE 240 MG: 240 CAPSULE, COATED, EXTENDED RELEASE ORAL at 12:07

## 2020-01-01 RX ADMIN — METHYLPREDNISOLONE SODIUM SUCCINATE 20 MG: 40 INJECTION, POWDER, FOR SOLUTION INTRAMUSCULAR; INTRAVENOUS at 16:54

## 2020-01-01 RX ADMIN — METOPROLOL SUCCINATE 25 MG: 25 TABLET, EXTENDED RELEASE ORAL at 09:28

## 2020-01-01 RX ADMIN — ONDANSETRON 4 MG: 2 INJECTION INTRAMUSCULAR; INTRAVENOUS at 11:10

## 2020-01-01 RX ADMIN — PRENATAL VIT W/ FE FUMARATE-FA TAB 27-0.8 MG 1 TABLET: 27-0.8 TAB at 09:28

## 2020-01-01 RX ADMIN — CITALOPRAM HYDROBROMIDE 20 MG: 20 TABLET ORAL at 12:03

## 2020-01-01 RX ADMIN — DILTIAZEM HYDROCHLORIDE 240 MG: 240 CAPSULE, COATED, EXTENDED RELEASE ORAL at 08:33

## 2020-01-01 RX ADMIN — IPRATROPIUM BROMIDE AND ALBUTEROL SULFATE 3 ML: 2.5; .5 SOLUTION RESPIRATORY (INHALATION) at 11:28

## 2020-01-01 RX ADMIN — EPOETIN ALFA 4000 UNITS: 10000 SOLUTION INTRAVENOUS; SUBCUTANEOUS at 11:28

## 2020-01-01 RX ADMIN — DILTIAZEM HYDROCHLORIDE 240 MG: 240 CAPSULE, COATED, EXTENDED RELEASE ORAL at 10:06

## 2020-01-01 RX ADMIN — FERROUS SULFATE TAB EC 324 MG (65 MG FE EQUIVALENT) 324 MG: 324 (65 FE) TABLET DELAYED RESPONSE at 08:01

## 2020-01-01 RX ADMIN — FERROUS SULFATE TAB EC 324 MG (65 MG FE EQUIVALENT) 324 MG: 324 (65 FE) TABLET DELAYED RESPONSE at 13:06

## 2020-01-01 RX ADMIN — FAMOTIDINE 20 MG: 20 TABLET ORAL at 09:43

## 2020-01-01 RX ADMIN — PROPOFOL 100 MG: 10 INJECTION, EMULSION INTRAVENOUS at 13:33

## 2020-01-01 RX ADMIN — ROPINIROLE HYDROCHLORIDE 0.25 MG: 0.25 TABLET, FILM COATED ORAL at 21:49

## 2020-01-01 RX ADMIN — HYDRALAZINE HYDROCHLORIDE 2.6 MG: 20 INJECTION INTRAMUSCULAR; INTRAVENOUS at 11:32

## 2020-01-01 RX ADMIN — BUDESONIDE AND FORMOTEROL FUMARATE DIHYDRATE 2 PUFF: 160; 4.5 AEROSOL RESPIRATORY (INHALATION) at 19:39

## 2020-01-01 RX ADMIN — METHYLPREDNISOLONE SODIUM SUCCINATE 20 MG: 40 INJECTION, POWDER, FOR SOLUTION INTRAMUSCULAR; INTRAVENOUS at 00:34

## 2020-01-01 RX ADMIN — MELATONIN 2000 UNITS: at 13:14

## 2020-01-01 RX ADMIN — ROCURONIUM BROMIDE 5 MG: 10 INJECTION INTRAVENOUS at 13:33

## 2020-01-01 RX ADMIN — HEPARIN SODIUM 5000 UNITS: 5000 INJECTION INTRAVENOUS; SUBCUTANEOUS at 05:50

## 2020-01-01 RX ADMIN — OXYCODONE HYDROCHLORIDE AND ACETAMINOPHEN 250 MG: 500 TABLET ORAL at 09:38

## 2020-01-01 RX ADMIN — ASPIRIN 81 MG 81 MG: 81 TABLET ORAL at 09:24

## 2020-01-01 RX ADMIN — SODIUM CHLORIDE 125 MG: 9 INJECTION, SOLUTION INTRAVENOUS at 11:10

## 2020-01-01 RX ADMIN — METOPROLOL SUCCINATE 25 MG: 25 TABLET, EXTENDED RELEASE ORAL at 09:03

## 2020-01-01 RX ADMIN — CITALOPRAM HYDROBROMIDE 20 MG: 20 TABLET ORAL at 11:09

## 2020-01-01 RX ADMIN — ONDANSETRON 4 MG: 2 INJECTION INTRAMUSCULAR; INTRAVENOUS at 00:28

## 2020-01-01 RX ADMIN — FERROUS SULFATE TAB EC 324 MG (65 MG FE EQUIVALENT) 324 MG: 324 (65 FE) TABLET DELAYED RESPONSE at 09:37

## 2020-01-01 RX ADMIN — TRAZODONE HYDROCHLORIDE 300 MG: 150 TABLET ORAL at 20:35

## 2020-01-01 RX ADMIN — LABETALOL HYDROCHLORIDE 10 MG: 5 INJECTION, SOLUTION INTRAVENOUS at 14:44

## 2020-01-01 RX ADMIN — OXYCODONE HYDROCHLORIDE 5 MG: 5 TABLET ORAL at 13:35

## 2020-01-01 RX ADMIN — GABAPENTIN 500 MG: 400 CAPSULE ORAL at 12:44

## 2020-01-01 RX ADMIN — MELATONIN 2000 UNITS: at 07:35

## 2020-01-01 RX ADMIN — BUMETANIDE 2 MG: 0.25 INJECTION INTRAMUSCULAR; INTRAVENOUS at 11:58

## 2020-01-01 RX ADMIN — IPRATROPIUM BROMIDE AND ALBUTEROL SULFATE 3 ML: 2.5; .5 SOLUTION RESPIRATORY (INHALATION) at 14:55

## 2020-01-01 RX ADMIN — SODIUM CHLORIDE, POTASSIUM CHLORIDE, SODIUM LACTATE AND CALCIUM CHLORIDE 100 ML/HR: 600; 310; 30; 20 INJECTION, SOLUTION INTRAVENOUS at 20:18

## 2020-01-01 RX ADMIN — PHENAZOPYRIDINE HYDROCHLORIDE 100 MG: 100 TABLET ORAL at 09:21

## 2020-01-01 RX ADMIN — ACETAMINOPHEN 1000 MG: 500 TABLET ORAL at 21:37

## 2020-01-01 RX ADMIN — METOPROLOL SUCCINATE 50 MG: 50 TABLET, EXTENDED RELEASE ORAL at 08:11

## 2020-01-01 RX ADMIN — KETAMINE HYDROCHLORIDE 5 MG: 100 INJECTION INTRAMUSCULAR; INTRAVENOUS at 13:02

## 2020-01-01 RX ADMIN — SODIUM CHLORIDE, PRESERVATIVE FREE 10 ML: 5 INJECTION INTRAVENOUS at 20:50

## 2020-01-01 RX ADMIN — DILTIAZEM HYDROCHLORIDE 240 MG: 240 CAPSULE, COATED, EXTENDED RELEASE ORAL at 12:39

## 2020-01-01 RX ADMIN — MELATONIN 2000 UNITS: at 09:38

## 2020-01-01 RX ADMIN — ASPIRIN 81 MG 81 MG: 81 TABLET ORAL at 09:29

## 2020-01-01 RX ADMIN — EPOETIN ALFA 6000 UNITS: 10000 SOLUTION INTRAVENOUS; SUBCUTANEOUS at 09:45

## 2020-01-01 RX ADMIN — CITALOPRAM HYDROBROMIDE 20 MG: 20 TABLET ORAL at 09:21

## 2020-01-01 RX ADMIN — ROPINIROLE HYDROCHLORIDE 0.25 MG: 0.25 TABLET, FILM COATED ORAL at 20:35

## 2020-01-01 RX ADMIN — OXYCODONE HYDROCHLORIDE 10 MG: 5 TABLET ORAL at 18:03

## 2020-01-01 RX ADMIN — FAMOTIDINE 20 MG: 20 TABLET ORAL at 08:41

## 2020-01-01 RX ADMIN — LABETALOL HYDROCHLORIDE 20 MG: 5 INJECTION, SOLUTION INTRAVENOUS at 14:54

## 2020-01-01 RX ADMIN — SODIUM CHLORIDE, POTASSIUM CHLORIDE, SODIUM LACTATE AND CALCIUM CHLORIDE 100 ML/HR: 600; 310; 30; 20 INJECTION, SOLUTION INTRAVENOUS at 17:07

## 2020-01-01 RX ADMIN — WARFARIN SODIUM 5 MG: 5 TABLET ORAL at 17:26

## 2020-01-01 RX ADMIN — PANTOPRAZOLE SODIUM 40 MG: 40 TABLET, DELAYED RELEASE ORAL at 08:27

## 2020-01-01 RX ADMIN — METOPROLOL SUCCINATE 25 MG: 25 TABLET, EXTENDED RELEASE ORAL at 08:26

## 2020-01-01 RX ADMIN — ONDANSETRON 4 MG: 2 INJECTION INTRAMUSCULAR; INTRAVENOUS at 05:01

## 2020-01-01 RX ADMIN — WARFARIN SODIUM 5 MG: 5 TABLET ORAL at 17:00

## 2020-01-01 RX ADMIN — WARFARIN SODIUM 5 MG: 5 TABLET ORAL at 17:17

## 2020-01-01 RX ADMIN — EPOETIN ALFA 6000 UNITS: 10000 SOLUTION INTRAVENOUS; SUBCUTANEOUS at 08:44

## 2020-01-01 RX ADMIN — FERROUS SULFATE TAB EC 324 MG (65 MG FE EQUIVALENT) 324 MG: 324 (65 FE) TABLET DELAYED RESPONSE at 09:03

## 2020-01-01 RX ADMIN — TRAZODONE HYDROCHLORIDE 300 MG: 150 TABLET ORAL at 20:51

## 2020-01-01 RX ADMIN — PHENAZOPYRIDINE HYDROCHLORIDE 100 MG: 100 TABLET ORAL at 18:03

## 2020-01-01 RX ADMIN — TRAZODONE HYDROCHLORIDE 50 MG: 50 TABLET ORAL at 20:04

## 2020-01-01 RX ADMIN — SUCRALFATE 1 G: 1 TABLET ORAL at 21:06

## 2020-01-01 RX ADMIN — MELATONIN 3 MG: at 20:49

## 2020-01-01 RX ADMIN — METOPROLOL SUCCINATE 25 MG: 25 TABLET, EXTENDED RELEASE ORAL at 09:11

## 2020-01-01 RX ADMIN — ACETAMINOPHEN 1000 MG: 500 TABLET ORAL at 15:59

## 2020-01-01 RX ADMIN — CITALOPRAM HYDROBROMIDE 20 MG: 20 TABLET ORAL at 08:13

## 2020-01-01 RX ADMIN — ROPINIROLE HYDROCHLORIDE 0.25 MG: 0.25 TABLET, FILM COATED ORAL at 20:59

## 2020-01-01 RX ADMIN — IPRATROPIUM BROMIDE AND ALBUTEROL SULFATE 3 ML: 2.5; .5 SOLUTION RESPIRATORY (INHALATION) at 19:17

## 2020-01-01 RX ADMIN — IPRATROPIUM BROMIDE AND ALBUTEROL SULFATE 3 ML: 2.5; .5 SOLUTION RESPIRATORY (INHALATION) at 06:40

## 2020-01-01 RX ADMIN — OXYCODONE HYDROCHLORIDE 10 MG: 5 TABLET ORAL at 19:39

## 2020-01-01 RX ADMIN — SUCRALFATE 1 G: 1 TABLET ORAL at 13:06

## 2020-01-01 RX ADMIN — DILTIAZEM HYDROCHLORIDE 240 MG: 240 CAPSULE, COATED, EXTENDED RELEASE ORAL at 09:43

## 2020-01-01 RX ADMIN — SODIUM CHLORIDE, PRESERVATIVE FREE 10 ML: 5 INJECTION INTRAVENOUS at 08:57

## 2020-01-01 RX ADMIN — KETAMINE HYDROCHLORIDE 20 MG: 100 INJECTION INTRAMUSCULAR; INTRAVENOUS at 13:33

## 2020-01-01 RX ADMIN — HEPARIN SODIUM 5000 UNITS: 5000 INJECTION INTRAVENOUS; SUBCUTANEOUS at 22:08

## 2020-01-01 RX ADMIN — DEXAMETHASONE SODIUM PHOSPHATE 4 MG: 4 INJECTION, SOLUTION INTRAMUSCULAR; INTRAVENOUS at 13:53

## 2020-01-01 RX ADMIN — CEFTRIAXONE 2 G: 2 INJECTION, POWDER, FOR SOLUTION INTRAMUSCULAR; INTRAVENOUS at 14:15

## 2020-01-01 RX ADMIN — PIPERACILLIN SODIUM, TAZOBACTAM SODIUM 4.5 G: 4; .5 INJECTION, POWDER, LYOPHILIZED, FOR SOLUTION INTRAVENOUS at 00:12

## 2020-01-01 RX ADMIN — ACETAMINOPHEN 325 MG: 325 TABLET, FILM COATED ORAL at 23:21

## 2020-01-01 RX ADMIN — HEPARIN SODIUM 5000 UNITS: 5000 INJECTION INTRAVENOUS; SUBCUTANEOUS at 22:26

## 2020-01-01 RX ADMIN — HEPARIN SODIUM 2000 UNITS: 1000 INJECTION INTRAVENOUS; SUBCUTANEOUS at 16:00

## 2020-01-01 RX ADMIN — MELATONIN 2000 UNITS: at 09:26

## 2020-01-01 RX ADMIN — TRAZODONE HYDROCHLORIDE 300 MG: 150 TABLET ORAL at 21:19

## 2020-01-01 RX ADMIN — CITALOPRAM HYDROBROMIDE 20 MG: 20 TABLET ORAL at 08:56

## 2020-01-01 RX ADMIN — METOPROLOL SUCCINATE 25 MG: 25 TABLET, FILM COATED, EXTENDED RELEASE ORAL at 08:01

## 2020-01-01 RX ADMIN — METOPROLOL SUCCINATE 25 MG: 25 TABLET, FILM COATED, EXTENDED RELEASE ORAL at 09:43

## 2020-01-01 RX ADMIN — DILTIAZEM HYDROCHLORIDE 240 MG: 240 CAPSULE, COATED, EXTENDED RELEASE ORAL at 09:28

## 2020-01-01 RX ADMIN — ROPINIROLE HYDROCHLORIDE 0.25 MG: 0.25 TABLET, FILM COATED ORAL at 20:58

## 2020-01-01 RX ADMIN — HYDROCORTISONE: 1 CREAM TOPICAL at 09:27

## 2020-01-01 RX ADMIN — TRAZODONE HYDROCHLORIDE 150 MG: 150 TABLET ORAL at 22:26

## 2020-01-01 RX ADMIN — ASPIRIN 81 MG 81 MG: 81 TABLET ORAL at 12:07

## 2020-01-01 RX ADMIN — METOPROLOL SUCCINATE 25 MG: 25 TABLET, FILM COATED, EXTENDED RELEASE ORAL at 08:51

## 2020-01-01 RX ADMIN — METOPROLOL SUCCINATE 25 MG: 25 TABLET, EXTENDED RELEASE ORAL at 09:24

## 2020-01-01 RX ADMIN — WARFARIN SODIUM 5 MG: 5 TABLET ORAL at 18:03

## 2020-01-01 RX ADMIN — OXYCODONE HYDROCHLORIDE 10 MG: 5 TABLET ORAL at 19:21

## 2020-01-01 RX ADMIN — ONDANSETRON 4 MG: 2 INJECTION INTRAMUSCULAR; INTRAVENOUS at 19:23

## 2020-01-01 RX ADMIN — SUCRALFATE 1 G: 1 TABLET ORAL at 20:34

## 2020-01-01 RX ADMIN — ERYTHROPOIETIN 10000 UNITS: 10000 INJECTION, SOLUTION INTRAVENOUS; SUBCUTANEOUS at 11:19

## 2020-01-01 RX ADMIN — ALBUTEROL SULFATE 10 PUFF: 90 AEROSOL, METERED RESPIRATORY (INHALATION) at 17:00

## 2020-01-01 RX ADMIN — CEFTRIAXONE SODIUM 1 G: 1 INJECTION, POWDER, FOR SOLUTION INTRAMUSCULAR; INTRAVENOUS at 17:03

## 2020-01-01 RX ADMIN — KETAMINE HYDROCHLORIDE 20 MG: 100 INJECTION INTRAMUSCULAR; INTRAVENOUS at 14:05

## 2020-01-01 RX ADMIN — FERROUS SULFATE TAB EC 324 MG (65 MG FE EQUIVALENT) 324 MG: 324 (65 FE) TABLET DELAYED RESPONSE at 09:26

## 2020-01-01 RX ADMIN — DIPHENHYDRAMINE HYDROCHLORIDE 25 MG: 25 CAPSULE ORAL at 01:36

## 2020-01-01 RX ADMIN — SUCCINYLCHOLINE CHLORIDE 100 MG: 20 INJECTION, SOLUTION INTRAMUSCULAR; INTRAVENOUS at 13:33

## 2020-01-01 RX ADMIN — BUDESONIDE AND FORMOTEROL FUMARATE DIHYDRATE 2 PUFF: 160; 4.5 AEROSOL RESPIRATORY (INHALATION) at 06:55

## 2020-01-01 RX ADMIN — BUDESONIDE AND FORMOTEROL FUMARATE DIHYDRATE 2 PUFF: 160; 4.5 AEROSOL RESPIRATORY (INHALATION) at 20:09

## 2020-01-01 RX ADMIN — DILTIAZEM HYDROCHLORIDE 240 MG: 240 CAPSULE, COATED, EXTENDED RELEASE ORAL at 08:56

## 2020-01-01 RX ADMIN — SODIUM CHLORIDE 250 ML: 9 INJECTION, SOLUTION INTRAVENOUS at 14:25

## 2020-01-01 RX ADMIN — SODIUM BICARBONATE 50 MEQ: 84 INJECTION INTRAVENOUS at 18:24

## 2020-01-01 RX ADMIN — OXYCODONE HYDROCHLORIDE AND ACETAMINOPHEN 250 MG: 500 TABLET ORAL at 09:05

## 2020-01-01 RX ADMIN — SODIUM CHLORIDE, PRESERVATIVE FREE 10 ML: 5 INJECTION INTRAVENOUS at 07:35

## 2020-01-01 RX ADMIN — ACETAMINOPHEN 1000 MG: 500 TABLET ORAL at 01:21

## 2020-01-01 RX ADMIN — Medication 1 G: at 09:12

## 2020-01-01 RX ADMIN — FERROUS SULFATE TAB EC 324 MG (65 MG FE EQUIVALENT) 324 MG: 324 (65 FE) TABLET DELAYED RESPONSE at 13:14

## 2020-01-01 RX ADMIN — CITALOPRAM HYDROBROMIDE 20 MG: 20 TABLET ORAL at 09:43

## 2020-01-01 RX ADMIN — MELATONIN 2000 UNITS: at 09:24

## 2020-01-01 RX ADMIN — HYDROCODONE BITARTRATE AND ACETAMINOPHEN 1 TABLET: 5; 325 TABLET ORAL at 06:06

## 2020-01-01 RX ADMIN — CEFTRIAXONE SODIUM 1 G: 1 INJECTION, POWDER, FOR SOLUTION INTRAMUSCULAR; INTRAVENOUS at 11:53

## 2020-01-01 RX ADMIN — SODIUM CHLORIDE, PRESERVATIVE FREE 10 ML: 5 INJECTION INTRAVENOUS at 10:07

## 2020-01-01 RX ADMIN — ASPIRIN 81 MG 81 MG: 81 TABLET ORAL at 08:27

## 2020-01-01 RX ADMIN — SODIUM CHLORIDE, PRESERVATIVE FREE 10 ML: 5 INJECTION INTRAVENOUS at 21:24

## 2020-01-01 RX ADMIN — ACETAMINOPHEN 1000 MG: 500 TABLET ORAL at 01:38

## 2020-01-01 RX ADMIN — METOPROLOL SUCCINATE 50 MG: 50 TABLET, EXTENDED RELEASE ORAL at 08:46

## 2020-01-01 RX ADMIN — BUDESONIDE AND FORMOTEROL FUMARATE DIHYDRATE 2 PUFF: 160; 4.5 AEROSOL RESPIRATORY (INHALATION) at 08:32

## 2020-01-01 RX ADMIN — CITALOPRAM HYDROBROMIDE 20 MG: 20 TABLET ORAL at 08:26

## 2020-01-01 RX ADMIN — METHYLPREDNISOLONE SODIUM SUCCINATE 20 MG: 40 INJECTION, POWDER, FOR SOLUTION INTRAMUSCULAR; INTRAVENOUS at 23:43

## 2020-01-01 RX ADMIN — SODIUM CHLORIDE 60 ML/HR: 9 INJECTION, SOLUTION INTRAVENOUS at 03:20

## 2020-01-01 RX ADMIN — ALBUMIN HUMAN 500 ML: 0.05 INJECTION, SOLUTION INTRAVENOUS at 04:43

## 2020-01-01 RX ADMIN — ASPIRIN 81 MG 81 MG: 81 TABLET ORAL at 09:06

## 2020-01-01 RX ADMIN — IPRATROPIUM BROMIDE AND ALBUTEROL SULFATE 3 ML: 2.5; .5 SOLUTION RESPIRATORY (INHALATION) at 09:16

## 2020-01-01 RX ADMIN — PHENAZOPYRIDINE HYDROCHLORIDE 100 MG: 100 TABLET ORAL at 12:10

## 2020-01-01 RX ADMIN — ACETAMINOPHEN 1000 MG: 500 TABLET ORAL at 03:19

## 2020-01-01 RX ADMIN — SODIUM CHLORIDE, PRESERVATIVE FREE 10 ML: 5 INJECTION INTRAVENOUS at 21:20

## 2020-01-01 RX ADMIN — CITALOPRAM HYDROBROMIDE 20 MG: 20 TABLET ORAL at 09:38

## 2020-01-01 RX ADMIN — BUMETANIDE 2 MG: 0.25 INJECTION INTRAMUSCULAR; INTRAVENOUS at 22:25

## 2020-01-01 RX ADMIN — TRAZODONE HYDROCHLORIDE 50 MG: 50 TABLET ORAL at 21:04

## 2020-01-01 RX ADMIN — DILTIAZEM HYDROCHLORIDE 240 MG: 240 CAPSULE, COATED, EXTENDED RELEASE ORAL at 09:37

## 2020-01-01 RX ADMIN — TRAZODONE HYDROCHLORIDE 300 MG: 150 TABLET ORAL at 21:23

## 2020-01-01 RX ADMIN — IPRATROPIUM BROMIDE AND ALBUTEROL SULFATE 3 ML: 2.5; .5 SOLUTION RESPIRATORY (INHALATION) at 23:23

## 2020-01-01 RX ADMIN — SODIUM CHLORIDE, PRESERVATIVE FREE 10 ML: 5 INJECTION INTRAVENOUS at 08:00

## 2020-01-01 RX ADMIN — PHENAZOPYRIDINE HYDROCHLORIDE 100 MG: 100 TABLET ORAL at 13:13

## 2020-01-01 RX ADMIN — OXYCODONE HYDROCHLORIDE 10 MG: 5 TABLET ORAL at 21:39

## 2020-01-01 RX ADMIN — SUCRALFATE 1 G: 1 TABLET ORAL at 10:47

## 2020-01-01 RX ADMIN — PRENATAL VIT W/ FE FUMARATE-FA TAB 27-0.8 MG 1 TABLET: 27-0.8 TAB at 09:11

## 2020-01-01 RX ADMIN — PRENATAL VIT W/ FE FUMARATE-FA TAB 27-0.8 MG 1 TABLET: 27-0.8 TAB at 09:05

## 2020-01-01 RX ADMIN — PRENATAL VIT W/ FE FUMARATE-FA TAB 27-0.8 MG 1 TABLET: 27-0.8 TAB at 09:27

## 2020-01-01 RX ADMIN — IPRATROPIUM BROMIDE AND ALBUTEROL SULFATE 3 ML: 2.5; .5 SOLUTION RESPIRATORY (INHALATION) at 06:59

## 2020-01-01 RX ADMIN — DILTIAZEM HYDROCHLORIDE 240 MG: 240 CAPSULE, COATED, EXTENDED RELEASE ORAL at 08:27

## 2020-01-01 RX ADMIN — WARFARIN SODIUM 5 MG: 5 TABLET ORAL at 17:58

## 2020-01-01 RX ADMIN — FERROUS SULFATE TAB EC 324 MG (65 MG FE EQUIVALENT) 324 MG: 324 (65 FE) TABLET DELAYED RESPONSE at 12:06

## 2020-01-01 RX ADMIN — CITALOPRAM HYDROBROMIDE 20 MG: 20 TABLET ORAL at 08:27

## 2020-01-01 RX ADMIN — ALVIMOPAN 12 MG: 12 CAPSULE ORAL at 12:44

## 2020-01-01 RX ADMIN — FERROUS SULFATE TAB EC 324 MG (65 MG FE EQUIVALENT) 324 MG: 324 (65 FE) TABLET DELAYED RESPONSE at 09:38

## 2020-01-01 RX ADMIN — METOPROLOL SUCCINATE 25 MG: 25 TABLET, EXTENDED RELEASE ORAL at 09:36

## 2020-01-01 RX ADMIN — ROCURONIUM BROMIDE 20 MG: 10 INJECTION INTRAVENOUS at 14:47

## 2020-01-01 RX ADMIN — ASPIRIN 81 MG 81 MG: 81 TABLET ORAL at 07:35

## 2020-01-01 RX ADMIN — BUDESONIDE AND FORMOTEROL FUMARATE DIHYDRATE 2 PUFF: 160; 4.5 AEROSOL RESPIRATORY (INHALATION) at 07:01

## 2020-01-01 RX ADMIN — ROPINIROLE HYDROCHLORIDE 0.25 MG: 0.25 TABLET, FILM COATED ORAL at 22:05

## 2020-01-01 RX ADMIN — OXYCODONE HYDROCHLORIDE 5 MG: 5 TABLET ORAL at 14:01

## 2020-01-01 RX ADMIN — WARFARIN SODIUM 2.5 MG: 2.5 TABLET ORAL at 17:58

## 2020-01-07 NOTE — PROGRESS NOTES
[facility]      Chief Complaint:   Chief Complaint   Patient presents with   • Malignant Neoplasm Of Sigmoid Colon       Subjective     HPI:   Patient with history of colon cancer.  Patient has area of colon cancer and within the sigmoid colon it was partially removed by colonoscopy.  Because of patient's high risk for surgery patient has not undergone surgery at this time.  Last evaluation in September shows some malignant cells at the base where the polyp had been removed.    Plan; we will have patient follow-up with Dr. Maher to see if this can be surgically removed if this cannot be surgically removed we will repeat flexible sigmoidoscopy for reevaluation of the area of this colon cancer    Past Medical History:   Past Medical History:   Diagnosis Date   • Acute bronchitis    • Anxiety    • Atrial fibrillation (CMS/HCC)    • C. difficile colitis    • Callosity     under metatarsal head      • Cardiac pacemaker in situ    • CHF (congestive heart failure) (CMS/HCC)    • Chronic obstructive lung disease (CMS/HCC)    • Corns and callus    • Depressive disorder    • Diabetes mellitus (CMS/HCC)    • Diastolic heart failure (CMS/HCC)    • Essential hypertension    • Foot pain    • History of transfusion    • Hyperlipidemia    • Long term current use of anticoagulant    • Malignant neoplasm of sigmoid colon (CMS/HCC) 8/13/2019    Added automatically from request for surgery [id number]   • On anticoagulant therapy    • Pulmonary emphysema (CMS/HCC)    • Rectal hemorrhage    • Type 2 diabetes mellitus (CMS/HCC)        Past Surgical History:    Past Surgical History:   Procedure Laterality Date   • AORTIC VALVE REPAIR/REPLACEMENT  1999   • CARDIAC ELECTROPHYSIOLOGY PROCEDURE N/A 3/20/2017    Procedure: PPM generator change - dual;  Surgeon: Bereket Gates MD;  Location: [facility] INVASIVE LOCATION;  Service:    • CARDIAC PACEMAKER PLACEMENT  1999   • CATARACT EXTRACTION W/ INTRAOCULAR LENS IMPLANT  Left 2/1/2019    Procedure: REMOVE CATARACT AND IMPLANT INTRAOCULAR LENS I;  Surgeon: Jose L Clay MD;  Location: [facility] OR;  Service: Ophthalmology   • CATARACT EXTRACTION W/ INTRAOCULAR LENS IMPLANT Right 2/8/2019    Procedure: REMOVE CATARACT AND IMPLANT  INTRAOCULAR LENS;  Surgeon: Jose L Clay MD;  Location: [facility] OR;  Service: Ophthalmology   • COLONOSCOPY N/A 6/19/2019    Procedure: COLONOSCOPY WITH CONTROL OF BLEED;  Surgeon: Alfredo Guerrero MD;  Location: [facility] ENDOSCOPY;  Service: Gastroenterology   • COLONOSCOPY N/A 6/24/2019    Procedure: COLONOSCOPY;  Surgeon: Alfredo Guerrero MD;  Location: [facility] ENDOSCOPY;  Service: Gastroenterology   • ECHO - CONVERTED  11/12/2013    There is mild to moderate left atrial enlargement EF 50-55%   • ENDOSCOPY N/A 6/19/2019    Procedure: ESOPHAGOGASTRODUODENOSCOPY WITH CONTROL OF BLEED;  Surgeon: Alfredo Guerrero MD;  Location: [facility] ENDOSCOPY;  Service: Gastroenterology   • FOOT SURGERY  1990   • OTHER SURGICAL HISTORY  10/26/2015    PARING CORN/CALLUS    • PACEMAKER REPLACEMENT N/A 3/21/2017    Procedure: revision pacemaker pocket, evacuation hematoma, control of bleeding;  Surgeon: Polo Feliz MD;  Location: [facility] OR;  Service:    • SIGMOIDOSCOPY N/A 9/30/2019    Procedure: SIGMOIDOSCOPY FLEXIBLE;  Surgeon: Alfredo Guerrero MD;  Location: [facility] ENDOSCOPY;  Service: Gastroenterology   • TRANSESOPHAGEAL ECHOCARDIOGRAM (MITZY)  05/01/2014    With color flow-Mild left atrial enlargement with mild concentric LV hypertrophy with top normal aortic root size. EF 45-50%. Mild mitral regurgitation and mild aortic insufficiency and trivial amount of tricuspid regurgation   • TUBAL ABDOMINAL LIGATION     • UPPER GASTROINTESTINAL ENDOSCOPY  06/19/2019       Family History:  Family History   Problem Relation Age of Onset   • Heart disease Mother    • Hyperlipidemia Mother    • Hypertension Mother    • Cancer Other        Social History:    reports that she quit smoking about 20 years ago. She has never used smokeless tobacco. She reports that she does not drink alcohol or use drugs.    Medications:   Prior to Admission medications    Medication Sig Start Date End Date Taking? Authorizing Provider   acetaminophen (TYLENOL) 325 MG tablet Take 650 mg by mouth every 6 (six) hours as needed for mild pain (1-3).   Yes Andrés Waldrop MD   albuterol (PROVENTIL) (2.5 MG/3ML) 0.083% nebulizer solution Take 2.5 mg by nebulization Every 4 (Four) Hours As Needed for Wheezing.   Yes Andrés Waldrop MD   albuterol sulfate  (90 Base) MCG/ACT inhaler Inhale 2 puffs Every 4 (Four) Hours As Needed for Wheezing or Shortness of Air. 11/21/19  Yes Nel Grant MD   Ascorbic Acid (VITAMIN C WITH OCHOA HIPS) 250 MG tablet Take 500 mg by mouth Daily.   Yes Andrés Waldrop MD   aspirin 81 MG chewable tablet Chew 81 mg Daily.   Yes Andrés Waldrop MD   bumetanide (BUMEX) 2 MG tablet Take 1 tablet by mouth 2 (Two) Times a Day.  Patient taking differently: Take 1 mg by mouth Daily. 8/22/19  Yes Josefa Pozo APRN   cholecalciferol (VITAMIN D3) 1000 units tablet Take 2,000 Units by mouth Daily.   Yes Andrés Waldrop MD   citalopram (CeleXA) 20 MG tablet Take 1 tablet by mouth Daily. 5/8/19  Yes Josefa Pozo APRN   diltiaZEM CD (CARDIZEM CD) 240 MG 24 hr capsule Take 1 capsule by mouth Daily. 6/4/19  Yes Josefa Pozo APRN   ezetimibe (ZETIA) 10 MG tablet TAKE 1 TABLET BY MOUTH EVERY DAY 7/18/19  Yes Josefa Pozo APRN   ferrous sulfate 325 (65 FE) MG tablet Take 325 mg by mouth Daily With Breakfast.   Yes Andrés Waldrop MD   glucose blood test strip 1 each by Other route 3 (three) times a day. Use as instructed   Yes Andrés Waldrop MD   Insulin Glargine (BASAGLAR KWIKPEN) 100 UNIT/ML injection pen INJECT 30 UNITS UNDER THE SKIN INTO THE APPROPRIATE AREA AS DIRECTED DAILY. 10/7/19  Yes Josefa Pozo APRN   metOLazone  (ZAROXOLYN) 5 MG tablet Take 1 tablet by mouth Daily. 11/18/19  Yes Josefa Pozo APRN   metoprolol succinate XL (TOPROL-XL) 25 MG 24 hr tablet Take 1 tablet by mouth Daily. 11/18/19  Yes Josefa Pozo APRN   ondansetron (ZOFRAN) 4 MG tablet Take 1 tablet by mouth Every 6 (Six) Hours As Needed for Nausea or Vomiting. 7/22/19  Yes Alfredo Charles Jr., MD   potassium chloride (K-DUR,KLOR-CON) 20 MEQ CR tablet Take 1 tablet by mouth Daily. 8/1/19  Yes Provider, MD Andrés   Prenatal Vit-Fe Fumarate-FA (PRENATAL VITAMIN) 27-0.8 MG tablet Take 1 tablet by mouth daily.   Yes Provider, MD Andrés   rOPINIRole (REQUIP) 0.25 MG tablet TAKE 1 TABLET BY MOUTH EVERY NIGHT. TAKE 1 HOUR BEFORE BEDTIME. 12/19/19  Yes Josefa Pozo APRN   traZODone (DESYREL) 150 MG tablet Take 2 tablets by mouth Every Night. 10/29/19  Yes oJsefa Pozo APRN   umeclidinium-vilanterol (ANORO ELLIPTA) 62.5-25 MCG/INH aerosol powder  inhaler Inhale 1 puff Daily. 11/21/19  Yes Nel Grant MD   UNIFINE PENTIPS 31G X 6 MM misc USE 1 FOUR (4) TIMES DAILY WITH INSULIN 8/7/19  Yes Josefa Pozo APRN   warfarin (COUMADIN) 5 MG tablet Take 1 tablet by mouth Daily. Take 1 tablet nightly or AS DIRECTED PER COUMADIN CLINIC 10/14/19  Yes Meme Duckworth APRN       Allergies:  Crestor [rosuvastatin calcium]; Lipitor [atorvastatin]; Lortab [hydrocodone-acetaminophen]; Adhesive tape; and Hydrocodone-acetaminophen    ROS:    Review of Systems   Constitutional: Negative for activity change, appetite change, chills, diaphoresis, fatigue, fever and unexpected weight change.   HENT: Negative for sore throat and trouble swallowing.    Respiratory: Negative for shortness of breath.    Gastrointestinal: Negative for abdominal distention, abdominal pain, anal bleeding, blood in stool, constipation, diarrhea, nausea, rectal pain and vomiting.   Endocrine: Negative for polydipsia, polyphagia and polyuria.   Genitourinary: Negative for difficulty  "urinating.   Musculoskeletal: Negative for arthralgias.   Skin: Negative for pallor.   Allergic/Immunologic: Negative for food allergies.   Neurological: Negative for weakness and light-headedness.   Psychiatric/Behavioral: Negative for behavioral problems.     Objective     Blood pressure 144/78, pulse 87, height 165.1 cm (65\"), weight 108 kg (237 lb 6.4 oz), SpO2 90 %, not currently breastfeeding.    Physical Exam   Constitutional: She is oriented to person, place, and time. She appears well-developed and well-nourished. No distress.   HENT:   Head: Normocephalic and atraumatic.   Cardiovascular: Normal rate, regular rhythm, normal heart sounds and intact distal pulses. Exam reveals no gallop and no friction rub.   No murmur heard.  Pulmonary/Chest: Breath sounds normal. No respiratory distress. She has no wheezes. She has no rales. She exhibits no tenderness.   Abdominal: Soft. Bowel sounds are normal. She exhibits no distension and no mass. There is no tenderness. There is no rebound and no guarding. No hernia.   Musculoskeletal: Normal range of motion. She exhibits no edema.   Neurological: She is alert and oriented to person, place, and time.   Skin: Skin is warm and dry. No rash noted. She is not diaphoretic. No erythema. No pallor.   Psychiatric: She has a normal mood and affect. Her behavior is normal. Judgment and thought content normal.        Assessment/Plan   Brendon was seen today for malignant neoplasm of sigmoid colon.    Diagnoses and all orders for this visit:    Malignant neoplasm of sigmoid colon (CMS/HCC)  -     Ambulatory Referral to General Surgery        * Surgery not found *     Diagnosis Plan   1. Malignant neoplasm of sigmoid colon (CMS/HCC)  Ambulatory Referral to General Surgery       Anticipated Surgical Procedure:  Orders Placed This Encounter   Procedures   • Ambulatory Referral to General Surgery     Referral Priority:   Routine     Referral Type:   Consultation     Referral Reason:   " Specialty Services Required     Referred to Provider:   Luis Maher MD     Requested Specialty:   General Surgery     Number of Visits Requested:   1       The risks, benefits, and alternatives of this procedure have been discussed with the patient or the responsible party- the patient understands and agrees to proceed.

## 2020-01-07 NOTE — PATIENT INSTRUCTIONS
MyPlate from USDA    MyPlate is an outline of a general healthy diet based on the 2010 Dietary Guidelines for Americans, from the U.S. Department of Agriculture (USDA). It sets guidelines for how much food you should eat from each food group based on your age, sex, and level of physical activity.  What are tips for following MyPlate?  To follow MyPlate recommendations:  · Eat a wide variety of fruits and vegetables, grains, and protein foods.  · Serve smaller portions and eat less food throughout the day.  · Limit portion sizes to avoid overeating.  · Enjoy your food.  · Get at least 150 minutes of exercise every week. This is about 30 minutes each day, 5 or more days per week.  It can be difficult to have every meal look like MyPlate. Think about MyPlate as eating guidelines for an entire day, rather than each individual meal.  Fruits and vegetables  · Make half of your plate fruits and vegetables.  · Eat many different colors of fruits and vegetables each day.  · For a 2,000 calorie daily food plan, eat:  ? 2½ cups of vegetables every day.  ? 2 cups of fruit every day.  · 1 cup is equal to:  ? 1 cup raw or cooked vegetables.  ? 1 cup raw fruit.  ? 1 medium-sized orange, apple, or banana.  ? 1 cup 100% fruit or vegetable juice.  ? 2 cups raw leafy greens, such as lettuce, spinach, or kale.  ? ½ cup dried fruit.  Grains  · One fourth of your plate should be grains.  · Make at least half of the grains you eat each day whole grains.  · For a 2,000 calorie daily food plan, eat 6 oz of grains every day.  · 1 oz is equal to:  ? 1 slice bread.  ? 1 cup cereal.  ? ½ cup cooked rice, cereal, or pasta.  Protein  · One fourth of your plate should be protein.  · Eat a wide variety of protein foods, including meat, poultry, fish, eggs, beans, nuts, and tofu.  · For a 2,000 calorie daily food plan, eat 5½ oz of protein every day.  · 1 oz is equal to:  ? 1 oz meat, poultry, or fish.  ? ¼ cup cooked beans.  ? 1 egg.  ? ½ oz nuts  or seeds.  ? 1 Tbsp peanut butter.  Dairy  · Drink fat-free or low-fat (1%) milk.  · Eat or drink dairy as a side to meals.  · For a 2,000 calorie daily food plan, eat or drink 3 cups of dairy every day.  · 1 cup is equal to:  ? 1 cup milk, yogurt, cottage cheese, or soy milk (soy beverage).  ? 2 oz processed cheese.  ? 1½ oz natural cheese.  Fats, oils, salt, and sugars  · Only small amounts of oils are recommended.  · Avoid foods that are high in calories and low in nutritional value (empty calories), like foods high in fat or added sugars.  · Choose foods that are low in salt (sodium). Choose foods that have less than 140 milligrams (mg) of sodium per serving.  · Drink water instead of sugary drinks. Drink enough water each day to keep your urine pale yellow.  Where to find support  · Work with your health care provider or a nutrition specialist (dietitian) to develop a customized eating plan that is right for you.  · Download an waldo (mobile application) to help you track your daily food intake.  Where to find more information  · Go to ChooseMyPlate.gov for more information.  · Learn more and log your daily food intake according to MyPlate using USDA's SuperTracker: www.[place] [employer].usda.gov  Summary  · MyPlate is a general guideline for healthy eating from the USDA. It is based on the 2010 Dietary Guidelines for Americans.  · In general, fruits and vegetables should take up ½ of your plate, grains should take up ¼ of your plate, and protein should take up ¼ of your plate.  This information is not intended to replace advice given to you by your health care provider. Make sure you discuss any questions you have with your health care provider.  Document Released: 01/06/2009 Document Revised: 03/19/2018 Document Reviewed: 03/19/2018  [employer] Interactive Patient Education © 2019 [employer]  BMI for Adults    Body mass index (BMI) is a number that is calculated from a person's weight and height. BMI may help to  "estimate how much of a person's weight is composed of fat. BMI can help identify those who may be at higher risk for certain medical problems.  How is BMI used with adults?  BMI is used as a screening tool to identify possible weight problems. It is used to check whether a person is obese, overweight, healthy weight, or underweight.  How is BMI calculated?  BMI measures your weight and compares it to your height. This can be done either in English ([place] or metric measurements. Note that charts are available to help you find your BMI quickly and easily without having to do these calculations yourself.  To calculate your BMI in English ([place] measurements, your health care provider will:  1. Measure your weight in pounds (lb).  2. Multiply the number of pounds by 703.  ? For example, for a person who weighs 180 lb, multiply that number by 703, which equals 126,540.  3. Measure your height in inches (in). Then multiply that number by itself to get a measurement called \"inches squared.\"  ? For example, for a person who is 70 in tall, the \"inches squared\" measurement is 70 in x 70 in, which equals 4900 inches squared.  4. Divide the total from Step 2 (number of lb x 703) by the total from Step 3 (inches squared): 126,540 ÷ 4900 = 25.8. This is your BMI.  To calculate your BMI in metric measurements, your health care provider will:  1. Measure your weight in kilograms (kg).  2. Measure your height in meters (m). Then multiply that number by itself to get a measurement called \"meters squared.\"  ? For example, for a person who is 1.75 m tall, the \"meters squared\" measurement is 1.75 m x 1.75 m, which is equal to 3.1 meters squared.  3. Divide the number of kilograms (your weight) by the meters squared number. In this example: 70 ÷ 3.1 = 22.6. This is your BMI.  How is BMI interpreted?  To interpret your results, your health care provider will use BMI charts to identify whether you are underweight, normal weight, " overweight, or obese. The following guidelines will be used:  · Underweight: BMI less than 18.5.  · Normal weight: BMI between 18.5 and 24.9.  · Overweight: BMI between 25 and 29.9.  · Obese: BMI of 30 and above.  Please note:  · Weight includes both fat and muscle, so someone with a muscular build, such as an athlete, may have a BMI that is higher than 24.9. In cases like these, BMI is not an accurate measure of body fat.  · To determine if excess body fat is the cause of a BMI of 25 or higher, further assessments may need to be done by a health care provider.  · BMI is usually interpreted in the same way for men and women.  Why is BMI a useful tool?  BMI is useful in two ways:  · Identifying a weight problem that may be related to a medical condition, or that may increase the risk for medical problems.  · Promoting lifestyle and diet changes in order to reach a healthy weight.  Summary  · Body mass index (BMI) is a number that is calculated from a person's weight and height.  · BMI may help to estimate how much of a person's weight is composed of fat. BMI can help identify those who may be at higher risk for certain medical problems.  · BMI can be measured using English measurements or metric measurements.  · To interpret your results, your health care provider will use BMI charts to identify whether you are underweight, normal weight, overweight, or obese.  This information is not intended to replace advice given to you by your health care provider. Make sure you discuss any questions you have with your health care provider.  Document Released: 08/29/2005 Document Revised: 10/31/2018 Document Reviewed: 10/31/2018  [employer] Interactive Patient Education © 2019 [employer]

## 2020-01-23 NOTE — PROGRESS NOTES
Chief Complaint   Patient presents with   • Mass     colon cancer        HPI  Hx of a colonic polyp resection endoscopically for what proved to be a cancer in June 2019.  Her poor physical health presented definitive resection of the colon; overall, her health has improved.  She has an oxygen requirement but only at night.  Most recently, endoscopy demonstrated:        Case Report   Surgical Pathology Report                         Case: [id number]                                   Authorizing Provider:  Alfredo Guerrero MD        Collected:           06/24/2019 12:41 PM           Ordering Location:     [facility]             Received:            06/24/2019 12:55 PM                                  [facility]                                                      Pathologist:           Jacob Angel MD                                                         Specimen:    Large Intestine, 30 cm   hot snare                                                         Final Diagnosis   LARGE INTESTINE, 30 CM LEVEL,MUCOSAL BIOPSY:   ADENOCARCINOMA, MODERATELY DIFFERENTIATED.    Amendment electronically signed by Jacob Angel MD on 6/25/2019 at 0941   Electronically signed by Jacob Angel MD on 6/25/2019 at 0915     The patient remains therapeutically anticoagulated on coumadin for a mechanical heart valve.     Past Medical History:   Diagnosis Date   • Acute bronchitis    • Anxiety    • Atrial fibrillation (CMS/HCC)    • C. difficile colitis    • Callosity     under metatarsal head      • Cardiac pacemaker in situ    • CHF (congestive heart failure) (CMS/HCC)    • Chronic obstructive lung disease (CMS/HCC)    • Corns and callus    • Depressive disorder    • Diabetes mellitus (CMS/HCC)    • Diastolic heart failure (CMS/HCC)    • Essential hypertension    • Foot pain    • History of transfusion    • Hyperlipidemia    • Long term current use of anticoagulant    • Malignant neoplasm of sigmoid  colon (CMS/HCC) 8/13/2019    Added automatically from request for surgery [id number]   • On anticoagulant therapy    • Pulmonary emphysema (CMS/HCC)    • Rectal hemorrhage    • Type 2 diabetes mellitus (CMS/HCC)        Past Surgical History:   Procedure Laterality Date   • AORTIC VALVE REPAIR/REPLACEMENT  1999   • CARDIAC ELECTROPHYSIOLOGY PROCEDURE N/A 3/20/2017    Procedure: PPM generator change - dual;  Surgeon: Bereket Gates MD;  Location: [facility] CATH INVASIVE LOCATION;  Service:    • CARDIAC PACEMAKER PLACEMENT  1999   • CATARACT EXTRACTION W/ INTRAOCULAR LENS IMPLANT Left 2/1/2019    Procedure: REMOVE CATARACT AND IMPLANT INTRAOCULAR LENS I;  Surgeon: Jose L Clay MD;  Location: [facility] OR;  Service: Ophthalmology   • CATARACT EXTRACTION W/ INTRAOCULAR LENS IMPLANT Right 2/8/2019    Procedure: REMOVE CATARACT AND IMPLANT  INTRAOCULAR LENS;  Surgeon: Jose L Clay MD;  Location: [facility] OR;  Service: Ophthalmology   • COLONOSCOPY N/A 6/19/2019    Procedure: COLONOSCOPY WITH CONTROL OF BLEED;  Surgeon: Alfredo Guerrero MD;  Location: [facility] ENDOSCOPY;  Service: Gastroenterology   • COLONOSCOPY N/A 6/24/2019    Procedure: COLONOSCOPY;  Surgeon: Alfredo Guerrero MD;  Location: [facility] ENDOSCOPY;  Service: Gastroenterology   • ECHO - CONVERTED  11/12/2013    There is mild to moderate left atrial enlargement EF 50-55%   • ENDOSCOPY N/A 6/19/2019    Procedure: ESOPHAGOGASTRODUODENOSCOPY WITH CONTROL OF BLEED;  Surgeon: Alfredo Guerrero MD;  Location: [facility] ENDOSCOPY;  Service: Gastroenterology   • FOOT SURGERY  1990   • OTHER SURGICAL HISTORY  10/26/2015    PARING CORN/CALLUS    • PACEMAKER REPLACEMENT N/A 3/21/2017    Procedure: revision pacemaker pocket, evacuation hematoma, control of bleeding;  Surgeon: Polo Feliz MD;  Location: [facility] OR;  Service:    • SIGMOIDOSCOPY N/A 9/30/2019    Procedure: SIGMOIDOSCOPY FLEXIBLE;  Surgeon: Alfredo Guerrero MD;  Location: [facility] ENDOSCOPY;   Service: Gastroenterology   • TRANSESOPHAGEAL ECHOCARDIOGRAM (MITZY)  05/01/2014    With color flow-Mild left atrial enlargement with mild concentric LV hypertrophy with top normal aortic root size. EF 45-50%. Mild mitral regurgitation and mild aortic insufficiency and trivial amount of tricuspid regurgation   • TUBAL ABDOMINAL LIGATION     • UPPER GASTROINTESTINAL ENDOSCOPY  06/19/2019         Current Outpatient Medications:   •  acetaminophen (TYLENOL) 325 MG tablet, Take 650 mg by mouth every 6 (six) hours as needed for mild pain (1-3)., Disp: , Rfl:   •  albuterol (PROVENTIL) (2.5 MG/3ML) 0.083% nebulizer solution, Take 2.5 mg by nebulization Every 4 (Four) Hours As Needed for Wheezing., Disp: , Rfl:   •  albuterol sulfate  (90 Base) MCG/ACT inhaler, Inhale 2 puffs Every 4 (Four) Hours As Needed for Wheezing or Shortness of Air., Disp: 18 g, Rfl: 11  •  Ascorbic Acid (VITAMIN C WITH OCHOA HIPS) 250 MG tablet, Take 500 mg by mouth Daily., Disp: , Rfl:   •  aspirin 81 MG chewable tablet, Chew 81 mg Daily., Disp: , Rfl:   •  bumetanide (BUMEX) 2 MG tablet, Take 1 tablet by mouth 2 (Two) Times a Day. (Patient taking differently: Take 1 mg by mouth Daily.), Disp: 60 tablet, Rfl: 3  •  cholecalciferol (VITAMIN D3) 1000 units tablet, Take 2,000 Units by mouth Daily., Disp: , Rfl:   •  citalopram (CeleXA) 20 MG tablet, Take 1 tablet by mouth Daily., Disp: 90 tablet, Rfl: 3  •  diltiaZEM CD (CARDIZEM CD) 240 MG 24 hr capsule, Take 1 capsule by mouth Daily., Disp: 90 capsule, Rfl: 3  •  ezetimibe (ZETIA) 10 MG tablet, TAKE 1 TABLET BY MOUTH EVERY DAY, Disp: 90 tablet, Rfl: 1  •  ferrous sulfate 325 (65 FE) MG tablet, Take 325 mg by mouth Daily With Breakfast., Disp: , Rfl:   •  glucose blood test strip, 1 each by Other route 3 (three) times a day. Use as instructed, Disp: , Rfl:   •  Insulin Glargine (BASAGLAR KWIKPEN) 100 UNIT/ML injection pen, INJECT 30 UNITS UNDER THE SKIN INTO THE APPROPRIATE AREA AS DIRECTED  DAILY., Disp: 15 mL, Rfl: 11  •  metOLazone (ZAROXOLYN) 5 MG tablet, Take 1 tablet by mouth Daily., Disp: 30 tablet, Rfl: 5  •  metoprolol succinate XL (TOPROL-XL) 25 MG 24 hr tablet, Take 1 tablet by mouth Daily., Disp: 30 tablet, Rfl: 5  •  ondansetron (ZOFRAN) 4 MG tablet, Take 1 tablet by mouth Every 6 (Six) Hours As Needed for Nausea or Vomiting., Disp: 30 tablet, Rfl: 0  •  Prenatal Vit-Fe Fumarate-FA (PRENATAL VITAMIN) 27-0.8 MG tablet, Take 1 tablet by mouth daily., Disp: , Rfl:   •  rOPINIRole (REQUIP) 0.25 MG tablet, TAKE 1 TABLET BY MOUTH EVERY NIGHT. TAKE 1 HOUR BEFORE BEDTIME., Disp: 30 tablet, Rfl: 0  •  traZODone (DESYREL) 150 MG tablet, Take 2 tablets by mouth Every Night., Disp: 180 tablet, Rfl: 3  •  umeclidinium-vilanterol (ANORO ELLIPTA) 62.5-25 MCG/INH aerosol powder  inhaler, Inhale 1 puff Daily., Disp: 1 each, Rfl: 11  •  UNIFINE PENTIPS 31G X 6 MM misc, USE 1 FOUR (4) TIMES DAILY WITH INSULIN, Disp: 130 each, Rfl: 5  •  warfarin (COUMADIN) 5 MG tablet, Take 1 tablet by mouth Daily. Take 1 tablet nightly or AS DIRECTED PER COUMADIN CLINIC, Disp: 30 tablet, Rfl: 5  •  potassium chloride (K-DUR,KLOR-CON) 20 MEQ CR tablet, TAKE 1 TABLET BY MOUTH EVERY DAY, Disp: 30 tablet, Rfl: 3    Allergies   Allergen Reactions   • Crestor [Rosuvastatin Calcium] Anaphylaxis     Hurts liver   • Lipitor [Atorvastatin] Other (See Comments)     Can mess with kidneys    • Lortab [Hydrocodone-Acetaminophen] Hallucinations   • Adhesive Tape Other (See Comments)     Only paper tape takes off her skin    • Hydrocodone-Acetaminophen Hallucinations       Family History   Problem Relation Age of Onset   • Heart disease Mother    • Hyperlipidemia Mother    • Hypertension Mother    • Cancer Other        Social History     Socioeconomic History   • Marital status:      Spouse name: Not on file   • Number of children: Not on file   • Years of education: Not on file   • Highest education level: Not on file   Tobacco Use    • Smoking status: Former Smoker     Last attempt to quit: 3/21/1999     Years since quittin.8   • Smokeless tobacco: Never Used   Substance and Sexual Activity   • Alcohol use: No     Comment: quit in 2016   • Drug use: No   • Sexual activity: Never     Birth control/protection: Post-menopausal       Review of Systems   Respiratory: Positive for shortness of breath.    ROS:    Review of Systems   Constitutional: Negative for activity change, appetite change, chills, diaphoresis, fatigue, fever and unexpected weight change.   HENT: Negative for sore throat and trouble swallowing.    Respiratory: Negative for shortness of breath.    Gastrointestinal: Negative for abdominal distention, abdominal pain, anal bleeding, blood in stool, constipation, diarrhea, nausea, rectal pain and vomiting.   Endocrine: Negative for polydipsia, polyphagia and polyuria.   Genitourinary: Negative for difficulty urinating.   Musculoskeletal: Negative for arthralgias.   Skin: Negative for pallor.   Allergic/Immunologic: Negative for food allergies.   Neurological: Negative for weakness and light-headedness.   Psychiatric/Behavioral: Negative for behavioral problems    Physical Exam   Constitutional: She is oriented to person, place, and time. She appears well-developed and well-nourished. No distress.   HENT:   Head: Normocephalic and atraumatic.   Mouth/Throat: No oropharyngeal exudate.   Eyes: Pupils are equal, round, and reactive to light. EOM are normal. No scleral icterus.   Neck: Normal range of motion. Neck supple. No JVD present. No tracheal deviation present. No thyromegaly present.   Cardiovascular: Normal rate, regular rhythm and normal heart sounds.   Click of a heart valve.   Pulmonary/Chest: Effort normal and breath sounds normal. No stridor. No respiratory distress. She has no wheezes. She has no rales.   Abdominal: Soft. Bowel sounds are normal. She exhibits no distension and no mass. There is no tenderness. There is  no rebound and no guarding. No hernia.   Musculoskeletal: Normal range of motion. She exhibits no edema, tenderness or deformity.   Lymphadenopathy:     She has no cervical adenopathy.     She has no axillary adenopathy.   Neurological: She is alert and oriented to person, place, and time.   Skin: Skin is warm and dry. No rash noted. She is not diaphoretic. No erythema. No pallor.   Psychiatric: She has a normal mood and affect. Her behavior is normal. Judgment normal.   Vitals reviewed.        ASSESSMENT    Brendon was seen today for mass.    Diagnoses and all orders for this visit:    Cancer of sigmoid (CMS/HCC)    Paroxysmal atrial fibrillation (CMS/HCC)    Chronic hypoxemic respiratory failure (CMS/HCC)    Heart failure with preserved left ventricular function (HFpEF) (CMS/HCC)    Chronic obstructive pulmonary disease, unspecified COPD type (CMS/HCC)    Type 2 diabetes mellitus with stage 4 chronic kidney disease, with long-term current use of insulin (CMS/HCC)    Class 2 severe obesity due to excess calories with serious comorbidity and body mass index (BMI) of 36.0 to 36.9 in adult (CMS/HCC)        PLAN    1. Coumadin clinic to transition to heparin  2. Laparoscopic possible open sigmoid colectomy is planned    The following were discussed:    What are the indications that have led your doctor to the opinion that an operation is necessary?    In order to treat colon cancer, it is necessary to remove the part of the colon that contains the cancer along with the attached lymph nodes. It is planned that a part of the large bowel will be removed through a cut in the abdomen.  While the majority of patients can be reconnected, occasionally a piece of the bowel may be brought out through the wall of the abdomen as a colostomy. This is usually temporary and allows the bowel content to drain into a bag worn over the colostomy until the bowel connection has healed.       What, if any, alternative treatments are available  for your condition?    There are no good alternative treatments for the treatment of colon cancer other than surgery. Occasionally, chemotherapy and/or radiation therapy are recommended, these are usually given in addition to surgery. In cases of advanced, non-resectable colon cancer, only chemotherapy may be prescribed.      What will be the likely result if you don't have the operation?    Failure to remove resectable colon cancer can result in perforation and infection, bleeding, intestinal obstruction, or death.    What are the basic procedures involved in the operation?    All attempts are made to perform the surgery using minimally invasive techniques- laparoscopy and small incisions. Occasionally, longer incisions are necessary. The diseased segment is removed, as well as attached lymph nodes if necessary. The intestine is reattached unless it is not safe (such as acute infection or a low lying rectal lesion).    What are the risks?    There are risks and complications with this procedure. Any complication may require a prolonged hospitalization, a modified incision or additional surgery.They include but are not limited to the following:      General risks:   • Infection can occur, requiring antibiotics and further treatment.   • Bleeding could occur and may require a return to the operating room. Bleeding is more common if you have been taking blood thinning drugs such as warfarin, asprin, clopidogrel (Plavix or Iscover) or dipyridamole (Persantin or Asasantin).   • Small areas of the lung can collapse, increasing the risk of chest infection. This may need antibiotics and physiotherapy.   • Increased risk in obese people of wound infection, chest infection, heart and lung complications, and thrombosis.   • Heart attack or stroke could occur due to the strain on the heart.   • Blood clot in the leg (DVT) causing pain and swelling. In rare cases part of the clot may break off and go to the lungs.   • Death as  a result of this procedure is possible.     Specific risks of a resection of colon:    • Leakage where the bowel was stitched together. This may need further surgery.   • Deep bleeding in the abdomen. This may need fluid replacement or further surgery.   • Bowel is paralyzed, causing abdominal bloating and vomiting. This is usually temporary.   • Incisional hernias are possible and usually require another operation.  • The wound may become infected. This is usually treated with antibiotics or the wound may need to be opened.   • Urinary tract infection. Antibiotics may be used to control the infection.   • Infection in the abdominal cavity. This may form an abscess, may need drainage and antibiotics.   • The bowel may be unable to be joined and may be brought to the surface as a stoma, with the following problems:   - The blood supply to the stoma may fail and cause damage. This may need further surgery.   - Excess fluid loss from the stoma.   - Stoma prolapse - the bowel protrudes passed skin.   - Parastomal hernia - the bowel pushes through a weak point in the muscle wall, causing pain.   - Local skin irritation - reddening of the skin and a rash in reaction to the stoma bag glue.   • Bleeding into the abdomen. A blood transfusion and further surgery may be necessary.   • Damage to the tube bringing the urine from the kidney to the bladder.   • Abnormal emptying of the bladder. It may empty without control or may not empty at all.   • Inability to have and/or maintain an erection in men. In women, it can cause pain during or after intercourse.   • The wound may be abnormal and the wound can be thickened, red and painful.   • The bowel actions may be much looser after the operation than before.   • Adhesions (bands of scar tissue) develop in the abdominal cavity and the bowel may block.   • Death within 30 days of surgery is estimated at 1 in 16 to 1 in 63.     How is the operation expected to improve your health  or quality of life?    Colon cancers may be cured with surgery, although certain features of the cancer ( such as location, positive nodes, or large tumors) may require chemotherapy or radiation therapy.      Is hospitalization necessary and, if so, how long can you expect to be hospitalized?    These operations are performed under general anesthesia. Generally, 5-7 days of hospitalization are required after surgery, although longer and shorter hospitalization may be necessary depending on clinical course.    What can you expect during your recovery period?    Immediately after surgery, pain is controlled by a combination of narcotic and non-narcotic measures. Nausea is controlled with IV medication.  Catheters in the bladder are removed as quickly as possible, usually in the first 24 hours. Large IV's placed in surgery are likewise removed in the first day.   Occasionally, it is necessary to leave a tube in the stomach, placed through the nose to control nausea and protect surgery. Most often this is not necessary.  Depending on your overall health, admission to intensive care may be necessary. Most patients are managed on the surgical floor on a cardiac monitor.  Coughing and early ambulation help prevent post operative pneumonia. Early ambulation, along with leg devices can decrease the rate of blood clots in the legs.    When can you expect to resume normal activities?    Excluding lifting and straining, most activity can be resumed after 1-2 weeks. Lifting under 5 pounds and no straining is recommended for 6 weeks. Driving may be resumed once pain medicine is stopped and the patient is fully capable of operating a vehicle.    Are there likely to be residual effects from the operation?    Persons having colon resections occasionally experience frequency, urgency, or diarrhea. Most of this is temporary resolves within a few months after surgery.     .   All questions were answered. The patient agrees to  operation.                This document has been electronically signed by Luis Maher MD on January 23, 2020 2:32 PM

## 2020-01-23 NOTE — PATIENT INSTRUCTIONS

## 2020-01-28 NOTE — PROGRESS NOTES
The ABCs of the Annual Wellness Visit  Subsequent Medicare Wellness Visit    Chief Complaint   Patient presents with   • Medicare Wellness-subsequent       Subjective   History of Present Illness:  Brendon Skelton is a 74 y.o. female who presents for a Subsequent Medicare Wellness Visit.  HEALTH RISK ASSESSMENT    Recent Hospitalizations:  Recently treated at the following:  [facility]    Current Medical Providers:  Patient Care Team:  Josefa Pozo APRN as PCP - General (Nurse Practitioner)  Meme Duckworth APRN as PCP - Claims Attributed  Luis Maher MD as Surgeon (General Surgery)  Alfredo Guerrero MD as Consulting Physician (Gastroenterology)  Hermann Cheng PA-C as Physician Assistant (Gastroenterology)    Smoking Status:  Social History     Tobacco Use   Smoking Status Former Smoker   • Last attempt to quit: 3/21/1999   • Years since quittin.8   Smokeless Tobacco Never Used       Alcohol Consumption:  Social History     Substance and Sexual Activity   Alcohol Use No    Comment: quit in 2016       Depression Screen:   PHQ-2/PHQ-9 Depression Screening 2020   Little interest or pleasure in doing things 0   Feeling down, depressed, or hopeless 0   Trouble falling or staying asleep, or sleeping too much 0   Feeling tired or having little energy 0   Poor appetite or overeating 0   Feeling bad about yourself - or that you are a failure or have let yourself or your family down 0   Trouble concentrating on things, such as reading the newspaper or watching television 0   Moving or speaking so slowly that other people could have noticed. Or the opposite - being so fidgety or restless that you have been moving around a lot more than usual 0   Thoughts that you would be better off dead, or of hurting yourself in some way 0   Total Score 0   If you checked off any problems, how difficult have these problems made it for you to do your work, take care of things at home, or get along  with other people? -       Fall Risk Screen:  JEEVAN Fall Risk Assessment has not been completed.    Health Habits and Functional and Cognitive Screening:  Functional & Cognitive Status 1/28/2020   Do you have difficulty preparing food and eating? No   Do you have difficulty bathing yourself, getting dressed or grooming yourself? No   Do you have difficulty using the toilet? No   Do you have difficulty moving around from place to place? No   Do you have trouble with steps or getting out of a bed or a chair? No   Current Diet Well Balanced Diet   Dental Exam Up to date   Eye Exam Up to date   Exercise (times per week) 3 times per week   Current Exercise Activities Include Walking   Do you need help using the phone?  No   Are you deaf or do you have serious difficulty hearing?  No   Do you need help with transportation? Yes   Do you need help shopping? No   Do you need help preparing meals?  No   Do you need help with housework?  No   Do you need help with laundry? No   Do you need help taking your medications? No   Do you need help managing money? No   Do you ever drive or ride in a car without wearing a seat belt? No   Have you felt unusual stress, anger or loneliness in the last month? No   Who do you live with? Alone   If you need help, do you have trouble finding someone available to you? No   Have you been bothered in the last four weeks by sexual problems? No   Do you have difficulty concentrating, remembering or making decisions? No         Does the patient have evidence of cognitive impairment? No    Asprin use counseling:Taking ASA appropriately as indicated    Age-appropriate Screening Schedule:  Refer to the list below for future screening recommendations based on patient's age, sex and/or medical conditions. Orders for these recommended tests are listed in the plan section. The patient has been provided with a written plan.    Health Maintenance   Topic Date Due   • URINE MICROALBUMIN  01/08/2019   •  LIPID PANEL  09/17/2019   • DIABETIC EYE EXAM  01/17/2020   • HEMOGLOBIN A1C  01/26/2020   • TDAP/TD VACCINES (1 - Tdap) 01/28/2021 (Originally 9/24/1956)   • ZOSTER VACCINE (1 of 2) 01/28/2021 (Originally 9/24/1995)   • MAMMOGRAM  02/12/2020   • DIABETIC FOOT EXAM  10/29/2020   • COLONOSCOPY  06/24/2029   • PNEUMOCOCCAL VACCINE (65+ HIGH RISK)  Completed   • INFLUENZA VACCINE  Completed          The following portions of the patient's history were reviewed and updated as appropriate:   She  has a past medical history of Acute bronchitis, Anxiety, Atrial fibrillation (CMS/HCC), C. difficile colitis, Callosity, Cardiac pacemaker in situ, CHF (congestive heart failure) (CMS/HCC), Chronic obstructive lung disease (CMS/HCC), Corns and callus, Depressive disorder, Diabetes mellitus (CMS/HCC), Diastolic heart failure (CMS/HCC), Essential hypertension, Foot pain, History of transfusion, Hyperlipidemia, Long term current use of anticoagulant, Malignant neoplasm of sigmoid colon (CMS/HCC) (8/13/2019), On anticoagulant therapy, Pulmonary emphysema (CMS/HCC), Rectal hemorrhage, and Type 2 diabetes mellitus (CMS/HCC).  She does not have any pertinent problems on file.  She  has a past surgical history that includes Cardiac pacemaker placement (1999); Echo - Converted (11/12/2013); transesophageal echocardiogram (lester) (05/01/2014); Foot surgery (1990); Aortic valve replacement (1999); Other surgical history (10/26/2015); Cardiac electrophysiology procedure (N/A, 3/20/2017); Tubal ligation; Pacemaker Replacement (N/A, 3/21/2017); Cataract extraction w/ intraocular lens implant (Left, 2/1/2019); Cataract extraction w/ intraocular lens implant (Right, 2/8/2019); Esophagogastroduodenoscopy (N/A, 6/19/2019); Colonoscopy (N/A, 6/19/2019); Colonoscopy (N/A, 6/24/2019); Sigmoidoscopy (N/A, 9/30/2019); and Upper gastrointestinal endoscopy (06/19/2019).  Her family history includes Cancer in an other family member; Heart disease in her  mother; Hyperlipidemia in her mother; Hypertension in her mother.  She  reports that she quit smoking about 20 years ago. She has never used smokeless tobacco. She reports that she does not drink alcohol or use drugs.  Current Outpatient Medications   Medication Sig Dispense Refill   • acetaminophen (TYLENOL) 325 MG tablet Take 650 mg by mouth every 6 (six) hours as needed for mild pain (1-3).     • albuterol (PROVENTIL) (2.5 MG/3ML) 0.083% nebulizer solution Take 2.5 mg by nebulization Every 4 (Four) Hours As Needed for Wheezing.     • albuterol sulfate  (90 Base) MCG/ACT inhaler Inhale 2 puffs Every 4 (Four) Hours As Needed for Wheezing or Shortness of Air. 18 g 11   • Ascorbic Acid (VITAMIN C WITH OCHOA HIPS) 250 MG tablet Take 500 mg by mouth Daily.     • aspirin 81 MG chewable tablet Chew 81 mg Daily.     • bumetanide (BUMEX) 2 MG tablet Take 1 tablet by mouth 2 (Two) Times a Day. (Patient taking differently: Take 1 mg by mouth Daily.) 60 tablet 3   • cholecalciferol (VITAMIN D3) 1000 units tablet Take 2,000 Units by mouth Daily.     • citalopram (CeleXA) 20 MG tablet Take 1 tablet by mouth Daily. 90 tablet 3   • diltiaZEM CD (CARDIZEM CD) 240 MG 24 hr capsule Take 1 capsule by mouth Daily. 90 capsule 3   • ezetimibe (ZETIA) 10 MG tablet TAKE 1 TABLET BY MOUTH EVERY DAY 90 tablet 1   • ferrous sulfate 325 (65 FE) MG tablet Take 325 mg by mouth Daily With Breakfast.     • glucose blood test strip 1 each by Other route 3 (three) times a day. Use as instructed     • Insulin Glargine (BASAGLAR KWIKPEN) 100 UNIT/ML injection pen INJECT 30 UNITS UNDER THE SKIN INTO THE APPROPRIATE AREA AS DIRECTED DAILY. 15 mL 11   • metOLazone (ZAROXOLYN) 5 MG tablet Take 1 tablet by mouth Daily. 30 tablet 5   • metoprolol succinate XL (TOPROL-XL) 25 MG 24 hr tablet Take 1 tablet by mouth Daily. 30 tablet 5   • metroNIDAZOLE (FLAGYL) 500 MG tablet Take 1 tablet by mouth 3 (Three) Times a Day for 7 days. 21 tablet 0   •  neomycin (MYCIFRADIN) 500 MG tablet TAKE 2 TABLETS  AND 1100PM NIGHT BEFORE SURGERY 4 tablet 0   • ondansetron (ZOFRAN) 4 MG tablet Take 1 tablet by mouth Every 6 (Six) Hours As Needed for Nausea or Vomiting. 30 tablet 0   • potassium chloride (K-DUR,KLOR-CON) 20 MEQ CR tablet TAKE 1 TABLET BY MOUTH EVERY DAY 30 tablet 3   • Prenatal Vit-Fe Fumarate-FA (PRENATAL VITAMIN) 27-0.8 MG tablet Take 1 tablet by mouth daily.     • rOPINIRole (REQUIP) 0.25 MG tablet TAKE 1 TABLET BY MOUTH EVERY NIGHT. TAKE 1 HOUR BEFORE BEDTIME. 30 tablet 0   • traZODone (DESYREL) 150 MG tablet Take 2 tablets by mouth Every Night. 180 tablet 3   • umeclidinium-vilanterol (ANORO ELLIPTA) 62.5-25 MCG/INH aerosol powder  inhaler Inhale 1 puff Daily. 1 each 11   • UNIFINE PENTIPS 31G X 6 MM misc USE 1 FOUR (4) TIMES DAILY WITH INSULIN 130 each 5   • warfarin (COUMADIN) 5 MG tablet Take 1 tablet by mouth Daily. Take 1 tablet nightly or AS DIRECTED PER COUMADIN CLINIC 30 tablet 5     No current facility-administered medications for this visit.      Current Outpatient Medications on File Prior to Visit   Medication Sig   • acetaminophen (TYLENOL) 325 MG tablet Take 650 mg by mouth every 6 (six) hours as needed for mild pain (1-3).   • albuterol (PROVENTIL) (2.5 MG/3ML) 0.083% nebulizer solution Take 2.5 mg by nebulization Every 4 (Four) Hours As Needed for Wheezing.   • albuterol sulfate  (90 Base) MCG/ACT inhaler Inhale 2 puffs Every 4 (Four) Hours As Needed for Wheezing or Shortness of Air.   • Ascorbic Acid (VITAMIN C WITH OCHOA HIPS) 250 MG tablet Take 500 mg by mouth Daily.   • aspirin 81 MG chewable tablet Chew 81 mg Daily.   • bumetanide (BUMEX) 2 MG tablet Take 1 tablet by mouth 2 (Two) Times a Day. (Patient taking differently: Take 1 mg by mouth Daily.)   • cholecalciferol (VITAMIN D3) 1000 units tablet Take 2,000 Units by mouth Daily.   • citalopram (CeleXA) 20 MG tablet Take 1 tablet by mouth Daily.   • diltiaZEM CD (CARDIZEM  CD) 240 MG 24 hr capsule Take 1 capsule by mouth Daily.   • ezetimibe (ZETIA) 10 MG tablet TAKE 1 TABLET BY MOUTH EVERY DAY   • ferrous sulfate 325 (65 FE) MG tablet Take 325 mg by mouth Daily With Breakfast.   • glucose blood test strip 1 each by Other route 3 (three) times a day. Use as instructed   • Insulin Glargine (BASAGLAR KWIKPEN) 100 UNIT/ML injection pen INJECT 30 UNITS UNDER THE SKIN INTO THE APPROPRIATE AREA AS DIRECTED DAILY.   • metOLazone (ZAROXOLYN) 5 MG tablet Take 1 tablet by mouth Daily.   • metoprolol succinate XL (TOPROL-XL) 25 MG 24 hr tablet Take 1 tablet by mouth Daily.   • metroNIDAZOLE (FLAGYL) 500 MG tablet Take 1 tablet by mouth 3 (Three) Times a Day for 7 days.   • neomycin (MYCIFRADIN) 500 MG tablet TAKE 2 TABLETS  AND 1100PM NIGHT BEFORE SURGERY   • ondansetron (ZOFRAN) 4 MG tablet Take 1 tablet by mouth Every 6 (Six) Hours As Needed for Nausea or Vomiting.   • potassium chloride (K-DUR,KLOR-CON) 20 MEQ CR tablet TAKE 1 TABLET BY MOUTH EVERY DAY   • Prenatal Vit-Fe Fumarate-FA (PRENATAL VITAMIN) 27-0.8 MG tablet Take 1 tablet by mouth daily.   • rOPINIRole (REQUIP) 0.25 MG tablet TAKE 1 TABLET BY MOUTH EVERY NIGHT. TAKE 1 HOUR BEFORE BEDTIME.   • traZODone (DESYREL) 150 MG tablet Take 2 tablets by mouth Every Night.   • umeclidinium-vilanterol (ANORO ELLIPTA) 62.5-25 MCG/INH aerosol powder  inhaler Inhale 1 puff Daily.   • UNIFINE PENTIPS 31G X 6 MM misc USE 1 FOUR (4) TIMES DAILY WITH INSULIN   • warfarin (COUMADIN) 5 MG tablet Take 1 tablet by mouth Daily. Take 1 tablet nightly or AS DIRECTED PER COUMADIN CLINIC     No current facility-administered medications on file prior to visit.      She is allergic to crestor [rosuvastatin calcium]; lipitor [atorvastatin]; lortab [hydrocodone-acetaminophen]; adhesive tape; and hydrocodone-acetaminophen..    Outpatient Medications Prior to Visit   Medication Sig Dispense Refill   • acetaminophen (TYLENOL) 325 MG tablet Take 650 mg by mouth  every 6 (six) hours as needed for mild pain (1-3).     • albuterol (PROVENTIL) (2.5 MG/3ML) 0.083% nebulizer solution Take 2.5 mg by nebulization Every 4 (Four) Hours As Needed for Wheezing.     • albuterol sulfate  (90 Base) MCG/ACT inhaler Inhale 2 puffs Every 4 (Four) Hours As Needed for Wheezing or Shortness of Air. 18 g 11   • Ascorbic Acid (VITAMIN C WITH OCHOA HIPS) 250 MG tablet Take 500 mg by mouth Daily.     • aspirin 81 MG chewable tablet Chew 81 mg Daily.     • bumetanide (BUMEX) 2 MG tablet Take 1 tablet by mouth 2 (Two) Times a Day. (Patient taking differently: Take 1 mg by mouth Daily.) 60 tablet 3   • cholecalciferol (VITAMIN D3) 1000 units tablet Take 2,000 Units by mouth Daily.     • citalopram (CeleXA) 20 MG tablet Take 1 tablet by mouth Daily. 90 tablet 3   • diltiaZEM CD (CARDIZEM CD) 240 MG 24 hr capsule Take 1 capsule by mouth Daily. 90 capsule 3   • ezetimibe (ZETIA) 10 MG tablet TAKE 1 TABLET BY MOUTH EVERY DAY 90 tablet 1   • ferrous sulfate 325 (65 FE) MG tablet Take 325 mg by mouth Daily With Breakfast.     • glucose blood test strip 1 each by Other route 3 (three) times a day. Use as instructed     • Insulin Glargine (BASAGLAR KWIKPEN) 100 UNIT/ML injection pen INJECT 30 UNITS UNDER THE SKIN INTO THE APPROPRIATE AREA AS DIRECTED DAILY. 15 mL 11   • metOLazone (ZAROXOLYN) 5 MG tablet Take 1 tablet by mouth Daily. 30 tablet 5   • metoprolol succinate XL (TOPROL-XL) 25 MG 24 hr tablet Take 1 tablet by mouth Daily. 30 tablet 5   • metroNIDAZOLE (FLAGYL) 500 MG tablet Take 1 tablet by mouth 3 (Three) Times a Day for 7 days. 21 tablet 0   • neomycin (MYCIFRADIN) 500 MG tablet TAKE 2 TABLETS  AND 1100PM NIGHT BEFORE SURGERY 4 tablet 0   • ondansetron (ZOFRAN) 4 MG tablet Take 1 tablet by mouth Every 6 (Six) Hours As Needed for Nausea or Vomiting. 30 tablet 0   • potassium chloride (K-DUR,KLOR-CON) 20 MEQ CR tablet TAKE 1 TABLET BY MOUTH EVERY DAY 30 tablet 3   • Prenatal Vit-Fe  Fumarate-FA (PRENATAL VITAMIN) 27-0.8 MG tablet Take 1 tablet by mouth daily.     • rOPINIRole (REQUIP) 0.25 MG tablet TAKE 1 TABLET BY MOUTH EVERY NIGHT. TAKE 1 HOUR BEFORE BEDTIME. 30 tablet 0   • traZODone (DESYREL) 150 MG tablet Take 2 tablets by mouth Every Night. 180 tablet 3   • umeclidinium-vilanterol (ANORO ELLIPTA) 62.5-25 MCG/INH aerosol powder  inhaler Inhale 1 puff Daily. 1 each 11   • UNIFINE PENTIPS 31G X 6 MM misc USE 1 FOUR (4) TIMES DAILY WITH INSULIN 130 each 5   • warfarin (COUMADIN) 5 MG tablet Take 1 tablet by mouth Daily. Take 1 tablet nightly or AS DIRECTED PER COUMADIN CLINIC 30 tablet 5     No facility-administered medications prior to visit.        Patient Active Problem List   Diagnosis   • Long term current use of anticoagulant therapy   • Personal history of heart valve replacement   • Atrial fibrillation [I48.91]   • Emphysema of lung (CMS/HCC)   • On anticoagulant therapy   • Hyperlipidemia   • Diastolic heart failure (CMS/HCC)   • Depressive disorder   • COPD (chronic obstructive pulmonary disease) (CMS/HCC)   • Type 2 diabetes mellitus with stage 4 chronic kidney disease, with long-term current use of insulin (CMS/HCC)   • Myopia   • Astigmatism   • Bleeding from open wound of chest wall   • Follow-up surgery care   • Encounter for screening for malignant neoplasm of colon   • Positive colorectal cancer screening using DNA-based stool test   • Nodule of left lung   • Chronic hypoxemic respiratory failure (CMS/HCC)   • Physical deconditioning   • Heart failure with preserved left ventricular function (HFpEF) (CMS/HCC)   • Essential hypertension   • Coronary artery disease involving native coronary artery without angina pectoris   • Hx of CABG   • SSS (sick sinus syndrome) (CMS/HCC)   • Pacemaker   • Stage 4 chronic kidney disease (CMS/HCC)   • Personal history of tobacco use, presenting hazards to health   • Gastrointestinal hemorrhage   • Class 2 severe obesity due to excess  "calories with serious comorbidity and body mass index (BMI) of 36.0 to 36.9 in adult (CMS/[facility])   • Gastritis   • Colon polyp   • Coumadin toxicity   • Acute on chronic diastolic congestive heart failure (CMS/HCC)   • Anemia   • Pulmonary hypertension (CMS/[facility])   • Cancer of sigmoid (CMS/[facility])       Advanced Care Planning:  Patient has an advance directive - a copy has been provided and is visible in patient header    Review of Systems   Constitutional: Positive for fatigue. Negative for chills, diaphoresis and fever.   HENT: Negative.    Eyes: Negative.    Respiratory: Positive for shortness of breath. Negative for cough.    Cardiovascular: Negative.  Negative for chest pain.   Gastrointestinal: Negative.    Genitourinary: Negative.    Musculoskeletal: Positive for gait problem.   Skin: Negative.    Neurological: Positive for weakness.   Psychiatric/Behavioral: Negative for confusion, decreased concentration and suicidal ideas.       Compared to one year ago, the patient feels her physical health is the same.  Compared to one year ago, the patient feels her mental health is better.    Reviewed chart for potential of high risk medication in the elderly: yes  Reviewed chart for potential of harmful drug interactions in the elderly:yes    Objective         Vitals:    01/28/20 1059   BP: 144/72   Weight: 108 kg (237 lb)   Height: 165.1 cm (65\")       Body mass index is 39.44 kg/m².  Discussed the patient's BMI with her. The BMI is above average; BMI management plan is completed.    Physical Exam   Constitutional: She is oriented to person, place, and time. She appears well-developed and well-nourished.   Arrives via personal wheelchair    HENT:   Head: Normocephalic.   Right Ear: External ear normal.   Left Ear: External ear normal.   Eyes: Pupils are equal, round, and reactive to light. EOM are normal.   Neck: Normal range of motion. Neck supple.   Cardiovascular: Normal rate and regular rhythm.   Pulmonary/Chest: " Effort normal and breath sounds normal.   3L per NC    Abdominal: Soft. Bowel sounds are normal.   Musculoskeletal: Normal range of motion.   Neurological: She is alert and oriented to person, place, and time.   Skin: Skin is warm. Capillary refill takes less than 2 seconds.   Psychiatric: She has a normal mood and affect. Her behavior is normal.   Nursing note and vitals reviewed.            Assessment/Plan   Medicare Risks and Personalized Health Plan  CMS Preventative Services Quick Reference  Cardiovascular risk  Fall Risk  Obesity/Overweight   Osteoprorosis Risk    The above risks/problems have been discussed with the patient.  Pertinent information has been shared with the patient in the After Visit Summary.  Follow up plans and orders are seen below in the Assessment/Plan Section.    Diagnoses and all orders for this visit:    1. Medicare annual wellness visit, subsequent (Primary)  -     Ambulatory Referral to Ophthalmology    2. Type 2 diabetes mellitus with stage 4 chronic kidney disease, with long-term current use of insulin (CMS/[facility])  -     Lipid panel  -     TSH; Future  -     Hemoglobin A1c; Future      Follow Up:  Return in about 3 months (around 4/28/2020) for Recheck.     An After Visit Summary and PPPS were given to the patient.         This document has been electronically signed by ABRAHAM Boone on January 28, 2020 11:49 AM

## 2020-01-28 NOTE — PATIENT INSTRUCTIONS
Calorie Counting for Weight Loss  Calories are units of energy. Your body needs a certain amount of calories from food to keep you going throughout the day. When you eat more calories than your body needs, your body stores the extra calories as fat. When you eat fewer calories than your body needs, your body burns fat to get the energy it needs.  Calorie counting means keeping track of how many calories you eat and drink each day. Calorie counting can be helpful if you need to lose weight. If you make sure to eat fewer calories than your body needs, you should lose weight. Ask your health care provider what a healthy weight is for you.  For calorie counting to work, you will need to eat the right number of calories in a day in order to lose a healthy amount of weight per week. A dietitian can help you determine how many calories you need in a day and will give you suggestions on how to reach your calorie goal.  · A healthy amount of weight to lose per week is usually 1-2 lb (0.5-0.9 kg). This usually means that your daily calorie intake should be reduced by 500-750 calories.  · Eating 1,200 - 1,500 calories per day can help most women lose weight.  · Eating 1,500 - 1,800 calories per day can help most men lose weight.  What is my plan?  My goal is to have __________ calories per day.  If I have this many calories per day, I should lose around __________ pounds per week.  What do I need to know about calorie counting?  In order to meet your daily calorie goal, you will need to:  · Find out how many calories are in each food you would like to eat. Try to do this before you eat.  · Decide how much of the food you plan to eat.  · Write down what you ate and how many calories it had. Doing this is called keeping a food log.  To successfully lose weight, it is important to balance calorie counting with a healthy lifestyle that includes regular activity. Aim for 150 minutes of moderate exercise (such as walking) or 75  minutes of vigorous exercise (such as running) each week.  Where do I find calorie information?    The number of calories in a food can be found on a Nutrition Facts label. If a food does not have a Nutrition Facts label, try to look up the calories online or ask your dietitian for help.  Remember that calories are listed per serving. If you choose to have more than one serving of a food, you will have to multiply the calories per serving by the amount of servings you plan to eat. For example, the label on a package of bread might say that a serving size is 1 slice and that there are 90 calories in a serving. If you eat 1 slice, you will have eaten 90 calories. If you eat 2 slices, you will have eaten 180 calories.  How do I keep a food log?  Immediately after each meal, record the following information in your food log:  · What you ate. Don't forget to include toppings, sauces, and other extras on the food.  · How much you ate. This can be measured in cups, ounces, or number of items.  · How many calories each food and drink had.  · The total number of calories in the meal.  Keep your food log near you, such as in a small notebook in your pocket, or use a mobile waldo or website. Some programs will calculate calories for you and show you how many calories you have left for the day to meet your goal.  What are some calorie counting tips?    · Use your calories on foods and drinks that will fill you up and not leave you hungry:  ? Some examples of foods that fill you up are nuts and nut butters, vegetables, lean proteins, and high-fiber foods like whole grains. High-fiber foods are foods with more than 5 g fiber per serving.  ? Drinks such as sodas, specialty coffee drinks, alcohol, and juices have a lot of calories, yet do not fill you up.  · Eat nutritious foods and avoid empty calories. Empty calories are calories you get from foods or beverages that do not have many vitamins or protein, such as candy, sweets, and  "soda. It is better to have a nutritious high-calorie food (such as an avocado) than a food with few nutrients (such as a bag of chips).  · Know how many calories are in the foods you eat most often. This will help you calculate calorie counts faster.  · Pay attention to calories in drinks. Low-calorie drinks include water and unsweetened drinks.  · Pay attention to nutrition labels for \"low fat\" or \"fat free\" foods. These foods sometimes have the same amount of calories or more calories than the full fat versions. They also often have added sugar, starch, or salt, to make up for flavor that was removed with the fat.  · Find a way of tracking calories that works for you. Get creative. Try different apps or programs if writing down calories does not work for you.  What are some portion control tips?  · Know how many calories are in a serving. This will help you know how many servings of a certain food you can have.  · Use a measuring cup to measure serving sizes. You could also try weighing out portions on a kitchen scale. With time, you will be able to estimate serving sizes for some foods.  · Take some time to put servings of different foods on your favorite plates, bowls, and cups so you know what a serving looks like.  · Try not to eat straight from a bag or box. Doing this can lead to overeating. Put the amount you would like to eat in a cup or on a plate to make sure you are eating the right portion.  · Use smaller plates, glasses, and bowls to prevent overeating.  · Try not to multitask (for example, watch TV or use your computer) while eating. If it is time to eat, sit down at a table and enjoy your food. This will help you to know when you are full. It will also help you to be aware of what you are eating and how much you are eating.  What are tips for following this plan?  Reading food labels  · Check the calorie count compared to the serving size. The serving size may be smaller than what you are used to " "eating.  · Check the source of the calories. Make sure the food you are eating is high in vitamins and protein and low in saturated and trans fats.  Shopping  · Read nutrition labels while you shop. This will help you make healthy decisions before you decide to purchase your food.  · Make a grocery list and stick to it.  Cooking  · Try to cook your favorite foods in a healthier way. For example, try baking instead of frying.  · Use low-fat dairy products.  Meal planning  · Use more fruits and vegetables. Half of your plate should be fruits and vegetables.  · Include lean proteins like poultry and fish.  How do I count calories when eating out?  · Ask for smaller portion sizes.  · Consider sharing an entree and sides instead of getting your own entree.  · If you get your own entree, eat only half. Ask for a box at the beginning of your meal and put the rest of your entree in it so you are not tempted to eat it.  · If calories are listed on the menu, choose the lower calorie options.  · Choose dishes that include vegetables, fruits, whole grains, low-fat dairy products, and lean protein.  · Choose items that are boiled, broiled, grilled, or steamed. Stay away from items that are buttered, battered, fried, or served with cream sauce. Items labeled \"crispy\" are usually fried, unless stated otherwise.  · Choose water, low-fat milk, unsweetened iced tea, or other drinks without added sugar. If you want an alcoholic beverage, choose a lower calorie option such as a glass of wine or light beer.  · Ask for dressings, sauces, and syrups on the side. These are usually high in calories, so you should limit the amount you eat.  · If you want a salad, choose a garden salad and ask for grilled meats. Avoid extra toppings like bah, cheese, or fried items. Ask for the dressing on the side, or ask for olive oil and vinegar or lemon to use as dressing.  · Estimate how many servings of a food you are given. For example, a serving of " cooked rice is ½ cup or about the size of half a baseball. Knowing serving sizes will help you be aware of how much food you are eating at restaurants. The list below tells you how big or small some common portion sizes are based on everyday objects:  ? 1 oz--4 stacked dice.  ? 3 oz--1 deck of cards.  ? 1 tsp--1 die.  ? 1 Tbsp--½ a ping-pong ball.  ? 2 Tbsp--1 ping-pong ball.  ? ½ cup--½ baseball.  ? 1 cup--1 baseball.  Summary  · Calorie counting means keeping track of how many calories you eat and drink each day. If you eat fewer calories than your body needs, you should lose weight.  · A healthy amount of weight to lose per week is usually 1-2 lb (0.5-0.9 kg). This usually means reducing your daily calorie intake by 500-750 calories.  · The number of calories in a food can be found on a Nutrition Facts label. If a food does not have a Nutrition Facts label, try to look up the calories online or ask your dietitian for help.  · Use your calories on foods and drinks that will fill you up, and not on foods and drinks that will leave you hungry.  · Use smaller plates, glasses, and bowls to prevent overeating.  This information is not intended to replace advice given to you by your health care provider. Make sure you discuss any questions you have with your health care provider.  Document Released: 12/18/2006 Document Revised: 09/06/2019 Document Reviewed: 11/17/2017  [employer] Interactive Patient Education © 2019 [employer]  Heart Disease Prevention  Heart disease is the leading cause of death in the world. Coronary artery disease is the most common cause of heart disease. This condition results when cholesterol and other substances (plaque) build up inside the walls of the blood vessels that supply your heart muscle (arteries). This buildup in arteries is called atherosclerosis. You can take actions to lower your risk of heart disease.  How can heart disease affect me?  Heart disease can cause many unpleasant  symptoms and complications, such as:  · Chest pain (angina).  · Reduced or blocked blood flow to your heart. This can cause:  ? Irregular heartbeats (arrhythmias).  ? Heart attack.  ? Heart failure.  What can increase my risk?  The following factors may make you more likely to develop this condition:  · High blood pressure (hypertension).  · High cholesterol.  · Smoking.  · A diet high in saturated fats or trans fats.  · Lack of physical activity.  · Obesity.  · Drinking too much alcohol.  · Diabetes.  · Having a family history of heart disease.  What actions can I take to prevent heart disease?  Nutrition    · Eat a heart-healthy eating plan as told by your health care provider. Examples include the DASH (Dietary Approaches to Stop Hypertension) eating plan or the Mediterranean diet.  · Generally, it is recommended that you:  ? Eat less salt (sodium). Ask your health care provider how much sodium is safe for you. Most people should have less than 2,300 mg each day.  ? Limit unhealthy fats, such as saturated and trans fats, in your diet. You can do this by eating low-fat dairy products, eating less red meat, and avoiding processed foods.  ? Eat healthy fats (omega-3 fatty acids). These are found in fish, such as mackerel or salmon.  ? Eat more fruits and vegetables. You should try to fill one-half of your plate with fruits and vegetables at each meal.  ? Eat more whole grains.  ? Avoid foods and drinks that have added sugars.  Lifestyle    · Get regular exercise. This is one of the most important things you can do for your health. Generally, it is recommended that you:  ? Exercise for at least 30 minutes on most days of the week (150 minutes each week). The exercise should increase your heart rate and make you sweat (aerobic exercise).  ? Add strength exercises on at least 2 days each week.  · Do not use any products that contain nicotine or tobacco, such as cigarettes and e-cigarettes. These can damage your heart  and blood vessels. If you need help quitting, ask your health care provider.  Alcohol use  · Do not drink alcohol if:  ? Your health care provider tells you not to drink.  ? You are pregnant, may be pregnant, or are planning to become pregnant.  · If you drink alcohol, limit how much you have:  ? 0-1 drink a day for women.  ? 0-2 drinks a day for men.  · Be aware of how much alcohol is in your drink. In the [place] one drink equals one typical bottle of beer (12 oz), one-half glass of wine (5 oz), or one shot of hard liquor (1½ oz).  Medicines  · Take over-the-counter and prescription medicines only as told by your health care provider.  · Ask your health care provider whether you should take an aspirin every day. Taking aspirin may help reduce your risk of heart disease and stroke.  · Depending on your risk factors, your health care provider may prescribe medicines to lower your risk of heart disease or to control related conditions. You may take medicine to:  ? Lower cholesterol.  ? Control blood pressure.  ? Control diabetes.  General information  · Keep your blood pressure under control, as recommended by your health care provider. For most healthy people, the upper number of your blood pressure (systolic) should be no higher than 120, and the lower number (diastolic) no higher than 80. Treatment may be needed if your blood pressure is higher than 130/80.  · Have your blood pressure checked at least every two years. Your health care provider may check your blood pressure more often if you have high blood pressure.  · After age 20, have your cholesterol checked every 4-6 years. If you have risk factors for heart disease, you may need to have it checked more frequently. Treatment may be needed if your cholesterol is high.  · Have your body mass index (BMI) checked every year. Your health care provider can calculate your BMI from your height and weight.  · Work with your health care provider to lose weight, if  needed, or to maintain a healthy weight.  Where to find more information:  · Centers for Disease Control and Prevention: www.cdc.gov/heartdisease  · American Heart Association: www.heart.org  ? Take a free online heart disease risk quiz to better understand your personal risk factors.  Summary  · Heart disease is the leading cause of death in the world.  · Heart disease can cause chest pain, abnormal heart rhythms, heart attack, and heart failure.  · High blood pressure, high cholesterol, and smoking are the main risk factors for heart disease, although other factors also contribute.  · You can take actions to lower your chances of developing heart disease. Work with your health care provider to reduce your risk by following a heart-healthy diet, being physically active, and controlling your weight, blood pressure, and cholesterol level.  This information is not intended to replace advice given to you by your health care provider. Make sure you discuss any questions you have with your health care provider.  Document Released: 08/01/2005 Document Revised: 01/02/2019 Document Reviewed: 01/02/2019  [employer] Interactive Patient Education © 2019 [employer]  Preventing Osteoporosis, Adult  Osteoporosis is a condition that causes the bones to get weaker. With osteoporosis, the bones become thinner, and the normal spaces in bone tissue become larger. This can make the bones weak and cause them to break more easily. People who have osteoporosis are more likely to break their wrist, spine, or hip. Even a minor accident or injury can be enough to break weak bones.  Osteoporosis can occur with aging. Your body constantly replaces old bone tissue with new tissue. As you get older, you may lose bone tissue more quickly, or it may be replaced more slowly. Osteoporosis is more likely to develop if you have poor nutrition or do not get enough calcium or vitamin D. Other lifestyle factors can also play a role. By making some diet  and lifestyle changes, you can help to keep your bones healthy and help to prevent osteoporosis.  What nutrition changes can be made?  Nutrition plays an important role in maintaining healthy, strong bones.  · Make sure you get enough calcium every day from food or from calcium supplements.  ? If you are age 50 or younger, aim to get 1,000 mg of calcium every day.  ? If you are older than age 50, aim to get 1,200 mg of calcium every day.  · Try to get enough vitamin D every day.  ? If you are age 70 or younger, aim to get 600 international units (IU) every day.  ? If you are older than age 70, aim to get 800 international units every day.  · Follow a healthy diet. Eat plenty of foods that contain calcium and vitamin D.  ? Calcium is in milk, cheese, yogurt, and other dairy products. Some fish and vegetables are also good sources of calcium. Many foods such as cereals and breads have had calcium added to them (are fortified). Check nutrition labels to see how much calcium is in a food or drink.  ? Foods that contain vitamin D include milk, cereals, salmon, and tuna. Your body also makes vitamin D when you are out in the sun. Bare skin exposure to the sun on your face, arms, legs, or back for no more than 30 minutes a day, 2 times per week is more than enough. Beyond that, it is important to use sunblock to protect your skin from sunburn, which increases your risk for skin cancer.  What lifestyle changes can be made?  Making changes in your everyday life can also play an important role in preventing osteoporosis.  · Stay active and get exercise every day. Ask your health care provider what types of exercise are best for you.  · Do not use any products that contain nicotine or tobacco, such as cigarettes and e-cigarettes. If you need help quitting, ask your health care provider.  · Limit alcohol intake to no more than 1 drink a day for nonpregnant women and 2 drinks a day for men. One drink equals 12 oz of beer, 5 oz  of wine, or 1½ oz of hard liquor.  Why are these changes important?  Making these nutrition and lifestyle changes can:  · Help you develop and maintain healthy, strong bones.  · Prevent loss of bone mass and the problems that are caused by that loss, such as broken bones and delayed healing.  · Make you feel better mentally and physically.  What can happen if changes are not made?  Problems that can result from osteoporosis can be very serious. These may include:  · A higher risk of broken bones that are painful and do not heal well.  · Physical malformations, such as a collapsed spine or a hunched back.  · Problems with movement.  Where to find support  If you need help making changes to prevent osteoporosis, talk with your health care provider. You can ask for a referral to a diet and nutrition specialist (dietitian) and a physical therapist.  Where to find more information  Learn more about osteoporosis from:  · NIH Osteoporosis and Related Bone Diseases National Resource Center: www.niams.nih.gov/health_info/bone/osteoporosis/osteoporosis_ff.asp  · [place] Office on Women’s Health: www.womenshealth.gov/publications/our-publications/fact-sheet/osteoporosis.html  · National Osteoporosis Foundation: www.nof.org/patients/what-is-osteoporosis/  Summary  · Osteoporosis is a condition that causes weak bones that are more likely to break.  · Eating a healthy diet and making sure you get enough calcium and vitamin D can help prevent osteoporosis.  · Other ways to reduce your risk of osteoporosis include getting regular exercise and avoiding alcohol and products that contain nicotine or tobacco.  This information is not intended to replace advice given to you by your health care provider. Make sure you discuss any questions you have with your health care provider.  Document Released: 01/01/2017 Document Revised: 09/25/2018 Document Reviewed: 08/28/2017  [employer] Interactive Patient Education © 2019 [employer]  Fall  Prevention in the Home, Adult  Falls can cause injuries. They can happen to people of all ages. There are many things you can do to make your home safe and to help prevent falls. Ask for help when making these changes, if needed.  What actions can I take to prevent falls?  General Instructions  · Use good lighting in all rooms. Replace any light bulbs that burn out.  · Turn on the lights when you go into a dark area. Use night-lights.  · Keep items that you use often in easy-to-reach places. Lower the shelves around your home if necessary.  · Set up your furniture so you have a clear path. Avoid moving your furniture around.  · Do not have throw rugs and other things on the floor that can make you trip.  · Avoid walking on wet floors.  · If any of your floors are uneven, fix them.  · Add color or contrast paint or tape to clearly alexandr and help you see:  ? Any grab bars or handrails.  ? First and last steps of stairways.  ? Where the edge of each step is.  · If you use a stepladder:  ? Make sure that it is fully opened. Do not climb a closed stepladder.  ? Make sure that both sides of the stepladder are locked into place.  ? Ask someone to hold the stepladder for you while you use it.  · If there are any pets around you, be aware of where they are.  What can I do in the bathroom?         · Keep the floor dry. Clean up any water that spills onto the floor as soon as it happens.  · Remove soap buildup in the tub or shower regularly.  · Use non-skid mats or decals on the floor of the tub or shower.  · Attach bath mats securely with double-sided, non-slip rug tape.  · If you need to sit down in the shower, use a plastic, non-slip stool.  · Install grab bars by the toilet and in the tub and shower. Do not use towel bars as grab bars.  What can I do in the bedroom?  · Make sure that you have a light by your bed that is easy to reach.  · Do not use any sheets or blankets that are too big for your bed. They should not hang  down onto the floor.  · Have a firm chair that has side arms. You can use this for support while you get dressed.  What can I do in the kitchen?  · Clean up any spills right away.  · If you need to reach something above you, use a strong step stool that has a grab bar.  · Keep electrical cords out of the way.  · Do not use floor polish or wax that makes floors slippery. If you must use wax, use non-skid floor wax.  What can I do with my stairs?  · Do not leave any items on the stairs.  · Make sure that you have a light switch at the top of the stairs and the bottom of the stairs. If you do not have them, ask someone to add them for you.  · Make sure that there are handrails on both sides of the stairs, and use them. Fix handrails that are broken or loose. Make sure that handrails are as long as the stairways.  · Install non-slip stair treads on all stairs in your home.  · Avoid having throw rugs at the top or bottom of the stairs. If you do have throw rugs, attach them to the floor with carpet tape.  · Choose a carpet that does not hide the edge of the steps on the stairway.  · Check any carpeting to make sure that it is firmly attached to the stairs. Fix any carpet that is loose or worn.  What can I do on the outside of my home?  · Use bright outdoor lighting.  · Regularly fix the edges of walkways and driveways and fix any cracks.  · Remove anything that might make you trip as you walk through a door, such as a raised step or threshold.  · Trim any bushes or trees on the path to your home.  · Regularly check to see if handrails are loose or broken. Make sure that both sides of any steps have handrails.  · Install guardrails along the edges of any raised decks and porches.  · Clear walking paths of anything that might make someone trip, such as tools or rocks.  · Have any leaves, snow, or ice cleared regularly.  · Use sand or salt on walking paths during winter.  · Clean up any spills in your garage right away.  This includes grease or oil spills.  What other actions can I take?  · Wear shoes that:  ? Have a low heel. Do not wear high heels.  ? Have rubber bottoms.  ? Are comfortable and fit you well.  ? Are closed at the toe. Do not wear open-toe sandals.  · Use tools that help you move around (mobility aids) if they are needed. These include:  ? Canes.  ? Walkers.  ? Scooters.  ? Crutches.  · Review your medicines with your doctor. Some medicines can make you feel dizzy. This can increase your chance of falling.  Ask your doctor what other things you can do to help prevent falls.  Where to find more information  · Centers for Disease Control and Prevention, [place]: https://cdc.gov  · National [place] on Aging: https://le6umhp.aleksandar.nih.gov  Contact a doctor if:  · You are afraid of falling at home.  · You feel weak, drowsy, or dizzy at home.  · You fall at home.  Summary  · There are many simple things that you can do to make your home safe and to help prevent falls.  · Ways to make your home safe include removing tripping hazards and installing grab bars in the bathroom.  · Ask for help when making these changes in your home.  This information is not intended to replace advice given to you by your health care provider. Make sure you discuss any questions you have with your health care provider.  Document Released: 10/14/2010 Document Revised: 08/02/2018 Document Reviewed: 08/02/2018  [employer] Interactive Patient Education © 2019 [employer]  Understanding Your Risk for Falls  Each year, millions of people suffer serious injuries from falls. It is important to understand your risk for falling. Talk with your health care provider about your risk and what you can do to lower it. There are actions you can take at home to lower your risk.  If you do have a serious fall, it is important to tell your health care provider. Falling once raises your risk for falling again.  How can falls affect me?  Serious injuries from falls  are common. These include:  · Broken bones. Most hip fractures are caused by falls.  · Traumatic brain injury (TBI). Falls are the most common cause of TBI.  Fear of falling can also cause you to avoid activities and stay at home. This can make your muscles weaker and actually raise your risk for a fall.  What can increase my risk?  Serious injuries from a fall most often happen to people older than age 65. Children and young adults ages 15-29 are also at higher risk. The more risk factors you have for falling, the higher your risk. Risk factors include:  · Weakness in the lower body.  · Lack (deficiency) of vitamin D.  · Weak bones (osteoporosis).  · Being generally weak or confused due to long-term (chronic) illness.  · Dizziness or balance problems.  · Poor vision.  · Having depression.  · Medicine that causes dizziness or drowsiness. These can include medicines for your blood pressure, heart, anxiety, insomnia, or edema, as well as pain medicines and muscle relaxants.  · Drinking alcohol.  · Foot pain or improper footwear.  · Working at a dangerous job.  · Having had a fall in the past.  · Tripping hazards at home, such as floor clutter or loose rugs, or poor lighting.  · Having pets or clutter in your home.  What actions can I take to lower my risk of falling?         · Maintain physical fitness:  ? Do strength and balance exercises. Consider taking a regular class to build strength and balance. Yoga and grey chi are good options.  ? Have your eyes checked every year and your vision prescription updated as needed.  · Remove all clutter from walkways and stairways, including extension cords.  · Use a cordless phone.  · Do not use throw rugs. Make sure all carpeting is taped or tacked down securely.  · Use good lighting in all rooms. Keep a flashlight near your bed.  · Make sure there is a clear path from your bed to the bathroom. Use night-lights.  · Install grab bars for your tub, shower, and toilet. Use a bath  mat in your tub or shower.  · Attach secure railings on both sides of your stairs.  · Repair uneven or broken steps.  · Use a cane or walker as directed by your health care provider.  · Wear nonskid shoes. Do not wear high heels. Do not walk around the house in socks or slippers.  · Avoid walking on icy or slippery surfaces. Walk on the grass instead of on icy or slick sidewalks. Where you can, use ice melt to get rid of ice on walkways.  Questions to ask your health care provider  · Can you help me evaluate my risk for a fall?  · Do any of my medicines make me more likely to fall?  · Should I take a vitamin D supplement?  · What exercises can I do to improve my strength and balance?  · Should I make an appointment to have my vision checked?  · Do I need a bone density test to check for osteoporosis?  · Would it help to use a cane or a walker?  Where to find more information  · Centers for Disease Control and Prevention, [place]: cdc.gov  · Community-Based Fall Prevention Programs: cdc.gov  · National [place] on Aging: jb4kbxz.aleksandar.nih.gov  Contact a health care provider if:  · You fall at home.  · You are afraid of falling at home.  · You feel weak, drowsy, or dizzy at home.  Summary  · People 65 and older are at high risk for falling. However, older people are not the only ones injured in falls. Children and young adults have a higher-than-normal risk, too.  · Talk with your health care provider about your risks for falling and how to lower those risks.  · Taking certain precautions at home can lower your risk for falling.  · If you fall, always tell your health care provider.  This information is not intended to replace advice given to you by your health care provider. Make sure you discuss any questions you have with your health care provider.  Document Released: 08/01/2018 Document Revised: 08/01/2018 Document Reviewed: 08/01/2018  [employer] Interactive Patient Education © 2019 [employer]

## 2020-01-29 NOTE — TELEPHONE ENCOUNTER
Per ABRAHAM Calixto, Ms. Skelton has been called with recent lab results & recommendations.  Continue current medications and follow-up as planned or sooner if any problems.      ----- Message from ABRAHAM Boone sent at 1/29/2020  8:23 AM CST -----  Labs normal including normal cholesterol levels.  Please call or send card

## 2020-01-29 NOTE — TELEPHONE ENCOUNTER
Ms. Nafisa Rivero is asking if you could write her a script for a new portable O2 concentrator.   She said the one she has is about 15-20 yrs old and she said it does not seem to be working correctly at times.

## 2020-01-30 NOTE — TELEPHONE ENCOUNTER
She currently has a [employer] Portable O2 Concentrator.   She has no way to go look at other brands.  She said she didn't care really she just wants one that will work

## 2020-01-30 NOTE — TELEPHONE ENCOUNTER
Can you please call and find out if she wants to come by and  the prescription or if she wants us to fax it to 1 of the local home medical supply stores.

## 2020-01-30 NOTE — TELEPHONE ENCOUNTER
Can you please call and find out if she has a specific concentrator she would like to prescription written for?  Does she want me to write it for what she is currently using order cannot just use any brand?

## 2020-02-04 NOTE — DISCHARGE INSTRUCTIONS
[facility]  Pre-op Information and Guidelines    You will be called after 2 p.m. the day before your surgery (Friday for Monday surgery) and notified of your time for arrival and approximate surgery time.  If you have not received a call by 4P.M., please contact Same Day Surgery at [phone] of if outside [facility] call [phone].    Please Follow these Important Safety Guidelines:    • The morning of your procedure, take only the medications listed below with   A sip of water:_____________________________________________       ______________________________________________    • DO NOT eat or drink anything after 12:00 midnight the night before surgery  Specific instructions concerning drinking clear liquids will be discussed during  the pre-surgery instruction call the day before your surgery.    • If you take a blood thinner (ex. Plavix, Coumadin, aspirin), ask your doctor when to stop it before surgery  STOP DATE: _________________    • Only 2 visitors are allowed in patient rooms at a time  Your visitors will be asked to wait in the lobby until the admission process is complete with the exception of a parent with a child and patients in need of special assistance.    • YOU CANNOT DRIVE YOURSELF HOME  You must be accompanied by someone who will be responsible for driving you home after surgery and for your care at home.    • DO NOT chew gum, use breath mints, hard candy, or smoke the day of surgery  • DO NOT drink alcohol for at least 24 hours before your surgery  • DO NOT wear any jewelry and remove all body piercing before coming to the hospital  • DO NOT wear make-up to the hospital  • If you are having surgery on an extremity (arm/leg/foot) remove nail polish/artificial nails on the surgical side  • Clothing, glasses, contacts, dentures, and hairpieces must be removed before surgery  • Bathe the night before or the morning of your surgery and do not use powders/lotions on  skin.

## 2020-02-04 NOTE — PAT
Chlorhexidine scrub given with instruction sheet.  Instruction reviewed in PAT, understanding verbalized.    Pt had appt at coumadin [facility] today.  They will instruction on stopping coumadin prior to surgery.    Dr Cook here to speak with pt and review ekg, no orders received.    Pt will be transported to and from hospital via PACS.  Felix made aware.  Pt instructed to arrive at 11 for surgery at 1.  Instructed she will receive call day before surgery to confirm time, understanding verbalized.

## 2020-02-04 NOTE — PROGRESS NOTES
Today's INR is 1.9.  Pt here today and states she is having colon resection on Monday February 10 and she was instructed to hold coumadin for 5 nights. Pt denies med changes or bleeding problems. Due to pt not being able to return for Lovenox bridging and already being subtherapeutic pt was instructed to begin holding coumadin tonight and bridging was advised for today. I consulted with ABRAHAM Pan for Lovenox dosing. Phil recommended Lovenox 80mg sq daily. Pt has used Lovenox multiple times in the past. Pt was provided with a Lovenox teaching kit and key points of administration, safety, and disposal were reviewed; pt verbalized. Pt was instructed to begin Lovenox injection tonight at 8 pm and take every 24 hours but to hold Sunday nights dose; pt verbalized. Pt was advised Dr Maher would dose coumadin and Lovenox while in the hospital but to call CC when she is discharged for follow up appointment; pt verbalized. Lovenox 80mg sq daily #10 with 1 refill called in to [facility]. Findings reported by Dixie Bermeo RN.        This document has been electronically signed by ERIKA PanCNP-BC @  On February 4, 2020 12:46 PM

## 2020-02-04 NOTE — ANESTHESIA PREPROCEDURE EVALUATION
Anesthesia Evaluation     Patient summary reviewed and Nursing notes reviewed   no history of anesthetic complications:  NPO Solid Status: > 8 hours             Airway   Mallampati: II  TM distance: <3 FB  Neck ROM: full  possible difficult intubation and Anterior  Comment: Higgins available on induction.  Dental    (+) edentulous    Pulmonary    (+) a smoker Former, COPD severe, home oxygen, decreased breath sounds,     ROS comment: FINDINGS: Cardiac silhouette is enlarged in size. Pulmonary  vascularity is increased.   Midline sternal sutures from prior cardiac surgery. Cardiac  valvular prosthesis. Pacemaker leads right atrium, right  ventricle.  No focal infiltrate or consolidation. Small, minimal left-sided  pleural effusion.     No significant changes since prior examination allowing for  differences in technique.     IMPRESSION:  CONCLUSION: Cardiomegaly. Pulmonary vascular prominence  suggesting congestive failure. Small left-sided effusion. Midline  sternal sutures from prior cardiac surgery. Cardiac valvular  prosthesis. Pacemaker leads right atrium and right ventricle. No  significant change since prior exam.     Electronically signed by:  Ishmael Lees MD  7/17/2019 11:46 AM CDT  PE comment: Albuterol treatment ordered pre-op.  Cardiovascular     ECG reviewed  PT is on anticoagulation therapy  Patient on routine beta blocker and Beta blocker given within 24 hours of surgery  Rhythm: regular  Rate: normal    (+) pacemaker (Replaced 2017. ) pacemaker, hypertension, CAD, dysrhythmias Atrial Fib, CHF , murmur (Grade II/VI systolic), hyperlipidemia,   CABG: CABG one vessel with aortic valve replacement.    ROS comment: AV dual-paced rhythm with prolonged AV conduction  Abnormal ECG  When compared with ECG of 13-JUL-2019 11:56,  Electronic ventricular pacemaker has replaced Atrial fibrillationLeft Ventricle Estimated EF appears to be in the range of 46 - 50%. The left ventricular cavity is mildly dilated. Left  ventricular wall thickness is consistent with borderline concentric hypertrophy. Left ventricular diastolic function is normal. There is no evidence of a left ventricular mass or thrombus present.     Neuro/Psych  (+) psychiatric history Anxiety and Depression,     GI/Hepatic/Renal/Endo    (+) morbid obesity,  renal disease (Creatinine 1.96), diabetes mellitus type 2 using insulin,     ROS Comment: HGB 11.0 HCT 33.5    Musculoskeletal (-) negative ROS    Abdominal   (+) obese,    Substance History - negative use     OB/GYN negative ob/gyn ROS         Other      history of cancer (Sigmoid colon.) active        Phys Exam Other: Patient on chronic oxygen therapy.              Anesthesia Plan    ASA 4     general   (Discussed central line,arterial line if necessary,additional peripheral IV, possible post op ICU ventilation and patient understands possible complications,risks and agrees.  The blood pressure elevated, will give hydralazine today.)  intravenous induction   Postoperative Plan: Expected vent after surgery  Anesthetic plan, all risks, benefits, and alternatives have been provided, discussed and informed consent has been obtained with: patient.  Use of blood products discussed with patient  Consented to blood products.

## 2020-02-10 NOTE — ANESTHESIA PROCEDURE NOTES
Airway  Urgency: elective    Date/Time: 2/10/2020 1:33 PM  Airway not difficult    General Information and Staff    Patient location during procedure: OR    Indications and Patient Condition  Indications for airway management: airway protection    Preoxygenated: yes  Mask difficulty assessment: 0 - not attempted    Final Airway Details  Final airway type: endotracheal airway      Successful airway: ETT  Cuffed: yes   Successful intubation technique: video laryngoscopy  Facilitating devices/methods: intubating stylet and cricoid pressure  Blade: Higgins  Blade size: 3  ETT size (mm): 7.5  Cormack-Lehane Classification: grade I - full view of glottis  Placement verified by: chest auscultation, capnometry and palpation of cuff   Cuff volume (mL): 8  Measured from: lips  ETT/EBT  to lips (cm): 21  Number of attempts at approach: 1  Assessment: lips, teeth, and gum same as pre-op and atraumatic intubation    Additional Comments  ETT inserted by Madeleine Izquierdo

## 2020-02-10 NOTE — ANESTHESIA PROCEDURE NOTES
Central Line      Patient location during procedure: OR  Indications: central pressure monitoring and vascular access  Staff  Anesthesiologist: Tyrone Prieto MD  Preanesthetic Checklist  Completed: patient identified and IV checked  Central Line Prep  Sterile Tech:cap, gloves, gown, mask and sterile barriers  Prep: chloraprep  Patient monitoring: continuous pulse oximetry, blood pressure monitoring and EKG  Central Line Procedure  Laterality:right  Location:internal jugular  Catheter Type:Cordis and double lumen  Catheter Size:7 Fr  Guidance:landmark technique and palpation techniqueImages: still images not obtained  Assessment  Post procedure:biopatch applied, line sutured and occlusive dressing applied  Assessement:blood return through all ports and free fluid flow  Complications:no  Patient Tolerance:patient tolerated the procedure well with no apparent complications

## 2020-02-10 NOTE — INTERVAL H&P NOTE
H&P reviewed. The patient was examined and there are no changes to the H&P.      Temp:  [97.7 °F (36.5 °C)] 97.7 °F (36.5 °C)  Heart Rate:  [64] 64  Resp:  [19] 19  BP: (198-223)/(79-88) 198/79

## 2020-02-10 NOTE — ANESTHESIA PROCEDURE NOTES
Arterial Line      Patient location during procedure: OR   Line placed for hemodynamic monitoring.  Performed By   Anesthesiologist: Tyrone Prieto MD  Preanesthetic Checklist  Completed: patient identified, IV checked and monitors and equipment checked  Arterial Line Prep   Sterile Tech: gloves, mask and cap  Prep: ChloraPrep  Patient monitoring: blood pressure monitoring, continuous pulse oximetry and EKG  Arterial Line Procedure   Laterality:right  Location:  radial artery  Catheter size: 20 G   Guidance: palpation technique  Number of attempts: 2  Successful placement: yes  Post Assessment   Dressing Type: occlusive dressing applied.   Complications no  Circ/Move/Sens Assessment: numbness.   Patient Tolerance: patient tolerated the procedure well with no apparent complications             Face to face end of shift report received from MARINA العلي. Rounding completed. Patient observed in room.     Toño Antunez  10/10/2017  7:34 AM

## 2020-02-10 NOTE — OP NOTE
COLON RESECTION LAPAROSCOPIC POSSIBLE OPEN  Procedure Note    Brendon Skelton  2/10/2020    Pre-op Diagnosis:   Cancer of sigmoid colon(CMS/HCC) [C18.7]    Post-op Diagnosis:     Same    Procedure:  Laparoscopic sigmoid COLON RESECTION       (PAC BUS)    Surgeon(s):  Luis Maher MD    Assistant surgeon:  Gurpreet Coe MD    Anesthesia: General    Staff:   Circulator: Elayne Fung RN  Scrub Person: Monique Goodwin; Kenna Nesbitt  Assistant: Aretha Santana CSA    Estimated Blood Loss: minimal    Specimens:                ID Type Source Tests Collected by Time   1 : CRITICAL CARE PANEL Arterial Blood Blood, Arterial Line BLOOD GAS, ARTERIAL W/CO-OXIMETRY Luis Maher MD 2/10/2020 1619   A : SIGMOID RESECTION FOR CANCER - OPEN UP, LOOK AT, AND SEND BACK TO OR PLEASE Tissue Large Intestine, Sigmoid Colon TISSUE PATHOLOGY EXAM Luis Maher MD 2/10/2020 1601         Drains:   Urethral Catheter Silicone;Double-lumen 16 Fr. (Active)       Findings: Tumor of the sigmoid colon completely excised    Complications: None     Indications: 74-year-old woman with biopsy-proven carcinoma of the sigmoid colon.  Brought to surgery today for colon resection.      Description of procedure:  The patient was brought to the operating room and placed supine on the operating table.  After adequate, general endotracheal anesthesia, the abdominal area was prepped and draped in sterile manner.  He was placed on a beanbag and lifted 30 degrees to the right.  Briefing and timeout were performed and all parties were in agreement.     A 5 mm incision was made in the epigastrium slightly to the right of midline in the subcostal area.  A 5 mm atraumatic trocar was uneventfully passed into the abdomen under direct vision with no visceral injury.  The abdomen was insufflated to a total of 15 mmHg, 4.3 L of CO2.  A 5 mm laparoscope revealed an excellent view of the entire abdominal cavity.  A second 5 mm incision  was made in the epigastrium slightly superior to the umbilicus in the midline.  A 5 mm atraumatic trocar was placed under direct vision with no visceral injury.  A third trocar 11 mm in size was placed in the mid lower abdomen.  All were done under direct vision with no visceral injury.    The bed was then tilted into Trendelenberg position and tipped even further to the right.  This allowed the small bowel to be pushed to the right side of the abdomen.  There was an area of sigmoid colon that was attached by adhesions to the  abdominal wall in the left lower quadrant.  These adhesions were taken down with a Harmonic scalpel ultimately freeing the entire sigmoid colon from its adhesive attachments.  The tattoo from the previous colonoscopy marking the site of the tumor was clearly visualized.  The left colon was then dissected along the white line of Toldt with the dissection carried superiorly to the level of the splenic flexure.  The splenic flexure was not taken down, however, because the mobility of the descending colon was excellent.  The left colon was mobilized towards the midline.  In the process, the ureter was exposed and clearly visualized throughout the procedure.  The 11 mm port was then removed.    An incision was made in the midline of the lower abdomen large enough to accommodate a gloved hand cooperating the 11 mm port site. The incision was carried to the level of the fascia and the fascia was opened longitudinally with easy and atraumatic entry into the abdomen.  The wound protector of a Gelport was then placed and the flange was attached.  The abdomen was reinsufflated and the dissection was completed by right mobilizing the left colon almost completely toward the midline.    The flange of the Gelport was removed, and the sigmoid colon delivered into the incision. The sigmoid descending junction was divided with the SCOTT stapler and the mesentery of the sigmoid colon was taken down with Harmonic  scalpel. In order that a lymphadenectomy be performed, the mesentery was dissected widely and the vessels were clamped and tied at their origin.  We marked the spot for the anastomosis, which was at least 2 cm beyond the rectosigmoid junction as defined by the confluence of the tinea. The dissection of the mesentery proceeded with the Harmonic scalpel to this point, the upper rectum was divided with another fire of the SCOTT stapler. This allowed the specimen to be removed from the operative field.  It was opened in the pathology lab and inspected immediately. The site of previous polyp/cancer was clearly visualized in the center of the specimen.     At this point, stapled end of descending colon was brought to the pelvis.  It fit against the rectal staple line in a tension-free manner.  Both ends of the intestine were well vascularized. A functional and end-to-end anastomosis was created in a side-to-side manner by resecting the antimesenteric portion of each staple line and firing a SCOTT stapler down each lumen.  The resulting colotomy was closed transversely with a single fire of a TA 60 stapler.   All apertures were closed with interrupted 3-0 Vicryl to prevent internal herniation. All apertures were closed with interrupted 3-0 Vicryl sutures to prevent internal herniation. No twists in the colon or mesentery were noted.     We changed gloves,  changed gowns, and redraped the abdomen.  Light handles were changed and new instruments for closing were brought close to the field.  The wound protector was removed.      All areas were checked for bleeding and none was seen.  The midline wound was closed with 0 Vicryl suture posteriorly and #1 PDS in short running segments of the anterior sheath.  The skin was brought together in all areas with continuous 4-0 subcuticular Vicryl. All incision sites were injected with a total of 30 mL of 1/2% Marcaine with epinephrine.  A tap block was placed.     The procedure was  terminated.  The patient tolerated it well.  Sponge and needle counts were correct, and patient was returned to recovery in satisfactory condition.                                  This document has been electronically signed by Luis Maher MD on February 10, 2020 5:56 PM      Date: 2/10/2020  Time: 5:56 PM

## 2020-02-11 NOTE — THERAPY EVALUATION
Acute Care - Occupational Therapy Initial Evaluation  [facility]     Patient Name: Brendon Skelton  : 1945  MRN: [id number]  Today's Date: 2020  Onset of Illness/Injury or Date of Surgery: 02/10/20  Date of Referral to OT: 20  Referring Physician: Dr. Maher    Admit Date: 2/10/2020       ICD-10-CM ICD-9-CM   1. Cancer of sigmoid (CMS/HCC) C18.7 153.3   2. Impaired functional mobility, balance, gait, and endurance Z74.09 V49.89   3. Impaired mobility and ADLs Z74.09 799.89     Patient Active Problem List   Diagnosis   • Long term current use of anticoagulant therapy   • Personal history of heart valve replacement   • Atrial fibrillation [I48.91]   • Emphysema of lung (CMS/HCC)   • On anticoagulant therapy   • Hyperlipidemia   • Diastolic heart failure (CMS/HCC)   • Depressive disorder   • COPD (chronic obstructive pulmonary disease) (CMS/HCC)   • Type 2 diabetes mellitus with stage 4 chronic kidney disease, with long-term current use of insulin (CMS/HCC)   • Myopia   • Astigmatism   • Bleeding from open wound of chest wall   • Follow-up surgery care   • Encounter for screening for malignant neoplasm of colon   • Positive colorectal cancer screening using DNA-based stool test   • Nodule of left lung   • Chronic hypoxemic respiratory failure (CMS/HCC)   • Physical deconditioning   • Heart failure with preserved left ventricular function (HFpEF) (CMS/HCC)   • Essential hypertension   • Coronary artery disease involving native coronary artery without angina pectoris   • Hx of CABG   • SSS (sick sinus syndrome) (CMS/HCC)   • Pacemaker   • Stage 4 chronic kidney disease (CMS/HCC)   • Personal history of tobacco use, presenting hazards to health   • Gastrointestinal hemorrhage   • Class 2 severe obesity due to excess calories with serious comorbidity and body mass index (BMI) of 36.0 to 36.9 in adult (CMS/HCC)   • Gastritis   • Colon polyp   • Coumadin toxicity   • Acute on chronic diastolic  congestive heart failure (CMS/HCC)   • Anemia   • Pulmonary hypertension (CMS/HCC)   • Cancer of sigmoid (CMS/HCC)     Past Medical History:   Diagnosis Date   • Acute bronchitis    • Anxiety    • Aortic valve replaced    • Atrial fibrillation (CMS/HCC)    • C. difficile colitis    • Callosity     under metatarsal head      • Cardiac pacemaker in situ    • CHF (congestive heart failure) (CMS/HCC)    • Chronic obstructive lung disease (CMS/HCC)    • Corns and callus    • Coronary artery disease     hx of CABG 1 vessel as well as aortic valve replacement   • Depressive disorder    • Diabetes mellitus (CMS/HCC)    • Diastolic heart failure (CMS/HCC)    • Essential hypertension    • Foot pain    • History of transfusion    • Hyperlipidemia    • Long term current use of anticoagulant    • Malignant neoplasm of sigmoid colon (CMS/HCC) 8/13/2019   • On anticoagulant therapy    • Pulmonary emphysema (CMS/HCC)    • Rectal hemorrhage    • Stage 4 chronic kidney disease (CMS/HCC)    • Type 2 diabetes mellitus (CMS/HCC)      Past Surgical History:   Procedure Laterality Date   • AORTIC VALVE REPAIR/REPLACEMENT  1999   • CARDIAC ELECTROPHYSIOLOGY PROCEDURE N/A 3/20/2017    Procedure: PPM generator change - dual;  Surgeon: Bereket Gates MD;  Location: [facility] CATH INVASIVE LOCATION;  Service:    • CARDIAC PACEMAKER PLACEMENT  1999   • CATARACT EXTRACTION W/ INTRAOCULAR LENS IMPLANT Left 2/1/2019    Procedure: REMOVE CATARACT AND IMPLANT INTRAOCULAR LENS I;  Surgeon: Jose L Clay MD;  Location: [facility] OR;  Service: Ophthalmology   • CATARACT EXTRACTION W/ INTRAOCULAR LENS IMPLANT Right 2/8/2019    Procedure: REMOVE CATARACT AND IMPLANT  INTRAOCULAR LENS;  Surgeon: Jose L Clay MD;  Location: [facility] OR;  Service: Ophthalmology   • COLONOSCOPY N/A 6/19/2019    Procedure: COLONOSCOPY WITH CONTROL OF BLEED;  Surgeon: Alfredo Guerrero MD;  Location: [facility] ENDOSCOPY;  Service: Gastroenterology   • COLONOSCOPY N/A  6/24/2019    Procedure: COLONOSCOPY;  Surgeon: Alfredo Guerrero MD;  Location: [facility] ENDOSCOPY;  Service: Gastroenterology   • ECHO - CONVERTED  11/12/2013    There is mild to moderate left atrial enlargement EF 50-55%   • ENDOSCOPY N/A 6/19/2019    Procedure: ESOPHAGOGASTRODUODENOSCOPY WITH CONTROL OF BLEED;  Surgeon: Alfredo Guerrero MD;  Location: [facility] ENDOSCOPY;  Service: Gastroenterology   • FOOT SURGERY  1990   • OTHER SURGICAL HISTORY  10/26/2015    PARING CORN/CALLUS    • PACEMAKER REPLACEMENT N/A 3/21/2017    Procedure: revision pacemaker pocket, evacuation hematoma, control of bleeding;  Surgeon: Polo Feliz MD;  Location: [facility] OR;  Service:    • SIGMOIDOSCOPY N/A 9/30/2019    Procedure: SIGMOIDOSCOPY FLEXIBLE;  Surgeon: Alfredo Guerrero MD;  Location: [facility] ENDOSCOPY;  Service: Gastroenterology   • TRANSESOPHAGEAL ECHOCARDIOGRAM (MITZY)  05/01/2014    With color flow-Mild left atrial enlargement with mild concentric LV hypertrophy with top normal aortic root size. EF 45-50%. Mild mitral regurgitation and mild aortic insufficiency and trivial amount of tricuspid regurgation   • TUBAL ABDOMINAL LIGATION     • UPPER GASTROINTESTINAL ENDOSCOPY  06/19/2019          OT ASSESSMENT FLOWSHEET (last 12 hours)      Occupational Therapy Evaluation     Row Name 02/11/20 1541                   OT Evaluation Time/Intention    Subjective Information  complains of;pain  -AS        Document Type  evaluation  -AS        Mode of Treatment  individual therapy;occupational therapy  -AS        Total Evaluation Minutes, Occupational Therapy  17  -AS        Patient Effort  good  -AS           General Information    Patient Profile Reviewed?  yes  -AS        Onset of Illness/Injury or Date of Surgery  02/10/20  -AS        Referring Physician  Dr. Maher  -AS        Patient Observations  alert;cooperative;agree to therapy  -AS        Patient/Family Observations  no family present  -AS        General Observations of  Patient  seated up in chair with PT at side; O2, [place], tele  -AS        Prior Level of Function  independent:;gait;transfer;ADL's;home management;cooking;cleaning;shopping uses power chair for shopping  -AS        Equipment Currently Used at Home  rollator;wheelchair, motorized;shower chair  -AS        Pertinent History of Current Functional Problem  Pt is a 75 yo F dx CA of sigmoid s/p resection  -AS        Existing Precautions/Restrictions  fall;other (see comments) abdominal  -AS        Risks Reviewed  patient:;LOB;nausea/vomiting;increased discomfort;dizziness;change in vital signs;lines disloged;increased drainage  -AS        Benefits Reviewed  patient:;improve function;increase independence;decrease pain;increase strength;increase balance;decrease risk of DVT;improve skin integrity;increase knowledge  -AS           Relationship/Environment    Primary Source of Support/Comfort  child(leona)  -AS        Lives With  alone  -AS        Concerns About Impact on Relationships  pt lives in [place] and son lives next door  -AS           Resource/Environmental Concerns    Current Living Arrangements  home/apartment/condo [place]  -AS           Home Main Entrance    Number of Stairs, Main Entrance  none  -AS           Stairs Within Home, Primary    Stairs, Within Home, Primary  uses ramp to get in/out home;   -AS           Cognitive Assessment/Intervention- PT/OT    Orientation Status (Cognition)  oriented x 4  -AS           Safety Issues, Functional Mobility    Impairments Affecting Function (Mobility)  pain;strength  -AS           Bed Mobility Assessment/Treatment    Comment (Bed Mobility)  Pt just completed with PT prior to arrival; per PT pt required max A of 2 person  -AS           Functional Mobility    Functional Mobility- Comment  Deferred due to PT just completing prior to arrival  -AS           Transfer Assessment/Treatment    Comment (Transfers)  Per PT, requires mod/max A for sit<>stand  -AS           General  ROM    GENERAL ROM COMMENTS  BUE WFL  -AS           MMT (Manual Muscle Testing)    General MMT Comments  BUE WFL; gentle resistance testing 2/2 abd precautions  -AS           Positioning and Restraints    Pre-Treatment Position  sitting in chair/recliner  -AS        Post Treatment Position  chair  -AS           Pain Scale: Numbers Pre/Post-Treatment    Pain Scale: Numbers, Pretreatment  0/10 - no pain  -AS        Pain Scale: Numbers, Post-Treatment  0/10 - no pain  -AS        Pre/Post Treatment Pain Comment  Pt reports only has pain when she moves  -AS           Wound 02/10/20 1450 abdomen Incision    Wound - Properties Group Date first assessed: 02/10/20  -CH Time first assessed: 1450  -CH Present on Hospital Admission: N  -CH Location: abdomen  -CH Primary Wound Type: Incision  -CH Additional Comments: LAPAROSCOPIC INCISIONS X2  -CH       Wound 02/10/20 1450 lower;midline abdomen Incision    Wound - Properties Group Date first assessed: 02/10/20  -CH Time first assessed: 1450  -CH Present on Hospital Admission: N  -CH Orientation: lower;midline  -CH Location: abdomen  -CH Primary Wound Type: Incision  -CH       Plan of Care Review    Plan of Care Reviewed With  patient  -AS           Clinical Impression (OT)    Date of Referral to OT  02/11/20  -AS        OT Diagnosis  impaired mobility and ADLs  -AS        Patient/Family Goals Statement (OT Eval)  return to PLOF  -AS        Criteria for Skilled Therapeutic Interventions Met (OT Eval)  yes;treatment indicated  -AS        Rehab Potential (OT Eval)  good, to achieve stated therapy goals  -AS        Therapy Frequency (OT Eval)  daily  -AS        Predicted Duration of Therapy Intervention (Therapy Eval)  until goals met and d/c from facility  -AS        Care Plan Review (OT)  evaluation/treatment results reviewed;risks/benefits reviewed;care plan/treatment goals reviewed;current/potential barriers reviewed;patient/other agree to care plan  -AS        Anticipated  Discharge Disposition (OT)  anticipate therapy at next level of care  -AS           Vital Signs    Pre Systolic BP Rehab  128  -AS        Pre Treatment Diastolic BP  59  -AS        Pretreatment Heart Rate (beats/min)  60  -AS        Pre SpO2 (%)  93  -AS        O2 Delivery Pre Treatment  nasal cannula  -AS        Pre Patient Position  Sitting  -AS           Planned OT Interventions    Planned Therapy Interventions (OT Eval)  activity tolerance training;BADL retraining;functional balance retraining;adaptive equipment training;neuromuscular control/coordination retraining;patient/caregiver education/training;transfer/mobility retraining;strengthening exercise;ROM/therapeutic exercise;occupation/activity based interventions  -AS           OT Goals    Transfer Goal Selection (OT)  transfer, OT goal 1  -AS        Bathing Goal Selection (OT)  bathing, OT goal 1  -AS        Dressing Goal Selection (OT)  dressing, OT goal 1  -AS        Toileting Goal Selection (OT)  toileting, OT goal 1  -AS           Transfer Goal 1 (OT)    Activity/Assistive Device (Transfer Goal 1, OT)  sit-to-stand/stand-to-sit;bed-to-chair/chair-to-bed;toilet  -AS        [place] Level/Cues Needed (Transfer Goal 1, OT)  conditional independence  -AS        Time Frame (Transfer Goal 1, OT)  long term goal (LTG);by discharge  -AS        Progress/Outcome (Transfer Goal 1, OT)  goal not met  -AS           Bathing Goal 1 (OT)    Activity/Assistive Device (Bathing Goal 1, OT)  bathing skills, all  -AS        [place] Level/Cues Needed (Bathing Goal 1, OT)  supervision required;set-up required  -AS        Time Frame (Bathing Goal 1, OT)  long term goal (LTG);by discharge  -AS        Progress/Outcomes (Bathing Goal 1, OT)  goal not met  -AS           Dressing Goal 1 (OT)    Activity/Assistive Device (Dressing Goal 1, OT)  dressing skills, all using AE PRN  -AS        [place]/Cues Needed (Dressing Goal 1, OT)  supervision required;set-up required   -AS        Time Frame (Dressing Goal 1, OT)  long term goal (LTG);by discharge  -AS        Progress/Outcome (Dressing Goal 1, OT)  goal not met  -AS           Toileting Goal 1 (OT)    Activity/Device (Toileting Goal 1, OT)  toileting skills, all  -AS        [place] Level/Cues Needed (Toileting Goal 1, OT)  conditional independence  -AS        Time Frame (Toileting Goal 1, OT)  long term goal (LTG);by discharge  -AS        Progress/Outcome (Toileting Goal 1, OT)  goal not met  -AS          User Key  (r) = Recorded By, (t) = Taken By, (c) = Cosigned By    Initials Name Effective Dates    CH Elayne Fung RN 06/23/17 -     AS Emmie Kinsey, OT 03/25/19 -                OT Recommendation and Plan  Outcome Summary/Treatment Plan (OT)  Anticipated Discharge Disposition (OT): anticipate therapy at next level of care  Planned Therapy Interventions (OT Eval): activity tolerance training, BADL retraining, functional balance retraining, adaptive equipment training, neuromuscular control/coordination retraining, patient/caregiver education/training, transfer/mobility retraining, strengthening exercise, ROM/therapeutic exercise, occupation/activity based interventions  Therapy Frequency (OT Eval): daily  Plan of Care Review  Plan of Care Reviewed With: patient  Plan of Care Reviewed With: patient  Outcome Summary: OT eval completed; Pt pleasant and agreeable. Pt rec'd seated in chair and had just finished up with PT. Pt deferring further activity due to fatigue. Pt participated in UE assessment with ROM intact. Per discussion with PT, pt requires max of 2 person for supine>sit and mod/max A of 2 for sit<>stand. Pt largely limited by pain during movement but denies any pain at rest. Pt reports goals to return home. Pt lives in [place] and her son lives next door. Pt would benefit from continued skilled OT services to promote return to PLOF. Pt will need to be mod I to safely return home due to living alone.  Anticipate therapy at next level of care; will continue to assess appropriate dispo with further progress in future sessions.    Outcome Measures     Row Name 02/11/20 1542             How much help from another is currently needed...    Putting on and taking off regular lower body clothing?  1  -AS      Bathing (including washing, rinsing, and drying)  2  -AS      Toileting (which includes using toilet bed pan or urinal)  1  -AS      Putting on and taking off regular upper body clothing  2  -AS      Taking care of personal grooming (such as brushing teeth)  3  -AS      Eating meals  4  -AS      AM-PAC 6 Clicks Score (OT)  13  -AS         Functional Assessment    Outcome Measure Options  AM-PAC 6 Clicks Daily Activity (OT)  -AS        User Key  (r) = Recorded By, (t) = Taken By, (c) = Cosigned By    Initials Name Provider Type    AS Emmie Kinsey OT Occupational Therapist          Time Calculation:   Time Calculation- OT     Row Name 02/11/20 1629             Time Calculation- OT    OT Start Time  1541  -AS      OT Stop Time  1558  -AS      OT Time Calculation (min)  17 min  -AS      OT Received On  02/11/20  -AS      OT Goal Re-Cert Due Date  02/24/20  -AS        User Key  (r) = Recorded By, (t) = Taken By, (c) = Cosigned By    Initials Name Provider Type    AS Emmie Kinsey OT Occupational Therapist        Therapy Charges for Today     Code Description Service Date Service Provider Modifiers Qty    [id number]  OT EVAL MOD COMPLEXITY 1 2/11/2020 Emmie Kinsey OT GO 1               Emmie Kinsey OT  2/11/2020

## 2020-02-11 NOTE — PLAN OF CARE
Problem: Patient Care Overview  Goal: Plan of Care Review  Outcome: Ongoing (interventions implemented as appropriate)  Flowsheets (Taken 2/11/2020 1057)  Progress: no change  Plan of Care Reviewed With: patient  Outcome Summary: Pt has had weight loss but no signs of malnutrition present in physical exam. No supplements added at this time. RD staff continuing to monitor.

## 2020-02-11 NOTE — CONSULTS
"Adult Nutrition  Assessment    Patient Name:  Brendon Skelton  YOB: 1945  MRN: [id number]  Admit Date:  2/10/2020    Assessment Date:  2/11/2020    Comments: Visited pt today for recent cancer diagnosis. Pt is s/p colon resection and on ERAS. Pt states her appetite is good and she is tolerating clear liquids well. Pt has lost 7% in 1 mo per epic records but pt was unaware of the loss. Physical exam did not show any signs of malnutrition. Pt has been having diarrhea but it has now resolved. No supplements added at this time. RD staff continuing to monitor.     Reason for Assessment     Row Name 02/11/20 1041          Reason for Assessment    Reason For Assessment  diagnosis/disease state  (Pended)      Diagnosis  cancer diagnosis/related complications  (Pended)      Identified At Risk by Screening Criteria  unintentional loss of 10 lbs or more in the past 2 mos  (Pended)          Nutrition/Diet History     Row Name 02/11/20 1042          Nutrition/Diet History    Typical Food/Fluid Intake  Pt states she has a good appetite is tolerating clear liquids; has had some weight loss that pt was unaware of.   (Pended)      Factors Affecting Nutritional Intake  altered gastrointestinal function  (Pended)  s/p colon resection         Anthropometrics     Row Name 02/11/20 1045 02/11/20 0500       Anthropometrics    Height  165.1 cm (65\")  (Pended)   --    Weight  --  101 kg (222 lb 10.6 oz)       Ideal Body Weight (IBW)    Ideal Body Weight (IBW) (kg)  57.29  (Pended)   --        Labs/Tests/Procedures/Meds     Row Name 02/11/20 1044          Labs/Procedures/Meds    Lab Results Reviewed  reviewed, pertinent  (Pended)      Lab Results Comments  Glu 173H, Phos 4.9H, Ca 4.48L, Cr 2.65H, BUN 61H, Plat 139L  (Pended)         Diagnostic Tests/Procedures    Diagnostic Test/Procedure Reviewed  reviewed, pertinent  (Pended)         Medications    Pertinent Medications Reviewed  reviewed, pertinent  (Pended)      " "    Physical Findings     Row Name 02/11/20 1045          Physical Findings    Overall Physical Appearance  obese  (Pended)          Estimated/Assessed Needs     Row Name 02/11/20 1045          Calculation Measurements    Weight Used For Calculations  66.2 kg (146 lb)  (Pended)      Height  165.1 cm (65\")  (Pended)         Estimated/Assessed Needs    Additional Documentation  Calorie Requirements (Group);Fluid Requirements (Group);KCAL/KG (Group);Protein Requirements (Group)  (Pended)         Calorie Requirements    Weight Used For Calorie Calculations  66.2 kg (146 lb)  (Pended)      Estimated Calorie Requirement (kcal/day)  1500  (Pended)      Estimated Calorie Need Method  kcal/kg  (Pended)         KCAL/KG    KCAL/KG  20 Kcal/Kg (kcal);25 Kcal/Kg (kcal)  (Pended)      20 Kcal/Kg (kcal)  1324.5  (Pended)      25 Kcal/Kg (kcal)  1655.625  (Pended)         Protein Requirements    Weight Used For Protein Calculations  66.2 kg (146 lb)  (Pended)      Est Protein Requirement Amount (gms/kg)  1.3 gm protein  (Pended)      Estimated Protein Requirements (gms/day)  86.09  (Pended)         Fluid Requirements    Estimated Fluid Requirement Method  [place]-Segar Formula  (Pended)      [place]-Segar Method (over 20 kg)  2824.5  (Pended)          Nutrition Prescription Ordered     Row Name 02/11/20 1047          Nutrition Prescription PO    Current PO Diet  Clear Liquid  (Pended)      Fluid Consistency  Thin  (Pended)                  Electronically signed by:  Fely Verde  02/11/20 10:51 AM  "

## 2020-02-11 NOTE — PROGRESS NOTES
GENERAL SURGERY PROGRESS NOTE     LOS: 1 day     Chief Complaint:     Brendon Skelton is a 74 year old lady who is post-operative day 1 status post laparoscopic sigmoid colon resection for cancer of sigmoid colon.    Interval History:     Ms. Skelton reports having a comfortable night. She was extubated at 12:20 AM, and has been oxygenating well on 3L nasal cannula since. Pain was well controlled on only Tylenol last night. Her Ann catheter is in place, and urine output was only 10cc overnight, so this will continue to be monitored. She is tolerating a liquid diet with no nausea/vomiting. Central venous line and peripheral IV remain in place. She has not had a bowel movement and is not passing gas yet. She plans to ambulate to recliner later this morning.     Medication Review:     acetaminophen 1,000 mg Oral Q6H   alvimopan 12 mg Oral BID   aspirin 81 mg Oral Daily   bumetanide 2 mg Oral Daily   citalopram 20 mg Oral Daily   dilTIAZem  mg Oral Daily   famotidine 20 mg Oral BID   heparin (porcine) 5,000 Units Subcutaneous Q8H   metOLazone 5 mg Oral Daily   metoprolol succinate XL 25 mg Oral Daily   rOPINIRole 0.25 mg Oral Nightly   traZODone 150 mg Oral Nightly         lactated ringers 100 mL/hr Last Rate: 100 mL/hr (02/10/20 2018)   Objective     Vital Signs:  Temp:  [97.7 °F (36.5 °C)-98.5 °F (36.9 °C)] 98 °F (36.7 °C)  Heart Rate:  [59-70] 62  Resp:  [8-19] 9  BP: (118-223)/(54-88) 128/62  Arterial Line BP: (116-127)/(41-64) 116/64  FiO2 (%):  [35 %-60 %] 35 %    Intake/Output Summary (Last 24 hours) at 2/11/2020 0523  Last data filed at 2/11/2020 0400  Gross per 24 hour   Intake 2650 ml   Output 370 ml   Net 2280 ml       Physical Exam  General: Well-appearing, resting woman in no acute distress  Head: Normocephalic and atraumatic.   Neck: Normal range of motion. Neck supple.   Cardiovascular: Heart regular rate and rhythm with no murmurs, gallops, rales. Radial pulses strong  bilaterally.   Pulmonary/Chest: Lungs with minor wheezing bilaterally.  Abdominal: Soft and non-tender. Incision site healing appropriately.  Musculoskeletal: Normal range of motion.   Skin: Skin warm and dry. No rashes.    Results Review:    Results from last 7 days   Lab Units 02/10/20  1620 02/04/20  1125   SODIUM mmol/L  --  141   SODIUM, ARTERIAL mmol/L 141  --    POTASSIUM mmol/L  --  5.0   CHLORIDE mmol/L  --  100   CO2 mmol/L  --  31.0*   BUN mg/dL  --  66*   CREATININE mg/dL  --  2.11*   GLUCOSE mg/dL  --  68   GLUCOSE, ARTERIAL mmol/L 102*  --    CALCIUM mg/dL  --  9.8     Results from last 7 days   Lab Units 02/11/20  0443 02/04/20  0959   WBC 10*3/mm3 9.99 7.20   HEMOGLOBIN g/dL 7.7* 11.2*   HEMATOCRIT % 24.8* 35.1   PLATELETS 10*3/mm3 139* 182       Assessment:    Cancer of sigmoid (CMS/HCC)    Brendon Skelton is a 74 year old lady who is post-operative day 1 status post laparoscopic sigmoid colon resection. She is recovering appropriately.    F: Tolerating clear liquid diet  A: Tylenol only overnight. Orders placed for Norco prn for moderate pain and Dilaudid prn for severe pain.  S: N/A. Patient is alert.  T: Heparin 5000 units q8h subcutaneously. Aspirin 81 mg daily.  H: Head of bed at 30 degrees  U: Famotidine 20mg BID  G: Glucose 102 mmol/L at last check immediately postoperatively. BMP still pending this morning.  S: Extubated. Well-oxygenated on 3L nasal cannula.   B: No bowel movements or passing of gas yet.   I: Ann catheter in place. Will keep in place at this time to continue to monitor urine output.  D: No antibiotics currently ordered    Plan:  - Continue routine post-operative care  - Encourage ambulation  - Advance diet as tolerated  - Administer bolus of fluid and continue to closely monitor Ann output    Felice Dickerson, MS3

## 2020-02-11 NOTE — PROGRESS NOTES
Discharge Planning Assessment  [facility]     Patient Name: Brendon Skelton  MRN: [id number]  Today's Date: 2/11/2020    Admit Date: 2/10/2020    Discharge Needs Assessment     Row Name 02/11/20 1115       Living Environment    Lives With  alone    Current Living Arrangements  home/apartment/condo    Primary Care Provided by  self    Provides Primary Care For  no one    Family Caregiver if Needed  child(leona), adult    Quality of Family Relationships  helpful;involved;supportive    Able to Return to Prior Arrangements  yes    Living Arrangement Comments  Pt resides at home alone. She informed LSW that son lives next door and assist with needs.        Transition Planning    Patient/Family Anticipates Transition to  home    Patient/Family Anticipated Services at Transition  home health care    Transportation Anticipated  family or friend will provide       Discharge Needs Assessment    Concerns to be Addressed  adjustment to diagnosis/illness    Equipment Currently Used at Home  oxygen;wheelchair, motorized;hospital bed;bath bench;walker, rolling Pt gets home o2 thru [employer]. Is on 2L at home.     Equipment Needed After Discharge  -- Awaiting PT/OT recomendations.     Discharge Facility/Level of Care Needs  home with home health Awaiting MD and therapy recomendations.     Current Discharge Risk  chronically ill;lives alone        Discharge Plan     Row Name 02/11/20 1119       Plan    Plan Comments  LSW assessment complete. Pt resides at home alone. Pt voiced good support system. She reports that her son lives next door and assist with needs. Pt reports trying to remain as independent as possible. She does use DME to assist with mobility and is also on home o2. Pt has used home health in past no services currently. Pt's goal is to reutrn home at d/c. LSW awaiting recomendations from MD and therapy. LSW/case mgt will follow up as consulted and complete arrangements as ordered. Javier Anderson LSW        Destination       Coordination has not been started for this encounter.      Durable Medical Equipment      Coordination has not been started for this encounter.      Dialysis/Infusion      Coordination has not been started for this encounter.      Home Medical Care      Coordination has not been started for this encounter.      Therapy      Coordination has not been started for this encounter.      Community Resources      Coordination has not been started for this encounter.          Demographic Summary     Row Name 02/11/20 1110       General Information    Admission Type  inpatient    Referral Source  high risk screening    Reason for Consult  discharge planning    Preferred Language  English     Used During This Interaction  no       Contact Information    Contact Information Obtained for          Functional Status     Row Name 02/11/20 1111       Functional Status    Usual Activity Tolerance  moderate    Current Activity Tolerance  fair       Functional Status, IADL    Medications  independent    Meal Preparation  independent    Housekeeping  independent    Laundry  independent    Shopping  assistive person    IADL Comments  Pt's son lives next door and assist with needs.        Mental Status Summary    Recent Changes in Mental Status/Cognitive Functioning  no changes        Psychosocial    No documentation.       Abuse/Neglect    No documentation.       Legal    No documentation.       Substance Abuse    No documentation.       Patient Forms    No documentation.           CLARITA Kelly

## 2020-02-11 NOTE — PLAN OF CARE
Problem: Patient Care Overview  Goal: Plan of Care Review  Outcome: Ongoing (interventions implemented as appropriate)  Flowsheets  Taken 2/11/2020 0631  Progress: improving  Outcome Summary: pt extubated at 0025. pt on 02 at 2l n/c. drsg change to abdomen with scant amount of blood. urine output low. MD  aware. albumin given thru the night. H/H 7.7. 2 units PRBC order. Pt pain controlled. pt up in chair this AM.  Taken 2/11/2020 0400  Plan of Care Reviewed With: patient

## 2020-02-11 NOTE — PLAN OF CARE
Problem: Patient Care Overview  Goal: Plan of Care Review  Outcome: Ongoing (interventions implemented as appropriate)  Flowsheets (Taken 2/11/2020 4464)  Plan of Care Reviewed With: patient  Outcome Summary: PT eval completed. Pt initially had been up in chair w/ nursing, returned to bed for rest and now consents to OOB. She required mod-max assist of 2 for sup to sit and sit to stand; gait w/ FWRW 5 ft with min-mod assist of 2 and following with her chair. She deferred out of room due to feeling her LEs were going to collapse. She kept vitals stable and no c/o dizzines but did c/o fatigue/pain w/ activity. Once settled she returned to chair and OT eval.  Pt will be followed for therapy to help meet goals to return home w/ son living next  door. She will need to be as indep as possible if alone in her home so may benefit from rehab to home prior  if all goals not met on this admit.

## 2020-02-11 NOTE — ANESTHESIA POSTPROCEDURE EVALUATION
Patient: Brendon Skelton    Procedure Summary     Date:  02/10/20 Room / Location:  [facility] OR 03 / [facility] OR    Anesthesia Start:  1301 Anesthesia Stop:  1834    Procedure:  Sigmoid COLON RESECTION LAPAROSCOPIC POSSIBLE OPEN             (PAC BUS) (Left Abdomen) Diagnosis:       Cancer of sigmoid (CMS/HCC)      (Cancer of sigmoid (CMS/HCC) [C18.7])    Surgeon:  Luis Maher MD Provider:  Tyrone Prieto MD    Anesthesia Type:  general ASA Status:  4          Anesthesia Type: general    Vitals  Vitals Value Taken Time   /63 2/10/2020  6:02 PM   Temp 98 °F (36.7 °C) 2/10/2020  5:47 PM   Pulse 59 2/10/2020  6:08 PM   Resp 8 2/10/2020  6:08 PM   SpO2 92 % 2/10/2020  6:08 PM           Post Anesthesia Care and Evaluation    Patient location during evaluation: PACU  Patient participation: waiting for patient participation  Level of consciousness: sleepy but conscious and responsive to light touch  Pain score: 0  Pain management: adequate  Airway patency: patent  Anesthetic complications: No anesthetic complications  PONV Status: none  Cardiovascular status: acceptable and stable  Respiratory status: acceptable, ETT and CPAP  Hydration status: stable

## 2020-02-11 NOTE — NURSING NOTE
Assessed patient for extubation. Pt followed commands when asked. Performed NIF of -27. Once ET tube was out, pt was placed on 3L nasal cannula. Will continue to monitor

## 2020-02-11 NOTE — THERAPY EVALUATION
Patient Name: Brendon Skelton  : 1945    MRN: [id number]                              Today's Date: 2020     PT Eval  Admit Date: 2/10/2020    Visit Dx:     ICD-10-CM ICD-9-CM   1. Cancer of sigmoid (CMS/HCC) C18.7 153.3   2. Impaired functional mobility, balance, gait, and endurance Z74.09 V49.89     Patient Active Problem List   Diagnosis   • Long term current use of anticoagulant therapy   • Personal history of heart valve replacement   • Atrial fibrillation [I48.91]   • Emphysema of lung (CMS/HCC)   • On anticoagulant therapy   • Hyperlipidemia   • Diastolic heart failure (CMS/HCC)   • Depressive disorder   • COPD (chronic obstructive pulmonary disease) (CMS/HCC)   • Type 2 diabetes mellitus with stage 4 chronic kidney disease, with long-term current use of insulin (CMS/HCC)   • Myopia   • Astigmatism   • Bleeding from open wound of chest wall   • Follow-up surgery care   • Encounter for screening for malignant neoplasm of colon   • Positive colorectal cancer screening using DNA-based stool test   • Nodule of left lung   • Chronic hypoxemic respiratory failure (CMS/HCC)   • Physical deconditioning   • Heart failure with preserved left ventricular function (HFpEF) (CMS/HCC)   • Essential hypertension   • Coronary artery disease involving native coronary artery without angina pectoris   • Hx of CABG   • SSS (sick sinus syndrome) (CMS/HCC)   • Pacemaker   • Stage 4 chronic kidney disease (CMS/HCC)   • Personal history of tobacco use, presenting hazards to health   • Gastrointestinal hemorrhage   • Class 2 severe obesity due to excess calories with serious comorbidity and body mass index (BMI) of 36.0 to 36.9 in adult (CMS/HCC)   • Gastritis   • Colon polyp   • Coumadin toxicity   • Acute on chronic diastolic congestive heart failure (CMS/HCC)   • Anemia   • Pulmonary hypertension (CMS/HCC)   • Cancer of sigmoid (CMS/HCC)     Past Medical History:   Diagnosis Date   • Acute bronchitis    • Anxiety     • Aortic valve replaced    • Atrial fibrillation (CMS/HCC)    • C. difficile colitis    • Callosity     under metatarsal head      • Cardiac pacemaker in situ    • CHF (congestive heart failure) (CMS/HCC)    • Chronic obstructive lung disease (CMS/HCC)    • Corns and callus    • Coronary artery disease     hx of CABG 1 vessel as well as aortic valve replacement   • Depressive disorder    • Diabetes mellitus (CMS/HCC)    • Diastolic heart failure (CMS/HCC)    • Essential hypertension    • Foot pain    • History of transfusion    • Hyperlipidemia    • Long term current use of anticoagulant    • Malignant neoplasm of sigmoid colon (CMS/HCC) 8/13/2019   • On anticoagulant therapy    • Pulmonary emphysema (CMS/HCC)    • Rectal hemorrhage    • Stage 4 chronic kidney disease (CMS/HCC)    • Type 2 diabetes mellitus (CMS/HCC)      Past Surgical History:   Procedure Laterality Date   • AORTIC VALVE REPAIR/REPLACEMENT  1999   • CARDIAC ELECTROPHYSIOLOGY PROCEDURE N/A 3/20/2017    Procedure: PPM generator change - dual;  Surgeon: Bereket Gates MD;  Location: [facility] CATH INVASIVE LOCATION;  Service:    • CARDIAC PACEMAKER PLACEMENT  1999   • CATARACT EXTRACTION W/ INTRAOCULAR LENS IMPLANT Left 2/1/2019    Procedure: REMOVE CATARACT AND IMPLANT INTRAOCULAR LENS I;  Surgeon: Jose L Clay MD;  Location: [facility] OR;  Service: Ophthalmology   • CATARACT EXTRACTION W/ INTRAOCULAR LENS IMPLANT Right 2/8/2019    Procedure: REMOVE CATARACT AND IMPLANT  INTRAOCULAR LENS;  Surgeon: Jose L Clay MD;  Location: [facility] OR;  Service: Ophthalmology   • COLONOSCOPY N/A 6/19/2019    Procedure: COLONOSCOPY WITH CONTROL OF BLEED;  Surgeon: Alfredo Guerrero MD;  Location: [facility] ENDOSCOPY;  Service: Gastroenterology   • COLONOSCOPY N/A 6/24/2019    Procedure: COLONOSCOPY;  Surgeon: Alfredo Guerrero MD;  Location: [facility] ENDOSCOPY;  Service: Gastroenterology   • ECHO - CONVERTED  11/12/2013    There is mild to moderate left  atrial enlargement EF 50-55%   • ENDOSCOPY N/A 6/19/2019    Procedure: ESOPHAGOGASTRODUODENOSCOPY WITH CONTROL OF BLEED;  Surgeon: Alfredo Guerrero MD;  Location: [facility] ENDOSCOPY;  Service: Gastroenterology   • FOOT SURGERY  1990   • OTHER SURGICAL HISTORY  10/26/2015    PARING CORN/CALLUS    • PACEMAKER REPLACEMENT N/A 3/21/2017    Procedure: revision pacemaker pocket, evacuation hematoma, control of bleeding;  Surgeon: Polo Feliz MD;  Location: [facility] OR;  Service:    • SIGMOIDOSCOPY N/A 9/30/2019    Procedure: SIGMOIDOSCOPY FLEXIBLE;  Surgeon: Alfredo Guerrero MD;  Location: [facility] ENDOSCOPY;  Service: Gastroenterology   • TRANSESOPHAGEAL ECHOCARDIOGRAM (MITZY)  05/01/2014    With color flow-Mild left atrial enlargement with mild concentric LV hypertrophy with top normal aortic root size. EF 45-50%. Mild mitral regurgitation and mild aortic insufficiency and trivial amount of tricuspid regurgation   • TUBAL ABDOMINAL LIGATION     • UPPER GASTROINTESTINAL ENDOSCOPY  06/19/2019     General Information     Row Name 02/11/20 1509          PT Evaluation Time/Intention    Document Type  evaluation  -     Mode of Treatment  individual therapy;physical therapy  -     Row Name 02/11/20 1509          General Information    Patient Profile Reviewed?  yes  -GB     Prior Level of Function  independent:;gait;ADL's using RW  -     Existing Precautions/Restrictions  fall watch gait belt  -     Row Name 02/11/20 1509          Relationship/Environment    Lives With  alone son in next door of her duplex  -     Row Name 02/11/20 1509          Resource/Environmental Concerns    Current Living Arrangements  home/apartment/condo  -     Row Name 02/11/20 1509          Home Main Entrance    Number of Stairs, Main Entrance  none  -     Row Name 02/11/20 1509          Stairs Within Home, Primary    Stairs, Within Home, Primary  uses ramp to get in/out home;   -GB     Number of Stairs, Within Home, Primary  none   -GB     Row Name 02/11/20 1509          Cognitive Assessment/Intervention- PT/OT    Orientation Status (Cognition)  oriented x 4  -GB       User Key  (r) = Recorded By, (t) = Taken By, (c) = Cosigned By    Initials Name Provider Type    Tisha Loo, PT Physical Therapist        Mobility     Row Name 02/11/20 1509          Bed Mobility Assessment/Treatment    Bed Mobility Assessment/Treatment  bed mobility (all) activities  -GB     [place] Level (Bed Mobility)  maximum assist (25% patient effort);moderate assist (50% patient effort);2 person assist  -GB     Assistive Device (Bed Mobility)  bed rails;head of bed elevated;draw sheet  -GB     Row Name 02/11/20 1509          Bed-Chair Transfer    Bed-Chair [place] (Transfers)  moderate assist (50% patient effort);minimum assist (75% patient effort);2 person assist;1 person to manage equipment  -GB     Assistive Device (Bed-Chair Transfers)  walker, front-wheeled  -GB     Row Name 02/11/20 1509          Sit-Stand Transfer    Sit-Stand [place] (Transfers)  minimum assist (75% patient effort);moderate assist (50% patient effort);2 person assist  -GB     Assistive Device (Sit-Stand Transfers)  walker, front-wheeled  -GB     Row Name 02/11/20 1509          Gait/Stairs Assessment/Training    Gait/Stairs Assessment/Training  gait/ambulation independence;gait/ambulation assistive device;distance ambulated;gait pattern;gait deviations  -GB     [place] Level (Gait)  minimum assist (75% patient effort);1 person to manage equipment;2 person assist  -GB     Assistive Device (Gait)  walker, front-wheeled  -GB     Distance in Feet (Gait)  5 ft; pt felt her LEs were going to collapse but was able to stand up til chair behind her;   -GB     Pattern (Gait)  step-to  -GB     Deviations/Abnormal Patterns (Gait)  gait speed decreased;marquita decreased;antalgic  -GB     Bilateral Gait Deviations  forward flexed posture  -GB       User Key  (r) = Recorded  By, (t) = Taken By, (c) = Cosigned By    Initials Name Provider Type    Tisha Loo, PT Physical Therapist        Obj/Interventions     Row Name 02/11/20 1509          General ROM    GENERAL ROM COMMENTS  AROM WFL hedy UE/LEs  -     Row Name 02/11/20 1509          MMT (Manual Muscle Testing)    General MMT Comments  grossly 4-/5 hedy UE/LEs due to sub max test to protect incision  -[facility] Name 02/11/20 1509          Static Sitting Balance    Level of [place] (Unsupported Sitting, Static Balance)  minimal assist, 75% patient effort;moderate assist, 50 to 74% patient effort;1 person assist  -     Sitting Position (Unsupported Sitting, Static Balance)  sitting edge of mat  -     Time Able to Maintain Position (Unsupported Sitting, Static Balance)  more than 5 minutes  -[facility] Name 02/11/20 1509          Dynamic Sitting Balance    Level of [place], Reaches Outside Midline (Sitting, Dynamic Balance)  moderate assist, 50 to 74% patient effort;2 person assist  -GB     Sitting Position, Reaches Outside Midline (Sitting, Dynamic Balance)  sitting on edge of bed  -[facility] Name 02/11/20 1509          Static Standing Balance    Level of [place] (Supported Standing, Static Balance)  minimal assist, 75% patient effort;2 person assist  -     Assistive Device Utilized (Supported Standing, Static Balance)  walker, rolling  -[facility] Name 02/11/20 1509          Dynamic Standing Balance    Level of [place], Reaches Outside Midline (Standing, Dynamic Balance)  minimal assist, 75% patient effort;2 person assist;1 person assist  -     Time Able to Maintain Position, Reaches Outside Midline (Standing, Dynamic Balance)  more than 5 minutes  -     Assistive Device Utilized (Supported Standing, Dynamic Balance)  walker, rolling  -     Row Name 02/11/20 1509          Sensory Assessment/Intervention    Sensory General Assessment  no sensation deficits identified  -       User Key   (r) = Recorded By, (t) = Taken By, (c) = Cosigned By    Initials Name Provider Type    GB Tisha Mehta, PT Physical Therapist        Goals/Plan     Row Name 02/11/20 1538          Bed Mobility Goal 1 (PT)    Activity/Assistive Device (Bed Mobility Goal 1, PT)  bed mobility activities, all  -GB     [place] Level/Cues Needed (Bed Mobility Goal 1, PT)  independent  -GB     Time Frame (Bed Mobility Goal 1, PT)  short term goal (STG);1 week  -GB     Progress/Outcomes (Bed Mobility Goal 1, PT)  goal not met  -GB     Row Name 02/11/20 1538          Transfer Goal 1 (PT)    Activity/Assistive Device (Transfer Goal 1, PT)  bed-to-chair/chair-to-bed;toilet  -GB     [place] Level/Cues Needed (Transfer Goal 1, PT)  conditional independence  -GB     Time Frame (Transfer Goal 1, PT)  long term goal (LTG);1 week  -GB     Barriers (Transfers Goal 1, PT)  pain; endurance  -GB     Progress/Outcome (Transfer Goal 1, PT)  goal not met  -GB     Row Name 02/11/20 1538          Gait Training Goal 1 (PT)    Activity/Assistive Device (Gait Training Goal 1, PT)  gait (walking locomotion);assistive device use;decrease fall risk;increase endurance/gait distance  -GB     [place] Level (Gait Training Goal 1, PT)  conditional independence  -GB     Time Frame (Gait Training Goal 1, PT)  long term goal (LTG);2 weeks  -GB     Barriers (Gait Training Goal 1, PT)  600 ft or more per trip x 2 per day w/out LOB  -GB     Progress/Outcome (Gait Training Goal 1, PT)  goal not met  -GB     Row Name 02/11/20 1538          Stairs Goal 1 (PT)    Activity/Assistive Device (Stairs Goal 1, PT)  -- ascend/descend ramp w/ FWRW and supervision  -GB     [place] Level/Cues Needed (Stairs Goal 1, PT)  conditional independence;supervision required  -GB     Time Frame (Stairs Goal 1, PT)  long term goal (LTG);2 weeks  -GB     Progress/Outcome (Stairs Goal 1, PT)  goal not met  -GB     Row Name 02/11/20 1538          Patient Education Goal  (PT)    Activity (Patient Education Goal, PT)  Indep w/ HEP   -GB     [place]/Cues/Accuracy (Memory Goal 2, PT)  demonstrates adequately  -GB     Time Frame (Patient Education Goal, PT)  1 week  -GB     Progress/Outcome (Patient Education Goal, PT)  goal not met  -GB       User Key  (r) = Recorded By, (t) = Taken By, (c) = Cosigned By    Initials Name Provider Type    GB Tisha Mehta, PT Physical Therapist        Clinical Impression     Row Name 02/11/20 1509          Pain Assessment    Additional Documentation  Pain Scale: Numbers Pre/Post-Treatment (Group)  -GB     Row Name 02/11/20 1509          Pain Scale: Numbers Pre/Post-Treatment    Pain Scale: Numbers, Pretreatment  0/10 - no pain  -GB     Pain Scale: Numbers, Post-Treatment  0/10 - no pain  -GB     Pre/Post Treatment Pain Comment  0 pain w/ stationary; 6-7/10 w/ position change; 7 getting into chair  -GB     Pain Intervention(s)  Repositioned;Ambulation/increased activity  -GB     Row Name 02/11/20 1509          Plan of Care Review    Plan of Care Reviewed With  patient  -GB     [facility] Name 02/11/20 1509          Physical Therapy Clinical Impression    Patient/Family Goals Statement (PT Clinical Impression)  home w/ son by her  -GB     Criteria for Skilled Interventions Met (PT Clinical Impression)  yes  -GB     Rehab Potential (PT Clinical Summary)  good, to achieve stated therapy goals  -GB     Row Name 02/11/20 1509          Vital Signs    Pre Systolic BP Rehab  118  -GB     Pre Treatment Diastolic BP  54  -GB     Post Systolic BP Rehab  128  -GB     Post Treatment Diastolic BP  59  -GB     Pretreatment Heart Rate (beats/min)  60  -GB     Posttreatment Heart Rate (beats/min)  60  -GB     Posttreatment Resp Rate (breaths/min)  61  -GB     Pre SpO2 (%)  91  -GB     O2 Delivery Pre Treatment  nasal cannula  -GB     Post SpO2 (%)  93  -GB     O2 Delivery Post Treatment  nasal cannula  -GB     Pre Patient Position  Supine  -GB     Intra Patient  Position  Standing  -GB     Post Patient Position  Sitting  -GB     Row Name 02/11/20 1509          Positioning and Restraints    Pre-Treatment Position  in bed  -GB     Post Treatment Position  chair  -GB     In Chair  reclined;with nsg;call light within reach;encouraged to call for assist;with PT  -GB       User Key  (r) = Recorded By, (t) = Taken By, (c) = Cosigned By    Initials Name Provider Type    Tisha Loo PT Physical Therapist        Outcome Measures     Row Name 02/11/20 1541          How much help from another person do you currently need...    Turning from your back to your side while in flat bed without using bedrails?  2  -GB     Moving from lying on back to sitting on the side of a flat bed without bedrails?  2  -GB     Moving to and from a bed to a chair (including a wheelchair)?  2  -GB     Standing up from a chair using your arms (e.g., wheelchair, bedside chair)?  1  -GB     Climbing 3-5 steps with a railing?  1  -GB     To walk in hospital room?  2  -GB     AM-PAC 6 Clicks Score (PT)  10  -GB     Row Name 02/11/20 1541          Functional Assessment    Outcome Measure Options  AM-PAC 6 Clicks Basic Mobility (PT)  -GB       User Key  (r) = Recorded By, (t) = Taken By, (c) = Cosigned By    Initials Name Provider Type    Tisha Loo PT Physical Therapist          PT Recommendation and Plan  Planned Therapy Interventions (PT Eval): balance training, bed mobility training, gait training, home exercise program, patient/family education, strengthening, stair training, transfer training  Plan of Care Reviewed With: patient  Outcome Summary: PT eval completed. Pt initially had been up in chair w/ nursing, returned to bed for rest and now consents to OOB. She required mod-max assist of 2 for sup to sit and sit to stand; gait w/ FWRW 5 ft with min-mod assist of 2 and following with her chair. She deferred out of room due to feeling her LEs were going to collapse. She  kept vitals stable and no c/o dizzines but did c/o fatigue/pain w/ activity. Once settled she returned to chair and OT eval.  Pt will be followed for therapy to help meet goals to return home w/ son living next  door. She will need to be as indep as possible if alone in her home so may benefit from rehab to home prior  if all goals not met on this admit.     Time Calculation:   PT Charges     Row Name 02/11/20 1542             Time Calculation    Start Time  1509  -GB      Stop Time  1542  -GB      Time Calculation (min)  33 min  -GB      PT Received On  02/11/20  -GB      PT Goal Re-Cert Due Date  02/20/20  -GB        User Key  (r) = Recorded By, (t) = Taken By, (c) = Cosigned By    Initials Name Provider Type    Tisha Loo, PT Physical Therapist        Therapy Charges for Today     Code Description Service Date Service Provider Modifiers Qty    [id number] HC PT EVAL MOD COMPLEXITY 2 2/11/2020 Tisha Mehta, PT GP 1          PT G-Codes  Outcome Measure Options: AM-PAC 6 Clicks Basic Mobility (PT)  AM-PAC 6 Clicks Score (PT): 10    Tisha Mehta, OLIVIA  2/11/2020

## 2020-02-11 NOTE — PLAN OF CARE
Problem: Patient Care Overview  Goal: Plan of Care Review  Flowsheets (Taken 2/11/2020 8243)  Outcome Summary: OT eval completed; Pt pleasant and agreeable. Pt rec'd seated in chair and had just finished up with PT. Pt deferring further activity due to fatigue. Pt participated in UE assessment with ROM intact. Per discussion with PT, pt requires max of 2 person for supine>sit and mod/max A of 2 for sit<>stand. Pt largely limited by pain during movement but denies any pain at rest. Pt reports goals to return home. Pt lives in [place] and her son lives next door. Pt would benefit from continued skilled OT services to promote return to PLOF. Pt will need to be mod I to safely return home due to living alone. Anticipate therapy at next level of care; will continue to assess appropriate dispo with further progress in future sessions.

## 2020-02-12 PROBLEM — C18.7 CANCER OF SIGMOID (HCC): Status: RESOLVED | Noted: 2019-08-13 | Resolved: 2020-01-01

## 2020-02-12 NOTE — PLAN OF CARE
Problem: Patient Care Overview  Goal: Plan of Care Review  Outcome: Ongoing (interventions implemented as appropriate)  Flowsheets  Taken 2/11/2020 1056 by Fely Verde  Progress: no change (Pended)  Taken 2/12/2020 0400 by Shahana Wright, RN  Plan of Care Reviewed With: patient  Taken 2/12/2020 0610 by Shahana Wright, RN  Outcome Summary: pt up in chair this AM. drsg changed to abdomen. urine output 10-15cc/hr. H/H 9.6. pain management effective at this time. pt vital signs stable

## 2020-02-12 NOTE — PROGRESS NOTES
"[facility]  [address] [place] [address]. [address]  T - [phone]  F - [phone]     Progress Note          PATIENT  DEMOGRAPHICS   PATIENT NAME: Brendon Skelton                      PHYSICIAN: Sandy Caputo MD  : 1945  MRN: [id number]   LOS: 2 days    Patient Care Team:  Josefa Pozo APRN as PCP - General (Nurse Practitioner)  Meme Duckworth APRN as PCP - Claims Attributed  Luis Maher MD as Surgeon (General Surgery)  Alfredo Guerrero MD as Consulting Physician (Gastroenterology)  Hermann Cheng PA-C as Physician Assistant (Gastroenterology)  Subjective   SUBJECTIVE   Still low UO, no soa,          Objective   OBJECTIVE   Vital Signs  Temp:  [97.4 °F (36.3 °C)-98.6 °F (37 °C)] 98.2 °F (36.8 °C)  Heart Rate:  [60-64] 64  Resp:  [13-22] 18  BP: (107-172)/(51-74) 156/63    Flowsheet Rows      First Filed Value   Admission Height  165.1 cm (65\") Documented at 02/10/2020 1036   Admission Weight  104 kg (229 lb 15 oz) Documented at 02/10/2020 1036           I/O last 3 completed shifts:  In: 6047.3 [P.O.:840; I.V.:3507.3; Blood:1200; IV Piggyback:500]  Out: 430 [Urine:430]    PHYSICAL EXAM    Physical Exam   Constitutional: She is oriented to person, place, and time. She appears well-developed.   HENT:   Head: Normocephalic.   Eyes: Pupils are equal, round, and reactive to light.   Cardiovascular: Normal rate, regular rhythm and normal heart sounds.   Pulmonary/Chest: Effort normal and breath sounds normal.   Abdominal: Soft. Bowel sounds are normal.   Musculoskeletal: She exhibits edema.   Neurological: She is alert and oriented to person, place, and time.       RESULTS   Results Review:    Results from last 7 days   Lab Units 20  0754 20  0443 02/10/20  1620   SODIUM mmol/L 125* 139  --    SODIUM, ARTERIAL mmol/L  --   --  141   POTASSIUM mmol/L 4.8 4.8  --    CHLORIDE mmol/L 89* 101  --    CO2 mmol/L 23.0 22.0  --    BUN mg/dL 64* 61*  --    "   CREATININE mg/dL 3.12* 2.65*  --    CALCIUM mg/dL 8.2* 8.4*  --    GLUCOSE mg/dL 140* 173*  --    GLUCOSE, ARTERIAL mmol/L  --   --  102*       Estimated Creatinine Clearance: 19.1 mL/min (A) (by C-G formula based on SCr of 3.12 mg/dL (H)).    Results from last 7 days   Lab Units 02/12/20  0754 02/11/20  0443   MAGNESIUM mg/dL 2.3 2.3   PHOSPHORUS mg/dL 4.8* 4.9*             Results from last 7 days   Lab Units 02/12/20  0754 02/12/20  0002 02/11/20  1417 02/11/20  0443   WBC 10*3/mm3 10.53  --   --  9.99   HEMOGLOBIN g/dL 9.9* 9.6* 7.7* 7.7*   PLATELETS 10*3/mm3 119*  --   --  139*       Results from last 7 days   Lab Units 02/10/20  1028   INR  1.12         Imaging Results (Last 24 Hours)     ** No results found for the last 24 hours. **           MEDICATIONS      acetaminophen 1,000 mg Oral Q6H   citalopram 20 mg Oral Daily   dilTIAZem  mg Oral Daily   famotidine 20 mg Oral BID   metoprolol succinate XL 25 mg Oral Daily   rOPINIRole 0.25 mg Oral Nightly   traZODone 150 mg Oral Nightly       lactated ringers 100 mL/hr Last Rate: 100 mL/hr (02/12/20 0127)       Assessment/Plan   ASSESSMENT / PLAN      Cancer of sigmoid (CMS/HCC)       1- ckd 4 - baseline cr is close to 2. anya ? atn related to hypoperfusion. bp is stable at present. She has 4 u of prbc and multiple albumin infusions. Change LR to NS due to low na and concern for k elevation. Discuss with patient about need of RRT. she understands it. Currently on LR. I will stop bumex and metolazone for now. fena more so atn. UA ?uti but asymptomatic.     2- laproscopic sigmoid colon resection on 2/10/2020 for adenocarcinoma of colon.      3- htn stable at present on metoprolol and cardizem     4- anemia post op - s/p 2 u prbc.    ok to transfer to the floor                This document has been electronically signed by Sandy Caputo MD on February 12, 2020 10:19 AM

## 2020-02-12 NOTE — PLAN OF CARE
Patient received 2 units of blood this morning. Patient is having difficulty walking due to pain. Currently receiving additional 2 units of blood. Urine output remains low, Dr. Caputo consulted by Dr. Maher.

## 2020-02-12 NOTE — THERAPY TREATMENT NOTE
Acute Care - Physical Therapy Treatment Note  [facility]     Patient Name: Brendon Skelton  : 1945  MRN: [id number]  Today's Date: 2020  Onset of Illness/Injury or Date of Surgery: 02/10/20     Referring Physician: Dr. Maher    Admit Date: 2/10/2020    Visit Dx:    ICD-10-CM ICD-9-CM   1. Cancer of sigmoid (CMS/HCC) C18.7 153.3   2. Impaired functional mobility, balance, gait, and endurance Z74.09 V49.89   3. Impaired mobility and ADLs Z74.09 799.89     Patient Active Problem List   Diagnosis   • Long term current use of anticoagulant therapy   • Personal history of heart valve replacement   • Atrial fibrillation [I48.91]   • Emphysema of lung (CMS/HCC)   • On anticoagulant therapy   • Hyperlipidemia   • Diastolic heart failure (CMS/HCC)   • Depressive disorder   • COPD (chronic obstructive pulmonary disease) (CMS/HCC)   • Type 2 diabetes mellitus with stage 4 chronic kidney disease, with long-term current use of insulin (CMS/HCC)   • Myopia   • Astigmatism   • Bleeding from open wound of chest wall   • Follow-up surgery care   • Encounter for screening for malignant neoplasm of colon   • Positive colorectal cancer screening using DNA-based stool test   • Nodule of left lung   • Chronic hypoxemic respiratory failure (CMS/HCC)   • Physical deconditioning   • Heart failure with preserved left ventricular function (HFpEF) (CMS/HCC)   • Essential hypertension   • Coronary artery disease involving native coronary artery without angina pectoris   • Hx of CABG   • SSS (sick sinus syndrome) (CMS/HCC)   • Pacemaker   • Stage 4 chronic kidney disease (CMS/HCC)   • Personal history of tobacco use, presenting hazards to health   • Gastrointestinal hemorrhage   • Class 2 severe obesity due to excess calories with serious comorbidity and body mass index (BMI) of 36.0 to 36.9 in adult (CMS/HCC)   • Gastritis   • Colon polyp   • Coumadin toxicity   • Acute on chronic diastolic congestive heart failure (CMS/HCC)    • Anemia   • Pulmonary hypertension (CMS/HCC)       Therapy Treatment    Rehabilitation Treatment Summary     Row Name 02/12/20 1430 02/12/20 0905 02/12/20 0735       Treatment Time/Intention    Discipline  physical therapy assistant  -ROZ  physical therapy assistant  -ROZ  occupational therapy assistant  -KD    Document Type  therapy note (daily note)  -ROZ  therapy note (daily note)  -ROZ  therapy note (daily note)  -KD    Subjective Information  no complaints  -ROZ  complains of;fatigue  -ROZ  complains of;fatigue  -KD    Mode of Treatment  individual therapy;physical therapy  -ROZ  individual therapy;physical therapy  -ROZ  occupational therapy;individual therapy  -KD    Patient/Family Observations  Pt. supine with family present   -ROZ  Pt. sitting up in recliner this a.m.   -ROZ  Pt sitting up in recliner upon entry  -MALCOLM    Therapy Frequency (PT Clinical Impression)  2 times/day  -ROZ  2 times/day  -ROZ  --    Therapy Frequency (OT Eval)  --  --  daily  -KD    Patient Effort  good  -ROZ  good  -ROZ  good  -KD    Existing Precautions/Restrictions  fall watch gait belt  -  fall watch gait belt  -  fall watch gait belt placement  -    Recorded by [JA] Alfonzo Thornton, PTA 02/12/20 1517 [JA] Alfonzo Thornton, PTA 02/12/20 1128 [KD] Akua Henderson, COLÓN/L 02/12/20 1136    Row Name 02/12/20 1430 02/12/20 0905 02/12/20 0735       Vital Signs    Pre Systolic BP Rehab  144  -JA  156  -JA  157  -KD    Pre Treatment Diastolic BP  58  -JA  63  -JA  70  -KD    Post Systolic BP Rehab  --  --  161  -KD    Post Treatment Diastolic BP  --  --  67  -KD    Pretreatment Heart Rate (beats/min)  62  -JA  61  -JA  61  -KD    Posttreatment Heart Rate (beats/min)  60  -JA  --  61  -KD    Pre SpO2 (%)  95  -JA  92  -JA  91  -KD    O2 Delivery Pre Treatment  supplemental O2  -  nasal cannula  -  supplemental O2  -KD    Post SpO2 (%)  93  -JA  --  94  -KD    O2 Delivery Post Treatment  supplemental O2  -  --  supplemental O2  -KD     Pre Patient Position  Supine  -JA  Sitting  -JA  Sitting  -KD    Intra Patient Position  Standing  -JA  Sitting  -JA  Sitting  -KD    Post Patient Position  Sitting  -JA  Sitting  -JA  Sitting  -KD    Recorded by [JA] Alfonzo Thornton, PTA 02/12/20 1517 [JA] Alfonoz Thornton, PTA 02/12/20 1128 [KD] Akua Henderson COLÓN/L 02/12/20 1139    Row Name 02/12/20 1430 02/12/20 0905 02/12/20 0735       Cognitive Assessment/Intervention- PT/OT    Orientation Status (Cognition)  oriented x 4  -JA  oriented x 4  -JA  oriented x 4  -KD    Recorded by [JA] Alfonzo Thornton, PTA 02/12/20 1517 [JA] Alfonzo Thornton, PTA 02/12/20 1128 [KD] Akua Henderson COLÓN/L 02/12/20 1139    Row Name 02/12/20 1430             Safety Issues, Functional Mobility    Impairments Affecting Function (Mobility)  pain;strength  -JA      Recorded by [JA] Alfonzo Thornton, PTA 02/12/20 1517      Row Name 02/12/20 1430             Bed Mobility Assessment/Treatment    Supine-Sit [place] (Bed Mobility)  moderate assist (50% patient effort)  -JA      Recorded by [JA] Alfonzo Thornton, PTA 02/12/20 1517      Row Name 02/12/20 1430 02/12/20 0735          Transfer Assessment/Treatment    Transfer Assessment/Treatment  bed-chair transfer  -JA  --     Comment (Transfers)  --  Pt deferred any t/f this any  -KD     Recorded by [ROZ] Alfonzo Thornton, PTA 02/12/20 1517 [KD] Akua Henderson COLÓN/L 02/12/20 1139     Row Name 02/12/20 1430             Bed-Chair Transfer    Bed-Chair [place] (Transfers)  minimum assist (75% patient effort)  -ROZ      Assistive Device (Bed-Chair Transfers)  walker, front-wheeled  -JA      Recorded by [JA] Alfonzo Thornton, PTA 02/12/20 1517      Row Name 02/12/20 1430             Sit-Stand Transfer    Sit-Stand [place] (Transfers)  minimum assist (75% patient effort)  -ROZ      Assistive Device (Sit-Stand Transfers)  walker, front-wheeled  -ROZ      Recorded by [JA] Alfonzo Thornton, PTA  02/12/20 1517      Row Name 02/12/20 1430             Stand-Sit Transfer    Stand-Sit [place] (Transfers)  minimum assist (75% patient effort)  -      Assistive Device (Stand-Sit Transfers)  walker, front-wheeled  -      Recorded by [JA] Alfonzo Thornton, PTA 02/12/20 1522      Row Name 02/12/20 0905             Lower Extremity Seated Therapeutic Exercise    Performed, Seated Lower Extremity (Therapeutic Exercise)  LAQ (long arc quad), knee extension  -      Exercise Type, Seated Lower Extremity (Therapeutic Exercise)  AROM (active range of motion)  -      Sets/Reps Detail, Seated Lower Extremity (Therapeutic Exercise)  2x10  -JA      Recorded by [JA] Alfonzo Thornton, PTA 02/12/20 1128      Row Name 02/12/20 0905             Lower Extremity Supine Therapeutic Exercise    Performed, Supine Lower Extremity (Therapeutic Exercise)  hip abduction/adduction;ankle dorsiflexion/plantarflexion;gluteal sets;quadriceps sets  -      Exercise Type, Supine Lower Extremity (Therapeutic Exercise)  AROM (active range of motion);isometric contraction, static  -      Sets/Reps Detail, Supine Lower Extremity (Therapeutic Exercise)  2x10  -JA      Comment, Supine Lower Extremity (Therapeutic Exercise)  long sitting in recliner  -      Recorded by [JA] Alfonzo Thornton, PTA 02/12/20 1128      Row Name 02/12/20 0735             Therapeutic Exercise    Upper Extremity Range of Motion (Therapeutic Exercise)  shoulder flexion/extension, bilateral;shoulder abduction/adduction, bilateral;shoulder horizontal abduction/adduction, bilateral;shoulder internal/external rotation, bilateral;elbow flexion/extension, bilateral;forearm supination/pronation, bilateral;wrist flexion/extension, bilateral  -KD      Weight/Resistance (Therapeutic Exercise)  2 pounds  -KD      Exercise Type (Therapeutic Exercise)  AROM (active range of motion)  -KD      Position (Therapeutic Exercise)  seated  -KD      Sets/Reps (Therapeutic  Exercise)  2/20  -KD      Equipment (Therapeutic Exercise)  free weight, barbell  -KD      Expected Outcome (Therapeutic Exercise)  improve functional tolerance, self-care activity;improve performance, transfer skills  -KD      Recorded by [KD] Akua Henderson COTA/L 02/12/20 1139      Row Name 02/12/20 1430             Static Sitting Balance    Level of [place] (Unsupported Sitting, Static Balance)  supervision  -JA      Sitting Position (Unsupported Sitting, Static Balance)  sitting on edge of bed  -JA      Time Able to Maintain Position (Unsupported Sitting, Static Balance)  more than 5 minutes  -JA      Recorded by [JA] Alfonzo Thornton, PTA 02/12/20 1517      Row Name 02/12/20 1430 02/12/20 0905 02/12/20 0735       Positioning and Restraints    Pre-Treatment Position  sitting in chair/recliner  -JA  sitting in chair/recliner  -JA  sitting in chair/recliner  -KD    Post Treatment Position  chair  -JA  chair  -JA  chair  -KD    In Chair  --  sitting;reclined;call light within reach;encouraged to call for assist  -JA  sitting;call light within reach;encouraged to call for assist;exit alarm on  -KD    Recorded by [JA] Alfonzo Thornton, PTA 02/12/20 1517 [JA] Alfonzo Thornton, PTA 02/12/20 1128 [KD] Akua Henderson COLÓN/L 02/12/20 1139    Row Name 02/12/20 1430 02/12/20 0905 02/12/20 0735       Pain Scale: Numbers Pre/Post-Treatment    Pain Scale: Numbers, Pretreatment  0/10 - no pain  -JA  0/10 - no pain  -JA  0/10 - no pain  -KD    Pain Scale: Numbers, Post-Treatment  0/10 - no pain  -JA  0/10 - no pain  -JA  0/10 - no pain  -KD    Recorded by [JA] Alfonzo Thornton, PTA 02/12/20 1517 [JA] Alfonzo Thornton, PTA 02/12/20 1128 [KD] Akua Henderson COLÓN/L 02/12/20 1139    Row Name 02/12/20 0905             Sensory Assessment/Intervention    Sensory General Assessment  --  -JA      Recorded by [JA] Alfonzo Tohrnton, PTA 02/12/20 1128      Row Name                Wound 02/10/20 1450 abdomen  Incision    Wound - Properties Group Date first assessed: 02/10/20 [CH] Time first assessed: 1450 [CH] Present on Hospital Admission: N [CH] Location: abdomen [CH] Primary Wound Type: Incision [CH] Additional Comments: LAPAROSCOPIC INCISIONS X2 [CH] Recorded by:  [CH] Elayne Fung RN 02/10/20 1450    Row Name                Wound 02/10/20 1450 lower;midline abdomen Incision    Wound - Properties Group Date first assessed: 02/10/20 [CH] Time first assessed: 1450 [CH] Present on Hospital Admission: N [CH] Orientation: lower;midline [CH] Location: abdomen [CH] Primary Wound Type: Incision [CH] Recorded by:  [CH] Elayne Fung RN 02/10/20 1450    Row Name 02/12/20 0735             Outcome Summary/Treatment Plan (OT)    Daily Summary of Progress (OT)  progress toward functional goals as expected  -KD      Plan for Continued Treatment (OT)  cont ot poc  -KD      Anticipated Discharge Disposition (OT)  anticipate therapy at next level of care  -KD      Recorded by [KD] Akua Henderson COTA/L 02/12/20 1139      Row Name 02/12/20 1430 02/12/20 0905          Outcome Summary/Treatment Plan (PT)    Daily Summary of Progress (PT)  progress toward functional goals as expected  -JA  progress toward functional goals as expected  -JA     Plan for Continued Treatment (PT)  Cont. to mobilize   -JA  Cont. to mobilize   -JA     Recorded by [JA] Alfonzo Thornton, PTA 02/12/20 1517 [JA] Alfonzo Thornton, PTA 02/12/20 1128       User Key  (r) = Recorded By, (t) = Taken By, (c) = Cosigned By    Initials Name Effective Dates Discipline    JA Alfonzo Thornton, PTA 03/07/18 -  PT    KD Akua Henderson COTA/L 03/07/18 -  OT    CH Elayne Fung RN 06/23/17 -  Nurse          Wound 02/10/20 1450 abdomen Incision (Active)   Dressing Appearance moist drainage 2/12/2020  1:00 PM   Closure Adhesive closure strips 2/12/2020  1:00 PM   Base pink;bleeding 2/12/2020  1:00 PM   Periwound dry;intact 2/12/2020  1:00 PM    Periwound Temperature warm 2/12/2020  1:00 PM   Periwound Skin Turgor soft 2/12/2020  1:00 PM   Drainage Characteristics/Odor sanguineous 2/12/2020  1:00 PM   Drainage Amount small 2/12/2020  1:00 PM   Care, Wound cleansed with;sterile normal saline 2/12/2020  1:00 PM   Dressing Care, Wound gauze 2/12/2020  1:00 PM       Wound 02/10/20 1450 lower;midline abdomen Incision (Active)   Dressing Appearance moist drainage 2/12/2020  1:00 PM   Closure Adhesive closure strips 2/12/2020  1:00 PM   Base pink;bleeding 2/11/2020  4:00 PM   Periwound intact;dry 2/12/2020  1:00 PM   Periwound Temperature warm 2/12/2020  1:00 PM   Periwound Skin Turgor soft 2/12/2020  1:00 PM   Drainage Characteristics/Odor sanguineous 2/12/2020  1:00 PM   Drainage Amount scant 2/12/2020  1:00 PM   Care, Wound cleansed with;sterile normal saline 2/12/2020  1:00 PM   Dressing Care, Wound gauze 2/12/2020  1:00 PM       Rehab Goal Summary     Row Name 02/12/20 0905 02/12/20 0735          Bed Mobility Goal 1 (PT)    Activity/Assistive Device (Bed Mobility Goal 1, PT)  bed mobility activities, all  -JA  --     [place] Level/Cues Needed (Bed Mobility Goal 1, PT)  independent  -JA  --     Time Frame (Bed Mobility Goal 1, PT)  short term goal (STG);1 week  -JA  --     Progress/Outcomes (Bed Mobility Goal 1, PT)  goal not met  -JA  --        Transfer Goal 1 (PT)    Activity/Assistive Device (Transfer Goal 1, PT)  bed-to-chair/chair-to-bed;toilet  -JA  --     [place] Level/Cues Needed (Transfer Goal 1, PT)  conditional independence  -JA  --     Time Frame (Transfer Goal 1, PT)  long term goal (LTG);1 week  -JA  --     Barriers (Transfers Goal 1, PT)  pain; endurance  -JA  --     Progress/Outcome (Transfer Goal 1, PT)  goal not met  -JA  --        Gait Training Goal 1 (PT)    Activity/Assistive Device (Gait Training Goal 1, PT)  gait (walking locomotion);assistive device use;decrease fall risk;increase endurance/gait distance  -JA  --      [place] Level (Gait Training Goal 1, PT)  conditional independence  -JA  --     Time Frame (Gait Training Goal 1, PT)  long term goal (LTG);2 weeks  -JA  --     Barriers (Gait Training Goal 1, PT)  600 ft or more per trip x 2 per day w/out LOB  -JA  --     Progress/Outcome (Gait Training Goal 1, PT)  goal not met  -JA  --        Stairs Goal 1 (PT)    Activity/Assistive Device (Stairs Goal 1, PT)  -- ascend/descend ramp w/ FWRW and supervision  -JA  --     [place] Level/Cues Needed (Stairs Goal 1, PT)  conditional independence;supervision required  -JA  --     Time Frame (Stairs Goal 1, PT)  long term goal (LTG);2 weeks  -JA  --     Progress/Outcome (Stairs Goal 1, PT)  goal not met  -JA  --        Patient Education Goal (PT)    Activity (Patient Education Goal, PT)  Indep w/ HEP   -JA  --     [place]/Cues/Accuracy (Memory Goal 2, PT)  demonstrates adequately  -JA  --     Time Frame (Patient Education Goal, PT)  1 week  -JA  --     Progress/Outcome (Patient Education Goal, PT)  goal not met  -JA  --        Occupational Therapy Goals    Transfer Goal Selection (OT)  --  transfer, OT goal 1  -KD     Bathing Goal Selection (OT)  --  bathing, OT goal 1  -KD     Dressing Goal Selection (OT)  --  dressing, OT goal 1  -KD     Toileting Goal Selection (OT)  --  toileting, OT goal 1  -KD        Transfer Goal 1 (OT)    Activity/Assistive Device (Transfer Goal 1, OT)  --  sit-to-stand/stand-to-sit;bed-to-chair/chair-to-bed;toilet  -KD     [place] Level/Cues Needed (Transfer Goal 1, OT)  --  conditional independence  -KD     Time Frame (Transfer Goal 1, OT)  --  long term goal (LTG);by discharge  -KD     Progress/Outcome (Transfer Goal 1, OT)  --  goal not met  -KD        Bathing Goal 1 (OT)    Activity/Assistive Device (Bathing Goal 1, OT)  --  bathing skills, all  -KD     [place] Level/Cues Needed (Bathing Goal 1, OT)  --  supervision required;set-up required  -KD     Time Frame (Bathing Goal 1, OT)   --  long term goal (LTG);by discharge  -KD     Progress/Outcomes (Bathing Goal 1, OT)  --  goal not met  -KD        Dressing Goal 1 (OT)    Activity/Assistive Device (Dressing Goal 1, OT)  --  dressing skills, all using AE PRN  -KD     [place]/Cues Needed (Dressing Goal 1, OT)  --  supervision required;set-up required  -KD     Time Frame (Dressing Goal 1, OT)  --  long term goal (LTG);by discharge  -KD     Progress/Outcome (Dressing Goal 1, OT)  --  goal not met  -KD        Toileting Goal 1 (OT)    Activity/Device (Toileting Goal 1, OT)  --  toileting skills, all  -KD     [place] Level/Cues Needed (Toileting Goal 1, OT)  --  conditional independence  -KD     Time Frame (Toileting Goal 1, OT)  --  long term goal (LTG);by discharge  -KD     Progress/Outcome (Toileting Goal 1, OT)  --  goal not met  -KD       User Key  (r) = Recorded By, (t) = Taken By, (c) = Cosigned By    Initials Name Provider Type Discipline    Alfonzo Schultz, PTA Physical Therapy Assistant PT    KD Akua Henderson, COLÓN/L Occupational Therapy Assistant OT              PT Recommendation and Plan  Therapy Frequency (PT Clinical Impression): 2 times/day  Outcome Summary/Treatment Plan (PT)  Daily Summary of Progress (PT): progress toward functional goals as expected  Plan for Continued Treatment (PT): Cont. to mobilize   Plan of Care Reviewed With: patient  Progress: improving  Outcome Summary: Pt. able to tolerate BID treatment today. Pt. performed seated & supine therex this a.m., Pt. transferred this p.m. sup-sit ModA, sat EOB ~8' SBA, Pt. transferred bed-recliner with RW Dewayne. Cont. to mobilize as pt. able  Outcome Measures     Row Name 02/12/20 0905 02/12/20 0735 02/11/20 1542       How much help from another person do you currently need...    Turning from your back to your side while in flat bed without using bedrails?  2  -RZO  --  --    Moving from lying on back to sitting on the side of a flat bed without bedrails?  2  -ROZ   --  --    Moving to and from a bed to a chair (including a wheelchair)?  2  -JA  --  --    Standing up from a chair using your arms (e.g., wheelchair, bedside chair)?  1  -JA  --  --    Climbing 3-5 steps with a railing?  1  -JA  --  --    To walk in hospital room?  2  -JA  --  --    AM-PAC 6 Clicks Score (PT)  10  -JA  --  --       How much help from another is currently needed...    Putting on and taking off regular lower body clothing?  --  1  -KD  1  -AS    Bathing (including washing, rinsing, and drying)  --  2  -KD  2  -AS    Toileting (which includes using toilet bed pan or urinal)  --  1  -KD  1  -AS    Putting on and taking off regular upper body clothing  --  2  -KD  2  -AS    Taking care of personal grooming (such as brushing teeth)  --  3  -KD  3  -AS    Eating meals  --  4  -KD  4  -AS    AM-PAC 6 Clicks Score (OT)  --  13  -KD  13  -AS       Functional Assessment    Outcome Measure Options  AM-PAC 6 Clicks Basic Mobility (PT)  -JA  --  AM-PAC 6 Clicks Daily Activity (OT)  -AS      User Key  (r) = Recorded By, (t) = Taken By, (c) = Cosigned By    Initials Name Provider Type    Alfonzo Schultz PTA Physical Therapy Assistant    Akua Maki, ELDON/L Occupational Therapy Assistant    AS Emmie Kinsey, OT Occupational Therapist         Time Calculation:   PT Charges     Row Name 02/12/20 1522 02/12/20 1128          Time Calculation    Start Time  1420  -  0905  -     Stop Time  1450  -  0935  -     Time Calculation (min)  30 min  -JA  30 min  -JA        Time Calculation- PT    Total Timed Code Minutes- PT  30 minute(s)  -ROZ  30 minute(s)  -ROZ        Timed Charges    19024 - PT Therapeutic Exercise Minutes  --  30  -JA     37062 - PT Therapeutic Activity Minutes  30  -JA  --       User Key  (r) = Recorded By, (t) = Taken By, (c) = Cosigned By    Initials Name Provider Type    Alfonzo Schultz PTA Physical Therapy Assistant        Therapy Charges for Today     Code Description  Service Date Service Provider Modifiers Qty    78754635687 HC PT THER PROC EA 15 MIN 2/12/2020 Alfonzo Thornton, PTA GP 2    43796667514 HC PT THERAPEUTIC ACT EA 15 MIN 2/12/2020 Alfonzo Thornton, YOSEPH GP 2          PT G-Codes  Outcome Measure Options: AM-PAC 6 Clicks Basic Mobility (PT)  AM-PAC 6 Clicks Score (PT): 10  AM-PAC 6 Clicks Score (OT): 13    Alfonzo Thornton PTA  2/12/2020

## 2020-02-12 NOTE — THERAPY TREATMENT NOTE
Acute Care - Occupational Therapy Treatment Note  [facility]     Patient Name: Brendon Skelton  : 1945  MRN: [id number]  Today's Date: 2020  Onset of Illness/Injury or Date of Surgery: 02/10/20  Date of Referral to OT: 20  Referring Physician: Dr. Maher    Admit Date: 2/10/2020       ICD-10-CM ICD-9-CM   1. Cancer of sigmoid (CMS/HCC) C18.7 153.3   2. Impaired functional mobility, balance, gait, and endurance Z74.09 V49.89   3. Impaired mobility and ADLs Z74.09 799.89     Patient Active Problem List   Diagnosis   • Long term current use of anticoagulant therapy   • Personal history of heart valve replacement   • Atrial fibrillation [I48.91]   • Emphysema of lung (CMS/HCC)   • On anticoagulant therapy   • Hyperlipidemia   • Diastolic heart failure (CMS/HCC)   • Depressive disorder   • COPD (chronic obstructive pulmonary disease) (CMS/HCC)   • Type 2 diabetes mellitus with stage 4 chronic kidney disease, with long-term current use of insulin (CMS/HCC)   • Myopia   • Astigmatism   • Bleeding from open wound of chest wall   • Follow-up surgery care   • Encounter for screening for malignant neoplasm of colon   • Positive colorectal cancer screening using DNA-based stool test   • Nodule of left lung   • Chronic hypoxemic respiratory failure (CMS/HCC)   • Physical deconditioning   • Heart failure with preserved left ventricular function (HFpEF) (CMS/HCC)   • Essential hypertension   • Coronary artery disease involving native coronary artery without angina pectoris   • Hx of CABG   • SSS (sick sinus syndrome) (CMS/HCC)   • Pacemaker   • Stage 4 chronic kidney disease (CMS/HCC)   • Personal history of tobacco use, presenting hazards to health   • Gastrointestinal hemorrhage   • Class 2 severe obesity due to excess calories with serious comorbidity and body mass index (BMI) of 36.0 to 36.9 in adult (CMS/HCC)   • Gastritis   • Colon polyp   • Coumadin toxicity   • Acute on chronic diastolic congestive  heart failure (CMS/HCC)   • Anemia   • Pulmonary hypertension (CMS/HCC)   • Cancer of sigmoid (CMS/HCC)     Past Medical History:   Diagnosis Date   • Acute bronchitis    • Anxiety    • Aortic valve replaced    • Atrial fibrillation (CMS/HCC)    • C. difficile colitis    • Callosity     under metatarsal head      • Cardiac pacemaker in situ    • CHF (congestive heart failure) (CMS/HCC)    • Chronic obstructive lung disease (CMS/HCC)    • Corns and callus    • Coronary artery disease     hx of CABG 1 vessel as well as aortic valve replacement   • Depressive disorder    • Diabetes mellitus (CMS/HCC)    • Diastolic heart failure (CMS/HCC)    • Essential hypertension    • Foot pain    • History of transfusion    • Hyperlipidemia    • Long term current use of anticoagulant    • Malignant neoplasm of sigmoid colon (CMS/HCC) 8/13/2019   • On anticoagulant therapy    • Pulmonary emphysema (CMS/HCC)    • Rectal hemorrhage    • Stage 4 chronic kidney disease (CMS/HCC)    • Type 2 diabetes mellitus (CMS/HCC)      Past Surgical History:   Procedure Laterality Date   • AORTIC VALVE REPAIR/REPLACEMENT  1999   • CARDIAC ELECTROPHYSIOLOGY PROCEDURE N/A 3/20/2017    Procedure: PPM generator change - dual;  Surgeon: Bereket Gates MD;  Location: [facility] CATH INVASIVE LOCATION;  Service:    • CARDIAC PACEMAKER PLACEMENT  1999   • CATARACT EXTRACTION W/ INTRAOCULAR LENS IMPLANT Left 2/1/2019    Procedure: REMOVE CATARACT AND IMPLANT INTRAOCULAR LENS I;  Surgeon: Jose L Clay MD;  Location: [facility] OR;  Service: Ophthalmology   • CATARACT EXTRACTION W/ INTRAOCULAR LENS IMPLANT Right 2/8/2019    Procedure: REMOVE CATARACT AND IMPLANT  INTRAOCULAR LENS;  Surgeon: Jose L Clay MD;  Location: [facility] OR;  Service: Ophthalmology   • COLONOSCOPY N/A 6/19/2019    Procedure: COLONOSCOPY WITH CONTROL OF BLEED;  Surgeon: Alfredo Guerrero MD;  Location: [facility] ENDOSCOPY;  Service: Gastroenterology   • COLONOSCOPY N/A 6/24/2019     Procedure: COLONOSCOPY;  Surgeon: Alfredo Guerrero MD;  Location: [facility] ENDOSCOPY;  Service: Gastroenterology   • ECHO - CONVERTED  11/12/2013    There is mild to moderate left atrial enlargement EF 50-55%   • ENDOSCOPY N/A 6/19/2019    Procedure: ESOPHAGOGASTRODUODENOSCOPY WITH CONTROL OF BLEED;  Surgeon: Alfredo Guerrero MD;  Location: [facility] ENDOSCOPY;  Service: Gastroenterology   • FOOT SURGERY  1990   • OTHER SURGICAL HISTORY  10/26/2015    PARING CORN/CALLUS    • PACEMAKER REPLACEMENT N/A 3/21/2017    Procedure: revision pacemaker pocket, evacuation hematoma, control of bleeding;  Surgeon: Polo Feliz MD;  Location: [facility] OR;  Service:    • SIGMOIDOSCOPY N/A 9/30/2019    Procedure: SIGMOIDOSCOPY FLEXIBLE;  Surgeon: Alfredo Guerrero MD;  Location: [facility] ENDOSCOPY;  Service: Gastroenterology   • TRANSESOPHAGEAL ECHOCARDIOGRAM (MITZY)  05/01/2014    With color flow-Mild left atrial enlargement with mild concentric LV hypertrophy with top normal aortic root size. EF 45-50%. Mild mitral regurgitation and mild aortic insufficiency and trivial amount of tricuspid regurgation   • TUBAL ABDOMINAL LIGATION     • UPPER GASTROINTESTINAL ENDOSCOPY  06/19/2019       Therapy Treatment    Rehabilitation Treatment Summary     Row Name 02/12/20 0905 02/12/20 0735          Treatment Time/Intention    Discipline  physical therapy assistant  -JA  occupational therapy assistant  -KD     Document Type  therapy note (daily note)  -ROZ  therapy note (daily note)  -     Subjective Information  complains of;fatigue  -ROZ  complains of;fatigue  -     Mode of Treatment  individual therapy;physical therapy  -JA  occupational therapy;individual therapy  -KD     Patient/Family Observations  Pt. sitting up in recliner this a.m.   -ROZ  Pt sitting up in recliner upon entry  -KD     Therapy Frequency (PT Clinical Impression)  2 times/day  -ROZ  --     Therapy Frequency (OT Eval)  --  daily  -KD     Patient Effort  good  -ROZ  good   -KD     Existing Precautions/Restrictions  fall watch gait belt  -JA  fall watch gait belt placement  -KD     Recorded by [JA] Alfonzo Thornton, PTA 02/12/20 1128 [KD] Akua Henderson COLÓN/L 02/12/20 1136     Row Name 02/12/20 0905 02/12/20 0735          Vital Signs    Pre Systolic BP Rehab  156  -JA  157  -KD     Pre Treatment Diastolic BP  63  -JA  70  -KD     Post Systolic BP Rehab  --  161  -KD     Post Treatment Diastolic BP  --  67  -KD     Pretreatment Heart Rate (beats/min)  61  -JA  61  -KD     Posttreatment Heart Rate (beats/min)  --  61  -KD     Pre SpO2 (%)  92  -JA  91  -KD     O2 Delivery Pre Treatment  nasal cannula  -JA  supplemental O2  -KD     Post SpO2 (%)  --  94  -KD     O2 Delivery Post Treatment  --  supplemental O2  -KD     Pre Patient Position  Sitting  -JA  Sitting  -KD     Intra Patient Position  Sitting  -JA  Sitting  -KD     Post Patient Position  Sitting  -JA  Sitting  -KD     Recorded by [JA] Alfonzo Thornton, PTA 02/12/20 1128 [KD] Akua Henderson COLÓN/L 02/12/20 1139     Row Name 02/12/20 0905 02/12/20 0735          Cognitive Assessment/Intervention- PT/OT    Orientation Status (Cognition)  oriented x 4  -JA  oriented x 4  -KD     Recorded by [JA] Alfonzo Thornton, PTA 02/12/20 1128 [KD] Akua Henderson COLÓN/L 02/12/20 1139     Row Name 02/12/20 0735             Transfer Assessment/Treatment    Comment (Transfers)  Pt deferred any t/f this any  -KD      Recorded by [KD] Akua Henderson COLÓN/L 02/12/20 1139      Row Name 02/12/20 0905             Lower Extremity Seated Therapeutic Exercise    Performed, Seated Lower Extremity (Therapeutic Exercise)  LAQ (long arc quad), knee extension  -ROZ      Exercise Type, Seated Lower Extremity (Therapeutic Exercise)  AROM (active range of motion)  -ROZ      Sets/Reps Detail, Seated Lower Extremity (Therapeutic Exercise)  2x10  -JA      Recorded by [JA] Alfonzo Thornton, PTA 02/12/20 1128      Row Name 02/12/20 0905              Lower Extremity Supine Therapeutic Exercise    Performed, Supine Lower Extremity (Therapeutic Exercise)  hip abduction/adduction;ankle dorsiflexion/plantarflexion;gluteal sets;quadriceps sets  -      Exercise Type, Supine Lower Extremity (Therapeutic Exercise)  AROM (active range of motion);isometric contraction, static  -JA      Sets/Reps Detail, Supine Lower Extremity (Therapeutic Exercise)  2x10  -JA      Comment, Supine Lower Extremity (Therapeutic Exercise)  long sitting in recliner  -      Recorded by [JA] Alfonzo Thornton, PTA 02/12/20 1128      Row Name 02/12/20 0735             Therapeutic Exercise    Upper Extremity Range of Motion (Therapeutic Exercise)  shoulder flexion/extension, bilateral;shoulder abduction/adduction, bilateral;shoulder horizontal abduction/adduction, bilateral;shoulder internal/external rotation, bilateral;elbow flexion/extension, bilateral;forearm supination/pronation, bilateral;wrist flexion/extension, bilateral  -KD      Weight/Resistance (Therapeutic Exercise)  2 pounds  -KD      Exercise Type (Therapeutic Exercise)  AROM (active range of motion)  -KD      Position (Therapeutic Exercise)  seated  -KD      Sets/Reps (Therapeutic Exercise)  2/20  -KD      Equipment (Therapeutic Exercise)  free weight, barbell  -KD      Expected Outcome (Therapeutic Exercise)  improve functional tolerance, self-care activity;improve performance, transfer skills  -KD      Recorded by [KD] Akua Hendersno COTA/L 02/12/20 1139      Row Name 02/12/20 0905 02/12/20 0735          Positioning and Restraints    Pre-Treatment Position  sitting in chair/recliner  -JA  sitting in chair/recliner  -KD     Post Treatment Position  chair  -  chair  -KD     In Chair  sitting;reclined;call light within reach;encouraged to call for assist  -  sitting;call light within reach;encouraged to call for assist;exit alarm on  -KD     Recorded by [JA] Alfonzo Thornton, PTA 02/12/20 1128 [KD] Akua Henderson,  COLÓN/L 02/12/20 1139     Row Name 02/12/20 0905 02/12/20 0735          Pain Scale: Numbers Pre/Post-Treatment    Pain Scale: Numbers, Pretreatment  0/10 - no pain  -JA  0/10 - no pain  -KD     Pain Scale: Numbers, Post-Treatment  0/10 - no pain  -JA  0/10 - no pain  -KD     Recorded by [JA] Alfonzo Thornton, PTA 02/12/20 1128 [KD] Akua Henderson COLÓN/L 02/12/20 1139     Row Name 02/12/20 0905             Sensory Assessment/Intervention    Sensory General Assessment  --  -JA      Recorded by [JA] Alfonzo Thornton, PTA 02/12/20 1128      Row Name                Wound 02/10/20 1450 abdomen Incision    Wound - Properties Group Date first assessed: 02/10/20 [CH] Time first assessed: 1450 [CH] Present on Hospital Admission: N [CH] Location: abdomen [CH] Primary Wound Type: Incision [CH] Additional Comments: LAPAROSCOPIC INCISIONS X2 [CH] Recorded by:  [CH] Elayne Fung RN 02/10/20 1450    Row Name                Wound 02/10/20 1450 lower;midline abdomen Incision    Wound - Properties Group Date first assessed: 02/10/20 [CH] Time first assessed: 1450 [CH] Present on Hospital Admission: N [CH] Orientation: lower;midline [CH] Location: abdomen [CH] Primary Wound Type: Incision [CH] Recorded by:  [CH] Elayne Fung RN 02/10/20 1450    Row Name 02/12/20 0735             Outcome Summary/Treatment Plan (OT)    Daily Summary of Progress (OT)  progress toward functional goals as expected  -KD      Plan for Continued Treatment (OT)  cont ot poc  -KD      Anticipated Discharge Disposition (OT)  anticipate therapy at next level of care  -KD      Recorded by [KD] Akua Henderson COLÓN/L 02/12/20 1139      Row Name 02/12/20 0905             Outcome Summary/Treatment Plan (PT)    Daily Summary of Progress (PT)  progress toward functional goals as expected  -JA      Plan for Continued Treatment (PT)  Cont. to mobilize   -JA      Recorded by [JA] Alfonzo Thornton, PTA 02/12/20 1128        User Key  (r) =  Recorded By, (t) = Taken By, (c) = Cosigned By    Initials Name Effective Dates Discipline    Alfonzo Schultz, YOSEPH 03/07/18 -  PT    Akua Maki, COLÓN/L 03/07/18 -  OT    Elayne Chakraborty RN 06/23/17 -  Nurse        Wound 02/10/20 1450 abdomen Incision (Active)   Dressing Appearance intact;dry 2/12/2020  8:00 AM   Closure Adhesive closure strips 2/12/2020  8:00 AM   Base pink;bleeding 2/12/2020  8:00 AM   Periwound dry;intact 2/12/2020  8:00 AM   Periwound Temperature warm 2/12/2020  8:00 AM   Periwound Skin Turgor soft 2/12/2020  8:00 AM   Drainage Characteristics/Odor sanguineous 2/12/2020  5:30 AM   Drainage Amount small 2/12/2020  8:00 AM   Dressing Care, Wound gauze 2/12/2020  8:00 AM       Wound 02/10/20 1450 lower;midline abdomen Incision (Active)   Dressing Appearance moist drainage;intact 2/12/2020  8:00 AM   Closure Adhesive closure strips 2/12/2020  8:00 AM   Base pink;bleeding 2/11/2020  4:00 PM   Periwound intact;dry 2/12/2020  8:00 AM   Periwound Temperature warm 2/12/2020  8:00 AM   Periwound Skin Turgor soft 2/12/2020  8:00 AM   Drainage Characteristics/Odor sanguineous 2/12/2020  8:00 AM   Drainage Amount none 2/12/2020  8:00 AM   Care, Wound cleansed with;antimicrobial agent applied 2/12/2020  5:30 AM   Dressing Care, Wound gauze 2/12/2020  8:00 AM     Rehab Goal Summary     Row Name 02/12/20 0905 02/12/20 0735          Bed Mobility Goal 1 (PT)    Activity/Assistive Device (Bed Mobility Goal 1, PT)  bed mobility activities, all  -JA  --     [place] Level/Cues Needed (Bed Mobility Goal 1, PT)  independent  -JA  --     Time Frame (Bed Mobility Goal 1, PT)  short term goal (STG);1 week  -JA  --     Progress/Outcomes (Bed Mobility Goal 1, PT)  goal not met  -JA  --        Transfer Goal 1 (PT)    Activity/Assistive Device (Transfer Goal 1, PT)  bed-to-chair/chair-to-bed;toilet  -JA  --     [place] Level/Cues Needed (Transfer Goal 1, PT)  conditional independence  -JA  --      Time Frame (Transfer Goal 1, PT)  long term goal (LTG);1 week  -JA  --     Barriers (Transfers Goal 1, PT)  pain; endurance  -JA  --     Progress/Outcome (Transfer Goal 1, PT)  goal not met  -JA  --        Gait Training Goal 1 (PT)    Activity/Assistive Device (Gait Training Goal 1, PT)  gait (walking locomotion);assistive device use;decrease fall risk;increase endurance/gait distance  -JA  --     [place] Level (Gait Training Goal 1, PT)  conditional independence  -JA  --     Time Frame (Gait Training Goal 1, PT)  long term goal (LTG);2 weeks  -JA  --     Barriers (Gait Training Goal 1, PT)  600 ft or more per trip x 2 per day w/out LOB  -JA  --     Progress/Outcome (Gait Training Goal 1, PT)  goal not met  -JA  --        Stairs Goal 1 (PT)    Activity/Assistive Device (Stairs Goal 1, PT)  -- ascend/descend ramp w/ FWRW and supervision  -JA  --     [place] Level/Cues Needed (Stairs Goal 1, PT)  conditional independence;supervision required  -JA  --     Time Frame (Stairs Goal 1, PT)  long term goal (LTG);2 weeks  -JA  --     Progress/Outcome (Stairs Goal 1, PT)  goal not met  -JA  --        Patient Education Goal (PT)    Activity (Patient Education Goal, PT)  Indep w/ HEP   -JA  --     [place]/Cues/Accuracy (Memory Goal 2, PT)  demonstrates adequately  -JA  --     Time Frame (Patient Education Goal, PT)  1 week  -JA  --     Progress/Outcome (Patient Education Goal, PT)  goal not met  -JA  --        Occupational Therapy Goals    Transfer Goal Selection (OT)  --  transfer, OT goal 1  -KD     Bathing Goal Selection (OT)  --  bathing, OT goal 1  -KD     Dressing Goal Selection (OT)  --  dressing, OT goal 1  -KD     Toileting Goal Selection (OT)  --  toileting, OT goal 1  -KD        Transfer Goal 1 (OT)    Activity/Assistive Device (Transfer Goal 1, OT)  --  sit-to-stand/stand-to-sit;bed-to-chair/chair-to-bed;toilet  -KD     [place] Level/Cues Needed (Transfer Goal 1, OT)  --  conditional  independence  -KD     Time Frame (Transfer Goal 1, OT)  --  long term goal (LTG);by discharge  -KD     Progress/Outcome (Transfer Goal 1, OT)  --  goal not met  -KD        Bathing Goal 1 (OT)    Activity/Assistive Device (Bathing Goal 1, OT)  --  bathing skills, all  -KD     [place] Level/Cues Needed (Bathing Goal 1, OT)  --  supervision required;set-up required  -KD     Time Frame (Bathing Goal 1, OT)  --  long term goal (LTG);by discharge  -KD     Progress/Outcomes (Bathing Goal 1, OT)  --  goal not met  -KD        Dressing Goal 1 (OT)    Activity/Assistive Device (Dressing Goal 1, OT)  --  dressing skills, all using AE PRN  -KD     [place]/Cues Needed (Dressing Goal 1, OT)  --  supervision required;set-up required  -KD     Time Frame (Dressing Goal 1, OT)  --  long term goal (LTG);by discharge  -KD     Progress/Outcome (Dressing Goal 1, OT)  --  goal not met  -KD        Toileting Goal 1 (OT)    Activity/Device (Toileting Goal 1, OT)  --  toileting skills, all  -KD     [place] Level/Cues Needed (Toileting Goal 1, OT)  --  conditional independence  -KD     Time Frame (Toileting Goal 1, OT)  --  long term goal (LTG);by discharge  -KD     Progress/Outcome (Toileting Goal 1, OT)  --  goal not met  -KD       User Key  (r) = Recorded By, (t) = Taken By, (c) = Cosigned By    Initials Name Provider Type Discipline    Alfonzo Schultz, PTA Physical Therapy Assistant PT    Akua Maki, COLÓN/L Occupational Therapy Assistant OT            OT Recommendation and Plan  Outcome Summary/Treatment Plan (OT)  Daily Summary of Progress (OT): progress toward functional goals as expected  Plan for Continued Treatment (OT): cont ot poc  Anticipated Discharge Disposition (OT): anticipate therapy at next level of care  Therapy Frequency (OT Eval): daily  Daily Summary of Progress (OT): progress toward functional goals as expected  Plan of Care Review  Plan of Care Reviewed With: patient  Plan of Care Reviewed  With: patient  Outcome Summary: Pt up in recliner upon entry this AM. Pt deferred any standing/t/f this AM 2' pain w/ standing. Pt agreeable to BUE ther ex and completed 2 sets w/ good tolerance allowing RB's as needed. No new goals met this date. Cont OT POC.  Outcome Measures     Row Name 02/12/20 0905 02/12/20 0735 02/11/20 1542       How much help from another person do you currently need...    Turning from your back to your side while in flat bed without using bedrails?  2  -JA  --  --    Moving from lying on back to sitting on the side of a flat bed without bedrails?  2  -JA  --  --    Moving to and from a bed to a chair (including a wheelchair)?  2  -JA  --  --    Standing up from a chair using your arms (e.g., wheelchair, bedside chair)?  1  -JA  --  --    Climbing 3-5 steps with a railing?  1  -JA  --  --    To walk in hospital room?  2  -JA  --  --    AM-PAC 6 Clicks Score (PT)  10  -JA  --  --       How much help from another is currently needed...    Putting on and taking off regular lower body clothing?  --  1  -KD  1  -AS    Bathing (including washing, rinsing, and drying)  --  2  -KD  2  -AS    Toileting (which includes using toilet bed pan or urinal)  --  1  -KD  1  -AS    Putting on and taking off regular upper body clothing  --  2  -KD  2  -AS    Taking care of personal grooming (such as brushing teeth)  --  3  -KD  3  -AS    Eating meals  --  4  -KD  4  -AS    AM-PAC 6 Clicks Score (OT)  --  13  -KD  13  -AS       Functional Assessment    Outcome Measure Options  AM-PAC 6 Clicks Basic Mobility (PT)  -  --  AM-PAC 6 Clicks Daily Activity (OT)  -AS      User Key  (r) = Recorded By, (t) = Taken By, (c) = Cosigned By    Initials Name Provider Type    Alfonzo Schultz, YOSEPH Physical Therapy Assistant    Akua Maki COTA/L Occupational Therapy Assistant    AS Emmie Kinsey, OT Occupational Therapist           Time Calculation:   Time Calculation- OT     Row Name 02/12/20 3170              Time Calculation- OT    OT Start Time  0735  -KD      OT Stop Time  0804  -KD      OT Time Calculation (min)  29 min  -KD      Total Timed Code Minutes- OT  29 minute(s)  -KD      OT Received On  02/12/20  -KD        User Key  (r) = Recorded By, (t) = Taken By, (c) = Cosigned By    Initials Name Provider Type    Akua Maki COTA/L Occupational Therapy Assistant        Therapy Charges for Today     Code Description Service Date Service Provider Modifiers Qty    [id number] HC OT THER PROC EA 15 MIN 2/12/2020 Akua Henderson COTA/L GO 2               ELDON [place]/DANDRE  2/12/2020

## 2020-02-12 NOTE — CONSULTS
[facility]  [address] [place] [address]. [id number]  T - [phone]  F  [phone]     Consultation         PATIENT  DEMOGRAPHICS   PATIENT NAME: Brendon Skelton                      PHYSICIAN: Sandy Caputo MD  : 1945  MRN: [id number]    Subjective   SUBJECTIVE   Referring Provider: Dr Maher  Reason for Consultation: ckd 4 oligouria  History of present illness:      Ms Green has ckd 4 with h/o fluid overload. She is on bumex daily along with metolazone every other day. She was upto 260lbs at one point and now recently came down to 212 lbs with diuretics. Her baseline cr is close to 2. She also has h/o htn anemia of ckd and secondary hyperparathyroidism.      She has h/o colonic polyp resection endoscopically and definitive resection was delayed due to poor surgical candidate. She has done well with O2 requirement and therefore brought in electively for colectomy. She has adenocarcinoma of colon diagnosed in 2019 . She has laproscopic sigmoid colon resection on 2/10/2020. She has poor urine output post operatively and only had 70 cc whole day. She has received 1.5L of albumin 5% along with 2 u prbc and IVF with no improvement in her UO. Cr is 2.6 today.     Past Medical History:   Diagnosis Date   • Acute bronchitis    • Anxiety    • Aortic valve replaced    • Atrial fibrillation (CMS/HCC)    • C. difficile colitis    • Callosity     under metatarsal head      • Cardiac pacemaker in situ    • CHF (congestive heart failure) (CMS/HCC)    • Chronic obstructive lung disease (CMS/HCC)    • Corns and callus    • Coronary artery disease     hx of CABG 1 vessel as well as aortic valve replacement   • Depressive disorder    • Diabetes mellitus (CMS/HCC)    • Diastolic heart failure (CMS/HCC)    • Essential hypertension    • Foot pain    • History of transfusion    • Hyperlipidemia    • Long term current use of anticoagulant    • Malignant neoplasm of sigmoid colon (CMS/HCC)  8/13/2019   • On anticoagulant therapy    • Pulmonary emphysema (CMS/HCC)    • Rectal hemorrhage    • Stage 4 chronic kidney disease (CMS/HCC)    • Type 2 diabetes mellitus (CMS/HCC)      Past Surgical History:   Procedure Laterality Date   • AORTIC VALVE REPAIR/REPLACEMENT  1999   • CARDIAC ELECTROPHYSIOLOGY PROCEDURE N/A 3/20/2017    Procedure: PPM generator change - dual;  Surgeon: Bereket Gates MD;  Location: [facility] CATH INVASIVE LOCATION;  Service:    • CARDIAC PACEMAKER PLACEMENT  1999   • CATARACT EXTRACTION W/ INTRAOCULAR LENS IMPLANT Left 2/1/2019    Procedure: REMOVE CATARACT AND IMPLANT INTRAOCULAR LENS I;  Surgeon: Jose L Clay MD;  Location: [facility] OR;  Service: Ophthalmology   • CATARACT EXTRACTION W/ INTRAOCULAR LENS IMPLANT Right 2/8/2019    Procedure: REMOVE CATARACT AND IMPLANT  INTRAOCULAR LENS;  Surgeon: Jose L Clay MD;  Location: [facility] OR;  Service: Ophthalmology   • COLONOSCOPY N/A 6/19/2019    Procedure: COLONOSCOPY WITH CONTROL OF BLEED;  Surgeon: Alfredo Guerrero MD;  Location: [facility] ENDOSCOPY;  Service: Gastroenterology   • COLONOSCOPY N/A 6/24/2019    Procedure: COLONOSCOPY;  Surgeon: Alfredo Guerrero MD;  Location: [facility] ENDOSCOPY;  Service: Gastroenterology   • ECHO - CONVERTED  11/12/2013    There is mild to moderate left atrial enlargement EF 50-55%   • ENDOSCOPY N/A 6/19/2019    Procedure: ESOPHAGOGASTRODUODENOSCOPY WITH CONTROL OF BLEED;  Surgeon: Alfredo Guerrero MD;  Location: [facility] ENDOSCOPY;  Service: Gastroenterology   • FOOT SURGERY  1990   • OTHER SURGICAL HISTORY  10/26/2015    PARING CORN/CALLUS    • PACEMAKER REPLACEMENT N/A 3/21/2017    Procedure: revision pacemaker pocket, evacuation hematoma, control of bleeding;  Surgeon: Polo Feliz MD;  Location: [facility] OR;  Service:    • SIGMOIDOSCOPY N/A 9/30/2019    Procedure: SIGMOIDOSCOPY FLEXIBLE;  Surgeon: Alfredo Guerrero MD;  Location: [facility] ENDOSCOPY;  Service: Gastroenterology   •  "TRANSESOPHAGEAL ECHOCARDIOGRAM (MITZY)  2014    With color flow-Mild left atrial enlargement with mild concentric LV hypertrophy with top normal aortic root size. EF 45-50%. Mild mitral regurgitation and mild aortic insufficiency and trivial amount of tricuspid regurgation   • TUBAL ABDOMINAL LIGATION     • UPPER GASTROINTESTINAL ENDOSCOPY  2019     Family History   Problem Relation Age of Onset   • Heart disease Mother    • Hyperlipidemia Mother    • Hypertension Mother    • Cancer Other      Social History     Tobacco Use   • Smoking status: Former Smoker     Last attempt to quit: 3/21/1999     Years since quittin.9   • Smokeless tobacco: Never Used   Substance Use Topics   • Alcohol use: No   • Drug use: No     Allergies:  Crestor [rosuvastatin calcium]; Lipitor [atorvastatin]; Lortab [hydrocodone-acetaminophen]; and Adhesive tape     REVIEW OF SYSTEMS    Review of Systems   Constitutional: Negative for chills and fever.   Respiratory: Negative for chest tightness and shortness of breath.    Cardiovascular: Negative for chest pain and leg swelling.   Gastrointestinal: Negative for abdominal pain, diarrhea and nausea.   Genitourinary: Negative for dysuria, flank pain and hematuria.   Neurological: Negative for dizziness, syncope and weakness.       Objective   OBJECTIVE   Vital Signs  Temp:  [97.4 °F (36.3 °C)-98.4 °F (36.9 °C)] 98.3 °F (36.8 °C)  Heart Rate:  [59-70] 60  Resp:  [8-22] 16  BP: (107-165)/(51-73) 122/53  FiO2 (%):  [35 %-40 %] 35 %    Flowsheet Rows      First Filed Value   Admission Height  165.1 cm (65\") Documented at 02/10/2020 1036   Admission Weight  104 kg (229 lb 15 oz) Documented at 02/10/2020 1036           I/O last 3 completed shifts:  In: 6534 [P.O.:360; I.V.:4424; Blood:600; IV Piggyback:1150]  Out: 465 [Urine:465]    PHYSICAL EXAM    Physical Exam   Constitutional: She is oriented to person, place, and time. She appears well-developed.   HENT:   Head: Normocephalic.   "   Eyes: Pupils are equal, round, and reactive to light.   Cardiovascular: Normal rate, regular rhythm and normal heart sounds.   Pulmonary/Chest: Effort normal and breath sounds normal.   Abdominal: Soft. Bowel sounds are normal.   Neurological: She is alert and oriented to person, place, and time.       RESULTS   Results Review:    Results from last 7 days   Lab Units 02/11/20  0443 02/10/20  1620   SODIUM mmol/L 139  --    SODIUM, ARTERIAL mmol/L  --  141   POTASSIUM mmol/L 4.8  --    CHLORIDE mmol/L 101  --    CO2 mmol/L 22.0  --    BUN mg/dL 61*  --    CREATININE mg/dL 2.65*  --    CALCIUM mg/dL 8.4*  --    GLUCOSE mg/dL 173*  --    GLUCOSE, ARTERIAL mmol/L  --  102*       Estimated Creatinine Clearance: 21.9 mL/min (A) (by C-G formula based on SCr of 2.65 mg/dL (H)).    Results from last 7 days   Lab Units 02/11/20  0443   MAGNESIUM mg/dL 2.3   PHOSPHORUS mg/dL 4.9*             Results from last 7 days   Lab Units 02/11/20  1417 02/11/20  0443   WBC 10*3/mm3  --  9.99   HEMOGLOBIN g/dL 7.7* 7.7*   PLATELETS 10*3/mm3  --  139*       Results from last 7 days   Lab Units 02/10/20  1028   INR  1.12        MEDICATIONS      acetaminophen 1,000 mg Oral Q6H   alvimopan 12 mg Oral BID   citalopram 20 mg Oral Daily   dilTIAZem  mg Oral Daily   famotidine 20 mg Oral BID   metoprolol succinate XL 25 mg Oral Daily   rOPINIRole 0.25 mg Oral Nightly   traZODone 150 mg Oral Nightly       lactated ringers 100 mL/hr Last Rate: 100 mL/hr (02/11/20 1707)     Medications Prior to Admission   Medication Sig Dispense Refill Last Dose   • acetaminophen (TYLENOL) 325 MG tablet Take 650 mg by mouth every 6 (six) hours as needed for mild pain (1-3).   Past Week at Unknown time   • albuterol (PROVENTIL) (2.5 MG/3ML) 0.083% nebulizer solution Take 2.5 mg by nebulization Every 4 (Four) Hours As Needed for Wheezing.   Past Week at Unknown time   • albuterol sulfate  (90 Base) MCG/ACT inhaler Inhale 2 puffs Every 4 (Four) Hours  As Needed for Wheezing or Shortness of Air. 18 g 11 2/9/2020 at 1100   • Ascorbic Acid (VITAMIN C WITH OCHOA HIPS) 250 MG tablet Take 250 mg by mouth Daily.   2/9/2020 at Unknown time   • aspirin 81 MG chewable tablet Chew 81 mg Daily.   2/9/2020 at 0800   • bumetanide (BUMEX) 2 MG tablet Take 2 mg by mouth Daily.   2/9/2020 at 1100   • cholecalciferol (VITAMIN D3) 1000 units tablet Take 2,000 Units by mouth Daily.   2/9/2020 at 0800   • citalopram (CeleXA) 20 MG tablet Take 1 tablet by mouth Daily. 90 tablet 3 2/10/2020 at 0700   • diltiaZEM CD (CARDIZEM CD) 240 MG 24 hr capsule Take 1 capsule by mouth Daily. 90 capsule 3 2/9/2020 at 0800   • ezetimibe (ZETIA) 10 MG tablet Take 10 mg by mouth Daily.   2/9/2020 at 0800   • ferrous sulfate 325 (65 FE) MG tablet Take 325 mg by mouth Daily With Breakfast.   2/9/2020 at 0800   • glucose blood test strip 1 each by Other route 3 (three) times a day. Use as instructed   Past Week at Unknown time   • Insulin Glargine (BASAGLAR KWIKPEN) 100 UNIT/ML injection pen Inject 30 Units under the skin into the appropriate area as directed Every Night.   2/9/2020 at 2000   • metOLazone (ZAROXOLYN) 5 MG tablet Take 1 tablet by mouth Daily. 30 tablet 5 2/9/2020 at 0900   • metoprolol succinate XL (TOPROL-XL) 25 MG 24 hr tablet Take 1 tablet by mouth Daily. 30 tablet 5 2/10/2020 at 0700   • neomycin (MYCIFRADIN) 500 MG tablet TAKE 2 TABLETS  AND 1100PM NIGHT BEFORE SURGERY 4 tablet 0 2/9/2020 at 2300   • Prenatal Vit-Fe Fumarate-FA (PRENATAL VITAMIN) 27-0.8 MG tablet Take 1 tablet by mouth daily.   2/9/2020 at 0900   • rOPINIRole (REQUIP) 0.25 MG tablet TAKE 1 TABLET BY MOUTH EVERY NIGHT. TAKE 1 HOUR BEFORE BEDTIME. 30 tablet 0 2/9/2020 at 2000   • traZODone (DESYREL) 150 MG tablet Take 2 tablets by mouth Every Night. 180 tablet 3 2/9/2020 at 2000   • umeclidinium-vilanterol (ANORO ELLIPTA) 62.5-25 MCG/INH aerosol powder  inhaler Inhale 1 puff Daily. 1 each 11 2/9/2020 at 2300   •  UNIFINE PENTIPS 31G X 6 MM misc USE 1 FOUR (4) TIMES DAILY WITH INSULIN 130 each 5 Past Week at Unknown time   • ondansetron (ZOFRAN) 4 MG tablet Take 1 tablet by mouth Every 6 (Six) Hours As Needed for Nausea or Vomiting. 30 tablet 0 Taking   • warfarin (COUMADIN) 5 MG tablet Take 1 tablet by mouth Daily. Take 1 tablet nightly or AS DIRECTED PER COUMADIN CLINIC 30 tablet 5 2/6/2020     Assessment/Plan   ASSESSMENT / PLAN      Cancer of sigmoid (CMS/HCC)    1- ckd 4 - baseline cr is close to 2. anya ? atn related to hypoperfusion. bp is stable at present. She has 2 u of prbc and multiple albumin infusions. Currently on LR. I will stop bumex and metolazone for now. Check urine studies and UA. Check later to see UO and may need bolus again.    2- laproscopic sigmoid colon resection on 2/10/2020 for adenocarcinoma of colon.     3- htn stable at present    4- anemia post op - s/p 2 u prbc.    Thank you will follow       I discussed the patients findings and my recommendations with patient and nursing staff         This document has been electronically signed by Sandy Caputo MD on February 11, 2020 8:03 PM

## 2020-02-12 NOTE — PLAN OF CARE
Problem: Patient Care Overview  Goal: Plan of Care Review  Outcome: Ongoing (interventions implemented as appropriate)  Flowsheets (Taken 2/12/2020 1420)  Progress: improving  Plan of Care Reviewed With: patient  Outcome Summary: Pt. able to tolerate BID treatment today. Pt. performed seated & supine therex this a.m., Pt. transferred this p.m. sup-sit ModA, sat EOB ~8' SBA, Pt. transferred bed-recliner with RW Dewayne. Cont. to mobilize as pt. able

## 2020-02-12 NOTE — PLAN OF CARE
Problem: Patient Care Overview  Goal: Plan of Care Review  Outcome: Ongoing (interventions implemented as appropriate)  Flowsheets (Taken 2/12/2020 7520)  Progress: improving  Plan of Care Reviewed With: patient  Outcome Summary: Pt up in recliner upon entry this AM. Pt deferred any standing/t/f's this AM 2' pain w/ standing. Pt agreeable to BUE ther ex and completed 2 sets w/ good tolerance allowing RB's as needed. VSS throughout tx. No new goals met this date. Cont OT POC.

## 2020-02-12 NOTE — PROGRESS NOTES
GENERAL SURGERY PROGRESS NOTE     LOS: 2 days     Chief Complaint:     Brendon Skelton is a 74 year old lady who is post-operative day 2 status post laparoscopic sigmoid colon resection for cancer of sigmoid colon.    Interval History:     Ms. Skelton reports having another comfortable night. Pain is well controlled on Norco, and she is tolerating a clear liquid diet. She remains well oxygenated on 3L nasal cannula. She was given 2 units of blood yesterday, and felt more energetic after this. She was able to ambulate to recliner without issue. Her hemoglobin level has increased to 9.6 as of last night. She remains on maintenance IV fluids, and was given another bolus last night. However, her urine output remains low (only 10 cc last night). Nephrology has been consulted, and a bladder scan was done last night to ensure that Ann catheter was properly placed. She has had 2 bowel movements in the past 24 hours. She remained hypertensive overnight, with a high of 163/72.    Medication Review:     acetaminophen 1,000 mg Oral Q6H   alvimopan 12 mg Oral BID   citalopram 20 mg Oral Daily   dilTIAZem  mg Oral Daily   famotidine 20 mg Oral BID   metoprolol succinate XL 25 mg Oral Daily   rOPINIRole 0.25 mg Oral Nightly   traZODone 150 mg Oral Nightly         lactated ringers 100 mL/hr Last Rate: 100 mL/hr (02/12/20 0127)   Objective     Vital Signs:  Temp:  [97.4 °F (36.3 °C)-98.6 °F (37 °C)] 98.6 °F (37 °C)  Heart Rate:  [60-62] 60  Resp:  [13-22] 18  BP: (107-172)/(51-74) 163/71    Intake/Output Summary (Last 24 hours) at 2/12/2020 0520  Last data filed at 2/12/2020 0000  Gross per 24 hour   Intake 2994 ml   Output 125 ml   Net 2869 ml       Physical Exam  General: Well-appearing, resting woman in no acute distress  Head: Normocephalic and atraumatic.   Neck: Normal range of motion. Neck supple.   Cardiovascular: Heart regular rate and rhythm with no murmurs, gallops, rales. Radial pulses strong  bilaterally.   Pulmonary/Chest: Lungs clear to auscultation bilaterally.  Abdominal: Soft and non-tender. Incision site healing appropriately.  Musculoskeletal: Normal range of motion.   Skin: Skin warm and dry. No rashes.    Results Review:    Results from last 7 days   Lab Units 02/11/20  0443 02/10/20  1620   SODIUM mmol/L 139  --    SODIUM, ARTERIAL mmol/L  --  141   POTASSIUM mmol/L 4.8  --    CHLORIDE mmol/L 101  --    CO2 mmol/L 22.0  --    BUN mg/dL 61*  --    CREATININE mg/dL 2.65*  --    GLUCOSE mg/dL 173*  --    GLUCOSE, ARTERIAL mmol/L  --  102*   CALCIUM mg/dL 8.4*  --      Results from last 7 days   Lab Units 02/12/20  0002 02/11/20  1417 02/11/20  0443   WBC 10*3/mm3  --   --  9.99   HEMOGLOBIN g/dL 9.6* 7.7* 7.7*   HEMATOCRIT % 29.4* 24.7* 24.8*   PLATELETS 10*3/mm3  --   --  139*       Assessment:    Cancer of sigmoid (CMS/HCC)    Brendon Skelton is a 74 year old lady who is post-operative day 2 status post laparoscopic sigmoid colon resection. She is stable, but kidney function remains a concern.     F: Tolerating clear liquid diet  A: Pain well controlled on Norco q4h prn  S: N/A. Patient is alert.  T: Heparin and aspirin discontinued  H: Head of bed at 30 degrees  U: Famotidine 20mg BID  G: Glucose 173 mg/dL as of yesterday  S: Well-oxygenated on 3L nasal cannula.   B: 2 loose bowel movements through course of day yesterday.  I: Ann catheter in place. Will keep in place at this time to continue to monitor urine output. Central venous line and peripheral line remain in place.  D: No antibiotics currently ordered    Plan:    - Continue routine post-operative care  - Continue to follow nephrology recommendations with regards to renal function  - Order BMP, Mag, Phos, CBC this morning to continue to trend values        This document has been electronically signed by Luis Maher MD on February 12, 2020 7:32 AM          Felice Dickerson, MS3

## 2020-02-13 NOTE — THERAPY TREATMENT NOTE
Acute Care - Occupational Therapy Treatment Note  [facility]     Patient Name: Brendon Skelton  : 1945  MRN: [id number]  Today's Date: 2020  Onset of Illness/Injury or Date of Surgery: 02/10/20  Date of Referral to OT: 20  Referring Physician: Dr. Maher    Admit Date: 2/10/2020       ICD-10-CM ICD-9-CM   1. Cancer of sigmoid (CMS/HCC) C18.7 153.3   2. Impaired functional mobility, balance, gait, and endurance Z74.09 V49.89   3. Impaired mobility and ADLs Z74.09 799.89     Patient Active Problem List   Diagnosis   • Long term current use of anticoagulant therapy   • Personal history of heart valve replacement   • Atrial fibrillation [I48.91]   • Emphysema of lung (CMS/HCC)   • On anticoagulant therapy   • Hyperlipidemia   • Diastolic heart failure (CMS/HCC)   • Depressive disorder   • COPD (chronic obstructive pulmonary disease) (CMS/HCC)   • Type 2 diabetes mellitus with stage 4 chronic kidney disease, with long-term current use of insulin (CMS/HCC)   • Myopia   • Astigmatism   • Bleeding from open wound of chest wall   • Follow-up surgery care   • Encounter for screening for malignant neoplasm of colon   • Positive colorectal cancer screening using DNA-based stool test   • Nodule of left lung   • Chronic hypoxemic respiratory failure (CMS/HCC)   • Physical deconditioning   • Heart failure with preserved left ventricular function (HFpEF) (CMS/HCC)   • Essential hypertension   • Coronary artery disease involving native coronary artery without angina pectoris   • Hx of CABG   • SSS (sick sinus syndrome) (CMS/HCC)   • Pacemaker   • Stage 4 chronic kidney disease (CMS/HCC)   • Personal history of tobacco use, presenting hazards to health   • Gastrointestinal hemorrhage   • Class 2 severe obesity due to excess calories with serious comorbidity and body mass index (BMI) of 36.0 to 36.9 in adult (CMS/HCC)   • Gastritis   • Colon polyp   • Coumadin toxicity   • Acute on chronic diastolic congestive  heart failure (CMS/HCC)   • Anemia   • Pulmonary hypertension (CMS/HCC)     Past Medical History:   Diagnosis Date   • Acute bronchitis    • Anxiety    • Aortic valve replaced    • Atrial fibrillation (CMS/HCC)    • C. difficile colitis    • Callosity     under metatarsal head      • Cardiac pacemaker in situ    • CHF (congestive heart failure) (CMS/HCC)    • Chronic obstructive lung disease (CMS/HCC)    • Corns and callus    • Coronary artery disease     hx of CABG 1 vessel as well as aortic valve replacement   • Depressive disorder    • Diabetes mellitus (CMS/HCC)    • Diastolic heart failure (CMS/HCC)    • Essential hypertension    • Foot pain    • History of transfusion    • Hyperlipidemia    • Long term current use of anticoagulant    • Malignant neoplasm of sigmoid colon (CMS/HCC) 8/13/2019   • On anticoagulant therapy    • Pulmonary emphysema (CMS/HCC)    • Rectal hemorrhage    • Stage 4 chronic kidney disease (CMS/HCC)    • Type 2 diabetes mellitus (CMS/HCC)      Past Surgical History:   Procedure Laterality Date   • AORTIC VALVE REPAIR/REPLACEMENT  1999   • CARDIAC ELECTROPHYSIOLOGY PROCEDURE N/A 3/20/2017    Procedure: PPM generator change - dual;  Surgeon: Bereket Gates MD;  Location: [facility] CATH INVASIVE LOCATION;  Service:    • CARDIAC PACEMAKER PLACEMENT  1999   • CATARACT EXTRACTION W/ INTRAOCULAR LENS IMPLANT Left 2/1/2019    Procedure: REMOVE CATARACT AND IMPLANT INTRAOCULAR LENS I;  Surgeon: Jose L Clay MD;  Location: [facility] OR;  Service: Ophthalmology   • CATARACT EXTRACTION W/ INTRAOCULAR LENS IMPLANT Right 2/8/2019    Procedure: REMOVE CATARACT AND IMPLANT  INTRAOCULAR LENS;  Surgeon: Jose L Clay MD;  Location: [facility] OR;  Service: Ophthalmology   • COLONOSCOPY N/A 6/19/2019    Procedure: COLONOSCOPY WITH CONTROL OF BLEED;  Surgeon: Alfredo Guerrero MD;  Location: [facility] ENDOSCOPY;  Service: Gastroenterology   • COLONOSCOPY N/A 6/24/2019    Procedure: COLONOSCOPY;   "Surgeon: Alfredo Guerrero MD;  Location: [facility] ENDOSCOPY;  Service: Gastroenterology   • ECHO - CONVERTED  11/12/2013    There is mild to moderate left atrial enlargement EF 50-55%   • ENDOSCOPY N/A 6/19/2019    Procedure: ESOPHAGOGASTRODUODENOSCOPY WITH CONTROL OF BLEED;  Surgeon: Alfredo Guerrero MD;  Location: [facility] ENDOSCOPY;  Service: Gastroenterology   • FOOT SURGERY  1990   • OTHER SURGICAL HISTORY  10/26/2015    PARING CORN/CALLUS    • PACEMAKER REPLACEMENT N/A 3/21/2017    Procedure: revision pacemaker pocket, evacuation hematoma, control of bleeding;  Surgeon: Polo Feliz MD;  Location: [facility] OR;  Service:    • SIGMOIDOSCOPY N/A 9/30/2019    Procedure: SIGMOIDOSCOPY FLEXIBLE;  Surgeon: Alfredo Guerrero MD;  Location: [facility] ENDOSCOPY;  Service: Gastroenterology   • TRANSESOPHAGEAL ECHOCARDIOGRAM (MITZY)  05/01/2014    With color flow-Mild left atrial enlargement with mild concentric LV hypertrophy with top normal aortic root size. EF 45-50%. Mild mitral regurgitation and mild aortic insufficiency and trivial amount of tricuspid regurgation   • TUBAL ABDOMINAL LIGATION     • UPPER GASTROINTESTINAL ENDOSCOPY  06/19/2019       Therapy Treatment    Rehabilitation Treatment Summary     Row Name 02/13/20 1310 02/13/20 0938          Treatment Time/Intention    Discipline  occupational therapy assistant  -KD  physical therapy assistant  -TW     Document Type  therapy note (daily note)  -KD  therapy note (daily note)  -TW     Subjective Information  complains of;fatigue  -KD  complains of;fatigue;pain  -TW     Mode of Treatment  occupational therapy  -KD  physical therapy;individual therapy  -TW     Patient/Family Observations  Pt longsitting in bed upon entry.  -KD  Pt up in room without assistance upon entering room. Pt very impulsive and states \"I have to go.\" Pt not willing to listen to instructions. O2 tubing and IV line were both very taunt as pt cont to try to get to bathroom.  -TW     Therapy " Frequency (OT Eval)  daily  -KD  --     Patient Effort  adequate  -KD  adequate  -TW     Existing Precautions/Restrictions  fall  -KD  fall  -TW     Recorded by [KD] Akua Henderson COTA/L 02/13/20 1425 [TW] Reece Rocha, PTA 02/13/20 1207     Row Name 02/13/20 1310 02/13/20 0938          Vital Signs    Pre Systolic BP Rehab  152  -KD  -- Pt would not take time to have BP assessed.  -TW     Pre Treatment Diastolic BP  65  -KD  --     Pretreatment Heart Rate (beats/min)  64  -KD  88  -TW     Posttreatment Heart Rate (beats/min)  61  -KD  76  -TW     Pre SpO2 (%)  91  -KD  84 Pt wouldnt listen to instructions to PLB.  -TW     O2 Delivery Pre Treatment  supplemental O2  -KD  supplemental O2  -TW     Intra SpO2 (%)  --  91  -TW     O2 Delivery Intra Treatment  --  supplemental O2  -TW     Post SpO2 (%)  92  -KD  88 Nsg aware and with pt upon PTA departure.  -TW     O2 Delivery Post Treatment  supplemental O2  -KD  supplemental O2  -TW     Pre Patient Position  Supine  -KD  Standing  -TW     Intra Patient Position  Standing  -KD  Standing  -TW     Post Patient Position  Sitting  -KD  Supine  -TW     Recorded by [KD] Akua Henderson COTA/L 02/13/20 1428 [TW] Reece Rocha PTA 02/13/20 1207     Row Name 02/13/20 1310 02/13/20 0938          Cognitive Assessment/Intervention- PT/OT    Affect/Mental Status (Cognitive)  anxious  -KD  anxious  -TW     Orientation Status (Cognition)  oriented x 4  -KD  oriented x 4  -TW     Follows Commands (Cognition)  --  does not follow one step commands  -TW     Personal Safety Interventions  --  fall prevention program maintained;gait belt;nonskid shoes/slippers when out of bed  -TW     Recorded by [KD] Akua Henderson COTA/L 02/13/20 1428 [TW] Reece Rocha PTA 02/13/20 1207     Row Name 02/13/20 1310 02/13/20 0938          Bed Mobility Assessment/Treatment    Bed Mobility Assessment/Treatment  --  sit-supine  -TW     Supine-Sit [place] (Bed Mobility)  minimum  assist (75% patient effort)  -KD  not tested  -TW     Sit-Supine [place] (Bed Mobility)  --  minimum assist (75% patient effort);moderate assist (50% patient effort)  -TW     Bed Mobility, Safety Issues  decreased use of arms for pushing/pulling;decreased use of legs for bridging/pushing  -KD  --     Assistive Device (Bed Mobility)  bed rails;head of bed elevated  -KD  bed rails;head of bed elevated  -TW     Recorded by [KD] Akua Henderson COTA/L 02/13/20 1428 [TW] Reece Rocha, PTA 02/13/20 1207     Row Name 02/13/20 1310             Functional Mobility    Functional Mobility- Ind. Level  contact guard assist  -KD      Functional Mobility- Device  rolling walker  -KD      Functional Mobility-Distance (Feet)  10  -KD      Functional Mobility- Safety Issues  supplemental O2  -KD      Recorded by [KD] Akua Henderson COLÓN/L 02/13/20 1428      Row Name 02/13/20 1310 02/13/20 0938          Transfer Assessment/Treatment    Transfer Assessment/Treatment  bed-chair transfer  -KD  toilet transfer  -TW     Recorded by [KD] Akua Henderson COTA/L 02/13/20 1428 [TW] Reece Rocha, PTA 02/13/20 1207     Row Name 02/13/20 1310 02/13/20 0938          Sit-Stand Transfer    Sit-Stand [place] (Transfers)  minimum assist (75% patient effort)  -KD  minimum assist (75% patient effort)  -TW     Assistive Device (Sit-Stand Transfers)  walker, front-wheeled Pt requires vc's for proper hand placement  -KD  walker, front-wheeled  -TW     Recorded by [KD] Akua Henderson COLÓN/L 02/13/20 1428 [TW] Reece Rocha, PTA 02/13/20 1207     Row Name 02/13/20 1310 02/13/20 0938          Stand-Sit Transfer    Stand-Sit [place] (Transfers)  contact guard  -KD  minimum assist (75% patient effort)  -TW     Assistive Device (Stand-Sit Transfers)  walker, front-wheeled  -KD  walker, front-wheeled  -TW     Recorded by [KD] Akua Henderson COLÓN/L 02/13/20 6678 [TW] Reece Rocha, PTA 02/13/20 1207     Row Name  02/13/20 0938             Toilet Transfer    Type (Toilet Transfer)  sit-stand;stand-sit  -TW      [place] Level (Toilet Transfer)  minimum assist (75% patient effort);moderate assist (50% patient effort)  -TW      Assistive Device (Toilet Transfer)  commode;grab bars/safety frame;walker, front-wheeled  -TW      Recorded by [TW] Reece Rocha PTA 02/13/20 1207      Row Name 02/13/20 0938             Gait/Stairs Assessment/Training    Gait/Stairs Assessment/Training  gait/ambulation assistive device  -TW      [place] Level (Gait)  minimum assist (75% patient effort)  -TW      Assistive Device (Gait)  walker, front-wheeled  -TW      Distance in Feet (Gait)  12ft then 5ft to bed.  -TW      Pattern (Gait)  step-to  -TW      Deviations/Abnormal Patterns (Gait)  antalgic;gait speed decreased;stride length decreased  -TW      Bilateral Gait Deviations  forward flexed posture  -TW      Comment (Gait/Stairs)  Pt very impulsive and disregaurds her life lines (ie, IV, cath, and O2 tubing.)  -TW      Recorded by [TW] Reece Rocha PTA 02/13/20 1207      Row Name 02/13/20 1310             Static Sitting Balance    Level of [place] (Unsupported Sitting, Static Balance)  supervision  -KD      Sitting Position (Unsupported Sitting, Static Balance)  sitting on edge of bed  -KD      Time Able to Maintain Position (Unsupported Sitting, Static Balance)  more than 5 minutes  -KD      Recorded by [KD] Akua Henderson COTA/L 02/13/20 1428      Row Name 02/13/20 1310 02/13/20 0938          Positioning and Restraints    Pre-Treatment Position  in bed  -KD  standing in room  -TW     Post Treatment Position  chair  -KD  bed  -TW     In Bed  --  supine;call light within reach;encouraged to call for assist;exit alarm on  -TW     In Chair  sitting;call light within reach;encouraged to call for assist;exit alarm on  -KD  --     Recorded by [KD] Akua Henderson COTA/L 02/13/20 1428 [TW] Reece Rocha, PTA  02/13/20 1207     Row Name 02/13/20 1310 02/13/20 0938          Pain Scale: Numbers Pre/Post-Treatment    Pain Scale: Numbers, Pretreatment  0/10 - no pain  -KD  0/10 - no pain  -TW     Pain Scale: Numbers, Post-Treatment  0/10 - no pain  -KD  0/10 - no pain  -TW     Recorded by [KD] Akua Henderson COLÓN/L 02/13/20 1428 [TW] Reece Rocha PTA 02/13/20 1207     Row Name                Wound 02/10/20 1450 abdomen Incision    Wound - Properties Group Date first assessed: 02/10/20 [CH] Time first assessed: 1450 [CH] Present on Hospital Admission: N [CH] Location: abdomen [CH] Primary Wound Type: Incision [CH] Additional Comments: LAPAROSCOPIC INCISIONS X2 [CH] Recorded by:  [CH] Elayne Fung RN 02/10/20 1450    Row Name                Wound 02/10/20 1450 lower;midline abdomen Incision    Wound - Properties Group Date first assessed: 02/10/20 [CH] Time first assessed: 1450 [CH] Present on Hospital Admission: N [CH] Orientation: lower;midline [CH] Location: abdomen [CH] Primary Wound Type: Incision [CH] Recorded by:  [CH] Elayne Fung RN 02/10/20 1450    Row Name 02/13/20 1310             Outcome Summary/Treatment Plan (OT)    Daily Summary of Progress (OT)  progress toward functional goals as expected  -KD      Plan for Continued Treatment (OT)  cont ot poc  -KD      Anticipated Discharge Disposition (OT)  anticipate therapy at next level of care  -KD      Recorded by [KD] Akua Henderson COTA/L 02/13/20 1428      Row Name 02/13/20 0938             Outcome Summary/Treatment Plan (PT)    Daily Summary of Progress (PT)  progress toward functional goals is good  -TW      Barriers to Overall Progress (PT)  Impulsive  -TW      Plan for Continued Treatment (PT)  Cont  -TW      Anticipated Discharge Disposition (PT)  anticipate therapy at next level of care  -TW      Recorded by [TW] Reece Rocha PTA 02/13/20 1207        User Key  (r) = Recorded By, (t) = Taken By, (c) = Cosigned By     Initials Name Effective Dates Discipline    TW Reece Rocha DANDRE, PTA 03/07/18 -  PT    KD Akua Henderson, COLÓN/L 03/07/18 -  OT    Elayne Chakraborty RN 06/23/17 -  Nurse        Wound 02/10/20 1450 abdomen Incision (Active)   Dressing Appearance open to air 2/13/2020  8:00 AM   Closure Liquid skin adhesive 2/13/2020  8:00 AM   Base clean 2/13/2020  8:00 AM   Periwound ecchymotic 2/12/2020  9:37 PM   Drainage Amount none 2/13/2020  8:00 AM       Wound 02/10/20 1450 lower;midline abdomen Incision (Active)   Dressing Appearance dry;intact 2/13/2020  8:00 AM   Closure Adhesive bandage 2/13/2020  8:00 AM   Base dressing in place, unable to visualize 2/13/2020  8:00 AM   Periwound ecchymotic 2/12/2020  9:37 PM   Drainage Amount none 2/13/2020  8:00 AM     Rehab Goal Summary     Row Name 02/13/20 1310 02/13/20 0938          Bed Mobility Goal 1 (PT)    Activity/Assistive Device (Bed Mobility Goal 1, PT)  --  bed mobility activities, all  -TW     [place] Level/Cues Needed (Bed Mobility Goal 1, PT)  --  independent  -TW     Time Frame (Bed Mobility Goal 1, PT)  --  short term goal (STG);1 week  -TW     Progress/Outcomes (Bed Mobility Goal 1, PT)  --  goal not met  -TW        Transfer Goal 1 (PT)    Activity/Assistive Device (Transfer Goal 1, PT)  --  bed-to-chair/chair-to-bed;toilet  -TW     [place] Level/Cues Needed (Transfer Goal 1, PT)  --  conditional independence  -TW     Time Frame (Transfer Goal 1, PT)  --  long term goal (LTG);1 week  -TW     Barriers (Transfers Goal 1, PT)  --  pain; endurance  -TW     Progress/Outcome (Transfer Goal 1, PT)  --  goal not met  -TW        Gait Training Goal 1 (PT)    Activity/Assistive Device (Gait Training Goal 1, PT)  --  gait (walking locomotion);assistive device use;decrease fall risk;increase endurance/gait distance  -TW     [place] Level (Gait Training Goal 1, PT)  --  conditional independence  -TW     Time Frame (Gait Training Goal 1, PT)  --  long term  goal (LTG);2 weeks  -TW     Barriers (Gait Training Goal 1, PT)  --  600 ft or more per trip x 2 per day w/out LOB  -TW     Progress/Outcome (Gait Training Goal 1, PT)  --  goal not met  -TW        Stairs Goal 1 (PT)    Activity/Assistive Device (Stairs Goal 1, PT)  --  -- ascend/descend ramp w/ FWRW and supervision  -TW     [place] Level/Cues Needed (Stairs Goal 1, PT)  --  conditional independence;supervision required  -TW     Time Frame (Stairs Goal 1, PT)  --  long term goal (LTG);2 weeks  -TW     Progress/Outcome (Stairs Goal 1, PT)  --  goal not met  -TW        Patient Education Goal (PT)    Activity (Patient Education Goal, PT)  --  Indep w/ HEP   -TW     [place]/Cues/Accuracy (Memory Goal 2, PT)  --  demonstrates adequately  -TW     Time Frame (Patient Education Goal, PT)  --  1 week  -TW     Progress/Outcome (Patient Education Goal, PT)  --  goal not met  -TW        Occupational Therapy Goals    Transfer Goal Selection (OT)  transfer, OT goal 1  -KD  --     Bathing Goal Selection (OT)  bathing, OT goal 1  -KD  --     Dressing Goal Selection (OT)  dressing, OT goal 1  -KD  --     Toileting Goal Selection (OT)  toileting, OT goal 1  -KD  --        Transfer Goal 1 (OT)    Activity/Assistive Device (Transfer Goal 1, OT)  sit-to-stand/stand-to-sit;bed-to-chair/chair-to-bed;toilet  -KD  --     [place] Level/Cues Needed (Transfer Goal 1, OT)  conditional independence  -KD  --     Time Frame (Transfer Goal 1, OT)  long term goal (LTG);by discharge  -KD  --     Progress/Outcome (Transfer Goal 1, OT)  goal not met  -KD  --        Bathing Goal 1 (OT)    Activity/Assistive Device (Bathing Goal 1, OT)  bathing skills, all  -KD  --     [place] Level/Cues Needed (Bathing Goal 1, OT)  supervision required;set-up required  -KD  --     Time Frame (Bathing Goal 1, OT)  long term goal (LTG);by discharge  -KD  --     Progress/Outcomes (Bathing Goal 1, OT)  goal not met  -KD  --        Dressing Goal 1 (OT)     Activity/Assistive Device (Dressing Goal 1, OT)  dressing skills, all using AE PRN  -KD  --     [place]/Cues Needed (Dressing Goal 1, OT)  supervision required;set-up required  -KD  --     Time Frame (Dressing Goal 1, OT)  long term goal (LTG);by discharge  -KD  --     Progress/Outcome (Dressing Goal 1, OT)  goal not met  -KD  --        Toileting Goal 1 (OT)    Activity/Device (Toileting Goal 1, OT)  toileting skills, all  -KD  --     [place] Level/Cues Needed (Toileting Goal 1, OT)  conditional independence  -KD  --     Time Frame (Toileting Goal 1, OT)  long term goal (LTG);by discharge  -KD  --     Progress/Outcome (Toileting Goal 1, OT)  goal not met  -KD  --       User Key  (r) = Recorded By, (t) = Taken By, (c) = Cosigned By    Initials Name Provider Type Discipline    TW Reece Rocha, PTA Physical Therapy Assistant PT    Akua Maki, ELDON/L Occupational Therapy Assistant OT            OT Recommendation and Plan  Outcome Summary/Treatment Plan (OT)  Daily Summary of Progress (OT): progress toward functional goals as expected  Plan for Continued Treatment (OT): cont ot poc  Anticipated Discharge Disposition (OT): anticipate therapy at next level of care  Therapy Frequency (OT Eval): daily  Daily Summary of Progress (OT): progress toward functional goals as expected  Plan of Care Review  Plan of Care Reviewed With: patient  Plan of Care Reviewed With: patient  Outcome Summary: Sup-sit-Min A, sit-stand-sit-Min A of 1 wRW and vc's for proper hand placement. Pt sat EOB ~15 mins SBA w/ no LOB or c/o. Pt amb~10' Min A of 1 w/ RW  Outcome Measures     Row Name 02/13/20 1310 02/13/20 0938 02/12/20 0905       How much help from another person do you currently need...    Turning from your back to your side while in flat bed without using bedrails?  --  2  -TW  2  -JA    Moving from lying on back to sitting on the side of a flat bed without bedrails?  --  2  -TW  2  -JA    Moving to and from a bed  to a chair (including a wheelchair)?  --  2  -TW  2  -JA    Standing up from a chair using your arms (e.g., wheelchair, bedside chair)?  --  1  -TW  1  -JA    Climbing 3-5 steps with a railing?  --  1  -TW  1  -JA    To walk in hospital room?  --  2  -TW  2  -JA    AM-PAC 6 Clicks Score (PT)  --  10  -TW  10  -JA       How much help from another is currently needed...    Putting on and taking off regular lower body clothing?  1  -KD  --  --    Bathing (including washing, rinsing, and drying)  2  -KD  --  --    Toileting (which includes using toilet bed pan or urinal)  1  -KD  --  --    Putting on and taking off regular upper body clothing  2  -KD  --  --    Taking care of personal grooming (such as brushing teeth)  3  -KD  --  --    Eating meals  4  -KD  --  --    AM-PAC 6 Clicks Score (OT)  13  -KD  --  --       Functional Assessment    Outcome Measure Options  --  AM-PAC 6 Clicks Basic Mobility (PT)  -  AM-PAC 6 Clicks Basic Mobility (PT)  -    Row Name 02/12/20 0735 02/11/20 1542          How much help from another is currently needed...    Putting on and taking off regular lower body clothing?  1  -KD  1  -AS     Bathing (including washing, rinsing, and drying)  2  -KD  2  -AS     Toileting (which includes using toilet bed pan or urinal)  1  -KD  1  -AS     Putting on and taking off regular upper body clothing  2  -KD  2  -AS     Taking care of personal grooming (such as brushing teeth)  3  -KD  3  -AS     Eating meals  4  -KD  4  -AS     AM-PAC 6 Clicks Score (OT)  13  -KD  13  -AS        Functional Assessment    Outcome Measure Options  --  AM-PAC 6 Clicks Daily Activity (OT)  -AS       User Key  (r) = Recorded By, (t) = Taken By, (c) = Cosigned By    Initials Name Provider Type    Alfonzo Schultz, PTA Physical Therapy Assistant    TW Reece Rocha, PTA Physical Therapy Assistant    KD Akua Henderson, ELDON/L Occupational Therapy Assistant    AS Emmie Kinsey, OT Occupational Therapist            Time Calculation:   Time Calculation- OT     Row Name 02/13/20 1431             Time Calculation- OT    OT Start Time  1310  -KD      OT Stop Time  1333  -KD      OT Time Calculation (min)  23 min  -KD      Total Timed Code Minutes- OT  23 minute(s)  -KD      OT Received On  02/13/20  -KD        User Key  (r) = Recorded By, (t) = Taken By, (c) = Cosigned By    Initials Name Provider Type    Akua Maki COTA/L Occupational Therapy Assistant        Therapy Charges for Today     Code Description Service Date Service Provider Modifiers Qty    79010946094 HC OT THER PROC EA 15 MIN 2/12/2020 Akua Henderson COTA/L GO 2    60776420747 HC OT THERAPEUTIC ACT EA 15 MIN 2/13/2020 Akua Henderson COTA/L GO 2               ELDON [place]/DANDRE  2/13/2020

## 2020-02-13 NOTE — PLAN OF CARE
Problem: Patient Care Overview  Goal: Plan of Care Review  Outcome: Ongoing (interventions implemented as appropriate)  Flowsheets (Taken 2/13/2020 9133)  Plan of Care Reviewed With: patient  Outcome Summary: pt t/fers sit <> stand w/ CGA/Min x 1  and vc's for hand placement, ambulates ~62', 58' w/ RW and CGA x 1-2 w/ 1 to follow w/ chair for sitting rest break, pt did desat from 92% to 88% w/ gt on 5L nasal cannula

## 2020-02-13 NOTE — PLAN OF CARE
Problem: Patient Care Overview  Goal: Plan of Care Review  Outcome: Ongoing (interventions implemented as appropriate)  Flowsheets (Taken 2/13/2020 1500)  Progress: no change  Outcome Summary: Patient was up in chair for breakfast after ambulating in room to toilet and then to chair; PT worked with patient; patient having loose incontinent BM's; nursing ambulated patient in hallway; patient needs lots of encouragement; c/o soreness; sodium bicarb and bumex one time doses given per Dr. Caputo

## 2020-02-13 NOTE — PLAN OF CARE
Problem: Patient Care Overview  Goal: Plan of Care Review  Outcome: Ongoing (interventions implemented as appropriate)  Flowsheets (Taken 2/13/2020 1310)  Progress: improving  Plan of Care Reviewed With: patient  Outcome Summary: Sup-sit-Min A, sit-stand-sit-Min A of 1 w/RW and vc's for proper hand placement. Pt sat EOB ~15 mins SBA w/ no LOB or c/o. Pt amb~10' Min A of 1 w/ RW. All need within reach, chair alarm intact. Cont OT POC

## 2020-02-13 NOTE — THERAPY TREATMENT NOTE
Acute Care - Physical Therapy Treatment Note  [facility]     Patient Name: Brendon Skelton  : 1945  MRN: [id number]  Today's Date: 2020  Onset of Illness/Injury or Date of Surgery: 02/10/20     Referring Physician: Dr. Maher    Admit Date: 2/10/2020    Visit Dx:    ICD-10-CM ICD-9-CM   1. Cancer of sigmoid (CMS/HCC) C18.7 153.3   2. Impaired functional mobility, balance, gait, and endurance Z74.09 V49.89   3. Impaired mobility and ADLs Z74.09 799.89     Patient Active Problem List   Diagnosis   • Long term current use of anticoagulant therapy   • Personal history of heart valve replacement   • Atrial fibrillation [I48.91]   • Emphysema of lung (CMS/HCC)   • On anticoagulant therapy   • Hyperlipidemia   • Diastolic heart failure (CMS/HCC)   • Depressive disorder   • COPD (chronic obstructive pulmonary disease) (CMS/HCC)   • Type 2 diabetes mellitus with stage 4 chronic kidney disease, with long-term current use of insulin (CMS/HCC)   • Myopia   • Astigmatism   • Bleeding from open wound of chest wall   • Follow-up surgery care   • Encounter for screening for malignant neoplasm of colon   • Positive colorectal cancer screening using DNA-based stool test   • Nodule of left lung   • Chronic hypoxemic respiratory failure (CMS/HCC)   • Physical deconditioning   • Heart failure with preserved left ventricular function (HFpEF) (CMS/HCC)   • Essential hypertension   • Coronary artery disease involving native coronary artery without angina pectoris   • Hx of CABG   • SSS (sick sinus syndrome) (CMS/HCC)   • Pacemaker   • Stage 4 chronic kidney disease (CMS/HCC)   • Personal history of tobacco use, presenting hazards to health   • Gastrointestinal hemorrhage   • Class 2 severe obesity due to excess calories with serious comorbidity and body mass index (BMI) of 36.0 to 36.9 in adult (CMS/HCC)   • Gastritis   • Colon polyp   • Coumadin toxicity   • Acute on chronic diastolic congestive heart failure (CMS/HCC)  "  • Anemia   • Pulmonary hypertension (CMS/HCC)       Therapy Treatment    Rehabilitation Treatment Summary     Row Name 02/13/20 0938             Treatment Time/Intention    Discipline  physical therapy assistant  -TW      Document Type  therapy note (daily note)  -TW      Subjective Information  complains of;fatigue;pain  -TW      Mode of Treatment  physical therapy;individual therapy  -TW      Patient/Family Observations  Pt up in room without assistance upon entering room. Pt very impulsive and states \"I have to go.\" Pt not willing to listen to instructions. O2 tubing and IV line were both very taunt as pt cont to try to get to bathroom.  -TW      Patient Effort  adequate  -TW      Existing Precautions/Restrictions  fall  -TW      Recorded by [TW] Reece Rocha PTA 02/13/20 1207      Row Name 02/13/20 0938             Vital Signs    Pre Systolic BP Rehab  -- Pt would not take time to have BP assessed.  -TW      Pretreatment Heart Rate (beats/min)  88  -TW      Posttreatment Heart Rate (beats/min)  76  -TW      Pre SpO2 (%)  84 Pt wouldnt listen to instructions to PLB.  -TW      O2 Delivery Pre Treatment  supplemental O2  -TW      Intra SpO2 (%)  91  -TW      O2 Delivery Intra Treatment  supplemental O2  -TW      Post SpO2 (%)  88 Nsg aware and with pt upon PTA departure.  -TW      O2 Delivery Post Treatment  supplemental O2  -TW      Pre Patient Position  Standing  -TW      Intra Patient Position  Standing  -TW      Post Patient Position  Supine  -TW      Recorded by [TW] Reece Rocha PTA 02/13/20 1207      Row Name 02/13/20 0938             Cognitive Assessment/Intervention- PT/OT    Affect/Mental Status (Cognitive)  anxious  -TW      Orientation Status (Cognition)  oriented x 4  -TW      Follows Commands (Cognition)  does not follow one step commands  -TW      Personal Safety Interventions  fall prevention program maintained;gait belt;nonskid shoes/slippers when out of bed  -TW      Recorded by " [TW] Reece Rocha, PTA 02/13/20 1207      Row Name 02/13/20 0938             Bed Mobility Assessment/Treatment    Bed Mobility Assessment/Treatment  sit-supine  -TW      Supine-Sit [place] (Bed Mobility)  not tested  -TW      Sit-Supine [place] (Bed Mobility)  minimum assist (75% patient effort);moderate assist (50% patient effort)  -TW      Assistive Device (Bed Mobility)  bed rails;head of bed elevated  -TW      Recorded by [TW] Reece Rocha, PTA 02/13/20 1207      Row Name 02/13/20 0938             Transfer Assessment/Treatment    Transfer Assessment/Treatment  toilet transfer  -TW      Recorded by [TW] Reece Rocha, PTA 02/13/20 1207      Row Name 02/13/20 0938             Sit-Stand Transfer    Sit-Stand [place] (Transfers)  minimum assist (75% patient effort)  -TW      Assistive Device (Sit-Stand Transfers)  walker, front-wheeled  -TW      Recorded by [TW] Reece Rocha, PTA 02/13/20 1207      Row Name 02/13/20 0938             Stand-Sit Transfer    Stand-Sit [place] (Transfers)  minimum assist (75% patient effort)  -TW      Assistive Device (Stand-Sit Transfers)  walker, front-wheeled  -TW      Recorded by [TW] Reece Rocha, PTA 02/13/20 1207      Row Name 02/13/20 0938             Toilet Transfer    Type (Toilet Transfer)  sit-stand;stand-sit  -TW      [place] Level (Toilet Transfer)  minimum assist (75% patient effort);moderate assist (50% patient effort)  -TW      Assistive Device (Toilet Transfer)  commode;grab bars/safety frame;walker, front-wheeled  -TW      Recorded by [TW] Reece Rocha, PTA 02/13/20 1207      Row Name 02/13/20 0938             Gait/Stairs Assessment/Training    Gait/Stairs Assessment/Training  gait/ambulation assistive device  -TW      [place] Level (Gait)  minimum assist (75% patient effort)  -TW      Assistive Device (Gait)  walker, front-wheeled  -TW      Distance in Feet (Gait)  12ft then 5ft to bed.  -TW       Pattern (Gait)  step-to  -TW      Deviations/Abnormal Patterns (Gait)  antalgic;gait speed decreased;stride length decreased  -TW      Bilateral Gait Deviations  forward flexed posture  -TW      Comment (Gait/Stairs)  Pt very impulsive and disregaurds her life lines (ie, IV, cath, and O2 tubing.)  -TW      Recorded by [TW] Reece Rocha PTA 02/13/20 1207      Row Name 02/13/20 0938             Positioning and Restraints    Pre-Treatment Position  standing in room  -TW      Post Treatment Position  bed  -TW      In Bed  supine;call light within reach;encouraged to call for assist;exit alarm on  -TW      Recorded by [TW] Reece Rocha PTA 02/13/20 1207      Row Name 02/13/20 0938             Pain Scale: Numbers Pre/Post-Treatment    Pain Scale: Numbers, Pretreatment  0/10 - no pain  -TW      Pain Scale: Numbers, Post-Treatment  0/10 - no pain  -TW      Recorded by [TW] Reece Rocha PTA 02/13/20 1207      Row Name                Wound 02/10/20 1450 abdomen Incision    Wound - Properties Group Date first assessed: 02/10/20 [CH] Time first assessed: 1450 [CH] Present on Hospital Admission: N [CH] Location: abdomen [CH] Primary Wound Type: Incision [CH] Additional Comments: LAPAROSCOPIC INCISIONS X2 [CH] Recorded by:  [CH] Elayne Fung RN 02/10/20 1450    Row Name                Wound 02/10/20 1450 lower;midline abdomen Incision    Wound - Properties Group Date first assessed: 02/10/20 [CH] Time first assessed: 1450 [CH] Present on Hospital Admission: N [CH] Orientation: lower;midline [CH] Location: abdomen [CH] Primary Wound Type: Incision [CH] Recorded by:  [CH] Elayne Fung RN 02/10/20 1450    Row Name 02/13/20 0938             Outcome Summary/Treatment Plan (PT)    Daily Summary of Progress (PT)  progress toward functional goals is good  -TW      Barriers to Overall Progress (PT)  Impulsive  -TW      Plan for Continued Treatment (PT)  Cont  -TW      Anticipated Discharge  Disposition (PT)  anticipate therapy at next level of care  -TW      Recorded by [TW] Reece Rocha PTA 02/13/20 1207        User Key  (r) = Recorded By, (t) = Taken By, (c) = Cosigned By    Initials Name Effective Dates Discipline    TW Reece Rocha, PTA 03/07/18 -  PT    CH Elayne Fung RN 06/23/17 -  Nurse          Wound 02/10/20 1450 abdomen Incision (Active)   Dressing Appearance open to air 2/13/2020  8:00 AM   Closure Liquid skin adhesive 2/13/2020  8:00 AM   Base clean 2/13/2020  8:00 AM   Periwound ecchymotic 2/12/2020  9:37 PM   Periwound Temperature warm 2/12/2020  1:00 PM   Periwound Skin Turgor soft 2/12/2020  1:00 PM   Drainage Characteristics/Odor sanguineous 2/12/2020  1:00 PM   Drainage Amount none 2/13/2020  8:00 AM   Care, Wound cleansed with;sterile normal saline 2/12/2020  1:00 PM   Dressing Care, Wound gauze 2/12/2020  1:00 PM       Wound 02/10/20 1450 lower;midline abdomen Incision (Active)   Dressing Appearance dry;intact 2/13/2020  8:00 AM   Closure Adhesive bandage 2/13/2020  8:00 AM   Base dressing in place, unable to visualize 2/13/2020  8:00 AM   Periwound ecchymotic 2/12/2020  9:37 PM   Periwound Temperature warm 2/12/2020  1:00 PM   Periwound Skin Turgor soft 2/12/2020  1:00 PM   Drainage Characteristics/Odor sanguineous 2/12/2020  1:00 PM   Drainage Amount none 2/13/2020  8:00 AM   Care, Wound cleansed with;sterile normal saline 2/12/2020  1:00 PM   Dressing Care, Wound gauze 2/12/2020  1:00 PM       Rehab Goal Summary     Row Name 02/13/20 0938             Bed Mobility Goal 1 (PT)    Activity/Assistive Device (Bed Mobility Goal 1, PT)  bed mobility activities, all  -TW      [place] Level/Cues Needed (Bed Mobility Goal 1, PT)  independent  -TW      Time Frame (Bed Mobility Goal 1, PT)  short term goal (STG);1 week  -TW      Progress/Outcomes (Bed Mobility Goal 1, PT)  goal not met  -TW         Transfer Goal 1 (PT)    Activity/Assistive Device (Transfer  Goal 1, PT)  bed-to-chair/chair-to-bed;toilet  -TW      [place] Level/Cues Needed (Transfer Goal 1, PT)  conditional independence  -TW      Time Frame (Transfer Goal 1, PT)  long term goal (LTG);1 week  -TW      Barriers (Transfers Goal 1, PT)  pain; endurance  -TW      Progress/Outcome (Transfer Goal 1, PT)  goal not met  -TW         Gait Training Goal 1 (PT)    Activity/Assistive Device (Gait Training Goal 1, PT)  gait (walking locomotion);assistive device use;decrease fall risk;increase endurance/gait distance  -TW      [place] Level (Gait Training Goal 1, PT)  conditional independence  -TW      Time Frame (Gait Training Goal 1, PT)  long term goal (LTG);2 weeks  -TW      Barriers (Gait Training Goal 1, PT)  600 ft or more per trip x 2 per day w/out LOB  -TW      Progress/Outcome (Gait Training Goal 1, PT)  goal not met  -TW         Stairs Goal 1 (PT)    Activity/Assistive Device (Stairs Goal 1, PT)  -- ascend/descend ramp w/ FWRW and supervision  -TW      [place] Level/Cues Needed (Stairs Goal 1, PT)  conditional independence;supervision required  -TW      Time Frame (Stairs Goal 1, PT)  long term goal (LTG);2 weeks  -TW      Progress/Outcome (Stairs Goal 1, PT)  goal not met  -TW         Patient Education Goal (PT)    Activity (Patient Education Goal, PT)  Indep w/ HEP   -TW      [place]/Cues/Accuracy (Memory Goal 2, PT)  demonstrates adequately  -TW      Time Frame (Patient Education Goal, PT)  1 week  -TW      Progress/Outcome (Patient Education Goal, PT)  goal not met  -TW        User Key  (r) = Recorded By, (t) = Taken By, (c) = Cosigned By    Initials Name Provider Type Discipline    TW Reece Rocha, PTA Physical Therapy Assistant PT              PT Recommendation and Plan  Anticipated Discharge Disposition (PT): anticipate therapy at next level of care  Outcome Summary/Treatment Plan (PT)  Daily Summary of Progress (PT): progress toward functional goals is good  Barriers to  Overall Progress (PT): Impulsive  Plan for Continued Treatment (PT): Cont  Anticipated Discharge Disposition (PT): anticipate therapy at next level of care  Plan of Care Reviewed With: patient  Progress: no change  Outcome Summary: Pt up standing in room trying to go to bathroom upon my arrival. Pt disregaurding IV line, cath hose and O2 tubing and all these lines were tight. Pt required perez instruction to stop and allow for lines to be attended to before she discharged one or all of them. Pt amb to bathroom with no AD for most part and required min/mod of 1 to stand from toilet and another for pericare. Pt then amb to bed and t/f to sup with mod of 1. Pt requested to allow to rest after completing t/f's. Pt was extensively instructed on importance of allowing for help to manage her lines and prevent injury. Pt verb understanding. Pt would cont to benefit from therapy upon DC.  Outcome Measures     Row Name 02/13/20 0938 02/12/20 0905 02/12/20 0735       How much help from another person do you currently need...    Turning from your back to your side while in flat bed without using bedrails?  2  -TW  2  -JA  --    Moving from lying on back to sitting on the side of a flat bed without bedrails?  2  -TW  2  -JA  --    Moving to and from a bed to a chair (including a wheelchair)?  2  -TW  2  -JA  --    Standing up from a chair using your arms (e.g., wheelchair, bedside chair)?  1  -TW  1  -JA  --    Climbing 3-5 steps with a railing?  1  -TW  1  -JA  --    To walk in hospital room?  2  -TW  2  -JA  --    AM-PAC 6 Clicks Score (PT)  10  -TW  10  -JA  --       How much help from another is currently needed...    Putting on and taking off regular lower body clothing?  --  --  1  -KD    Bathing (including washing, rinsing, and drying)  --  --  2  -KD    Toileting (which includes using toilet bed pan or urinal)  --  --  1  -KD    Putting on and taking off regular upper body clothing  --  --  2  -KD    Taking care of  personal grooming (such as brushing teeth)  --  --  3  -KD    Eating meals  --  --  4  -KD    AM-PAC 6 Clicks Score (OT)  --  --  13  -KD       Functional Assessment    Outcome Measure Options  AM-PAC 6 Clicks Basic Mobility (PT)  -[facility]-PAC 6 Clicks Basic Mobility (PT)  -  --    Row Name 02/11/20 1542             How much help from another is currently needed...    Putting on and taking off regular lower body clothing?  1  -AS      Bathing (including washing, rinsing, and drying)  2  -AS      Toileting (which includes using toilet bed pan or urinal)  1  -AS      Putting on and taking off regular upper body clothing  2  -AS      Taking care of personal grooming (such as brushing teeth)  3  -AS      Eating meals  4  -AS      AM-PAC 6 Clicks Score (OT)  13  -AS         Functional Assessment    Outcome Measure Options  AM-PAC 6 Clicks Daily Activity (OT)  -AS        User Key  (r) = Recorded By, (t) = Taken By, (c) = Cosigned By    Initials Name Provider Type    Alfonzo Schultz PTA Physical Therapy Assistant    Reece Rouse, YOSEPH Physical Therapy Assistant    Akua Maki, COLÓN/L Occupational Therapy Assistant    AS Emmie Kinsey, OT Occupational Therapist         Time Calculation:   PT Charges     Row Name 02/13/20 1213             Time Calculation    Start Time  0938  -TW      Stop Time  1005  -TW      Time Calculation (min)  27 min  -TW      PT Received On  02/13/20  -TW      PT Goal Re-Cert Due Date  02/20/20  -TW         Time Calculation- PT    Total Timed Code Minutes- PT  27 minute(s)  -TW        User Key  (r) = Recorded By, (t) = Taken By, (c) = Cosigned By    Initials Name Provider Type    Reece Rouse PTA Physical Therapy Assistant        Therapy Charges for Today     Code Description Service Date Service Provider Modifiers Qty    99690153959 HC GAIT TRAINING EA 15 MIN 2/13/2020 Reece Rocha PTA GP 1    [id number] HC PT THERAPEUTIC ACT EA 15 MIN 2/13/2020  Reece Rocha, PTA GP 1          PT G-Codes  Outcome Measure Options: AM-PAC 6 Clicks Basic Mobility (PT)  AM-PAC 6 Clicks Score (PT): 10  AM-PAC 6 Clicks Score (OT): 13    Reece Rocha, YOSEPH  2/13/2020

## 2020-02-13 NOTE — PLAN OF CARE
Problem: Patient Care Overview  Goal: Plan of Care Review  Outcome: Ongoing (interventions implemented as appropriate)  Flowsheets (Taken 2/13/2020 0300)  Progress: no change  Plan of Care Reviewed With: patient  Outcome Summary: Vss, no complaints of pain, resting well this shift continue to monitor.

## 2020-02-13 NOTE — PROGRESS NOTES
"[facility]  [address] [place] [address]. [address]  T - [phone]  F - [phone]     Progress Note          PATIENT  DEMOGRAPHICS   PATIENT NAME: Brendon Skelton                      PHYSICIAN: Sandy Caputo MD  : 1945  MRN: [id number]   LOS: 3 days    Patient Care Team:  Josefa Pozo APRN as PCP - General (Nurse Practitioner)  Meme Duckworth APRN as PCP - Claims Attributed  Luis Maher MD as Surgeon (General Surgery)  Alfredo Guerrero MD as Consulting Physician (Gastroenterology)  Hermann Cheng PA-C as Physician Assistant (Gastroenterology)  Subjective   SUBJECTIVE   Still low UO, but better than last 48hrs. hypoxic         Objective   OBJECTIVE   Vital Signs  Temp:  [96.1 °F (35.6 °C)-97.7 °F (36.5 °C)] 96.1 °F (35.6 °C)  Heart Rate:  [60-79] 63  Resp:  [18] 18  BP: (116-170)/(53-78) 148/72    Flowsheet Rows      First Filed Value   Admission Height  165.1 cm (65\") Documented at 02/10/2020 1036   Admission Weight  104 kg (229 lb 15 oz) Documented at 02/10/2020 1036           I/O last 3 completed shifts:  In: 2503.3 [P.O.:480; I.V.:1423.3; Blood:600]  Out: 270 [Urine:270]    PHYSICAL EXAM    Physical Exam   Constitutional: She is oriented to person, place, and time. She appears well-developed.   HENT:   Head: Normocephalic.   Eyes: Pupils are equal, round, and reactive to light.   Cardiovascular: Normal rate, regular rhythm and normal heart sounds.   Pulmonary/Chest: Effort normal and breath sounds normal.   Abdominal: Soft. Bowel sounds are normal.   Musculoskeletal: She exhibits edema.   Neurological: She is alert and oriented to person, place, and time.       RESULTS   Results Review:    Results from last 7 days   Lab Units 20  0648 20  0754 20  0443   SODIUM mmol/L 126* 125* 139   POTASSIUM mmol/L 5.3* 4.8 4.8   CHLORIDE mmol/L 90* 89* 101   CO2 mmol/L 21.0* 23.0 22.0   BUN mg/dL 70* 64* 61*   CREATININE mg/dL 3.69* 3.12* 2.65*   "   CALCIUM mg/dL 7.9* 8.2* 8.4*   GLUCOSE mg/dL 143* 140* 173*       Estimated Creatinine Clearance: 16.2 mL/min (A) (by C-G formula based on SCr of 3.69 mg/dL (H)).    Results from last 7 days   Lab Units 02/13/20  0648 02/12/20  0754 02/11/20  0443   MAGNESIUM mg/dL 2.2 2.3 2.3   PHOSPHORUS mg/dL 5.3* 4.8* 4.9*             Results from last 7 days   Lab Units 02/13/20  0648 02/12/20  0754 02/12/20  0002 02/11/20  1417 02/11/20  0443   WBC 10*3/mm3 12.66* 10.53  --   --  9.99   HEMOGLOBIN g/dL 10.2* 9.9* 9.6* 7.7* 7.7*   PLATELETS 10*3/mm3 101* 119*  --   --  139*       Results from last 7 days   Lab Units 02/10/20  1028   INR  1.12         Imaging Results (Last 24 Hours)     ** No results found for the last 24 hours. **           MEDICATIONS      acetaminophen 1,000 mg Oral Q6H   citalopram 20 mg Oral Daily   dilTIAZem  mg Oral Daily   famotidine 20 mg Oral BID   metoprolol succinate XL 25 mg Oral Daily   rOPINIRole 0.25 mg Oral Nightly   sodium chloride 10 mL Intravenous Q12H   sodium chloride 10 mL Intravenous Q12H   traZODone 150 mg Oral Nightly       sodium chloride 60 mL/hr Last Rate: 60 mL/hr (02/13/20 0320)       Assessment/Plan   ASSESSMENT / PLAN      * No active hospital problems. *       1- ckd 4 - baseline cr is close to 2. anya ? atn related to hypoperfusion. bp is stable at present. She has 4 u of prbc and multiple albumin infusions. Now on NS. k is high and worsening uremia. Will observe another day or two. Talked to her about need of RRT.  fena more so atn. UA ?uti but asymptomatic. Stop ivf and give a dose of bumex (soa hypoxic) dw Daughter in law as well     2- laproscopic sigmoid colon resection on 2/10/2020 for adenocarcinoma of colon.      3- htn stable at present on metoprolol and cardizem     4- anemia post op - s/p 2 u prbc.                This document has been electronically signed by Sandy Caputo MD on February 13, 2020 9:54 AM

## 2020-02-13 NOTE — PLAN OF CARE
Problem: Patient Care Overview  Goal: Plan of Care Review  Outcome: Ongoing (interventions implemented as appropriate)  Flowsheets (Taken 2/13/2020 8705)  Progress: no change  Plan of Care Reviewed With: patient  Outcome Summary: Pt up standing in room trying to go to bathroom upon my arrival. Pt disregaurding IV line, cath hose and O2 tubing and all these lines were tight. Pt required perez instruction to stop and allow for lines to be attended to before she discharged one or all of them. Pt amb to bathroom with no AD for most part and required min/mod of 1 to stand from toilet and another for pericare. Pt then amb to bed and t/f to sup with mod of 1. Pt requested to allow to rest after completing t/f's. Pt was extensively instructed on importance of allowing for help to manage her lines and prevent injury. Pt verb understanding. Pt would cont to benefit from therapy upon DC.

## 2020-02-13 NOTE — PROGRESS NOTES
GENERAL SURGERY PROGRESS NOTE     LOS: 3 days     Chief Complaint:     Brendon Skelton is a 74 year old lady who is post-operative day 3 status post laparoscopic sigmoid colon resection for cancer of sigmoid colon.    Interval History:     Ms. Skelton reports having another comfortable night. Pain is well controlled on Tylenol. She was advanced to regular diet yesterday and had one episode of vomiting during the night for which she was given Zofran and now feels better. She remains well oxygenated on room air. She was given 2 additional units of blood yesterday afternoon, and latest Hb level is 9.9. She has been able to ambulate to the bathroom without issue, and is having regular bowel movements. She remains on maintenance IV fluids. Ann catheter remains in place, and urine output was only 150 mL for the past 24 hours. Nephrology will make decision today pertaining to dialysis. She remained hypertensive overnight, with a high of 163/70.    Medication Review:     acetaminophen 1,000 mg Oral Q6H   citalopram 20 mg Oral Daily   dilTIAZem  mg Oral Daily   famotidine 20 mg Oral BID   metoprolol succinate XL 25 mg Oral Daily   rOPINIRole 0.25 mg Oral Nightly   sodium chloride 10 mL Intravenous Q12H   sodium chloride 10 mL Intravenous Q12H   traZODone 150 mg Oral Nightly         sodium chloride 60 mL/hr Last Rate: 60 mL/hr (02/13/20 0320)   Objective     Vital Signs:  Temp:  [96.9 °F (36.1 °C)-98.2 °F (36.8 °C)] 97.2 °F (36.2 °C)  Heart Rate:  [60-79] 72  Resp:  [18] 18  BP: (116-170)/(53-78) 163/70    Intake/Output Summary (Last 24 hours) at 2/13/2020 0523  Last data filed at 2/12/2020 1821  Gross per 24 hour   Intake 1663.33 ml   Output 165 ml   Net 1498.33 ml       Physical Exam  General: Well-appearing, resting woman in no acute distress  Head: Normocephalic and atraumatic.   Neck: Normal range of motion. Neck supple.   Cardiovascular: Heart regular rate and rhythm with no murmurs, gallops, rales. Radial  pulses strong bilaterally.   Pulmonary/Chest: Lungs clear to auscultation bilaterally.  Abdominal: Soft and non-tender. Incision site healing appropriately.  Musculoskeletal: Normal range of motion.   Skin: Skin warm and dry. No rashes. Scattered bruising present.    Results Review:    Results from last 7 days   Lab Units 02/12/20  0754 02/11/20  0443  02/10/20  1620   SODIUM mmol/L 125* 139  --   --    SODIUM, ARTERIAL mmol/L  --   --   --  141   POTASSIUM mmol/L 4.8 4.8  --   --    CHLORIDE mmol/L 89* 101  --   --    CO2 mmol/L 23.0 22.0  --   --    BUN mg/dL 64* 61*  --   --    CREATININE mg/dL 3.12* 2.65*  --   --    GLUCOSE mg/dL 140* 173*   < >  --    GLUCOSE, ARTERIAL mmol/L  --   --   --  102*   CALCIUM mg/dL 8.2* 8.4*  --   --     < > = values in this interval not displayed.     Results from last 7 days   Lab Units 02/12/20  0754 02/12/20  0002 02/11/20  1417 02/11/20 0443   WBC 10*3/mm3 10.53  --   --  9.99   HEMOGLOBIN g/dL 9.9* 9.6* 7.7* 7.7*   HEMATOCRIT % 29.8* 29.4* 24.7* 24.8*   PLATELETS 10*3/mm3 119*  --   --  139*       Assessment:    Cancer of sigmoid (CMS/HCC)    Brendon Skelton is a 74 year old lady who is post-operative day 2 status post laparoscopic sigmoid colon resection. She is stable on the medical/surgical floor, but kidney function remains a concern.      Plan:    - Continue routine post-operative care  - Continue to follow nephrology recommendations    Felice Dickerson, MS3

## 2020-02-13 NOTE — THERAPY TREATMENT NOTE
Acute Care - Physical Therapy Treatment Note  [facility]     Patient Name: Brendon Skelton  : 1945  MRN: [id number]  Today's Date: 2020  Onset of Illness/Injury or Date of Surgery: 02/10/20     Referring Physician: Dr. Maher    Admit Date: 2/10/2020    Visit Dx:    ICD-10-CM ICD-9-CM   1. Cancer of sigmoid (CMS/HCC) C18.7 153.3   2. Impaired functional mobility, balance, gait, and endurance Z74.09 V49.89   3. Impaired mobility and ADLs Z74.09 799.89     Patient Active Problem List   Diagnosis   • Long term current use of anticoagulant therapy   • Personal history of heart valve replacement   • Atrial fibrillation [I48.91]   • Emphysema of lung (CMS/HCC)   • On anticoagulant therapy   • Hyperlipidemia   • Diastolic heart failure (CMS/HCC)   • Depressive disorder   • COPD (chronic obstructive pulmonary disease) (CMS/HCC)   • Type 2 diabetes mellitus with stage 4 chronic kidney disease, with long-term current use of insulin (CMS/HCC)   • Myopia   • Astigmatism   • Bleeding from open wound of chest wall   • Follow-up surgery care   • Encounter for screening for malignant neoplasm of colon   • Positive colorectal cancer screening using DNA-based stool test   • Nodule of left lung   • Chronic hypoxemic respiratory failure (CMS/HCC)   • Physical deconditioning   • Heart failure with preserved left ventricular function (HFpEF) (CMS/HCC)   • Essential hypertension   • Coronary artery disease involving native coronary artery without angina pectoris   • Hx of CABG   • SSS (sick sinus syndrome) (CMS/HCC)   • Pacemaker   • Stage 4 chronic kidney disease (CMS/HCC)   • Personal history of tobacco use, presenting hazards to health   • Gastrointestinal hemorrhage   • Class 2 severe obesity due to excess calories with serious comorbidity and body mass index (BMI) of 36.0 to 36.9 in adult (CMS/HCC)   • Gastritis   • Colon polyp   • Coumadin toxicity   • Acute on chronic diastolic congestive heart failure (CMS/HCC)  "  • Anemia   • Pulmonary hypertension (CMS/HCC)       Therapy Treatment    Rehabilitation Treatment Summary     Row Name 02/13/20 1350 02/13/20 1310 02/13/20 0938       Treatment Time/Intention    Discipline  physical therapy assistant  -JW  occupational therapy assistant  -KD  physical therapy assistant  -TW    Document Type  therapy note (daily note)  -JW  therapy note (daily note)  -KD  therapy note (daily note)  -TW    Subjective Information  complains of;fatigue  -JW  complains of;fatigue  -KD  complains of;fatigue;pain  -TW    Mode of Treatment  physical therapy;individual therapy  -JW  occupational therapy  -KD  physical therapy;individual therapy  -TW    Patient/Family Observations  pt up in recliner  -JW  Pt longsitting in bed upon entry.  -KD  Pt up in room without assistance upon entering room. Pt very impulsive and states \"I have to go.\" Pt not willing to listen to instructions. O2 tubing and IV line were both very taunt as pt cont to try to get to bathroom.  -TW    Therapy Frequency (OT Eval)  --  daily  -KD  --    Patient Effort  good  -JW  adequate  -KD  adequate  -TW    Existing Precautions/Restrictions  fall  -JW  fall  -KD  fall  -TW    Recorded by [JW] Kellie Pardo, PTA 02/13/20 1459 [KD] Akua Henderson, COLÓN/L 02/13/20 1425 [TW] Reece Rocha, PTA 02/13/20 1207    Row Name 02/13/20 1350 02/13/20 1310 02/13/20 0938       Vital Signs    Pre Systolic BP Rehab  --  152  -KD  -- Pt would not take time to have BP assessed.  -TW    Pre Treatment Diastolic BP  --  65  -KD  --    Pretreatment Heart Rate (beats/min)  61  -JW  64  -KD  88  -TW    Intratreatment Heart Rate (beats/min)  72  -JW  --  --    Posttreatment Heart Rate (beats/min)  70  -JW  61  -KD  76  -TW    Pre SpO2 (%)  92  -JW  91  -KD  84 Pt wouldnt listen to instructions to PLB.  -TW    O2 Delivery Pre Treatment  supplemental O2  -JW  supplemental O2  -KD  supplemental O2  -TW    Intra SpO2 (%)  88  -JW  --  91  -TW    O2 " Delivery Intra Treatment  supplemental O2  -JW  --  supplemental O2  -TW    Post SpO2 (%)  92  -JW  92  -KD  88 Nsg aware and with pt upon PTA departure.  -TW    O2 Delivery Post Treatment  supplemental O2  -JW  supplemental O2  -KD  supplemental O2  -TW    Pre Patient Position  Sitting  -JW  Supine  -KD  Standing  -TW    Intra Patient Position  --  Standing  -KD  Standing  -TW    Post Patient Position  Sitting  -JW  Sitting  -KD  Supine  -TW    Recorded by [JW] Kellie Pardo, PTA 02/13/20 1459 [KD] Akua Henderson COTA/L 02/13/20 1428 [TW] Reece Rocha, PTA 02/13/20 1207    Row Name 02/13/20 1350 02/13/20 1310 02/13/20 0938       Cognitive Assessment/Intervention- PT/OT    Affect/Mental Status (Cognitive)  --  anxious  -KD  anxious  -TW    Orientation Status (Cognition)  oriented x 4  -JW  oriented x 4  -KD  oriented x 4  -TW    Follows Commands (Cognition)  --  --  does not follow one step commands  -TW    Personal Safety Interventions  --  --  fall prevention program maintained;gait belt;nonskid shoes/slippers when out of bed  -TW    Recorded by [JW] Kellie Pardo, PTA 02/13/20 1459 [KD] Akua Henderson COLÓN/L 02/13/20 1428 [TW] Reece Rocha, PTA 02/13/20 1207    Row Name 02/13/20 1310 02/13/20 0938          Bed Mobility Assessment/Treatment    Bed Mobility Assessment/Treatment  --  sit-supine  -TW     Supine-Sit [place] (Bed Mobility)  minimum assist (75% patient effort)  -KD  not tested  -TW     Sit-Supine [place] (Bed Mobility)  --  minimum assist (75% patient effort);moderate assist (50% patient effort)  -TW     Bed Mobility, Safety Issues  decreased use of arms for pushing/pulling;decreased use of legs for bridging/pushing  -KD  --     Assistive Device (Bed Mobility)  bed rails;head of bed elevated  -KD  bed rails;head of bed elevated  -TW     Recorded by [KD] Akua Henderson COTA/L 02/13/20 1428 [TW] Reece Rocha, PTA 02/13/20 1207     Row Name 02/13/20 1311              Functional Mobility    Functional Mobility- Ind. Level  contact guard assist  -KD      Functional Mobility- Device  rolling walker  -KD      Functional Mobility-Distance (Feet)  10  -KD      Functional Mobility- Safety Issues  supplemental O2  -KD      Recorded by [KD] Akua Henderson COLÓN/L 02/13/20 1428      Row Name 02/13/20 1350 02/13/20 1310 02/13/20 0938       Transfer Assessment/Treatment    Transfer Assessment/Treatment  toilet transfer  -JW  bed-chair transfer  -KD  toilet transfer  -TW    Recorded by [JW] Kellie Pardo, PTA 02/13/20 1459 [KD] Akua Henderson COLÓN/L 02/13/20 1428 [TW] Reece Rocha, PTA 02/13/20 1207    Row Name 02/13/20 1350 02/13/20 1310 02/13/20 0938       Sit-Stand Transfer    Sit-Stand [place] (Transfers)  contact guard;verbal cues  -  minimum assist (75% patient effort)  -KD  minimum assist (75% patient effort)  -TW    Assistive Device (Sit-Stand Transfers)  walker, front-wheeled  -JW  walker, front-wheeled Pt requires vc's for proper hand placement  -KD  walker, front-wheeled  -TW    Recorded by [JW] Kellie Pardo, PTA 02/13/20 1459 [KD] Akua Henderson COLÓN/L 02/13/20 1428 [TW] Reece Rocha, PTA 02/13/20 1207    Row Name 02/13/20 1350 02/13/20 1310 02/13/20 0938       Stand-Sit Transfer    Stand-Sit [place] (Transfers)  contact guard;verbal cues  -  contact guard  -KD  minimum assist (75% patient effort)  -TW    Assistive Device (Stand-Sit Transfers)  walker, front-wheeled  -JW  walker, front-wheeled  -KD  walker, front-wheeled  -TW    Recorded by [JW] Kellie Pardo, PTA 02/13/20 1459 [KD] Akua Henderson COLÓN/L 02/13/20 1428 [TW] Reece Rocha, PTA 02/13/20 1207    Row Name 02/13/20 0938             Toilet Transfer    Type (Toilet Transfer)  sit-stand;stand-sit  -TW      [place] Level (Toilet Transfer)  minimum assist (75% patient effort);moderate assist (50% patient effort)  -TW      Assistive Device  (Toilet Transfer)  commode;grab bars/safety frame;walker, front-wheeled  -TW      Recorded by [TW] Reece Rocha, PTA 02/13/20 1207      Row Name 02/13/20 1350 02/13/20 0938          Gait/Stairs Assessment/Training    Gait/Stairs Assessment/Training  gait/ambulation assistive device  -JW  gait/ambulation assistive device  -TW     [place] Level (Gait)  minimum assist (75% patient effort);contact guard  -JW  minimum assist (75% patient effort)  -TW     Assistive Device (Gait)  walker, front-wheeled  -JW  walker, front-wheeled  -TW     Distance in Feet (Gait)  62', 58'  -JW  12ft then 5ft to bed.  -TW     Pattern (Gait)  step-to  -JW  step-to  -TW     Deviations/Abnormal Patterns (Gait)  antalgic;gait speed decreased;stride length decreased  -JW  antalgic;gait speed decreased;stride length decreased  -TW     Bilateral Gait Deviations  forward flexed posture  -JW  forward flexed posture  -TW     Comment (Gait/Stairs)  one to follow w/ chair for safety, pt fatigues easily and desats  -JW  Pt very impulsive and disregaurds her life lines (ie, IV, cath, and O2 tubing.)  -TW     Recorded by [JW] Kellie Pardo, PTA 02/13/20 1459 [TW] Reece Rocha, PTA 02/13/20 1207     Row Name 02/13/20 1310             Static Sitting Balance    Level of [place] (Unsupported Sitting, Static Balance)  supervision  -KD      Sitting Position (Unsupported Sitting, Static Balance)  sitting on edge of bed  -KD      Time Able to Maintain Position (Unsupported Sitting, Static Balance)  more than 5 minutes  -KD      Recorded by [KD] Akua Henderson COTA/L 02/13/20 1428      Row Name 02/13/20 1350 02/13/20 1310 02/13/20 0938       Positioning and Restraints    Pre-Treatment Position  sitting in chair/recliner  -JW  in bed  -KD  standing in room  -TW    Post Treatment Position  chair  -JW  chair  -KD  bed  -TW    In Bed  --  --  supine;call light within reach;encouraged to call for assist;exit alarm on  -TW    In Chair   reclined;call light within reach;encouraged to call for assist;exit alarm on;with nsg  -JW  sitting;call light within reach;encouraged to call for assist;exit alarm on  -KD  --    Recorded by [JW] Kellie Pardo, PTA 02/13/20 1459 [KD] Akua Henderson COLÓN/L 02/13/20 1428 [TW] Reece Rocha, PTA 02/13/20 1207    Row Name 02/13/20 1350 02/13/20 1310 02/13/20 0938       Pain Scale: Numbers Pre/Post-Treatment    Pain Scale: Numbers, Pretreatment  0/10 - no pain  -JW  0/10 - no pain  -KD  0/10 - no pain  -TW    Pain Scale: Numbers, Post-Treatment  0/10 - no pain  -JW  0/10 - no pain  -KD  0/10 - no pain  -TW    Recorded by [JW] Kellie Pardo, PTA 02/13/20 1459 [KD] Akua Henderson COLÓN/L 02/13/20 1428 [TW] Reece Rocha, PTA 02/13/20 1207    Row Name                Wound 02/10/20 1450 abdomen Incision    Wound - Properties Group Date first assessed: 02/10/20 [CH] Time first assessed: 1450 [CH] Present on Hospital Admission: N [CH] Location: abdomen [CH] Primary Wound Type: Incision [CH] Additional Comments: LAPAROSCOPIC INCISIONS X2 [CH] Recorded by:  [CH] Elayne Fung RN 02/10/20 1450    Row Name                Wound 02/10/20 1450 lower;midline abdomen Incision    Wound - Properties Group Date first assessed: 02/10/20 [CH] Time first assessed: 1450 [CH] Present on Hospital Admission: N [CH] Orientation: lower;midline [CH] Location: abdomen [CH] Primary Wound Type: Incision [CH] Recorded by:  [CH] Elayne Fung RN 02/10/20 1450    Row Name 02/13/20 1310             Outcome Summary/Treatment Plan (OT)    Daily Summary of Progress (OT)  progress toward functional goals as expected  -KD      Plan for Continued Treatment (OT)  cont ot poc  -KD      Anticipated Discharge Disposition (OT)  anticipate therapy at next level of care  -KD      Recorded by [KD] Akua Henderson, ELDON/L 02/13/20 1428      Row Name 02/13/20 1350 02/13/20 0938          Outcome Summary/Treatment Plan (PT)     Daily Summary of Progress (PT)  --  progress toward functional goals is good  -TW     Barriers to Overall Progress (PT)  --  Impulsive  -TW     Plan for Continued Treatment (PT)  cont per POC  -JW  Cont  -TW     Anticipated Discharge Disposition (PT)  anticipate therapy at next level of care  -  anticipate therapy at next level of care  -TW     Recorded by [JW] Kellie Pardo, PTA 02/13/20 1459 [TW] Reece Rocha, PTA 02/13/20 1207       User Key  (r) = Recorded By, (t) = Taken By, (c) = Cosigned By    Initials Name Effective Dates Discipline     Kellie Pardo, PTA 08/11/15 -  PT    TW Reece Rocha, PTA 03/07/18 -  PT    KD Akua Henderson COLÓN/L 03/07/18 -  OT    CH Elayne Fung RN 06/23/17 -  Nurse          Wound 02/10/20 1450 abdomen Incision (Active)   Dressing Appearance open to air 2/13/2020  8:00 AM   Closure Liquid skin adhesive 2/13/2020  8:00 AM   Base clean 2/13/2020  8:00 AM   Periwound ecchymotic 2/12/2020  9:37 PM   Drainage Amount none 2/13/2020  8:00 AM       Wound 02/10/20 1450 lower;midline abdomen Incision (Active)   Dressing Appearance dry;intact 2/13/2020  8:00 AM   Closure Adhesive bandage 2/13/2020  8:00 AM   Base dressing in place, unable to visualize 2/13/2020  8:00 AM   Periwound ecchymotic 2/12/2020  9:37 PM   Drainage Amount none 2/13/2020  8:00 AM       Rehab Goal Summary     Row Name 02/13/20 1310 02/13/20 0938          Bed Mobility Goal 1 (PT)    Activity/Assistive Device (Bed Mobility Goal 1, PT)  --  bed mobility activities, all  -TW     [place] Level/Cues Needed (Bed Mobility Goal 1, PT)  --  independent  -TW     Time Frame (Bed Mobility Goal 1, PT)  --  short term goal (STG);1 week  -TW     Progress/Outcomes (Bed Mobility Goal 1, PT)  --  goal not met  -TW        Transfer Goal 1 (PT)    Activity/Assistive Device (Transfer Goal 1, PT)  --  bed-to-chair/chair-to-bed;toilet  -TW     [place] Level/Cues Needed (Transfer Goal 1, PT)   --  conditional independence  -TW     Time Frame (Transfer Goal 1, PT)  --  long term goal (LTG);1 week  -TW     Barriers (Transfers Goal 1, PT)  --  pain; endurance  -TW     Progress/Outcome (Transfer Goal 1, PT)  --  goal not met  -TW        Gait Training Goal 1 (PT)    Activity/Assistive Device (Gait Training Goal 1, PT)  --  gait (walking locomotion);assistive device use;decrease fall risk;increase endurance/gait distance  -TW     [place] Level (Gait Training Goal 1, PT)  --  conditional independence  -TW     Time Frame (Gait Training Goal 1, PT)  --  long term goal (LTG);2 weeks  -TW     Barriers (Gait Training Goal 1, PT)  --  600 ft or more per trip x 2 per day w/out LOB  -TW     Progress/Outcome (Gait Training Goal 1, PT)  --  goal not met  -TW        Stairs Goal 1 (PT)    Activity/Assistive Device (Stairs Goal 1, PT)  --  -- ascend/descend ramp w/ FWRW and supervision  -TW     [place] Level/Cues Needed (Stairs Goal 1, PT)  --  conditional independence;supervision required  -TW     Time Frame (Stairs Goal 1, PT)  --  long term goal (LTG);2 weeks  -TW     Progress/Outcome (Stairs Goal 1, PT)  --  goal not met  -TW        Patient Education Goal (PT)    Activity (Patient Education Goal, PT)  --  Indep w/ HEP   -TW     [place]/Cues/Accuracy (Memory Goal 2, PT)  --  demonstrates adequately  -TW     Time Frame (Patient Education Goal, PT)  --  1 week  -TW     Progress/Outcome (Patient Education Goal, PT)  --  goal not met  -TW        Occupational Therapy Goals    Transfer Goal Selection (OT)  transfer, OT goal 1  -KD  --     Bathing Goal Selection (OT)  bathing, OT goal 1  -KD  --     Dressing Goal Selection (OT)  dressing, OT goal 1  -KD  --     Toileting Goal Selection (OT)  toileting, OT goal 1  -KD  --        Transfer Goal 1 (OT)    Activity/Assistive Device (Transfer Goal 1, OT)  sit-to-stand/stand-to-sit;bed-to-chair/chair-to-bed;toilet  -KD  --     [place] Level/Cues Needed (Transfer  Goal 1, OT)  conditional independence  -KD  --     Time Frame (Transfer Goal 1, OT)  long term goal (LTG);by discharge  -KD  --     Progress/Outcome (Transfer Goal 1, OT)  goal not met  -KD  --        Bathing Goal 1 (OT)    Activity/Assistive Device (Bathing Goal 1, OT)  bathing skills, all  -KD  --     [place] Level/Cues Needed (Bathing Goal 1, OT)  supervision required;set-up required  -KD  --     Time Frame (Bathing Goal 1, OT)  long term goal (LTG);by discharge  -KD  --     Progress/Outcomes (Bathing Goal 1, OT)  goal not met  -KD  --        Dressing Goal 1 (OT)    Activity/Assistive Device (Dressing Goal 1, OT)  dressing skills, all using AE PRN  -KD  --     [place]/Cues Needed (Dressing Goal 1, OT)  supervision required;set-up required  -KD  --     Time Frame (Dressing Goal 1, OT)  long term goal (LTG);by discharge  -KD  --     Progress/Outcome (Dressing Goal 1, OT)  goal not met  -KD  --        Toileting Goal 1 (OT)    Activity/Device (Toileting Goal 1, OT)  toileting skills, all  -KD  --     [place] Level/Cues Needed (Toileting Goal 1, OT)  conditional independence  -KD  --     Time Frame (Toileting Goal 1, OT)  long term goal (LTG);by discharge  -KD  --     Progress/Outcome (Toileting Goal 1, OT)  goal not met  -KD  --       User Key  (r) = Recorded By, (t) = Taken By, (c) = Cosigned By    Initials Name Provider Type Discipline    TW Reece Rocha, PTA Physical Therapy Assistant PT    KD Akua Henderson, ELDON/L Occupational Therapy Assistant OT              PT Recommendation and Plan  Anticipated Discharge Disposition (PT): anticipate therapy at next level of care  Outcome Summary/Treatment Plan (PT)  Plan for Continued Treatment (PT): cont per POC  Anticipated Discharge Disposition (PT): anticipate therapy at next level of care  Plan of Care Reviewed With: patient  Outcome Summary: pt t/fers sit <> stand w/ CGA/Min x 1  and vc's for hand placement, ambulates ~62', 58' w/ RW and CGA x  1-2 w/ 1 to follow w/ chair for sitting rest break, pt did desat from 92% to 88% w/ gt on 5L nasal cannula  Outcome Measures     Row Name 02/13/20 1310 02/13/20 0938 02/12/20 0905       How much help from another person do you currently need...    Turning from your back to your side while in flat bed without using bedrails?  --  2  -TW  2  -JA    Moving from lying on back to sitting on the side of a flat bed without bedrails?  --  2  -TW  2  -JA    Moving to and from a bed to a chair (including a wheelchair)?  --  2  -TW  2  -JA    Standing up from a chair using your arms (e.g., wheelchair, bedside chair)?  --  1  -TW  1  -JA    Climbing 3-5 steps with a railing?  --  1  -TW  1  -JA    To walk in hospital room?  --  2  -TW  2  -JA    AM-PAC 6 Clicks Score (PT)  --  10  -TW  10  -JA       How much help from another is currently needed...    Putting on and taking off regular lower body clothing?  1  -KD  --  --    Bathing (including washing, rinsing, and drying)  2  -KD  --  --    Toileting (which includes using toilet bed pan or urinal)  1  -KD  --  --    Putting on and taking off regular upper body clothing  2  -KD  --  --    Taking care of personal grooming (such as brushing teeth)  3  -KD  --  --    Eating meals  4  -KD  --  --    AM-PAC 6 Clicks Score (OT)  13  -KD  --  --       Functional Assessment    Outcome Measure Options  --  AM-PAC 6 Clicks Basic Mobility (PT)  -TW  AM-PAC 6 Clicks Basic Mobility (PT)  -JA    Row Name 02/12/20 0735 02/11/20 1542          How much help from another is currently needed...    Putting on and taking off regular lower body clothing?  1  -KD  1  -AS     Bathing (including washing, rinsing, and drying)  2  -KD  2  -AS     Toileting (which includes using toilet bed pan or urinal)  1  -KD  1  -AS     Putting on and taking off regular upper body clothing  2  -KD  2  -AS     Taking care of personal grooming (such as brushing teeth)  3  -KD  3  -AS     Eating meals  4  -KD  4  -AS      AM-PAC 6 Clicks Score (OT)  13  -KD  13  -AS        Functional Assessment    Outcome Measure Options  --  AM-PAC 6 Clicks Daily Activity (OT)  -AS       User Key  (r) = Recorded By, (t) = Taken By, (c) = Cosigned By    Initials Name Provider Type    Alfonzo Schultz, PTA Physical Therapy Assistant    TW Reece Rocha, YOSEPH Physical Therapy Assistant    KD Akua Henderson, COLÓN/L Occupational Therapy Assistant    AS Emmie Kinsey, OT Occupational Therapist         Time Calculation:   PT Charges     Row Name 02/13/20 1350 02/13/20 1213          Time Calculation    Start Time  1350  -  0938  -TW     Stop Time  1405  -  1005  -TW     Time Calculation (min)  15 min  -  27 min  -TW     PT Received On  02/13/20  -JW  02/13/20  -     PT Goal Re-Cert Due Date  --  02/20/20  -        Time Calculation- PT    Total Timed Code Minutes- PT  15 minute(s)  -JW  27 minute(s)  -TW       User Key  (r) = Recorded By, (t) = Taken By, (c) = Cosigned By    Initials Name Provider Type    Kellie Arredondo, YOSEPH Physical Therapy Assistant    TW Reece Rocha, YOSEPH Physical Therapy Assistant        Therapy Charges for Today     Code Description Service Date Service Provider Modifiers Qty    [id number] HC GAIT TRAINING EA 15 MIN 2/13/2020 Kellie Pardo PTA GP 1          PT G-Codes  Outcome Measure Options: AM-PAC 6 Clicks Basic Mobility (PT)  AM-PAC 6 Clicks Score (PT): 10  AM-PAC 6 Clicks Score (OT): 13    Kellie Pardo PTA  2/13/2020

## 2020-02-14 NOTE — PLAN OF CARE
Problem: Patient Care Overview  Goal: Plan of Care Review  Outcome: Ongoing (interventions implemented as appropriate)  Flowsheets (Taken 2/14/2020 2680)  Progress: no change  Plan of Care Reviewed With: patient  Outcome Summary: Pt Min A for doffing/donning hospital gown while siting in chair. Pt supervision for grooming asks. No gaols met this tx.

## 2020-02-14 NOTE — CONSULTS
Adult Nutrition  Assessment    Patient Name:  Brendon Skelton  YOB: 1945  MRN: [id number]  Admit Date:  2/10/2020    Assessment Date:  2/14/2020    Comments: Visited pt today for a follow-up. Pt was short of breath and edema was present. Pt stated her appetite is okay but she has trouble eating because she does not have her teeth with her. Pt stated she would like soft foods added to her meals and less meats that are tough to chew. Pt denied any ground or chopped meats. Intake is 75% for 1 meal. Cardiac, ADA, and Renal modifications were added to the pt's diet d/t PMH of heart failure, T2DM, and current renal labs showing renal insufficiency from CKD 4. Currently, Labs are BUN 70H, K 5.3H, Cr 3.69H, Phos 5.3H, GFR 12L. Hopefully, diet mods added will help lab results. RD staff continuing to monitor.    Reason for Assessment     Row Name 02/14/20 1243          Reason for Assessment    Reason For Assessment  diagnosis/disease state  (Pended)      Diagnosis  cancer diagnosis/related complications  (Pended)      Identified At Risk by Screening Criteria  unintentional loss of 10 lbs or more in the past 2 mos  (Pended)          Nutrition/Diet History     Row Name 02/14/20 1243          Nutrition/Diet History    Typical Food/Fluid Intake  Pt was having trouble talking from being out of breath; states she doesn't have her teeth and some things are hard for her to eat.   (Pended)      Food Preferences  soft foods  (Pended)      Factors Affecting Nutritional Intake  altered gastrointestinal function  (Pended)  s/p colon resection           Labs/Tests/Procedures/Meds     Row Name 02/14/20 1247          Labs/Procedures/Meds    Lab Results Reviewed  reviewed, pertinent  (Pended)      Lab Results Comments  Na 126L, Cl 90L, BUN 70H, Plat 101H, K 5.3H, Glu 143H, Cr 3.69H, Phos 5.3H, Ca 4.48L, GFR 12L  (Pended)         Diagnostic Tests/Procedures    Diagnostic Test/Procedure Reviewed  reviewed, pertinent   (Pended)         Medications    Pertinent Medications Reviewed  reviewed, pertinent  (Pended)      Pertinent Medications Comments  bumex  (Pended)          Physical Findings     Row Name 02/14/20 1249          Physical Findings    Overall Physical Appearance  edematous;shortness of breath  (Pended)      Oral/Mouth Cavity  poor dentition  (Pended)      Skin  edema  (Pended)            Nutrition Prescription Ordered     Row Name 02/14/20 1250          Nutrition Prescription PO    Current PO Diet  Regular  (Pended)      Fluid Consistency  Thin  (Pended)      Common Modifiers  Cardiac;Consistent Carbohydrate;Renal  (Pended)          Evaluation of Received Nutrient/Fluid Intake     Row Name 02/14/20 1250          PO Evaluation    Number of Meals  1  (Pended)      % PO Intake  75%  (Pended)                Electronically signed by:  Fely Verde  02/14/20 12:53 PM

## 2020-02-14 NOTE — THERAPY TREATMENT NOTE
Acute Care - Physical Therapy Treatment Note  [facility]     Patient Name: Brendon Skelton  : 1945  MRN: [id number]  Today's Date: 2020  Onset of Illness/Injury or Date of Surgery: 02/10/20     Referring Physician: Dr. Maher    Admit Date: 2/10/2020    Visit Dx:    ICD-10-CM ICD-9-CM   1. Cancer of sigmoid (CMS/HCC) C18.7 153.3   2. Impaired functional mobility, balance, gait, and endurance Z74.09 V49.89   3. Impaired mobility and ADLs Z74.09 799.89     Patient Active Problem List   Diagnosis   • Long term current use of anticoagulant therapy   • Personal history of heart valve replacement   • Atrial fibrillation [I48.91]   • Emphysema of lung (CMS/HCC)   • On anticoagulant therapy   • Hyperlipidemia   • Diastolic heart failure (CMS/HCC)   • Depressive disorder   • COPD (chronic obstructive pulmonary disease) (CMS/HCC)   • Type 2 diabetes mellitus with stage 4 chronic kidney disease, with long-term current use of insulin (CMS/HCC)   • Myopia   • Astigmatism   • Bleeding from open wound of chest wall   • Follow-up surgery care   • Encounter for screening for malignant neoplasm of colon   • Positive colorectal cancer screening using DNA-based stool test   • Nodule of left lung   • Chronic hypoxemic respiratory failure (CMS/HCC)   • Physical deconditioning   • Heart failure with preserved left ventricular function (HFpEF) (CMS/HCC)   • Essential hypertension   • Coronary artery disease involving native coronary artery without angina pectoris   • Hx of CABG   • SSS (sick sinus syndrome) (CMS/HCC)   • Pacemaker   • Stage 4 chronic kidney disease (CMS/HCC)   • Personal history of tobacco use, presenting hazards to health   • Gastrointestinal hemorrhage   • Class 2 severe obesity due to excess calories with serious comorbidity and body mass index (BMI) of 36.0 to 36.9 in adult (CMS/HCC)   • Gastritis   • Colon polyp   • Coumadin toxicity   • Acute on chronic diastolic congestive heart failure (CMS/HCC)    • Anemia   • Pulmonary hypertension (CMS/HCC)       Therapy Treatment    Rehabilitation Treatment Summary     Row Name 02/14/20 1427 02/14/20 1110 02/14/20 0907       Treatment Time/Intention    Discipline  physical therapy assistant  -TW  occupational therapy assistant  -BB  physical therapy assistant  -TW    Document Type  therapy note (daily note)  -TW  therapy note (daily note)  -BB  therapy note (daily note)  -TW    Subjective Information  complains of;fatigue;dizziness  -TW  complains of;fatigue  -BB  complains of;fatigue;weakness  -TW    Mode of Treatment  physical therapy;individual therapy  -TW  individual therapy;occupational therapy  -BB  physical therapy;individual therapy  -TW    Patient/Family Observations  Pt supine in bed but insists she is at her brothers house on the couch. Pt eventually came to realize where she was and why. Nsg made aware of episode.  -TW  --  Pt was supine in bed and wearing her O2 on her forehead. Pt was instructed to apply her O2 correctly and did so. Pt O2 level with 82% but recovered within a minute to 93%.  -TW    Total Minutes, Occupational Therapy Treatment  --  25  -BB  --    Therapy Frequency (OT Eval)  --  daily  -BB  daily  -TW    Patient Effort  adequate  -TW  adequate  -BB  adequate  -TW    Existing Precautions/Restrictions  fall  -TW  fall  -BB  fall  -TW    Recorded by [TW] Reece Rocha PTA 02/14/20 1548 [BB] Selena Simpson COTA/DANDRE 02/14/20 1346 [TW] Reece Rohca, YOSEPH 02/14/20 1317    Row Name 02/14/20 1427 02/14/20 1110 02/14/20 0907       Vital Signs    Pre Systolic BP Rehab  --  --  -- Pt would not allow for BP due to figetting with tubes of Dma  -TW    Pretreatment Heart Rate (beats/min)  78  -TW  86  -BB  90  -TW    Posttreatment Heart Rate (beats/min)  84  -TW  --  85  -TW    Pre SpO2 (%)  93  -TW  95  -BB  92  -TW    O2 Delivery Pre Treatment  supplemental O2  -TW  supplemental O2  -BB  supplemental O2  -TW    Intra SpO2 (%)  --  --   84  -TW    O2 Delivery Intra Treatment  --  --  supplemental O2  -TW    Post SpO2 (%)  92  -TW  --  95  -TW    O2 Delivery Post Treatment  --  --  supplemental O2  -TW    Pre Patient Position  Supine  -TW  Sitting  -BB  Supine  -TW    Intra Patient Position  Supine  -TW  --  Standing  -TW    Post Patient Position  Supine  -TW  --  Sitting  -TW    Recorded by [TW] Reece Rocha, YOSEPH 02/14/20 1552 [BB] Selena Simpson COTA/DANDRE 02/14/20 1346 [TW] Reece Rocha, PTA 02/14/20 1317    Row Name 02/14/20 1427 02/14/20 1110 02/14/20 0907       Cognitive Assessment/Intervention- PT/OT    Affect/Mental Status (Cognitive)  confused  -TW  confused  -BB  anxious;confused  -TW    Behavioral Issues (Cognitive)  overwhelmed easily  -TW  --  overwhelmed easily  -TW    Orientation Status (Cognition)  oriented to;person;verbal cues/prompts needed for orientation  -TW  oriented to;person  -BB  oriented to;person  -TW    Follows Commands (Cognition)  follows one step commands;25-49% accuracy  -TW  follows one step commands;25-49% accuracy;verbal cues/prompting required;repetition of directions required;delayed response/completion  -BB  follows one step commands;25-49% accuracy;delayed response/completion;increased processing time needed;physical/tactile prompts required;repetition of directions required;verbal cues/prompting required  -TW    Personal Safety Interventions  muscle strengthening facilitated  -TW  --  fall prevention program maintained;gait belt;nonskid shoes/slippers when out of bed  -TW    Recorded by [TW] Reece Rocha, YOSEPH 02/14/20 1552 [BB] Selena Simpson COTA/DANDRE 02/14/20 1346 [TW] Reece Rocha PTA 02/14/20 1317    Row Name 02/14/20 1427 02/14/20 0907          Bed Mobility Assessment/Treatment    Bed Mobility Assessment/Treatment  rolling left;rolling right;scooting/bridging  -TW  --     Rolling Left [place] (Bed Mobility)  maximum assist (25% patient effort)  -TW  --     Rolling  Right [place] (Bed Mobility)  maximum assist (25% patient effort)  -TW  --     Scooting/Bridging [place] (Bed Mobility)  dependent (less than 25% patient effort)  -TW  --     Supine-Sit [place] (Bed Mobility)  not tested  -TW  minimum assist (75% patient effort)  -TW     Sit-Supine [place] (Bed Mobility)  not tested  -TW  not tested  -TW     Bed Mobility, Safety Issues  impaired trunk control for bed mobility  -TW  decreased use of legs for bridging/pushing;impaired trunk control for bed mobility  -TW     Assistive Device (Bed Mobility)  bed rails;draw sheet;head of bed elevated  -TW  bed rails;head of bed elevated  -TW     Recorded by [TW] Reece Rocha, PTA 02/14/20 1552 [TW] Reece Rocha, PTA 02/14/20 1317     Row Name 02/14/20 1427             Bed-Chair Transfer    Bed-Chair [place] (Transfers)  not tested  -TW      Recorded by [TW] Reece Rocha, PTA 02/14/20 1552      Row Name 02/14/20 1110 02/14/20 0907          Sit-Stand Transfer    Sit-Stand [place] (Transfers)  contact guard;verbal cues  -BB  contact guard;verbal cues  -TW     Assistive Device (Sit-Stand Transfers)  walker, front-wheeled  -BB  walker, front-wheeled  -TW     Recorded by [BB] Selena Simpson COTA/DANDRE 02/14/20 1346 [TW] Reece Rocha, PTA 02/14/20 1317     Row Name 02/14/20 1427 02/14/20 1110 02/14/20 0907       Stand-Sit Transfer    Stand-Sit [place] (Transfers)  not tested  -TW  contact guard;verbal cues  -BB  contact guard  -TW    Assistive Device (Stand-Sit Transfers)  --  walker, front-wheeled  -BB  walker, front-wheeled  -TW    Recorded by [TW] Reece Rocha, PTA 02/14/20 1552 [BB] Selena Simpson COTA/DANDRE 02/14/20 1346 [TW] Reece Rocha, PTA 02/14/20 1317    Row Name 02/14/20 0907             Toilet Transfer    Type (Toilet Transfer)  sit-stand;stand-sit  -TW      [place] Level (Toilet Transfer)  minimum assist (75% patient effort)  -TW      Assistive  "Device (Toilet Transfer)  commode;grab bars/safety frame  -TW      Recorded by [TW] Reece Rocha, PTA 02/14/20 1317      Row Name 02/14/20 1427 02/14/20 0907          Gait/Stairs Assessment/Training    Gait/Stairs Assessment/Training  --  gait/ambulation assistive device  -TW     [place] Level (Gait)  --  minimum assist (75% patient effort)  -TW     Assistive Device (Gait)  --  walker, front-wheeled  -TW     Distance in Feet (Gait)  --  16ft x 2 to and from bathroom.   -TW     Comment (Gait/Stairs)  Not tested due to confused state of pt.   -TW  Upon standing pt states, \"I have to go.\" and begins to have a BM which falls in the floor. Pt amb to bathroom but cont to have small droppings on the way. While pt sat on toilet nsg was called to room and cleaned up doppings. Pt stood with min of 1 from toilet and nsg performed pericare. Pt amb around bed to recliner and sat down.   -TW     Recorded by [TW] Reece Rocha, PTA 02/14/20 1552 [TW] Reece Rocha, PTA 02/14/20 1317     Row Name 02/14/20 1110             Upper Body Dressing Assessment/Training    Upper Body Dressing [place] Level  don;doff;verbal cues;set up;minimum assist (75% patient effort)  -BB      Upper Body Dressing Position  supported sitting  -BB      Recorded by [BB] Selena Simpson COTA/L 02/14/20 1346      Row Name 02/14/20 1110             Grooming Assessment/Training    [place] Level (Grooming)  hair care, combing/brushing;wash face, hands;set up;supervision  -BB      Grooming Position  supported sitting  -BB      Recorded by [BB] Selena Simpson COTA/L 02/14/20 1346      Row Name 02/14/20 0907             Lower Extremity Seated Therapeutic Exercise    Performed, Seated Lower Extremity (Therapeutic Exercise)  hip flexion/extension;hip abduction/adduction;ankle dorsiflexion/plantarflexion;LAQ (long arc quad), knee extension  -TW      Exercise Type, Seated Lower Extremity (Therapeutic Exercise)  AROM (active " range of motion)  -TW      Sets/Reps Detail, Seated Lower Extremity (Therapeutic Exercise)  2/10  -TW      Recorded by [TW] Reece Rocha, PTA 02/14/20 1317      Row Name 02/14/20 1427             Lower Extremity Supine Therapeutic Exercise    Performed, Supine Lower Extremity (Therapeutic Exercise)  hip flexion/extension;hip abduction/adduction;hip external/internal rotation;ankle dorsiflexion/plantarflexion;quadriceps sets;ankle pumps  -TW      Exercise Type, Supine Lower Extremity (Therapeutic Exercise)  AROM (active range of motion);AAROM (active assistive range of motion)  -TW      Sets/Reps Detail, Supine Lower Extremity (Therapeutic Exercise)  2/10-15  -TW      Recorded by [TW] Reece Rocha PTA 02/14/20 1552      Row Name 02/14/20 1427 02/14/20 1110 02/14/20 0907       Positioning and Restraints    Pre-Treatment Position  in bed  -TW  sitting in chair/recliner  -BB  in bed  -TW    Post Treatment Position  bed  -TW  chair  -BB  chair  -TW    In Bed  supine;call light within reach;encouraged to call for assist;exit alarm on  -TW  --  --    In Chair  --  sitting;call light within reach;encouraged to call for assist;exit alarm on  -BB  reclined;call light within reach;encouraged to call for assist;exit alarm on  -TW    Recorded by [TW] Reece Rocha, PTA 02/14/20 1552 [BB] Selena Simpson COTA/DANDRE 02/14/20 1346 [TW] Reece Rocha PTA 02/14/20 1317    Row Name 02/14/20 1427 02/14/20 1110 02/14/20 0907       Pain Scale: Numbers Pre/Post-Treatment    Pain Scale: Numbers, Pretreatment  0/10 - no pain  -TW  0/10 - no pain  -BB  0/10 - no pain  -TW    Pain Scale: Numbers, Post-Treatment  0/10 - no pain  -TW  0/10 - no pain  -BB  0/10 - no pain  -TW    Recorded by [TW] Reece Rocha, YOSEPH 02/14/20 1552 [BB] Selena Simpson COTA/DANDRE 02/14/20 1346 [TW] Reece Rocha, PTA 02/14/20 1317    Row Name                Wound 02/10/20 6722 abdomen Incision    Wound - Properties Group  Date first assessed: 02/10/20 [CH] Time first assessed: 1450 [CH] Present on Hospital Admission: N [CH] Location: abdomen [CH] Primary Wound Type: Incision [CH] Additional Comments: LAPAROSCOPIC INCISIONS X2 [CH] Recorded by:  [CH] Elayne Fung RN 02/10/20 1450    Row Name                Wound 02/10/20 1450 lower;midline abdomen Incision    Wound - Properties Group Date first assessed: 02/10/20 [CH] Time first assessed: 1450 [CH] Present on Hospital Admission: N [CH] Orientation: lower;midline [CH] Location: abdomen [CH] Primary Wound Type: Incision [CH] Recorded by:  [CH] Elayne Fung RN 02/10/20 1450    Row Name 02/14/20 1110             Outcome Summary/Treatment Plan (OT)    Daily Summary of Progress (OT)  progress toward functional goals as expected  -BB      Plan for Continued Treatment (OT)  continue POC  -BB      Anticipated Discharge Disposition (OT)  anticipate therapy at next level of care  -BB      Recorded by [BB] Selena Simpson COTA/L 02/14/20 1346      Row Name 02/14/20 1427 02/14/20 0907          Outcome Summary/Treatment Plan (PT)    Daily Summary of Progress (PT)  progress toward functional goals is good  -TW  progress toward functional goals is good  -TW     Barriers to Overall Progress (PT)  Pt confused this pm.  -TW  impulsive and gets anxious very easlily.  -TW     Plan for Continued Treatment (PT)  Cont  -TW  Cont  -TW     Anticipated Discharge Disposition (PT)  anticipate therapy at next level of care  -TW  anticipate therapy at next level of care  -TW     Recorded by [TW] Reece Rocha PTA 02/14/20 1552 [TW] Reece Rocha PTA 02/14/20 1317       User Key  (r) = Recorded By, (t) = Taken By, (c) = Cosigned By    Initials Name Effective Dates Discipline    TW Reece Rocha PTA 03/07/18 -  PT    BB Selena Simpson COTA/L 03/07/18 -  OT    CH Elayne Fung RN 06/23/17 -  Nurse          Wound 02/10/20 1450 abdomen Incision (Active)    Dressing Appearance open to air 2/14/2020  8:00 AM   Closure Liquid skin adhesive;Adhesive closure strips 2/14/2020  8:00 AM   Base clean 2/14/2020  8:00 AM   Periwound ecchymotic 2/14/2020  8:00 AM   Periwound Temperature warm 2/14/2020  8:00 AM   Periwound Skin Turgor soft 2/14/2020  8:00 AM   Drainage Characteristics/Odor sanguineous 2/14/2020  8:00 AM   Drainage Amount none 2/14/2020  8:00 AM       Wound 02/10/20 1450 lower;midline abdomen Incision (Active)   Dressing Appearance dry;intact 2/14/2020  8:00 AM   Closure Adhesive bandage 2/14/2020  8:00 AM   Base dressing in place, unable to visualize 2/14/2020  8:00 AM   Periwound ecchymotic 2/14/2020  8:00 AM   Periwound Temperature warm 2/14/2020  8:00 AM   Periwound Skin Turgor soft 2/14/2020  8:00 AM   Drainage Characteristics/Odor sanguineous 2/14/2020  8:00 AM   Drainage Amount none 2/14/2020  8:00 AM       Rehab Goal Summary     Row Name 02/14/20 1427 02/14/20 1110          Bed Mobility Goal 1 (PT)    Activity/Assistive Device (Bed Mobility Goal 1, PT)  bed mobility activities, all  -TW  --     [place] Level/Cues Needed (Bed Mobility Goal 1, PT)  independent  -TW  --     Time Frame (Bed Mobility Goal 1, PT)  short term goal (STG);1 week  -TW  --     Progress/Outcomes (Bed Mobility Goal 1, PT)  goal not met  -TW  --        Transfer Goal 1 (PT)    Activity/Assistive Device (Transfer Goal 1, PT)  bed-to-chair/chair-to-bed;toilet  -TW  --     [place] Level/Cues Needed (Transfer Goal 1, PT)  conditional independence  -TW  --     Time Frame (Transfer Goal 1, PT)  long term goal (LTG);1 week  -TW  --     Barriers (Transfers Goal 1, PT)  pain; endurance  -TW  --     Progress/Outcome (Transfer Goal 1, PT)  goal not met  -TW  --        Gait Training Goal 1 (PT)    Activity/Assistive Device (Gait Training Goal 1, PT)  gait (walking locomotion);assistive device use;decrease fall risk;increase endurance/gait distance  -TW  --     [place] Level (Gait  Training Goal 1, PT)  conditional independence  -TW  --     Time Frame (Gait Training Goal 1, PT)  long term goal (LTG);2 weeks  -TW  --     Barriers (Gait Training Goal 1, PT)  600 ft or more per trip x 2 per day w/out LOB  -TW  --     Progress/Outcome (Gait Training Goal 1, PT)  goal not met  -TW  --        Stairs Goal 1 (PT)    Activity/Assistive Device (Stairs Goal 1, PT)  -- ascend/descend ramp w/ FWRW and supervision  -TW  --     [place] Level/Cues Needed (Stairs Goal 1, PT)  conditional independence;supervision required  -TW  --     Time Frame (Stairs Goal 1, PT)  long term goal (LTG);2 weeks  -TW  --     Progress/Outcome (Stairs Goal 1, PT)  goal not met  -TW  --        Patient Education Goal (PT)    Activity (Patient Education Goal, PT)  Indep w/ HEP   -TW  --     [place]/Cues/Accuracy (Memory Goal 2, PT)  demonstrates adequately  -TW  --     Time Frame (Patient Education Goal, PT)  1 week  -TW  --     Progress/Outcome (Patient Education Goal, PT)  goal not met  -TW  --        Occupational Therapy Goals    Transfer Goal Selection (OT)  --  transfer, OT goal 1  -BB     Bathing Goal Selection (OT)  --  bathing, OT goal 1  -BB     Dressing Goal Selection (OT)  --  dressing, OT goal 1  -BB     Toileting Goal Selection (OT)  --  toileting, OT goal 1  -BB        Transfer Goal 1 (OT)    Activity/Assistive Device (Transfer Goal 1, OT)  --  sit-to-stand/stand-to-sit;bed-to-chair/chair-to-bed;toilet  -BB     [place] Level/Cues Needed (Transfer Goal 1, OT)  --  conditional independence  -BB     Time Frame (Transfer Goal 1, OT)  --  long term goal (LTG);by discharge  -BB     Progress/Outcome (Transfer Goal 1, OT)  --  goal not met  -BB        Bathing Goal 1 (OT)    Activity/Assistive Device (Bathing Goal 1, OT)  --  bathing skills, all  -BB     [place] Level/Cues Needed (Bathing Goal 1, OT)  --  supervision required;set-up required  -BB     Time Frame (Bathing Goal 1, OT)  --  long term goal  (LTG);by discharge  -BB     Progress/Outcomes (Bathing Goal 1, OT)  --  goal not met  -BB        Dressing Goal 1 (OT)    Activity/Assistive Device (Dressing Goal 1, OT)  --  dressing skills, all using AE PRN  -BB     [place]/Cues Needed (Dressing Goal 1, OT)  --  supervision required;set-up required  -BB     Time Frame (Dressing Goal 1, OT)  --  long term goal (LTG);by discharge  -BB     Progress/Outcome (Dressing Goal 1, OT)  --  goal not met  -BB        Toileting Goal 1 (OT)    Activity/Device (Toileting Goal 1, OT)  --  toileting skills, all  -BB     [place] Level/Cues Needed (Toileting Goal 1, OT)  --  conditional independence  -BB     Time Frame (Toileting Goal 1, OT)  --  long term goal (LTG);by discharge  -BB     Progress/Outcome (Toileting Goal 1, OT)  --  goal not met  -BB       User Key  (r) = Recorded By, (t) = Taken By, (c) = Cosigned By    Initials Name Provider Type Discipline    TW Reece Rocha, PTA Physical Therapy Assistant PT    BB Selena Simpson, COLÓN/L Occupational Therapy Assistant OT              PT Recommendation and Plan  Anticipated Discharge Disposition (PT): anticipate therapy at next level of care  Outcome Summary/Treatment Plan (PT)  Daily Summary of Progress (PT): progress toward functional goals is good  Barriers to Overall Progress (PT): Pt confused this pm.  Plan for Continued Treatment (PT): Cont  Anticipated Discharge Disposition (PT): anticipate therapy at next level of care  Plan of Care Reviewed With: patient  Progress: no change  Outcome Summary: Pt participated in BID tx this date. Pt with increased confusion this pm and also had incontinent BM episode upon initially standing. Pt required lots of cueing for safety and proper use of RW during gait in am. Pt cont to disregaurd cathetar tubing and other lines when t/f'ing and amb. Pt requires perez instructions at times due to pt's lack of safety awareness. Pt would cont to benefit from therapy upon  SHA.  Outcome Measures     Row Name 02/14/20 1427 02/14/20 1110 02/13/20 1310       How much help from another person do you currently need...    Turning from your back to your side while in flat bed without using bedrails?  2  -TW  --  --    Moving from lying on back to sitting on the side of a flat bed without bedrails?  2  -TW  --  --    Moving to and from a bed to a chair (including a wheelchair)?  2  -TW  --  --    Standing up from a chair using your arms (e.g., wheelchair, bedside chair)?  1  -TW  --  --    Climbing 3-5 steps with a railing?  1  -TW  --  --    To walk in hospital room?  2  -TW  --  --    AM-PAC 6 Clicks Score (PT)  10  -TW  --  --       How much help from another is currently needed...    Putting on and taking off regular lower body clothing?  --  1  -BB  1  -KD    Bathing (including washing, rinsing, and drying)  --  2  -BB  2  -KD    Toileting (which includes using toilet bed pan or urinal)  --  1  -BB  1  -KD    Putting on and taking off regular upper body clothing  --  2  -BB  2  -KD    Taking care of personal grooming (such as brushing teeth)  --  3  -BB  3  -KD    Eating meals  --  4  -BB  4  -KD    AM-PAC 6 Clicks Score (OT)  --  13  -BB  13  -KD    Row Name 02/13/20 0938 02/12/20 0905 02/12/20 0735       How much help from another person do you currently need...    Turning from your back to your side while in flat bed without using bedrails?  2  -TW  2  -JA  --    Moving from lying on back to sitting on the side of a flat bed without bedrails?  2  -TW  2  -JA  --    Moving to and from a bed to a chair (including a wheelchair)?  2  -TW  2  -JA  --    Standing up from a chair using your arms (e.g., wheelchair, bedside chair)?  1  -TW  1  -JA  --    Climbing 3-5 steps with a railing?  1  -TW  1  -JA  --    To walk in hospital room?  2  -TW  2  -JA  --    AM-PAC 6 Clicks Score (PT)  10  -TW  10  -JA  --       How much help from another is currently needed...    Putting on and taking off  regular lower body clothing?  --  --  1  -KD    Bathing (including washing, rinsing, and drying)  --  --  2  -KD    Toileting (which includes using toilet bed pan or urinal)  --  --  1  -KD    Putting on and taking off regular upper body clothing  --  --  2  -KD    Taking care of personal grooming (such as brushing teeth)  --  --  3  -KD    Eating meals  --  --  4  -KD    AM-PAC 6 Clicks Score (OT)  --  --  13  -KD       Functional Assessment    Outcome Measure Options  AM-PAC 6 Clicks Basic Mobility (PT)  -TW  AM-PAC 6 Clicks Basic Mobility (PT)  -  --      User Key  (r) = Recorded By, (t) = Taken By, (c) = Cosigned By    Initials Name Provider Type    Alfonzo Schultz, PTA Physical Therapy Assistant    TW Reece Rocha, YOSEPH Physical Therapy Assistant    Selena Steele, COLÓN/L Occupational Therapy Assistant    KD Akua Henderson, COLÓN/L Occupational Therapy Assistant         Time Calculation:   PT Charges     Row Name 02/14/20 1556 02/14/20 1317          Time Calculation    Start Time  1427  -TW  0907  -TW     Stop Time  1454  -TW  0946  -TW     Time Calculation (min)  27 min  -TW  39 min  -TW     PT Received On  02/14/20  -TW  02/14/20  -TW     PT Goal Re-Cert Due Date  02/20/20  -TW  02/20/20  -TW        Time Calculation- PT    Total Timed Code Minutes- PT  27 minute(s)  -TW  39 minute(s)  -TW       User Key  (r) = Recorded By, (t) = Taken By, (c) = Cosigned By    Initials Name Provider Type    Reece Rouse PTA Physical Therapy Assistant        Therapy Charges for Today     Code Description Service Date Service Provider Modifiers Qty    07114927388 HC GAIT TRAINING EA 15 MIN 2/13/2020 Reece Rocha, PTA GP 1    42366506676 HC PT THERAPEUTIC ACT EA 15 MIN 2/13/2020 Reece Rocha, PTA GP 1    13742304657 HC GAIT TRAINING EA 15 MIN 2/14/2020 Reece Rocha, PTA GP 1    00143556888 HC PT THERAPEUTIC ACT EA 15 MIN 2/14/2020 Reece Rocha, PTA GP 1    [id number]  HC PT THER PROC EA 15 MIN 2/14/2020 Reece Rocha, YOSEPH GP 1    52235836746 HC PT THER PROC EA 15 MIN 2/14/2020 Reece Rocha, YOSEPH GP 2          PT G-Codes  Outcome Measure Options: AM-PAC 6 Clicks Basic Mobility (PT)  AM-PAC 6 Clicks Score (PT): 10  AM-PAC 6 Clicks Score (OT): 13    Reece Rocha PTA  2/14/2020

## 2020-02-14 NOTE — PLAN OF CARE
Problem: Patient Care Overview  Goal: Plan of Care Review  Outcome: Ongoing (interventions implemented as appropriate)  Flowsheets (Taken 2/14/2020 1889)  Progress: no change  Plan of Care Reviewed With: patient  Outcome Summary: Pt participated in BID tx this date. Pt with increased confusion this pm and also had incontinent BM episode upon initially standing. Pt required lots of cueing for safety and proper use of RW during gait in am. Pt cont to disregaurd cathetar tubing and other lines when t/f'ing and amb. Pt requires perez instructions at times due to pt's lack of safety awareness. Pt would cont to benefit from therapy upon DC.

## 2020-02-14 NOTE — PROGRESS NOTES
"[facility]  [address] [place] [address]. [address]  T - [phone]  F - [phone]     Progress Note          PATIENT  DEMOGRAPHICS   PATIENT NAME: Brendon Skelton                      PHYSICIAN: Sandy Caputo MD  : 1945  MRN: [id number]   LOS: 4 days    Patient Care Team:  Josefa Pozo APRN as PCP - General (Nurse Practitioner)  Meme Duckworth APRN as PCP - Claims Attributed  Luis Maher MD as Surgeon (General Surgery)  Alfredo Guerrero MD as Consulting Physician (Gastroenterology)  Hermann Cheng PA-C as Physician Assistant (Gastroenterology)  Subjective   SUBJECTIVE   UO some better close to 500ml, some soa and hypoxia         Objective   OBJECTIVE   Vital Signs  Temp:  [96 °F (35.6 °C)-96.7 °F (35.9 °C)] 96 °F (35.6 °C)  Heart Rate:  [61-98] 98  Resp:  [18-20] 18  BP: (138-178)/(63-89) 178/89    Flowsheet Rows      First Filed Value   Admission Height  165.1 cm (65\") Documented at 02/10/2020 1036   Admission Weight  104 kg (229 lb 15 oz) Documented at 02/10/2020 1036           I/O last 3 completed shifts:  In: 360 [P.O.:360]  Out: 450 [Urine:450]    PHYSICAL EXAM    Physical Exam   Constitutional: She is oriented to person, place, and time. She appears well-developed.   HENT:   Head: Normocephalic.   Eyes: Pupils are equal, round, and reactive to light.   Cardiovascular: Normal rate, regular rhythm and normal heart sounds.   Pulmonary/Chest: Effort normal and breath sounds normal.   Abdominal: Soft. Bowel sounds are normal.   Musculoskeletal: She exhibits edema.   Neurological: She is alert and oriented to person, place, and time.       RESULTS   Results Review:    Results from last 7 days   Lab Units 20  0648 20  0754 20  0443   SODIUM mmol/L 126* 125* 139   POTASSIUM mmol/L 5.3* 4.8 4.8   CHLORIDE mmol/L 90* 89* 101   CO2 mmol/L 21.0* 23.0 22.0   BUN mg/dL 70* 64* 61*   CREATININE mg/dL 3.69* 3.12* 2.65*   CALCIUM mg/dL 7.9* 8.2* 8.4*   "   GLUCOSE mg/dL 143* 140* 173*       Estimated Creatinine Clearance: 16.2 mL/min (A) (by C-G formula based on SCr of 3.69 mg/dL (H)).    Results from last 7 days   Lab Units 02/13/20  0648 02/12/20  0754 02/11/20  0443   MAGNESIUM mg/dL 2.2 2.3 2.3   PHOSPHORUS mg/dL 5.3* 4.8* 4.9*             Results from last 7 days   Lab Units 02/13/20  0648 02/12/20  0754 02/12/20  0002 02/11/20  1417 02/11/20  0443   WBC 10*3/mm3 12.66* 10.53  --   --  9.99   HEMOGLOBIN g/dL 10.2* 9.9* 9.6* 7.7* 7.7*   PLATELETS 10*3/mm3 101* 119*  --   --  139*       Results from last 7 days   Lab Units 02/10/20  1028   INR  1.12         Imaging Results (Last 24 Hours)     ** No results found for the last 24 hours. **           MEDICATIONS      acetaminophen 1,000 mg Oral Q6H   citalopram 20 mg Oral Daily   dilTIAZem  mg Oral Daily   famotidine 20 mg Oral Daily   heparin (porcine) 5,000 Units Subcutaneous Q8H   metoprolol succinate XL 25 mg Oral Daily   rOPINIRole 0.25 mg Oral Nightly   sodium chloride 10 mL Intravenous Q12H   sodium chloride 10 mL Intravenous Q12H   traZODone 150 mg Oral Nightly          Assessment/Plan   ASSESSMENT / PLAN      * No active hospital problems. *       1- ckd 4 - baseline cr is close to 2. anya ? atn related to hypoperfusion. She has 4 u of prbc and multiple albumin infusions. Now fluid overload and off ivf, added bumex yesterday. Will give a dose again. I will observe another 24hrs. Add iv bicarb.      Talked to her about need of RRT. dw Daughter in law as well     2- laproscopic sigmoid colon resection on 2/10/2020 for adenocarcinoma of colon.      3- htn borderline high - at present on metoprolol and cardizem     4- anemia post op - s/p 4 u prbc.                This document has been electronically signed by Sandy Caputo MD on February 14, 2020 10:58 AM

## 2020-02-14 NOTE — DISCHARGE PLACEMENT REQUEST
"Brendon Kamara (74 y.o. Female)     Date of Birth Social Security Number Address Home Phone MRN    1945  [address] [facility] [address] [phone] [id number]    [employer]ab[employer] Marital Status          None        Admission Date Admission Type Admitting Provider Attending Provider Department, Room/Bed    2/10/20 Elective Luis Mahre MD Rao, Mohan K, MD [address], 371/1    Discharge Date Discharge Disposition Discharge Destination                       Attending Provider:  Luis Maher MD    Allergies:  Crestor [Rosuvastatin Calcium], Lipitor [Atorvastatin], Lortab [Hydrocodone-acetaminophen], Adhesive Tape    Isolation:  None   Infection:  None   Code Status:  CPR    Ht:  165.1 cm (65\")   Wt:  106 kg (233 lb 11 oz)    Admission Cmt:  None   Principal Problem:  Cancer of sigmoid (CMS/HCC) [C18.7] More...                 Active Insurance as of 2/10/2020     Primary Coverage     Payor Plan Insurance Group Employer/Plan Group    MEDICARE MEDICARE A & B      Payor Plan Address Payor Plan Phone Number Payor Plan Fax Number Effective Dates    [address] [phone]  9/1/2010 - None Entered    [facility] [id number]       Subscriber Name Subscriber Birth Date Member ID       BRENDON KAMARA 1945 [id number]           Secondary Coverage     Payor Plan Insurance Group Employer/Plan Group    [employer]HE[employer] MEDICAID Counts include 234 beds at the [facility] MEDICAID [id number]     Payor Plan Address Payor Plan Phone Number Payor Plan Fax Number Effective Dates    [address] [address] [phone]  7/2/2019 - None Entered    [facility] [address]       Subscriber Name Subscriber Birth Date Member ID       BRENDON KAMARA 1945 [id number]                 Emergency Contacts      (Rel.) Home Phone Work Phone Mobile Phone    Hermann Marrufo (Son) [phone] -- [phone]    Carleen Fraser (Relative) [phone] -- [phone]            Emergency Contact Information     Name " Relation Home Work Mobile    Hermann Marrufo [phone]    Carleen Fraser Relative [phone]          Insurance Information                MEDICARE/MEDICARE A & B Phone: [phone]    Subscriber: Brendon Skelton Subscriber#: [id number]    Group#:  Precert#:         [employer] MEDICAID/[employer]ICAID Phone: [phone]    Subscriber: Brendon Skelton Subscriber#: [id number]    Group#: [id number] Precert#:

## 2020-02-14 NOTE — THERAPY TREATMENT NOTE
Acute Care - Occupational Therapy Treatment Note  [facility]     Patient Name: Brendon Skelton  : 1945  MRN: [id number]  Today's Date: 2020  Onset of Illness/Injury or Date of Surgery: 02/10/20  Date of Referral to OT: 20  Referring Physician: Dr. Maher    Admit Date: 2/10/2020       ICD-10-CM ICD-9-CM   1. Cancer of sigmoid (CMS/HCC) C18.7 153.3   2. Impaired functional mobility, balance, gait, and endurance Z74.09 V49.89   3. Impaired mobility and ADLs Z74.09 799.89     Patient Active Problem List   Diagnosis   • Long term current use of anticoagulant therapy   • Personal history of heart valve replacement   • Atrial fibrillation [I48.91]   • Emphysema of lung (CMS/HCC)   • On anticoagulant therapy   • Hyperlipidemia   • Diastolic heart failure (CMS/HCC)   • Depressive disorder   • COPD (chronic obstructive pulmonary disease) (CMS/HCC)   • Type 2 diabetes mellitus with stage 4 chronic kidney disease, with long-term current use of insulin (CMS/HCC)   • Myopia   • Astigmatism   • Bleeding from open wound of chest wall   • Follow-up surgery care   • Encounter for screening for malignant neoplasm of colon   • Positive colorectal cancer screening using DNA-based stool test   • Nodule of left lung   • Chronic hypoxemic respiratory failure (CMS/HCC)   • Physical deconditioning   • Heart failure with preserved left ventricular function (HFpEF) (CMS/HCC)   • Essential hypertension   • Coronary artery disease involving native coronary artery without angina pectoris   • Hx of CABG   • SSS (sick sinus syndrome) (CMS/HCC)   • Pacemaker   • Stage 4 chronic kidney disease (CMS/HCC)   • Personal history of tobacco use, presenting hazards to health   • Gastrointestinal hemorrhage   • Class 2 severe obesity due to excess calories with serious comorbidity and body mass index (BMI) of 36.0 to 36.9 in adult (CMS/HCC)   • Gastritis   • Colon polyp   • Coumadin toxicity   • Acute on chronic diastolic congestive  heart failure (CMS/HCC)   • Anemia   • Pulmonary hypertension (CMS/HCC)     Past Medical History:   Diagnosis Date   • Acute bronchitis    • Anxiety    • Aortic valve replaced    • Atrial fibrillation (CMS/HCC)    • C. difficile colitis    • Callosity     under metatarsal head      • Cardiac pacemaker in situ    • CHF (congestive heart failure) (CMS/HCC)    • Chronic obstructive lung disease (CMS/HCC)    • Corns and callus    • Coronary artery disease     hx of CABG 1 vessel as well as aortic valve replacement   • Depressive disorder    • Diabetes mellitus (CMS/HCC)    • Diastolic heart failure (CMS/HCC)    • Essential hypertension    • Foot pain    • History of transfusion    • Hyperlipidemia    • Long term current use of anticoagulant    • Malignant neoplasm of sigmoid colon (CMS/HCC) 8/13/2019   • On anticoagulant therapy    • Pulmonary emphysema (CMS/HCC)    • Rectal hemorrhage    • Stage 4 chronic kidney disease (CMS/HCC)    • Type 2 diabetes mellitus (CMS/HCC)      Past Surgical History:   Procedure Laterality Date   • AORTIC VALVE REPAIR/REPLACEMENT  1999   • CARDIAC ELECTROPHYSIOLOGY PROCEDURE N/A 3/20/2017    Procedure: PPM generator change - dual;  Surgeon: Bereket Gates MD;  Location: [facility] CATH INVASIVE LOCATION;  Service:    • CARDIAC PACEMAKER PLACEMENT  1999   • CATARACT EXTRACTION W/ INTRAOCULAR LENS IMPLANT Left 2/1/2019    Procedure: REMOVE CATARACT AND IMPLANT INTRAOCULAR LENS I;  Surgeon: Jose L Clay MD;  Location: [facility] OR;  Service: Ophthalmology   • CATARACT EXTRACTION W/ INTRAOCULAR LENS IMPLANT Right 2/8/2019    Procedure: REMOVE CATARACT AND IMPLANT  INTRAOCULAR LENS;  Surgeon: Jose L Clay MD;  Location: [facility] OR;  Service: Ophthalmology   • COLONOSCOPY N/A 6/19/2019    Procedure: COLONOSCOPY WITH CONTROL OF BLEED;  Surgeon: Alfredo Guerrero MD;  Location: [facility] ENDOSCOPY;  Service: Gastroenterology   • COLONOSCOPY N/A 6/24/2019    Procedure: COLONOSCOPY;   Surgeon: Alfredo Guerrero MD;  Location: [facility] ENDOSCOPY;  Service: Gastroenterology   • ECHO - CONVERTED  11/12/2013    There is mild to moderate left atrial enlargement EF 50-55%   • ENDOSCOPY N/A 6/19/2019    Procedure: ESOPHAGOGASTRODUODENOSCOPY WITH CONTROL OF BLEED;  Surgeon: Alfredo Guerrero MD;  Location: [facility] ENDOSCOPY;  Service: Gastroenterology   • FOOT SURGERY  1990   • OTHER SURGICAL HISTORY  10/26/2015    PARING CORN/CALLUS    • PACEMAKER REPLACEMENT N/A 3/21/2017    Procedure: revision pacemaker pocket, evacuation hematoma, control of bleeding;  Surgeon: Polo Feliz MD;  Location: [facility] OR;  Service:    • SIGMOIDOSCOPY N/A 9/30/2019    Procedure: SIGMOIDOSCOPY FLEXIBLE;  Surgeon: Alfredo Guerrero MD;  Location: [facility] ENDOSCOPY;  Service: Gastroenterology   • TRANSESOPHAGEAL ECHOCARDIOGRAM (MITZY)  05/01/2014    With color flow-Mild left atrial enlargement with mild concentric LV hypertrophy with top normal aortic root size. EF 45-50%. Mild mitral regurgitation and mild aortic insufficiency and trivial amount of tricuspid regurgation   • TUBAL ABDOMINAL LIGATION     • UPPER GASTROINTESTINAL ENDOSCOPY  06/19/2019       Therapy Treatment    Rehabilitation Treatment Summary     Row Name 02/14/20 1110 02/14/20 0907          Treatment Time/Intention    Discipline  occupational therapy assistant  -BB  physical therapy assistant  -TW     Document Type  therapy note (daily note)  -BB  therapy note (daily note)  -TW     Subjective Information  complains of;fatigue  -BB  complains of;fatigue;weakness  -TW     Mode of Treatment  individual therapy;occupational therapy  -BB  physical therapy;individual therapy  -TW     Patient/Family Observations  --  Pt was supine in bed and wearing her O2 on her forehead. Pt was instructed to apply her O2 correctly and did so. Pt O2 level with 82% but recovered within a minute to 93%.  -TW     Total Minutes, Occupational Therapy Treatment  25  -BB  --     Therapy  Frequency (OT Eval)  daily  -BB  daily  -TW     Patient Effort  adequate  -BB  adequate  -TW     Existing Precautions/Restrictions  fall  -BB  fall  -TW     Recorded by [BB] Selena Simpson COTA/L 02/14/20 1346 [TW] Reece Rocha PTA 02/14/20 1317     Row Name 02/14/20 1110 02/14/20 0907          Vital Signs    Pre Systolic BP Rehab  --  -- Pt would not allow for BP due to figetting with tubes of Dma  -TW     Pretreatment Heart Rate (beats/min)  86  -BB  90  -TW     Posttreatment Heart Rate (beats/min)  --  85  -TW     Pre SpO2 (%)  95  -BB  92  -TW     O2 Delivery Pre Treatment  supplemental O2  -BB  supplemental O2  -TW     Intra SpO2 (%)  --  84  -TW     O2 Delivery Intra Treatment  --  supplemental O2  -TW     Post SpO2 (%)  --  95  -TW     O2 Delivery Post Treatment  --  supplemental O2  -TW     Pre Patient Position  Sitting  -BB  Supine  -TW     Intra Patient Position  --  Standing  -TW     Post Patient Position  --  Sitting  -TW     Recorded by [BB] Selena Simpson COTA/DANDRE 02/14/20 1346 [TW] Reece Rocha, YOSEPH 02/14/20 1317     Row Name 02/14/20 1110 02/14/20 0907          Cognitive Assessment/Intervention- PT/OT    Affect/Mental Status (Cognitive)  confused  -BB  anxious;confused  -TW     Behavioral Issues (Cognitive)  --  overwhelmed easily  -TW     Orientation Status (Cognition)  oriented to;person  -BB  oriented to;person  -TW     Follows Commands (Cognition)  follows one step commands;25-49% accuracy;verbal cues/prompting required;repetition of directions required;delayed response/completion  -BB  follows one step commands;25-49% accuracy;delayed response/completion;increased processing time needed;physical/tactile prompts required;repetition of directions required;verbal cues/prompting required  -TW     Personal Safety Interventions  --  fall prevention program maintained;gait belt;nonskid shoes/slippers when out of bed  -TW     Recorded by [BB] Selena Simpson COTA/DANDRE 02/14/20  1346 [TW] Reece Rocha, PTA 02/14/20 1317     Row Name 02/14/20 0907             Bed Mobility Assessment/Treatment    Supine-Sit [place] (Bed Mobility)  minimum assist (75% patient effort)  -TW      Sit-Supine [place] (Bed Mobility)  not tested  -TW      Bed Mobility, Safety Issues  decreased use of legs for bridging/pushing;impaired trunk control for bed mobility  -TW      Assistive Device (Bed Mobility)  bed rails;head of bed elevated  -TW      Recorded by [TW] Reece Rocha, PTA 02/14/20 1317      Row Name 02/14/20 1110 02/14/20 0907          Sit-Stand Transfer    Sit-Stand [place] (Transfers)  contact guard;verbal cues  -BB  contact guard;verbal cues  -TW     Assistive Device (Sit-Stand Transfers)  walker, front-wheeled  -BB  walker, front-wheeled  -TW     Recorded by [BB] Selena Simpson COTA/L 02/14/20 1346 [TW] Reece Rocha, PTA 02/14/20 1317     Row Name 02/14/20 1110 02/14/20 0907          Stand-Sit Transfer    Stand-Sit [place] (Transfers)  contact guard;verbal cues  -BB  contact guard  -TW     Assistive Device (Stand-Sit Transfers)  walker, front-wheeled  -BB  walker, front-wheeled  -TW     Recorded by [BB] Selena Simpson COTA/DANDRE 02/14/20 1346 [TW] Reece Rocha, YOSEPH 02/14/20 1317     Row Name 02/14/20 0907             Toilet Transfer    Type (Toilet Transfer)  sit-stand;stand-sit  -TW      [place] Level (Toilet Transfer)  minimum assist (75% patient effort)  -TW      Assistive Device (Toilet Transfer)  commode;grab bars/safety frame  -TW      Recorded by [TW] Reece Rocha PTA 02/14/20 1317      Row Name 02/14/20 0907             Gait/Stairs Assessment/Training    Gait/Stairs Assessment/Training  gait/ambulation assistive device  -TW      [place] Level (Gait)  minimum assist (75% patient effort)  -TW      Assistive Device (Gait)  walker, front-wheeled  -TW      Distance in Feet (Gait)  16ft x 2 to and from bathroom.   -TW       "Comment (Gait/Stairs)  Upon standing pt states, \"I have to go.\" and begins to have a BM which falls in the floor. Pt amb to bathroom but cont to have small droppings on the way. While pt sat on toilet nsg was called to room and cleaned up doppings. Pt stood with min of 1 from toilet and nsg performed pericare. Pt amb around bed to recliner and sat down.   -TW      Recorded by [TW] Reece Rocha PTA 02/14/20 1317      Row Name 02/14/20 1110             Upper Body Dressing Assessment/Training    Upper Body Dressing [place] Level  don;doff;verbal cues;set up;minimum assist (75% patient effort)  -BB      Upper Body Dressing Position  supported sitting  -BB      Recorded by [BB] Selena Simpson COTA/L 02/14/20 1346      Row Name 02/14/20 1110             Grooming Assessment/Training    [place] Level (Grooming)  hair care, combing/brushing;wash face, hands;set up;supervision  -BB      Grooming Position  supported sitting  -BB      Recorded by [BB] Selena Simpson COTA/L 02/14/20 1346      Row Name 02/14/20 0907             Lower Extremity Seated Therapeutic Exercise    Performed, Seated Lower Extremity (Therapeutic Exercise)  hip flexion/extension;hip abduction/adduction;ankle dorsiflexion/plantarflexion;LAQ (long arc quad), knee extension  -TW      Exercise Type, Seated Lower Extremity (Therapeutic Exercise)  AROM (active range of motion)  -TW      Sets/Reps Detail, Seated Lower Extremity (Therapeutic Exercise)  2/10  -TW      Recorded by [TW] Reece Rocha PTA 02/14/20 1317      Row Name 02/14/20 1110 02/14/20 0907          Positioning and Restraints    Pre-Treatment Position  sitting in chair/recliner  -BB  in bed  -TW     Post Treatment Position  chair  -BB  chair  -TW     In Chair  sitting;call light within reach;encouraged to call for assist;exit alarm on  -BB  reclined;call light within reach;encouraged to call for assist;exit alarm on  -TW     Recorded by [BB] Selena Simpson, " COLÓN/L 02/14/20 1346 [TW] Reece Rocha, PTA 02/14/20 1317     Row Name 02/14/20 1110 02/14/20 0907          Pain Scale: Numbers Pre/Post-Treatment    Pain Scale: Numbers, Pretreatment  0/10 - no pain  -BB  0/10 - no pain  -TW     Pain Scale: Numbers, Post-Treatment  0/10 - no pain  -BB  0/10 - no pain  -TW     Recorded by [BB] Selena Simpson COTA/DANDRE 02/14/20 1346 [TW] Reece Rocha, PTA 02/14/20 1317     Row Name                Wound 02/10/20 1450 abdomen Incision    Wound - Properties Group Date first assessed: 02/10/20 [CH] Time first assessed: 1450 [CH] Present on Hospital Admission: N [CH] Location: abdomen [CH] Primary Wound Type: Incision [CH] Additional Comments: LAPAROSCOPIC INCISIONS X2 [CH] Recorded by:  [CH] Elayne Fung RN 02/10/20 1450    Row Name                Wound 02/10/20 1450 lower;midline abdomen Incision    Wound - Properties Group Date first assessed: 02/10/20 [CH] Time first assessed: 1450 [CH] Present on Hospital Admission: N [CH] Orientation: lower;midline [CH] Location: abdomen [CH] Primary Wound Type: Incision [CH] Recorded by:  [CH] Elayne Fung RN 02/10/20 1450    Row Name 02/14/20 1110             Outcome Summary/Treatment Plan (OT)    Daily Summary of Progress (OT)  progress toward functional goals as expected  -BB      Plan for Continued Treatment (OT)  continue POC  -BB      Anticipated Discharge Disposition (OT)  anticipate therapy at next level of care  -BB      Recorded by [BB] Selena Simpson COTA/L 02/14/20 1346      Row Name 02/14/20 0907             Outcome Summary/Treatment Plan (PT)    Daily Summary of Progress (PT)  progress toward functional goals is good  -TW      Barriers to Overall Progress (PT)  impulsive and gets anxious very easlily.  -TW      Plan for Continued Treatment (PT)  Cont  -TW      Anticipated Discharge Disposition (PT)  anticipate therapy at next level of care  -TW      Recorded by [TW] Reece Rocha, PTA  02/14/20 1317        User Key  (r) = Recorded By, (t) = Taken By, (c) = Cosigned By    Initials Name Effective Dates Discipline    TW Reece Rocha, YOSEPH 03/07/18 -  PT    BB Selena Simpson, ELDON/L 03/07/18 -  OT    CH Elayne Fung RN 06/23/17 -  Nurse        Wound 02/10/20 1450 abdomen Incision (Active)   Dressing Appearance open to air 2/14/2020  8:00 AM   Closure Liquid skin adhesive;Adhesive closure strips 2/14/2020  8:00 AM   Base clean 2/14/2020  8:00 AM   Periwound ecchymotic 2/14/2020  8:00 AM   Periwound Temperature warm 2/14/2020  8:00 AM   Periwound Skin Turgor soft 2/14/2020  8:00 AM   Drainage Characteristics/Odor sanguineous 2/14/2020  8:00 AM   Drainage Amount none 2/14/2020  8:00 AM       Wound 02/10/20 1450 lower;midline abdomen Incision (Active)   Dressing Appearance dry;intact 2/14/2020  8:00 AM   Closure Adhesive bandage 2/14/2020  8:00 AM   Base dressing in place, unable to visualize 2/14/2020  8:00 AM   Periwound ecchymotic 2/14/2020  8:00 AM   Periwound Temperature warm 2/14/2020  8:00 AM   Periwound Skin Turgor soft 2/14/2020  8:00 AM   Drainage Characteristics/Odor sanguineous 2/14/2020  8:00 AM   Drainage Amount none 2/14/2020  8:00 AM     Rehab Goal Summary     Row Name 02/14/20 1110             Occupational Therapy Goals    Transfer Goal Selection (OT)  transfer, OT goal 1  -BB      Bathing Goal Selection (OT)  bathing, OT goal 1  -BB      Dressing Goal Selection (OT)  dressing, OT goal 1  -BB      Toileting Goal Selection (OT)  toileting, OT goal 1  -BB         Transfer Goal 1 (OT)    Activity/Assistive Device (Transfer Goal 1, OT)  sit-to-stand/stand-to-sit;bed-to-chair/chair-to-bed;toilet  -BB      [place] Level/Cues Needed (Transfer Goal 1, OT)  conditional independence  -BB      Time Frame (Transfer Goal 1, OT)  long term goal (LTG);by discharge  -BB      Progress/Outcome (Transfer Goal 1, OT)  goal not met  -BB         Bathing Goal 1 (OT)     Activity/Assistive Device (Bathing Goal 1, OT)  bathing skills, all  -BB      [place] Level/Cues Needed (Bathing Goal 1, OT)  supervision required;set-up required  -BB      Time Frame (Bathing Goal 1, OT)  long term goal (LTG);by discharge  -BB      Progress/Outcomes (Bathing Goal 1, OT)  goal not met  -BB         Dressing Goal 1 (OT)    Activity/Assistive Device (Dressing Goal 1, OT)  dressing skills, all using AE PRN  -BB      [place]/Cues Needed (Dressing Goal 1, OT)  supervision required;set-up required  -BB      Time Frame (Dressing Goal 1, OT)  long term goal (LTG);by discharge  -BB      Progress/Outcome (Dressing Goal 1, OT)  goal not met  -BB         Toileting Goal 1 (OT)    Activity/Device (Toileting Goal 1, OT)  toileting skills, all  -BB      [place] Level/Cues Needed (Toileting Goal 1, OT)  conditional independence  -BB      Time Frame (Toileting Goal 1, OT)  long term goal (LTG);by discharge  -BB      Progress/Outcome (Toileting Goal 1, OT)  goal not met  -BB        User Key  (r) = Recorded By, (t) = Taken By, (c) = Cosigned By    Initials Name Provider Type Discipline    BB Selena Simpson, ELDON/L Occupational Therapy Assistant OT         Non-skid socks and gait belt in place. Toileting offered. Call light and needs within reach. Pt advised to not get up alone and call the nurse for assistance.  Bed alarm on.     OT Recommendation and Plan  Outcome Summary/Treatment Plan (OT)  Daily Summary of Progress (OT): progress toward functional goals as expected  Plan for Continued Treatment (OT): continue POC  Anticipated Discharge Disposition (OT): anticipate therapy at next level of care  Therapy Frequency (OT Eval): daily  Daily Summary of Progress (OT): progress toward functional goals as expected  Plan of Care Review  Plan of Care Reviewed With: patient  Plan of Care Reviewed With: patient  Outcome Summary: Pt Min A for doffing/donning hospital gown while siting in chair. Pt supervision  for grooming asks. No gaols met this tx.  Outcome Measures     Row Name 02/14/20 1110 02/13/20 1310 02/13/20 0938       How much help from another person do you currently need...    Turning from your back to your side while in flat bed without using bedrails?  --  --  2  -TW    Moving from lying on back to sitting on the side of a flat bed without bedrails?  --  --  2  -TW    Moving to and from a bed to a chair (including a wheelchair)?  --  --  2  -TW    Standing up from a chair using your arms (e.g., wheelchair, bedside chair)?  --  --  1  -TW    Climbing 3-5 steps with a railing?  --  --  1  -TW    To walk in hospital room?  --  --  2  -TW    AM-PAC 6 Clicks Score (PT)  --  --  10  -TW       How much help from another is currently needed...    Putting on and taking off regular lower body clothing?  1  -BB  1  -KD  --    Bathing (including washing, rinsing, and drying)  2  -BB  2  -KD  --    Toileting (which includes using toilet bed pan or urinal)  1  -BB  1  -KD  --    Putting on and taking off regular upper body clothing  2  -BB  2  -KD  --    Taking care of personal grooming (such as brushing teeth)  3  -BB  3  -KD  --    Eating meals  4  -BB  4  -KD  --    AM-PAC 6 Clicks Score (OT)  13  -BB  13  -KD  --       Functional Assessment    Outcome Measure Options  --  --  AM-PAC 6 Clicks Basic Mobility (PT)  -TW    Row Name 02/12/20 0905 02/12/20 0735 02/11/20 1542       How much help from another person do you currently need...    Turning from your back to your side while in flat bed without using bedrails?  2  -JA  --  --    Moving from lying on back to sitting on the side of a flat bed without bedrails?  2  -JA  --  --    Moving to and from a bed to a chair (including a wheelchair)?  2  -JA  --  --    Standing up from a chair using your arms (e.g., wheelchair, bedside chair)?  1  -JA  --  --    Climbing 3-5 steps with a railing?  1  -JA  --  --    To walk in hospital room?  2  -JA  --  --    AM-PAC 6 Clicks  Score (PT)  10  -JA  --  --       How much help from another is currently needed...    Putting on and taking off regular lower body clothing?  --  1  -KD  1  -AS    Bathing (including washing, rinsing, and drying)  --  2  -KD  2  -AS    Toileting (which includes using toilet bed pan or urinal)  --  1  -KD  1  -AS    Putting on and taking off regular upper body clothing  --  2  -KD  2  -AS    Taking care of personal grooming (such as brushing teeth)  --  3  -KD  3  -AS    Eating meals  --  4  -KD  4  -AS    AM-[facility] 6 Clicks Score (OT)  --  13  -KD  13  -AS       Functional Assessment    Outcome Measure Options  AM-[facility] 6 Clicks Basic Mobility (PT)  -  --  AM-[facility] 6 Clicks Daily Activity (OT)  -AS      User Key  (r) = Recorded By, (t) = Taken By, (c) = Cosigned By    Initials Name Provider Type    Alfonzo Schultz, PTA Physical Therapy Assistant    TW Reece Rocha, PTA Physical Therapy Assistant    Selena Steele COTA/L Occupational Therapy Assistant    Akua Maki COTA/L Occupational Therapy Assistant    AS Emmie Kinsey, OT Occupational Therapist           Time Calculation:   Time Calculation- OT     Row Name 02/14/20 1349             Time Calculation- OT    OT Start Time  1110  -BB      OT Stop Time  1135  -BB      OT Time Calculation (min)  25 min  -BB      Total Timed Code Minutes- OT  25 minute(s)  -BB      OT Received On  02/14/20  -BB        User Key  (r) = Recorded By, (t) = Taken By, (c) = Cosigned By    Initials Name Provider Type    Selena Steele COTA/L Occupational Therapy Assistant        Therapy Charges for Today     Code Description Service Date Service Provider Modifiers Qty    [id number] HC OT SELF CARE/MGMT/TRAIN EA 15 MIN 2/14/2020 Selena Simpson COTA/L GO 2               YSABEL Mary  2/14/2020

## 2020-02-14 NOTE — PLAN OF CARE
Problem: Patient Care Overview  Goal: Plan of Care Review  Outcome: Ongoing (interventions implemented as appropriate)  Flowsheets (Taken 2/14/2020 0141)  Progress: no change  Plan of Care Reviewed With: patient  Outcome Summary: Vss, pain controlled with tylenol, resting well between care, cont to monitor.

## 2020-02-14 NOTE — PROGRESS NOTES
GENERAL SURGERY PROGRESS NOTE     LOS: 4 days     Chief Complaint:   Brendon Skelton is a 74 year old lady who is status post laparoscopic sigmoid colon resection for cancer of sigmoid colon.     Interval History:     Continues to have marginal urine output.  Otherwise vital signs stable.  Pain well controlled.  Resting in bed.  Incisions c/d/i.  Reports she has had a few BMs since surgery.     Medication Review:     acetaminophen 1,000 mg Oral Q6H   citalopram 20 mg Oral Daily   dilTIAZem  mg Oral Daily   famotidine 20 mg Oral Daily   metoprolol succinate XL 25 mg Oral Daily   rOPINIRole 0.25 mg Oral Nightly   sodium chloride 10 mL Intravenous Q12H   sodium chloride 10 mL Intravenous Q12H   traZODone 150 mg Oral Nightly        Objective     Vital Signs:  Temp:  [96.1 °F (35.6 °C)-96.7 °F (35.9 °C)] 96.2 °F (35.7 °C)  Heart Rate:  [61-97] 80  Resp:  [18-20] 18  BP: (138-153)/(63-72) 149/63    Intake/Output Summary (Last 24 hours) at 2/14/2020 0553  Last data filed at 2/14/2020 0100  Gross per 24 hour   Intake 360 ml   Output 450 ml   Net -90 ml       Physical Exam  General: Well-appearing, resting woman in no acute distress  Head: Normocephalic and atraumatic.   Neck: Normal range of motion. Neck supple.   Cardiovascular: Heart regular rate and rhythm with no murmurs, gallops, rales. Radial pulses strong bilaterally.   Pulmonary/Chest: Lungs clear to auscultation bilaterally.  Abdominal: Soft and non-tender. Incision site healing appropriately.  Musculoskeletal: Normal range of motion.   Skin: Skin warm and dry. No rashes. Scattered bruising present.       Results Review:    Results from last 7 days   Lab Units 02/13/20  0648 02/12/20  0754 02/11/20  0443   SODIUM mmol/L 126* 125* 139   POTASSIUM mmol/L 5.3* 4.8 4.8   CHLORIDE mmol/L 90* 89* 101   CO2 mmol/L 21.0* 23.0 22.0   BUN mg/dL 70* 64* 61*   CREATININE mg/dL 3.69* 3.12* 2.65*   GLUCOSE mg/dL 143* 140* 173*   CALCIUM mg/dL 7.9* 8.2* 8.4*     Results  from last 7 days   Lab Units 02/13/20  0648 02/12/20  0754 02/12/20  0002  02/11/20  0443   WBC 10*3/mm3 12.66* 10.53  --   --  9.99   HEMOGLOBIN g/dL 10.2* 9.9* 9.6*   < > 7.7*   HEMATOCRIT % 30.7* 29.8* 29.4*   < > 24.8*   PLATELETS 10*3/mm3 101* 119*  --   --  139*    < > = values in this interval not displayed.       Assessment:       Brendon Skelton is a 74 year old lady who is status post laparoscopic sigmoid colon resection 2/10/20. She is stable on the medical/surgical floor, but kidney function remains a concern.        Plan:  --Follow-up nephrology plans.  Bumex was given yesterday with some response.  They have discussed with her RRT if that were to be required  --Continue diet  --PRNs pain control  --Continue home medications  --Ann in place for strict Is&Os in patient with TIARA on CKD  --Follow-up am labs    This document has been electronically signed by Gurpreet Coe MD on February 14, 2020 5:53 AM      She continues to show slow steady progress with her urine output.  I think she will need some help postoperatively and have consulted case management that regard

## 2020-02-15 PROBLEM — J96.11 CHRONIC HYPOXEMIC RESPIRATORY FAILURE (HCC): Chronic | Status: ACTIVE | Noted: 2018-01-31

## 2020-02-15 PROBLEM — D64.9 CHRONIC ANEMIA: Chronic | Status: ACTIVE | Noted: 2019-07-13

## 2020-02-15 PROBLEM — C18.7 CANCER OF SIGMOID (HCC): Chronic | Status: RESOLVED | Noted: 2019-08-13 | Resolved: 2020-01-01

## 2020-02-15 PROBLEM — C18.7 CANCER OF SIGMOID (HCC): Chronic | Status: ACTIVE | Noted: 2019-08-13

## 2020-02-15 PROBLEM — I10 HYPERTENSION: Chronic | Status: ACTIVE | Noted: 2018-03-12

## 2020-02-15 PROBLEM — N18.4 STAGE 4 CHRONIC KIDNEY DISEASE (HCC): Chronic | Status: ACTIVE | Noted: 2018-06-19

## 2020-02-15 PROBLEM — I27.20 PULMONARY HYPERTENSION (HCC): Chronic | Status: ACTIVE | Noted: 2019-07-30

## 2020-02-15 PROBLEM — R41.0 CONFUSION: Status: ACTIVE | Noted: 2020-01-01

## 2020-02-15 PROBLEM — E87.1 HYPONATREMIA: Status: ACTIVE | Noted: 2020-01-01

## 2020-02-15 NOTE — THERAPY TREATMENT NOTE
Acute Care - Physical Therapy Treatment Note  [facility]     Patient Name: Brendon Skelton  : 1945  MRN: [id number]  Today's Date: 2/15/2020  Onset of Illness/Injury or Date of Surgery: 02/10/20     Referring Physician: Dr. Maher    Admit Date: 2/10/2020    Visit Dx:    ICD-10-CM ICD-9-CM   1. Cancer of sigmoid (CMS/HCC) C18.7 153.3   2. Impaired functional mobility, balance, gait, and endurance Z74.09 V49.89   3. Impaired mobility and ADLs Z74.09 799.89     Patient Active Problem List   Diagnosis   • Long term current use of anticoagulant therapy   • Personal history of heart valve replacement   • Atrial fibrillation [I48.91]   • Emphysema of lung (CMS/HCC)   • On anticoagulant therapy   • Hyperlipidemia   • Diastolic heart failure (CMS/HCC)   • Depressive disorder   • COPD (chronic obstructive pulmonary disease) (CMS/HCC)   • Diabetes mellitus (CMS/HCC)   • Myopia   • Astigmatism   • Bleeding from open wound of chest wall   • Follow-up surgery care   • Encounter for screening for malignant neoplasm of colon   • Positive colorectal cancer screening using DNA-based stool test   • Nodule of left lung   • Chronic hypoxemic respiratory failure (CMS/HCC)   • Physical deconditioning   • Heart failure with preserved left ventricular function (HFpEF) (CMS/HCC)   • Hypertension   • Coronary artery disease involving native coronary artery without angina pectoris   • Hx of CABG   • SSS (sick sinus syndrome) (CMS/HCC)   • Pacemaker   • Stage 4 chronic kidney disease (CMS/HCC)   • Personal history of tobacco use, presenting hazards to health   • Gastrointestinal hemorrhage   • Morbid obesity (CMS/HCC)   • Gastritis   • Colon polyp   • Coumadin toxicity   • Acute on chronic diastolic congestive heart failure (CMS/HCC)   • Chronic anemia   • Pulmonary hypertension (CMS/HCC)   • Cancer of sigmoid (CMS/HCC)   • Confusion   • Hyponatremia   • Long term current use of anticoagulant       Therapy  Treatment    Rehabilitation Treatment Summary     Row Name 02/15/20 1600 02/15/20 0931          Treatment Time/Intention    Discipline  physical therapy assistant  -EM  occupational therapy assistant  -TO     Document Type  therapy note (daily note)  -EM  therapy note (daily note)  -TO     Subjective Information  no complaints  -EM  no complaints  -TO     Mode of Treatment  individual therapy;physical therapy  -EM  individual therapy;occupational therapy  -TO     Patient/Family Observations  --  pt side lying with both feet off bed attempting to get to BR but stuck  -TO     Therapy Frequency (PT Clinical Impression)  2 times/day  -EM  --     Therapy Frequency (OT Eval)  --  daily  -TO     Patient Effort  fair  -EM  fair  -TO     Existing Precautions/Restrictions  (S) fall 2 person assist when up  -EM  fall  -TO     Recorded by [EM] Miguel Angel Grant, PTA 02/15/20 1618 [TO] Alistair Gayle COLÓN/L 02/15/20 1418     Row Name 02/15/20 1600 02/15/20 0931          Vital Signs    Pre Systolic BP Rehab  186  -EM  146  -TO     Pre Treatment Diastolic BP  70  -EM  72  -TO     Post Systolic BP Rehab  180  -EM  145  -TO     Post Treatment Diastolic BP  68  -EM  70  -TO     Pretreatment Heart Rate (beats/min)  66  -EM  84  -TO     Posttreatment Heart Rate (beats/min)  81  -EM  94  -TO     Pre SpO2 (%)  94  -EM  98  -TO     O2 Delivery Pre Treatment  supplemental O2  -EM  supplemental O2  -TO     Post SpO2 (%)  98  -EM  94  -TO     O2 Delivery Post Treatment  supplemental O2  -EM  supplemental O2  -TO     Pre Patient Position  Supine  -EM  Supine  -TO     Intra Patient Position  Sitting  -EM  Sitting  -TO     Post Patient Position  Supine  -EM  Supine  -TO     Recorded by [EM] Miguel Angel Grant, YOSEPH 02/15/20 1618 [TO] Alistair Gayle COLÓN/L 02/15/20 1418     Row Name 02/15/20 1600 02/15/20 0931          Cognitive Assessment/Intervention- PT/OT    Affect/Mental Status (Cognitive)  confused;flat/blunted affect;low arousal/lethargic   -EM  confused  -TO     Behavioral Issues (Cognitive)  overwhelmed easily  -EM  --     Orientation Status (Cognition)  oriented to;person   -EM  oriented to;person;unable/difficult to assess  -TO     Follows Commands (Cognition)  follows one step commands;50-74% accuracy  -EM  follows one step commands;50-74% accuracy  -TO     Recorded by [EM] Miguel Angel Grant, PTA 02/15/20 1618 [TO] Alistair Gayle COLÓN/L 02/15/20 1418     Row Name 02/15/20 0931             Safety Issues, Functional Mobility    Safety Issues Affecting Function (Mobility)  at risk behavior observed;ability to follow commands;safety precaution awareness  -TO      Impairments Affecting Function (Mobility)  balance;cognition;endurance/activity tolerance;motor planning  -TO      Recorded by [TO] Alistair Gayle COLÓN/L 02/15/20 1418      Row Name 02/15/20 1600 02/15/20 0931          Bed Mobility Assessment/Treatment    Bed Mobility Assessment/Treatment  --  supine-sit  -TO     [place] Level (Bed Mobility)  --  moderate assist (50% patient effort) already side llying  -TO     Scooting/Bridging Ward (Bed Mobility)  --  moderate assist (50% patient effort)  -TO     Supine-Sit Ward (Bed Mobility)  minimum assist (75% patient effort)  -EM  --     Sit-Supine Ward (Bed Mobility)  moderate assist (50% patient effort)  -EM  --     Assistive Device (Bed Mobility)  bed rails;head of bed elevated  -EM  --     Comment (Bed Mobility)  Pt sat EOB ~18' with SBA/min Ax1 with frequent intermittent uncontrollable jerks/falling alseep episodes that would require min Ax1 for correction-nsg made aware  -EM  --     Recorded by [EM] Miguel Angel Grant, PTA 02/15/20 1618 [TO] Alistair Gayle COLÓN/L 02/15/20 1418     Row Name 02/15/20 0931             Functional Mobility    Functional Mobility- Ind. Level  contact guard assist;2 person assist required;maximum assist (25% patient effort) pt completed trip to [facility] x 2, return trip lowered to floo  -TO       Functional Mobility- Device  rolling walker  -TO      Functional Mobility-Distance (Feet)  10  -TO      Functional Mobility- Safety Issues  supplemental O2  -TO      Recorded by [TO] Alistair Gayle COLÓN/L 02/15/20 1418      Row Name 02/15/20 0931             Transfer Assessment/Treatment    Transfer Assessment/Treatment  toilet transfer  -TO      Recorded by [TO] Alistair Gayle COLÓN/L 02/15/20 1418      Row Name 02/15/20 0931             Toilet Transfer    Type (Toilet Transfer)  stand pivot/stand step  -TO      [place] Level (Toilet Transfer)  2 person assist;maximum assist (25% patient effort)  -TO      Assistive Device (Toilet Transfer)  walker, front-wheeled;grab bars/safety frame 2* to knee giving out  -TO      Recorded by [TO] Alistair Gayle COTA/L 02/15/20 1418      Row Name 02/15/20 1600             Therapeutic Exercise    Lower Extremity (Therapeutic Exercise)  LAQ (long arc quad), bilateral;marching while seated;other (see comments) AP  -EM      Exercise Type (Therapeutic Exercise)  AROM (active range of motion)  -EM      Position (Therapeutic Exercise)  seated  -EM      Sets/Reps (Therapeutic Exercise)  1x20  -EM      Comment (Therapeutic Exercise)  vc's reqired to stay on task.  -EM      Recorded by [EM] Miguel Angel Grant PTA 02/15/20 1618      Row Name 02/15/20 1600             Positioning and Restraints    Pre-Treatment Position  in bed  -EM      Post Treatment Position  bed  -EM      In Bed  supine;call light within reach;encouraged to call for assist;exit alarm on side rails up x  -EM      Recorded by [EM] Miguel Angel Grant PTA 02/15/20 1618      Row Name 02/15/20 1600 02/15/20 0931          Pain Scale: Numbers Pre/Post-Treatment    Pain Scale: Numbers, Pretreatment  0/10 - no pain  -EM  0/10 - no pain  -TO     Pain Scale: Numbers, Post-Treatment  0/10 - no pain  -EM  0/10 - no pain  -TO     Recorded by [EM] Miguel Angel Grant PTA 02/15/20 1618 [TO] Alistair Gayle COLÓN/L 02/15/20 1418      Row Name                Wound 02/10/20 1450 abdomen Incision    Wound - Properties Group Date first assessed: 02/10/20 [CH] Time first assessed: 1450 [CH] Present on Hospital Admission: N [CH] Location: abdomen [CH] Primary Wound Type: Incision [CH] Additional Comments: LAPAROSCOPIC INCISIONS X2 [CH] Recorded by:  [CH] Elayne Fung RN 02/10/20 1450    Row Name                Wound 02/10/20 1450 lower;midline abdomen Incision    Wound - Properties Group Date first assessed: 02/10/20 [CH] Time first assessed: 1450 [CH] Present on Hospital Admission: N [CH] Orientation: lower;midline [CH] Location: abdomen [CH] Primary Wound Type: Incision [CH] Recorded by:  [CH] Elayne Fung RN 02/10/20 1450    Row Name 02/15/20 1600 02/15/20 0931          Outcome Summary/Treatment Plan (OT)    Plan for Continued Treatment (OT)  --  cont OT poc  -TO     Anticipated Discharge Disposition (OT)  anticipate therapy at next level of care  -EM  anticipate therapy at next level of care  -TO     Recorded by [EM] Miguel Angel Grant, YOSEPH 02/15/20 1618 [TO] Alistair Gayle COLÓN/L 02/15/20 1418     Row Name 02/15/20 1600             Outcome Summary/Treatment Plan (PT)    Daily Summary of Progress (PT)  progress towards functional goals is fair  -EM      Barriers to Overall Progress (PT)  Pt confusion/ intermittent episodes   -EM      Plan for Continued Treatment (PT)  Continue  -EM      Anticipated Discharge Disposition (PT)  anticipate therapy at next level of care  -EM      Recorded by [EM] Miguel Angel Grant PTA 02/15/20 1618        User Key  (r) = Recorded By, (t) = Taken By, (c) = Cosigned By    Initials Name Effective Dates Discipline    EM Miguel Angel Grant, PTA 08/11/15 -  PT    TO Alistair Gayle, COLÓN/L 03/07/18 -  OT    CH Elayne Fung RN 06/23/17 -  Nurse          Wound 02/10/20 1450 abdomen Incision (Active)   Dressing Appearance open to air 2/15/2020  7:19 AM   Closure Liquid skin adhesive;Adhesive closure  strips 2/15/2020  7:19 AM   Base clean 2/15/2020  7:19 AM   Periwound ecchymotic 2/15/2020  7:19 AM   Periwound Temperature warm 2/15/2020  7:19 AM   Periwound Skin Turgor soft 2/15/2020  7:19 AM   Drainage Amount none 2/14/2020  8:30 PM       Wound 02/10/20 1450 lower;midline abdomen Incision (Active)   Dressing Appearance open to air 2/15/2020  7:19 AM   Closure Adhesive bandage 2/14/2020  8:30 PM   Base dressing in place, unable to visualize 2/14/2020  8:30 PM   Periwound ecchymotic 2/15/2020  7:19 AM   Periwound Temperature warm 2/15/2020  7:19 AM   Periwound Skin Turgor soft 2/15/2020  7:19 AM   Drainage Amount none 2/14/2020  8:30 PM       Rehab Goal Summary     Row Name 02/15/20 1430 02/15/20 0931          Bed Mobility Goal 1 (PT)    Activity/Assistive Device (Bed Mobility Goal 1, PT)  bed mobility activities, all  -EM  --     [place] Level/Cues Needed (Bed Mobility Goal 1, PT)  independent  -EM  --     Time Frame (Bed Mobility Goal 1, PT)  short term goal (STG);1 week  -EM  --     Progress/Outcomes (Bed Mobility Goal 1, PT)  goal not met  -EM  --        Transfer Goal 1 (PT)    Activity/Assistive Device (Transfer Goal 1, PT)  bed-to-chair/chair-to-bed;toilet  -EM  --     [place] Level/Cues Needed (Transfer Goal 1, PT)  conditional independence  -EM  --     Time Frame (Transfer Goal 1, PT)  long term goal (LTG);1 week  -EM  --     Barriers (Transfers Goal 1, PT)  pain; endurance  -EM  --     Progress/Outcome (Transfer Goal 1, PT)  goal not met  -EM  --        Gait Training Goal 1 (PT)    Activity/Assistive Device (Gait Training Goal 1, PT)  gait (walking locomotion);assistive device use;decrease fall risk;increase endurance/gait distance  -EM  --     [place] Level (Gait Training Goal 1, PT)  conditional independence  -EM  --     Time Frame (Gait Training Goal 1, PT)  long term goal (LTG);2 weeks  -EM  --     Barriers (Gait Training Goal 1, PT)  600 ft or more per trip x 2 per day w/out LOB  -EM   --     Progress/Outcome (Gait Training Goal 1, PT)  goal not met  -EM  --        Stairs Goal 1 (PT)    Activity/Assistive Device (Stairs Goal 1, PT)  -- ascend/descend ramp w/ FWRW and supervision  -EM  --     [place] Level/Cues Needed (Stairs Goal 1, PT)  conditional independence;supervision required  -EM  --     Time Frame (Stairs Goal 1, PT)  long term goal (LTG);2 weeks  -EM  --     Progress/Outcome (Stairs Goal 1, PT)  goal not met  -EM  --        Patient Education Goal (PT)    Activity (Patient Education Goal, PT)  Indep w/ HEP   -EM  --     [place]/Cues/Accuracy (Memory Goal 2, PT)  demonstrates adequately  -EM  --     Time Frame (Patient Education Goal, PT)  1 week  -EM  --     Progress/Outcome (Patient Education Goal, PT)  goal not met  -EM  --        Occupational Therapy Goals    Transfer Goal Selection (OT)  --  transfer, OT goal 1  -TO     Bathing Goal Selection (OT)  --  bathing, OT goal 1  -TO     Dressing Goal Selection (OT)  --  dressing, OT goal 1  -TO     Toileting Goal Selection (OT)  --  toileting, OT goal 1  -TO        Transfer Goal 1 (OT)    Activity/Assistive Device (Transfer Goal 1, OT)  --  sit-to-stand/stand-to-sit;bed-to-chair/chair-to-bed;toilet  -TO     [place] Level/Cues Needed (Transfer Goal 1, OT)  --  conditional independence  -TO     Time Frame (Transfer Goal 1, OT)  --  long term goal (LTG);by discharge  -TO     Progress/Outcome (Transfer Goal 1, OT)  --  goal not met  -TO        Bathing Goal 1 (OT)    Activity/Assistive Device (Bathing Goal 1, OT)  --  bathing skills, all  -TO     [place] Level/Cues Needed (Bathing Goal 1, OT)  --  supervision required;set-up required  -TO     Time Frame (Bathing Goal 1, OT)  --  long term goal (LTG);by discharge  -TO     Progress/Outcomes (Bathing Goal 1, OT)  --  goal not met  -TO        Dressing Goal 1 (OT)    Activity/Assistive Device (Dressing Goal 1, OT)  --  dressing skills, all using AE PRN  -TO     [place]/Cues  "Needed (Dressing Goal 1, OT)  --  supervision required;set-up required  -TO     Time Frame (Dressing Goal 1, OT)  --  long term goal (LTG);by discharge  -TO     Progress/Outcome (Dressing Goal 1, OT)  --  goal not met  -TO        Toileting Goal 1 (OT)    Activity/Device (Toileting Goal 1, OT)  --  toileting skills, all  -TO     [place] Level/Cues Needed (Toileting Goal 1, OT)  --  conditional independence  -TO     Time Frame (Toileting Goal 1, OT)  --  long term goal (LTG);by discharge  -TO     Progress/Outcome (Toileting Goal 1, OT)  --  goal not met  -TO       User Key  (r) = Recorded By, (t) = Taken By, (c) = Cosigned By    Initials Name Provider Type Discipline    EM Miguel Angel Grant, PTA Physical Therapy Assistant PT    TO Alistair Gayle COTA/L Occupational Therapy Assistant OT              PT Recommendation and Plan  Anticipated Discharge Disposition (PT): anticipate therapy at next level of care  Therapy Frequency (PT Clinical Impression): 2 times/day  Outcome Summary/Treatment Plan (PT)  Daily Summary of Progress (PT): progress towards functional goals is fair  Barriers to Overall Progress (PT): Pt confusion/ intermittent episodes   Plan for Continued Treatment (PT): Continue  Anticipated Discharge Disposition (PT): anticipate therapy at next level of care  Progress: no change  Outcome Summary: Pt confused to location/situation, but oriented to  and name. Pt t/f sup-sit with min Ax1. Pt sat EOB with SBA/min Ax1-Pt had frequent  intermittent \"jerks\" as if she was falling asleep that would cause her to loose use of UE and balance, but would quickly rebound. This often caused min Ax1 for correction and vc's to stay on task-nsg made aware. PTA declined to t/f pt OOB to chair or to attempt gt due to these episodes and due to safety concerns secondary to confusion/restlessness.  Outcome Measures     Row Name 02/15/20 1430 02/15/20 0931 20 1427       How much help from another person do you currently " need...    Turning from your back to your side while in flat bed without using bedrails?  2  -EM  --  2  -TW    Moving from lying on back to sitting on the side of a flat bed without bedrails?  2  -EM  --  2  -TW    Moving to and from a bed to a chair (including a wheelchair)?  2  -EM  --  2  -TW    Standing up from a chair using your arms (e.g., wheelchair, bedside chair)?  1  -EM  --  1  -TW    Climbing 3-5 steps with a railing?  1  -EM  --  1  -TW    To walk in hospital room?  2  -EM  --  2  -TW    AM-PAC 6 Clicks Score (PT)  10  -EM  --  10  -TW       How much help from another is currently needed...    Putting on and taking off regular lower body clothing?  --  1  -TO  --    Bathing (including washing, rinsing, and drying)  --  1  -TO  --    Toileting (which includes using toilet bed pan or urinal)  --  1  -TO  --    Putting on and taking off regular upper body clothing  --  2  -TO  --    Taking care of personal grooming (such as brushing teeth)  --  2  -TO  --    Eating meals  --  3  -TO  --    AM-PAC 6 Clicks Score (OT)  --  10  -TO  --       Functional Assessment    Outcome Measure Options  AM-[facility] 6 Clicks Basic Mobility (PT)  -EM  --  --    Row Name 02/14/20 1110 02/13/20 1310 02/13/20 0938       How much help from another person do you currently need...    Turning from your back to your side while in flat bed without using bedrails?  --  --  2  -TW    Moving from lying on back to sitting on the side of a flat bed without bedrails?  --  --  2  -TW    Moving to and from a bed to a chair (including a wheelchair)?  --  --  2  -TW    Standing up from a chair using your arms (e.g., wheelchair, bedside chair)?  --  --  1  -TW    Climbing 3-5 steps with a railing?  --  --  1  -TW    To walk in hospital room?  --  --  2  -TW    AM-PAC 6 Clicks Score (PT)  --  --  10  -TW       How much help from another is currently needed...    Putting on and taking off regular lower body clothing?  1  -BB  1  -KD  --    Bathing  (including washing, rinsing, and drying)  2  -BB  2  -KD  --    Toileting (which includes using toilet bed pan or urinal)  1  -BB  1  -KD  --    Putting on and taking off regular upper body clothing  2  -BB  2  -KD  --    Taking care of personal grooming (such as brushing teeth)  3  -BB  3  -KD  --    Eating meals  4  -BB  4  -KD  --    AM-PAC 6 Clicks Score (OT)  13  -BB  13  -KD  --       Functional Assessment    Outcome Measure Options  --  --  AM-PAC 6 Clicks Basic Mobility (PT)  -TW      User Key  (r) = Recorded By, (t) = Taken By, (c) = Cosigned By    Initials Name Provider Type    Miguel Angel Squires PTA Physical Therapy Assistant    TW Reece Rocha, PTA Physical Therapy Assistant    BB Selena Simpson, COLÓN/L Occupational Therapy Assistant    KD Akua Henderson, COLÓN/L Occupational Therapy Assistant    TO Alistair Gayle, COLÓN/L Occupational Therapy Assistant         Time Calculation:   PT Charges     Row Name 02/15/20 1624             Time Calculation    Start Time  1430  -EM      Stop Time  1503  -EM      Time Calculation (min)  33 min  -EM         Time Calculation- PT    Total Timed Code Minutes- PT  33 minute(s)  -EM        User Key  (r) = Recorded By, (t) = Taken By, (c) = Cosigned By    Initials Name Provider Type    Miguel Angel Squires PTA Physical Therapy Assistant        Therapy Charges for Today     Code Description Service Date Service Provider Modifiers Qty    73976457286 HC PT THERAPEUTIC ACT EA 15 MIN 2/15/2020 Miguel Angel Grant PTA GP 1    48077171573 HC PT THER PROC EA 15 MIN 2/15/2020 Miguel Angel Grant PTA GP 1          PT G-Codes  Outcome Measure Options: AM-PAC 6 Clicks Basic Mobility (PT)  AM-PAC 6 Clicks Score (PT): 10  AM-PAC 6 Clicks Score (OT): 10    Miguel Angel Grant PTA  2/15/2020

## 2020-02-15 NOTE — PLAN OF CARE
"Pt was working with Therapist and CNA was assisting.  All fall measures were in place, but upon returning the patient to bed, her \"knees buckled\" and the therapist and CNA had the gait belt and helped her sit down in the floor.  Pt continues to be confused.    "

## 2020-02-15 NOTE — PROGRESS NOTES
"Anticoagulation by Pharmacy - Warfarin    Brendon Skelton is a 74 y.o.female being continued on warfarin for atrial fibrillation / aortic valve replacement  Patient is also receiving heparin subcutaneous    Home regimen: warfarin 5 mg TuThSaSu, 2.5 mg MoWeFr  INR Goal: 2.5 - 3.5 per coumadin clinic    Last INR:   Lab Results   Component Value Date    INR 1.50 (H) 02/15/2020       Objective:  [Ht: (P) 165.1 cm (65\"); Wt: 106 kg (233 lb 11 oz)]  Lab Results   Component Value Date    INR 1.50 (H) 02/15/2020    INR 1.12 02/10/2020    INR 1.90 (A) 02/04/2020    PROTIME 14.2 02/10/2020    PROTIME 43.7 (H) 07/26/2019    PROTIME 21.2 (H) 07/22/2019     Lab Results   Component Value Date    HGB 10.4 (L) 02/15/2020    HGB 9.8 (L) 02/14/2020    HGB 10.2 (L) 02/13/2020    HCT 30.9 (L) 02/15/2020    HCT 29.3 (L) 02/14/2020    HCT 30.7 (L) 02/13/2020     (L) 02/15/2020     (L) 02/14/2020     (L) 02/13/2020           Assessment  Interacting medications: trazodone  INR is 1.5, subtherapeutic, due to being on hold 5 days prior to colon resection 2/10.  Will re-initiate with a 2-3 days of warfarin 5 mg, trend INR, and then return to home regimen.  H/H below normal limits today    Plan:  1.  Give warfarin 5 mg tablet PO @ 1800 tonight  2.  Draw a PT/INR in AM  3.  Pharmacy will continue to follow    Quinn Mercer [facility]  02/15/20 3:52 PM     "

## 2020-02-15 NOTE — PROGRESS NOTES
"[facility]  [address] [place] [address]. [address]  T - [phone]  F - [phone]     Progress Note          PATIENT  DEMOGRAPHICS   PATIENT NAME: Brendon Skelton                      PHYSICIAN: Marcelino Love MD  : 1945  MRN: [id number]   LOS: 5 days    Patient Care Team:  Josefa Pozo APRN as PCP - General (Nurse Practitioner)  Meme Duckworth APRN as PCP - Claims Attributed  Luis Maher MD as Surgeon (General Surgery)  Subjective   SUBJECTIVE   Patient tells me is feeling better he is made about 1.1 L of urine denies any chest pain or shortness of air.  Her family tells me she is a bit confused at this time.  She answers all questions appropriately to me.         Objective   OBJECTIVE   Vital Signs  Temp:  [96.9 °F (36.1 °C)-98.3 °F (36.8 °C)] 96.9 °F (36.1 °C)  Heart Rate:  [73-97] 97  Resp:  [18-20] 18  BP: (140-172)/(68-82) 140/75    Flowsheet Rows      First Filed Value   Admission Height  165.1 cm (65\") Documented at 02/10/2020 1036   Admission Weight  104 kg (229 lb 15 oz) Documented at 02/10/2020 1036           I/O last 3 completed shifts:  In: 840 [P.O.:840]  Out: 1150 [Urine:1150]    PHYSICAL EXAM    Physical Exam   Constitutional: She is oriented to person, place, and time. She appears well-developed.   HENT:   Head: Normocephalic.   Eyes: Pupils are equal, round, and reactive to light.   Cardiovascular: Normal rate, regular rhythm and normal heart sounds.   Pulmonary/Chest: Effort normal and breath sounds normal.   Abdominal: Soft. Bowel sounds are normal.   Neurological: She is alert and oriented to person, place, and time.       RESULTS   Results Review:    Results from last 7 days   Lab Units 02/15/20  0714 20  1610 20  0648   SODIUM mmol/L 129* 125* 126*   POTASSIUM mmol/L 4.3 4.4 5.3*   CHLORIDE mmol/L 90* 91* 90*   CO2 mmol/L 21.0* 22.0 21.0*   BUN mg/dL 87* 81* 70*   CREATININE mg/dL 3.71* 3.60* 3.69*   CALCIUM mg/dL 9.1 8.4* " 7.9*   GLUCOSE mg/dL 125* 161* 143*       Estimated Creatinine Clearance: 16.1 mL/min (A) (by C-G formula based on SCr of 3.71 mg/dL (H)).    Results from last 7 days   Lab Units 02/15/20  0714 02/14/20  1610 02/13/20  0648   MAGNESIUM mg/dL 2.2 2.3 2.2   PHOSPHORUS mg/dL 4.5 4.8* 5.3*             Results from last 7 days   Lab Units 02/15/20  0714 02/14/20  1610 02/13/20  0648 02/12/20  0754 02/12/20  0002  02/11/20  0443   WBC 10*3/mm3 13.10* 12.64* 12.66* 10.53  --   --  9.99   HEMOGLOBIN g/dL 10.4* 9.8* 10.2* 9.9* 9.6*   < > 7.7*   PLATELETS 10*3/mm3 137* 133* 101* 119*  --   --  139*    < > = values in this interval not displayed.       Results from last 7 days   Lab Units 02/10/20  1028   INR  1.12         Imaging Results (Last 24 Hours)     ** No results found for the last 24 hours. **           MEDICATIONS      dilTIAZem  mg Oral Daily   famotidine 20 mg Oral Daily   heparin (porcine) 5,000 Units Subcutaneous Q8H   metoprolol succinate XL 25 mg Oral Daily   sodium chloride 10 mL Intravenous Q12H   sodium chloride 10 mL Intravenous Q12H   traZODone 50 mg Oral Nightly   warfarin 5 mg Oral Daily       Pharmacy to dose warfarin        Assessment/Plan   ASSESSMENT / PLAN      Confusion    Atrial fibrillation [I48.91]    Diastolic heart failure (CMS/HCC)    COPD (chronic obstructive pulmonary disease) (CMS/HCC)    Diabetes mellitus (CMS/HCC)    Chronic hypoxemic respiratory failure (CMS/HCC)    Stage 4 chronic kidney disease (CMS/HCC)    Morbid obesity (CMS/HCC)    Chronic anemia    Pulmonary hypertension (CMS/HCC)    Cancer of sigmoid (CMS/HCC)    Hyponatremia    Long term current use of anticoagulant       1- ckd 4 - baseline cr is close to 2. anya ? atn related to hypoperfusion.  Better urine output at this time.  Volume status acceptable for now.  Acidosis improving white count stable, hemoglobin stable sodium better asymptomatic at this time.   Talked to her about need of RRT. dw Daughter in law as well,  she appears to be slightly dry at this time.     2- laproscopic sigmoid colon resection on 2/10/2020 for adenocarcinoma of colon.      3- htn borderline high - at present on metoprolol and cardizem     4- anemia post op - s/p 4 u prbc.  Slightly better    Plan: Continue with current medications at this time.  Doing well from renal standpoint.  Add normal saline at 50 cc/h.  Strict I's and O's daily weights.  Repeat labs again this evening.  Will follow from there                This document has been electronically signed by Marcelino Love MD on February 15, 2020 10:47 AM

## 2020-02-15 NOTE — THERAPY TREATMENT NOTE
Acute Care - Occupational Therapy Treatment Note  [facility]     Patient Name: Brendon Skelton  : 1945  MRN: [id number]  Today's Date: 2/15/2020  Onset of Illness/Injury or Date of Surgery: 02/10/20  Date of Referral to OT: 20  Referring Physician: Dr. Maher    Admit Date: 2/10/2020       ICD-10-CM ICD-9-CM   1. Cancer of sigmoid (CMS/HCC) C18.7 153.3   2. Impaired functional mobility, balance, gait, and endurance Z74.09 V49.89   3. Impaired mobility and ADLs Z74.09 799.89     Patient Active Problem List   Diagnosis   • Long term current use of anticoagulant therapy   • Personal history of heart valve replacement   • Atrial fibrillation [I48.91]   • Emphysema of lung (CMS/HCC)   • On anticoagulant therapy   • Hyperlipidemia   • Diastolic heart failure (CMS/HCC)   • Depressive disorder   • COPD (chronic obstructive pulmonary disease) (CMS/HCC)   • Diabetes mellitus (CMS/HCC)   • Myopia   • Astigmatism   • Bleeding from open wound of chest wall   • Follow-up surgery care   • Encounter for screening for malignant neoplasm of colon   • Positive colorectal cancer screening using DNA-based stool test   • Nodule of left lung   • Chronic hypoxemic respiratory failure (CMS/HCC)   • Physical deconditioning   • Heart failure with preserved left ventricular function (HFpEF) (CMS/HCC)   • Hypertension   • Coronary artery disease involving native coronary artery without angina pectoris   • Hx of CABG   • SSS (sick sinus syndrome) (CMS/HCC)   • Pacemaker   • Stage 4 chronic kidney disease (CMS/HCC)   • Personal history of tobacco use, presenting hazards to health   • Gastrointestinal hemorrhage   • Morbid obesity (CMS/HCC)   • Gastritis   • Colon polyp   • Coumadin toxicity   • Acute on chronic diastolic congestive heart failure (CMS/HCC)   • Chronic anemia   • Pulmonary hypertension (CMS/HCC)   • Cancer of sigmoid (CMS/HCC)   • Confusion   • Hyponatremia   • Long term current use of anticoagulant     Past  Medical History:   Diagnosis Date   • Anxiety    • Aortic valve replaced    • Atrial fibrillation (CMS/HCC)    • C. difficile colitis    • Chronic hypoxemic respiratory failure (CMS/HCC)    • COPD (chronic obstructive pulmonary disease) (CMS/HCC)    • Coronary artery disease    • Depressive disorder    • Diabetes mellitus (CMS/HCC)    • Diastolic heart failure (CMS/HCC)    • Gastrointestinal hemorrhage    • Hyperlipidemia    • Hypertension    • Long term current use of anticoagulant    • Malignant neoplasm of sigmoid colon (CMS/HCC)    • Morbid obesity (CMS/HCC)    • Pulmonary hypertension (CMS/HCC)    • Rectal hemorrhage    • Stage 4 chronic kidney disease (CMS/HCC)      Past Surgical History:   Procedure Laterality Date   • AORTIC VALVE REPAIR/REPLACEMENT     • CARDIAC ELECTROPHYSIOLOGY PROCEDURE N/A 3/20/2017   • CARDIAC PACEMAKER PLACEMENT     • CATARACT EXTRACTION W/ INTRAOCULAR LENS IMPLANT Left 2/1/2019   • CATARACT EXTRACTION W/ INTRAOCULAR LENS IMPLANT Right 2/8/2019   • COLON RESECTION Left 2/10/2020   • COLONOSCOPY N/A 6/19/2019   • COLONOSCOPY N/A 6/24/2019   • ENDOSCOPY N/A 6/19/2019   • PACEMAKER REPLACEMENT N/A 3/21/2017   • SIGMOIDOSCOPY N/A 9/30/2019   • UPPER GASTROINTESTINAL ENDOSCOPY         Therapy Treatment    Rehabilitation Treatment Summary     Row Name 02/15/20 0931             Treatment Time/Intention    Discipline  occupational therapy assistant  -TO      Document Type  therapy note (daily note)  -TO      Subjective Information  no complaints  -TO      Mode of Treatment  individual therapy;occupational therapy  -TO      Patient/Family Observations  pt side lying with both feet off bed attempting to get to BR but stuck  -TO      Therapy Frequency (OT Eval)  daily  -TO      Patient Effort  fair  -TO      Existing Precautions/Restrictions  fall  -TO      Recorded by [TO] Alistair Gayle COTA/L 02/15/20 1418      Row Name 02/15/20 0931             Vital Signs    Pre Systolic BP Rehab  146   -TO      Pre Treatment Diastolic BP  72  -TO      Post Systolic BP Rehab  145  -TO      Post Treatment Diastolic BP  70  -TO      Pretreatment Heart Rate (beats/min)  84  -TO      Posttreatment Heart Rate (beats/min)  94  -TO      Pre SpO2 (%)  98  -TO      O2 Delivery Pre Treatment  supplemental O2  -TO      Post SpO2 (%)  94  -TO      O2 Delivery Post Treatment  supplemental O2  -TO      Pre Patient Position  Supine  -TO      Intra Patient Position  Sitting  -TO      Post Patient Position  Supine  -TO      Recorded by [TO] Alistair Gayle COTA/L 02/15/20 1418      Row Name 02/15/20 0931             Cognitive Assessment/Intervention- PT/OT    Affect/Mental Status (Cognitive)  confused  -TO      Orientation Status (Cognition)  oriented to;person;unable/difficult to assess  -TO      Follows Commands (Cognition)  follows one step commands;50-74% accuracy  -TO      Recorded by [TO] Alistair Gayle COTA/L 02/15/20 1418      Row Name 02/15/20 0931             Safety Issues, Functional Mobility    Safety Issues Affecting Function (Mobility)  at risk behavior observed;ability to follow commands;safety precaution awareness  -TO      Impairments Affecting Function (Mobility)  balance;cognition;endurance/activity tolerance;motor planning  -TO      Recorded by [TO] Alistair Gayle COTA/L 02/15/20 1418      Row Name 02/15/20 0931             Bed Mobility Assessment/Treatment    Bed Mobility Assessment/Treatment  supine-sit  -TO      [place] Level (Bed Mobility)  moderate assist (50% patient effort) already side llying  -TO      Scooting/Bridging [place] (Bed Mobility)  moderate assist (50% patient effort)  -TO      Recorded by [TO] Alistair Gayle COTA/L 02/15/20 1418      Row Name 02/15/20 0931             Functional Mobility    Functional Mobility- Ind. Level  contact guard assist;2 person assist required;maximum assist (25% patient effort) pt completed trip to [facility] x 2, return trip lowered to floo  -TO       Functional Mobility- Device  rolling walker  -TO      Functional Mobility-Distance (Feet)  10  -TO      Functional Mobility- Safety Issues  supplemental O2  -TO      Recorded by [TO] Alistair Gayle COLÓN/L 02/15/20 1418      Row Name 02/15/20 0931             Transfer Assessment/Treatment    Transfer Assessment/Treatment  toilet transfer  -TO      Recorded by [TO] Alistair Gayle COLÓN/L 02/15/20 1418      Row Name 02/15/20 0931             Toilet Transfer    Type (Toilet Transfer)  stand pivot/stand step  -TO      [place] Level (Toilet Transfer)  2 person assist;maximum assist (25% patient effort)  -TO      Assistive Device (Toilet Transfer)  walker, front-wheeled;grab bars/safety frame 2* to knee giving out  -TO      Recorded by [TO] Alistair Gayle COLÓN/L 02/15/20 1418      Row Name 02/15/20 0931             Pain Scale: Numbers Pre/Post-Treatment    Pain Scale: Numbers, Pretreatment  0/10 - no pain  -TO      Pain Scale: Numbers, Post-Treatment  0/10 - no pain  -TO      Recorded by [TO] Alistair Gayle COLÓN/L 02/15/20 1418      Row Name                Wound 02/10/20 1450 abdomen Incision    Wound - Properties Group Date first assessed: 02/10/20 [CH] Time first assessed: 1450 [CH] Present on Hospital Admission: N [CH] Location: abdomen [CH] Primary Wound Type: Incision [CH] Additional Comments: LAPAROSCOPIC INCISIONS X2 [CH] Recorded by:  [CH] Elayne Fung RN 02/10/20 1450    Row Name                Wound 02/10/20 1450 lower;midline abdomen Incision    Wound - Properties Group Date first assessed: 02/10/20 [CH] Time first assessed: 1450 [CH] Present on Hospital Admission: N [CH] Orientation: lower;midline [CH] Location: abdomen [CH] Primary Wound Type: Incision [CH] Recorded by:  [CH] Elayne Fung RN 02/10/20 1450    Row Name 02/15/20 0931             Outcome Summary/Treatment Plan (OT)    Plan for Continued Treatment (OT)  cont OT poc  -TO      Anticipated Discharge Disposition (OT)   anticipate therapy at next level of care  -TO      Recorded by [TO] Alistair Gayle ELDON SANCHEZ/L 02/15/20 1418        User Key  (r) = Recorded By, (t) = Taken By, (c) = Cosigned By    Initials Name Effective Dates Discipline    TO Alistair Gayle, COLÓN/L 03/07/18 -  OT    CH Kenton, Elayne VU RN 06/23/17 -  Nurse        Wound 02/10/20 1450 abdomen Incision (Active)   Dressing Appearance open to air 2/15/2020  7:19 AM   Closure Liquid skin adhesive;Adhesive closure strips 2/15/2020  7:19 AM   Base clean 2/15/2020  7:19 AM   Periwound ecchymotic 2/15/2020  7:19 AM   Periwound Temperature warm 2/15/2020  7:19 AM   Periwound Skin Turgor soft 2/15/2020  7:19 AM   Drainage Amount none 2/14/2020  8:30 PM       Wound 02/10/20 1450 lower;midline abdomen Incision (Active)   Dressing Appearance open to air 2/15/2020  7:19 AM   Closure Adhesive bandage 2/14/2020  8:30 PM   Base dressing in place, unable to visualize 2/14/2020  8:30 PM   Periwound ecchymotic 2/15/2020  7:19 AM   Periwound Temperature warm 2/15/2020  7:19 AM   Periwound Skin Turgor soft 2/15/2020  7:19 AM   Drainage Amount none 2/14/2020  8:30 PM     Rehab Goal Summary     Row Name 02/15/20 0931             Occupational Therapy Goals    Transfer Goal Selection (OT)  transfer, OT goal 1  -TO      Bathing Goal Selection (OT)  bathing, OT goal 1  -TO      Dressing Goal Selection (OT)  dressing, OT goal 1  -TO      Toileting Goal Selection (OT)  toileting, OT goal 1  -TO         Transfer Goal 1 (OT)    Activity/Assistive Device (Transfer Goal 1, OT)  sit-to-stand/stand-to-sit;bed-to-chair/chair-to-bed;toilet  -TO      [place] Level/Cues Needed (Transfer Goal 1, OT)  conditional independence  -TO      Time Frame (Transfer Goal 1, OT)  long term goal (LTG);by discharge  -TO      Progress/Outcome (Transfer Goal 1, OT)  goal not met  -TO         Bathing Goal 1 (OT)    Activity/Assistive Device (Bathing Goal 1, OT)  bathing skills, all  -TO      [place]  Level/Cues Needed (Bathing Goal 1, OT)  supervision required;set-up required  -TO      Time Frame (Bathing Goal 1, OT)  long term goal (LTG);by discharge  -TO      Progress/Outcomes (Bathing Goal 1, OT)  goal not met  -TO         Dressing Goal 1 (OT)    Activity/Assistive Device (Dressing Goal 1, OT)  dressing skills, all using AE PRN  -TO      [place]/Cues Needed (Dressing Goal 1, OT)  supervision required;set-up required  -TO      Time Frame (Dressing Goal 1, OT)  long term goal (LTG);by discharge  -TO      Progress/Outcome (Dressing Goal 1, OT)  goal not met  -TO         Toileting Goal 1 (OT)    Activity/Device (Toileting Goal 1, OT)  toileting skills, all  -TO      [place] Level/Cues Needed (Toileting Goal 1, OT)  conditional independence  -TO      Time Frame (Toileting Goal 1, OT)  long term goal (LTG);by discharge  -TO      Progress/Outcome (Toileting Goal 1, OT)  goal not met  -TO        User Key  (r) = Recorded By, (t) = Taken By, (c) = Cosigned By    Initials Name Provider Type Discipline    TO Alistair Gayle COTA/L Occupational Therapy Assistant OT            OT Recommendation and Plan  Outcome Summary/Treatment Plan (OT)  Plan for Continued Treatment (OT): cont OT poc  Anticipated Discharge Disposition (OT): anticipate therapy at next level of care  Therapy Frequency (OT Eval): daily  Outcome Summary: Pt confused upon arrival, but able to state name and need to have BM. Pt sit<>stand CGA x 2 to t/f to BR min x 2 with RW and difficulty following cues. Pt t/f toilet>EOB with RW and assist of 2 when pt L knee gave out and pt lowered to floor. NSG notified and  pt returned to bed once assessed Dx5.  Outcome Measures     Row Name 02/15/20 0931 02/14/20 1427 02/14/20 1110       How much help from another person do you currently need...    Turning from your back to your side while in flat bed without using bedrails?  --  2  -TW  --    Moving from lying on back to sitting on the side of a flat bed  without bedrails?  --  2  -TW  --    Moving to and from a bed to a chair (including a wheelchair)?  --  2  -TW  --    Standing up from a chair using your arms (e.g., wheelchair, bedside chair)?  --  1  -TW  --    Climbing 3-5 steps with a railing?  --  1  -TW  --    To walk in hospital room?  --  2  -TW  --    AM-PAC 6 Clicks Score (PT)  --  10  -TW  --       How much help from another is currently needed...    Putting on and taking off regular lower body clothing?  1  -TO  --  1  -BB    Bathing (including washing, rinsing, and drying)  1  -TO  --  2  -BB    Toileting (which includes using toilet bed pan or urinal)  1  -TO  --  1  -BB    Putting on and taking off regular upper body clothing  2  -TO  --  2  -BB    Taking care of personal grooming (such as brushing teeth)  2  -TO  --  3  -BB    Eating meals  3  -TO  --  4  -BB    AM-PAC 6 Clicks Score (OT)  10  -TO  --  13  -BB    Row Name 02/13/20 1310 02/13/20 0938          How much help from another person do you currently need...    Turning from your back to your side while in flat bed without using bedrails?  --  2  -TW     Moving from lying on back to sitting on the side of a flat bed without bedrails?  --  2  -TW     Moving to and from a bed to a chair (including a wheelchair)?  --  2  -TW     Standing up from a chair using your arms (e.g., wheelchair, bedside chair)?  --  1  -TW     Climbing 3-5 steps with a railing?  --  1  -TW     To walk in hospital room?  --  2  -TW     AM-PAC 6 Clicks Score (PT)  --  10  -TW        How much help from another is currently needed...    Putting on and taking off regular lower body clothing?  1  -KD  --     Bathing (including washing, rinsing, and drying)  2  -KD  --     Toileting (which includes using toilet bed pan or urinal)  1  -KD  --     Putting on and taking off regular upper body clothing  2  -KD  --     Taking care of personal grooming (such as brushing teeth)  3  -KD  --     Eating meals  4  -KD  --     AM-PAC 6  Clicks Score (OT)  13  -KD  --        Functional Assessment    Outcome Measure Options  --  AM-PAC 6 Clicks Basic Mobility (PT)  -TW       User Key  (r) = Recorded By, (t) = Taken By, (c) = Cosigned By    Initials Name Provider Type    TW Reece Rocha, PTA Physical Therapy Assistant    BB Selena Simpson, COLÓN/L Occupational Therapy Assistant    KD Akua Henderson, COLÓN/L Occupational Therapy Assistant    TO Alistair Gayle COTA/L Occupational Therapy Assistant           Time Calculation:   Time Calculation- OT     Row Name 02/15/20 1422             Time Calculation- OT    OT Start Time  0931  -TO      OT Stop Time  1030  -TO      OT Time Calculation (min)  59 min  -TO      Total Timed Code Minutes- OT  59 minute(s)  -TO      OT Received On  02/15/20  -TO        User Key  (r) = Recorded By, (t) = Taken By, (c) = Cosigned By    Initials Name Provider Type    TO Alistair Gayle COTA/L Occupational Therapy Assistant        Therapy Charges for Today     Code Description Service Date Service Provider Modifiers Qty    [id number]  OT SELF CARE/MGMT/TRAIN EA 15 MIN 2/15/2020 Alistair Gayle COTA/L GO 4               YSABEL Cruz  2/15/2020

## 2020-02-15 NOTE — PLAN OF CARE
"  Problem: Patient Care Overview  Goal: Plan of Care Review  Outcome: Ongoing (interventions implemented as appropriate)  Flowsheets  Taken 2/15/2020 1620 by Miguel Angel Grant PTA  Progress: no change  Outcome Summary: Pt confused to location/situation, but oriented to  and name. Pt t/f sup-sit with min Ax1. Pt sat EOB with SBA/min Ax1-Pt had frequent  intermittent \"jerks\" as if she was falling asleep that would cause her to loose use of UE and balance, but would quickly rebound. This often caused min Ax1 for correction and vc's to stay on task-nsg made aware. PTA declined to t/f pt OOB to chair or to attempt gt due to these episodes and due to safety concerns secondary to confusion/restlessness.  Taken 2/15/2020 0719 by Kathy Pardo, RN  Plan of Care Reviewed With: patient     "

## 2020-02-15 NOTE — NURSING NOTE
Therapy working with patient.  Ambulating to bathroom. Patient knee buckled and CNA and Therapist assisted patient to floor.  Therapist and CNA assisted patient to floor.

## 2020-02-15 NOTE — PLAN OF CARE
Problem: Patient Care Overview  Goal: Plan of Care Review  Outcome: Ongoing (interventions implemented as appropriate)  Flowsheets (Taken 2/15/2020 6694)  Outcome Summary: Pt confused upon arrival, but able to state name and need to have BM. Pt sit<>stand CGA x 2 to t/f to BR min x 2 with RW and difficulty following cues. Pt t/f toilet>EOB with RW and assist of 2 when pt L knee gave out and pt lowered to floor. NSG notified and  pt returned to bed once assessed Dx5.

## 2020-02-15 NOTE — CONSULTS
Consult Note  Colin Coreas MD  Hospitalist    Referring Provider: Dr. Maher - General Surgery    Reason for Consultation: confusion    Patient Care Team:  Josefa Pozo APRN as PCP - General (Nurse Practitioner)  Luis Maher MD as Surgeon (General Surgery)    Chief complaint confusion.    Subjective .     History of present illness: admitted for elective colon resection. She has remaines confused, slightly agitated and confused at times for the last several days ever since her surgery.    History  Past Medical History:   Diagnosis Date   • Anxiety    • Aortic valve replaced    • Atrial fibrillation (CMS/HCC)    • C. difficile colitis    • Chronic hypoxemic respiratory failure (CMS/HCC)    • COPD (chronic obstructive pulmonary disease) (CMS/HCC)    • Coronary artery disease    • Depressive disorder    • Diabetes mellitus (CMS/HCC)    • Diastolic heart failure (CMS/HCC)    • Gastrointestinal hemorrhage    • Hyperlipidemia    • Hypertension    • Long term current use of anticoagulant    • Malignant neoplasm of sigmoid colon (CMS/HCC)    • Morbid obesity (CMS/HCC)    • Pulmonary hypertension (CMS/HCC)    • Rectal hemorrhage    • Stage 4 chronic kidney disease (CMS/HCC)    ,   Past Surgical History:   Procedure Laterality Date   • AORTIC VALVE REPAIR/REPLACEMENT     • CARDIAC ELECTROPHYSIOLOGY PROCEDURE N/A 3/20/2017   • CARDIAC PACEMAKER PLACEMENT     • CATARACT EXTRACTION W/ INTRAOCULAR LENS IMPLANT Left 2019   • CATARACT EXTRACTION W/ INTRAOCULAR LENS IMPLANT Right 2019   • COLON RESECTION Left 2/10/2020   • COLONOSCOPY N/A 2019   • COLONOSCOPY N/A 2019   • ENDOSCOPY N/A 2019   • PACEMAKER REPLACEMENT N/A 3/21/2017   • SIGMOIDOSCOPY N/A 2019   • UPPER GASTROINTESTINAL ENDOSCOPY      and   Social History     Tobacco Use   • Smoking status: Former Smoker     Last attempt to quit: 3/21/1999     Years since quittin.9   • Smokeless tobacco: Never Used   Substance Use Topics   • Alcohol  use: No   • Drug use: No     Current Facility-Administered Medications   Medication Dose Route Frequency Provider Last Rate Last Dose   • acetaminophen (TYLENOL) tablet 325 mg  325 mg Oral Q6H PRN Luis Maher MD       • albuterol (PROVENTIL) nebulizer solution 0.083% 2.5 mg/3mL  2.5 mg Nebulization Q4H PRN Luis Maher MD       • dilTIAZem CD (CARDIZEM CD) 24 hr capsule 240 mg  240 mg Oral Daily Luis Maher MD   240 mg at 02/15/20 0856   • famotidine (PEPCID) tablet 20 mg  20 mg Oral Daily Sandy Captuo MD   20 mg at 02/15/20 0856   • heparin (porcine) 5000 UNIT/ML injection 5,000 Units  5,000 Units Subcutaneous Q8H Gurpreet Coe MD   5,000 Units at 02/15/20 1353   • metoprolol succinate XL (TOPROL-XL) 24 hr tablet 25 mg  25 mg Oral Daily Luis Maher MD   25 mg at 02/15/20 0856   • ondansetron (ZOFRAN) injection 4 mg  4 mg Intravenous Q6H PRN Luis Maher MD   4 mg at 02/13/20 0345   • Pharmacy to dose warfarin   Does not apply Continuous PRN Luis Maher MD       • sodium chloride 0.9 % flush 10 mL  10 mL Intravenous Q12H Luis Maher MD   10 mL at 02/15/20 0857   • sodium chloride 0.9 % flush 10 mL  10 mL Intravenous Q12H Luis Maher MD   10 mL at 02/15/20 0856   • sodium chloride 0.9 % infusion  50 mL/hr Intravenous Continuous Marcelino Love MD 50 mL/hr at 02/15/20 1353 50 mL/hr at 02/15/20 1353   • traZODone (DESYREL) tablet 50 mg  50 mg Oral Nightly Colin Coreas MD       • warfarin (COUMADIN) tablet 5 mg  5 mg Oral Daily Luis Maher MD           Review of Systems  Review of Systems   Constitutional: Positive for fatigue. Negative for fever.   Respiratory: Positive for cough. Negative for shortness of breath, wheezing and stridor.    Cardiovascular: Negative for chest pain, palpitations and leg swelling.   Gastrointestinal: Negative for abdominal distention, abdominal pain and blood in stool.   Genitourinary: Negative for dysuria, frequency, genital sores, hematuria and urgency.      Musculoskeletal: Positive for arthralgias. Negative for back pain, gait problem and joint swelling.   Skin: Positive for pallor. Negative for color change and rash.   Neurological: Positive for weakness. Negative for tremors, seizures, speech difficulty, numbness and headaches.   Psychiatric/Behavioral: Positive for confusion. Negative for agitation and behavioral problems.       Objective     Vital Signs   Temp:  [96.9 °F (36.1 °C)-98.3 °F (36.8 °C)] 96.9 °F (36.1 °C)  Heart Rate:  [73-97] 82  Resp:  [18-20] 18  BP: (140-172)/(68-82) 169/77    Physical Exam:  Physical Exam   Constitutional: She appears ill. No distress.   Obese    HENT:   Head: Normocephalic and atraumatic.   Eyes: Pupils are equal, round, and reactive to light. EOM are normal. No scleral icterus.   Neck: Normal range of motion. Neck supple.   Cardiovascular: Normal rate and regular rhythm.   Pulmonary/Chest: Effort normal and breath sounds normal. No stridor. No respiratory distress.   Abdominal: Soft. Bowel sounds are normal. She exhibits no distension. There is no tenderness. There is no guarding.   Musculoskeletal: Normal range of motion. She exhibits no edema or tenderness.   Neurological: No cranial nerve deficit.   Confused    Skin: Skin is warm and dry. No rash noted. There is pallor.   Extensive abdominal hematoma   Vitals reviewed.      Results Review:  Lab Results (last 24 hours)     Procedure Component Value Units Date/Time    Protime-INR, Fingerstick [[id number]]  (Abnormal) Collected:  02/15/20 1048    Specimen:  Capillary Blood Updated:  02/15/20 1127     INR 1.50    Narrative:       Therapeutic range for most indications is 2.0-3.0 INR,  or 2.5-3.5 for mechanical heart valves.    Magnesium [[id number]]  (Normal) Collected:  02/15/20 0714    Specimen:  Blood Updated:  02/15/20 0817     Magnesium 2.2 mg/dL     Phosphorus [[id number]]  (Normal) Collected:  02/15/20 0714    Specimen:  Blood Updated:  02/15/20 0817     Phosphorus 4.5  mg/dL     Basic Metabolic Panel [[id number]]  (Abnormal) Collected:  02/15/20 0714    Specimen:  Blood Updated:  02/15/20 0817     Glucose 125 mg/dL      BUN 87 mg/dL      Creatinine 3.71 mg/dL      Sodium 129 mmol/L      Potassium 4.3 mmol/L      Chloride 90 mmol/L      CO2 21.0 mmol/L      Calcium 9.1 mg/dL      eGFR   Amer --     Comment: <15 Indicative of kidney failure.        eGFR Non African Amer 12 mL/min/1.73      Comment: <15 Indicative of kidney failure.        BUN/Creatinine Ratio 23.5     Anion Gap 18.0 mmol/L     Narrative:       GFR Normal >60  Chronic Kidney Disease <60  Kidney Failure <15      CBC & Differential [[id number]] Collected:  02/15/20 0714    Specimen:  Blood Updated:  02/15/20 0801    Narrative:       The following orders were created for panel order CBC & Differential.  Procedure                               Abnormality         Status                     ---------                               -----------         ------                     CBC Auto Differential[[id number]]        Abnormal            Final result                 Please view results for these tests on the individual orders.    CBC Auto Differential [[id number]]  (Abnormal) Collected:  02/15/20 0714    Specimen:  Blood Updated:  02/15/20 0801     WBC 13.10 10*3/mm3      RBC 3.34 10*6/mm3      Hemoglobin 10.4 g/dL      Hematocrit 30.9 %      MCV 92.5 fL      MCH 31.1 pg      MCHC 33.7 g/dL      RDW 15.9 %      RDW-SD 52.2 fl      MPV 11.3 fL      Platelets 137 10*3/mm3      Neutrophil % 82.7 %      Lymphocyte % 3.2 %      Monocyte % 8.9 %      Eosinophil % 0.9 %      Basophil % 0.5 %      Immature Grans % 3.8 %      Neutrophils, Absolute 10.83 10*3/mm3      Lymphocytes, Absolute 0.42 10*3/mm3      Monocytes, Absolute 1.17 10*3/mm3      Eosinophils, Absolute 0.12 10*3/mm3      Basophils, Absolute 0.06 10*3/mm3      Immature Grans, Absolute 0.50 10*3/mm3      nRBC 1.8 /100 WBC     Magnesium [[id number]]  (Normal)  Collected:  02/14/20 1610    Specimen:  Blood Updated:  02/14/20 1640     Magnesium 2.3 mg/dL     Phosphorus [[id number]]  (Abnormal) Collected:  02/14/20 1610    Specimen:  Blood Updated:  02/14/20 1640     Phosphorus 4.8 mg/dL     Basic Metabolic Panel [[id number]]  (Abnormal) Collected:  02/14/20 1610    Specimen:  Blood Updated:  02/14/20 1640     Glucose 161 mg/dL      BUN 81 mg/dL      Creatinine 3.60 mg/dL      Sodium 125 mmol/L      Potassium 4.4 mmol/L      Chloride 91 mmol/L      CO2 22.0 mmol/L      Calcium 8.4 mg/dL      eGFR   Amer --     Comment: <15 Indicative of kidney failure.        eGFR Non African Amer 12 mL/min/1.73      Comment: <15 Indicative of kidney failure.        BUN/Creatinine Ratio 22.5     Anion Gap 12.0 mmol/L     Narrative:       GFR Normal >60  Chronic Kidney Disease <60  Kidney Failure <15      CBC & Differential [[id number]] Collected:  02/14/20 1610    Specimen:  Blood Updated:  02/14/20 1623    Narrative:       The following orders were created for panel order CBC & Differential.  Procedure                               Abnormality         Status                     ---------                               -----------         ------                     CBC Auto Differential[[id number]]        Abnormal            Final result                 Please view results for these tests on the individual orders.    CBC Auto Differential [[id number]]  (Abnormal) Collected:  02/14/20 1610    Specimen:  Blood Updated:  02/14/20 1623     WBC 12.64 10*3/mm3      RBC 3.18 10*6/mm3      Hemoglobin 9.8 g/dL      Hematocrit 29.3 %      MCV 92.1 fL      MCH 30.8 pg      MCHC 33.4 g/dL      RDW 15.9 %      RDW-SD 51.8 fl      MPV 11.4 fL      Platelets 133 10*3/mm3      Neutrophil % 84.3 %      Lymphocyte % 3.7 %      Monocyte % 7.8 %      Eosinophil % 1.2 %      Basophil % 0.3 %      Immature Grans % 2.7 %      Neutrophils, Absolute 10.65 10*3/mm3      Lymphocytes, Absolute 0.47 10*3/mm3       Monocytes, Absolute 0.99 10*3/mm3      Eosinophils, Absolute 0.15 10*3/mm3      Basophils, Absolute 0.04 10*3/mm3      Immature Grans, Absolute 0.34 10*3/mm3      nRBC 0.9 /100 WBC     Blood Gas, Arterial [[id number]]  (Abnormal) Collected:  02/14/20 1510    Specimen:  Arterial Blood Updated:  02/14/20 1518     Site Left Radial     Ramses's Test Negative     pH, Arterial 7.306 pH units      Comment: 84 Value below reference range        pCO2, Arterial 48.1 mm Hg      Comment: 83 Value above reference range        pO2, Arterial 78.5 mm Hg      Comment: 84 Value below reference range        HCO3, Arterial 23.9 mmol/L      Base Excess, Arterial -2.5 mmol/L      Comment: 84 Value below reference range        O2 Saturation, Arterial 95.3 %      Barometric Pressure for Blood Gas 761 mmHg      Modality Nasal Cannula     Flow Rate 5.5 lpm      Ventilator Mode NA     Collected by GP     Comment: Meter: [id number]     :  [id number]           Imaging Results (Last 24 Hours)     Procedure Component Value Units Date/Time    XR Chest 1 View [[id number]] Resulted:  02/15/20 1359     Updated:  02/15/20 1359            Assessment/Plan       Confusion    Stage 4 chronic kidney disease (CMS/HCC)    COPD (chronic obstructive pulmonary disease) (CMS/HCC)    Chronic hypoxemic respiratory failure (CMS/HCC)    Pulmonary hypertension (CMS/HCC)    Atrial fibrillation [I48.91]    Diastolic heart failure (CMS/HCC)    Diabetes mellitus (CMS/HCC)    Chronic anemia    Cancer of sigmoid (CMS/HCC)    Morbid obesity (CMS/HCC)    Hyponatremia    Long term current use of anticoagulant    Stop Celexa / Requip and decrease the dose of Trazodone as she remains confused / delirious at times. Try PT/OT as tolerated.     She is not only 5 days post op after colon resection but she has multiple, severe medical problems that could worsen her mental status as well.    Colin Coreas MD  02/15/20  2:34 PM

## 2020-02-15 NOTE — PROGRESS NOTES
GENERAL SURGERY PROGRESS NOTE     LOS: 5 days     Chief Complaint:     Brendon Skelton is a 74 year old lady who is post-operative day 5 status post laparoscopic sigmoid colon resection for cancer of sigmoid colon.    Interval History:     Ms. Skelton appears confused this morning, but reports being comfortable and that pain is well controlled on Tylenol. She is tolerating a soft foods diet and does not report any additional episodes of nausea/vomiting. Urine output over past 24 hours has been significantly improved (1025 mL). She reports having regular bowel movements.      Medication Review:     citalopram 20 mg Oral Daily   dilTIAZem  mg Oral Daily   famotidine 20 mg Oral Daily   heparin (porcine) 5,000 Units Subcutaneous Q8H   metoprolol succinate XL 25 mg Oral Daily   rOPINIRole 0.25 mg Oral Nightly   sodium chloride 10 mL Intravenous Q12H   sodium chloride 10 mL Intravenous Q12H   traZODone 150 mg Oral Nightly        Objective     Vital Signs:  Temp:  [96 °F (35.6 °C)-98 °F (36.7 °C)] 97.8 °F (36.6 °C)  Heart Rate:  [73-98] 86  Resp:  [18-20] 18  BP: (152-178)/(68-89) 166/79    Intake/Output Summary (Last 24 hours) at 2/15/2020 0701  Last data filed at 2/15/2020 0316  Gross per 24 hour   Intake 840 ml   Output 1025 ml   Net -185 ml       Physical Exam  General: Confused appearing woman in no acute distress  Head: Normocephalic and atraumatic.   Neck: Normal range of motion. Neck supple.   Cardiovascular: Heart regular rate and rhythm with no murmurs, gallops, rales. Radial pulses strong bilaterally.   Pulmonary/Chest: Lungs clear to auscultation bilaterally.  Abdominal: Soft and non-tender. Incision site healing appropriately.  Musculoskeletal: Normal range of motion.   Skin: Skin warm and dry. No rashes. Scattered bruising present.    Results Review:    Results from last 7 days   Lab Units 02/14/20  1610 02/13/20  0648 02/12/20  0754   SODIUM mmol/L 125* 126* 125*   POTASSIUM mmol/L 4.4 5.3* 4.8    CHLORIDE mmol/L 91* 90* 89*   CO2 mmol/L 22.0 21.0* 23.0   BUN mg/dL 81* 70* 64*   CREATININE mg/dL 3.60* 3.69* 3.12*   GLUCOSE mg/dL 161* 143* 140*   CALCIUM mg/dL 8.4* 7.9* 8.2*     Results from last 7 days   Lab Units 02/14/20  1610 02/13/20  0648 02/12/20  0754   WBC 10*3/mm3 12.64* 12.66* 10.53   HEMOGLOBIN g/dL 9.8* 10.2* 9.9*   HEMATOCRIT % 29.3* 30.7* 29.8*   PLATELETS 10*3/mm3 133* 101* 119*       Assessment:    Brendon Skelton is a 74 year old lady who is status post laparoscopic sigmoid colon resection 2/10/20. She is stable on the medical/surgical floor, and urinary output has improved significantly.    Plan:    - Continue routine post-operative care  - Continue to follow nephrology recommendations regarding possible dialysis  - Monitor for progression of delirium  - Leave Ann in place to strictly measure urinary output  - Evaluate AM labs once received          This document has been electronically signed by Luis Maher MD on February 15, 2020 10:39 AM      74-year-old woman persistent mild delirium.  I will have her seen by hospitalist service today.  We will also restart her Coumadin.  Central line should be removed.  Felice Dickerson, MS3

## 2020-02-16 NOTE — PROGRESS NOTES
GENERAL SURGERY PROGRESS NOTE     LOS: 6 days     Chief Complaint:     Brendon Skelton is a 74 year old lady who is post-operative day 6 status post laparoscopic sigmoid colon resection for cancer of sigmoid colon.    Interval History:     Ms. Skelton remains confused and agitated this morning, but reports being comfortable and that pain is well controlled. Her dosage of Trazodone was decreased by medicine team yesterday. She is tolerating a soft foods diet and does not report any episodes of nausea/vomiting. Urine output over past 24 hours remains significantly improved (1075 mL). She reports having regular bowel movements. Peripheral IV fluids (50 cc/h NS) and central line remain in place.    Medication Review:     dilTIAZem  mg Oral Daily   famotidine 20 mg Oral Daily   heparin (porcine) 5,000 Units Subcutaneous Q8H   metoprolol succinate XL 25 mg Oral Daily   sodium chloride 10 mL Intravenous Q12H   sodium chloride 10 mL Intravenous Q12H   traZODone 50 mg Oral Nightly   warfarin 5 mg Oral Daily         Pharmacy to dose warfarin     sodium chloride 50 mL/hr Last Rate: 50 mL/hr (02/15/20 1726)   Objective     Vital Signs:  Temp:  [96.2 °F (35.7 °C)-98.3 °F (36.8 °C)] 96.2 °F (35.7 °C)  Heart Rate:  [67-97] 67  Resp:  [18-20] 18  BP: (140-220)/(69-95) 158/88    Intake/Output Summary (Last 24 hours) at 2/16/2020 0551  Last data filed at 2/16/2020 0409  Gross per 24 hour   Intake 120 ml   Output 1075 ml   Net -955 ml       Physical Exam  General: Confused, agitated appearing woman in no acute distress  Head: Normocephalic and atraumatic.   Neck: Normal range of motion. Neck supple.   Cardiovascular: Heart regular rate and rhythm with no murmurs, gallops, rales. Radial pulses strong bilaterally.   Pulmonary/Chest: Lungs clear to auscultation bilaterally.  Abdominal: Soft and non-tender. Incision site healing appropriately.  Musculoskeletal: Normal range of motion.   Skin: Skin warm and dry. No  rashes. Scattered bruising present.    Results Review:    Results from last 7 days   Lab Units 02/15/20  1717 02/15/20  0714 02/14/20  1610   SODIUM mmol/L 130* 129* 125*   POTASSIUM mmol/L 4.5 4.3 4.4   CHLORIDE mmol/L 92* 90* 91*   CO2 mmol/L 23.0 21.0* 22.0   BUN mg/dL 89* 87* 81*   CREATININE mg/dL 3.46* 3.71* 3.60*   GLUCOSE mg/dL 152* 125* 161*   CALCIUM mg/dL 9.3 9.1 8.4*     Results from last 7 days   Lab Units 02/15/20  0714 02/14/20  1610 02/13/20  0648   WBC 10*3/mm3 13.10* 12.64* 12.66*   HEMOGLOBIN g/dL 10.4* 9.8* 10.2*   HEMATOCRIT % 30.9* 29.3* 30.7*   PLATELETS 10*3/mm3 137* 133* 101*       Assessment:    Confusion    Atrial fibrillation [I48.91]    Diastolic heart failure (CMS/HCC)    COPD (chronic obstructive pulmonary disease) (CMS/HCC)    Diabetes mellitus (CMS/HCC)    Chronic hypoxemic respiratory failure (CMS/HCC)    Stage 4 chronic kidney disease (CMS/HCC)    Morbid obesity (CMS/HCC)    Chronic anemia    Pulmonary hypertension (CMS/HCC)    Cancer of sigmoid (CMS/HCC)    Hyponatremia    Long term current use of anticoagulant    Brendon Skelton is a 74 year old lady who is status post laparoscopic sigmoid colon resection 2/10/20. She is stable, and urinary output remains significantly improved.      Plan:    - Continue routine post-operative care  - Continue to follow Nephrology recommendations  - Continue to follow Medicine recommendations regarding deliruim  - Leave Ann in place to strictly measure urinary output  - Remove central line          This document has been electronically signed by Luis Maher MD on February 16, 2020 7:40 AM         Felice Dickerson, MS3

## 2020-02-16 NOTE — SIGNIFICANT NOTE
"   02/16/20 9919   Rehab Treatment   Discipline physical therapy assistant  (Nsg agreeable for tx. Upon entry pt states  \"It would be alot better if you people would leave me alone.\" After motivation and options for therex, EOB pt cont to decline tx stating \"I'm not doing anything.\")   Reason Treatment Not Performed patient/family declined treatment     "

## 2020-02-16 NOTE — CONSULTS
Progress Note  Colin Coreas MD  Hospitalist    Patient Care Team:  Josefa Pozo APRN as PCP - General (Nurse Practitioner)  Luis Maher MD as Surgeon (General Surgery)    Chief complaint confusion.    Subjective .     History of present illness: admitted for elective colon resection. She has remained confused    History  Past Medical History:   Diagnosis Date   • Anxiety    • Aortic valve replaced    • Atrial fibrillation (CMS/HCC)    • C. difficile colitis    • Chronic hypoxemic respiratory failure (CMS/HCC)    • COPD (chronic obstructive pulmonary disease) (CMS/HCC)    • Coronary artery disease    • Depressive disorder    • Diabetes mellitus (CMS/HCC)    • Diastolic heart failure (CMS/HCC)    • Gastrointestinal hemorrhage    • Hyperlipidemia    • Hypertension    • Long term current use of anticoagulant    • Malignant neoplasm of sigmoid colon (CMS/HCC)    • Morbid obesity (CMS/HCC)    • Pulmonary hypertension (CMS/HCC)    • Rectal hemorrhage    • Stage 4 chronic kidney disease (CMS/HCC)    ,   Past Surgical History:   Procedure Laterality Date   • AORTIC VALVE REPAIR/REPLACEMENT     • CARDIAC ELECTROPHYSIOLOGY PROCEDURE N/A 3/20/2017   • CARDIAC PACEMAKER PLACEMENT     • CATARACT EXTRACTION W/ INTRAOCULAR LENS IMPLANT Left 2019   • CATARACT EXTRACTION W/ INTRAOCULAR LENS IMPLANT Right 2019   • COLON RESECTION Left 2/10/2020   • COLONOSCOPY N/A 2019   • COLONOSCOPY N/A 2019   • ENDOSCOPY N/A 2019   • PACEMAKER REPLACEMENT N/A 3/21/2017   • SIGMOIDOSCOPY N/A 2019   • UPPER GASTROINTESTINAL ENDOSCOPY      and   Social History     Tobacco Use   • Smoking status: Former Smoker     Last attempt to quit: 3/21/1999     Years since quittin.9   • Smokeless tobacco: Never Used   Substance Use Topics   • Alcohol use: No   • Drug use: No     Current Facility-Administered Medications   Medication Dose Route Frequency Provider Last Rate Last Dose   • acetaminophen (TYLENOL) tablet 325 mg   325 mg Oral Q6H PRN Luis Maher MD       • albuterol (PROVENTIL) nebulizer solution 0.083% 2.5 mg/3mL  2.5 mg Nebulization Q4H PRN Luis Maher MD   2.5 mg at 02/15/20 2112   • dilTIAZem CD (CARDIZEM CD) 24 hr capsule 240 mg  240 mg Oral Daily Luis Maher MD   240 mg at 02/16/20 0934   • famotidine (PEPCID) tablet 20 mg  20 mg Oral Daily Sandy Caputo MD   20 mg at 02/16/20 0934   • heparin (porcine) 5000 UNIT/ML injection 5,000 Units  5,000 Units Subcutaneous Q8H Gurpreet Coe MD   5,000 Units at 02/16/20 1236   • [START ON 2/17/2020] metoprolol succinate XL (TOPROL-XL) 24 hr tablet 50 mg  50 mg Oral Daily Raúl Madsen, APRN       • ondansetron (ZOFRAN) injection 4 mg  4 mg Intravenous Q6H PRN Luis Maher MD   4 mg at 02/13/20 0345   • Pharmacy to dose warfarin   Does not apply Continuous PRN Luis Maher MD       • sodium chloride 0.9 % flush 10 mL  10 mL Intravenous Q12H Luis Maher MD   10 mL at 02/15/20 2104   • sodium chloride 0.9 % flush 10 mL  10 mL Intravenous Q12H Luis Maher MD   10 mL at 02/15/20 2102   • traZODone (DESYREL) tablet 50 mg  50 mg Oral Nightly Colin Coreas MD   50 mg at 02/15/20 2104   • warfarin (COUMADIN) tablet 5 mg  5 mg Oral Daily Luis Maher MD   5 mg at 02/15/20 1726       Review of Systems  Review of Systems   Constitutional: Positive for fatigue. Negative for fever.   Respiratory: Positive for cough. Negative for shortness of breath, wheezing and stridor.    Cardiovascular: Negative for chest pain, palpitations and leg swelling.   Gastrointestinal: Negative for abdominal distention, abdominal pain and blood in stool.   Genitourinary: Negative for dysuria, frequency, genital sores, hematuria and urgency.   Musculoskeletal: Positive for arthralgias. Negative for back pain, gait problem and joint swelling.   Skin: Positive for pallor. Negative for color change and rash.   Neurological: Positive for weakness. Negative for tremors, seizures, speech  difficulty, numbness and headaches.   Psychiatric/Behavioral: Positive for confusion. Negative for agitation and behavioral problems.       Objective     Vital Signs   Temp:  [96.2 °F (35.7 °C)-96.8 °F (36 °C)] 96.4 °F (35.8 °C)  Heart Rate:  [67-93] 69  Resp:  [16-18] 16  BP: (149-220)/(69-95) 159/80    Physical Exam:  Physical Exam   Constitutional: She appears ill. No distress.   Obese    HENT:   Head: Normocephalic and atraumatic.   Eyes: Pupils are equal, round, and reactive to light. EOM are normal. No scleral icterus.   Neck: Normal range of motion. Neck supple.   Cardiovascular: Normal rate and regular rhythm.   Pulmonary/Chest: Effort normal and breath sounds normal. No stridor. No respiratory distress.   Abdominal: Soft. Bowel sounds are normal. She exhibits no distension. There is no tenderness. There is no guarding.   Musculoskeletal: Normal range of motion. She exhibits no edema or tenderness.   Neurological: No cranial nerve deficit.   Confused    Skin: Skin is warm and dry. No rash noted. There is pallor.   Extensive abdominal hematoma   Vitals reviewed.      Results Review:  Lab Results (last 24 hours)     Procedure Component Value Units Date/Time    Comprehensive Metabolic Panel [[id number]]  (Abnormal) Collected:  02/16/20 0600    Specimen:  Blood Updated:  02/16/20 1146     Glucose 117 mg/dL      BUN 84 mg/dL      Creatinine 2.90 mg/dL      Sodium 131 mmol/L      Potassium 4.2 mmol/L      Chloride 94 mmol/L      CO2 24.0 mmol/L      Calcium 8.9 mg/dL      Total Protein 5.8 g/dL      Albumin 3.60 g/dL      ALT (SGPT) 227 U/L      AST (SGOT) 161 U/L      Alkaline Phosphatase 58 U/L      Total Bilirubin 1.5 mg/dL      eGFR Non African Amer 16 mL/min/1.73      Globulin 2.2 gm/dL      A/G Ratio 1.6 g/dL      BUN/Creatinine Ratio 29.0     Anion Gap 13.0 mmol/L     Narrative:       GFR Normal >60  Chronic Kidney Disease <60  Kidney Failure <15      Extra Tubes [[id number]] Collected:  02/16/20 0600     Specimen:  Blood Updated:  02/16/20 0700    Narrative:       The following orders were created for panel order Extra Tubes.  Procedure                               Abnormality         Status                     ---------                               -----------         ------                     Green Top (Gel)[[id number]]                                  Final result                 Please view results for these tests on the individual orders.    Green Top (Gel) [[id number]] Collected:  02/16/20 0600    Specimen:  Blood Updated:  02/16/20 0700     Extra Tube Hold for add-ons.     Comment: Auto resulted.       Protime-INR [[id number]]  (Abnormal) Collected:  02/16/20 0600    Specimen:  Blood Updated:  02/16/20 0645     Protime 18.8 Seconds      INR 1.60    Narrative:       Therapeutic range for most indications is 2.0-3.0 INR,  or 2.5-3.5 for mechanical heart valves.    Basic Metabolic Panel [[id number]]  (Abnormal) Collected:  02/15/20 1717    Specimen:  Blood Updated:  02/15/20 1739     Glucose 152 mg/dL      BUN 89 mg/dL      Creatinine 3.46 mg/dL      Sodium 130 mmol/L      Potassium 4.5 mmol/L      Chloride 92 mmol/L      CO2 23.0 mmol/L      Calcium 9.3 mg/dL      eGFR   Amer --     Comment: <15 Indicative of kidney failure.        eGFR Non African Amer 13 mL/min/1.73      Comment: <15 Indicative of kidney failure.        BUN/Creatinine Ratio 25.7     Anion Gap 15.0 mmol/L     Narrative:       GFR Normal >60  Chronic Kidney Disease <60  Kidney Failure <15          Imaging Results (Last 24 Hours)     Procedure Component Value Units Date/Time    XR Chest 1 View [[id number]] Collected:  02/15/20 1359     Updated:  02/15/20 1604    Narrative:       PROCEDURE: XR CHEST 1 VW    VIEWS:Single    INDICATION: Shortness of breath, colon cancer    COMPARISON: CXR: 2/10/2020    FINDINGS:       - lines/tubes: Patient has been extubated since the prior  study. Right internal jugular catheter terminates in superior  vena  cava, as before. Left-sided pacemaker is in stable position.  Median sternotomy wires and mediastinal clips are present.    - cardiac: Mild cardiomegaly.    - mediastinum: Contour unchanged..     - lungs: Central vascular and interstitial prominence and  indistinctness are present. There is also increasing airspace  opacity in bilateral lung bases which may represent asymmetric  edema or pneumonia.     - pleura: Blunting of left costophrenic angle .      - osseous: Unremarkable for age.        Impression:       1. Stable cardiomegaly with central vascular and interstitial  prominence and indistinctness. There is also increasing patchy  airspace opacification in bilateral lung bases, may represent  asymmetric edema, though infectious/inflammatory process is also  be considered. Clinical correlation needed.   2. Probable small left pleural effusion.      Electronically signed by:  Yeimy Vuong MD  2/15/2020 4:03 PM CST  Workstation: [id number]            Assessment/Plan       Confusion    Stage 4 chronic kidney disease (CMS/HCC)    COPD (chronic obstructive pulmonary disease) (CMS/HCC)    Chronic hypoxemic respiratory failure (CMS/HCC)    Pulmonary hypertension (CMS/HCC)    Atrial fibrillation [I48.91]    Diastolic heart failure (CMS/HCC)    Diabetes mellitus (CMS/HCC)    Chronic anemia    Cancer of sigmoid (CMS/HCC)    Morbid obesity (CMS/HCC)    Hyponatremia    Long term current use of anticoagulant    Try PT/OT as tolerated, continue with the current management. Her creatinine is down to 2.9, her serum Sodium has improved to 131. Repeat BMP / CBC in AM.    Colin Coreas MD  02/16/20  2:11 PM

## 2020-02-16 NOTE — PROGRESS NOTES
"[facility]  [address] [place] [address]. [id number]  T - [phone]  F - [phone]     Progress Note          PATIENT  DEMOGRAPHICS   PATIENT NAME: Brendon Skelton                      PHYSICIAN: ABRAHAM Smith  : 1945  MRN: [id number]   LOS: 6 days    Patient Care Team:  Josefa Pozo APRN as PCP - General (Nurse Practitioner)  Meme Duckworth APRN as PCP - Claims Attributed  Luis Maher MD as Surgeon (General Surgery)  Subjective   SUBJECTIVE   Very drowsy today.         Objective   OBJECTIVE   Vital Signs  Temp:  [96.2 °F (35.7 °C)-96.8 °F (36 °C)] 96.4 °F (35.8 °C)  Heart Rate:  [67-93] 82  Resp:  [18] 18  BP: (149-220)/(69-95) 169/69    Flowsheet Rows      First Filed Value   Admission Height  165.1 cm (65\") Documented at 02/10/2020 1036   Admission Weight  104 kg (229 lb 15 oz) Documented at 02/10/2020 1036           I/O last 3 completed shifts:  In: 120 [P.O.:120]  Out: 1625 [Urine:1625]    PHYSICAL EXAM    Physical Exam   Constitutional: She is oriented to person, place, and time. She appears well-developed. She appears lethargic.   HENT:   Head: Normocephalic.   Eyes: Pupils are equal, round, and reactive to light.   Cardiovascular: Normal rate, regular rhythm and normal heart sounds.   Pulmonary/Chest: Effort normal and breath sounds normal.   Abdominal: Soft. Bowel sounds are normal.   Neurological: She is oriented to person, place, and time. She appears lethargic.       RESULTS   Results Review:    Results from last 7 days   Lab Units 02/15/20  1717 02/15/20  0714 20  1610   SODIUM mmol/L 130* 129* 125*   POTASSIUM mmol/L 4.5 4.3 4.4   CHLORIDE mmol/L 92* 90* 91*   CO2 mmol/L 23.0 21.0* 22.0   BUN mg/dL 89* 87* 81*   CREATININE mg/dL 3.46* 3.71* 3.60*   CALCIUM mg/dL 9.3 9.1 8.4*   GLUCOSE mg/dL 152* 125* 161*       Estimated Creatinine Clearance: 17.2 mL/min (A) (by C-G formula based on SCr of 3.46 mg/dL (H)).    Results from last 7 days "   Lab Units 02/15/20  0714 02/14/20  1610 02/13/20  0648   MAGNESIUM mg/dL 2.2 2.3 2.2   PHOSPHORUS mg/dL 4.5 4.8* 5.3*             Results from last 7 days   Lab Units 02/15/20  0714 02/14/20  1610 02/13/20  0648 02/12/20  0754 02/12/20  0002  02/11/20  0443   WBC 10*3/mm3 13.10* 12.64* 12.66* 10.53  --   --  9.99   HEMOGLOBIN g/dL 10.4* 9.8* 10.2* 9.9* 9.6*   < > 7.7*   PLATELETS 10*3/mm3 137* 133* 101* 119*  --   --  139*    < > = values in this interval not displayed.       Results from last 7 days   Lab Units 02/16/20  0600 02/15/20  1048 02/10/20  1028   INR  1.60* 1.50* 1.12         Imaging Results (Last 24 Hours)     Procedure Component Value Units Date/Time    XR Chest 1 View [[id number]] Collected:  02/15/20 1359     Updated:  02/15/20 1604    Narrative:       PROCEDURE: XR CHEST 1 VW    VIEWS:Single    INDICATION: Shortness of breath, colon cancer    COMPARISON: CXR: 2/10/2020    FINDINGS:       - lines/tubes: Patient has been extubated since the prior  study. Right internal jugular catheter terminates in superior  vena cava, as before. Left-sided pacemaker is in stable position.  Median sternotomy wires and mediastinal clips are present.    - cardiac: Mild cardiomegaly.    - mediastinum: Contour unchanged..     - lungs: Central vascular and interstitial prominence and  indistinctness are present. There is also increasing airspace  opacity in bilateral lung bases which may represent asymmetric  edema or pneumonia.     - pleura: Blunting of left costophrenic angle .      - osseous: Unremarkable for age.        Impression:       1. Stable cardiomegaly with central vascular and interstitial  prominence and indistinctness. There is also increasing patchy  airspace opacification in bilateral lung bases, may represent  asymmetric edema, though infectious/inflammatory process is also  be considered. Clinical correlation needed.   2. Probable small left pleural effusion.      Electronically signed by:  Yeimy  Carolann NUÑEZ  2/15/2020 4:03 PM [facility]  Workstation: [id number]           MEDICATIONS      dilTIAZem  mg Oral Daily   famotidine 20 mg Oral Daily   heparin (porcine) 5,000 Units Subcutaneous Q8H   metoprolol succinate XL 25 mg Oral Daily   sodium chloride 10 mL Intravenous Q12H   sodium chloride 10 mL Intravenous Q12H   traZODone 50 mg Oral Nightly   warfarin 5 mg Oral Daily       Pharmacy to dose warfarin        Assessment/Plan   ASSESSMENT / PLAN      Confusion    Atrial fibrillation [I48.91]    Diastolic heart failure (CMS/HCC)    COPD (chronic obstructive pulmonary disease) (CMS/HCC)    Diabetes mellitus (CMS/HCC)    Chronic hypoxemic respiratory failure (CMS/HCC)    Stage 4 chronic kidney disease (CMS/HCC)    Morbid obesity (CMS/HCC)    Chronic anemia    Pulmonary hypertension (CMS/HCC)    Cancer of sigmoid (CMS/HCC)    Hyponatremia    Long term current use of anticoagulant       1.  CKD4- baseline cr is close to 2. TIARA, ? atn related to hypoperfusion.  Better urine output at this time.  Volume status acceptable for now.  Acidosis improving, white count stable, hemoglobin stable, sodium better, asymptomatic at this time. On NS @ 50 ml/hr.    -Check labs today, approaching need for RRT.      2.  Adenocarcinoma of colon-  S/p laproscopic sigmoid colon resection on 2/10/2020     3.  HTN- borderline high, on metoprolol and cardizem, increase metoprolol.     4.  Anemia- post-op, s/p 4 u prbc.  Slightly better                This document has been electronically signed by ABRAHAM Smith on February 16, 2020 10:59 AM

## 2020-02-16 NOTE — PROGRESS NOTES
"Anticoagulation by Pharmacy - Warfarin    Brendon Skelton is a 74 y.o.female being continued on warfarin for atrial fibrillation / aortic valve replacement  Patient is also receiving heparin subcutaneous     Home regimen: warfarin 5 mg TuThSaSu, 2.5 mg MoWeFr  INR Goal: 2.5 - 3.5 per coumadin clinic    Last INR:   Lab Results   Component Value Date    INR 1.60 (H) 02/16/2020       Objective:  [Ht: (P) 165.1 cm (65\"); Wt: 106 kg (233 lb 11 oz)]  Lab Results   Component Value Date    INR 1.60 (H) 02/16/2020    INR 1.50 (H) 02/15/2020    INR 1.12 02/10/2020    PROTIME 18.8 (H) 02/16/2020    PROTIME 14.2 02/10/2020    PROTIME 43.7 (H) 07/26/2019     Lab Results   Component Value Date    HGB 10.4 (L) 02/15/2020    HGB 9.8 (L) 02/14/2020    HGB 10.2 (L) 02/13/2020    HCT 30.9 (L) 02/15/2020    HCT 29.3 (L) 02/14/2020    HCT 30.7 (L) 02/13/2020     (L) 02/15/2020     (L) 02/14/2020     (L) 02/13/2020       Recent Warfarin Administrations       The 5 most recent administrations since 02/09/2020 are shown below each listed medication.    Warfarin Sodium         Order Dose Date Given     warfarin (COUMADIN) tablet 5 mg 5 mg 02/15/2020                      Assessment  Interacting medications: trazodone, heparin subcutaneous  INR is 1.6, subtherapeutic, due to being on hold 5 days prior to colon resection 2/10.  Will continue to trend boluses of 5 mg, trend INR, and return to home regimen.  No H/H today    Plan:  1.  Give warfarin 5 mg tablet PO @ 1800 tonight  2.  Draw a PT/INR in AM  3.  Pharmacy will continue to follow    Quinn Mercer [facility]  02/16/20 12:59 PM     "

## 2020-02-17 NOTE — PROGRESS NOTES
GENERAL SURGERY PROGRESS NOTE     LOS: 7 days     Chief Complaint:     Brendon Skelton is a 74 year old lady who is status post laparoscopic sigmoid colon resection for cancer of sigmoid colon.    Interval History:     Remains confused, but resting comfortably upon my assessment this am.  Urine output has picked up considerably the last few days.  Pain controlled. No other issues.     Medication Review:     dilTIAZem  mg Oral Daily   famotidine 20 mg Oral Daily   heparin (porcine) 5,000 Units Subcutaneous Q8H   metoprolol succinate XL 50 mg Oral Daily   sodium chloride 10 mL Intravenous Q12H   sodium chloride 10 mL Intravenous Q12H   traZODone 50 mg Oral Nightly   warfarin 5 mg Oral Daily         Pharmacy to dose warfarin    Objective     Vital Signs:  Temp:  [96.4 °F (35.8 °C)-97.5 °F (36.4 °C)] 97.5 °F (36.4 °C)  Heart Rate:  [69-82] 70  Resp:  [16-18] 18  BP: (159-188)/(65-97) 188/80    Intake/Output Summary (Last 24 hours) at 2/17/2020 0549  Last data filed at 2/17/2020 0310  Gross per 24 hour   Intake 120 ml   Output 1525 ml   Net -1405 ml       Physical Exam  General: Confused, agitated appearing woman in no acute distress  Head: Normocephalic and atraumatic.   Neck: Normal range of motion. Neck supple.   Cardiovascular: Heart regular rate and rhythm with no murmurs, gallops, rales. Radial pulses strong bilaterally.   Pulmonary/Chest: Lungs clear to auscultation bilaterally.  Abdominal: Soft and non-tender. Incision site healing appropriately.  Musculoskeletal: Normal range of motion.   Skin: Skin warm and dry. No rashes. Scattered bruising present.    Results Review:    Results from last 7 days   Lab Units 02/16/20  0600 02/15/20  1717 02/15/20  0714   SODIUM mmol/L 131* 130* 129*   POTASSIUM mmol/L 4.2 4.5 4.3   CHLORIDE mmol/L 94* 92* 90*   CO2 mmol/L 24.0 23.0 21.0*   BUN mg/dL 84* 89* 87*   CREATININE mg/dL 2.90* 3.46* 3.71*   GLUCOSE mg/dL 117* 152* 125*   CALCIUM mg/dL 8.9 9.3 9.1     Results from  last 7 days   Lab Units 02/15/20  0714 02/14/20  1610 02/13/20  0648   WBC 10*3/mm3 13.10* 12.64* 12.66*   HEMOGLOBIN g/dL 10.4* 9.8* 10.2*   HEMATOCRIT % 30.9* 29.3* 30.7*   PLATELETS 10*3/mm3 137* 133* 101*       Assessment:    Confusion    Atrial fibrillation [I48.91]    Diastolic heart failure (CMS/HCC)    COPD (chronic obstructive pulmonary disease) (CMS/HCC)    Diabetes mellitus (CMS/HCC)    Chronic hypoxemic respiratory failure (CMS/HCC)    Stage 4 chronic kidney disease (CMS/HCC)    Morbid obesity (CMS/HCC)    Chronic anemia    Pulmonary hypertension (CMS/HCC)    Cancer of sigmoid (CMS/HCC)    Hyponatremia    Long term current use of anticoagulant    Brendon Skelton is a 74 year old lady who is status post laparoscopic sigmoid colon resection 2/10/20. She is stable, and urinary output remains significantly improved.      Plan:  - Doing well from her operation, tolerating diet, having bowel function, pain controlled  - Home medications  - Appreciate medicine and nephrology input  - Now that UOP improved, may be able to d/c del rio; this could help with some of her delirium. will discuss with nephrology & Dr. Maher    This document has been electronically signed by Gurpreet Coe MD on February 17, 2020 5:49 AM

## 2020-02-17 NOTE — THERAPY TREATMENT NOTE
Acute Care - Occupational Therapy Treatment Note  [facility]     Patient Name: Brendon Skelton  : 1945  MRN: [id number]  Today's Date: 2020  Onset of Illness/Injury or Date of Surgery: 02/10/20  Date of Referral to OT: 20  Referring Physician: Dr. Maher    Admit Date: 2/10/2020       ICD-10-CM ICD-9-CM   1. Cancer of sigmoid (CMS/HCC) C18.7 153.3   2. Impaired functional mobility, balance, gait, and endurance Z74.09 V49.89   3. Impaired mobility and ADLs Z74.09 799.89     Patient Active Problem List   Diagnosis   • Long term current use of anticoagulant therapy   • Personal history of heart valve replacement   • Atrial fibrillation [I48.91]   • Emphysema of lung (CMS/HCC)   • On anticoagulant therapy   • Hyperlipidemia   • Diastolic heart failure (CMS/HCC)   • Depressive disorder   • COPD (chronic obstructive pulmonary disease) (CMS/HCC)   • Diabetes mellitus (CMS/HCC)   • Myopia   • Astigmatism   • Bleeding from open wound of chest wall   • Follow-up surgery care   • Encounter for screening for malignant neoplasm of colon   • Positive colorectal cancer screening using DNA-based stool test   • Nodule of left lung   • Chronic hypoxemic respiratory failure (CMS/HCC)   • Physical deconditioning   • Heart failure with preserved left ventricular function (HFpEF) (CMS/HCC)   • Hypertension   • Coronary artery disease involving native coronary artery without angina pectoris   • Hx of CABG   • SSS (sick sinus syndrome) (CMS/HCC)   • Pacemaker   • Stage 4 chronic kidney disease (CMS/HCC)   • Personal history of tobacco use, presenting hazards to health   • Gastrointestinal hemorrhage   • Morbid obesity (CMS/HCC)   • Gastritis   • Colon polyp   • Coumadin toxicity   • Acute on chronic diastolic congestive heart failure (CMS/HCC)   • Chronic anemia   • Pulmonary hypertension (CMS/HCC)   • Cancer of sigmoid (CMS/HCC)   • Confusion   • Hyponatremia   • Long term current use of anticoagulant     Past  Medical History:   Diagnosis Date   • Anxiety    • Aortic valve replaced    • Atrial fibrillation (CMS/HCC)    • C. difficile colitis    • Chronic hypoxemic respiratory failure (CMS/HCC)    • COPD (chronic obstructive pulmonary disease) (CMS/HCC)    • Coronary artery disease    • Depressive disorder    • Diabetes mellitus (CMS/HCC)    • Diastolic heart failure (CMS/HCC)    • Gastrointestinal hemorrhage    • Hyperlipidemia    • Hypertension    • Long term current use of anticoagulant    • Malignant neoplasm of sigmoid colon (CMS/HCC)    • Morbid obesity (CMS/HCC)    • Pulmonary hypertension (CMS/HCC)    • Rectal hemorrhage    • Stage 4 chronic kidney disease (CMS/HCC)      Past Surgical History:   Procedure Laterality Date   • AORTIC VALVE REPAIR/REPLACEMENT     • CARDIAC ELECTROPHYSIOLOGY PROCEDURE N/A 3/20/2017   • CARDIAC PACEMAKER PLACEMENT     • CATARACT EXTRACTION W/ INTRAOCULAR LENS IMPLANT Left 2/1/2019   • CATARACT EXTRACTION W/ INTRAOCULAR LENS IMPLANT Right 2/8/2019   • COLON RESECTION Left 2/10/2020   • COLONOSCOPY N/A 6/19/2019   • COLONOSCOPY N/A 6/24/2019   • ENDOSCOPY N/A 6/19/2019   • PACEMAKER REPLACEMENT N/A 3/21/2017   • SIGMOIDOSCOPY N/A 9/30/2019   • UPPER GASTROINTESTINAL ENDOSCOPY         Therapy Treatment    Rehabilitation Treatment Summary     Row Name 02/17/20 1104 02/17/20 1000          Treatment Time/Intention    Discipline  physical therapy assistant  -TW  occupational therapy assistant  -KD     Document Type  therapy note (daily note)  -TW  therapy note (daily note)  -KD     Subjective Information  complains of;weakness;fatigue;pain  -TW  no complaints  -KD     Mode of Treatment  physical therapy;individual therapy  -TW  occupational therapy  -KD     Patient/Family Observations  Pt up in recliner and agreeable to therex only.   -TW  Pt up in recliner upon entry  -KD     Therapy Frequency (OT Eval)  --  daily  -KD     Patient Effort  adequate  -TW  fair  -KD     Comment  --  Pt reported  she had already had ADL this AM.  -KD     Existing Precautions/Restrictions  fall  -TW  fall 2 person assist when up  -KD     Recorded by [TW] Reece Rocha PTA 02/17/20 1414 [KD] Akua Henderson COTA/L 02/17/20 1151     Row Name 02/17/20 1104 02/17/20 1000          Vital Signs    Pre Systolic BP Rehab  151  -TW  130  -KD     Pre Treatment Diastolic BP  68  -TW  62  -KD     Pretreatment Heart Rate (beats/min)  71  -TW  70  -KD     Posttreatment Heart Rate (beats/min)  76  -TW  78  -KD     Pre SpO2 (%)  97  -TW  98  -KD     O2 Delivery Pre Treatment  supplemental O2  -TW  supplemental O2  -KD     Post SpO2 (%)  96  -TW  96  -KD     O2 Delivery Post Treatment  supplemental O2  -TW  supplemental O2  -KD     Pre Patient Position  Sitting  -TW  Sitting  -KD     Intra Patient Position  Sitting  -TW  Sitting  -KD     Post Patient Position  Sitting  -TW  Sitting  -KD     Recorded by [TW] Reece Rocha PTA 02/17/20 1414 [KD] Akua Henderson COTA/L 02/17/20 1151     Row Name 02/17/20 1104 02/17/20 1000          Cognitive Assessment/Intervention- PT/OT    Affect/Mental Status (Cognitive)  WFL  -TW  WFL  -KD     Behavioral Issues (Cognitive)  overwhelmed easily  -TW  --     Orientation Status (Cognition)  oriented x 3  -TW  oriented x 3  -KD     Follows Commands (Cognition)  follows one step commands;75-90% accuracy  -TW  follows one step commands  -KD     Personal Safety Interventions  muscle strengthening facilitated  -TW  --     Recorded by [TW] Reece Rocha PTA 02/17/20 1414 [KD] Akua Henderson COTA/L 02/17/20 1151     Row Name 02/17/20 1104             Safety Issues, Functional Mobility    Impairments Affecting Function (Mobility)  balance;cognition;endurance/activity tolerance;pain;postural/trunk control;strength  -TW      Recorded by [TW] Reece Rocha PTA 02/17/20 1414      Row Name 02/17/20 1104             Bed Mobility Assessment/Treatment    Rolling Left [place] (Bed Mobility)   not tested  -TW      Supine-Sit [place] (Bed Mobility)  not tested  -TW      Sit-Supine [place] (Bed Mobility)  not tested  -TW      Recorded by [TW] Reece Rocha, PTA 02/17/20 1414      Row Name 02/17/20 1000             Transfer Assessment/Treatment    Comment (Transfers)  Pt deferred any t/f's @ this time 2' to getting in chair  -KD      Recorded by [KD] Akua Henderson COTA/L 02/17/20 1151      Row Name 02/17/20 1104             Bed-Chair Transfer    Bed-Chair [place] (Transfers)  not tested  -TW      Recorded by [TW] Reece Rocha, PTA 02/17/20 1414      Row Name 02/17/20 1104             Stand-Sit Transfer    Stand-Sit [place] (Transfers)  not tested  -TW      Recorded by [TW] Reece Rocha, PTA 02/17/20 1414      Row Name 02/17/20 1104 02/17/20 1000          Therapeutic Exercise    Upper Extremity Range of Motion (Therapeutic Exercise)  --  shoulder flexion/extension, bilateral;shoulder abduction/adduction, bilateral;shoulder horizontal abduction/adduction, bilateral;shoulder internal/external rotation, bilateral;elbow flexion/extension, bilateral;forearm supination/pronation, bilateral;wrist flexion/extension, bilateral  -KD     Hand (Therapeutic Exercise)  --  finger flexion/extension, bilateral  -KD     Lower Extremity (Therapeutic Exercise)  gluteal sets;quad sets, bilateral;heel slides, bilateral  -TW  --     Lower Extremity Range of Motion (Therapeutic Exercise)  hip abduction/adduction, bilateral;ankle dorsiflexion/plantar flexion, bilateral  -TW  --     Weight/Resistance (Therapeutic Exercise)  --  2 pounds  -KD     Exercise Type (Therapeutic Exercise)  AROM (active range of motion);AAROM (active assistive range of motion)  -TW  AROM (active range of motion)  -KD     Position (Therapeutic Exercise)  seated In recliner with LE's elevated and supported.  -TW  seated  -KD     Sets/Reps (Therapeutic Exercise)  2/10-15  -TW  1/20  -KD     Equipment (Therapeutic  Exercise)  --  free weight, barbell  -KD     Expected Outcome (Therapeutic Exercise)  --  improve functional tolerance, self-care activity;improve performance, transfer skills  -KD     Comment (Therapeutic Exercise)  --  Pt required several RB's to complete task  -KD     Recorded by [TW] Reece Rocha, YOSEPH 02/17/20 1414 [KD] Akua Henderson COTA/L 02/17/20 1151     Row Name 02/17/20 1104 02/17/20 1000          Positioning and Restraints    Pre-Treatment Position  sitting in chair/recliner  -TW  sitting in chair/recliner  -KD     Post Treatment Position  chair  -TW  chair  -KD     In Chair  reclined;call light within reach;encouraged to call for assist;exit alarm on  -TW  sitting;call light within reach;encouraged to call for assist;exit alarm on  -KD     Recorded by [TW] Reece Rocha, YOSEPH 02/17/20 1414 [KD] Akua Henderson COLÓN/L 02/17/20 1151     Row Name 02/17/20 1104 02/17/20 1000          Pain Scale: Numbers Pre/Post-Treatment    Pain Scale: Numbers, Pretreatment  6/10  -TW  5/10  -KD     Pain Scale: Numbers, Post-Treatment  7/10  -TW  5/10  -KD     Pain Location  abdomen  -TW  abdomen  -KD     Pain Intervention(s)  --  -- Meds not due  -KD     Recorded by [TW] Reece Rocha, YOSEPH 02/17/20 1414 [KD] Akua Henderson COLÓN/L 02/17/20 1151     Row Name                Wound 02/10/20 1450 abdomen Incision    Wound - Properties Group Date first assessed: 02/10/20 [CH] Time first assessed: 1450 [CH] Present on Hospital Admission: N [CH] Location: abdomen [CH] Primary Wound Type: Incision [CH] Additional Comments: LAPAROSCOPIC INCISIONS X2 [CH] Recorded by:  [CH] Elayne Fung RN 02/10/20 1450    Row Name                Wound 02/10/20 1450 lower;midline abdomen Incision    Wound - Properties Group Date first assessed: 02/10/20 [CH] Time first assessed: 1450 [CH] Present on Hospital Admission: N [CH] Orientation: lower;midline [CH] Location: abdomen [CH] Primary Wound Type: Incision [CH]  Recorded by:  [CH] Elayne Fung RN 02/10/20 1450    Row Name 02/17/20 1000             Outcome Summary/Treatment Plan (OT)    Daily Summary of Progress (OT)  progress toward functional goals as expected  -KD      Plan for Continued Treatment (OT)  cont ot poc  -KD      Anticipated Discharge Disposition (OT)  anticipate therapy at next level of care  -KD      Recorded by [KD] Akua Henderson COTA/L 02/17/20 1151      Row Name 02/17/20 1104             Outcome Summary/Treatment Plan (PT)    Daily Summary of Progress (PT)  progress towards functional goals is fair  -TW      Plan for Continued Treatment (PT)  Cont  -TW      Anticipated Discharge Disposition (PT)  anticipate therapy at next level of care  -TW      Recorded by [TW] Reece Rocha PTA 02/17/20 1414        User Key  (r) = Recorded By, (t) = Taken By, (c) = Cosigned By    Initials Name Effective Dates Discipline    TW Reece Rocha PTA 03/07/18 -  PT    KD Akua Henderson COLÓN/L 03/07/18 -  OT     Elayne Fung RN 06/23/17 -  Nurse        Wound 02/10/20 1450 abdomen Incision (Active)   Dressing Appearance open to air 2/17/2020  8:09 AM   Closure Liquid skin adhesive;Adhesive closure strips 2/17/2020  8:09 AM   Base clean 2/17/2020  8:09 AM   Periwound ecchymotic 2/17/2020  8:09 AM   Periwound Temperature warm 2/17/2020  8:09 AM   Periwound Skin Turgor soft 2/17/2020  8:09 AM   Drainage Amount none 2/17/2020  8:09 AM       Wound 02/10/20 1450 lower;midline abdomen Incision (Active)   Dressing Appearance dry;intact 2/17/2020  8:09 AM   Closure Adhesive bandage 2/17/2020  8:09 AM   Base dressing in place, unable to visualize 2/17/2020  8:09 AM   Periwound ecchymotic 2/17/2020  8:09 AM   Periwound Temperature warm 2/16/2020  8:30 PM   Periwound Skin Turgor soft 2/16/2020  8:30 PM   Drainage Amount none 2/17/2020  8:09 AM     Rehab Goal Summary     Row Name 02/17/20 1000             Occupational Therapy Goals    Transfer Goal  Selection (OT)  transfer, OT goal 1  -KD      Bathing Goal Selection (OT)  bathing, OT goal 1  -KD      Dressing Goal Selection (OT)  dressing, OT goal 1  -KD      Toileting Goal Selection (OT)  toileting, OT goal 1  -KD         Transfer Goal 1 (OT)    Activity/Assistive Device (Transfer Goal 1, OT)  sit-to-stand/stand-to-sit;bed-to-chair/chair-to-bed;toilet  -KD      [place] Level/Cues Needed (Transfer Goal 1, OT)  conditional independence  -KD      Time Frame (Transfer Goal 1, OT)  long term goal (LTG);by discharge  -KD      Progress/Outcome (Transfer Goal 1, OT)  goal not met  -KD         Bathing Goal 1 (OT)    Activity/Assistive Device (Bathing Goal 1, OT)  bathing skills, all  -KD      [place] Level/Cues Needed (Bathing Goal 1, OT)  supervision required;set-up required  -KD      Time Frame (Bathing Goal 1, OT)  long term goal (LTG);by discharge  -KD      Progress/Outcomes (Bathing Goal 1, OT)  goal not met  -KD         Dressing Goal 1 (OT)    Activity/Assistive Device (Dressing Goal 1, OT)  dressing skills, all using AE PRN  -KD      [place]/Cues Needed (Dressing Goal 1, OT)  supervision required;set-up required  -KD      Time Frame (Dressing Goal 1, OT)  long term goal (LTG);by discharge  -KD      Progress/Outcome (Dressing Goal 1, OT)  goal not met  -KD         Toileting Goal 1 (OT)    Activity/Device (Toileting Goal 1, OT)  toileting skills, all  -KD      [place] Level/Cues Needed (Toileting Goal 1, OT)  conditional independence  -KD      Time Frame (Toileting Goal 1, OT)  long term goal (LTG);by discharge  -KD      Progress/Outcome (Toileting Goal 1, OT)  goal not met  -KD        User Key  (r) = Recorded By, (t) = Taken By, (c) = Cosigned By    Initials Name Provider Type Discipline    Akua Maki, COLÓN/L Occupational Therapy Assistant OT            OT Recommendation and Plan  Outcome Summary/Treatment Plan (OT)  Daily Summary of Progress (OT): progress toward functional goals as  expected  Plan for Continued Treatment (OT): cont ot poc  Anticipated Discharge Disposition (OT): anticipate therapy at next level of care  Therapy Frequency (OT Eval): daily  Daily Summary of Progress (OT): progress toward functional goals as expected  Plan of Care Review  Plan of Care Reviewed With: patient  Plan of Care Reviewed With: patient  Outcome Summary: Pt up in recliner upon entry. Pt deferred any t/f's @ this time 2' abdominal pain but was agreeable to BUE ther ex. Pt completed BUE ther ex in all planes w/ 2lb HW and RB's PRN to complete task presented. No new goals met this date. Cont OT POC.  Outcome Measures     Row Name 02/17/20 1000 02/15/20 1430 02/15/20 0931       How much help from another person do you currently need...    Turning from your back to your side while in flat bed without using bedrails?  --  2  -EM  --    Moving from lying on back to sitting on the side of a flat bed without bedrails?  --  2  -EM  --    Moving to and from a bed to a chair (including a wheelchair)?  --  2  -EM  --    Standing up from a chair using your arms (e.g., wheelchair, bedside chair)?  --  1  -EM  --    Climbing 3-5 steps with a railing?  --  1  -EM  --    To walk in hospital room?  --  2  -EM  --    AM-PAC 6 Clicks Score (PT)  --  10  -EM  --       How much help from another is currently needed...    Putting on and taking off regular lower body clothing?  1  -KD  --  1  -TO    Bathing (including washing, rinsing, and drying)  1  -KD  --  1  -TO    Toileting (which includes using toilet bed pan or urinal)  1  -KD  --  1  -TO    Putting on and taking off regular upper body clothing  2  -KD  --  2  -TO    Taking care of personal grooming (such as brushing teeth)  2  -KD  --  2  -TO    Eating meals  3  -KD  --  3  -TO    AM-PAC 6 Clicks Score (OT)  10  -KD  --  10  -TO       Functional Assessment    Outcome Measure Options  --  AM-PAC 6 Clicks Basic Mobility (PT)  -EM  --      User Key  (r) = Recorded By, (t) =  Taken By, (c) = Cosigned By    Initials Name Provider Type    EM Miguel Angel Grant, YOSEPH Physical Therapy Assistant    KD Akua Henderson COTA/L Occupational Therapy Assistant    TO Alistair Gayle COTA/L Occupational Therapy Assistant           Time Calculation:   Time Calculation- OT     Row Name 02/17/20 1439             Time Calculation- OT    OT Start Time  1000  -KD      OT Stop Time  1024  -KD      OT Time Calculation (min)  24 min  -KD      Total Timed Code Minutes- OT  24 minute(s)  -KD      OT Received On  02/17/20  -KD        User Key  (r) = Recorded By, (t) = Taken By, (c) = Cosigned By    Initials Name Provider Type    Akua Maki, ELDON/L Occupational Therapy Assistant        Therapy Charges for Today     Code Description Service Date Service Provider Modifiers Qty    50571725941 HC OT THER PROC EA 15 MIN 2/17/2020 Akua Henderson COTA/L GO 2               YSABEL Denton  2/17/2020

## 2020-02-17 NOTE — PROGRESS NOTES
"Anticoagulation by Pharmacy - Warfarin    Brendon Skelton is a 74 y.o.female being continued on warfarin for atrial fibrillation / aortic valve replacement  Patient is also receiving heparin subcutaneous     Home regimen: warfarin 5 mg TuThSaSu, 2.5 mg MoWeFr  INR Goal: 2.5 - 3.5 per coumadin clinic    Last INR:   Lab Results   Component Value Date    INR 2.01 (H) 02/17/2020       Objective:  [Ht: (P) 165.1 cm (65\"); Wt: 106 kg (233 lb 11 oz)]  Lab Results   Component Value Date    INR 2.01 (H) 02/17/2020    INR 1.60 (H) 02/16/2020    INR 1.50 (H) 02/15/2020    PROTIME 22.5 (H) 02/17/2020    PROTIME 18.8 (H) 02/16/2020    PROTIME 14.2 02/10/2020     Lab Results   Component Value Date    HGB 9.8 (L) 02/17/2020    HGB 10.4 (L) 02/15/2020    HGB 9.8 (L) 02/14/2020    HCT 29.4 (L) 02/17/2020    HCT 30.9 (L) 02/15/2020    HCT 29.3 (L) 02/14/2020     02/17/2020     (L) 02/15/2020     (L) 02/14/2020       Recent Warfarin Administrations       The 5 most recent administrations since 02/10/2020 are shown below each listed medication.    Warfarin Sodium         Order Dose Date Given     warfarin (COUMADIN) tablet 5 mg 5 mg 02/16/2020      5 mg 02/15/2020                      Assessment  Interacting medications: trazodone, heparin subcutaneous  INR is 2.01, subtherapeutic, trending upwards appropriately.  Been on hold 5 days prior to colon resection 2/10.  Will continue to trend boluses of 5 mg, trend INR, and return to home regimen.  No H/H today     Plan:  1.  Give warfarin 5 mg tablet PO @ 1800 tonight  2.  Draw a PT/INR in AM  3.  Pharmacy will continue to follow       Quinn Mercer [facility]  02/17/20 3:03 PM     "

## 2020-02-17 NOTE — THERAPY TREATMENT NOTE
Acute Care - Physical Therapy Treatment Note  [facility]     Patient Name: Brendon Skelton  : 1945  MRN: [id number]  Today's Date: 2020  Onset of Illness/Injury or Date of Surgery: 02/10/20     Referring Physician: Dr. Maher    Admit Date: 2/10/2020    Visit Dx:    ICD-10-CM ICD-9-CM   1. Cancer of sigmoid (CMS/HCC) C18.7 153.3   2. Impaired functional mobility, balance, gait, and endurance Z74.09 V49.89   3. Impaired mobility and ADLs Z74.09 799.89     Patient Active Problem List   Diagnosis   • Long term current use of anticoagulant therapy   • Personal history of heart valve replacement   • Atrial fibrillation [I48.91]   • Emphysema of lung (CMS/HCC)   • On anticoagulant therapy   • Hyperlipidemia   • Diastolic heart failure (CMS/HCC)   • Depressive disorder   • COPD (chronic obstructive pulmonary disease) (CMS/HCC)   • Diabetes mellitus (CMS/HCC)   • Myopia   • Astigmatism   • Bleeding from open wound of chest wall   • Follow-up surgery care   • Encounter for screening for malignant neoplasm of colon   • Positive colorectal cancer screening using DNA-based stool test   • Nodule of left lung   • Chronic hypoxemic respiratory failure (CMS/HCC)   • Physical deconditioning   • Heart failure with preserved left ventricular function (HFpEF) (CMS/HCC)   • Hypertension   • Coronary artery disease involving native coronary artery without angina pectoris   • Hx of CABG   • SSS (sick sinus syndrome) (CMS/HCC)   • Pacemaker   • Stage 4 chronic kidney disease (CMS/HCC)   • Personal history of tobacco use, presenting hazards to health   • Gastrointestinal hemorrhage   • Morbid obesity (CMS/HCC)   • Gastritis   • Colon polyp   • Coumadin toxicity   • Acute on chronic diastolic congestive heart failure (CMS/HCC)   • Chronic anemia   • Pulmonary hypertension (CMS/HCC)   • Cancer of sigmoid (CMS/HCC)   • Confusion   • Hyponatremia   • Long term current use of anticoagulant       Therapy  Treatment    Rehabilitation Treatment Summary     Row Name 02/17/20 1104 02/17/20 1000          Treatment Time/Intention    Discipline  physical therapy assistant  -TW  occupational therapy assistant  -KD     Document Type  therapy note (daily note)  -TW  therapy note (daily note)  -KD     Subjective Information  complains of;weakness;fatigue;pain  -TW  no complaints  -KD     Mode of Treatment  physical therapy;individual therapy  -TW  occupational therapy  -KD     Patient/Family Observations  Pt up in recliner and agreeable to therex only.   -TW  Pt up in recliner upon entry  -KD     Therapy Frequency (OT Eval)  --  daily  -KD     Patient Effort  adequate  -TW  fair  -KD     Comment  --  Pt reported she had already had ADL this AM.  -KD     Existing Precautions/Restrictions  fall  -TW  fall 2 person assist when up  -KD     Recorded by [TW] Reece Rocha PTA 02/17/20 1414 [KD] Akua Henderson COTA/L 02/17/20 1151     Row Name 02/17/20 1104 02/17/20 1000          Vital Signs    Pre Systolic BP Rehab  151  -TW  130  -KD     Pre Treatment Diastolic BP  68  -TW  62  -KD     Pretreatment Heart Rate (beats/min)  71  -TW  70  -KD     Posttreatment Heart Rate (beats/min)  76  -TW  78  -KD     Pre SpO2 (%)  97  -TW  98  -KD     O2 Delivery Pre Treatment  supplemental O2  -TW  supplemental O2  -KD     Post SpO2 (%)  96  -TW  96  -KD     O2 Delivery Post Treatment  supplemental O2  -TW  supplemental O2  -KD     Pre Patient Position  Sitting  -TW  Sitting  -KD     Intra Patient Position  Sitting  -TW  Sitting  -KD     Post Patient Position  Sitting  -TW  Sitting  -KD     Recorded by [TW] Reece Rocha PTA 02/17/20 1414 [KD] Akua Henderson COLÓN/L 02/17/20 1151     Row Name 02/17/20 1104 02/17/20 1000          Cognitive Assessment/Intervention- PT/OT    Affect/Mental Status (Cognitive)  WFL  -TW  WFL  -KD     Behavioral Issues (Cognitive)  overwhelmed easily  -TW  --     Orientation Status (Cognition)  oriented x  3  -TW  oriented x 3  -KD     Follows Commands (Cognition)  follows one step commands;75-90% accuracy  -TW  follows one step commands  -KD     Personal Safety Interventions  muscle strengthening facilitated  -TW  --     Recorded by [TW] Reece Rocha, PTA 02/17/20 1414 [KD] Akua Henderson COTA/L 02/17/20 1151     Row Name 02/17/20 1104             Safety Issues, Functional Mobility    Impairments Affecting Function (Mobility)  balance;cognition;endurance/activity tolerance;pain;postural/trunk control;strength  -TW      Recorded by [TW] Reece Rocha, PTA 02/17/20 1414      Row Name 02/17/20 1104             Bed Mobility Assessment/Treatment    Rolling Left [place] (Bed Mobility)  not tested  -TW      Supine-Sit [place] (Bed Mobility)  not tested  -TW      Sit-Supine [place] (Bed Mobility)  not tested  -TW      Recorded by [TW] Reece Rocha, PTA 02/17/20 1414      Row Name 02/17/20 1000             Transfer Assessment/Treatment    Comment (Transfers)  Pt deferred any t/f's @ this time 2' to getting in chair  -KD      Recorded by [KD] Akua Henderson COTA/L 02/17/20 1151      Row Name 02/17/20 1104             Bed-Chair Transfer    Bed-Chair [place] (Transfers)  not tested  -TW      Recorded by [TW] Reece Rocha, PTA 02/17/20 1414      Row Name 02/17/20 1104             Stand-Sit Transfer    Stand-Sit [place] (Transfers)  not tested  -TW      Recorded by [TW] Reece Rocha, PTA 02/17/20 1414      Row Name 02/17/20 1104 02/17/20 1000          Therapeutic Exercise    Upper Extremity Range of Motion (Therapeutic Exercise)  --  shoulder flexion/extension, bilateral;shoulder abduction/adduction, bilateral;shoulder horizontal abduction/adduction, bilateral;shoulder internal/external rotation, bilateral;elbow flexion/extension, bilateral;forearm supination/pronation, bilateral;wrist flexion/extension, bilateral  -KD     Hand (Therapeutic Exercise)  --  finger  flexion/extension, bilateral  -KD     Lower Extremity (Therapeutic Exercise)  gluteal sets;quad sets, bilateral;heel slides, bilateral  -TW  --     Lower Extremity Range of Motion (Therapeutic Exercise)  hip abduction/adduction, bilateral;ankle dorsiflexion/plantar flexion, bilateral  -TW  --     Weight/Resistance (Therapeutic Exercise)  --  2 pounds  -KD     Exercise Type (Therapeutic Exercise)  AROM (active range of motion);AAROM (active assistive range of motion)  -TW  AROM (active range of motion)  -KD     Position (Therapeutic Exercise)  seated In recliner with LE's elevated and supported.  -TW  seated  -KD     Sets/Reps (Therapeutic Exercise)  2/10-15  -TW  1/20  -KD     Equipment (Therapeutic Exercise)  --  free weight, barbell  -KD     Expected Outcome (Therapeutic Exercise)  --  improve functional tolerance, self-care activity;improve performance, transfer skills  -KD     Comment (Therapeutic Exercise)  --  Pt required several RB's to complete task  -KD     Recorded by [TW] Reece Rocha PTA 02/17/20 1414 [KD] Akua Henderson COTA/L 02/17/20 1151     Row Name 02/17/20 1104 02/17/20 1000          Positioning and Restraints    Pre-Treatment Position  sitting in chair/recliner  -TW  sitting in chair/recliner  -KD     Post Treatment Position  chair  -TW  chair  -KD     In Chair  reclined;call light within reach;encouraged to call for assist;exit alarm on  -TW  sitting;call light within reach;encouraged to call for assist;exit alarm on  -KD     Recorded by [TW] Reece Rocha PTA 02/17/20 1414 [KD] Akua Henderson COTA/L 02/17/20 1151     Row Name 02/17/20 1104 02/17/20 1000          Pain Scale: Numbers Pre/Post-Treatment    Pain Scale: Numbers, Pretreatment  6/10  -TW  5/10  -KD     Pain Scale: Numbers, Post-Treatment  7/10  -TW  5/10  -KD     Pain Location  abdomen  -TW  abdomen  -KD     Pain Intervention(s)  --  -- Meds not due  -KD     Recorded by [TW] Reece Rocha PTA 02/17/20 1415  [KD] Akua Henderson COLÓN/L 02/17/20 1151     Row Name                Wound 02/10/20 1450 abdomen Incision    Wound - Properties Group Date first assessed: 02/10/20 [CH] Time first assessed: 1450 [CH] Present on Hospital Admission: N [CH] Location: abdomen [CH] Primary Wound Type: Incision [CH] Additional Comments: LAPAROSCOPIC INCISIONS X2 [CH] Recorded by:  [CH] Elayne Fung RN 02/10/20 1450    Row Name                Wound 02/10/20 1450 lower;midline abdomen Incision    Wound - Properties Group Date first assessed: 02/10/20 [CH] Time first assessed: 1450 [CH] Present on Hospital Admission: N [CH] Orientation: lower;midline [CH] Location: abdomen [CH] Primary Wound Type: Incision [CH] Recorded by:  [CH] Elayne Fung RN 02/10/20 1450    Row Name 02/17/20 1000             Outcome Summary/Treatment Plan (OT)    Daily Summary of Progress (OT)  progress toward functional goals as expected  -KD      Plan for Continued Treatment (OT)  cont ot poc  -KD      Anticipated Discharge Disposition (OT)  anticipate therapy at next level of care  -KD      Recorded by [KD] Akua Henderson COLÓN/L 02/17/20 1151      Row Name 02/17/20 1104             Outcome Summary/Treatment Plan (PT)    Daily Summary of Progress (PT)  progress towards functional goals is fair  -TW      Plan for Continued Treatment (PT)  Cont  -TW      Anticipated Discharge Disposition (PT)  anticipate therapy at next level of care  -TW      Recorded by [TW] Reece Rocha PTA 02/17/20 1414        User Key  (r) = Recorded By, (t) = Taken By, (c) = Cosigned By    Initials Name Effective Dates Discipline    TW Reece Rocha PTA 03/07/18 -  PT    KD Akua Henedrson COLÓN/L 03/07/18 -  OT    CH Elayne Fung RN 06/23/17 -  Nurse          Wound 02/10/20 1450 abdomen Incision (Active)   Dressing Appearance open to air 2/17/2020  8:09 AM   Closure Liquid skin adhesive;Adhesive closure strips 2/17/2020  8:09 AM   Base clean  2/17/2020  8:09 AM   Periwound ecchymotic 2/17/2020  8:09 AM   Periwound Temperature warm 2/17/2020  8:09 AM   Periwound Skin Turgor soft 2/17/2020  8:09 AM   Drainage Amount none 2/17/2020  8:09 AM       Wound 02/10/20 1450 lower;midline abdomen Incision (Active)   Dressing Appearance dry;intact 2/17/2020  8:09 AM   Closure Adhesive bandage 2/17/2020  8:09 AM   Base dressing in place, unable to visualize 2/17/2020  8:09 AM   Periwound ecchymotic 2/17/2020  8:09 AM   Periwound Temperature warm 2/16/2020  8:30 PM   Periwound Skin Turgor soft 2/16/2020  8:30 PM   Drainage Amount none 2/17/2020  8:09 AM       Rehab Goal Summary     Row Name 02/17/20 1000             Occupational Therapy Goals    Transfer Goal Selection (OT)  transfer, OT goal 1  -KD      Bathing Goal Selection (OT)  bathing, OT goal 1  -KD      Dressing Goal Selection (OT)  dressing, OT goal 1  -KD      Toileting Goal Selection (OT)  toileting, OT goal 1  -KD         Transfer Goal 1 (OT)    Activity/Assistive Device (Transfer Goal 1, OT)  sit-to-stand/stand-to-sit;bed-to-chair/chair-to-bed;toilet  -KD      [place] Level/Cues Needed (Transfer Goal 1, OT)  conditional independence  -KD      Time Frame (Transfer Goal 1, OT)  long term goal (LTG);by discharge  -KD      Progress/Outcome (Transfer Goal 1, OT)  goal not met  -KD         Bathing Goal 1 (OT)    Activity/Assistive Device (Bathing Goal 1, OT)  bathing skills, all  -KD      [place] Level/Cues Needed (Bathing Goal 1, OT)  supervision required;set-up required  -KD      Time Frame (Bathing Goal 1, OT)  long term goal (LTG);by discharge  -KD      Progress/Outcomes (Bathing Goal 1, OT)  goal not met  -KD         Dressing Goal 1 (OT)    Activity/Assistive Device (Dressing Goal 1, OT)  dressing skills, all using AE PRN  -KD      [place]/Cues Needed (Dressing Goal 1, OT)  supervision required;set-up required  -KD      Time Frame (Dressing Goal 1, OT)  long term goal (LTG);by discharge  -KD       Progress/Outcome (Dressing Goal 1, OT)  goal not met  -KD         Toileting Goal 1 (OT)    Activity/Device (Toileting Goal 1, OT)  toileting skills, all  -KD      [place] Level/Cues Needed (Toileting Goal 1, OT)  conditional independence  -KD      Time Frame (Toileting Goal 1, OT)  long term goal (LTG);by discharge  -KD      Progress/Outcome (Toileting Goal 1, OT)  goal not met  -KD        User Key  (r) = Recorded By, (t) = Taken By, (c) = Cosigned By    Initials Name Provider Type Discipline    KD Akua Henderson COTA/L Occupational Therapy Assistant OT              PT Recommendation and Plan  Anticipated Discharge Disposition (PT): anticipate therapy at next level of care  Outcome Summary/Treatment Plan (PT)  Daily Summary of Progress (PT): progress towards functional goals is fair  Barriers to Overall Progress (PT): Pt confused this pm.  Plan for Continued Treatment (PT): Cont  Anticipated Discharge Disposition (PT): anticipate therapy at next level of care  Plan of Care Reviewed With: patient  Progress: no change  Outcome Summary: Pt participated in BID tx this date. Pt with increased confusion this pm and also had incontinent BM episode upon initially standing. Pt required lots of cueing for safety and proper use of RW during gait in am. Pt cont to disregaurd cathetar tubing and other lines when t/f'ing and amb. Pt requires perez instructions at times due to pt's lack of safety awareness. Pt would cont to benefit from therapy upon DC.  Outcome Measures     Row Name 02/17/20 1000 02/15/20 1430 02/15/20 0931       How much help from another person do you currently need...    Turning from your back to your side while in flat bed without using bedrails?  --  2  -EM  --    Moving from lying on back to sitting on the side of a flat bed without bedrails?  --  2  -EM  --    Moving to and from a bed to a chair (including a wheelchair)?  --  2  -EM  --    Standing up from a chair using your arms (e.g.,  wheelchair, bedside chair)?  --  1  -EM  --    Climbing 3-5 steps with a railing?  --  1  -EM  --    To walk in hospital room?  --  2  -EM  --    AM-PAC 6 Clicks Score (PT)  --  10  -EM  --       How much help from another is currently needed...    Putting on and taking off regular lower body clothing?  1  -KD  --  1  -TO    Bathing (including washing, rinsing, and drying)  1  -KD  --  1  -TO    Toileting (which includes using toilet bed pan or urinal)  1  -KD  --  1  -TO    Putting on and taking off regular upper body clothing  2  -KD  --  2  -TO    Taking care of personal grooming (such as brushing teeth)  2  -KD  --  2  -TO    Eating meals  3  -KD  --  3  -TO    AM-PAC 6 Clicks Score (OT)  10  -KD  --  10  -TO       Functional Assessment    Outcome Measure Options  --  AM-PAC 6 Clicks Basic Mobility (PT)  -EM  --    Row Name 02/14/20 8056             How much help from another person do you currently need...    Turning from your back to your side while in flat bed without using bedrails?  2  -TW      Moving from lying on back to sitting on the side of a flat bed without bedrails?  2  -TW      Moving to and from a bed to a chair (including a wheelchair)?  2  -TW      Standing up from a chair using your arms (e.g., wheelchair, bedside chair)?  1  -TW      Climbing 3-5 steps with a railing?  1  -TW      To walk in hospital room?  2  -TW      AM-PAC 6 Clicks Score (PT)  10  -TW        User Key  (r) = Recorded By, (t) = Taken By, (c) = Cosigned By    Initials Name Provider Type    EM Miguel Angel Grant, PTA Physical Therapy Assistant    TW Reece Rocha PTA Physical Therapy Assistant    KD Akua Henderson COTA/L Occupational Therapy Assistant    TO Alistair Gayle COTA/L Occupational Therapy Assistant         Time Calculation:   PT Charges     Row Name 02/17/20 1414             Time Calculation    Start Time  1104  -TW      Stop Time  1128  -TW      Time Calculation (min)  24 min  -TW      PT Received On   02/17/20  -TW      PT Goal Re-Cert Due Date  02/20/20  -TW         Time Calculation- PT    Total Timed Code Minutes- PT  24 minute(s)  -TW        User Key  (r) = Recorded By, (t) = Taken By, (c) = Cosigned By    Initials Name Provider Type    TW Reece Rocha PTA Physical Therapy Assistant        Therapy Charges for Today     Code Description Service Date Service Provider Modifiers Qty    82898548277 HC PT THER PROC EA 15 MIN 2/17/2020 Reece Rocha PTA GP 2          PT G-Codes  Outcome Measure Options: AM-PAC 6 Clicks Basic Mobility (PT)  AM-PAC 6 Clicks Score (PT): 10  AM-PAC 6 Clicks Score (OT): 10    Reece Rocha PTA  2/17/2020

## 2020-02-17 NOTE — PLAN OF CARE
Problem: Patient Care Overview  Goal: Plan of Care Review  Outcome: Ongoing (interventions implemented as appropriate)  Flowsheets (Taken 2/17/2020 6014)  Progress: improving  Plan of Care Reviewed With: patient  Outcome Summary: vss. pain controlled. ambulated in [place] and will again before dinner. resting between care. BM this shift. continue to monitor.

## 2020-02-17 NOTE — DISCHARGE PLACEMENT REQUEST
"Request for placement  Carly Will RN,   [phone]        Brendno Kamara (74 y.o. Female)     Date of Birth Social Security Number Address Home Phone MRN    1945  [address] [facility] [address] [phone] [id number]    Jain Marital Status          None        Admission Date Admission Type Admitting Provider Attending Provider Department, Room/Bed    2/10/20 Elective Luis Maher MD Rao, Mohan K, MD [address], 371/1    Discharge Date Discharge Disposition Discharge Destination                       Attending Provider:  Luis Maher MD    Allergies:  Crestor [Rosuvastatin Calcium], Lipitor [Atorvastatin], Lortab [Hydrocodone-acetaminophen], Adhesive Tape    Isolation:  None   Infection:  None   Code Status:  CPR    Ht:  165.1 cm (65\")   Wt:  106 kg (233 lb 11 oz)    Admission Cmt:  None   Principal Problem:  Confusion [R41.0]                 Active Insurance as of 2/10/2020     Primary Coverage     Payor Plan Insurance Group Employer/Plan Group    MEDICARE MEDICARE A & B      Payor Plan Address Payor Plan Phone Number Payor Plan Fax Number Effective Dates    [address] [phone]  9/1/2010 - None Entered    [facility] [address]       Subscriber Name Subscriber Birth Date Member ID       BRENDON KAMARA 1945 [id number]           Secondary Coverage     Payor Plan Insurance Group Employer/Plan Group    ANTHEM MEDICAID ANTHEM MEDICAID [id number]     Payor Plan Address Payor Plan Phone Number Payor Plan Fax Number Effective Dates    [address] [address] [phone]  7/2/2019 - None Entered    [facility] [address]       Subscriber Name Subscriber Birth Date Member ID       BRENDON KAMARA 1945 [id number]                 Emergency Contacts      (Rel.) Home Phone Work Phone Mobile Phone    Hermann Marrufo (Son) [phone] -- [phone]    Carleen Fraser (Relative) [phone] -- [phone]               "   History & Physical      Luis Maher MD at 02/10/20 1248        H&P reviewed. The patient was examined and there are no changes to the H&P.      Temp:  [97.7 °F (36.5 °C)] 97.7 °F (36.5 °C)  Heart Rate:  [64] 64  Resp:  [19] 19  BP: (198-223)/(79-88) 198/79      Electronically signed by Luis Maher MD at 02/10/20 1248   Source Note          Chief Complaint   Patient presents with   • Mass     colon cancer        HPI  Hx of a colonic polyp resection endoscopically for what proved to be a cancer in June 2019.  Her poor physical health presented definitive resection of the colon; overall, her health has improved.  She has an oxygen requirement but only at night.  Most recently, endoscopy demonstrated:        Case Report   Surgical Pathology Report                         Case: [id number]                                   Authorizing Provider:  Alfredo Guerrero MD        Collected:           06/24/2019 12:41 PM           Ordering Location:     [facility]             Received:            06/24/2019 12:55 PM                                  [facility]                                                      Pathologist:           Jacob Angel MD                                                         Specimen:    Large Intestine, 30 cm   hot snare                                                         Final Diagnosis   LARGE INTESTINE, 30 CM LEVEL,MUCOSAL BIOPSY:   ADENOCARCINOMA, MODERATELY DIFFERENTIATED.    Amendment electronically signed by Jacob Angel MD on 6/25/2019 at 0941   Electronically signed by Jacob Angel MD on 6/25/2019 at 0915     The patient remains therapeutically anticoagulated on coumadin for a mechanical heart valve.     Past Medical History:   Diagnosis Date   • Acute bronchitis    • Anxiety    • Atrial fibrillation (CMS/HCC)    • C. difficile colitis    • Callosity     under metatarsal head      • Cardiac pacemaker in situ    • CHF (congestive heart failure)  (CMS/HCC)    • Chronic obstructive lung disease (CMS/HCC)    • Corns and callus    • Depressive disorder    • Diabetes mellitus (CMS/HCC)    • Diastolic heart failure (CMS/HCC)    • Essential hypertension    • Foot pain    • History of transfusion    • Hyperlipidemia    • Long term current use of anticoagulant    • Malignant neoplasm of sigmoid colon (CMS/HCC) 8/13/2019    Added automatically from request for surgery [id number]   • On anticoagulant therapy    • Pulmonary emphysema (CMS/HCC)    • Rectal hemorrhage    • Type 2 diabetes mellitus (CMS/HCC)        Past Surgical History:   Procedure Laterality Date   • AORTIC VALVE REPAIR/REPLACEMENT  1999   • CARDIAC ELECTROPHYSIOLOGY PROCEDURE N/A 3/20/2017    Procedure: PPM generator change - dual;  Surgeon: Bereket Gates MD;  Location: [facility] CATH INVASIVE LOCATION;  Service:    • CARDIAC PACEMAKER PLACEMENT  1999   • CATARACT EXTRACTION W/ INTRAOCULAR LENS IMPLANT Left 2/1/2019    Procedure: REMOVE CATARACT AND IMPLANT INTRAOCULAR LENS I;  Surgeon: Jose L Clay MD;  Location: [facility] OR;  Service: Ophthalmology   • CATARACT EXTRACTION W/ INTRAOCULAR LENS IMPLANT Right 2/8/2019    Procedure: REMOVE CATARACT AND IMPLANT  INTRAOCULAR LENS;  Surgeon: Jose L Clay MD;  Location: [facility] [place] OR;  Service: Ophthalmology   • COLONOSCOPY N/A 6/19/2019    Procedure: COLONOSCOPY WITH CONTROL OF BLEED;  Surgeon: Alfredo Guerrero MD;  Location: [facility] [place] ENDOSCOPY;  Service: Gastroenterology   • COLONOSCOPY N/A 6/24/2019    Procedure: COLONOSCOPY;  Surgeon: Alfredo Guerrero MD;  Location: [facility] [place] ENDOSCOPY;  Service: Gastroenterology   • ECHO - CONVERTED  11/12/2013    There is mild to moderate left atrial enlargement EF 50-55%   • ENDOSCOPY N/A 6/19/2019    Procedure: ESOPHAGOGASTRODUODENOSCOPY WITH CONTROL OF BLEED;  Surgeon: Alfredo Guerrero MD;  Location: [facility] [place] ENDOSCOPY;  Service: Gastroenterology   • FOOT SURGERY  1990   • OTHER SURGICAL HISTORY  10/26/2015     PARING CORN/CALLUS    • PACEMAKER REPLACEMENT N/A 3/21/2017    Procedure: revision pacemaker pocket, evacuation hematoma, control of bleeding;  Surgeon: Polo Feliz MD;  Location: [facility] OR;  Service:    • SIGMOIDOSCOPY N/A 9/30/2019    Procedure: SIGMOIDOSCOPY FLEXIBLE;  Surgeon: Alfredo Guerrero MD;  Location: [facility] ENDOSCOPY;  Service: Gastroenterology   • TRANSESOPHAGEAL ECHOCARDIOGRAM (MITZY)  05/01/2014    With color flow-Mild left atrial enlargement with mild concentric LV hypertrophy with top normal aortic root size. EF 45-50%. Mild mitral regurgitation and mild aortic insufficiency and trivial amount of tricuspid regurgation   • TUBAL ABDOMINAL LIGATION     • UPPER GASTROINTESTINAL ENDOSCOPY  06/19/2019         Current Outpatient Medications:   •  acetaminophen (TYLENOL) 325 MG tablet, Take 650 mg by mouth every 6 (six) hours as needed for mild pain (1-3)., Disp: , Rfl:   •  albuterol (PROVENTIL) (2.5 MG/3ML) 0.083% nebulizer solution, Take 2.5 mg by nebulization Every 4 (Four) Hours As Needed for Wheezing., Disp: , Rfl:   •  albuterol sulfate  (90 Base) MCG/ACT inhaler, Inhale 2 puffs Every 4 (Four) Hours As Needed for Wheezing or Shortness of Air., Disp: 18 g, Rfl: 11  •  Ascorbic Acid (VITAMIN C WITH OCHOA HIPS) 250 MG tablet, Take 500 mg by mouth Daily., Disp: , Rfl:   •  aspirin 81 MG chewable tablet, Chew 81 mg Daily., Disp: , Rfl:   •  bumetanide (BUMEX) 2 MG tablet, Take 1 tablet by mouth 2 (Two) Times a Day. (Patient taking differently: Take 1 mg by mouth Daily.), Disp: 60 tablet, Rfl: 3  •  cholecalciferol (VITAMIN D3) 1000 units tablet, Take 2,000 Units by mouth Daily., Disp: , Rfl:   •  citalopram (CeleXA) 20 MG tablet, Take 1 tablet by mouth Daily., Disp: 90 tablet, Rfl: 3  •  diltiaZEM CD (CARDIZEM CD) 240 MG 24 hr capsule, Take 1 capsule by mouth Daily., Disp: 90 capsule, Rfl: 3  •  ezetimibe (ZETIA) 10 MG tablet, TAKE 1 TABLET BY MOUTH EVERY DAY, Disp: 90 tablet, Rfl: 1  •   ferrous sulfate 325 (65 FE) MG tablet, Take 325 mg by mouth Daily With Breakfast., Disp: , Rfl:   •  glucose blood test strip, 1 each by Other route 3 (three) times a day. Use as instructed, Disp: , Rfl:   •  Insulin Glargine (BASAGLAR KWIKPEN) 100 UNIT/ML injection pen, INJECT 30 UNITS UNDER THE SKIN INTO THE APPROPRIATE AREA AS DIRECTED DAILY., Disp: 15 mL, Rfl: 11  •  metOLazone (ZAROXOLYN) 5 MG tablet, Take 1 tablet by mouth Daily., Disp: 30 tablet, Rfl: 5  •  metoprolol succinate XL (TOPROL-XL) 25 MG 24 hr tablet, Take 1 tablet by mouth Daily., Disp: 30 tablet, Rfl: 5  •  ondansetron (ZOFRAN) 4 MG tablet, Take 1 tablet by mouth Every 6 (Six) Hours As Needed for Nausea or Vomiting., Disp: 30 tablet, Rfl: 0  •  Prenatal Vit-Fe Fumarate-FA (PRENATAL VITAMIN) 27-0.8 MG tablet, Take 1 tablet by mouth daily., Disp: , Rfl:   •  rOPINIRole (REQUIP) 0.25 MG tablet, TAKE 1 TABLET BY MOUTH EVERY NIGHT. TAKE 1 HOUR BEFORE BEDTIME., Disp: 30 tablet, Rfl: 0  •  traZODone (DESYREL) 150 MG tablet, Take 2 tablets by mouth Every Night., Disp: 180 tablet, Rfl: 3  •  umeclidinium-vilanterol (ANORO ELLIPTA) 62.5-25 MCG/INH aerosol powder  inhaler, Inhale 1 puff Daily., Disp: 1 each, Rfl: 11  •  UNIFINE PENTIPS 31G X 6 MM misc, USE 1 FOUR (4) TIMES DAILY WITH INSULIN, Disp: 130 each, Rfl: 5  •  warfarin (COUMADIN) 5 MG tablet, Take 1 tablet by mouth Daily. Take 1 tablet nightly or AS DIRECTED PER COUMADIN CLINIC, Disp: 30 tablet, Rfl: 5  •  potassium chloride (K-DUR,KLOR-CON) 20 MEQ CR tablet, TAKE 1 TABLET BY MOUTH EVERY DAY, Disp: 30 tablet, Rfl: 3    Allergies   Allergen Reactions   • Crestor [Rosuvastatin Calcium] Anaphylaxis     Hurts liver   • Lipitor [Atorvastatin] Other (See Comments)     Can mess with kidneys    • Lortab [Hydrocodone-Acetaminophen] Hallucinations   • Adhesive Tape Other (See Comments)     Only paper tape takes off her skin    • Hydrocodone-Acetaminophen Hallucinations       Family History   Problem Relation  Age of Onset   • Heart disease Mother    • Hyperlipidemia Mother    • Hypertension Mother    • Cancer Other        Social History     Socioeconomic History   • Marital status:      Spouse name: Not on file   • Number of children: Not on file   • Years of education: Not on file   • Highest education level: Not on file   Tobacco Use   • Smoking status: Former Smoker     Last attempt to quit: 3/21/1999     Years since quittin.8   • Smokeless tobacco: Never Used   Substance and Sexual Activity   • Alcohol use: No     Comment: quit in 2016   • Drug use: No   • Sexual activity: Never     Birth control/protection: Post-menopausal       Review of Systems   Respiratory: Positive for shortness of breath.    ROS:    Review of Systems   Constitutional: Negative for activity change, appetite change, chills, diaphoresis, fatigue, fever and unexpected weight change.   HENT: Negative for sore throat and trouble swallowing.    Respiratory: Negative for shortness of breath.    Gastrointestinal: Negative for abdominal distention, abdominal pain, anal bleeding, blood in stool, constipation, diarrhea, nausea, rectal pain and vomiting.   Endocrine: Negative for polydipsia, polyphagia and polyuria.   Genitourinary: Negative for difficulty urinating.   Musculoskeletal: Negative for arthralgias.   Skin: Negative for pallor.   Allergic/Immunologic: Negative for food allergies.   Neurological: Negative for weakness and light-headedness.   Psychiatric/Behavioral: Negative for behavioral problems    Physical Exam   Constitutional: She is oriented to person, place, and time. She appears well-developed and well-nourished. No distress.   HENT:   Head: Normocephalic and atraumatic.   Mouth/Throat: No oropharyngeal exudate.   Eyes: Pupils are equal, round, and reactive to light. EOM are normal. No scleral icterus.   Neck: Normal range of motion. Neck supple. No JVD present. No tracheal deviation present. No thyromegaly present.      Cardiovascular: Normal rate, regular rhythm and normal heart sounds.   Click of a heart valve.   Pulmonary/Chest: Effort normal and breath sounds normal. No stridor. No respiratory distress. She has no wheezes. She has no rales.   Abdominal: Soft. Bowel sounds are normal. She exhibits no distension and no mass. There is no tenderness. There is no rebound and no guarding. No hernia.   Musculoskeletal: Normal range of motion. She exhibits no edema, tenderness or deformity.   Lymphadenopathy:     She has no cervical adenopathy.     She has no axillary adenopathy.   Neurological: She is alert and oriented to person, place, and time.   Skin: Skin is warm and dry. No rash noted. She is not diaphoretic. No erythema. No pallor.   Psychiatric: She has a normal mood and affect. Her behavior is normal. Judgment normal.   Vitals reviewed.        ASSESSMENT    Brendon was seen today for mass.    Diagnoses and all orders for this visit:    Cancer of sigmoid (CMS/HCC)    Paroxysmal atrial fibrillation (CMS/HCC)    Chronic hypoxemic respiratory failure (CMS/HCC)    Heart failure with preserved left ventricular function (HFpEF) (CMS/[facility])    Chronic obstructive pulmonary disease, unspecified COPD type (CMS/HCC)    Type 2 diabetes mellitus with stage 4 chronic kidney disease, with long-term current use of insulin (CMS/[facility])    Class 2 severe obesity due to excess calories with serious comorbidity and body mass index (BMI) of 36.0 to 36.9 in adult (CMS/[facility])        PLAN    1. Coumadin clinic to transition to heparin  2. Laparoscopic possible open sigmoid colectomy is planned    The following were discussed:    What are the indications that have led your doctor to the opinion that an operation is necessary?    In order to treat colon cancer, it is necessary to remove the part of the colon that contains the cancer along with the attached lymph nodes. It is planned that a part of the large bowel will be removed through a cut in the abdomen.   While the majority of patients can be reconnected, occasionally a piece of the bowel may be brought out through the wall of the abdomen as a colostomy. This is usually temporary and allows the bowel content to drain into a bag worn over the colostomy until the bowel connection has healed.       What, if any, alternative treatments are available for your condition?    There are no good alternative treatments for the treatment of colon cancer other than surgery. Occasionally, chemotherapy and/or radiation therapy are recommended, these are usually given in addition to surgery. In cases of advanced, non-resectable colon cancer, only chemotherapy may be prescribed.      What will be the likely result if you don't have the operation?    Failure to remove resectable colon cancer can result in perforation and infection, bleeding, intestinal obstruction, or death.    What are the basic procedures involved in the operation?    All attempts are made to perform the surgery using minimally invasive techniques- laparoscopy and small incisions. Occasionally, longer incisions are necessary. The diseased segment is removed, as well as attached lymph nodes if necessary. The intestine is reattached unless it is not safe (such as acute infection or a low lying rectal lesion).    What are the risks?    There are risks and complications with this procedure. Any complication may require a prolonged hospitalization, a modified incision or additional surgery.They include but are not limited to the following:      General risks:   • Infection can occur, requiring antibiotics and further treatment.   • Bleeding could occur and may require a return to the operating room. Bleeding is more common if you have been taking blood thinning drugs such as warfarin, asprin, clopidogrel (Plavix or Iscover) or dipyridamole (Persantin or Asasantin).   • Small areas of the lung can collapse, increasing the risk of chest infection. This may need  antibiotics and physiotherapy.   • Increased risk in obese people of wound infection, chest infection, heart and lung complications, and thrombosis.   • Heart attack or stroke could occur due to the strain on the heart.   • Blood clot in the leg (DVT) causing pain and swelling. In rare cases part of the clot may break off and go to the lungs.   • Death as a result of this procedure is possible.     Specific risks of a resection of colon:    • Leakage where the bowel was stitched together. This may need further surgery.   • Deep bleeding in the abdomen. This may need fluid replacement or further surgery.   • Bowel is paralyzed, causing abdominal bloating and vomiting. This is usually temporary.   • Incisional hernias are possible and usually require another operation.  • The wound may become infected. This is usually treated with antibiotics or the wound may need to be opened.   • Urinary tract infection. Antibiotics may be used to control the infection.   • Infection in the abdominal cavity. This may form an abscess, may need drainage and antibiotics.   • The bowel may be unable to be joined and may be brought to the surface as a stoma, with the following problems:   - The blood supply to the stoma may fail and cause damage. This may need further surgery.   - Excess fluid loss from the stoma.   - Stoma prolapse - the bowel protrudes passed skin.   - Parastomal hernia - the bowel pushes through a weak point in the muscle wall, causing pain.   - Local skin irritation - reddening of the skin and a rash in reaction to the stoma bag glue.   • Bleeding into the abdomen. A blood transfusion and further surgery may be necessary.   • Damage to the tube bringing the urine from the kidney to the bladder.   • Abnormal emptying of the bladder. It may empty without control or may not empty at all.   • Inability to have and/or maintain an erection in men. In women, it can cause pain during or after intercourse.   • The wound may  be abnormal and the wound can be thickened, red and painful.   • The bowel actions may be much looser after the operation than before.   • Adhesions (bands of scar tissue) develop in the abdominal cavity and the bowel may block.   • Death within 30 days of surgery is estimated at 1 in 16 to 1 in 63.     How is the operation expected to improve your health or quality of life?    Colon cancers may be cured with surgery, although certain features of the cancer ( such as location, positive nodes, or large tumors) may require chemotherapy or radiation therapy.      Is hospitalization necessary and, if so, how long can you expect to be hospitalized?    These operations are performed under general anesthesia. Generally, 5-7 days of hospitalization are required after surgery, although longer and shorter hospitalization may be necessary depending on clinical course.    What can you expect during your recovery period?    Immediately after surgery, pain is controlled by a combination of narcotic and non-narcotic measures. Nausea is controlled with IV medication.  Catheters in the bladder are removed as quickly as possible, usually in the first 24 hours. Large IV's placed in surgery are likewise removed in the first day.   Occasionally, it is necessary to leave a tube in the stomach, placed through the nose to control nausea and protect surgery. Most often this is not necessary.  Depending on your overall health, admission to intensive care may be necessary. Most patients are managed on the surgical floor on a cardiac monitor.  Coughing and early ambulation help prevent post operative pneumonia. Early ambulation, along with leg devices can decrease the rate of blood clots in the legs.    When can you expect to resume normal activities?    Excluding lifting and straining, most activity can be resumed after 1-2 weeks. Lifting under 5 pounds and no straining is recommended for 6 weeks. Driving may be resumed once pain medicine is  stopped and the patient is fully capable of operating a vehicle.    Are there likely to be residual effects from the operation?    Persons having colon resections occasionally experience frequency, urgency, or diarrhea. Most of this is temporary resolves within a few months after surgery.     .   All questions were answered. The patient agrees to operation.                This document has been electronically signed by Luis Maher MD on January 23, 2020 2:32 PM      Electronically signed by Luis Maher MD at 01/25/20 0177             Luis Maher MD at 01/23/20 2311          Chief Complaint   Patient presents with   • Mass     colon cancer        HPI  Hx of a colonic polyp resection endoscopically for what proved to be a cancer in June 2019.  Her poor physical health presented definitive resection of the colon; overall, her health has improved.  She has an oxygen requirement but only at night.  Most recently, endoscopy demonstrated:        Case Report   Surgical Pathology Report                         Case: [id number]                                   Authorizing Provider:  Alfredo Guerrero MD        Collected:           06/24/2019 12:41 PM           Ordering Location:     [facility]             Received:            06/24/2019 12:55 PM                                  [facility]                                                      Pathologist:           Jacob Angel MD                                                         Specimen:    Large Intestine, 30 cm   hot snare                                                         Final Diagnosis   LARGE INTESTINE, 30 CM LEVEL,MUCOSAL BIOPSY:   ADENOCARCINOMA, MODERATELY DIFFERENTIATED.    Amendment electronically signed by Jacob Angel MD on 6/25/2019 at 0941   Electronically signed by Jacob Angel MD on 6/25/2019 at 0915     The patient remains therapeutically anticoagulated on coumadin for a mechanical heart valve.     Past  Medical History:   Diagnosis Date   • Acute bronchitis    • Anxiety    • Atrial fibrillation (CMS/HCC)    • C. difficile colitis    • Callosity     under metatarsal head      • Cardiac pacemaker in situ    • CHF (congestive heart failure) (CMS/HCC)    • Chronic obstructive lung disease (CMS/HCC)    • Corns and callus    • Depressive disorder    • Diabetes mellitus (CMS/HCC)    • Diastolic heart failure (CMS/HCC)    • Essential hypertension    • Foot pain    • History of transfusion    • Hyperlipidemia    • Long term current use of anticoagulant    • Malignant neoplasm of sigmoid colon (CMS/HCC) 8/13/2019    Added automatically from request for surgery [id number]   • On anticoagulant therapy    • Pulmonary emphysema (CMS/HCC)    • Rectal hemorrhage    • Type 2 diabetes mellitus (CMS/HCC)        Past Surgical History:   Procedure Laterality Date   • AORTIC VALVE REPAIR/REPLACEMENT  1999   • CARDIAC ELECTROPHYSIOLOGY PROCEDURE N/A 3/20/2017    Procedure: PPM generator change - dual;  Surgeon: Bereket Gates MD;  Location: [facility] CATH INVASIVE LOCATION;  Service:    • CARDIAC PACEMAKER PLACEMENT  1999   • CATARACT EXTRACTION W/ INTRAOCULAR LENS IMPLANT Left 2/1/2019    Procedure: REMOVE CATARACT AND IMPLANT INTRAOCULAR LENS I;  Surgeon: Jose L Clay MD;  Location: [facility] OR;  Service: Ophthalmology   • CATARACT EXTRACTION W/ INTRAOCULAR LENS IMPLANT Right 2/8/2019    Procedure: REMOVE CATARACT AND IMPLANT  INTRAOCULAR LENS;  Surgeon: Jose L Clay MD;  Location: [facility] OR;  Service: Ophthalmology   • COLONOSCOPY N/A 6/19/2019    Procedure: COLONOSCOPY WITH CONTROL OF BLEED;  Surgeon: Alfredo Guerrero MD;  Location: [facility] ENDOSCOPY;  Service: Gastroenterology   • COLONOSCOPY N/A 6/24/2019    Procedure: COLONOSCOPY;  Surgeon: Alfredo Guerrero MD;  Location: [facility] ENDOSCOPY;  Service: Gastroenterology   • ECHO - CONVERTED  11/12/2013    There is mild to moderate left atrial enlargement EF 50-55%   •  ENDOSCOPY N/A 6/19/2019    Procedure: ESOPHAGOGASTRODUODENOSCOPY WITH CONTROL OF BLEED;  Surgeon: Alfredo Guerrero MD;  Location: [facility] ENDOSCOPY;  Service: Gastroenterology   • FOOT SURGERY  1990   • OTHER SURGICAL HISTORY  10/26/2015    PARING CORN/CALLUS    • PACEMAKER REPLACEMENT N/A 3/21/2017    Procedure: revision pacemaker pocket, evacuation hematoma, control of bleeding;  Surgeon: Polo Feliz MD;  Location: [facility] OR;  Service:    • SIGMOIDOSCOPY N/A 9/30/2019    Procedure: SIGMOIDOSCOPY FLEXIBLE;  Surgeon: Alfredo Guerrero MD;  Location: [facility] ENDOSCOPY;  Service: Gastroenterology   • TRANSESOPHAGEAL ECHOCARDIOGRAM (MITZY)  05/01/2014    With color flow-Mild left atrial enlargement with mild concentric LV hypertrophy with top normal aortic root size. EF 45-50%. Mild mitral regurgitation and mild aortic insufficiency and trivial amount of tricuspid regurgation   • TUBAL ABDOMINAL LIGATION     • UPPER GASTROINTESTINAL ENDOSCOPY  06/19/2019         Current Outpatient Medications:   •  acetaminophen (TYLENOL) 325 MG tablet, Take 650 mg by mouth every 6 (six) hours as needed for mild pain (1-3)., Disp: , Rfl:   •  albuterol (PROVENTIL) (2.5 MG/3ML) 0.083% nebulizer solution, Take 2.5 mg by nebulization Every 4 (Four) Hours As Needed for Wheezing., Disp: , Rfl:   •  albuterol sulfate  (90 Base) MCG/ACT inhaler, Inhale 2 puffs Every 4 (Four) Hours As Needed for Wheezing or Shortness of Air., Disp: 18 g, Rfl: 11  •  Ascorbic Acid (VITAMIN C WITH OCHOA HIPS) 250 MG tablet, Take 500 mg by mouth Daily., Disp: , Rfl:   •  aspirin 81 MG chewable tablet, Chew 81 mg Daily., Disp: , Rfl:   •  bumetanide (BUMEX) 2 MG tablet, Take 1 tablet by mouth 2 (Two) Times a Day. (Patient taking differently: Take 1 mg by mouth Daily.), Disp: 60 tablet, Rfl: 3  •  cholecalciferol (VITAMIN D3) 1000 units tablet, Take 2,000 Units by mouth Daily., Disp: , Rfl:   •  citalopram (CeleXA) 20 MG tablet, Take 1 tablet by mouth  Daily., Disp: 90 tablet, Rfl: 3  •  diltiaZEM CD (CARDIZEM CD) 240 MG 24 hr capsule, Take 1 capsule by mouth Daily., Disp: 90 capsule, Rfl: 3  •  ezetimibe (ZETIA) 10 MG tablet, TAKE 1 TABLET BY MOUTH EVERY DAY, Disp: 90 tablet, Rfl: 1  •  ferrous sulfate 325 (65 FE) MG tablet, Take 325 mg by mouth Daily With Breakfast., Disp: , Rfl:   •  glucose blood test strip, 1 each by Other route 3 (three) times a day. Use as instructed, Disp: , Rfl:   •  Insulin Glargine (BASAGLAR KWIKPEN) 100 UNIT/ML injection pen, INJECT 30 UNITS UNDER THE SKIN INTO THE APPROPRIATE AREA AS DIRECTED DAILY., Disp: 15 mL, Rfl: 11  •  metOLazone (ZAROXOLYN) 5 MG tablet, Take 1 tablet by mouth Daily., Disp: 30 tablet, Rfl: 5  •  metoprolol succinate XL (TOPROL-XL) 25 MG 24 hr tablet, Take 1 tablet by mouth Daily., Disp: 30 tablet, Rfl: 5  •  ondansetron (ZOFRAN) 4 MG tablet, Take 1 tablet by mouth Every 6 (Six) Hours As Needed for Nausea or Vomiting., Disp: 30 tablet, Rfl: 0  •  Prenatal Vit-Fe Fumarate-FA (PRENATAL VITAMIN) 27-0.8 MG tablet, Take 1 tablet by mouth daily., Disp: , Rfl:   •  rOPINIRole (REQUIP) 0.25 MG tablet, TAKE 1 TABLET BY MOUTH EVERY NIGHT. TAKE 1 HOUR BEFORE BEDTIME., Disp: 30 tablet, Rfl: 0  •  traZODone (DESYREL) 150 MG tablet, Take 2 tablets by mouth Every Night., Disp: 180 tablet, Rfl: 3  •  umeclidinium-vilanterol (ANORO ELLIPTA) 62.5-25 MCG/INH aerosol powder  inhaler, Inhale 1 puff Daily., Disp: 1 each, Rfl: 11  •  UNIFINE PENTIPS 31G X 6 MM misc, USE 1 FOUR (4) TIMES DAILY WITH INSULIN, Disp: 130 each, Rfl: 5  •  warfarin (COUMADIN) 5 MG tablet, Take 1 tablet by mouth Daily. Take 1 tablet nightly or AS DIRECTED PER COUMADIN CLINIC, Disp: 30 tablet, Rfl: 5  •  potassium chloride (K-DUR,KLOR-CON) 20 MEQ CR tablet, TAKE 1 TABLET BY MOUTH EVERY DAY, Disp: 30 tablet, Rfl: 3    Allergies   Allergen Reactions   • Crestor [Rosuvastatin Calcium] Anaphylaxis     Hurts liver   • Lipitor [Atorvastatin] Other (See Comments)      Can mess with kidneys    • Lortab [Hydrocodone-Acetaminophen] Hallucinations   • Adhesive Tape Other (See Comments)     Only paper tape takes off her skin    • Hydrocodone-Acetaminophen Hallucinations       Family History   Problem Relation Age of Onset   • Heart disease Mother    • Hyperlipidemia Mother    • Hypertension Mother    • Cancer Other        Social History     Socioeconomic History   • Marital status:      Spouse name: Not on file   • Number of children: Not on file   • Years of education: Not on file   • Highest education level: Not on file   Tobacco Use   • Smoking status: Former Smoker     Last attempt to quit: 3/21/1999     Years since quittin.8   • Smokeless tobacco: Never Used   Substance and Sexual Activity   • Alcohol use: No     Comment: quit in 2016   • Drug use: No   • Sexual activity: Never     Birth control/protection: Post-menopausal       Review of Systems   Respiratory: Positive for shortness of breath.    ROS:    Review of Systems   Constitutional: Negative for activity change, appetite change, chills, diaphoresis, fatigue, fever and unexpected weight change.   HENT: Negative for sore throat and trouble swallowing.    Respiratory: Negative for shortness of breath.    Gastrointestinal: Negative for abdominal distention, abdominal pain, anal bleeding, blood in stool, constipation, diarrhea, nausea, rectal pain and vomiting.   Endocrine: Negative for polydipsia, polyphagia and polyuria.   Genitourinary: Negative for difficulty urinating.   Musculoskeletal: Negative for arthralgias.   Skin: Negative for pallor.   Allergic/Immunologic: Negative for food allergies.   Neurological: Negative for weakness and light-headedness.   Psychiatric/Behavioral: Negative for behavioral problems    Physical Exam   Constitutional: She is oriented to person, place, and time. She appears well-developed and well-nourished. No distress.   HENT:   Head: Normocephalic and atraumatic.   Mouth/Throat:  No oropharyngeal exudate.   Eyes: Pupils are equal, round, and reactive to light. EOM are normal. No scleral icterus.   Neck: Normal range of motion. Neck supple. No JVD present. No tracheal deviation present. No thyromegaly present.   Cardiovascular: Normal rate, regular rhythm and normal heart sounds.   Click of a heart valve.   Pulmonary/Chest: Effort normal and breath sounds normal. No stridor. No respiratory distress. She has no wheezes. She has no rales.   Abdominal: Soft. Bowel sounds are normal. She exhibits no distension and no mass. There is no tenderness. There is no rebound and no guarding. No hernia.   Musculoskeletal: Normal range of motion. She exhibits no edema, tenderness or deformity.   Lymphadenopathy:     She has no cervical adenopathy.     She has no axillary adenopathy.   Neurological: She is alert and oriented to person, place, and time.   Skin: Skin is warm and dry. No rash noted. She is not diaphoretic. No erythema. No pallor.   Psychiatric: She has a normal mood and affect. Her behavior is normal. Judgment normal.   Vitals reviewed.        ASSESSMENT    Brendon was seen today for mass.    Diagnoses and all orders for this visit:    Cancer of sigmoid (CMS/HCC)    Paroxysmal atrial fibrillation (CMS/HCC)    Chronic hypoxemic respiratory failure (CMS/HCC)    Heart failure with preserved left ventricular function (HFpEF) (CMS/HCC)    Chronic obstructive pulmonary disease, unspecified COPD type (CMS/HCC)    Type 2 diabetes mellitus with stage 4 chronic kidney disease, with long-term current use of insulin (CMS/HCC)    Class 2 severe obesity due to excess calories with serious comorbidity and body mass index (BMI) of 36.0 to 36.9 in adult (CMS/HCC)        PLAN    1. Coumadin clinic to transition to heparin  2. Laparoscopic possible open sigmoid colectomy is planned    The following were discussed:    What are the indications that have led your doctor to the opinion that an operation is  necessary?    In order to treat colon cancer, it is necessary to remove the part of the colon that contains the cancer along with the attached lymph nodes. It is planned that a part of the large bowel will be removed through a cut in the abdomen.  While the majority of patients can be reconnected, occasionally a piece of the bowel may be brought out through the wall of the abdomen as a colostomy. This is usually temporary and allows the bowel content to drain into a bag worn over the colostomy until the bowel connection has healed.       What, if any, alternative treatments are available for your condition?    There are no good alternative treatments for the treatment of colon cancer other than surgery. Occasionally, chemotherapy and/or radiation therapy are recommended, these are usually given in addition to surgery. In cases of advanced, non-resectable colon cancer, only chemotherapy may be prescribed.      What will be the likely result if you don't have the operation?    Failure to remove resectable colon cancer can result in perforation and infection, bleeding, intestinal obstruction, or death.    What are the basic procedures involved in the operation?    All attempts are made to perform the surgery using minimally invasive techniques- laparoscopy and small incisions. Occasionally, longer incisions are necessary. The diseased segment is removed, as well as attached lymph nodes if necessary. The intestine is reattached unless it is not safe (such as acute infection or a low lying rectal lesion).    What are the risks?    There are risks and complications with this procedure. Any complication may require a prolonged hospitalization, a modified incision or additional surgery.They include but are not limited to the following:      General risks:   • Infection can occur, requiring antibiotics and further treatment.   • Bleeding could occur and may require a return to the operating room. Bleeding is more common if  you have been taking blood thinning drugs such as warfarin, asprin, clopidogrel (Plavix or Iscover) or dipyridamole (Persantin or Asasantin).   • Small areas of the lung can collapse, increasing the risk of chest infection. This may need antibiotics and physiotherapy.   • Increased risk in obese people of wound infection, chest infection, heart and lung complications, and thrombosis.   • Heart attack or stroke could occur due to the strain on the heart.   • Blood clot in the leg (DVT) causing pain and swelling. In rare cases part of the clot may break off and go to the lungs.   • Death as a result of this procedure is possible.     Specific risks of a resection of colon:    • Leakage where the bowel was stitched together. This may need further surgery.   • Deep bleeding in the abdomen. This may need fluid replacement or further surgery.   • Bowel is paralyzed, causing abdominal bloating and vomiting. This is usually temporary.   • Incisional hernias are possible and usually require another operation.  • The wound may become infected. This is usually treated with antibiotics or the wound may need to be opened.   • Urinary tract infection. Antibiotics may be used to control the infection.   • Infection in the abdominal cavity. This may form an abscess, may need drainage and antibiotics.   • The bowel may be unable to be joined and may be brought to the surface as a stoma, with the following problems:   - The blood supply to the stoma may fail and cause damage. This may need further surgery.   - Excess fluid loss from the stoma.   - Stoma prolapse - the bowel protrudes passed skin.   - Parastomal hernia - the bowel pushes through a weak point in the muscle wall, causing pain.   - Local skin irritation - reddening of the skin and a rash in reaction to the stoma bag glue.   • Bleeding into the abdomen. A blood transfusion and further surgery may be necessary.   • Damage to the tube bringing the urine from the kidney to  the bladder.   • Abnormal emptying of the bladder. It may empty without control or may not empty at all.   • Inability to have and/or maintain an erection in men. In women, it can cause pain during or after intercourse.   • The wound may be abnormal and the wound can be thickened, red and painful.   • The bowel actions may be much looser after the operation than before.   • Adhesions (bands of scar tissue) develop in the abdominal cavity and the bowel may block.   • Death within 30 days of surgery is estimated at 1 in 16 to 1 in 63.     How is the operation expected to improve your health or quality of life?    Colon cancers may be cured with surgery, although certain features of the cancer ( such as location, positive nodes, or large tumors) may require chemotherapy or radiation therapy.      Is hospitalization necessary and, if so, how long can you expect to be hospitalized?    These operations are performed under general anesthesia. Generally, 5-7 days of hospitalization are required after surgery, although longer and shorter hospitalization may be necessary depending on clinical course.    What can you expect during your recovery period?    Immediately after surgery, pain is controlled by a combination of narcotic and non-narcotic measures. Nausea is controlled with IV medication.  Catheters in the bladder are removed as quickly as possible, usually in the first 24 hours. Large IV's placed in surgery are likewise removed in the first day.   Occasionally, it is necessary to leave a tube in the stomach, placed through the nose to control nausea and protect surgery. Most often this is not necessary.  Depending on your overall health, admission to intensive care may be necessary. Most patients are managed on the surgical floor on a cardiac monitor.  Coughing and early ambulation help prevent post operative pneumonia. Early ambulation, along with leg devices can decrease the rate of blood clots in the legs.    When  can you expect to resume normal activities?    Excluding lifting and straining, most activity can be resumed after 1-2 weeks. Lifting under 5 pounds and no straining is recommended for 6 weeks. Driving may be resumed once pain medicine is stopped and the patient is fully capable of operating a vehicle.    Are there likely to be residual effects from the operation?    Persons having colon resections occasionally experience frequency, urgency, or diarrhea. Most of this is temporary resolves within a few months after surgery.     .   All questions were answered. The patient agrees to operation.                This document has been electronically signed by Luis Maher MD on January 23, 2020 2:32 PM      Electronically signed by Luis Maher MD at 01/25/20 2042

## 2020-02-17 NOTE — PLAN OF CARE
Problem: Patient Care Overview  Goal: Plan of Care Review  Outcome: Ongoing (interventions implemented as appropriate)  Flowsheets (Taken 2/17/2020 1303)  Progress: improving  Plan of Care Reviewed With: patient; son  Outcome Summary: Pt participated in BID tx this date with only agreeing to LE ex's this am. Pt able to stand with min/mod of 1 and took steps to bed from chair with RW and min of 1. Pt more alert and able to perform activity as asked but does require encouragement. Pt would cont to benefit from therapy upon DC.

## 2020-02-17 NOTE — PLAN OF CARE
Problem: Patient Care Overview  Goal: Plan of Care Review  Outcome: Ongoing (interventions implemented as appropriate)  Flowsheets (Taken 2/17/2020 1000)  Progress: improving  Plan of Care Reviewed With: patient  Outcome Summary: Pt up in recliner upon entry. Pt deferred any t/f's @ this time 2' abdominal pain but was agreeable to BUE ther ex. Pt completed BUE ther ex in all planes w/ 2lb HW and RB's PRN to complete task presented. No new goals met this date. Cont OT POC.

## 2020-02-17 NOTE — PLAN OF CARE
Blood pressure elevated, MD called no orders at this time, will attempt to walk patient in a.m. Or get in chair, pt resting well

## 2020-02-17 NOTE — THERAPY TREATMENT NOTE
Acute Care - Physical Therapy Treatment Note  [facility]     Patient Name: Brendon Skelton  : 1945  MRN: [id number]  Today's Date: 2020  Onset of Illness/Injury or Date of Surgery: 02/10/20     Referring Physician: Dr. Maher    Admit Date: 2/10/2020    Visit Dx:    ICD-10-CM ICD-9-CM   1. Cancer of sigmoid (CMS/HCC) C18.7 153.3   2. Impaired functional mobility, balance, gait, and endurance Z74.09 V49.89   3. Impaired mobility and ADLs Z74.09 799.89     Patient Active Problem List   Diagnosis   • Long term current use of anticoagulant therapy   • Personal history of heart valve replacement   • Atrial fibrillation [I48.91]   • Emphysema of lung (CMS/HCC)   • On anticoagulant therapy   • Hyperlipidemia   • Diastolic heart failure (CMS/HCC)   • Depressive disorder   • COPD (chronic obstructive pulmonary disease) (CMS/HCC)   • Diabetes mellitus (CMS/HCC)   • Myopia   • Astigmatism   • Bleeding from open wound of chest wall   • Follow-up surgery care   • Encounter for screening for malignant neoplasm of colon   • Positive colorectal cancer screening using DNA-based stool test   • Nodule of left lung   • Chronic hypoxemic respiratory failure (CMS/HCC)   • Physical deconditioning   • Heart failure with preserved left ventricular function (HFpEF) (CMS/HCC)   • Hypertension   • Coronary artery disease involving native coronary artery without angina pectoris   • Hx of CABG   • SSS (sick sinus syndrome) (CMS/HCC)   • Pacemaker   • Stage 4 chronic kidney disease (CMS/HCC)   • Personal history of tobacco use, presenting hazards to health   • Gastrointestinal hemorrhage   • Morbid obesity (CMS/HCC)   • Gastritis   • Colon polyp   • Coumadin toxicity   • Acute on chronic diastolic congestive heart failure (CMS/HCC)   • Chronic anemia   • Pulmonary hypertension (CMS/HCC)   • Cancer of sigmoid (CMS/HCC)   • Confusion   • Hyponatremia   • Long term current use of anticoagulant       Therapy  Treatment    Rehabilitation Treatment Summary     Row Name 02/17/20 1308 02/17/20 1104 02/17/20 1000       Treatment Time/Intention    Discipline  physical therapy assistant  -TW  physical therapy assistant  -TW  occupational therapy assistant  -KD    Document Type  therapy note (daily note)  -TW  therapy note (daily note)  -TW  therapy note (daily note)  -KD    Subjective Information  complains of;weakness;fatigue;pain  -TW  complains of;weakness;fatigue;pain  -TW  no complaints  -KD    Mode of Treatment  physical therapy;individual therapy  -TW  physical therapy;individual therapy  -TW  occupational therapy  -KD    Patient/Family Observations  Son came in during tx and stayed for remainder of tx.  -TW  Pt up in recliner and agreeable to therex only.   -TW  Pt up in recliner upon entry  -KD    Therapy Frequency (OT Eval)  --  --  daily  -KD    Patient Effort  good  -TW  adequate  -TW  fair  -KD    Comment  --  --  Pt reported she had already had ADL this AM.  -KD    Existing Precautions/Restrictions  fall  -TW  fall  -TW  fall 2 person assist when up  -KD    Recorded by [TW] eRece Rocha, PTA 02/17/20 1444 [TW] Reece Rocah, PTA 02/17/20 1414 [KD] Akua Henderson COLÓN/L 02/17/20 1151    Row Name 02/17/20 1308 02/17/20 1104 02/17/20 1000       Vital Signs    Pre Systolic BP Rehab  180  -TW  151  -TW  130  -KD    Pre Treatment Diastolic BP  75  -TW  68  -TW  62  -KD    Intra Systolic BP Rehab  191  -TW  --  --    Intra Treatment Diastolic BP  84  -TW  --  --    Post Systolic BP Rehab  177  -TW  --  --    Post Treatment Diastolic BP  74  -TW  --  --    Pretreatment Heart Rate (beats/min)  73  -TW  71  -TW  70  -KD    Intratreatment Heart Rate (beats/min)  70  -TW  --  --    Posttreatment Heart Rate (beats/min)  77  -TW  76  -TW  78  -KD    Pre SpO2 (%)  94  -TW  97  -TW  98  -KD    O2 Delivery Pre Treatment  supplemental O2  -TW  supplemental O2  -TW  supplemental O2  -KD    Intra SpO2 (%)  94  -TW  --   --    O2 Delivery Intra Treatment  supplemental O2  -TW  --  --    Post SpO2 (%)  93  -TW  96  -TW  96  -KD    O2 Delivery Post Treatment  supplemental O2  -TW  supplemental O2  -TW  supplemental O2  -KD    Pre Patient Position  Sitting  -TW  Sitting  -TW  Sitting  -KD    Intra Patient Position  Standing  -TW  Sitting  -TW  Sitting  -KD    Post Patient Position  Supine  -TW  Sitting  -TW  Sitting  -KD    Recorded by [TW] Reece Rocha, PTA 02/17/20 1444 [TW] Reece Rocha, PTA 02/17/20 1414 [KD] Akua Henderson COTA/L 02/17/20 1151    Row Name 02/17/20 1308 02/17/20 1104 02/17/20 1000       Cognitive Assessment/Intervention- PT/OT    Affect/Mental Status (Cognitive)  WFL  -TW  WFL  -TW  WFL  -KD    Behavioral Issues (Cognitive)  overwhelmed easily  -TW  overwhelmed easily  -TW  --    Orientation Status (Cognition)  oriented x 3  -TW  oriented x 3  -TW  oriented x 3  -KD    Follows Commands (Cognition)  follows one step commands;75-90% accuracy  -TW  follows one step commands;75-90% accuracy  -TW  follows one step commands  -KD    Personal Safety Interventions  fall prevention program maintained;gait belt;nonskid shoes/slippers when out of bed  -TW  muscle strengthening facilitated  -TW  --    Recorded by [TW] Reece Rocha, YOSEPH 02/17/20 1444 [TW] Reece Rocha PTA 02/17/20 1414 [KD] Akua Henderson COTA/L 02/17/20 1151    Row Name 02/17/20 1308 02/17/20 1104          Safety Issues, Functional Mobility    Safety Issues Affecting Function (Mobility)  at risk behavior observed;impulsivity;ability to follow commands;awareness of need for assistance  -TW  --     Impairments Affecting Function (Mobility)  balance;coordination;endurance/activity tolerance;pain;postural/trunk control;shortness of breath;strength  -TW  balance;cognition;endurance/activity tolerance;pain;postural/trunk control;strength  -TW     Recorded by [TW] Reece Rocha PTA 02/17/20 1444 [TW] Reece Rocha, PTA  02/17/20 1414     Row Name 02/17/20 1308 02/17/20 1104          Bed Mobility Assessment/Treatment    Rolling Left [place] (Bed Mobility)  minimum assist (75% patient effort)  -TW  not tested  -TW     Rolling Right [place] (Bed Mobility)  minimum assist (75% patient effort)  -TW  --     Scooting/Bridging [place] (Bed Mobility)  moderate assist (50% patient effort)  -TW  --     Supine-Sit [place] (Bed Mobility)  not tested  -TW  not tested  -TW     Sit-Supine [place] (Bed Mobility)  moderate assist (50% patient effort)  -TW  not tested  -TW     Bed Mobility, Safety Issues  impaired trunk control for bed mobility;decreased use of legs for bridging/pushing  -TW  --     Assistive Device (Bed Mobility)  bed rails;head of bed elevated;draw sheet  -TW  --     Recorded by [TW] Reece Rocha, YOSEPH 02/17/20 1444 [TW] Reece Rocha, YOSEPH 02/17/20 1414     Row Name 02/17/20 1308 02/17/20 1000          Transfer Assessment/Treatment    Transfer Assessment/Treatment  chair-bed transfer  -TW  --     Comment (Transfers)  --  Pt deferred any t/f's @ this time 2' to getting in chair  -KD     Recorded by [TW] Reece Rocha PTA 02/17/20 1444 [KD] Akua Henderson COTA/L 02/17/20 1151     Row Name 02/17/20 1104             Bed-Chair Transfer    Bed-Chair [place] (Transfers)  not tested  -TW      Recorded by [TW] Reece Rocha PTA 02/17/20 1414      Row Name 02/17/20 1308             Chair-Bed Transfer    Chair-Bed [place] (Transfers)  minimum assist (75% patient effort);1 person assist  -TW      Assistive Device (Chair-Bed Transfers)  walker, front-wheeled  -TW      Recorded by [TW] Reece Rocha PTA 02/17/20 1444      Row Name 02/17/20 1308             Sit-Stand Transfer    Sit-Stand [place] (Transfers)  minimum assist (75% patient effort);contact guard  -TW      Assistive Device (Sit-Stand Transfers)  walker, front-wheeled  -TW      Recorded by [TW] Reece Rocha  DANDRE, PTA 02/17/20 1444      Row Name 02/17/20 1308 02/17/20 1104          Stand-Sit Transfer    Stand-Sit [place] (Transfers)  contact guard  -TW  not tested  -TW     Assistive Device (Stand-Sit Transfers)  walker, front-wheeled  -TW  --     Recorded by [TW] Reece Rocha, PTA 02/17/20 1444 [TW] Reece Rocha, [facility] 02/17/20 1414     Row Name 02/17/20 1308             Gait/Stairs Assessment/Training    Gait/Stairs Assessment/Training  gait/ambulation assistive device  -TW      [place] Level (Gait)  minimum assist (75% patient effort)  -TW      Assistive Device (Gait)  walker, front-wheeled  -TW      Distance in Feet (Gait)  4 steps to bed then 3-4 steps to HOB.  -TW      Pattern (Gait)  step-to  -TW      Deviations/Abnormal Patterns (Gait)  antalgic;gait speed decreased;stride length decreased  -TW      Bilateral Gait Deviations  forward flexed posture  -TW      Recorded by [TW] Reece Rocha, [facility] 02/17/20 1444      Row Name 02/17/20 1308             Lower Extremity Seated Therapeutic Exercise    Performed, Seated Lower Extremity (Therapeutic Exercise)  hip flexion/extension;hip abduction/adduction;ankle dorsiflexion/plantarflexion;LAQ (long arc quad), knee extension  -TW      Exercise Type, Seated Lower Extremity (Therapeutic Exercise)  AROM (active range of motion)  -TW      Sets/Reps Detail, Seated Lower Extremity (Therapeutic Exercise)  1/15  -TW      Recorded by [TW] Reece Rocha, PTA 02/17/20 1444      Row Name 02/17/20 1104 02/17/20 1000          Therapeutic Exercise    Upper Extremity Range of Motion (Therapeutic Exercise)  --  shoulder flexion/extension, bilateral;shoulder abduction/adduction, bilateral;shoulder horizontal abduction/adduction, bilateral;shoulder internal/external rotation, bilateral;elbow flexion/extension, bilateral;forearm supination/pronation, bilateral;wrist flexion/extension, bilateral  -     Hand (Therapeutic Exercise)  --  finger flexion/extension,  bilateral  -KD     Lower Extremity (Therapeutic Exercise)  gluteal sets;quad sets, bilateral;heel slides, bilateral  -TW  --     Lower Extremity Range of Motion (Therapeutic Exercise)  hip abduction/adduction, bilateral;ankle dorsiflexion/plantar flexion, bilateral  -TW  --     Weight/Resistance (Therapeutic Exercise)  --  2 pounds  -KD     Exercise Type (Therapeutic Exercise)  AROM (active range of motion);AAROM (active assistive range of motion)  -TW  AROM (active range of motion)  -KD     Position (Therapeutic Exercise)  seated In recliner with LE's elevated and supported.  -TW  seated  -KD     Sets/Reps (Therapeutic Exercise)  2/10-15  -TW  1/20  -KD     Equipment (Therapeutic Exercise)  --  free weight, barbell  -KD     Expected Outcome (Therapeutic Exercise)  --  improve functional tolerance, self-care activity;improve performance, transfer skills  -KD     Comment (Therapeutic Exercise)  --  Pt required several RB's to complete task  -KD     Recorded by [TW] Reece Rocha, YOSEPH 02/17/20 1414 [KD] Akua Henderson COTA/L 02/17/20 1151     Row Name 02/17/20 1308             Static Sitting Balance    Level of [place] (Unsupported Sitting, Static Balance)  supervision  -TW      Sitting Position (Unsupported Sitting, Static Balance)  sitting on edge of bed  -TW      Time Able to Maintain Position (Unsupported Sitting, Static Balance)  4 to 5 minutes  -TW      Recorded by [TW] Reece Rocha PTA 02/17/20 1444      Row Name 02/17/20 1308             Dynamic Sitting Balance    Level of [place], Reaches Outside Midline (Sitting, Dynamic Balance)  moderate assist, 50 to 74% patient effort  -TW      Sitting Position, Reaches Outside Midline (Sitting, Dynamic Balance)  sitting on edge of bed  -TW      Recorded by [TW] Reece Rocha, YOSEPH 02/17/20 1444      Row Name 02/17/20 1308             Static Standing Balance    Level of [place] (Supported Standing, Static Balance)  minimal assist, 75%  patient effort;contact guard assist  -TW      Assistive Device Utilized (Supported Standing, Static Balance)  walker, rolling  -TW      Recorded by [TW] Reece Rocha, PTA 02/17/20 1444      Row Name 02/17/20 1308             Dynamic Standing Balance    Level of [place], Reaches Outside Midline (Standing, Dynamic Balance)  minimal assist, 75% patient effort  -TW      Recorded by [TW] Reece Rocha, PTA 02/17/20 1444      Row Name 02/17/20 1308 02/17/20 1104 02/17/20 1000       Positioning and Restraints    Pre-Treatment Position  sitting in chair/recliner  -TW  sitting in chair/recliner  -TW  sitting in chair/recliner  -KD    Post Treatment Position  bed  -TW  chair  -TW  chair  -KD    In Bed  supine;call light within reach;encouraged to call for assist;exit alarm on;with family/caregiver  -TW  --  --    In Chair  --  reclined;call light within reach;encouraged to call for assist;exit alarm on  -TW  sitting;call light within reach;encouraged to call for assist;exit alarm on  -KD    Recorded by [TW] Reece Rocha, PTA 02/17/20 1444 [TW] Reece Rocha, PTA 02/17/20 1414 [KD] Akua Henderson COTA/L 02/17/20 1151    Row Name 02/17/20 1308 02/17/20 1104 02/17/20 1000       Pain Scale: Numbers Pre/Post-Treatment    Pain Scale: Numbers, Pretreatment  5/10  -TW  6/10  -TW  5/10  -KD    Pain Scale: Numbers, Post-Treatment  5/10  -TW  7/10  -TW  5/10  -KD    Pain Location  abdomen  -TW  abdomen  -TW  abdomen  -KD    Pain Intervention(s)  --  --  -- Meds not due  -KD    Recorded by [TW] Reece Rocha, PTA 02/17/20 1444 [TW] Reece Rocha, PTA 02/17/20 1414 [KD] Akua Henderson COLÓN/L 02/17/20 1151    Row Name                Wound 02/10/20 1450 abdomen Incision    Wound - Properties Group Date first assessed: 02/10/20 [CH] Time first assessed: 1450 [CH] Present on Hospital Admission: N [CH] Location: abdomen [CH] Primary Wound Type: Incision [CH] Additional Comments: LAPAROSCOPIC  INCISIONS X2 [CH] Recorded by:  [CH] Elayne Fung RN 02/10/20 1450    Row Name                Wound 02/10/20 1450 lower;midline abdomen Incision    Wound - Properties Group Date first assessed: 02/10/20 [CH] Time first assessed: 1450 [CH] Present on Hospital Admission: N [CH] Orientation: lower;midline [CH] Location: abdomen [CH] Primary Wound Type: Incision [CH] Recorded by:  [CH] Elayne Fung RN 02/10/20 1450    Row Name 02/17/20 1000             Outcome Summary/Treatment Plan (OT)    Daily Summary of Progress (OT)  progress toward functional goals as expected  -KD      Plan for Continued Treatment (OT)  cont ot poc  -KD      Anticipated Discharge Disposition (OT)  anticipate therapy at next level of care  -KD      Recorded by [KD] Akua Henderson COTA/L 02/17/20 1151      Row Name 02/17/20 1308 02/17/20 1104          Outcome Summary/Treatment Plan (PT)    Daily Summary of Progress (PT)  progress towards functional goals is fair  -TW  progress towards functional goals is fair  -TW     Plan for Continued Treatment (PT)  Cont to mobilize  -TW  Cont  -TW     Anticipated Discharge Disposition (PT)  anticipate therapy at next level of care  -TW  anticipate therapy at next level of care  -TW     Recorded by [TW] Reece Rocha, YOSEPH 02/17/20 1444 [TW] Reece Rocha, YOSEPH 02/17/20 1414       User Key  (r) = Recorded By, (t) = Taken By, (c) = Cosigned By    Initials Name Effective Dates Discipline    TW Reece Rocha PTA 03/07/18 -  PT    KD Akua Henderson COTA/L 03/07/18 -  OT    CH Elayne Fung RN 06/23/17 -  Nurse          Wound 02/10/20 1450 abdomen Incision (Active)   Dressing Appearance open to air 2/17/2020  8:09 AM   Closure Liquid skin adhesive;Adhesive closure strips 2/17/2020  8:09 AM   Base clean 2/17/2020  8:09 AM   Periwound ecchymotic 2/17/2020  8:09 AM   Periwound Temperature warm 2/17/2020  8:09 AM   Periwound Skin Turgor soft 2/17/2020  8:09 AM   Drainage  Amount none 2/17/2020  8:09 AM       Wound 02/10/20 1450 lower;midline abdomen Incision (Active)   Dressing Appearance dry;intact 2/17/2020  8:09 AM   Closure Adhesive bandage 2/17/2020  8:09 AM   Base dressing in place, unable to visualize 2/17/2020  8:09 AM   Periwound ecchymotic 2/17/2020  8:09 AM   Periwound Temperature warm 2/16/2020  8:30 PM   Periwound Skin Turgor soft 2/16/2020  8:30 PM   Drainage Amount none 2/17/2020  8:09 AM       Rehab Goal Summary     Row Name 02/17/20 1308 02/17/20 1000          Bed Mobility Goal 1 (PT)    Activity/Assistive Device (Bed Mobility Goal 1, PT)  bed mobility activities, all  -TW  --     [place] Level/Cues Needed (Bed Mobility Goal 1, PT)  independent  -TW  --     Time Frame (Bed Mobility Goal 1, PT)  short term goal (STG);1 week  -TW  --     Progress/Outcomes (Bed Mobility Goal 1, PT)  goal not met  -TW  --        Transfer Goal 1 (PT)    Activity/Assistive Device (Transfer Goal 1, PT)  bed-to-chair/chair-to-bed;toilet  -TW  --     [place] Level/Cues Needed (Transfer Goal 1, PT)  conditional independence  -TW  --     Time Frame (Transfer Goal 1, PT)  long term goal (LTG);1 week  -TW  --     Barriers (Transfers Goal 1, PT)  pain; endurance  -TW  --     Progress/Outcome (Transfer Goal 1, PT)  goal not met  -TW  --        Gait Training Goal 1 (PT)    Activity/Assistive Device (Gait Training Goal 1, PT)  gait (walking locomotion);assistive device use;decrease fall risk;increase endurance/gait distance  -TW  --     [place] Level (Gait Training Goal 1, PT)  conditional independence  -TW  --     Time Frame (Gait Training Goal 1, PT)  long term goal (LTG);2 weeks  -TW  --     Barriers (Gait Training Goal 1, PT)  600 ft or more per trip x 2 per day w/out LOB  -TW  --     Progress/Outcome (Gait Training Goal 1, PT)  goal not met  -TW  --        Stairs Goal 1 (PT)    Activity/Assistive Device (Stairs Goal 1, PT)  -- ascend/descend ramp w/ FWRW and supervision  -TW  --      [place] Level/Cues Needed (Stairs Goal 1, PT)  conditional independence;supervision required  -TW  --     Time Frame (Stairs Goal 1, PT)  long term goal (LTG);2 weeks  -TW  --     Progress/Outcome (Stairs Goal 1, PT)  goal not met  -TW  --        Patient Education Goal (PT)    Activity (Patient Education Goal, PT)  Indep w/ HEP   -TW  --     [place]/Cues/Accuracy (Memory Goal 2, PT)  demonstrates adequately  -TW  --     Time Frame (Patient Education Goal, PT)  1 week  -TW  --     Progress/Outcome (Patient Education Goal, PT)  goal not met  -TW  --        Occupational Therapy Goals    Transfer Goal Selection (OT)  --  transfer, OT goal 1  -KD     Bathing Goal Selection (OT)  --  bathing, OT goal 1  -KD     Dressing Goal Selection (OT)  --  dressing, OT goal 1  -KD     Toileting Goal Selection (OT)  --  toileting, OT goal 1  -KD        Transfer Goal 1 (OT)    Activity/Assistive Device (Transfer Goal 1, OT)  --  sit-to-stand/stand-to-sit;bed-to-chair/chair-to-bed;toilet  -KD     [place] Level/Cues Needed (Transfer Goal 1, OT)  --  conditional independence  -KD     Time Frame (Transfer Goal 1, OT)  --  long term goal (LTG);by discharge  -KD     Progress/Outcome (Transfer Goal 1, OT)  --  goal not met  -KD        Bathing Goal 1 (OT)    Activity/Assistive Device (Bathing Goal 1, OT)  --  bathing skills, all  -KD     [place] Level/Cues Needed (Bathing Goal 1, OT)  --  supervision required;set-up required  -KD     Time Frame (Bathing Goal 1, OT)  --  long term goal (LTG);by discharge  -KD     Progress/Outcomes (Bathing Goal 1, OT)  --  goal not met  -KD        Dressing Goal 1 (OT)    Activity/Assistive Device (Dressing Goal 1, OT)  --  dressing skills, all using AE PRN  -KD     [place]/Cues Needed (Dressing Goal 1, OT)  --  supervision required;set-up required  -KD     Time Frame (Dressing Goal 1, OT)  --  long term goal (LTG);by discharge  -KD     Progress/Outcome (Dressing Goal 1, OT)  --  goal  not met  -KD        Toileting Goal 1 (OT)    Activity/Device (Toileting Goal 1, OT)  --  toileting skills, all  -KD     [place] Level/Cues Needed (Toileting Goal 1, OT)  --  conditional independence  -KD     Time Frame (Toileting Goal 1, OT)  --  long term goal (LTG);by discharge  -KD     Progress/Outcome (Toileting Goal 1, OT)  --  goal not met  -KD       User Key  (r) = Recorded By, (t) = Taken By, (c) = Cosigned By    Initials Name Provider Type Discipline    TW Reece Rocha, PTA Physical Therapy Assistant PT    KD Akua Henderson, COLÓN/L Occupational Therapy Assistant OT              PT Recommendation and Plan  Anticipated Discharge Disposition (PT): anticipate therapy at next level of care  Outcome Summary/Treatment Plan (PT)  Daily Summary of Progress (PT): progress towards functional goals is fair  Barriers to Overall Progress (PT): Pt confused this pm.  Plan for Continued Treatment (PT): Cont to mobilize  Anticipated Discharge Disposition (PT): anticipate therapy at next level of care  Plan of Care Reviewed With: patient, son  Progress: improving  Outcome Summary: Pt participated in BID tx this date with only agreeing to LE ex's this am. Pt able to stand with min/mod of 1 and took steps to bed from chair with RW and min of 1. Pt more alert and able to perform activity as asked but does require encouragement. Pt would cont to benefit from therapy upon DC.  Outcome Measures     Row Name 02/17/20 1308 02/17/20 1000 02/15/20 1430       How much help from another person do you currently need...    Turning from your back to your side while in flat bed without using bedrails?  3  -TW  --  2  -EM    Moving from lying on back to sitting on the side of a flat bed without bedrails?  2  -TW  --  2  -EM    Moving to and from a bed to a chair (including a wheelchair)?  2  -TW  --  2  -EM    Standing up from a chair using your arms (e.g., wheelchair, bedside chair)?  2  -TW  --  1  -EM    Climbing 3-5 steps  with a railing?  1  -TW  --  1  -EM    To walk in hospital room?  2  -TW  --  2  -EM    AM-PAC 6 Clicks Score (PT)  12  -TW  --  10  -EM       How much help from another is currently needed...    Putting on and taking off regular lower body clothing?  --  1  -KD  --    Bathing (including washing, rinsing, and drying)  --  1  -KD  --    Toileting (which includes using toilet bed pan or urinal)  --  1  -KD  --    Putting on and taking off regular upper body clothing  --  2  -KD  --    Taking care of personal grooming (such as brushing teeth)  --  2  -KD  --    Eating meals  --  3  -KD  --    AM-PAC 6 Clicks Score (OT)  --  10  -KD  --       Functional Assessment    Outcome Measure Options  AM-PAC 6 Clicks Basic Mobility (PT)  -TW  --  AM-PAC 6 Clicks Basic Mobility (PT)  -EM    Row Name 02/15/20 0931             How much help from another is currently needed...    Putting on and taking off regular lower body clothing?  1  -TO      Bathing (including washing, rinsing, and drying)  1  -TO      Toileting (which includes using toilet bed pan or urinal)  1  -TO      Putting on and taking off regular upper body clothing  2  -TO      Taking care of personal grooming (such as brushing teeth)  2  -TO      Eating meals  3  -TO      AM-PAC 6 Clicks Score (OT)  10  -TO        User Key  (r) = Recorded By, (t) = Taken By, (c) = Cosigned By    Initials Name Provider Type    EM Miguel Angel Grant, YOSEPH Physical Therapy Assistant    TW Reece Rocha PTA Physical Therapy Assistant    Akua Maki, COLÓN/L Occupational Therapy Assistant    TO Alistair Gayle, COLÓN/L Occupational Therapy Assistant         Time Calculation:   PT Charges     Row Name 02/17/20 1447 02/17/20 1414          Time Calculation    Start Time  1308  -TW  1104  -TW     Stop Time  1354  -TW  1128  -TW     Time Calculation (min)  46 min  -TW  24 min  -TW     PT Received On  02/17/20  -TW  02/17/20  -TW     PT Goal Re-Cert Due Date  02/20/20  -TW 02/20/20  -TW         Time Calculation- PT    Total Timed Code Minutes- PT  46 minute(s)  -TW  24 minute(s)  -TW       User Key  (r) = Recorded By, (t) = Taken By, (c) = Cosigned By    Initials Name Provider Type    TW Reece Rocha PTA Physical Therapy Assistant        Therapy Charges for Today     Code Description Service Date Service Provider Modifiers Qty    98843104452 HC PT THER PROC EA 15 MIN 2/17/2020 Reece Rocha, YOSEPH GP 2    11325942884 HC GAIT TRAINING EA 15 MIN 2/17/2020 Reece Rocha, YOSEPH GP 1    71315711340 HC PT THERAPEUTIC ACT EA 15 MIN 2/17/2020 Reece Rocha, YOSEPH GP 1    54656927275 HC PT THER PROC EA 15 MIN 2/17/2020 Reece Rocha, YOSEPH GP 1          PT G-Codes  Outcome Measure Options: AM-PAC 6 Clicks Basic Mobility (PT)  AM-PAC 6 Clicks Score (PT): 12  AM-PAC 6 Clicks Score (OT): 10    Reece Rocha PTA  2/17/2020

## 2020-02-18 NOTE — THERAPY TREATMENT NOTE
Inpatient Rehabilitation - Occupational Therapy Treatment Note  [facility]     Patient Name: Brendon Skelton  : 1945  MRN: [id number]  Today's Date: 2020  Onset of Illness/Injury or Date of Surgery: 02/10/20  Date of Referral to OT: 20  Referring Physician: Dr. Maher    Admit Date: 2/10/2020       ICD-10-CM ICD-9-CM   1. Cancer of sigmoid (CMS/HCC) C18.7 153.3   2. Impaired functional mobility, balance, gait, and endurance Z74.09 V49.89   3. Impaired mobility and ADLs Z74.09 799.89   4. Atrial fibrillation, unspecified type (CMS/HCC) I48.91 427.31   5. Stage 4 chronic kidney disease (CMS/HCC) N18.4 585.4     Patient Active Problem List   Diagnosis   • Long term current use of anticoagulant therapy   • Personal history of heart valve replacement   • Atrial fibrillation [I48.91]   • Emphysema of lung (CMS/HCC)   • On anticoagulant therapy   • Hyperlipidemia   • Diastolic heart failure (CMS/HCC)   • Depressive disorder   • COPD (chronic obstructive pulmonary disease) (CMS/HCC)   • Diabetes mellitus (CMS/HCC)   • Myopia   • Astigmatism   • Bleeding from open wound of chest wall   • Follow-up surgery care   • Encounter for screening for malignant neoplasm of colon   • Positive colorectal cancer screening using DNA-based stool test   • Nodule of left lung   • Chronic hypoxemic respiratory failure (CMS/HCC)   • Physical deconditioning   • Heart failure with preserved left ventricular function (HFpEF) (CMS/HCC)   • Hypertension   • Coronary artery disease involving native coronary artery without angina pectoris   • Hx of CABG   • SSS (sick sinus syndrome) (CMS/HCC)   • Pacemaker   • Stage 4 chronic kidney disease (CMS/HCC)   • Personal history of tobacco use, presenting hazards to health   • Gastrointestinal hemorrhage   • Morbid obesity (CMS/HCC)   • Gastritis   • Colon polyp   • Coumadin toxicity   • Acute on chronic diastolic congestive heart failure (CMS/HCC)   • Chronic anemia   • Pulmonary  hypertension (CMS/HCC)   • Cancer of sigmoid (CMS/HCC)   • Confusion   • Hyponatremia   • Long term current use of anticoagulant     Past Medical History:   Diagnosis Date   • Anxiety    • Aortic valve replaced    • Atrial fibrillation (CMS/HCC)    • C. difficile colitis    • Chronic hypoxemic respiratory failure (CMS/HCC)    • COPD (chronic obstructive pulmonary disease) (CMS/HCC)    • Coronary artery disease    • Depressive disorder    • Diabetes mellitus (CMS/HCC)    • Diastolic heart failure (CMS/HCC)    • Gastrointestinal hemorrhage    • Hyperlipidemia    • Hypertension    • Long term current use of anticoagulant    • Malignant neoplasm of sigmoid colon (CMS/HCC)    • Morbid obesity (CMS/HCC)    • Pulmonary hypertension (CMS/HCC)    • Rectal hemorrhage    • Stage 4 chronic kidney disease (CMS/HCC)      Past Surgical History:   Procedure Laterality Date   • AORTIC VALVE REPAIR/REPLACEMENT     • CARDIAC ELECTROPHYSIOLOGY PROCEDURE N/A 3/20/2017   • CARDIAC PACEMAKER PLACEMENT     • CATARACT EXTRACTION W/ INTRAOCULAR LENS IMPLANT Left 2/1/2019   • CATARACT EXTRACTION W/ INTRAOCULAR LENS IMPLANT Right 2/8/2019   • COLON RESECTION Left 2/10/2020   • COLONOSCOPY N/A 6/19/2019   • COLONOSCOPY N/A 6/24/2019   • ENDOSCOPY N/A 6/19/2019   • PACEMAKER REPLACEMENT N/A 3/21/2017   • SIGMOIDOSCOPY N/A 9/30/2019   • UPPER GASTROINTESTINAL ENDOSCOPY         Therapy Treatment    Rehabilitation Treatment Summary     Row Name 02/18/20 1610 02/18/20 1200          Treatment Time/Intention    Discipline  occupational therapy assistant  -LM  physical therapy assistant  -TW     Document Type  therapy note (daily note)  -LM  therapy note (daily note)  -TW     Subjective Information  no complaints  -LM  complains of;weakness;fatigue  -TW     Mode of Treatment  individual therapy;occupational therapy  -LM  physical therapy;individual therapy  -TW     Patient/Family Observations  --  No family present.  -TW     Therapy Frequency (OT Eval)   --  daily  -TW     Patient Effort  --  good  -TW     Existing Precautions/Restrictions  --  fall  -TW     Recorded by [LM] Sarah Calixto COTA/DANDRE 02/18/20 1646 [TW] Reece Rocha PTA 02/18/20 1325     Row Name 02/18/20 1610 02/18/20 1200          Vital Signs    Pre Systolic BP Rehab  --  184  -TW     Pre Treatment Diastolic BP  --  82  -TW     Pretreatment Heart Rate (beats/min)  --  82  -TW     Pretreatment Resp Rate (breaths/min)  --  84  -TW     Pre SpO2 (%)  --  96  -TW     O2 Delivery Pre Treatment  supplemental O2  -LM  supplemental O2  -TW     Post SpO2 (%)  --  95  -TW     Pre Patient Position  --  Supine  -TW     Intra Patient Position  --  Standing  -TW     Post Patient Position  --  Sitting  -TW     Recorded by [LM] Sarah Calixto COTA/DANDRE 02/18/20 1646 [TW] Reece Rocha PTA 02/18/20 1325     Row Name 02/18/20 1610 02/18/20 1200          Cognitive Assessment/Intervention- PT/OT    Affect/Mental Status (Cognitive)  WFL  -LM  WFL  -TW     Behavioral Issues (Cognitive)  overwhelmed easily  -LM  overwhelmed easily  -TW     Orientation Status (Cognition)  oriented x 3  -LM  oriented x 3  -TW     Follows Commands (Cognition)  follows one step commands  -LM  follows one step commands;over 90% accuracy  -TW     Cognitive Function (Cognitive)  WFL  -LM  WFL  -TW     Personal Safety Interventions  --  fall prevention program maintained;gait belt;nonskid shoes/slippers when out of bed  -TW     Recorded by [LM] Sarah Calixto COTA/DANDRE 02/18/20 1646 [TW] Reece Rocha PTA 02/18/20 1325     Row Name 02/18/20 1610 02/18/20 1200          Bed Mobility Assessment/Treatment    Bed Mobility Assessment/Treatment  supine-sit  -LM  --     [place] Level (Bed Mobility)  minimum assist (75% patient effort)  -LM  --     Rolling Left [place] (Bed Mobility)  --  minimum assist (75% patient effort)  -TW     Rolling Right [place] (Bed Mobility)  --  minimum assist (75% patient effort)   -TW     Scooting/Bridging [place] (Bed Mobility)  --  minimum assist (75% patient effort)  -TW     Supine-Sit [place] (Bed Mobility)  --  minimum assist (75% patient effort)  -TW     Sit-Supine [place] (Bed Mobility)  --  not tested  -TW     Bed Mobility, Safety Issues  --  impaired trunk control for bed mobility  -TW     Assistive Device (Bed Mobility)  --  bed rails;head of bed elevated  -TW     Recorded by [LM] Sarah Calixto COLÓN/L 02/18/20 1646 [TW] Reece Rocha, PTA 02/18/20 1325     Row Name 02/18/20 1610             Functional Mobility    Functional Mobility- Ind. Level  contact guard assist  -LM      Functional Mobility- Device  rolling walker  -LM      Functional Mobility-Distance (Feet)  15  -LM      Functional Mobility- Safety Issues  supplemental O2  -LM      Recorded by [LM] Sarah Calixto COLÓN/L 02/18/20 1646      Row Name 02/18/20 1610             Transfer Assessment/Treatment    Transfer Assessment/Treatment  bed-chair transfer;sit-stand transfer;stand-sit transfer  -LM      Recorded by [LM] Sarah Calixto COLÓN/L 02/18/20 1646      Row Name 02/18/20 1610             Chair-Bed Transfer    Chair-Bed [place] (Transfers)  minimum assist (75% patient effort)  -LM      Assistive Device (Chair-Bed Transfers)  walker, front-wheeled  -LM      Recorded by [LM] Sarah Calixto COLÓN/L 02/18/20 1646      Row Name 02/18/20 1610 02/18/20 1200          Sit-Stand Transfer    Sit-Stand [place] (Transfers)  contact guard  -LM  contact guard;minimum assist (75% patient effort)  -TW     Assistive Device (Sit-Stand Transfers)  walker, front-wheeled  -LM  walker, front-wheeled  -TW     Recorded by [LM] Sarah Calixto COLÓN/L 02/18/20 1646 [TW] Reece Rocha, PTA 02/18/20 1325     Row Name 02/18/20 1610 02/18/20 1200          Stand-Sit Transfer    Stand-Sit [place] (Transfers)  contact guard  -LM  contact guard;minimum assist (75% patient effort)  -TW      Assistive Device (Stand-Sit Transfers)  walker, front-wheeled  -LM  walker, front-wheeled  -TW     Recorded by [LM] Sarah Calixto COTA/DANDRE 02/18/20 1646 [TW] Reece Rocha PTA 02/18/20 1325     Row Name 02/18/20 1200             Gait/Stairs Assessment/Training    Gait/Stairs Assessment/Training  gait/ambulation assistive device  -TW      [place] Level (Gait)  minimum assist (75% patient effort);contact guard  -TW      Assistive Device (Gait)  walker, front-wheeled  -TW      Distance in Feet (Gait)  16ft   -TW      Pattern (Gait)  step-through  -TW      Deviations/Abnormal Patterns (Gait)  base of support, wide  -TW      Bilateral Gait Deviations  forward flexed posture  -TW      Recorded by [TW] Reece Rocha PTA 02/18/20 1325      Row Name 02/18/20 1200             Lower Extremity Seated Therapeutic Exercise    Performed, Seated Lower Extremity (Therapeutic Exercise)  hip flexion/extension;hip abduction/adduction;ankle dorsiflexion/plantarflexion;LAQ (long arc quad), knee extension  -TW      Exercise Type, Seated Lower Extremity (Therapeutic Exercise)  AROM (active range of motion)  -TW      Sets/Reps Detail, Seated Lower Extremity (Therapeutic Exercise)  1/15  -TW      Recorded by [TW] Reece Rocha PTA 02/18/20 1325      Row Name 02/18/20 1610             Therapeutic Exercise    Upper Extremity Range of Motion (Therapeutic Exercise)  shoulder flexion/extension, bilateral;shoulder abduction/adduction, bilateral;elbow flexion/extension, bilateral  -LM      Weight/Resistance (Therapeutic Exercise)  2 pounds  -LM      Sets/Reps (Therapeutic Exercise)  2/10  -LM      Recorded by [LM] Sarah Calixto COTA/L 02/18/20 1646      Row Name 02/18/20 1610 02/18/20 1200          Static Sitting Balance    Level of [place] (Unsupported Sitting, Static Balance)  supervision  -LM  supervision  -TW     Sitting Position (Unsupported Sitting, Static Balance)  sitting on edge of bed  -LM  sitting  on edge of bed  -TW     Time Able to Maintain Position (Unsupported Sitting, Static Balance)  more than 5 minutes  -LM  4 to 5 minutes  -TW     Recorded by [LM] Sarah Calixto COTA/DANDRE 02/18/20 1646 [TW] Reece Rocha, PTA 02/18/20 1325     Row Name 02/18/20 1200             Dynamic Sitting Balance    Level of [place], Reaches Outside Midline (Sitting, Dynamic Balance)  minimal assist, 75% patient effort  -TW      Sitting Position, Reaches Outside Midline (Sitting, Dynamic Balance)  sitting on edge of bed  -TW      Recorded by [TW] Reece Rocha PTA 02/18/20 1325      Row Name 02/18/20 1610 02/18/20 1200          Static Standing Balance    Level of [place] (Supported Standing, Static Balance)  contact guard assist  -LM  contact guard assist  -TW     Assistive Device Utilized (Supported Standing, Static Balance)  walker, rolling  -LM  walker, rolling  -TW     Recorded by [LM] Sarah Calixto COTA/DANDRE 02/18/20 1646 [TW] Reece Rocha PTA 02/18/20 1325     Row Name 02/18/20 1200             Dynamic Standing Balance    Level of [place], Reaches Outside Midline (Standing, Dynamic Balance)  minimal assist, 75% patient effort  -TW      Time Able to Maintain Position, Reaches Outside Midline (Standing, Dynamic Balance)  3 to 4 minutes  -TW      Assistive Device Utilized (Supported Standing, Dynamic Balance)  walker, rolling  -TW      Recorded by [TW] Reece Rocha PTA 02/18/20 1325      Row Name 02/18/20 1610 02/18/20 1200          Positioning and Restraints    Pre-Treatment Position  in bed  -LM  in bed  -TW     Post Treatment Position  chair  -LM  chair  -TW     In Chair  --  sitting;call light within reach;encouraged to call for assist;exit alarm on  -TW     Recorded by [LM] Sarah Calixto COTA/DANDRE 02/18/20 1646 [TW] Reece Rocha PTA 02/18/20 1325     Row Name 02/18/20 1610 02/18/20 1200          Pain Scale: Numbers Pre/Post-Treatment    Pain Scale: Numbers,  Pretreatment  0/10 - no pain  -LM  0/10 - no pain  -TW     Pain Scale: Numbers, Post-Treatment  0/10 - no pain  -LM  0/10 - no pain  -TW     Pain Location  abdomen  -LM  --     Pre/Post Treatment Pain Comment  -- during movement only   -LM  --     Recorded by [LM] Sarah Calixto COLÓN/L 02/18/20 1646 [TW] Reece Rocha, PTA 02/18/20 1325     Row Name                Wound 02/10/20 1450 abdomen Incision    Wound - Properties Group Date first assessed: 02/10/20 [CH] Time first assessed: 1450 [CH] Present on Hospital Admission: N [CH] Location: abdomen [CH] Primary Wound Type: Incision [CH] Additional Comments: LAPAROSCOPIC INCISIONS X2 [CH] Recorded by:  [CH] Elayne Fung RN 02/10/20 1450    Row Name                Wound 02/10/20 1450 lower;midline abdomen Incision    Wound - Properties Group Date first assessed: 02/10/20 [CH] Time first assessed: 1450 [CH] Present on Hospital Admission: N [CH] Orientation: lower;midline [CH] Location: abdomen [CH] Primary Wound Type: Incision [CH] Recorded by:  [CH] Elayne Fung RN 02/10/20 1450    Row Name 02/18/20 1610             Outcome Summary/Treatment Plan (OT)    Daily Summary of Progress (OT)  progress toward functional goals as expected  -LM      Recorded by [LM] Sarah Calixto COLÓN/L 02/18/20 1646      Row Name 02/18/20 1200             Outcome Summary/Treatment Plan (PT)    Daily Summary of Progress (PT)  progress toward functional goals is good  -TW      Plan for Continued Treatment (PT)  Cont  -TW      Anticipated Discharge Disposition (PT)  anticipate therapy at next level of care  -TW      Recorded by [TW] Reece Rocha PTA 02/18/20 1325        User Key  (r) = Recorded By, (t) = Taken By, (c) = Cosigned By    Initials Name Effective Dates Discipline    TW Reece Rocha, PTA 03/07/18 -  PT    LM Sarah Calixto COLÓN/L 03/07/18 -  OT    CH Elayne Fung RN 06/23/17 -  Nurse        Wound 02/10/20 1450 abdomen  Incision (Active)   Dressing Appearance open to air 2/18/2020  8:00 AM   Closure Liquid skin adhesive;Adhesive closure strips 2/18/2020  8:00 AM   Base clean 2/18/2020  8:00 AM   Periwound ecchymotic 2/18/2020  8:00 AM   Periwound Temperature warm 2/18/2020  8:00 AM   Periwound Skin Turgor soft 2/18/2020  8:00 AM   Drainage Characteristics/Odor sanguineous 2/18/2020  8:00 AM   Drainage Amount none 2/18/2020  8:00 AM   Care, Wound cleansed with 2/18/2020  8:00 AM   Dressing Care, Wound gauze 2/18/2020  8:00 AM       Wound 02/10/20 1450 lower;midline abdomen Incision (Active)   Dressing Appearance dry;intact 2/18/2020  8:00 AM   Closure Adhesive bandage 2/18/2020  8:00 AM   Base dressing in place, unable to visualize 2/18/2020  8:00 AM   Periwound ecchymotic 2/18/2020  8:00 AM   Periwound Temperature warm 2/18/2020  8:00 AM   Periwound Skin Turgor soft 2/18/2020  8:00 AM   Drainage Characteristics/Odor sanguineous 2/18/2020  8:00 AM   Drainage Amount none 2/18/2020  8:00 AM   Dressing Care, Wound gauze 2/17/2020  9:00 PM     Rehab Goal Summary     Row Name 02/18/20 1646             Occupational Therapy Goals    Transfer Goal Selection (OT)  transfer, OT goal 1  -LM      Bathing Goal Selection (OT)  bathing, OT goal 1  -LM      Dressing Goal Selection (OT)  dressing, OT goal 1  -LM      Toileting Goal Selection (OT)  toileting, OT goal 1  -LM         Transfer Goal 1 (OT)    Activity/Assistive Device (Transfer Goal 1, OT)  sit-to-stand/stand-to-sit;bed-to-chair/chair-to-bed;toilet  -LM      [place] Level/Cues Needed (Transfer Goal 1, OT)  conditional independence  -LM      Time Frame (Transfer Goal 1, OT)  long term goal (LTG);by discharge  -LM      Progress/Outcome (Transfer Goal 1, OT)  goal not met  -LM         Bathing Goal 1 (OT)    Activity/Assistive Device (Bathing Goal 1, OT)  bathing skills, all  -LM      [place] Level/Cues Needed (Bathing Goal 1, OT)  supervision required;set-up required  -LM       Time Frame (Bathing Goal 1, OT)  long term goal (LTG);by discharge  -LM      Progress/Outcomes (Bathing Goal 1, OT)  goal not met  -LM         Dressing Goal 1 (OT)    Activity/Assistive Device (Dressing Goal 1, OT)  dressing skills, all using AE PRN  -LM      [place]/Cues Needed (Dressing Goal 1, OT)  supervision required;set-up required  -LM      Time Frame (Dressing Goal 1, OT)  long term goal (LTG);by discharge  -LM      Progress/Outcome (Dressing Goal 1, OT)  goal not met  -LM         Toileting Goal 1 (OT)    Activity/Device (Toileting Goal 1, OT)  toileting skills, all  -LM      [place] Level/Cues Needed (Toileting Goal 1, OT)  conditional independence  -LM      Time Frame (Toileting Goal 1, OT)  long term goal (LTG);by discharge  -LM      Progress/Outcome (Toileting Goal 1, OT)  goal not met  -LM        User Key  (r) = Recorded By, (t) = Taken By, (c) = Cosigned By    Initials Name Provider Type Discipline    LM Sarah Calixto, COLÓN/L Occupational Therapy Assistant OT            OT Recommendation and Plan  Outcome Summary/Treatment Plan (OT)  Daily Summary of Progress (OT): progress toward functional goals as expected  Daily Summary of Progress (OT): progress toward functional goals as expected  Plan of Care Review  Plan of Care Reviewed With: patient  Plan of Care Reviewed With: patient  Outcome Summary: pt perf fair with OT cont towd all goasl  Outcome Measures     Row Name 02/17/20 1308 02/17/20 1000          How much help from another person do you currently need...    Turning from your back to your side while in flat bed without using bedrails?  3  -TW  --     Moving from lying on back to sitting on the side of a flat bed without bedrails?  2  -TW  --     Moving to and from a bed to a chair (including a wheelchair)?  2  -TW  --     Standing up from a chair using your arms (e.g., wheelchair, bedside chair)?  2  -TW  --     Climbing 3-5 steps with a railing?  1  -TW  --     To walk in hospital  room?  2  -TW  --     AM-PAC 6 Clicks Score (PT)  12  -TW  --        How much help from another is currently needed...    Putting on and taking off regular lower body clothing?  --  1  -KD     Bathing (including washing, rinsing, and drying)  --  1  -KD     Toileting (which includes using toilet bed pan or urinal)  --  1  -KD     Putting on and taking off regular upper body clothing  --  2  -KD     Taking care of personal grooming (such as brushing teeth)  --  2  -KD     Eating meals  --  3  -KD     AM-PAC 6 Clicks Score (OT)  --  10  -KD        Functional Assessment    Outcome Measure Options  AM-PAC 6 Clicks Basic Mobility (PT)  -TW  --       User Key  (r) = Recorded By, (t) = Taken By, (c) = Cosigned By    Initials Name Provider Type    TW Reece Rocha, YOSEPH Physical Therapy Assistant    KD Akua Henderson COLÓN/L Occupational Therapy Assistant           Time Calculation:   Time Calculation- OT     Row Name 02/18/20 1637 02/18/20 1620          Time Calculation- OT    OT Start Time  1610  -LM  1610  -LM     OT Stop Time  1635  -LM  1640  -LM     OT Time Calculation (min)  25 min  -LM  30 min  -LM     Total Timed Code Minutes- OT  25 minute(s)  -LM  30 minute(s)  -LM     OT Received On  02/18/20  -LM  02/18/20  -LM       User Key  (r) = Recorded By, (t) = Taken By, (c) = Cosigned By    Initials Name Provider Type     Sarah Calixto COTA/L Occupational Therapy Assistant        Therapy Charges for Today     Code Description Service Date Service Provider Modifiers Qty    89488197350 HC OT THERAPEUTIC ACT EA 15 MIN 2/18/2020 Sarah Calixto COTA/L GO 1    22495477083 HC OT THER PROC EA 15 MIN 2/18/2020 Sarah Calixto COTA/L GO 1               ELDON Michel/DANDRE  2/18/2020

## 2020-02-18 NOTE — PROGRESS NOTES
"Anticoagulation by Pharmacy - Warfarin    Brendon Skelton is a 74 y.o.female being continued on warfarin for atrial fibrillation / aortic valve replacement  Patient is also receiving heparin subcutaneous     Home regimen: warfarin 5 mg TuThSaSu, 2.5 mg MoWeFr  INR Goal: 2.5 - 3.5 per coumadin clinic    Last INR:   Lab Results   Component Value Date    INR 2.10 (H) 02/18/2020       Objective:  [Ht: (P) 165.1 cm (65\"); Wt: 106 kg (233 lb 11 oz)]  Lab Results   Component Value Date    INR 2.10 (H) 02/18/2020    INR 2.01 (H) 02/17/2020    INR 1.60 (H) 02/16/2020    PROTIME  02/18/2020      Comment:      Finger Stick PT    PROTIME 22.5 (H) 02/17/2020    PROTIME 18.8 (H) 02/16/2020     Lab Results   Component Value Date    HGB 9.8 (L) 02/17/2020    HGB 10.4 (L) 02/15/2020    HGB 9.8 (L) 02/14/2020    HCT 29.4 (L) 02/17/2020    HCT 30.9 (L) 02/15/2020    HCT 29.3 (L) 02/14/2020     02/17/2020     (L) 02/15/2020     (L) 02/14/2020       Recent Warfarin Administrations     The 5 most recent administrations since 02/11/2020 are shown below each listed medication.    Warfarin Sodium       Order Dose Date Given     warfarin (COUMADIN) tablet 5 mg 5 mg 02/17/2020      5 mg 02/16/2020      5 mg 02/15/2020                  Assessment  Interacting medications: trazodone, heparin subcutaneous  INR is 2.1, subtherapeutic, trending upwards appropriately.  Been on hold 5 days prior to colon resection 2/10.    Will continue to trend boluses of 5 mg, trend INR, and return to home regimen.  Trend upwards has begun to stabilize, patient was well controlled on home dose.  If trend upwards continues to slow, consider larger dose tomorrow  No H/H today.  H/H yesterday was trending down, no sign of bleed noted.  Continue to monitor     Plan:  1.  Give warfarin 5 mg tablet PO @ 1800 tonight  2.  Draw a PT/INR in AM  3.  Pharmacy will continue to follow       Gilberto Moscoso, [facility]  02/18/20      "

## 2020-02-18 NOTE — PLAN OF CARE
Patient up several times this shift to void.  Complains of incisional pain which is relieved by oral Acetaminophen.

## 2020-02-18 NOTE — PROGRESS NOTES
"[facility]  [address] [place] [address]. [address]  T - [phone]  F - [phone]     Progress Note          PATIENT  DEMOGRAPHICS   PATIENT NAME: Brendon Skelton                      PHYSICIAN: Sandy Caputo MD  : 1945  MRN: [id number]   LOS: 8 days    Patient Care Team:  Josefa Pozo APRN as PCP - General (Nurse Practitioner)  Meme Duckworth APRN as PCP - Claims Attributed  Luis Maher MD as Surgeon (General Surgery)  Subjective   SUBJECTIVE   Feels well more awake         Objective   OBJECTIVE   Vital Signs  Temp:  [97.2 °F (36.2 °C)-98.6 °F (37 °C)] 97.2 °F (36.2 °C)  Heart Rate:  [73-95] 95  Resp:  [16-18] 16  BP: (132-165)/(63-89) 165/63    Flowsheet Rows      First Filed Value   Admission Height  165.1 cm (65\") Documented at 02/10/2020 1036   Admission Weight  104 kg (229 lb 15 oz) Documented at 02/10/2020 1036           I/O last 3 completed shifts:  In: 960 [P.O.:960]  Out: 1350 [Urine:1350]    PHYSICAL EXAM    Physical Exam   Constitutional: She is oriented to person, place, and time. She appears well-developed. She appears lethargic.   HENT:   Head: Normocephalic.   Eyes: Pupils are equal, round, and reactive to light.   Cardiovascular: Normal rate, regular rhythm and normal heart sounds.   Pulmonary/Chest: Effort normal and breath sounds normal.   Abdominal: Soft. Bowel sounds are normal.   Neurological: She is oriented to person, place, and time. She appears lethargic.       RESULTS   Results Review:    Results from last 7 days   Lab Units 20  0920 20  0553 20  0600   SODIUM mmol/L 135* 136 131*   POTASSIUM mmol/L 4.3 4.0 4.2   CHLORIDE mmol/L 96* 96* 94*   CO2 mmol/L 26.0 28.0 24.0   BUN mg/dL 91* 84* 84*   CREATININE mg/dL 2.28* 2.45* 2.90*   CALCIUM mg/dL 9.7 9.3 8.9   BILIRUBIN mg/dL  --   --  1.5*   ALK PHOS U/L  --   --  58   ALT (SGPT) U/L  --   --  227*   AST (SGOT) U/L  --   --  161*   GLUCOSE mg/dL 138* 128* 117* "       Estimated Creatinine Clearance: 26.2 mL/min (A) (by C-G formula based on SCr of 2.28 mg/dL (H)).    Results from last 7 days   Lab Units 02/15/20  0714 02/14/20  1610 02/13/20  0648   MAGNESIUM mg/dL 2.2 2.3 2.2   PHOSPHORUS mg/dL 4.5 4.8* 5.3*             Results from last 7 days   Lab Units 02/17/20  0553 02/15/20  0714 02/14/20  1610 02/13/20  0648 02/12/20  0754   WBC 10*3/mm3 11.57* 13.10* 12.64* 12.66* 10.53   HEMOGLOBIN g/dL 9.8* 10.4* 9.8* 10.2* 9.9*   PLATELETS 10*3/mm3 164 137* 133* 101* 119*       Results from last 7 days   Lab Units 02/18/20  0804 02/17/20  0553 02/16/20  0600 02/15/20  1048   INR  2.10* 2.01* 1.60* 1.50*         Imaging Results (Last 24 Hours)     ** No results found for the last 24 hours. **           MEDICATIONS      dilTIAZem  mg Oral Daily   famotidine 20 mg Oral Daily   heparin (porcine) 5,000 Units Subcutaneous Q8H   metoprolol succinate XL 50 mg Oral Daily   sodium chloride 10 mL Intravenous Q12H   sodium chloride 10 mL Intravenous Q12H   traZODone 50 mg Oral Nightly   warfarin 5 mg Oral Daily       Pharmacy to dose warfarin        Assessment/Plan   ASSESSMENT / PLAN      Confusion    Atrial fibrillation [I48.91]    Diastolic heart failure (CMS/HCC)    COPD (chronic obstructive pulmonary disease) (CMS/HCC)    Diabetes mellitus (CMS/HCC)    Chronic hypoxemic respiratory failure (CMS/HCC)    Stage 4 chronic kidney disease (CMS/HCC)    Morbid obesity (CMS/HCC)    Chronic anemia    Pulmonary hypertension (CMS/HCC)    Cancer of sigmoid (CMS/HCC)    Hyponatremia    Long term current use of anticoagulant       1.  CKD4- baseline cr is close to 2. TIARA, ? atn related to hypoperfusion.  Better urine output at this time.  Volume status acceptable for now. Off ivf cr is 2.2    Ok to discharge from renal standpoint. No diuretics on discharge (metolazone or bumex)     2.  Adenocarcinoma of colon-  S/p laproscopic sigmoid colon resection on 2/10/2020     3.  HTN- borderline high, on  metoprolol and cardizem observe     4.  Anemia- post-op, s/p 4 u prbc.  Hgb stable                This document has been electronically signed by Sandy Caputo MD on February 18, 2020 11:02 AM

## 2020-02-18 NOTE — NURSING NOTE
"Pt is still refusing to walk in the hallway.  Pt has been encouraged and educated.  Pt states \" I walk enough going back and forth to the bathroom\".   "

## 2020-02-18 NOTE — PROGRESS NOTES
[facility] Medicine Services  INPATIENT PROGRESS NOTE    Length of Stay: 8  Date of Admission: 2/10/2020  Primary Care Physician: Josefa Pozo APRN    Subjective   Chief Complaint: none  HPI:  Pt seen and examined. No acute events overnight. ptadmitted for elective colon resection. She has remained plesantly confused     Review of Systems     All pertinent negatives and positives are as above. All other systems have been reviewed and are negative unless otherwise stated.     Objective    Temp:  [97.2 °F (36.2 °C)-98.6 °F (37 °C)] 97.2 °F (36.2 °C)  Heart Rate:  [71-95] 95  Resp:  [16-20] 16  BP: (132-165)/(63-89) 165/63    Physical Exam  Constitutional:  No distress.   Obese    HENT:   Head: Normocephalic and atraumatic.   Eyes: Pupils are equal, round, and reactive to light. EOM are normal. No scleral icterus.   Neck: Normal range of motion. Neck supple.   Cardiovascular: Normal rate and regular rhythm.   Pulmonary/Chest: Effort normal and breath sounds normal. No stridor. No respiratory distress.   Abdominal: Soft. Bowel sounds are normal. She exhibits no distension. There is no tenderness. There is no guarding.   Musculoskeletal: Normal range of motion. She exhibits no edema or tenderness.   Neurological: No cranial nerve deficit.   Confused    Skin: Skin is warm and dry. No rash noted. There is pallor.   Extensive abdominal hematoma   Vitals reviewed.      Results Review:  I have reviewed the labs, radiology results, and diagnostic studies.    Laboratory Data:   Results from last 7 days   Lab Units 02/18/20  0920 02/17/20  0553 02/16/20  0600   SODIUM mmol/L 135* 136 131*   POTASSIUM mmol/L 4.3 4.0 4.2   CHLORIDE mmol/L 96* 96* 94*   CO2 mmol/L 26.0 28.0 24.0   BUN mg/dL 91* 84* 84*   CREATININE mg/dL 2.28* 2.45* 2.90*   GLUCOSE mg/dL 138* 128* 117*   CALCIUM mg/dL 9.7 9.3 8.9   BILIRUBIN mg/dL  --   --  1.5*   ALK PHOS U/L  --   --  58   ALT (SGPT) U/L  --   --  227*     AST (SGOT) U/L  --   --  161*   ANION GAP mmol/L 13.0 12.0 13.0     Estimated Creatinine Clearance: 26.2 mL/min (A) (by C-G formula based on SCr of 2.28 mg/dL (H)).  Results from last 7 days   Lab Units 02/15/20  0714 02/14/20  1610 02/13/20  0648   MAGNESIUM mg/dL 2.2 2.3 2.2   PHOSPHORUS mg/dL 4.5 4.8* 5.3*         Results from last 7 days   Lab Units 02/17/20  0553 02/15/20  0714 02/14/20  1610 02/13/20  0648 02/12/20  0754   WBC 10*3/mm3 11.57* 13.10* 12.64* 12.66* 10.53   HEMOGLOBIN g/dL 9.8* 10.4* 9.8* 10.2* 9.9*   HEMATOCRIT % 29.4* 30.9* 29.3* 30.7* 29.8*   PLATELETS 10*3/mm3 164 137* 133* 101* 119*     Results from last 7 days   Lab Units 02/18/20  0804 02/17/20  0553 02/16/20  0600 02/15/20  1048   INR  2.10* 2.01* 1.60* 1.50*       Culture Data:   No results found for: BLOODCX  No results found for: URINECX  No results found for: RESPCX  No results found for: WOUNDCX  No results found for: STOOLCX  No components found for: BODYFLD    Radiology Data:   Imaging Results (Last 24 Hours)     ** No results found for the last 24 hours. **          I have reviewed the patient's current medications.     Assessment/Plan     Active Hospital Problems    Diagnosis   • **Confusion   • Hyponatremia   • Long term current use of anticoagulant   • Cancer of sigmoid (CMS/HCC)   • Pulmonary hypertension (CMS/HCC)   • Chronic anemia   • Morbid obesity (CMS/HCC)   • Stage 4 chronic kidney disease (CMS/HCC)   • Chronic hypoxemic respiratory failure (CMS/HCC)   • Diabetes mellitus (CMS/HCC)   • COPD (chronic obstructive pulmonary disease) (CMS/HCC)   • Diastolic heart failure (CMS/HCC)   • Atrial fibrillation [I48.91]       Plan:      PT/OT as tolerated, continue with the current management. Her creatinine is down to 2.28, her serum Sodium has improved to 135. Repeat BMP / CBC in AM

## 2020-02-18 NOTE — PLAN OF CARE
Problem: Patient Care Overview  Goal: Plan of Care Review  Outcome: Ongoing (interventions implemented as appropriate)  Flowsheets (Taken 2/18/2020 1433)  Progress: improving  Plan of Care Reviewed With: patient  Outcome Summary: Pt participated in am tx but adamently declined pm tx this date. Pt performed sup to sit t/f with min of 1 and amb 16-18ft with RW and CGA. Pt reports she would do better if she was able to rest a little which is why pt declined pm tx. Pt would cont to benefit from therapy upon DC.

## 2020-02-18 NOTE — PROGRESS NOTES
[facility] Medicine Services  INPATIENT PROGRESS NOTE    Length of Stay: 7  Date of Admission: 2/10/2020  Primary Care Physician: Josefa Pozo APRN    Subjective   Chief Complaint: none  HPI:  Pt seen and examined. No acute events overnight. ptadmitted for elective colon resection. She has remained confused    Review of Systems     All pertinent negatives and positives are as above. All other systems have been reviewed and are negative unless otherwise stated.     Objective    Temp:  [96.7 °F (35.9 °C)-98.6 °F (37 °C)] 98.6 °F (37 °C)  Heart Rate:  [69-74] 74  Resp:  [18-20] 18  BP: (130-188)/(62-92) 162/80    Physical Exam  Constitutional: She appears ill. No distress.   Obese    HENT:   Head: Normocephalic and atraumatic.   Eyes: Pupils are equal, round, and reactive to light. EOM are normal. No scleral icterus.   Neck: Normal range of motion. Neck supple.   Cardiovascular: Normal rate and regular rhythm.   Pulmonary/Chest: Effort normal and breath sounds normal. No stridor. No respiratory distress.   Abdominal: Soft. Bowel sounds are normal. She exhibits no distension. There is no tenderness. There is no guarding.   Musculoskeletal: Normal range of motion. She exhibits no edema or tenderness.   Neurological: No cranial nerve deficit.   Confused    Skin: Skin is warm and dry. No rash noted. There is pallor.   Extensive abdominal hematoma   Vitals reviewed.      Results Review:  I have reviewed the labs, radiology results, and diagnostic studies.    Laboratory Data:   Results from last 7 days   Lab Units 02/17/20  0553 02/16/20  0600 02/15/20  1717   SODIUM mmol/L 136 131* 130*   POTASSIUM mmol/L 4.0 4.2 4.5   CHLORIDE mmol/L 96* 94* 92*   CO2 mmol/L 28.0 24.0 23.0   BUN mg/dL 84* 84* 89*   CREATININE mg/dL 2.45* 2.90* 3.46*   GLUCOSE mg/dL 128* 117* 152*   CALCIUM mg/dL 9.3 8.9 9.3   BILIRUBIN mg/dL  --  1.5*  --    ALK PHOS U/L  --  58  --    ALT (SGPT) U/L  --  227*   --    AST (SGOT) U/L  --  161*  --    ANION GAP mmol/L 12.0 13.0 15.0     Estimated Creatinine Clearance: 24.4 mL/min (A) (by C-G formula based on SCr of 2.45 mg/dL (H)).  Results from last 7 days   Lab Units 02/15/20  0714 02/14/20  1610 02/13/20  0648   MAGNESIUM mg/dL 2.2 2.3 2.2   PHOSPHORUS mg/dL 4.5 4.8* 5.3*         Results from last 7 days   Lab Units 02/17/20  0553 02/15/20  0714 02/14/20  1610 02/13/20  0648 02/12/20  0754   WBC 10*3/mm3 11.57* 13.10* 12.64* 12.66* 10.53   HEMOGLOBIN g/dL 9.8* 10.4* 9.8* 10.2* 9.9*   HEMATOCRIT % 29.4* 30.9* 29.3* 30.7* 29.8*   PLATELETS 10*3/mm3 164 137* 133* 101* 119*     Results from last 7 days   Lab Units 02/17/20  0553 02/16/20  0600 02/15/20  1048   INR  2.01* 1.60* 1.50*       Culture Data:   No results found for: BLOODCX  No results found for: URINECX  No results found for: RESPCX  No results found for: WOUNDCX  No results found for: STOOLCX  No components found for: BODYFLD    Radiology Data:   Imaging Results (Last 24 Hours)     ** No results found for the last 24 hours. **          I have reviewed the patient's current medications.     Assessment/Plan     Active Hospital Problems    Diagnosis   • **Confusion   • Hyponatremia   • Long term current use of anticoagulant   • Cancer of sigmoid (CMS/HCC)   • Pulmonary hypertension (CMS/HCC)   • Chronic anemia   • Morbid obesity (CMS/HCC)   • Stage 4 chronic kidney disease (CMS/HCC)   • Chronic hypoxemic respiratory failure (CMS/HCC)   • Diabetes mellitus (CMS/HCC)   • COPD (chronic obstructive pulmonary disease) (CMS/HCC)   • Diastolic heart failure (CMS/HCC)   • Atrial fibrillation [I48.91]       Plan:    PT/OT as tolerated, continue with the current management. Her creatinine is down to 2.64, her serum Sodium has improved to 136. Repeat BMP / CBC in AM

## 2020-02-18 NOTE — PLAN OF CARE
Problem: Patient Care Overview  Goal: Plan of Care Review  Outcome: Ongoing (interventions implemented as appropriate)  Flowsheets  Taken 2/18/2020 1448 by Arlen Romero RN  Progress: improving  Outcome Summary: VSS, weaning o2, pt not wanting to walk in the halls, fequent bathroom trips, will bladder scan to see if holding residual.  pain controlled, will continue to monitor.  Taken 2/18/2020 1418 by Fely Verde  Plan of Care Reviewed With: patient (Pended)

## 2020-02-18 NOTE — THERAPY TREATMENT NOTE
Acute Care - Physical Therapy Treatment Note  [facility]     Patient Name: Brendon Skelton  : 1945  MRN: [id number]  Today's Date: 2020  Onset of Illness/Injury or Date of Surgery: 02/10/20     Referring Physician: Dr. Maher    Admit Date: 2/10/2020    Visit Dx:    ICD-10-CM ICD-9-CM   1. Cancer of sigmoid (CMS/HCC) C18.7 153.3   2. Impaired functional mobility, balance, gait, and endurance Z74.09 V49.89   3. Impaired mobility and ADLs Z74.09 799.89   4. Atrial fibrillation, unspecified type (CMS/HCC) I48.91 427.31   5. Stage 4 chronic kidney disease (CMS/HCC) N18.4 585.4     Patient Active Problem List   Diagnosis   • Long term current use of anticoagulant therapy   • Personal history of heart valve replacement   • Atrial fibrillation [I48.91]   • Emphysema of lung (CMS/HCC)   • On anticoagulant therapy   • Hyperlipidemia   • Diastolic heart failure (CMS/HCC)   • Depressive disorder   • COPD (chronic obstructive pulmonary disease) (CMS/HCC)   • Diabetes mellitus (CMS/HCC)   • Myopia   • Astigmatism   • Bleeding from open wound of chest wall   • Follow-up surgery care   • Encounter for screening for malignant neoplasm of colon   • Positive colorectal cancer screening using DNA-based stool test   • Nodule of left lung   • Chronic hypoxemic respiratory failure (CMS/HCC)   • Physical deconditioning   • Heart failure with preserved left ventricular function (HFpEF) (CMS/HCC)   • Hypertension   • Coronary artery disease involving native coronary artery without angina pectoris   • Hx of CABG   • SSS (sick sinus syndrome) (CMS/HCC)   • Pacemaker   • Stage 4 chronic kidney disease (CMS/HCC)   • Personal history of tobacco use, presenting hazards to health   • Gastrointestinal hemorrhage   • Morbid obesity (CMS/HCC)   • Gastritis   • Colon polyp   • Coumadin toxicity   • Acute on chronic diastolic congestive heart failure (CMS/HCC)   • Chronic anemia   • Pulmonary hypertension (CMS/HCC)   • Cancer of  sigmoid (CMS/HCC)   • Confusion   • Hyponatremia   • Long term current use of anticoagulant       Therapy Treatment    Rehabilitation Treatment Summary     Row Name 02/18/20 1200             Treatment Time/Intention    Discipline  physical therapy assistant  -TW      Document Type  therapy note (daily note)  -TW      Subjective Information  complains of;weakness;fatigue  -TW      Mode of Treatment  physical therapy;individual therapy  -TW      Patient/Family Observations  No family present.  -TW      Therapy Frequency (OT Eval)  daily  -TW      Patient Effort  good  -TW      Existing Precautions/Restrictions  fall  -TW      Recorded by [TW] Reece Rocha PTA 02/18/20 1325      Row Name 02/18/20 1200             Vital Signs    Pre Systolic BP Rehab  184  -TW      Pre Treatment Diastolic BP  82  -TW      Pretreatment Heart Rate (beats/min)  82  -TW      Pretreatment Resp Rate (breaths/min)  84  -TW      Pre SpO2 (%)  96  -TW      O2 Delivery Pre Treatment  supplemental O2  -TW      Post SpO2 (%)  95  -TW      Pre Patient Position  Supine  -TW      Intra Patient Position  Standing  -TW      Post Patient Position  Sitting  -TW      Recorded by [TW] Reece Rocha PTA 02/18/20 1325      Row Name 02/18/20 1200             Cognitive Assessment/Intervention- PT/OT    Affect/Mental Status (Cognitive)  WFL  -TW      Behavioral Issues (Cognitive)  overwhelmed easily  -TW      Orientation Status (Cognition)  oriented x 3  -TW      Follows Commands (Cognition)  follows one step commands;over 90% accuracy  -TW      Cognitive Function (Cognitive)  WFL  -TW      Personal Safety Interventions  fall prevention program maintained;gait belt;nonskid shoes/slippers when out of bed  -TW      Recorded by [TW] Reece Rocha PTA 02/18/20 1325      Row Name 02/18/20 1200             Bed Mobility Assessment/Treatment    Rolling Left [place] (Bed Mobility)  minimum assist (75% patient effort)  -TW      Rolling Right  [place] (Bed Mobility)  minimum assist (75% patient effort)  -TW      Scooting/Bridging [place] (Bed Mobility)  minimum assist (75% patient effort)  -TW      Supine-Sit [place] (Bed Mobility)  minimum assist (75% patient effort)  -TW      Sit-Supine [place] (Bed Mobility)  not tested  -TW      Bed Mobility, Safety Issues  impaired trunk control for bed mobility  -TW      Assistive Device (Bed Mobility)  bed rails;head of bed elevated  -TW      Recorded by [TW] Reece Rocha PTA 02/18/20 1325      Row Name 02/18/20 1200             Sit-Stand Transfer    Sit-Stand [place] (Transfers)  contact guard;minimum assist (75% patient effort)  -TW      Assistive Device (Sit-Stand Transfers)  walker, front-wheeled  -TW      Recorded by [TW] Reece Rocha PTA 02/18/20 1325      Row Name 02/18/20 1200             Stand-Sit Transfer    Stand-Sit [place] (Transfers)  contact guard;minimum assist (75% patient effort)  -TW      Assistive Device (Stand-Sit Transfers)  walker, front-wheeled  -TW      Recorded by [TW] Reece Rocha PTA 02/18/20 1325      Row Name 02/18/20 1200             Gait/Stairs Assessment/Training    Gait/Stairs Assessment/Training  gait/ambulation assistive device  -TW      [place] Level (Gait)  minimum assist (75% patient effort);contact guard  -TW      Assistive Device (Gait)  walker, front-wheeled  -TW      Distance in Feet (Gait)  16ft   -TW      Pattern (Gait)  step-through  -TW      Deviations/Abnormal Patterns (Gait)  base of support, wide  -TW      Bilateral Gait Deviations  forward flexed posture  -TW      Recorded by [TW] Reece Rocha PTA 02/18/20 1325      Row Name 02/18/20 1200             Lower Extremity Seated Therapeutic Exercise    Performed, Seated Lower Extremity (Therapeutic Exercise)  hip flexion/extension;hip abduction/adduction;ankle dorsiflexion/plantarflexion;LAQ (long arc quad), knee extension  -TW      Exercise Type, Seated  Lower Extremity (Therapeutic Exercise)  AROM (active range of motion)  -TW      Sets/Reps Detail, Seated Lower Extremity (Therapeutic Exercise)  1/15  -TW      Recorded by [TW] Reece Rocha PTA 02/18/20 1325      Row Name 02/18/20 1200             Static Sitting Balance    Level of [place] (Unsupported Sitting, Static Balance)  supervision  -TW      Sitting Position (Unsupported Sitting, Static Balance)  sitting on edge of bed  -TW      Time Able to Maintain Position (Unsupported Sitting, Static Balance)  4 to 5 minutes  -TW      Recorded by [TW] Reece Rocha, PTA 02/18/20 1325      Row Name 02/18/20 1200             Dynamic Sitting Balance    Level of [place], Reaches Outside Midline (Sitting, Dynamic Balance)  minimal assist, 75% patient effort  -TW      Sitting Position, Reaches Outside Midline (Sitting, Dynamic Balance)  sitting on edge of bed  -TW      Recorded by [TW] Reece Rocha PTA 02/18/20 1325      Row Name 02/18/20 1200             Static Standing Balance    Level of [place] (Supported Standing, Static Balance)  contact guard assist  -TW      Assistive Device Utilized (Supported Standing, Static Balance)  walker, rolling  -TW      Recorded by [TW] Reece Rocha PTA 02/18/20 1325      Row Name 02/18/20 1200             Dynamic Standing Balance    Level of [place], Reaches Outside Midline (Standing, Dynamic Balance)  minimal assist, 75% patient effort  -TW      Time Able to Maintain Position, Reaches Outside Midline (Standing, Dynamic Balance)  3 to 4 minutes  -TW      Assistive Device Utilized (Supported Standing, Dynamic Balance)  walker, rolling  -TW      Recorded by [TW] Reece Rocha PTA 02/18/20 1325      Row Name 02/18/20 1200             Positioning and Restraints    Pre-Treatment Position  in bed  -TW      Post Treatment Position  chair  -TW      In Chair  sitting;call light within reach;encouraged to call for assist;exit alarm on  -TW       Recorded by [TW] Reece Rocha PTA 02/18/20 1325      Row Name 02/18/20 1200             Pain Scale: Numbers Pre/Post-Treatment    Pain Scale: Numbers, Pretreatment  0/10 - no pain  -TW      Pain Scale: Numbers, Post-Treatment  0/10 - no pain  -TW      Recorded by [TW] Reece Rocha PTA 02/18/20 1325      Row Name                Wound 02/10/20 1450 abdomen Incision    Wound - Properties Group Date first assessed: 02/10/20 [CH] Time first assessed: 1450 [CH] Present on Hospital Admission: N [CH] Location: abdomen [CH] Primary Wound Type: Incision [CH] Additional Comments: LAPAROSCOPIC INCISIONS X2 [CH] Recorded by:  [CH] Elayne Fung RN 02/10/20 1450    Row Name                Wound 02/10/20 1450 lower;midline abdomen Incision    Wound - Properties Group Date first assessed: 02/10/20 [CH] Time first assessed: 1450 [CH] Present on Hospital Admission: N [CH] Orientation: lower;midline [CH] Location: abdomen [CH] Primary Wound Type: Incision [CH] Recorded by:  [CH] Elayne Fung RN 02/10/20 1450    Row Name 02/18/20 1200             Outcome Summary/Treatment Plan (PT)    Daily Summary of Progress (PT)  progress toward functional goals is good  -TW      Plan for Continued Treatment (PT)  Cont  -TW      Anticipated Discharge Disposition (PT)  anticipate therapy at next level of care  -TW      Recorded by [TW] Reece Rocha PTA 02/18/20 1325        User Key  (r) = Recorded By, (t) = Taken By, (c) = Cosigned By    Initials Name Effective Dates Discipline    TW Reece Rocha PTA 03/07/18 -  PT    CH Elayne Fung RN 06/23/17 -  Nurse          Wound 02/10/20 1450 abdomen Incision (Active)   Dressing Appearance open to air 2/18/2020  8:00 AM   Closure Liquid skin adhesive;Adhesive closure strips 2/18/2020  8:00 AM   Base clean 2/18/2020  8:00 AM   Periwound ecchymotic 2/18/2020  8:00 AM   Periwound Temperature warm 2/18/2020  8:00 AM   Periwound Skin Turgor soft 2/18/2020   8:00 AM   Drainage Characteristics/Odor sanguineous 2/18/2020  8:00 AM   Drainage Amount none 2/18/2020  8:00 AM   Care, Wound cleansed with 2/18/2020  8:00 AM   Dressing Care, Wound gauze 2/18/2020  8:00 AM       Wound 02/10/20 1450 lower;midline abdomen Incision (Active)   Dressing Appearance dry;intact 2/18/2020  8:00 AM   Closure Adhesive bandage 2/18/2020  8:00 AM   Base dressing in place, unable to visualize 2/18/2020  8:00 AM   Periwound ecchymotic 2/18/2020  8:00 AM   Periwound Temperature warm 2/18/2020  8:00 AM   Periwound Skin Turgor soft 2/18/2020  8:00 AM   Drainage Characteristics/Odor sanguineous 2/18/2020  8:00 AM   Drainage Amount none 2/18/2020  8:00 AM   Dressing Care, Wound gauze 2/17/2020  9:00 PM               PT Recommendation and Plan  Anticipated Discharge Disposition (PT): anticipate therapy at next level of care  Outcome Summary/Treatment Plan (PT)  Daily Summary of Progress (PT): progress toward functional goals is good  Barriers to Overall Progress (PT): Pt confused this pm.  Plan for Continued Treatment (PT): Cont  Anticipated Discharge Disposition (PT): anticipate therapy at next level of care  Plan of Care Reviewed With: patient  Progress: improving  Outcome Summary: Pt participated in am tx but adamently declined pm tx this date. Pt performed sup to sit t/f with min of 1 and amb 16-18ft with RW and CGA. Pt reports she would do better if she was able to rest a little which is why pt declined pm tx. Pt would cont to benefit from therapy upon DC.  Outcome Measures     Row Name 02/17/20 1308 02/17/20 1000          How much help from another person do you currently need...    Turning from your back to your side while in flat bed without using bedrails?  3  -TW  --     Moving from lying on back to sitting on the side of a flat bed without bedrails?  2  -TW  --     Moving to and from a bed to a chair (including a wheelchair)?  2  -TW  --     Standing up from a chair using your arms (e.g.,  wheelchair, bedside chair)?  2  -TW  --     Climbing 3-5 steps with a railing?  1  -TW  --     To walk in hospital room?  2  -TW  --     AM-PAC 6 Clicks Score (PT)  12  -TW  --        How much help from another is currently needed...    Putting on and taking off regular lower body clothing?  --  1  -KD     Bathing (including washing, rinsing, and drying)  --  1  -KD     Toileting (which includes using toilet bed pan or urinal)  --  1  -KD     Putting on and taking off regular upper body clothing  --  2  -KD     Taking care of personal grooming (such as brushing teeth)  --  2  -KD     Eating meals  --  3  -KD     AM-PAC 6 Clicks Score (OT)  --  10  -KD        Functional Assessment    Outcome Measure Options  AM-PAC 6 Clicks Basic Mobility (PT)  -TW  --       User Key  (r) = Recorded By, (t) = Taken By, (c) = Cosigned By    Initials Name Provider Type    Reece Rouse PTA Physical Therapy Assistant    Akua Maki COTA/L Occupational Therapy Assistant         Time Calculation:   PT Charges     Row Name 02/18/20 1325             Time Calculation    Start Time  1105  -TW      Stop Time  1130  -TW      Time Calculation (min)  25 min  -TW      PT Received On  02/18/20  -TW      PT Goal Re-Cert Due Date  02/20/20  -TW         Time Calculation- PT    Total Timed Code Minutes- PT  25 minute(s)  -TW        User Key  (r) = Recorded By, (t) = Taken By, (c) = Cosigned By    Initials Name Provider Type     Reece Rocha PTA Physical Therapy Assistant        Therapy Charges for Today     Code Description Service Date Service Provider Modifiers Qty    09750790037 HC PT THER PROC EA 15 MIN 2/17/2020 Reece Rocha, PTA GP 2    41448166165 HC GAIT TRAINING EA 15 MIN 2/17/2020 Reece Rocha, PTA GP 1    28370648162 HC PT THERAPEUTIC ACT EA 15 MIN 2/17/2020 Reece Rocha, PTA GP 1    55259503924 HC PT THER PROC EA 15 MIN 2/17/2020 Reece Rocha, PTA GP 1    81644984692 HC GAIT TRAINING  EA 15 MIN 2/18/2020 Reece Rocha, PTA GP 1    20857637592 HC PT THER PROC EA 15 MIN 2/18/2020 Reece Rocha PTA GP 1          PT G-Codes  Outcome Measure Options: AM-PAC 6 Clicks Basic Mobility (PT)  AM-PAC 6 Clicks Score (PT): 12  AM-PAC 6 Clicks Score (OT): 10    Reece Rocha PTA  2/18/2020

## 2020-02-18 NOTE — PLAN OF CARE
Problem: Patient Care Overview  Goal: Plan of Care Review  Outcome: Ongoing (interventions implemented as appropriate)  Flowsheets (Taken 2/18/2020 6681)  Progress: improving  Plan of Care Reviewed With: patient  Outcome Summary: pt perf fair with OT cont towd all goasl

## 2020-02-18 NOTE — CONSULTS
"Adult Nutrition  Assessment    Patient Name:  Brendon Skelton  YOB: 1945  MRN: [id number]  Admit Date:  2/10/2020    Assessment Date:  2/18/2020    Comments: Visited pt today for a follow-up. Pt was irritated today and wanted to be left alone to sleep. She states her appetite is good and she \"eats everything she is given\". Intake is currently 44% for 12 meals. Milk to be added to trays BID to help supplement intake. Cr trending down and is now 2.45. Continuing to monitor.     Reason for Assessment     Row Name 02/18/20 1409          Reason for Assessment    Reason For Assessment  diagnosis/disease state  (Pended)      Diagnosis  cancer diagnosis/related complications  (Pended)      Identified At Risk by Screening Criteria  unintentional loss of 10 lbs or more in the past 2 mos  (Pended)          Nutrition/Diet History     Row Name 02/18/20 1409          Nutrition/Diet History    Typical Food/Fluid Intake  Pt was tired and didn't have much to say. States her appetite is good and she eats everything she is given.   (Pended)      Supplemental Drinks/Foods/Additives  likes milk   (Pended)            Labs/Tests/Procedures/Meds     Row Name 02/18/20 1411          Labs/Procedures/Meds    Lab Results Reviewed  reviewed, pertinent  (Pended)      Lab Results Comments  Glu 128H, BUN 84H, ALT 227H, Cr 2.45H, Cl 96L  (Pended)         Diagnostic Tests/Procedures    Diagnostic Test/Procedure Reviewed  reviewed, pertinent  (Pended)         Medications    Pertinent Medications Reviewed  reviewed, pertinent  (Pended)          Physical Findings     Row Name 02/18/20 1411          Physical Findings    Overall Physical Appearance  obese  (Pended)      Oral/Mouth Cavity  poor dentition  (Pended)      Skin  edema  (Pended)            Nutrition Prescription Ordered     Row Name 02/18/20 1412          Nutrition Prescription PO    Current PO Diet  Soft Texture  (Pended)      Texture  Whole foods  (Pended)      Fluid " Consistency  Thin  (Pended)      Common Modifiers  Cardiac;Consistent Carbohydrate;Renal  (Pended)          Evaluation of Received Nutrient/Fluid Intake     Row Name 02/18/20 1412          PO Evaluation    Number of Meals  12  (Pended)      % PO Intake  44  (Pended)                Electronically signed by:  Fely Verde  02/18/20 2:14 PM

## 2020-02-18 NOTE — PLAN OF CARE
Problem: Patient Care Overview  Goal: Plan of Care Review  Outcome: Ongoing (interventions implemented as appropriate)  Flowsheets (Taken 2/18/2020 9308)  Progress: no change  Plan of Care Reviewed With: patient  Outcome Summary: Intake 44% for 12 meals. Milk added to trays BID with close watch on labs. Cr trending down. Continuing to monitor.

## 2020-02-18 NOTE — PROGRESS NOTES
GENERAL SURGERY PROGRESS NOTE     LOS: 8 days     Chief Complaint:     Brendon Skelton is a 74 year old lady who is status post laparoscopic sigmoid colon resection for cancer of sigmoid colon.    Interval History:     Mentation slowly improving.  Ann out and has voided spontaneously several times.  No other issues. Incisions c/d/i    Medication Review:     dilTIAZem  mg Oral Daily   famotidine 20 mg Oral Daily   heparin (porcine) 5,000 Units Subcutaneous Q8H   metoprolol succinate XL 50 mg Oral Daily   sodium chloride 10 mL Intravenous Q12H   sodium chloride 10 mL Intravenous Q12H   traZODone 50 mg Oral Nightly   warfarin 5 mg Oral Daily         Pharmacy to dose warfarin    Objective     Vital Signs:  Temp:  [97.6 °F (36.4 °C)-98.6 °F (37 °C)] 98.2 °F (36.8 °C)  Heart Rate:  [69-79] 77  Resp:  [17-20] 18  BP: (132-170)/(62-89) 136/89    Intake/Output Summary (Last 24 hours) at 2/18/2020 0606  Last data filed at 2/17/2020 1800  Gross per 24 hour   Intake 960 ml   Output 800 ml   Net 160 ml       Physical Exam  General: Confused, agitated appearing woman in no acute distress  Head: Normocephalic and atraumatic.   Neck: Normal range of motion. Neck supple.   Cardiovascular: Heart regular rate and rhythm with no murmurs, gallops, rales. Radial pulses strong bilaterally.   Pulmonary/Chest: Lungs clear to auscultation bilaterally.  Abdominal: Soft and non-tender. Incision site healing appropriately.  Musculoskeletal: Normal range of motion.   Skin: Skin warm and dry. No rashes. Scattered bruising present.    Results Review:    Results from last 7 days   Lab Units 02/17/20  0553 02/16/20  0600 02/15/20  1717   SODIUM mmol/L 136 131* 130*   POTASSIUM mmol/L 4.0 4.2 4.5   CHLORIDE mmol/L 96* 94* 92*   CO2 mmol/L 28.0 24.0 23.0   BUN mg/dL 84* 84* 89*   CREATININE mg/dL 2.45* 2.90* 3.46*   GLUCOSE mg/dL 128* 117* 152*   CALCIUM mg/dL 9.3 8.9 9.3     Results from last 7 days   Lab Units 02/17/20  0553 02/15/20  0714  02/14/20  1610   WBC 10*3/mm3 11.57* 13.10* 12.64*   HEMOGLOBIN g/dL 9.8* 10.4* 9.8*   HEMATOCRIT % 29.4* 30.9* 29.3*   PLATELETS 10*3/mm3 164 137* 133*       Assessment:    Confusion    Atrial fibrillation [I48.91]    Diastolic heart failure (CMS/HCC)    COPD (chronic obstructive pulmonary disease) (CMS/HCC)    Diabetes mellitus (CMS/HCC)    Chronic hypoxemic respiratory failure (CMS/HCC)    Stage 4 chronic kidney disease (CMS/HCC)    Morbid obesity (CMS/HCC)    Chronic anemia    Pulmonary hypertension (CMS/HCC)    Cancer of sigmoid (CMS/HCC)    Hyponatremia    Long term current use of anticoagulant    Brendon Skelton is a 74 year old lady who is status post laparoscopic sigmoid colon resection 2/10/20. She is stable, and urinary output remains significantly improved.      Plan:  - Doing well from her operation, tolerating diet, having bowel function, pain controlled  - Home medications  - Appreciate medicine and nephrology input  - voiding since [place] removed  - case management working on placement    This document has been electronically signed by Gurpreet Coe MD on February 18, 2020

## 2020-02-19 PROBLEM — C18.7 CANCER OF SIGMOID (HCC): Chronic | Status: RESOLVED | Noted: 2019-08-13 | Resolved: 2020-01-01

## 2020-02-19 NOTE — THERAPY TREATMENT NOTE
Acute Care - Physical Therapy Treatment Note  [facility] [place]     Patient Name: Brendon Skelton  : 1945  MRN: [id number]  Today's Date: 2020  Onset of Illness/Injury or Date of Surgery: 02/10/20     Referring Physician: Dr. Maher    Admit Date: 2/10/2020    Visit Dx:    ICD-10-CM ICD-9-CM   1. Malignant neoplasm of sigmoid colon (CMS/HCC) C18.7 153.3   2. Cancer of sigmoid (CMS/HCC) C18.7 153.3   3. Impaired functional mobility, balance, gait, and endurance Z74.09 V49.89   4. Impaired mobility and ADLs Z74.09 799.89   5. Atrial fibrillation, unspecified type (CMS/HCC) I48.91 427.31   6. Stage 4 chronic kidney disease (CMS/HCC) N18.4 585.4     Patient Active Problem List   Diagnosis   • Long term current use of anticoagulant therapy   • Personal history of heart valve replacement   • Atrial fibrillation [I48.91]   • Emphysema of lung (CMS/HCC)   • On anticoagulant therapy   • Hyperlipidemia   • Diastolic heart failure (CMS/HCC)   • Depressive disorder   • COPD (chronic obstructive pulmonary disease) (CMS/HCC)   • Diabetes mellitus (CMS/HCC)   • Myopia   • Astigmatism   • Bleeding from open wound of chest wall   • Follow-up surgery care   • Encounter for screening for malignant neoplasm of colon   • Positive colorectal cancer screening using DNA-based stool test   • Nodule of left lung   • Chronic hypoxemic respiratory failure (CMS/HCC)   • Physical deconditioning   • Heart failure with preserved left ventricular function (HFpEF) (CMS/HCC)   • Hypertension   • Coronary artery disease involving native coronary artery without angina pectoris   • Hx of CABG   • SSS (sick sinus syndrome) (CMS/HCC)   • Pacemaker   • Stage 4 chronic kidney disease (CMS/HCC)   • Personal history of tobacco use, presenting hazards to health   • Gastrointestinal hemorrhage   • Morbid obesity (CMS/HCC)   • Gastritis   • Colon polyp   • Coumadin toxicity   • Acute on chronic diastolic congestive heart failure (CMS/HCC)   • Chronic  anemia   • Pulmonary hypertension (CMS/HCC)   • Confusion   • Hyponatremia   • Long term current use of anticoagulant       Therapy Treatment    Rehabilitation Treatment Summary     Row Name 02/19/20 1027             Treatment Time/Intention    Discipline  physical therapy assistant  -TW      Document Type  therapy note (daily note)  -TW      Subjective Information  complains of;fatigue;weakness  -TW      Mode of Treatment  physical therapy;individual therapy  -TW      Patient/Family Observations  No family present.  -TW      Therapy Frequency (OT Eval)  daily  -TW      Patient Effort  adequate  -TW      Existing Precautions/Restrictions  fall  -TW      Recorded by [TW] Reece Rocha PTA 02/19/20 1139      Row Name 02/19/20 1027             Vital Signs    Pretreatment Heart Rate (beats/min)  70  -TW      Intratreatment Heart Rate (beats/min)  74  -TW      Posttreatment Heart Rate (beats/min)  72  -TW      Pre SpO2 (%)  94  -TW      O2 Delivery Pre Treatment  supplemental O2  -TW      Intra SpO2 (%)  93  -TW      Post SpO2 (%)  93  -TW      O2 Delivery Post Treatment  supplemental O2  -TW      Pre Patient Position  Sitting  -TW      Intra Patient Position  Sitting  -TW      Post Patient Position  Sitting  -TW      Recorded by [TW] Reece Rocha PTA 02/19/20 1139      Row Name 02/19/20 1027             Cognitive Assessment/Intervention- PT/OT    Affect/Mental Status (Cognitive)  WFL  -TW      Orientation Status (Cognition)  oriented x 3  -TW      Follows Commands (Cognition)  follows one step commands;over 90% accuracy  -TW      Cognitive Function (Cognitive)  WFL  -TW      Personal Safety Interventions  muscle strengthening facilitated  -TW      Recorded by [TW] Reece Rocha PTA 02/19/20 1139      Row Name 02/19/20 1027             Safety Issues, Functional Mobility    Impairments Affecting Function (Mobility)  balance;coordination;endurance/activity tolerance;pain;shortness of breath;strength   -TW      Recorded by [TW] Reece Rocha, PTA 02/19/20 1139      Row Name 02/19/20 1027             Bed Mobility Assessment/Treatment    Comment (Bed Mobility)  Pt already up in chair upon arrival.  -TW      Recorded by [TW] Reece Rocha, PTA 02/19/20 1139      Row Name 02/19/20 1027             Chair-Bed Transfer    Chair-Bed Assumption (Transfers)  not tested  -TW      Recorded by [TW] Reece Rocha, PTA 02/19/20 1139      Row Name 02/19/20 1027             Sit-Stand Transfer    Sit-Stand Assumption (Transfers)  not tested  -TW      Recorded by [TW] Reece Rocha, PTA 02/19/20 1139      Row Name 02/19/20 1027             Stand-Sit Transfer    Stand-Sit Assumption (Transfers)  not tested  -TW      Recorded by [TW] Reece Rocha, PTA 02/19/20 1139      Row Name 02/19/20 1027             Gait/Stairs Assessment/Training    Assumption Level (Gait)  not tested  -TW      Recorded by [TW] Reece Rocha, PTA 02/19/20 1139      Row Name 02/19/20 1027             Therapeutic Exercise    Lower Extremity (Therapeutic Exercise)  gluteal sets;quad sets, bilateral;SAQ (short arc quad), bilateral;heel slides, bilateral  -TW      Lower Extremity Range of Motion (Therapeutic Exercise)  hip abduction/adduction, bilateral;hip internal/external rotation, bilateral;ankle dorsiflexion/plantar flexion, bilateral  -TW      Exercise Type (Therapeutic Exercise)  AROM (active range of motion)  -TW      Position (Therapeutic Exercise)  seated In recliner with B LE's elevated.  -TW      Sets/Reps (Therapeutic Exercise)  2/10-15  -TW      Recorded by [TW] Reece Rocha, PTA 02/19/20 1139      Row Name 02/19/20 1027             Positioning and Restraints    Pre-Treatment Position  sitting in chair/recliner  -TW      Post Treatment Position  chair  -TW      In Chair  reclined;encouraged to call for assist;call light within reach;exit alarm on  -TW      Recorded by [TW] Reece Rocha, PTA  02/19/20 1139      Row Name 02/19/20 1027             Pain Scale: Numbers Pre/Post-Treatment    Pain Scale: Numbers, Pretreatment  0/10 - no pain  -TW      Pain Scale: Numbers, Post-Treatment  0/10 - no pain  -TW      Recorded by [TW] Reece Rocha PTA 02/19/20 1139      Row Name                Wound 02/10/20 1450 abdomen Incision    Wound - Properties Group Date first assessed: 02/10/20 [CH] Time first assessed: 1450 [CH] Present on Hospital Admission: N [CH] Location: abdomen [CH] Primary Wound Type: Incision [CH] Additional Comments: LAPAROSCOPIC INCISIONS X2 [CH] Recorded by:  [CH] Elayne Fung RN 02/10/20 1450    Row Name                Wound 02/10/20 1450 lower;midline abdomen Incision    Wound - Properties Group Date first assessed: 02/10/20 [CH] Time first assessed: 1450 [CH] Present on Hospital Admission: N [CH] Orientation: lower;midline [CH] Location: abdomen [CH] Primary Wound Type: Incision [CH] Recorded by:  [CH] Elayne Fung RN 02/10/20 1450    Row Name 02/19/20 1027             Outcome Summary/Treatment Plan (PT)    Daily Summary of Progress (PT)  progress toward functional goals is good  -TW      Plan for Continued Treatment (PT)  Cont as pt able.  -TW      Anticipated Discharge Disposition (PT)  anticipate therapy at next level of care  -TW      Recorded by [TW] Reece Rocha PTA 02/19/20 1139        User Key  (r) = Recorded By, (t) = Taken By, (c) = Cosigned By    Initials Name Effective Dates Discipline    TW Reece Rocha PTA 03/07/18 -  PT    CH Elayne Fung RN 06/23/17 -  Nurse          Wound 02/10/20 1450 abdomen Incision (Active)   Dressing Appearance open to air 2/19/2020  8:24 AM   Closure Liquid skin adhesive;Adhesive closure strips 2/19/2020  8:24 AM   Base clean 2/19/2020  8:24 AM   Periwound ecchymotic 2/19/2020  8:24 AM   Periwound Temperature warm 2/19/2020  8:24 AM   Periwound Skin Turgor soft 2/19/2020  8:24 AM   Drainage Amount  none 2/19/2020  8:24 AM       Wound 02/10/20 1450 lower;midline abdomen Incision (Active)   Dressing Appearance dry;intact 2/19/2020  8:24 AM   Closure Adhesive bandage 2/19/2020  8:24 AM   Base dry 2/19/2020  8:24 AM   Periwound ecchymotic 2/19/2020  8:24 AM   Periwound Temperature warm 2/19/2020  8:24 AM   Periwound Skin Turgor soft 2/19/2020  8:24 AM   Drainage Amount none 2/19/2020  8:24 AM       Rehab Goal Summary     Row Name 02/19/20 1027             Bed Mobility Goal 1 (PT)    Activity/Assistive Device (Bed Mobility Goal 1, PT)  bed mobility activities, all  -TW      [place] Level/Cues Needed (Bed Mobility Goal 1, PT)  independent  -TW      Time Frame (Bed Mobility Goal 1, PT)  short term goal (STG);1 week  -TW      Progress/Outcomes (Bed Mobility Goal 1, PT)  goal not met  -TW         Transfer Goal 1 (PT)    Activity/Assistive Device (Transfer Goal 1, PT)  bed-to-chair/chair-to-bed;toilet  -TW      [place] Level/Cues Needed (Transfer Goal 1, PT)  conditional independence  -TW      Time Frame (Transfer Goal 1, PT)  long term goal (LTG);1 week  -TW      Barriers (Transfers Goal 1, PT)  pain; endurance  -TW      Progress/Outcome (Transfer Goal 1, PT)  goal not met  -TW         Gait Training Goal 1 (PT)    Activity/Assistive Device (Gait Training Goal 1, PT)  gait (walking locomotion);assistive device use;decrease fall risk;increase endurance/gait distance  -TW      [place] Level (Gait Training Goal 1, PT)  conditional independence  -TW      Time Frame (Gait Training Goal 1, PT)  long term goal (LTG);2 weeks  -TW      Barriers (Gait Training Goal 1, PT)  600 ft or more per trip x 2 per day w/out LOB  -TW      Progress/Outcome (Gait Training Goal 1, PT)  goal not met  -TW         Stairs Goal 1 (PT)    Activity/Assistive Device (Stairs Goal 1, PT)  -- ascend/descend ramp w/ FWRW and supervision  -TW      [place] Level/Cues Needed (Stairs Goal 1, PT)  conditional independence;supervision  required  -TW      Time Frame (Stairs Goal 1, PT)  long term goal (LTG);2 weeks  -TW      Progress/Outcome (Stairs Goal 1, PT)  goal not met  -TW         Patient Education Goal (PT)    Activity (Patient Education Goal, PT)  Indep w/ HEP   -TW      Spink/Cues/Accuracy (Memory Goal 2, PT)  demonstrates adequately  -TW      Time Frame (Patient Education Goal, PT)  1 week  -TW      Progress/Outcome (Patient Education Goal, PT)  goal not met  -TW        User Key  (r) = Recorded By, (t) = Taken By, (c) = Cosigned By    Initials Name Provider Type Discipline    TW Reece Rocha, PTA Physical Therapy Assistant PT              PT Recommendation and Plan  Anticipated Discharge Disposition (PT): anticipate therapy at next level of care  Outcome Summary/Treatment Plan (PT)  Daily Summary of Progress (PT): progress toward functional goals is good  Barriers to Overall Progress (PT): Pt confused this pm.  Plan for Continued Treatment (PT): Cont as pt able.  Anticipated Discharge Disposition (PT): anticipate therapy at next level of care  Plan of Care Reviewed With: patient  Progress: no change  Outcome Summary: Pt tolerated B LE ex's well with good participation. Pt declined out of chair activity due to may be DC soon to go to rehab center in [place]. Pt would cont to benefit from therapy upon DC.  Outcome Measures     Row Name 02/19/20 1027 02/17/20 1308 02/17/20 1000       How much help from another person do you currently need...    Turning from your back to your side while in flat bed without using bedrails?  3  -TW  3  -TW  --    Moving from lying on back to sitting on the side of a flat bed without bedrails?  2  -TW  2  -TW  --    Moving to and from a bed to a chair (including a wheelchair)?  2  -TW  2  -TW  --    Standing up from a chair using your arms (e.g., wheelchair, bedside chair)?  2  -TW  2  -TW  --    Climbing 3-5 steps with a railing?  1  -TW  1  -TW  --    To walk in hospital room?  2  -TW  2   -TW  --    AM-PAC 6 Clicks Score (PT)  12  -TW  12  -TW  --       How much help from another is currently needed...    Putting on and taking off regular lower body clothing?  --  --  1  -KD    Bathing (including washing, rinsing, and drying)  --  --  1  -KD    Toileting (which includes using toilet bed pan or urinal)  --  --  1  -KD    Putting on and taking off regular upper body clothing  --  --  2  -KD    Taking care of personal grooming (such as brushing teeth)  --  --  2  -KD    Eating meals  --  --  3  -KD    AM-PAC 6 Clicks Score (OT)  --  --  10  -KD       Functional Assessment    Outcome Measure Options  --  AM-PAC 6 Clicks Basic Mobility (PT)  -TW  --      User Key  (r) = Recorded By, (t) = Taken By, (c) = Cosigned By    Initials Name Provider Type    Reece Rouse PTA Physical Therapy Assistant    Akua Maki, ELDON/L Occupational Therapy Assistant         Time Calculation:   PT Charges     Row Name 02/19/20 1141             Time Calculation    Start Time  1027  -TW      Stop Time  1051  -TW      Time Calculation (min)  24 min  -TW      PT Received On  02/19/20  -TW      PT Goal Re-Cert Due Date  02/20/20  -TW         Time Calculation- PT    Total Timed Code Minutes- PT  24 minute(s)  -TW        User Key  (r) = Recorded By, (t) = Taken By, (c) = Cosigned By    Initials Name Provider Type    Reece Rouse PTA Physical Therapy Assistant        Therapy Charges for Today     Code Description Service Date Service Provider Modifiers Qty    19841507266 HC GAIT TRAINING EA 15 MIN 2/18/2020 Reece Rocha, PTA GP 1    95189580245 HC PT THER PROC EA 15 MIN 2/18/2020 Reece Rocha, PTA GP 1    51857733947 HC PT THER PROC EA 15 MIN 2/19/2020 Reece Rocha, YOSEPH GP 2          PT G-Codes  Outcome Measure Options: AM-PAC 6 Clicks Basic Mobility (PT)  AM-PAC 6 Clicks Score (PT): 12  AM-PAC 6 Clicks Score (OT): 10    Reece Rocha PTA  2/19/2020

## 2020-02-19 NOTE — THERAPY TREATMENT NOTE
Acute Care - Occupational Therapy Treatment Note  [facility]     Patient Name: Brendon Skelton  : 1945  MRN: [id number]  Today's Date: 2020  Onset of Illness/Injury or Date of Surgery: 02/10/20  Date of Referral to OT: 20  Referring Physician: Dr. Maher    Admit Date: 2/10/2020       ICD-10-CM ICD-9-CM   1. Malignant neoplasm of sigmoid colon (CMS/HCC) C18.7 153.3   2. Cancer of sigmoid (CMS/HCC) C18.7 153.3   3. Impaired functional mobility, balance, gait, and endurance Z74.09 V49.89   4. Impaired mobility and ADLs Z74.09 799.89   5. Atrial fibrillation, unspecified type (CMS/HCC) I48.91 427.31   6. Stage 4 chronic kidney disease (CMS/HCC) N18.4 585.4     Patient Active Problem List   Diagnosis   • Long term current use of anticoagulant therapy   • Personal history of heart valve replacement   • Atrial fibrillation [I48.91]   • Emphysema of lung (CMS/HCC)   • On anticoagulant therapy   • Hyperlipidemia   • Diastolic heart failure (CMS/HCC)   • Depressive disorder   • COPD (chronic obstructive pulmonary disease) (CMS/HCC)   • Diabetes mellitus (CMS/HCC)   • Myopia   • Astigmatism   • Bleeding from open wound of chest wall   • Follow-up surgery care   • Encounter for screening for malignant neoplasm of colon   • Positive colorectal cancer screening using DNA-based stool test   • Nodule of left lung   • Chronic hypoxemic respiratory failure (CMS/HCC)   • Physical deconditioning   • Heart failure with preserved left ventricular function (HFpEF) (CMS/HCC)   • Hypertension   • Coronary artery disease involving native coronary artery without angina pectoris   • Hx of CABG   • SSS (sick sinus syndrome) (CMS/HCC)   • Pacemaker   • Stage 4 chronic kidney disease (CMS/HCC)   • Personal history of tobacco use, presenting hazards to health   • Gastrointestinal hemorrhage   • Morbid obesity (CMS/HCC)   • Gastritis   • Colon polyp   • Coumadin toxicity   • Acute on chronic diastolic congestive heart  failure (CMS/HCC)   • Chronic anemia   • Pulmonary hypertension (CMS/HCC)   • Confusion   • Hyponatremia   • Long term current use of anticoagulant     Past Medical History:   Diagnosis Date   • Anxiety    • Aortic valve replaced    • Atrial fibrillation (CMS/HCC)    • C. difficile colitis    • Chronic hypoxemic respiratory failure (CMS/HCC)    • COPD (chronic obstructive pulmonary disease) (CMS/HCC)    • Coronary artery disease    • Depressive disorder    • Diabetes mellitus (CMS/HCC)    • Diastolic heart failure (CMS/HCC)    • Gastrointestinal hemorrhage    • Hyperlipidemia    • Hypertension    • Long term current use of anticoagulant    • Malignant neoplasm of sigmoid colon (CMS/HCC)    • Morbid obesity (CMS/HCC)    • Pulmonary hypertension (CMS/HCC)    • Rectal hemorrhage    • Stage 4 chronic kidney disease (CMS/HCC)      Past Surgical History:   Procedure Laterality Date   • AORTIC VALVE REPAIR/REPLACEMENT     • CARDIAC ELECTROPHYSIOLOGY PROCEDURE N/A 3/20/2017   • CARDIAC PACEMAKER PLACEMENT     • CATARACT EXTRACTION W/ INTRAOCULAR LENS IMPLANT Left 2/1/2019   • CATARACT EXTRACTION W/ INTRAOCULAR LENS IMPLANT Right 2/8/2019   • COLON RESECTION Left 2/10/2020   • COLONOSCOPY N/A 6/19/2019   • COLONOSCOPY N/A 6/24/2019   • ENDOSCOPY N/A 6/19/2019   • PACEMAKER REPLACEMENT N/A 3/21/2017   • SIGMOIDOSCOPY N/A 9/30/2019   • UPPER GASTROINTESTINAL ENDOSCOPY         Therapy Treatment    Rehabilitation Treatment Summary     Row Name 02/19/20 1027 02/19/20 0820          Treatment Time/Intention    Discipline  physical therapy assistant  -TW  occupational therapy assistant  -KD     Document Type  therapy note (daily note)  -TW  therapy note (daily note)  -KD     Subjective Information  complains of;fatigue;weakness  -TW  no complaints  -KD     Mode of Treatment  physical therapy;individual therapy  -TW  occupational therapy  -KD     Patient/Family Observations  No family present.  -TW  No family present  -KD     Therapy  Frequency (OT Eval)  daily  -TW  daily  -KD     Patient Effort  adequate  -TW  poor  -KD     Comment  --  Pt deferred any t/f's w/ encouragement  -KD     Existing Precautions/Restrictions  fall  -TW  fall  -KD     Recorded by [TW] Reece Rocha, YOSEPH 02/19/20 1139 [KD] Akua Henderson COTA/L 02/19/20 1408     Row Name 02/19/20 1027 02/19/20 0820          Vital Signs    Pre Systolic BP Rehab  --  162  -KD     Pre Treatment Diastolic BP  --  68  -KD     Pretreatment Heart Rate (beats/min)  70  -TW  78  -KD     Intratreatment Heart Rate (beats/min)  74  -TW  78  -KD     Posttreatment Heart Rate (beats/min)  72  -TW  82  -KD     Pre SpO2 (%)  94  -TW  82  -KD     O2 Delivery Pre Treatment  supplemental O2  -TW  supplemental O2 02 increased to 91% w/ PLB  -KD     Intra SpO2 (%)  93  -TW  91  -KD     O2 Delivery Intra Treatment  --  supplemental O2  -KD     Post SpO2 (%)  93  -TW  92  -KD     O2 Delivery Post Treatment  supplemental O2  -TW  supplemental O2  -KD     Pre Patient Position  Sitting  -TW  Sitting  -KD     Intra Patient Position  Sitting  -TW  Standing  -KD     Post Patient Position  Sitting  -TW  Sitting  -KD     Recorded by [TW] Reece Rocha PTA 02/19/20 1139 [KD] Akua Henderson COTA/L 02/19/20 1408     Row Name 02/19/20 1027 02/19/20 0820          Cognitive Assessment/Intervention- PT/OT    Affect/Mental Status (Cognitive)  WFL  -TW  WFL  -KD     Behavioral Issues (Cognitive)  --  overwhelmed easily  -KD     Orientation Status (Cognition)  oriented x 3  -TW  oriented x 3  -KD     Follows Commands (Cognition)  follows one step commands;over 90% accuracy  -TW  follows one step commands  -KD     Cognitive Function (Cognitive)  WFL  -TW  WFL  -KD     Personal Safety Interventions  muscle strengthening facilitated  -TW  --     Recorded by [TW] Reece Rocha, YOSEPH 02/19/20 1139 [KD] Akua Henderson COTA/L 02/19/20 1408     Row Name 02/19/20 1027             Safety Issues, Functional Mobility     Impairments Affecting Function (Mobility)  balance;coordination;endurance/activity tolerance;pain;shortness of breath;strength  -TW      Recorded by [TW] Reece Rocha, PTA 02/19/20 1139      Row Name 02/19/20 1027             Bed Mobility Assessment/Treatment    Comment (Bed Mobility)  Pt already up in chair upon arrival.  -TW      Recorded by [TW] Reece Rocha, PTA 02/19/20 1139      Row Name 02/19/20 0820             Functional Mobility    Functional Mobility- Ind. Level  contact guard assist  -KD      Functional Mobility- Device  rolling walker  -KD      Functional Mobility-Distance (Feet)  10  -KD      Functional Mobility- Safety Issues  supplemental O2  -KD      Recorded by [KD] Akua Henderson COLÓN/L 02/19/20 1408      Row Name 02/19/20 0820             Bed-Chair Transfer    Bed-Chair [place] (Transfers)  contact guard  -KD      Assistive Device (Bed-Chair Transfers)  walker, front-wheeled  -KD      Recorded by [KD] Akua Henderson COLÓN/L 02/19/20 1408      Row Name 02/19/20 1027             Chair-Bed Transfer    Chair-Bed [place] (Transfers)  not tested  -TW      Recorded by [TW] Reece Rocha, PTA 02/19/20 1139      Row Name 02/19/20 1027 02/19/20 0820          Sit-Stand Transfer    Sit-Stand [place] (Transfers)  not tested  -TW  contact guard  -KD     Assistive Device (Sit-Stand Transfers)  --  walker, front-wheeled  -KD     Recorded by [TW] Reece Rocha, PTA 02/19/20 1139 [KD] Akua Henderson COLÓN/L 02/19/20 1408     Row Name 02/19/20 1027 02/19/20 0820          Stand-Sit Transfer    Stand-Sit [place] (Transfers)  not tested  -TW  contact guard  -KD     Assistive Device (Stand-Sit Transfers)  --  walker, front-wheeled  -KD     Recorded by [TW] Reece Rocha, PTA 02/19/20 1139 [KD] Akua Henderson COLÓN/L 02/19/20 1408     Row Name 02/19/20 0820             Toilet Transfer    Type (Toilet Transfer)  sit-stand;stand-sit  -KD      [place] Level  (Toilet Transfer)  minimum assist (75% patient effort)  -KD      Assistive Device (Toilet Transfer)  walker, front-wheeled  -KD      Recorded by [KD] Akua Henderson COTA/L 02/19/20 1408      Row Name 02/19/20 1027             Gait/Stairs Assessment/Training    [place] Level (Gait)  not tested  -TW      Recorded by [TW] Reece Rocha PTA 02/19/20 1139      Row Name 02/19/20 0820             Grooming Assessment/Training    [place] Level (Grooming)  grooming skills;wash face, hands;contact guard assist  -KD      Recorded by [KD] Akua Henderson COTA/L 02/19/20 1408      Row Name 02/19/20 1027             Therapeutic Exercise    Lower Extremity (Therapeutic Exercise)  gluteal sets;quad sets, bilateral;SAQ (short arc quad), bilateral;heel slides, bilateral  -TW      Lower Extremity Range of Motion (Therapeutic Exercise)  hip abduction/adduction, bilateral;hip internal/external rotation, bilateral;ankle dorsiflexion/plantar flexion, bilateral  -TW      Exercise Type (Therapeutic Exercise)  AROM (active range of motion)  -TW      Position (Therapeutic Exercise)  seated In recliner with B LE's elevated.  -TW      Sets/Reps (Therapeutic Exercise)  2/10-15  -TW      Recorded by [TW] Reece Rocha PTA 02/19/20 1139      Row Name 02/19/20 1027 02/19/20 0820          Positioning and Restraints    Pre-Treatment Position  sitting in chair/recliner  -TW  in bed  -KD     Post Treatment Position  chair  -TW  chair  -KD     In Chair  reclined;encouraged to call for assist;call light within reach;exit alarm on  -TW  sitting;call light within reach;encouraged to call for assist;exit alarm on  -KD     Recorded by [TW] Reece Rocha PTA 02/19/20 1139 [KD] Akua Henderson COTA/L 02/19/20 1408     Row Name 02/19/20 1027 02/19/20 0820          Pain Scale: Numbers Pre/Post-Treatment    Pain Scale: Numbers, Pretreatment  0/10 - no pain  -TW  0/10 - no pain  -KD     Pain Scale: Numbers, Post-Treatment  0/10 -  no pain  -TW  0/10 - no pain  -KD     Recorded by [TW] Reece Rocha, PTA 02/19/20 1139 [KD] Akua Henderson COLÓN/L 02/19/20 1408     Row Name                Wound 02/10/20 1450 abdomen Incision    Wound - Properties Group Date first assessed: 02/10/20 [CH] Time first assessed: 1450 [CH] Present on Hospital Admission: N [CH] Location: abdomen [CH] Primary Wound Type: Incision [CH] Additional Comments: LAPAROSCOPIC INCISIONS X2 [CH] Recorded by:  [CH] Elayne Fung RN 02/10/20 1450    Row Name                Wound 02/10/20 1450 lower;midline abdomen Incision    Wound - Properties Group Date first assessed: 02/10/20 [CH] Time first assessed: 1450 [CH] Present on Hospital Admission: N [CH] Orientation: lower;midline [CH] Location: abdomen [CH] Primary Wound Type: Incision [CH] Recorded by:  [CH] Elayne Fung RN 02/10/20 1450    Row Name 02/19/20 0820             Outcome Summary/Treatment Plan (OT)    Daily Summary of Progress (OT)  progress toward functional goals as expected  -KD      Plan for Continued Treatment (OT)  cont ot poc  -KD      Anticipated Discharge Disposition (OT)  anticipate therapy at next level of care  -KD      Recorded by [KD] Akua Henderson COTA/L 02/19/20 1408      Row Name 02/19/20 1027             Outcome Summary/Treatment Plan (PT)    Daily Summary of Progress (PT)  progress toward functional goals is good  -TW      Plan for Continued Treatment (PT)  Cont as pt able.  -TW      Anticipated Discharge Disposition (PT)  anticipate therapy at next level of care  -TW      Recorded by [TW] Reece Rocha, YOSEPH 02/19/20 1139        User Key  (r) = Recorded By, (t) = Taken By, (c) = Cosigned By    Initials Name Effective Dates Discipline    TW Reece Rocha, PTA 03/07/18 -  PT    KD Akua Henderson COTA/L 03/07/18 -  OT    CH Elayne Fung RN 06/23/17 -  Nurse        Wound 02/10/20 1450 abdomen Incision (Active)   Dressing Appearance open to air 2/19/2020   8:24 AM   Closure Liquid skin adhesive;Adhesive closure strips 2/19/2020  8:24 AM   Base clean 2/19/2020  8:24 AM   Periwound ecchymotic 2/19/2020  8:24 AM   Periwound Temperature warm 2/19/2020  8:24 AM   Periwound Skin Turgor soft 2/19/2020  8:24 AM   Drainage Amount none 2/19/2020  8:24 AM       Wound 02/10/20 1450 lower;midline abdomen Incision (Active)   Dressing Appearance dry;intact 2/19/2020  8:24 AM   Closure Adhesive bandage 2/19/2020  8:24 AM   Base dry 2/19/2020  8:24 AM   Periwound ecchymotic 2/19/2020  8:24 AM   Periwound Temperature warm 2/19/2020  8:24 AM   Periwound Skin Turgor soft 2/19/2020  8:24 AM   Drainage Amount none 2/19/2020  8:24 AM     Rehab Goal Summary     Row Name 02/19/20 1027 02/19/20 0820          Bed Mobility Goal 1 (PT)    Activity/Assistive Device (Bed Mobility Goal 1, PT)  bed mobility activities, all  -TW  --     [place] Level/Cues Needed (Bed Mobility Goal 1, PT)  independent  -TW  --     Time Frame (Bed Mobility Goal 1, PT)  short term goal (STG);1 week  -TW  --     Progress/Outcomes (Bed Mobility Goal 1, PT)  goal not met  -TW  --        Transfer Goal 1 (PT)    Activity/Assistive Device (Transfer Goal 1, PT)  bed-to-chair/chair-to-bed;toilet  -TW  --     [place] Level/Cues Needed (Transfer Goal 1, PT)  conditional independence  -TW  --     Time Frame (Transfer Goal 1, PT)  long term goal (LTG);1 week  -TW  --     Barriers (Transfers Goal 1, PT)  pain; endurance  -TW  --     Progress/Outcome (Transfer Goal 1, PT)  goal not met  -TW  --        Gait Training Goal 1 (PT)    Activity/Assistive Device (Gait Training Goal 1, PT)  gait (walking locomotion);assistive device use;decrease fall risk;increase endurance/gait distance  -TW  --     [place] Level (Gait Training Goal 1, PT)  conditional independence  -TW  --     Time Frame (Gait Training Goal 1, PT)  long term goal (LTG);2 weeks  -TW  --     Barriers (Gait Training Goal 1, PT)  600 ft or more per trip x 2 per  day w/out LOB  -TW  --     Progress/Outcome (Gait Training Goal 1, PT)  goal not met  -TW  --        Stairs Goal 1 (PT)    Activity/Assistive Device (Stairs Goal 1, PT)  -- ascend/descend ramp w/ FWRW and supervision  -TW  --     [place] Level/Cues Needed (Stairs Goal 1, PT)  conditional independence;supervision required  -TW  --     Time Frame (Stairs Goal 1, PT)  long term goal (LTG);2 weeks  -TW  --     Progress/Outcome (Stairs Goal 1, PT)  goal not met  -TW  --        Patient Education Goal (PT)    Activity (Patient Education Goal, PT)  Indep w/ HEP   -TW  --     [place]/Cues/Accuracy (Memory Goal 2, PT)  demonstrates adequately  -TW  --     Time Frame (Patient Education Goal, PT)  1 week  -TW  --     Progress/Outcome (Patient Education Goal, PT)  goal not met  -TW  --        Occupational Therapy Goals    Transfer Goal Selection (OT)  --  transfer, OT goal 1  -KD     Bathing Goal Selection (OT)  --  bathing, OT goal 1  -KD     Dressing Goal Selection (OT)  --  dressing, OT goal 1  -KD     Toileting Goal Selection (OT)  --  toileting, OT goal 1  -KD        Transfer Goal 1 (OT)    Activity/Assistive Device (Transfer Goal 1, OT)  --  sit-to-stand/stand-to-sit;bed-to-chair/chair-to-bed;toilet  -KD     [place] Level/Cues Needed (Transfer Goal 1, OT)  --  conditional independence  -KD     Time Frame (Transfer Goal 1, OT)  --  long term goal (LTG);by discharge  -KD     Progress/Outcome (Transfer Goal 1, OT)  --  goal not met  -KD        Bathing Goal 1 (OT)    Activity/Assistive Device (Bathing Goal 1, OT)  --  bathing skills, all  -KD     [place] Level/Cues Needed (Bathing Goal 1, OT)  --  supervision required;set-up required  -KD     Time Frame (Bathing Goal 1, OT)  --  long term goal (LTG);by discharge  -KD     Progress/Outcomes (Bathing Goal 1, OT)  --  goal not met  -KD        Dressing Goal 1 (OT)    Activity/Assistive Device (Dressing Goal 1, OT)  --  dressing skills, all using AE PRN  -KD      [place]/Cues Needed (Dressing Goal 1, OT)  --  supervision required;set-up required  -KD     Time Frame (Dressing Goal 1, OT)  --  long term goal (LTG);by discharge  -KD     Progress/Outcome (Dressing Goal 1, OT)  --  goal not met  -KD        Toileting Goal 1 (OT)    Activity/Device (Toileting Goal 1, OT)  --  toileting skills, all  -KD     [place] Level/Cues Needed (Toileting Goal 1, OT)  --  conditional independence  -KD     Time Frame (Toileting Goal 1, OT)  --  long term goal (LTG);by discharge  -KD     Progress/Outcome (Toileting Goal 1, OT)  --  goal not met  -KD       User Key  (r) = Recorded By, (t) = Taken By, (c) = Cosigned By    Initials Name Provider Type Discipline    TW Reece Rocha, PTA Physical Therapy Assistant PT    KD Akua Henderson, COLÓN/L Occupational Therapy Assistant OT            OT Recommendation and Plan  Outcome Summary/Treatment Plan (OT)  Daily Summary of Progress (OT): progress toward functional goals as expected  Plan for Continued Treatment (OT): cont ot poc  Anticipated Discharge Disposition (OT): anticipate therapy at next level of care  Therapy Frequency (OT Eval): daily  Daily Summary of Progress (OT): progress toward functional goals as expected  Plan of Care Review  Plan of Care Reviewed With: patient  Plan of Care Reviewed With: patient  Outcome Summary: Pt up in recliner upon entry. Pt deferred any t/f's @ this time 2' abdominal pain but was agreeable to BUE ther ex. Pt completed BUE ther ex in all planes w/ 2lb HW and RB's PRN to complete task presented. No new goals met this date. Cont OT POC.  Outcome Measures     Row Name 02/19/20 1027 02/19/20 0820 02/17/20 1308       How much help from another person do you currently need...    Turning from your back to your side while in flat bed without using bedrails?  3  -TW  --  3  -TW    Moving from lying on back to sitting on the side of a flat bed without bedrails?  2  -TW  --  2  -TW    Moving to and from a bed  to a chair (including a wheelchair)?  2  -TW  --  2  -TW    Standing up from a chair using your arms (e.g., wheelchair, bedside chair)?  2  -TW  --  2  -TW    Climbing 3-5 steps with a railing?  1  -TW  --  1  -TW    To walk in hospital room?  2  -TW  --  2  -TW    AM-PAC 6 Clicks Score (PT)  12  -TW  --  12  -TW       How much help from another is currently needed...    Putting on and taking off regular lower body clothing?  --  1  -KD  --    Bathing (including washing, rinsing, and drying)  --  1  -KD  --    Toileting (which includes using toilet bed pan or urinal)  --  2  -KD  --    Putting on and taking off regular upper body clothing  --  2  -KD  --    Taking care of personal grooming (such as brushing teeth)  --  3  -KD  --    Eating meals  --  4  -KD  --    AM-PAC 6 Clicks Score (OT)  --  13  -KD  --       Functional Assessment    Outcome Measure Options  --  --  AM-PAC 6 Clicks Basic Mobility (PT)  -TW    Row Name 02/17/20 1000             How much help from another is currently needed...    Putting on and taking off regular lower body clothing?  1  -KD      Bathing (including washing, rinsing, and drying)  1  -KD      Toileting (which includes using toilet bed pan or urinal)  1  -KD      Putting on and taking off regular upper body clothing  2  -KD      Taking care of personal grooming (such as brushing teeth)  2  -KD      Eating meals  3  -KD      AM-PAC 6 Clicks Score (OT)  10  -KD        User Key  (r) = Recorded By, (t) = Taken By, (c) = Cosigned By    Initials Name Provider Type    TW Reece Rocha, YOSEPH Physical Therapy Assistant    KD Akua Henderson, ELDON/L Occupational Therapy Assistant           Time Calculation:   Time Calculation- OT     Row Name 02/19/20 1408             Time Calculation- OT    OT Start Time  0820  -KD      OT Stop Time  0843  -KD      OT Time Calculation (min)  23 min  -KD      Total Timed Code Minutes- OT  23 minute(s)  -KD      OT Received On  02/19/20  -KD        User  Key  (r) = Recorded By, (t) = Taken By, (c) = Cosigned By    Initials Name Provider Type     Akua Henderson COTA/L Occupational Therapy Assistant        Therapy Charges for Today     Code Description Service Date Service Provider Modifiers Qty    08216658171 HC OT SELF CARE/MGMT/TRAIN EA 15 MIN 2/19/2020 Akua Henderson COTA/L GO 2               YSABEL Denton  2/19/2020

## 2020-02-19 NOTE — DISCHARGE INSTRUCTIONS
COLON RESECTION  POST OP RECOMMENDATIONS  Dr. Luis Maher  [address] [place] [id number]  Phone: [phone] (office)                   [phone] (hospital)           [phone] (cell)    ACTIVITIES:  1. Expect to rest most of the first week after discharge from hospital, but do get up several times daily to reduce the risk of getting a clot in your legs.  2. No strenuous activity or lifting over 10 lbs. for two weeks.  Add 5 lbs. a week to that restriction until 6 weeks out from surgery.  Try to avoid squatting and deep bends for about a week.  3. No driving until seen at your post operative appointment.  You must be off pain meds 24 hours before driving after that visit unless otherwise instructed.  4. You can climb stairs, but minimize this and initially do one step at a time (both feet on one step rather than going up with each step.)  SYMPTOMS:  1. Avoiding constipation is important after this surgery as it is common when taking pain medication.  Over the counter laxatives, such as Miralax, can be used temporarily to avoid this.   2. Fatigue and decreased stamina is not unusual for about a week or so after surgery due to anesthesia.  Try to take walks and some mild activity between resting.  3. Shoulder pain is not unusual from the “gas” used in laparoscopy which will dissipate within 1-3 days.  If it does not, call your physician.  4. It is not unusual for your gastrointestinal system to take 1-2 months to get back to your normal routine and you may have long term changes towards looser bowel movements  WOUND SITE:         1. You may remove your outer dressings in 3 days.  The white tapes called steri-strips should stay in place.  They will fall off on their own in 1-2 weeks.  Do not worry if they come off sooner.      MEALS:  1. A soft diet is recommended for the first 1-2 days after discharge from the hospital.  A normal diet may then be started as tolerated after that.  2. Expect to  eat less after this procedure and fill up somewhat faster.   3. Do NOT take pain pills on an empty stomach.  WORK:  1. In general if you have a sedentary job, you can return to work in 3 weeks if done laparoscopically.  If lifting or exertion is required it may be 4-6 weeks depending on your recovery.    2. Use discretion and remember that your stamina will be decreased post operatively.  3. Return to work notes can be provided at the time of your post-operative appointment.  FOLLOW UP:  1. If no appointment is given, please call and make a post-operative appointment for approximately 7-10 days after the procedure

## 2020-02-19 NOTE — NURSING NOTE
Patient complains of slight shortness of breath.  O2 saturations 88-92% on O2 2L.  Increased O2 to 5L sats improved to 93%.  Called respiratory for PRN breathing treatment.

## 2020-02-19 NOTE — DISCHARGE SUMMARY
Discharge Summary  Date: 02/19/20  Service: General Surgery  Attending: Luis Maher MD    Procedures: Laparoscopic sigmoid colectomy  Consults: Nephrology, hospitalist service  Discharge Diagnoses: Stage II colon cancer  Secondary Diagnosis:   Confusion 2/15/2020   Atrial fibrillation [I48.91] (Chronic) 8/23/2016   Heart failure (Chronic) Unknown   Chronic airway obstruction (Chronic) Unknown   Diabetes (Chronic) 9/21/2016   Chronic respiratory failure (Chronic) 1/31/2018   Chronic kidney disease, stage IV (severe) (Chronic) 6/19/2018   Severe obesity (Chronic) 6/19/2019   Chronic anemia (Chronic) 7/13/2019   Pulmonary hypertension (Chronic) 7/30/2019   Low sodium levels 2/15/2020   Long term use of blood thinners Unknown      Non-Hospital Problem List        Noted   Long term use of blood thinners 8/23/2016   Personal history of heart valve replacement 8/23/2016   Emphysema (Chronic) Unknown   Taking blood thinners Unknown   High cholesterol or triglycerides Unknown   Depressive disorder Unknown   Nearsightedness 10/14/2016   Astigmatism 10/14/2016   Bleeding from open wound of chest wall 3/21/2017   Encounter for examination following surgery 3/31/2017   Screen for colon cancer 11/8/2017   Positive colorectal cancer screening using DNA-based stool test 11/8/2017   Lung nodule 1/31/2018   Physical deconditioning 1/31/2018   Heart failure 3/12/2018   High blood pressure disorder (Chronic) 3/12/2018   Coronary artery disease involving native coronary artery without angina pectoris 3/12/2018   History of coronary artery bypass graft 3/12/2018   Sick sinus syndrome 3/28/2018   Pacemaker 3/28/2018   Personal history of tobacco use, presenting hazards to health 12/4/2018   Bleeding of the stomach and intestines 6/18/2019   Inflammation of the stomach lining (Chronic) 6/20/2019   Colon polyp (Chronic) 6/20/2019   Poisoning by coumarin 6/20/2019   Heart failure 7/13/2019          Reason for hospitalization: Postoperative  care following colon resection  Significant Findings: Stage II colon cancer  Patient Condition on Discharge: Improved  Hospital Course: Patient has multiple medical comorbidities and underwent an uneventful laparoscopic sigmoid colectomy.  Her postoperative course was remarkable for bleeding requiring a couple of units of blood.  This occurred likely because of chronic anticoagulation.  Additionally, she had confusion likely related to her renal failure.  All medical problems resolved at the time of discharge.  The following discharge instructions were provided to the patient:      ACTIVITIES:  1. Expect to rest most of the first week after discharge from hospital, but do get up several times daily to reduce the risk of getting a clot in your legs.  2. No strenuous activity or lifting over 10 lbs. for two weeks.  Add 5 lbs. a week to that restriction until 6 weeks out from surgery.  Try to avoid squatting and deep bends for about a week.  3. No driving until seen at your post operative appointment.  You must be off pain meds 24 hours before driving after that visit unless otherwise instructed.  4. You can climb stairs, but minimize this and initially do one step at a time (both feet on one step rather than going up with each step.)  SYMPTOMS:  1. Avoiding constipation is important after this surgery as it is common when taking pain medication.  Over the counter laxatives, such as Miralax, can be used temporarily to avoid this.   2. Fatigue and decreased stamina is not unusual for about a week or so after surgery due to anesthesia.  Try to take walks and some mild activity between resting.  3. Shoulder pain is not unusual from the “gas” used in laparoscopy which will dissipate within 1-3 days.  If it does not, call your physician.  4. It is not unusual for your gastrointestinal system to take 1-2 months to get back to your normal routine and you may have long term changes towards looser bowel movements  WOUND SITE:            1. You may remove your outer dressings in 3 days.  The white tapes called steri-strips should stay in place.  They will fall off on their own in 1-2 weeks.  Do not worry if they come off sooner.      MEALS:  1. A soft diet is recommended for the first 1-2 days after discharge from the hospital.  A normal diet may then be started as tolerated after that.  2. Expect to eat less after this procedure and fill up somewhat faster.   3. Do NOT take pain pills on an empty stomach.  WORK:  1. In general if you have a sedentary job, you can return to work in 3 weeks if done laparoscopically.  If lifting or exertion is required it may be 4-6 weeks depending on your recovery.    2. Use discretion and remember that your stamina will be decreased post operatively.  3. Return to work notes can be provided at the time of your post-operative appointment.  FOLLOW UP:  1. If no appointment is given, please call and make a post-operative appointment for approximately 7-10 days after the procedure    Discharge Medications:     Your medication list      CONTINUE taking these medications      Instructions Last Dose Given Next Dose Due   albuterol (2.5 MG/3ML) 0.083% nebulizer solution  Commonly known as:  PROVENTIL      Take 2.5 mg by nebulization Every 4 (Four) Hours As Needed for Wheezing.       albuterol sulfate  (90 Base) MCG/ACT inhaler  Commonly known as:  PROVENTIL HFA;VENTOLIN HFA;PROAIR HFA      Inhale 2 puffs Every 4 (Four) Hours As Needed for Wheezing or Shortness of Air.       aspirin 81 MG chewable tablet      Chew 81 mg Daily.       bumetanide 2 MG tablet  Commonly known as:  BUMEX      Take 2 mg by mouth Daily.       cholecalciferol 25 MCG (1000 UT) tablet  Commonly known as:  VITAMIN D3      Take 2,000 Units by mouth Daily.       citalopram 20 MG tablet  Commonly known as:  CeleXA      Take 1 tablet by mouth Daily.       dilTIAZem  MG 24 hr capsule  Commonly known as:  CARDIZEM CD      Take 1 capsule  by mouth Daily.       ferrous sulfate 325 (65 FE) MG tablet      Take 325 mg by mouth Daily With Breakfast.       glucose blood test strip      1 each by Other route 3 (three) times a day. Use as instructed       Insulin Glargine 100 UNIT/ML injection pen  Commonly known as:  BASAGLAR KWIKPEN      Inject 30 Units under the skin into the appropriate area as directed Every Night.       metOLazone 5 MG tablet  Commonly known as:  ZAROXOLYN      Take 1 tablet by mouth Daily.       metoprolol succinate XL 25 MG 24 hr tablet  Commonly known as:  TOPROL-XL      Take 1 tablet by mouth Daily.       neomycin 500 MG tablet  Commonly known as:  MYCIFRADIN      TAKE 2 TABLETS  AND 1100PM NIGHT BEFORE SURGERY       Prenatal Vitamin 27-0.8 MG tablet      Take 1 tablet by mouth daily.       rOPINIRole 0.25 MG tablet  Commonly known as:  REQUIP      TAKE 1 TABLET BY MOUTH EVERY NIGHT. TAKE 1 HOUR BEFORE BEDTIME.       traZODone 150 MG tablet  Commonly known as:  DESYREL      Take 2 tablets by mouth Every Night.       umeclidinium-vilanterol 62.5-25 MCG/INH aerosol powder  inhaler  Commonly known as:  ANORO ELLIPTA      Inhale 1 puff Daily.       UNIFINE PENTIPS 31G X 6 MM misc  Generic drug:  Insulin Pen Needle      USE 1 FOUR (4) TIMES DAILY WITH INSULIN       vitamin C with bob hips 250 MG tablet      Take 250 mg by mouth Daily.       warfarin 5 MG tablet  Commonly known as:  COUMADIN      Take 1 tablet by mouth Daily. Take 1 tablet nightly or AS DIRECTED PER COUMADIN CLINIC       ZETIA 10 MG tablet  Generic drug:  ezetimibe      Take 10 mg by mouth Daily.          ASK your doctor about these medications      Instructions Last Dose Given Next Dose Due   ondansetron 4 MG tablet  Commonly known as:  ZOFRAN      Take 1 tablet by mouth Every 6 (Six) Hours As Needed for Nausea or Vomiting.              Your Scheduled Appointments    Feb 21, 2020 11:15 AM CST  Follow Up with Nel Grant MD  [facility]  PULMONARY MEDICINE (--) [address]  [address] [facility] [address] [phone]   Arrive 15 minutes prior to appointment.     Apr 28, 2020 10:30 AM CDT  Office Visit with ABRAHAM Boone  [facility] FAMILY MEDICINE (--) [address]  [address] [facility] [address] [phone]   Arrive 15 minutes prior to appointment. If you are in need of a language or hearing  please call the Department.                  This document has been electronically signed by Luis Maher MD on February 19, 2020 8:37 AM

## 2020-02-19 NOTE — PROGRESS NOTES
[facility] Medicine Services  INPATIENT PROGRESS NOTE    Length of Stay: 9  Date of Admission: 2/10/2020  Primary Care Physician: Josefa Pozo APRN    Subjective   Chief Complaint: none  HPI:  Pt seen and examined. No acute events overnight. ptadmitted for elective colon resection.pt seems to be back to her baseline.  Review of Systems     All pertinent negatives and positives are as above. All other systems have been reviewed and are negative unless otherwise stated.     Objective    Temp:  [97.1 °F (36.2 °C)-98.7 °F (37.1 °C)] 97.8 °F (36.6 °C)  Heart Rate:  [65-77] 77  Resp:  [16-21] 20  BP: (124-162)/(61-84) 162/68    Physical Exam  Constitutional:  No distress.   Obese    HENT:   Head: Normocephalic and atraumatic.   Eyes: Pupils are equal, round, and reactive to light. EOM are normal. No scleral icterus.   Neck: Normal range of motion. Neck supple.   Cardiovascular: Normal rate and regular rhythm.   Pulmonary/Chest: Effort normal and breath sounds normal. No stridor. No respiratory distress.   Abdominal: Soft. Bowel sounds are normal. She exhibits no distension. There is no tenderness. There is no guarding.   Musculoskeletal: Normal range of motion. She exhibits no edema or tenderness.   Neurological: No cranial nerve deficit.   Confused    Skin: Skin is warm and dry. No rash noted. There is pallor.   Extensive abdominal hematoma   Vitals reviewed.      Results Review:  I have reviewed the labs, radiology results, and diagnostic studies.    Laboratory Data:   Results from last 7 days   Lab Units 02/19/20  0527 02/18/20  0920 02/17/20  0553 02/16/20  0600   SODIUM mmol/L 137 135* 136 131*   POTASSIUM mmol/L 4.5 4.3 4.0 4.2   CHLORIDE mmol/L 96* 96* 96* 94*   CO2 mmol/L 27.0 26.0 28.0 24.0   BUN mg/dL 100* 91* 84* 84*   CREATININE mg/dL 2.61* 2.28* 2.45* 2.90*   GLUCOSE mg/dL 134* 138* 128* 117*   CALCIUM mg/dL 9.6 9.7 9.3 8.9   BILIRUBIN mg/dL  --   --   --  1.5*      ALK PHOS U/L  --   --   --  58   ALT (SGPT) U/L  --   --   --  227*   AST (SGOT) U/L  --   --   --  161*   ANION GAP mmol/L 14.0 13.0 12.0 13.0     Estimated Creatinine Clearance: 22.9 mL/min (A) (by C-G formula based on SCr of 2.61 mg/dL (H)).  Results from last 7 days   Lab Units 02/15/20  0714 02/14/20  1610 02/13/20  0648   MAGNESIUM mg/dL 2.2 2.3 2.2   PHOSPHORUS mg/dL 4.5 4.8* 5.3*         Results from last 7 days   Lab Units 02/17/20  0553 02/15/20  0714 02/14/20  1610 02/13/20  0648   WBC 10*3/mm3 11.57* 13.10* 12.64* 12.66*   HEMOGLOBIN g/dL 9.8* 10.4* 9.8* 10.2*   HEMATOCRIT % 29.4* 30.9* 29.3* 30.7*   PLATELETS 10*3/mm3 164 137* 133* 101*     Results from last 7 days   Lab Units 02/19/20  0527 02/18/20  0804 02/17/20  0553 02/16/20  0600 02/15/20  1048   INR  2.22* 2.10* 2.01* 1.60* 1.50*       Culture Data:   No results found for: BLOODCX  No results found for: URINECX  No results found for: RESPCX  No results found for: WOUNDCX  No results found for: STOOLCX  No components found for: BODYFLD    Radiology Data:   Imaging Results (Last 24 Hours)     ** No results found for the last 24 hours. **          I have reviewed the patient's current medications.     Assessment/Plan     Active Hospital Problems    Diagnosis   • **Confusion   • Hyponatremia   • Long term current use of anticoagulant   • Pulmonary hypertension (CMS/HCC)   • Chronic anemia   • Morbid obesity (CMS/HCC)   • Stage 4 chronic kidney disease (CMS/HCC)   • Chronic hypoxemic respiratory failure (CMS/HCC)   • Diabetes mellitus (CMS/HCC)   • COPD (chronic obstructive pulmonary disease) (CMS/HCC)   • Diastolic heart failure (CMS/HCC)   • Atrial fibrillation [I48.91]       Plan:      PT/OT as tolerated, continue with the current management. Her creatinine is trended up slightly to 2.65. Repeat BMP / CBC in 3 days

## 2020-02-20 NOTE — PROGRESS NOTES
"[facility]  [address] [place] [address]. [address]  T - [phone]  F - [phone]     Progress Note          PATIENT  DEMOGRAPHICS   PATIENT NAME: Brendon Skelton                      PHYSICIAN: Sandy Caputo MD  : 1945  MRN: [id number]   LOS: 9 days    Patient Care Team:  Josefa Pozo APRN as PCP - General (Nurse Practitioner)  Meme Duckworth APRN as PCP - Claims Attributed  Luis Maher MD as Surgeon (General Surgery)  Veronica Ruiz, RN as Ambulatory  ([facility])  Subjective   SUBJECTIVE   Worried about a new med which made her confused, feels well          Objective   OBJECTIVE   Vital Signs reviewed   afeb, HR 74 RR 18 /80    Flowsheet Rows      First Filed Value   Admission Height  165.1 cm (65\") Documented at 02/10/2020 1036   Admission Weight  104 kg (229 lb 15 oz) Documented at 02/10/2020 1036           No intake/output data recorded.    PHYSICAL EXAM    Physical Exam   Constitutional: She is oriented to person, place, and time. She appears well-developed. She appears lethargic.   HENT:   Head: Normocephalic.   Eyes: Pupils are equal, round, and reactive to light.   Cardiovascular: Normal rate, regular rhythm and normal heart sounds.   Pulmonary/Chest: Effort normal and breath sounds normal.   Abdominal: Soft. Bowel sounds are normal.   Neurological: She is oriented to person, place, and time. She appears lethargic.       RESULTS   Results Review:    Results from last 7 days   Lab Units 20  0527 20  0920 20  0553 20  0600   SODIUM mmol/L 137 135* 136 131*   POTASSIUM mmol/L 4.5 4.3 4.0 4.2   CHLORIDE mmol/L 96* 96* 96* 94*   CO2 mmol/L 27.0 26.0 28.0 24.0   BUN mg/dL 100* 91* 84* 84*   CREATININE mg/dL 2.61* 2.28* 2.45* 2.90*   CALCIUM mg/dL 9.6 9.7 9.3 8.9   BILIRUBIN mg/dL  --   --   --  1.5*   ALK PHOS U/L  --   --   --  58   ALT (SGPT) U/L  --   --   --  227*   AST (SGOT) U/L  --   --   --  161* "   GLUCOSE mg/dL 134* 138* 128* 117*       Estimated Creatinine Clearance: 22.9 mL/min (A) (by C-G formula based on SCr of 2.61 mg/dL (H)).    Results from last 7 days   Lab Units 02/15/20  0714 02/14/20  1610   MAGNESIUM mg/dL 2.2 2.3   PHOSPHORUS mg/dL 4.5 4.8*             Results from last 7 days   Lab Units 02/17/20  0553 02/15/20  0714 02/14/20  1610   WBC 10*3/mm3 11.57* 13.10* 12.64*   HEMOGLOBIN g/dL 9.8* 10.4* 9.8*   PLATELETS 10*3/mm3 164 137* 133*       Results from last 7 days   Lab Units 02/19/20  0527 02/18/20  0804 02/17/20  0553 02/16/20  0600 02/15/20  1048   INR  2.22* 2.10* 2.01* 1.60* 1.50*         Imaging Results (Last 24 Hours)     ** No results found for the last 24 hours. **           MEDICATIONS        No current facility-administered medications for this encounter.     Assessment/Plan   ASSESSMENT / PLAN      Confusion    Atrial fibrillation [I48.91]    Diastolic heart failure (CMS/[facility])    COPD (chronic obstructive pulmonary disease) (CMS/[facility])    Diabetes mellitus (CMS/[facility])    Chronic hypoxemic respiratory failure (CMS/[facility])    Stage 4 chronic kidney disease (CMS/[facility])    Class 2 severe obesity due to excess calories with serious comorbidity and body mass index (BMI) of 39.0 to 39.9 in adult (CMS/[facility])    Chronic anemia    Pulmonary hypertension (CMS/[facility])    Hyponatremia    Long term current use of anticoagulant       1.  CKD4- baseline cr is close to 2. TIARA, ? atn related to hypoperfusion.  Better urine output at this time.  Volume status acceptable for now.  Acidosis improving, cr better and is On NS @ 50 ml/hr. Tolerating PT/OT. Dc del rio     2.  Adenocarcinoma of colon-  S/p laproscopic sigmoid colon resection on 2/10/2020. Tolerating po     3.  HTN- borderline high, on metoprolol and cardizem, metoprolol changed yesterday     4.  Anemia- post-op, s/p 4 u prbc.  Stable now    5. Delerium better now              This document has been electronically signed by Sandy Caputo MD on February 20,  2020 11:18 AM

## 2020-02-20 NOTE — PROGRESS NOTES
Pt called and states she is now at [facility] for rehab. Pt was instructed to call [facility] when she is discharged home as Dr Sanford would follow her INR while she was there; pt verbalized. Findings reported by Dixie Bermeo RN.

## 2020-02-24 PROBLEM — N28.9 RENAL INSUFFICIENCY: Status: ACTIVE | Noted: 2020-01-01

## 2020-02-24 PROBLEM — S30.1XXA HEMATOMA OF ABDOMINAL WALL: Status: ACTIVE | Noted: 2020-01-01

## 2020-02-24 PROBLEM — R10.30 LOWER ABDOMINAL PAIN: Status: ACTIVE | Noted: 2020-01-01

## 2020-03-01 NOTE — THERAPY TREATMENT NOTE
Acute Care - Physical Therapy Treatment Note  [facility]     Patient Name: Brendon Skelton  : 1945  MRN: [id number]  Today's Date: 3/1/2020             Admit Date: 2020    Visit Dx:    ICD-10-CM ICD-9-CM   1. Renal insufficiency N28.9 593.9   2. Left lower quadrant abdominal pain R10.32 789.04   3. Elevated INR R79.1 790.92   4. Impaired physical mobility Z74.09 781.99     Patient Active Problem List   Diagnosis   • Long term current use of anticoagulant therapy   • Personal history of heart valve replacement   • Atrial fibrillation [I48.91]   • Emphysema of lung (CMS/HCC)   • On anticoagulant therapy   • Hyperlipidemia   • Diastolic heart failure (CMS/HCC)   • Depressive disorder   • COPD (chronic obstructive pulmonary disease) (CMS/HCC)   • Diabetes mellitus (CMS/HCC)   • Myopia   • Astigmatism   • Bleeding from open wound of chest wall   • Follow-up surgery care   • Encounter for screening for malignant neoplasm of colon   • Positive colorectal cancer screening using DNA-based stool test   • Nodule of left lung   • Chronic hypoxemic respiratory failure (CMS/HCC)   • Physical deconditioning   • Heart failure with preserved left ventricular function (HFpEF) (CMS/HCC)   • Hypertension   • Coronary artery disease involving native coronary artery without angina pectoris   • Hx of CABG   • SSS (sick sinus syndrome) (CMS/HCC)   • Pacemaker   • Stage 4 chronic kidney disease (CMS/HCC)   • Personal history of tobacco use, presenting hazards to health   • Gastrointestinal hemorrhage   • Class 2 severe obesity due to excess calories with serious comorbidity and body mass index (BMI) of 39.0 to 39.9 in adult (CMS/HCC)   • Gastritis   • Colon polyp   • Coumadin toxicity   • Acute on chronic diastolic congestive heart failure (CMS/HCC)   • Chronic anemia   • Pulmonary hypertension (CMS/HCC)   • Confusion   • Hyponatremia   • Long term current use of anticoagulant   • Renal insufficiency   • Lower  abdominal pain   • Hematoma of abdominal wall       Therapy Treatment    Rehabilitation Treatment Summary     Row Name 03/01/20 1345             Treatment Time/Intention    Discipline  physical therapy assistant  -LEODAN      Document Type  therapy note (daily note)  -LEODAN      Mode of Treatment  individual therapy;physical therapy  -LEODAN      Therapy Frequency (PT Clinical Impression)  other (see comments) 6 x week  -LEODAN      Patient Effort  fair  -LEODAN      Comment  pt deferred PT this AM. pt agreeable to PM but declines OOB to chair.   -LEODAN      Existing Precautions/Restrictions  fall;oxygen therapy device and L/min  -LEODAN      Recorded by [LEODAN] Gordo Hightower, PTA 03/01/20 1357      Row Name 03/01/20 1345             Vital Signs    Pre Systolic BP Rehab  -- beginning vitals not taken. pt needing to go to BR quickly  -LEODAN      Intra Systolic BP Rehab  168 BP hurts pts arm. pt declines ending BP  -LEODAN      Intra Treatment Diastolic BP  53  -LEODAN      Intratreatment Heart Rate (beats/min)  70  -LEODAN      Posttreatment Heart Rate (beats/min)  77  -JC2      Intra SpO2 (%)  85 quickly improved to 96%  -LEODAN      O2 Delivery Intra Treatment  supplemental O2  -LEODAN      Post SpO2 (%)  96  -JC2      O2 Delivery Post Treatment  supplemental O2  -JC2      Pre Patient Position  Supine  -LEODAN      Post Patient Position  Supine pt defers OOB in recliner  -LEODAN      Recorded by [LEODAN] Gordo Hightower PTA 03/01/20 1411  [JC2] Gordo Hightower, PTA 03/01/20 1415      Row Name 03/01/20 1345             Cognitive Assessment/Intervention- PT/OT    Orientation Status (Cognition)  oriented x 4  -LEODAN      Recorded by [LEODAN] Gordo Hightower PTA 03/01/20 1357      Row Name 03/01/20 1345             Safety Issues, Functional Mobility    Impairments Affecting Function (Mobility)  endurance/activity tolerance;pain;strength  -LEODAN      Recorded by [LEODAN] Gordo Hightower PTA 03/01/20 1357      Row Name 03/01/20 1345             Bed Mobility Assessment/Treatment    Bed  Mobility Assessment/Treatment  supine-sit;sit-supine;scooting/bridging  -LEODAN      Scooting/Bridging [place] (Bed Mobility)  dependent (less than 25% patient effort);2 person assist  -LEODAN      Supine-Sit [place] (Bed Mobility)  minimum assist (75% patient effort)  -JC2      Sit-Supine [place] (Bed Mobility)  minimum assist (75% patient effort)  -JC2      Assistive Device (Bed Mobility)  bed rails;head of bed elevated  -JC2      Recorded by [LEODAN] Gordo Hightower, PTA 03/01/20 1415  [JC2] Gordo Hightower, PTA 03/01/20 1411      Row Name 03/01/20 1345             Transfer Assessment/Treatment    Transfer Assessment/Treatment  sit-stand transfer;stand-sit transfer;toilet transfer  -LEODAN      Recorded by [LEODAN] Gordo Hightower, PTA 03/01/20 1411      Row Name 03/01/20 1345             Bed-Chair Transfer    Bed-Chair [place] (Transfers)  --  -LEODAN      Assistive Device (Bed-Chair Transfers)  --  -LEODAN      Recorded by [LEODAN] Gordo Hightower, PTA 03/01/20 1411      Row Name 03/01/20 1345             Sit-Stand Transfer    Sit-Stand [place] (Transfers)  contact guard;verbal cues  -LEODAN      Assistive Device (Sit-Stand Transfers)  walker, front-wheeled  -LEODAN      Recorded by [LEODAN] Gordo Hightower PTA 03/01/20 1357      Row Name 03/01/20 1345             Stand-Sit Transfer    Stand-Sit [place] (Transfers)  contact guard;verbal cues  -LEODAN      Assistive Device (Stand-Sit Transfers)  walker, front-wheeled  -LEODAN      Recorded by [LEODAN] Gordo Hightower PTA 03/01/20 1411      Row Name 03/01/20 1345             Toilet Transfer    Type (Toilet Transfer)  sit-stand;stand-sit  -LEODAN      [place] Level (Toilet Transfer)  contact guard;verbal cues  -LEODAN      Assistive Device (Toilet Transfer)  walker, front-wheeled  -LEODAN      Recorded by [LEODAN] Gordo Hightower, PTA 03/01/20 1411      Row Name 03/01/20 1345             Gait/Stairs Assessment/Training    Gait/Stairs Assessment/Training  gait/ambulation  independence;gait/ambulation assistive device;distance ambulated  -LEODAN      [place] Level (Gait)  contact guard  -LEODAN      Assistive Device (Gait)  walker, front-wheeled  -LEODAN      Distance in Feet (Gait)  3, 16, 20  -JC2      Pattern (Gait)  3-point  -JC2      Deviations/Abnormal Patterns (Gait)  antalgic;base of support, wide;gait speed decreased;stride length decreased;marquita decreased  -JC2      Comment (Gait/Stairs)  pt SOA w/ gait  -JC2      Recorded by [LEODAN] Gordo Hightower, PTA 03/01/20 1357  [JC2] Gordo Hightower, PTA 03/01/20 1411      Row Name 03/01/20 1345             Pain Scale: Numbers Pre/Post-Treatment    Pain Scale: Numbers, Pretreatment  3/10  -LEODAN      Pain Scale: Numbers, Post-Treatment  4/10  -JC2      Pain Location  abdomen  -LEODAN      Pain Intervention(s)  Repositioned;Medication (See MAR)  -LEODAN      Recorded by [LEODAN] Gordo Hightower, PTA 03/01/20 1357  [JC2] Gordo Hightower, PTA 03/01/20 1411      Row Name 03/01/20 1345             Sensory Assessment/Intervention    Sensory General Assessment  --  -LEODAN      Recorded by [LEODAN] Gordo Hightower, PTA 03/01/20 1411      Row Name 03/01/20 1345             Outcome Summary/Treatment Plan (PT)    Daily Summary of Progress (PT)  progress toward functional goals as expected  -LEODAN      Plan for Continued Treatment (PT)  continue  -LEODAN      Anticipated Equipment Needs at Discharge (PT)  -- none back to facility  -LEODAN      Anticipated Discharge Disposition (PT)  skilled nursing facility  -LEODAN      Recorded by [LEODAN] Gordo Hightower, PTA 03/01/20 1357        User Key  (r) = Recorded By, (t) = Taken By, (c) = Cosigned By    Initials Name Effective Dates Discipline    LEODAN Gordo Hightower, PTA 03/07/18 -  PT               Rehab Goal Summary     Row Name 03/01/20 1343             Bed Mobility Goal 1 (PT)    Activity/Assistive Device (Bed Mobility Goal 1, PT)  sit to supine;supine to sit  -LEODAN      [place] Level/Cues Needed (Bed Mobility Goal 1, PT)  supervision  required  -LEODAN      Time Frame (Bed Mobility Goal 1, PT)  by discharge  -LEODAN      Progress/Outcomes (Bed Mobility Goal 1, PT)  goal not met  -LEODAN         Transfer Goal 1 (PT)    Activity/Assistive Device (Transfer Goal 1, PT)  sit-to-stand/stand-to-sit;bed-to-chair/chair-to-bed;walker, rolling  -LEODAN      [place] Level/Cues Needed (Transfer Goal 1, PT)  contact guard assist  -LEODAN      Time Frame (Transfer Goal 1, PT)  by discharge  -LEODAN      Progress/Outcome (Transfer Goal 1, PT)  goal not met  -LEODAN         Gait Training Goal 1 (PT)    Activity/Assistive Device (Gait Training Goal 1, PT)  gait (walking locomotion);assistive device use;decrease fall risk;increase endurance/gait distance;turning, left;turning, right;walker, rolling  -LEODAN      [place] Level (Gait Training Goal 1, PT)  contact guard assist  -LEODAN      Distance (Gait Goal 1, PT)  50 feet  -LEODAN      Time Frame (Gait Training Goal 1, PT)  by discharge  -LEODAN      Progress/Outcome (Gait Training Goal 1, PT)  goal not met  -LEODAN        User Key  (r) = Recorded By, (t) = Taken By, (c) = Cosigned By    Initials Name Provider Type Discipline    Gordo Clay, PTA Physical Therapy Assistant PT              PT Recommendation and Plan  Anticipated Discharge Disposition (PT): skilled nursing facility  Therapy Frequency (PT Clinical Impression): other (see comments)(6 x week)  Outcome Summary/Treatment Plan (PT)  Daily Summary of Progress (PT): progress toward functional goals as expected  Plan for Continued Treatment (PT): continue  Anticipated Equipment Needs at Discharge (PT): (none back to facility)  Anticipated Discharge Disposition (PT): skilled nursing facility  Plan of Care Reviewed With: patient  Progress: improving  Outcome Summary: pt deferred tx this AM but agreeable to PM session. pt w/ multiple short gait trips up to 20 ft CGA w/ RW. pt requires min A for sup-sit-sup and CGA for t/fs. no new goals met at this time. pt would continue to benefit from PT  services.  Outcome Measures     Row Name 03/01/20 1400             How much help from another person do you currently need...    Turning from your back to your side while in flat bed without using bedrails?  3  -LEODAN      Moving from lying on back to sitting on the side of a flat bed without bedrails?  3  -LEODAN      Moving to and from a bed to a chair (including a wheelchair)?  3  -LEODAN      Standing up from a chair using your arms (e.g., wheelchair, bedside chair)?  3  -LEODAN      Climbing 3-5 steps with a railing?  2  -LEODAN      To walk in hospital room?  3  -LEODAN      AM-PAC 6 Clicks Score (PT)  17  -LEODAN         Functional Assessment    Outcome Measure Options  AM-PAC 6 Clicks Basic Mobility (PT)  -LEODAN        User Key  (r) = Recorded By, (t) = Taken By, (c) = Cosigned By    Initials Name Provider Type    Gordo Clay PTA Physical Therapy Assistant         Time Calculation:   PT Charges     Row Name 03/01/20 1415             Time Calculation    Start Time  1345  -LEODAN      Stop Time  1415  -LEODAN      Time Calculation (min)  30 min  -LEODAN         Time Calculation- PT    Total Timed Code Minutes- PT  30 minute(s)  -LEODAN        User Key  (r) = Recorded By, (t) = Taken By, (c) = Cosigned By    Initials Name Provider Type    Gordo Clay PTA Physical Therapy Assistant        Therapy Charges for Today     Code Description Service Date Service Provider Modifiers Qty    26201796700 HC GAIT TRAINING EA 15 MIN 3/1/2020 Gordo Hightower PTA GP 1    50547107449 HC PT THERAPEUTIC ACT EA 15 MIN 3/1/2020 Gordo Hightower PTA GP 1          PT G-Codes  Outcome Measure Options: AM-PAC 6 Clicks Basic Mobility (PT)  AM-PAC 6 Clicks Score (PT): 17    Gordo Hightower PTA  3/1/2020

## 2020-03-01 NOTE — PROGRESS NOTES
"Anticoagulation by Pharmacy - Warfarin    Brendon Skelton is a 74 y.o.female being continued on warfarin for atrial fibrillation with valve replacement    Home regimen: 5 mg Su/Tu/Th, 2.5 mg all other days  INR Goal: 2.5-3.5  Last INR:   Lab Results   Component Value Date    INR 2.22 (H) 03/01/2020       Objective:  [Ht: (P) 165.1 cm (65\"); Wt: 116 kg (255 lb 8 oz)]  Lab Results   Component Value Date    INR 2.22 (H) 03/01/2020    INR 2.17 (H) 02/29/2020    INR 2.48 (H) 02/28/2020    PROTIME 24.4 (H) 03/01/2020    PROTIME 23.9 (H) 02/29/2020    PROTIME 26.6 (H) 02/28/2020     Lab Results   Component Value Date    HGB 8.4 (L) 03/01/2020    HGB 8.5 (L) 02/29/2020    HGB 8.7 (L) 02/28/2020    HCT 25.7 (L) 03/01/2020    HCT 25.0 (L) 02/29/2020    HCT 26.2 (L) 02/28/2020     03/01/2020     02/29/2020     02/28/2020     Recent Warfarin Administrations     The 5 most recent administrations since 02/23/2020 are shown below each listed medication.    Warfarin Sodium       Order Dose Date Given     warfarin (COUMADIN) tablet 5 mg 5 mg 02/29/2020                  Assessment  Interacting medications:celexa  INR is subtherapeutic, up from yesterday.    H/H is below normal limits but stable.    Will start patient at 5 mg dose.  Patient is subtherapeutic and trending up  Per coumadin clinic patient had goal of 2.5 - 3.5.  Valve replacement listed as reason per coumadin clinic    Plan:  1.  Give warfarin 5 mg tablet PO @ 1800 tonight  2.  Draw a PT/INR in AM  3.  Pharmacy will continue to follow    Gilberto Moscoso, [facility]  03/01/20 9:45 AM    "

## 2020-03-01 NOTE — PLAN OF CARE
Problem: Patient Care Overview  Goal: Plan of Care Review  Outcome: Ongoing (interventions implemented as appropriate)  Flowsheets (Taken 3/1/2020 1412)  Progress: improving  Plan of Care Reviewed With: patient  Outcome Summary: pt deferred tx this AM but agreeable to PM session. pt w/ multiple short gait trips up to 20 ft CGA w/ RW. pt requires min A for sup-sit-sup and CGA for t/fs. no new goals met at this time. pt would continue to benefit from PT services.

## 2020-03-01 NOTE — NURSING NOTE
"CNA came to me stating that the pt was upset that I had used paper tape on her arm for a small dressing I applied for a small skin tear.  I used IV adhesive tape.  The pt refused her finger stick blood sugar while the CNA was in the room with the pt.  I went to the pt to see if there was something I could do to help her.  She stated \"there is nothing you can do now, I already took the tape off\".    "

## 2020-03-01 NOTE — PROGRESS NOTES
[facility] HOSPITALIST PROGRESS NOTE     Patient Identification:  Name:  Brendon Skelton  Age:  74 y.o.  Sex:  female  :  1945  MRN:  [id number]  Visit Number:  [id number]  Primary Care Provider:  Josefa Pozo APRN    Length of stay:  6    Subjective:    Chart reviewed and patient seen and examined   Briefly this is a 74-year-old female who presented the ED on  carrying the following problem list    1.  Class III obesity and obesity related diabetes mellitus with metabolic syndrome   2. CKD stage IVb with microalbuminuria presumed secondary to diabetic nephropathy  3.  Severe pulmonary hypertension with RSVP greater than 55  4.  COPD     who came to the ED complaining of lower abdominal pain. She underwent laparoscopic colectomy for colon cancer recently. CT abdomen at the ED revealed some collection in the anterior abdominal wall consistent with a hematoma versus abscess. Surgery was consulted. At the ED she also complained of SOB, her O2 requirement went from baseline 3 to 4 liters. CXR revealed Cardiomegaly with vascular /interstitial congestion and small pleural effusions. Appearance is similar to 2/15/2020, lasix IV times 1 was given.   Nephrology was consulted since patient has CKD. Currently her blood pressure is positive for E. coli and gram-positive cocci.  She i was initially started on Rocephin and vancomycin.  Vancomycin has been discontinued.  E. coli sensitive to Rocephin.  Patient creatinine and BUN continue to be trending up.  On 2020 she was started on hemodialysis via a right femoral tunneled dialysis catheter.  She was seen and evaluated this morning continues to complain of spasm when she wants to pass urine.  Her abdominal pain is still persistent.  Patient was seen on rounds today 3/1 is complaining of bladder spasms.  She had a sound which revealed minimal urine and likely bladder is having to get adjusted to absence of urine.  We will see if we  can give her as needed meds.  In addition she is not interacting with physical therapy actually refusing.  Her oxygen requirements are higher than at baseline 4 L instead of 2    ----------------------------------------------------------------------------------------------------------------------  Current Hospital Meds:    aspirin 81 mg Oral Daily   cefTRIAXone 2 g Intravenous Q24H   cholecalciferol 2,000 Units Oral Daily   citalopram 20 mg Oral Daily   dilTIAZem  mg Oral Daily   ferrous sulfate 324 mg Oral Daily With Breakfast   insulin aspart 0-9 Units Subcutaneous 4x Daily AC & at Bedtime   insulin detemir 25 Units Subcutaneous Nightly   ipratropium-albuterol 3 mL Nebulization Q8H - RT   metoprolol succinate XL 25 mg Oral Daily   phenazopyridine 100 mg Oral TID With Meals   prenatal vitamin 27-0.8 1 tablet Oral Daily   rOPINIRole 0.25 mg Oral Nightly   sodium chloride 10 mL Intravenous Q12H   vitamin C with bob hips 250 mg Oral Daily   warfarin 5 mg Oral Daily       Pharmacy to dose warfarin      ----------------------------------------------------------------------------------------------------------------------  Vital Signs:  Temp:  [96.7 °F (35.9 °C)-97.8 °F (36.6 °C)] 97.4 °F (36.3 °C)  Heart Rate:  [0-74] 63  Resp:  [18-24] 18  BP: (133-168)/(65-80) 165/65      02/28/20  0644 02/29/20  0705 03/01/20  0500   Weight: 126 kg (277 lb 4.8 oz) 118 kg (259 lb 6.4 oz) 116 kg (255 lb 8 oz)     Body mass index is 42.52 kg/m² (pended).    Intake/Output Summary (Last 24 hours) at 3/1/2020 1107  Last data filed at 3/1/2020 0916  Gross per 24 hour   Intake 720 ml   Output 4010 ml   Net -3290 ml     Diet Regular; Cardiac, Consistent Carbohydrate  ----------------------------------------------------------------------------------------------------------------------  Physical exam:  :Constitutional:  Obese, well-developed and well-nourished.  No respiratory distress.      HENT:  Head:  Normocephalic and atraumatic.   Mouth:  Moist mucous membranes.    Eyes:  Conjunctivae and EOM are normal.  Pupils are equal, round, and reactive to light.  No scleral icterus.    Neck:  Neck supple.  No JVD present.    Cardiovascular:  Normal rate, regular rhythm and normal heart sounds with no murmur.  Aortic valve click appreciated  Pulmonary/Chest:  No respiratory distress, no wheezes, no crackles, with normal breath sounds and good air movement.  Abdominal: Obese, mild tenderness in the lower abdominal quadrant but no guarding.  Bowel sounds are normal..   Musculoskeletal: 1+ bilateral lower extremity edema, no tenderness, and no deformity.  No red or swollen joints anywhere.    Neurological:  Alert and oriented to person, place, and time.  No cranial nerve deficit.  No tongue deviation.  No facial droop.  No slurred speech.   Skin:  Skin is warm and dry.  Ecchymosis on the lower quadrants of the abdomen.   Peripheral vascular:  Strong pulses in all 4 extremities with no clubbing, no cyanosis, no edema.  Genitourinary: Dialysis catheter right groin  ----------------------------------------------------------------------------------------------------------------------  Tele:    ----------------------------------------------------------------------------------------------------------------------  Results from last 7 days   Lab Units 02/24/20  1424   TROPONIN T ng/mL 0.036*     Results from last 7 days   Lab Units 03/01/20  0529 02/29/20  0617 02/28/20  0534 02/27/20  0602 02/26/20  0611 02/25/20  0736  02/24/20  1424   LACTATE mmol/L  --   --   --   --   --   --   --  0.9   WBC 10*3/mm3 11.34* 11.61* 12.33* 13.20* 14.04*  --    < > 16.21*   HEMOGLOBIN g/dL 8.4* 8.5* 8.7* 9.4* 9.3*  --    < > 9.9*   HEMATOCRIT % 25.7* 25.0* 26.2* 29.2* 29.2*  --    < > 29.9*   MCV fL 94.1 91.6 93.9 97.7* 98.3*  --    < > 97.7*   MCHC g/dL 32.7 34.0 33.2 32.2 31.8  --    < > 33.1   PLATELETS 10*3/mm3 278 252 265 239 212  --    < > 200   INR  2.22* 2.17* 2.48*  3.87* 3.89* 4.19*  --  3.06*    < > = values in this interval not displayed.         Results from last 7 days   Lab Units 03/01/20  0529 02/29/20  0617 02/28/20  0534  02/24/20  1424   SODIUM mmol/L 133* 131* 131*   < > 139   POTASSIUM mmol/L 4.0 4.1 4.3   < > 3.7   CHLORIDE mmol/L 92* 90* 87*   < > 94*   CO2 mmol/L 24.0 23.0 24.0   < > 31.0*   BUN mg/dL 64* 88* 126*   < > 104*   CREATININE mg/dL 4.21* 4.35* 4.57*   < > 2.03*   EGFR IF NONAFRICN AM mL/min/1.73 10* 10* 9*   < > 24*   CALCIUM mg/dL 9.2 9.2 9.1   < > 9.4   GLUCOSE mg/dL 112* 111* 115*   < > 89   ALBUMIN g/dL  --   --   --   --  3.60   BILIRUBIN mg/dL  --   --   --   --  1.7*   ALK PHOS U/L  --   --   --   --  60   AST (SGOT) U/L  --   --   --   --  34*   ALT (SGPT) U/L  --   --   --   --  53*    < > = values in this interval not displayed.   Estimated Creatinine Clearance: 14.9 mL/min (A) (by C-G formula based on SCr of 4.21 mg/dL (H)).    No results found for: AMMONIA      Blood Culture   Date Value Ref Range Status   02/27/2020 No growth at 24 hours  Preliminary   02/27/2020 No growth at 24 hours  Preliminary   02/24/2020 No growth at 4 days  Preliminary   02/24/2020 Escherichia coli (C)  Final   02/24/2020 Gram Positive Cocci (C)  Final     Comment:     Not viable for growth.     Urine Culture   Date Value Ref Range Status   02/24/2020 No growth  Final     No results found for: WOUNDCX  No results found for: STOOLCX    I have personally looked at the labs and they are summarized above.  ----------------------------------------------------------------------------------------------------------------------  Imaging Results (Last 24 Hours)     ** No results found for the last 24 hours. **        ----------------------------------------------------------------------------------------------------------------------  Assessment :  1.  E. coli bacteremia  -Gram-positive cocci bacteremia-possible contaminant.  -Patient afebrile and white count improving    2.   Abdominal pain secondary to hematoma  -Stable    3.  Acute kidney injury superimposed on CKD.  Creatinine trending up to 4.03 from 3.09  -Post RRT x2, volume status still volume overload.  Electrolytes okay.  Acid-base status okay.  Metabolic bone status follow-up phosphorus in the a.m. prior to dialysis and hematocrit stable, posttransfusion.    4.  Acute on chronic systolic/ diastolic congestive heart failure  -Secondary to volume overload with #3    5.  Diabetes mellitus  -Continue sliding scale insulin    6.  Colon cancer status post recent colon resection    7.  History of aortic valve replacement on Coumadin     8.  COPD   -Patient on 4 L instead of her baseline 2 L continue ultrafiltration challenge as per nephrology at dialysis tomorrow    9.  Bladder spasms  Continues on p.o. Pyridium     Plan:  Continue patient on oxycodone and morphine PRN for pain  Continue on Rocephin.  P.o. Pyridium  Continue insulin sliding scale/Levemir although patient is refusing  Follow-up with repeat blood culture result  Fluid and TIARA management per recommendation of nephrology.  Patient was started on hemodialysis on 02/28/2020 with UF challenge on 2/28 and 2/29    Appreciate nephrology input  Appreciate general surgery recommendation    Pharmacy to monitor and dose Coumadin  We will place patient on bowel regimen  PT evaluation and treatment     Prophylaxis:  DVT-patient is already on Coumadin.  INR therapeutic     Disposition:  Discharge planning- in progress.  Plan of care was discussed with the patient and charge nurse on rounds    Abdoulaye Barlow MD  03/01/20  11:07 AM     Dragon disclaimer:  Much of this encounter note is an electronic transcription/translation of spoken language to printed text. The electronic translation of spoken language may permit erroneous, or at times, nonsensical words or phrases to be inadvertently transcribed; Although I have reviewed the note for such errors, some may still exist.

## 2020-03-01 NOTE — PROGRESS NOTES
"[facility]  [address] [place] [address]. [address]  T - [phone]  F - [phone]     Progress Note          PATIENT  DEMOGRAPHICS   PATIENT NAME: Brendon Skelton                      PHYSICIAN: Sandy Caputo MD  : 1945  MRN: [id number]   LOS: 6 days    Patient Care Team:  Josefa Pozo APRN as PCP - General (Nurse Practitioner)  Meme Duckworth APRN as PCP - Claims Attributed  Luis Maher MD as Surgeon (General Surgery)  Veronica Ruiz, MAI as Ambulatory  (Formerly [facility])  Subjective   SUBJECTIVE   Pain some better, had hd yesterday         Objective   OBJECTIVE   Vital Signs  Temp:  [96.7 °F (35.9 °C)-97.8 °F (36.6 °C)] 97.4 °F (36.3 °C)  Heart Rate:  [0-74] 63  Resp:  [18-24] 18  BP: (133-168)/(65-80) 165/65    Flowsheet Rows      First Filed Value   Admission Height  165.1 cm (65\") Documented at 2020 1322   Admission Weight  111 kg (245 lb) Documented at 2020 1322           I/O last 3 completed shifts:  In: 870 [P.O.:870]  Out: 4025 [Urine:25; Other:4000]    PHYSICAL EXAM    Physical Exam   Constitutional: She is oriented to person, place, and time. She appears well-developed.   HENT:   Head: Normocephalic.   Eyes: Pupils are equal, round, and reactive to light.   Cardiovascular: Normal rate, regular rhythm and normal heart sounds.   Pulmonary/Chest: Effort normal and breath sounds normal.   Abdominal: Soft. Bowel sounds are normal. There is tenderness.   Large lower abdominal bruise   Musculoskeletal: She exhibits edema.   Neurological: She is alert and oriented to person, place, and time.       RESULTS   Results Review:    Results from last 7 days   Lab Units 20  0529 20  0617 20  0534  20  1424   SODIUM mmol/L 133* 131* 131*   < > 139   POTASSIUM mmol/L 4.0 4.1 4.3   < > 3.7   CHLORIDE mmol/L 92* 90* 87*   < > 94*   CO2 mmol/L 24.0 23.0 24.0   < > 31.0*   BUN mg/dL 64* 88* 126*   < > 104*   CREATININE mg/dL " 4.21* 4.35* 4.57*   < > 2.03*   CALCIUM mg/dL 9.2 9.2 9.1   < > 9.4   BILIRUBIN mg/dL  --   --   --   --  1.7*   ALK PHOS U/L  --   --   --   --  60   ALT (SGPT) U/L  --   --   --   --  53*   AST (SGOT) U/L  --   --   --   --  34*   GLUCOSE mg/dL 112* 111* 115*   < > 89    < > = values in this interval not displayed.       Estimated Creatinine Clearance: 14.9 mL/min (A) (by C-G formula based on SCr of 4.21 mg/dL (H)).    Results from last 7 days   Lab Units 02/28/20  0534   PHOSPHORUS mg/dL 6.8*             Results from last 7 days   Lab Units 03/01/20 0529 02/29/20  0617 02/28/20  0534 02/27/20  0602 02/26/20  0611   WBC 10*3/mm3 11.34* 11.61* 12.33* 13.20* 14.04*   HEMOGLOBIN g/dL 8.4* 8.5* 8.7* 9.4* 9.3*   PLATELETS 10*3/mm3 278 252 265 239 212       Results from last 7 days   Lab Units 03/01/20 0529 02/29/20  0617 02/28/20  0534 02/27/20  0602 02/26/20  0611   INR  2.22* 2.17* 2.48* 3.87* 3.89*         Imaging Results (Last 24 Hours)     ** No results found for the last 24 hours. **           MEDICATIONS      aspirin 81 mg Oral Daily   cefTRIAXone 2 g Intravenous Q24H   cholecalciferol 2,000 Units Oral Daily   citalopram 20 mg Oral Daily   dilTIAZem  mg Oral Daily   ferrous sulfate 324 mg Oral Daily With Breakfast   insulin aspart 0-9 Units Subcutaneous 4x Daily AC & at Bedtime   insulin detemir 25 Units Subcutaneous Nightly   ipratropium-albuterol 3 mL Nebulization Q8H - RT   metoprolol succinate XL 25 mg Oral Daily   phenazopyridine 100 mg Oral TID With Meals   prenatal vitamin 27-0.8 1 tablet Oral Daily   rOPINIRole 0.25 mg Oral Nightly   sodium chloride 10 mL Intravenous Q12H   vitamin C with bob hips 250 mg Oral Daily   warfarin 5 mg Oral Daily       Pharmacy to dose warfarin        Assessment/Plan   ASSESSMENT / PLAN      Lower abdominal pain    Acute on chronic diastolic congestive heart failure (CMS/HCC)    Renal insufficiency    Hematoma of abdominal wall    1.CKD 4 baseline creatinine is 2.   Renal function was stable on arrival.  She does have elevated proBNP and shortness of air.  Patient received Lasix in the ER and was on Bumex and metolazone.  Her creatinine is up to 4.57, elevated bun - initiated hd on 2/28/2020. Hd again tomorrow    Dnoald negative. Off vancomycin. Xray improved congestion    2.intra-abdominal and abdominal wall hematoma after the surgery.  Patient Coumadin is on hold.  No evidence of anastomotic leak. No surgical intervention is planned     3.anemia with recent blood loss while on lovenox received 4 units of packed RBC currently stable check fe. Will give epogen tomorrow     4. soa / hcap E.coli in blood . Also GPC in gram stain. On ceftriaxone              This document has been electronically signed by Sandy Caputo MD on March 1, 2020 9:39 AM

## 2020-03-01 NOTE — PLAN OF CARE
Problem: Patient Care Overview  Goal: Plan of Care Review  Outcome: Ongoing (interventions implemented as appropriate)  Flowsheets  Taken 3/1/2020 1412 by Gordo Hightower PTA  Plan of Care Reviewed With: patient  Taken 3/1/2020 1428 by Arlen Romero RN  Outcome Summary: VSS, pt refused morning therapy, but agreed to afternoon therapy, urgency for bathroom is getting better, will continue to monitor

## 2020-03-02 NOTE — DISCHARGE PLACEMENT REQUEST
Current Facility-Administered Medications   Medication Dose Route Frequency Provider Last Rate Last Dose   • acetaminophen (TYLENOL) tablet 650 mg  650 mg Oral Q4H PRN Jake Linares MD       • aspirin chewable tablet 81 mg  81 mg Oral Daily Jake Linares MD   81 mg at 03/01/20 0920   • cefTRIAXone (ROCEPHIN) 2 g/100 mL 0.9% NS VTB (PATRICIA)  2 g Intravenous Q24H Markel Fountain MD   2 g at 03/01/20 2049   • cholecalciferol (VITAMIN D3) tablet 2,000 Units  2,000 Units Oral Daily Jake Linares MD   2,000 Units at 03/01/20 0920   • citalopram (CeleXA) tablet 20 mg  20 mg Oral Daily Jake Linares MD   20 mg at 03/01/20 0921   • dextrose (D50W) 25 g/ 50mL Intravenous Solution 25 g  25 g Intravenous Q15 Min PRN Jake Linares MD       • dextrose (GLUTOSE) oral gel 15 g  15 g Oral Q15 Min PRN Jake Linares MD       • dilTIAZem CD (CARDIZEM CD) 24 hr capsule 240 mg  240 mg Oral Daily Jake Linares MD   240 mg at 03/01/20 0921   • epoetin rosales (EPOGEN,PROCRIT) injection 10,000 Units  10,000 Units Intravenous Once per day on Mon Wed Fri Sandy Caputo MD   10,000 Units at 03/02/20 1119   • ferric gluconate (FERRLECIT) 125 mg in sodium chloride 0.9 % 110 mL IVPB  125 mg Intravenous Once Sandy Caputo MD       • ferrous sulfate EC tablet 324 mg  324 mg Oral Daily With Breakfast Jake Linares MD   324 mg at 03/01/20 0921   • glucagon (human recombinant) (GLUCAGEN DIAGNOSTIC) injection 1 mg  1 mg Subcutaneous Q15 Min PRN Jake Linares MD       • insulin aspart (novoLOG) injection 0-9 Units  0-9 Units Subcutaneous 4x Daily AC & at Bedtime Jake Linares MD   4 Units at 02/26/20 2058   • insulin detemir (LEVEMIR) injection 25 Units  25 Units Subcutaneous Nightly Jake Linares MD   25 Units at 02/26/20 2059   • ipratropium-albuterol (DUO-NEB) nebulizer solution 3 mL  3 mL Nebulization Q8H - RT Jake Linares MD   3 mL at 03/02/20 0657   •  magic butt ointment   Topical BID PRN Apolinar Azevedo MD       • melatonin tablet 3 mg  3 mg Oral Nightly PRN Amy Kamara MD   3 mg at 03/01/20 2049   • metoprolol succinate XL (TOPROL-XL) 24 hr tablet 25 mg  25 mg Oral Daily Jake Linares MD   25 mg at 03/01/20 0936   • morphine injection 2 mg  2 mg Intravenous Q4H PRN Jake Linares MD   2 mg at 02/28/20 1003    And   • naloxone (NARCAN) injection 0.4 mg  0.4 mg Intravenous Q5 Min PRN Jake Linares MD       • ondansetron (ZOFRAN) injection 4 mg  4 mg Intravenous Q6H PRN Jake Linares MD   4 mg at 02/24/20 1443   • oxyCODONE (ROXICODONE) immediate release tablet 10 mg  10 mg Oral Q6H PRN Markel Fountain MD   10 mg at 03/01/20 1939   • Pharmacy to dose warfarin   Does not apply Continuous PRN Markel Fountain MD       • phenazopyridine (PYRIDIUM) tablet 100 mg  100 mg Oral TID With Meals Markel Fountain MD   100 mg at 03/01/20 1807   • prenatal vitamin 27-0.8 tablet 1 tablet  1 tablet Oral Daily Jake Linares MD   1 tablet at 03/01/20 0921   • rOPINIRole (REQUIP) tablet 0.25 mg  0.25 mg Oral Nightly Jake Linares MD   0.25 mg at 03/01/20 2049   • sodium chloride 0.9 % flush 10 mL  10 mL Intravenous PRN Jake Linares MD       • sodium chloride 0.9 % flush 10 mL  10 mL Intravenous Q12H Jake Linares MD   10 mL at 03/01/20 2049   • sodium chloride 0.9 % flush 10 mL  10 mL Intravenous PRN Jake Linares MD       • vitamin C (ASCORBIC ACID) tablet 250 mg  250 mg Oral Daily Jake Linares MD   250 mg at 03/01/20 0921   • warfarin (COUMADIN) tablet 2.5 mg  2.5 mg Oral Daily Markel Fountain MD

## 2020-03-02 NOTE — PROGRESS NOTES
"Anticoagulation by Pharmacy - Warfarin    Brendon Skelton is a 74 y.o.female being continued on warfarin for atrial fibrillation with valve replacement     Home regimen: 5 mg Su/Tu/Th, 2.5 mg all other days  INR Goal: 2.5-3.5    Last INR:   Lab Results   Component Value Date    INR 2.57 (H) 03/02/2020       Objective:  [Ht: (P) 165.1 cm (65\"); Wt: 118 kg (261 lb)]  Lab Results   Component Value Date    INR 2.57 (H) 03/02/2020    INR 2.22 (H) 03/01/2020    INR 2.17 (H) 02/29/2020    PROTIME 27.3 (H) 03/02/2020    PROTIME 24.4 (H) 03/01/2020    PROTIME 23.9 (H) 02/29/2020     Lab Results   Component Value Date    HGB 8.7 (L) 03/02/2020    HGB 8.4 (L) 03/01/2020    HGB 8.5 (L) 02/29/2020    HCT 26.6 (L) 03/02/2020    HCT 25.7 (L) 03/01/2020    HCT 25.0 (L) 02/29/2020     03/02/2020     03/01/2020     02/29/2020       Recent Warfarin Administrations       The 5 most recent administrations since 02/24/2020 are shown below each listed medication.    Warfarin Sodium         Order Dose Date Given     warfarin (COUMADIN) tablet 5 mg 5 mg 03/01/2020      5 mg 02/29/2020                      Assessment  Interacting medications: aspirin, citalopram  INR is 2.57, therapeutic, trending upwards appropriately.  Warfarin 5 mg given past two days, INR trending upwards, will give warfarin 2.5 mg tonight.    H/H below normal limits, stable  HD patient  Per coumadin clinic patient had goal of 2.5 - 3.5.  Valve replacement listed as reason per coumadin clinic    Plan:  1.  Give warfarin 2.5 mg tablet PO @ 1800 tonight  2.  Draw a PT/INR in AM  3.  Pharmacy will continue to follow    Quinn Mercer, [facility]  03/02/20 9:46 AM     "

## 2020-03-02 NOTE — SIGNIFICANT NOTE
03/02/20 1400   Rehab Treatment   Discipline physical therapy assistant   Reason Treatment Not Performed patient/family declined treatment  (Pt declined tx stating she hurts too much and isnt going to do anything.)

## 2020-03-02 NOTE — CONSULTS
Adult Nutrition  Assessment    Patient Name:  Brendon Skelton  YOB: 1945  MRN: [id number]  Admit Date:  2/24/2020    Assessment Date:  3/2/2020    Comments:  Pt had dialysis today.  First dialysis was started on 2/28/2020.  She has recently had a colon resection for colon CA and was readmitted for Intra Abd and abd Wall hematoma following surgery.  Worsenign renal fxn requiring the initiation of dialysis.  She was not up to talking today.  Will follow up tomorrow.    Reason for Assessment     Row Name 03/02/20 1544          Reason for Assessment    Reason For Assessment  follow-up protocol         Nutrition/Diet History     Row Name 03/02/20 1550          Nutrition/Diet History    Typical Food/Fluid Intake  Pt back from dialysis.  Did not feel up to talking.             Labs/Tests/Procedures/Meds     Row Name 03/02/20 1550          Labs/Procedures/Meds    Lab Results Reviewed  reviewed, pertinent     Lab Results Comments  Abx; Epogen; Ferrous Sulfate; Levemir; Vit C; Prenatal Vit        Diagnostic Tests/Procedures    Diagnostic Test/Procedure Reviewed  reviewed, pertinent     Diagnostic Test/Procedures Comments  TEmporary dialysis catheter placement & dialysis started        Medications    Pertinent Medications Reviewed  reviewed, pertinent         Physical Findings     Row Name 03/02/20 1551          Physical Findings    Overall Physical Appearance  on oxygen therapy;obese           Nutrition Prescription Ordered     Row Name 03/02/20 1551          Nutrition Prescription PO    Current PO Diet  Regular     Fluid Consistency  Thin     Common Modifiers  Cardiac;Consistent Carbohydrate         Evaluation of Received Nutrient/Fluid Intake     Row Name 03/02/20 1551          PO Evaluation    Number of Days PO Intake Evaluated  2 days     Number of Meals  4     % PO Intake  63% average               Electronically signed by:  Esther Alcala RD  03/02/20 3:57 PM

## 2020-03-02 NOTE — PROGRESS NOTES
[facility]  INPATIENT PROGRESS NOTE    Length of Stay: 7  Date of Admission: 2/24/2020  Primary Care Physician: Josefa Pozo APRN    Subjective   Chief Complaint: No new complaints.    HPI:    74 year old female patient who came to the ED complaining of lower abdominal pain. She underwent laparoscopic colectomy for colon cancer recently. CT abdomen at the ED revealed some collection in the anterior abdominal wall consistent with a hematoma versus abscess.  At the ED she also complained of SOB and her O2 requirement went from baseline of3L to 4 liters. CXR revealed Cardiomegaly with vascular /interstitial congestion and small pleural effusions. Appearance is similar to 2/15/2020 but lasix IV times 1 was given.   Patient was initially started on vancomycin and ceftriaxone.  BUN/creatinine continue to increase at which point the patient was seen by the nephrologist and was started on hemodialysis on 2/28/2020.    Patient is seen for follow-up today at hemodialysis unit.  She is doing better and voices no new complaints.     Review of Systems   Constitutional: Positive for activity change and fatigue. Negative for appetite change, chills, diaphoresis and fever.   HENT: Negative for trouble swallowing and voice change.    Eyes: Negative for photophobia and visual disturbance.   Respiratory: Negative for cough, choking, chest tightness, shortness of breath, wheezing and stridor.    Cardiovascular: Negative for chest pain, palpitations and leg swelling.   Gastrointestinal: Negative for abdominal distention, abdominal pain, blood in stool, constipation, diarrhea, nausea and vomiting.   Endocrine: Negative for cold intolerance, heat intolerance, polydipsia, polyphagia and polyuria.   Genitourinary: Positive for decreased urine volume. Negative for difficulty urinating, dysuria, enuresis, flank pain, frequency, hematuria and urgency.   Musculoskeletal: Negative for  arthralgias, gait problem, myalgias, neck pain and neck stiffness.   Skin: Negative for pallor, rash and wound.   Neurological: Negative for dizziness, tremors, seizures, syncope, facial asymmetry, speech difficulty, weakness, light-headedness, numbness and headaches.   Hematological: Does not bruise/bleed easily.   Psychiatric/Behavioral: Negative for agitation, behavioral problems and confusion.       Objective    Temp:  [96.5 °F (35.8 °C)-97.5 °F (36.4 °C)] 96.5 °F (35.8 °C)  Heart Rate:  [62-80] 66  Resp:  [18-22] 20  BP: (150-164)/(65-72) 150/65    Physical Exam   Constitutional: She is oriented to person, place, and time. She appears well-developed and well-nourished. She is cooperative. No distress.   Patient is morbidly obese and has a BMI 43.43.   HENT:   Head: Normocephalic and atraumatic.   Right Ear: External ear normal.   Left Ear: External ear normal.   Nose: Nose normal.   Mouth/Throat: Oropharynx is clear and moist.   Eyes: Pupils are equal, round, and reactive to light. Conjunctivae and EOM are normal.   Neck: Normal range of motion. Neck supple. No JVD present. No thyromegaly present.   Cardiovascular: Normal rate, regular rhythm, normal heart sounds and intact distal pulses. Exam reveals no gallop and no friction rub.   No murmur heard.  Pulmonary/Chest: Effort normal. No stridor. No respiratory distress. She has no wheezes. She has rhonchi. She has no rales. She exhibits no tenderness.   Abdominal: Soft. Bowel sounds are normal. She exhibits no distension and no mass. There is no tenderness. There is no rebound and no guarding. No hernia.   Musculoskeletal: Normal range of motion. She exhibits edema. She exhibits no tenderness or deformity.   Neurological: She is alert and oriented to person, place, and time. She has normal reflexes. No cranial nerve deficit or sensory deficit. She exhibits normal muscle tone. Coordination normal.   Skin: Skin is warm and dry. No rash noted. She is not  diaphoretic. No erythema. No pallor.   Psychiatric: She has a normal mood and affect. Her behavior is normal. Judgment and thought content normal.   Nursing note and vitals reviewed.        Medication Review:    Current Facility-Administered Medications:   •  acetaminophen (TYLENOL) tablet 650 mg, 650 mg, Oral, Q4H PRN, Jake Linares MD  •  aspirin chewable tablet 81 mg, 81 mg, Oral, Daily, Jake Linares MD, 81 mg at 03/01/20 0920  •  cefTRIAXone (ROCEPHIN) 2 g/100 mL 0.9% NS VTB (PATRICIA), 2 g, Intravenous, Q24H, Markel Fountain MD, 2 g at 03/01/20 2049  •  cholecalciferol (VITAMIN D3) tablet 2,000 Units, 2,000 Units, Oral, Daily, Jake Linares MD, 2,000 Units at 03/01/20 0920  •  citalopram (CeleXA) tablet 20 mg, 20 mg, Oral, Daily, Jake Linares MD, 20 mg at 03/01/20 0921  •  dextrose (D50W) 25 g/ 50mL Intravenous Solution 25 g, 25 g, Intravenous, Q15 Min PRN, Jake Linares MD  •  dextrose (GLUTOSE) oral gel 15 g, 15 g, Oral, Q15 Min PRN, Jake Linares MD  •  dilTIAZem CD (CARDIZEM CD) 24 hr capsule 240 mg, 240 mg, Oral, Daily, Jake Linares MD, 240 mg at 03/01/20 0921  •  epoetin rosales (EPOGEN,PROCRIT) injection 10,000 Units, 10,000 Units, Intravenous, Once per day on Mon Wed Fri, Sandy Caputo MD, 10,000 Units at 03/02/20 1119  •  ferric gluconate (FERRLECIT) 125 mg in sodium chloride 0.9 % 110 mL IVPB, 125 mg, Intravenous, Once, Sandy Caputo MD  •  ferrous sulfate EC tablet 324 mg, 324 mg, Oral, Daily With Breakfast, Jake Linares MD, 324 mg at 03/01/20 0921  •  glucagon (human recombinant) (GLUCAGEN DIAGNOSTIC) injection 1 mg, 1 mg, Subcutaneous, Q15 Min PRN, Jake Linares MD  •  insulin aspart (novoLOG) injection 0-9 Units, 0-9 Units, Subcutaneous, 4x Daily AC & at Bedtime, Jake Linares MD, 4 Units at 02/26/20 2058  •  insulin detemir (LEVEMIR) injection 25 Units, 25 Units, Subcutaneous, Nightly, Jake Linares MD, 25 Units at  02/26/20 2059  •  ipratropium-albuterol (DUO-NEB) nebulizer solution 3 mL, 3 mL, Nebulization, Q8H - RT, Jake Linares MD, 3 mL at 03/02/20 0657  •  magic butt ointment, , Topical, BID PRN, Apolinar Azevedo MD  •  melatonin tablet 3 mg, 3 mg, Oral, Nightly PRN, Amy Kamara MD, 3 mg at 03/01/20 2049  •  metoprolol succinate XL (TOPROL-XL) 24 hr tablet 25 mg, 25 mg, Oral, Daily, Jake Linares MD, 25 mg at 03/01/20 0936  •  morphine injection 2 mg, 2 mg, Intravenous, Q4H PRN, 2 mg at 02/28/20 1003 **AND** naloxone (NARCAN) injection 0.4 mg, 0.4 mg, Intravenous, Q5 Min PRN, Jake Linares MD  •  ondansetron (ZOFRAN) injection 4 mg, 4 mg, Intravenous, Q6H PRN, Jake Linares MD, 4 mg at 02/24/20 1443  •  oxyCODONE (ROXICODONE) immediate release tablet 10 mg, 10 mg, Oral, Q6H PRN, Markel Fountain MD, 10 mg at 03/01/20 1939  •  Pharmacy to dose warfarin, , Does not apply, Continuous PRN, Markel Fountain MD  •  phenazopyridine (PYRIDIUM) tablet 100 mg, 100 mg, Oral, TID With Meals, Markel Fountain MD, 100 mg at 03/01/20 1807  •  prenatal vitamin 27-0.8 tablet 1 tablet, 1 tablet, Oral, Daily, Jake Linares MD, 1 tablet at 03/01/20 0921  •  rOPINIRole (REQUIP) tablet 0.25 mg, 0.25 mg, Oral, Nightly, Jake Linares MD, 0.25 mg at 03/01/20 2049  •  [COMPLETED] Insert peripheral IV, , , Once **AND** sodium chloride 0.9 % flush 10 mL, 10 mL, Intravenous, PRN, Jake Linares MD  •  sodium chloride 0.9 % flush 10 mL, 10 mL, Intravenous, Q12H, Jake Linares MD, 10 mL at 03/01/20 2049  •  sodium chloride 0.9 % flush 10 mL, 10 mL, Intravenous, PRN, Jake Linares MD  •  vitamin C (ASCORBIC ACID) tablet 250 mg, 250 mg, Oral, Daily, Jake Linares MD, 250 mg at 03/01/20 0921  •  warfarin (COUMADIN) tablet 2.5 mg, 2.5 mg, Oral, Daily, Markel Fountain MD    Results Review:  I have reviewed the labs,  radiology results, and diagnostic studies.    Laboratory Data:   Results from last 7 days   Lab Units 03/02/20  0508 03/01/20  0529 02/29/20  0617  02/24/20  1424   SODIUM mmol/L 130* 133* 131*   < > 139   POTASSIUM mmol/L 4.2 4.0 4.1   < > 3.7   CHLORIDE mmol/L 90* 92* 90*   < > 94*   CO2 mmol/L 22.0 24.0 23.0   < > 31.0*   BUN mg/dL 74* 64* 88*   < > 104*   CREATININE mg/dL 5.14* 4.21* 4.35*   < > 2.03*   GLUCOSE mg/dL 103* 112* 111*   < > 89   CALCIUM mg/dL 9.3 9.2 9.2   < > 9.4   BILIRUBIN mg/dL  --   --   --   --  1.7*   ALK PHOS U/L  --   --   --   --  60   ALT (SGPT) U/L  --   --   --   --  53*   AST (SGOT) U/L  --   --   --   --  34*   ANION GAP mmol/L 18.0* 17.0* 18.0*   < > 14.0    < > = values in this interval not displayed.     Estimated Creatinine Clearance: 12.3 mL/min (A) (by C-G formula based on SCr of 5.14 mg/dL (H)).  Results from last 7 days   Lab Units 03/02/20 0508 02/28/20  0534   PHOSPHORUS mg/dL 6.9* 6.8*         Results from last 7 days   Lab Units 03/02/20  0508 03/01/20 0529 02/29/20  0617 02/28/20  0534 02/27/20  0602   WBC 10*3/mm3 9.51 11.34* 11.61* 12.33* 13.20*   HEMOGLOBIN g/dL 8.7* 8.4* 8.5* 8.7* 9.4*   HEMATOCRIT % 26.6* 25.7* 25.0* 26.2* 29.2*   PLATELETS 10*3/mm3 308 278 252 265 239     Results from last 7 days   Lab Units 03/02/20  0508 03/01/20  0529 02/29/20  0617 02/28/20  0534 02/27/20  0602   INR  2.57* 2.22* 2.17* 2.48* 3.87*       Culture Data:   Blood Culture   Date Value Ref Range Status   03/01/2020 No growth at less than 24 hours  Preliminary   03/01/2020 No growth at less than 24 hours  Preliminary     No results found for: URINECX  No results found for: RESPCX  No results found for: WOUNDCX  No results found for: STOOLCX  No components found for: BODYFLD    Radiology Data:   Imaging Results (Last 24 Hours)     ** No results found for the last 24 hours. **          I have reviewed the patient's current medications.     Assessment/Plan     Hospital Problem  List:  Principal Problem:    Lower abdominal pain  Active Problems:    Acute on chronic diastolic congestive heart failure (CMS/HCC)    Renal insufficiency    Hematoma of abdominal wall    Lower abdominal pain secondary to intra-abdominal and abdominal wall hematoma: Stable.  Continue pain control.  Coumadin was previously on hold but has been restarted.    Acute on chronic heart failure (with preserved ejection fraction): Improving.  Patient is currently on hemodialysis and off diuretics.  Chest x-ray done on 2/28/2020 showed some improvement.  Echocardiogram done on 6/18/2019 showed an ejection fraction of 46 to 50%.    Acute on chronic kidney disease: Continue hemodialysis.  Nephrologist is following.    Possible pneumonia with E. coli bacteremia: Continue trazodone.    Anemia: Hemoglobin has improved following transfusion of packed red blood cells.  Continue iron supplementation, Epogen with dialysis and continue to monitor hemoglobin with transfusion as needed arises.  Findings of iron studies have been noted.    Diabetes mellitus: Stable.  Continue anti-glycemic with Accu-Cheks and sliding scale insulin.    O2 dependent COPD: Patient is currently not in exacerbation.  Continue supplemental oxygen bronchodilators.    Status post aortic valve replacement: Continue anticoagulation with warfarin.  INR is therapeutic.    Depressive disorder: Continue citalopram.    Deconditioning: Continue PT and OT.    Continue GI and DVT prophylaxis.    Discharge Planning: In progress.    Dudley Alfredo MD   03/02/20   12:11 PM

## 2020-03-02 NOTE — PLAN OF CARE
Problem: Patient Care Overview  Goal: Plan of Care Review  Flowsheets (Taken 3/2/2020 8816)  Progress: improving  Plan of Care Reviewed With: caregiver; patient  Outcome Summary: Dialysis started.  Po intake ~63% average.  Pt not up to talking today.  Will follow.

## 2020-03-02 NOTE — PROGRESS NOTES
"[facility]  [address] [place] [address]. [address]  T - [phone]  F - [phone]     Progress Note          PATIENT  DEMOGRAPHICS   PATIENT NAME: Brendon Skelton                      PHYSICIAN: Sandy Caputo MD  : 1945  MRN: [id number]   LOS: 7 days    Patient Care Team:  Josefa Pozo APRN as PCP - General (Nurse Practitioner)  Meme Duckworth APRN as PCP - Claims Attributed  Luis Maher MD as Surgeon (General Surgery)  Veronica Ruiz, MAI as Ambulatory  ([facility])  Subjective   SUBJECTIVE   Pain some better, seen during hd         Objective   OBJECTIVE   Vital Signs  Temp:  [96.5 °F (35.8 °C)-97.9 °F (36.6 °C)] 96.5 °F (35.8 °C)  Heart Rate:  [62-80] 66  Resp:  [18-22] 20  BP: (150-164)/(65-75) 150/65    Flowsheet Rows      First Filed Value   Admission Height  165.1 cm (65\") Documented at 2020 1322   Admission Weight  111 kg (245 lb) Documented at 2020 1322           I/O last 3 completed shifts:  In: 800 [P.O.:600; I.V.:100; IV Piggyback:100]  Out: 85 [Urine:85]    PHYSICAL EXAM    Physical Exam   Constitutional: She is oriented to person, place, and time. She appears well-developed.   HENT:   Head: Normocephalic.   Eyes: Pupils are equal, round, and reactive to light.   Cardiovascular: Normal rate, regular rhythm and normal heart sounds.   Pulmonary/Chest: Effort normal and breath sounds normal.   Abdominal: Soft. Bowel sounds are normal. There is tenderness.   Large lower abdominal bruise   Musculoskeletal: She exhibits edema.   Neurological: She is alert and oriented to person, place, and time.       RESULTS   Results Review:    Results from last 7 days   Lab Units 20  0508 20  0529 20  0617  20  1424   SODIUM mmol/L 130* 133* 131*   < > 139   POTASSIUM mmol/L 4.2 4.0 4.1   < > 3.7   CHLORIDE mmol/L 90* 92* 90*   < > 94*   CO2 mmol/L 22.0 24.0 23.0   < > 31.0*   BUN mg/dL 74* 64* 88*   < > 104*   "   CREATININE mg/dL 5.14* 4.21* 4.35*   < > 2.03*   CALCIUM mg/dL 9.3 9.2 9.2   < > 9.4   BILIRUBIN mg/dL  --   --   --   --  1.7*   ALK PHOS U/L  --   --   --   --  60   ALT (SGPT) U/L  --   --   --   --  53*   AST (SGOT) U/L  --   --   --   --  34*   GLUCOSE mg/dL 103* 112* 111*   < > 89    < > = values in this interval not displayed.       Estimated Creatinine Clearance: 12.3 mL/min (A) (by C-G formula based on SCr of 5.14 mg/dL (H)).    Results from last 7 days   Lab Units 03/02/20  0508 02/28/20  0534   PHOSPHORUS mg/dL 6.9* 6.8*             Results from last 7 days   Lab Units 03/02/20  0508 03/01/20  0529 02/29/20  0617 02/28/20  0534 02/27/20  0602   WBC 10*3/mm3 9.51 11.34* 11.61* 12.33* 13.20*   HEMOGLOBIN g/dL 8.7* 8.4* 8.5* 8.7* 9.4*   PLATELETS 10*3/mm3 308 278 252 265 239       Results from last 7 days   Lab Units 03/02/20  0508 03/01/20  0529 02/29/20  0617 02/28/20  0534 02/27/20  0602   INR  2.57* 2.22* 2.17* 2.48* 3.87*         Imaging Results (Last 24 Hours)     ** No results found for the last 24 hours. **           MEDICATIONS      aspirin 81 mg Oral Daily   cefTRIAXone 2 g Intravenous Q24H   cholecalciferol 2,000 Units Oral Daily   citalopram 20 mg Oral Daily   dilTIAZem  mg Oral Daily   ferrous sulfate 324 mg Oral Daily With Breakfast   insulin aspart 0-9 Units Subcutaneous 4x Daily AC & at Bedtime   insulin detemir 25 Units Subcutaneous Nightly   ipratropium-albuterol 3 mL Nebulization Q8H - RT   metoprolol succinate XL 25 mg Oral Daily   phenazopyridine 100 mg Oral TID With Meals   prenatal vitamin 27-0.8 1 tablet Oral Daily   rOPINIRole 0.25 mg Oral Nightly   sodium chloride 10 mL Intravenous Q12H   vitamin C with bob hips 250 mg Oral Daily   warfarin 5 mg Oral Daily       Pharmacy to dose warfarin        Assessment/Plan   ASSESSMENT / PLAN      Lower abdominal pain    Acute on chronic diastolic congestive heart failure (CMS/HCC)    Renal insufficiency    Hematoma of abdominal  wall    1.CKD 4 baseline creatinine is 2.  Renal function was stable on arrival.  She does have elevated proBNP and shortness of air.  Patient received Lasix in the ER and was on Bumex and metolazone.  Her creatinine is up to 4.57, elevated bun - initiated hd on 2/28/2020. Hd today, likely need ongoing dialysis and tunnel cath    Donald negative. Off vancomycin. Xray improved congestion    2.intra-abdominal and abdominal wall hematoma after the surgery.  Patient Coumadin is on hold.  No evidence of anastomotic leak. No surgical intervention is planned     3.anemia with recent blood loss while on lovenox received 4 units of packed RBC t sat is low and will give iv fe and epogen today with hd     4. soa / hcap E.coli in blood . Also GPC in gram stain. On ceftriaxone              This document has been electronically signed by Sandy Caputo MD on March 2, 2020 9:37 AM

## 2020-03-02 NOTE — PLAN OF CARE
Problem: Patient Care Overview  Goal: Plan of Care Review  Outcome: Ongoing (interventions implemented as appropriate)  Flowsheets  Taken 3/1/2020 1412 by Gordo Hightower, PTA  Progress: improving  Taken 3/2/2020 0830 by Arlen Romero, RN  Plan of Care Reviewed With: patient  Taken 3/2/2020 1337 by Arlen Romero, RN  Outcome Summary: VSS, pt had HD today-pulled 2.5L off, pt is still very irritable, will continue to monitor.

## 2020-03-02 NOTE — SIGNIFICANT NOTE
03/02/20 1010   Rehab Treatment   Reason Treatment Not Performed unavailable for treatment  (Pt off floor to HD this am. Will check back.)

## 2020-03-03 NOTE — PROGRESS NOTES
"[facility]  [address] [place] [address]. [address]  T - [phone]  F - [phone]     Progress Note          PATIENT  DEMOGRAPHICS   PATIENT NAME: Brendon Skelton                      PHYSICIAN: Sandy Caputo MD  : 1945  MRN: [id number]   LOS: 8 days    Patient Care Team:  Josefa Pozo APRN as PCP - General (Nurse Practitioner)  Meme Duckworth APRN as PCP - Claims Attributed  Luis Maher MD as Surgeon (General Surgery)  Veronica Ruiz, MAI as Ambulatory  ([facility])  Subjective   SUBJECTIVE   Doing physical therapy walking in the corridor         Objective   OBJECTIVE   Vital Signs  Temp:  [96.7 °F (35.9 °C)-99.2 °F (37.3 °C)] 97.2 °F (36.2 °C)  Heart Rate:  [61-75] 75  Resp:  [12-20] 16  BP: (131-185)/(57-89) 136/89    Flowsheet Rows      First Filed Value   Admission Height  165.1 cm (65\") Documented at 2020 1322   Admission Weight  111 kg (245 lb) Documented at 2020 1322           I/O last 3 completed shifts:  In: 1620 [P.O.:1320; I.V.:100; IV Piggyback:200]  Out: 3665 [Urine:165; Other:3500]    PHYSICAL EXAM    Physical Exam   Constitutional: She is oriented to person, place, and time. She appears well-developed.   HENT:   Head: Normocephalic.   Eyes: Pupils are equal, round, and reactive to light.   Cardiovascular: Normal rate, regular rhythm and normal heart sounds.   Pulmonary/Chest: Effort normal and breath sounds normal.   Abdominal: Soft. Bowel sounds are normal. There is tenderness.   Large lower abdominal bruise   Musculoskeletal: She exhibits edema.   Neurological: She is alert and oriented to person, place, and time.       RESULTS   Results Review:    Results from last 7 days   Lab Units 20  0500 20  0508 20  0529   SODIUM mmol/L 131* 130* 133*   POTASSIUM mmol/L 3.7 4.2 4.0   CHLORIDE mmol/L 92* 90* 92*   CO2 mmol/L 23.0 22.0 24.0   BUN mg/dL 41* 74* 64*   CREATININE mg/dL 3.68* 5.14* 4.21*   "   CALCIUM mg/dL 9.2 9.3 9.2   GLUCOSE mg/dL 107* 103* 112*       Estimated Creatinine Clearance: 16.8 mL/min (A) (by C-G formula based on SCr of 3.68 mg/dL (H)).    Results from last 7 days   Lab Units 03/02/20  0508 02/28/20  0534   PHOSPHORUS mg/dL 6.9* 6.8*             Results from last 7 days   Lab Units 03/03/20  0500 03/02/20  0508 03/01/20  0529 02/29/20  0617 02/28/20  0534   WBC 10*3/mm3 8.00 9.51 11.34* 11.61* 12.33*   HEMOGLOBIN g/dL 8.8* 8.7* 8.4* 8.5* 8.7*   PLATELETS 10*3/mm3 304 308 278 252 265       Results from last 7 days   Lab Units 03/03/20  0500 03/02/20  0508 03/01/20  0529 02/29/20  0617 02/28/20  0534   INR  4.04* 2.57* 2.22* 2.17* 2.48*         Imaging Results (Last 24 Hours)     ** No results found for the last 24 hours. **           MEDICATIONS      aspirin 81 mg Oral Daily   cefTRIAXone 2 g Intravenous Q24H   cholecalciferol 2,000 Units Oral Daily   citalopram 20 mg Oral Daily   dilTIAZem  mg Oral Daily   epoetin rosales/rosales-epbx 10,000 Units Intravenous Once per day on Mon Wed Fri   ferric gluconate (FERRLECIT) IVPB 125 mg Intravenous Once   ferrous sulfate 324 mg Oral Daily With Breakfast   insulin aspart 0-9 Units Subcutaneous 4x Daily AC & at Bedtime   insulin detemir 25 Units Subcutaneous Nightly   ipratropium-albuterol 3 mL Nebulization Q4H - RT   metoprolol succinate XL 25 mg Oral Daily   phenazopyridine 100 mg Oral TID With Meals   prenatal vitamin 27-0.8 1 tablet Oral Daily   rOPINIRole 0.25 mg Oral Nightly   sodium chloride 10 mL Intravenous Q12H   vitamin C with bob hips 250 mg Oral Daily       Pharmacy to dose warfarin        Assessment/Plan   ASSESSMENT / PLAN      Lower abdominal pain    Acute on chronic diastolic congestive heart failure (CMS/HCC)    Renal insufficiency    Hematoma of abdominal wall    1.CKD 4 baseline creatinine is 2.  Renal function was stable on arrival.  She does have elevated proBNP and shortness of air.  Patient received Lasix in the ER and was  on Bumex and metolazone.  Her creatinine is up to 4.57, elevated bun - initiated hd on 2/28/2020. Hd tomorrow, UO poor , doubt any recovery. Will inform CT surgery for possible tunnel cath tomorrow. Out pt hd arrangement is requested.     Donald negative. Off vancomycin. Xray improved congestion    2.intra-abdominal and abdominal wall hematoma after the surgery.  Patient Coumadin is on hold.  No evidence of anastomotic leak. No surgical intervention is planned     3.anemia with recent blood loss while on lovenox received 4 units of packed RBC t sat is low and will give iv fe and epogen today with hd     4. soa / hcap E.coli in blood . Also GPC in gram stain - not viable for growth. On ceftriaxone              This document has been electronically signed by Sandy Caputo MD on March 3, 2020 10:53 AM

## 2020-03-03 NOTE — PROGRESS NOTES
GENERAL SURGERY PROGRESS NOTE     LOS: 8 days     Chief Complaint:     Laparoscopic sigmoid colon resection    Interval History:   Ms. WILFREDO Skelton is a 74 y old F presenting post laparoscopic sigmoid colon resection on 2/10. Pt is eating well on Cardiac ADA diet. She is ambulating around her room. Pt's nurse reporting decreased urination, 10 cc per urination. She received HD yesterday.  No trouble with BM. She reports pain.     Medication Review:     aspirin 81 mg Oral Daily   cefTRIAXone 2 g Intravenous Q24H   cholecalciferol 2,000 Units Oral Daily   citalopram 20 mg Oral Daily   dilTIAZem  mg Oral Daily   epoetin rosales/rosales-epbx 10,000 Units Intravenous Once per day on Mon Wed Fri   ferric gluconate (FERRLECIT) IVPB 125 mg Intravenous Once   ferrous sulfate 324 mg Oral Daily With Breakfast   insulin aspart 0-9 Units Subcutaneous 4x Daily AC & at Bedtime   insulin detemir 25 Units Subcutaneous Nightly   ipratropium-albuterol 3 mL Nebulization Q4H - RT   metoprolol succinate XL 25 mg Oral Daily   phenazopyridine 100 mg Oral TID With Meals   prenatal vitamin 27-0.8 1 tablet Oral Daily   rOPINIRole 0.25 mg Oral Nightly   sodium chloride 10 mL Intravenous Q12H   vitamin C with bob hips 250 mg Oral Daily   warfarin 2.5 mg Oral Daily         Pharmacy to dose warfarin    Objective     Vital Signs:  Temp:  [96.5 °F (35.8 °C)-99.2 °F (37.3 °C)] 96.7 °F (35.9 °C)  Heart Rate:  [61-80] 69  Resp:  [12-22] 18  BP: (131-178)/(57-78) 170/69    Intake/Output Summary (Last 24 hours) at 3/3/2020 0425  Last data filed at 3/3/2020 0246  Gross per 24 hour   Intake 940 ml   Output 3630 ml   Net -2690 ml       Physical Exam   Constitutional: She is oriented to person, place, and time. She appears well-developed and well-nourished.   HENT:   Head: Normocephalic and atraumatic.   Eyes: Pupils are equal, round, and reactive to light. Conjunctivae and EOM are normal.   Neck: Normal range of motion. Neck supple.   Cardiovascular: Normal  rate and regular rhythm.   Pulmonary/Chest: Effort normal and breath sounds normal.   Abdominal: Soft. Bowel sounds are normal.   Musculoskeletal: Normal range of motion. She exhibits edema.   Neurological: She is alert and oriented to person, place, and time.   Skin: Skin is warm and dry. Capillary refill takes less than 2 seconds.   Psychiatric: She has a normal mood and affect.       Results Review:    Results from last 7 days   Lab Units 03/02/20  0508 03/01/20  0529 02/29/20  0617   SODIUM mmol/L 130* 133* 131*   POTASSIUM mmol/L 4.2 4.0 4.1   CHLORIDE mmol/L 90* 92* 90*   CO2 mmol/L 22.0 24.0 23.0   BUN mg/dL 74* 64* 88*   CREATININE mg/dL 5.14* 4.21* 4.35*   GLUCOSE mg/dL 103* 112* 111*   CALCIUM mg/dL 9.3 9.2 9.2     Results from last 7 days   Lab Units 03/02/20  0508 03/01/20  0529 02/29/20  0617   WBC 10*3/mm3 9.51 11.34* 11.61*   HEMOGLOBIN g/dL 8.7* 8.4* 8.5*   HEMATOCRIT % 26.6* 25.7* 25.0*   PLATELETS 10*3/mm3 308 278 252       Assessment:    Lower abdominal pain    Acute on chronic diastolic congestive heart failure (CMS/HCC)    Renal insufficiency    Hematoma of abdominal wall      Condition: In stable condition.     Plan:    1. Patient currently on HD, per Nephrology.   2. Continue to monitor.         This document has been electronically signed by Adam Ricks, Medical Student on March 3, 2020 4:25 AM

## 2020-03-03 NOTE — PROGRESS NOTES
"Anticoagulation by Pharmacy - Warfarin    Brendon Skelton is a 74 y.o.female being continued on warfarin for atrial fibrillation with valve replacement     Home regimen: 5 mg Su/Tu/Th, 2.5 mg all other days  INR Goal: 2.5-3.5    Last INR:   Lab Results   Component Value Date    INR 4.04 (H) 03/03/2020       Objective:  [Ht: (P) 165.1 cm (65\"); Wt: 113 kg (249 lb 9.6 oz)]  Lab Results   Component Value Date    INR 4.04 (H) 03/03/2020    INR 2.57 (H) 03/02/2020    INR 2.22 (H) 03/01/2020    PROTIME 39.0 (H) 03/03/2020    PROTIME 27.3 (H) 03/02/2020    PROTIME 24.4 (H) 03/01/2020     Lab Results   Component Value Date    HGB 8.8 (L) 03/03/2020    HGB 8.7 (L) 03/02/2020    HGB 8.4 (L) 03/01/2020    HCT 27.1 (L) 03/03/2020    HCT 26.6 (L) 03/02/2020    HCT 25.7 (L) 03/01/2020     03/03/2020     03/02/2020     03/01/2020       Recent Warfarin Administrations       The 5 most recent administrations since 02/25/2020 are shown below each listed medication.    Warfarin Sodium         Order Dose Date Given     warfarin (COUMADIN) tablet 2.5 mg 2.5 mg 03/02/2020     warfarin (COUMADIN) tablet 5 mg 5 mg 03/01/2020      5 mg 02/29/2020                      Assessment  Interacting medications: aspirin, citalopram  INR is 4.04, supratherapeutic today, trended upwards significantly overnight.  No significant interactions, newest medication on mar is epogen and ferrlecit, which should have minimal interaction.    HD patient.  H/H below normal limits but stable  Per coumadin clinic patient had goal of 2.5 - 3.5.  Valve replacement listed as reason per coumadin clinic.       Plan:  1.  HOLD warfarin tonight  2.  Draw a PT/INR in AM  3.  Pharmacy will continue to follow    Quinn Mercer [facility]  03/03/20 3:08 PM     "

## 2020-03-03 NOTE — THERAPY TREATMENT NOTE
Acute Care - Physical Therapy Treatment Note  [facility]     Patient Name: Brendon Skelton  : 1945  MRN: [id number]  Today's Date: 3/3/2020             Admit Date: 2020    Visit Dx:    ICD-10-CM ICD-9-CM   1. Renal insufficiency N28.9 593.9   2. Left lower quadrant abdominal pain R10.32 789.04   3. Elevated INR R79.1 790.92   4. Impaired physical mobility Z74.09 781.99     Patient Active Problem List   Diagnosis   • Long term current use of anticoagulant therapy   • Personal history of heart valve replacement   • Atrial fibrillation [I48.91]   • Emphysema of lung (CMS/HCC)   • On anticoagulant therapy   • Hyperlipidemia   • Diastolic heart failure (CMS/HCC)   • Depressive disorder   • COPD (chronic obstructive pulmonary disease) (CMS/HCC)   • Diabetes mellitus (CMS/HCC)   • Myopia   • Astigmatism   • Bleeding from open wound of chest wall   • Follow-up surgery care   • Encounter for screening for malignant neoplasm of colon   • Positive colorectal cancer screening using DNA-based stool test   • Nodule of left lung   • Chronic hypoxemic respiratory failure (CMS/HCC)   • Physical deconditioning   • Heart failure with preserved left ventricular function (HFpEF) (CMS/HCC)   • Hypertension   • Coronary artery disease involving native coronary artery without angina pectoris   • Hx of CABG   • SSS (sick sinus syndrome) (CMS/HCC)   • Pacemaker   • Stage 4 chronic kidney disease (CMS/HCC)   • Personal history of tobacco use, presenting hazards to health   • Gastrointestinal hemorrhage   • Class 2 severe obesity due to excess calories with serious comorbidity and body mass index (BMI) of 39.0 to 39.9 in adult (CMS/HCC)   • Gastritis   • Colon polyp   • Coumadin toxicity   • Acute on chronic diastolic congestive heart failure (CMS/HCC)   • Chronic anemia   • Pulmonary hypertension (CMS/HCC)   • Confusion   • Hyponatremia   • Long term current use of anticoagulant   • Renal insufficiency   • Lower  abdominal pain   • Hematoma of abdominal wall       Therapy Treatment    Rehabilitation Treatment Summary     Row Name 03/03/20 1035             Treatment Time/Intention    Discipline  physical therapy assistant  -TW      Document Type  therapy note (daily note)  -TW      Subjective Information  complains of;fatigue;weakness;pain  -TW      Mode of Treatment  physical therapy;individual therapy  -TW      Patient/Family Observations  Pt up in chair and returned to chair after tx.  -TW      Patient Effort  fair  -TW      Existing Precautions/Restrictions  fall;oxygen therapy device and L/min  -TW      Recorded by [TW] Reece Rocha PTA 03/03/20 1351      Row Name 03/03/20 1035             Vital Signs    Pretreatment Heart Rate (beats/min)  74  -TW      Posttreatment Heart Rate (beats/min)  78  -TW      Pre SpO2 (%)  96  -TW      O2 Delivery Pre Treatment  supplemental O2  -TW      Post SpO2 (%)  94  -TW      Pre Patient Position  Sitting  -TW      Intra Patient Position  Standing  -TW      Post Patient Position  Sitting  -TW      Recorded by [TW] Reece Rocha PTA 03/03/20 1351      Row Name 03/03/20 1035             Cognitive Assessment/Intervention- PT/OT    Orientation Status (Cognition)  oriented x 4  -TW      Recorded by [TW] Reece Rocha PTA 03/03/20 1351      Row Name 03/03/20 1035             Safety Issues, Functional Mobility    Impairments Affecting Function (Mobility)  endurance/activity tolerance;balance;pain;postural/trunk control;shortness of breath;strength  -TW      Recorded by [TW] Reece Rocha PTA 03/03/20 1351      Row Name 03/03/20 1035             Bed Mobility Assessment/Treatment    Comment (Bed Mobility)  Not tested due to pt up in chair and returned to chair after tx completed.  -TW      Recorded by [TW] Reece Rocha PTA 03/03/20 1351      Row Name 03/03/20 1035             Sit-Stand Transfer    Sit-Stand Warren (Transfers)  contact guard  -TW       Assistive Device (Sit-Stand Transfers)  walker, front-wheeled  -TW      Recorded by [TW] Reece Rocha PTA 03/03/20 1351      Row Name 03/03/20 1035             Stand-Sit Transfer    Stand-Sit Page (Transfers)  contact guard  -TW      Assistive Device (Stand-Sit Transfers)  walker, front-wheeled  -TW      Recorded by [TW] Reece Rocha PTA 03/03/20 1351      Row Name 03/03/20 1035             Gait/Stairs Assessment/Training    Gait/Stairs Assessment/Training  gait/ambulation assistive device  -TW      Page Level (Gait)  contact guard  -TW      Assistive Device (Gait)  walker, front-wheeled  -TW      Distance in Feet (Gait)  28ft x 2 trials.  -TW      Pattern (Gait)  3-point  -TW      Deviations/Abnormal Patterns (Gait)  antalgic;base of support, wide;marquita decreased;stride length decreased  -TW      Comment (Gait/Stairs)  Pt does not take encouragement to increase distance well.  -TW      Recorded by [TW] Reece Rocha PTA 03/03/20 1351      Row Name 03/03/20 1035             Positioning and Restraints    Pre-Treatment Position  sitting in chair/recliner  -TW      Post Treatment Position  chair  -TW      In Chair  sitting;call light within reach;encouraged to call for assist;exit alarm on  -TW      Recorded by [TW] Reece Rocha PTA 03/03/20 1351      Row Name 03/03/20 1035             Pain Scale: Numbers Pre/Post-Treatment    Pain Scale: Numbers, Pretreatment  3/10  -TW      Pain Scale: Numbers, Post-Treatment  4/10  -TW      Pain Location  abdomen  -TW      Recorded by [TW] Reece Rocha PTA 03/03/20 1351      Row Name 03/03/20 1035             Outcome Summary/Treatment Plan (PT)    Daily Summary of Progress (PT)  progress toward functional goals is good  -TW      Plan for Continued Treatment (PT)  Cont  -TW      Anticipated Discharge Disposition (PT)  skilled nursing facility  -TW      Recorded by [TW] Reece Rocha PTA 03/03/20 1351        User Key  (r)  = Recorded By, (t) = Taken By, (c) = Cosigned By    Initials Name Effective Dates Discipline    Reece Rouse PTA 03/07/18 -  PT               Rehab Goal Summary     Row Name 03/03/20 1035             Bed Mobility Goal 1 (PT)    Activity/Assistive Device (Bed Mobility Goal 1, PT)  sit to supine;supine to sit  -TW      Cross Level/Cues Needed (Bed Mobility Goal 1, PT)  supervision required  -TW      Time Frame (Bed Mobility Goal 1, PT)  by discharge  -TW      Progress/Outcomes (Bed Mobility Goal 1, PT)  goal not met  -TW         Transfer Goal 1 (PT)    Activity/Assistive Device (Transfer Goal 1, PT)  sit-to-stand/stand-to-sit;bed-to-chair/chair-to-bed;walker, rolling  -TW      Cross Level/Cues Needed (Transfer Goal 1, PT)  contact guard assist  -TW      Time Frame (Transfer Goal 1, PT)  by discharge  -TW      Progress/Outcome (Transfer Goal 1, PT)  goal not met  -TW         Gait Training Goal 1 (PT)    Activity/Assistive Device (Gait Training Goal 1, PT)  gait (walking locomotion);assistive device use;decrease fall risk;increase endurance/gait distance;turning, left;turning, right;walker, rolling  -TW      Cross Level (Gait Training Goal 1, PT)  contact guard assist  -TW      Distance (Gait Goal 1, PT)  50 feet  -TW      Time Frame (Gait Training Goal 1, PT)  by discharge  -TW      Progress/Outcome (Gait Training Goal 1, PT)  goal not met  -TW        User Key  (r) = Recorded By, (t) = Taken By, (c) = Cosigned By    Initials Name Provider Type Discipline    Reece Rouse PTA Physical Therapy Assistant PT              PT Recommendation and Plan  Anticipated Discharge Disposition (PT): skilled nursing facility  Outcome Summary/Treatment Plan (PT)  Daily Summary of Progress (PT): progress toward functional goals is good  Plan for Continued Treatment (PT): Cont  Anticipated Discharge Disposition (PT): skilled nursing facility  Plan of Care Reviewed With: patient  Progress:  improving  Outcome Summary: Pt up in chair upon arrival and returned to chair at completion of tx. Pt able to stand with CGA and amb 28ft x 2 trials with CGA using RW. Pt wouldnt perform LE ther ex at thsi time due  to being too tired and sore. Pt would cont to benefit from therapy upon DC.  Outcome Measures     Row Name 03/03/20 1035 03/01/20 1400          How much help from another person do you currently need...    Turning from your back to your side while in flat bed without using bedrails?  3  -TW  3  -LEODAN     Moving from lying on back to sitting on the side of a flat bed without bedrails?  3  -TW  3  -LEODAN     Moving to and from a bed to a chair (including a wheelchair)?  3  -TW  3  -LEODAN     Standing up from a chair using your arms (e.g., wheelchair, bedside chair)?  3  -TW  3  -LEODAN     Climbing 3-5 steps with a railing?  2  -TW  2  -LEODAN     To walk in hospital room?  3  -TW  3  -LEODAN     AM-PAC 6 Clicks Score (PT)  17  -TW  17  -LEODAN        Functional Assessment    Outcome Measure Options  --  AM-PAC 6 Clicks Basic Mobility (PT)  -LEODAN       User Key  (r) = Recorded By, (t) = Taken By, (c) = Cosigned By    Initials Name Provider Type    Gordo Clay PTA Physical Therapy Assistant    Reece Rouse PTA Physical Therapy Assistant         Time Calculation:   PT Charges     Row Name 03/03/20 1355             Time Calculation    Start Time  1035  -TW      Stop Time  1105  -TW      Time Calculation (min)  30 min  -TW      PT Received On  03/03/20  -TW      PT Goal Re-Cert Due Date  03/12/20  -TW         Time Calculation- PT    Total Timed Code Minutes- PT  30 minute(s)  -TW        User Key  (r) = Recorded By, (t) = Taken By, (c) = Cosigned By    Initials Name Provider Type    Reece Rouse PTA Physical Therapy Assistant        Therapy Charges for Today     Code Description Service Date Service Provider Modifiers Qty    [id number] HC GAIT TRAINING EA 15 MIN 3/3/2020 Reece Rocha PTA GP 1     [id number]  PT THERAPEUTIC ACT EA 15 MIN 3/3/2020 Reece Rocha, PTA GP 1          PT G-Codes  Outcome Measure Options: AM-PAC 6 Clicks Basic Mobility (PT)  AM-PAC 6 Clicks Score (PT): 17    Reece Rocha PTA  3/3/2020

## 2020-03-03 NOTE — PLAN OF CARE
Problem: Patient Care Overview  Goal: Plan of Care Review  Outcome: Ongoing (interventions implemented as appropriate)  Flowsheets (Taken 3/3/2020 1035)  Progress: improving  Plan of Care Reviewed With: patient  Outcome Summary: Pt up in chair upon arrival and returned to chair at completion of tx. Pt able to stand with CGA and amb 28ft x 2 trials with CGA using RW. Pt wouldnt perform LE ther ex at thsi time due  to being too tired and sore. Pt would cont to benefit from therapy upon DC.

## 2020-03-03 NOTE — PROGRESS NOTES
[facility] Medicine Services  INPATIENT PROGRESS NOTE    Length of Stay: 8  Date of Admission: 2/24/2020  Primary Care Physician: Josefa Pozo APRN    Subjective   Chief Complaint: No new complaints.    HPI:    74 year old female patient who came to the ED complaining of lower abdominal pain. She underwent laparoscopic colectomy for colon cancer recently. CT abdomen at the ED revealed some collection in the anterior abdominal wall consistent with a hematoma versus abscess.  At the ED she also complained of SOB and her O2 requirement went from baseline of3L to 4 liters. CXR revealed Cardiomegaly with vascular /interstitial congestion and small pleural effusions. Appearance is similar to 2/15/2020 but lasix IV times 1 was given.   Patient was initially started on vancomycin and ceftriaxone.  BUN/creatinine continue to increase at which point the patient was seen by the nephrologist and was started on hemodialysis on 2/28/2020.    Patient is seen for follow-up today. She is doing better, less deconditioned and voices no new complaints.     Review of Systems   Constitutional: Positive for fatigue. Negative for activity change, appetite change, chills, diaphoresis and fever.   HENT: Negative for trouble swallowing and voice change.    Eyes: Negative for photophobia and visual disturbance.   Respiratory: Negative for cough, choking, chest tightness, shortness of breath, wheezing and stridor.    Cardiovascular: Negative for chest pain, palpitations and leg swelling.   Gastrointestinal: Negative for abdominal distention, abdominal pain, blood in stool, constipation, diarrhea, nausea and vomiting.   Endocrine: Negative for cold intolerance, heat intolerance, polydipsia, polyphagia and polyuria.   Genitourinary: Positive for decreased urine volume. Negative for difficulty urinating, dysuria, enuresis, flank pain, frequency, hematuria and urgency.   Musculoskeletal: Negative for  arthralgias, gait problem, myalgias, neck pain and neck stiffness.   Skin: Negative for pallor, rash and wound.   Neurological: Negative for dizziness, tremors, seizures, syncope, facial asymmetry, speech difficulty, weakness, light-headedness, numbness and headaches.   Hematological: Does not bruise/bleed easily.   Psychiatric/Behavioral: Negative for agitation, behavioral problems and confusion.       Objective    Temp:  [96.7 °F (35.9 °C)-99.2 °F (37.3 °C)] 97.2 °F (36.2 °C)  Heart Rate:  [61-75] 75  Resp:  [12-20] 16  BP: (131-185)/(57-89) 136/89    Physical Exam   Constitutional: She is oriented to person, place, and time. She appears well-developed and well-nourished. She is cooperative. No distress.   Patient is morbidly obese and has a BMI 41.54.     HENT:   Head: Normocephalic and atraumatic.   Right Ear: External ear normal.   Left Ear: External ear normal.   Nose: Nose normal.   Mouth/Throat: Oropharynx is clear and moist.   Eyes: Pupils are equal, round, and reactive to light. Conjunctivae and EOM are normal.   Neck: Normal range of motion. Neck supple. No JVD present. No thyromegaly present.   Cardiovascular: Normal rate, regular rhythm, normal heart sounds and intact distal pulses. Exam reveals no gallop and no friction rub.   No murmur heard.  Pulmonary/Chest: Effort normal. No stridor. No respiratory distress. She has no wheezes. She has rhonchi. She has no rales. She exhibits no tenderness.   Abdominal: Soft. Bowel sounds are normal. She exhibits no distension and no mass. There is no tenderness. There is no rebound and no guarding. No hernia.   Musculoskeletal: Normal range of motion. She exhibits no edema, tenderness or deformity.   Neurological: She is alert and oriented to person, place, and time. She has normal reflexes. No cranial nerve deficit or sensory deficit. She exhibits normal muscle tone. Coordination normal.   Skin: Skin is warm and dry. No rash noted. She is not diaphoretic. No  erythema. No pallor.   Psychiatric: She has a normal mood and affect. Her behavior is normal. Judgment and thought content normal.   Nursing note and vitals reviewed.        Medication Review:    Current Facility-Administered Medications:   •  acetaminophen (TYLENOL) tablet 650 mg, 650 mg, Oral, Q4H PRN, Jake Linares MD  •  aspirin chewable tablet 81 mg, 81 mg, Oral, Daily, Jake Linares MD, 81 mg at 03/03/20 0927  •  cefTRIAXone (ROCEPHIN) 2 g/100 mL 0.9% NS VTB (PATRICIA), 2 g, Intravenous, Q24H, Markel Fountain MD, 2 g at 03/02/20 2138  •  cholecalciferol (VITAMIN D3) tablet 2,000 Units, 2,000 Units, Oral, Daily, Jake Linares MD, 2,000 Units at 03/03/20 0926  •  citalopram (CeleXA) tablet 20 mg, 20 mg, Oral, Daily, Jake Linares MD, 20 mg at 03/03/20 0927  •  dextrose (D50W) 25 g/ 50mL Intravenous Solution 25 g, 25 g, Intravenous, Q15 Min PRN, Jake Linares MD  •  dextrose (GLUTOSE) oral gel 15 g, 15 g, Oral, Q15 Min PRN, Jake Linares MD  •  dilTIAZem CD (CARDIZEM CD) 24 hr capsule 240 mg, 240 mg, Oral, Daily, Jake Linares MD, 240 mg at 03/03/20 0926  •  epoetin rosales (EPOGEN,PROCRIT) injection 10,000 Units, 10,000 Units, Intravenous, Once per day on Mon Wed Fri, Sandy Caputo MD, 10,000 Units at 03/02/20 1119  •  ferric gluconate (FERRLECIT) 125 mg in sodium chloride 0.9 % 110 mL IVPB, 125 mg, Intravenous, Once, Sandy Caputo MD  •  ferrous sulfate EC tablet 324 mg, 324 mg, Oral, Daily With Breakfast, Jake Linares MD, 324 mg at 03/03/20 0926  •  glucagon (human recombinant) (GLUCAGEN DIAGNOSTIC) injection 1 mg, 1 mg, Subcutaneous, Q15 Min PRN, Jake Linares MD  •  insulin aspart (novoLOG) injection 0-9 Units, 0-9 Units, Subcutaneous, 4x Daily AC & at Bedtime, Jake Linares MD, 4 Units at 02/26/20 2058  •  insulin detemir (LEVEMIR) injection 25 Units, 25 Units, Subcutaneous, Nightly, Jake Linares MD, 25 Units at 02/26/20 2059  •   ipratropium-albuterol (DUO-NEB) nebulizer solution 3 mL, 3 mL, Nebulization, Q4H - RT, Dudley Alfredo MD, 3 mL at 03/03/20 1005  •  magic butt ointment, , Topical, BID PRN, Apolinar Azevedo MD  •  melatonin tablet 3 mg, 3 mg, Oral, Nightly PRN, Amy Kamara MD, 3 mg at 03/01/20 2049  •  metoprolol succinate XL (TOPROL-XL) 24 hr tablet 25 mg, 25 mg, Oral, Daily, Jake Linares MD, 25 mg at 03/03/20 0926  •  morphine injection 2 mg, 2 mg, Intravenous, Q4H PRN, 2 mg at 02/28/20 1003 **AND** naloxone (NARCAN) injection 0.4 mg, 0.4 mg, Intravenous, Q5 Min PRN, Jake Linares MD  •  ondansetron (ZOFRAN) injection 4 mg, 4 mg, Intravenous, Q6H PRN, Jake Linares MD, 4 mg at 02/24/20 1443  •  oxyCODONE (ROXICODONE) immediate release tablet 10 mg, 10 mg, Oral, Q6H PRN, Markel Fountain MD, 10 mg at 03/03/20 0454  •  Pharmacy to dose warfarin, , Does not apply, Continuous PRN, Markel Fountain MD  •  phenazopyridine (PYRIDIUM) tablet 100 mg, 100 mg, Oral, TID With Meals, Markel Fountain MD, 100 mg at 03/03/20 0926  •  prenatal vitamin 27-0.8 tablet 1 tablet, 1 tablet, Oral, Daily, Jake Linares MD, 1 tablet at 03/03/20 0927  •  rOPINIRole (REQUIP) tablet 0.25 mg, 0.25 mg, Oral, Nightly, Jake Linares MD, 0.25 mg at 03/02/20 2139  •  [COMPLETED] Insert peripheral IV, , , Once **AND** sodium chloride 0.9 % flush 10 mL, 10 mL, Intravenous, PRN, Jake Linares MD  •  sodium chloride 0.9 % flush 10 mL, 10 mL, Intravenous, Q12H, Jake Linares MD, 10 mL at 03/03/20 0927  •  sodium chloride 0.9 % flush 10 mL, 10 mL, Intravenous, PRN, Jake Linares MD  •  vitamin C (ASCORBIC ACID) tablet 250 mg, 250 mg, Oral, Daily, Jake Linares MD, 250 mg at 03/03/20 0927    Results Review:  I have reviewed the labs, radiology results, and diagnostic studies.    Laboratory Data:   Results from last 7 days   Lab Units 03/03/20  0500  03/02/20  0508 03/01/20  0529   SODIUM mmol/L 131* 130* 133*   POTASSIUM mmol/L 3.7 4.2 4.0   CHLORIDE mmol/L 92* 90* 92*   CO2 mmol/L 23.0 22.0 24.0   BUN mg/dL 41* 74* 64*   CREATININE mg/dL 3.68* 5.14* 4.21*   GLUCOSE mg/dL 107* 103* 112*   CALCIUM mg/dL 9.2 9.3 9.2   ANION GAP mmol/L 16.0* 18.0* 17.0*     Estimated Creatinine Clearance: 16.8 mL/min (A) (by C-G formula based on SCr of 3.68 mg/dL (H)).  Results from last 7 days   Lab Units 03/02/20  0508 02/28/20  0534   PHOSPHORUS mg/dL 6.9* 6.8*         Results from last 7 days   Lab Units 03/03/20  0500 03/02/20  0508 03/01/20  0529 02/29/20  0617 02/28/20  0534   WBC 10*3/mm3 8.00 9.51 11.34* 11.61* 12.33*   HEMOGLOBIN g/dL 8.8* 8.7* 8.4* 8.5* 8.7*   HEMATOCRIT % 27.1* 26.6* 25.7* 25.0* 26.2*   PLATELETS 10*3/mm3 304 308 278 252 265     Results from last 7 days   Lab Units 03/03/20  0500 03/02/20  0508 03/01/20  0529 02/29/20  0617 02/28/20  0534   INR  4.04* 2.57* 2.22* 2.17* 2.48*       Culture Data:   Blood Culture   Date Value Ref Range Status   03/01/2020 No growth at 24 hours  Preliminary   03/01/2020 No growth at 24 hours  Preliminary     No results found for: URINECX  No results found for: RESPCX  No results found for: WOUNDCX  No results found for: STOOLCX  No components found for: BODYFLD    Radiology Data:   Imaging Results (Last 24 Hours)     ** No results found for the last 24 hours. **          I have reviewed the patient's current medications.     Assessment/Plan     Hospital Problem List:  Principal Problem:    Lower abdominal pain  Active Problems:    Acute on chronic diastolic congestive heart failure (CMS/HCC)    Renal insufficiency    Hematoma of abdominal wall    Lower abdominal pain secondary to intra-abdominal and abdominal wall hematoma: Stable.  Continue pain control.      Acute on chronic heart failure (with preserved ejection fraction): Much improved. Patient is currently on hemodialysis and off diuretics.  Chest x-ray done on  2/28/2020 showed some improvement.  Echocardiogram done on 6/18/2019 showed an ejection fraction of 46 to 50%.    Acute on chronic kidney disease: Continue hemodialysis.  Nephrologist is following.    Possible pneumonia with E. coli bacteremia: Continue ceftriaxone.    Anemia: Hemoglobin has remained stable following transfusion of packed red blood cells.  Continue iron supplementation, Epogen with dialysis and continue to monitor hemoglobin with transfusion as needed arises.  Findings of iron studies have been noted.    Diabetes mellitus: Stable.  Continue anti-glycemic with Accu-Cheks and sliding scale insulin.    O2 dependent COPD: Patient is currently not in exacerbation.  Continue supplemental oxygen bronchodilators.  At baseline she uses 2 L of supplemental oxygen at the nursing home.    Status post aortic valve replacement: Hold warfarin for now secondary to elevated INR 4.04.  Continue to trend INR.      Depressive disorder: Continue citalopram.    Deconditioning: Continue PT and OT.    Continue GI and DVT prophylaxis.    Discharge Planning: In progress.    Dudley Alfredo MD   03/03/20   10:52 AM

## 2020-03-03 NOTE — CONSULTS
"Adult Nutrition  Assessment    Patient Name:  Brendon Skelton  YOB: 1945  MRN: [id number]  Admit Date:  2/24/2020    Assessment Date:  3/3/2020    Comments:  Pt continues to require dialysis and outpatient dialysis will be set up.  She is having nausea which often effects her appetite.  Appetite up and down per pt.  Po intake 100% at lunch but she didn't eat breakfast.  Per notes--no surgical intervention planned for hematoma.  Rd add NovaRenal with meals and monitor.    Reason for Assessment     Row Name 03/03/20 1545          Reason for Assessment    Reason For Assessment  follow-up protocol         Nutrition/Diet History     Row Name 03/03/20 1545          Nutrition/Diet History    Typical Food/Fluid Intake  Pt is alert but tired. Said that her appetite \"sometimes OK and sometimes not OK\"  She is having nausea.  would like to try the Novasource.             Labs/Tests/Procedures/Meds     Row Name 03/03/20 1546          Labs/Procedures/Meds    Lab Results Reviewed  reviewed, pertinent        Diagnostic Tests/Procedures    Diagnostic Test/Procedure Reviewed  reviewed, pertinent        Medications    Pertinent Medications Reviewed  reviewed, pertinent         Physical Findings     Row Name 03/03/20 1546          Physical Findings    Overall Physical Appearance  obese;on oxygen therapy     Gastrointestinal  nausea           Nutrition Prescription Ordered     Row Name 03/03/20 1546          Nutrition Prescription PO    Current PO Diet  Regular     Fluid Consistency  Thin     Common Modifiers  Cardiac;Consistent Carbohydrate;Renal         Evaluation of Received Nutrient/Fluid Intake     Row Name 03/03/20 1546          PO Evaluation    Number of Days PO Intake Evaluated  2 days     % PO Intake  100/50/0/100               Electronically signed by:  Esther Alcala RD  03/03/20 3:49 PM  "

## 2020-03-03 NOTE — DISCHARGE PLACEMENT REQUEST
"Brendon Kamara (74 y.o. Female)     Date of Birth Social Security Number Address Home Phone MRN    1945  [address] [facility] [address] [phone] [id number]    Rastafari Marital Status          None        Admission Date Admission Type Admitting Provider Attending Provider Department, Room/Bed    2/24/20 Emergency Jake Linares MD [place], Emmanuel [place], MD [address], 358/1    Discharge Date Discharge Disposition Discharge Destination                       Attending Provider:  Markel Fountain MD    Allergies:  Crestor [Rosuvastatin Calcium], Lipitor [Atorvastatin], Lortab [Hydrocodone-acetaminophen], Adhesive Tape    Isolation:  None   Infection:  None   Code Status:  CPR    Ht:  165.1 cm (65\")   Wt:  113 kg (249 lb 9.6 oz)    Admission Cmt:  None   Principal Problem:  Lower abdominal pain [R10.30]                 Active Insurance as of 2/24/2020     Primary Coverage     Payor Plan Insurance Group Employer/Plan Group    MEDICARE MEDICARE A & B      Payor Plan Address Payor Plan Phone Number Payor Plan Fax Number Effective Dates    [address] [phone]  9/1/2010 - None Entered    [facility] [id number]       Subscriber Name Subscriber Birth Date Member ID       BRENDON KAMARA 1945 [id number]           Secondary Coverage     Payor Plan Insurance Group Employer/Plan Group    ANTHEM MEDICAID [facility] MEDICAID [id number]     Payor Plan Address Payor Plan Phone Number Payor Plan Fax Number Effective Dates    [address] [address] [phone]  7/2/2019 - None Entered    [facility] [id number]       Subscriber Name Subscriber Birth Date Member ID       BRENDON KAMARA 1945 [id number]                 Emergency Contacts      (Rel.) Home Phone Work Phone Mobile Phone    Hermann Marrufo (Son) [phone] -- [phone]    Carleen Fraser (Relative) [phone] -- [phone]            "

## 2020-03-04 NOTE — NURSING NOTE
Patient has a stage 2 pressure injury left heel . It measures 1.5 x 1 x 0.1 cm Wound bed granulated. NPOA  Needs wound care, offloading and protection.

## 2020-03-04 NOTE — SIGNIFICANT NOTE
03/04/20 1000   Rehab Treatment   Discipline physical therapy assistant   Reason Treatment Not Performed unavailable for treatment  (Pt off floor to HD. Will check back in pm.)

## 2020-03-04 NOTE — NURSING NOTE
Patient called and stated she was throwing up. Patient had vomited small amount of food and bile. Upon assessment, patient was exhibiting signs of anxiety with tachypnea and was nauseous. Patient was repositioned and given zofran for nausea. Respiratory called for breathing treatment. O2 saturation 86-88%.  O2 turned increased to 5 lpm. Anterior lung sounds were clear/diminished. Patient verbalizes feeling better. O2 saturation up to 94% on 5 lpm per nasal canula./76 manually, HR 80, RR 24.

## 2020-03-04 NOTE — PROGRESS NOTES
"Anticoagulation by Pharmacy - Warfarin    Brendon Skelton is a 74 y.o.female being continued on warfarin for atrial fibrillation with valve replacement  Home regimen: 5 mg Su/Tu/Th, 2.5 mg all other days  INR Goal: 2.5-3.5    Last INR:   Lab Results   Component Value Date    INR 6.40 (C) 03/04/2020       Objective:  [Ht: (P) 165.1 cm (65\"); Wt: 114 kg (250 lb 11.2 oz)]  Lab Results   Component Value Date    INR 6.40 (C) 03/04/2020    INR 4.04 (H) 03/03/2020    INR 2.57 (H) 03/02/2020    PROTIME 56.1 (H) 03/04/2020    PROTIME 39.0 (H) 03/03/2020    PROTIME 27.3 (H) 03/02/2020     Lab Results   Component Value Date    HGB 9.1 (L) 03/04/2020    HGB 8.8 (L) 03/03/2020    HGB 8.7 (L) 03/02/2020    HCT 27.9 (L) 03/04/2020    HCT 27.1 (L) 03/03/2020    HCT 26.6 (L) 03/02/2020     03/04/2020     03/03/2020     03/02/2020       Recent Warfarin Administrations       The 5 most recent administrations since 02/26/2020 are shown below each listed medication.    Warfarin Sodium         Order Dose Date Given     warfarin (COUMADIN) tablet 2.5 mg 2.5 mg 03/02/2020     warfarin (COUMADIN) tablet 5 mg 5 mg 03/01/2020      5 mg 02/29/2020                      Assessment  Interacting medications: citalopram can increase the effect of warfarin on the INR  INR is supratherapeutic and trending up quickly even after dose was held yesterday  H&H trended up    Plan:  1.  HOLD warfarin  2.  Draw a PT/INR in AM  3.  Pharmacy will continue to follow    Cristino Ness RPH  03/04/20 2:12 PM     "

## 2020-03-04 NOTE — PLAN OF CARE
Dialysis today, patient nauseous and vomiting small quantities x3 per shift. Medications administered

## 2020-03-04 NOTE — PROGRESS NOTES
[facility] Medicine Services  INPATIENT PROGRESS NOTE    Length of Stay: 9  Date of Admission: 2/24/2020  Primary Care Physician: Josefa Pozo APRN    Subjective   Chief Complaint: No new complaints.    HPI:    74 year old female patient who came to the ED complaining of lower abdominal pain. She underwent laparoscopic colectomy for colon cancer recently. CT abdomen at the ED revealed some collection in the anterior abdominal wall consistent with a hematoma versus abscess.  At the ED she also complained of SOB and her O2 requirement went from baseline of3L to 4 liters. CXR revealed Cardiomegaly with vascular /interstitial congestion and small pleural effusions. Appearance is similar to 2/15/2020 but lasix IV times 1 was given.   Patient was initially started on vancomycin and ceftriaxone.  BUN/creatinine continue to increase at which point the patient was seen by the nephrologist and was started on hemodialysis on 2/28/2020.    Patient is seen for follow-up today. She is doing better, less deconditioned and voices no new complaints.  She is now on supplemental oxygen of 4 to 5 L nasal prongs and maintaining saturation of 93 to 94%.    Review of Systems   Constitutional: Positive for fatigue. Negative for activity change, appetite change, chills, diaphoresis and fever.   HENT: Negative for trouble swallowing and voice change.    Eyes: Negative for photophobia and visual disturbance.   Respiratory: Negative for cough, choking, chest tightness, shortness of breath, wheezing and stridor.    Cardiovascular: Negative for chest pain, palpitations and leg swelling.   Gastrointestinal: Negative for abdominal distention, abdominal pain, blood in stool, constipation, diarrhea, nausea and vomiting.   Endocrine: Negative for cold intolerance, heat intolerance, polydipsia, polyphagia and polyuria.   Genitourinary: Positive for decreased urine volume. Negative for difficulty urinating,  dysuria, enuresis, flank pain, frequency, hematuria and urgency.   Musculoskeletal: Negative for arthralgias, gait problem, myalgias, neck pain and neck stiffness.   Skin: Negative for pallor, rash and wound.   Neurological: Negative for dizziness, tremors, seizures, syncope, facial asymmetry, speech difficulty, weakness, light-headedness, numbness and headaches.   Hematological: Does not bruise/bleed easily.   Psychiatric/Behavioral: Negative for agitation, behavioral problems and confusion.       Objective    Temp:  [96.1 °F (35.6 °C)-98 °F (36.7 °C)] 96.1 °F (35.6 °C)  Heart Rate:  [63-86] 73  Resp:  [18-24] 24  BP: (136-167)/(62-89) 160/69    Physical Exam   Constitutional: She is oriented to person, place, and time. She appears well-developed and well-nourished. She is cooperative. No distress.   Patient is morbidly obese and has a BMI 41.46.     HENT:   Head: Normocephalic and atraumatic.   Right Ear: External ear normal.   Left Ear: External ear normal.   Nose: Nose normal.   Mouth/Throat: Oropharynx is clear and moist.   Eyes: Pupils are equal, round, and reactive to light. Conjunctivae and EOM are normal.   Neck: Normal range of motion. Neck supple. No JVD present. No thyromegaly present.   Cardiovascular: Normal rate, regular rhythm, normal heart sounds and intact distal pulses. Exam reveals no gallop and no friction rub.   No murmur heard.  Pulmonary/Chest: Effort normal. No stridor. No respiratory distress. She has no wheezes. She has rhonchi. She has no rales. She exhibits no tenderness.   Abdominal: Soft. Bowel sounds are normal. She exhibits no distension and no mass. There is no tenderness. There is no rebound and no guarding. No hernia.   Musculoskeletal: Normal range of motion. She exhibits no edema, tenderness or deformity.   Neurological: She is alert and oriented to person, place, and time. She has normal reflexes. No cranial nerve deficit or sensory deficit. She exhibits normal muscle tone.  Coordination normal.   Skin: Skin is warm and dry. No rash noted. She is not diaphoretic. No erythema. No pallor.   Psychiatric: She has a normal mood and affect. Her behavior is normal. Judgment and thought content normal.   Nursing note and vitals reviewed.        Medication Review:    Current Facility-Administered Medications:   •  acetaminophen (TYLENOL) tablet 650 mg, 650 mg, Oral, Q4H PRN, Jake Linares MD  •  albumin human 25 % IV SOLN 12.5 g, 12.5 g, Intravenous, PRN, Sandy Caputo MD  •  albumin human 25 % IV SOLN 12.5 g, 12.5 g, Intravenous, PRN, Sandy Caputo MD  •  aspirin chewable tablet 81 mg, 81 mg, Oral, Daily, Jake Linares MD, 81 mg at 03/03/20 0927  •  budesonide-formoterol (SYMBICORT) 160-4.5 MCG/ACT inhaler 2 puff, 2 puff, Inhalation, BID - RT, Dudley Alfredo MD  •  cefTRIAXone (ROCEPHIN) 2 g/100 mL 0.9% NS VTB (PATRICIA), 2 g, Intravenous, Q24H, Markel Fountain MD, 2 g at 03/03/20 2003  •  cholecalciferol (VITAMIN D3) tablet 2,000 Units, 2,000 Units, Oral, Daily, Jake Linares MD, 2,000 Units at 03/03/20 0926  •  citalopram (CeleXA) tablet 20 mg, 20 mg, Oral, Daily, Jake Linares MD, 20 mg at 03/03/20 0927  •  dextrose (D50W) 25 g/ 50mL Intravenous Solution 25 g, 25 g, Intravenous, Q15 Min PRN, Jake Linares MD  •  dextrose (GLUTOSE) oral gel 15 g, 15 g, Oral, Q15 Min PRN, Jake Linares MD  •  dilTIAZem CD (CARDIZEM CD) 24 hr capsule 240 mg, 240 mg, Oral, Daily, Jake Linares MD, 240 mg at 03/03/20 0926  •  epoetin rosales (EPOGEN,PROCRIT) injection 10,000 Units, 10,000 Units, Intravenous, Once per day on Mon Wed Fri, Sandy Caputo MD, 10,000 Units at 03/02/20 1119  •  ferric gluconate (FERRLECIT) 125 mg in sodium chloride 0.9 % 110 mL IVPB, 125 mg, Intravenous, Once, Sandy Caputo MD  •  glucagon (human recombinant) (GLUCAGEN DIAGNOSTIC) injection 1 mg, 1 mg, Subcutaneous, Q15 Min PRN, Jake Linares MD  •  insulin aspart  (novoLOG) injection 0-9 Units, 0-9 Units, Subcutaneous, 4x Daily AC & at Bedtime, Jake Linares MD, 4 Units at 02/26/20 2058  •  insulin detemir (LEVEMIR) injection 25 Units, 25 Units, Subcutaneous, Nightly, Jake Linares MD, 25 Units at 02/26/20 2059  •  ipratropium-albuterol (DUO-NEB) nebulizer solution 3 mL, 3 mL, Nebulization, Q4H - RT, Dudley Alfredo MD, 3 mL at 03/04/20 0510  •  magic butt ointment, , Topical, BID PRN, Apolinar Azeveod MD  •  melatonin tablet 3 mg, 3 mg, Oral, Nightly PRN, Amy Kamara MD, 3 mg at 03/03/20 2311  •  metoprolol succinate XL (TOPROL-XL) 24 hr tablet 25 mg, 25 mg, Oral, Daily, Jake Linares MD, 25 mg at 03/03/20 0926  •  morphine injection 2 mg, 2 mg, Intravenous, Q4H PRN, 2 mg at 02/28/20 1003 **AND** naloxone (NARCAN) injection 0.4 mg, 0.4 mg, Intravenous, Q5 Min PRN, Jake Linares MD  •  ondansetron (ZOFRAN) injection 4 mg, 4 mg, Intravenous, Q6H PRN, Jake Linares MD, 4 mg at 03/04/20 0501  •  oxyCODONE (ROXICODONE) immediate release tablet 10 mg, 10 mg, Oral, Q6H PRN, Markel Fountain MD, 10 mg at 03/03/20 2003  •  Pharmacy to dose warfarin, , Does not apply, Continuous PRN, Markel Fountain MD  •  prenatal vitamin 27-0.8 tablet 1 tablet, 1 tablet, Oral, Daily, Jake Linares MD, 1 tablet at 03/03/20 0927  •  rOPINIRole (REQUIP) tablet 0.25 mg, 0.25 mg, Oral, Nightly, Jake Linares MD, 0.25 mg at 03/03/20 2003  •  sodium chloride 0.9 % bolus 100 mL, 100 mL, Intravenous, PRN, Sandy Caputo MD  •  [COMPLETED] Insert peripheral IV, , , Once **AND** sodium chloride 0.9 % flush 10 mL, 10 mL, Intravenous, PRN, Jake Linares MD  •  sodium chloride 0.9 % flush 10 mL, 10 mL, Intravenous, Q12H, Jake Linares MD, 10 mL at 03/03/20 2003  •  sodium chloride 0.9 % flush 10 mL, 10 mL, Intravenous, PRN, Jake Linares MD  •  vitamin C (ASCORBIC ACID) tablet 250 mg, 250 mg, Oral,  Daily, Jake Linares MD, 250 mg at 03/03/20 0927    Results Review:  I have reviewed the labs, radiology results, and diagnostic studies.    Laboratory Data:   Results from last 7 days   Lab Units 03/04/20  0628 03/03/20  0500 03/02/20  0508   SODIUM mmol/L 127* 131* 130*   POTASSIUM mmol/L 4.0 3.7 4.2   CHLORIDE mmol/L 87* 92* 90*   CO2 mmol/L 23.0 23.0 22.0   BUN mg/dL 49* 41* 74*   CREATININE mg/dL 4.73* 3.68* 5.14*   GLUCOSE mg/dL 106* 107* 103*   CALCIUM mg/dL 9.5 9.2 9.3   ANION GAP mmol/L 17.0* 16.0* 18.0*     Estimated Creatinine Clearance: 13.1 mL/min (A) (by C-G formula based on SCr of 4.73 mg/dL (H)).  Results from last 7 days   Lab Units 03/02/20  0508 02/28/20  0534   PHOSPHORUS mg/dL 6.9* 6.8*         Results from last 7 days   Lab Units 03/04/20  0628 03/03/20  0500 03/02/20  0508 03/01/20  0529 02/29/20  0617   WBC 10*3/mm3 10.17 8.00 9.51 11.34* 11.61*   HEMOGLOBIN g/dL 9.1* 8.8* 8.7* 8.4* 8.5*   HEMATOCRIT % 27.9* 27.1* 26.6* 25.7* 25.0*   PLATELETS 10*3/mm3 328 304 308 278 252     Results from last 7 days   Lab Units 03/04/20  0628 03/03/20  0500 03/02/20  0508 03/01/20  0529 02/29/20  0617   INR  6.40* 4.04* 2.57* 2.22* 2.17*       Culture Data:   Blood Culture   Date Value Ref Range Status   03/01/2020 No growth at 2 days  Preliminary   03/01/2020 No growth at 2 days  Preliminary     No results found for: URINECX  No results found for: RESPCX  No results found for: WOUNDCX  No results found for: STOOLCX  No components found for: BODYFLD    Radiology Data:   Imaging Results (Last 24 Hours)     ** No results found for the last 24 hours. **          I have reviewed the patient's current medications.     Assessment/Plan     Hospital Problem List:  Principal Problem:    Lower abdominal pain  Active Problems:    Acute on chronic diastolic congestive heart failure (CMS/HCC)    Renal insufficiency    Hematoma of abdominal wall    Lower abdominal pain secondary to intra-abdominal and abdominal wall  hematoma: Stable.  Continue pain control.      Acute on chronic heart failure (with preserved ejection fraction): Much improved. Patient is currently on hemodialysis and off diuretics.  Chest x-ray done on 2/28/2020 showed some improvement.  Echocardiogram done on 6/18/2019 showed an ejection fraction of 46 to 50%.  Repeat chest x-ray in a.m.    Acute on chronic kidney disease: Continue hemodialysis.  Nephrologist is following.    Possible pneumonia with E. coli bacteremia: Continue ceftriaxone.    Anemia: Hemoglobin has remained stable following transfusion of packed red blood cells.  Continue iron supplementation, Epogen with dialysis and continue to monitor hemoglobin with transfusion as needed arises.  Findings of iron studies have been noted.    Diabetes mellitus: Stable.  Continue anti-glycemic with Accu-Cheks and sliding scale insulin.    O2 dependent COPD: Patient is currently not in exacerbation.  Continue supplemental oxygen and bronchodilators.  She was on Anoro as outpatient.  Will add Symbicort.  At baseline she uses 2 L of supplemental oxygen at the nursing home.    Status post aortic valve replacement: Warfarin remains on hold secondary to elevated INR.  INR has increased to 6.40 today.  She may require vitamin K if the need arises.  Continue to trend INR.    Hyponatremia (likely hypervolemic): Begin free water restriction and monitor BMP.  Nephrology is following.    Depressive disorder: Continue citalopram.    Deconditioning: Continue PT and OT.    Continue GI and DVT prophylaxis.    Discharge Planning: In progress.    Dudley Alfredo MD   03/04/20   10:29 AM

## 2020-03-04 NOTE — PLAN OF CARE
Problem: Patient Care Overview  Goal: Plan of Care Review  Outcome: Ongoing (interventions implemented as appropriate)  Flowsheets (Taken 3/4/2020 7032)  Progress: no change  Plan of Care Reviewed With: patient  Outcome Summary: VSS; pain managed with medicine; rested well through out the night; no complaints at this time; will continue to monitor

## 2020-03-04 NOTE — PROGRESS NOTES
"[facility]  [address] [place] [address]. [address]  T - [phone]  F - [phone]     Progress Note          PATIENT  DEMOGRAPHICS   PATIENT NAME: Brendon Skelton                      PHYSICIAN: Sandy Caputo MD  : 1945  MRN: [id number]   LOS: 9 days    Patient Care Team:  Josefa Pozo APRN as PCP - General (Nurse Practitioner)  Meme Duckworth APRN as PCP - Claims Attributed  Luis Maher MD as Surgeon (General Surgery)  Veronica Ruiz, MAI as Ambulatory  ([facility])  Subjective   SUBJECTIVE   Seen during hd, no soa, no marked pain at present         Objective   OBJECTIVE   Vital Signs  Temp:  [96.1 °F (35.6 °C)-98 °F (36.7 °C)] 96.1 °F (35.6 °C)  Heart Rate:  [63-86] 73  Resp:  [16-24] 24  BP: (136-167)/(62-89) 160/69    Flowsheet Rows      First Filed Value   Admission Height  165.1 cm (65\") Documented at 2020 1322   Admission Weight  111 kg (245 lb) Documented at 2020 1322           I/O last 3 completed shifts:  In: 1880 [P.O.:1680; IV Piggyback:200]  Out: 90 [Urine:90]    PHYSICAL EXAM    Physical Exam   Constitutional: She is oriented to person, place, and time. She appears well-developed.   HENT:   Head: Normocephalic.   Eyes: Pupils are equal, round, and reactive to light.   Cardiovascular: Normal rate, regular rhythm and normal heart sounds.   Pulmonary/Chest: Effort normal and breath sounds normal.   Abdominal: Soft. Bowel sounds are normal. There is tenderness.   Large lower abdominal bruise   Musculoskeletal: She exhibits edema.   Neurological: She is alert and oriented to person, place, and time.       RESULTS   Results Review:    Results from last 7 days   Lab Units 20  0628 20  0500 20  0508   SODIUM mmol/L 127* 131* 130*   POTASSIUM mmol/L 4.0 3.7 4.2   CHLORIDE mmol/L 87* 92* 90*   CO2 mmol/L 23.0 23.0 22.0   BUN mg/dL 49* 41* 74*   CREATININE mg/dL 4.73* 3.68* 5.14*   CALCIUM mg/dL 9.5 9.2 9.3   "   GLUCOSE mg/dL 106* 107* 103*       Estimated Creatinine Clearance: 13.1 mL/min (A) (by C-G formula based on SCr of 4.73 mg/dL (H)).    Results from last 7 days   Lab Units 03/02/20  0508 02/28/20  0534   PHOSPHORUS mg/dL 6.9* 6.8*             Results from last 7 days   Lab Units 03/04/20  0628 03/03/20  0500 03/02/20  0508 03/01/20  0529 02/29/20  0617   WBC 10*3/mm3 10.17 8.00 9.51 11.34* 11.61*   HEMOGLOBIN g/dL 9.1* 8.8* 8.7* 8.4* 8.5*   PLATELETS 10*3/mm3 328 304 308 278 252       Results from last 7 days   Lab Units 03/04/20  0628 03/03/20  0500 03/02/20  0508 03/01/20  0529 02/29/20  0617   INR  6.40* 4.04* 2.57* 2.22* 2.17*         Imaging Results (Last 24 Hours)     ** No results found for the last 24 hours. **           MEDICATIONS      aspirin 81 mg Oral Daily   budesonide-formoterol 2 puff Inhalation BID - RT   cefTRIAXone 2 g Intravenous Q24H   cholecalciferol 2,000 Units Oral Daily   citalopram 20 mg Oral Daily   dilTIAZem  mg Oral Daily   epoetin rosales/rosales-epbx 10,000 Units Intravenous Once per day on Mon Wed Fri   ferric gluconate (FERRLECIT) IVPB 125 mg Intravenous Once   ferrous sulfate 324 mg Oral Daily With Breakfast   insulin aspart 0-9 Units Subcutaneous 4x Daily AC & at Bedtime   insulin detemir 25 Units Subcutaneous Nightly   ipratropium-albuterol 3 mL Nebulization Q4H - RT   metoprolol succinate XL 25 mg Oral Daily   prenatal vitamin 27-0.8 1 tablet Oral Daily   rOPINIRole 0.25 mg Oral Nightly   sodium chloride 10 mL Intravenous Q12H   vitamin C with bob hips 250 mg Oral Daily       Pharmacy to dose warfarin        Assessment/Plan   ASSESSMENT / PLAN      Lower abdominal pain    Acute on chronic diastolic congestive heart failure (CMS/HCC)    Renal insufficiency    Hematoma of abdominal wall    1.CKD 4 baseline creatinine is 2.  Renal function was stable on arrival.  She does have elevated proBNP and shortness of air.  Patient received Lasix in the ER and was on Bumex and metolazone.   Her creatinine is up to 4.57, elevated bun - initiated hd on 2/28/2020. Hd today. UO poor , doubt any recovery. I have asked dr Feliz for tunnel cath placement. She is stable for discharge after tunnel cath placement. Out pt is setup for MWF at [facility] negative. Off vancomycin. Xray improved congestion    2.intra-abdominal and abdominal wall hematoma after the surgery.  Patient Coumadin is on hold.  No evidence of anastomotic leak. No surgical intervention is planned     3.anemia with recent blood loss while on lovenox received 4 units of packed RBC t sat is low and will give iv fe and epogen today with hd again     4. soa / hcap E.coli in blood . Also GPC in gram stain - not viable for growth. On ceftriaxone              This document has been electronically signed by Sandy Caputo MD on March 4, 2020 9:20 AM

## 2020-03-04 NOTE — SIGNIFICANT NOTE
"   03/04/20 2158   Rehab Treatment   Discipline physical therapy assistant   Reason Treatment Not Performed patient/family declined treatment  (Pt adamently refused to participate with therapy in any way stating she was sick and needed to \"Throw up\". Will check on pt tomorrow.)     "

## 2020-03-05 NOTE — PROGRESS NOTES
[facility]  INPATIENT PROGRESS NOTE    Length of Stay: 10  Date of Admission: 2/24/2020  Primary Care Physician: Josefa Pozo APRN    Subjective   Chief Complaint: No new complaints.    HPI:    74 year old female patient who came to the ED complaining of lower abdominal pain. She underwent laparoscopic colectomy for colon cancer recently. CT abdomen at the ED revealed some collection in the anterior abdominal wall consistent with a hematoma versus abscess.  At the ED she also complained of SOB and her O2 requirement went from baseline of3L to 4 liters. CXR revealed Cardiomegaly with vascular /interstitial congestion and small pleural effusions. Appearance is similar to 2/15/2020 but lasix IV times 1 was given.   Patient was initially started on vancomycin and ceftriaxone.  BUN/creatinine continue to increase at which point the patient was seen by the nephrologist and was started on hemodialysis on 2/28/2020.    Patient is seen for follow-up today.  Bleeding from the dialysis catheter site on the right groin has been reported requiring pressure dressing and a dose of vitamin K.  She is doing better, less deconditioned and voices no new complaints.  She remains on supplemental oxygen of 4 to 5 L nasal prongs and maintaining saturation of 93 to 94%.    Review of Systems   Constitutional: Positive for fatigue. Negative for activity change, appetite change, chills, diaphoresis and fever.   HENT: Negative for trouble swallowing and voice change.    Eyes: Negative for photophobia and visual disturbance.   Respiratory: Negative for cough, choking, chest tightness, shortness of breath, wheezing and stridor.    Cardiovascular: Negative for chest pain, palpitations and leg swelling.   Gastrointestinal: Negative for abdominal distention, abdominal pain, blood in stool, constipation, diarrhea, nausea and vomiting.   Endocrine: Negative for cold intolerance, heat  intolerance, polydipsia, polyphagia and polyuria.   Genitourinary: Positive for decreased urine volume. Negative for difficulty urinating, dysuria, enuresis, flank pain, frequency, hematuria and urgency.   Musculoskeletal: Negative for arthralgias, gait problem, myalgias, neck pain and neck stiffness.   Skin: Negative for pallor, rash and wound.   Neurological: Negative for dizziness, tremors, seizures, syncope, facial asymmetry, speech difficulty, weakness, light-headedness, numbness and headaches.   Hematological: Bruises/bleeds easily.   Psychiatric/Behavioral: Negative for agitation, behavioral problems and confusion.       Objective    Temp:  [97.6 °F (36.4 °C)-98.6 °F (37 °C)] 98.6 °F (37 °C)  Heart Rate:  [74-96] 92  Resp:  [18-22] 18  BP: (144-174)/(60-69) 170/60    Physical Exam   Constitutional: She is oriented to person, place, and time. She appears well-developed and well-nourished. She is cooperative. No distress.   Patient is morbidly obese and has a BMI 41.82.     HENT:   Head: Normocephalic and atraumatic.   Right Ear: External ear normal.   Left Ear: External ear normal.   Nose: Nose normal.   Mouth/Throat: Oropharynx is clear and moist.   Eyes: Pupils are equal, round, and reactive to light. Conjunctivae and EOM are normal.   Neck: Normal range of motion. Neck supple. No JVD present. No thyromegaly present.   Cardiovascular: Normal rate, regular rhythm, normal heart sounds and intact distal pulses. Exam reveals no gallop and no friction rub.   No murmur heard.  Pulmonary/Chest: Effort normal. No stridor. No respiratory distress. She has no wheezes. She has rhonchi. She has no rales. She exhibits no tenderness.   Abdominal: Soft. Bowel sounds are normal. She exhibits no distension and no mass. There is no tenderness. There is no rebound and no guarding. No hernia.   Musculoskeletal: Normal range of motion. She exhibits no edema, tenderness or deformity.   Neurological: She is alert and oriented to  person, place, and time. She has normal reflexes. No cranial nerve deficit or sensory deficit. She exhibits normal muscle tone. Coordination normal.   Skin: Skin is warm and dry. No rash noted. She is not diaphoretic. No erythema. No pallor.   Psychiatric: She has a normal mood and affect. Her behavior is normal. Judgment and thought content normal.   Nursing note and vitals reviewed.        Medication Review:    Current Facility-Administered Medications:   •  acetaminophen (TYLENOL) tablet 650 mg, 650 mg, Oral, Q4H PRN, Jake Linares MD  •  albumin human 25 % IV SOLN 12.5 g, 12.5 g, Intravenous, PRN, Sandy Caputo MD  •  aspirin chewable tablet 81 mg, 81 mg, Oral, Daily, Jake Linares MD, 81 mg at 03/05/20 0911  •  BAG BALM ointment 1 g, 1 application, Apply externally, BID, Dudley Alfredo MD, 1 g at 03/05/20 0912  •  budesonide-formoterol (SYMBICORT) 160-4.5 MCG/ACT inhaler 2 puff, 2 puff, Inhalation, BID - RT, Dudley Alfredo MD, 2 puff at 03/04/20 2014  •  cefTRIAXone (ROCEPHIN) 2 g/100 mL 0.9% NS VTB (PATRICIA), 2 g, Intravenous, Q24H, Markel Fountain MD, 2 g at 03/04/20 2145  •  cholecalciferol (VITAMIN D3) tablet 2,000 Units, 2,000 Units, Oral, Daily, Jake Linares MD, 2,000 Units at 03/05/20 0912  •  citalopram (CeleXA) tablet 20 mg, 20 mg, Oral, Daily, Jake Linares MD, 20 mg at 03/05/20 0911  •  dextrose (D50W) 25 g/ 50mL Intravenous Solution 25 g, 25 g, Intravenous, Q15 Min PRN, Jake Linares MD  •  dextrose (GLUTOSE) oral gel 15 g, 15 g, Oral, Q15 Min PRN, Jake Linares MD  •  dilTIAZem CD (CARDIZEM CD) 24 hr capsule 240 mg, 240 mg, Oral, Daily, Jake Linares MD, 240 mg at 03/05/20 0911  •  epoetin rosales (EPOGEN,PROCRIT) injection 10,000 Units, 10,000 Units, Intravenous, Once per day on Mon Wed Fri, Sandy Caputo MD, 10,000 Units at 03/04/20 1030  •  glucagon (human recombinant) (GLUCAGEN DIAGNOSTIC) injection 1 mg, 1 mg, Subcutaneous, Q15  Min PRN, Jake Linares MD  •  insulin aspart (novoLOG) injection 0-9 Units, 0-9 Units, Subcutaneous, 4x Daily AC & at Bedtime, Jake Linares MD, 4 Units at 02/26/20 2058  •  insulin detemir (LEVEMIR) injection 25 Units, 25 Units, Subcutaneous, Nightly, Jake Linares MD, 25 Units at 02/26/20 2059  •  ipratropium-albuterol (DUO-NEB) nebulizer solution 3 mL, 3 mL, Nebulization, Q4H - RT, Dudley Alfredo MD, 3 mL at 03/04/20 2008  •  magic butt ointment, , Topical, BID PRN, Apolinar Azevedo MD  •  melatonin tablet 3 mg, 3 mg, Oral, Nightly PRN, Amy Kamara MD, 3 mg at 03/03/20 2311  •  metoprolol succinate XL (TOPROL-XL) 24 hr tablet 25 mg, 25 mg, Oral, Daily, Jake Linares MD, 25 mg at 03/05/20 0911  •  morphine injection 2 mg, 2 mg, Intravenous, Q4H PRN, 2 mg at 03/04/20 1923 **AND** naloxone (NARCAN) injection 0.4 mg, 0.4 mg, Intravenous, Q5 Min PRN, Jake Linares MD  •  ondansetron (ZOFRAN) injection 4 mg, 4 mg, Intravenous, Q6H PRN, Jake Linares MD, 4 mg at 03/05/20 0028  •  oxyCODONE (ROXICODONE) immediate release tablet 10 mg, 10 mg, Oral, Q6H PRN, Markel Fountain MD, 10 mg at 03/03/20 2003  •  Pharmacy to dose warfarin, , Does not apply, Continuous PRN, Markel Fountain MD  •  prenatal vitamin 27-0.8 tablet 1 tablet, 1 tablet, Oral, Daily, Jake Linares MD, 1 tablet at 03/05/20 0911  •  rOPINIRole (REQUIP) tablet 0.25 mg, 0.25 mg, Oral, Nightly, Jake Linares MD, 0.25 mg at 03/04/20 2034  •  sodium chloride 0.9 % bolus 100 mL, 100 mL, Intravenous, PRN, Sandy Caputo MD  •  [COMPLETED] Insert peripheral IV, , , Once **AND** sodium chloride 0.9 % flush 10 mL, 10 mL, Intravenous, PRN, Jake Linares MD  •  sodium chloride 0.9 % flush 10 mL, 10 mL, Intravenous, Q12H, Jake Linares MD, 10 mL at 03/04/20 2034  •  sodium chloride 0.9 % flush 10 mL, 10 mL, Intravenous, PRN, Jake Linares MD  •  vitamin  C (ASCORBIC ACID) tablet 250 mg, 250 mg, Oral, Daily, Jake Linares MD, 250 mg at 03/05/20 0911    Results Review:  I have reviewed the labs, radiology results, and diagnostic studies.    Laboratory Data:   Results from last 7 days   Lab Units 03/05/20  0513 03/04/20  0628 03/03/20  0500   SODIUM mmol/L 133* 127* 131*   POTASSIUM mmol/L 3.8 4.0 3.7   CHLORIDE mmol/L 93* 87* 92*   CO2 mmol/L 24.0 23.0 23.0   BUN mg/dL 34* 49* 41*   CREATININE mg/dL 3.79* 4.73* 3.68*   GLUCOSE mg/dL 132* 106* 107*   CALCIUM mg/dL 9.1 9.5 9.2   ANION GAP mmol/L 16.0* 17.0* 16.0*     Estimated Creatinine Clearance: 16.4 mL/min (A) (by C-G formula based on SCr of 3.79 mg/dL (H)).  Results from last 7 days   Lab Units 03/02/20  0508 02/28/20  0534   PHOSPHORUS mg/dL 6.9* 6.8*         Results from last 7 days   Lab Units 03/05/20  0513 03/04/20  0628 03/03/20  0500 03/02/20  0508 03/01/20  0529   WBC 10*3/mm3 12.81* 10.17 8.00 9.51 11.34*   HEMOGLOBIN g/dL 8.8* 9.1* 8.8* 8.7* 8.4*   HEMATOCRIT % 27.8* 27.9* 27.1* 26.6* 25.7*   PLATELETS 10*3/mm3 309 328 304 308 278     Results from last 7 days   Lab Units 03/05/20  0513 03/04/20  0628 03/03/20  0500 03/02/20  0508 03/01/20  0529   INR  1.88* 6.40* 4.04* 2.57* 2.22*       Culture Data:   No results found for: BLOODCX  No results found for: URINECX  No results found for: RESPCX  No results found for: WOUNDCX  No results found for: STOOLCX  No components found for: BODYFLD    Radiology Data:   Imaging Results (Last 24 Hours)     Procedure Component Value Units Date/Time    XR Chest PA & Lateral [[id number]] Collected:  03/05/20 0802     Updated:  03/05/20 0839    Narrative:           PROCEDURE: Chest PA and lateral    REASON FOR EXAM: follow up chf, N28.9 Disorder of kidney and  ureter, unspecified R10.32 Left lower quadrant pain R79.1  Abnormal coagulation profile Z74.09 Other reduced mobility    FINDINGS: Comparison study dated February 28, 2020.  Stable  postsurgical changes with  median sternotomy. Stable pacemaker and  leads. Cardiomegaly. Pulmonary vasculature redistribution. Right  lung base small infrahilar interstitial opacity. Lungs are  otherwise clear. Pleural spaces are normal . No acute osseous  abnormality.      Impression:       1.  Cardiomegaly with pulmonary vasculature redistribution and  right lung base small infrahilar interstitial opacity. These  findings would be suspicious for pulmonary vascular congestion  with associated mild component of pulmonary edema.  2.  Remainder chest exam is unremarkable.    Electronically signed by:  Ke Zarate MD  3/5/2020 8:38 AM CST  Workstation: [id number]          I have reviewed the patient's current medications.     Assessment/Plan     Hospital Problem List:  Principal Problem:    Lower abdominal pain  Active Problems:    Acute on chronic diastolic congestive heart failure (CMS/HCC)    Renal insufficiency    Hematoma of abdominal wall    Lower abdominal pain secondary to intra-abdominal and abdominal wall hematoma: Stable.  Continue pain control.      Acute on chronic heart failure (with preserved ejection fraction): Much improved. Patient is currently on hemodialysis and off diuretics.  Echocardiogram done on 6/18/2019 showed an ejection fraction of 46 to 50%.  Findings of repeat chest x-ray this a.m. have been noted.      Acute on chronic kidney disease: Continue hemodialysis.  Nephrologist is following.    Possible pneumonia with E. coli bacteremia: Continue ceftriaxone.    Anemia: Hemoglobin has remained stable following transfusion of packed red blood cells.  Continue iron supplementation, Epogen with dialysis and continue to monitor hemoglobin with transfusion as needed arises.  Findings of iron studies have been noted.    Diabetes mellitus: Stable.  Continue anti-glycemic with Accu-Cheks and sliding scale insulin.    O2 dependent COPD: Patient is currently not in exacerbation.  Continue supplemental oxygen and bronchodilators.  She  was on Anoro as outpatient. At baseline she uses 2 L of supplemental oxygen at the nursing home.    Status post aortic valve replacement: Warfarin remains on hold secondary to elevated INR/bleeding at dialysis catheter site.  INR down to 1.88 today following a dose of vitamin K. Continue to trend INR.    Hyponatremia (likely hypervolemic): Much improved.  Continue free water restriction and monitor BMP.  Nephrology is following.    Depressive disorder: Continue citalopram.    Deconditioning: Continue PT and OT.    Continue GI and DVT prophylaxis.    Discharge Planning: In progress.    Dudley Alfredo MD   03/05/20   9:51 AM

## 2020-03-05 NOTE — PLAN OF CARE
Problem: Patient Care Overview  Goal: Plan of Care Review  Outcome: Ongoing (interventions implemented as appropriate)  Flowsheets (Taken 3/5/2020 3797)  Progress: no change  Plan of Care Reviewed With: patient  Outcome Summary: VSS; pain managed with medication; complains frequently of nausea; resting with eyes closed; will not leave heels floating on pillow and frequently rubs heel on sheets; will continue to monitor.

## 2020-03-05 NOTE — SIGNIFICANT NOTE
03/05/20 1035   Rehab Treatment   Discipline physical therapy assistant   Reason Treatment Not Performed patient/family declined treatment  (defers secondary to N/V)

## 2020-03-05 NOTE — NURSING NOTE
Patient's right groin dialysis catheter bleeding. INR 6.40. Dressing changed, pressure held until controlled. Magdalena Workman RN notified Dr. Alfredo. Order received to give a one time dose Vitamin K. Administered ordered Vitamin K. Bleeding stopped. Continued to monitor bleeding through out the night.

## 2020-03-05 NOTE — NURSING NOTE
Patient's right groin dialysis catheter bleeding, dressing changed and pressure is being held to control bleeding. INR 1.88. Dr. Caputo called; gave orders to hold pressure until controlled and that he would call the dialysis nurse to come assess; no FFP ordered at this time per doctor due to stable INR. Will continue to hold pressure until controlled and continue to monitor bleeding.

## 2020-03-05 NOTE — PROGRESS NOTES
"[facility]  [address] [place] [address]. [id number]  T - [phone]  F - [phone]     Progress Note          PATIENT  DEMOGRAPHICS   PATIENT NAME: Brendon Skelton                      PHYSICIAN: Sandy Caputo MD  : 1945  MRN: [id number]   LOS: 10 days    Patient Care Team:  Josefa Pozo APRN as PCP - General (Nurse Practitioner)  Meme Duckworth APRN as PCP - Claims Attributed  Luis Maher MD as Surgeon (General Surgery)  Veronica Ruiz, MAI as Ambulatory  ([facility])  Subjective   SUBJECTIVE   Bleeding from vascath site ON, now oozing, dressing is changed, comfortable now         Objective   OBJECTIVE   Vital Signs  Temp:  [97.6 °F (36.4 °C)-98.6 °F (37 °C)] 98.6 °F (37 °C)  Heart Rate:  [74-96] 92  Resp:  [18-22] 18  BP: (144-174)/(60-69) 170/60    Flowsheet Rows      First Filed Value   Admission Height  165.1 cm (65\") Documented at 2020 1322   Admission Weight  111 kg (245 lb) Documented at 2020 1322           I/O last 3 completed shifts:  In: 680 [P.O.:480; IV Piggyback:200]  Out: 4050 [Urine:100; Emesis/NG output:50; Other:3900]    PHYSICAL EXAM    Physical Exam   Constitutional: She is oriented to person, place, and time. She appears well-developed.   HENT:   Head: Normocephalic.   Eyes: Pupils are equal, round, and reactive to light.   Cardiovascular: Normal rate, regular rhythm and normal heart sounds.   Pulmonary/Chest: Effort normal and breath sounds normal.   Abdominal: Soft. Bowel sounds are normal. There is tenderness.   Large lower abdominal bruise   Musculoskeletal: She exhibits edema.   Neurological: She is alert and oriented to person, place, and time.       RESULTS   Results Review:    Results from last 7 days   Lab Units 20  0513 20  0628 20  0500   SODIUM mmol/L 133* 127* 131*   POTASSIUM mmol/L 3.8 4.0 3.7   CHLORIDE mmol/L 93* 87* 92*   CO2 mmol/L 24.0 23.0 23.0   BUN mg/dL 34* 49* 41*   "   CREATININE mg/dL 3.79* 4.73* 3.68*   CALCIUM mg/dL 9.1 9.5 9.2   GLUCOSE mg/dL 132* 106* 107*       Estimated Creatinine Clearance: 16.4 mL/min (A) (by C-G formula based on SCr of 3.79 mg/dL (H)).    Results from last 7 days   Lab Units 03/02/20  0508 02/28/20  0534   PHOSPHORUS mg/dL 6.9* 6.8*             Results from last 7 days   Lab Units 03/05/20  0513 03/04/20  0628 03/03/20  0500 03/02/20  0508 03/01/20  0529   WBC 10*3/mm3 12.81* 10.17 8.00 9.51 11.34*   HEMOGLOBIN g/dL 8.8* 9.1* 8.8* 8.7* 8.4*   PLATELETS 10*3/mm3 309 328 304 308 278       Results from last 7 days   Lab Units 03/05/20  0513 03/04/20  0628 03/03/20  0500 03/02/20  0508 03/01/20  0529   INR  1.88* 6.40* 4.04* 2.57* 2.22*         Imaging Results (Last 24 Hours)     Procedure Component Value Units Date/Time    XR Chest PA & Lateral [[id number]] Collected:  03/05/20 0802     Updated:  03/05/20 0839    Narrative:           PROCEDURE: Chest PA and lateral    REASON FOR EXAM: follow up chf, N28.9 Disorder of kidney and  ureter, unspecified R10.32 Left lower quadrant pain R79.1  Abnormal coagulation profile Z74.09 Other reduced mobility    FINDINGS: Comparison study dated February 28, 2020.  Stable  postsurgical changes with median sternotomy. Stable pacemaker and  leads. Cardiomegaly. Pulmonary vasculature redistribution. Right  lung base small infrahilar interstitial opacity. Lungs are  otherwise clear. Pleural spaces are normal . No acute osseous  abnormality.      Impression:       1.  Cardiomegaly with pulmonary vasculature redistribution and  right lung base small infrahilar interstitial opacity. These  findings would be suspicious for pulmonary vascular congestion  with associated mild component of pulmonary edema.  2.  Remainder chest exam is unremarkable.    Electronically signed by:  Ke Zarate MD  3/5/2020 8:38 AM CST  Workstation: [id number]           MEDICATIONS      aspirin 81 mg Oral Daily   BAG BALM 1 application Apply externally BID    budesonide-formoterol 2 puff Inhalation BID - RT   cefTRIAXone 2 g Intravenous Q24H   cholecalciferol 2,000 Units Oral Daily   citalopram 20 mg Oral Daily   dilTIAZem  mg Oral Daily   epoetin rosales/rosales-epbx 10,000 Units Intravenous Once per day on Mon Wed Fri   insulin aspart 0-9 Units Subcutaneous 4x Daily AC & at Bedtime   insulin detemir 25 Units Subcutaneous Nightly   ipratropium-albuterol 3 mL Nebulization Q4H - RT   metoprolol succinate XL 25 mg Oral Daily   prenatal vitamin 27-0.8 1 tablet Oral Daily   rOPINIRole 0.25 mg Oral Nightly   sodium chloride 10 mL Intravenous Q12H   vitamin C with bob hips 250 mg Oral Daily       Pharmacy to dose warfarin        Assessment/Plan   ASSESSMENT / PLAN      Lower abdominal pain    Acute on chronic diastolic congestive heart failure (CMS/HCC)    Renal insufficiency    Hematoma of abdominal wall    1.CKD 4 baseline creatinine is 2.  Renal function was stable on arrival.  She does have elevated proBNP and shortness of air.  Patient received Lasix in the ER and was on Bumex and metolazone.  Her creatinine is up to 4.57, elevated bun - initiated hd on 2/28/2020. Hd tomorrow. UO poor , doubt any recovery. Awaiting tunnel cath. Out pt is setup for MWF at [facility] negative. Off vancomycin. Xray again congestion - continue to work on UF wt is down to 251 from 277 lbs    2.intra-abdominal and abdominal wall hematoma after the surgery.  No evidence of anastomotic leak. No surgical intervention is planned. Back on coumadin     3.anemia with recent blood loss while on lovenox received 4 units of packed RBC t sat is low and will give iv fe and epogen with hd.     4. soa / hcap E.coli in blood . Also GPC in gram stain - not viable for growth. On ceftriaxone    5. Oozing from temp cath site now hemostasis is achieved with pressure.              This document has been electronically signed by Sandy Caputo MD on March 5, 2020 10:12 AM

## 2020-03-06 NOTE — THERAPY TREATMENT NOTE
Acute Care - Physical Therapy Treatment Note  [facility]     Patient Name: Brendon Skelton  : 1945  MRN: [id number]  Today's Date: 3/6/2020             Admit Date: 2020    Visit Dx:    ICD-10-CM ICD-9-CM   1. Stage 4 chronic kidney disease (CMS/HCC) N18.4 585.4   2. Renal insufficiency N28.9 593.9   3. Left lower quadrant abdominal pain R10.32 789.04   4. Elevated INR R79.1 790.92   5. Impaired physical mobility Z74.09 781.99     Patient Active Problem List   Diagnosis   • Long term current use of anticoagulant therapy   • Personal history of heart valve replacement   • Atrial fibrillation [I48.91]   • Emphysema of lung (CMS/HCC)   • On anticoagulant therapy   • Hyperlipidemia   • Diastolic heart failure (CMS/HCC)   • Depressive disorder   • COPD (chronic obstructive pulmonary disease) (CMS/HCC)   • Diabetes mellitus (CMS/HCC)   • Myopia   • Astigmatism   • Bleeding from open wound of chest wall   • Follow-up surgery care   • Encounter for screening for malignant neoplasm of colon   • Positive colorectal cancer screening using DNA-based stool test   • Nodule of left lung   • Chronic hypoxemic respiratory failure (CMS/HCC)   • Physical deconditioning   • Heart failure with preserved left ventricular function (HFpEF) (CMS/HCC)   • Hypertension   • Coronary artery disease involving native coronary artery without angina pectoris   • Hx of CABG   • SSS (sick sinus syndrome) (CMS/HCC)   • Pacemaker   • Stage 4 chronic kidney disease (CMS/HCC)   • Personal history of tobacco use, presenting hazards to health   • Gastrointestinal hemorrhage   • Class 2 severe obesity due to excess calories with serious comorbidity and body mass index (BMI) of 39.0 to 39.9 in adult (CMS/HCC)   • Gastritis   • Colon polyp   • Coumadin toxicity   • Acute on chronic diastolic congestive heart failure (CMS/HCC)   • Chronic anemia   • Pulmonary hypertension (CMS/HCC)   • Confusion   • Hyponatremia   • Long term current use  of anticoagulant   • Renal insufficiency   • Lower abdominal pain   • Hematoma of abdominal wall       Therapy Treatment    Rehabilitation Treatment Summary     Row Name 03/06/20 1451             Treatment Time/Intention    Discipline  physical therapy assistant  -LEODAN      Document Type  therapy note (daily note)  -LEODAN      Mode of Treatment  physical therapy;individual therapy  -LEODAN      Patient Effort  fair  -LEODAN      Comment  pt agreeable to get up to chair.   -LEODAN      Existing Precautions/Restrictions  fall;oxygen therapy device and L/min  -LEODAN      Recorded by [LEODAN] Gordo Hightower PTA 03/06/20 1513      Row Name 03/06/20 1451             Vital Signs    Pre Systolic BP Rehab  173  -LEODAN      Pre Treatment Diastolic BP  53  -LEODAN      Post Systolic BP Rehab  155  -LEODAN      Post Treatment Diastolic BP  57  -LEODAN      Pretreatment Heart Rate (beats/min)  72  -LEODAN      Posttreatment Heart Rate (beats/min)  80  -LEODAN      Pre SpO2 (%)  98  -LEODAN      O2 Delivery Pre Treatment  supplemental O2  -LEODAN      Post SpO2 (%)  99  -LEODAN      O2 Delivery Post Treatment  supplemental O2  -LEODAN      Pre Patient Position  Supine  -LEODAN      Post Patient Position  Sitting  -LEODAN      Recorded by [LEODAN] Gordo Hightower PTA 03/06/20 1513      Row Name 03/06/20 1451             Cognitive Assessment/Intervention- PT/OT    Orientation Status (Cognition)  oriented x 4  -LEODAN      Recorded by [LEODAN] Gordo Hightower PTA 03/06/20 1513      Row Name 03/06/20 1451             Safety Issues, Functional Mobility    Impairments Affecting Function (Mobility)  endurance/activity tolerance;balance;pain;postural/trunk control;shortness of breath;strength  -LEODAN      Recorded by [LEODAN] Gordo Hightower PTA 03/06/20 1513      Row Name 03/06/20 1451             Bed Mobility Assessment/Treatment    Bed Mobility Assessment/Treatment  supine-sit  -LEODAN      Supine-Sit [place] (Bed Mobility)  minimum assist (75% patient effort)  -LEODAN      Assistive Device (Bed Mobility)  bed  rails;head of bed elevated  -LEODAN      Recorded by [LEODAN] Gordo Hightower, PTA 03/06/20 1513      Row Name 03/06/20 1451             Transfer Assessment/Treatment    Transfer Assessment/Treatment  bed-chair transfer;sit-stand transfer;stand-sit transfer  -LEODAN      Recorded by [LEODAN] Gordo Hightower, PTA 03/06/20 1513      Row Name 03/06/20 1451             Bed-Chair Transfer    Bed-Chair [place] (Transfers)  contact guard  -LEODAN      Assistive Device (Bed-Chair Transfers)  walker, front-wheeled  -LEODAN      Recorded by [LEODAN] Gordo Hightower, PTA 03/06/20 1513      Row Name 03/06/20 1451             Sit-Stand Transfer    Sit-Stand [place] (Transfers)  contact guard  -LEODAN      Assistive Device (Sit-Stand Transfers)  walker, front-wheeled  -LEODAN      Recorded by [LEODAN] Gordo Hightower, PTA 03/06/20 1513      Row Name 03/06/20 1451             Stand-Sit Transfer    Stand-Sit [place] (Transfers)  contact guard  -LEODAN      Assistive Device (Stand-Sit Transfers)  walker, front-wheeled  -LEODAN      Recorded by [LEODAN] Gordo Hightower, PTA 03/06/20 1513      Row Name 03/06/20 1451             Gait/Stairs Assessment/Training    Gait/Stairs Assessment/Training  gait/ambulation assistive device  -LEODAN      [place] Level (Gait)  contact guard  -LEODAN      Assistive Device (Gait)  walker, front-wheeled  -LEODAN      Distance in Feet (Gait)  28  -LEODAN      Pattern (Gait)  3-point  -LEODAN      Deviations/Abnormal Patterns (Gait)  antalgic;base of support, wide;marquita decreased;stride length decreased  -LEODAN      Recorded by [LEODAN] Gordo Hightower, PTA 03/06/20 1513      Row Name 03/06/20 1451             Motor Skills Assessment/Interventions    Additional Documentation  -- encouraged exercise but pt only agreeable to AP.  -LEODAN      Recorded by [LEODAN] Gordo Hightower, PTA 03/06/20 1513      Row Name 03/06/20 1451             Positioning and Restraints    Pre-Treatment Position  in bed  -LEODAN      Post Treatment Position  chair  -LEODAN      In Chair   reclined;call light within reach;encouraged to call for assist;exit alarm on all needs met.  -LEODAN      Recorded by [LEODAN] Gordo Hightower PTA 03/06/20 1513      Row Name 03/06/20 1451             Pain Scale: Numbers Pre/Post-Treatment    Pain Scale: Numbers, Pretreatment  0/10 - no pain  -LEODAN      Pain Scale: Numbers, Post-Treatment  0/10 - no pain  -LEODAN      Recorded by [LEODAN] Gordo Hightower PTA 03/06/20 1513      Row Name                Wound 03/03/20 1800 Left heel Pressure Injury    Wound - Properties Group Date first assessed: 03/03/20 [KR] Time first assessed: 1800 [KR] Side: Left [KR] Location: heel [KR] Primary Wound Type: Pressure inj [KR] Recorded by:  [KR] Iris Reynolds, RN 03/03/20 1810    Row Name 03/06/20 1451             Outcome Summary/Treatment Plan (PT)    Daily Summary of Progress (PT)  progress toward functional goals as expected  -LEODAN      Plan for Continued Treatment (PT)  continue  -LEODAN      Anticipated Discharge Disposition (PT)  skilled nursing facility  -LEODAN      Recorded by [LEDOAN] Gordo Hightower PTA 03/06/20 1513        User Key  (r) = Recorded By, (t) = Taken By, (c) = Cosigned By    Initials Name Effective Dates Discipline    LEODAN Gordo Hightower PTA 03/07/18 -  PT    KR Iris Reynolds RN 01/12/18 -  Nurse          Wound 03/03/20 1800 Left heel Pressure Injury (Active)   Wound Image    3/6/2020  2:20 PM   Dressing Appearance open to air 3/5/2020  9:24 PM   Closure None 3/6/2020 12:33 PM   Base non-blanchable;red 3/6/2020 12:33 PM   Drainage Amount none 3/6/2020 12:33 PM       Rehab Goal Summary     Row Name 03/06/20 1451             Bed Mobility Goal 1 (PT)    Activity/Assistive Device (Bed Mobility Goal 1, PT)  sit to supine;supine to sit  -LEODAN      [place] Level/Cues Needed (Bed Mobility Goal 1, PT)  supervision required  -LEODAN      Time Frame (Bed Mobility Goal 1, PT)  by discharge  -LEODAN      Progress/Outcomes (Bed Mobility Goal 1, PT)  goal not met  -LEODAN          Transfer Goal 1 (PT)    Activity/Assistive Device (Transfer Goal 1, PT)  sit-to-stand/stand-to-sit;bed-to-chair/chair-to-bed;walker, rolling  -LEODAN      [place] Level/Cues Needed (Transfer Goal 1, PT)  contact guard assist  -LEODAN      Time Frame (Transfer Goal 1, PT)  by discharge  -LEODAN      Progress/Outcome (Transfer Goal 1, PT)  goal not met  -LEODAN         Gait Training Goal 1 (PT)    Activity/Assistive Device (Gait Training Goal 1, PT)  gait (walking locomotion);assistive device use;decrease fall risk;increase endurance/gait distance;turning, left;turning, right;walker, rolling  -LEODAN      [place] Level (Gait Training Goal 1, PT)  contact guard assist  -LEODAN      Distance (Gait Goal 1, PT)  50 feet  -LEODAN      Time Frame (Gait Training Goal 1, PT)  by discharge  -LEODAN      Progress/Outcome (Gait Training Goal 1, PT)  goal not met  -LEODAN        User Key  (r) = Recorded By, (t) = Taken By, (c) = Cosigned By    Initials Name Provider Type Discipline    Gordo Clay, PTA Physical Therapy Assistant PT              PT Recommendation and Plan  Anticipated Discharge Disposition (PT): skilled nursing facility  Therapy Frequency (PT Clinical Impression): other (see comments)(6 x week)  Outcome Summary/Treatment Plan (PT)  Daily Summary of Progress (PT): progress toward functional goals as expected  Plan for Continued Treatment (PT): continue  Anticipated Equipment Needs at Discharge (PT): (none back to facility)  Anticipated Discharge Disposition (PT): skilled nursing facility  Plan of Care Reviewed With: patient  Progress: improving  Outcome Summary: pt participated in PT. pt requires min A for bed mob and CGA for t/fs. pt participated in gait trainin ft CGA w/ RW. pt wa encouraged to complete exercises but only agreeable to ankle DF/PF. pt OOB in recliner. no new goals met at this time. pt would continue to benefit from PT services.  Outcome Measures     Row Name 20 [id number]             How much help from another  person do you currently need...    Turning from your back to your side while in flat bed without using bedrails?  3  -LEODAN      Moving from lying on back to sitting on the side of a flat bed without bedrails?  3  -LEODAN      Moving to and from a bed to a chair (including a wheelchair)?  3  -LEODAN      Standing up from a chair using your arms (e.g., wheelchair, bedside chair)?  3  -LOEDAN      Climbing 3-5 steps with a railing?  2  -LEODAN      To walk in hospital room?  3  -LEODAN      AM-PAC 6 Clicks Score (PT)  17  -LEODAN         Functional Assessment    Outcome Measure Options  AM-PAC 6 Clicks Basic Mobility (PT)  -LEODAN        User Key  (r) = Recorded By, (t) = Taken By, (c) = Cosigned By    Initials Name Provider Type    Gordo Clay PTA Physical Therapy Assistant         Time Calculation:   PT Charges     Row Name 03/06/20 1631             Time Calculation    Start Time  1451  -LEODAN      Stop Time  1516  -LEODAN      Time Calculation (min)  25 min  -LEODAN         Time Calculation- PT    Total Timed Code Minutes- PT  25 minute(s)  -LEODAN        User Key  (r) = Recorded By, (t) = Taken By, (c) = Cosigned By    Initials Name Provider Type    Gordo Clay PTA Physical Therapy Assistant        Therapy Charges for Today     Code Description Service Date Service Provider Modifiers Qty    38974997985 HC GAIT TRAINING EA 15 MIN 3/6/2020 Gordo Hightower PTA GP 1    [id number] HC PT THERAPEUTIC ACT EA 15 MIN 3/6/2020 Gordo Hightower PTA GP 1          PT G-Codes  Outcome Measure Options: AM-PAC 6 Clicks Basic Mobility (PT)  AM-PAC 6 Clicks Score (PT): 17    Gordo Hightower PTA  3/6/2020

## 2020-03-06 NOTE — PLAN OF CARE
Problem: Patient Care Overview  Goal: Plan of Care Review  Outcome: Ongoing (interventions implemented as appropriate)  Flowsheets (Taken 3/6/2020 0308)  Progress: improving  Plan of Care Reviewed With: patient  Outcome Summary: VSS, still not on home O2, pain controlled, ambulating to bedside commade, sleeping between care, cont to monitor.

## 2020-03-06 NOTE — PROGRESS NOTES
"Anticoagulation by Pharmacy - Warfarin    Brendon Skelton is a 74 y.o.female being continued on warfarin for atrial fibrillation with valve replacement    Home regimen: 5 mg Su/Tu/Th, 2.5 mg all other days  INR Goal: 2.5-3.5    Last INR:   Lab Results   Component Value Date    INR 1.51 (H) 03/06/2020       Objective:  [Ht: (P) 165.1 cm (65\"); Wt: 104 kg (230 lb 6 oz)]  Lab Results   Component Value Date    INR 1.51 (H) 03/06/2020    INR 1.88 (H) 03/05/2020    INR 6.40 (C) 03/04/2020    PROTIME 18.0 (H) 03/06/2020    PROTIME 21.4 (H) 03/05/2020    PROTIME 56.1 (H) 03/04/2020     Lab Results   Component Value Date    HGB 8.3 (L) 03/06/2020    HGB 8.8 (L) 03/05/2020    HGB 9.1 (L) 03/04/2020    HCT 25.8 (L) 03/06/2020    HCT 27.8 (L) 03/05/2020    HCT 27.9 (L) 03/04/2020     03/06/2020     03/05/2020     03/04/2020       Recent Warfarin Administrations       The 5 most recent administrations since 02/26/2020 are shown below each listed medication.    Warfarin Sodium         Order Dose Date Given     warfarin (COUMADIN) tablet 2.5 mg 2.5 mg 03/02/2020     warfarin (COUMADIN) tablet 5 mg 5 mg 03/01/2020      5 mg 02/29/2020                      Assessment  Interacting medications: aspirin  H/h is down today but warfarin restarted today  INR is subtherapeutic as dose recently held  Will restart 5 mg PO once today    Plan:  1.  Give warfarin 5 mg PO once tonight   2.  Draw a PT/INR in AM  3.  Pharmacy will continue to follow    Milan Mcintosh RPH  03/06/20 1:43 PM     "

## 2020-03-06 NOTE — PLAN OF CARE
Problem: Patient Care Overview  Goal: Plan of Care Review  Outcome: Ongoing (interventions implemented as appropriate)  Flowsheets (Taken 3/6/2020 7150)  Progress: improving  Plan of Care Reviewed With: patient  Outcome Summary: pt participated in PT. pt requires min A for bed mob and CGA for t/fs. pt participated in gait trainin ft CGA w/ RW. pt was encouraged to complete exercises but only agreeable to ankle DF/PF. pt OOB in recliner. no new goals met at this time. pt would continue to benefit from PT services.

## 2020-03-06 NOTE — PLAN OF CARE
Problem: Patient Care Overview  Goal: Plan of Care Review  Outcome: Ongoing (interventions implemented as appropriate)  Flowsheets  Taken 3/6/2020 1424 by Sarah Rubin, RN  Progress: no change  Outcome Summary: pt vss. Pt had dialysis this am  Taken 3/6/2020 0308 by Holley Cardenas, RN  Plan of Care Reviewed With: patient

## 2020-03-06 NOTE — PROGRESS NOTES
[facility] Medicine Services  INPATIENT PROGRESS NOTE    Length of Stay: 11  Date of Admission: 2/24/2020  Primary Care Physician: Josefa Pozo APRN    Subjective   Chief Complaint: No new complaints.    HPI:    74 year old female patient who came to the ED complaining of lower abdominal pain. She underwent laparoscopic colectomy for colon cancer recently. CT abdomen at the ED revealed some collection in the anterior abdominal wall consistent with a hematoma versus abscess.  At the ED she also complained of SOB and her O2 requirement went from baseline of3L to 4 liters. CXR revealed Cardiomegaly with vascular /interstitial congestion and small pleural effusions. Appearance is similar to 2/15/2020 but lasix IV times 1 was given.   Patient was initially started on vancomycin and ceftriaxone.  BUN/creatinine continue to increase at which point the patient was seen by the nephrologist and was started on hemodialysis on 2/28/2020.    Patient is seen for follow-up today.  Right groin dialysis catheter has been removed  Patient has a tunnel catheter for hemodialysis.  She is doing better, less deconditioned and voices no new complaints.      Review of Systems   Constitutional: Positive for fatigue. Negative for activity change, appetite change, chills, diaphoresis and fever.   HENT: Negative for trouble swallowing and voice change.    Eyes: Negative for photophobia and visual disturbance.   Respiratory: Negative for cough, choking, chest tightness, shortness of breath, wheezing and stridor.    Cardiovascular: Negative for chest pain, palpitations and leg swelling.   Gastrointestinal: Negative for abdominal distention, abdominal pain, blood in stool, constipation, diarrhea, nausea and vomiting.   Endocrine: Negative for cold intolerance, heat intolerance, polydipsia, polyphagia and polyuria.   Genitourinary: Positive for decreased urine volume. Negative for difficulty urinating,  dysuria, enuresis, flank pain, frequency, hematuria and urgency.   Musculoskeletal: Negative for arthralgias, gait problem, myalgias, neck pain and neck stiffness.   Skin: Negative for pallor, rash and wound.   Neurological: Negative for dizziness, tremors, seizures, syncope, facial asymmetry, speech difficulty, weakness, light-headedness, numbness and headaches.   Hematological: Bruises/bleeds easily.   Psychiatric/Behavioral: Negative for agitation, behavioral problems and confusion.       Objective    Temp:  [97.1 °F (36.2 °C)-97.9 °F (36.6 °C)] 97.1 °F (36.2 °C)  Heart Rate:  [72-96] 74  Resp:  [18-20] 18  BP: (132-168)/(66-98) 150/66    Physical Exam   Constitutional: She is oriented to person, place, and time. She appears well-developed and well-nourished. She is cooperative. No distress.   Patient is morbidly obese and has a BMI 40.10.     HENT:   Head: Normocephalic and atraumatic.   Right Ear: External ear normal.   Left Ear: External ear normal.   Nose: Nose normal.   Mouth/Throat: Oropharynx is clear and moist.   Eyes: Pupils are equal, round, and reactive to light. Conjunctivae and EOM are normal.   Neck: Normal range of motion. Neck supple. No JVD present. No thyromegaly present.   Cardiovascular: Normal rate, regular rhythm, normal heart sounds and intact distal pulses. Exam reveals no gallop and no friction rub.   No murmur heard.  Pulmonary/Chest: Effort normal. No stridor. No respiratory distress. She has no wheezes. She has rhonchi. She has no rales. She exhibits no tenderness.   Abdominal: Soft. Bowel sounds are normal. She exhibits no distension and no mass. There is no tenderness. There is no rebound and no guarding. No hernia.   Musculoskeletal: Normal range of motion. She exhibits no edema, tenderness or deformity.   Neurological: She is alert and oriented to person, place, and time. She has normal reflexes. No cranial nerve deficit or sensory deficit. She exhibits normal muscle tone.  Coordination normal.   Skin: Skin is warm and dry. No rash noted. She is not diaphoretic. No erythema. No pallor.   Psychiatric: She has a normal mood and affect. Her behavior is normal. Judgment and thought content normal.   Nursing note and vitals reviewed.        Medication Review:    Current Facility-Administered Medications:   •  acetaminophen (TYLENOL) tablet 650 mg, 650 mg, Oral, Q4H PRN, Jake Linares MD  •  albumin human 25 % IV SOLN 12.5 g, 12.5 g, Intravenous, PRN, Sandy Caputo MD  •  aspirin chewable tablet 81 mg, 81 mg, Oral, Daily, Jake Linares MD, 81 mg at 03/05/20 0911  •  BAG BALM ointment 1 g, 1 application, Apply externally, BID, Dudley Alfredo MD, 1 g at 03/05/20 2128  •  budesonide-formoterol (SYMBICORT) 160-4.5 MCG/ACT inhaler 2 puff, 2 puff, Inhalation, BID - RT, Dudley Alfredo MD, 2 puff at 03/05/20 2014  •  cholecalciferol (VITAMIN D3) tablet 2,000 Units, 2,000 Units, Oral, Daily, Jake Linares MD, 2,000 Units at 03/05/20 0912  •  citalopram (CeleXA) tablet 20 mg, 20 mg, Oral, Daily, Jake Linares MD, 20 mg at 03/05/20 0911  •  dextrose (D50W) 25 g/ 50mL Intravenous Solution 25 g, 25 g, Intravenous, Q15 Min PRN, Jake Linares MD  •  dextrose (GLUTOSE) oral gel 15 g, 15 g, Oral, Q15 Min PRN, Jake Linares MD  •  dilTIAZem CD (CARDIZEM CD) 24 hr capsule 240 mg, 240 mg, Oral, Daily, Jake Linares MD, 240 mg at 03/05/20 0911  •  epoetin rosales (EPOGEN,PROCRIT) injection 6,000 Units, 6,000 Units, Intravenous, Once per day on Mon Wed Fri, Sandy Caputo MD, 6,000 Units at 03/06/20 0843  •  glucagon (human recombinant) (GLUCAGEN DIAGNOSTIC) injection 1 mg, 1 mg, Subcutaneous, Q15 Min PRN, Jake Linares MD  •  heparin (porcine) 1000 UNIT/ML injection  - ADS Override Pull, , , ,   •  insulin aspart (novoLOG) injection 0-9 Units, 0-9 Units, Subcutaneous, 4x Daily AC & at Bedtime, Jake Linares MD, 4 Units at 02/26/20 2058  •  insulin  detemir (LEVEMIR) injection 25 Units, 25 Units, Subcutaneous, Nightly, Jake Linares MD, 25 Units at 20  •  ipratropium-albuterol (DUO-NEB) nebulizer solution 3 mL, 3 mL, Nebulization, Q4H - RT, Dudley Alfredo MD, 3 mL at 20  •  magic butt ointment, , Topical, BID PRN, Apolinar Azevedo MD  •  melatonin tablet 3 mg, 3 mg, Oral, Nightly PRN, Amy Kamara MD, 3 mg at 20 231  •  metoprolol succinate XL (TOPROL-XL) 24 hr tablet 25 mg, 25 mg, Oral, Daily, Jake Linares MD, 25 mg at 20  •  [] morphine injection 2 mg, 2 mg, Intravenous, Q4H PRN, 2 mg at 20 1156 **AND** naloxone (NARCAN) injection 0.4 mg, 0.4 mg, Intravenous, Q5 Min PRN, Jake Linares MD  •  ondansetron (ZOFRAN) injection 4 mg, 4 mg, Intravenous, Q6H PRN, Jake Linares MD, 4 mg at 20  •  oxyCODONE (ROXICODONE) immediate release tablet 10 mg, 10 mg, Oral, Q6H PRN, Markel Fountain MD, 10 mg at 20  •  prenatal vitamin 27-0.8 tablet 1 tablet, 1 tablet, Oral, Daily, Jake Linares MD, 1 tablet at 20  •  rOPINIRole (REQUIP) tablet 0.25 mg, 0.25 mg, Oral, Nightly, Jake Linares MD, 0.25 mg at 20  •  sodium chloride 0.9 % bolus 100 mL, 100 mL, Intravenous, PRN, Sandy Caputo MD  •  sodium chloride 0.9 % bolus 100 mL, 100 mL, Intravenous, PRN, Sandy Caputo MD  •  sodium chloride 0.9 % bolus 100 mL, 100 mL, Intravenous, PRNHarshal Imran, MD  •  [COMPLETED] Insert peripheral IV, , , Once **AND** sodium chloride 0.9 % flush 10 mL, 10 mL, Intravenous, PRN, Jake Linares MD  •  sodium chloride 0.9 % flush 10 mL, 10 mL, Intravenous, Q12H, Jake Linares MD, 10 mL at 208  •  sodium chloride 0.9 % flush 10 mL, 10 mL, Intravenous, PRN, Jake Linares MD  •  vitamin C (ASCORBIC ACID) tablet 250 mg, 250 mg, Oral, Daily, Jake Linares MD, 250 mg at 20 0911    Results  Review:  I have reviewed the labs, radiology results, and diagnostic studies.    Laboratory Data:   Results from last 7 days   Lab Units 03/06/20  0511 03/05/20  0513 03/04/20  0628   SODIUM mmol/L 134* 133* 127*   POTASSIUM mmol/L 4.0 3.8 4.0   CHLORIDE mmol/L 94* 93* 87*   CO2 mmol/L 24.0 24.0 23.0   BUN mg/dL 47* 34* 49*   CREATININE mg/dL 4.29* 3.79* 4.73*   GLUCOSE mg/dL 110* 132* 106*   CALCIUM mg/dL 9.4 9.1 9.5   ANION GAP mmol/L 16.0* 16.0* 17.0*     Estimated Creatinine Clearance: 14.1 mL/min (A) (by C-G formula based on SCr of 4.29 mg/dL (H)).  Results from last 7 days   Lab Units 03/02/20  0508   PHOSPHORUS mg/dL 6.9*         Results from last 7 days   Lab Units 03/06/20  0511 03/05/20  0513 03/04/20  0628 03/03/20  0500 03/02/20  0508   WBC 10*3/mm3 15.77* 12.81* 10.17 8.00 9.51   HEMOGLOBIN g/dL 8.3* 8.8* 9.1* 8.8* 8.7*   HEMATOCRIT % 25.8* 27.8* 27.9* 27.1* 26.6*   PLATELETS 10*3/mm3 314 309 328 304 308     Results from last 7 days   Lab Units 03/06/20 0511 03/05/20 0513 03/04/20  0628 03/03/20  0500 03/02/20  0508   INR  1.51* 1.88* 6.40* 4.04* 2.57*       Culture Data:   No results found for: BLOODCX  No results found for: URINECX  No results found for: RESPCX  No results found for: WOUNDCX  No results found for: STOOLCX  No components found for: BODYFLD    Radiology Data:   Imaging Results (Last 24 Hours)     Procedure Component Value Units Date/Time    US Guided Vascular Access [[id number]] Resulted:  03/06/20 0834     Updated:  03/06/20 0834    Narrative:       This procedure was auto-finalized with no dictation required.    XR Chest Post CVA Port [[id number]] Collected:  03/06/20 0745     Updated:  03/06/20 0807    Narrative:       PROCEDURE: Portable chest x-ray    TECHNIQUE: Single AP view of the chest    COMPARISON: 3/5/2020    HISTORY: Tunneled hemodialysis catheter    FINDINGS:  There is a right-sided dialysis catheter tip in the region of the  right atrium. There is a left-sided cardiac  pacer leads faintly  visualized. There is pulmonary vascular congestion and  interstitial edema. There is a small left pleural effusion.      Impression:       CONCLUSION:  There is a right-sided dialysis catheter tip in the region of the  right atrium.  There is pulmonary vascular congestion and interstitial edema.  There is a small left pleural effusion.    Electronically signed by:  Dave More MD  3/6/2020 8:05 AM CST  Workstation: [id number]          I have reviewed the patient's current medications.     Assessment/Plan     Hospital Problem List:  Principal Problem:    Lower abdominal pain  Active Problems:    Stage 4 chronic kidney disease (CMS/HCC)    Acute on chronic diastolic congestive heart failure (CMS/HCC)    Renal insufficiency    Hematoma of abdominal wall    Lower abdominal pain secondary to intra-abdominal and abdominal wall hematoma: Stable.  Continue pain control.      Acute on chronic heart failure (with preserved ejection fraction): Much improved. Patient is currently on hemodialysis and off diuretics.  Echocardiogram done on 6/18/2019 showed an ejection fraction of 46 to 50%.  Findings of repeat chest x-ray have been noted.      Acute on chronic kidney disease: Continue hemodialysis.  Nephrologist is following.    Possible pneumonia with E. coli bacteremia: Continue ceftriaxone.    Anemia: Hemoglobin has remained stable following transfusion of packed red blood cells.  Continue iron supplementation, Epogen with dialysis and continue to monitor hemoglobin with transfusion as needed arises.  Findings of iron studies have been noted.    Diabetes mellitus: Stable.  Continue anti-glycemic with Accu-Cheks and sliding scale insulin.    O2 dependent COPD: Patient is currently not in exacerbation.  Continue supplemental oxygen and bronchodilators.  She was on Anoro as outpatient. At baseline she uses 2 L of supplemental oxygen at the nursing home.    Status post aortic valve replacement: Resume warfarin.   INR today is 1.51.      Hyponatremia (likely hypervolemic): Much improved.  Continue free water restriction and monitor BMP.  Nephrology is following.    Depressive disorder: Continue citalopram.    Deconditioning: Continue PT and OT.    Continue GI and DVT prophylaxis.    Discharge Planning: In progress.    Dudley Alfredo MD   03/06/20   10:10 AM

## 2020-03-06 NOTE — PROGRESS NOTES
"[facility]  [address] [place] [address]. [address]  T - [phone]  F - [phone]     Progress Note          PATIENT  DEMOGRAPHICS   PATIENT NAME: Brendon Skelton                      PHYSICIAN: Rocio Mack MD  : 1945  MRN: [id number]   LOS: 11 days    Patient Care Team:  Josefa Pozo APRN as PCP - General (Nurse Practitioner)  Meme Duckworth APRN as PCP - Claims Attributed  Luis Maher MD as Surgeon (General Surgery)  Veronica Ruiz, RN as Ambulatory  ([facility])  Subjective   SUBJECTIVE   No acute events overnight. Denied chest pain, SOB. Still on 6L NC. Temp cath in groin is removed yesterday.          Objective   OBJECTIVE   Vital Signs  Temp:  [97.1 °F (36.2 °C)-98.6 °F (37 °C)] 97.1 °F (36.2 °C)  Heart Rate:  [72-96] 74  Resp:  [18-20] 18  BP: (132-170)/(60-98) 150/66    Flowsheet Rows      First Filed Value   Admission Height  165.1 cm (65\") Documented at 2020 1322   Admission Weight  111 kg (245 lb) Documented at 2020 1322           I/O last 3 completed shifts:  In: 200 [I.V.:100; IV Piggyback:100]  Out: 500 [Urine:500]    PHYSICAL EXAM    Physical Exam   Constitutional: She is oriented to person, place, and time. She appears well-developed.   HENT:   Head: Normocephalic.   Eyes: Pupils are equal, round, and reactive to light.   Cardiovascular: Normal rate, regular rhythm and normal heart sounds.   Pulmonary/Chest: Effort normal and breath sounds normal.   Abdominal: Soft. Bowel sounds are normal. There is tenderness.   Large lower abdominal bruise   Musculoskeletal: She exhibits no edema.   Neurological: She is alert and oriented to person, place, and time.       RESULTS   Results Review:    Results from last 7 days   Lab Units 20  0511 20  0513 20  0628   SODIUM mmol/L 134* 133* 127*   POTASSIUM mmol/L 4.0 3.8 4.0   CHLORIDE mmol/L 94* 93* 87*   CO2 mmol/L 24.0 24.0 23.0   BUN mg/dL 47* 34* 49* "   CREATININE mg/dL 4.29* 3.79* 4.73*   CALCIUM mg/dL 9.4 9.1 9.5   GLUCOSE mg/dL 110* 132* 106*       Estimated Creatinine Clearance: 14.1 mL/min (A) (by C-G formula based on SCr of 4.29 mg/dL (H)).    Results from last 7 days   Lab Units 03/02/20  0508   PHOSPHORUS mg/dL 6.9*             Results from last 7 days   Lab Units 03/06/20  0511 03/05/20  0513 03/04/20  0628 03/03/20  0500 03/02/20  0508   WBC 10*3/mm3 15.77* 12.81* 10.17 8.00 9.51   HEMOGLOBIN g/dL 8.3* 8.8* 9.1* 8.8* 8.7*   PLATELETS 10*3/mm3 314 309 328 304 308       Results from last 7 days   Lab Units 03/06/20  0511 03/05/20  0513 03/04/20  0628 03/03/20  0500 03/02/20  0508   INR  1.51* 1.88* 6.40* 4.04* 2.57*         Imaging Results (Last 24 Hours)     Procedure Component Value Units Date/Time    US Guided Vascular Access [[id number]] Resulted:  03/06/20 0834     Updated:  03/06/20 0834    Narrative:       This procedure was auto-finalized with no dictation required.    XR Chest Post CVA Port [[id number]] Collected:  03/06/20 0745     Updated:  03/06/20 0807    Narrative:       PROCEDURE: Portable chest x-ray    TECHNIQUE: Single AP view of the chest    COMPARISON: 3/5/2020    HISTORY: Tunneled hemodialysis catheter    FINDINGS:  There is a right-sided dialysis catheter tip in the region of the  right atrium. There is a left-sided cardiac pacer leads faintly  visualized. There is pulmonary vascular congestion and  interstitial edema. There is a small left pleural effusion.      Impression:       CONCLUSION:  There is a right-sided dialysis catheter tip in the region of the  right atrium.  There is pulmonary vascular congestion and interstitial edema.  There is a small left pleural effusion.    Electronically signed by:  Dave More MD  3/6/2020 8:05 AM [facility]  Workstation: [id number]           MEDICATIONS      aspirin 81 mg Oral Daily   BAG BALM 1 application Apply externally BID   budesonide-formoterol 2 puff Inhalation BID - RT   cholecalciferol 2,000  Units Oral Daily   citalopram 20 mg Oral Daily   dilTIAZem  mg Oral Daily   epoetin rosales/rosales-epbx 6,000 Units Intravenous Once per day on Mon Wed Fri   ferric gluconate (FERRLECIT) IVPB 125 mg Intravenous Once   heparin (porcine)      insulin aspart 0-9 Units Subcutaneous 4x Daily AC & at Bedtime   insulin detemir 25 Units Subcutaneous Nightly   ipratropium-albuterol 3 mL Nebulization Q4H - RT   metoprolol succinate XL 25 mg Oral Daily   prenatal vitamin 27-0.8 1 tablet Oral Daily   rOPINIRole 0.25 mg Oral Nightly   sodium chloride 10 mL Intravenous Q12H   vitamin C with bob hips 250 mg Oral Daily          Assessment/Plan   ASSESSMENT / PLAN      Lower abdominal pain    Stage 4 chronic kidney disease (CMS/HCC)    Acute on chronic diastolic congestive heart failure (CMS/HCC)    Renal insufficiency    Hematoma of abdominal wall    1. Acute Kidney Injury on CKD 4- HD dependent: baseline creatinine 2.0 mg/dl  - Creatinine peaked at 4.57 mg/dl with elevated BUN. HD initiated 2/28/2020.  - Had TDC place yesterday. Seen and examined on HD. Goal UF is 5 kgs. Tolerating well so far.  Out pt dialysis is setup for MWF at [facility]     2. Intra-abdominal and abdominal wall hematoma:  - No evidence of anastomotic leak. No surgical intervention is planned. Back on coumadin     3. Anemia:  - Had recent blood loss while on Lovenox. Received 4 units of packed RBCs.   - On iron and Epogen with HD.     4. E.coli bacteremia:   - On ceftriaxone     5. S/p Aortic valve replacement     6. Diabetes type 2     7. COPD         This document has been electronically signed by Rocio Mack MD on March 6, 2020 9:08 AM

## 2020-03-06 NOTE — NURSING NOTE
"Went to room for pt scheduled breathing treatment and Symbicort. Pt refused breathing treatment and stated that she 'did not want to be woke up for remainder of the night for tx\", but did take the Symbicort. Educated pt on the importance of the treatments, pt still declined. Told pt if she changed her mind, to have RN call me.   "

## 2020-03-07 NOTE — PROGRESS NOTES
[facility] ER Medicine Services  INPATIENT PROGRESS NOTE    Length of Stay: 12  Date of Admission: 2/24/2020  Primary Care Physician: Josefa Pozo APRN    Subjective   Chief Complaint: Indigestion.      HPI:    74 year old female patient who came to the ED complaining of lower abdominal pain. She underwent laparoscopic colectomy for colon cancer recently. CT abdomen at the ED revealed some collection in the anterior abdominal wall consistent with a hematoma versus abscess.  At the ED she also complained of SOB and her O2 requirement went from baseline of3L to 4 liters. CXR revealed Cardiomegaly with vascular /interstitial congestion and small pleural effusions. Appearance is similar to 2/15/2020 but lasix IV times 1 was given.   Patient was initially started on vancomycin and ceftriaxone.  BUN/creatinine continue to increase at which point the patient was seen by the nephrologist and was started on hemodialysis on 2/28/2020.    Patient is seen for follow-up today.  She complains of indigestion which has affected her oral intake.      Review of Systems   Constitutional: Positive for appetite change and fatigue. Negative for activity change, chills, diaphoresis and fever.   HENT: Negative for trouble swallowing and voice change.    Eyes: Negative for photophobia and visual disturbance.   Respiratory: Negative for cough, choking, chest tightness, shortness of breath, wheezing and stridor.    Cardiovascular: Negative for chest pain, palpitations and leg swelling.   Gastrointestinal: Negative for abdominal distention, abdominal pain, blood in stool, constipation, diarrhea, nausea and vomiting.   Endocrine: Negative for cold intolerance, heat intolerance, polydipsia, polyphagia and polyuria.   Genitourinary: Positive for decreased urine volume. Negative for difficulty urinating, dysuria, enuresis, flank pain, frequency, hematuria and urgency.   Musculoskeletal: Negative for  arthralgias, gait problem, myalgias, neck pain and neck stiffness.   Skin: Negative for pallor, rash and wound.   Neurological: Negative for dizziness, tremors, seizures, syncope, facial asymmetry, speech difficulty, weakness, light-headedness, numbness and headaches.   Hematological: Bruises/bleeds easily.   Psychiatric/Behavioral: Negative for agitation, behavioral problems and confusion.       Objective    Temp:  [96.4 °F (35.8 °C)-97 °F (36.1 °C)] 97 °F (36.1 °C)  Heart Rate:  [72-89] 80  Resp:  [18-22] 20  BP: (154-172)/(47-78) 172/78    Physical Exam   Constitutional: She is oriented to person, place, and time. She appears well-developed and well-nourished. She is cooperative. No distress.   HENT:   Head: Normocephalic and atraumatic.   Right Ear: External ear normal.   Left Ear: External ear normal.   Nose: Nose normal.   Mouth/Throat: Oropharynx is clear and moist.   Eyes: Pupils are equal, round, and reactive to light. Conjunctivae and EOM are normal.   Neck: Normal range of motion. Neck supple. No JVD present. No thyromegaly present.   Cardiovascular: Normal rate, regular rhythm, normal heart sounds and intact distal pulses. Exam reveals no gallop and no friction rub.   No murmur heard.  Pulmonary/Chest: Effort normal. No stridor. No respiratory distress. She has no wheezes. She has rhonchi. She has no rales. She exhibits no tenderness.   Abdominal: Soft. Bowel sounds are normal. She exhibits no distension and no mass. There is no tenderness. There is no rebound and no guarding. No hernia.   Musculoskeletal: Normal range of motion. She exhibits no edema, tenderness or deformity.   Neurological: She is alert and oriented to person, place, and time. She has normal reflexes. No cranial nerve deficit or sensory deficit. She exhibits normal muscle tone. Coordination normal.   Skin: Skin is warm and dry. No rash noted. She is not diaphoretic. No erythema. No pallor.   Psychiatric: She has a normal mood and  affect. Her behavior is normal. Judgment and thought content normal.   Nursing note and vitals reviewed.        Medication Review:    Current Facility-Administered Medications:   •  acetaminophen (TYLENOL) tablet 650 mg, 650 mg, Oral, Q4H PRN, Polo Feliz MD  •  aluminum-magnesium hydroxide-simethicone (MAALOX MAX) 400-400-40 MG/5ML suspension 15 mL, 15 mL, Oral, Q6H PRN, Dudley Alfredo MD  •  aspirin chewable tablet 81 mg, 81 mg, Oral, Daily, Polo Feliz MD, 81 mg at 03/07/20 0906  •  BAG BALM ointment 1 g, 1 application, Apply externally, BID, Polo Feliz MD, 1 g at 03/07/20 0905  •  budesonide-formoterol (SYMBICORT) 160-4.5 MCG/ACT inhaler 2 puff, 2 puff, Inhalation, BID - RT, Polo Feliz MD, 2 puff at 03/07/20 0655  •  cholecalciferol (VITAMIN D3) tablet 2,000 Units, 2,000 Units, Oral, Daily, Polo Feliz MD, 2,000 Units at 03/07/20 0906  •  citalopram (CeleXA) tablet 20 mg, 20 mg, Oral, Daily, Polo Feliz MD, 20 mg at 03/07/20 0906  •  dextrose (D50W) 25 g/ 50mL Intravenous Solution 25 g, 25 g, Intravenous, Q15 Min PRN, Polo Feliz MD  •  dextrose (GLUTOSE) oral gel 15 g, 15 g, Oral, Q15 Min PRN, Polo Feliz MD  •  dilTIAZem CD (CARDIZEM CD) 24 hr capsule 240 mg, 240 mg, Oral, Daily, Polo Feliz MD, 240 mg at 03/07/20 0905  •  epoetin rosales (EPOGEN,PROCRIT) injection 6,000 Units, 6,000 Units, Intravenous, Once per day on Mon Wed Fri, Polo Feliz MD, 6,000 Units at 03/06/20 0843  •  famotidine (PEPCID) tablet 20 mg, 20 mg, Oral, BID AC, Dudley Alfredo MD  •  glucagon (human recombinant) (GLUCAGEN DIAGNOSTIC) injection 1 mg, 1 mg, Subcutaneous, Q15 Min PRN, Polo Feliz MD  •  insulin aspart (novoLOG) injection 0-9 Units, 0-9 Units, Subcutaneous, 4x Daily AC & at Bedtime, Polo Feliz MD, 4 Units at 02/26/20 2058  •  insulin detemir (LEVEMIR) injection 25 Units, 25  Units, Subcutaneous, Nightly, Polo Feliz MD, 25 Units at 20  •  ipratropium-albuterol (DUO-NEB) nebulizer solution 3 mL, 3 mL, Nebulization, Q4H - RT, Polo Feliz MD, 3 mL at 20 0654  •  magic butt ointment, , Topical, BID PRN, Polo Feliz MD  •  melatonin tablet 3 mg, 3 mg, Oral, Nightly PRN, Polo Feliz MD, 3 mg at 20  •  metoprolol succinate XL (TOPROL-XL) 24 hr tablet 25 mg, 25 mg, Oral, Daily, Polo Feliz MD, 25 mg at 20 0906  •  [] morphine injection 2 mg, 2 mg, Intravenous, Q4H PRN, 2 mg at 20 1156 **AND** naloxone (NARCAN) injection 0.4 mg, 0.4 mg, Intravenous, Q5 Min PRN, Polo Feliz MD  •  ondansetron (ZOFRAN) injection 4 mg, 4 mg, Intravenous, Q6H PRN, Polo Feliz MD, 4 mg at 20 0028  •  oxyCODONE (ROXICODONE) immediate release tablet 10 mg, 10 mg, Oral, Q6H PRN, Polo Feliz MD, 10 mg at 20 0910  •  Pharmacy to dose warfarin, , Does not apply, Continuous PRN, Dudley Alfredo MD  •  prenatal vitamin 27-0.8 tablet 1 tablet, 1 tablet, Oral, Daily, Polo Feliz MD, 1 tablet at 20 0905  •  rOPINIRole (REQUIP) tablet 0.25 mg, 0.25 mg, Oral, Nightly, Polo Feliz MD, 0.25 mg at 20  •  sodium chloride 0.9 % bolus 100 mL, 100 mL, Intravenous, PRN, Polo Feliz MD  •  sodium chloride 0.9 % bolus 100 mL, 100 mL, Intravenous, PRN, Polo Feliz MD  •  sodium chloride 0.9 % bolus 100 mL, 100 mL, Intravenous, PRN, Polo Feliz MD  •  [COMPLETED] Insert peripheral IV, , , Once **AND** sodium chloride 0.9 % flush 10 mL, 10 mL, Intravenous, PRN, Polo Feliz MD  •  sodium chloride 0.9 % flush 10 mL, 10 mL, Intravenous, Q12H, Polo Feliz MD, 10 mL at 20 0909  •  sodium chloride 0.9 % flush 10 mL, 10 mL, Intravenous, PRN, Polo Feliz MD  •  vitamin C  (ASCORBIC ACID) tablet 250 mg, 250 mg, Oral, Daily, Polo Feliz MD, 250 mg at 03/07/20 0905    Results Review:  I have reviewed the labs, radiology results, and diagnostic studies.    Laboratory Data:   Results from last 7 days   Lab Units 03/07/20  0513 03/06/20  0511 03/05/20  0513   SODIUM mmol/L 137 134* 133*   POTASSIUM mmol/L 3.7 4.0 3.8   CHLORIDE mmol/L 97* 94* 93*   CO2 mmol/L 26.0 24.0 24.0   BUN mg/dL 26* 47* 34*   CREATININE mg/dL 3.60* 4.29* 3.79*   GLUCOSE mg/dL 98 110* 132*   CALCIUM mg/dL 8.9 9.4 9.1   ANION GAP mmol/L 14.0 16.0* 16.0*     Estimated Creatinine Clearance: 16.5 mL/min (A) (by C-G formula based on SCr of 3.6 mg/dL (H)).  Results from last 7 days   Lab Units 03/02/20  0508   PHOSPHORUS mg/dL 6.9*         Results from last 7 days   Lab Units 03/07/20  0513 03/06/20  0511 03/05/20  0513 03/04/20  0628 03/03/20  0500   WBC 10*3/mm3 11.97* 15.77* 12.81* 10.17 8.00   HEMOGLOBIN g/dL 7.9* 8.3* 8.8* 9.1* 8.8*   HEMATOCRIT % 25.5* 25.8* 27.8* 27.9* 27.1*   PLATELETS 10*3/mm3 261 314 309 328 304     Results from last 7 days   Lab Units 03/07/20  0513 03/06/20  0511 03/05/20  0513 03/04/20  0628 03/03/20  0500   INR  1.75* 1.51* 1.88* 6.40* 4.04*       Culture Data:   No results found for: BLOODCX  No results found for: URINECX  No results found for: RESPCX  No results found for: WOUNDCX  No results found for: STOOLCX  No components found for: BODYFLD    Radiology Data:   Imaging Results (Last 24 Hours)     ** No results found for the last 24 hours. **          I have reviewed the patient's current medications.     Assessment/Plan     Hospital Problem List:  Principal Problem:    Lower abdominal pain  Active Problems:    Stage 4 chronic kidney disease (CMS/HCC)    Acute on chronic diastolic congestive heart failure (CMS/HCC)    Renal insufficiency    Hematoma of abdominal wall    Lower abdominal pain secondary to intra-abdominal and abdominal wall hematoma: Resolved. Continue pain  control as needed.      Acute on chronic heart failure (with preserved ejection fraction): Much improved. Patient is currently on hemodialysis and off diuretics.  Echocardiogram done on 6/18/2019 showed an ejection fraction of 46 to 50%.  Findings of repeat chest x-ray have been noted.      Acute on chronic kidney disease: Continue hemodialysis.  Nephrologist is following.    Possible pneumonia with E. coli bacteremia: She has completed a course of ceftriaxone.    Anemia: Hemoglobin has remained stable following transfusion of packed red blood cells.  Continue iron supplementation, Epogen with dialysis and continue to monitor hemoglobin with transfusion as needed arises.  Findings of iron studies have been noted.    Diabetes mellitus: Stable.  Continue anti-glycemic with Accu-Cheks and sliding scale insulin.    O2 dependent COPD: Patient is currently not in exacerbation.  Continue supplemental oxygen and bronchodilators.  She was on Anoro as outpatient. At baseline she uses 2 L of supplemental oxygen at the nursing home.    Status post aortic valve replacement: Continue warfarin.  INR today is 1.75.      Hyponatremia (likely hypervolemic): Resolved.      Depressive disorder: Continue citalopram.    Deconditioning: Continue PT and OT.    Indigestion: Treat supportively with Pepcid and PRN Maalox.    Continue GI and DVT prophylaxis.    Discharge Planning: In progress.    Dudley Alfredo MD   03/07/20   9:42 AM

## 2020-03-07 NOTE — PLAN OF CARE
"  Problem: Patient Care Overview  Goal: Plan of Care Review  Outcome: Ongoing (interventions implemented as appropriate)  Flowsheets (Taken 3/7/2020 0912)  Progress: no change  Plan of Care Reviewed With: patient  Outcome Summary: Pt agreeable to LE ther ex only due to having \"Heart burn pain.\" Pt unhooked from O2 but sats at 93% initially. Pt desats with each set of ex's but able to recover within 1-2 minutes but at end of session pt dropped to 84% and required being hooked back up to O2 to get sats back up over 90%. Pt would cont to benefit from therapy upon DC.     "

## 2020-03-07 NOTE — PROGRESS NOTES
"Anticoagulation by Pharmacy - Warfarin    Brendon Skelton is a 74 y.o.female being continued on warfarin for atrial fibrillation with valve replacement    Home regimen: 5 mg Su/Tu/Th, 2.5 mg all other days  INR Goal: 2.5-3.5    Last INR:   Lab Results   Component Value Date    INR 1.75 (H) 03/07/2020       Objective:  [Ht: (P) 165.1 cm (65\"); Wt: 105 kg (232 lb 4.8 oz)]  Lab Results   Component Value Date    INR 1.75 (H) 03/07/2020    INR 1.51 (H) 03/06/2020    INR 1.88 (H) 03/05/2020    PROTIME 20.2 (H) 03/07/2020    PROTIME 18.0 (H) 03/06/2020    PROTIME 21.4 (H) 03/05/2020     Lab Results   Component Value Date    HGB 7.9 (L) 03/07/2020    HGB 8.3 (L) 03/06/2020    HGB 8.8 (L) 03/05/2020    HCT 25.5 (L) 03/07/2020    HCT 25.8 (L) 03/06/2020    HCT 27.8 (L) 03/05/2020     03/07/2020     03/06/2020     03/05/2020       Recent Warfarin Administrations       The 5 most recent administrations since 02/26/2020 are shown below each listed medication.    Warfarin Sodium         Order Dose Date Given     warfarin (COUMADIN) tablet 2.5 mg 2.5 mg 03/02/2020     warfarin (COUMADIN) tablet 5 mg 5 mg 03/01/2020      5 mg 02/29/2020                      Assessment  Interacting medications: aspirin  H/h is down again today  INR is subtherapeutic as dose recently held - did trend up appropriately   Decrease dose to 2.5 mg PO daily at this time    Plan:  1.  Give warfarin 2.5 mg PO tonight   2.  Draw a PT/INR in AM  3.  Pharmacy will continue to follow    Milan Mcintosh RPH  03/07/20 2:57 PM     "

## 2020-03-07 NOTE — THERAPY TREATMENT NOTE
Acute Care - Physical Therapy Treatment Note  [facility]     Patient Name: Brendon Skelton  : 1945  MRN: [id number]  Today's Date: 3/7/2020             Admit Date: 2020    Visit Dx:    ICD-10-CM ICD-9-CM   1. Stage 4 chronic kidney disease (CMS/HCC) N18.4 585.4   2. Renal insufficiency N28.9 593.9   3. Left lower quadrant abdominal pain R10.32 789.04   4. Elevated INR R79.1 790.92   5. Impaired physical mobility Z74.09 781.99     Patient Active Problem List   Diagnosis   • Long term current use of anticoagulant therapy   • Personal history of heart valve replacement   • Atrial fibrillation [I48.91]   • Emphysema of lung (CMS/HCC)   • On anticoagulant therapy   • Hyperlipidemia   • Diastolic heart failure (CMS/HCC)   • Depressive disorder   • COPD (chronic obstructive pulmonary disease) (CMS/HCC)   • Diabetes mellitus (CMS/HCC)   • Myopia   • Astigmatism   • Bleeding from open wound of chest wall   • Follow-up surgery care   • Encounter for screening for malignant neoplasm of colon   • Positive colorectal cancer screening using DNA-based stool test   • Nodule of left lung   • Chronic hypoxemic respiratory failure (CMS/HCC)   • Physical deconditioning   • Heart failure with preserved left ventricular function (HFpEF) (CMS/HCC)   • Hypertension   • Coronary artery disease involving native coronary artery without angina pectoris   • Hx of CABG   • SSS (sick sinus syndrome) (CMS/HCC)   • Pacemaker   • Stage 4 chronic kidney disease (CMS/HCC)   • Personal history of tobacco use, presenting hazards to health   • Gastrointestinal hemorrhage   • Class 2 severe obesity due to excess calories with serious comorbidity and body mass index (BMI) of 39.0 to 39.9 in adult (CMS/HCC)   • Gastritis   • Colon polyp   • Coumadin toxicity   • Acute on chronic diastolic congestive heart failure (CMS/HCC)   • Chronic anemia   • Pulmonary hypertension (CMS/HCC)   • Confusion   • Hyponatremia   • Long term current use  of anticoagulant   • Renal insufficiency   • Lower abdominal pain   • Hematoma of abdominal wall       Therapy Treatment    Rehabilitation Treatment Summary     Row Name 03/07/20 0912             Treatment Time/Intention    Discipline  physical therapy assistant  -TW      Document Type  therapy note (daily note)  -TW      Subjective Information  complains of;weakness;fatigue;dyspnea  -TW      Mode of Treatment  physical therapy;individual therapy  -TW      Patient/Family Observations  Pt up in chair.   -TW      Patient Effort  fair  -TW      Existing Precautions/Restrictions  fall;oxygen therapy device and L/min  -TW      Recorded by [TW] Reece Rocha PTA 03/07/20 1330      Row Name 03/07/20 0912             Vital Signs    Pretreatment Heart Rate (beats/min)  84  -TW      Intratreatment Heart Rate (beats/min)  104  -TW      Posttreatment Heart Rate (beats/min)  82  -TW      Pre SpO2 (%)  93  -TW      O2 Delivery Pre Treatment  room air  -TW      Intra SpO2 (%)  82 Increased to 96% when O2 applied.  -TW      O2 Delivery Intra Treatment  room air  -TW      Post SpO2 (%)  96  -TW      O2 Delivery Post Treatment  supplemental O2  -TW      Pre Patient Position  Sitting  -TW      Intra Patient Position  Sitting  -TW      Post Patient Position  Sitting  -TW      Recorded by [TW] Reece Rocha PTA 03/07/20 1330      Row Name 03/07/20 0912             Cognitive Assessment/Intervention- PT/OT    Orientation Status (Cognition)  oriented x 4  -TW      Recorded by [TW] Reece Rocha PTA 03/07/20 1330      Row Name 03/07/20 0912             Safety Issues, Functional Mobility    Impairments Affecting Function (Mobility)  balance;endurance/activity tolerance;pain;postural/trunk control;shortness of breath;strength  -TW      Recorded by [TW] Reece Rocha PTA 03/07/20 1330      Row Name 03/07/20 0912             Bed Mobility Assessment/Treatment    Comment (Bed Mobility)  Not tested due to pt up in  recliner and remained in recliner after tx.  -TW      Recorded by [TW] Reece Rocha, PTA 03/07/20 1330      Row Name 03/07/20 0912             Sit-Stand Transfer    Sit-Stand [place] (Transfers)  not tested  -TW      Recorded by [TW] Reece Rocha, PTA 03/07/20 1330      Row Name 03/07/20 0912             Stand-Sit Transfer    Stand-Sit [place] (Transfers)  not tested  -TW      Recorded by [TW] Reece Rocha, PTA 03/07/20 1330      Row Name 03/07/20 0912             Gait/Stairs Assessment/Training    [place] Level (Gait)  not tested  -TW      Recorded by [TW] Reece Rocha, PTA 03/07/20 1330      Row Name 03/07/20 0912             Therapeutic Exercise    Lower Extremity (Therapeutic Exercise)  LAQ (long arc quad), bilateral;marching while seated  -TW      Lower Extremity Range of Motion (Therapeutic Exercise)  ankle dorsiflexion/plantar flexion, bilateral;hip abduction/adduction, bilateral  -TW      Exercise Type (Therapeutic Exercise)  AROM (active range of motion)  -TW      Position (Therapeutic Exercise)  seated  -TW      Sets/Reps (Therapeutic Exercise)  2/10-15  -TW      Comment (Therapeutic Exercise)  Pt desats with each ex but able to recover to 90% but eventually pt took 4-5 minutes and required being placed back on O2 at 6L to recover to good O2 level.  -TW      Recorded by [TW] Reece Rocha PTA 03/07/20 1330      Row Name 03/07/20 0912             Positioning and Restraints    Pre-Treatment Position  sitting in chair/recliner  -TW      Post Treatment Position  chair  -TW      In Chair  sitting;call light within reach;encouraged to call for assist;exit alarm on  -TW      Recorded by [TW] Reece Rocha, PTA 03/07/20 1330      Row Name                Wound 03/03/20 1800 Left heel Pressure Injury    Wound - Properties Group Date first assessed: 03/03/20 [KR] Time first assessed: 1800 [KR] Side: Left [KR] Location: heel [KR] Primary Wound Type: Pressure inj  [KR] Recorded by:  [KR] Iris Reynolds RN 03/03/20 1810    Row Name 03/07/20 0912             Outcome Summary/Treatment Plan (PT)    Daily Summary of Progress (PT)  progress towards functional goals is fair  -TW      Plan for Continued Treatment (PT)  Cont  -TW      Anticipated Discharge Disposition (PT)  anticipate therapy at next level of care  -TW      Recorded by [TW] Reece Rocha PTA 03/07/20 1330        User Key  (r) = Recorded By, (t) = Taken By, (c) = Cosigned By    Initials Name Effective Dates Discipline    TW Reece Rocha PTA 03/07/18 -  PT    KR Iris Reynolds RN 01/12/18 -  Nurse          Wound 03/03/20 1800 Left heel Pressure Injury (Active)   Wound Image    3/6/2020  2:20 PM   Closure None 3/6/2020  8:25 PM   Base non-blanchable;red 3/6/2020  8:25 PM   Drainage Amount none 3/6/2020  8:25 PM       Rehab Goal Summary     Row Name 03/07/20 0912             Bed Mobility Goal 1 (PT)    Activity/Assistive Device (Bed Mobility Goal 1, PT)  sit to supine;supine to sit  -TW      [place] Level/Cues Needed (Bed Mobility Goal 1, PT)  supervision required  -TW      Time Frame (Bed Mobility Goal 1, PT)  by discharge  -TW      Progress/Outcomes (Bed Mobility Goal 1, PT)  goal not met  -TW         Transfer Goal 1 (PT)    Activity/Assistive Device (Transfer Goal 1, PT)  sit-to-stand/stand-to-sit;bed-to-chair/chair-to-bed;walker, rolling  -TW      [place] Level/Cues Needed (Transfer Goal 1, PT)  contact guard assist  -TW      Time Frame (Transfer Goal 1, PT)  by discharge  -TW      Progress/Outcome (Transfer Goal 1, PT)  goal not met  -TW         Gait Training Goal 1 (PT)    Activity/Assistive Device (Gait Training Goal 1, PT)  gait (walking locomotion);assistive device use;decrease fall risk;increase endurance/gait distance;turning, left;turning, right;walker, rolling  -TW      [place] Level (Gait Training Goal 1, PT)  contact guard assist  -TW      Distance  "(Gait Goal 1, PT)  50 feet  -TW      Time Frame (Gait Training Goal 1, PT)  by discharge  -TW      Progress/Outcome (Gait Training Goal 1, PT)  goal not met  -TW        User Key  (r) = Recorded By, (t) = Taken By, (c) = Cosigned By    Initials Name Provider Type Discipline    TW Reece Rocha, PTA Physical Therapy Assistant PT              PT Recommendation and Plan  Anticipated Discharge Disposition (PT): anticipate therapy at next level of care  Outcome Summary/Treatment Plan (PT)  Daily Summary of Progress (PT): progress towards functional goals is fair  Plan for Continued Treatment (PT): Cont  Anticipated Discharge Disposition (PT): anticipate therapy at next level of care  Plan of Care Reviewed With: patient  Progress: no change  Outcome Summary: Pt agreeable to LE ther ex only due to having \"Heart burn pain.\" Pt unhooked from O2 but sats at 93% initially. Pt desats with each set of ex's but able to recover within 1-2 minutes but at end of session pt dropped to 84% and required being hooked back up to O2 to get sats back up over 90%. Pt would cont to benefit from therapy upon DC.  Outcome Measures     Row Name 03/07/20 0912 03/06/20 8141          How much help from another person do you currently need...    Turning from your back to your side while in flat bed without using bedrails?  3  -TW  3  -LEODAN     Moving from lying on back to sitting on the side of a flat bed without bedrails?  3  -TW  3  -LEODAN     Moving to and from a bed to a chair (including a wheelchair)?  3  -TW  3  -LEODAN     Standing up from a chair using your arms (e.g., wheelchair, bedside chair)?  3  -TW  3  -LEODAN     Climbing 3-5 steps with a railing?  2  -TW  2  -LEODAN     To walk in hospital room?  3  -TW  3  -LEODAN     AM-PAC 6 Clicks Score (PT)  17  -TW  17  -LEODAN        Functional Assessment    Outcome Measure Options  --  AM-PAC 6 Clicks Basic Mobility (PT)  -LEODAN       User Key  (r) = Recorded By, (t) = Taken By, (c) = Cosigned By    Initials Name " Provider Type    LEODAN Gordo Hightower PTA Physical Therapy Assistant    Reece Rouse PTA Physical Therapy Assistant         Time Calculation:   PT Charges     Row Name 03/07/20 1333             Time Calculation    Start Time  0912  -TW      Stop Time  0935 -TW      Time Calculation (min)  23 min  -TW      PT Received On  03/07/20  -TW      PT Goal Re-Cert Due Date  03/12/20  -TW         Time Calculation- PT    Total Timed Code Minutes- PT  23 minute(s)  -TW        User Key  (r) = Recorded By, (t) = Taken By, (c) = Cosigned By    Initials Name Provider Type    TW Reece Rocha PTA Physical Therapy Assistant        Therapy Charges for Today     Code Description Service Date Service Provider Modifiers Qty    [id number] HC PT THER PROC EA 15 MIN 3/7/2020 Reece Rocha PTA GP 2          PT G-Codes  Outcome Measure Options: AM-PAC 6 Clicks Basic Mobility (PT)  AM-PAC 6 Clicks Score (PT): 17    Reece Rocha PTA  3/7/2020

## 2020-03-07 NOTE — PROGRESS NOTES
"[facility]  [address] [place] [address]. [phone]  T - [phone]  F - [phone]     Progress Note          PATIENT  DEMOGRAPHICS   PATIENT NAME: Brendon Skelton                      PHYSICIAN: Rocio Mack MD  : 1945  MRN: [id number]   LOS: 12 days    Patient Care Team:  Josefa Pozo APRN as PCP - General (Nurse Practitioner)  Meme Duckworth APRN as PCP - Claims Attributed  Luis Maher MD as Surgeon (General Surgery)  Veronica Ruiz, RN as Ambulatory  ([facility])  Subjective   SUBJECTIVE   No acute events overnight. C/o heart burn. Denied SOB.         Objective   OBJECTIVE   Vital Signs  Temp:  [96.4 °F (35.8 °C)-97.4 °F (36.3 °C)] 97.4 °F (36.3 °C)  Heart Rate:  [72-89] 78  Resp:  [18-22] 18  BP: (154-172)/(57-78) 156/70    Flowsheet Rows      First Filed Value   Admission Height  165.1 cm (65\") Documented at 2020 1322   Admission Weight  111 kg (245 lb) Documented at 2020 1322           I/O last 3 completed shifts:  In: 430 [P.O.:220; I.V.:100; IV Piggyback:110]  Out: 5020 [Urine:520; Other:4500]    PHYSICAL EXAM    Physical Exam   Constitutional: She is oriented to person, place, and time. She appears well-developed.   HENT:   Head: Normocephalic.   Eyes: Pupils are equal, round, and reactive to light.   Cardiovascular: Normal rate, regular rhythm and normal heart sounds.   Pulmonary/Chest: Effort normal and breath sounds normal.   Abdominal: Soft. Bowel sounds are normal. There is tenderness.   Large lower abdominal bruise   Musculoskeletal: She exhibits no edema.   Neurological: She is alert and oriented to person, place, and time.       RESULTS   Results Review:    Results from last 7 days   Lab Units 20  0513 20  0511 20  0513   SODIUM mmol/L 137 134* 133*   POTASSIUM mmol/L 3.7 4.0 3.8   CHLORIDE mmol/L 97* 94* 93*   CO2 mmol/L 26.0 24.0 24.0   BUN mg/dL 26* 47* 34*   CREATININE mg/dL 3.60* 4.29* " 3.79*   CALCIUM mg/dL 8.9 9.4 9.1   GLUCOSE mg/dL 98 110* 132*       Estimated Creatinine Clearance: 16.5 mL/min (A) (by C-G formula based on SCr of 3.6 mg/dL (H)).    Results from last 7 days   Lab Units 03/02/20  0508   PHOSPHORUS mg/dL 6.9*             Results from last 7 days   Lab Units 03/07/20  0513 03/06/20  0511 03/05/20  0513 03/04/20  0628 03/03/20  0500   WBC 10*3/mm3 11.97* 15.77* 12.81* 10.17 8.00   HEMOGLOBIN g/dL 7.9* 8.3* 8.8* 9.1* 8.8*   PLATELETS 10*3/mm3 261 314 309 328 304       Results from last 7 days   Lab Units 03/07/20  0513 03/06/20  0511 03/05/20  0513 03/04/20  0628 03/03/20  0500   INR  1.75* 1.51* 1.88* 6.40* 4.04*         Imaging Results (Last 24 Hours)     ** No results found for the last 24 hours. **           MEDICATIONS      aspirin 81 mg Oral Daily   BAG BALM 1 application Apply externally BID   budesonide-formoterol 2 puff Inhalation BID - RT   cholecalciferol 2,000 Units Oral Daily   citalopram 20 mg Oral Daily   dilTIAZem  mg Oral Daily   epoetin rosales/rosales-epbx 6,000 Units Intravenous Once per day on Mon Wed Fri   famotidine 20 mg Oral BID AC   insulin aspart 0-9 Units Subcutaneous 4x Daily AC & at Bedtime   insulin detemir 25 Units Subcutaneous Nightly   ipratropium-albuterol 3 mL Nebulization Q4H - RT   metoprolol succinate XL 25 mg Oral Daily   prenatal vitamin 27-0.8 1 tablet Oral Daily   rOPINIRole 0.25 mg Oral Nightly   sodium chloride 10 mL Intravenous Q12H   vitamin C with bob hips 250 mg Oral Daily       Pharmacy to dose warfarin        Assessment/Plan   ASSESSMENT / PLAN      Lower abdominal pain    Stage 4 chronic kidney disease (CMS/HCC)    Acute on chronic diastolic congestive heart failure (CMS/HCC)    Renal insufficiency    Hematoma of abdominal wall    1. Acute Kidney Injury on CKD 4- HD dependent: baseline creatinine 2.0 mg/dl  - Creatinine peaked at 4.57 mg/dl with elevated BUN. HD initiated 2/28/2020.  - Received HD yesterday with UF of 4.5 kgs.  Tolerated well. No HD needs today. Next HD most likely Monday. Out pt dialysis is setup for MWF at [facility]     2. Intra-abdominal and abdominal wall hematoma:  - No evidence of anastomotic leak. No surgical intervention is planned. Back on coumadin     3. Anemia:  - Had recent blood loss while on Lovenox. Received 4 units of packed RBCs.   - On iron and Epogen with HD.     4. E.coli bacteremia:   - On ceftriaxone     5. S/p Aortic valve replacement     6. Diabetes type 2     7. COPD         This document has been electronically signed by Rocio Mack MD on March 7, 2020 3:01 PM

## 2020-03-07 NOTE — PLAN OF CARE
Problem: Patient Care Overview  Goal: Plan of Care Review  Outcome: Ongoing (interventions implemented as appropriate)  Goal: Individualization and Mutuality  Outcome: Ongoing (interventions implemented as appropriate)  Goal: Discharge Needs Assessment  Outcome: Ongoing (interventions implemented as appropriate)  Goal: Interprofessional Rounds/Family Conf  Outcome: Ongoing (interventions implemented as appropriate)     Problem: Fall Risk (Adult)  Goal: Absence of Fall  Outcome: Ongoing (interventions implemented as appropriate)     Problem: Skin Injury Risk (Adult)  Goal: Skin Health and Integrity  Outcome: Ongoing (interventions implemented as appropriate)     Problem: Pain, Acute (Adult)  Goal: Acceptable Pain Control/Comfort Level  Outcome: Ongoing (interventions implemented as appropriate)     Problem: Hemodialysis (Adult)  Goal: Signs and Symptoms of Listed Potential Problems Will be Absent, Minimized or Managed (Hemodialysis)  Outcome: Ongoing (interventions implemented as appropriate)     Problem: Anemia (Adult)  Goal: Symptom Improvement  Outcome: Ongoing (interventions implemented as appropriate)  Flowsheets (Taken 3/7/2020 1207)  Symptom Improvement: making progress toward outcome     Problem: Kidney Disease, Chronic/End Stage Renal Disease (Adult)  Goal: Signs and Symptoms of Listed Potential Problems Will be Absent, Minimized or Managed (Kidney Disease, Chronic/End Stage Renal Disease)  Outcome: Ongoing (interventions implemented as appropriate)  Flowsheets (Taken 3/4/2020 0339 by Prisca Zamorano RNA)  Problems Assessed (Chronic Kidney Disease/ESRD): all

## 2020-03-08 NOTE — THERAPY TREATMENT NOTE
Acute Care - Physical Therapy Treatment Note  [facility]     Patient Name: Brendon Skelton  : 1945  MRN: [id number]  Today's Date: 2019  Onset of Illness/Injury or Date of Surgery: 19  Date of Referral to PT: 19  Referring Physician: LATISHA Reaves MD    Admit Date: 2019    Visit Dx:    ICD-10-CM ICD-9-CM   1. Gastrointestinal hemorrhage, unspecified gastrointestinal hemorrhage type K92.2 578.9   2. Supratherapeutic INR R79.1 790.92   3. SSS (sick sinus syndrome) (CMS/HCC) I49.5 427.81   4. Atherosclerosis of native coronary artery of native heart, angina presence unspecified I25.10 414.01   5. 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 S/P AVR Z95.2 V43.3   7. Impaired physical mobility Z74.09 781.99   8. Impaired mobility and activities of daily living Z74.09 799.89     Patient Active Problem List   Diagnosis   • Long term current use of anticoagulant therapy   • Personal history of heart valve replacement   • Atrial fibrillation [I48.91]   • Emphysema of lung (CMS/[facility])   • On anticoagulant therapy   • Hyperlipidemia   • Diastolic heart failure (CMS/HCC)   • Depressive disorder   • COPD (chronic obstructive pulmonary disease) (CMS/HCC)   • Diabetes mellitus (CMS/HCC)   • Myopia   • Astigmatism   • Bleeding from open wound of chest wall   • Follow-up surgery care   • Encounter for screening for malignant neoplasm of colon   • Positive colorectal cancer screening using DNA-based stool test   • Nodule of left lung   • Chronic hypoxemic respiratory failure (CMS/HCC)   • Physical deconditioning   • Heart failure with preserved left ventricular function (HFpEF) (CMS/HCC)   • Essential hypertension   • Coronary artery disease involving native coronary artery without angina pectoris   • Hx of CABG   • SSS (sick sinus syndrome) (CMS/HCC)   • Pacemaker   • CKD (chronic kidney disease) stage 3, GFR 30-59 ml/min (CMS/HCC)    • Personal history of tobacco use, presenting hazards to health   • Gastrointestinal hemorrhage   • Morbid obesity (CMS/HCC)   • Gastritis   • Colon polyp   • Coumadin toxicity       Therapy Treatment    Rehabilitation Treatment Summary     Row Name 07/02/19 1435             Treatment Time/Intention    Discipline  physical therapy assistant  -TA      Document Type  therapy note (daily note)  -TA      Subjective Information  complains of;dyspnea  -TA      Mode of Treatment  individual therapy;physical therapy  -TA      Patient Effort  good  -TA      Existing Precautions/Restrictions  oxygen therapy device and L/min  -TA      Recorded by [TA] Mary Jo Johnson, [facility] 07/02/19 1636      Row Name 07/02/19 1435             Vital Signs    Pre Systolic BP Rehab  121  -TA      Pre Treatment Diastolic BP  77  -TA      Post Systolic BP Rehab  121  -TA      Post Treatment Diastolic BP  81  -TA      Pretreatment Heart Rate (beats/min)  98  -TA      Intratreatment Heart Rate (beats/min)  110  -TA      Posttreatment Heart Rate (beats/min)  87  -TA      Pre SpO2 (%)  94  -TA      O2 Delivery Pre Treatment  supplemental O2  -TA      Intra SpO2 (%)  80  -TA      O2 Delivery Intra Treatment  supplemental O2  -TA      Post SpO2 (%)  93  -TA      O2 Delivery Post Treatment  supplemental O2  -TA      Pre Patient Position  Sitting  -TA      Post Patient Position  Supine  -TA      Recorded by [TA] Mary Jo Johnson PTA 07/02/19 1636      Row Name 07/02/19 1435             Cognitive Assessment/Intervention- PT/OT    Affect/Mental Status (Cognitive)  WFL frustrated with medical situation  -TA      Orientation Status (Cognition)  oriented x 4  -TA      Follows Commands (Cognition)  WNL  -TA      Personal Safety Interventions  fall prevention program maintained;gait belt;nonskid shoes/slippers when out of bed;supervised activity  -TA      Recorded by [TA] Mary Jo Johnson PTA 07/02/19 1636      Row Name 07/02/19 1435             Safety Issues,  Functional Mobility    Impairments Affecting Function (Mobility)  shortness of breath;endurance/activity tolerance  -TA      Recorded by [TA] Mary Jo Johnson, PTA 07/02/19 1636      Row Name 07/02/19 1435             Bed Mobility Assessment/Treatment    Sit-Supine [place] (Bed Mobility)  conditional independence  -TA      Assistive Device (Bed Mobility)  bed rails;head of bed elevated  -TA      Recorded by [TA] Mary Jo Johnson, PTA 07/02/19 1636      Row Name 07/02/19 1435             Sit-Stand Transfer    Sit-Stand [place] (Transfers)  supervision  -TA      Assistive Device (Sit-Stand Transfers)  walker, front-wheeled  -TA      Recorded by [TA] Mary Jo Johnson, PTA 07/02/19 1636      Row Name 07/02/19 1435             Stand-Sit Transfer    Stand-Sit [place] (Transfers)  supervision  -TA      Assistive Device (Stand-Sit Transfers)  walker, front-wheeled  -TA      Recorded by [TA] Mary Jo Johnson, PTA 07/02/19 1636      Row Name 07/02/19 1435             Toilet Transfer    Type (Toilet Transfer)  stand pivot/stand step  BSC>bed  -TA      [place] Level (Toilet Transfer)  supervision  -TA2      Assistive Device (Toilet Transfer)  --  -TA      Recorded by [TA] Mary Jo Johnson, PTA 07/02/19 1642  [TA2] Mary Jo Johnson, PTA 07/02/19 1636      Row Name 07/02/19 1435             Gait/Stairs Assessment/Training    [place] Level (Gait)  stand by assist  -TA      Assistive Device (Gait)  walker, front-wheeled  -TA      Distance in Feet (Gait)  40`  -TA      Recorded by [TA] Mary Jo Johnson, PTA 07/02/19 1636      Row Name 07/02/19 1435             Therapeutic Exercise    Lower Extremity (Therapeutic Exercise)  LAQ (long arc quad), bilateral;marching while seated  -TA      Lower Extremity Range of Motion (Therapeutic Exercise)  hip abduction/adduction, bilateral;ankle dorsiflexion/plantar flexion, bilateral  -TA      Exercise Type (Therapeutic Exercise)  AROM (active range of motion)  -TA      Position  (Therapeutic Exercise)  seated  -TA      Sets/Reps (Therapeutic Exercise)  10-15  -TA      Expected Outcome (Therapeutic Exercise)  facilitate normal movement patterns;improve functional stability;improve motor control;increase active range of motion  -TA      Recorded by [TA] Mary Jo Johnson, [facility] 07/02/19 1636      Row Name 07/02/19 1435             Positioning and Restraints    Pre-Treatment Position  in bed  -TA      Post Treatment Position  bed  -TA      Recorded by [TA] Mary Jo Johnson, [facility] 07/02/19 1636      Row Name 07/02/19 1435             Pain Scale: Numbers Pre/Post-Treatment    Pain Scale: Numbers, Pretreatment  0/10 - no pain  -TA      Pain Scale: Numbers, Post-Treatment  0/10 - no pain  -TA      Recorded by [TA] Mary Jo Johnson, [facility] 07/02/19 1636      Row Name 07/02/19 1435             Outcome Summary/Treatment Plan (PT)    Daily Summary of Progress (PT)  progress towards functional goals is fair  -TA      Plan for Continued Treatment (PT)  continue  -TA      Anticipated Discharge Disposition (PT)  anticipate therapy at next level of care needs daily assistance  -TA      Recorded by [TA] Mary Jo Johnson, [facility] 07/02/19 1636        User Key  (r) = Recorded By, (t) = Taken By, (c) = Cosigned By    Initials Name Effective Dates Discipline    TA Mary Jo Johnson, [facility] 03/07/18 -  PT          Wound Left posterior arm skin tear (Active)   Dressing Appearance open to air 7/2/2019 10:39 AM   Closure Open to air 7/2/2019 10:39 AM   Base scab 7/2/2019 10:39 AM       Rehab Goal Summary     Row Name 07/02/19 1435             Physical Therapy Goals    Bed Mobility Goal Selection (PT)  bed mobility, PT goal 1  -TA      Transfer Goal Selection (PT)  transfer, PT goal 1  -TA      Gait Training Goal Selection (PT)  gait training, PT goal 1  -TA         Bed Mobility Goal 1 (PT)    Activity/Assistive Device (Bed Mobility Goal 1, PT)  sit to supine/supine to sit  -TA      [place] Level/Cues Needed (Bed Mobility Goal 1,  PT)  conditional independence  -TA      Time Frame (Bed Mobility Goal 1, PT)  long term goal (LTG);by discharge  -TA      Barriers (Bed Mobility Goal 1, PT)  HOB flat, no bed rails.   -TA      Progress/Outcomes (Bed Mobility Goal 1, PT)  goal partially met  -TA         Transfer Goal 1 (PT)    Activity/Assistive Device (Transfer Goal 1, PT)  sit-to-stand/stand-to-sit;bed-to-chair/chair-to-bed  -TA      [place] Level/Cues Needed (Transfer Goal 1, PT)  conditional independence  -TA      Time Frame (Transfer Goal 1, PT)  long term goal (LTG);by discharge  -TA      Barriers (Transfers Goal 1, PT)  Maintain SpO2 >88%  -TA      Progress/Outcome (Transfer Goal 1, PT)  goal not met;goal ongoing  -TA         Gait Training Goal 1 (PT)    Activity/Assistive Device (Gait Training Goal 1, PT)  walker, rolling  -TA      [place] Level (Gait Training Goal 1, PT)  conditional independence  -TA      Distance (Gait Goal 1, PT)  25'x2  -TA      Time Frame (Gait Training Goal 1, PT)  long term goal (LTG);by discharge  -TA      Barriers (Gait Training Goal 1, PT)  Maintain SpO2 >88%  -TA      Progress/Outcome (Gait Training Goal 1, PT)  goal partially met;goal ongoing met distance; not met level of independence  -TA        User Key  (r) = Recorded By, (t) = Taken By, (c) = Cosigned By    Initials Name Provider Type Discipline    Mary Jo Santiago, PTA Physical Therapy Assistant PT          Physical Therapy Education     Title: PT OT SLP Therapies (In Progress)     Topic: Physical Therapy (In Progress)     Point: Mobility training (Done)     Learning Progress Summary           Patient Acceptance, E, VU,NR by LF at 7/1/2019 12:52 PM    Comment:  use of RW for energy conservation    Acceptance, E, VU by CZ at 6/26/2019 11:04 AM    Comment:  Educated on proper hand placement with transfers; educated on energy conservation to improve O2 saturation.                   Point: Precautions (Done)     Learning Progress Summary            Patient Acceptance, E, VU by LF at 7/1/2019 12:53 PM    Comment:  PLB for maintaining and recovering O2 saturation    Acceptance, E,D, VU by TA at 6/28/2019  1:32 PM    Comment:  PLB & energy consevation                               User Key     Initials Effective Dates Name Provider Type Discipline     03/07/18 -  Mary Jo Johnson, PTA Physical Therapy Assistant PT    CZ 04/03/18 -  Titi Portillo, PT Physical Therapist PT     07/23/18 -  Fatimah Gavin PT Physical Therapist PT                PT Recommendation and Plan  Anticipated Discharge Disposition (PT): anticipate therapy at next level of care(needs daily assistance)  Therapy Frequency (PT Clinical Impression): daily  Outcome Summary/Treatment Plan (PT)  Daily Summary of Progress (PT): progress towards functional goals is fair  Barriers to Overall Progress (PT): SOA  Plan for Continued Treatment (PT): continue  Anticipated Discharge Disposition (PT): anticipate therapy at next level of care(needs daily assistance)  Progress: improving  Outcome Summary: pt sit>sup with independence, sit<>stand with SBA, pt ambulated 40` without AD with SBA ,  pt would benefit from 24/7 care & continued pT services  @ D/C  Outcome Measures     Row Name 07/02/19 1600 07/01/19 1448 07/01/19 1039       How much help from another person do you currently need...    Turning from your back to your side while in flat bed without using bedrails?  4  -TA  --  4  -LF    Moving from lying on back to sitting on the side of a flat bed without bedrails?  4  -TA  --  4  -LF    Moving to and from a bed to a chair (including a wheelchair)?  3  -TA  --  3  -LF    Standing up from a chair using your arms (e.g., wheelchair, bedside chair)?  3  -TA  --  3  -LF    Climbing 3-5 steps with a railing?  3  -TA  --  3  -LF    To walk in hospital room?  3  -TA  --  3  -LF    AM-PAC 6 Clicks Score (PT)  20  -TA  --  20  -LF       How much help from another is currently needed...    Putting on and  taking off regular lower body clothing?  --  3  -BB  --    Bathing (including washing, rinsing, and drying)  --  3  -BB  --    Toileting (which includes using toilet bed pan or urinal)  --  3  -BB  --    Putting on and taking off regular upper body clothing  --  3  -BB  --    Taking care of personal grooming (such as brushing teeth)  --  4  -BB  --    Eating meals  --  4  -BB  --    AM-[facility] 6 Clicks Score (OT)  --  20  -BB  --       Functional Assessment    Outcome Measure Options  -[facility] 6 Clicks Basic Mobility (PT)  -TA  --  -[facility] 6 Clicks Basic Mobility (PT)  -    Row Name 06/30/19 1300             How much help from another person do you currently need...    Turning from your back to your side while in flat bed without using bedrails?  4  -TA      Moving from lying on back to sitting on the side of a flat bed without bedrails?  3  -TA      Moving to and from a bed to a chair (including a wheelchair)?  3  -TA      Standing up from a chair using your arms (e.g., wheelchair, bedside chair)?  3  -TA      Climbing 3-5 steps with a railing?  3  -TA      To walk in hospital room?  3  -TA      AM-[facility] 6 Clicks Score (PT)  19  -TA         Functional Assessment    Outcome Measure Options  [facility] 6 Clicks Basic Mobility (PT)  -TA        User Key  (r) = Recorded By, (t) = Taken By, (c) = Cosigned By    Initials Name Provider Type    Mary Jo Santiago PTA Physical Therapy Assistant    BB Selena Simpson COTA/L Occupational Therapy Assistant    LF Fatimah Gavin, PT Physical Therapist         Time Calculation:   PT Charges     Row Name 07/02/19 1644             Time Calculation    Start Time  1435  -TA      Stop Time  1510  -TA      Time Calculation (min)  35 min  -TA         Time Calculation- PT    Total Timed Code Minutes- PT  35 minute(s)  -TA        User Key  (r) = Recorded By, (t) = Taken By, (c) = Cosigned By    Initials Name Provider Type    Mary Jo Santiago PTA Physical Therapy Assistant        Therapy Charges  for Today     Code Description Service Date Service Provider Modifiers Qty    86126991012 HC GAIT TRAINING EA 15 MIN 7/2/2019 Mary Jo Johnson, PTA GP 1    65277617582 HC PT THER PROC EA 15 MIN 7/2/2019 Mary Jo Johnson, YOSEPH GP 1          PT G-Codes  Outcome Measure Options: AM-PAC 6 Clicks Basic Mobility (PT)  AM-PAC 6 Clicks Score (PT): 20  AM-PAC 6 Clicks Score (OT): 20    Mary Jo Johnson PTA  7/2/2019        89F PMHx CHF (EF 50-55%), A fib on coumadin, hypothyroidism, rectal prolapse x years presents with prolapsed rectum x 2-3 hours after a large bowel movement this afternoon. She intermittently has rectal prolapse, easily reduced by her health aide at home, but yesterday her health aide quit and her son at home was unsure how to reduce it. No fevers, chills, chest pain, dyspnea, nausea, vomiting, abdominal pain, rectal pain, diarrhea or constipation. Some loose bowel movements, about 1-2 times per day. Pt is DNR/DNI and they do not want invasive procedures, do not want to have surgery to fix it.

## 2020-03-08 NOTE — PROGRESS NOTES
"[facility]  [address] [place] [address]. [id number]  T - [phone]  F - [phone]     Progress Note          PATIENT  DEMOGRAPHICS   PATIENT NAME: Brendon Skelton                      PHYSICIAN: ABRAHAM Smith  : 1945  MRN: [id number]   LOS: 13 days    Patient Care Team:  Josefa Pozo APRN as PCP - General (Nurse Practitioner)  Meme Duckworth APRN as PCP - Claims Attributed  Luis Maher MD as Surgeon (General Surgery)  Veronica Ruiz, RN as Ambulatory  ([facility])  Subjective   SUBJECTIVE   No acute events overnight.  Denied SOB.         Objective   OBJECTIVE   Vital Signs  Temp:  [97.1 °F (36.2 °C)-98 °F (36.7 °C)] 98 °F (36.7 °C)  Heart Rate:  [72-88] 79  Resp:  [16-18] 18  BP: (134-149)/(60-76) 148/74    Flowsheet Rows      First Filed Value   Admission Height  165.1 cm (65\") Documented at 2020 1322   Admission Weight  111 kg (245 lb) Documented at 2020 1322           I/O last 3 completed shifts:  In: -   Out: 200 [Urine:200]    PHYSICAL EXAM    Physical Exam   Constitutional: She is oriented to person, place, and time. She appears well-developed.   HENT:   Head: Normocephalic.   Eyes: Pupils are equal, round, and reactive to light.   Cardiovascular: Normal rate, regular rhythm and normal heart sounds.   Pulmonary/Chest: Effort normal and breath sounds normal.   Abdominal: Soft. Bowel sounds are normal. There is tenderness.   Large lower abdominal bruise   Musculoskeletal: She exhibits no edema.   Neurological: She is alert and oriented to person, place, and time.       RESULTS   Results Review:    Results from last 7 days   Lab Units 20  0508 20  0513 20  0511   SODIUM mmol/L 136 137 134*   POTASSIUM mmol/L 4.1 3.7 4.0   CHLORIDE mmol/L 97* 97* 94*   CO2 mmol/L 24.0 26.0 24.0   BUN mg/dL 35* 26* 47*   CREATININE mg/dL 4.36* 3.60* 4.29*   CALCIUM mg/dL 9.5 8.9 9.4   GLUCOSE mg/dL 91 98 110*       Estimated " Creatinine Clearance: 13.6 mL/min (A) (by C-G formula based on SCr of 4.36 mg/dL (H)).    Results from last 7 days   Lab Units 03/02/20  0508   PHOSPHORUS mg/dL 6.9*             Results from last 7 days   Lab Units 03/08/20  0508 03/07/20  0513 03/06/20  0511 03/05/20  0513 03/04/20  0628   WBC 10*3/mm3 11.36* 11.97* 15.77* 12.81* 10.17   HEMOGLOBIN g/dL 8.1* 7.9* 8.3* 8.8* 9.1*   PLATELETS 10*3/mm3 259 261 314 309 328       Results from last 7 days   Lab Units 03/08/20  0508 03/07/20  0513 03/06/20  0511 03/05/20  0513 03/04/20  0628   INR  2.61* 1.75* 1.51* 1.88* 6.40*         Imaging Results (Last 24 Hours)     ** No results found for the last 24 hours. **           MEDICATIONS      aspirin 81 mg Oral Daily   BAG BALM 1 application Apply externally BID   budesonide-formoterol 2 puff Inhalation BID - RT   cholecalciferol 2,000 Units Oral Daily   citalopram 20 mg Oral Daily   dilTIAZem  mg Oral Daily   epoetin rosales/rosales-epbx 6,000 Units Intravenous Once per day on Mon Wed Fri   insulin aspart 0-9 Units Subcutaneous 4x Daily AC & at Bedtime   insulin detemir 25 Units Subcutaneous Nightly   ipratropium-albuterol 3 mL Nebulization Q4H - RT   metoprolol succinate XL 25 mg Oral Daily   pantoprazole 40 mg Oral Q AM   prenatal vitamin 27-0.8 1 tablet Oral Daily   rOPINIRole 0.25 mg Oral Nightly   sodium chloride 10 mL Intravenous Q12H   vitamin C with bob hips 250 mg Oral Daily   warfarin 2.5 mg Oral Daily       Pharmacy to dose warfarin        Assessment/Plan   ASSESSMENT / PLAN      Lower abdominal pain    Stage 4 chronic kidney disease (CMS/HCC)    Acute on chronic diastolic congestive heart failure (CMS/HCC)    Renal insufficiency    Hematoma of abdominal wall    1. Acute Kidney Injury on CKD 4- HD dependent: baseline creatinine 2.0 mg/dl  - Creatinine peaked at 4.57 mg/dl with elevated BUN. HD initiated 2/28/2020.  - Received HD Friday with UF of 4.5 kgs. Tolerated well. No HD needs today. Next HD most likely  Monday. Out pt dialysis is setup for MWF at [facility]     2. Intra-abdominal and abdominal wall hematoma:  - No evidence of anastomotic leak. No surgical intervention is planned. Back on coumadin. U/S today.     3. Anemia:  - Had recent blood loss while on Lovenox. Received 4 units of packed RBCs.   - On iron and Epogen with HD.     4. E.coli bacteremia:   - On ceftriaxone     5. S/p Aortic valve replacement     6. Diabetes type 2     7. COPD         This document has been electronically signed by ABRAHAM Smith on March 8, 2020 12:00 PM

## 2020-03-08 NOTE — THERAPY TREATMENT NOTE
Acute Care - Physical Therapy Treatment Note  [facility]     Patient Name: Brendon Skelton  : 1945  MRN: [id number]  Today's Date: 3/8/2020             Admit Date: 2020    Visit Dx:    ICD-10-CM ICD-9-CM   1. Stage 4 chronic kidney disease (CMS/HCC) N18.4 585.4   2. Renal insufficiency N28.9 593.9   3. Left lower quadrant abdominal pain R10.32 789.04   4. Elevated INR R79.1 790.92   5. Impaired physical mobility Z74.09 781.99     Patient Active Problem List   Diagnosis   • Long term current use of anticoagulant therapy   • Personal history of heart valve replacement   • Atrial fibrillation [I48.91]   • Emphysema of lung (CMS/HCC)   • On anticoagulant therapy   • Hyperlipidemia   • Diastolic heart failure (CMS/HCC)   • Depressive disorder   • COPD (chronic obstructive pulmonary disease) (CMS/HCC)   • Diabetes mellitus (CMS/HCC)   • Myopia   • Astigmatism   • Bleeding from open wound of chest wall   • Follow-up surgery care   • Encounter for screening for malignant neoplasm of colon   • Positive colorectal cancer screening using DNA-based stool test   • Nodule of left lung   • Chronic hypoxemic respiratory failure (CMS/HCC)   • Physical deconditioning   • Heart failure with preserved left ventricular function (HFpEF) (CMS/HCC)   • Hypertension   • Coronary artery disease involving native coronary artery without angina pectoris   • Hx of CABG   • SSS (sick sinus syndrome) (CMS/HCC)   • Pacemaker   • Stage 4 chronic kidney disease (CMS/HCC)   • Personal history of tobacco use, presenting hazards to health   • Gastrointestinal hemorrhage   • Class 2 severe obesity due to excess calories with serious comorbidity and body mass index (BMI) of 39.0 to 39.9 in adult (CMS/HCC)   • Gastritis   • Colon polyp   • Coumadin toxicity   • Acute on chronic diastolic congestive heart failure (CMS/HCC)   • Chronic anemia   • Pulmonary hypertension (CMS/HCC)   • Confusion   • Hyponatremia   • Long term current use  of anticoagulant   • Renal insufficiency   • Lower abdominal pain   • Hematoma of abdominal wall       Therapy Treatment    Rehabilitation Treatment Summary     Row Name 03/08/20 1015             Treatment Time/Intention    Discipline  physical therapy assistant  -TW      Document Type  therapy note (daily note)  -TW      Subjective Information  complains of;fatigue;weakness  -TW      Mode of Treatment  physical therapy;individual therapy  -TW      Patient/Family Observations  Pt supine in bed sleeping and easily awakened but gets aggitated when wakened.  -TW      Patient Effort  fair  -TW      Existing Precautions/Restrictions  fall;oxygen therapy device and L/min  -TW      Recorded by [TW] Reece Rocha PTA 03/08/20 1438      Row Name 03/08/20 1015             Vital Signs    Pretreatment Heart Rate (beats/min)  76  -TW      Posttreatment Heart Rate (beats/min)  80  -TW      Pre SpO2 (%)  97  -TW      O2 Delivery Pre Treatment  supplemental O2  -TW      Post SpO2 (%)  96  -TW      Pre Patient Position  Supine  -TW      Intra Patient Position  Supine  -TW      Post Patient Position  Supine  -TW      Recorded by [TW] Reece Rocha PTA 03/08/20 1438      Row Name 03/08/20 1015             Cognitive Assessment/Intervention- PT/OT    Orientation Status (Cognition)  oriented x 4  -TW      Recorded by [TW] Reece Rocha PTA 03/08/20 1438      Row Name 03/08/20 1015             Safety Issues, Functional Mobility    Impairments Affecting Function (Mobility)  balance;endurance/activity tolerance;pain;postural/trunk control;shortness of breath;strength  -TW      Recorded by [TW] Reece Rocha PTA 03/08/20 1438      Row Name 03/08/20 1015             Bed Mobility Assessment/Treatment    Comment (Bed Mobility)  Pt deferred and gets aggitated with encouragement.  -TW      Recorded by [TW] Reece Rocha PTA 03/08/20 1438      Row Name 03/08/20 1015             Therapeutic Exercise    Lower  Extremity (Therapeutic Exercise)  gluteal sets;quad sets, bilateral;heel slides, bilateral  -TW      Lower Extremity Range of Motion (Therapeutic Exercise)  hip abduction/adduction, bilateral;hip internal/external rotation, bilateral;ankle dorsiflexion/plantar flexion, bilateral  -TW      Exercise Type (Therapeutic Exercise)  AROM (active range of motion);AAROM (active assistive range of motion)  -TW      Position (Therapeutic Exercise)  supine  -TW      Sets/Reps (Therapeutic Exercise)  2/10-15  -TW      Comment (Therapeutic Exercise)  Pt constantly falls asleep throughout ex's and gets irritated when awakened but eventually cont to ex.  -TW      Recorded by [TW] Reece Rocha PTA 03/08/20 1438      Row Name 03/08/20 1015             Positioning and Restraints    Pre-Treatment Position  in bed  -TW      Post Treatment Position  bed  -TW      In Bed  supine;call light within reach;encouraged to call for assist;exit alarm on  -TW      Recorded by [TW] Reece Rocha PTA 03/08/20 1438      Row Name 03/08/20 1015             Pain Scale: Numbers Pre/Post-Treatment    Pain Scale: Numbers, Pretreatment  0/10 - no pain  -TW      Pain Scale: Numbers, Post-Treatment  0/10 - no pain  -TW      Recorded by [TW] Reece Rocha PTA 03/08/20 1438      Row Name                Wound 03/03/20 1800 Left heel Pressure Injury    Wound - Properties Group Date first assessed: 03/03/20 [KR] Time first assessed: 1800 [KR] Side: Left [KR] Location: heel [KR] Primary Wound Type: Pressure inj [KR] Recorded by:  [KR] Iris Reynolds RN 03/03/20 1810    Row Name 03/08/20 1015             Outcome Summary/Treatment Plan (PT)    Daily Summary of Progress (PT)  progress toward functional goals is gradual  -TW      Plan for Continued Treatment (PT)  Cont  -TW      Anticipated Discharge Disposition (PT)  anticipate therapy at next level of care  -TW      Recorded by [TW] Reece Rocha PTA 03/08/20 1438        User  Key  (r) = Recorded By, (t) = Taken By, (c) = Cosigned By    Initials Name Effective Dates Discipline    TW Reece Rocha PTA 03/07/18 -  PT    KR Iris Reynolds RN 01/12/18 -  Nurse          Wound 03/03/20 1800 Left heel Pressure Injury (Active)   Dressing Appearance open to air 3/8/2020  9:25 AM   Closure None 3/8/2020  9:25 AM   Base red;non-blanchable 3/8/2020  9:25 AM   Drainage Amount none 3/8/2020  9:25 AM   Dressing Care, Wound open to air 3/7/2020  8:00 PM       Rehab Goal Summary     Row Name 03/08/20 1015             Bed Mobility Goal 1 (PT)    Activity/Assistive Device (Bed Mobility Goal 1, PT)  sit to supine;supine to sit  -TW      [place] Level/Cues Needed (Bed Mobility Goal 1, PT)  supervision required  -TW      Time Frame (Bed Mobility Goal 1, PT)  by discharge  -TW      Progress/Outcomes (Bed Mobility Goal 1, PT)  goal not met  -TW         Transfer Goal 1 (PT)    Activity/Assistive Device (Transfer Goal 1, PT)  sit-to-stand/stand-to-sit;bed-to-chair/chair-to-bed;walker, rolling  -TW      [place] Level/Cues Needed (Transfer Goal 1, PT)  contact guard assist  -TW      Time Frame (Transfer Goal 1, PT)  by discharge  -TW      Progress/Outcome (Transfer Goal 1, PT)  goal not met  -TW         Gait Training Goal 1 (PT)    Activity/Assistive Device (Gait Training Goal 1, PT)  gait (walking locomotion);assistive device use;decrease fall risk;increase endurance/gait distance;turning, left;turning, right;walker, rolling  -TW      [place] Level (Gait Training Goal 1, PT)  contact guard assist  -TW      Distance (Gait Goal 1, PT)  50 feet  -TW      Time Frame (Gait Training Goal 1, PT)  by discharge  -TW      Progress/Outcome (Gait Training Goal 1, PT)  goal not met  -TW        User Key  (r) = Recorded By, (t) = Taken By, (c) = Cosigned By    Initials Name Provider Type Discipline    Reece Rouse PTA Physical Therapy Assistant PT              PT Recommendation and  "Plan  Anticipated Discharge Disposition (PT): anticipate therapy at next level of care  Outcome Summary/Treatment Plan (PT)  Daily Summary of Progress (PT): progress toward functional goals is gradual  Plan for Continued Treatment (PT): Cont  Anticipated Discharge Disposition (PT): anticipate therapy at next level of care  Plan of Care Reviewed With: patient  Progress: no change  Outcome Summary: Pt becomes very irritable when awakened but is eventually agreeable to ex's in bed. Pt states \"Sunday is my sleeping day\" and declined OOB activity. Pt performed 2 sets of 10-15 reps of supine therex with almost constant VCs to stay awake and to stay on task. Pt would benefit from therapy when DC.  Outcome Measures     Row Name 03/08/20 1015 03/07/20 0912 03/06/20 1451       How much help from another person do you currently need...    Turning from your back to your side while in flat bed without using bedrails?  3  -TW  3  -TW  3  -LEODAN    Moving from lying on back to sitting on the side of a flat bed without bedrails?  3  -TW  3  -TW  3  -LEODAN    Moving to and from a bed to a chair (including a wheelchair)?  3  -TW  3  -TW  3  -LEODAN    Standing up from a chair using your arms (e.g., wheelchair, bedside chair)?  3  -TW  3  -TW  3  -LEODAN    Climbing 3-5 steps with a railing?  2  -TW  2  -TW  2  -LEODAN    To walk in hospital room?  3  -TW  3  -TW  3  -LEODAN    AM-PAC 6 Clicks Score (PT)  17  -TW  17  -TW  17  -LEODAN       Functional Assessment    Outcome Measure Options  --  --  AM-PAC 6 Clicks Basic Mobility (PT)  -LEODAN      User Key  (r) = Recorded By, (t) = Taken By, (c) = Cosigned By    Initials Name Provider Type    Gordo Clay, PTA Physical Therapy Assistant    TW Reece Rocha, YOSEPH Physical Therapy Assistant         Time Calculation:   PT Charges     Row Name 03/08/20 1444             Time Calculation    Start Time  1015  -TW      Stop Time  1040  -TW      Time Calculation (min)  25 min  -TW      PT Received On  03/08/20  " -TW      PT Goal Re-Cert Due Date  03/12/20  -TW         Time Calculation- PT    Total Timed Code Minutes- PT  25 minute(s)  -TW        User Key  (r) = Recorded By, (t) = Taken By, (c) = Cosigned By    Initials Name Provider Type    Reece Rouse PTA Physical Therapy Assistant        Therapy Charges for Today     Code Description Service Date Service Provider Modifiers Qty    68752497493 HC PT THER PROC EA 15 MIN 3/7/2020 Reece Rocha PTA GP 2    87075553953 HC PT THER PROC EA 15 MIN 3/8/2020 Reece Rocha PTA GP 2          PT G-Codes  Outcome Measure Options: AM-PAC 6 Clicks Basic Mobility (PT)  AM-PAC 6 Clicks Score (PT): 17    Reece Rocha PTA  3/8/2020

## 2020-03-08 NOTE — PLAN OF CARE
"  Problem: Patient Care Overview  Goal: Plan of Care Review  Outcome: Ongoing (interventions implemented as appropriate)  Flowsheets (Taken 3/8/2020 1015)  Progress: no change  Plan of Care Reviewed With: patient  Outcome Summary: Pt becomes very irritable when awakened but is eventually agreeable to ex's in bed. Pt states \"Sunday is my sleeping day\" and declined OOB activity. Pt performed 2 sets of 10-15 reps of supine therex with almost constant VCs to stay awake and to stay on task. Pt would benefit from therapy when DC.     "

## 2020-03-08 NOTE — PROGRESS NOTES
[facility] Medicine Services  INPATIENT PROGRESS NOTE    Length of Stay: 13  Date of Admission: 2/24/2020  Primary Care Physician: Josefa Pozo APRN    Subjective   Chief Complaint: No new complaints.        HPI:    74 year old female patient who came to the ED complaining of lower abdominal pain. She underwent laparoscopic colectomy for colon cancer recently. CT abdomen at the ED revealed some collection in the anterior abdominal wall consistent with a hematoma versus abscess.  At the ED she also complained of SOB and her O2 requirement went from baseline of3L to 4 liters. CXR revealed Cardiomegaly with vascular /interstitial congestion and small pleural effusions. Appearance is similar to 2/15/2020 but lasix IV times 1 was given.   Patient was initially started on vancomycin and ceftriaxone.  BUN/creatinine continue to increase at which point the patient was seen by the nephrologist and was started on hemodialysis on 2/28/2020.    Patient is seen for follow-up today.  She is doing better, tolerating prescribed diet and indigestion has much improved.        Review of Systems   Constitutional: Positive for fatigue. Negative for activity change, appetite change, chills, diaphoresis and fever.   HENT: Negative for trouble swallowing and voice change.    Eyes: Negative for photophobia and visual disturbance.   Respiratory: Negative for cough, choking, chest tightness, shortness of breath, wheezing and stridor.    Cardiovascular: Negative for chest pain, palpitations and leg swelling.   Gastrointestinal: Negative for abdominal distention, abdominal pain, blood in stool, constipation, diarrhea, nausea and vomiting.   Endocrine: Negative for cold intolerance, heat intolerance, polydipsia, polyphagia and polyuria.   Genitourinary: Positive for decreased urine volume. Negative for difficulty urinating, dysuria, enuresis, flank pain, frequency, hematuria and urgency.      Musculoskeletal: Negative for arthralgias, gait problem, myalgias, neck pain and neck stiffness.   Skin: Negative for pallor, rash and wound.   Neurological: Negative for dizziness, tremors, seizures, syncope, facial asymmetry, speech difficulty, weakness, light-headedness, numbness and headaches.   Hematological: Bruises/bleeds easily.   Psychiatric/Behavioral: Negative for agitation, behavioral problems and confusion.       Objective    Temp:  [97.1 °F (36.2 °C)-97.6 °F (36.4 °C)] 97.3 °F (36.3 °C)  Heart Rate:  [72-88] 76  Resp:  [16-18] 18  BP: (134-156)/(60-76) 140/65    Physical Exam   Constitutional: She is oriented to person, place, and time. She appears well-developed and well-nourished. She is cooperative. No distress.   HENT:   Head: Normocephalic and atraumatic.   Right Ear: External ear normal.   Left Ear: External ear normal.   Nose: Nose normal.   Mouth/Throat: Oropharynx is clear and moist.   Eyes: Pupils are equal, round, and reactive to light. Conjunctivae and EOM are normal.   Neck: Normal range of motion. Neck supple. No JVD present. No thyromegaly present.   Cardiovascular: Normal rate, regular rhythm, normal heart sounds and intact distal pulses. Exam reveals no gallop and no friction rub.   No murmur heard.  Pulmonary/Chest: Effort normal. No stridor. No respiratory distress. She has no wheezes. She has rhonchi. She has no rales. She exhibits no tenderness.   Abdominal: Soft. Bowel sounds are normal. She exhibits no distension and no mass. There is no tenderness. There is no rebound and no guarding. No hernia.   Musculoskeletal: Normal range of motion. She exhibits no edema, tenderness or deformity.   Neurological: She is alert and oriented to person, place, and time. She has normal reflexes. No cranial nerve deficit or sensory deficit. She exhibits normal muscle tone. Coordination normal.   Skin: Skin is warm and dry. No rash noted. She is not diaphoretic. No erythema. No pallor.    Psychiatric: She has a normal mood and affect. Her behavior is normal. Judgment and thought content normal.   Nursing note and vitals reviewed.        Medication Review:    Current Facility-Administered Medications:   •  acetaminophen (TYLENOL) tablet 650 mg, 650 mg, Oral, Q4H PRN, Polo Feliz MD  •  aluminum-magnesium hydroxide-simethicone (MAALOX MAX) 400-400-40 MG/5ML suspension 15 mL, 15 mL, Oral, Q6H PRN, Dudley Alfredo MD, 15 mL at 03/07/20 1535  •  aspirin chewable tablet 81 mg, 81 mg, Oral, Daily, Polo Feliz MD, 81 mg at 03/08/20 0929  •  BAG BALM ointment 1 g, 1 application, Apply externally, BID, Polo Feliz MD, 1 g at 03/08/20 0929  •  budesonide-formoterol (SYMBICORT) 160-4.5 MCG/ACT inhaler 2 puff, 2 puff, Inhalation, BID - RT, Polo Feliz MD, 2 puff at 03/08/20 0651  •  cholecalciferol (VITAMIN D3) tablet 2,000 Units, 2,000 Units, Oral, Daily, Polo Feliz MD, 2,000 Units at 03/08/20 0929  •  citalopram (CeleXA) tablet 20 mg, 20 mg, Oral, Daily, Polo Feliz MD, 20 mg at 03/08/20 0928  •  dextrose (D50W) 25 g/ 50mL Intravenous Solution 25 g, 25 g, Intravenous, Q15 Min PRN, Polo Feliz MD  •  dextrose (GLUTOSE) oral gel 15 g, 15 g, Oral, Q15 Min PRN, Polo Feliz MD  •  dilTIAZem CD (CARDIZEM CD) 24 hr capsule 240 mg, 240 mg, Oral, Daily, Polo Feliz MD, 240 mg at 03/08/20 0928  •  epoetin rosales (EPOGEN,PROCRIT) injection 6,000 Units, 6,000 Units, Intravenous, Once per day on Mon Wed Fri, Polo Feliz MD, 6,000 Units at 03/06/20 0843  •  glucagon (human recombinant) (GLUCAGEN DIAGNOSTIC) injection 1 mg, 1 mg, Subcutaneous, Q15 Min PRN, Polo Feliz MD  •  insulin aspart (novoLOG) injection 0-9 Units, 0-9 Units, Subcutaneous, 4x Daily AC & at Bedtime, Polo Feliz MD, 4 Units at 02/26/20 2058  •  insulin detemir (LEVEMIR) injection 25 Units, 25 Units,  Subcutaneous, Nightly, Polo Feliz MD, 25 Units at 20  •  ipratropium-albuterol (DUO-NEB) nebulizer solution 3 mL, 3 mL, Nebulization, Q4H - RT, Polo Feliz MD, 3 mL at 20 0651  •  magic butt ointment, , Topical, BID PRN, Polo Feliz MD  •  melatonin tablet 3 mg, 3 mg, Oral, Nightly PRN, Polo Feliz MD, 3 mg at 20  •  metoprolol succinate XL (TOPROL-XL) 24 hr tablet 25 mg, 25 mg, Oral, Daily, Polo Feliz MD, 25 mg at 20  •  [] morphine injection 2 mg, 2 mg, Intravenous, Q4H PRN, 2 mg at 20 1156 **AND** naloxone (NARCAN) injection 0.4 mg, 0.4 mg, Intravenous, Q5 Min PRN, Polo Feliz MD  •  ondansetron (ZOFRAN) injection 4 mg, 4 mg, Intravenous, Q6H PRN, Polo Feliz MD, 4 mg at 20 0028  •  oxyCODONE (ROXICODONE) immediate release tablet 10 mg, 10 mg, Oral, Q6H PRN, Polo Feliz MD, 10 mg at 20  •  pantoprazole (PROTONIX) EC tablet 40 mg, 40 mg, Oral, Q AM, Rocio Mack MD, 40 mg at 20 0636  •  Pharmacy to dose warfarin, , Does not apply, Continuous PRN, Dudley Alfredo MD  •  prenatal vitamin 27-0.8 tablet 1 tablet, 1 tablet, Oral, Daily, Polo Feliz MD, 1 tablet at 20  •  rOPINIRole (REQUIP) tablet 0.25 mg, 0.25 mg, Oral, Nightly, Polo Feliz MD, 0.25 mg at 20  •  sodium chloride 0.9 % bolus 100 mL, 100 mL, Intravenous, PRN, Polo Feliz MD  •  sodium chloride 0.9 % bolus 100 mL, 100 mL, Intravenous, PRN, Polo Feliz MD  •  sodium chloride 0.9 % bolus 100 mL, 100 mL, Intravenous, PRN, Polo Feliz MD  •  [COMPLETED] Insert peripheral IV, , , Once **AND** sodium chloride 0.9 % flush 10 mL, 10 mL, Intravenous, PRN, Polo Feliz MD  •  sodium chloride 0.9 % flush 10 mL, 10 mL, Intravenous, Q12H, Polo Feliz MD, 10 mL at 208  •   sodium chloride 0.9 % flush 10 mL, 10 mL, Intravenous, PRN, Polo Feliz MD  •  vitamin C (ASCORBIC ACID) tablet 250 mg, 250 mg, Oral, Daily, Polo Feliz MD, 250 mg at 03/08/20 0928  •  warfarin (COUMADIN) tablet 2.5 mg, 2.5 mg, Oral, Daily, Dudley Alfredo MD, 2.5 mg at 03/07/20 1754    Results Review:  I have reviewed the labs, radiology results, and diagnostic studies.    Laboratory Data:   Results from last 7 days   Lab Units 03/08/20  0508 03/07/20  0513 03/06/20  0511   SODIUM mmol/L 136 137 134*   POTASSIUM mmol/L 4.1 3.7 4.0   CHLORIDE mmol/L 97* 97* 94*   CO2 mmol/L 24.0 26.0 24.0   BUN mg/dL 35* 26* 47*   CREATININE mg/dL 4.36* 3.60* 4.29*   GLUCOSE mg/dL 91 98 110*   CALCIUM mg/dL 9.5 8.9 9.4   ANION GAP mmol/L 15.0 14.0 16.0*     Estimated Creatinine Clearance: 13.6 mL/min (A) (by C-G formula based on SCr of 4.36 mg/dL (H)).  Results from last 7 days   Lab Units 03/02/20  0508   PHOSPHORUS mg/dL 6.9*         Results from last 7 days   Lab Units 03/08/20  0508 03/07/20  0513 03/06/20  0511 03/05/20  0513 03/04/20  0628   WBC 10*3/mm3 11.36* 11.97* 15.77* 12.81* 10.17   HEMOGLOBIN g/dL 8.1* 7.9* 8.3* 8.8* 9.1*   HEMATOCRIT % 25.4* 25.5* 25.8* 27.8* 27.9*   PLATELETS 10*3/mm3 259 261 314 309 328     Results from last 7 days   Lab Units 03/08/20  0508 03/07/20  0513 03/06/20  0511 03/05/20  0513 03/04/20  0628   INR  2.61* 1.75* 1.51* 1.88* 6.40*       Culture Data:   No results found for: BLOODCX  No results found for: URINECX  No results found for: RESPCX  No results found for: WOUNDCX  No results found for: STOOLCX  No components found for: BODYFLD    Radiology Data:   Imaging Results (Last 24 Hours)     ** No results found for the last 24 hours. **          I have reviewed the patient's current medications.     Assessment/Plan     Hospital Problem List:  Principal Problem:    Lower abdominal pain  Active Problems:    Stage 4 chronic kidney disease (CMS/HCC)    Acute on chronic  diastolic congestive heart failure (CMS/HCC)    Renal insufficiency    Hematoma of abdominal wall    Lower abdominal pain secondary to intra-abdominal and abdominal wall hematoma: Resolved. Continue pain control as needed.  Check abdominal ultrasound today to assess resolution of hematoma.     Acute on chronic heart failure (with preserved ejection fraction): Much improved. Patient is currently on hemodialysis and off diuretics.  Echocardiogram done on 6/18/2019 showed an ejection fraction of 46 to 50%.  Findings of repeat chest x-ray have been noted.  Will repeat chest x-ray in a.m.    Acute on chronic kidney disease: Continue hemodialysis.  Nephrologist is following.    Possible pneumonia with E. coli bacteremia: She has completed a course of ceftriaxone.    Anemia: Hemoglobin has remained stable following transfusion of packed red blood cells.  Continue iron supplementation, Epogen with dialysis and continue to monitor hemoglobin with transfusion as needed arises.  Findings of iron studies have been noted.    Diabetes mellitus: Stable. Continue anti-glycemic with Accu-Cheks and sliding scale insulin.    O2 dependent COPD: Patient is currently not in exacerbation.  Continue supplemental oxygen and bronchodilators.  She was on Anoro as outpatient. At baseline she uses 2 L of supplemental oxygen at the nursing home.    Status post aortic valve replacement: Continue warfarin.  INR today is 2.61.      Hyponatremia (likely hypervolemic): Resolved.      Depressive disorder: Continue citalopram.    Deconditioning: Continue PT and OT.    Indigestion: Essentially resolved.  Continue Pepcid and PRN Maalox.    Continue GI and DVT prophylaxis.    Discharge Planning: Likely discharge in a.m.      Dudley Alfredo MD   03/08/20   9:50 AM

## 2020-03-08 NOTE — PLAN OF CARE
Problem: Patient Care Overview  Goal: Plan of Care Review  Flowsheets  Taken 3/7/2020 0912 by Reece Rocha, PTA  Progress: no change  Taken 3/7/2020 2000 by Maryjo Velazquez, RN  Plan of Care Reviewed With: patient  Taken 3/8/2020 1434 by Sarah Rubin, RN  Outcome Summary: pt vss. Pt resting well at this time.

## 2020-03-09 PROBLEM — B96.20 E COLI BACTEREMIA: Status: ACTIVE | Noted: 2020-01-01

## 2020-03-09 PROBLEM — R78.81 E COLI BACTEREMIA: Status: ACTIVE | Noted: 2020-01-01

## 2020-03-09 NOTE — SIGNIFICANT NOTE
03/09/20 1445   Rehab Treatment   Discipline physical therapy assistant   Reason Treatment Not Performed patient/family declined treatment

## 2020-03-09 NOTE — PROGRESS NOTES
"Anticoagulation by Pharmacy - Warfarin    Brendon Skelton is a 74 y.o.female being continued on warfarin for atrial fibrillation with valve replacement     Home regimen: 5 mg Su/Tu/Th, 2.5 mg all other days  INR Goal: 2.5-3.5    Last INR:   Lab Results   Component Value Date    INR 2.58 (H) 03/09/2020       Objective:  [Ht: (P) 165.1 cm (65\"); Wt: 105 kg (231 lb)]  Lab Results   Component Value Date    INR 2.58 (H) 03/09/2020    INR 2.61 (H) 03/08/2020    INR 1.75 (H) 03/07/2020    PROTIME 27.4 (H) 03/09/2020    PROTIME 27.7 (H) 03/08/2020    PROTIME 20.2 (H) 03/07/2020     Lab Results   Component Value Date    HGB 8.1 (L) 03/09/2020    HGB 8.1 (L) 03/08/2020    HGB 7.9 (L) 03/07/2020    HCT 25.8 (L) 03/09/2020    HCT 25.4 (L) 03/08/2020    HCT 25.5 (L) 03/07/2020     03/09/2020     03/08/2020     03/07/2020       Recent Warfarin Administrations       The 5 most recent administrations since 03/02/2020 are shown below each listed medication.    Warfarin Sodium         Order Dose Date Given     warfarin (COUMADIN) tablet 2.5 mg 2.5 mg 03/08/2020      2.5 mg 03/07/2020     warfarin (COUMADIN) tablet 5 mg 5 mg 03/06/2020     warfarin (COUMADIN) tablet 2.5 mg 2.5 mg 03/02/2020                      Assessment  Interacting medications: aspirin  INR is 2.58, therapeutic, trended upwards by 0.9 on 3/7.  Will continue warfarin 2.5 mg tonight.    H/H below normal limits, stable      Plan:  1.  Give warfarin 2.5 mg tablet PO @ 1800 tonight  2.  Draw a PT/INR in AM  3.  Pharmacy will continue to follow    Quinn Mercer, [facility]  03/09/20 5:56 PM     "

## 2020-03-09 NOTE — PROGRESS NOTES
"[facility]  [address] [place] [address]. [address]  T - [phone]  F - [phone]     Progress Note          PATIENT  DEMOGRAPHICS   PATIENT NAME: Brendon Skelton                      PHYSICIAN: Sandy Caputo MD  : 1945  MRN: [id number]   LOS: 14 days    Patient Care Team:  Josefa Pozo APRN as PCP - General (Nurse Practitioner)  Meme Duckworth APRN as PCP - Claims Attributed  Luis Maher MD as Surgeon (General Surgery)  Veronica Ruiz, MAI as Ambulatory  ([facility])  Subjective   SUBJECTIVE   Seen during hd, no soa, no new complaints         Objective   OBJECTIVE   Vital Signs  Temp:  [96 °F (35.6 °C)-98 °F (36.7 °C)] 96 °F (35.6 °C)  Heart Rate:  [65-83] 65  Resp:  [18-20] 18  BP: (138-178)/(58-84) 152/58    Flowsheet Rows      First Filed Value   Admission Height  165.1 cm (65\") Documented at 2020 1322   Admission Weight  111 kg (245 lb) Documented at 2020 1322           I/O last 3 completed shifts:  In: 600 [P.O.:600]  Out: 275 [Urine:275]    PHYSICAL EXAM    Physical Exam   Constitutional: She is oriented to person, place, and time. She appears well-developed.   HENT:   Head: Normocephalic.   Eyes: Pupils are equal, round, and reactive to light.   Cardiovascular: Normal rate, regular rhythm and normal heart sounds.   Pulmonary/Chest: Effort normal and breath sounds normal.   Abdominal: Soft. Bowel sounds are normal. There is tenderness.   Large lower abdominal bruise   Musculoskeletal: She exhibits no edema.   Neurological: She is alert and oriented to person, place, and time.       RESULTS   Results Review:    Results from last 7 days   Lab Units 20  0540 20  0508 20  0513   SODIUM mmol/L 135* 136 137   POTASSIUM mmol/L 4.0 4.1 3.7   CHLORIDE mmol/L 95* 97* 97*   CO2 mmol/L 25.0 24.0 26.0   BUN mg/dL 45* 35* 26*   CREATININE mg/dL 5.20* 4.36* 3.60*   CALCIUM mg/dL 9.5 9.5 8.9   BILIRUBIN mg/dL 0.6  --   --  "   ALK PHOS U/L 63  --   --    ALT (SGPT) U/L 14  --   --    AST (SGOT) U/L 23  --   --    GLUCOSE mg/dL 97 91 98       Estimated Creatinine Clearance: 11.4 mL/min (A) (by C-G formula based on SCr of 5.2 mg/dL (H)).                Results from last 7 days   Lab Units 03/09/20  0540 03/08/20  0508 03/07/20  0513 03/06/20  0511 03/05/20  0513   WBC 10*3/mm3 11.20* 11.36* 11.97* 15.77* 12.81*   HEMOGLOBIN g/dL 8.1* 8.1* 7.9* 8.3* 8.8*   PLATELETS 10*3/mm3 255 259 261 314 309       Results from last 7 days   Lab Units 03/09/20  0540 03/08/20  0508 03/07/20  0513 03/06/20  0511 03/05/20  0513   INR  2.58* 2.61* 1.75* 1.51* 1.88*         Imaging Results (Last 24 Hours)     Procedure Component Value Units Date/Time    XR Chest PA & Lateral [[id number]] Collected:  03/09/20 0751     Updated:  03/09/20 0816    Narrative:         [facility] [place]    Patient Name: QUIANA KAMARA    ATTENDING: WIN PEREZ     REFERRING: SUSANNE ANGELO    ORDERING: SUSANNE ANGELO    -----------------------    PROCEDURE: Chest, AP and lateral    DATE OF EXAM: 3/9/2020    HISTORY: Congestive heart failure, follow-up    PA and lateral views of the chest were obtained. Study is  compared to prior chest of 3/6/2020.    FINDINGS:    There is been slight improvement with some decrease in amount of  diffuse vascular congestion and edema in the lungs. Heart size  remains enlarged with mild interstitial vascular congestion.  Minimal blunting of costophrenic angles is consistent with small  pleural effusions.    Right-sided jugular central venous access catheter and left-sided  cardiac pacemaker again noted. Sternal wires with prosthetic  heart valve are unchanged.      Impression:       Some improvement with decrease in the diffuse  vascular congestion and edema.    Electronically signed by:  Reji Stevenson MD  3/9/2020 8:15 AM CDT  Workstation: [id number]     Abdomen Limited [[id number]] Collected:  03/08/20 0849      Updated:  03/08/20 1314    Narrative:         Ultrasound abdominal wall    HISTORY: Follow-up hematoma midline subcutaneous fat lower  anterior abdominal wall    Ultrasound examination of the area of concern anterior abdominal  wall performed.    COMPARISON: None. Correlation CT February 24, 2020.    FINDINGS:  7.82 cm x 4.70 cm x 2.1 cm fluid collection subcutaneous fat of  the anterior abdominal wall in the midline, inferior to the  umbilicus.  No solid mass identified.  No lymphadenopathy.      Impression:       CONCLUSION:  7.82 cm x 4.70 cm x 2.1 cm fluid collection subcutaneous fat of  the anterior abdominal wall in the midline, inferior to the  umbilicus.  This is slightly smaller than the prior CT.    [id number]    Electronically signed by:  William Grant MD  3/8/2020 1:12 PM CDT  Workstation: [id number]           MEDICATIONS      aspirin 81 mg Oral Daily   BAG BALM 1 application Apply externally BID   budesonide-formoterol 2 puff Inhalation BID - RT   cholecalciferol 2,000 Units Oral Daily   citalopram 20 mg Oral Daily   dilTIAZem  mg Oral Daily   epoetin rosales/rosales-epbx 6,000 Units Intravenous Once per day on Mon Wed Fri   insulin aspart 0-9 Units Subcutaneous 4x Daily AC & at Bedtime   insulin detemir 25 Units Subcutaneous Nightly   ipratropium-albuterol 3 mL Nebulization Q4H - RT   metoprolol succinate XL 25 mg Oral Daily   pantoprazole 40 mg Oral Q AM   prenatal vitamin 27-0.8 1 tablet Oral Daily   rOPINIRole 0.25 mg Oral Nightly   sodium chloride 10 mL Intravenous Q12H   vitamin C with bob hips 250 mg Oral Daily   warfarin 2.5 mg Oral Daily       Pharmacy to dose warfarin        Assessment/Plan   ASSESSMENT / PLAN      Lower abdominal pain    Stage 4 chronic kidney disease (CMS/HCC)    Acute on chronic diastolic congestive heart failure (CMS/HCC)    Renal insufficiency    Hematoma of abdominal wall    1. Acute Kidney Injury on CKD 4- HD dependent likely esrd: baseline creatinine 2.0 mg/dl. No renal  recovery.   - Creatinine peaked at 4.57 mg/dl with elevated BUN. HD initiated 2/28/2020.  - Out pt dialysis is setup for MWF at [facility]. Wt is 231 lbs and peak upto 261 lbs     2. Intra-abdominal and abdominal wall hematoma:  - No evidence of anastomotic leak. No surgical intervention. Back on coumadin. U/S showed less hematoma than ct     3. Anemia:  - Had recent blood loss while on Lovenox. Received 4 units of packed RBCs.   - On iron and Epogen with HD. [place] to [address] units     4. E.coli bacteremia:   - finished ceftriaxone     5. S/p Aortic valve replacement     6. Diabetes type 2     7. COPD O2 dep    dispo discharge today post dialysis.          This document has been electronically signed by Sandy Caputo MD on March 9, 2020 10:23 AM

## 2020-03-09 NOTE — PLAN OF CARE
VSS, pt has been restless most of night, feels like she needs to urinate about every 30 minutes but only voids around 5ml, bladder scan showed 40ml, patient also complaining of itching, MD called benadryl given and CMP ordered, no other changes

## 2020-03-09 NOTE — DISCHARGE SUMMARY
[facility]  DISCHARGE SUMMARY       Date of Admission: 2/24/2020  Date of Discharge:  3/9/2020  Primary Care Physician: Josefa Pozo APRN    Presenting Problem/History of Present Illness:  Elevated INR [R79.1]  Renal insufficiency [N28.9]  Left lower quadrant abdominal pain [R10.32]  Renal insufficiency [N28.9]  Renal insufficiency [N28.9]     Final Discharge Diagnoses:  Active Hospital Problems    Diagnosis   • **Lower abdominal pain   • E coli bacteremia   • Renal insufficiency   • Hematoma of abdominal wall   • Acute on chronic diastolic congestive heart failure (CMS/HCC)        • Stage 4 chronic kidney disease (CMS/HCC)       Consults:   Consults     Date and Time Order Name Status Description    2/24/2020 2033 Inpatient Nephrology Consult Completed     2/24/2020 2033 Inpatient General Surgery Consult      2/24/2020 1739 Surgery (on-call MD unless specified)      2/24/2020 1739 Hospitalist (on-call MD unless specified)      2/11/2020 1207 Inpatient Nephrology Consult Completed           Procedures Performed: Procedure(s):  tunneled central venous catheter placement                Pertinent Test Results:   Collected Updated Procedure Result Status    03/09/2020 0540 03/09/2020 0651 Protime-INR [[id number]]    (Abnormal)   Blood    Final result Protime 27.4High  Seconds   INR 2.58High             03/09/2020 0540 03/09/2020 0626 CBC Auto Differential [[id number]]   (Abnormal)   Blood    Final result WBC 11.20High  10*3/mm3   RBC 2.59Low  10*6/mm3   Hemoglobin 8.1Low  g/dL   Hematocrit 25.8Low  %   MCV 99.6High  fL   MCH 31.3 pg   MCHC 31.4Low  g/dL   RDW 20.8High  %   RDW-SD 72.6High  fl   MPV 11.1 fL   Platelets 255 10*3/mm3   Neutrophil Rel % 79.2High  %   Lymphocyte Rel % 4.8Low  %   Monocyte Rel % 9.7 %   Eosinophil Rel % 3.6 %   Basophil Rel % 0.6 %   Immature Granulocyte Rel % 2.1High  %   Neutrophils Absolute 8.86High  10*3/mm3   Lymphocytes  Absolute 0.54Low  10*3/mm3   Monocytes Absolute 1.09High  10*3/mm3   Eosinophils Absolute 0.40 10*3/mm3   Basophils Absolute 0.07 10*3/mm3   Immature Grans, Absolute 0.24High  10*3/mm3   nRBC 0.0 /100 WBC        02/24/2020 1424 02/24/2020 1506 Comprehensive Metabolic Panel [[id number]]    (Abnormal)   Blood    Final result Glucose 89 mg/dL   BUN 104High  mg/dL   Creatinine 2.03High  mg/dL   Sodium 139 mmol/L   Potassium 3.7 mmol/L   Chloride 94Low  mmol/L   CO2 31.0High  mmol/L   Calcium 9.4 mg/dL   Total Protein 6.4 g/dL   Albumin 3.60 g/dL   ALT (SGPT) 53High  U/L   AST (SGOT) 34High  U/L   Alkaline Phosphatase 60 U/L   Total Bilirubin 1.7High  mg/dL   eGFR Non  Am 24Low  mL/min/1.73   Globulin 2.8 gm/dL   A/G Ratio 1.3 g/dL   BUN/Creatinine Ratio 51.2High     Anion Gap 14.0 mmol/L           02/24/2020 1424 02/24/2020 1506 Lipase [[id number]]   Blood    Final result Lipase 21 U/L           02/24/2020 1424 02/24/2020 1455 Lactic Acid, Plasma [[id number]]   Blood    Final result Lactate 0.9 mmol/L           02/24/2020 1424 02/24/2020 1510 Protime-INR [[id number]]    (Abnormal)   Blood    Final result Protime 31.4High  Seconds   INR 3.06High             02/24/2020 1424 02/24/2020 1507 Troponin [[id number]]    (Abnormal)   Blood    Final result Troponin T 0.036High Critical  ng/mL           02/24/2020 1424 02/24/2020 1440 CBC Auto Differential [[id number]]   (Abnormal)   Blood    Final result WBC 16.21High  10*3/mm3   RBC 3.06Low  10*6/mm3   Hemoglobin 9.9Low  g/dL   Hematocrit 29.9Low  %   MCV 97.7High  fL   MCH 32.4 pg   MCHC 33.1 g/dL   RDW 21.6High  %   RDW-SD 73.6High  fl   MPV 11.3 fL   Platelets 200 10*3/mm3   Neutrophil Rel % 95.2High  %   Lymphocyte Rel % 0.6Low  %   Monocyte Rel % 3.0Low  %   Eosinophil Rel % 0.7 %   Basophil Rel % 0.1 %   Immature Granulocyte Rel % 0.4 %   Neutrophils Absolute 15.41High  10*3/mm3   Lymphocytes Absolute 0.10Low  10*3/mm3   Monocytes Absolute 0.49 10*3/mm3   Eosinophils  Absolute 0.12 10*3/mm3   Basophils Absolute 0.02 10*3/mm3   Immature Grans, Absolute 0.07High  10*3/mm3   nRBC 0.0 /100 WBC           2020 1424 2020 1500 BNP [[id number]]    (Abnormal)   Blood    Final result proBNP 28,524.0High  pg/mL             XR Chest PA & Lateral [[id number]] Rojas as Reviewed   Order Status: Completed Collected: 20 1650    Updated: 20 1719   Narrative:       [facility] [place]    Patient Name: QUIANA KAMARA    ATTENDING: PETRA TY     REFERRING: TERRI CARRILLO    ORDERING: TERRI CARRILLO    -----------------------    PROCEDURE: Chest, AP and lateral    DATE OF EXAM: 2020    HISTORY: Elevated BNP.    AP and lateral views of the chest were obtained. Study is  compared to prior exam of 2/15/2020.    FINDINGS:    The lungs are satisfactorily expanded and clear of focal  infiltrates. The heart size is enlarged. There is severe  generalized vascular congestion along with minimal blunting of  the posterior costophrenic angle suggesting small effusions.  Sternal wires with prosthetic heart valve are noted. Left-sided  pacemaking device is unchanged.   Impression:     Cardiomegaly with vascular /interstitial congestion  and small pleural effusions. Appearance is similar to 2/15/2020.      Electronically signed by:  Reji Stevenson MD  2020 5:18 PM  CST Workstation: [id number]   CT Abdomen Pelvis Without Contrast [[id number]] Rojas as Reviewed   Order Status: Completed Collected: 20 1609    Updated: 20 1744   Narrative:     PROCEDURE: CT ABDOMEN PELVIS WO CONTRAST        EXAMINATION:  Computed Tomography           REGION: Abdomen / Pelvis                     INDICATION: Postoperative pain      HISTORY: Sigmoid colon cancer      ALEJANDRO. [id number]            TECHNIQUE:      - reconstructions: axial, coronal, sagittal         - contrast:  oral:  Yes ; intravenous:  no     - Please note:      - Lack of IV contrast limits assessment of  solid organ  parenchyma, urinary system, or vascular structures.            This exam was performed according to our departmental  dose-optimization program, which includes automated exposure  control, adjustment of the mA and/or kV according to patient size  and/or use of iterative reconstruction technique.    COMMENTS:           THORAX (INFERIOR):     There is a small amount of consolidation of the right lower lobe  dependently. Bibasilar atelectasis is present.  Small right and tiny left pleural effusions are present.  The heart size is normal size and there is no pericardial fluid.  There appears to be a prosthetic aortic valve and there is  atherosclerotic calcification involving aorta, and the coronary  arteries. Pacemaker is in place.  Hiatal hernia present        ABDOMEN:    Limited assessment of the solid organ parenchyma is grossly  unremarkable given limits of noncontrast study, demonstrating no  evidence of organomegaly. The gallbladder appears mildly  prominent.  Limited assessment of the small bowel is grossly unremarkable  demonstrating normal caliber bowel loops. Small amount of ascites  is present, primarily adjacent to the liver and spleen. Small  locules of free air are present, which are most visible in the  left anterior upper abdomen and in the right upper abdomen just  anterior to the liver. The osseous structures are grossly  unremarkable for age.  Subcutaneous stranding is present, compatible with anasarca.  There is a collection in the anterior abdominal wall in the  breast measuring 5.1 x 4.3 by approximately 8.1 cm. This may  represent a hematoma, seroma, less likely abscess. There is  extensive stranding in the anterior abdominal wall inferiorly.  There is a tract in the anterior abdominal wall at midline just  superior to the umbilicus, which may represent laparoscopic port.  Locules of gas extend to the peritoneum. Focal perforation of the  adjacent colon subjacent to the anterior  abdominal wall is  thought doubtful, but cannot be entirely excluded by this  examination.     RETROPERITONEUM:  Limited assessment of the kidneys demonstrates bilateral renal  atrophy, which is unchanged from prior study. Limited assessment  of the ureters demonstrates normal caliber and course.  The adrenal glands are of normal size and contour.  No gross evidence of significant retroperitoneal adenopathy.  The vascular structures demonstrate atherosclerotic vascular  calcification.     PELVIS:   Limited assessment of the colon demonstrates normal caliber bowel  loops. A new anastomosis in the sigmoid colon is present. There  are adjacent locules of gas. Anastomotic sutures, somewhat  limited in evaluation due to lack of intravenous contrast, but  these are favored to be intraluminal colonic diverticuli are  present. The descending colon appears slightly thick-walled,  which exaggerated by underdistention small amount of ascites..  Tiny locule of free air in the left pelvis adjacent to colon  (series 2, image #65). The osseous structures are grossly  unremarkable for age.  Atherosclerotic vascular calcification.       .      Impression:      IMPRESSION:  1. Limited examination due to the lack of intravenous contrast.  2. Small amount of ascites, primarily adjacent to the liver and  spleen.  3. Multiple tiny locules of free air are present, may be  postoperative in nature. Bowel perforation is thought less  likely.  4. New sigmoid anastomosis with sutures adjacent to tiny locules  of gas. These are favored to be intraluminal, though evaluation  is limited due to lack of intravenous contrast, closely opposed  loops of bowel, and incomplete enteric contrast opacification of  the colon. There is a small amount of gas as far distally as the  rectum.   5. Multiple locules of gas in the anterior abdominal wall just  superior to the umbilicus, extending from the skin surface to the  peritoneum. While extension into the  colon adjacent to the  anterior abdominal wall is thought doubtful, it cannot be  entirely excluded by this study (axial series 2, image #53, and  sagittal series 5, image #60).  6. Mildly thick-walled appearance of the descending colon near  the splenic flexure, probably exaggerated by underdistention, but  some degree of true wall thickening and therefore colitis cannot  be entirely excluded.  7. Bilateral pleural effusions, right greater than left, and  bibasilar atelectasis with slightly more consolidated appearance  of   8. Mild nonspecific prominence of the gallbladder  9. Small hiatal hernia  10. Diffuse subcutaneous stranding, compatible with edema  11. Collection in the subcutaneous fat in the anterior abdominal  wall near midline, probably representing hematoma, less likely  postoperative seroma. Abscess is thought unlikely, but not  entirely excluded on this noncontrast study  12. Please see findings section above for further details    Were discussed with Dr. Rondon in the emergency department at  6:12 PM EST on 2/24/2020, and also discussed with Dr. Maher at 6:15  PM EST on the same date.    Electronically signed by:  Yeimy Vuong MD  2/24/2020 5:43 PM [facility]  Workstation: [id number]     Chief Complaint on Day of Discharge: None    Hospital Course:  The patient is a 74 y.o. female with a past medical history of COPD, chronic hypoxic respiratory failure on 3 L of home oxygen, past tobacco use, AF, HFpEF, s/p mechanical aortic valve replacement, obesity, CKD stage 4, and type 2 DM who presented to the emergency department on [facility] with complaints of lower abdominal pain of 1 week duration. Of note, patient had laparoscopic colectomy for colon cancer 2 weeks prior to presentation. CT abdomen in the ED revealed a collection in the anterior abdominal wall consistent with a hematoma versus abscess.  She was reviewed by general surgeon Dr. Maher and there was no evidence of anastomotic leak in the  "surgery site.  Her Coumadin was transiently held and the intra-abdominal collection reduced in size and abdominal pain resolved. At the ED she also complained of shortness of breath and her O2 requirement went from baseline of 3L to 4 liters. CXR revealed Cardiomegaly with vascular /interstitial congestion and small pleural effusions.  Blood culture was positive for E. coli and patient was treated with IV antibiotics and completed a course.  Her BUN/creatinine continued to increase at which point the patient was seen by the nephrologist and was started on hemodialysis on 2/28/2020.there was no renal recovery and it was felt that her CKD had progressed to ESRD.  She was set up for hemodialysis Monday Wednesday Friday as outpatient.  She was discharged to the skilled nursing facility for physical rehabilitation.    Condition on Discharge: Stable    Physical Exam on Discharge:  /58 (BP Location: Left arm, Patient Position: Lying)   Pulse 65   Temp 96 °F (35.6 °C) (Oral)   Resp 18   Ht (P) 165.1 cm (65\")   Wt 105 kg (231 lb)   LMP  (LMP Unknown)   SpO2 90%   BMI (P) 38.44 kg/m²      Physical Exam  Constitutional: She is oriented to person, place, and time. She appears well-developed and well-nourished. No distress.   HENT:   Head: Normocephalic and atraumatic.   Mouth/Throat: Oropharynx is clear and moist. No oropharyngeal exudate.   Eyes: Pupils are equal, round, and reactive to light. Conjunctivae and EOM are normal. No scleral icterus.   Neck: Normal range of motion. Neck supple. No JVD present. No tracheal deviation present. No thyromegaly present.   Cardiovascular: Normal rate, regular rhythm, normal heart sounds and intact distal pulses. Exam reveals no gallop and no friction rub.   No murmur heard.  Pulmonary/Chest: Effort normal. No stridor. No respiratory distress. She has no wheezes. She has no rales. She exhibits no tenderness.   Reduced breath sounds globally.  Bilateral basal fine " crepitations.   Abdominal: Soft. Bowel sounds are normal. She exhibits no distension and no mass. There is no tenderness. There is no rebound and no guarding. No hernia.   Bruising in suprapubic region.   Musculoskeletal: Normal range of motion. She exhibits no edema. She exhibits no tenderness or deformity.   Lymphadenopathy:     She has no cervical adenopathy.   Neurological: She is alert and oriented to person, place, and time. No cranial nerve deficit. She exhibits normal muscle tone.   Skin: Skin is warm and dry. No rash noted. She is not diaphoretic. No erythema. No pallor.   Psychiatric: She has a normal mood and affect. Her behavior is normal. Judgment and thought content normal.     Discharge Disposition: Skilled nursing facility      Discharge Medications:     Discharge Medications      New Medications      Instructions Start Date   insulin aspart 100 UNIT/ML injection  Commonly known as:  novoLOG   0-9 Units, Subcutaneous, 4 Times Daily Before Meals & Nightly      oxyCODONE 10 MG tablet  Commonly known as:  ROXICODONE   10 mg, Oral, Every 6 Hours PRN      pantoprazole 40 MG EC tablet  Commonly known as:  PROTONIX   40 mg, Oral, Daily         Changes to Medications      Instructions Start Date   warfarin 2.5 MG tablet  Commonly known as:  COUMADIN  What changed:    · medication strength  · how much to take  · how to take this  · when to take this  · additional instructions   Take for mechanical aortic valve to keep INR between 2.5 and 3.5         Continue These Medications      Instructions Start Date   acetaminophen 325 MG tablet  Commonly known as:  TYLENOL   650 mg, Oral, Every 6 Hours PRN      albuterol (2.5 MG/3ML) 0.083% nebulizer solution  Commonly known as:  PROVENTIL   2.5 mg, Nebulization, Every 4 Hours PRN      albuterol sulfate  (90 Base) MCG/ACT inhaler  Commonly known as:  PROVENTIL HFA;VENTOLIN HFA;PROAIR HFA   2 puffs, Inhalation, Every 4 Hours PRN      aspirin 81 MG chewable  tablet   81 mg, Oral, Daily      cholecalciferol 25 MCG (1000 UT) tablet  Commonly known as:  VITAMIN D3   2,000 Units, Oral, Daily      citalopram 20 MG tablet  Commonly known as:  CeleXA   20 mg, Oral, Daily      dilTIAZem  MG 24 hr capsule  Commonly known as:  CARDIZEM CD   240 mg, Oral, Daily      ferrous sulfate 325 (65 FE) MG tablet   325 mg, Oral, Daily With Breakfast      glucose blood test strip   1 each, Other, 3 Times Daily, Use as instructed       loperamide 2 MG capsule  Commonly known as:  IMODIUM   2 mg, Oral, 4 Times Daily PRN      metoprolol succinate XL 25 MG 24 hr tablet  Commonly known as:  TOPROL-XL   25 mg, Oral, Daily      ondansetron 4 MG tablet  Commonly known as:  ZOFRAN   4 mg, Oral, Every 6 Hours PRN      Prenatal Vitamin 27-0.8 MG tablet   1 tablet, Oral, Daily      rOPINIRole 0.25 MG tablet  Commonly known as:  REQUIP   0.25 mg, Oral, Nightly, Take 1 hour before bedtime.      umeclidinium-vilanterol 62.5-25 MCG/INH aerosol powder  inhaler  Commonly known as:  ANORO ELLIPTA   1 puff, Inhalation, Daily      UNIFINE PENTIPS 31G X 6 MM misc  Generic drug:  Insulin Pen Needle   USE 1 FOUR (4) TIMES DAILY WITH INSULIN      vitamin C 250 MG tablet  Commonly known as:  ASCORBIC ACID   250 mg, Oral, Daily      ZETIA 10 MG tablet  Generic drug:  ezetimibe   10 mg, Oral, Daily         Stop These Medications    bumetanide 2 MG tablet  Commonly known as:  BUMEX     guaifenesin-dextromethorphan  MG tablet sustained-release 12 hour tablet     Insulin Glargine 100 UNIT/ML injection pen  Commonly known as:  BASAGLAR KWIKPEN     metOLazone 5 MG tablet  Commonly known as:  ZAROXOLYN     traZODone 150 MG tablet  Commonly known as:  DESYREL            Discharge Diet: Right now, consistent carbohydrate and cardiac diet    Activity at Discharge: Self-limited activity    Discharge Care Plan/Instructions:   1.  Follow-up with your primary care provider within 1 week of discharge.   2.  Follow-up  with nephrologist Dr. Caputo within 2 weeks of discharge   3.  Patient should have INR checked on Tuesday, 3/10/2020 and Coumadin dose adjusted periodically to keep INR between 2.5 and 3.5 for for mechanical aortic valve.   4.  Patient should have physical and occupational therapy at the skilled nursing facility for strengthening and gait instability.   5.  Patient should have oxygen therapy by nasal cannula to keep O2 sats greater than 90%.    Follow-up Appointments:   Future Appointments   Date Time Provider Department Center   3/18/2020  3:45 PM Nel Grant MD [facility] None   4/28/2020 10:30 AM Josefa Pozo APRN [facility] None       Test Results Pending at Discharge:     Jake Linares MD  03/09/20  10:29 AM    Time: 38 minutes of time was spent evaluating patient and planning discharge.      Part of this note may be an electronic transcription/translation of spoken language to printed text using the Dragon Dictation system.

## 2020-03-23 NOTE — OUTREACH NOTE
Care Plan Note      Responses   Annual Wellness Visit:   Patient Has Completed   Care Gaps Addressed  Colon Cancer Screening, Diabetic A1C, Diabetic Eye Exam, Flu Shot, Pneumonia Vaccine, Mammogram   Colon Cancer Screening Type  Colonoscopy   Colonoscopy Status  Up to Date (< 10 yrs)   HbA1c Status  Up to Date-within defined limits   Diabetic Eye Exam Status  Patient will Complete   Flu Shot Status  Up to Date   Mammogram Status  Refused   Pneumonia Vaccine Status  Up to Date   Other Patient Education/Resources   24/7 [facility] Nurse Call Line, MyChart   24/7 Nurse Call Line Education Method  Verbal   MyChart Education Method  Verbal [pt declined (no computer)]   Does patient have depression diagnosis?  Yes   Ed Visits past 12 months:  1   Hospitalizations past 12 months  3 or more   Medication Adherence  Medications understood        The main concerns and/or symptoms the patient would like to address are: Pt discharged home from [facility] 3/20/20 following hospitalization in Feb for lap colectomy and in March for renal failure. She has dialysis MWF. HH PT is coming out to see her tomorrow. She has her medications and uses a walker; denies falls. She is on home O2 and does not smoke. She goes to the coumadin clinic for INR. She uses PACS for transportation and she has food in the home; denied food insecurities. Her pcp appt isn't until 4/28/20.     Education/instruction provided by Care Coordinator: Discussed importance of hospital/SNF f/u with pcp and encd an earlier appt (perhaps a telehealth visit) and pt said she will think about it. Care gaps listed. She refuses a mammogram. Discussed dm eye exam needing to be completed but she declines at this time due to covid. She denied needs and ACM contact info provided.    Follow Up Outreach Due: as needed    Veronica Ruiz RN  Ambulatory     3/23/2020, 15:29

## 2020-03-23 NOTE — OUTREACH NOTE
Patient Outreach Note    Pt dc from [facility] on 3/20/20. ACM spoke with pt and she stated she still has not heard from [facility]. ACM will call to see when they plan to visit.    Veronica Ruiz RN  Ambulatory     3/23/2020, 15:00

## 2020-03-23 NOTE — OUTREACH NOTE
Care Coordination Note    [facility] said [facility] receiving [facility] only and they are scheduled to come out tomorrow.    Veronica Ruiz RN  Ambulatory     3/23/2020, 15:04

## 2020-03-23 NOTE — OUTREACH NOTE
Care Coordination Assessment    Documented/Reviewed By: Veronica Ruiz RN Date/time: 3/23/2020  3:21 PM   Assessment completed with: patient  Enrolled in care management program: No  Living arrangement: alone  Support system: children (Comment: son lives in duplex next door)  Type of residence: apartment  Home care services: No  Equipment used at home: walker, wheelchair, oxygen/respiratory treatment  Bed or wheelchair confined: No  Inadequate nutrition: No  Medication adherence problem: No  Experiencing side effects from current medications: No  History of fall(s) in last 6 months: No  Difficulty keeping appointments: No

## 2020-03-31 NOTE — PROGRESS NOTES
Today's INR is 1.1.   Patient checked curbside due to COVID 19 pandemic precautions.   Pt has been in and out of the hospital since February due to surgery per Dr. Maher and readmission in March for renal failure and multiple co-dx.  Pt states she has not taken coumadin in the last two days because she did not know what coumadin dose to take.  Due to recent kidney failure dx, consulted ABRAHAM Pan who did speak with Dr. Caputo who instructed pt to have coumadin-only anticoagulation at this time.  I spoke to MAI Villalba who states pt is with their services; I instructed Orly to recheck INR in two days on Thursday, Apr 2nd.  Instructions verbalized.  I adjusted coumadin dose and instructed pt to limit all green intake for now.  Pt verbalized instructions.  Patient instructed regarding medication; results given and questions answered. Nutritional counseling given.  Dietary factors affecting therapy addressed.  Patient instructed to monitor for excessive bruising or bleeding.  Findings reported by Ree Jade RN.       This document has been electronically signed by MIKI Pan-BC @  On March 31, 2020 11:28

## 2020-04-02 NOTE — PROGRESS NOTES
"Zoila Skelton is a 74 y.o. female. Hospital discharge follow-up.  Admitted to [facility] on February 10 of 2020 for an laparoscopic sigmoid colectomy for stage II colon cancer that was done by Dr. Maher.  \"He thinks he got all of it.\"  Patient remained in the hospital until February 24 and at that time was discharged to [facility] and rehab.  Needs refill on her Coumadin.    You have chosen to receive care through a telephone visit today. Do you consent to use a telephone visit for your medical care today? Yes      Date of Admission: 2/24/2020  Date of Discharge:  3/9/2020  Primary Care Physician: Josefa Pozo APRN     Presenting Problem/History of Present Illness:  Elevated INR [R79.1]  Renal insufficiency [N28.9]  Left lower quadrant abdominal pain [R10.32]  Renal insufficiency [N28.9]  Renal insufficiency [N28.9]          Final Discharge Diagnoses:       Active Hospital Problems     Diagnosis   • **Lower abdominal pain   • E coli bacteremia   • Renal insufficiency   • Hematoma of abdominal wall   • Acute on chronic diastolic congestive heart failure (CMS/HCC)           • Stage 4 chronic kidney disease (CMS/HCC)          Cancer   This is a new problem. The current episode started more than 1 month ago. The problem has been gradually improving. Associated symptoms include arthralgias and weakness. Pertinent negatives include no chills, diaphoresis, fatigue or fever. Treatments tried: Colon resection. The treatment provided moderate relief.        The following portions of the patient's history were reviewed and updated as appropriate:   Current Outpatient Medications   Medication Sig Dispense Refill   • acetaminophen (TYLENOL) 325 MG tablet Take 650 mg by mouth Every 6 (Six) Hours As Needed for Mild Pain  or Fever.     • albuterol (PROVENTIL) (2.5 MG/3ML) 0.083% nebulizer solution Take 2.5 mg by nebulization Every 4 (Four) Hours As Needed for Wheezing.     • albuterol sulfate "  (90 Base) MCG/ACT inhaler Inhale 2 puffs Every 4 (Four) Hours As Needed for Wheezing or Shortness of Air. 18 g 11   • aspirin 81 MG chewable tablet Chew 81 mg Daily.     • cholecalciferol (VITAMIN D3) 1000 units tablet Take 2,000 Units by mouth Daily.     • citalopram (CeleXA) 20 MG tablet Take 1 tablet by mouth Daily. 90 tablet 3   • diltiaZEM CD (CARDIZEM CD) 240 MG 24 hr capsule Take 1 capsule by mouth Daily. 90 capsule 3   • ezetimibe (ZETIA) 10 MG tablet Take 10 mg by mouth Daily.     • ferrous sulfate 325 (65 FE) MG tablet Take 325 mg by mouth Daily With Breakfast.     • glucose blood test strip 1 each by Other route 3 (three) times a day. Use as instructed     • insulin aspart (novoLOG) 100 UNIT/ML injection Inject 0-9 Units under the skin into the appropriate area as directed 4 (Four) Times a Day Before Meals & at Bedtime. 10 mL 0   • loperamide (IMODIUM) 2 MG capsule Take 2 mg by mouth 4 (Four) Times a Day As Needed for Diarrhea.     • metoprolol succinate XL (TOPROL-XL) 25 MG 24 hr tablet Take 1 tablet by mouth Daily. 30 tablet 5   • ondansetron (ZOFRAN) 4 MG tablet Take 1 tablet by mouth Every 6 (Six) Hours As Needed for Nausea or Vomiting. 30 tablet 0   • oxyCODONE (ROXICODONE) 10 MG tablet Take 1 tablet by mouth Every 6 (Six) Hours As Needed for Moderate Pain  or Severe Pain . 12 tablet 0   • pantoprazole (PROTONIX) 40 MG EC tablet Take 1 tablet by mouth Daily. 30 tablet 0   • Prenatal Vit-Fe Fumarate-FA (PRENATAL VITAMIN) 27-0.8 MG tablet Take 1 tablet by mouth daily.     • rOPINIRole (REQUIP) 0.25 MG tablet TAKE 1 TABLET BY MOUTH EVERY NIGHT. TAKE 1 HOUR BEFORE BEDTIME. 30 tablet 0   • umeclidinium-vilanterol (ANORO ELLIPTA) 62.5-25 MCG/INH aerosol powder  inhaler Inhale 1 puff Daily. 1 each 11   • UNIFINE PENTIPS 31G X 6 MM misc USE 1 FOUR (4) TIMES DAILY WITH INSULIN 130 each 5   • vitamin C (ASCORBIC ACID) 250 MG tablet Take 250 mg by mouth Daily.     • warfarin (Coumadin) 2.5 MG tablet  Take 1 tab nightly on Monday, Wednesday, Friday and Saturday or AS DIRECTED PER COUMADIN CLINIC 30 tablet 2   • warfarin (Coumadin) 5 MG tablet Take 1 tablet nightly on Sunday, Tuesday and Thursday or AS DIRECTED BY COUMADIN CLINIC 30 tablet 2     No current facility-administered medications for this visit.      Current Outpatient Medications on File Prior to Visit   Medication Sig   • acetaminophen (TYLENOL) 325 MG tablet Take 650 mg by mouth Every 6 (Six) Hours As Needed for Mild Pain  or Fever.   • albuterol (PROVENTIL) (2.5 MG/3ML) 0.083% nebulizer solution Take 2.5 mg by nebulization Every 4 (Four) Hours As Needed for Wheezing.   • albuterol sulfate  (90 Base) MCG/ACT inhaler Inhale 2 puffs Every 4 (Four) Hours As Needed for Wheezing or Shortness of Air.   • aspirin 81 MG chewable tablet Chew 81 mg Daily.   • cholecalciferol (VITAMIN D3) 1000 units tablet Take 2,000 Units by mouth Daily.   • citalopram (CeleXA) 20 MG tablet Take 1 tablet by mouth Daily.   • diltiaZEM CD (CARDIZEM CD) 240 MG 24 hr capsule Take 1 capsule by mouth Daily.   • ezetimibe (ZETIA) 10 MG tablet Take 10 mg by mouth Daily.   • ferrous sulfate 325 (65 FE) MG tablet Take 325 mg by mouth Daily With Breakfast.   • glucose blood test strip 1 each by Other route 3 (three) times a day. Use as instructed   • insulin aspart (novoLOG) 100 UNIT/ML injection Inject 0-9 Units under the skin into the appropriate area as directed 4 (Four) Times a Day Before Meals & at Bedtime.   • loperamide (IMODIUM) 2 MG capsule Take 2 mg by mouth 4 (Four) Times a Day As Needed for Diarrhea.   • metoprolol succinate XL (TOPROL-XL) 25 MG 24 hr tablet Take 1 tablet by mouth Daily.   • ondansetron (ZOFRAN) 4 MG tablet Take 1 tablet by mouth Every 6 (Six) Hours As Needed for Nausea or Vomiting.   • oxyCODONE (ROXICODONE) 10 MG tablet Take 1 tablet by mouth Every 6 (Six) Hours As Needed for Moderate Pain  or Severe Pain .   • pantoprazole (PROTONIX) 40 MG EC tablet  Take 1 tablet by mouth Daily.   • Prenatal Vit-Fe Fumarate-FA (PRENATAL VITAMIN) 27-0.8 MG tablet Take 1 tablet by mouth daily.   • rOPINIRole (REQUIP) 0.25 MG tablet TAKE 1 TABLET BY MOUTH EVERY NIGHT. TAKE 1 HOUR BEFORE BEDTIME.   • umeclidinium-vilanterol (ANORO ELLIPTA) 62.5-25 MCG/INH aerosol powder  inhaler Inhale 1 puff Daily.   • UNIFINE PENTIPS 31G X 6 MM misc USE 1 FOUR (4) TIMES DAILY WITH INSULIN   • vitamin C (ASCORBIC ACID) 250 MG tablet Take 250 mg by mouth Daily.   • [DISCONTINUED] warfarin (Coumadin) 2.5 MG tablet Take 1 tab nightly except on Sunday, Tuesday and Thursday take 2 tabs or AS DIRECTED PER COUMADIN CLINIC     No current facility-administered medications on file prior to visit.      She is allergic to crestor [rosuvastatin calcium]; lipitor [atorvastatin]; lortab [hydrocodone-acetaminophen]; and adhesive tape..    Review of Systems   Constitutional: Negative for chills, diaphoresis, fatigue and fever.   Respiratory: Negative.    Cardiovascular: Negative.    Gastrointestinal: Negative.    Genitourinary: Negative.    Musculoskeletal: Positive for arthralgias and gait problem.   Skin: Negative.    Neurological: Positive for weakness.   Psychiatric/Behavioral: Negative.  Negative for confusion.       Objective   Physical Exam   Constitutional: She is oriented to person, place, and time.   Abdominal:   Patient directed exam reveals no abdominal tenderness, masses   Neurological: She is alert and oriented to person, place, and time.       Assessment/Plan   Problems Addressed this Visit        Cardiovascular and Mediastinum    Atrial fibrillation [I48.91] (Chronic)    Relevant Medications    warfarin (Coumadin) 2.5 MG tablet    warfarin (Coumadin) 5 MG tablet       Digestive    RESOLVED: Cancer of sigmoid (CMS/HCC) - Primary (Chronic)       Other    Long term current use of anticoagulant therapy    Relevant Medications    warfarin (Coumadin) 2.5 MG tablet    warfarin (Coumadin) 5 MG tablet         Other Visit Diagnoses     Hospital discharge follow-up            New Medications Ordered This Visit   Medications   • warfarin (Coumadin) 2.5 MG tablet     Sig: Take 1 tab nightly on Monday, Wednesday, Friday and Saturday or AS DIRECTED PER COUMADIN CLINIC     Dispense:  30 tablet     Refill:  2   • warfarin (Coumadin) 5 MG tablet     Sig: Take 1 tablet nightly on Sunday, Tuesday and Thursday or AS DIRECTED BY COUMADIN CLINIC     Dispense:  30 tablet     Refill:  2       1.  Cancer of the sigmoid colon:  Resolved  Continue follow-up with Dr. Maher as scheduled    2.  Atrial fibrillation:  Continue on Coumadin as previously prescribed and refill prescription sent to pharmacy  Will continue with Coumadin clinic nurse as she is scheduled to have blood drawn this afternoon    3.  Hospital discharge follow-up:  Current outpatient and discharge medications have been reconciled for the patient.  Reviewed by: ABRAHAM Boone    Continue on current medications as previously prescribed   This visit has been rescheduled as a phone visit to comply with patient safety concerns in accordance with CDC recommendations. Total time of discussion was 15 minutes.  Return if symptoms worsen or fail to improve, for Next scheduled follow up.        This document has been electronically signed by ABRAHAM Boone on April 2, 2020 12:45

## 2020-04-02 NOTE — PROGRESS NOTES
Today's INR is 1.2.   I spoke to MAI Giron with [facility] who was still in the home with pt.  Pt denies missed coumadin doses or consuming too much grenn.  I adjusted coumadin dose and instructed pt not to have green intake for now.  May will fill pt's med planner for her.  Instructed May to recheck INR on Tuesday, Apr 7th as pt has dialysis M,W,F.  Instructions verbalized.  Patient instructed regarding medication; results given and questions answered. Nutritional counseling given.  Dietary factors affecting therapy addressed.  Patient instructed to monitor for excessive bruising or bleeding.  Findings reported by Ree Jade RN.

## 2020-04-07 NOTE — TELEPHONE ENCOUNTER
Pt called and wanted to see if you could send in a script to [facility] for a portable oxygen concentrator (the kind you carry around) Her phone is [phone].

## 2020-04-07 NOTE — PROGRESS NOTES
We received INR from MAI Giron with [facility] by phone while she was still in the pt home. Pt denied med changes or bleeding problems. Dose adjusted closer to pt usual dose but still slightly higher. May was instructed on dosing, to have pt resume green veggies every 3 days, and recheck INR on Tuesday April 14; she repeated back correctly. Findings reported by Dixie Bermeo RN.

## 2020-04-14 NOTE — PROGRESS NOTES
I spoke to MAI Fatima with [employer] .  Pt's INR in the home registered greater than 8.0.  INR verified per venipuncture.  I spoke to pt over the phone who denies medication or diet changes and verified coumadin strength of 5mg.  Pt denies bleeding but states she does have bruising to hedy arms.  Dr. Feliz in the OR today and ABRAHAM Pan who works with Dr. Feliz was consulted regarding today's INR level.  Instructions received to have pt increase Vit K intake today with a large can of spinach.  I spoke to pt over the phone who stated she has a can of mustard greens and has already begun eating brussel sprouts.  I instructed pt to have a half can of mustard greens now and again for supper this evening.  I instructed pt not to take any warfarin tonight.  I instructed pt to watch for s/s bleeding and report to ER for blunt trauma.  Pt has dialysis tomorrow, 4/15 and Akua with [facility] will check INR in the a.m prior to pt's dialysis appt.  Instructions verbalized.  Patient instructed regarding medication; results given and questions answered. Nutritional counseling given.  Dietary factors affecting therapy addressed.  Patient instructed to monitor for excessive bruising or bleeding.  Findings reported by Ree Jade RN.

## 2020-04-15 NOTE — PROGRESS NOTES
I spoke to MAI Fatima with [facility] who collected pt's INR today for recheck of elevated level yesterday.  I did call [facility] to make aware pt's elevated INR status.  I spoke with Dr. Feliz this a.m regarding pt's level from yesterday and the fact pt was being rechecked today; he stated acceptable to continue to consult with ABRAHAM Pan who works with [employer] in office.  Phil Santiago notified with today's INR level.  Will continue to hold coumadin and pt will continue to increase Vit K intake with mustard greens, at least another two cup serving.  I spoke to pt over the phone while at dialysis and she verbalized instructions, denies bleeding issues at this time.  I instructed Akua to recheck INR tomorrow, 04/16.   Findings reported by Ree Jade RN.

## 2020-04-16 NOTE — PROGRESS NOTES
I spoke to MAI Fatima with [facility]  who was still in the home with pt. Today's INR is per [employer] [facility].  Pt held coumadin last night and increased Vit K as instructed.  Pt denies bleeding issues, just bruising to arms.  I instructed for pt to continue to hold coumadin and increase Vit K intake today with additional green.  Akua will recheck INR in the a.m prior to dialysis; tomorrow will be Akua's last  visit with pt.  Instructions verbalized.  Patient instructed regarding medication; results given and questions answered. Nutritional counseling given.  Dietary factors affecting therapy addressed.  Patient instructed to monitor for excessive bruising or bleeding.  Findings reported by Ree Jade RN.

## 2020-04-17 NOTE — PROGRESS NOTES
Received INR from Akua RN [facility] over the phone who is still in pt's home. Per Akua, pt denies any med changes or bleeding issues. PT held Coumadin and ate green as directed. PT is being d/c'd from Home Care today. Adjusted pt's dose and instructed to increase vit k today and Monday. Patient instructed regarding medication; results given and questions answered. Nutritional counseling given.  Dietary factors affecting therapy addressed.  Patient instructed to monitor for excessive bruising or bleeding. Akua repeated back correctly and will inform pt.

## 2020-04-21 NOTE — PROGRESS NOTES
Today's INR is 3.5.   Patient checked curbside due to COVID 19 pandemic precautions.   Pt here today for recheck of elevated INR.  Pt denies med changes or bleeding issues.  Instructed pt on dosing calendar, intentionally leaving today's dose as no coumadin since filled in days from last wk's held doses actually increases pt's weekly dose.  Recheck INR next Tuesday due to pt having dialysis M,W,F and transports via PAX.  Pt verbalizes instructions.  Patient instructed regarding medication; results given and questions answered. Nutritional counseling given.  Dietary factors affecting therapy addressed.  Patient instructed to monitor for excessive bruising or bleeding.  Findings reported by Ree Jade RN.       This document has been electronically signed by CRIS Pan @  On April 21, 2020 10:05

## 2020-04-28 NOTE — PROGRESS NOTES
Today's INR is 2.2.   Patient checked curbside due to COVID 19 pandemic precautions.   Pt denies missed coumadin doses, med changes or bleeding issues.  Adjusted coumadin dose and instructed pt to limit green intake for two days.  Recheck INR next wk.  Pt verbalizes.  Patient instructed regarding medication; results given and questions answered. Nutritional counseling given.  Dietary factors affecting therapy addressed.  Patient instructed to monitor for excessive bruising or bleeding.  Findings reported by Ree Jade RN.       This document has been electronically signed by CRIS Pan @  On April 28, 2020 10:24

## 2020-05-14 NOTE — PROGRESS NOTES
Today's INR is 1.7. PT states she continued same dose when she missed appts due to transportation issues. Denies any med changes, bleeding issues, missed doses or excessive k. Denies any s/s of blood clot. Adjusted pt's dose and instructed to hold green vegs for 2 days. Recheck INR next week. Patient instructed regarding medication; results given and questions answered. Nutritional counseling given.  Dietary factors affecting therapy addressed.  Patient instructed to monitor for excessive bruising or bleeding. PT verbalizes understanding. Findings reported by Maida Pearce RN.       This document has been electronically signed by ERIKA PanCNP-BC @  On May 14, 2020 10:42

## 2020-05-21 NOTE — PROGRESS NOTES
"Today's INR is 2.9. PT denies any new medications or bleeding issues. PT does have bruising to BUE but states that she had \"bumped her arms a lot lately\". PT denies any s/s of blood clot. PT has been consuming green consistently. PT instructed to continue same dose and Coumadin friendly diet. Recheck INR in 1 week when pt is returning to see Dr Grant. Patient instructed regarding medication; results given and questions answered. Nutritional counseling given.  Dietary factors affecting therapy addressed.  Patient instructed to monitor for excessive bruising or bleeding. Findings reported by Maida Pearce RN.         This document has been electronically signed by Meme Duckworth, ALEXYPBPETERSON @ on May 21, 2020 14:51      "

## 2020-05-21 NOTE — PROGRESS NOTES
"Zoila Skelton is a 74 y.o. female.  Follow-up for hypertension, high cholesterol, diabetes, COPD, congestive heart failure and depression.  Has been isolating at home due to COVID-19 pandemic.  \"My son lives right next door the other part of my duplex though he is been doing the grocery shopping.  I really would have to get out and less I go to the Coumadin clinic.\"    Depression   Visit Type: follow-up  Patient presents with the following symptoms: decreased concentration, depressed mood and restlessness.  Patient is not experiencing: confusion, shortness of breath, suicidal ideas, suicidal planning and thoughts of death.  Frequency of symptoms: occasionally   Severity: mild   Sleep quality: good  Nighttime awakenings: occasional  Patient has a history of: CHF  Compliance with medications:  %        Diabetes   She has type 2 diabetes mellitus. Pertinent negatives for hypoglycemia include no confusion. Associated symptoms include fatigue and weakness. Pertinent negatives for diabetes include no chest pain. Symptoms are stable. Risk factors for coronary artery disease include diabetes mellitus, hypertension, obesity, post-menopausal, sedentary lifestyle and dyslipidemia. Current diabetic treatment includes insulin injections. Her weight is stable. She is following a generally healthy diet. Meal planning includes avoidance of concentrated sweets. She never participates in exercise. Her home blood glucose trend is fluctuating minimally.   Hypertension   This is a chronic problem. The current episode started more than 1 year ago. The problem is unchanged. The problem is controlled. Pertinent negatives include no chest pain or shortness of breath. There are no associated agents to hypertension. Risk factors for coronary artery disease include obesity, dyslipidemia, sedentary lifestyle and post-menopausal state. Past treatments include calcium channel blockers. Current antihypertension treatment " includes calcium channel blockers. The current treatment provides significant improvement. Compliance problems include exercise and diet.  Hypertensive end-organ damage includes CAD/MI and heart failure.   Hyperlipidemia   This is a chronic problem. The current episode started more than 1 year ago. The problem is controlled. Exacerbating diseases include diabetes and obesity. Pertinent negatives include no chest pain or shortness of breath. Current antihyperlipidemic treatment includes ezetimibe. The current treatment provides moderate improvement of lipids. There are no compliance problems.  Risk factors for coronary artery disease include a sedentary lifestyle, hypertension and diabetes mellitus.   COPD   There is no cough or shortness of breath. This is a chronic problem. The current episode started more than 1 year ago. The problem occurs constantly. The problem has been waxing and waning. Pertinent negatives include no chest pain, fever or sore throat. Her symptoms are aggravated by walking and exertion.   Congestive Heart Failure   Presents for follow-up visit. Associated symptoms include fatigue. Pertinent negatives include no chest pain, chest pressure or shortness of breath. The symptoms have been stable. Compliance with total regimen is 51-75%. Compliance problems include adherence to diet and adherence to exercise.  Compliance with diet is 51-75%. Compliance with exercise is 0-25%. Compliance with medications is %.        The following portions of the patient's history were reviewed and updated as appropriate:     Current Outpatient Medications   Medication Sig Dispense Refill   • acetaminophen (TYLENOL) 325 MG tablet Take 650 mg by mouth Every 6 (Six) Hours As Needed for Mild Pain  or Fever.     • albuterol (PROVENTIL) (2.5 MG/3ML) 0.083% nebulizer solution Take 2.5 mg by nebulization Every 4 (Four) Hours As Needed for Wheezing.     • albuterol sulfate  (90 Base) MCG/ACT inhaler Inhale 2  puffs Every 4 (Four) Hours As Needed for Wheezing or Shortness of Air. 18 g 11   • aspirin 81 MG chewable tablet Chew 81 mg Daily.     • cholecalciferol (VITAMIN D3) 1000 units tablet Take 2,000 Units by mouth Daily.     • citalopram (CeleXA) 20 MG tablet Take 1 tablet by mouth Daily. 90 tablet 3   • dilTIAZem (TIAZAC) 240 MG 24 hr capsule TAKE 1 CAPSULE BY MOUTH EVERY DAY 30 capsule 0   • diltiaZEM CD (CARDIZEM CD) 240 MG 24 hr capsule Take 1 capsule by mouth Daily. 90 capsule 3   • ezetimibe (ZETIA) 10 MG tablet Take 10 mg by mouth Daily.     • famotidine (PEPCID) 20 MG tablet TAKE 2 TABLETS BY MOUTH EVERY DAY 60 tablet 11   • ferrous sulfate 325 (65 FE) MG tablet Take 325 mg by mouth Daily With Breakfast.     • glucose blood test strip 1 each by Other route 3 (three) times a day. Use as instructed     • insulin aspart (novoLOG) 100 UNIT/ML injection Inject 0-9 Units under the skin into the appropriate area as directed 4 (Four) Times a Day Before Meals & at Bedtime. 10 mL 0   • loperamide (IMODIUM) 2 MG capsule Take 2 mg by mouth 4 (Four) Times a Day As Needed for Diarrhea.     • metoprolol succinate XL (TOPROL-XL) 25 MG 24 hr tablet TAKE 1 TABLET BY MOUTH EVERY DAY 30 tablet 0   • ondansetron (ZOFRAN) 4 MG tablet Take 1 tablet by mouth Every 6 (Six) Hours As Needed for Nausea or Vomiting. 30 tablet 0   • oxyCODONE (ROXICODONE) 10 MG tablet Take 1 tablet by mouth Every 6 (Six) Hours As Needed for Moderate Pain  or Severe Pain . 12 tablet 0   • pantoprazole (PROTONIX) 40 MG EC tablet TAKE 1 TABLET BY MOUTH EVERY DAY 30 tablet 0   • Prenatal Vit-Fe Fumarate-FA (PRENATAL VITAMIN) 27-0.8 MG tablet Take 1 tablet by mouth daily.     • rOPINIRole (REQUIP) 0.25 MG tablet TAKE 1 TABLET BY MOUTH EVERY NIGHT AT BEDTIME FOR RESTLESS LEGS 30 tablet 0   • sucralfate (CARAFATE) 1 g tablet TAKE 1 TABLET BY MOUTH FOUR (4) TIMES DAILY 120 tablet 0   • umeclidinium-vilanterol (ANORO ELLIPTA) 62.5-25 MCG/INH aerosol powder  inhaler  Inhale 1 puff Daily. 1 each 11   • UNIFINE PENTIPS 31G X 6 MM misc USE 1 FOUR (4) TIMES DAILY WITH INSULIN 130 each 5   • vitamin C (ASCORBIC ACID) 250 MG tablet Take 250 mg by mouth Daily.     • warfarin (Coumadin) 2.5 MG tablet Take 1 tab nightly on Monday, Wednesday, Friday and Saturday or AS DIRECTED PER COUMADIN CLINIC 30 tablet 2   • warfarin (Coumadin) 5 MG tablet Take 1/2 tablet daily except on Thursdays take 1 tablet 25 tablet 3     No current facility-administered medications for this visit.      Current Outpatient Medications on File Prior to Visit   Medication Sig   • acetaminophen (TYLENOL) 325 MG tablet Take 650 mg by mouth Every 6 (Six) Hours As Needed for Mild Pain  or Fever.   • albuterol (PROVENTIL) (2.5 MG/3ML) 0.083% nebulizer solution Take 2.5 mg by nebulization Every 4 (Four) Hours As Needed for Wheezing.   • albuterol sulfate  (90 Base) MCG/ACT inhaler Inhale 2 puffs Every 4 (Four) Hours As Needed for Wheezing or Shortness of Air.   • aspirin 81 MG chewable tablet Chew 81 mg Daily.   • cholecalciferol (VITAMIN D3) 1000 units tablet Take 2,000 Units by mouth Daily.   • citalopram (CeleXA) 20 MG tablet Take 1 tablet by mouth Daily.   • dilTIAZem (TIAZAC) 240 MG 24 hr capsule TAKE 1 CAPSULE BY MOUTH EVERY DAY   • diltiaZEM CD (CARDIZEM CD) 240 MG 24 hr capsule Take 1 capsule by mouth Daily.   • ezetimibe (ZETIA) 10 MG tablet Take 10 mg by mouth Daily.   • famotidine (PEPCID) 20 MG tablet TAKE 2 TABLETS BY MOUTH EVERY DAY   • ferrous sulfate 325 (65 FE) MG tablet Take 325 mg by mouth Daily With Breakfast.   • glucose blood test strip 1 each by Other route 3 (three) times a day. Use as instructed   • insulin aspart (novoLOG) 100 UNIT/ML injection Inject 0-9 Units under the skin into the appropriate area as directed 4 (Four) Times a Day Before Meals & at Bedtime.   • loperamide (IMODIUM) 2 MG capsule Take 2 mg by mouth 4 (Four) Times a Day As Needed for Diarrhea.   • metoprolol succinate XL  (TOPROL-XL) 25 MG 24 hr tablet TAKE 1 TABLET BY MOUTH EVERY DAY   • ondansetron (ZOFRAN) 4 MG tablet Take 1 tablet by mouth Every 6 (Six) Hours As Needed for Nausea or Vomiting.   • oxyCODONE (ROXICODONE) 10 MG tablet Take 1 tablet by mouth Every 6 (Six) Hours As Needed for Moderate Pain  or Severe Pain .   • pantoprazole (PROTONIX) 40 MG EC tablet TAKE 1 TABLET BY MOUTH EVERY DAY   • Prenatal Vit-Fe Fumarate-FA (PRENATAL VITAMIN) 27-0.8 MG tablet Take 1 tablet by mouth daily.   • rOPINIRole (REQUIP) 0.25 MG tablet TAKE 1 TABLET BY MOUTH EVERY NIGHT AT BEDTIME FOR RESTLESS LEGS   • sucralfate (CARAFATE) 1 g tablet TAKE 1 TABLET BY MOUTH FOUR (4) TIMES DAILY   • umeclidinium-vilanterol (ANORO ELLIPTA) 62.5-25 MCG/INH aerosol powder  inhaler Inhale 1 puff Daily.   • UNIFINE PENTIPS 31G X 6 MM misc USE 1 FOUR (4) TIMES DAILY WITH INSULIN   • vitamin C (ASCORBIC ACID) 250 MG tablet Take 250 mg by mouth Daily.   • warfarin (Coumadin) 2.5 MG tablet Take 1 tab nightly on Monday, Wednesday, Friday and Saturday or AS DIRECTED PER COUMADIN CLINIC   • warfarin (Coumadin) 5 MG tablet Take 1/2 tablet daily except on Thursdays take 1 tablet     No current facility-administered medications on file prior to visit.      She is allergic to crestor [rosuvastatin calcium]; lipitor [atorvastatin]; lortab [hydrocodone-acetaminophen]; and adhesive tape..    Review of Systems   Constitutional: Positive for fatigue. Negative for fever.   HENT: Negative for sore throat.    Respiratory: Negative for cough and shortness of breath.    Cardiovascular: Negative for chest pain.   Gastrointestinal: Negative.    Genitourinary: Negative.    Musculoskeletal: Positive for arthralgias.   Skin: Negative.    Neurological: Positive for weakness.   Psychiatric/Behavioral: Positive for decreased concentration. Negative for confusion and suicidal ideas.       Objective   Physical Exam   Constitutional: She is oriented to person, place, and time.    Neurological: She is alert and oriented to person, place, and time.   Telephone visit    Assessment/Plan   Problems Addressed this Visit        Cardiovascular and Mediastinum    Hypertension - Primary (Chronic)    Hyperlipidemia    Heart failure with preserved left ventricular function (HFpEF) (CMS/[facility])       Respiratory    COPD (chronic obstructive pulmonary disease) (CMS/[facility]) (Chronic)       Endocrine    Diabetes mellitus (CMS/[facility]) (Chronic)       Other    Depressive disorder      No orders of the defined types were placed in this encounter.    1. Essential Hypertension:  Continue Cardizem and Lasix use as previously prescribed  Adhere to no more than 2 g sodium diet daily  Educated on high sodium content snacks such as potato chips, microwave meals and canned soups and encouraged to avoid these products     2.  Hyperlipidemia:  Continue Zetia as previously prescribed  Discussed ways to reduce saturated fats in diet such as choosing lean meats like chicken and fish as opposed to fatty red meat  Encouraged baking or air frying foods as opposed to deep frying or pan frying     3.  COPD:  Continue Anoro, albuterol inhaler and nebulizer as needed  Continue Lasix as previously prescribed  Continue use of Hoveround to prevent exertional dyspnea and prevent further COPD exacerbations  Discussed at length how weather changes can result in COPD exacerbations     4.  Diabetes mellitus with stage IV chronic kidney disease, with long-term current use of insulin::  Continue insulin use as previously prescribed  Educated on importance of thorough diabetic foot care and encouraged inspect feet daily for signs of ulcerations or callus  Strongly encouraged to avoid cookies, cakes, pies, ice cream and other concentrated sweets during COVID-19 pandemic     5. Depressive disorder:  Continue Celexa as previously prescribed  Continue on trazodone as previously prescribed   Educated on possible side of this medication including but  not limited possible suicidal ideations  Encouraged to speak with her son daily as isolation during COVID-19 can worsen depression  Encouraged to seek emergency medical treatment for any thoughts of helplessness, hopelessness or self-harm     6.  Heart failure with preserved left ventricular function:  Continue on Bumex previously prescribed  Continue on oxygen per nasal cannula  Encouraged to seek emergency medical treatment for any new or worsening shortness of breath or weight gain of greater than 2 to 3 pounds in a 24-hour.  Encourage use of compression stockings for minimum of 4 to 6 hours daily     You have chosen to receive care through a telephone visit. Do you consent to use a telephone visit for your medical care today? Yes  This visit has been completed as a phone visit to comply with patient safety concerns in accordance with CDC recommendations. Total time of discussion was 22 minutes.    Continue on current medications as previously prescribed   Return in about 3 months (around 8/21/2020) for Recheck.          This document has been electronically signed by ABRAHAM Boone on May 26, 2020 07:10

## 2020-05-27 NOTE — TELEPHONE ENCOUNTER
PT CALLED IN STATING THAT SHE NEEDS AUTHORIZATION FOR HER OXYGEN FAXED TO [facility].    PT CONTACT: [phone]

## 2020-05-27 NOTE — PROGRESS NOTES
Pulmonary Office Follow-up    Subjective     Brendon Skelton is seen today at the office for   Chief Complaint   Patient presents with   • COPD         HPI  Brendon Skelton is a 74 y.o. female with a PMH significant for COPD, chronic hypoxic respiratory failure, past tobacco use, AF, CHF, s/p AVR, obesity, CKD, and DM who presents for follow-up of COPD.      7/30/19: She is recovering from prolonged hospitalization during which time she was diagnosed with adenocarcinoma of the sigmoid colon.  She was stable on her Anoro so recommend continue with it and to continue diuretics as directed by Dr. Caputo.    11/21/19: I felt she was experiencing a mild viral URI so recommend symptomatic management.  She was to continue on her Anoro daily and using her supplemental oxygen.    5/28/20: She states that she has been more short of breath over the past several weeks.  She reports that she got up to go to the kitchen and noticed harder time breathing and this is persisted with exertion.  She continues to use her Anoro daily but has been having to use her albuterol several times a day.  She denies cough, sputum, wheeze, or chest pain.  Patient is now on hemodialysis 3 times a week and she does admit they have been trying to pull more fluid off.  She denies any known weight changes or leg swelling.  Patient has not had any other recent illnesses, steroids, or antibiotic use.  She does report that her home oxygen concentrator is not working and she was told that [facility] needs recertification order to get her new machine.    Tobacco use history:  Type: cigarettes  Amount: 1 ppd  Duration: 45 years  Cessation: 1999   Willing to quit: N/A      Patient Active Problem List   Diagnosis   • Long term current use of anticoagulant therapy   • Personal history of heart valve replacement   • Atrial fibrillation [I48.91]   • Emphysema of lung (CMS/HCC)   • On anticoagulant therapy   • Hyperlipidemia   • Diastolic heart failure  (CMS/HCC)   • Depressive disorder   • COPD (chronic obstructive pulmonary disease) (CMS/HCC)   • Diabetes mellitus (CMS/HCC)   • Myopia   • Astigmatism   • Bleeding from open wound of chest wall   • Follow-up surgery care   • Encounter for screening for malignant neoplasm of colon   • Positive colorectal cancer screening using DNA-based stool test   • Nodule of left lung   • Chronic hypoxemic respiratory failure (CMS/HCC)   • Physical deconditioning   • Heart failure with preserved left ventricular function (HFpEF) (CMS/HCC)   • Hypertension   • Coronary artery disease involving native coronary artery without angina pectoris   • Hx of CABG   • SSS (sick sinus syndrome) (CMS/HCC)   • Pacemaker   • Stage 4 chronic kidney disease (CMS/HCC)   • Personal history of tobacco use, presenting hazards to health   • Gastrointestinal hemorrhage   • Class 2 severe obesity due to excess calories with serious comorbidity and body mass index (BMI) of 39.0 to 39.9 in adult (CMS/HCC)   • Gastritis   • Colon polyp   • Coumadin toxicity   • Acute on chronic diastolic congestive heart failure (CMS/HCC)   • Chronic anemia   • Pulmonary hypertension (CMS/HCC)   • Confusion   • Hyponatremia   • Long term current use of anticoagulant   • Renal insufficiency   • Lower abdominal pain   • Hematoma of abdominal wall   • E coli bacteremia       Review of Systems  Review of Systems   Constitutional: Positive for fatigue. Negative for fever and unexpected weight change.   HENT: Negative for congestion, postnasal drip and rhinorrhea.    Respiratory: Positive for shortness of breath. Negative for cough, choking and wheezing.    Cardiovascular: Negative for chest pain and leg swelling.     As described in the HPI. Otherwise, remainder of ROS (14 systems) were negative.    Medications, Allergies, Social, and Family Histories reviewed as per EMR.    Objective     Vitals:    05/28/20 1011   BP: 128/72   Pulse: 68   SpO2: (!) 89%     Physical Exam      Constitutional: She is oriented to person, place, and time. Vital signs are normal. She appears well-developed and well-nourished.   Morbidly obese   HENT:   Head: Normocephalic and atraumatic.   Nose: Nose normal.   Eyes: Pupils are equal, round, and reactive to light. Conjunctivae, EOM and lids are normal.   Neck: Trachea normal and normal range of motion. No tracheal tenderness present. No thyroid mass present.   Cardiovascular: Normal rate and regular rhythm. PMI is not displaced. Exam reveals no gallop.   Murmur heard.   Systolic murmur is present with a grade of 2/6.  AV click   Pulmonary/Chest: Effort normal and breath sounds normal. No respiratory distress. She has no decreased breath sounds. She has no wheezes. She has no rhonchi. She exhibits deformity (thoracic kyphosis). She exhibits no tenderness.   Abdominal: Soft. Normal appearance and bowel sounds are normal. There is no hepatosplenomegaly. There is no tenderness.   Morbidly obese   Musculoskeletal:   Uses motorized scooter, no extremity edema     Vascular Status -  Her right foot exhibits no edema. Her left foot exhibits no edema.  Lymphadenopathy:        Head (right side): No submandibular adenopathy present.        Head (left side): No submandibular adenopathy present.     She has no cervical adenopathy.        Right: No supraclavicular adenopathy present.        Left: No supraclavicular adenopathy present.   Neurological: She is alert and oriented to person, place, and time. Gait normal.   Skin: Skin is warm and dry. No rash noted. No cyanosis. Nails show no clubbing.   Psychiatric: She has a normal mood and affect. Her speech is normal and behavior is normal. Judgment normal.   Nursing note and vitals reviewed.      IMAGING: CT chest without contrast 3/27/19 (independently reviewed and interpreted by me) showed resolution of left upper lobe nodule and bilateral effusions, interval decrease in mediastinal adenopathy, emphysematous changes,  atherosclerosis    CXR 3/9/2020: Improvement in diffuse vascular congestion and edema, cardiomegaly, small pleural effusions    TTE 6/18/19:  · LVEF 46-50%  · Left atrial cavity size is moderately dilated.  · The left ventricular cavity is mildly dilated.  · Left ventricular wall thickness is consistent with borderline concentric hypertrophy.  · Mild mitral valve regurgitation is present  · Mild to moderate tricuspid valve regurgitation is present  · RVSP 55mmHg    Assessment/Plan     Brendon was seen today for copd.    Diagnoses and all orders for this visit:    Chronic obstructive pulmonary disease, unspecified COPD type (CMS/[facility])  -     Fluticasone-Umeclidin-Vilant 100-62.5-25 MCG/INH aerosol powder ; Inhale 1 puff Daily.    Chronic hypoxemic respiratory failure (CMS/[facility])    Class 2 severe obesity due to excess calories with serious comorbidity and body mass index (BMI) of 39.0 to 39.9 in adult (CMS/[facility])    Physical deconditioning    Personal history of tobacco use, presenting hazards to health    Pulmonary hypertension (CMS/[facility])         Discussion/ Recommendations:   She has had a complicated recent history and is now on hemodialysis.  Her dyspnea is likely multifactorial but I think it is worth a trial escalation to triple therapy with addition of an ICS.  Otherwise, I am concerned that some of her dyspnea could be related to her underlying heart failure and ESRD so I recommend that she discuss her dyspnea with her nephrologist at hemodialysis.  Otherwise, we have recertified her for her home oxygen and plan to send this information to [facility].    -Stop Anoro  -Start Trelegy daily.  Sample provided instructed on use.  Rinse mouth after use.  -Use albuterol as needed for dyspnea or wheeze.  -Use oxygen to maintain oxygen saturations greater than 88%.  -Hemodialysis as directed by nephrologist.  -Does not meet criteria for LD CT screening.  -Up-to-date with flu and pneumococcal vaccines.    6 Minute Walk for  Oxygen   Room air SpO2 at rest: 84%  SpO2 on oxygen: 89% on 2lpm at rest    Supplemental oxygen ordered for 2lpm       Patient's Body mass index is 38.44 kg/m². BMI is above normal parameters. Recommendations include: exercise counseling and nutrition counseling.      Return in about 4 weeks (around 6/25/2020) for Recheck COPD.          This document has been electronically signed by Nel Grant MD on May 28, 2020 10:40      Dictated using Dragon

## 2020-05-28 NOTE — PROGRESS NOTES
Today's INR is 2.0. PT here today seeing Dr Grant. Denies any new medications or bleeding issues. Denies any s/s of blood clot, missed doses or excessive k. Adjusted pt's dose and instructed to hold green vegs for 2 days. Recheck INR next week. Patient instructed regarding medication; results given and questions answered. Nutritional counseling given.  Dietary factors affecting therapy addressed.  Patient instructed to monitor for excessive bruising or bleeding. PT verbalizes understanding. Findings reported by Maida Pearce RN.       This document has been electronically signed by MIKI Pan-BC @  On May 28, 2020 10:14

## 2020-05-29 NOTE — TELEPHONE ENCOUNTER
Explained to patient that her insurance will not pay for a portable oxygen concentrator and a home concentrator.  Told her that if she wants to pay out of pocket for the portable machine we will be glad to send in a rx for it.      ----- Message from Jenny Guardado sent at 5/29/2020  3:19 PM CDT -----  Contact: [phone]  She got her oxygen today- thank you    Can Dr. Grant ok getting another portable. Her's is about to crash    [facility]

## 2020-06-04 NOTE — PROGRESS NOTES
Today's INR is 3.2. Denies any new medications or bleeding issues. PT states she consumed green consistently. Denies any s/s of blood clot. Due to rapid rise in INR, dose was cut back and pt will continue to consume green on Monday and Thursday. Patient instructed regarding medication; results given and questions answered. Nutritional counseling given.  Dietary factors affecting therapy addressed.  Patient instructed to monitor for excessive bruising or bleeding. Pt will be seen in 1 week.PT verbalizes understanding. Findings reported by Maida Pearce RN. .        This document has been electronically signed by Meme Duckworth, DANIEL @ on June 4, 2020 10:12

## 2020-06-11 NOTE — PROGRESS NOTES
Today's INR is 2.2.   Pt denies missed coumadin doses but states she consumed too many brussel sprouts.  Pt denies bleeding issues.  Adjusted coumadin dose for today only and instructed pt to limit green intake for two days.  Pt requests a two week appt.  Patient instructed regarding medication; results given and questions answered. Nutritional counseling given.  Dietary factors affecting therapy addressed.  Patient instructed to monitor for excessive bruising or bleeding.  Pt verbalizes.    Findings reported by Ree Jade RN.       This document has been electronically signed by CRIS Pan @  On June 11, 2020 10:40

## 2020-06-23 NOTE — OUTREACH NOTE
Patient Outreach Note    ACM outreach with [facility] covid high risk pt and she said she is doing pretty good, has no needs or concerns at present. [facility] had outreach with pt in March 2020 and she reports her condition has unchanged. She was really very short with conversation mostly giving single word responses. She has all needed dme and medications. She denied needs at this time. She has ACM contact # on her phone.    Veronica Ruiz RN  Ambulatory     6/23/2020, 15:49

## 2020-06-25 NOTE — PROGRESS NOTES
Today's INR is 2.9.  Patient states no med changes or bleeding problems or unexplained bruising. Patient instructed to continue current dosing schedule. Verbalizes understanding. Will recheck in 3 weeks. Patient instructed regarding medication; results given and questions answered. Nutritional counseling given.  Dietary factors affecting therapy addressed.  Patient instructed to monitor for excessive bruising or bleeding. Findings reported by Dixie Bermeo RN.        This document has been electronically signed by ERIKA PanCNP-BC @  On June 25, 2020 10:50

## 2020-07-01 NOTE — PROGRESS NOTES
Pulmonary Office Follow-up    Subjective     Brendon Skelton is seen today at the office for   Chief Complaint   Patient presents with   • COPD         HPI  Brendon Skelton is a 74 y.o. female with a PMH significant for COPD, chronic hypoxic respiratory failure, past tobacco use, AF, CHF, s/p AVR, obesity, CKD, and DM who presents for follow-up of COPD.      7/30/19: She is recovering from prolonged hospitalization during which time she was diagnosed with adenocarcinoma of the sigmoid colon.  She was stable on her Anoro so recommend continue with it and to continue diuretics as directed by Dr. Caputo.    11/21/19: I felt she was experiencing a mild viral URI so recommend symptomatic management.  She was to continue on her Anoro daily and using her supplemental oxygen.    5/28/20: She had worsening dyspnea thought was multifactorial but I did think it was worth a trial escalation to triple therapy with addition of ICS.  I recommend stopping Anoro and starting Trelegy.  We also recertified her for her supplemental oxygen through [facility].    7/2/20: She states that she does feel like the Trelegy is helping, but she continues to get out of breath whenever she walks.  Patient admits that she does not walk very much as she mostly uses her power chair.  She is using her albuterol nebs about 2 times a day, but states that she should be using it more as she was instructed to do so.  Patient denies cough, sputum, wheeze, chest pain, or leg swelling.  She continues on her oxygen and is currently using 3 L on her conserving regulator.  Patient denies any recent exacerbations or steroid use.    Tobacco use history:  Type: cigarettes  Amount: 1 ppd  Duration: 45 years  Cessation: 1999   Willing to quit: N/A      Patient Active Problem List   Diagnosis   • Long term current use of anticoagulant therapy   • Personal history of heart valve replacement   • Atrial fibrillation [I48.91]   • Emphysema of lung (CMS/HCC)   • On  anticoagulant therapy   • Hyperlipidemia   • Diastolic heart failure (CMS/HCC)   • Depressive disorder   • COPD (chronic obstructive pulmonary disease) (CMS/HCC)   • Diabetes mellitus (CMS/HCC)   • Myopia   • Astigmatism   • Bleeding from open wound of chest wall   • Follow-up surgery care   • Encounter for screening for malignant neoplasm of colon   • Positive colorectal cancer screening using DNA-based stool test   • Nodule of left lung   • Chronic hypoxemic respiratory failure (CMS/HCC)   • Physical deconditioning   • Heart failure with preserved left ventricular function (HFpEF) (CMS/HCC)   • Hypertension   • Coronary artery disease involving native coronary artery without angina pectoris   • Hx of CABG   • SSS (sick sinus syndrome) (CMS/HCC)   • Pacemaker   • Stage 4 chronic kidney disease (CMS/HCC)   • Personal history of tobacco use, presenting hazards to health   • Gastrointestinal hemorrhage   • Class 2 severe obesity due to excess calories with serious comorbidity and body mass index (BMI) of 39.0 to 39.9 in adult (CMS/HCC)   • Gastritis   • Colon polyp   • Coumadin toxicity   • Acute on chronic diastolic congestive heart failure (CMS/HCC)   • Chronic anemia   • Pulmonary hypertension (CMS/HCC)   • Confusion   • Hyponatremia   • Long term current use of anticoagulant   • Renal insufficiency   • Lower abdominal pain   • Hematoma of abdominal wall   • E coli bacteremia       Review of Systems  Review of Systems   Constitutional: Positive for fatigue. Negative for fever and unexpected weight change.   HENT: Negative for congestion, postnasal drip and rhinorrhea.    Respiratory: Positive for shortness of breath. Negative for cough, choking and wheezing.    Cardiovascular: Negative for chest pain and leg swelling.     As described in the HPI. Otherwise, remainder of ROS (14 systems) were negative.    Medications, Allergies, Social, and Family Histories reviewed as per EMR.    Objective     Vitals:    07/02/20  1046   BP: 116/60   Pulse: 70   SpO2: 98%     Physical Exam   Constitutional: She is oriented to person, place, and time. Vital signs are normal. She appears well-developed and well-nourished.   Morbidly obese   HENT:   Head: Normocephalic and atraumatic.   Masked   Eyes: Pupils are equal, round, and reactive to light. Conjunctivae, EOM and lids are normal.   Neck: Trachea normal and normal range of motion. No tracheal tenderness present. No thyroid mass present.   Cardiovascular: Normal rate and regular rhythm. PMI is not displaced. Exam reveals no gallop.   Murmur heard.   Systolic murmur is present with a grade of 2/6.  AV click   Pulmonary/Chest: Effort normal and breath sounds normal. No respiratory distress. She has no decreased breath sounds. She has no wheezes. She has no rhonchi. She exhibits deformity (thoracic kyphosis). She exhibits no tenderness.   Abdominal: Soft. Normal appearance and bowel sounds are normal. There is no hepatosplenomegaly. There is no tenderness.   Morbidly obese   Musculoskeletal:   Uses motorized scooter, no extremity edema     Vascular Status -  Her right foot exhibits no edema. Her left foot exhibits no edema.  Lymphadenopathy:        Head (right side): No submandibular adenopathy present.        Head (left side): No submandibular adenopathy present.     She has no cervical adenopathy.        Right: No supraclavicular adenopathy present.        Left: No supraclavicular adenopathy present.   Neurological: She is alert and oriented to person, place, and time. Gait normal.   Skin: Skin is warm and dry. No rash noted. No cyanosis. Nails show no clubbing.   Psychiatric: She has a normal mood and affect. Her speech is normal and behavior is normal. Judgment normal.   Nursing note and vitals reviewed.      IMAGING: CT chest without contrast 3/27/19 (independently reviewed and interpreted by me) showed resolution of left upper lobe nodule and bilateral effusions, interval decrease in  mediastinal adenopathy, emphysematous changes, atherosclerosis    CXR 3/9/2020: Improvement in diffuse vascular congestion and edema, cardiomegaly, small pleural effusions    TTE 6/18/19:  · LVEF 46-50%  · Left atrial cavity size is moderately dilated.  · The left ventricular cavity is mildly dilated.  · Left ventricular wall thickness is consistent with borderline concentric hypertrophy.  · Mild mitral valve regurgitation is present  · Mild to moderate tricuspid valve regurgitation is present  · RVSP 55mmHg    Assessment/Plan     Brendon was seen today for copd.    Diagnoses and all orders for this visit:    Chronic obstructive pulmonary disease, unspecified COPD type (CMS/HCC)    Chronic hypoxemic respiratory failure (CMS/HCC)    Nodule of left lung    Heart failure with preserved left ventricular function (HFpEF) (CMS/[facility])    Class 2 severe obesity due to excess calories with serious comorbidity and body mass index (BMI) of 39.0 to 39.9 in adult (CMS/[facility])    Personal history of tobacco use, presenting hazards to health    Physical deconditioning         Discussion/ Recommendations:   She has had some improvement with escalation to triple therapy. I did  her to only use her albuterol as needed. I also counseled her that her dyspnea is multifactorial and all aspects need to be addressed for her relief.    -Cont Trelegy daily.  -Use albuterol as needed for dyspnea or wheeze.  -Use oxygen to maintain oxygen saturations greater than 88%.  -Hemodialysis as directed by nephrologist.  -Does not meet criteria for LD CT screening.  -Up-to-date with flu and pneumococcal vaccines.    6 Minute Walk for Oxygen   Room air SpO2 at rest: 84%  SpO2 on oxygen: 89% on 2lpm at rest    Patient's Body mass index is 39.11 kg/m². BMI is above normal parameters. Recommendations include: exercise counseling and nutrition counseling.      Return in about 3 months (around 10/2/2020) for Recheck COPD.          This document has been  electronically signed by Nel Grant MD on July 2, 2020 10:59      Dictated using Dragon

## 2020-07-02 PROBLEM — Z99.2 ESRD (END STAGE RENAL DISEASE) ON DIALYSIS (HCC): Status: ACTIVE | Noted: 2020-01-01

## 2020-07-02 PROBLEM — N18.6 ESRD (END STAGE RENAL DISEASE) ON DIALYSIS (HCC): Status: ACTIVE | Noted: 2020-01-01

## 2020-07-02 NOTE — PROGRESS NOTES
PT is having dialysis fistula placement on 7/21 per Dr Feliz. RN discussed Lovenox bridging with Dr Feliz due to pt's hx of Aortic Mechanical Valve replacement and kidney disease. Labs and previous Lovenox dosing reviewed. Per Dr Feliz, pt will hold Coumadin x3 days. Lovenox 40mg sq daily with last dose 24 hours prior to procedure was recommended. RX sent via Epic. Findings reported by Maida Pearce RN.

## 2020-07-02 NOTE — PROGRESS NOTES
7/2/2020    Brendon Skelton  1945    Chief Complaint:  ESRD    HPI:      PCP:  Josefa Pozo APRN  Cardiology:  Dr Gates  Nephrology:  Dr Caputo, [facility]  Pulmonology:  Dr Grant    74 y.o. female with HTN(stable, increased risk stroke, rupture), Hyperlipidemia(stable, increased risk cardiovascular events), Diabetes Mellitus(stable, increased risk cardiovascular events), Obesity(uncontrolled, increased risk cardiovascular events), COPD(stable, increased risk pulmonary complications), Chronic Kidney Disease(stable, increased risk renal failure) and Atrial fibrillation(stable, increased risk stroke) , ESRD(new, dialysis).  former smoker.  Moderate fatigue, shortness of breath with exertion x 1 year.  Needs long term access for hemodialysis. Afib on Coumadin.  No TIA stroke amaurosis.  No MI claudication. No other associated signs, symptoms or modifying factors.    3/2020 tunnel catheter placement  6/2020 Upper extremity vein mapping:  RIGHT cephalic 2.8-7.0mm, basilic 3.4-6.4mm, radial artery 1.6mm.  LEFT cephalic 3.2-4.6mm, basilic 4.2-6.0mm, radial artery 1.3mm.    3/2017: PPM Generator change (Yajaira)  3/2017: Pacemaker pocket revision, evacuation hematoma, control of bleeding.  12/2017 Echocardiogram:  EF 55%, LA 35mm, LV 49mm, AVR.  Trace MR TR.  2/2020 Afib 122, QTc 544    The following portions of the patient's history were reviewed and updated as appropriate: allergies, current medications, past family history, past medical history, past social history, past surgical history and problem list.  Recent images independently reviewed.  Available laboratory values reviewed.    PMH:  Past Medical History:   Diagnosis Date   • Anxiety    • Aortic valve replaced    • Atrial fibrillation (CMS/HCC)    • C. difficile colitis    • Chronic hypoxemic respiratory failure (CMS/HCC)    • COPD (chronic obstructive pulmonary disease) (CMS/HCC)    • Coronary artery disease    • Depressive disorder   "  • Diabetes mellitus (CMS/HCC)    • Diastolic heart failure (CMS/HCC)    • Gastrointestinal hemorrhage    • Hyperlipidemia    • Hypertension    • Long term current use of anticoagulant    • Malignant neoplasm of sigmoid colon (CMS/HCC)    • Pulmonary hypertension (CMS/HCC)    • Rectal hemorrhage    • Stage 4 chronic kidney disease (CMS/HCC)      Past Surgical History:   Procedure Laterality Date   • AORTIC VALVE REPAIR/REPLACEMENT     • CARDIAC ELECTROPHYSIOLOGY PROCEDURE N/A 3/20/2017   • CARDIAC PACEMAKER PLACEMENT     • CATARACT EXTRACTION W/ INTRAOCULAR LENS IMPLANT Left 2019   • CATARACT EXTRACTION W/ INTRAOCULAR LENS IMPLANT Right 2019   • COLON RESECTION Left 2/10/2020   • COLONOSCOPY N/A 2019   • COLONOSCOPY N/A 2019   • ENDOSCOPY N/A 2019   • INTERVENTIONAL RADIOLOGY PROCEDURE N/A 3/6/2020    Procedure: tunneled central venous catheter placement;  Surgeon: Polo Feliz MD;  Location: [facility] INVASIVE LOCATION;  Service: Interventional Radiology;  Laterality: N/A;   • PACEMAKER REPLACEMENT N/A 3/21/2017   • SIGMOIDOSCOPY N/A 2019   • UPPER GASTROINTESTINAL ENDOSCOPY       Family History   Problem Relation Age of Onset   • Hyperlipidemia Mother      Social History     Tobacco Use   • Smoking status: Former Smoker     Last attempt to quit: 3/21/1999     Years since quittin.2   • Smokeless tobacco: Never Used   Substance Use Topics   • Alcohol use: No   • Drug use: No       ALLERGIES:  Allergies   Allergen Reactions   • Crestor [Rosuvastatin Calcium] Other (See Comments)     \"hurts my liver\"   • Lipitor [Atorvastatin] Other (See Comments)     \"hurt my liver\"   • Lortab [Hydrocodone-Acetaminophen] Hallucinations   • Adhesive Tape Other (See Comments)     Paper tape         MEDICATIONS:    Current Outpatient Medications:   •  acetaminophen (TYLENOL) 325 MG tablet, Take 650 mg by mouth Every 6 (Six) Hours As Needed for Mild Pain  or Fever., Disp: , Rfl:   •  " albuterol (PROVENTIL) (2.5 MG/3ML) 0.083% nebulizer solution, Take 2.5 mg by nebulization Every 4 (Four) Hours As Needed for Wheezing., Disp: , Rfl:   •  albuterol sulfate  (90 Base) MCG/ACT inhaler, Inhale 2 puffs Every 4 (Four) Hours As Needed for Wheezing or Shortness of Air., Disp: 18 g, Rfl: 11  •  aspirin 81 MG chewable tablet, Chew 81 mg Daily., Disp: , Rfl:   •  cholecalciferol (VITAMIN D3) 1000 units tablet, Take 2,000 Units by mouth Daily., Disp: , Rfl:   •  citalopram (CeleXA) 20 MG tablet, TAKE 1 TABLET BY MOUTH EVERY DAY, Disp: 30 tablet, Rfl: 3  •  dilTIAZem (TIAZAC) 240 MG 24 hr capsule, TAKE 1 CAPSULE BY MOUTH EVERY DAY, Disp: 30 capsule, Rfl: 0  •  diltiaZEM CD (CARDIZEM CD) 240 MG 24 hr capsule, Take 1 capsule by mouth Daily., Disp: 90 capsule, Rfl: 3  •  ezetimibe (ZETIA) 10 MG tablet, Take 10 mg by mouth Daily., Disp: , Rfl:   •  famotidine (PEPCID) 20 MG tablet, TAKE 2 TABLETS BY MOUTH EVERY DAY, Disp: 60 tablet, Rfl: 11  •  ferrous sulfate 325 (65 FE) MG tablet, Take 325 mg by mouth Daily With Breakfast., Disp: , Rfl:   •  Fluticasone-Umeclidin-Vilant 100-62.5-25 MCG/INH aerosol powder , Inhale 1 puff Daily., Disp: 1 each, Rfl: 11  •  glucose blood test strip, 1 each by Other route 3 (three) times a day. Use as instructed, Disp: , Rfl:   •  insulin aspart (novoLOG) 100 UNIT/ML injection, Inject 0-9 Units under the skin into the appropriate area as directed 4 (Four) Times a Day Before Meals & at Bedtime., Disp: 10 mL, Rfl: 0  •  loperamide (IMODIUM) 2 MG capsule, Take 2 mg by mouth 4 (Four) Times a Day As Needed for Diarrhea., Disp: , Rfl:   •  metOLazone (ZAROXOLYN) 2.5 MG tablet, TAKE 1 TABLET BY MOUTH EVERY OTHER DAY, Disp: 45 tablet, Rfl: 0  •  metoprolol succinate XL (TOPROL-XL) 25 MG 24 hr tablet, TAKE 1 TABLET BY MOUTH EVERY DAY, Disp: 30 tablet, Rfl: 0  •  ondansetron (ZOFRAN) 4 MG tablet, Take 1 tablet by mouth Every 6 (Six) Hours As Needed for Nausea or Vomiting., Disp: 30  tablet, Rfl: 0  •  oxyCODONE (ROXICODONE) 10 MG tablet, Take 1 tablet by mouth Every 6 (Six) Hours As Needed for Moderate Pain  or Severe Pain ., Disp: 12 tablet, Rfl: 0  •  pantoprazole (PROTONIX) 40 MG EC tablet, Take 1 tablet by mouth Daily., Disp: 90 tablet, Rfl: 2  •  Prenatal Vit-Fe Fumarate-FA (PRENATAL VITAMIN) 27-0.8 MG tablet, Take 1 tablet by mouth daily., Disp: , Rfl:   •  rOPINIRole (REQUIP) 0.25 MG tablet, TAKE 1 TABLET BY MOUTH EVERY NIGHT AT BEDTIME FOR RESTLESS LEGS, Disp: 30 tablet, Rfl: 0  •  sucralfate (CARAFATE) 1 g tablet, TAKE 1 TABLET BY MOUTH FOUR (4) TIMES DAILY, Disp: 120 tablet, Rfl: 0  •  UNIFINE PENTIPS 31G X 6 MM misc, USE 1 FOUR (4) TIMES DAILY WITH INSULIN, Disp: 130 each, Rfl: 5  •  vitamin C (ASCORBIC ACID) 250 MG tablet, Take 250 mg by mouth Daily., Disp: , Rfl:   •  warfarin (Coumadin) 2.5 MG tablet, Take 1 tab nightly on Monday, Wednesday, Friday and Saturday or AS DIRECTED PER COUMADIN CLINIC, Disp: 30 tablet, Rfl: 2  •  warfarin (Coumadin) 5 MG tablet, Take 1/2 tablet daily except on Thursdays take 1 tablet, Disp: 25 tablet, Rfl: 3    Review of Systems   Review of Systems   Constitution: Positive for malaise/fatigue. Negative for night sweats and weight loss.   HENT: Negative for hearing loss, hoarse voice and stridor.    Eyes: Negative for vision loss in left eye, vision loss in right eye and visual disturbance.   Cardiovascular: Positive for dyspnea on exertion, irregular heartbeat and palpitations. Negative for chest pain, claudication and leg swelling.   Respiratory: Positive for shortness of breath. Negative for cough and hemoptysis.    Hematologic/Lymphatic: Negative for adenopathy and bleeding problem. Bruises/bleeds easily.   Skin: Negative for color change, poor wound healing and rash.   Musculoskeletal: Positive for arthritis. Negative for back pain, muscle weakness and neck pain.   Gastrointestinal: Negative for abdominal pain, dysphagia and heartburn.  "  Neurological: Positive for numbness. Negative for dizziness, seizures and weakness.   Psychiatric/Behavioral: Negative for altered mental status, depression and memory loss. The patient is not nervous/anxious.        Physical Exam   Vitals:    07/02/20 1349   BP: 122/68   BP Location: Right arm   Patient Position: Sitting   Cuff Size: Adult   Pulse: 64   Temp: 98.2 °F (36.8 °C)   TempSrc: Temporal   SpO2: (!) 83%   Height: 165.1 cm (65\")     Physical Exam   Constitutional: She is oriented to person, place, and time. She is active and cooperative. She does not appear ill. No distress.   HENT:   Head: Atraumatic.   Right Ear: Hearing normal.   Left Ear: Hearing normal.   Nose: No nasal deformity. No epistaxis.   Mouth/Throat: She does not have dentures. Normal dentition.   Eyes: Conjunctivae and lids are normal. Right pupil is round and reactive. Left pupil is round and reactive.   Neck: No JVD present. Carotid bruit is not present. No tracheal deviation present. No thyroid mass and no thyromegaly present.   Cardiovascular: Normal rate and regular rhythm.   No murmur heard.  Pulses:       Carotid pulses are 2+ on the right side, and 2+ on the left side.       Radial pulses are 2+ on the right side, and 2+ on the left side.        Dorsalis pedis pulses are 1+ on the right side, and 1+ on the left side.        Posterior tibial pulses are 1+ on the right side, and 1+ on the left side.   RIGHT IJ tunnel catheter   Pulmonary/Chest: Effort normal and breath sounds normal.   Abdominal: Soft. She exhibits no distension and no mass. There is no splenomegaly or hepatomegaly. There is no tenderness.   Musculoskeletal: She exhibits no deformity.   Gait normal.    Lymphadenopathy:     She has no cervical adenopathy.        Right: No supraclavicular adenopathy present.        Left: No supraclavicular adenopathy present.   Neurological: She is alert and oriented to person, place, and time. She has normal strength.   Skin: Skin is " warm and dry. No cyanosis or erythema. No pallor.   No venous staining   Psychiatric: She has a normal mood and affect. Her speech is normal. Judgment and thought content normal.       BUN   Date Value Ref Range Status   03/09/2020 45 (H) 8 - 23 mg/dL Final     Creatinine   Date Value Ref Range Status   03/09/2020 5.20 (H) 0.57 - 1.00 mg/dL Final     eGFR Non  Amer   Date Value Ref Range Status   03/09/2020 8 (L) >60 mL/min/1.73 Final     Comment:     <15 Indicative of kidney failure.     eGFR   Amer   Date Value Ref Range Status   03/09/2020   Final     Comment:     <15 Indicative of kidney failure.       ASSESSMENT:  Brendon was seen today for follow-up.    Diagnoses and all orders for this visit:    ESRD (end stage renal disease) on dialysis (CMS/[facility])  -     Case Request; Standing  -     Case Request    SSS (sick sinus syndrome) (CMS/[facility])    Pulmonary hypertension (CMS/[facility])    Essential hypertension    Mixed hyperlipidemia    Coronary artery disease involving native coronary artery of native heart without angina pectoris    Paroxysmal atrial fibrillation (CMS/[facility])    Panlobular emphysema (CMS/[facility])    Class 2 severe obesity due to excess calories with serious comorbidity and body mass index (BMI) of 39.0 to 39.9 in adult (CMS/[facility])    Type 2 diabetes mellitus with diabetic polyneuropathy, with long-term current use of insulin (CMS/[facility])    Other orders  -     Provide NPO Instructions to Patient; Future  -     Chlorhexidine Skin Prep; Future      PLAN:  Detailed discussion with Brendon Saleh Nafisa regarding situation and options.  ESRD on hemodialysis.  Long term AV access for dialysis is advisable.  vein mapping shows adequate vein for fistula.  Multiple risk factors with severe comorbidities    Risks including but not limited to infection, bleeding, blood vessel and nerve injury, swelling, reduced circulation to distal extremity, need for revision and repeat intervention to maintain access.  Benefits:   avoidance of catheter, long term access for dialysis.  Options:  catheter, fistula vs graft, peritoneal dialysis, renal transplantation.  Understands and wishes to proceed.    LEFT brachial artery to cephalic vein AV fistula  vascular ultrasound.  [facility].  SDS.  7/21/2020  On coumadin mechanical aortic valve, hold 3 days, lovenox bridge.    Return after above studies complete  Obesity Class  2. Increased risk cardiovascular events, sleep and breathing disorders, joint issues, type 2 diabetes mellitus. Options for weight management, heart healthy diet, exercise programs, and associated health risks of obesity discussed.  Recommended regular physical activity, progressive walking program.  Continue current medications as directed.  Will Obtain relevant old records.    Thank you for the opportunity to participate in this patient's care.    Copy to primary care provider.    EMR Dragon/Transcription disclaimer:   Much of this encounter note is an electronic transcription/translation of spoken language to printed text. The electronic translation of spoken language may permit erroneous, or at times, nonsensical words or phrases to be inadvertently transcribed; Although I have reviewed the note for such errors, some may still exist.

## 2020-07-06 NOTE — TELEPHONE ENCOUNTER
Notified pt that she had to have the covid testing in order to get her procedure done, we gave her 2 options on when to come for testing and she declined both. We gave her the option on 7-20-20 between 9am and 10 am and the day of her procedure.  Pt verbalized understanding that if she did not get her covid test we would have to reschedule for a later date. [facility]

## 2020-07-14 PROBLEM — J96.21 ACUTE ON CHRONIC RESPIRATORY FAILURE WITH HYPOXIA (HCC): Status: ACTIVE | Noted: 2020-01-01

## 2020-07-15 NOTE — PLAN OF CARE
Problem: Patient Care Overview  Goal: Plan of Care Review  Outcome: Ongoing (interventions implemented as appropriate)  Flowsheets  Taken 7/15/2020 0521  Progress: no change  Outcome Summary: pt admitted from ED, short of air at home, Rule out Covid 19. Pt ruled out for Covid 19, isolation discontinued. Pt hypoxic, remains on 6L of O2, baseline is 3L.  Taken 7/15/2020 0016  Plan of Care Reviewed With: patient

## 2020-07-15 NOTE — PLAN OF CARE
Problem: Patient Care Overview  Goal: Plan of Care Review  Outcome: Ongoing (interventions implemented as appropriate)  Flowsheets (Taken 7/15/2020 6173)  Progress: no change  Plan of Care Reviewed With: caregiver; patient  Outcome Summary: New assessment:  Provided education on Low sodium and Renal (for review)  Admitted with volume overload.  Will monitor.

## 2020-07-15 NOTE — H&P
[facility] HOSPITALIST HISTORY AND PHYSICAL    Patient Identification:  Name:  Brendon Skelton  Age:  74 y.o.  Sex:  female  :  1945  MRN:  [id number]   Visit Number:  [id number]  Primary Care Physician:  Josefa Pozo APRN     Chief complaint: Left chest wall pain x1 day.  Worsening shortness of breath with activity x1 week    History of presenting illness:  74 y.o. female with chronic medical problems which include COPD and chronic hypoxic respiratory failure on 3 L of oxygen nasal cannula, atrial fibrillation, end-stage renal disease on hemodialysis  and Friday presented to the emergency room complaining of worsening shortness of breath with activity.  According to patient when she is sitting down or lying down not doing anything she feels more comfortable.  As soon as she starts ambulating or doing any activity she finds it difficult to catch her breath.  She had a normal dialysis on Monday, 2020.  She denies any fever or chills.  She has not been coughing more than usual.  She denies orthopnea or PND.  This evening as she was trying to get out of the couch she had a brief episode of left-sided chest pain which she described as pressure-like 5/10 intensity and lasted for a few seconds.  The pain did not radiate.  It was not associated with nausea or diaphoresis.  Patient was concerned and brought to the emergency room.  She denied any exposure to any sick contact.  ---------------------------------------------------------------------------------------------------------------------   Review of Systems   Constitutional: Negative for chills and fever.   HENT: Negative.    Eyes: Negative.    Respiratory: Positive for shortness of breath. Negative for wheezing.    Cardiovascular: Positive for chest pain. Negative for palpitations and leg swelling.   Gastrointestinal: Negative.    Genitourinary: Negative.    Musculoskeletal: Negative.    Skin: Negative.       Neurological: Negative.    Psychiatric/Behavioral: Negative.       ---------------------------------------------------------------------------------------------------------------------   Past Medical History:   Diagnosis Date   • Anxiety    • Aortic valve replaced    • Atrial fibrillation (CMS/[facility])    • C. difficile colitis    • Chronic hypoxemic respiratory failure (CMS/[facility])    • COPD (chronic obstructive pulmonary disease) (CMS/[facility])    • Coronary artery disease    • Depressive disorder    • Diabetes mellitus (CMS/[facility])    • Diastolic heart failure (CMS/[facility])    • Gastrointestinal hemorrhage    • Hyperlipidemia    • Hypertension    • Long term current use of anticoagulant    • Malignant neoplasm of sigmoid colon (CMS/[facility])    • Pulmonary hypertension (CMS/[facility])    • Rectal hemorrhage    • Stage 4 chronic kidney disease (CMS/[facility])      Past Surgical History:   Procedure Laterality Date   • AORTIC VALVE REPAIR/REPLACEMENT     • CARDIAC ELECTROPHYSIOLOGY PROCEDURE N/A 3/20/2017   • CARDIAC PACEMAKER PLACEMENT     • CATARACT EXTRACTION W/ INTRAOCULAR LENS IMPLANT Left 2/1/2019   • CATARACT EXTRACTION W/ INTRAOCULAR LENS IMPLANT Right 2/8/2019   • COLON RESECTION Left 2/10/2020   • COLONOSCOPY N/A 6/19/2019   • COLONOSCOPY N/A 6/24/2019   • ENDOSCOPY N/A 6/19/2019   • INTERVENTIONAL RADIOLOGY PROCEDURE N/A 3/6/2020    Procedure: tunneled central venous catheter placement;  Surgeon: Polo Feliz MD;  Location: [facility] INVASIVE LOCATION;  Service: Interventional Radiology;  Laterality: N/A;   • PACEMAKER REPLACEMENT N/A 3/21/2017   • SIGMOIDOSCOPY N/A 9/30/2019   • UPPER GASTROINTESTINAL ENDOSCOPY       Family History   Problem Relation Age of Onset   • Hyperlipidemia Mother      Social History     Socioeconomic History   • Marital status:      Spouse name: Not on file   • Number of children: Not on file   • Years of education: Not on file   • Highest education level: Not on file   Social Needs   •  Financial resource strain: Not on file   • Food insecurity:     Worry: Never true     Inability: Never true   • Transportation needs:     Medical: No     Non-medical: No   Tobacco Use   • Smoking status: Former Smoker     Last attempt to quit: 3/21/1999     Years since quittin.3   • Smokeless tobacco: Never Used   Substance and Sexual Activity   • Alcohol use: No   • Drug use: No   • Sexual activity: Not Currently     ---------------------------------------------------------------------------------------------------------------------   Allergies:  Crestor [rosuvastatin calcium]; Lipitor [atorvastatin]; Lortab [hydrocodone-acetaminophen]; and Adhesive tape  ---------------------------------------------------------------------------------------------------------------------   Prior to Admission Medications     Prescriptions Last Dose Informant Patient Reported? Taking?    acetaminophen (TYLENOL) 325 MG tablet  Nursing Home Yes No    Take 650 mg by mouth Every 6 (Six) Hours As Needed for Mild Pain  or Fever.    albuterol (PROVENTIL) (2.5 MG/3ML) 0.083% nebulizer solution  Self Yes No    Take 2.5 mg by nebulization Every 4 (Four) Hours As Needed for Wheezing.    albuterol sulfate  (90 Base) MCG/ACT inhaler   No No    Inhale 2 puffs Every 4 (Four) Hours As Needed for Wheezing or Shortness of Air.    aspirin 81 MG chewable tablet  Self Yes No    Chew 81 mg Daily.    cholecalciferol (VITAMIN D3) 1000 units tablet  Self Yes No    Take 2,000 Units by mouth Daily.    citalopram (CeleXA) 20 MG tablet   No No    TAKE 1 TABLET BY MOUTH EVERY DAY    dilTIAZem (TIAZAC) 240 MG 24 hr capsule   No No    TAKE 1 CAPSULE BY MOUTH EVERY DAY    diltiaZEM CD (CARDIZEM CD) 240 MG 24 hr capsule   No No    Take 1 capsule by mouth Daily.    enoxaparin (Lovenox) 40 MG/0.4ML solution syringe   No No    Inject 0.4 mL under the skin into the appropriate area as directed Daily for 10 doses. Inject 0.4mL sq daily    ezetimibe (ZETIA) 10  MG tablet   Yes No    Take 10 mg by mouth Daily.    famotidine (PEPCID) 20 MG tablet   No No    TAKE 2 TABLETS BY MOUTH EVERY DAY    ferrous sulfate 325 (65 FE) MG tablet  Self Yes No    Take 325 mg by mouth Daily With Breakfast.    Fluticasone-Umeclidin-Vilant 100-62.5-25 MCG/INH aerosol powder    No No    Inhale 1 puff Daily.    glucose blood test strip   Yes No    1 each by Other route 3 (three) times a day. Use as instructed    insulin aspart (novoLOG) 100 UNIT/ML injection   No No    Inject 0-9 Units under the skin into the appropriate area as directed 4 (Four) Times a Day Before Meals & at Bedtime.    loperamide (IMODIUM) 2 MG capsule  Nursing Home Yes No    Take 2 mg by mouth 4 (Four) Times a Day As Needed for Diarrhea.    metOLazone (ZAROXOLYN) 2.5 MG tablet   No No    TAKE 1 TABLET BY MOUTH EVERY OTHER DAY    metoprolol succinate XL (TOPROL-XL) 25 MG 24 hr tablet   No No    TAKE 1 TABLET BY MOUTH EVERY DAY    ondansetron (ZOFRAN) 4 MG tablet   No No    Take 1 tablet by mouth Every 6 (Six) Hours As Needed for Nausea or Vomiting.    oxyCODONE (ROXICODONE) 10 MG tablet   No No    Take 1 tablet by mouth Every 6 (Six) Hours As Needed for Moderate Pain  or Severe Pain .    pantoprazole (PROTONIX) 40 MG EC tablet   No No    Take 1 tablet by mouth Daily.    Prenatal Vit-Fe Fumarate-FA (PRENATAL VITAMIN) 27-0.8 MG tablet  Self Yes No    Take 1 tablet by mouth daily.    rOPINIRole (REQUIP) 0.25 MG tablet   No No    TAKE 1 TABLET BY MOUTH EVERY NIGHT AT BEDTIME FOR RESTLESS LEGS    sucralfate (CARAFATE) 1 g tablet   No No    TAKE 1 TABLET BY MOUTH FOUR (4) TIMES DAILY    UNIFINE PENTIPS 31G X 6 MM misc   No No    USE 1 FOUR (4) TIMES DAILY WITH INSULIN    vitamin C (ASCORBIC ACID) 250 MG tablet  Nursing Home Yes No    Take 250 mg by mouth Daily.    warfarin (Coumadin) 2.5 MG tablet   No No    Take 1 tab nightly on Monday, Wednesday, Friday and Saturday or AS DIRECTED PER COUMADIN CLINIC    warfarin (Coumadin) 5 MG  tablet   No No    Take 1/2 tablet daily except on Thursdays take 1 tablet        ---------------------------------------------------------------------------------------------------------------------   Vital Signs:  Temp:  [98 °F (36.7 °C)] 98 °F (36.7 °C)  Heart Rate:  [64-76] 70  Resp:  [22-27] 22  BP: (140-172)/(60-80) 172/69      07/14/20  1907   Weight: 102 kg (225 lb)     Body mass index is 37.44 kg/m².  ---------------------------------------------------------------------------------------------------------------------   Physical Exam:  Constitutional:  Well-developed and well-nourished.  Mild respiratory distress.      HENT:  Head: Normocephalic and atraumatic.  Mouth:  Moist mucous membranes.    Eyes:  Conjunctivae and EOM are normal.  Pupils are equal, round, and reactive to light.  No scleral icterus.  Neck:  Neck supple.  No JVD present.    Cardiovascular:  Normal rate, irregular rhythm and normal heart sounds with no murmur except for valve click.  Chest: Right-sided anterior chest wall tunneled dialysis catheter.  Left sided pacer  Pulmonary: Mild tachypnea with pursed lip, no wheezes, few basal crackles, with diminished breath sounds and decreased air movement.  Abdominal: Obese soft.  Bowel sounds are normal.  No distension and no tenderness.   Musculoskeletal:  No edema, no tenderness, and no deformity.  No red or swollen joints anywhere.    Neurological:  Alert and oriented to person, place, and time.  No cranial nerve deficit.  No tongue deviation.  No facial droop.  No slurred speech.   Skin:  Skin is warm and dry.  No rash noted.  No pallor.  Multiple bruises  Psychiatric:  Normal mood and affect.  Behavior is normal.  Judgment and thought content normal.   Peripheral vascular:  No edema and strong pulses on all 4 extremities.  Genitourinary: Deferred  ---------------------------------------------------------------------------------------------------------------------  EKG:          ---------------------------------------------------------------------------------------------------------------------   Results from last 7 days   Lab Units 07/14/20 1942   CRP mg/dL 1.84*   LACTATE mmol/L 0.9   WBC 10*3/mm3 7.33   HEMOGLOBIN g/dL 9.7*   HEMATOCRIT % 29.6*   MCV fL 103.1*   MCHC g/dL 32.8   PLATELETS 10*3/mm3 168   INR  4.67*         Results from last 7 days   Lab Units 07/14/20 1942   SODIUM mmol/L 137   POTASSIUM mmol/L 4.7   MAGNESIUM mg/dL 2.1   CHLORIDE mmol/L 99   CO2 mmol/L 27.0   BUN mg/dL 44*   CREATININE mg/dL 3.95*   EGFR IF NONAFRICN AM mL/min/1.73 11*   CALCIUM mg/dL 9.5   GLUCOSE mg/dL 62*   ALBUMIN g/dL 3.40*   BILIRUBIN mg/dL 0.5   ALK PHOS U/L 85   AST (SGOT) U/L 25   ALT (SGPT) U/L 17   Estimated Creatinine Clearance: 14.8 mL/min (A) (by C-G formula based on SCr of 3.95 mg/dL (H)).  No results found for: AMMONIA  Results from last 7 days   Lab Units 07/14/20 1942   TROPONIN T ng/mL 0.021     Results from last 7 days   Lab Units 07/14/20 1942   PROBNP pg/mL 28,929.0*     Lab Results   Component Value Date    HGBA1C 5.10 01/28/2020     Lab Results   Component Value Date    TSH 1.730 01/28/2020    FREET4 1.22 09/14/2017     No results found for: PREGTESTUR, PREGSERUM, HCG, HCGQUANT  Pain Management Panel     Pain Management Panel Latest Ref Rng & Units 2/11/2020 1/8/2018    CREATININE UR mg/dL 93.6 96.3        No results found for: BLOODCX  No results found for: URINECX  No results found for: WOUNDCX  No results found for: STOOLCX        I have personally looked at the labs and they are stated above.  ---------------------------------------------------------------------------------------------------------------------  Imaging Results (Last 7 Days)     Procedure Component Value Units Date/Time    XR Chest AP [[id number]] Collected:  07/14/20 1932     Updated:  07/14/20 2012    Narrative:       PROCEDURE: XR CHEST AP    VIEWS:Single    INDICATION: Cough, fever    COMPARISON: CXR:  3/9/2020    FINDINGS:       - lines/tubes: Right-sided dual-lumen central venous catheter  terminates at the cavoatrial junction. Left-sided pacemaker is in  stable position.    - cardiac: Borderline size.    - mediastinum: Contour within normal limits.     - lungs: There is interstitial prominence and indistinctness,  an ill-defined bibasilar opacities, right greater than left..     - pleura: Blunting of bilateral costophrenic angles, right  greater than left.      - osseous: No acute abnormality identified.      Impression:       Interstitial prominence and indistinctness, which could represent  pneumonia or edema. There is ill-defined opacification in  bilateral lung bases, right greater than left, which may  represent atelectasis, consolidation, effusion, asymmetric edema,  or any combination thereof.          Electronically signed by:  Yeimy Vuong MD  7/14/2020 8:11 PM CDT  Workstation: [id number]          I have personally reviewed the radiology images and read the final radiology report.  ---------------------------------------------------------------------------------------------------------------------  Assessment:  Acute on chronic respiratory failure with hypoxia etiology is multifactorial -COPD, diastolic congestive heart failure, fluid overload or possible angina equivalent.  Uncontrolled hypertension  End-stage renal disease on hemodialysis  COPD does not seem to be in exacerbation  Diabetes mellitus presenting with hypoglycemia  Chronic diastolic heart failure  Chronic atrial fibrillation  Supratherapeutic INR  Anxiety/depression  Suspected COVID-19 infection    Plan:  Inpatient admission.  Diet: Cardiac/carbohydrate/renal  Continue on oxygen nasal cannula titrate to keep oxygen saturation between 88 and 92%  Albuterol inhaler every 6 hours PRN for shortness of breath  Check serial troponin  2D echo  Nephrology consult  Follow-up with the COVID-19 resolved.  Meanwhile patient will be in droplet  isolation  Resume patient's home medication  Patient received antibiotics in the emergency room.  I do not think this patient has pneumonia so I will not continue with any antibiotics for now.  We will monitor her progress with the current treatment.  We will adjust her insulin to avoid further hypoglycemia  Hold Coumadin for today  We will check PT/INR in a.m.    Prophylaxis:  DVT-currently on Coumadin    Disposition:  Inpatient admission  She is full code.  Patient does not have any designated surrogate decision maker.  Patient condition at time of admission is fair  Plan of care was discussed with the patient.  Anticipate patient will be in hospital 2 to 3 midnights.        Markel Fountain MD  07/14/20  22:58     Dragon disclaimer:  Part of this note may be an electronic transcription/translation of spoken language to printed text using the Dragon Dictation System.

## 2020-07-15 NOTE — ED PROVIDER NOTES
Subjective   74 years old female with history of coronary artery disease status post CABG, aortic valve replacement on Coumadin, hypertension, hyperlipidemia, end-stage renal disease on dialysis, atrial fibrillation presented in the ER with chief complaint of sudden onset substernal chest pain, moderate intensity, sharp in nature without radiation/aggravating relieving factors and improved on the way to the ER.  Patient oxygen saturation was in upper 70s despite being on 3 L which she normally uses.  Currently patient is on 5 L but still having some trouble breathing.  Denies any fever chills but has mild nonproductive cough.  Denies any sick contact.      History provided by:  Patient  Shortness of Breath   Severity:  Moderate  Onset quality:  Sudden  Timing:  Constant  Progression:  Waxing and waning  Chronicity:  New  Relieved by:  Nothing  Worsened by:  Nothing  Associated symptoms: chest pain, cough and wheezing    Associated symptoms: no abdominal pain, no fever, no headaches, no neck pain, no PND, no rash, no sore throat and no sputum production        Review of Systems   Constitutional: Negative for fever.   HENT: Negative for congestion, postnasal drip, rhinorrhea, sinus pressure, sinus pain and sore throat.    Respiratory: Positive for cough, chest tightness, shortness of breath and wheezing. Negative for sputum production.    Cardiovascular: Positive for chest pain. Negative for PND.   Gastrointestinal: Negative for abdominal pain.   Genitourinary: Negative for flank pain.   Musculoskeletal: Negative for neck pain.   Skin: Negative for rash.   Neurological: Negative for syncope and headaches.   Psychiatric/Behavioral: Negative for agitation.       Past Medical History:   Diagnosis Date   • Anxiety    • Aortic valve replaced    • Atrial fibrillation (CMS/[facility])    • C. difficile colitis    • Chronic hypoxemic respiratory failure (CMS/[facility])    • COPD (chronic obstructive pulmonary disease) (CMS/[facility])    •  "Coronary artery disease    • Depressive disorder    • Diabetes mellitus (CMS/HCC)    • Diastolic heart failure (CMS/HCC)    • Gastrointestinal hemorrhage    • Hyperlipidemia    • Hypertension    • Long term current use of anticoagulant    • Malignant neoplasm of sigmoid colon (CMS/HCC)    • Pulmonary hypertension (CMS/HCC)    • Rectal hemorrhage    • Stage 4 chronic kidney disease (CMS/HCC)        Allergies   Allergen Reactions   • Crestor [Rosuvastatin Calcium] Other (See Comments)     \"hurts my liver\"   • Lipitor [Atorvastatin] Other (See Comments)     \"hurt my liver\"   • Lortab [Hydrocodone-Acetaminophen] Hallucinations   • Adhesive Tape Other (See Comments)     Paper tape       Past Surgical History:   Procedure Laterality Date   • AORTIC VALVE REPAIR/REPLACEMENT     • CARDIAC ELECTROPHYSIOLOGY PROCEDURE N/A 3/20/2017   • CARDIAC PACEMAKER PLACEMENT     • CATARACT EXTRACTION W/ INTRAOCULAR LENS IMPLANT Left 2/1/2019   • CATARACT EXTRACTION W/ INTRAOCULAR LENS IMPLANT Right 2/8/2019   • COLON RESECTION Left 2/10/2020   • COLONOSCOPY N/A 6/19/2019   • COLONOSCOPY N/A 6/24/2019   • ENDOSCOPY N/A 6/19/2019   • INTERVENTIONAL RADIOLOGY PROCEDURE N/A 3/6/2020    Procedure: tunneled central venous catheter placement;  Surgeon: Polo Feliz MD;  Location: [facility] INVASIVE LOCATION;  Service: Interventional Radiology;  Laterality: N/A;   • PACEMAKER REPLACEMENT N/A 3/21/2017   • SIGMOIDOSCOPY N/A 9/30/2019   • UPPER GASTROINTESTINAL ENDOSCOPY         Family History   Problem Relation Age of Onset   • Hyperlipidemia Mother        Social History     Socioeconomic History   • Marital status:      Spouse name: Not on file   • Number of children: Not on file   • Years of education: Not on file   • Highest education level: Not on file   Social Needs   • Financial resource strain: Not on file   • Food insecurity:     Worry: Never true     Inability: Never true   • Transportation needs:     Medical: No    "  Non-medical: No   Tobacco Use   • Smoking status: Former Smoker     Last attempt to quit: 3/21/1999     Years since quittin.3   • Smokeless tobacco: Never Used   Substance and Sexual Activity   • Alcohol use: No   • Drug use: No   • Sexual activity: Not Currently           Objective   Physical Exam   Constitutional: She is oriented to person, place, and time. She appears well-developed and well-nourished.   HENT:   Head: Atraumatic.   Mouth/Throat: Oropharynx is clear and moist.   Eyes: EOM are normal.   Neck: Normal range of motion. Neck supple.   Cardiovascular: Normal rate and regular rhythm.   Pulmonary/Chest: Effort normal. She has decreased breath sounds. She has wheezes.   Abdominal: Soft. Bowel sounds are normal. There is no tenderness.   Musculoskeletal: Normal range of motion.        Right lower leg: Normal.        Left lower leg: Normal.   Neurological: She is alert and oriented to person, place, and time.   Skin: Skin is warm and dry. Capillary refill takes less than 2 seconds.   Psychiatric: She has a normal mood and affect.   Nursing note and vitals reviewed.      ECG 12 Lead    Date/Time: 2020 7:55 PM  Performed by: Rome Ramsey MD  Authorized by: Rome Ramsey MD   Interpreted by physician  Rhythm: atrial fibrillation  Rate: normal  BPM: 75  QRS axis: normal  Clinical impression: abnormal ECG  Comments: Anterolateral Q waves                 ED Course                                           MDM  Number of Diagnoses or Management Options  Acute on chronic congestive heart failure, unspecified heart failure type (CMS/HCC):   Acute on chronic respiratory failure with hypoxia (CMS/HCC):   COVID-19 ruled out:   Pneumonia due to infectious organism, unspecified laterality, unspecified part of lung:   Diagnosis management comments: 74 years old is evaluated for chest pain shortness of breath.  Patient was hypoxic on presentation, given albuterol inhaler and currently on 5 L oxygen with sats  around 90%.  She has normal white count, chemistries consistent with end-stage renal failure.  Chest x-ray showed some congestion and bilateral pneumonia.  Started on antibiotics.  Part of her shortness of breath is secondary to fluid overload.  Patient is feeling better on reevaluation.  Discussed with  patient is being admitted.  COVID 19 testing is pending.       Amount and/or Complexity of Data Reviewed  Clinical lab tests: ordered and reviewed  Tests in the radiology section of CPT®: ordered and reviewed  Discuss the patient with other providers: yes      Labs Reviewed   PROTIME-INR - Abnormal; Notable for the following components:       Result Value    Protime 47.6 (*)     INR 4.67 (*)     All other components within normal limits    Narrative:     Therapeutic range for most indications is 2.0-3.0 INR,  or 2.5-3.5 for mechanical heart valves.   COMPREHENSIVE METABOLIC PANEL - Abnormal; Notable for the following components:    Glucose 62 (*)     BUN 44 (*)     Creatinine 3.95 (*)     Albumin 3.40 (*)     eGFR Non  Amer 11 (*)     All other components within normal limits    Narrative:     GFR Normal >60  Chronic Kidney Disease <60  Kidney Failure <15     LACTATE DEHYDROGENASE - Abnormal; Notable for the following components:     (*)     All other components within normal limits   C-REACTIVE PROTEIN - Abnormal; Notable for the following components:    C-Reactive Protein 1.84 (*)     All other components within normal limits   BNP (IN-HOUSE) - Abnormal; Notable for the following components:    proBNP 28,929.0 (*)     All other components within normal limits    Narrative:     Among patients with dyspnea, NT-proBNP is highly sensitive for the detection of acute congestive heart failure. In addition NT-proBNP of <300 pg/ml effectively rules out acute congestive heart failure with 99% negative predictive value.    Results may be falsely decreased if patient taking Biotin.     CBC WITH  "AUTO DIFFERENTIAL - Abnormal; Notable for the following components:    RBC 2.87 (*)     Hemoglobin 9.7 (*)     Hematocrit 29.6 (*)     .1 (*)     MCH 33.8 (*)     RDW 18.8 (*)     RDW-SD 69.8 (*)     Neutrophil % 86.1 (*)     Lymphocyte % 3.7 (*)     Lymphocytes, Absolute 0.27 (*)     nRBC 0.3 (*)     All other components within normal limits   RESPIRATORY PANEL, PCR - Normal    Narrative:     The coronavirus on the RVP is NOT COVID-19 and is NOT indicative of infection with COVID-19.    PROCALCITONIN - Normal    Narrative:     As a Marker for Sepsis (Non-Neonates):   1. <0.5 ng/mL represents a low risk of severe sepsis and/or septic shock.  1. >2 ng/mL represents a high risk of severe sepsis and/or septic shock.    As a Marker for Lower Respiratory Tract Infections that require antibiotic therapy:  PCT on Admission     Antibiotic Therapy             6-12 Hrs later  > 0.5                Strongly Recommended            >0.25 - <0.5         Recommended  0.1 - 0.25           Discouraged                   Remeasure/reassess PCT  <0.1                 Strongly Discouraged          Remeasure/reassess PCT      As 28 day mortality risk marker: \"Change in Procalcitonin Result\" (> 80 % or <=80 %) if Day 0 (or Day 1) and Day 4 values are available. Refer to http://www.[employer]-pct-calculator.com/   Change in PCT <=80 %   A decrease of PCT levels below or equal to 80 % defines a positive change in PCT test result representing a higher risk for 28-day all-cause mortality of patients diagnosed with severe sepsis or septic shock.  Change in PCT > 80 %   A decrease of PCT levels of more than 80 % defines a negative change in PCT result representing a lower risk for 28-day all-cause mortality of patients diagnosed with severe sepsis or septic shock.                Results may be falsely decreased if patient taking Biotin.    LACTIC ACID, PLASMA - Normal   TROPONIN (IN-HOUSE) - Normal    Narrative:     Troponin T Reference " Range:  <= 0.03 ng/mL-   Negative for AMI  >0.03 ng/mL-     Abnormal for myocardial necrosis.  Clinicians would have to utilize clinical acumen, EKG, Troponin and serial changes to determine if it is an Acute Myocardial Infarction or myocardial injury due to an underlying chronic condition.       Results may be falsely decreased if patient taking Biotin.     MAGNESIUM - Normal   COVID-19,[facility] IN-HOUSE, NP SWAB IN TRANSPORT MEDIA 8-10 HR TAT   BLOOD CULTURE   BLOOD CULTURE   TROPONIN (IN-HOUSE)   CBC AND DIFFERENTIAL    Narrative:     The following orders were created for panel order CBC & Differential.  Procedure                               Abnormality         Status                     ---------                               -----------         ------                     CBC Auto Differential[[id number]]        Abnormal            Final result                 Please view results for these tests on the individual orders.   EXTRA TUBES    Narrative:     The following orders were created for panel order Extra Tubes.  Procedure                               Abnormality         Status                     ---------                               -----------         ------                     Light Blue Top[[id number]]                                   Final result               Lavender Top[[id number]]                                     Final result               Gold Top - SST[[id number]]                                   Final result               Green Top (Gel)[[id number]]                                  Final result                 Please view results for these tests on the individual orders.   LIGHT BLUE TOP   LAVENDER TOP   GOLD TOP - SST   GREEN TOP       Xr Chest Ap    Result Date: 7/14/2020  Narrative: PROCEDURE: XR CHEST AP VIEWS:Single INDICATION: Cough, fever COMPARISON: CXR: 3/9/2020 FINDINGS:   - lines/tubes: Right-sided dual-lumen central venous catheter terminates at the cavoatrial junction. Left-sided  pacemaker is in stable position.   - cardiac: Borderline size.   - mediastinum: Contour within normal limits.   - lungs: There is interstitial prominence and indistinctness, an ill-defined bibasilar opacities, right greater than left..   - pleura: Blunting of bilateral costophrenic angles, right greater than left.    - osseous: No acute abnormality identified.     Impression: Interstitial prominence and indistinctness, which could represent pneumonia or edema. There is ill-defined opacification in bilateral lung bases, right greater than left, which may represent atelectasis, consolidation, effusion, asymmetric edema, or any combination thereof. Electronically signed by:  Yeimy Vuong MD  7/14/2020 8:11 PM CDT Workstation: [id number]          Final diagnoses:   Acute on chronic respiratory failure with hypoxia (CMS/HCC)   Pneumonia due to infectious organism, unspecified laterality, unspecified part of lung   Acute on chronic congestive heart failure, unspecified heart failure type (CMS/HCC)   COVID-19 ruled out            Rome Ramsey MD  07/14/20 5455

## 2020-07-15 NOTE — PROGRESS NOTES
[facility] Medicine Services  INPATIENT PROGRESS NOTE    Length of Stay: 1  Date of Admission: 7/14/2020  Primary Care Physician: Josefa Pozo APRN    Subjective   Chief Complaint: Shortness of air.    HPI: Patient is seen for follow-up.  Patient is 74-year-old female with history of end-stage renal disease (on hemodialysis Mondays, Wednesdays and Fridays), chronic atrial fibrillation, O2 dependent COPD/chronic hypoxic respiratory failure, diabetes mellitus, hypertension, chronic pain syndrome, CHF who was admitted for acute on chronic respiratory failure with hypoxia, supratherapeutic INR and uncontrolled hypertension.  Patient remains short of air and on supplemental oxygen of 6 L nasal prongs with saturation in the low 90s (at baseline she is on 3 L of supplemental oxygen at home chronically).    Review of Systems   Constitutional: Positive for activity change and fatigue. Negative for appetite change, chills, diaphoresis and fever.   HENT: Negative for trouble swallowing and voice change.    Eyes: Negative for photophobia and visual disturbance.   Respiratory: Positive for cough and shortness of breath. Negative for choking, chest tightness, wheezing and stridor.    Cardiovascular: Negative for chest pain, palpitations and leg swelling.   Gastrointestinal: Negative for abdominal distention, abdominal pain, blood in stool, constipation, diarrhea, nausea and vomiting.   Endocrine: Negative for cold intolerance, heat intolerance, polydipsia, polyphagia and polyuria.   Genitourinary: Negative for decreased urine volume, difficulty urinating, dysuria, enuresis, flank pain, frequency, hematuria and urgency.   Musculoskeletal: Negative for arthralgias, gait problem, myalgias, neck pain and neck stiffness.   Skin: Negative for pallor, rash and wound.   Neurological: Negative for dizziness, tremors, seizures, syncope, facial asymmetry, speech difficulty, weakness,  light-headedness, numbness and headaches.   Hematological: Does not bruise/bleed easily.   Psychiatric/Behavioral: Negative for agitation, behavioral problems and confusion.       Objective    Temp:  [97.3 °F (36.3 °C)-98 °F (36.7 °C)] 97.4 °F (36.3 °C)  Heart Rate:  [64-76] 70  Resp:  [20-27] 20  BP: (136-199)/(53-80) 180/77    Physical Exam   Constitutional: She is oriented to person, place, and time. She appears well-developed and well-nourished. She is cooperative. No distress.   HENT:   Head: Normocephalic and atraumatic.   Right Ear: External ear normal.   Left Ear: External ear normal.   Nose: Nose normal.   Mouth/Throat: Oropharynx is clear and moist.   Eyes: Pupils are equal, round, and reactive to light. Conjunctivae and EOM are normal.   Neck: Normal range of motion. Neck supple. No JVD present. No thyromegaly present.   Cardiovascular: Normal rate, normal heart sounds and intact distal pulses. An irregularly irregular rhythm present. Exam reveals no gallop and no friction rub.   No murmur heard.  Pulmonary/Chest: Effort normal. No stridor. No respiratory distress. She has decreased breath sounds. She has no wheezes. She has rhonchi. She has rales. She exhibits no tenderness.   Abdominal: Soft. Bowel sounds are normal. She exhibits no distension and no mass. There is no tenderness. There is no rebound and no guarding. No hernia.   Musculoskeletal: Normal range of motion. She exhibits no edema, tenderness or deformity.   Neurological: She is alert and oriented to person, place, and time. She has normal reflexes.   Skin: Skin is warm and dry. No rash noted. She is not diaphoretic. No erythema. No pallor.   Psychiatric: She has a normal mood and affect. Her behavior is normal. Judgment and thought content normal.   Nursing note and vitals reviewed.        Medication Review:    Current Facility-Administered Medications:   •  albuterol sulfate HFA (PROVENTIL HFA;VENTOLIN HFA;PROAIR HFA) inhaler 4 puff, 4 puff,  Inhalation, 4x Daily - RT, Rome Ramsey MD, 4 puff at 07/14/20 2139  •  sodium chloride 0.9 % flush 10 mL, 10 mL, Intravenous, Q12H, Markel Fountain MD, 10 mL at 07/15/20 0810  •  sodium chloride 0.9 % flush 10 mL, 10 mL, Intravenous, PRN, Markel Fountain MD    Results Review:  I have reviewed the labs, radiology results, and diagnostic studies.    Laboratory Data:   Results from last 7 days   Lab Units 07/15/20  0313 07/14/20  1942   SODIUM mmol/L 135* 137   POTASSIUM mmol/L 4.3 4.7   CHLORIDE mmol/L 98 99   CO2 mmol/L 28.0 27.0   BUN mg/dL 49* 44*   CREATININE mg/dL 4.33* 3.95*   GLUCOSE mg/dL 111* 62*   CALCIUM mg/dL 9.4 9.5   BILIRUBIN mg/dL  --  0.5   ALK PHOS U/L  --  85   ALT (SGPT) U/L  --  17   AST (SGOT) U/L  --  25   ANION GAP mmol/L 9.0 11.0     Estimated Creatinine Clearance: 13.4 mL/min (A) (by C-G formula based on SCr of 4.33 mg/dL (H)).  Results from last 7 days   Lab Units 07/14/20 1942   MAGNESIUM mg/dL 2.1         Results from last 7 days   Lab Units 07/15/20  0313 07/14/20  1942   WBC 10*3/mm3 7.47 7.33   HEMOGLOBIN g/dL 9.4* 9.7*   HEMATOCRIT % 29.2* 29.6*   PLATELETS 10*3/mm3 169 168     Results from last 7 days   Lab Units 07/15/20  0313 07/14/20  1942   INR  5.08* 4.67*       Culture Data:   No results found for: BLOODCX  No results found for: URINECX  No results found for: RESPCX  No results found for: WOUNDCX  No results found for: STOOLCX  No components found for: BODYFLD    Radiology Data:   Imaging Results (Last 24 Hours)     Procedure Component Value Units Date/Time    XR Chest AP [[id number]] Collected:  07/14/20 1932     Updated:  07/14/20 2012    Narrative:       PROCEDURE: XR CHEST AP    VIEWS:Single    INDICATION: Cough, fever    COMPARISON: CXR: 3/9/2020    FINDINGS:       - lines/tubes: Right-sided dual-lumen central venous catheter  terminates at the cavoatrial junction. Left-sided pacemaker is in  stable position.    - cardiac: Borderline  size.    - mediastinum: Contour within normal limits.     - lungs: There is interstitial prominence and indistinctness,  an ill-defined bibasilar opacities, right greater than left..     - pleura: Blunting of bilateral costophrenic angles, right  greater than left.      - osseous: No acute abnormality identified.      Impression:       Interstitial prominence and indistinctness, which could represent  pneumonia or edema. There is ill-defined opacification in  bilateral lung bases, right greater than left, which may  represent atelectasis, consolidation, effusion, asymmetric edema,  or any combination thereof.          Electronically signed by:  Yeimy Vuong MD  7/14/2020 8:11 PM CDT  Workstation: [id number]          I have reviewed the patient's current medications.     Assessment/Plan     Hospital Problem List:  Active Problems:    Acute on chronic respiratory failure with hypoxia (CMS/HCC)    Acute on chronic respiratory failure with hypoxia (multifactorial arising from end-stage renal disease with fluid overload, heart failure decompensation and possible pneumonia): Continue noninvasive respiratory therapy, bronchodilators and antimicrobial therapy. Respiratory PCR is unremarkable and blood culture results are pending.      End-stage renal disease: Patient is scheduled for hemodialysis today.  Nephrology has been consulted.    Acute on chronic heart failure with preserved ejection fraction: Continue Zaroxolyn with strict I's and O's, daily weights, salt and fluid restriction.  Patient is scheduled for hemodialysis today.  Repeat chest x-ray in 1 to 2 days.  Echocardiogram is pending.  Previous echocardiogram done 6/18/2019 showed:  · Left atrial cavity size is moderately dilated.  · The left ventricular cavity is mildly dilated.  · Left ventricular wall thickness is consistent with borderline concentric hypertrophy.  · Mild mitral valve regurgitation is present  · Mild to moderate tricuspid valve regurgitation is  present  Estimated ejection fraction is between 46 to 50%.    Chronic atrial fibrillation: Patient is in controlled ventricular response.  Continue rate control with Cardizem and metoprolol.  Warfarin is on hold secondary to elevated INR.  Continue to monitor INR.  She may need vitamin K if the need arises.    Hypertension: Blood pressure is poorly controlled.  Continue Cardizem, Lopressor and add PRN hydralazine.  Blood pressure will likely improve post hemodialysis.    Diabetes mellitus: Stable.  Continue anti-glycemic with Accu-Cheks and sliding scale insulin.    Depressive disorder: Continue citalopram.    Anemia of chronic inflammation: Hemoglobin is stable.  Continue iron supplementation.    Chronic pain syndrome: Continue pain control.    Restless leg syndrome: Continue Requip.    Continue PPI for GERD.    Discharge Planning: In progress.    Dudley Alfredo MD   07/15/20   10:25

## 2020-07-15 NOTE — PROGRESS NOTES
Discharge Planning Assessment  [facility]     Patient Name: Brendon Skelton  MRN: [id number]  Today's Date: 7/15/2020    Admit Date: 7/14/2020    Discharge Needs Assessment     Row Name 07/15/20 1420       Discharge Needs Assessment    Equipment Needed After Discharge  none    Outpatient/Agency/Support Group Needs  homecare agency    Discharge Facility/Level of Care Needs  home with home health    Patient's Choice of Community Agency(s)  [facility]    Row Name 07/15/20 1418       Living Environment    Lives With  alone    Primary Care Provided by  self    Provides Primary Care For  no one    Family Caregiver if Needed  child(leona), adult    Quality of Family Relationships  supportive    Able to Return to Prior Arrangements  yes        Discharge Plan     Row Name 07/15/20 1420       Plan    Plan  Home with [facility]    Patient/Family in Agreement with Plan  yes    Plan Comments  LACE complete. Lives at home alone. Plans to return home at discharge. Would like to have  services. Has used [facility] before and would like to have them again. Dialysis patient with [facility] and has PACS transportation for dialysis MWF. has home O2 with [employer]. She stated her son will transport her home on discharge.Denies other needs at this time. HarshadRN,, CM        Destination      Coordination has not been started for this encounter.      Durable Medical Equipment      Coordination has not been started for this encounter.      Dialysis/Infusion      Coordination has not been started for this encounter.      Home Medical Care      Coordination has not been started for this encounter.      Therapy      Coordination has not been started for this encounter.      Community Resources      Coordination has not been started for this encounter.        Expected Discharge Date and Time     Expected Discharge Date Expected Discharge Time    Jul 18, 2020         Demographic Summary     Row Name 07/15/20 1412       General Information     Admission Type  inpatient    Arrived From  emergency department    Required Notices Provided  Important Message from Medicare        Functional Status     Row Name 07/15/20 1418       Functional Status    Usual Activity Tolerance  moderate    Current Activity Tolerance  moderate       Functional Status, IADL    Medications  independent    Meal Preparation  independent    Housekeeping  independent    Laundry  independent    Shopping  assistive person        Psychosocial    No documentation.       Abuse/Neglect    No documentation.       Legal    No documentation.       Substance Abuse    No documentation.       Patient Forms    No documentation.           Carly Will RN

## 2020-07-15 NOTE — PLAN OF CARE
Problem: Patient Care Overview  Goal: Plan of Care Review  Outcome: Ongoing (interventions implemented as appropriate)  Flowsheets  Taken 7/15/2020 1454 by Yessenia Palomino RN  Progress: no change  Outcome Summary: vss, abx ordred, SOA improving per patient, dialysis today  Taken 7/15/2020 1348 by Esther Alcala RD  Plan of Care Reviewed With: caregiver;patient

## 2020-07-15 NOTE — CONSULTS
"Adult Nutrition  Assessment    Patient Name:  Brendon Skelton  YOB: 1945  MRN: [id number]  Admit Date:  7/14/2020    Assessment Date:  7/15/2020    Comments: Pt admitted with fluid overload with A/C Resp failure with hx of ESRD; CHF; and COPD.  She reports a good appetite.  Follows a Renal/Low Sodium diet at home.  She seems to have good understanding of the Low Sodium/Renal guidelines.  Provided diet copies and contact number.      Reason for Assessment     Row Name 07/15/20 1845          Reason for Assessment    Reason For Assessment  per organizational policy     Diagnosis  cardiac disease;renal disease     Identified At Risk by Screening Criteria  need for education         Nutrition/Diet History     Row Name 07/15/20 1333          Nutrition/Diet History    Typical Food/Fluid Intake  Pt reports a good appetite and was eating well pta.  She reports that she does not use salt in her diet and follows a renal diet.  Her daughter does the cooking and \"she makes foods separate for me.\"           Anthropometrics     Row Name 07/15/20 0700          Anthropometrics    Weight  101 kg (222 lb 0.1 oz)         Labs/Tests/Procedures/Meds     Row Name 07/15/20 1340          Labs/Procedures/Meds    Lab Results Reviewed  reviewed, pertinent     Lab Results Comments  Na 135; Bun 49; Creat 4.33; Gluc 111; crp 1.84; Alb 3.4        Diagnostic Tests/Procedures    Diagnostic Test/Procedure Reviewed  reviewed, pertinent     Diagnostic Test/Procedures Comments  Dialysis; O2        Medications    Pertinent Medications Reviewed  reviewed, pertinent     Pertinent Medications Comments  Abx; ferrous sulfate         Physical Findings     Row Name 07/15/20 1341          Physical Findings    Overall Physical Appearance  on oxygen therapy;overweight         Estimated/Assessed Needs     Row Name 07/15/20 1342          Calculation Measurements    Weight Used For Calculations  101 kg (222 lb)        Estimated/Assessed Needs    " Additional Documentation  Additional Requirements (Group);Fluid Requirements (Group);[place] Jeor Equation (Group);Protein Requirements (Group);Calorie Requirements (Group);KCAL/KG (Group)        Calorie Requirements    Estimated Calorie Need Method  [place] Jeor     Estimated Calorie Requirement Comment  1600 (for moderate wt loss due to BMI of 36.94)        [place]-St. Jeor Equation    RMR ([place]. Jeor Equation)  1507.865     [place]. Jeor Activity Factors  1.4 - 1.5     Activity Factors ([place]-St. Jeor)  2111.011 - 2261.7983        Protein Requirements    Weight Used For Protein Calculations  101 kg (222 lb)     Est Protein Requirement Amount (gms/kg)  1.0 gm protein     Estimated Protein Requirements (gms/day)  100.7        Fluid Requirements    Estimated Fluid Requirements (mL/day)  1200 Dialysis     RDA Method (mL)  1200         Nutrition Prescription Ordered     Row Name 07/15/20 1343          Nutrition Prescription PO    Current PO Diet  Regular     Common Modifiers  Cardiac;Consistent Carbohydrate;Renal         Evaluation of Received Nutrient/Fluid Intake     Row Name 07/15/20 1344          PO Evaluation    Number of Days PO Intake Evaluated  Insufficient Data               Electronically signed by:  Esther Alcala RD  07/15/20 13:50

## 2020-07-15 NOTE — CONSULTS
[facility]  [address] [place] [address]. [address]   - [phone]  F  [phone]     Consultation         PATIENT  DEMOGRAPHICS   PATIENT NAME: Brendon Skelton                      PHYSICIAN: Dudley Cruz MD  : 1945  MRN: [id number]    Subjective   SUBJECTIVE   Referring Provider: Dr Fountain  Reason for Consultation: HD  History of present illness:  Patient was admitted for Acute on Chronic heart Failure. Patient is on 3L of oxygen at home. Currently requires 6l to maintain saturation. Pt came in with soa which is sub acute in onset. Mild cough but no fever. No leg edema. Chest pain x 1 but not persistent. She did go for her dialysis on Monday. She is on hd since 2020. Her Uo is low now.      Hx of COPD, a fib, ESRD on HD.     Today, patient was found resting in bed, in no acute distress.     Past Medical History:   Diagnosis Date   • Anxiety    • Aortic valve replaced    • Atrial fibrillation (CMS/HCC)    • C. difficile colitis    • Chronic hypoxemic respiratory failure (CMS/HCC)    • COPD (chronic obstructive pulmonary disease) (CMS/HCC)    • Coronary artery disease    • Depressive disorder    • Diabetes mellitus (CMS/HCC)    • Diastolic heart failure (CMS/HCC)    • Gastrointestinal hemorrhage    • Hyperlipidemia    • Hypertension    • Long term current use of anticoagulant    • Malignant neoplasm of sigmoid colon (CMS/HCC)    • Pulmonary hypertension (CMS/HCC)    • Rectal hemorrhage    • Stage 4 chronic kidney disease (CMS/HCC)      Past Surgical History:   Procedure Laterality Date   • AORTIC VALVE REPAIR/REPLACEMENT     • CARDIAC ELECTROPHYSIOLOGY PROCEDURE N/A 3/20/2017   • CARDIAC PACEMAKER PLACEMENT     • CATARACT EXTRACTION W/ INTRAOCULAR LENS IMPLANT Left 2019   • CATARACT EXTRACTION W/ INTRAOCULAR LENS IMPLANT Right 2019   • COLON RESECTION Left 2/10/2020   • COLONOSCOPY N/A 2019   • COLONOSCOPY N/A 2019   • ENDOSCOPY N/A 2019    • INTERVENTIONAL RADIOLOGY PROCEDURE N/A 3/6/2020    Procedure: tunneled central venous catheter placement;  Surgeon: Polo Feliz MD;  Location: [facility];  Service: Interventional Radiology;  Laterality: N/A;   • PACEMAKER REPLACEMENT N/A 3/21/2017   • SIGMOIDOSCOPY N/A 2019   • UPPER GASTROINTESTINAL ENDOSCOPY       Family History   Problem Relation Age of Onset   • Hyperlipidemia Mother      Social History     Tobacco Use   • Smoking status: Former Smoker     Last attempt to quit: 3/21/1999     Years since quittin.3   • Smokeless tobacco: Never Used   Substance Use Topics   • Alcohol use: No   • Drug use: No     Allergies:  Crestor [rosuvastatin calcium]; Lipitor [atorvastatin]; Lortab [hydrocodone-acetaminophen]; and Adhesive tape     REVIEW OF SYSTEMS    Review of Systems   Constitutional: Negative for activity change, appetite change, chills, fatigue and fever.   HENT: Negative for congestion, hearing loss, sinus pressure, sinus pain, sneezing, sore throat and trouble swallowing.    Eyes: Negative for pain, discharge and itching.   Respiratory: Positive for shortness of breath. Negative for apnea, cough, choking, chest tightness, wheezing and stridor.    Cardiovascular: Negative for chest pain, palpitations and leg swelling.   Gastrointestinal: Positive for nausea. Negative for abdominal distention, abdominal pain, anal bleeding, constipation, diarrhea and vomiting.   Genitourinary: Negative for difficulty urinating, dysuria, enuresis and flank pain.   Musculoskeletal: Positive for back pain. Negative for arthralgias and gait problem.   Skin: Negative for color change.   Neurological: Negative for dizziness, seizures, syncope, facial asymmetry, light-headedness, numbness and headaches.   Psychiatric/Behavioral: Negative for agitation and behavioral problems.       Objective   OBJECTIVE   Vital Signs  Temp:  [97.3 °F (36.3 °C)-98 °F (36.7 °C)] 97.4 °F (36.3  "°C)  Heart Rate:  [64-76] 70  Resp:  [20-27] 20  BP: (136-199)/(53-80) 180/77    Flowsheet Rows      First Filed Value   Admission Height  165.1 cm (65\") Documented at 07/14/2020 1907   Admission Weight  102 kg (225 lb) Documented at 07/14/2020 1907           I/O last 3 completed shifts:  In: 370 [P.O.:120; IV Piggyback:250]  Out: -     PHYSICAL EXAM    Physical Exam   Constitutional: She is oriented to person, place, and time. She appears well-developed and well-nourished. No distress.   HENT:   Head: Normocephalic and atraumatic.   Right Ear: External ear normal.   Left Ear: External ear normal.   Nose: Nose normal.   Mouth/Throat: Oropharynx is clear and moist.   Eyes: Pupils are equal, round, and reactive to light. EOM are normal. Right eye exhibits no discharge. Left eye exhibits no discharge.   Neck: Normal range of motion. Neck supple. No tracheal deviation present. No thyromegaly present.   Cardiovascular: Normal rate, regular rhythm, normal heart sounds and intact distal pulses.   Pulmonary/Chest: Effort normal and breath sounds normal. No stridor. No respiratory distress. She has no wheezes. She has no rales.   Abdominal: Soft. Bowel sounds are normal. She exhibits no distension. There is no tenderness.   Musculoskeletal: Normal range of motion. She exhibits no edema, tenderness or deformity.   Lymphadenopathy:     She has no cervical adenopathy.   Neurological: She is alert and oriented to person, place, and time. No cranial nerve deficit.   Skin: Skin is warm and dry. She is not diaphoretic.   Psychiatric: She has a normal mood and affect.       RESULTS   Results Review:    Results from last 7 days   Lab Units 07/15/20  0313 07/14/20  1942   SODIUM mmol/L 135* 137   POTASSIUM mmol/L 4.3 4.7   CHLORIDE mmol/L 98 99   CO2 mmol/L 28.0 27.0   BUN mg/dL 49* 44*   CREATININE mg/dL 4.33* 3.95*   CALCIUM mg/dL 9.4 9.5   BILIRUBIN mg/dL  --  0.5   ALK PHOS U/L  --  85   ALT (SGPT) U/L  --  17   AST (SGOT) U/L  -- "  25   GLUCOSE mg/dL 111* 62*       Estimated Creatinine Clearance: 13.4 mL/min (A) (by C-G formula based on SCr of 4.33 mg/dL (H)).    Results from last 7 days   Lab Units 07/14/20 1942   MAGNESIUM mg/dL 2.1             Results from last 7 days   Lab Units 07/15/20  0313 07/14/20  1942   WBC 10*3/mm3 7.47 7.33   HEMOGLOBIN g/dL 9.4* 9.7*   PLATELETS 10*3/mm3 169 168       Results from last 7 days   Lab Units 07/15/20  0313 07/14/20  1942   INR  5.08* 4.67*        MEDICATIONS      albuterol sulfate HFA 4 puff Inhalation 4x Daily - RT   sodium chloride 10 mL Intravenous Q12H        Medications Prior to Admission   Medication Sig Dispense Refill Last Dose   • acetaminophen (TYLENOL) 325 MG tablet Take 650 mg by mouth Every 6 (Six) Hours As Needed for Mild Pain  or Fever.   Past Week at Unknown time   • albuterol (PROVENTIL) (2.5 MG/3ML) 0.083% nebulizer solution Take 2.5 mg by nebulization Every 4 (Four) Hours As Needed for Wheezing.   7/14/2020 at Unknown time   • albuterol sulfate  (90 Base) MCG/ACT inhaler Inhale 2 puffs Every 4 (Four) Hours As Needed for Wheezing or Shortness of Air. 18 g 11 7/14/2020 at Unknown time   • aspirin 81 MG chewable tablet Chew 81 mg Daily.   7/14/2020 at Unknown time   • cholecalciferol (VITAMIN D3) 1000 units tablet Take 2,000 Units by mouth Daily.   7/14/2020 at Unknown time   • citalopram (CeleXA) 20 MG tablet TAKE 1 TABLET BY MOUTH EVERY DAY 30 tablet 3 7/14/2020 at Unknown time   • dilTIAZem (TIAZAC) 240 MG 24 hr capsule TAKE 1 CAPSULE BY MOUTH EVERY DAY 30 capsule 0 7/14/2020 at Unknown time   • diltiaZEM CD (CARDIZEM CD) 240 MG 24 hr capsule Take 1 capsule by mouth Daily. 90 capsule 3 7/14/2020 at Unknown time   • ezetimibe (ZETIA) 10 MG tablet Take 10 mg by mouth Daily.   7/14/2020 at Unknown time   • famotidine (PEPCID) 20 MG tablet TAKE 2 TABLETS BY MOUTH EVERY DAY 60 tablet 11 7/14/2020 at Unknown time   • ferrous sulfate 325 (65 FE) MG tablet Take 325 mg by mouth  Daily With Breakfast.   7/14/2020 at Unknown time   • Fluticasone-Umeclidin-Vilant 100-62.5-25 MCG/INH aerosol powder  Inhale 1 puff Daily. 1 each 11 7/14/2020 at Unknown time   • insulin aspart (novoLOG) 100 UNIT/ML injection Inject 0-9 Units under the skin into the appropriate area as directed 4 (Four) Times a Day Before Meals & at Bedtime. 10 mL 0 7/14/2020 at Unknown time   • loperamide (IMODIUM) 2 MG capsule Take 2 mg by mouth 4 (Four) Times a Day As Needed for Diarrhea.   Past Month at Unknown time   • metOLazone (ZAROXOLYN) 2.5 MG tablet TAKE 1 TABLET BY MOUTH EVERY OTHER DAY 45 tablet 0 Past Week at Unknown time   • metoprolol succinate XL (TOPROL-XL) 25 MG 24 hr tablet TAKE 1 TABLET BY MOUTH EVERY DAY 30 tablet 0 7/14/2020 at Unknown time   • ondansetron (ZOFRAN) 4 MG tablet Take 1 tablet by mouth Every 6 (Six) Hours As Needed for Nausea or Vomiting. 30 tablet 0 7/14/2020 at Unknown time   • oxyCODONE (ROXICODONE) 10 MG tablet Take 1 tablet by mouth Every 6 (Six) Hours As Needed for Moderate Pain  or Severe Pain . 12 tablet 0 Past Month at Unknown time   • pantoprazole (PROTONIX) 40 MG EC tablet Take 1 tablet by mouth Daily. 90 tablet 2 7/14/2020 at Unknown time   • Prenatal Vit-Fe Fumarate-FA (PRENATAL VITAMIN) 27-0.8 MG tablet Take 1 tablet by mouth daily.   7/14/2020 at Unknown time   • rOPINIRole (REQUIP) 0.25 MG tablet TAKE 1 TABLET BY MOUTH EVERY NIGHT AT BEDTIME FOR RESTLESS LEGS 30 tablet 0 7/14/2020 at Unknown time   • sucralfate (CARAFATE) 1 g tablet TAKE 1 TABLET BY MOUTH FOUR (4) TIMES DAILY 120 tablet 0 7/14/2020 at Unknown time   • vitamin C (ASCORBIC ACID) 250 MG tablet Take 250 mg by mouth Daily.   7/14/2020 at Unknown time   • warfarin (Coumadin) 2.5 MG tablet Take 1 tab nightly on Monday, Wednesday, Friday and Saturday or AS DIRECTED PER COUMADIN CLINIC 30 tablet 2 Past Week at Unknown time   • warfarin (Coumadin) 5 MG tablet Take 1/2 tablet daily except on Thursdays take 1 tablet 25  tablet 3 Past Week at Unknown time   • [START ON 7/16/2020] enoxaparin (Lovenox) 40 MG/0.4ML solution syringe Inject 0.4 mL under the skin into the appropriate area as directed Daily for 10 doses. Inject 0.4mL sq daily 4 mL 0    • glucose blood test strip 1 each by Other route 3 (three) times a day. Use as instructed   Taking   • UNIFINE PENTIPS 31G X 6 MM misc USE 1 FOUR (4) TIMES DAILY WITH INSULIN 130 each 5 Taking     Assessment/Plan   ASSESSMENT / PLAN      Acute on chronic respiratory failure with hypoxia (CMS/HCC)    ESRD on hd mwf since 2/28/2020. No sign of recovery and likely ESRD. Hd 3.5 hrs. edw is 97.5kg and will try to lower it during this admission . Check phos in am    Results from last 7 days   Lab Units 07/15/20  0313 07/14/20 1942   CREATININE mg/dL 4.33* 3.95*   ]  Results from last 7 days   Lab Units 07/15/20  0313 07/14/20  1942   POTASSIUM mmol/L 4.3 4.7   ]  Magnesium on admisison 2.1  -HD today     Anemia  Hb 9.4 on admisison   Hct 29.2 observe.     CHF / fluid overload EF 46-50%. Right pleural effusion / atelectasis  proBNP-  28, 929. Dc metolazone. UF with hd today. On ceftriaxone and azithromycin    Hyponatremia   Results from last 7 days   Lab Units 07/15/20  0313 07/14/20 1942   SODIUM mmol/L 135* 137     -  Modulate with ultrafiltration               I have reviewed the case with the resident. I have also examined and seen  the patient.  I agree with the assessment and plan as documented in the resident's note.         This document has been electronically signed by Sandy Caputo MD on July 15, 2020 10:47

## 2020-07-16 PROBLEM — J96.21 ACUTE ON CHRONIC RESPIRATORY FAILURE WITH HYPOXIA (HCC): Chronic | Status: ACTIVE | Noted: 2020-01-01

## 2020-07-16 PROBLEM — N18.6 ESRF (END STAGE RENAL FAILURE) (HCC): Chronic | Status: ACTIVE | Noted: 2020-01-01

## 2020-07-16 PROBLEM — T45.511A COUMADIN TOXICITY: Chronic | Status: ACTIVE | Noted: 2020-01-01

## 2020-07-16 PROBLEM — I50.23 ACUTE ON CHRONIC SYSTOLIC HEART FAILURE (HCC): Chronic | Status: ACTIVE | Noted: 2017-12-27

## 2020-07-16 NOTE — PROGRESS NOTES
"[facility]  [address] [place] [address]. [address]  T - [phone]  F - [phone]     Progress Note          PATIENT  DEMOGRAPHICS   PATIENT NAME: Brendon Skelton                      PHYSICIAN: Dudley Cruz MD  : 1945  MRN: [id number]   LOS: 2 days    Patient Care Team:  Josefa Pozo APRN as PCP - General (Nurse Practitioner)  Meme Duckworth APRN as PCP - Claims Attributed  Luis Maher MD as Surgeon (General Surgery)  Subjective   SUBJECTIVE   Patient is a 74-year-old  female admitted for acute on chronic heart failure.  Patient still requires 6 L of nasal cannula to maintain saturation at 92%.  Patient was found resting comfortably in bed, in no acute distress.  Patient endorses that she feels a little better than yesterday.         Objective   OBJECTIVE   Vital Signs  Temp:  [97.4 °F (36.3 °C)-98.4 °F (36.9 °C)] 98.4 °F (36.9 °C)  Heart Rate:  [61-78] 69  Resp:  [12-25] 20  BP: (139-166)/(67-74) 161/72    Flowsheet Rows      First Filed Value   Admission Height  165.1 cm (65\") Documented at 2020   Admission Weight  102 kg (225 lb) Documented at 2020           I/O last 3 completed shifts:  In: 1170 [P.O.:520; I.V.:50; IV Piggyback:600]  Out: 4030 [Urine:30; Other:4000]    PHYSICAL EXAM    Physical Exam   Constitutional: She is oriented to person, place, and time. She appears well-developed and well-nourished. No distress.   HENT:   Head: Normocephalic and atraumatic.   Right Ear: External ear normal.   Left Ear: External ear normal.   Nose: Nose normal.   Mouth/Throat: Oropharynx is clear and moist.   Eyes: Pupils are equal, round, and reactive to light. EOM are normal. Right eye exhibits no discharge. Left eye exhibits no discharge.   Neck: Normal range of motion. Neck supple. No tracheal deviation present. No thyromegaly present.   Cardiovascular: Normal rate, regular rhythm, normal heart sounds and intact distal pulses. "   Pulmonary/Chest: Effort normal and breath sounds normal. No stridor. No respiratory distress. She has no wheezes. She has no rales.   Abdominal: Soft. Bowel sounds are normal. She exhibits no distension. There is no tenderness.   Musculoskeletal: Normal range of motion. She exhibits no edema, tenderness or deformity.   Lymphadenopathy:     She has no cervical adenopathy.   Neurological: She is alert and oriented to person, place, and time. No cranial nerve deficit.   Skin: Skin is warm and dry. She is not diaphoretic.   Psychiatric: She has a normal mood and affect.       RESULTS   Results Review:    Results from last 7 days   Lab Units 07/16/20  0452 07/15/20  0313 07/14/20  1942   SODIUM mmol/L 135* 135* 137   POTASSIUM mmol/L 4.2 4.3 4.7   CHLORIDE mmol/L 98 98 99   CO2 mmol/L 28.0 28.0 27.0   BUN mg/dL 26* 49* 44*   CREATININE mg/dL 3.23* 4.33* 3.95*   CALCIUM mg/dL 8.8 9.4 9.5   BILIRUBIN mg/dL  --   --  0.5   ALK PHOS U/L  --   --  85   ALT (SGPT) U/L  --   --  17   AST (SGOT) U/L  --   --  25   GLUCOSE mg/dL 133* 111* 62*       Estimated Creatinine Clearance: 17.8 mL/min (A) (by C-G formula based on SCr of 3.23 mg/dL (H)).    Results from last 7 days   Lab Units 07/16/20 0452 07/14/20 1942   MAGNESIUM mg/dL  --  2.1   PHOSPHORUS mg/dL 4.6*  --              Results from last 7 days   Lab Units 07/16/20  0451 07/15/20  0313 07/14/20  1942   WBC 10*3/mm3 6.55 7.47 7.33   HEMOGLOBIN g/dL 9.4* 9.4* 9.7*   PLATELETS 10*3/mm3 167 169 168       Results from last 7 days   Lab Units 07/16/20  0452 07/15/20  0313 07/14/20  1942   INR  2.04* 5.08* 4.67*         Imaging Results (Last 24 Hours)     ** No results found for the last 24 hours. **           MEDICATIONS      aspirin 81 mg Oral Daily   azithromycin 500 mg Intravenous Q24H   budesonide-formoterol 2 puff Inhalation BID - RT   cefTRIAXone 1 g Intravenous Q24H   cholecalciferol 2,000 Units Oral Daily   citalopram 20 mg Oral Daily   dilTIAZem  mg Oral Q24H    ferrous sulfate 324 mg Oral Daily With Breakfast   ipratropium-albuterol 3 mL Nebulization 4x Daily - RT   metoprolol succinate XL 25 mg Oral Daily   pantoprazole 40 mg Oral Daily   rOPINIRole 0.25 mg Oral Nightly   sodium chloride 10 mL Intravenous Q12H   sucralfate 1 g Oral 4x Daily AC & at Bedtime   traZODone 300 mg Oral Nightly          Assessment/Plan   ASSESSMENT / PLAN      Acute on chronic respiratory failure with hypoxia (CMS/HCC)    ESRD ON HD   -History of HD Monday Wednesday Friday since 2/28/2020.  -Underwent HD  on 7/15/2020  -Repeat PHOS -4.6. Still soa and will do chest xray tomorrow evening after dialysis. May need thoracentesis or extra hd on Saturday.   Results from last 7 days   Lab Units 07/16/20  0452 07/15/20  0313 07/14/20  1942   CREATININE mg/dL 3.23* 4.33* 3.95*   ]  Results from last 7 days   Lab Units 07/16/20  0452 07/15/20  0313 07/14/20  1942   BUN mg/dL 26* 49* 44*     Results from last 7 days   Lab Units 07/16/20  0452 07/15/20  0313 07/14/20  1942   POTASSIUM mmol/L 4.2 4.3 4.7   Weight today 98.6 kg    Anemia   -H/h 9.4/29.2 on admission   Today: H/H 9.4/29.7  - STABLE     CHF   -EF 46 to 50%  -Right pleural effusion/atelectasis  -proBNP 28, 929  -HD yesterday  -Continue Rocephin and azithromycin    HYPONATREMIA   Results from last 7 days   Lab Units 07/16/20 0452 07/15/20  0313 07/14/20  1942   SODIUM mmol/L 135* 135* 137   -HD on 7/15/20

## 2020-07-16 NOTE — PROGRESS NOTES
Progress Note  Colin Coreas MD  Hospitalist    Date of visit: 7/16/2020     LOS: 2 days   Patient Care Team:  Josefa Pozo APRN as PCP    Chief Complaint: dyspnea    Subjective     Interval History:     Patient Complaints: dyspnea / improved to some extent    History taken from: patient / nursing    Medication Review:   Current Facility-Administered Medications   Medication Dose Route Frequency Provider Last Rate Last Dose   • albumin human 25 % IV SOLN 12.5 g  12.5 g Intravenous PRN Sandy Caputo MD       • aspirin chewable tablet 81 mg  81 mg Oral Daily Dudley Alfredo MD   81 mg at 07/16/20 0827   • AZITHROMYCIN 500 MG/250 ML 0.9% NS IVPB (vial-mate)  500 mg Intravenous Q24H Dudley Alfredo MD   500 mg at 07/15/20 2106   • budesonide-formoterol (SYMBICORT) 160-4.5 MCG/ACT inhaler 2 puff  2 puff Inhalation BID - RT Dudley Alfredo MD   2 puff at 07/16/20 0832   • cefTRIAXone (ROCEPHIN) 1 g/100 mL 0.9% NS (MBP)  1 g Intravenous Q24H Dudley Alfredo MD 0 mL/hr at 07/15/20 1200 1 g at 07/16/20 1047   • cholecalciferol (VITAMIN D3) tablet 2,000 Units  2,000 Units Oral Daily Dudley Alfredo MD   2,000 Units at 07/16/20 0826   • citalopram (CeleXA) tablet 20 mg  20 mg Oral Daily Dudley Alfredo MD   20 mg at 07/16/20 0827   • dilTIAZem CD (CARDIZEM CD) 24 hr capsule 240 mg  240 mg Oral Q24H Dudley Alfredo MD   240 mg at 07/16/20 0827   • ferrous sulfate EC tablet 324 mg  324 mg Oral Daily With Breakfast Dudley Alfredo MD   324 mg at 07/16/20 0827   • heparin (porcine) injection 2,000 Units  2,000 Units Intracatheter PRN Sandy Caputo MD   2,000 Units at 07/15/20 1600   • ipratropium-albuterol (DUO-NEB) nebulizer solution 3 mL  3 mL Nebulization 4x Daily - RT Dudley Alfredo MD   3 mL at 07/16/20 1432   • metoprolol succinate XL (TOPROL-XL) 24 hr tablet 25 mg  25 mg Oral Daily Dudley Alfredo MD   25 mg at 07/16/20 0828   • ondansetron (ZOFRAN) injection 4 mg  4 mg  Intravenous Q6H PRN Markel Fountain MD   4 mg at 07/15/20 2103   • oxyCODONE (ROXICODONE) immediate release tablet 10 mg  10 mg Oral Q6H PRN Dudley Alfredo MD       • pantoprazole (PROTONIX) EC tablet 40 mg  40 mg Oral Daily Dudley Alfredo MD   40 mg at 07/16/20 0827   • rOPINIRole (REQUIP) tablet 0.25 mg  0.25 mg Oral Nightly Dudley Alfredo MD   0.25 mg at 07/15/20 2035   • sodium chloride 0.9 % flush 10 mL  10 mL Intravenous Q12H Markel Fountain MD   10 mL at 07/16/20 0835   • sodium chloride 0.9 % flush 10 mL  10 mL Intravenous PRN Markel Fountain MD       • sucralfate (CARAFATE) tablet 1 g  1 g Oral 4x Daily AC & at Bedtime Dudley Alfredo MD   1 g at 07/16/20 1047   • traZODone (DESYREL) tablet 300 mg  300 mg Oral Nightly Markel Fountain MD   300 mg at 07/15/20 2035       Review of Systems:   Review of Systems   Constitutional: Positive for fatigue. Negative for fever.   Respiratory: Positive for cough and shortness of breath. Negative for wheezing.    Cardiovascular: Positive for leg swelling.   Gastrointestinal: Positive for abdominal distention. Negative for abdominal pain, anal bleeding, blood in stool and diarrhea.   Genitourinary: Negative for decreased urine volume, dysuria, flank pain, frequency, hematuria and urgency.   Musculoskeletal: Positive for arthralgias.   Skin: Positive for pallor. Negative for color change and rash.   Neurological: Positive for weakness. Negative for tremors, seizures, syncope, numbness and headaches.   Psychiatric/Behavioral: Negative for agitation, behavioral problems and confusion.   All other systems reviewed and are negative.      Objective     Vital Signs  Temp:  [97.7 °F (36.5 °C)-98.4 °F (36.9 °C)] 97.7 °F (36.5 °C)  Heart Rate:  [61-78] 70  Resp:  [12-22] 22  BP: (126-161)/(57-72) 146/62    Physical Exam:  Physical Exam   Constitutional: She is oriented to person, place, and time.  She has a sickly appearance. No distress.   Obese    HENT:   Head: Normocephalic and atraumatic.   Eyes: Pupils are equal, round, and reactive to light. EOM are normal. No scleral icterus.   Neck: Normal range of motion. Neck supple.   Cardiovascular: An irregular rhythm present.   Pulmonary/Chest: Effort normal. No stridor. She has no wheezes. She has rales.   Abdominal: Soft. Bowel sounds are normal. She exhibits distension. She exhibits no mass. No hernia.   Musculoskeletal: Normal range of motion. She exhibits edema. She exhibits no tenderness.   Neurological: She is alert and oriented to person, place, and time. No cranial nerve deficit. Coordination normal.   Skin: Skin is warm and dry. There is pallor.   Psychiatric: She has a normal mood and affect. Her behavior is normal.   Vitals reviewed.       Results Review:    Lab Results (last 24 hours)     Procedure Component Value Units Date/Time    Protime-INR [[id number]]  (Abnormal) Collected:  07/16/20 0452    Specimen:  Blood Updated:  07/16/20 0601     Protime 24.3 Seconds      INR 2.04    Narrative:       Therapeutic range for most indications is 2.0-3.0 INR,  or 2.5-3.5 for mechanical heart valves.    Phosphorus [[id number]]  (Abnormal) Collected:  07/16/20 0452    Specimen:  Blood Updated:  07/16/20 0559     Phosphorus 4.6 mg/dL     Basic Metabolic Panel [[id number]]  (Abnormal) Collected:  07/16/20 0452    Specimen:  Blood Updated:  07/16/20 0559     Glucose 133 mg/dL      BUN 26 mg/dL      Creatinine 3.23 mg/dL      Sodium 135 mmol/L      Potassium 4.2 mmol/L      Chloride 98 mmol/L      CO2 28.0 mmol/L      Calcium 8.8 mg/dL      eGFR   Amer --     Comment: <15 Indicative of kidney failure.        eGFR Non African Amer 14 mL/min/1.73      Comment: <15 Indicative of kidney failure.        BUN/Creatinine Ratio 8.0     Anion Gap 9.0 mmol/L     Narrative:       GFR Normal >60  Chronic Kidney Disease <60  Kidney Failure <15      CBC & Differential  [[id number]] Collected:  07/16/20 0451    Specimen:  Blood Updated:  07/16/20 0535    Narrative:       The following orders were created for panel order CBC & Differential.  Procedure                               Abnormality         Status                     ---------                               -----------         ------                     CBC Auto Differential[[id number]]        Abnormal            Final result                 Please view results for these tests on the individual orders.    CBC Auto Differential [[id number]]  (Abnormal) Collected:  07/16/20 0451    Specimen:  Blood Updated:  07/16/20 0535     WBC 6.55 10*3/mm3      RBC 2.80 10*6/mm3      Hemoglobin 9.4 g/dL      Hematocrit 29.7 %      .1 fL      MCH 33.6 pg      MCHC 31.6 g/dL      RDW 18.6 %      RDW-SD 72.3 fl      MPV 12.0 fL      Platelets 167 10*3/mm3      Neutrophil % 82.7 %      Lymphocyte % 5.5 %      Monocyte % 9.2 %      Eosinophil % 1.1 %      Basophil % 0.6 %      Immature Grans % 0.9 %      Neutrophils, Absolute 5.42 10*3/mm3      Lymphocytes, Absolute 0.36 10*3/mm3      Monocytes, Absolute 0.60 10*3/mm3      Eosinophils, Absolute 0.07 10*3/mm3      Basophils, Absolute 0.04 10*3/mm3      Immature Grans, Absolute 0.06 10*3/mm3      nRBC 0.5 /100 WBC     Blood Culture - Blood, Arm, Right [[id number]] Collected:  07/14/20 2058    Specimen:  Blood from Arm, Right Updated:  07/15/20 2115     Blood Culture No growth at 24 hours    Blood Culture - Blood, Arm, Right [[id number]] Collected:  07/14/20 1942    Specimen:  Blood from Arm, Right Updated:  07/15/20 2000     Blood Culture No growth at 24 hours          Imaging Results (Last 24 Hours)     ** No results found for the last 24 hours. **          Assessment/Plan       Acute on chronic respiratory failure with hypoxia (CMS/HCC)    Acute on chronic systolic heart failure (CMS/HCC)    Pulmonary hypertension (CMS/HCC)    ESRF (end stage renal failure) (CMS/HCC)    Atrial fibrillation  [I48.91]    Diabetes mellitus (CMS/HCC)    Coumadin toxicity    Continue with the current management, dialysis, nebulized treatments. Her dyspnea is multi factorial, related to the conditions above. She can be transferred to a regular floor.     Follow up on the BMP / INR.    Colin Coreas MD  07/16/20  16:07

## 2020-07-17 NOTE — PROGRESS NOTES
Progress Note  Colin Coreas MD  Hospitalist    Date of visit: 7/17/2020     LOS: 3 days   Patient Care Team:  Josefa Pozo APRN as PCP    Chief Complaint: dyspnea    Subjective     Interval History:     Patient Complaints: dyspnea / improved to some extent    History taken from: patient / nursing    Medication Review:   Current Facility-Administered Medications   Medication Dose Route Frequency Provider Last Rate Last Dose   • albumin human 25 % IV SOLN 12.5 g  12.5 g Intravenous PRN Sandy Caputo MD       • aspirin chewable tablet 81 mg  81 mg Oral Daily Colin Coreas MD   81 mg at 07/17/20 1154   • AZITHROMYCIN 500 MG/250 ML 0.9% NS IVPB (vial-mate)  500 mg Intravenous Q24H Colin Coreas MD   500 mg at 07/17/20 0431   • budesonide-formoterol (SYMBICORT) 160-4.5 MCG/ACT inhaler 2 puff  2 puff Inhalation BID - RT Colin Coreas MD   2 puff at 07/16/20 0832   • cefTRIAXone (ROCEPHIN) 1 g/100 mL 0.9% NS (MBP)  1 g Intravenous Q24H Colin Coreas MD 0 mL/hr at 07/15/20 1200 1 g at 07/17/20 1153   • cholecalciferol (VITAMIN D3) tablet 2,000 Units  2,000 Units Oral Daily Colin Coreas MD   2,000 Units at 07/17/20 1153   • citalopram (CeleXA) tablet 20 mg  20 mg Oral Daily Colin Coreas MD   20 mg at 07/17/20 1153   • dilTIAZem CD (CARDIZEM CD) 24 hr capsule 240 mg  240 mg Oral Q24H Colin Coreas MD   240 mg at 07/17/20 1153   • epoetin rosales (EPOGEN,PROCRIT) injection 6,000 Units  6,000 Units Intravenous Once per day on Mon Wed Fri Sandy Caputo MD   6,000 Units at 07/17/20 0844   • ferrous sulfate EC tablet 324 mg  324 mg Oral Daily With Breakfast Colin Coreas MD   324 mg at 07/17/20 1154   • heparin (porcine) injection 2,000 Units  2,000 Units Intracatheter PRN Sandy Caputo MD   4,000 Units at 07/17/20 1127   • ipratropium-albuterol (DUO-NEB) nebulizer solution 3 mL  3 mL Nebulization 4x Daily - RT Colin Coreas MD   3 mL at 07/16/20 1432   • metoprolol succinate XL (TOPROL-XL) 24 hr tablet 25  mg  25 mg Oral Daily Colin Coreas MD   25 mg at 07/17/20 1154   • ondansetron (ZOFRAN) injection 4 mg  4 mg Intravenous Q6H PRN Colin Coreas MD   4 mg at 07/15/20 2103   • oxyCODONE (ROXICODONE) immediate release tablet 10 mg  10 mg Oral Q6H PRN Colin Coreas MD   10 mg at 07/17/20 1215   • pantoprazole (PROTONIX) EC tablet 40 mg  40 mg Oral Daily Colin Coreas MD   40 mg at 07/17/20 1153   • rOPINIRole (REQUIP) tablet 0.25 mg  0.25 mg Oral Nightly Colin Coreas MD   0.25 mg at 07/16/20 2205   • sodium chloride 0.9 % flush 10 mL  10 mL Intravenous Q12H Colin Coreas MD   10 mL at 07/17/20 1157   • sodium chloride 0.9 % flush 10 mL  10 mL Intravenous PRN Colin Coreas MD       • sucralfate (CARAFATE) tablet 1 g  1 g Oral 4x Daily AC & at Bedtime Colin Coreas MD   1 g at 07/17/20 1153   • traZODone (DESYREL) tablet 300 mg  300 mg Oral Nightly Colin Coreas MD   300 mg at 07/16/20 2205       Review of Systems:   Review of Systems   Constitutional: Positive for fatigue. Negative for fever.   Respiratory: Positive for cough and shortness of breath. Negative for wheezing.    Cardiovascular: Positive for leg swelling.   Gastrointestinal: Positive for abdominal distention. Negative for abdominal pain, anal bleeding, blood in stool and diarrhea.   Genitourinary: Negative for decreased urine volume, dysuria, flank pain, frequency, hematuria and urgency.   Musculoskeletal: Positive for arthralgias.   Skin: Positive for pallor. Negative for color change and rash.   Neurological: Positive for weakness. Negative for tremors, seizures, syncope, numbness and headaches.   Psychiatric/Behavioral: Negative for agitation, behavioral problems and confusion.   All other systems reviewed and are negative.      Objective     Vital Signs  Temp:  [96.1 °F (35.6 °C)-98.5 °F (36.9 °C)] 98.5 °F (36.9 °C)  Heart Rate:  [] 79  Resp:  [18-22] 18  BP: (101-146)/(48-63) 116/48    Physical Exam:  Physical Exam      Constitutional: She is oriented to person, place, and time. She has a sickly appearance. No distress.   Obese    HENT:   Head: Normocephalic and atraumatic.   Eyes: Pupils are equal, round, and reactive to light. EOM are normal. No scleral icterus.   Neck: Normal range of motion. Neck supple.   Cardiovascular: An irregular rhythm present.   Pulmonary/Chest: Effort normal. No stridor. She has no wheezes. She has rales.   Abdominal: Soft. Bowel sounds are normal. She exhibits distension. She exhibits no mass. No hernia.   Musculoskeletal: Normal range of motion. She exhibits edema. She exhibits no tenderness.   Neurological: She is alert and oriented to person, place, and time. No cranial nerve deficit. Coordination normal.   Skin: Skin is warm and dry. There is pallor.   Psychiatric: She has a normal mood and affect. Her behavior is normal.   Vitals reviewed.       Results Review:    Lab Results (last 24 hours)     Procedure Component Value Units Date/Time    Basic Metabolic Panel [[id number]]  (Abnormal) Collected:  07/17/20 0550    Specimen:  Blood Updated:  07/17/20 0639     Glucose 144 mg/dL      BUN 41 mg/dL      Creatinine 4.68 mg/dL      Sodium 132 mmol/L      Potassium 4.4 mmol/L      Comment: Specimen hemolyzed.  Results may be affected.        Chloride 95 mmol/L      CO2 24.0 mmol/L      Calcium 8.8 mg/dL      eGFR   Amer --     Comment: <15 Indicative of kidney failure.        eGFR Non African Amer 9 mL/min/1.73      Comment: <15 Indicative of kidney failure.        BUN/Creatinine Ratio 8.8     Anion Gap 13.0 mmol/L     Narrative:       GFR Normal >60  Chronic Kidney Disease <60  Kidney Failure <15      Protime-INR [[id number]]  (Abnormal) Collected:  07/17/20 0550    Specimen:  Blood Updated:  07/17/20 0638     Protime 17.9 Seconds      INR 1.40    Narrative:       Therapeutic range for most indications is 2.0-3.0 INR,  or 2.5-3.5 for mechanical heart valves.    Blood Culture - Blood, Arm, Right  [[id number]] Collected:  07/14/20 2058    Specimen:  Blood from Arm, Right Updated:  07/16/20 2115     Blood Culture No growth at 2 days    POC Glucose Once [[id number]]  (Abnormal) Collected:  07/16/20 2006    Specimen:  Blood Updated:  07/16/20 2043     Glucose 140 mg/dL      Comment: Result Not ConfirmedOperator: [id number] DICK BOSSMeter ID: [id number]       Blood Culture - Blood, Arm, Right [[id number]] Collected:  07/14/20 1942    Specimen:  Blood from Arm, Right Updated:  07/16/20 2000     Blood Culture No growth at 2 days          Imaging Results (Last 24 Hours)     ** No results found for the last 24 hours. **          Assessment/Plan       Acute on chronic respiratory failure with hypoxia (CMS/HCC)    Acute on chronic systolic heart failure (CMS/HCC)    Pulmonary hypertension (CMS/HCC)    ESRF (end stage renal failure) (CMS/HCC)    Atrial fibrillation [I48.91]    Diabetes mellitus (CMS/HCC)    Coumadin toxicity    Continue with the current management, dialysis, nebulized treatments. Her dyspnea is multi factorial, related to the conditions above. Try PT/OT.    Follow up on the BMP / INR.    Colin Coreas MD  07/17/20  13:22

## 2020-07-17 NOTE — PLAN OF CARE
Problem: Patient Care Overview  Goal: Plan of Care Review  Outcome: Ongoing (interventions implemented as appropriate)  Flowsheets (Taken 7/17/2020 3449)  Progress: no change  Plan of Care Reviewed With: patient

## 2020-07-17 NOTE — PROGRESS NOTES
"[facility]  [address] [place] [address]. [address]  T - [phone]  F - [phone]     Progress Note          PATIENT  DEMOGRAPHICS   PATIENT NAME: Brendon Skelton                      PHYSICIAN: Sandy Caputo MD  : 1945  MRN: [id number]   LOS: 3 days    Patient Care Team:  Josefa Pozo APRN as PCP - General (Nurse Practitioner)  Meme Duckworth APRN as PCP - Claims Attributed  Subjective   SUBJECTIVE   Seen during hd, bp is stable         Objective   OBJECTIVE   Vital Signs  Temp:  [96.1 °F (35.6 °C)-98.1 °F (36.7 °C)] 97.5 °F (36.4 °C)  Heart Rate:  [] 85  Resp:  [18-22] 18  BP: (101-146)/(49-67) 139/63    Flowsheet Rows      First Filed Value   Admission Height  165.1 cm (65\") Documented at 2020   Admission Weight  102 kg (225 lb) Documented at 2020           I/O last 3 completed shifts:  In: 740 [P.O.:390; IV Piggyback:350]  Out: 10 [Urine:10]    PHYSICAL EXAM    Physical Exam   Constitutional: She is oriented to person, place, and time. She appears well-developed and well-nourished. No distress.   HENT:   Head: Normocephalic and atraumatic.   Right Ear: External ear normal.   Left Ear: External ear normal.   Nose: Nose normal.   Mouth/Throat: Oropharynx is clear and moist.   Eyes: Pupils are equal, round, and reactive to light. EOM are normal. Right eye exhibits no discharge. Left eye exhibits no discharge.   Neck: Normal range of motion. Neck supple. No tracheal deviation present. No thyromegaly present.   Cardiovascular: Normal rate, regular rhythm, normal heart sounds and intact distal pulses.   Pulmonary/Chest: Effort normal and breath sounds normal. No stridor. No respiratory distress. She has no wheezes. She has no rales.   Abdominal: Soft. Bowel sounds are normal. She exhibits no distension. There is no tenderness.   Musculoskeletal: Normal range of motion. She exhibits no edema, tenderness or deformity.   Lymphadenopathy:     " She has no cervical adenopathy.   Neurological: She is alert and oriented to person, place, and time. No cranial nerve deficit.   Skin: Skin is warm and dry. She is not diaphoretic.   Psychiatric: She has a normal mood and affect.       RESULTS   Results Review:    Results from last 7 days   Lab Units 07/17/20  0550 07/16/20  0452 07/15/20  0313 07/14/20  1942   SODIUM mmol/L 132* 135* 135* 137   POTASSIUM mmol/L 4.4 4.2 4.3 4.7   CHLORIDE mmol/L 95* 98 98 99   CO2 mmol/L 24.0 28.0 28.0 27.0   BUN mg/dL 41* 26* 49* 44*   CREATININE mg/dL 4.68* 3.23* 4.33* 3.95*   CALCIUM mg/dL 8.8 8.8 9.4 9.5   BILIRUBIN mg/dL  --   --   --  0.5   ALK PHOS U/L  --   --   --  85   ALT (SGPT) U/L  --   --   --  17   AST (SGOT) U/L  --   --   --  25   GLUCOSE mg/dL 144* 133* 111* 62*       Estimated Creatinine Clearance: 12.2 mL/min (A) (by C-G formula based on SCr of 4.68 mg/dL (H)).    Results from last 7 days   Lab Units 07/16/20 0452 07/14/20 1942   MAGNESIUM mg/dL  --  2.1   PHOSPHORUS mg/dL 4.6*  --              Results from last 7 days   Lab Units 07/16/20  0451 07/15/20  0313 07/14/20  1942   WBC 10*3/mm3 6.55 7.47 7.33   HEMOGLOBIN g/dL 9.4* 9.4* 9.7*   PLATELETS 10*3/mm3 167 169 168       Results from last 7 days   Lab Units 07/17/20  0550 07/16/20 0452 07/15/20  0313 07/14/20  1942   INR  1.40* 2.04* 5.08* 4.67*         Imaging Results (Last 24 Hours)     ** No results found for the last 24 hours. **           MEDICATIONS      aspirin 81 mg Oral Daily   azithromycin 500 mg Intravenous Q24H   budesonide-formoterol 2 puff Inhalation BID - RT   cefTRIAXone 1 g Intravenous Q24H   cholecalciferol 2,000 Units Oral Daily   citalopram 20 mg Oral Daily   dilTIAZem  mg Oral Q24H   epoetin rosales/rosales-epbx 6,000 Units Intravenous Once per day on Mon Wed Fri   ferrous sulfate 324 mg Oral Daily With Breakfast   ipratropium-albuterol 3 mL Nebulization 4x Daily - RT   metoprolol succinate XL 25 mg Oral Daily   pantoprazole 40 mg  Oral Daily   rOPINIRole 0.25 mg Oral Nightly   sodium chloride 10 mL Intravenous Q12H   sucralfate 1 g Oral 4x Daily AC & at Bedtime   traZODone 300 mg Oral Nightly          Assessment/Plan   ASSESSMENT / PLAN      Acute on chronic respiratory failure with hypoxia (CMS/HCC)    Atrial fibrillation [I48.91]    Diabetes mellitus (CMS/HCC)    Acute on chronic systolic heart failure (CMS/HCC)    Pulmonary hypertension (CMS/HCC)    ESRF (end stage renal failure) (CMS/HCC)    Coumadin toxicity    ESRD ON HD   -History of HD Monday Wednesday Friday since 2/28/2020.  -hd today. Cramping with ultrafiltration. Her dry weight is 97.5 kg. Unable to do large volume ultrafiltration  -chest xray after dialysis. May need thoracentesis or extra hd on Saturday. Still requiring high O2 support. Will reassess with xray today.     Anemia   -H/h stable    CHF   -EF 46 to 50%  -Right pleural effusion/atelectasis  -proBNP 28, 929  -HD yesterday  -Continue Rocephin and azithromycin    HYPONATREMIA   Stable    Copd exacerbation / pna / right pleural effusion overall better          This document has been electronically signed by Sandy Caputo MD on July 17, 2020 10:47

## 2020-07-17 NOTE — PLAN OF CARE
Problem: Patient Care Overview  Goal: Plan of Care Review  Outcome: Ongoing (interventions implemented as appropriate)  Flowsheets (Taken 7/17/2020 1640)  Progress: declining  Plan of Care Reviewed With: patient  Outcome Summary: patient had an episdoe of respiratory decline today (see detailed significant note), pt on BIPAP resting well at this time, dialysis today, closely monitoring patient

## 2020-07-18 PROBLEM — J90 PLEURAL EFFUSION: Chronic | Status: ACTIVE | Noted: 2020-01-01

## 2020-07-18 NOTE — PLAN OF CARE
Problem: Patient Care Overview  Goal: Plan of Care Review  Outcome: Ongoing (interventions implemented as appropriate)  Flowsheets (Taken 7/18/2020 9915)  Progress: declining  Plan of Care Reviewed With: patient  Outcome Summary: VSS. Pt can not tolerate breathing without Bipap. Becomes extremely short of breath and weak. No complaints otherwise. Monitoring patient

## 2020-07-18 NOTE — PLAN OF CARE
Problem: Patient Care Overview  Goal: Plan of Care Review  7/18/2020 1603 by Tisha Mehta, PT  Flowsheets  Taken 7/18/2020 1603  Plan of Care Reviewed With: patient  Taken 7/18/2020 1411  Outcome Summary: PT eval as co-eval w/ OT. Pt just returned from dialysis. She demonstrated min assist sup<>sit<>sup and sit<>stand; gait w/ min assist of 1 plus 1 for equipment 6 ft x 2. Pt has been community mobile driving her powerchair to/from grocery & getting her food as needed. Her goal is being as indep as possible so will need skilled services to address ex ata, mobiltiy and safety in self care in order to return home alone w/ support of family intermittantly.  HH PT may be good option post d/c. If she does go home recommend family stay w/ her initially to be availalbe, or consider a rehab to home program if not indep by time of d/c

## 2020-07-18 NOTE — PLAN OF CARE
Problem: Patient Care Overview  Goal: Plan of Care Review  Outcome: Ongoing (interventions implemented as appropriate)  Flowsheets (Taken 7/18/2020 4630)  Progress: no change  Plan of Care Reviewed With: patient  Outcome Summary: pt had dialysis today, working with therapy, pt interchanged from BIPAP to high flow nonrebreather, to 6L nasal cannula, monitoring O2 closely, pt more alert

## 2020-07-18 NOTE — PROGRESS NOTES
"[facility]  [address] [place] [address]. [address]  T - [phone]  F - [phone]     Progress Note          PATIENT  DEMOGRAPHICS   PATIENT NAME: Brendon Skelton                      PHYSICIAN: Sandy Caputo MD  : 1945  MRN: [id number]   LOS: 4 days    Patient Care Team:  Josefa Pozo APRN as PCP - General (Nurse Practitioner)  Meme Duckworth APRN as PCP - Claims Attributed  Subjective   SUBJECTIVE   reqd bipap last night. On 6 LO2. Though looking better from before       Objective   OBJECTIVE   Vital Signs  Temp:  [96.9 °F (36.1 °C)-98.5 °F (36.9 °C)] 98.4 °F (36.9 °C)  Heart Rate:  [61-90] 67  Resp:  [12-24] 22  BP: ()/(48-96) 152/67    Flowsheet Rows      First Filed Value   Admission Height  165.1 cm (65\") Documented at 2020   Admission Weight  102 kg (225 lb) Documented at 2020           I/O last 3 completed shifts:  In: -   Out: 5750 [Urine:250; Other:5500]    PHYSICAL EXAM    Physical Exam   Constitutional: She is oriented to person, place, and time. She appears well-developed and well-nourished. No distress.   HENT:   Head: Normocephalic and atraumatic.   Right Ear: External ear normal.   Left Ear: External ear normal.   Nose: Nose normal.   Mouth/Throat: Oropharynx is clear and moist.   Eyes: Pupils are equal, round, and reactive to light. EOM are normal. Right eye exhibits no discharge. Left eye exhibits no discharge.   Neck: Normal range of motion. Neck supple. No tracheal deviation present. No thyromegaly present.   Cardiovascular: Normal rate, regular rhythm, normal heart sounds and intact distal pulses.   Pulmonary/Chest: Effort normal and breath sounds normal. No stridor. No respiratory distress. She has no wheezes. She has no rales.   Abdominal: Soft. Bowel sounds are normal. She exhibits no distension. There is no tenderness.   Musculoskeletal: Normal range of motion. She exhibits no edema, tenderness or deformity.   "   Lymphadenopathy:     She has no cervical adenopathy.   Neurological: She is alert and oriented to person, place, and time. No cranial nerve deficit.   Skin: Skin is warm and dry. She is not diaphoretic.   Psychiatric: She has a normal mood and affect.       RESULTS   Results Review:    Results from last 7 days   Lab Units 07/18/20  0527 07/17/20  0550 07/16/20 0452 07/14/20 1942   SODIUM mmol/L 130* 132* 135*   < > 137   POTASSIUM mmol/L 4.2 4.4 4.2   < > 4.7   CHLORIDE mmol/L 93* 95* 98   < > 99   CO2 mmol/L 27.0 24.0 28.0   < > 27.0   BUN mg/dL 25* 41* 26*   < > 44*   CREATININE mg/dL 3.51* 4.68* 3.23*   < > 3.95*   CALCIUM mg/dL 9.0 8.8 8.8   < > 9.5   BILIRUBIN mg/dL  --   --   --   --  0.5   ALK PHOS U/L  --   --   --   --  85   ALT (SGPT) U/L  --   --   --   --  17   AST (SGOT) U/L  --   --   --   --  25   GLUCOSE mg/dL 166* 144* 133*   < > 62*    < > = values in this interval not displayed.       Estimated Creatinine Clearance: 15.7 mL/min (A) (by C-G formula based on SCr of 3.51 mg/dL (H)).    Results from last 7 days   Lab Units 07/16/20 0452 07/14/20 1942   MAGNESIUM mg/dL  --  2.1   PHOSPHORUS mg/dL 4.6*  --              Results from last 7 days   Lab Units 07/16/20  0451 07/15/20  0313 07/14/20  1942   WBC 10*3/mm3 6.55 7.47 7.33   HEMOGLOBIN g/dL 9.4* 9.4* 9.7*   PLATELETS 10*3/mm3 167 169 168       Results from last 7 days   Lab Units 07/18/20  0527 07/17/20  0550 07/16/20 0452 07/15/20  0313 07/14/20  1942   INR  1.35* 1.40* 2.04* 5.08* 4.67*         Imaging Results (Last 24 Hours)     Procedure Component Value Units Date/Time    XR Chest 1 View [[id number]] Collected:  07/17/20 1359     Updated:  07/17/20 1434    Narrative:       Chest x-ray single view.         CLINICAL INDICATION: Shortness of breath    COMPARISON: July 14, 2020.    FINDINGS: Cardiac silhouette is enlarged in size. There is  pulmonary vascular engorgement. There is increased infiltrative  changes in both lung fields  especially left upper lobe. There is  also increasing right-sided pleural effusion. Unfavorable change  since prior exam.      Impression:       Cardiomegaly. Midline sternal sutures from prior  cardiac surgery. Cardiac valvular prosthesis.    Dual-lumen central venous catheter right jugular approach with  catheter tip in right atrium in satisfactory position. Increasing  infiltrative changes suggesting pulmonary edema type pattern  and/or superimposed pneumonitis on the left.    Increasing right-sided effusion.    Electronically signed by:  Ishmael Lees MD  7/17/2020 2:37 PM CDT  Workstation: [id number]           MEDICATIONS      aspirin 81 mg Oral Daily   budesonide-formoterol 2 puff Inhalation BID - RT   cefTRIAXone 1 g Intravenous Q24H   cholecalciferol 2,000 Units Oral Daily   citalopram 20 mg Oral Daily   dilTIAZem  mg Oral Q24H   epoetin rosales/rosales-epbx 6,000 Units Intravenous Once per day on Mon Wed Fri   ferrous sulfate 324 mg Oral Daily With Breakfast   ipratropium-albuterol 3 mL Nebulization 4x Daily - RT   methylPREDNISolone sodium succinate 20 mg Intravenous Q8H   metoprolol succinate XL 25 mg Oral Daily   naloxone 0.4 mg Intravenous Once   pantoprazole 40 mg Oral Daily   rOPINIRole 0.25 mg Oral Nightly   sodium chloride 10 mL Intravenous Q12H   sucralfate 1 g Oral 4x Daily AC & at Bedtime   traZODone 300 mg Oral Nightly          Assessment/Plan   ASSESSMENT / PLAN      Acute on chronic respiratory failure with hypoxia (CMS/HCC)    Atrial fibrillation [I48.91]    Diabetes mellitus (CMS/HCC)    Acute on chronic systolic heart failure (CMS/HCC)    Pulmonary hypertension (CMS/HCC)    ESRF (end stage renal failure) (CMS/HCC)    Coumadin toxicity    ESRD ON HD   -History of HD Monday Wednesday Friday since 2/28/2020.  -hd today again for added UF in view of her breathing status O2 req and xray findings. .  Her dry weight is 97.5 kg. Today 91.5kg? Not sure if it is correct. May need thoracentesis as well      Anemia   -H/h stable    CHF   -EF 46 to 50%  -Right pleural effusion/atelectasis  -proBNP 28, 929  -HD yesterday  -Continue Rocephin and azithromycin    HYPONATREMIA   Stable    Copd exacerbation / pna / right pleural effusion plan as above        This document has been electronically signed by Sandy Caputo MD on July 17, 2020 10:47

## 2020-07-18 NOTE — PLAN OF CARE
Problem: Patient Care Overview  Goal: Plan of Care Review  Outcome: Ongoing (interventions implemented as appropriate)  Flowsheets (Taken 7/18/2020 4118)  Outcome Summary: OT darby completed this date, co-darby with PT. Pt was fatigued but agreed to tasks, gets SOB at times. Pt was min assist for sup to sit to sup. Pt CGA to min assist with sit to stand t/f and mobility with RW, total assist to attend to O2 line, Pt fatigued quickly. Pt min assist with toileting. Pt may benefit from further skilled OT to reach PLOF/max independence with ADL. Pt may need 24/7 care and home health therapy at d/c depending on progress with goals.

## 2020-07-18 NOTE — PLAN OF CARE
Problem: NPPV/CPAP (Adult)  Goal: Signs and Symptoms of Listed Potential Problems Will be Absent, Minimized or Managed (NPPV/CPAP)  Outcome: Ongoing (interventions implemented as appropriate)  Note:   Pt tolerated BiPAP well throughout the night. ABG results improved after changes were made to BiPAP and pt has became more alert and oriented as the night went on.

## 2020-07-18 NOTE — PROGRESS NOTES
Progress Note  Colin Coreas MD  Hospitalist    Date of visit: 7/18/2020     LOS: 4 days   Patient Care Team:  Josefa Pozo APRN as PCP    Chief Complaint: dyspnea    Subjective     Interval History:     Patient Complaints: dyspnea / felt worse overnight requiring supplemental Oxygen, non re breather mask    History taken from: patient / nursing    Medication Review:   Current Facility-Administered Medications   Medication Dose Route Frequency Provider Last Rate Last Dose   • acetaminophen (TYLENOL) tablet 650 mg  650 mg Oral Q6H PRN Colin Coreas MD       • albumin human 25 % IV SOLN 12.5 g  12.5 g Intravenous PRN Sandy Caputo MD       • aspirin chewable tablet 81 mg  81 mg Oral Daily Colin Coreas MD   81 mg at 07/18/20 0801   • budesonide-formoterol (SYMBICORT) 160-4.5 MCG/ACT inhaler 2 puff  2 puff Inhalation BID - RT Colin Coreas MD   2 puff at 07/18/20 0757   • cefTRIAXone (ROCEPHIN) 1 g/100 mL 0.9% NS (MBP)  1 g Intravenous Q24H Colin Coreas MD 0 mL/hr at 07/15/20 1200 1 g at 07/17/20 1153   • cholecalciferol (VITAMIN D3) tablet 2,000 Units  2,000 Units Oral Daily Colin Coreas MD   2,000 Units at 07/18/20 0801   • citalopram (CeleXA) tablet 20 mg  20 mg Oral Daily Colin Coreas MD   20 mg at 07/18/20 0801   • dilTIAZem CD (CARDIZEM CD) 24 hr capsule 240 mg  240 mg Oral Q24H Colin Coreas MD   240 mg at 07/18/20 0801   • epoetin rosales (EPOGEN,PROCRIT) injection 6,000 Units  6,000 Units Intravenous Once per day on Mon Wed Fri Sandy Caputo MD   6,000 Units at 07/17/20 0844   • ferrous sulfate EC tablet 324 mg  324 mg Oral Daily With Breakfast Colin Coreas MD   324 mg at 07/18/20 0801   • heparin (porcine) injection 2,000 Units  2,000 Units Intracatheter PRN Sandy Caputo MD       • ipratropium-albuterol (DUO-NEB) nebulizer solution 3 mL  3 mL Nebulization 4x Daily - RT Colin Coreas MD   3 mL at 07/18/20 0738   • methylPREDNISolone sodium succinate (SOLU-Medrol) injection 20 mg  20  mg Intravenous Q8H Colin Coreas MD   20 mg at 07/18/20 0800   • metoprolol succinate XL (TOPROL-XL) 24 hr tablet 25 mg  25 mg Oral Daily Colin Coreas MD   25 mg at 07/18/20 0801   • naloxone (NARCAN) injection 0.4 mg  0.4 mg Intravenous Once Colin Coreas MD       • ondansetron (ZOFRAN) injection 4 mg  4 mg Intravenous Q6H PRN Colin Coreas MD   4 mg at 07/15/20 2103   • pantoprazole (PROTONIX) EC tablet 40 mg  40 mg Oral Daily Colin Coreas MD   40 mg at 07/18/20 0801   • rOPINIRole (REQUIP) tablet 0.25 mg  0.25 mg Oral Nightly Colin Coreas MD   0.25 mg at 07/17/20 2149   • sodium chloride 0.9 % flush 10 mL  10 mL Intravenous Q12H Colin Coreas MD   10 mL at 07/18/20 0800   • sodium chloride 0.9 % flush 10 mL  10 mL Intravenous PRN Colin Coreas MD       • sucralfate (CARAFATE) tablet 1 g  1 g Oral 4x Daily AC & at Bedtime Colin Coreas MD   1 g at 07/18/20 0801   • traZODone (DESYREL) tablet 300 mg  300 mg Oral Nightly Colin Coreas MD   300 mg at 07/17/20 2149       Review of Systems:   Review of Systems   Constitutional: Positive for fatigue. Negative for fever.   Respiratory: Positive for cough and shortness of breath. Negative for wheezing.    Cardiovascular: Positive for leg swelling.   Gastrointestinal: Positive for abdominal distention. Negative for abdominal pain, anal bleeding, blood in stool and diarrhea.   Genitourinary: Negative for decreased urine volume, dysuria, flank pain, frequency, hematuria and urgency.   Musculoskeletal: Positive for arthralgias.   Skin: Positive for pallor. Negative for color change and rash.   Neurological: Positive for weakness. Negative for tremors, seizures, syncope, numbness and headaches.   Psychiatric/Behavioral: Negative for agitation, behavioral problems and confusion.   All other systems reviewed and are negative.      Objective     Vital Signs  Temp:  [96.9 °F (36.1 °C)-98.4 °F (36.9 °C)] 98.4 °F (36.9 °C)  Heart Rate:  [56-90] 56  Resp:   [12-24] 22  BP: ()/(56-96) 152/67    Physical Exam:  Physical Exam   Constitutional: She is oriented to person, place, and time. She has a sickly appearance. No distress.   Obese    HENT:   Head: Normocephalic and atraumatic.   Eyes: Pupils are equal, round, and reactive to light. EOM are normal. No scleral icterus.   Neck: Normal range of motion. Neck supple.   Cardiovascular: An irregular rhythm present.   Pulmonary/Chest: Effort normal. No stridor. She has no wheezes. She has rales.   Abdominal: Soft. Bowel sounds are normal. She exhibits distension. She exhibits no mass. No hernia.   Musculoskeletal: Normal range of motion. She exhibits edema. She exhibits no tenderness.   Neurological: She is alert and oriented to person, place, and time. No cranial nerve deficit. Coordination normal.   Skin: Skin is warm and dry. There is pallor.   Psychiatric: She has a normal mood and affect. Her behavior is normal.   Vitals reviewed.       Results Review:    Lab Results (last 24 hours)     Procedure Component Value Units Date/Time    Protime-INR [[id number]]  (Abnormal) Collected:  07/18/20 0527    Specimen:  Blood Updated:  07/18/20 0754     Protime 17.4 Seconds      INR 1.35    Narrative:       Therapeutic range for most indications is 2.0-3.0 INR,  or 2.5-3.5 for mechanical heart valves.    Basic Metabolic Panel [[id number]]  (Abnormal) Collected:  07/18/20 0527    Specimen:  Blood Updated:  07/18/20 0702     Glucose 166 mg/dL      BUN 25 mg/dL      Creatinine 3.51 mg/dL      Sodium 130 mmol/L      Potassium 4.2 mmol/L      Chloride 93 mmol/L      CO2 27.0 mmol/L      Calcium 9.0 mg/dL      eGFR   Amer --     Comment: <15 Indicative of kidney failure.        eGFR Non African Amer 13 mL/min/1.73      Comment: <15 Indicative of kidney failure.        BUN/Creatinine Ratio 7.1     Anion Gap 10.0 mmol/L     Narrative:       GFR Normal >60  Chronic Kidney Disease <60  Kidney Failure <15      Blood Culture - Blood,  Arm, Right [[id number]] Collected:  07/14/20 2058    Specimen:  Blood from Arm, Right Updated:  07/17/20 2115     Blood Culture No growth at 3 days    Blood Gas, Arterial [[id number]]  (Abnormal) Collected:  07/17/20 2015    Specimen:  Arterial Blood Updated:  07/17/20 2030     Site Right Radial     Ramses's Test Positive     pH, Arterial 7.284 pH units      Comment: 84 Value below reference range        pCO2, Arterial 64.3 mm Hg      Comment: 83 Value above reference range        pO2, Arterial 68.5 mm Hg      Comment: 84 Value below reference range        HCO3, Arterial 30.5 mmol/L      Comment: 83 Value above reference range        Base Excess, Arterial 2.7 mmol/L      Comment: 83 Value above reference range        O2 Saturation, Arterial 93.0 %      Comment: 84 Value below reference range        Barometric Pressure for Blood Gas 750 mmHg      Modality BiPap     FIO2 50 %      Ventilator Mode AVAP     Set Tidal Volume 500     Set Mech Resp Rate 20.0     EPAP 8     Comment: Meter: [id number]     :  [id number]        Collected by TAMMY OMER    POC Glucose Once [[id number]]  (Abnormal) Collected:  07/17/20 1942    Specimen:  Blood Updated:  07/17/20 2009     Glucose 161 mg/dL      Comment: RN NotifiedOperator: [id number] DICK Castro ID: [id number]       Blood Culture - Blood, Arm, Right [[id number]] Collected:  07/14/20 1942    Specimen:  Blood from Arm, Right Updated:  07/17/20 2000     Blood Culture No growth at 3 days    Blood Gas, Arterial [[id number]]  (Abnormal) Collected:  07/17/20 1653    Specimen:  Arterial Blood Updated:  07/17/20 1703     Site Left Brachial     Ramses's Test N/A     pH, Arterial 7.252 pH units      Comment: 84 Value below reference range        pCO2, Arterial 71.4 mm Hg      Comment: 86 Value above critical limit        pO2, Arterial 93.2 mm Hg      HCO3, Arterial 31.4 mmol/L      Comment: 83 Value above reference range        Base Excess, Arterial 2.9 mmol/L      Comment:  83 Value above reference range        O2 Saturation, Arterial 96.8 %      Barometric Pressure for Blood Gas 750 mmHg      Modality BiPap     FIO2 75 %      Ventilator Mode BiPAP     IPAP 14     Comment: Meter: [id number]     :  [id number]        EPAP 6     Collected by ALLEN    Blood Gas, Arterial [[id number]]  (Abnormal) Collected:  07/17/20 1500    Specimen:  Arterial Blood Updated:  07/17/20 1508     Site Right Brachial     Ramses's Test N/A     pH, Arterial 7.262 pH units      Comment: 84 Value below reference range        pCO2, Arterial 68.7 mm Hg      Comment: 86 Value above critical limit        pO2, Arterial 137.0 mm Hg      Comment: 83 Value above reference range        HCO3, Arterial 31.0 mmol/L      Comment: 83 Value above reference range        Base Excess, Arterial 2.7 mmol/L      Comment: 83 Value above reference range        O2 Saturation, Arterial 99.2 %      Comment: 83 Value above reference range        Barometric Pressure for Blood Gas 750 mmHg      Modality NRB     FIO2 100 %      Flow Rate 15.0 lpm      Ventilator Mode NA     Collected by YOLIS. RRT     Comment: Meter: [id number]     :  [id number]       POC Glucose Once [[id number]]  (Abnormal) Collected:  07/17/20 1432    Specimen:  Blood Updated:  07/17/20 1443     Glucose 131 mg/dL      Comment: RN NotifiedOperator: [id number] KING Connie ID: [id number]             Imaging Results (Last 24 Hours)     Procedure Component Value Units Date/Time    XR Chest 1 View [[id number]] Collected:  07/17/20 1359     Updated:  07/17/20 1438    Narrative:       Chest x-ray single view.         CLINICAL INDICATION: Shortness of breath    COMPARISON: July 14, 2020.    FINDINGS: Cardiac silhouette is enlarged in size. There is  pulmonary vascular engorgement. There is increased infiltrative  changes in both lung fields especially left upper lobe. There is  also increasing right-sided pleural effusion. Unfavorable change  since  prior exam.      Impression:       Cardiomegaly. Midline sternal sutures from prior  cardiac surgery. Cardiac valvular prosthesis.    Dual-lumen central venous catheter right jugular approach with  catheter tip in right atrium in satisfactory position. Increasing  infiltrative changes suggesting pulmonary edema type pattern  and/or superimposed pneumonitis on the left.    Increasing right-sided effusion.    Electronically signed by:  Ishmael Lees MD  7/17/2020 2:37 PM CDT  Workstation: [id number]          Assessment/Plan       Acute on chronic respiratory failure with hypoxia (CMS/HCC)    Acute on chronic systolic heart failure (CMS/HCC)    Pulmonary hypertension (CMS/HCC)    ESRF (end stage renal failure) (CMS/HCC)    Pleural effusion    Atrial fibrillation [I48.91]    Diabetes mellitus (CMS/HCC)    Coumadin toxicity    Continue with the current management, dialysis (extra session today), nebulized treatments. Her dyspnea is multi factorial, related to the conditions above. Try PT/OT.    Follow up on the BMP / INR.    Colin Coreas MD  07/18/20  12:40

## 2020-07-18 NOTE — THERAPY EVALUATION
Acute Care - Occupational Therapy Initial Evaluation  [facility]     Patient Name: Brendon Skelton  : 1945  MRN: [id number]  Today's Date: 2020  Onset of Illness/Injury or Date of Surgery: 20  Date of Referral to OT: 20  Referring Physician: Dr. Coreas     Admit Date: 2020       ICD-10-CM ICD-9-CM   1. Acute on chronic respiratory failure with hypoxia (CMS/HCC) J96.21 518.84     799.02   2. Pneumonia due to infectious organism, unspecified laterality, unspecified part of lung J18.9 486   3. Acute on chronic congestive heart failure, unspecified heart failure type (CMS/[facility]) I50.9 428.0   4. COVID-19 ruled out Z03.818 V71.83   5. Impaired mobility and ADLs Z74.09 V49.89    Z78.9      Patient Active Problem List   Diagnosis   • Long term current use of anticoagulant therapy   • Personal history of heart valve replacement   • Atrial fibrillation [I48.91]   • Emphysema of lung (CMS/[facility])   • On anticoagulant therapy   • Hyperlipidemia   • Diastolic heart failure (CMS/HCC)   • Depressive disorder   • COPD (chronic obstructive pulmonary disease) (CMS/HCC)   • Diabetes mellitus (CMS/HCC)   • Myopia   • Astigmatism   • Bleeding from open wound of chest wall   • Follow-up surgery care   • Encounter for screening for malignant neoplasm of colon   • Positive colorectal cancer screening using DNA-based stool test   • Acute on chronic systolic heart failure (CMS/HCC)   • Nodule of left lung   • Chronic hypoxemic respiratory failure (CMS/HCC)   • Physical deconditioning   • Heart failure with preserved left ventricular function (HFpEF) (CMS/HCC)   • Hypertension   • Coronary artery disease involving native coronary artery without angina pectoris   • Hx of CABG   • SSS (sick sinus syndrome) (CMS/HCC)   • Pacemaker   • Stage 4 chronic kidney disease (CMS/HCC)   • Personal history of tobacco use, presenting hazards to health   • Gastrointestinal hemorrhage   • Class 2 severe obesity due to  excess calories with serious comorbidity and body mass index (BMI) of 39.0 to 39.9 in adult (CMS/HCC)   • Gastritis   • Colon polyp   • Coumadin toxicity   • Acute on chronic diastolic congestive heart failure (CMS/HCC)   • Chronic anemia   • Pulmonary hypertension (CMS/HCC)   • Confusion   • Hyponatremia   • Long term current use of anticoagulant   • Renal insufficiency   • Lower abdominal pain   • Hematoma of abdominal wall   • E coli bacteremia   • ESRF (end stage renal failure) (CMS/HCC)   • Acute on chronic respiratory failure with hypoxia (CMS/HCC)   • Coumadin toxicity   • Pleural effusion     Past Medical History:   Diagnosis Date   • Anxiety    • Aortic valve replaced    • Atrial fibrillation (CMS/HCC)    • C. difficile colitis    • Chronic hypoxemic respiratory failure (CMS/HCC)    • COPD (chronic obstructive pulmonary disease) (CMS/HCC)    • Coronary artery disease    • Depressive disorder    • Diabetes mellitus (CMS/HCC)    • Diastolic heart failure (CMS/HCC)    • Gastrointestinal hemorrhage    • Hyperlipidemia    • Hypertension    • Long term current use of anticoagulant    • Malignant neoplasm of sigmoid colon (CMS/HCC)    • Pulmonary hypertension (CMS/HCC)    • Rectal hemorrhage    • Stage 4 chronic kidney disease (CMS/HCC)      Past Surgical History:   Procedure Laterality Date   • AORTIC VALVE REPAIR/REPLACEMENT     • CARDIAC ELECTROPHYSIOLOGY PROCEDURE N/A 3/20/2017   • CARDIAC PACEMAKER PLACEMENT     • CATARACT EXTRACTION W/ INTRAOCULAR LENS IMPLANT Left 2/1/2019   • CATARACT EXTRACTION W/ INTRAOCULAR LENS IMPLANT Right 2/8/2019   • COLON RESECTION Left 2/10/2020   • COLONOSCOPY N/A 6/19/2019   • COLONOSCOPY N/A 6/24/2019   • ENDOSCOPY N/A 6/19/2019   • INTERVENTIONAL RADIOLOGY PROCEDURE N/A 3/6/2020    Procedure: tunneled central venous catheter placement;  Surgeon: Polo Feliz MD;  Location: [facility];  Service: Interventional Radiology;  Laterality: N/A;   •  PACEMAKER REPLACEMENT N/A 3/21/2017   • SIGMOIDOSCOPY N/A 9/30/2019   • UPPER GASTROINTESTINAL ENDOSCOPY            OT ASSESSMENT FLOWSHEET (last 12 hours)      Occupational Therapy Evaluation     Row Name 07/18/20 1409                   OT Evaluation Time/Intention    Document Type  evaluation  -        Mode of Treatment  co-treatment;occupational therapy;physical therapy  -        Total Evaluation Minutes, Occupational Therapy  41  -        Patient Effort  good  -        Symptoms Noted During/After Treatment  fatigue  -        Comment  RN aware of session   -           General Information    Patient Profile Reviewed?  yes  -        Onset of Illness/Injury or Date of Surgery  07/14/20  -        Referring Physician  Dr. Coreas   -        Patient Observations  alert;cooperative;agree to therapy  -        Patient/Family Observations  no family present   -        General Observations of Patient  pt supine HOB up on nonrebreather, IV, bed alarm   -        Prior Level of Function  independent:;all household mobility;transfer;bed mobility;w/c or scooter;ADL's;home management family assist with IADL   -        Equipment Currently Used at Home  rollator;wheelchair, motorized;shower chair walker inside, power chair in and outside  -        Existing Precautions/Restrictions  fall;oxygen therapy device and L/min gait belt placement - port   -        Limitations/Impairments  safety/cognitive  -        Risks Reviewed  patient:  -        Benefits Reviewed  patient:  -           Relationship/Environment    Lives With  alone  -           Resource/Environmental Concerns    Current Living Arrangements  home/apartment/condo duplex next door to son  -           Cognitive Assessment/Interventions    Additional Documentation  Cognitive Assessment/Intervention (Group)  [facility]           Cognitive Assessment/Intervention- PT/OT    Affect/Mental Status (Cognitive)  agitated;WFL  -        Orientation  Status (Cognition)  oriented x 4  -        Follows Commands (Cognition)  over 90% accuracy;verbal cues/prompting required;repetition of directions required  -        Safety Deficit (Cognitive)  mild deficit  -        Personal Safety Interventions  fall prevention program maintained;gait belt;nonskid shoes/slippers when out of bed;supervised activity  -           Safety Issues, Functional Mobility    Safety Issues Affecting Function (Mobility)  awareness of need for assistance;insight into deficits/self awareness;judgment;positioning of assistive device;problem solving;sequencing abilities;safety precautions follow-through/compliance;safety precaution awareness  -        Impairments Affecting Function (Mobility)  balance;coordination;endurance/activity tolerance;motor planning;pain;shortness of breath;strength  -           Bed Mobility Assessment/Treatment    Bed Mobility Assessment/Treatment  supine-sit;sit-supine  -        Supine-Sit [place] (Bed Mobility)  minimum assist (75% patient effort);nonverbal cues (demo/gesture);verbal cues  -        Sit-Supine [place] (Bed Mobility)  minimum assist (75% patient effort);nonverbal cues (demo/gesture);verbal cues  -        Bed Mobility, Safety Issues  decreased use of arms for pushing/pulling;decreased use of legs for bridging/pushing;impaired trunk control for bed mobility  -        Assistive Device (Bed Mobility)  bed rails;head of bed elevated  -        Comment (Bed Mobility)  pt wanted HOB up to get back in, assist with legs to get into bed  -           Functional Mobility    Functional Mobility- Ind. Level  minimum assist (75% patient effort);contact guard assist;nonverbal cues required (demo/gesture);verbal cues required  -        Functional Mobility- Device  rolling walker  -        Functional Mobility- Comment  pt unsteady at times, fatigued quickly, total assist with O2 line   -           Transfer Assessment/Treatment     Transfer Assessment/Treatment  stand-sit transfer;sit-stand transfer;toilet transfer  -           Sit-Stand Transfer    Sit-Stand [place] (Transfers)  minimum assist (75% patient effort);nonverbal cues (demo/gesture);verbal cues  -        Assistive Device (Sit-Stand Transfers)  walker, front-wheeled  -           Stand-Sit Transfer    Stand-Sit [place] (Transfers)  minimum assist (75% patient effort);contact guard;nonverbal cues (demo/gesture);verbal cues  -        Assistive Device (Stand-Sit Transfers)  walker, front-wheeled  -           Toilet Transfer    Type (Toilet Transfer)  stand-sit;sit-stand  -        [place] Level (Toilet Transfer)  minimum assist (75% patient effort);nonverbal cues (demo/gesture);verbal cues  -        Assistive Device (Toilet Transfer)  grab bars/safety frame;walker, front-wheeled  [facility]           ADL Assessment/Intervention    BADL Assessment/Intervention  toileting  -           Toileting Assessment/Training    [place] Level (Toileting)  toileting skills;minimum assist (75% patient effort);contact guard assist;nonverbal cues (demo/gesture);verbal cues  -        Assistive Devices (Toileting)  grab bar/safety frame  -           BADL Safety/Performance    Impairments, [facility] Safety/Performance  balance;endurance/activity tolerance;coordination;motor control;motor planning;pain;range of motion;shortness of breath;strength;trunk/postural control  -           General ROM    GENERAL ROM COMMENTS  BUE grossly WFL   -           MMT (Manual Muscle Testing)    General MMT Comments  BUE  grossly 4/5; BUE grossly 4-/5   -           Motor Assessment/Interventions    Additional Documentation  Balance (Group)  -           Balance    Balance  static sitting balance;static standing balance;dynamic sitting balance;dynamic standing balance  -           Static Sitting Balance    Level of [place] (Unsupported Sitting, Static Balance)   supervision;independent  -           Dynamic Sitting Balance    Level of [place], Reaches Outside Midline (Sitting, Dynamic Balance)  standby assist;contact guard assist  -           Static Standing Balance    Level of [place] (Supported Standing, Static Balance)  contact guard assist  -           Dynamic Standing Balance    Level of [place], Reaches Outside Midline (Standing, Dynamic Balance)  minimal assist, 75% patient effort  -           Sensory Assessment/Intervention    Additional Documentation  Hearing Assessment (Group);Vision Assessment/Intervention (Group)  -           Light Touch Sensation Assessment    Left Upper Extremity: Light Touch Sensation Assessment  intact grossly   -        Right Upper Extremity: Light Touch Sensation Assessment  intact grossly   -           Hearing Assessment    Hearing Status  WFL  -           Vision Assessment/Intervention    Visual Impairment/Limitations  corrective lenses for reading  -           Positioning and Restraints    Pre-Treatment Position  in bed  -        Post Treatment Position  bed  -        In Bed  notified nsg;supine;call light within reach;encouraged to call for assist;exit alarm on;side rails up x2  -           Pain Assessment    Additional Documentation  Pain Scale: Numbers Pre/Post-Treatment (Group)  -           Pain Scale: Numbers Pre/Post-Treatment    Pain Scale: Numbers, Pretreatment  8/10  -        Pain Scale: Numbers, Post-Treatment  0/10 - no pain  -        Pain Location - Side  Left  -        Pain Location  -- hand  -        Pre/Post Treatment Pain Comment  reports muscle cramp after diaylsis, pt reports and RN stated pt just had Tylenol  -        Pain Intervention(s)  Medication (See MAR);Repositioned;Ambulation/increased activity;Distraction;Emotional support;Prayers;Rest  -           Plan of Care Review    Plan of Care Reviewed With  patient  -           Clinical Impression (OT)    Date of  Referral to OT  07/16/20  -        OT Diagnosis  Impaired mobility and ADL   -BH        Prognosis (OT Eval)  fair  -BH        Functional Level at Time of Evaluation (OT Eval)  pt decreased strength, endurance, balance, safety and independence with ADL   -BH        Patient/Family Goals Statement (OT Eval)  to go home  -        Criteria for Skilled Therapeutic Interventions Met (OT Eval)  yes;treatment indicated  -        Rehab Potential (OT Eval)  fair, will monitor progress closely  -        Therapy Frequency (OT Eval)  other (see comments) 5-7 days a week   -        Predicted Duration of Therapy Intervention (Therapy Eval)  until d/c   -BH        Care Plan Review (OT)  evaluation/treatment results reviewed;care plan/treatment goals reviewed;risks/benefits reviewed;current/potential barriers reviewed;patient/other agree to care plan  -        Anticipated Discharge Disposition (OT)  anticipate therapy at next level of care;home with 24/7 care;home with home health  -           Vital Signs    Pre Systolic BP Rehab  135  -BH        Pre Treatment Diastolic BP  70  -BH        Intra Systolic BP Rehab  147  -BH        Intra Treatment Diastolic BP  69  -BH        Post Systolic BP Rehab  126  -BH        Post Treatment Diastolic BP  75  -BH        Pretreatment Heart Rate (beats/min)  71  -BH        Intratreatment Heart Rate (beats/min)  78  -BH        Posttreatment Heart Rate (beats/min)  79  -BH        Pre SpO2 (%)  100  -BH        O2 Delivery Pre Treatment  non-rebreather  -BH        Intra SpO2 (%)  95  -BH        O2 Delivery Intra Treatment  non-rebreather  -BH        Post SpO2 (%)  95  -BH        O2 Delivery Post Treatment  non-rebreather  -BH        Pre Patient Position  Supine  -BH        Intra Patient Position  Sitting  -BH        Post Patient Position  Supine  -BH           Planned OT Interventions    Planned Therapy Interventions (OT Eval)  activity tolerance training;adaptive equipment training;BADL  retraining;cognitive/visual perception retraining;functional balance retraining;IADL retraining;neuromuscular control/coordination retraining;occupation/activity based interventions;passive ROM/stretching;patient/caregiver education/training;ROM/therapeutic exercise;strengthening exercise;transfer/mobility retraining  -           OT Goals    Transfer Goal Selection (OT)  transfer, OT goal 1  -        Bathing Goal Selection (OT)  bathing, OT goal 1  -        Dressing Goal Selection (OT)  dressing, OT goal 1  -        Toileting Goal Selection (OT)  toileting, OT goal 1  -        Endurance Goal Selection (OT)  endurance, OT goal 1  -        Functional Mobility Goal Selection (OT)  functional mobility, OT goal 1  -        Additional Documentation  Endurance Goal Selection (OT) (Row);Functional Mobility Selection (OT) (Row)  -           Transfer Goal 1 (OT)    Activity/Assistive Device (Transfer Goal 1, OT)  toilet  -        [place] Level/Cues Needed (Transfer Goal 1, OT)  supervision required  -        Time Frame (Transfer Goal 1, OT)  long term goal (LTG);by discharge  -        Progress/Outcome (Transfer Goal 1, OT)  goal not met  -           Bathing Goal 1 (OT)    Activity/Assistive Device (Bathing Goal 1, OT)  bathing skills, all  -        [place] Level/Cues Needed (Bathing Goal 1, OT)  set-up required;supervision required  -        Time Frame (Bathing Goal 1, OT)  long term goal (LTG);by discharge  -        Progress/Outcomes (Bathing Goal 1, OT)  goal not met  -           Dressing Goal 1 (OT)    Activity/Assistive Device (Dressing Goal 1, OT)  dressing skills, all  -        [place]/Cues Needed (Dressing Goal 1, OT)  set-up required;supervision required  -        Time Frame (Dressing Goal 1, OT)  long term goal (LTG);by discharge  -        Progress/Outcome (Dressing Goal 1, OT)  goal not met  -           Toileting Goal 1 (OT)    Activity/Device (Toileting Goal 1,  OT)  toileting skills, all  -        [place] Level/Cues Needed (Toileting Goal 1, OT)  supervision required  -        Time Frame (Toileting Goal 1, OT)  long term goal (LTG);by discharge  -        Progress/Outcome (Toileting Goal 1, OT)  goal not met  -            Endurance Goal 1 (OT)    Activity Level (Endurance Goal 1, OT)  endurance 2 good 30 min functional task 3 or less rest breaks O2 90 or up  -        Time Frame (Endurance Goal 1, OT)  long term goal (LTG);by discharge  -        Progress/Outcome (Endurance Goal 1, OT)  goal not met  -           Functional Mobility Goal 1 (OT)    Activity/Assistive Device (Functional Mobility Goal 1, OT)  walker, rolling  -        [place] Level/Cues Needed (Functional Mobility Goal 1, OT)  standby assist;supervision required  -        Distance Goal 1 (Functional Mobility, OT)  for ADL tasks no LOB and good safety  -        Time Frame (Functional Mobility Goal 1, OT)  long term goal (LTG);by discharge  -        Progress/Outcome (Functional Mobility Goal 1, OT)  goal not met  -           Patient Education Goal (OT)    Activity (Patient Education Goal, OT)  Pt will communicate good home safety awareness.   -        [place]/Cues/Accuracy (Memory Goal 2, OT)  verbalizes understanding  -        Time Frame (Patient Education Goal, OT)  long term goal (LTG);by discharge  -        Progress/Outcome (Patient Education Goal, OT)  goal not met  -           Living Environment    Home Accessibility  wheelchair accessible;tub/shower is not walk in ramp handrail one side   -          User Key  (r) = Recorded By, (t) = Taken By, (c) = Cosigned By    Initials Name Effective Dates     Shyann Graves, OTR/L 06/08/18 -          Occupational Therapy Education                 Title: PT OT SLP Therapies (In Progress)     Topic: Occupational Therapy (In Progress)     Point: ADL training (In Progress)     Description:   Instruct learner(s) on proper  safety adaptation and remediation techniques during self care or transfers.   Instruct in proper use of assistive devices.              Learning Progress Summary           Patient Acceptance, E, NR by  at 7/18/2020 1511    Comment:  Educated about OT and POC. Educated to call for assist. Educated on safety throughout.                   Point: Home exercise program (Not Started)     Description:   Instruct learner(s) on appropriate technique for monitoring, assisting and/or progressing therapeutic exercises/activities.              Learner Progress:   Not documented in this visit.          Point: Precautions (In Progress)     Description:   Instruct learner(s) on prescribed precautions during self-care and functional transfers.              Learning Progress Summary           Patient Acceptance, E, NR by  at 7/18/2020 1511    Comment:  Educated about OT and POC. Educated to call for assist. Educated on safety throughout.                   Point: Body mechanics (Not Started)     Description:   Instruct learner(s) on proper positioning and spine alignment during self-care, functional mobility activities and/or exercises.              Learner Progress:   Not documented in this visit.                      User Key     Initials Effective Dates Name Provider Type Discipline     06/08/18 -  Shyann Graves, OTR/L Occupational Therapist OT                  OT Recommendation and Plan  Outcome Summary/Treatment Plan (OT)  Anticipated Discharge Disposition (OT): anticipate therapy at next level of care, home with 24/7 care, home with home health  Planned Therapy Interventions (OT Eval): activity tolerance training, adaptive equipment training, BADL retraining, cognitive/visual perception retraining, functional balance retraining, IADL retraining, neuromuscular control/coordination retraining, occupation/activity based interventions, passive ROM/stretching, patient/caregiver education/training, ROM/therapeutic exercise,  strengthening exercise, transfer/mobility retraining  Therapy Frequency (OT Eval): other (see comments)(5-7 days a week )  Plan of Care Review  Plan of Care Reviewed With: patient  Plan of Care Reviewed With: patient  Outcome Summary: OT darby completed this date, co-eval with PT. Pt was fatigued but agreed to tasks, gets SOB at times. Pt was min assist for sup to sit to sup. Pt CGA to min assist with sit to stand t/f and mobility with RW, total assist to attend to O2 line, Pt fatigued quickly. Pt min assist with toileting. Pt may benefit from further skilled OT to reach PLOF/max independence with ADL. Pt may need 24/7 care and home health therapy at d/c depending on progress with goals.    Outcome Measures     Row Name 07/18/20 1139             How much help from another is currently needed...    Putting on and taking off regular lower body clothing?  2  -BH      Bathing (including washing, rinsing, and drying)  3  -BH      Toileting (which includes using toilet bed pan or urinal)  3  -BH      Putting on and taking off regular upper body clothing  3  -BH      Taking care of personal grooming (such as brushing teeth)  3  -BH      Eating meals  4  -BH      AM-PAC 6 Clicks Score (OT)  18  -         Functional Assessment    Outcome Measure Options  AM-PAC 6 Clicks Daily Activity (OT)  -        User Key  (r) = Recorded By, (t) = Taken By, (c) = Cosigned By    Initials Name Provider Type    Shyann Drummond, OTR/L Occupational Therapist          Time Calculation:   Time Calculation- OT     Row Name 07/18/20 9302             Time Calculation-     OT Start Time  1409  -      OT Stop Time  1450  -      OT Time Calculation (min)  41 min  -      OT Received On  07/18/20  -      OT Goal Re-Cert Due Date  07/31/20  -        User Key  (r) = Recorded By, (t) = Taken By, (c) = Cosigned By    Initials Name Provider Type    Shyann Drummond, OTR/L Occupational Therapist        Therapy Charges for Today     Code  Description Service Date Service Provider Modifiers Qty    [id number] HC OT EVAL MOD COMPLEXITY 3 7/18/2020 Shyann Graves, OTR/L GO 1               Shyann Graves OTR/L  7/18/2020

## 2020-07-18 NOTE — THERAPY EVALUATION
Acute Care - Physical Therapy Initial Evaluation  [facility]     Patient Name: Brendon Skelton  : 1945  MRN: [id number]  Today's Date: 2020   Onset of Illness/Injury or Date of Surgery: 20     Referring Physician: Dr. Coreas       Admit Date: 2020    Visit Dx:     ICD-10-CM ICD-9-CM   1. Acute on chronic respiratory failure with hypoxia (CMS/HCC) J96.21 518.84     799.02   2. Pneumonia due to infectious organism, unspecified laterality, unspecified part of lung J18.9 486   3. Acute on chronic congestive heart failure, unspecified heart failure type (CMS/HCC) I50.9 428.0   4. COVID-19 ruled out Z03.818 V71.83   5. Impaired mobility and ADLs Z74.09 V49.89    Z78.9    6. Impaired functional mobility, balance, gait, and endurance Z74.09 V49.89     Patient Active Problem List   Diagnosis   • Long term current use of anticoagulant therapy   • Personal history of heart valve replacement   • Atrial fibrillation [I48.91]   • Emphysema of lung (CMS/HCC)   • On anticoagulant therapy   • Hyperlipidemia   • Diastolic heart failure (CMS/HCC)   • Depressive disorder   • COPD (chronic obstructive pulmonary disease) (CMS/HCC)   • Diabetes mellitus (CMS/HCC)   • Myopia   • Astigmatism   • Bleeding from open wound of chest wall   • Follow-up surgery care   • Encounter for screening for malignant neoplasm of colon   • Positive colorectal cancer screening using DNA-based stool test   • Acute on chronic systolic heart failure (CMS/HCC)   • Nodule of left lung   • Chronic hypoxemic respiratory failure (CMS/HCC)   • Physical deconditioning   • Heart failure with preserved left ventricular function (HFpEF) (CMS/HCC)   • Hypertension   • Coronary artery disease involving native coronary artery without angina pectoris   • Hx of CABG   • SSS (sick sinus syndrome) (CMS/HCC)   • Pacemaker   • Stage 4 chronic kidney disease (CMS/HCC)   • Personal history of tobacco use, presenting hazards to health   •  Gastrointestinal hemorrhage   • Class 2 severe obesity due to excess calories with serious comorbidity and body mass index (BMI) of 39.0 to 39.9 in adult (CMS/HCC)   • Gastritis   • Colon polyp   • Coumadin toxicity   • Acute on chronic diastolic congestive heart failure (CMS/HCC)   • Chronic anemia   • Pulmonary hypertension (CMS/HCC)   • Confusion   • Hyponatremia   • Long term current use of anticoagulant   • Renal insufficiency   • Lower abdominal pain   • Hematoma of abdominal wall   • E coli bacteremia   • ESRF (end stage renal failure) (CMS/HCC)   • Acute on chronic respiratory failure with hypoxia (CMS/HCC)   • Coumadin toxicity   • Pleural effusion     Past Medical History:   Diagnosis Date   • Anxiety    • Aortic valve replaced    • Atrial fibrillation (CMS/HCC)    • C. difficile colitis    • Chronic hypoxemic respiratory failure (CMS/HCC)    • COPD (chronic obstructive pulmonary disease) (CMS/HCC)    • Coronary artery disease    • Depressive disorder    • Diabetes mellitus (CMS/HCC)    • Diastolic heart failure (CMS/HCC)    • Gastrointestinal hemorrhage    • Hyperlipidemia    • Hypertension    • Long term current use of anticoagulant    • Malignant neoplasm of sigmoid colon (CMS/HCC)    • Pulmonary hypertension (CMS/HCC)    • Rectal hemorrhage    • Stage 4 chronic kidney disease (CMS/HCC)      Past Surgical History:   Procedure Laterality Date   • AORTIC VALVE REPAIR/REPLACEMENT     • CARDIAC ELECTROPHYSIOLOGY PROCEDURE N/A 3/20/2017   • CARDIAC PACEMAKER PLACEMENT     • CATARACT EXTRACTION W/ INTRAOCULAR LENS IMPLANT Left 2/1/2019   • CATARACT EXTRACTION W/ INTRAOCULAR LENS IMPLANT Right 2/8/2019   • COLON RESECTION Left 2/10/2020   • COLONOSCOPY N/A 6/19/2019   • COLONOSCOPY N/A 6/24/2019   • ENDOSCOPY N/A 6/19/2019   • INTERVENTIONAL RADIOLOGY PROCEDURE N/A 3/6/2020    Procedure: tunneled central venous catheter placement;  Surgeon: Polo Feliz MD;  Location: [facility];   Service: Interventional Radiology;  Laterality: N/A;   • PACEMAKER REPLACEMENT N/A 3/21/2017   • SIGMOIDOSCOPY N/A 9/30/2019   • UPPER GASTROINTESTINAL ENDOSCOPY          PT ASSESSMENT (last 12 hours)      Physical Therapy Evaluation     Row Name 07/18/20 1411          PT Evaluation Time/Intention    Document Type  evaluation  -GB     Mode of Treatment  co-treatment;occupational therapy;physical therapy  -GB     Patient Effort  good  -GB     Comment  RN approved eval  -GB     Row Name 07/18/20 1411          General Information    Patient Profile Reviewed?  yes  -GB     Patient Observations  alert;cooperative;agree to therapy  -GB     Prior Level of Function  independent:;all household mobility;ADL's  -GB     Equipment Currently Used at Home  rollator;wheelchair, motorized;shower chair walker inside, power chair in and outside  -GB     Existing Precautions/Restrictions  fall;oxygen therapy device and L/min gait belt placement - port   -GB     Limitations/Impairments  safety/cognitive  -GB     Risks Reviewed  patient:;LOB;dizziness  -GB     Benefits Reviewed  patient:;improve function;increase independence;increase strength  -GB     Row Name 07/18/20 1411          Relationship/Environment    Primary Source of Support/Comfort  child(leona)  -GB     Lives With  alone  -GB     Concerns About Impact on Relationships  son in duplex next to her  -GB     Row Name 07/18/20 1411          Living Environment    Home Accessibility  wheelchair accessible  -GB     Living Environment Comment  ramp w/ hand rail one side  -GB     Row Name 07/18/20 1411          Cognitive Assessment/Intervention- PT/OT    Affect/Mental Status (Cognitive)  agitated;WFL  -GB     Orientation Status (Cognition)  oriented x 4  -GB     Follows Commands (Cognition)  over 90% accuracy;verbal cues/prompting required;repetition of directions required  -GB     Personal Safety Interventions  fall prevention program maintained  -GB     Row Name 07/18/20 1411           Safety Issues, Functional Mobility    Impairments Affecting Function (Mobility)  balance;coordination;endurance/activity tolerance;motor planning;pain;shortness of breath;strength  -     Row Name 07/18/20 1411          Bed Mobility Assessment/Treatment    Bed Mobility Assessment/Treatment  bed mobility (all) activities  -     Supine-Sit [place] (Bed Mobility)  minimum assist (75% patient effort);nonverbal cues (demo/gesture);verbal cues  -     Sit-Supine [place] (Bed Mobility)  minimum assist (75% patient effort);nonverbal cues (demo/gesture);verbal cues  -     Bed Mobility, Safety Issues  decreased use of arms for pushing/pulling;decreased use of legs for bridging/pushing;impaired trunk control for bed mobility  -     Assistive Device (Bed Mobility)  bed rails;head of bed elevated  -     Row Name 07/18/20 1411          Transfer Assessment/Treatment    Transfer Assessment/Treatment  stand-sit transfer;sit-stand transfer;toilet transfer  -     Sit-Stand [place] (Transfers)  minimum assist (75% patient effort);nonverbal cues (demo/gesture);verbal cues  -     Stand-Sit [place] (Transfers)  minimum assist (75% patient effort);contact guard;nonverbal cues (demo/gesture);verbal cues  -     [place] Level (Toilet Transfer)  minimum assist (75% patient effort);nonverbal cues (demo/gesture);verbal cues  -     Assistive Device (Toilet Transfer)  grab bars/safety frame;walker, front-wheeled  -     Row Name 07/18/20 1411          Sit-Stand Transfer    Assistive Device (Sit-Stand Transfers)  walker, front-wheeled  -     Row Name 07/18/20 1411          Stand-Sit Transfer    Assistive Device (Stand-Sit Transfers)  walker, front-wheeled  -     Row Name 07/18/20 1411          Toilet Transfer    Type (Toilet Transfer)  stand-sit;sit-stand  -     Row Name 07/18/20 1411          Gait/Stairs Assessment/Training    Gait/Stairs Assessment/Training  gait/ambulation  independence;gait/ambulation assistive device;distance ambulated;gait pattern;gait deviations  -     [place] Level (Gait)  contact guard;minimum assist (75% patient effort);1 person to manage equipment;1 person assist  -     Assistive Device (Gait)  walker, front-wheeled  -     Distance in Feet (Gait)  6,6,  -GB     Pattern (Gait)  step-through  -     Deviations/Abnormal Patterns (Gait)  stride length decreased  -     Comment (Gait/Stairs)  SOA post gait but quick recovery  -GB     Row Name 07/18/20 1411          General ROM    GENERAL ROM COMMENTS  hedy LE AROM WFL hip, knee and ankle flx/ext  -GB     Row Name 07/18/20 1411          MMT (Manual Muscle Testing)    General MMT Comments  grossly 4/5 hedy hip, knee & ankle flx/ext; abd-add  -GB     Row Name 07/18/20 1411          Static Sitting Balance    Level of [place] (Unsupported Sitting, Static Balance)  supervision;independent  -GB     Row Name 07/18/20 1411          Dynamic Sitting Balance    Level of [place], Reaches Outside Midline (Sitting, Dynamic Balance)  standby assist  -GB     Row Name 07/18/20 1411          Static Standing Balance    Level of [place] (Supported Standing, Static Balance)  contact guard assist  -GB     Row Name 07/18/20 1411          Dynamic Standing Balance    Level of [place], Reaches Outside Midline (Standing, Dynamic Balance)  minimal assist, 75% patient effort  -GB     Row Name 07/18/20 1411          Sensory Assessment/Intervention    Sensory General Assessment  no sensation deficits identified  -GB     Row Name 07/18/20 1411          Hearing Assessment    Hearing Status  WFL  -GB     Row Name 07/18/20 1411          Vision Assessment/Intervention    Visual Impairment/Limitations  corrective lenses for reading  -GB     Row Name 07/18/20 1411          Pain Assessment    Additional Documentation  Pain Scale: Numbers Pre/Post-Treatment (Group)  -GB     Row Name 07/18/20 1411          Pain Scale: Numbers  Pre/Post-Treatment    Pain Scale: Numbers, Pretreatment  8/10  -GB     Pain Scale: Numbers, Post-Treatment  0/10 - no pain  -GB     Pain Location - Side  Left  -GB     Pain Location  -- hand  -GB     Pain Intervention(s)  Repositioned;Ambulation/increased activity  -GB     Row Name 07/18/20 1411          Plan of Care Review    Plan of Care Reviewed With  patient  -GB     Outcome Summary  PT eval as co-eval w/ OT. Pt just returned from dialysis. She demonstrated min assist sup<>sit<>sup and sit<>stand; gait w/ min assist of 1 plus 1 for equipment 6 ft x 2. Pt has been community mobile driving her powerchair to/from grocery & getting her food as needed. Her goal is being as indep as possible so will need skilled services to address ex ata, mobiltiy and safety in self care in order to return home alone w/ support of family intermittantly.  HH PT may be good option post d/c. If she does go home recommend family stay w/ her initially to be availalbe, or consider a rehab to home program if not indep by time of d/c   -GB     Row Name 07/18/20 1411          Physical Therapy Clinical Impression    Criteria for Skilled Interventions Met (PT Clinical Impression)  yes  -GB     Rehab Potential (PT Clinical Summary)  good, to achieve stated therapy goals  -GB     Predicted Duration of Therapy (PT)  2 wks  -GB     Care Plan Review (PT)  patient/other agree to care plan  -GB     Row Name 07/18/20 1411          Vital Signs    Pre Systolic BP Rehab  135  -GB     Pre Treatment Diastolic BP  70  -GB     Intra Systolic BP Rehab  147  -GB     Intra Treatment Diastolic BP  69  -GB     Post Systolic BP Rehab  126  -GB     Post Treatment Diastolic BP  75  -GB     Pretreatment Heart Rate (beats/min)  71  -GB     Intratreatment Heart Rate (beats/min)  78  -GB     Posttreatment Heart Rate (beats/min)  79  -GB     Pre SpO2 (%)  100  -GB     O2 Delivery Pre Treatment  non-rebreather  -GB     Intra SpO2 (%)  95  -GB     O2 Delivery Intra  Treatment  non-rebreather  -GB     Post SpO2 (%)  95  -GB     O2 Delivery Post Treatment  non-rebreather  -GB     Pre Patient Position  Sitting  -GB     Intra Patient Position  Supine post 6,6 ft gait to toilet/up/down  -GB     Row Name 07/18/20 1411          Physical Therapy Goals    Bed Mobility Goal Selection (PT)  bed mobility, PT goal 1  -GB     Transfer Goal Selection (PT)  transfer, PT goal 1  -GB     Gait Training Goal Selection (PT)  gait training, PT goal 1  -GB     Wheelchair Locomotion Goal Selection (PT)  wheelchair locomotion, PT goal (free text)  -GB     Additional Documentation  Wheelchair Locomotion Goal Selection (PT) (Row)  -GB     Row Name 07/18/20 1411          Bed Mobility Goal 1 (PT)    Activity/Assistive Device (Bed Mobility Goal 1, PT)  bed mobility activities, all  -GB     [place] Level/Cues Needed (Bed Mobility Goal 1, PT)  independent;conditional independence  -GB     Time Frame (Bed Mobility Goal 1, PT)  3 days  -GB     Barriers (Bed Mobility Goal 1, PT)  strength  -GB     Progress/Outcomes (Bed Mobility Goal 1, PT)  goal not met  -GB     Row Name 07/18/20 1411          Transfer Goal 1 (PT)    Activity/Assistive Device (Transfer Goal 1, PT)  bed-to-chair/chair-to-bed;wheelchair transfer  -GB     [place] Level/Cues Needed (Transfer Goal 1, PT)  conditional independence;supervision required  -GB     Time Frame (Transfer Goal 1, PT)  1 week  -GB     Barriers (Transfers Goal 1, PT)  strength  -GB     Progress/Outcome (Transfer Goal 1, PT)  goal not met  -GB     Row Name 07/18/20 1411          Gait Training Goal 1 (PT)    Activity/Assistive Device (Gait Training Goal 1, PT)  gait (walking locomotion);assistive device use;decrease fall risk;increase endurance/gait distance  -GB     [place] Level (Gait Training Goal 1, PT)  conditional independence  -GB     Distance (Gait Goal 1, PT)  150 ft or more  -GB     Time Frame (Gait Training Goal 1, PT)  2 weeks  -GB     Barriers (Gait  Training Goal 1, PT)  strength, ex ata  -GB     Progress/Outcome (Gait Training Goal 1, PT)  goal not met  -GB     Row Name 07/18/20 1411          Wheelchair Locomotion Goal (PT)    Wheelchair Locomotion Goal (PT)  indep mobility in/out of tight spaces  -GB     Time Frame (Wheelchair Locomotion Goal, PT)  by discharge  -GB     Barriers (Wheelchair Locomotion Goal, PT)  weakness  -GB     Progress/Outcome (Wheelchair Locomotion Goal, PT)  goal not met  -GB     Row Name 07/18/20 1411          Positioning and Restraints    Pre-Treatment Position  in bed  -GB     Post Treatment Position  bed  -GB     In Bed  notified nsg;supine;call light within reach;encouraged to call for assist;fowlers;exit alarm on;side rails up x2  -GB       User Key  (r) = Recorded By, (t) = Taken By, (c) = Cosigned By    Initials Name Provider Type    Tisha Loo, PT Physical Therapist        Physical Therapy Education                 Title: PT OT SLP Therapies (In Progress)     Topic: Physical Therapy (In Progress)     Point: Mobility training (In Progress)     Description:   Instruct learner(s) on safety and technique for assisting patient out of bed, chair or wheelchair.  Instruct in the proper use of assistive devices, such as walker, crutches, cane or brace.              Patient Friendly Description:   It's important to get you on your feet again, but we need to do so in a way that is safe for you. Falling has serious consequences, and your personal safety is the most important thing of all.        When it's time to get out of bed, one of us or a family member will sit next to you on the bed to give you support.     If your doctor or nurse tells you to use a walker, crutches, a cane, or a brace, be sure you use it every time you get out of bed, even if you think you don't need it.    Learning Progress Summary           Patient Acceptance, D, NR by REGI at 7/18/2020 1602    Comment:  POC, mobiity & transfers to/from bathroom                    Point: Home exercise program (In Progress)     Description:   Instruct learner(s) on appropriate technique for monitoring, assisting and/or progressing patient with therapeutic exercises and activities.              Learning Progress Summary           Patient Acceptance, D, NR by  at 7/18/2020 1602    Comment:  POC, mobiity & transfers to/from bathroom                   Point: Body mechanics (In Progress)     Description:   Instruct learner(s) on proper positioning and spine alignment for patient and/or caregiver during mobility tasks and/or exercises.              Learning Progress Summary           Patient Acceptance, D, NR by  at 7/18/2020 1602    Comment:  POC, mobiity & transfers to/from bathroom                   Point: Precautions (In Progress)     Description:   Instruct learner(s) on prescribed precautions during mobility and gait tasks              Learning Progress Summary           Patient Acceptance, D, NR by  at 7/18/2020 1602    Comment:  POC, mobiity & transfers to/from bathroom                               User Key     Initials Effective Dates Name Provider Type Discipline     04/03/18 -  Tisha Mehta, PT Physical Therapist PT              PT Recommendation and Plan  Anticipated Discharge Disposition (PT): anticipate therapy at next level of care, home with 24/7 care, home with home health, transitional care(rehab to home if needed to enable home alone safely)  Planned Therapy Interventions (PT Eval): balance training, bed mobility training, gait training, home exercise program, patient/family education, strengthening, transfer training, wheelchair management/propulsion training  Therapy Frequency (PT Clinical Impression): other (see comments)(6 d/wk)  Outcome Summary/Treatment Plan (PT)  Anticipated Discharge Disposition (PT): anticipate therapy at next level of care, home with 24/7 care, home with home health, transitional care(rehab to home if needed to enable  home alone safely)  Plan of Care Reviewed With: patient  Outcome Summary: PT eval as co-eval w/ OT. Pt just returned from dialysis. She demonstrated min assist sup<>sit<>sup and sit<>stand; gait w/ min assist of 1 plus 1 for equipment 6 ft x 2. Pt has been community mobile driving her powerchair to/from grocery & getting her food as needed. Her goal is being as indep as possible so will need skilled services to address ex ata, mobiltiy and safety in self care in order to return home alone w/ support of family intermittantly.  HH PT may be good option post d/c. If she does go home recommend family stay w/ her initially to be availalbe, or consider a rehab to home program if not indep by time of d/c   Outcome Measures     Row Name 07/18/20 1600 07/18/20 1409          How much help from another person do you currently need...    Turning from your back to your side while in flat bed without using bedrails?  3  -GB  --     Moving from lying on back to sitting on the side of a flat bed without bedrails?  3  -GB  --     Moving to and from a bed to a chair (including a wheelchair)?  3  -GB  --     Standing up from a chair using your arms (e.g., wheelchair, bedside chair)?  3  -GB  --     Climbing 3-5 steps with a railing?  2  -GB  --     To walk in hospital room?  2  -GB  --     AM-PAC 6 Clicks Score (PT)  16  -GB  --        How much help from another is currently needed...    Putting on and taking off regular lower body clothing?  --  2  -BH     Bathing (including washing, rinsing, and drying)  --  3  -BH     Toileting (which includes using toilet bed pan or urinal)  --  3  -BH     Putting on and taking off regular upper body clothing  --  3  -BH     Taking care of personal grooming (such as brushing teeth)  --  3  -BH     Eating meals  --  4  -BH     AM-PAC 6 Clicks Score (OT)  --  18  -BH        Functional Assessment    Outcome Measure Options  AM-PAC 6 Clicks Basic Mobility (PT)  -GB  AM-PAC 6 Clicks Daily Activity (OT)   -       User Key  (r) = Recorded By, (t) = Taken By, (c) = Cosigned By    Initials Name Provider Type    Tisha Loo, PT Physical Therapist     Shyann Graves, OTR/L Occupational Therapist         Time Calculation:   PT Charges     Row Name 07/18/20 1604             Time Calculation    Start Time  1411  -GB      Stop Time  1450  -GB      Time Calculation (min)  39 min  -GB      PT Received On  07/18/20  -GB      PT Goal Re-Cert Due Date  07/31/20  -GB        User Key  (r) = Recorded By, (t) = Taken By, (c) = Cosigned By    Initials Name Provider Type    Tisha Loo, PT Physical Therapist        Therapy Charges for Today     Code Description Service Date Service Provider Modifiers Qty    20687517411 HC PT EVAL MOD COMPLEXITY 3 7/18/2020 Tisha Mehta, PT GP 1          PT G-Codes  Outcome Measure Options: AM-PAC 6 Clicks Basic Mobility (PT)  AM-PAC 6 Clicks Score (PT): 16  AM-PAC 6 Clicks Score (OT): 18      Tisha Mehta, PT  7/18/2020

## 2020-07-19 NOTE — PLAN OF CARE
Problem: Patient Care Overview  Goal: Plan of Care Review  Outcome: Ongoing (interventions implemented as appropriate)  Flowsheets (Taken 7/19/2020 8398)  Plan of Care Reviewed With: patient  Outcome Summary: sup-sit-sup sba with hob raised,sit-stand-sit cga/sba of 1,amb 5'x2 with rw and cga of 1-no goals met

## 2020-07-19 NOTE — THERAPY TREATMENT NOTE
Acute Care - Physical Therapy Treatment Note  [facility]     Patient Name: Brendon Skelton  : 1945  MRN: [id number]  Today's Date: 2020  Onset of Illness/Injury or Date of Surgery: 20     Referring Physician: Dr. Coreas     Admit Date: 2020    Visit Dx:    ICD-10-CM ICD-9-CM   1. Acute on chronic respiratory failure with hypoxia (CMS/HCC) J96.21 518.84     799.02   2. Pneumonia due to infectious organism, unspecified laterality, unspecified part of lung J18.9 486   3. Acute on chronic congestive heart failure, unspecified heart failure type (CMS/HCC) I50.9 428.0   4. COVID-19 ruled out Z03.818 V71.83   5. Impaired mobility and ADLs Z74.09 V49.89    Z78.9    6. Impaired functional mobility, balance, gait, and endurance Z74.09 V49.89     Patient Active Problem List   Diagnosis   • Long term current use of anticoagulant therapy   • Personal history of heart valve replacement   • Atrial fibrillation [I48.91]   • Emphysema of lung (CMS/HCC)   • On anticoagulant therapy   • Hyperlipidemia   • Diastolic heart failure (CMS/HCC)   • Depressive disorder   • COPD (chronic obstructive pulmonary disease) (CMS/HCC)   • Diabetes mellitus (CMS/HCC)   • Myopia   • Astigmatism   • Bleeding from open wound of chest wall   • Follow-up surgery care   • Encounter for screening for malignant neoplasm of colon   • Positive colorectal cancer screening using DNA-based stool test   • Acute on chronic systolic heart failure (CMS/HCC)   • Nodule of left lung   • Chronic hypoxemic respiratory failure (CMS/HCC)   • Physical deconditioning   • Heart failure with preserved left ventricular function (HFpEF) (CMS/HCC)   • Hypertension   • Coronary artery disease involving native coronary artery without angina pectoris   • Hx of CABG   • SSS (sick sinus syndrome) (CMS/HCC)   • Pacemaker   • Stage 4 chronic kidney disease (CMS/HCC)   • Personal history of tobacco use, presenting hazards to health   • Gastrointestinal  hemorrhage   • Class 2 severe obesity due to excess calories with serious comorbidity and body mass index (BMI) of 39.0 to 39.9 in adult (CMS/HCC)   • Gastritis   • Colon polyp   • Coumadin toxicity   • Acute on chronic diastolic congestive heart failure (CMS/HCC)   • Chronic anemia   • Pulmonary hypertension (CMS/HCC)   • Confusion   • Hyponatremia   • Long term current use of anticoagulant   • Renal insufficiency   • Lower abdominal pain   • Hematoma of abdominal wall   • E coli bacteremia   • ESRF (end stage renal failure) (CMS/HCC)   • Acute on chronic respiratory failure with hypoxia (CMS/HCC)   • Coumadin toxicity   • Pleural effusion       Therapy Treatment    Rehabilitation Treatment Summary     Row Name 07/19/20 1035             Treatment Time/Intention    Discipline  physical therapy assistant  -LN      Document Type  therapy note (daily note)  -LN      Subjective Information  complains of;weakness  -LN      Mode of Treatment  physical therapy  -LN      Therapy Frequency (PT Clinical Impression)  other (see comments) 6 d/wk  -LN      Therapy Frequency (OT Eval)  other (see comments) 5-7 days a week   -LN      Patient Effort  good  -LN      Comment  pt didn't sleep last night and doesn't feel like gt/ex but wanted to go to br  -LN      Reason Treatment Not Performed  --  -LN      Existing Precautions/Restrictions  fall;oxygen therapy device and L/min gait belt placement - port   -LN      Recorded by [LN] Cecy Lara, PTA 07/19/20 2601      Row Name 07/19/20 1035             Vital Signs    Post Systolic BP Rehab  201 dinomap  -LN      Post Treatment Diastolic BP  83  -LN      Posttreatment Heart Rate (beats/min)  83  -LN      O2 Delivery Pre Treatment  non-rebreather  -LN      Intra SpO2 (%)  88  -LN      Post SpO2 (%)  96  -LN      Pre Patient Position  Sitting  -LN      Intra Patient Position  Standing  -LN      Post Patient Position  Sitting  -LN      Recorded by [LN] Cecy Lara, PTA 07/19/20 9121       Row Name 07/19/20 1035             Cognitive Assessment/Intervention- PT/OT    Affect/Mental Status (Cognitive)  agitated;WFL  -LN      Orientation Status (Cognition)  oriented x 4  -LN      Follows Commands (Cognition)  over 90% accuracy;verbal cues/prompting required;repetition of directions required  -LN      Recorded by [LN] Cecy Lara, PTA 07/19/20 1332      Row Name 07/19/20 1035             Safety Issues, Functional Mobility    Impairments Affecting Function (Mobility)  balance;coordination;endurance/activity tolerance;motor planning;pain;shortness of breath;strength  -LN      Recorded by [LN] Cecy Lara, PTA 07/19/20 1332      Row Name 07/19/20 1035             Bed Mobility Assessment/Treatment    Bed Mobility Assessment/Treatment  bed mobility (all) activities  -LN      Supine-Sit [place] (Bed Mobility)  verbal cues;supervision  -LN      Sit-Supine [place] (Bed Mobility)  verbal cues;supervision  -LN      Bed Mobility, Safety Issues  decreased use of arms for pushing/pulling;decreased use of legs for bridging/pushing;impaired trunk control for bed mobility  -LN      Assistive Device (Bed Mobility)  bed rails;head of bed elevated  -LN      Recorded by [LN] Cecy Lara, PTA 07/19/20 1332      Row Name 07/19/20 1035             Functional Mobility    Functional Mobility- Ind. Level  minimum assist (75% patient effort);contact guard assist;nonverbal cues required (demo/gesture);verbal cues required  -LN      Functional Mobility- Device  rolling walker  -LN      Recorded by [LN] Cecy Lara, PTA 07/19/20 1332      Row Name 07/19/20 1035             Transfer Assessment/Treatment    Transfer Assessment/Treatment  stand-sit transfer;sit-stand transfer;toilet transfer  -LN      Recorded by [LN] Cecy Lara, PTA 07/19/20 1332      Row Name 07/19/20 1035             Sit-Stand Transfer    Sit-Stand [place] (Transfers)  verbal cues;contact guard  -LN      Assistive Device (Sit-Stand  Transfers)  walker, front-wheeled  -LN      Recorded by [LN] Cecy Lara, [facility] 07/19/20 1332      Row Name 07/19/20 1035             Stand-Sit Transfer    Stand-Sit [place] (Transfers)  verbal cues;supervision  -LN      Assistive Device (Stand-Sit Transfers)  walker, front-wheeled  -LN2      Recorded by [LN] Cecy Lara, [facility] 07/19/20 1335  [LN2] Cecy Lara, [facility] 07/19/20 1332      Row Name 07/19/20 1035             Toilet Transfer    Type (Toilet Transfer)  stand-sit;sit-stand  -LN      [place] Level (Toilet Transfer)  verbal cues;contact guard  -LN      Assistive Device (Toilet Transfer)  grab bars/safety frame;walker, front-wheeled  -LN      Recorded by [LN] Cecy Lara, [facility] 07/19/20 1332      Row Name 07/19/20 1035             Gait/Stairs Assessment/Training    Gait/Stairs Assessment/Training  gait/ambulation independence;gait/ambulation assistive device;distance ambulated;gait pattern;gait deviations  -LN      [place] Level (Gait)  contact guard;1 person assist  -LN      Assistive Device (Gait)  walker, front-wheeled  -LN      Distance in Feet (Gait)  5'x 2  -LN      Pattern (Gait)  step-through  -LN      Deviations/Abnormal Patterns (Gait)  stride length decreased  -LN      Recorded by [LN] Cecy Lara, [facility] 07/19/20 1332      Row Name 07/19/20 1035             Toileting Assessment/Training    [place] Level (Toileting)  --  -LN      Assistive Devices (Toileting)  --  -LN      Recorded by [LN] Cecy Lara, [facility] 07/19/20 1332      Row Name 07/19/20 1035             Positioning and Restraints    Post Treatment Position  bed  -LN      In Bed  fowlers;call light within reach;encouraged to call for assist;exit alarm on  -LN      Recorded by [LN] Cecy Lara, [facility] 07/19/20 1332      Row Name 07/19/20 1035             Pain Scale: Numbers Pre/Post-Treatment    Pain Scale: Numbers, Pretreatment  0/10 - no pain  -LN      Pain Scale: Numbers, Post-Treatment  0/10 - no pain  -LN      Pain  Location - Side  --  -LN      Pain Location  --  -LN      Recorded by [LN] Jane, Cecy S, PTA 07/19/20 1332      Row Name 07/19/20 1035             Sensory Assessment/Intervention    Sensory General Assessment  no sensation deficits identified  -LN      Recorded by [LN] Jane, Cecy S, PTA 07/19/20 1332      Row Name 07/19/20 1035             Hearing Assessment    Hearing Status  WFL  -LN      Recorded by [LN] Jane, Cecy S, PTA 07/19/20 1332      Row Name 07/19/20 1035             Vision Assessment/Intervention    Visual Impairment/Limitations  corrective lenses for reading  -LN      Recorded by [LN] Jane, Cecy S, PTA 07/19/20 1332      Row Name 07/19/20 1035             Light Touch Sensation Assessment    Left Upper Extremity: Light Touch Sensation Assessment  intact grossly   -LN      Right Upper Extremity: Light Touch Sensation Assessment  intact grossly   -LN      Recorded by [LN] Jane, Cecy S, PTA 07/19/20 1332      Row Name 07/19/20 1035             Respiratory WDL    Respiratory WDL  --  -LN      Rhythm/Pattern, Respiratory  --  -LN      Cough Frequency  --  -LN      Cough Type  --  -LN      Recorded by [LN] Jane, Cecy S, PTA 07/19/20 1332      Row Name 07/19/20 1035             Breath Sounds    Breath Sounds  --  -LN      All Lung Fields Breath Sounds  --  -LN      Recorded by [LN] Jane, Cecy S, PTA 07/19/20 1332      Row Name 07/19/20 1035             Skin WDL    Skin WDL  --  -LN      Skin Color/Characteristics  --  -LN      Skin Temperature  --  -LN      Skin Moisture  --  -LN      Skin Elasticity  --  -LN      Skin Integrity  --  -LN      Recorded by [LN] Jane, Cecy S, PTA 07/19/20 1332      Row Name 07/19/20 1035             Coping    Observed Emotional State  calm;cooperative  -LN      Verbalized Emotional State  acceptance  -LN      Recorded by [LN] Jane, Cecy S, PTA 07/19/20 1332      Row Name 07/19/20 1035             Plan of Care Review    Plan of Care Reviewed With  patient  -LN       Recorded by [LN] JaneKymi S, PTA 07/19/20 1332      Row Name 07/19/20 1035             Outcome Summary/Treatment Plan (OT)    Anticipated Discharge Disposition (OT)  anticipate therapy at next level of care;home with 24/7 care;home with home health  -LN      Recorded by [LN] Jane Cecy VU, PTA 07/19/20 1332      Row Name 07/19/20 1035             Outcome Summary/Treatment Plan (PT)    Anticipated Discharge Disposition (PT)  anticipate therapy at next level of care;home with 24/7 care;home with home health;transitional care rehab to home if needed to enable home alone safely  -LN      Recorded by [LN] Cecy Lara, PTA 07/19/20 1332        User Key  (r) = Recorded By, (t) = Taken By, (c) = Cosigned By    Initials Name Effective Dates Discipline    LN Jane Cecy VU, PTA 03/07/18 -  PT               Rehab Goal Summary     Row Name 07/19/20 1035             Physical Therapy Goals    Bed Mobility Goal Selection (PT)  bed mobility, PT goal 1  -LN      Transfer Goal Selection (PT)  transfer, PT goal 1  -LN      Gait Training Goal Selection (PT)  gait training, PT goal 1  -LN      Wheelchair Locomotion Goal Selection (PT)  wheelchair locomotion, PT goal (free text)  -LN         Bed Mobility Goal 1 (PT)    Activity/Assistive Device (Bed Mobility Goal 1, PT)  bed mobility activities, all  -LN      [place] Level/Cues Needed (Bed Mobility Goal 1, PT)  independent;conditional independence  -LN      Time Frame (Bed Mobility Goal 1, PT)  3 days  -LN      Barriers (Bed Mobility Goal 1, PT)  strength  -LN      Progress/Outcomes (Bed Mobility Goal 1, PT)  goal not met  -LN         Transfer Goal 1 (PT)    Activity/Assistive Device (Transfer Goal 1, PT)  bed-to-chair/chair-to-bed;wheelchair transfer  -LN      [place] Level/Cues Needed (Transfer Goal 1, PT)  conditional independence;supervision required  -LN      Time Frame (Transfer Goal 1, PT)  1 week  -LN      Barriers (Transfers Goal 1, PT)  strength  -LN       Progress/Outcome (Transfer Goal 1, PT)  goal not met  -LN         Gait Training Goal 1 (PT)    Activity/Assistive Device (Gait Training Goal 1, PT)  gait (walking locomotion);assistive device use;decrease fall risk;increase endurance/gait distance  -LN      [place] Level (Gait Training Goal 1, PT)  conditional independence  -LN      Distance (Gait Goal 1, PT)  150 ft or more  -LN      Time Frame (Gait Training Goal 1, PT)  2 weeks  -LN      Barriers (Gait Training Goal 1, PT)  strength, ex ata  -LN      Progress/Outcome (Gait Training Goal 1, PT)  goal not met  -LN         Wheelchair Locomotion Goal (PT)    Wheelchair Locomotion Goal (PT)  indep mobility in/out of tight spaces  -LN      Time Frame (Wheelchair Locomotion Goal, PT)  by discharge  -LN      Barriers (Wheelchair Locomotion Goal, PT)  weakness  -LN      Progress/Outcome (Wheelchair Locomotion Goal, PT)  goal not met  -LN        User Key  (r) = Recorded By, (t) = Taken By, (c) = Cosigned By    Initials Name Provider Type Discipline    Cecy Loyola PTA Physical Therapy Assistant PT          Physical Therapy Education                 Title: PT OT SLP Therapies (In Progress)     Topic: Physical Therapy (In Progress)     Point: Mobility training (Done)     Description:   Instruct learner(s) on safety and technique for assisting patient out of bed, chair or wheelchair.  Instruct in the proper use of assistive devices, such as walker, crutches, cane or brace.              Patient Friendly Description:   It's important to get you on your feet again, but we need to do so in a way that is safe for you. Falling has serious consequences, and your personal safety is the most important thing of all.        When it's time to get out of bed, one of us or a family member will sit next to you on the bed to give you support.     If your doctor or nurse tells you to use a walker, crutches, a cane, or a brace, be sure you use it every time you get out of bed, even  if you think you don't need it.    Learning Progress Summary           Patient Acceptance, E,TB, VU,NR by LN at 7/19/2020 1333    Acceptance, D, NR by GB at 7/18/2020 1602    Comment:  POC, mobiity & transfers to/from bathroom                   Point: Home exercise program (In Progress)     Description:   Instruct learner(s) on appropriate technique for monitoring, assisting and/or progressing patient with therapeutic exercises and activities.              Learning Progress Summary           Patient Acceptance, D, NR by GB at 7/18/2020 1602    Comment:  POC, mobiity & transfers to/from bathroom                   Point: Body mechanics (In Progress)     Description:   Instruct learner(s) on proper positioning and spine alignment for patient and/or caregiver during mobility tasks and/or exercises.              Learning Progress Summary           Patient Acceptance, D, NR by  at 7/18/2020 1602    Comment:  POC, mobiity & transfers to/from bathroom                   Point: Precautions (Done)     Description:   Instruct learner(s) on prescribed precautions during mobility and gait tasks              Learning Progress Summary           Patient Acceptance, E,TB, VU,NR by LN at 7/19/2020 1333    Acceptance, D, NR by  at 7/18/2020 1602    Comment:  POC, mobiity & transfers to/from bathroom                               User Key     Initials Effective Dates Name Provider Type Discipline     04/03/18 -  Tisha Mehta, PT Physical Therapist PT    STEFANIE 03/07/18 -  Ceyc Lara, PTA Physical Therapy Assistant PT                PT Recommendation and Plan  Anticipated Discharge Disposition (PT): anticipate therapy at next level of care, home with 24/7 care, home with home health, transitional care(rehab to home if needed to enable home alone safely)  Therapy Frequency (PT Clinical Impression): other (see comments)(6 d/wk)  Outcome Summary/Treatment Plan (PT)  Anticipated Discharge Disposition (PT): anticipate  therapy at next level of care, home with 24/7 care, home with home health, transitional care(rehab to home if needed to enable home alone safely)  Plan of Care Reviewed With: patient  Outcome Summary: sup-sit-sup sba with hob raised,sit-stand-sit cga/sba of 1,amb 5'x2 with rw and cga of 1-no goals met  Outcome Measures     Row Name 07/19/20 1035 07/18/20 1600 07/18/20 1409       How much help from another person do you currently need...    Turning from your back to your side while in flat bed without using bedrails?  3  -LN  3  -GB  --    Moving from lying on back to sitting on the side of a flat bed without bedrails?  3  -LN  3  -GB  --    Moving to and from a bed to a chair (including a wheelchair)?  3  -LN  3  -GB  --    Standing up from a chair using your arms (e.g., wheelchair, bedside chair)?  3  -LN  3  -GB  --    Climbing 3-5 steps with a railing?  2  -LN  2  -GB  --    To walk in hospital room?  3  -LN  2  -GB  --    AM-PAC 6 Clicks Score (PT)  17  -LN  16  -GB  --       How much help from another is currently needed...    Putting on and taking off regular lower body clothing?  --  --  2  -BH    Bathing (including washing, rinsing, and drying)  --  --  3  -BH    Toileting (which includes using toilet bed pan or urinal)  --  --  3  -BH    Putting on and taking off regular upper body clothing  --  --  3  -BH    Taking care of personal grooming (such as brushing teeth)  --  --  3  -BH    Eating meals  --  --  4  -BH    AM-PAC 6 Clicks Score (OT)  --  --  18  -BH       Functional Assessment    Outcome Measure Options  AM-PAC 6 Clicks Basic Mobility (PT)  -LN  AM-PAC 6 Clicks Basic Mobility (PT)  -  AM-PAC 6 Clicks Daily Activity (OT)  -      User Key  (r) = Recorded By, (t) = Taken By, (c) = Cosigned By    Initials Name Provider Type    GB Tisha Mehta, PT Physical Therapist     Shyann Graves, OTR/L Occupational Therapist    LN Kym Larai S, PTA Physical Therapy Assistant         Time  Calculation:   PT Charges     Row Name 07/19/20 1335             Time Calculation    Start Time  1035  -LN      Stop Time  1050  -LN      Time Calculation (min)  15 min  -LN      PT Received On  07/19/20  -LN         Time Calculation- PT    Total Timed Code Minutes- PT  15 minute(s)  -LN        User Key  (r) = Recorded By, (t) = Taken By, (c) = Cosigned By    Initials Name Provider Type    LN Cecy Lara PTA Physical Therapy Assistant        Therapy Charges for Today     Code Description Service Date Service Provider Modifiers Qty    [id number]  PT THERAPEUTIC ACT EA 15 MIN 7/19/2020 Cecy Lara PTA GP 1          PT G-Codes  Outcome Measure Options: AM-PAC 6 Clicks Basic Mobility (PT)  AM-PAC 6 Clicks Score (PT): 17  AM-PAC 6 Clicks Score (OT): 18    Cecy Lara PTA  7/19/2020

## 2020-07-19 NOTE — PROGRESS NOTES
"[facility]  [address] [place] [address]. [id number]  T - [phone]  F - [phone]     Progress Note          PATIENT  DEMOGRAPHICS   PATIENT NAME: Brendon Skelton                      PHYSICIAN: Sandy Caputo MD  : 1945  MRN: [id number]   LOS: 5 days    Patient Care Team:  Josefa Pozo APRN as PCP - General (Nurse Practitioner)  Meme Duckworth APRN as PCP - Claims Attributed  Subjective   SUBJECTIVE   Received hd last night.  On 6 LO2. Feels better        Objective   OBJECTIVE   Vital Signs  Temp:  [96.6 °F (35.9 °C)-98.5 °F (36.9 °C)] 97.2 °F (36.2 °C)  Heart Rate:  [62-79] 77  Resp:  [18-22] 20  BP: (134-154)/(60-94) 134/94    Flowsheet Rows      First Filed Value   Admission Height  165.1 cm (65\") Documented at 2020   Admission Weight  102 kg (225 lb) Documented at 2020           I/O last 3 completed shifts:  In: 580 [P.O.:480; IV Piggyback:100]  Out: 4750 [Urine:250; Other:4500]    PHYSICAL EXAM    Physical Exam   Constitutional: She is oriented to person, place, and time. She appears well-developed and well-nourished. No distress.   HENT:   Head: Normocephalic and atraumatic.   Right Ear: External ear normal.   Left Ear: External ear normal.   Nose: Nose normal.   Mouth/Throat: Oropharynx is clear and moist.   Eyes: Pupils are equal, round, and reactive to light. EOM are normal. Right eye exhibits no discharge. Left eye exhibits no discharge.   Neck: Normal range of motion. Neck supple. No tracheal deviation present. No thyromegaly present.   Cardiovascular: Normal rate, regular rhythm, normal heart sounds and intact distal pulses.   Pulmonary/Chest: Breath sounds normal. No stridor. She is in respiratory distress. She has no wheezes. She has no rales.   Abdominal: Soft. Bowel sounds are normal. She exhibits no distension. There is no tenderness.   Musculoskeletal: Normal range of motion. She exhibits no edema, tenderness or deformity.   "   Lymphadenopathy:     She has no cervical adenopathy.   Neurological: She is alert and oriented to person, place, and time. No cranial nerve deficit.   Skin: Skin is warm and dry. She is not diaphoretic.   Psychiatric: She has a normal mood and affect.       RESULTS   Results Review:    Results from last 7 days   Lab Units 07/18/20  0527 07/17/20  0550 07/16/20 0452 07/14/20 1942   SODIUM mmol/L 130* 132* 135*   < > 137   POTASSIUM mmol/L 4.2 4.4 4.2   < > 4.7   CHLORIDE mmol/L 93* 95* 98   < > 99   CO2 mmol/L 27.0 24.0 28.0   < > 27.0   BUN mg/dL 25* 41* 26*   < > 44*   CREATININE mg/dL 3.51* 4.68* 3.23*   < > 3.95*   CALCIUM mg/dL 9.0 8.8 8.8   < > 9.5   BILIRUBIN mg/dL  --   --   --   --  0.5   ALK PHOS U/L  --   --   --   --  85   ALT (SGPT) U/L  --   --   --   --  17   AST (SGOT) U/L  --   --   --   --  25   GLUCOSE mg/dL 166* 144* 133*   < > 62*    < > = values in this interval not displayed.       Estimated Creatinine Clearance: 15.9 mL/min (A) (by C-G formula based on SCr of 3.51 mg/dL (H)).    Results from last 7 days   Lab Units 07/16/20 0452 07/14/20 1942   MAGNESIUM mg/dL  --  2.1   PHOSPHORUS mg/dL 4.6*  --              Results from last 7 days   Lab Units 07/16/20  0451 07/15/20  0313 07/14/20 1942   WBC 10*3/mm3 6.55 7.47 7.33   HEMOGLOBIN g/dL 9.4* 9.4* 9.7*   PLATELETS 10*3/mm3 167 169 168       Results from last 7 days   Lab Units 07/19/20  0650 07/18/20  0527 07/17/20  0550 07/16/20  0452 07/15/20  0313   INR  1.20 1.35* 1.40* 2.04* 5.08*         Imaging Results (Last 24 Hours)     ** No results found for the last 24 hours. **           MEDICATIONS      aspirin 81 mg Oral Daily   budesonide-formoterol 2 puff Inhalation BID - RT   cefTRIAXone 1 g Intravenous Q24H   cholecalciferol 2,000 Units Oral Daily   citalopram 20 mg Oral Daily   dilTIAZem  mg Oral Q24H   epoetin rosales/rosales-epbx 6,000 Units Intravenous Once per day on Mon Wed Fri   ferrous sulfate 324 mg Oral Daily With Breakfast    ipratropium-albuterol 3 mL Nebulization 4x Daily - RT   metoprolol succinate XL 25 mg Oral Daily   naloxone 0.4 mg Intravenous Once   pantoprazole 40 mg Oral Daily   rOPINIRole 0.25 mg Oral Nightly   sodium chloride 10 mL Intravenous Q12H   sucralfate 1 g Oral 4x Daily AC & at Bedtime   traZODone 300 mg Oral Nightly   warfarin 5 mg Oral Once       Pharmacy to dose warfarin        Assessment/Plan   ASSESSMENT / PLAN      Acute on chronic respiratory failure with hypoxia (CMS/HCC)    Atrial fibrillation [I48.91]    Diabetes mellitus (CMS/HCC)    Acute on chronic systolic heart failure (CMS/HCC)    Pulmonary hypertension (CMS/HCC)    ESRF (end stage renal failure) (CMS/HCC)    Coumadin toxicity    Pleural effusion    ESRD ON HD   -History of HD Monday Wednesday Friday since 2/28/2020.  -hd back to back - wt is now 93.6 kg.  Her dry weight is 97.5 kg.  May need thoracentesis as well cxr in am. Hd tomorrow    Anemia   -H/h stable    CHF   -EF 46 to 50%  -Right pleural effusion/atelectasis  -proBNP 28, 929  -Continue Rocephin and azithromycin    HYPONATREMIA   Stable    Copd exacerbation / pna / right pleural effusion plan as above        This document has been electronically signed by Sandy Caputo MD on July 17, 2020 10:47

## 2020-07-19 NOTE — PROGRESS NOTES
Progress Note  Colin Coreas MD  Hospitalist    Date of visit: 7/19/2020     LOS: 5 days   Patient Care Team:  Josefa Pozo APRN as PCP    Chief Complaint: dyspnea    Subjective     Interval History:     Patient Complaints: dyspnea  - improved after dialysis. Able to maintain an adequate Oxygen saturation with nasal cannula only.    History taken from: patient / nursing    Medication Review:   Current Facility-Administered Medications   Medication Dose Route Frequency Provider Last Rate Last Dose   • acetaminophen (TYLENOL) tablet 650 mg  650 mg Oral Q6H PRN Colin Coreas MD   650 mg at 07/18/20 2156   • albumin human 25 % IV SOLN 12.5 g  12.5 g Intravenous PRN Sandy Caputo MD       • aspirin chewable tablet 81 mg  81 mg Oral Daily Colin Coreas MD   81 mg at 07/19/20 0735   • budesonide-formoterol (SYMBICORT) 160-4.5 MCG/ACT inhaler 2 puff  2 puff Inhalation BID - RT Colin Coreas MD   2 puff at 07/19/20 0715   • cefTRIAXone (ROCEPHIN) 1 g/100 mL 0.9% NS (MBP)  1 g Intravenous Q24H Colin Coreas MD 0 mL/hr at 07/15/20 1200 1 g at 07/18/20 1350   • cholecalciferol (VITAMIN D3) tablet 2,000 Units  2,000 Units Oral Daily Colin Coreas MD   2,000 Units at 07/19/20 0735   • citalopram (CeleXA) tablet 20 mg  20 mg Oral Daily Colin Coreas MD   20 mg at 07/19/20 0735   • dilTIAZem CD (CARDIZEM CD) 24 hr capsule 240 mg  240 mg Oral Q24H Colin Coreas MD   240 mg at 07/19/20 0735   • epoetin rosales (EPOGEN,PROCRIT) injection 6,000 Units  6,000 Units Intravenous Once per day on Mon Wed Fri Sandy Caputo MD   6,000 Units at 07/17/20 0844   • ferrous sulfate EC tablet 324 mg  324 mg Oral Daily With Breakfast Colin Coreas MD   324 mg at 07/19/20 0735   • heparin (porcine) injection 2,000 Units  2,000 Units Intracatheter PRN Sandy Caputo MD       • ipratropium-albuterol (DUO-NEB) nebulizer solution 3 mL  3 mL Nebulization 4x Daily - RT Colin Coreas MD   3 mL at 07/19/20 0704   • metoprolol succinate XL  (TOPROL-XL) 24 hr tablet 25 mg  25 mg Oral Daily Colin Coreas MD   25 mg at 07/19/20 0735   • naloxone (NARCAN) injection 0.4 mg  0.4 mg Intravenous Once Colin Coreas MD       • ondansetron (ZOFRAN) injection 4 mg  4 mg Intravenous Q6H PRN Colin Coreas MD   4 mg at 07/15/20 2103   • pantoprazole (PROTONIX) EC tablet 40 mg  40 mg Oral Daily Colin Coreas MD   40 mg at 07/19/20 0734   • Pharmacy to dose warfarin   Does not apply Continuous PRN Colin Coreas MD       • rOPINIRole (REQUIP) tablet 0.25 mg  0.25 mg Oral Nightly Colin Coreas MD   0.25 mg at 07/18/20 2051   • sodium chloride 0.9 % flush 10 mL  10 mL Intravenous Q12H Colin Coreas MD   10 mL at 07/19/20 0735   • sodium chloride 0.9 % flush 10 mL  10 mL Intravenous PRN Colin Coreas MD       • sucralfate (CARAFATE) tablet 1 g  1 g Oral 4x Daily AC & at Bedtime Colin Coreas MD   1 g at 07/19/20 0735   • traZODone (DESYREL) tablet 300 mg  300 mg Oral Nightly Colin Coreas MD   300 mg at 07/18/20 2051   • warfarin (COUMADIN) tablet 2.5 mg  2.5 mg Oral Daily Colin Coreas MD           Review of Systems:   Review of Systems   Constitutional: Positive for fatigue. Negative for fever.   Respiratory: Positive for cough and shortness of breath. Negative for wheezing.    Cardiovascular: Positive for leg swelling.   Gastrointestinal: Positive for abdominal distention. Negative for abdominal pain, anal bleeding, blood in stool and diarrhea.   Genitourinary: Negative for decreased urine volume, dysuria, flank pain, frequency, hematuria and urgency.   Musculoskeletal: Positive for arthralgias.   Skin: Positive for pallor. Negative for color change and rash.   Neurological: Positive for weakness. Negative for tremors, seizures, syncope, numbness and headaches.   Psychiatric/Behavioral: Negative for agitation, behavioral problems and confusion.   All other systems reviewed and are negative.      Objective     Vital Signs  Temp:  [96.6 °F (35.9  °C)-98.5 °F (36.9 °C)] 97.2 °F (36.2 °C)  Heart Rate:  [56-78] 68  Resp:  [18-22] 18  BP: (135-154)/(60-65) 146/60    Physical Exam:  Physical Exam   Constitutional: She is oriented to person, place, and time. She has a sickly appearance. No distress.   Obese    HENT:   Head: Normocephalic and atraumatic.   Eyes: Pupils are equal, round, and reactive to light. EOM are normal. No scleral icterus.   Neck: Normal range of motion. Neck supple.   Cardiovascular: An irregular rhythm present.   Pulmonary/Chest: Effort normal. No stridor. She has no wheezes. She has rales.   Abdominal: Soft. Bowel sounds are normal. She exhibits distension. She exhibits no mass. No hernia.   Musculoskeletal: Normal range of motion. She exhibits edema. She exhibits no tenderness.   Neurological: She is alert and oriented to person, place, and time. No cranial nerve deficit. Coordination normal.   Skin: Skin is warm and dry. There is pallor.   Psychiatric: She has a normal mood and affect. Her behavior is normal.   Vitals reviewed.       Results Review:    Lab Results (last 24 hours)     Procedure Component Value Units Date/Time    Protime-INR, Fingerstick [[id number]]  (Normal) Collected:  07/19/20 0650    Specimen:  Capillary Blood Updated:  07/19/20 0652     INR 1.20    Narrative:       Therapeutic range for most indications is 2.0-3.0 INR,  or 2.5-3.5 for mechanical heart valves.    POC Glucose Once [[id number]]  (Abnormal) Collected:  07/19/20 0613    Specimen:  Blood Updated:  07/19/20 0634     Glucose 184 mg/dL      Comment: RN NotifiedOperator: [id number] [facility] [id number]       Blood Culture - Blood, Arm, Right [[id number]] Collected:  07/14/20 2058    Specimen:  Blood from Arm, Right Updated:  07/18/20 2115     Blood Culture No growth at 4 days    Blood Culture - Blood, Arm, Right [[id number]] Collected:  07/14/20 1942    Specimen:  Blood from Arm, Right Updated:  07/18/20 2001     Blood Culture No growth at 4 days       POC Glucose Once [[id number]]  (Abnormal) Collected:  07/18/20 1934    Specimen:  Blood Updated:  07/18/20 1946     Glucose 225 mg/dL      Comment: RN NotifiedOperator: [id number] NELIDA Lassiter ID: [id number]             Imaging Results (Last 24 Hours)     ** No results found for the last 24 hours. **          Assessment/Plan       Acute on chronic respiratory failure with hypoxia (CMS/HCC)    Acute on chronic systolic heart failure (CMS/HCC)    Pulmonary hypertension (CMS/HCC)    ESRF (end stage renal failure) (CMS/HCC)    Pleural effusion    Atrial fibrillation [I48.91]    Diabetes mellitus (CMS/HCC)    Coumadin toxicity    Continue with the current management, dialysis, nebulized treatments. Her dyspnea is multi factorial, related to the conditions above. Try PT/OT, out of bed.    Follow up on the BMP / INR - resume Coumadin as the INR is no longer severely elevated. Repeat CXRay in AM to reassess the R pleural effusion.    Colin Coreas MD  07/19/20  10:00

## 2020-07-19 NOTE — PROGRESS NOTES
"Anticoagulation by Pharmacy - Warfarin    Brendon Skelton is a 74 y.o.female being continued on warfarin for valve replacement, afib      Home regimen: warfarin 2.5 mg TuWeThFrSaSu, warfarin 5 mg Mo (coumadin clinic)  INR Goal: 2.5 - 3.5      Last INR:   Lab Results   Component Value Date    INR 1.20 07/19/2020       Objective:  [Ht: 165.1 cm (65\"); Wt: 93.6 kg (206 lb 4.8 oz)]  Lab Results   Component Value Date    INR 1.20 07/19/2020    INR 1.35 (H) 07/18/2020    INR 1.40 (H) 07/17/2020    PROTIME 17.4 (H) 07/18/2020    PROTIME 17.9 (H) 07/17/2020    PROTIME 24.3 (H) 07/16/2020     Lab Results   Component Value Date    HGB 9.4 (L) 07/16/2020    HGB 9.4 (L) 07/15/2020    HGB 9.7 (L) 07/14/2020    HCT 29.7 (L) 07/16/2020    HCT 29.2 (L) 07/15/2020    HCT 29.6 (L) 07/14/2020     07/16/2020     07/15/2020     07/14/2020           Assessment  Interacting medications: aspirin, citalopram  INR is 1.2, subtherapeutic upon admission.  Will give a bolus tonight of warfarin 5 mg x 1 then reassess tomorrow.  Vitamin k IV 5 mg given 7/15.  Supratherapeutic INR upon admission, however previous INR last two months at coumadin clinic were appropriate.  H/H below normal limits    Plan:  1.  Give warfarin 5 mg tablet PO @ 1800 tonight  2.  Draw a PT/INR in AM  3.  Pharmacy will continue to follow    Quinn Mercer [facility]  07/19/20 16:16     "

## 2020-07-19 NOTE — PLAN OF CARE
Patient VSS. Patient requested to be put on non rebreather earlier in the shift even though O2 sats were around 95. Respiratory then put patient on high flow NC. No complaints of pain. Up to void a few times.

## 2020-07-19 NOTE — PLAN OF CARE
Problem: Patient Care Overview  Goal: Plan of Care Review  Outcome: Ongoing (interventions implemented as appropriate)  Flowsheets (Taken 7/19/2020 0227)  Progress: improving  Plan of Care Reviewed With: patient  Outcome Summary: Pt is more alert, stronger this shift. Got up to the bathroom by herself-educated her on possible injuries and to call for assistance. VSS. PO2 between 92-96% this shift.

## 2020-07-20 NOTE — PROGRESS NOTES
"Anticoagulation by Pharmacy - Warfarin    Brendon Skelton is a 74 y.o.female being continued on warfarin for aortic valve replacement and afib      Home regimen: warfarin 2.5 mg TuWeThFrSaSu, warfarin 5 mg Mo (coumadin clinic)      INR Goal: 2-3, of note, goal from CC is 2.5-3.5 - we will proceed with goal of 2-3 in line with our pharmacy to dose consult; as well as, CHEST guidelines for this indication      Last INR:   Lab Results   Component Value Date    INR 1.20 07/20/2020       Objective:  [Ht: 165.1 cm (65\"); Wt: 94.7 kg (208 lb 12.8 oz)]  Lab Results   Component Value Date    INR 1.20 07/20/2020    INR 1.20 07/19/2020    INR 1.35 (H) 07/18/2020    PROTIME 17.4 (H) 07/18/2020    PROTIME 17.9 (H) 07/17/2020    PROTIME 24.3 (H) 07/16/2020     Lab Results   Component Value Date    HGB 10.4 (L) 07/20/2020    HGB 9.4 (L) 07/16/2020    HGB 9.4 (L) 07/15/2020    HCT 31.5 (L) 07/20/2020    HCT 29.7 (L) 07/16/2020    HCT 29.2 (L) 07/15/2020     07/20/2020     07/16/2020     07/15/2020           Assessment  Interacting medications: aspirin, citalopram    INR is subtherapeutic  Vitamin k IV 5 mg given 7/15 for supratherapeutic INR    H/H being followed for s/s of bleeding  No bleeding noted at this time  We will continue with 5 mg PO warfarin at this time until INR begins to trend up.     Plan:  1.  Give warfarin 5 mg tablet PO @ 1800 tonight  2.  Draw a PT/INR in AM  3.  Pharmacy will continue to follow    Milan Mcintosh RPH  07/20/20 15:28     "

## 2020-07-20 NOTE — PROGRESS NOTES
[facility] Medicine Services  INPATIENT PROGRESS NOTE    Length of Stay: 6  Date of Admission: 7/14/2020  Primary Care Physician: Josefa Pozo APRN    Subjective   Chief Complaint: Shortness of breath  HPI:  Reports feeling better since having dialysis.  Breathing is improved but not quite back to baseline yet.     Review of Systems   Constitutional: Negative for chills and fever.   Respiratory: Positive for shortness of breath. Negative for cough.    Cardiovascular: Negative for chest pain.   Gastrointestinal: Negative for abdominal pain.   All other systems reviewed and are negative.     All pertinent negatives and positives are as above. All other systems have been reviewed and are negative unless otherwise stated.     Objective    Temp:  [96.4 °F (35.8 °C)-97 °F (36.1 °C)] 96.6 °F (35.9 °C)  Heart Rate:  [63-88] 78  Resp:  [18-24] 18  BP: (126-152)/() 149/67    Physical Exam   Constitutional: She is oriented to person, place, and time.   Elderly appearing female.    HENT:   Head: Normocephalic and atraumatic.   Nose: Nose normal.   Eyes: Conjunctivae and EOM are normal.   Neck: Neck supple.   Cardiovascular: Normal rate, regular rhythm, normal heart sounds and intact distal pulses.   Pulmonary/Chest:   Breath sounds distant but clear presently.    Abdominal: Soft. Bowel sounds are normal. She exhibits no distension.   Neurological: She is alert and oriented to person, place, and time.   Skin: Skin is warm and dry. Capillary refill takes less than 2 seconds.   Psychiatric: She has a normal mood and affect.         Results Review:  I have reviewed the labs, radiology results, and diagnostic studies.    Laboratory Data:   Results from last 7 days   Lab Units 07/20/20  0507 07/18/20  0527 07/17/20  0550  07/14/20  1942   SODIUM mmol/L 123* 130* 132*   < > 137   POTASSIUM mmol/L 4.9 4.2 4.4   < > 4.7   CHLORIDE mmol/L 82* 93* 95*   < > 99   CO2 mmol/L 23.0 27.0 24.0    < > 27.0   BUN mg/dL 72* 25* 41*   < > 44*   CREATININE mg/dL 6.34* 3.51* 4.68*   < > 3.95*   GLUCOSE mg/dL 140* 166* 144*   < > 62*   CALCIUM mg/dL 9.8 9.0 8.8   < > 9.5   BILIRUBIN mg/dL  --   --   --   --  0.5   ALK PHOS U/L  --   --   --   --  85   ALT (SGPT) U/L  --   --   --   --  17   AST (SGOT) U/L  --   --   --   --  25   ANION GAP mmol/L 18.0* 10.0 13.0   < > 11.0    < > = values in this interval not displayed.     Estimated Creatinine Clearance: 8.9 mL/min (A) (by C-G formula based on SCr of 6.34 mg/dL (H)).  Results from last 7 days   Lab Units 07/16/20  0452 07/14/20  1942   MAGNESIUM mg/dL  --  2.1   PHOSPHORUS mg/dL 4.6*  --          Results from last 7 days   Lab Units 07/20/20  0507 07/16/20  0451 07/15/20  0313 07/14/20  1942   WBC 10*3/mm3 11.83* 6.55 7.47 7.33   HEMOGLOBIN g/dL 10.4* 9.4* 9.4* 9.7*   HEMATOCRIT % 31.5* 29.7* 29.2* 29.6*   PLATELETS 10*3/mm3 156 167 169 168     Results from last 7 days   Lab Units 07/20/20  0626 07/19/20  0650 07/18/20  0527 07/17/20  0550 07/16/20  0452   INR  1.20 1.20 1.35* 1.40* 2.04*       Culture Data:   No results found for: BLOODCX  No results found for: URINECX  No results found for: RESPCX  No results found for: WOUNDCX  No results found for: STOOLCX  No components found for: BODYFLD    Radiology Data:   Imaging Results (Last 24 Hours)     Procedure Component Value Units Date/Time    XR Chest 1 View [[id number]] Collected:  07/20/20 0444     Updated:  07/20/20 0514    Narrative:         CHEST 1 VIEW on 7/20/2020     CLINICAL INDICATION: Right pleural effusion    COMPARISON: 7/17/2020    FINDINGS: 2-lead left subclavian pacemaker is noted in place.  Right IJ catheter tip is at the cavoatrial junction. The patient  is status post median sternotomy and valve replacement.  Cardiomegaly is noted. There has been improvement in right  greater than left pleural effusions. There has been improvement  in bilateral opacities likely improved edema.      Impression:        Improving changes of CHF.    Electronically signed by:  Quinn Howell  7/20/2020 5:13 AM  CDT Workstation: [id number]          I have reviewed the patient's current medications.     Assessment/Plan     Principal Problem:    Acute on chronic respiratory failure with hypoxia (CMS/HCC)  Active Problems:    Atrial fibrillation [I48.91]    Diabetes mellitus (CMS/HCC)    Acute on chronic systolic heart failure (CMS/HCC)    Pulmonary hypertension (CMS/HCC)    ESRF (end stage renal failure) (CMS/HCC)    Coumadin toxicity    Pleural effusion    Acute on chronic respiratory failure with hypoxia - Appears improving.  Continue current management.  Appreciate Nephrology help/input.     ESRD on HD - Management per Nephrology.     Acute on chronic systolic heart failure - management as above.     Anemia - Stable, continue to monitor.     Hyponatremia - Nephrology onboard.     Afib - pharmacy to dose warfarin.     DVT ppx - Warfarin  CODE - Full    Discharge Planning: In progress.    Hermann Cha MD

## 2020-07-20 NOTE — PROGRESS NOTES
"[facility]  [address] [place] [address]. [address]  T - [phone]  F - [phone]     Progress Note          PATIENT  DEMOGRAPHICS   PATIENT NAME: Brendon Skelton                      PHYSICIAN: Sandy Caputo MD  : 1945  MRN: [id number]   LOS: 6 days    Patient Care Team:  Josefa Pozo APRN as PCP - General (Nurse Practitioner)  Meme Duckworth APRN as PCP - Claims Attributed  Subjective   SUBJECTIVE   Seen during hd today tolerating it well        Objective   OBJECTIVE   Vital Signs  Temp:  [96.4 °F (35.8 °C)-96.8 °F (36 °C)] 96.4 °F (35.8 °C)  Heart Rate:  [63-88] 78  Resp:  [18-20] 18  BP: (126-152)/() 150/117    Flowsheet Rows      First Filed Value   Admission Height  165.1 cm (65\") Documented at 2020   Admission Weight  102 kg (225 lb) Documented at 2020           No intake/output data recorded.    PHYSICAL EXAM    Physical Exam   Constitutional: She is oriented to person, place, and time. She appears well-developed and well-nourished. No distress.   HENT:   Head: Normocephalic and atraumatic.   Right Ear: External ear normal.   Left Ear: External ear normal.   Nose: Nose normal.   Mouth/Throat: Oropharynx is clear and moist.   Eyes: Pupils are equal, round, and reactive to light. EOM are normal. Right eye exhibits no discharge. Left eye exhibits no discharge.   Neck: Normal range of motion. Neck supple. No tracheal deviation present. No thyromegaly present.   Cardiovascular: Normal rate, regular rhythm, normal heart sounds and intact distal pulses.   Pulmonary/Chest: Breath sounds normal. No stridor. She is in respiratory distress. She has no wheezes. She has no rales.   Abdominal: Soft. Bowel sounds are normal. She exhibits no distension. There is no tenderness.   Musculoskeletal: Normal range of motion. She exhibits no edema, tenderness or deformity.   Lymphadenopathy:     She has no cervical adenopathy.   Neurological: She is " alert and oriented to person, place, and time. No cranial nerve deficit.   Skin: Skin is warm and dry. She is not diaphoretic.   Psychiatric: She has a normal mood and affect.       RESULTS   Results Review:    Results from last 7 days   Lab Units 07/20/20  0507 07/18/20  0527 07/17/20  0550  07/14/20 1942   SODIUM mmol/L 123* 130* 132*   < > 137   POTASSIUM mmol/L 4.9 4.2 4.4   < > 4.7   CHLORIDE mmol/L 82* 93* 95*   < > 99   CO2 mmol/L 23.0 27.0 24.0   < > 27.0   BUN mg/dL 72* 25* 41*   < > 44*   CREATININE mg/dL 6.34* 3.51* 4.68*   < > 3.95*   CALCIUM mg/dL 9.8 9.0 8.8   < > 9.5   BILIRUBIN mg/dL  --   --   --   --  0.5   ALK PHOS U/L  --   --   --   --  85   ALT (SGPT) U/L  --   --   --   --  17   AST (SGOT) U/L  --   --   --   --  25   GLUCOSE mg/dL 140* 166* 144*   < > 62*    < > = values in this interval not displayed.       Estimated Creatinine Clearance: 8.9 mL/min (A) (by C-G formula based on SCr of 6.34 mg/dL (H)).    Results from last 7 days   Lab Units 07/16/20  0452 07/14/20 1942   MAGNESIUM mg/dL  --  2.1   PHOSPHORUS mg/dL 4.6*  --              Results from last 7 days   Lab Units 07/20/20  0507 07/16/20  0451 07/15/20  0313 07/14/20  1942   WBC 10*3/mm3 11.83* 6.55 7.47 7.33   HEMOGLOBIN g/dL 10.4* 9.4* 9.4* 9.7*   PLATELETS 10*3/mm3 156 167 169 168       Results from last 7 days   Lab Units 07/20/20  0626 07/19/20  0650 07/18/20  0527 07/17/20  0550 07/16/20  0452   INR  1.20 1.20 1.35* 1.40* 2.04*         Imaging Results (Last 24 Hours)     Procedure Component Value Units Date/Time    XR Chest 1 View [[id number]] Collected:  07/20/20 0444     Updated:  07/20/20 0514    Narrative:         CHEST 1 VIEW on 7/20/2020     CLINICAL INDICATION: Right pleural effusion    COMPARISON: 7/17/2020    FINDINGS: 2-lead left subclavian pacemaker is noted in place.  Right IJ catheter tip is at the cavoatrial junction. The patient  is status post median sternotomy and valve replacement.  Cardiomegaly is noted.  There has been improvement in right  greater than left pleural effusions. There has been improvement  in bilateral opacities likely improved edema.      Impression:       Improving changes of CHF.    Electronically signed by:  Quinn Howell  7/20/2020 5:13 AM  CDT Workstation: [id number]           MEDICATIONS      aspirin 81 mg Oral Daily   budesonide-formoterol 2 puff Inhalation BID - RT   cefTRIAXone 1 g Intravenous Q24H   cholecalciferol 2,000 Units Oral Daily   citalopram 20 mg Oral Daily   dilTIAZem  mg Oral Q24H   epoetin rosales/rosales-epbx 6,000 Units Intravenous Once per day on Mon Wed Fri   ferrous sulfate 324 mg Oral Daily With Breakfast   ipratropium-albuterol 3 mL Nebulization 4x Daily - RT   metoprolol succinate XL 25 mg Oral Daily   naloxone 0.4 mg Intravenous Once   pantoprazole 40 mg Oral Daily   rOPINIRole 0.25 mg Oral Nightly   sodium chloride 10 mL Intravenous Q12H   sucralfate 1 g Oral 4x Daily AC & at Bedtime   traZODone 300 mg Oral Nightly       Pharmacy to dose warfarin        Assessment/Plan   ASSESSMENT / PLAN      Acute on chronic respiratory failure with hypoxia (CMS/HCC)    Atrial fibrillation [I48.91]    Diabetes mellitus (CMS/HCC)    Acute on chronic systolic heart failure (CMS/HCC)    Pulmonary hypertension (CMS/HCC)    ESRF (end stage renal failure) (CMS/HCC)    Coumadin toxicity    Pleural effusion    ESRD ON HD   -History of HD Monday Wednesday Friday since 2/28/2020.  -hd back to back - wt is now 93.6 kg.  Her dry weight is 97.5 kg. cxr is improved. Still has right pleural effusion. Plan for UF 4-5 L today.  New edw is close to 92 kg    Anemia   -H/h stable    CHF   -EF 46 to 50%  -Right pleural effusion/atelectasis  -proBNP 28, 929  -Continue Rocephin and azithromycin    HYPONATREMIA   Stable    Copd exacerbation / pna / right pleural effusion plan as above        This document has been electronically signed by Sandy Caputo MD on July 17, 2020 10:47

## 2020-07-21 NOTE — PROGRESS NOTES
"[facility]  [address] [place] [address]. [address]  T - [phone]  F - [phone]     Progress Note          PATIENT  DEMOGRAPHICS   PATIENT NAME: Brendon Skelton                      PHYSICIAN: Sandy Caputo MD  : 1945  MRN: [id number]   LOS: 7 days    Patient Care Team:  Josefa Pozo APRN as PCP - General (Nurse Practitioner)  Meme Duckworth APRN as PCP - Claims Attributed  Subjective   SUBJECTIVE   Feels well breathing stable       Objective   OBJECTIVE   Vital Signs  Temp:  [95.5 °F (35.3 °C)-97.5 °F (36.4 °C)] 97 °F (36.1 °C)  Heart Rate:  [62-95] 62  Resp:  [16-20] 16  BP: (128-140)/(58-83) 139/62    Flowsheet Rows      First Filed Value   Admission Height  165.1 cm (65\") Documented at 2020   Admission Weight  102 kg (225 lb) Documented at 2020           I/O last 3 completed shifts:  In: -   Out: 4500 [Other:4500]    PHYSICAL EXAM    Physical Exam   Constitutional: She is oriented to person, place, and time. She appears well-developed and well-nourished. No distress.   HENT:   Head: Normocephalic and atraumatic.   Right Ear: External ear normal.   Left Ear: External ear normal.   Nose: Nose normal.   Mouth/Throat: Oropharynx is clear and moist.   Eyes: Pupils are equal, round, and reactive to light. EOM are normal. Right eye exhibits no discharge. Left eye exhibits no discharge.   Neck: Normal range of motion. Neck supple. No tracheal deviation present. No thyromegaly present.   Cardiovascular: Normal rate, regular rhythm, normal heart sounds and intact distal pulses.   Pulmonary/Chest: Breath sounds normal. No stridor. She is in respiratory distress. She has no wheezes. She has no rales.   Abdominal: Soft. Bowel sounds are normal. She exhibits no distension. There is no tenderness.   Musculoskeletal: Normal range of motion. She exhibits no edema, tenderness or deformity.   Lymphadenopathy:     She has no cervical adenopathy.   "   Neurological: She is alert and oriented to person, place, and time. No cranial nerve deficit.   Skin: Skin is warm and dry. She is not diaphoretic.   Psychiatric: She has a normal mood and affect.       RESULTS   Results Review:    Results from last 7 days   Lab Units 07/21/20  0929 07/20/20  0507 07/18/20  0527  07/14/20 1942   SODIUM mmol/L 128* 123* 130*   < > 137   POTASSIUM mmol/L 3.5 4.9 4.2   < > 4.7   CHLORIDE mmol/L 91* 82* 93*   < > 99   CO2 mmol/L 25.0 23.0 27.0   < > 27.0   BUN mg/dL 42* 72* 25*   < > 44*   CREATININE mg/dL 4.73* 6.34* 3.51*   < > 3.95*   CALCIUM mg/dL 8.5* 9.8 9.0   < > 9.5   BILIRUBIN mg/dL  --   --   --   --  0.5   ALK PHOS U/L  --   --   --   --  85   ALT (SGPT) U/L  --   --   --   --  17   AST (SGOT) U/L  --   --   --   --  25   GLUCOSE mg/dL 137* 140* 166*   < > 62*    < > = values in this interval not displayed.       Estimated Creatinine Clearance: 11.7 mL/min (A) (by C-G formula based on SCr of 4.73 mg/dL (H)).    Results from last 7 days   Lab Units 07/16/20  0452 07/14/20 1942   MAGNESIUM mg/dL  --  2.1   PHOSPHORUS mg/dL 4.6*  --              Results from last 7 days   Lab Units 07/20/20  0507 07/16/20  0451 07/15/20  0313 07/14/20  1942   WBC 10*3/mm3 11.83* 6.55 7.47 7.33   HEMOGLOBIN g/dL 10.4* 9.4* 9.4* 9.7*   PLATELETS 10*3/mm3 156 167 169 168       Results from last 7 days   Lab Units 07/21/20  0929 07/20/20  0626 07/19/20  0650 07/18/20  0527 07/17/20  0550   INR  1.52* 1.20 1.20 1.35* 1.40*         Imaging Results (Last 24 Hours)     ** No results found for the last 24 hours. **           MEDICATIONS      aspirin 81 mg Oral Daily   budesonide-formoterol 2 puff Inhalation BID - RT   cholecalciferol 2,000 Units Oral Daily   citalopram 20 mg Oral Daily   dilTIAZem  mg Oral Q24H   epoetin rosales/rosales-epbx 6,000 Units Intravenous Once per day on Mon Wed Fri   ferrous sulfate 324 mg Oral Daily With Breakfast   ipratropium-albuterol 3 mL Nebulization 4x Daily - RT    metoprolol succinate XL 25 mg Oral Daily   naloxone 0.4 mg Intravenous Once   pantoprazole 40 mg Oral Daily   rOPINIRole 0.25 mg Oral Nightly   sodium chloride 10 mL Intravenous Q12H   sucralfate 1 g Oral 4x Daily AC & at Bedtime   traZODone 300 mg Oral Nightly   warfarin 5 mg Oral Daily       Pharmacy to dose warfarin        Assessment/Plan   ASSESSMENT / PLAN      Acute on chronic respiratory failure with hypoxia (CMS/HCC)    Atrial fibrillation [I48.91]    Diabetes mellitus (CMS/HCC)    Acute on chronic systolic heart failure (CMS/HCC)    Pulmonary hypertension (CMS/HCC)    ESRF (end stage renal failure) (CMS/HCC)    Coumadin toxicity    Pleural effusion    ESRD ON HD   -History of HD Monday Wednesday Friday since 2/28/2020.  -hd back to back - wt is now 93.6 kg.  Her dry weight is 97.5 kg before. cxr is improved. Still has right pleural effusion.  New edw is close to 92 kg    Anemia   -H/h stable    CHF   -EF 46 to 50%  -Right pleural effusion/atelectasis  -proBNP 28, 929  -Continue Rocephin and azithromycin    HYPONATREMIA   Stable    Copd exacerbation / pna / right pleural effusion plan as above    dispo ?discharge tomorrow after hd        This document has been electronically signed by Sandy Caputo MD on July 17, 2020 10:47

## 2020-07-21 NOTE — THERAPY TREATMENT NOTE
Acute Care - Physical Therapy Treatment Note  [facility]     Patient Name: Brendon Skelton  : 1945  MRN: [id number]  Today's Date: 2020  Onset of Illness/Injury or Date of Surgery: 20     Referring Physician: Dr. Coreas     Admit Date: 2020    Visit Dx:    ICD-10-CM ICD-9-CM   1. Acute on chronic respiratory failure with hypoxia (CMS/HCC) J96.21 518.84     799.02   2. Pneumonia due to infectious organism, unspecified laterality, unspecified part of lung J18.9 486   3. Acute on chronic congestive heart failure, unspecified heart failure type (CMS/HCC) I50.9 428.0   4. COVID-19 ruled out Z03.818 V71.83   5. Impaired mobility and ADLs Z74.09 V49.89    Z78.9    6. Impaired functional mobility, balance, gait, and endurance Z74.09 V49.89     Patient Active Problem List   Diagnosis   • Long term current use of anticoagulant therapy   • Personal history of heart valve replacement   • Atrial fibrillation [I48.91]   • Emphysema of lung (CMS/HCC)   • On anticoagulant therapy   • Hyperlipidemia   • Diastolic heart failure (CMS/HCC)   • Depressive disorder   • COPD (chronic obstructive pulmonary disease) (CMS/HCC)   • Diabetes mellitus (CMS/HCC)   • Myopia   • Astigmatism   • Bleeding from open wound of chest wall   • Follow-up surgery care   • Encounter for screening for malignant neoplasm of colon   • Positive colorectal cancer screening using DNA-based stool test   • Acute on chronic systolic heart failure (CMS/HCC)   • Nodule of left lung   • Chronic hypoxemic respiratory failure (CMS/HCC)   • Physical deconditioning   • Heart failure with preserved left ventricular function (HFpEF) (CMS/HCC)   • Hypertension   • Coronary artery disease involving native coronary artery without angina pectoris   • Hx of CABG   • SSS (sick sinus syndrome) (CMS/HCC)   • Pacemaker   • Stage 4 chronic kidney disease (CMS/HCC)   • Personal history of tobacco use, presenting hazards to health   • Gastrointestinal  hemorrhage   • Class 2 severe obesity due to excess calories with serious comorbidity and body mass index (BMI) of 39.0 to 39.9 in adult (CMS/HCC)   • Gastritis   • Colon polyp   • Coumadin toxicity   • Acute on chronic diastolic congestive heart failure (CMS/HCC)   • Chronic anemia   • Pulmonary hypertension (CMS/HCC)   • Confusion   • Hyponatremia   • Long term current use of anticoagulant   • Renal insufficiency   • Lower abdominal pain   • Hematoma of abdominal wall   • E coli bacteremia   • ESRF (end stage renal failure) (CMS/HCC)   • Acute on chronic respiratory failure with hypoxia (CMS/HCC)   • Coumadin toxicity   • Pleural effusion       Therapy Treatment    Rehabilitation Treatment Summary     Row Name 07/21/20 1120 07/21/20 1038          Treatment Time/Intention    Discipline  --  physical therapy assistant  -     Document Type  --  therapy note (daily note)  -JW     Subjective Information  --  complains of;fatigue  -     Mode of Treatment  --  physical therapy;individual therapy  -JW     Therapy Frequency (PT Clinical Impression)  --  other (see comments) 6 d/wk  -JW     Therapy Frequency (OT Eval)  --  other (see comments) 5-7 days a week   -JW     Patient Effort  --  good  -JW     Comment  --  pt prefers to not wear mask during tx secondary to being hard to breathe in it  -JW     Reason Treatment Not Performed  patient/family declined treatment  -  --     Existing Precautions/Restrictions  --  fall;oxygen therapy device and L/min gait belt placement - port   -JW     Recorded by [RC] Fiorella Broderick, COLÓN/L 07/21/20 1220 [JW] Kellie Pardo, PTA 07/21/20 1239     Row Name 07/21/20 1038             Vital Signs    Post Systolic BP Rehab  139  -JW      Post Treatment Diastolic BP  62  -JW      Posttreatment Heart Rate (beats/min)  74  -JW      Post SpO2 (%)  91  -JW      O2 Delivery Post Treatment  supplemental O2  -JW      Pre Patient Position  Supine  -JW      Intra Patient Position   Sitting  -JW      Post Patient Position  Supine  -JW      Recorded by [JW] Kellie Pardo, PTA 07/21/20 1239      Row Name 07/21/20 1038             Cognitive Assessment/Intervention- PT/OT    Affect/Mental Status (Cognitive)  WFL  -JW      Orientation Status (Cognition)  oriented x 4  -JW      Follows Commands (Cognition)  over 90% accuracy;verbal cues/prompting required;repetition of directions required  -JW      Recorded by [JW] Kellie Pardo, PTA 07/21/20 1239      Row Name 07/21/20 1038             Safety Issues, Functional Mobility    Impairments Affecting Function (Mobility)  balance;coordination;endurance/activity tolerance;motor planning;pain;shortness of breath;strength  -JW      Recorded by [JW] Kellie Pardo, [facility] 07/21/20 1239      Row Name 07/21/20 1038             Bed Mobility Assessment/Treatment    Bed Mobility Assessment/Treatment  bed mobility (all) activities  -JW      Supine-Sit [place] (Bed Mobility)  supervision  -JW      Sit-Supine [place] (Bed Mobility)  supervision  -JW      Assistive Device (Bed Mobility)  bed rails;head of bed elevated  -JW      Recorded by [JW] Kellie Pardo, [facility] 07/21/20 1239      Row Name 07/21/20 1038             Sit-Stand Transfer    Sit-Stand [place] (Transfers)  supervision  -JW      Assistive Device (Sit-Stand Transfers)  walker, front-wheeled  -JW      Recorded by [JW] Kellie Pardo, PTA 07/21/20 1239      Row Name 07/21/20 1038             Stand-Sit Transfer    Stand-Sit [place] (Transfers)  supervision  -JW      Assistive Device (Stand-Sit Transfers)  walker, front-wheeled  -JW      Recorded by [JW] Kellie Pardo, PTA 07/21/20 1239      Row Name 07/21/20 1038             Toilet Transfer    Type (Toilet Transfer)  stand-sit;sit-stand  -JW      [place] Level (Toilet Transfer)  contact guard;supervision  -JW      Assistive Device (Toilet Transfer)  grab bars/safety frame;walker, front-wheeled   -JW      Recorded by [JW] Kellie Pardo, [facility] 07/21/20 1239      Row Name 07/21/20 1038             Gait/Stairs Assessment/Training    Gait/Stairs Assessment/Training  gait/ambulation independence;gait/ambulation assistive device;distance ambulated;gait pattern;gait deviations  -JW      [place] Level (Gait)  contact guard;supervision  -JW      Assistive Device (Gait)  walker, front-wheeled  -JW      Distance in Feet (Gait)  5'x 2  -JW      Pattern (Gait)  step-through  -JW      Deviations/Abnormal Patterns (Gait)  stride length decreased  -JW      Recorded by [JW] Kellie Pardo, [facility] 07/21/20 1239      Row Name 07/21/20 1038             Motor Skills Assessment/Interventions    Additional Documentation  Therapeutic Exercise (Group)  -JW      Recorded by [JW] Kellie Pardo, [facility] 07/21/20 1239      Row Name 07/21/20 1038             Therapeutic Exercise    Therapeutic Exercise  seated, lower extremities  -JW      Additional Documentation  Therapeutic Exercise (Row)  -JW      Recorded by [JW] Kellie Pardo, [facility] 07/21/20 1239      Row Name 07/21/20 1038             Lower Extremity Seated Therapeutic Exercise    Performed, Seated Lower Extremity (Therapeutic Exercise)  ankle dorsiflexion/plantarflexion;LAQ (long arc quad), knee extension GS, add pillow squeechan braden  -JW      Exercise Type, Seated Lower Extremity (Therapeutic Exercise)  AROM (active range of motion)  -JW      Sets/Reps Detail, Seated Lower Extremity (Therapeutic Exercise)  x 20  -JW      Recorded by [JW] Kellie Pardo, [facility] 07/21/20 1239      Row Name 07/21/20 1038             Positioning and Restraints    Pre-Treatment Position  in bed  -JW      Post Treatment Position  bed  -JW      In Bed  supine;call light within reach;encouraged to call for assist  -JW      Recorded by [JW] Kellie Pardo, [facility] 07/21/20 1239      Row Name 07/21/20 1038             Pain Scale: Numbers Pre/Post-Treatment    Pain Scale:  Numbers, Pretreatment  0/10 - no pain  -JW      Pain Scale: Numbers, Post-Treatment  0/10 - no pain  -JW      Recorded by [JW] Kellie Pardo, PTA 07/21/20 1239      Row Name 07/21/20 1038             Sensory Assessment/Intervention    Sensory General Assessment  --  -JW      Recorded by [JW] Kellie Pardo, PTA 07/21/20 1239      Row Name 07/21/20 1038             Hearing Assessment    Hearing Status  WFL  -JW      Recorded by [JW] Kellie Pardo, PTA 07/21/20 1239      Row Name 07/21/20 1038             Vision Assessment/Intervention    Visual Impairment/Limitations  corrective lenses for reading  -JW      Recorded by [JW] Kellie Pardo, PTA 07/21/20 1239      Row Name 07/21/20 1038             Light Touch Sensation Assessment    Left Upper Extremity: Light Touch Sensation Assessment  --  -JW      Right Upper Extremity: Light Touch Sensation Assessment  --  -JW      Recorded by [JW] Kellie Pardo, PTA 07/21/20 1239      Row Name 07/21/20 1038             Coping    Observed Emotional State  calm;cooperative  -JW      Verbalized Emotional State  acceptance  -JW      Recorded by [JW] Kellie Pardo, PTA 07/21/20 1239      Row Name 07/21/20 1038             Outcome Summary/Treatment Plan (OT)    Anticipated Discharge Disposition (OT)  anticipate therapy at next level of care;home with 24/7 care;home with home health  -JW      Recorded by [JW] Kellie Pardo, PTA 07/21/20 1239      Row Name 07/21/20 1038             Outcome Summary/Treatment Plan (PT)    Plan for Continued Treatment (PT)  cont per POC  -JW      Anticipated Discharge Disposition (PT)  anticipate therapy at next level of care;home with 24/7 care;home with home health;transitional care rehab to home if needed to enable home alone safely  -JW      Recorded by [JW] Kellie Pardo, PTA 07/21/20 1239        User Key  (r) = Recorded By, (t) = Taken By, (c) = Cosigned By    Initials Name Effective Dates  Discipline    JW Kellie Pardo, PTA 08/11/15 -  PT    RC Meggan Fiorella G, COLÓN/L 03/07/18 -  OT               Rehab Goal Summary     Row Name 07/21/20 [facility]             Physical Therapy Goals    Bed Mobility Goal Selection (PT)  bed mobility, PT goal 1  -JW      Transfer Goal Selection (PT)  transfer, PT goal 1  -JW      Gait Training Goal Selection (PT)  gait training, PT goal 1  -JW      Wheelchair Locomotion Goal Selection (PT)  wheelchair locomotion, PT goal (free text)  -JW         Bed Mobility Goal 1 (PT)    Activity/Assistive Device (Bed Mobility Goal 1, PT)  bed mobility activities, all  -JW      [place] Level/Cues Needed (Bed Mobility Goal 1, PT)  independent;conditional independence  -JW      Time Frame (Bed Mobility Goal 1, PT)  3 days  -JW      Barriers (Bed Mobility Goal 1, PT)  strength  -JW      Progress/Outcomes (Bed Mobility Goal 1, PT)  goal not met  -JW         Transfer Goal 1 (PT)    Activity/Assistive Device (Transfer Goal 1, PT)  bed-to-chair/chair-to-bed;wheelchair transfer  -JW      [place] Level/Cues Needed (Transfer Goal 1, PT)  conditional independence;supervision required  -JW      Time Frame (Transfer Goal 1, PT)  1 week  -JW      Barriers (Transfers Goal 1, PT)  strength  -JW      Progress/Outcome (Transfer Goal 1, PT)  goal not met  -JW         Gait Training Goal 1 (PT)    Activity/Assistive Device (Gait Training Goal 1, PT)  gait (walking locomotion);assistive device use;decrease fall risk;increase endurance/gait distance  -JW      [place] Level (Gait Training Goal 1, PT)  conditional independence  -JW      Distance (Gait Goal 1, PT)  150 ft or more  -JW      Time Frame (Gait Training Goal 1, PT)  2 weeks  -JW      Barriers (Gait Training Goal 1, PT)  strength, ex ata  -JW      Progress/Outcome (Gait Training Goal 1, PT)  goal not met  -JW         Wheelchair Locomotion Goal (PT)    Wheelchair Locomotion Goal (PT)  indep mobility in/out of tight spaces  -JW       Time Frame (Wheelchair Locomotion Goal, PT)  by discharge  -JW      Barriers (Wheelchair Locomotion Goal, PT)  weakness  -      Progress/Outcome (Wheelchair Locomotion Goal, PT)  goal not met  -        User Key  (r) = Recorded By, (t) = Taken By, (c) = Cosigned By    Initials Name Provider Type Discipline    Kellie Arredondo, PTA Physical Therapy Assistant PT          Physical Therapy Education                 Title: PT OT SLP Therapies (In Progress)     Topic: Physical Therapy (In Progress)     Point: Mobility training (Done)     Description:   Instruct learner(s) on safety and technique for assisting patient out of bed, chair or wheelchair.  Instruct in the proper use of assistive devices, such as walker, crutches, cane or brace.              Patient Friendly Description:   It's important to get you on your feet again, but we need to do so in a way that is safe for you. Falling has serious consequences, and your personal safety is the most important thing of all.        When it's time to get out of bed, one of us or a family member will sit next to you on the bed to give you support.     If your doctor or nurse tells you to use a walker, crutches, a cane, or a brace, be sure you use it every time you get out of bed, even if you think you don't need it.    Learning Progress Summary           Patient Acceptance, E,TB, VU,NR by STEFANIE at 7/19/2020 1333    Acceptance, D, NR by REGI at 7/18/2020 1602    Comment:  POC, mobiity & transfers to/from bathroom                   Point: Home exercise program (In Progress)     Description:   Instruct learner(s) on appropriate technique for monitoring, assisting and/or progressing patient with therapeutic exercises and activities.              Learning Progress Summary           Patient Acceptance, D, NR by REGI at 7/18/2020 1602    Comment:  POC, mobiity & transfers to/from bathroom                   Point: Body mechanics (In Progress)     Description:   Instruct learner(s)  on proper positioning and spine alignment for patient and/or caregiver during mobility tasks and/or exercises.              Learning Progress Summary           Patient Acceptance, D, NR by  at 7/18/2020 1602    Comment:  POC, mobiity & transfers to/from bathroom                   Point: Precautions (Done)     Description:   Instruct learner(s) on prescribed precautions during mobility and gait tasks              Learning Progress Summary           Patient Acceptance, E,TB, VU,NR by LN at 7/19/2020 1333    Acceptance, D, NR by  at 7/18/2020 1602    Comment:  POC, mobiity & transfers to/from bathroom                               User Key     Initials Effective Dates Name Provider Type Discipline     04/03/18 -  Tisha Mehta, PT Physical Therapist PT    STEFANIE 03/07/18 -  Cecy Lara, PTA Physical Therapy Assistant PT                PT Recommendation and Plan  Anticipated Discharge Disposition (PT): anticipate therapy at next level of care, home with 24/7 care, home with home health, transitional care(rehab to home if needed to enable home alone safely)  Therapy Frequency (PT Clinical Impression): other (see comments)(6 d/wk)  Outcome Summary/Treatment Plan (PT)  Plan for Continued Treatment (PT): cont per POC  Anticipated Discharge Disposition (PT): anticipate therapy at next level of care, home with 24/7 care, home with home health, transitional care(rehab to home if needed to enable home alone safely)  Plan of Care Reviewed With: patient  Outcome Summary: sup<>sit SBA, sit<>stand SBA, t/shawn bed <>bathroom w/ RW w/ CGA/SBA ~5' x 2, performed B LE ther ex at EOB  Outcome Measures     Row Name 07/21/20 1038 07/19/20 1035 07/18/20 1600       How much help from another person do you currently need...    Turning from your back to your side while in flat bed without using bedrails?  3  -JW  3  -LN  3  -GB    Moving from lying on back to sitting on the side of a flat bed without bedrails?  3  -JW  3  -LN   3  -GB    Moving to and from a bed to a chair (including a wheelchair)?  3  -JW  3  -LN  3  -GB    Standing up from a chair using your arms (e.g., wheelchair, bedside chair)?  3  -JW  3  -LN  3  -GB    Climbing 3-5 steps with a railing?  2  -JW  2  -LN  2  -GB    To walk in hospital room?  3  -JW  3  -LN  2  -GB    AM-PAC 6 Clicks Score (PT)  17  -JW  17  -LN  16  -GB       Functional Assessment    Outcome Measure Options  AM-PAC 6 Clicks Basic Mobility (PT)  -JW  AM-PAC 6 Clicks Basic Mobility (PT)  -LN  AM-PAC 6 Clicks Basic Mobility (PT)  -GB    Row Name 07/18/20 1409             How much help from another is currently needed...    Putting on and taking off regular lower body clothing?  2  -BH      Bathing (including washing, rinsing, and drying)  3  -BH      Toileting (which includes using toilet bed pan or urinal)  3  -BH      Putting on and taking off regular upper body clothing  3  -BH      Taking care of personal grooming (such as brushing teeth)  3  -BH      Eating meals  4  -BH      AM-PAC 6 Clicks Score (OT)  18  -         Functional Assessment    Outcome Measure Options  AM-PAC 6 Clicks Daily Activity (OT)  -        User Key  (r) = Recorded By, (t) = Taken By, (c) = Cosigned By    Initials Name Provider Type    Kellie Arredondo, PTA Physical Therapy Assistant    Tisha Loo, PT Physical Therapist     Shyann Graves, OTR/L Occupational Therapist    LN Cecy Lara, PTA Physical Therapy Assistant         Time Calculation:   PT Charges     Row Name 07/21/20 1038             Time Calculation    Start Time  1038  -      Stop Time  1104  -      Time Calculation (min)  26 min  -      PT Received On  07/21/20  -         Time Calculation- PT    Total Timed Code Minutes- PT  26 minute(s)  -        User Key  (r) = Recorded By, (t) = Taken By, (c) = Cosigned By    Initials Name Provider Type    Kellie Arredondo, PTA Physical Therapy Assistant        Therapy Charges  for Today     Code Description Service Date Service Provider Modifiers Qty    61987771203 HC PT THERAPEUTIC ACT EA 15 MIN 7/21/2020 Kellie Pardo, PTA GP 1    54268694046 HC PT THER PROC EA 15 MIN 7/21/2020 Kellie Pardo, PTA GP 1          PT G-Codes  Outcome Measure Options: AM-PAC 6 Clicks Basic Mobility (PT)  AM-PAC 6 Clicks Score (PT): 17  AM-PAC 6 Clicks Score (OT): 18    Kellie Pardo PTA  7/21/2020

## 2020-07-21 NOTE — PLAN OF CARE
Problem: Patient Care Overview  Goal: Plan of Care Review  Outcome: Ongoing (interventions implemented as appropriate)  Flowsheets (Taken 7/21/2020 1038)  Plan of Care Reviewed With: patient  Outcome Summary: sup<>sit SBA, sit<>stand SBA, t/shawn bed <>bathroom w/ RW w/ CGA/SBA ~5' x 2, performed B LE ther ex at EOB

## 2020-07-21 NOTE — PLAN OF CARE
Problem: Patient Care Overview  Goal: Plan of Care Review  Outcome: Ongoing (interventions implemented as appropriate)  Flowsheets  Taken 7/21/2020 1644 by Sarah Rubin RN  Progress: no change  Outcome Summary: pt vss. Pt resting well this shift.  Taken 7/21/2020 1038 by Kellie Pardo PTA  Plan of Care Reviewed With: patient

## 2020-07-21 NOTE — PLAN OF CARE
Problem: Patient Care Overview  Goal: Plan of Care Review  7/21/2020 0445 by Melonie Perdue RN  Outcome: Ongoing (interventions implemented as appropriate)  Flowsheets  Taken 7/21/2020 0445  Progress: no change  Taken 7/20/2020 2100  Plan of Care Reviewed With: patient

## 2020-07-21 NOTE — PROGRESS NOTES
"Anticoagulation by Pharmacy - Warfarin    Brendon Skelton is a 74 y.o.female being continued on warfarin for aortic valve replacement and afib      Home regimen: warfarin 2.5 mg [id number], warfarin 5 mg Mo (coumadin clinic)      INR Goal: 2-3, of note, goal from CC is 2.5-3.5 - we will proceed with goal of 2-3 in line with our pharmacy to dose consult; as well as, CHEST guidelines for this indication      Last INR:   Lab Results   Component Value Date    INR 1.52 (H) 07/21/2020       Objective:  [Ht: 165.1 cm (65\"); Wt: 92.1 kg (203 lb)]  Lab Results   Component Value Date    INR 1.52 (H) 07/21/2020    INR 1.20 07/20/2020    INR 1.20 07/19/2020    PROTIME 19.1 (H) 07/21/2020    PROTIME 17.4 (H) 07/18/2020    PROTIME 17.9 (H) 07/17/2020     Lab Results   Component Value Date    HGB 10.4 (L) 07/20/2020    HGB 9.4 (L) 07/16/2020    HGB 9.4 (L) 07/15/2020    HCT 31.5 (L) 07/20/2020    HCT 29.7 (L) 07/16/2020    HCT 29.2 (L) 07/15/2020     07/20/2020     07/16/2020     07/15/2020           Assessment  Interacting medications: aspirin, citalopram  INR is subtherapeutic, but has now started to trend up  H/H being followed for s/s of bleeding  No bleeding noted at this time  We will reduce dose to home dose of 2.5 mg PO warfarin tonight    Plan:  1.  Give warfarin 2.5 mg tablet PO @ 1800 tonight  2.  Draw a PT/INR in AM  3.  Pharmacy will continue to follow    Milan Mcintosh RPH  07/21/20 14:34     "

## 2020-07-21 NOTE — PLAN OF CARE
Problem: NPPV/CPAP (Adult)  Goal: Signs and Symptoms of Listed Potential Problems Will be Absent, Minimized or Managed (NPPV/CPAP)  Outcome: Ongoing (interventions implemented as appropriate)  Flowsheets (Taken 7/21/2020 2946)  Problems Assessed (NPPV/CPAP): all  Problems Present (NPPV/CPAP): none  Note:   Pt ata. Being on 6L  HF NC throughout day; bipap not needed; will cont. To monitor.

## 2020-07-21 NOTE — PROGRESS NOTES
[facility] Medicine Services  INPATIENT PROGRESS NOTE    Length of Stay: 7  Date of Admission: 7/14/2020  Primary Care Physician: Josefa Pozo APRN    Subjective   Chief Complaint: Shortness of breath  HPI:  Feels further improved today, asking about when she can possibly go home.  Still wearing 6L O2 at this time.      Review of Systems   Constitutional: Negative for chills and fever.   Respiratory: Positive for shortness of breath. Negative for cough.    Cardiovascular: Negative for chest pain.   Gastrointestinal: Negative for abdominal pain.   All other systems reviewed and are negative.     All pertinent negatives and positives are as above. All other systems have been reviewed and are negative unless otherwise stated.     Objective    Temp:  [95.5 °F (35.3 °C)-97.5 °F (36.4 °C)] 97 °F (36.1 °C)  Heart Rate:  [62-95] 62  Resp:  [16-20] 16  BP: (128-140)/(58-83) 139/62    Physical Exam   Constitutional: She is oriented to person, place, and time.   Elderly appearing female.    HENT:   Head: Normocephalic and atraumatic.   Nose: Nose normal.   Eyes: Conjunctivae and EOM are normal.   Neck: Neck supple.   Cardiovascular: Normal rate, regular rhythm, normal heart sounds and intact distal pulses.   Pulmonary/Chest:   Breath sounds distant but clear presently.    Abdominal: Soft. Bowel sounds are normal. She exhibits no distension.   Neurological: She is alert and oriented to person, place, and time.   Skin: Skin is warm and dry. Capillary refill takes less than 2 seconds.   Psychiatric: She has a normal mood and affect.         Results Review:  I have reviewed the labs, radiology results, and diagnostic studies.    Laboratory Data:   Results from last 7 days   Lab Units 07/21/20  0929 07/20/20  0507 07/18/20  0527  07/14/20  1942   SODIUM mmol/L 128* 123* 130*   < > 137   POTASSIUM mmol/L 3.5 4.9 4.2   < > 4.7   CHLORIDE mmol/L 91* 82* 93*   < > 99   CO2 mmol/L 25.0 23.0  27.0   < > 27.0   BUN mg/dL 42* 72* 25*   < > 44*   CREATININE mg/dL 4.73* 6.34* 3.51*   < > 3.95*   GLUCOSE mg/dL 137* 140* 166*   < > 62*   CALCIUM mg/dL 8.5* 9.8 9.0   < > 9.5   BILIRUBIN mg/dL  --   --   --   --  0.5   ALK PHOS U/L  --   --   --   --  85   ALT (SGPT) U/L  --   --   --   --  17   AST (SGOT) U/L  --   --   --   --  25   ANION GAP mmol/L 12.0 18.0* 10.0   < > 11.0    < > = values in this interval not displayed.     Estimated Creatinine Clearance: 11.7 mL/min (A) (by C-G formula based on SCr of 4.73 mg/dL (H)).  Results from last 7 days   Lab Units 07/16/20  0452 07/14/20  1942   MAGNESIUM mg/dL  --  2.1   PHOSPHORUS mg/dL 4.6*  --          Results from last 7 days   Lab Units 07/20/20  0507 07/16/20  0451 07/15/20  0313 07/14/20  1942   WBC 10*3/mm3 11.83* 6.55 7.47 7.33   HEMOGLOBIN g/dL 10.4* 9.4* 9.4* 9.7*   HEMATOCRIT % 31.5* 29.7* 29.2* 29.6*   PLATELETS 10*3/mm3 156 167 169 168     Results from last 7 days   Lab Units 07/21/20  0929 07/20/20  0626 07/19/20  0650 07/18/20  0527 07/17/20  0550   INR  1.52* 1.20 1.20 1.35* 1.40*       Culture Data:   No results found for: BLOODCX  No results found for: URINECX  No results found for: RESPCX  No results found for: WOUNDCX  No results found for: STOOLCX  No components found for: BODYFLD    Radiology Data:   Imaging Results (Last 24 Hours)     ** No results found for the last 24 hours. **          I have reviewed the patient's current medications.     Assessment/Plan     Principal Problem:    Acute on chronic respiratory failure with hypoxia (CMS/HCC)  Active Problems:    Atrial fibrillation [I48.91]    Diabetes mellitus (CMS/HCC)    Acute on chronic systolic heart failure (CMS/HCC)    Pulmonary hypertension (CMS/HCC)    ESRF (end stage renal failure) (CMS/HCC)    Coumadin toxicity    Pleural effusion    Acute on chronic respiratory failure with hypoxia - Clinically improving.  Continue current management.  Appreciate Nephrology help/input.     ESRD  on HD - Management per Nephrology.     Acute on chronic systolic heart failure - management as above.     Anemia - Stable, continue to monitor.     Hyponatremia - Nephrology onboard.     Afib - pharmacy to dose warfarin.     DVT ppx - Warfarin  CODE - Full    Discharge Planning: In progress.    Hermann Cha MD     Consent 3/Introductory Paragraph: I gave the patient a chance to ask questions they had about the procedure.  Following this I explained the Mohs procedure and consent was obtained. The risks, benefits and alternatives to therapy were discussed in detail. Specifically, the risks of infection, scarring, bleeding, prolonged wound healing, incomplete removal, allergy to anesthesia, nerve injury and recurrence were addressed. Prior to the procedure, the treatment site was clearly identified and confirmed by the patient. All components of Universal Protocol/PAUSE Rule completed.

## 2020-07-22 NOTE — DISCHARGE SUMMARY
[facility]  DISCHARGE SUMMARY       Date of Admission: 7/14/2020  Date of Discharge:  7/22/2020  Primary Care Physician: Josefa Pozo APRN    Presenting Problem/History of Present Illness:  Acute on chronic respiratory failure with hypoxia (CMS/Formerly [facility]) [J96.21]  Pneumonia due to infectious organism, unspecified laterality, unspecified part of lung [J18.9]  Acute on chronic congestive heart failure, unspecified heart failure type (CMS/Formerly [facility]) [I50.9]  COVID-19 ruled out [Z03.818]     Final Discharge Diagnoses:  Active Hospital Problems    Diagnosis   • **Acute on chronic respiratory failure with hypoxia (CMS/Formerly [facility])   • Pleural effusion   • Coumadin toxicity   • ESRF (end stage renal failure) (CMS/Formerly [facility])   • Pulmonary hypertension (CMS/Formerly [facility])   • Acute on chronic systolic heart failure (CMS/Formerly [facility])   • Diabetes mellitus (CMS/Formerly [facility])   • Atrial fibrillation [I48.91]       Consults:   Consults     Date and Time Order Name Status Description    7/15/2020 0736 Inpatient Nephrology Consult Completed           Procedures Performed:                 Pertinent Test Results:   Lab Results (most recent)     Procedure Component Value Units Date/Time    Basic Metabolic Panel [[id number]]  (Abnormal) Collected:  07/22/20 0712    Specimen:  Blood Updated:  07/22/20 0732     Glucose 114 mg/dL      BUN 56 mg/dL      Creatinine 5.83 mg/dL      Sodium 125 mmol/L      Potassium 3.8 mmol/L      Chloride 89 mmol/L      CO2 24.0 mmol/L      Calcium 8.5 mg/dL      eGFR   Amer --     Comment: <15 Indicative of kidney failure.        eGFR Non African Amer 7 mL/min/1.73      Comment: <15 Indicative of kidney failure.        BUN/Creatinine Ratio 9.6     Anion Gap 12.0 mmol/L     Narrative:       GFR Normal >60  Chronic Kidney Disease <60  Kidney Failure <15      Protime-INR [[id number]]  (Abnormal) Collected:  07/22/20 0711    Specimen:  Blood Updated:  07/22/20 0727     Protime 21.5 Seconds       INR 1.76    Narrative:       Therapeutic range for most indications is 2.0-3.0 INR,  or 2.5-3.5 for mechanical heart valves.    CBC & Differential [[id number]] Collected:  07/22/20 0711    Specimen:  Blood Updated:  07/22/20 0723    Narrative:       The following orders were created for panel order CBC & Differential.  Procedure                               Abnormality         Status                     ---------                               -----------         ------                     CBC Auto Differential[[id number]]        Abnormal            Final result                 Please view results for these tests on the individual orders.    CBC Auto Differential [[id number]]  (Abnormal) Collected:  07/22/20 0711    Specimen:  Blood Updated:  07/22/20 0723     WBC 7.73 10*3/mm3      RBC 3.05 10*6/mm3      Hemoglobin 10.1 g/dL      Hematocrit 30.8 %      .0 fL      MCH 33.1 pg      MCHC 32.8 g/dL      RDW 18.0 %      RDW-SD 66.0 fl      MPV 10.1 fL      Platelets 125 10*3/mm3      Neutrophil % 77.3 %      Lymphocyte % 5.0 %      Monocyte % 10.7 %      Eosinophil % 4.7 %      Basophil % 0.1 %      Immature Grans % 2.2 %      Neutrophils, Absolute 5.97 10*3/mm3      Lymphocytes, Absolute 0.39 10*3/mm3      Monocytes, Absolute 0.83 10*3/mm3      Eosinophils, Absolute 0.36 10*3/mm3      Basophils, Absolute 0.01 10*3/mm3      Immature Grans, Absolute 0.17 10*3/mm3      nRBC 0.0 /100 WBC     Protime-INR [[id number]]  (Abnormal) Collected:  07/21/20 0929    Specimen:  Blood Updated:  07/21/20 1026     Protime 19.1 Seconds      INR 1.52    Narrative:       Therapeutic range for most indications is 2.0-3.0 INR,  or 2.5-3.5 for mechanical heart valves.    Basic Metabolic Panel [[id number]]  (Abnormal) Collected:  07/21/20 0929    Specimen:  Blood Updated:  07/21/20 1010     Glucose 137 mg/dL      BUN 42 mg/dL      Creatinine 4.73 mg/dL      Sodium 128 mmol/L      Potassium 3.5 mmol/L      Chloride 91 mmol/L      CO2 25.0  mmol/L      Calcium 8.5 mg/dL      eGFR   Amer --     Comment: <15 Indicative of kidney failure.        eGFR Non African Amer 9 mL/min/1.73      Comment: <15 Indicative of kidney failure.        BUN/Creatinine Ratio 8.9     Anion Gap 12.0 mmol/L     Narrative:       GFR Normal >60  Chronic Kidney Disease <60  Kidney Failure <15      POC Glucose Once [[id number]]  (Abnormal) Collected:  07/20/20 1908    Specimen:  Blood Updated:  07/20/20 1921     Glucose 136 mg/dL      Comment: RN NotifiedOperator: [id number] LIZETTE Vital ID: [id number]       POC Glucose Once [[id number]]  (Abnormal) Collected:  07/20/20 1650    Specimen:  Blood Updated:  07/20/20 1714     Glucose 146 mg/dL      Comment: RN NotifiedOperator: [id number] SAVANNAH Fish ID: [id number]       Protime-INR, Fingerstick [[id number]]  (Normal) Collected:  07/20/20 0626    Specimen:  Capillary Blood Updated:  07/20/20 0638     INR 1.20    Narrative:       Therapeutic range for most indications is 2.0-3.0 INR,  or 2.5-3.5 for mechanical heart valves.    CBC & Differential [[id number]] Collected:  07/20/20 0507    Specimen:  Blood Updated:  07/20/20 0603    Narrative:       The following orders were created for panel order CBC & Differential.  Procedure                               Abnormality         Status                     ---------                               -----------         ------                     CBC Auto Differential[[id number]]        Abnormal            Final result                 Please view results for these tests on the individual orders.    CBC Auto Differential [[id number]]  (Abnormal) Collected:  07/20/20 0507    Specimen:  Blood Updated:  07/20/20 0603     WBC 11.83 10*3/mm3      RBC 3.08 10*6/mm3      Hemoglobin 10.4 g/dL      Hematocrit 31.5 %      .3 fL      MCH 33.8 pg      MCHC 33.0 g/dL      RDW 18.1 %      RDW-SD 67.3 fl      MPV 12.5 fL      Platelets 156 10*3/mm3      Neutrophil % 91.1 %      Lymphocyte  % 1.4 %      Monocyte % 6.6 %      Eosinophil % 0.0 %      Basophil % 0.1 %      Immature Grans % 0.8 %      Neutrophils, Absolute 10.78 10*3/mm3      Lymphocytes, Absolute 0.17 10*3/mm3      Monocytes, Absolute 0.78 10*3/mm3      Eosinophils, Absolute 0.00 10*3/mm3      Basophils, Absolute 0.01 10*3/mm3      Immature Grans, Absolute 0.09 10*3/mm3      nRBC 0.3 /100 WBC     Blood Culture - Blood, Arm, Right [[id number]] Collected:  07/14/20 2058    Specimen:  Blood from Arm, Right Updated:  07/19/20 2115     Blood Culture No growth at 5 days    Blood Culture - Blood, Arm, Right [[id number]] Collected:  07/14/20 1942    Specimen:  Blood from Arm, Right Updated:  07/19/20 2000     Blood Culture No growth at 5 days    Protime-INR, Fingerstick [[id number]]  (Normal) Collected:  07/19/20 0650    Specimen:  Capillary Blood Updated:  07/19/20 0652     INR 1.20    Narrative:       Therapeutic range for most indications is 2.0-3.0 INR,  or 2.5-3.5 for mechanical heart valves.    Blood Gas, Arterial [[id number]]  (Abnormal) Collected:  07/17/20 2015    Specimen:  Arterial Blood Updated:  07/17/20 2030     Site Right Radial     Ramses's Test Positive     pH, Arterial 7.284 pH units      Comment: 84 Value below reference range        pCO2, Arterial 64.3 mm Hg      Comment: 83 Value above reference range        pO2, Arterial 68.5 mm Hg      Comment: 84 Value below reference range        HCO3, Arterial 30.5 mmol/L      Comment: 83 Value above reference range        Base Excess, Arterial 2.7 mmol/L      Comment: 83 Value above reference range        O2 Saturation, Arterial 93.0 %      Comment: 84 Value below reference range        Barometric Pressure for Blood Gas 750 mmHg      Modality BiPap     FIO2 50 %      Ventilator Mode AVAP     Set Tidal Volume 500     Set Mech Resp Rate 20.0     EPAP 8     Comment: Meter: [id number]     :  [id number]        Collected by TAMMY OMER    Blood Gas, Arterial [[id number]]  (Abnormal)  Collected:  07/17/20 1653    Specimen:  Arterial Blood Updated:  07/17/20 1703     Site Left Brachial     Ramses's Test N/A     pH, Arterial 7.252 pH units      Comment: 84 Value below reference range        pCO2, Arterial 71.4 mm Hg      Comment: 86 Value above critical limit        pO2, Arterial 93.2 mm Hg      HCO3, Arterial 31.4 mmol/L      Comment: 83 Value above reference range        Base Excess, Arterial 2.9 mmol/L      Comment: 83 Value above reference range        O2 Saturation, Arterial 96.8 %      Barometric Pressure for Blood Gas 750 mmHg      Modality BiPap     FIO2 75 %      Ventilator Mode BiPAP     IPAP 14     Comment: Meter: [id number]     :  [id number]        EPAP 6     Collected by ALLEN    Phosphorus [[id number]]  (Abnormal) Collected:  07/16/20 0452    Specimen:  Blood Updated:  07/16/20 0559     Phosphorus 4.6 mg/dL     COVID-19, [facility] IN-HOUSE, NP SWAB IN TRANSPORT MEDIA 8-10 HR TAT - Swab, Nasopharynx [[id number]]  (Normal) Collected:  07/14/20 2043    Specimen:  Swab from Nasopharynx Updated:  07/15/20 0328     COVID19 Not Detected    Narrative:       Testing performed by Real Time RT-PCR  This test has not been approved by the Drug Response Dx but is authorized under the Emergency Use Act (EUA)    Fact sheet for providers: https://www.fda.gov/media/841145/download    Fact sheet for patients: https://www.fda.gov/media/544161/download        Troponin [[id number]]  (Normal) Collected:  07/14/20 2058    Specimen:  Blood Updated:  07/14/20 2330     Troponin T 0.024 ng/mL     Narrative:       Troponin T Reference Range:  <= 0.03 ng/mL-   Negative for AMI  >0.03 ng/mL-     Abnormal for myocardial necrosis.  Clinicians would have to utilize clinical acumen, EKG, Troponin and serial changes to determine if it is an Acute Myocardial Infarction or myocardial injury due to an underlying chronic condition.       Results may be falsely decreased if patient taking Biotin.      Respiratory Panel, PCR - Swab,  Nasopharynx [[id number]]  (Normal) Collected:  07/14/20 2043    Specimen:  Swab from Nasopharynx Updated:  07/14/20 2258     ADENOVIRUS, PCR Not Detected     Coronavirus 229E Not Detected     Coronavirus HKU1 Not Detected     Coronavirus NL63 Not Detected     Coronavirus OC43 Not Detected     Human Metapneumovirus Not Detected     Human Rhinovirus/Enterovirus Not Detected     Influenza B PCR Not Detected     Parainfluenza Virus 1 Not Detected     Parainfluenza Virus 2 Not Detected     Parainfluenza Virus 3 Not Detected     Parainfluenza Virus 4 Not Detected     Bordetella pertussis pcr Not Detected     Influenza A H1 2009 PCR Not Detected     Chlamydophila pneumoniae PCR Not Detected     Mycoplasma pneumo by PCR Not Detected     Influenza A PCR Not Detected     Influenza A H3 Not Detected     Influenza A H1 Not Detected     RSV, PCR Not Detected    Narrative:       The coronavirus on the RVP is NOT COVID-19 and is NOT indicative of infection with COVID-19.     Extra Tubes [[id number]] Collected:  07/14/20 2058    Specimen:  Blood Updated:  07/14/20 2200    Narrative:       The following orders were created for panel order Extra Tubes.  Procedure                               Abnormality         Status                     ---------                               -----------         ------                     Light Blue Top[[id number]]                                   Final result               Lavender Top[[id number]]                                     Final result               Gold Top - SST[[id number]]                                   Final result               Green Top (Gel)[[id number]]                                  Final result                 Please view results for these tests on the individual orders.    Light Blue Top [[id number]] Collected:  07/14/20 2058    Specimen:  Blood Updated:  07/14/20 2200     Extra Tube hold for add-on     Comment: Auto resulted       Lavender Top [[id number]] Collected:  07/14/20  "2058    Specimen:  Blood Updated:  07/14/20 2200     Extra Tube hold for add-on     Comment: Auto resulted       Gold Top - SST [[id number]] Collected:  07/14/20 2058    Specimen:  Blood Updated:  07/14/20 2200     Extra Tube Hold for add-ons.     Comment: Auto resulted.       Green Top (Gel) [[id number]] Collected:  07/14/20 2058    Specimen:  Blood Updated:  07/14/20 2200     Extra Tube Hold for add-ons.     Comment: Auto resulted.       Troponin [[id number]]  (Normal) Collected:  07/14/20 1942    Specimen:  Blood Updated:  07/14/20 2020     Troponin T 0.021 ng/mL     Narrative:       Troponin T Reference Range:  <= 0.03 ng/mL-   Negative for AMI  >0.03 ng/mL-     Abnormal for myocardial necrosis.  Clinicians would have to utilize clinical acumen, EKG, Troponin and serial changes to determine if it is an Acute Myocardial Infarction or myocardial injury due to an underlying chronic condition.       Results may be falsely decreased if patient taking Biotin.      Procalcitonin [[id number]]  (Normal) Collected:  07/14/20 1942    Specimen:  Blood Updated:  07/14/20 2014     Procalcitonin 0.20 ng/mL     Narrative:       As a Marker for Sepsis (Non-Neonates):   1. <0.5 ng/mL represents a low risk of severe sepsis and/or septic shock.  1. >2 ng/mL represents a high risk of severe sepsis and/or septic shock.    As a Marker for Lower Respiratory Tract Infections that require antibiotic therapy:  PCT on Admission     Antibiotic Therapy             6-12 Hrs later  > 0.5                Strongly Recommended            >0.25 - <0.5         Recommended  0.1 - 0.25           Discouraged                   Remeasure/reassess PCT  <0.1                 Strongly Discouraged          Remeasure/reassess PCT      As 28 day mortality risk marker: \"Change in Procalcitonin Result\" (> 80 % or <=80 %) if Day 0 (or Day 1) and Day 4 values are available. Refer to http://www.[employer] [facility]-pct-calculator.com/   Change in PCT <=80 %   A decrease of PCT levels " below or equal to 80 % defines a positive change in PCT test result representing a higher risk for 28-day all-cause mortality of patients diagnosed with severe sepsis or septic shock.  Change in PCT > 80 %   A decrease of PCT levels of more than 80 % defines a negative change in PCT result representing a lower risk for 28-day all-cause mortality of patients diagnosed with severe sepsis or septic shock.                Results may be falsely decreased if patient taking Biotin.     Comprehensive Metabolic Panel [[id number]]  (Abnormal) Collected:  07/14/20 1942    Specimen:  Blood Updated:  07/14/20 2008     Glucose 62 mg/dL      BUN 44 mg/dL      Creatinine 3.95 mg/dL      Sodium 137 mmol/L      Potassium 4.7 mmol/L      Chloride 99 mmol/L      CO2 27.0 mmol/L      Calcium 9.5 mg/dL      Total Protein 6.2 g/dL      Albumin 3.40 g/dL      ALT (SGPT) 17 U/L      AST (SGOT) 25 U/L      Alkaline Phosphatase 85 U/L      Total Bilirubin 0.5 mg/dL      eGFR Non African Amer 11 mL/min/1.73      Comment: <15 Indicative of kidney failure.        eGFR   Amer --     Comment: <15 Indicative of kidney failure.        Globulin 2.8 gm/dL      A/G Ratio 1.2 g/dL      BUN/Creatinine Ratio 11.1     Anion Gap 11.0 mmol/L     Narrative:       GFR Normal >60  Chronic Kidney Disease <60  Kidney Failure <15      Lactate Dehydrogenase [[id number]]  (Abnormal) Collected:  07/14/20 1942    Specimen:  Blood Updated:  07/14/20 2008      U/L     C-reactive Protein [[id number]]  (Abnormal) Collected:  07/14/20 1942    Specimen:  Blood Updated:  07/14/20 2008     C-Reactive Protein 1.84 mg/dL     Magnesium [[id number]]  (Normal) Collected:  07/14/20 1942    Specimen:  Blood Updated:  07/14/20 2008     Magnesium 2.1 mg/dL     Lactic Acid, Plasma [[id number]]  (Normal) Collected:  07/14/20 1942    Specimen:  Blood Updated:  07/14/20 2006     Lactate 0.9 mmol/L     BNP [[id number]]  (Abnormal) Collected:  07/14/20 1942    Specimen:  Blood  Updated:  07/14/20 2005     proBNP 28,929.0 pg/mL     Narrative:       Among patients with dyspnea, NT-proBNP is highly sensitive for the detection of acute congestive heart failure. In addition NT-proBNP of <300 pg/ml effectively rules out acute congestive heart failure with 99% negative predictive value.    Results may be falsely decreased if patient taking Biotin.          Imaging Results (Most Recent)     Procedure Component Value Units Date/Time    XR Chest 1 View [[id number]] Collected:  07/20/20 0444     Updated:  07/20/20 0514    Narrative:         CHEST 1 VIEW on 7/20/2020     CLINICAL INDICATION: Right pleural effusion    COMPARISON: 7/17/2020    FINDINGS: 2-lead left subclavian pacemaker is noted in place.  Right IJ catheter tip is at the cavoatrial junction. The patient  is status post median sternotomy and valve replacement.  Cardiomegaly is noted. There has been improvement in right  greater than left pleural effusions. There has been improvement  in bilateral opacities likely improved edema.      Impression:       Improving changes of CHF.    Electronically signed by:  Quinn Howell  7/20/2020 5:13 AM  CDT Workstation: [id number]    XR Chest 1 View [[id number]] Collected:  07/17/20 1359     Updated:  07/17/20 1438    Narrative:       Chest x-ray single view.         CLINICAL INDICATION: Shortness of breath    COMPARISON: July 14, 2020.    FINDINGS: Cardiac silhouette is enlarged in size. There is  pulmonary vascular engorgement. There is increased infiltrative  changes in both lung fields especially left upper lobe. There is  also increasing right-sided pleural effusion. Unfavorable change  since prior exam.      Impression:       Cardiomegaly. Midline sternal sutures from prior  cardiac surgery. Cardiac valvular prosthesis.    Dual-lumen central venous catheter right jugular approach with  catheter tip in right atrium in satisfactory position. Increasing  infiltrative changes suggesting pulmonary  edema type pattern  and/or superimposed pneumonitis on the left.    Increasing right-sided effusion.    Electronically signed by:  Ishmael Lees MD  7/17/2020 2:37 PM CDT  Workstation: [id number]    XR Chest AP [[id number]] Collected:  07/14/20 1932     Updated:  07/14/20 2012    Narrative:       PROCEDURE: XR CHEST AP    VIEWS:Single    INDICATION: Cough, fever    COMPARISON: CXR: 3/9/2020    FINDINGS:       - lines/tubes: Right-sided dual-lumen central venous catheter  terminates at the cavoatrial junction. Left-sided pacemaker is in  stable position.    - cardiac: Borderline size.    - mediastinum: Contour within normal limits.     - lungs: There is interstitial prominence and indistinctness,  an ill-defined bibasilar opacities, right greater than left..     - pleura: Blunting of bilateral costophrenic angles, right  greater than left.      - osseous: No acute abnormality identified.      Impression:       Interstitial prominence and indistinctness, which could represent  pneumonia or edema. There is ill-defined opacification in  bilateral lung bases, right greater than left, which may  represent atelectasis, consolidation, effusion, asymmetric edema,  or any combination thereof.          Electronically signed by:  Yeimy Vuong MD  7/14/2020 8:11 PM CDT  Workstation: [id number]          Chief Complaint on Day of Discharge: None    Hospital Course:  The patient is a 74 y.o. female with PMH notable for Afib on Warfarin, Diabetes Mellitus, ESRD on HD, COPD, Pulmonary HTN, and HTN who presented to [facility] with worsening shortness of breath.  She was found to have pneumonia as well as fluid overload.  Her INR was noted to be elevated so her Warfarin was held. She was treated with Rocephin and Zmax.  Her dry weight was adjusted for dialysis and she had excess fluid removal with good results.  Patient was able to be weaned to her home O2.  Patient felt improved and back to baseline.  Her warfarin was  "restarted prior to discharge.  She was discharged home to resume home health care from prior to admission.  Patient voiced understanding and agreement.     Condition on Discharge:  Stable    Physical Exam on Discharge:  /59 (BP Location: Right arm, Patient Position: Sitting)   Pulse 76   Temp 96.7 °F (35.9 °C) (Oral)   Resp 18   Ht 165.1 cm (65\")   Wt 94.2 kg (207 lb 9.6 oz)   LMP  (LMP Unknown)   SpO2 94%   BMI 34.55 kg/m²   Physical Exam   Constitutional: She is oriented to person, place, and time.   Chronically ill appearing female in NAD.   HENT:   Head: Normocephalic and atraumatic.   Nose: Nose normal.   Nasal canula in place.    Eyes: Conjunctivae and EOM are normal.   Neck: Neck supple.   Cardiovascular: Normal rate, regular rhythm, normal heart sounds and intact distal pulses.   Pulmonary/Chest: Effort normal and breath sounds normal. No respiratory distress.   Abdominal: Soft. Bowel sounds are normal. She exhibits no distension. There is no tenderness.   Musculoskeletal: She exhibits no edema.   Neurological: She is alert and oriented to person, place, and time.   Skin: Skin is dry. Capillary refill takes less than 2 seconds.   Psychiatric: She has a normal mood and affect.       Discharge Disposition:  Home-Health Care [facility]    Discharge Medications:     Discharge Medications      Continue These Medications      Instructions Start Date   acetaminophen 325 MG tablet  Commonly known as:  TYLENOL   650 mg, Oral, Every 6 Hours PRN      albuterol (2.5 MG/3ML) 0.083% nebulizer solution  Commonly known as:  PROVENTIL   2.5 mg, Nebulization, Every 4 Hours PRN      albuterol sulfate  (90 Base) MCG/ACT inhaler  Commonly known as:  PROVENTIL HFA;VENTOLIN HFA;PROAIR HFA   2 puffs, Inhalation, Every 4 Hours PRN      aspirin 81 MG chewable tablet   81 mg, Oral, Daily      cholecalciferol 25 MCG (1000 UT) tablet  Commonly known as:  VITAMIN D3   2,000 Units, Oral, Daily      citalopram 20 MG " tablet  Commonly known as:  CeleXA   TAKE 1 TABLET BY MOUTH EVERY DAY      dilTIAZem  MG 24 hr capsule  Commonly known as:  CARDIZEM CD   240 mg, Oral, Daily      enoxaparin 40 MG/0.4ML solution syringe  Commonly known as:  Lovenox   40 mg, Subcutaneous, Every 24 Hours Scheduled, Inject 0.4mL sq daily      famotidine 20 MG tablet  Commonly known as:  PEPCID   TAKE 2 TABLETS BY MOUTH EVERY DAY      ferrous sulfate 325 (65 FE) MG tablet   325 mg, Oral, Daily With Breakfast      Fluticasone-Umeclidin-Vilant 100-62.5-25 MCG/INH aerosol powder    1 puff, Inhalation, Daily      glucose blood test strip   1 each, Other, 3 Times Daily, Use as instructed       insulin aspart 100 UNIT/ML injection  Commonly known as:  novoLOG   0-9 Units, Subcutaneous, 4 Times Daily Before Meals & Nightly      loperamide 2 MG capsule  Commonly known as:  IMODIUM   2 mg, Oral, 4 Times Daily PRN      metOLazone 2.5 MG tablet  Commonly known as:  ZAROXOLYN   TAKE 1 TABLET BY MOUTH EVERY OTHER DAY      metoprolol succinate XL 25 MG 24 hr tablet  Commonly known as:  TOPROL-XL   TAKE 1 TABLET BY MOUTH EVERY DAY      ondansetron 4 MG tablet  Commonly known as:  ZOFRAN   4 mg, Oral, Every 6 Hours PRN      oxyCODONE 10 MG tablet  Commonly known as:  ROXICODONE   10 mg, Oral, Every 6 Hours PRN      pantoprazole 40 MG EC tablet  Commonly known as:  PROTONIX   40 mg, Oral, Daily      Prenatal Vitamin 27-0.8 MG tablet   1 tablet, Oral, Daily      rOPINIRole 0.25 MG tablet  Commonly known as:  REQUIP   TAKE 1 TABLET BY MOUTH EVERY NIGHT AT BEDTIME FOR RESTLESS LEGS      sucralfate 1 g tablet  Commonly known as:  CARAFATE   TAKE 1 TABLET BY MOUTH FOUR (4) TIMES DAILY      traZODone 150 MG tablet  Commonly known as:  DESYREL   300 mg, Oral, Nightly      Unifine Pentips 31G X 6 MM misc  Generic drug:  Insulin Pen Needle   USE 1 FOUR (4) TIMES DAILY WITH INSULIN      vitamin C 250 MG tablet  Commonly known as:  ASCORBIC ACID   250 mg, Oral, Daily       warfarin 2.5 MG tablet  Commonly known as:  Coumadin   Take 1 tab nightly on Monday, Wednesday, Friday and Saturday or AS DIRECTED PER COUMADIN CLINIC      warfarin 5 MG tablet  Commonly known as:  Coumadin   Take 1/2 tablet daily except on Thursdays take 1 tablet      Zetia 10 MG tablet  Generic drug:  ezetimibe   10 mg, Oral, Daily         Stop These Medications    dilTIAZem 240 MG 24 hr capsule  Commonly known as:  TIAZAC            Discharge Diet:   Diet Instructions     Diet: Consistent Carbohydrate, Cardiac, Renal      Discharge Diet:   Consistent Carbohydrate  Cardiac  Renal             Activity at Discharge:   Activity Instructions     Gradually Increase Activity Until at Pre-Hospitalization Level            Discharge Care Plan/Instructions: Take medications as prescribed.  Follow up with PCP.  Return for worsening symptoms.     Follow-up Appointments:   Future Appointments   Date Time Provider Department Center   7/28/2020 11:30 AM Josefa Pozo APRN [facility] None   8/20/2020 10:45 AM Josefa Pozo APRN [facility] None   10/2/2020 10:15 AM Nel Grant MD [facility] None       Test Results Pending at Discharge: None    Hermann Cha MD    Time: 32 minutes.

## 2020-07-22 NOTE — DISCHARGE PLACEMENT REQUEST
"Quiana Kamara (74 y.o. Female)     Date of Birth Social Security Number Address Home Phone MRN    1945  [address] [facility] [address] [phone] [id number]    [place] Marital Status          None        Admission Date Admission Type Admitting Provider Attending Provider Department, Room/Bed    7/14/20 Emergency Markel Fountain MD [place], Emmanuel [place], MD [facility] 3 EAST, 378/1    Discharge Date Discharge Disposition Discharge Destination         [place] [facility]              Attending Provider:  Markel Fountain MD    Allergies:  Crestor [Rosuvastatin Calcium], Lipitor [Atorvastatin], Lortab [Hydrocodone-acetaminophen], Adhesive Tape    Isolation:  None   Infection:  None   Code Status:  CPR    Ht:  165.1 cm (65\")   Wt:  94.2 kg (207 lb 9.6 oz)    Admission Cmt:  None   Principal Problem:  Acute on chronic respiratory failure with hypoxia (CMS/HCC) [J96.21]                 Active Insurance as of 7/14/2020     Primary Coverage     Payor Plan Insurance Group Employer/Plan Group    MEDICARE MEDICARE A & B      Payor Plan Address Payor Plan Phone Number Payor Plan Fax Number Effective Dates    [address] [phone]  9/1/2010 - None Entered    [facility] [id number]       Subscriber Name Subscriber Birth Date Member ID       QUIANA KAMARA 1945 [id number]           Secondary Coverage     Payor Plan Insurance Group Employer/Plan Group    [place]      Payor Plan Address Payor Plan Phone Number Payor Plan Fax Number Effective Dates    [address] [phone]  4/1/2020 - None Entered    [place] [address]       Subscriber Name Subscriber Birth Date Member ID       QUIANA KAMARA 1945 [id number]                 Emergency Contacts      (Rel.) Home Phone Work Phone Mobile Phone    Hermann Marrufo (Son) [phone] -- [phone]    " Carleen Fraser (Relative) [phone] -- [phone]            Insurance Information                MEDICARE/MEDICARE A & B Phone: [phone]    Subscriber: Brendon Skleton Subscriber#: [id number]    Group#:  Precert#:         [place] Phone: [phone]    Subscriber: Brendon Skelton Subscriber#: [id number]    Group#:  Precert#:         [address] [address]  Phone:  [phone]  Fax:   Date: 2020      Referral to Home Health     Patient:  Brendon Skelton MRN:  [id number]   [address] [place] [address] :  1945  SSN:    Phone: [phone] Sex:  F      INSURANCE PAYOR PLAN GROUP # SUBSCRIBER ID   Primary:  Secondary:    MEDICARE  [place] [id number]  [id number]      [id number]  [id number]      Referring Provider Information:  GARRET LEVY Phone: [phone] Fax:       Referral Information:   # Visits:  1 Referral Type: Home Health [42]   Urgency:  Routine Referral Reason: Specialty Services Required   Start Date: 2020 End Date:  To be determined by Insurer   Diagnosis: Acute on chronic respiratory failure with hypoxia (CMS/HCC) (J96.21 [ICD-10-CM] 518.84,799.02 [ICD-9-CM])  Paroxysmal atrial fibrillation (CMS/HCC) (I48.0 [ICD-10-CM] 427.31 [ICD-9-CM])  Type 2 diabetes mellitus with diabetic polyneuropathy, with long-term current use of insulin (CMS/HCC) (E11.42,Z79.4 [ICD-10-CM] 250.60,357.2,V58.67 [ICD-9-CM])  Acute on chronic systolic heart failure (CMS/HCC) (I50.23 [ICD-10-CM] 428.23 [ICD-9-CM])  Pulmonary hypertension (CMS/HCC) (I27.20 [ICD-10-CM] 416.8 [ICD-9-CM])  ESRF (end stage renal failure) (CMS/HCC) (N18.6 [ICD-10-CM] 585.6 [ICD-9-CM])      Refer to Dept: [facility] C[facility]  Refer to Provider:   Refer to Facility:       Face to Face Visit Date: 2020  Follow-up provider for Plan of Care? I treated the patient in an acute care  facility and will not continue treatment after discharge.  Follow-up provider: JOEL SEYMOUR [[id number]]  Reason/Clinical Findings: Resume previous orders.  Describe mobility limitations that make leaving home difficult: Resume previous orders.  Nursing/Therapeutic Services Requested: Skilled Nursing  Nursing/Therapeutic Services Requested: Physical Therapy  Nursing/Therapeutic Services Requested: Occupational Therapy  Skilled nursing orders: Cardiopulmonary assessments  PT orders: Therapeutic exercise  Occupational orders: Activities of daily living  Frequency: 1 Week 1     This document serves as a request of services and does not constitute Insurance authorization or approval of services.  To determine eligibility, please contact the members Insurance carrier to verify and review coverage.     If you have medical questions regarding this request for services. Please contact [address] at [phone] during normal business hours.       Authorizing Provider:Hermann Cha MD  Authorizing Provider's NPI: [id number]  Order Entered By: Hermann Cha MD 7/22/2020 12:18 PM     Electronically signed by: Hermann Cha MD 7/22/2020 12:18 PM

## 2020-07-22 NOTE — PLAN OF CARE
Problem: Patient Care Overview  Goal: Plan of Care Review  Outcome: Ongoing (interventions implemented as appropriate)  Flowsheets  Taken 7/22/2020 0413 by Melonie Perdue RN  Progress: no change  Taken 7/21/2020 1038 by Kellie Pardo PTA  Plan of Care Reviewed With: patient  Note:   Pt is alert and oriented. No s/s of pain or discomfort. No s/s of soa at this time. Will continue to monitor.

## 2020-07-22 NOTE — PROGRESS NOTES
"[facility]  [address] [place] [address]. [address]  T - [phone]  F - [phone]     Progress Note          PATIENT  DEMOGRAPHICS   PATIENT NAME: Brendon Skelton                      PHYSICIAN: Sandy Caputo MD  : 1945  MRN: [id number]   LOS: 8 days    Patient Care Team:  Josefa Pozo APRN as PCP - General (Nurse Practitioner)  Meme Duckworth APRN as PCP - Claims Attributed  Subjective   SUBJECTIVE   Feels well breathing stable, seen during hd        Objective   OBJECTIVE   Vital Signs  Temp:  [96.1 °F (35.6 °C)-96.8 °F (36 °C)] 96.2 °F (35.7 °C)  Heart Rate:  [62-87] 63  Resp:  [16-18] 18  BP: (135-147)/(60-66) 141/66    Flowsheet Rows      First Filed Value   Admission Height  165.1 cm (65\") Documented at 2020   Admission Weight  102 kg (225 lb) Documented at 2020           I/O last 3 completed shifts:  In: 1080 [P.O.:1080]  Out: -     PHYSICAL EXAM    Physical Exam   Constitutional: She is oriented to person, place, and time. She appears well-developed and well-nourished. No distress.   HENT:   Head: Normocephalic and atraumatic.   Right Ear: External ear normal.   Left Ear: External ear normal.   Nose: Nose normal.   Mouth/Throat: Oropharynx is clear and moist.   Eyes: Pupils are equal, round, and reactive to light. EOM are normal. Right eye exhibits no discharge. Left eye exhibits no discharge.   Neck: Normal range of motion. Neck supple. No tracheal deviation present. No thyromegaly present.   Cardiovascular: Normal rate, regular rhythm, normal heart sounds and intact distal pulses.   Pulmonary/Chest: Breath sounds normal. No stridor. She is in respiratory distress. She has no wheezes. She has no rales.   Abdominal: Soft. Bowel sounds are normal. She exhibits no distension. There is no tenderness.   Musculoskeletal: Normal range of motion. She exhibits no edema, tenderness or deformity.   Lymphadenopathy:     She has no cervical " adenopathy.   Neurological: She is alert and oriented to person, place, and time. No cranial nerve deficit.   Skin: Skin is warm and dry. She is not diaphoretic.   Psychiatric: She has a normal mood and affect.       RESULTS   Results Review:    Results from last 7 days   Lab Units 07/22/20  0712 07/21/20  0929 07/20/20  0507   SODIUM mmol/L 125* 128* 123*   POTASSIUM mmol/L 3.8 3.5 4.9   CHLORIDE mmol/L 89* 91* 82*   CO2 mmol/L 24.0 25.0 23.0   BUN mg/dL 56* 42* 72*   CREATININE mg/dL 5.83* 4.73* 6.34*   CALCIUM mg/dL 8.5* 8.5* 9.8   GLUCOSE mg/dL 114* 137* 140*       Estimated Creatinine Clearance: 9.6 mL/min (A) (by C-G formula based on SCr of 5.83 mg/dL (H)).    Results from last 7 days   Lab Units 07/16/20  0452   PHOSPHORUS mg/dL 4.6*             Results from last 7 days   Lab Units 07/22/20  0711 07/20/20  0507 07/16/20  0451   WBC 10*3/mm3 7.73 11.83* 6.55   HEMOGLOBIN g/dL 10.1* 10.4* 9.4*   PLATELETS 10*3/mm3 125* 156 167       Results from last 7 days   Lab Units 07/22/20  0711 07/21/20  0929 07/20/20  0626 07/19/20  0650 07/18/20  0527   INR  1.76* 1.52* 1.20 1.20 1.35*         Imaging Results (Last 24 Hours)     ** No results found for the last 24 hours. **           MEDICATIONS      aspirin 81 mg Oral Daily   budesonide-formoterol 2 puff Inhalation BID - RT   cholecalciferol 2,000 Units Oral Daily   citalopram 20 mg Oral Daily   dilTIAZem  mg Oral Q24H   epoetin rosales/rosales-epbx 6,000 Units Intravenous Once per day on Mon Wed Fri   ferrous sulfate 324 mg Oral Daily With Breakfast   ipratropium-albuterol 3 mL Nebulization 4x Daily - RT   metoprolol succinate XL 25 mg Oral Daily   naloxone 0.4 mg Intravenous Once   pantoprazole 40 mg Oral Daily   rOPINIRole 0.25 mg Oral Nightly   sodium chloride 10 mL Intravenous Q12H   sucralfate 1 g Oral 4x Daily AC & at Bedtime   traZODone 300 mg Oral Nightly   warfarin 2.5 mg Oral Daily       Pharmacy to dose warfarin        Assessment/Plan   ASSESSMENT / PLAN         Acute on chronic respiratory failure with hypoxia (CMS/HCC)    Atrial fibrillation [I48.91]    Diabetes mellitus (CMS/HCC)    Acute on chronic systolic heart failure (CMS/HCC)    Pulmonary hypertension (CMS/HCC)    ESRF (end stage renal failure) (CMS/HCC)    Coumadin toxicity    Pleural effusion    ESRD ON HD   -History of HD Monday Wednesday Friday since 2/28/2020.  - Her dry weight is 97.5 kg before. cxr is improved. Still has right pleural effusion.  New edw is close to 92 kg. Plan for 4 L UF today    Anemia   -H/h stable    CHF   -EF 46 to 50%  -Right pleural effusion/atelectasis  -proBNP 28, 929  -Continue Rocephin and azithromycin    HYPONATREMIA   Stable    Copd exacerbation / pna / right pleural effusion plan as above    dispo ?discharge today after hd        This document has been electronically signed by Sandy Caputo MD on July 17, 2020 10:47

## 2020-07-22 NOTE — OUTREACH NOTE
Prep Survey      Responses   [facility] facility patient discharged from?  [place]   Is LACE score < 7 ?  No   Eligibility  [facility]   Date of Admission  07/14/20   Date of Discharge  07/22/20   Discharge Disposition  Home-Health Care Svc   Discharge diagnosis  Acute on chronic systolic heart failure Acute on chronic respiratory failure with hypoxia    COVID-19 Test Status  Negative   Does the patient have one of the following disease processes/diagnoses(primary or secondary)?  CHF   Does the patient have Home health ordered?  No   Is there a DME ordered?  No   Prep survey completed?  Yes          Ysabel Sanches RN

## 2020-07-22 NOTE — SIGNIFICANT NOTE
07/17/20 1419   Rehab Time/Intention   Evaluation Not Performed other (see comments)   Rehab Treatment   Discipline occupational therapist   Recommendation   OT - Next Appointment 07/18/20     OT unable to arouse pt. OT checked with RN who attempted to arouse pt and respiratory arrived at the same time. Pt O2 levels in the 70s and RN and respiratory addressing. RN defer eval at this time. OT will check back for eval as time allows and pt appropriate.   
   07/18/20 1003   Rehab Time/Intention   Evaluation Not Performed patient unavailable for evaluation   Rehab Treatment   Discipline occupational therapist     Pt off floor for dialyses. OT will follow up for eval as time allows and pt available.   
   07/20/20 0745   Rehab Treatment   Reason Treatment Not Performed unavailable for treatment  (Pt off floor to HD @ this time.)     
   07/22/20 1106   Rehab Treatment   Discipline physical therapy assistant   Reason Treatment Not Performed unavailable for treatment  (pt off the floor for HD @ present time)     
   07/22/20 1510   Rehab Treatment   Discipline physical therapy assistant   Reason Treatment Not Performed patient/family declined treatment     
"   07/20/20 2314   Rehab Treatment   Discipline physical therapy assistant   Reason Treatment Not Performed patient/family declined treatment  (pt fatigued from hd \"I just need some sleep-I haven't slept since I've been here\")     "
Patient is resting now back on Bipap.    
Pt declined tx at 11:20  And at 13:25  
Upon coming back from dialysis, CNA reported that pt's O2 was in mid 70's on 3L NC.  I turned patient up to 5L NC and sats raised to low-mid 90's.  Patient left to dialysis this AM on 5-6L NC.  Patient remained consistent on 5L for several minutes.  Pt c/o headache later and was administered Roxicdone 10mg - per PRN order.  Patient later went to sleep.  Was notified by OT and RT that patient was difficult to arose, was having almost apnic spells, and was again satting in mid 70's on 5L NC.  Patient was placed on a non-rebreather and administered Narcan.  Patient's sats are in 90's on non-rebreather, but patient is still dazed and disoriented.  Dr. Coreas was called and notified of the events.  Order placed for STAT ABG and to call with results.     
Upon entering room patient had pulled off Bipap and was sitting up on side of bed to eat.  She was wanting to eat.  Her oxygen sats were in the 65% to 75%.  Placed patient on Nasal o2 to increase sats sats increased to 85%.  I encouraged patient to go back on Bipap.  She agreed and was placed back on Bipap.    
Stroke

## 2020-07-23 NOTE — OUTREACH NOTE
Call Center TCM Note      Responses   [facility] patient discharged from?  [place]   Does the patient have one of the following disease processes/diagnoses(primary or secondary)?  CHF   TCM attempt successful?  Yes   Call start time  1123   Call end time  1126   Discharge diagnosis  Acute on chronic systolic heart failure Acute on chronic respiratory failure with hypoxia    Meds reviewed with patient/caregiver?  Yes   Is the patient having any side effects they believe may be caused by any medication additions or changes?  No   Does the patient have all medications ordered at discharge?  Yes   Is the patient taking all medications as directed (includes completed medication regime)?  Yes   Does the patient have a primary care provider?   Yes   Does the patient have an appointment with their PCP within 7 days of discharge?  Yes   Comments regarding PCP  f/u with PCP on 7/28   Has the patient kept scheduled appointments due by today?  N/A   Psychosocial issues?  No   Did the patient receive a copy of their discharge instructions?  Yes   What is the patient's perception of their health status since discharge?  Improving   Is the patient/caregiver able to teach back the hierarchy of who to call/visit for symptoms/problems? PCP, Specialist, Home health nurse, Urgent Care, ED, 911  Yes   TCM call completed?  Yes   Wrap up additional comments  Patient says she is doing well, no questions or concerns at this time.           Mary Jane Stevenson RN    7/23/2020, 11:26

## 2020-07-24 NOTE — PROGRESS NOTES
Received INR from May RN [facility] over the phone who is still in pt's home. Per May, pt was dc'd home on 7/22. Dose in computer changed to reflect what it appears received in hospital. PT is not on AB/steroids.  Med list updated and pt denies any bleeding issues. PT did not receive Lovenox in hospital and due to pt's creatinine and GFR, Lovenox was not initiated at this time. PT denies any s/s of blood clot at this time. Instructed May on dosing and to recheck pt on 7/27. Patient instructed regarding medication; results given and questions answered. Nutritional counseling given.  Dietary factors affecting therapy addressed.  Patient instructed to monitor for excessive bruising or bleeding. May repeated back correctly and will inform pt. Findings reported by Maida Pearce RN.       This document has been electronically signed by ERIKA PanCNP-BC @  On July 24, 2020 09:25

## 2020-07-27 NOTE — PROGRESS NOTES
Today's INR is 2.6.   I spoke to MAI Fatima with [facility]  who was still in the home with pt.  Pt denies medication changes or bleeding issues.  Gave instructions for pt to take coumadin 5mg today, 2.5mg on Tuesday, Wednesday and recheck INR this Thursday the 30th due to recent fluctuations in INR.  Instructions verbalized. Patient instructed regarding medication; results given and questions answered. Nutritional counseling given.  Dietary factors affecting therapy addressed.  Patient instructed to monitor for excessive bruising or bleeding.  Findings reported by Ree Jade RN.         This document has been electronically signed by Meme Duckworth, DANIEL @ on July 27, 2020 10:02

## 2020-07-30 NOTE — PROGRESS NOTES
Received INR from Akua [facility] over the phone who is still in pt's home. Per Akua, pt denies any med changes or bleeding issues. Denies any s/s of blood clot. Instructed Akua on dosing and to recheck INR on 8/6. Patient instructed regarding medication; results given and questions answered. Nutritional counseling given.  Dietary factors affecting therapy addressed.  Patient instructed to monitor for excessive bruising or bleeding. PT verbalizes understanding. Findings reported by Maida Pearce RN.         This document has been electronically signed by Meme Duckworth, DANIEL @ on July 30, 2020 13:35

## 2020-07-30 NOTE — OUTREACH NOTE
CHF Week 2 Survey      Responses   [facility] patient discharged from?  [place]   Does the patient have one of the following disease processes/diagnoses(primary or secondary)?  CHF   Week 2 attempt successful?  No   Unsuccessful attempts  Attempt 1          Latosha Stokes RN

## 2020-07-31 NOTE — OUTREACH NOTE
CHF Week 2 Survey      Responses   [facility] patient discharged from?  [place]   Does the patient have one of the following disease processes/diagnoses(primary or secondary)?  CHF   Week 2 attempt successful?  No   Unsuccessful attempts  Attempt 2          Yecenia Sherwood RN

## 2020-08-06 NOTE — PROGRESS NOTES
Today's INR is 3.2.  We received INR from MAI Fatima after she had left the pt home. I called and spoke to pt who denied med changes or bleeding problems. Pt was instructed on dosing, to continue eating green veggies every 3 days, and appt made for INR on Thursday August 13 (pt being discharged from ); she repeated back dosing correctly. I called and spoke to MAI Renae with [facility]  and gave orders for dosing and appt info; she repeated back correctly. Findings reported by Dixie Bermeo RN.        This document has been electronically signed by MIKI Pan-BC @  On August 6, 2020 12:50

## 2020-08-11 NOTE — OUTREACH NOTE
"CHF Week 3 Survey      Responses   [facility] patient discharged from?  [place]   Does the patient have one of the following disease processes/diagnoses(primary or secondary)?  CHF   Week 3 attempt successful?  Yes   Call start time  1303   Call end time  1308   Discharge diagnosis  Acute on chronic systolic heart failure Acute on chronic respiratory failure with hypoxia    Meds reviewed with patient/caregiver?  Yes   Is the patient taking all medications as directed (includes completed medication regime)?  Yes   Comments regarding appointments  CT surgery appt on 8/13/20,  Pulm appt on 10/2/20   Comments regarding PCP  8/20/20   Has the patient kept scheduled appointments due by today?  No [Pt missed appt with PCP]   What is preventing the patient from keeping their appointments?  -- [She forgot]   Nursing Interventions  Advised patient to keep appointment   Pulse Ox monitoring  Intermittent   Pulse Ox device source  Patient   O2 Sat comments  O2 sats is in the 90's with O2   Comments  Pt wears 3 LPM O2 continuous   Nursing interventions  Educated on MyChart [Pt doesn't have a cmputer ]   What is the patient's perception of their health status since discharge?  Improving   Nursing interventions  Nurse provided patient education   Is the patient weighing daily?  No [Pt reports, \"pretty much\"]   Does the patient have scales?  Yes   Daily weight interventions  Education provided on importance of daily weight   Is the patient able to teach back Heart Failure diet management?  Yes   Is the patient able to teach back Heart Failure Zones?  Yes   Is the patient able to teach back signs and symptoms of worsening condition? (i.e. weight gain, shortness of air, etc.)  Yes   If the patient is a current smoker, are they able to teach back resources for cessation?  -- [Nonsmoker]   CHF Week 3 call completed?  Yes          Latosha Stokes RN  "

## 2020-08-13 NOTE — PROGRESS NOTES
Today's INR is 2.9.  Denies any new medications or bleeding issues. Denies any s/s of blood clot. PT instructed to continue same dose and Coumadin friendly diet. PT will be seen in 2 weeks. Patient instructed regarding medication; results given and questions answered. Nutritional counseling given.  Dietary factors affecting therapy addressed.  Patient instructed to monitor for excessive bruising or bleeding. PT verbalizes understanding. Findings reported by Maida Pearce RN.         This document has been electronically signed by DANIEL Pavon @ on August 13, 2020 10:56

## 2020-08-14 NOTE — TELEPHONE ENCOUNTER
Luda from [facility] is calling in assisting with getting patient a power wheelchair and states that they had sent a fax over on 8/6 and have heard no response. She is requesting a call back at [phone] #[phone]

## 2020-08-19 NOTE — OUTREACH NOTE
CHF Week 4 Survey      Responses   [facility] patient discharged from?  [place]   Does the patient have one of the following disease processes/diagnoses(primary or secondary)?  CHF   Week 4 attempt successful?  No          Yecenia Sherwood RN

## 2020-08-20 NOTE — PROGRESS NOTES
Subjective   Brendon Skelton is a 74 y.o. female.  Three month follow-up for HTN, high cholesterol, DM, COPD, CHF and depression.  Continues use of her Hoveround to improve her activities of daily living.    Depression   Visit Type: follow-up  Patient presents with the following symptoms: decreased concentration, depressed mood and restlessness.  Patient is not experiencing: confusion, shortness of breath, suicidal ideas, suicidal planning and thoughts of death.  Frequency of symptoms: occasionally   Severity: mild   Sleep quality: good  Nighttime awakenings: occasional  Patient has a history of: CHF  Compliance with medications:  %        Diabetes   She has type 2 diabetes mellitus. Pertinent negatives for hypoglycemia include no confusion. Associated symptoms include fatigue and weakness. Pertinent negatives for diabetes include no chest pain. Symptoms are stable. Risk factors for coronary artery disease include diabetes mellitus, hypertension, obesity, post-menopausal, sedentary lifestyle and dyslipidemia. Current diabetic treatment includes insulin injections. Her weight is stable. She is following a generally healthy diet. Meal planning includes avoidance of concentrated sweets. She never participates in exercise. Her home blood glucose trend is fluctuating minimally.   Hypertension   This is a chronic problem. The current episode started more than 1 year ago. The problem is unchanged. The problem is controlled. Pertinent negatives include no chest pain or shortness of breath. There are no associated agents to hypertension. Risk factors for coronary artery disease include obesity, dyslipidemia, sedentary lifestyle and post-menopausal state. Past treatments include calcium channel blockers. Current antihypertension treatment includes calcium channel blockers. The current treatment provides significant improvement. Compliance problems include exercise and diet.  Hypertensive end-organ damage includes  CAD/MI and heart failure.   Hyperlipidemia   This is a chronic problem. The current episode started more than 1 year ago. The problem is controlled. Exacerbating diseases include diabetes and obesity. Pertinent negatives include no chest pain or shortness of breath. Current antihyperlipidemic treatment includes ezetimibe. The current treatment provides moderate improvement of lipids. There are no compliance problems.  Risk factors for coronary artery disease include a sedentary lifestyle, hypertension and diabetes mellitus.   COPD   There is no cough or shortness of breath. This is a chronic problem. The current episode started more than 1 year ago. The problem occurs constantly. The problem has been waxing and waning. Pertinent negatives include no chest pain, fever or sore throat. Her symptoms are aggravated by walking and exertion.   Congestive Heart Failure   Presents for follow-up visit. Associated symptoms include fatigue. Pertinent negatives include no chest pain, chest pressure or shortness of breath. The symptoms have been stable. Compliance with total regimen is 51-75%. Compliance problems include adherence to diet and adherence to exercise.  Compliance with diet is 51-75%. Compliance with exercise is 0-25%. Compliance with medications is %.        The following portions of the patient's history were reviewed and updated as appropriate:     Current Outpatient Medications   Medication Sig Dispense Refill   • acetaminophen (TYLENOL) 325 MG tablet Take 650 mg by mouth Every 6 (Six) Hours As Needed for Mild Pain  or Fever.     • albuterol (PROVENTIL) (2.5 MG/3ML) 0.083% nebulizer solution Take 2.5 mg by nebulization Every 4 (Four) Hours As Needed for Wheezing.     • albuterol sulfate  (90 Base) MCG/ACT inhaler Inhale 2 puffs Every 4 (Four) Hours As Needed for Wheezing or Shortness of Air. 18 g 11   • aspirin 81 MG chewable tablet Chew 81 mg Daily.     • cholecalciferol (VITAMIN D3) 1000 units  tablet Take 2,000 Units by mouth Daily.     • citalopram (CeleXA) 20 MG tablet TAKE 1 TABLET BY MOUTH EVERY DAY 30 tablet 3   • diltiaZEM CD (CARDIZEM CD) 240 MG 24 hr capsule Take 1 capsule by mouth Daily. 90 capsule 3   • ezetimibe (ZETIA) 10 MG tablet Take 10 mg by mouth Daily.     • famotidine (PEPCID) 20 MG tablet TAKE 2 TABLETS BY MOUTH EVERY DAY 60 tablet 11   • ferrous sulfate 325 (65 FE) MG tablet Take 325 mg by mouth Daily With Breakfast.     • Fluticasone-Umeclidin-Vilant 100-62.5-25 MCG/INH aerosol powder  Inhale 1 puff Daily. 1 each 11   • glucose blood test strip 1 each by Other route 3 (three) times a day. Use as instructed     • insulin aspart (novoLOG) 100 UNIT/ML injection Inject 0-9 Units under the skin into the appropriate area as directed 4 (Four) Times a Day Before Meals & at Bedtime. 10 mL 0   • loperamide (IMODIUM) 2 MG capsule Take 2 mg by mouth 4 (Four) Times a Day As Needed for Diarrhea.     • metOLazone (ZAROXOLYN) 2.5 MG tablet TAKE 1 TABLET BY MOUTH EVERY OTHER DAY 45 tablet 0   • metoprolol succinate XL (TOPROL-XL) 25 MG 24 hr tablet TAKE 1 TABLET BY MOUTH EVERY DAY 30 tablet 0   • ondansetron (ZOFRAN) 4 MG tablet Take 1 tablet by mouth Every 6 (Six) Hours As Needed for Nausea or Vomiting. 30 tablet 0   • oxyCODONE (ROXICODONE) 10 MG tablet Take 1 tablet by mouth Every 6 (Six) Hours As Needed for Moderate Pain  or Severe Pain . 12 tablet 0   • pantoprazole (PROTONIX) 40 MG EC tablet Take 1 tablet by mouth Daily. 90 tablet 2   • Prenatal Vit-Fe Fumarate-FA (PRENATAL VITAMIN) 27-0.8 MG tablet Take 1 tablet by mouth daily.     • rOPINIRole (REQUIP) 0.25 MG tablet TAKE 1 TABLET BY MOUTH EVERY NIGHT AT BEDTIME FOR RESTLESS LEGS 30 tablet 0   • sucralfate (CARAFATE) 1 g tablet TAKE 1 TABLET BY MOUTH FOUR (4) TIMES DAILY 120 tablet 0   • traZODone (DESYREL) 150 MG tablet Take 300 mg by mouth Every Night.     • UNIFINE PENTIPS 31G X 6 MM misc USE 1 FOUR (4) TIMES DAILY WITH INSULIN 130 each 5    • vitamin C (ASCORBIC ACID) 250 MG tablet Take 250 mg by mouth Daily.     • warfarin (Coumadin) 2.5 MG tablet Take 1 tab nightly on Monday, Wednesday, Friday and Saturday or AS DIRECTED PER COUMADIN CLINIC 30 tablet 2   • warfarin (Coumadin) 5 MG tablet Take 1/2 tablet daily except on Thursdays take 1 tablet 25 tablet 3   • Insulin Glargine (BASAGLAR KWIKPEN) 100 UNIT/ML injection pen INJECT 10 UNITS EVERY NIGHT AT BEDTIME 15 mL 0     No current facility-administered medications for this visit.      Current Outpatient Medications on File Prior to Visit   Medication Sig   • acetaminophen (TYLENOL) 325 MG tablet Take 650 mg by mouth Every 6 (Six) Hours As Needed for Mild Pain  or Fever.   • albuterol (PROVENTIL) (2.5 MG/3ML) 0.083% nebulizer solution Take 2.5 mg by nebulization Every 4 (Four) Hours As Needed for Wheezing.   • albuterol sulfate  (90 Base) MCG/ACT inhaler Inhale 2 puffs Every 4 (Four) Hours As Needed for Wheezing or Shortness of Air.   • aspirin 81 MG chewable tablet Chew 81 mg Daily.   • cholecalciferol (VITAMIN D3) 1000 units tablet Take 2,000 Units by mouth Daily.   • citalopram (CeleXA) 20 MG tablet TAKE 1 TABLET BY MOUTH EVERY DAY   • diltiaZEM CD (CARDIZEM CD) 240 MG 24 hr capsule Take 1 capsule by mouth Daily.   • ezetimibe (ZETIA) 10 MG tablet Take 10 mg by mouth Daily.   • famotidine (PEPCID) 20 MG tablet TAKE 2 TABLETS BY MOUTH EVERY DAY   • ferrous sulfate 325 (65 FE) MG tablet Take 325 mg by mouth Daily With Breakfast.   • Fluticasone-Umeclidin-Vilant 100-62.5-25 MCG/INH aerosol powder  Inhale 1 puff Daily.   • glucose blood test strip 1 each by Other route 3 (three) times a day. Use as instructed   • insulin aspart (novoLOG) 100 UNIT/ML injection Inject 0-9 Units under the skin into the appropriate area as directed 4 (Four) Times a Day Before Meals & at Bedtime.   • loperamide (IMODIUM) 2 MG capsule Take 2 mg by mouth 4 (Four) Times a Day As Needed for Diarrhea.   • metOLazone  (ZAROXOLYN) 2.5 MG tablet TAKE 1 TABLET BY MOUTH EVERY OTHER DAY   • metoprolol succinate XL (TOPROL-XL) 25 MG 24 hr tablet TAKE 1 TABLET BY MOUTH EVERY DAY   • ondansetron (ZOFRAN) 4 MG tablet Take 1 tablet by mouth Every 6 (Six) Hours As Needed for Nausea or Vomiting.   • oxyCODONE (ROXICODONE) 10 MG tablet Take 1 tablet by mouth Every 6 (Six) Hours As Needed for Moderate Pain  or Severe Pain .   • pantoprazole (PROTONIX) 40 MG EC tablet Take 1 tablet by mouth Daily.   • Prenatal Vit-Fe Fumarate-FA (PRENATAL VITAMIN) 27-0.8 MG tablet Take 1 tablet by mouth daily.   • rOPINIRole (REQUIP) 0.25 MG tablet TAKE 1 TABLET BY MOUTH EVERY NIGHT AT BEDTIME FOR RESTLESS LEGS   • sucralfate (CARAFATE) 1 g tablet TAKE 1 TABLET BY MOUTH FOUR (4) TIMES DAILY   • traZODone (DESYREL) 150 MG tablet Take 300 mg by mouth Every Night.   • UNIFINE PENTIPS 31G X 6 MM misc USE 1 FOUR (4) TIMES DAILY WITH INSULIN   • vitamin C (ASCORBIC ACID) 250 MG tablet Take 250 mg by mouth Daily.   • warfarin (Coumadin) 2.5 MG tablet Take 1 tab nightly on Monday, Wednesday, Friday and Saturday or AS DIRECTED PER COUMADIN CLINIC   • warfarin (Coumadin) 5 MG tablet Take 1/2 tablet daily except on Thursdays take 1 tablet     No current facility-administered medications on file prior to visit.      She is allergic to crestor [rosuvastatin calcium]; lipitor [atorvastatin]; and lortab [hydrocodone-acetaminophen]..    Review of Systems   Constitutional: Positive for fatigue. Negative for fever.   HENT: Negative.  Negative for sore throat.    Respiratory: Negative.  Negative for cough and shortness of breath.    Cardiovascular: Negative.  Negative for chest pain.   Gastrointestinal: Negative.    Genitourinary: Negative.    Musculoskeletal: Negative.    Skin: Negative.    Neurological: Positive for weakness.   Psychiatric/Behavioral: Positive for decreased concentration. Negative for confusion and suicidal ideas.       Objective    Visit Vitals  /64   Ht  "165.1 cm (65\")   Wt 94.3 kg (208 lb) Comment: estimated   LMP  (LMP Unknown)   BMI 34.61 kg/m²       Physical Exam   Constitutional: She is oriented to person, place, and time. She appears well-developed and well-nourished.   Arrives via electric wheelchair    HENT:   Head: Normocephalic.   Right Ear: External ear normal.   Left Ear: External ear normal.   Eyes: Pupils are equal, round, and reactive to light. EOM are normal.   Neck: Normal range of motion. Neck supple.   Cardiovascular: Normal rate, regular rhythm and normal heart sounds.   Pulmonary/Chest: Effort normal and breath sounds normal.   2 L of oxygen per nasal cannula via portable concentrator   Abdominal: Soft. Bowel sounds are normal.   Musculoskeletal: Normal range of motion.   Neurological: She is alert and oriented to person, place, and time.   Skin: Skin is warm. Capillary refill takes less than 2 seconds.   Psychiatric: She has a normal mood and affect. Her behavior is normal.   Nursing note and vitals reviewed.      Assessment/Plan   Problems Addressed this Visit        Cardiovascular and Mediastinum    Hypertension - Primary (Chronic)    Relevant Orders    CBC & Differential    Comprehensive Metabolic Panel    Hyperlipidemia    Relevant Orders    Lipid panel    Heart failure with preserved left ventricular function (HFpEF) (CMS/[facility])       Respiratory    COPD (chronic obstructive pulmonary disease) (CMS/[facility]) (Chronic)       Endocrine    Diabetes mellitus (CMS/[facility]) (Chronic)    Relevant Orders    Hemoglobin A1c      Other Visit Diagnoses     Mild episode of recurrent major depressive disorder (CMS/[facility])        Screening for thyroid disorder        Relevant Orders    TSH      No orders of the defined types were placed in this encounter.    1. Essential Hypertension:  Complete CBC and chemistry panel as ordered and will notify results when available  Continue Cardizem and Lasix use as previously prescribed  Adhere to no more than 2 g sodium diet " daily  Encouraged removing the saltshaker from the table in order to improve adherence to low-sodium diet     2.  Hyperlipidemia:  Complete fasting lipid panel as ordered and will notify results when available  Continue Zetia as previously prescribed  Discussed ways to reduce saturated fats in diet such as using all of oil as opposed to vegetable oil when cooking     3.  COPD:  Continue Anoro, albuterol inhaler and nebulizer as needed  Continue Lasix as previously prescribed  Continue use of Hoveround to prevent exertional dyspnea and prevent further COPD exacerbations     4.  Diabetes mellitus with stage IV chronic kidney disease, with long-term current use of insulin::  Continue insulin use as previously prescribed  Educated on importance of thorough diabetic foot care and encouraged inspect feet daily for signs of ulcerations or callus as well as foreign bodies  Will continue to avoid concentrated sweets and attempt to reduce her carbohydrate intake including bread and pasta  Discussed importance of thorough diabetic eye care and strongly encouraged to contact her ophthalmologist to schedule diabetic eye exam    5. Depressive disorder:  Continue Celexa as previously prescribed  Continue on trazodone as previously prescribed   Encouraged to seek emergency medical treatment for any thoughts of helplessness, hopelessness or self-harm     6.  Heart failure with preserved left ventricular function:  Continue use of Hoveround daily to help complete ADLs and provide her with a chance of mobility for socialization  Continue on Bumex previously prescribed  Continue on oxygen per nasal cannula  Encouraged to seek emergency medical treatment for any new or worsening shortness of breath or weight gain of greater than 2 to 3 pounds in a 24-hour.  Encourage use of compression stockings for minimum of 6-8 hours daily    7.  Screening for thyroid disorder:  Complete TSH as ordered and will notify results when  available    Continue on current medications as previously prescribed   I spent 23 minutes in direct face to face contact with patient.  Greater than 50% of this time was spent counseling patient and discussing plan of care.  Return in about 3 months (around 11/20/2020) for Recheck.        This document has been electronically signed by ABRAHAM Boone on August 31, 2020 08:01

## 2020-08-25 PROBLEM — Z99.2 ESRD (END STAGE RENAL DISEASE) ON DIALYSIS (HCC): Status: ACTIVE | Noted: 2020-01-01

## 2020-08-25 PROBLEM — N18.6 ESRD (END STAGE RENAL DISEASE) ON DIALYSIS (HCC): Status: ACTIVE | Noted: 2020-01-01

## 2020-08-25 NOTE — TELEPHONE ENCOUNTER
Contacted [facility] to inform of procedure scheduled for 9/4, covid testing morning of procedure. Stop coumadin 3 days prior. Fax sent with information listed      ----- Message from Magdalena Flower sent at 8/12/2020  1:31 PM CDT -----  Aline from [facility] has called wanting to know if we can get Ms. Skelton rescheduled for her procedure.  She asked if it could be arranged for her to get her covid test on the same day as her procedure due to transportation?

## 2020-08-27 NOTE — PROGRESS NOTES
Today's INR is 3.3. PT denies any med changes or bleeding issues. Denies any s/s of blood clot. PT has bruising and half dollar sized, raised hematoma to left side of forehead. PT states she fell and struck head yesterday morning from sitting position. PT did not seek CT scan. PT denies any HA, visual changes, nausea/vomiting, or dizziness. PT states hematoma is improving. Meme Duckworth assessed pt who again denied any s/s of concussion. Since it was greater than 24 hours post injury and pt states hematoma is improving, Meme did not advise CT scan. Instructed to seek ER attention immediately if any s/s of arise. PT is having fistula placement on 9/4 per Dr Feliz. Spoke with Dr Caputo concerning use of Lovenox due to MAV and kidney function. Due to poor kidney function, Dr Caputo states Lovenox is contraindicated and poses high bleeding risk. PT has hx of intraabdominal bleed. PT has recently been off Coumadin without bridge without incidence. Dr Caputo questioned doing Eliquis bridge which was discussed with Meme ROSARIO. Eliquis is not approved for treatment of patients with mechanical valves. PT instructed to hold Coumadin for 3 days prior to procedure. PT instructed to restart Coumadin on night of procedure if ok with Dr Feliz. PT will be seen on Thursday following procedure. PT instructed to report any s/s of blood clot to ER immediatly while Coumadin is on hold. Patient instructed regarding medication; results given and questions answered. Nutritional counseling given.  Dietary factors affecting therapy addressed.  Patient instructed to monitor for excessive bruising or bleeding. PT verbalizes understanding. Findings reported by Maida Pearce RN.         This document has been electronically signed by Meme Duckworth, DANIEL @ on August 27, 2020 13:43

## 2020-09-04 NOTE — NURSING NOTE
Upon admission questions; patient admitted to eating 4-5 bites of cereal at 7 before she remembered she wasn't supposed to eat. Reported to Dr. Jacobo and he came to speak with her. He cancelled the surgery for today and instructed her to follow up with the office about a reschedule day. No further questions at this time. Transportation for patient has been called.

## 2020-09-04 NOTE — H&P
Brendon Skelton  1945     Chief Complaint:  ESRD     HPI:       PCP:  Josefa Pozo, ABRAHAM  Cardiology:  Dr Gates  Nephrology:  Dr Caputo, [facility]  Pulmonology:  Dr Grant     74 y.o. female with HTN(stable, increased risk stroke, rupture), Hyperlipidemia(stable, increased risk cardiovascular events), Diabetes Mellitus(stable, increased risk cardiovascular events), Obesity(uncontrolled, increased risk cardiovascular events), COPD(stable, increased risk pulmonary complications), Chronic Kidney Disease(stable, increased risk renal failure) and Atrial fibrillation(stable, increased risk stroke) , ESRD(new, dialysis).  former smoker.  Moderate fatigue, shortness of breath with exertion x 1 year.  Needs long term access for hemodialysis. Afib on Coumadin.  No TIA stroke amaurosis.  No MI claudication. No other associated signs, symptoms or modifying factors.     3/2020 tunnel catheter placement  6/2020 Upper extremity vein mapping:  RIGHT cephalic 2.8-7.0mm, basilic 3.4-6.4mm, radial artery 1.6mm.  LEFT cephalic 3.2-4.6mm, basilic 4.2-6.0mm, radial artery 1.3mm.     3/2017: PPM Generator change (Yajaira)  3/2017: Pacemaker pocket revision, evacuation hematoma, control of bleeding.  12/2017 Echocardiogram:  EF 55%, LA 35mm, LV 49mm, AVR.  Trace MR TR.  2/2020 Afib 122, QTc 544     The following portions of the patient's history were reviewed and updated as appropriate: allergies, current medications, past family history, past medical history, past social history, past surgical history and problem list.  Recent images independently reviewed.  Available laboratory values reviewed.     PMH:  Medical History   Past Medical History:   Diagnosis Date   • Anxiety     • Aortic valve replaced     • Atrial fibrillation (CMS/HCC)     • C. difficile colitis     • Chronic hypoxemic respiratory failure (CMS/HCC)     • COPD (chronic obstructive pulmonary disease) (CMS/HCC)     • Coronary artery disease     •  "Depressive disorder     • Diabetes mellitus (CMS/HCC)     • Diastolic heart failure (CMS/HCC)     • Gastrointestinal hemorrhage     • Hyperlipidemia     • Hypertension     • Long term current use of anticoagulant     • Malignant neoplasm of sigmoid colon (CMS/HCC)     • Pulmonary hypertension (CMS/HCC)     • Rectal hemorrhage     • Stage 4 chronic kidney disease (CMS/HCC)           Surgical History         Past Surgical History:   Procedure Laterality Date   • AORTIC VALVE REPAIR/REPLACEMENT       • CARDIAC ELECTROPHYSIOLOGY PROCEDURE N/A 3/20/2017   • CARDIAC PACEMAKER PLACEMENT       • CATARACT EXTRACTION W/ INTRAOCULAR LENS IMPLANT Left 2019   • CATARACT EXTRACTION W/ INTRAOCULAR LENS IMPLANT Right 2019   • COLON RESECTION Left 2/10/2020   • COLONOSCOPY N/A 2019   • COLONOSCOPY N/A 2019   • ENDOSCOPY N/A 2019   • INTERVENTIONAL RADIOLOGY PROCEDURE N/A 3/6/2020     Procedure: tunneled central venous catheter placement;  Surgeon: Polo Feliz MD;  Location: [facility] INVASIVE LOCATION;  Service: Interventional Radiology;  Laterality: N/A;   • PACEMAKER REPLACEMENT N/A 3/21/2017   • SIGMOIDOSCOPY N/A 2019   • UPPER GASTROINTESTINAL ENDOSCOPY                   Family History   Problem Relation Age of Onset   • Hyperlipidemia Mother        Social History            Tobacco Use   • Smoking status: Former Smoker       Last attempt to quit: 3/21/1999       Years since quittin.2   • Smokeless tobacco: Never Used   Substance Use Topics   • Alcohol use: No   • Drug use: No         ALLERGIES:        Allergies   Allergen Reactions   • Crestor [Rosuvastatin Calcium] Other (See Comments)       \"hurts my liver\"   • Lipitor [Atorvastatin] Other (See Comments)       \"hurt my liver\"   • Lortab [Hydrocodone-Acetaminophen] Hallucinations   • Adhesive Tape Other (See Comments)       Paper tape            MEDICATIONS:     Current Outpatient Medications:   •  acetaminophen (TYLENOL) 325 MG " tablet, Take 650 mg by mouth Every 6 (Six) Hours As Needed for Mild Pain  or Fever., Disp: , Rfl:   •  albuterol (PROVENTIL) (2.5 MG/3ML) 0.083% nebulizer solution, Take 2.5 mg by nebulization Every 4 (Four) Hours As Needed for Wheezing., Disp: , Rfl:   •  albuterol sulfate  (90 Base) MCG/ACT inhaler, Inhale 2 puffs Every 4 (Four) Hours As Needed for Wheezing or Shortness of Air., Disp: 18 g, Rfl: 11  •  aspirin 81 MG chewable tablet, Chew 81 mg Daily., Disp: , Rfl:   •  cholecalciferol (VITAMIN D3) 1000 units tablet, Take 2,000 Units by mouth Daily., Disp: , Rfl:   •  citalopram (CeleXA) 20 MG tablet, TAKE 1 TABLET BY MOUTH EVERY DAY, Disp: 30 tablet, Rfl: 3  •  dilTIAZem (TIAZAC) 240 MG 24 hr capsule, TAKE 1 CAPSULE BY MOUTH EVERY DAY, Disp: 30 capsule, Rfl: 0  •  diltiaZEM CD (CARDIZEM CD) 240 MG 24 hr capsule, Take 1 capsule by mouth Daily., Disp: 90 capsule, Rfl: 3  •  ezetimibe (ZETIA) 10 MG tablet, Take 10 mg by mouth Daily., Disp: , Rfl:   •  famotidine (PEPCID) 20 MG tablet, TAKE 2 TABLETS BY MOUTH EVERY DAY, Disp: 60 tablet, Rfl: 11  •  ferrous sulfate 325 (65 FE) MG tablet, Take 325 mg by mouth Daily With Breakfast., Disp: , Rfl:   •  Fluticasone-Umeclidin-Vilant 100-62.5-25 MCG/INH aerosol powder , Inhale 1 puff Daily., Disp: 1 each, Rfl: 11  •  glucose blood test strip, 1 each by Other route 3 (three) times a day. Use as instructed, Disp: , Rfl:   •  insulin aspart (novoLOG) 100 UNIT/ML injection, Inject 0-9 Units under the skin into the appropriate area as directed 4 (Four) Times a Day Before Meals & at Bedtime., Disp: 10 mL, Rfl: 0  •  loperamide (IMODIUM) 2 MG capsule, Take 2 mg by mouth 4 (Four) Times a Day As Needed for Diarrhea., Disp: , Rfl:   •  metOLazone (ZAROXOLYN) 2.5 MG tablet, TAKE 1 TABLET BY MOUTH EVERY OTHER DAY, Disp: 45 tablet, Rfl: 0  •  metoprolol succinate XL (TOPROL-XL) 25 MG 24 hr tablet, TAKE 1 TABLET BY MOUTH EVERY DAY, Disp: 30 tablet, Rfl: 0  •  ondansetron (ZOFRAN) 4  MG tablet, Take 1 tablet by mouth Every 6 (Six) Hours As Needed for Nausea or Vomiting., Disp: 30 tablet, Rfl: 0  •  oxyCODONE (ROXICODONE) 10 MG tablet, Take 1 tablet by mouth Every 6 (Six) Hours As Needed for Moderate Pain  or Severe Pain ., Disp: 12 tablet, Rfl: 0  •  pantoprazole (PROTONIX) 40 MG EC tablet, Take 1 tablet by mouth Daily., Disp: 90 tablet, Rfl: 2  •  Prenatal Vit-Fe Fumarate-FA (PRENATAL VITAMIN) 27-0.8 MG tablet, Take 1 tablet by mouth daily., Disp: , Rfl:   •  rOPINIRole (REQUIP) 0.25 MG tablet, TAKE 1 TABLET BY MOUTH EVERY NIGHT AT BEDTIME FOR RESTLESS LEGS, Disp: 30 tablet, Rfl: 0  •  sucralfate (CARAFATE) 1 g tablet, TAKE 1 TABLET BY MOUTH FOUR (4) TIMES DAILY, Disp: 120 tablet, Rfl: 0  •  UNIFINE PENTIPS 31G X 6 MM misc, USE 1 FOUR (4) TIMES DAILY WITH INSULIN, Disp: 130 each, Rfl: 5  •  vitamin C (ASCORBIC ACID) 250 MG tablet, Take 250 mg by mouth Daily., Disp: , Rfl:   •  warfarin (Coumadin) 2.5 MG tablet, Take 1 tab nightly on Monday, Wednesday, Friday and Saturday or AS DIRECTED PER COUMADIN CLINIC, Disp: 30 tablet, Rfl: 2  •  warfarin (Coumadin) 5 MG tablet, Take 1/2 tablet daily except on Thursdays take 1 tablet, Disp: 25 tablet, Rfl: 3     Review of Systems   Review of Systems   Constitution: Positive for malaise/fatigue. Negative for night sweats and weight loss.   HENT: Negative for hearing loss, hoarse voice and stridor.    Eyes: Negative for vision loss in left eye, vision loss in right eye and visual disturbance.   Cardiovascular: Positive for dyspnea on exertion, irregular heartbeat and palpitations. Negative for chest pain, claudication and leg swelling.   Respiratory: Positive for shortness of breath. Negative for cough and hemoptysis.    Hematologic/Lymphatic: Negative for adenopathy and bleeding problem. Bruises/bleeds easily.   Skin: Negative for color change, poor wound healing and rash.   Musculoskeletal: Positive for arthritis. Negative for back pain, muscle weakness and  "neck pain.   Gastrointestinal: Negative for abdominal pain, dysphagia and heartburn.   Neurological: Positive for numbness. Negative for dizziness, seizures and weakness.   Psychiatric/Behavioral: Negative for altered mental status, depression and memory loss. The patient is not nervous/anxious.          Physical Exam   Vitals       Vitals:     07/02/20 1349   BP: 122/68   BP Location: Right arm   Patient Position: Sitting   Cuff Size: Adult   Pulse: 64   Temp: 98.2 °F (36.8 °C)   TempSrc: Temporal   SpO2: (!) 83%   Height: 165.1 cm (65\")         Physical Exam   Constitutional: She is oriented to person, place, and time. She is active and cooperative. She does not appear ill. No distress.   HENT:   Head: Atraumatic.   Right Ear: Hearing normal.   Left Ear: Hearing normal.   Nose: No nasal deformity. No epistaxis.   Mouth/Throat: She does not have dentures. Normal dentition.   Eyes: Conjunctivae and lids are normal. Right pupil is round and reactive. Left pupil is round and reactive.   Neck: No JVD present. Carotid bruit is not present. No tracheal deviation present. No thyroid mass and no thyromegaly present.   Cardiovascular: Normal rate and regular rhythm.   No murmur heard.  Pulses:       Carotid pulses are 2+ on the right side, and 2+ on the left side.       Radial pulses are 2+ on the right side, and 2+ on the left side.        Dorsalis pedis pulses are 1+ on the right side, and 1+ on the left side.        Posterior tibial pulses are 1+ on the right side, and 1+ on the left side.   RIGHT IJ tunnel catheter   Pulmonary/Chest: Effort normal and breath sounds normal.   Abdominal: Soft. She exhibits no distension and no mass. There is no splenomegaly or hepatomegaly. There is no tenderness.   Musculoskeletal: She exhibits no deformity.   Gait normal.    Lymphadenopathy:     She has no cervical adenopathy.        Right: No supraclavicular adenopathy present.        Left: No supraclavicular adenopathy present. "   Neurological: She is alert and oriented to person, place, and time. She has normal strength.   Skin: Skin is warm and dry. No cyanosis or erythema. No pallor.   No venous staining   Psychiatric: She has a normal mood and affect. Her speech is normal. Judgment and thought content normal.               BUN   Date Value Ref Range Status   03/09/2020 45 (H) 8 - 23 mg/dL Final            Creatinine   Date Value Ref Range Status   03/09/2020 5.20 (H) 0.57 - 1.00 mg/dL Final              eGFR Non  Amer   Date Value Ref Range Status   03/09/2020 8 (L) >60 mL/min/1.73 Final       Comment:       <15 Indicative of kidney failure.              eGFR   Amer   Date Value Ref Range Status   03/09/2020     Final       Comment:       <15 Indicative of kidney failure.         ASSESSMENT:  Brendon was seen today for follow-up.     Diagnoses and all orders for this visit:     ESRD (end stage renal disease) on dialysis (CMS/[facility])  -     Case Request; Standing  -     Case Request     SSS (sick sinus syndrome) (CMS/[facility])     Pulmonary hypertension (CMS/[facility])     Essential hypertension     Mixed hyperlipidemia     Coronary artery disease involving native coronary artery of native heart without angina pectoris     Paroxysmal atrial fibrillation (CMS/[facility])     Panlobular emphysema (CMS/[facility])     Class 2 severe obesity due to excess calories with serious comorbidity and body mass index (BMI) of 39.0 to 39.9 in adult (CMS/[facility])     Type 2 diabetes mellitus with diabetic polyneuropathy, with long-term current use of insulin (CMS/[facility])     Other orders  -     Provide NPO Instructions to Patient; Future  -     Chlorhexidine Skin Prep; Future        PLAN:  Detailed discussion with Brendon Therese Skelton regarding situation and options.  ESRD on hemodialysis.  Long term AV access for dialysis is advisable.  vein mapping shows adequate vein for fistula.  Multiple risk factors with severe comorbidities     Risks including but not limited to  infection, bleeding, blood vessel and nerve injury, swelling, reduced circulation to distal extremity, need for revision and repeat intervention to maintain access.  Benefits:  avoidance of catheter, long term access for dialysis.  Options:  catheter, fistula vs graft, peritoneal dialysis, renal transplantation.  Understands and wishes to proceed.     LEFT brachial artery to cephalic vein AV fistula  vascular ultrasound.  Regional/GEN.  SDS.  9/4/2020  On coumadin mechanical aortic valve, hold 3 days, lovenox bridge.     Return after above studies complete  Obesity Class  2. Increased risk cardiovascular events, sleep and breathing disorders, joint issues, type 2 diabetes mellitus. Options for weight management, heart healthy diet, exercise programs, and associated health risks of obesity discussed.  Recommended regular physical activity, progressive walking program.  Continue current medications as directed.  Will Obtain relevant old records.     Thank you for the opportunity to participate in this patient's care.     Copy to primary care provider.     EMR Dragon/Transcription disclaimer:   Much of this encounter note is an electronic transcription/translation of spoken language to printed text. The electronic translation of spoken language may permit erroneous, or at times, nonsensical words or phrases to be inadvertently transcribed; Although I have reviewed the note for such errors, some may still exist.

## 2020-09-08 NOTE — TELEPHONE ENCOUNTER
Patient called in requesting to speak with Ms. Rivero or her MA in regards to her wheelchair order. I advised Ms. Rivero was out of the office today and patient voiced understanding. Please call patient back at the earliest convenience. Phone number is [phone]

## 2020-09-08 NOTE — OUTREACH NOTE
Patient Outreach Note    Pt discharged home from [facility] 7/22/20 for pneumonia and CHF and received call center outreach. [facility] spoke with pt and she reports to be doing good. She was really very short with conversation mostly giving single word responses. She confirms compliance with her medications, inhalers, home O2, BG monitoring, and dialysis MWF. She doesn't check her weight every day; discussed CHF and importance of daily weights. She denied needs at this time. She has [facility] contact # on her phone.    Veronica Ruiz RN  Ambulatory     9/8/2020, 12:27

## 2020-09-09 NOTE — TELEPHONE ENCOUNTER
Patient has been notified on several occasions that we have not received paperwork from [employer].

## 2020-09-10 NOTE — TELEPHONE ENCOUNTER
Asking if you signed the form for her to get the batteries that go in her [employer]?  Said, it's been a couple of weeks, and they say that they never received it.

## 2020-09-11 NOTE — TELEPHONE ENCOUNTER
I went through her media and found her [facility] information from 2018. I printed it off and had Josefa write a prescription for her batteries. It was sent to [facility] this morning.

## 2020-09-15 NOTE — TELEPHONE ENCOUNTER
PATIENT STATES THAT SHE IS NEEDING DETAILED ORIGIN ORDER SO THAT SHE CAN GET BATTERY REPLACED. STATES THAT THIS NEEDS TO BE SENT TO [facility]

## 2020-09-15 NOTE — PROGRESS NOTES
Today's INR is 3.1.  Patient states no med changes or bleeding problems or unexplained bruising. Patient instructed to continue current dosing schedule. Verbalizes understanding. Will recheck 1 month. Pt is having fistula placement on 9/23 and will hold coumadin for 3 nights beginning on 9/20. Pt cannot use Lovenox due to kidney function per Dr Caputo. Patient instructed regarding medication; results given and questions answered. Nutritional counseling given.  Dietary factors affecting therapy addressed.  Patient instructed to monitor for excessive bruising or bleeding. Findings reported by Dixie Bermeo RN.        This document has been electronically signed by MIKI Pan-BC @  On September 15, 2020 10:18 CDT

## 2020-09-16 NOTE — TELEPHONE ENCOUNTER
I have contacted [employer] by phone and fax today asking that the correct form be faxed to us for Josefa to fill out so that patient can get her battery asap.

## 2020-09-18 NOTE — TELEPHONE ENCOUNTER
Paperwork has been faxed to [facility] multiple times. We have not received anything back from them.

## 2020-10-13 NOTE — PROGRESS NOTES
Today's INR is 2.1.   Pt denies missed coumadin doses or consuming too much green.  Pt denies bleeding issues.  Pt states her surgery for fistula placement has not been rescheduled as of yet.  Adjusted coumadin dose for today only and instructed pt to limit green intake for three days.  Recheck INR in two wks, if therapeutic then, place out for one month.  Pt verbalizes.  Patient instructed regarding medication; results given and questions answered. Nutritional counseling given.  Dietary factors affecting therapy addressed.  Patient instructed to monitor for excessive bruising or bleeding.  Findings reported by Ree Jade RN.       This document has been electronically signed by ERIKA PanCNP-BC @  On October 13, 2020 10:29 CDT

## 2020-10-27 NOTE — PROGRESS NOTES
Today's INR is 2.3.   Pt denies med changes, missed coumadin doses or consuming too much green.  Pt states she had a nose bleed over the weekend, thinking it was from dry sinuses due to oxygen; pt states she will start using her aqua-jovana.  Adjusted coumadin dose and instructed pt to limit green intake for two days.  Recheck INR next wk.  Pt verbalizes.  Patient instructed regarding medication; results given and questions answered. Nutritional counseling given.  Dietary factors affecting therapy addressed.  Patient instructed to monitor for excessive bruising or bleeding.  Findings reported by Ree Jade RN.       This document has been electronically signed by MIKI Pan-BC @  On October 27, 2020 10:58 CDT

## 2020-11-03 PROBLEM — Z51.81 ENCOUNTER FOR THERAPEUTIC DRUG MONITORING: Status: ACTIVE | Noted: 2020-01-01

## 2020-11-03 NOTE — TELEPHONE ENCOUNTER
I faxed information to [employer] asking them to please fax us the correct form needed for her to get her battery.

## 2020-11-03 NOTE — PROGRESS NOTES
Today's INR is 4.0.  Pt denies med changes but states she did have a nosebleed this weekend. Pt states it oozed overnight and then stopped. Pt states she ate green veggies Saturday. Pt is scheduled for fistula on 11/11 and will begin holding coumadin 11/8. Dose adjusted today and due to pt having to reschedule fistula twice INR will be obtained on Monday 11/8. Pt will not require heparin due to kidney function per Dr Caputo. Patient instructed regarding medication; results given and questions answered. Nutritional counseling given.  Dietary factors affecting therapy addressed.  Patient instructed to monitor for excessive bruising or bleeding problems. Findings reported by Dixie Bermeo RN.          This document has been electronically signed by Meme Duckworth, DANIEL @ on November 3, 2020 11:52 CST

## 2020-11-16 NOTE — TELEPHONE ENCOUNTER
Patient returned my phone call about her surgical procedure. Her first date she was not able to make belen to the time. Her procedure is Wednesday December 9, 2020. Her covid test is Sunday December 6, 2020. Patient stated she understood. I also faxed dialysis this information.

## 2020-11-17 NOTE — PROGRESS NOTES
Pt denies med changes or bleeding problems. Pt was not able to have procedure last week due to transportation but she had held her coumadin 3 nights. Pt states she restarted coumadin 11/11 and took her previous dose of 5mg Monday and Friday and 2.5mg all other nights. Pt was instructed to hold green veggies 3 days and continue current dose; pt verbalized. Will recheck in 2 weeks. Findings reported by Dixie Bermeo RN.    Today's INR is   Lab Results - Last 18 Months   Lab Units 11/17/20   INR  2.40*

## 2020-11-19 NOTE — PROGRESS NOTES
"Subjective   Berndon Skelton is a 75 y.o. female.  3-month follow-up for high blood pressure, high cholesterol, diabetes, COPD, congestive heart failure and depression.  Currently on Trazodone and Celexa \"but they are not working anymore.  I cannot tell that I am even taking them.  I need you to change it to something else.  He used to help a lot but I am not sleeping anymore at all.  The trazodone is not working and that is what is making my depression worse.\"    Depression  Visit Type: follow-up  Patient presents with the following symptoms: decreased concentration, depressed mood, excessive worry, fatigue, insomnia, nervousness/anxiety, restlessness and shortness of breath.  Patient is not experiencing: confusion, suicidal ideas, suicidal planning and thoughts of death.  Frequency of symptoms: most days   Severity: moderate   Sleep quality: good  Nighttime awakenings: occasional  Patient has a history of: CHF  Compliance with medications:  %        Diabetes  She has type 2 diabetes mellitus. Hypoglycemia symptoms include nervousness/anxiousness. Pertinent negatives for hypoglycemia include no confusion. Associated symptoms include fatigue and weakness. Pertinent negatives for diabetes include no chest pain. Symptoms are stable. Risk factors for coronary artery disease include diabetes mellitus, hypertension, obesity, post-menopausal, sedentary lifestyle and dyslipidemia. Current diabetic treatment includes insulin injections. Her weight is stable. She is following a generally healthy diet. Meal planning includes avoidance of concentrated sweets. She never participates in exercise. Her home blood glucose trend is fluctuating minimally.   Hypertension  This is a chronic problem. The current episode started more than 1 year ago. The problem is unchanged. The problem is controlled. Associated symptoms include shortness of breath. Pertinent negatives include no chest pain. There are no associated agents to " hypertension. Risk factors for coronary artery disease include obesity, dyslipidemia, sedentary lifestyle and post-menopausal state. Past treatments include calcium channel blockers. Current antihypertension treatment includes calcium channel blockers. The current treatment provides significant improvement. Compliance problems include exercise and diet.  Hypertensive end-organ damage includes CAD/MI and heart failure.   Hyperlipidemia  This is a chronic problem. The current episode started more than 1 year ago. The problem is controlled. Exacerbating diseases include diabetes and obesity. Associated symptoms include shortness of breath. Pertinent negatives include no chest pain. Current antihyperlipidemic treatment includes ezetimibe. The current treatment provides moderate improvement of lipids. There are no compliance problems.  Risk factors for coronary artery disease include a sedentary lifestyle, hypertension and diabetes mellitus.   COPD  She complains of shortness of breath. There is no cough. This is a chronic problem. The current episode started more than 1 year ago. The problem occurs constantly. The problem has been waxing and waning. Pertinent negatives include no chest pain, fever or sore throat. Her symptoms are aggravated by walking and exertion.   Congestive Heart Failure  Presents for follow-up visit. Associated symptoms include fatigue and shortness of breath. Pertinent negatives include no chest pain or chest pressure. The symptoms have been stable. Compliance with total regimen is 51-75%. Compliance problems include adherence to diet and adherence to exercise.  Compliance with diet is 51-75%. Compliance with exercise is 0-25%. Compliance with medications is %.        The following portions of the patient's history were reviewed and updated as appropriate:   Current Outpatient Medications   Medication Sig Dispense Refill   • acetaminophen (TYLENOL) 325 MG tablet Take 650 mg by mouth Every 6  (Six) Hours As Needed for Mild Pain  or Fever.     • albuterol (PROVENTIL) (2.5 MG/3ML) 0.083% nebulizer solution Take 2.5 mg by nebulization Every 4 (Four) Hours As Needed for Wheezing.     • albuterol sulfate  (90 Base) MCG/ACT inhaler Inhale 2 puffs Every 4 (Four) Hours As Needed for Wheezing or Shortness of Air. 18 g 11   • aspirin 81 MG chewable tablet Chew 81 mg Daily.     • ezetimibe (ZETIA) 10 MG tablet Take 10 mg by mouth Daily.     • famotidine (PEPCID) 40 MG tablet Take 40 mg by mouth Daily.     • ferrous sulfate 325 (65 FE) MG tablet Take 325 mg by mouth Every Night.     • Fluticasone-Umeclidin-Vilant 100-62.5-25 MCG/INH aerosol powder  Inhale 1 puff Every Night.     • glucose blood test strip 1 each by Other route 3 (three) times a day. Use as instructed     • insulin aspart (novoLOG) 100 UNIT/ML injection Inject 0-9 Units under the skin into the appropriate area as directed 4 (Four) Times a Day Before Meals & at Bedtime. 10 mL 0   • Insulin Glargine (BASAGLAR KWIKPEN) 100 UNIT/ML injection pen Inject 30 Units under the skin into the appropriate area as directed Every Night. 3 pen 5   • loperamide (IMODIUM) 2 MG capsule TAKE 1 CAPSULE BY MOUTH EVERY FOUR HOURS AS NEEDED FOR DIARRHEA 60 capsule 0   • metOLazone (ZAROXOLYN) 2.5 MG tablet Take 2.5 mg by mouth Every Other Day.     • metoprolol succinate XL (TOPROL-XL) 25 MG 24 hr tablet TAKE 1 TABLET BY MOUTH EVERY DAY 30 tablet 3   • ondansetron (ZOFRAN) 4 MG tablet Take 1 tablet by mouth Every 6 (Six) Hours As Needed for Nausea or Vomiting. 30 tablet 0   • pantoprazole (PROTONIX) 40 MG EC tablet Take 1 tablet by mouth Daily. 90 tablet 2   • Prenatal Vit-Fe Fumarate-FA (PRENATAL VITAMIN) 27-0.8 MG tablet Take 1 tablet by mouth daily.     • rOPINIRole (REQUIP) 0.25 MG tablet TAKE 1 TABLET BY MOUTH EVERY NIGHT AT BEDTIME FOR RESTLESS LEGS 30 tablet 0   • sucralfate (CARAFATE) 1 g tablet Take 1 g by mouth 2 (two) times a day.     • UNIFINE PENTIPS 31G  X 6 MM misc USE 1 FOUR (4) TIMES DAILY WITH INSULIN 130 each 5   • vitamin C (ASCORBIC ACID) 250 MG tablet Take 250 mg by mouth Daily.     • warfarin (COUMADIN) 5 MG tablet TAKE 1/2 TABLET DAILY EXCEPT ON THURSDAYS TAKE 1 TABLET     • amitriptyline (ELAVIL) 150 MG tablet Take 1 tablet by mouth Every Night. 30 tablet 3   • escitalopram (Lexapro) 20 MG tablet Take 1 tablet by mouth Daily. 30 tablet 2     No current facility-administered medications for this visit.      Current Outpatient Medications on File Prior to Visit   Medication Sig   • acetaminophen (TYLENOL) 325 MG tablet Take 650 mg by mouth Every 6 (Six) Hours As Needed for Mild Pain  or Fever.   • albuterol (PROVENTIL) (2.5 MG/3ML) 0.083% nebulizer solution Take 2.5 mg by nebulization Every 4 (Four) Hours As Needed for Wheezing.   • albuterol sulfate  (90 Base) MCG/ACT inhaler Inhale 2 puffs Every 4 (Four) Hours As Needed for Wheezing or Shortness of Air.   • aspirin 81 MG chewable tablet Chew 81 mg Daily.   • ezetimibe (ZETIA) 10 MG tablet Take 10 mg by mouth Daily.   • famotidine (PEPCID) 40 MG tablet Take 40 mg by mouth Daily.   • ferrous sulfate 325 (65 FE) MG tablet Take 325 mg by mouth Every Night.   • Fluticasone-Umeclidin-Vilant 100-62.5-25 MCG/INH aerosol powder  Inhale 1 puff Every Night.   • glucose blood test strip 1 each by Other route 3 (three) times a day. Use as instructed   • insulin aspart (novoLOG) 100 UNIT/ML injection Inject 0-9 Units under the skin into the appropriate area as directed 4 (Four) Times a Day Before Meals & at Bedtime.   • Insulin Glargine (BASAGLAR KWIKPEN) 100 UNIT/ML injection pen Inject 30 Units under the skin into the appropriate area as directed Every Night.   • loperamide (IMODIUM) 2 MG capsule TAKE 1 CAPSULE BY MOUTH EVERY FOUR HOURS AS NEEDED FOR DIARRHEA   • metOLazone (ZAROXOLYN) 2.5 MG tablet Take 2.5 mg by mouth Every Other Day.   • metoprolol succinate XL (TOPROL-XL) 25 MG 24 hr tablet TAKE 1 TABLET  "BY MOUTH EVERY DAY   • ondansetron (ZOFRAN) 4 MG tablet Take 1 tablet by mouth Every 6 (Six) Hours As Needed for Nausea or Vomiting.   • pantoprazole (PROTONIX) 40 MG EC tablet Take 1 tablet by mouth Daily.   • Prenatal Vit-Fe Fumarate-FA (PRENATAL VITAMIN) 27-0.8 MG tablet Take 1 tablet by mouth daily.   • rOPINIRole (REQUIP) 0.25 MG tablet TAKE 1 TABLET BY MOUTH EVERY NIGHT AT BEDTIME FOR RESTLESS LEGS   • sucralfate (CARAFATE) 1 g tablet Take 1 g by mouth 2 (two) times a day.   • UNIFINE PENTIPS 31G X 6 MM misc USE 1 FOUR (4) TIMES DAILY WITH INSULIN   • vitamin C (ASCORBIC ACID) 250 MG tablet Take 250 mg by mouth Daily.   • warfarin (COUMADIN) 5 MG tablet TAKE 1/2 TABLET DAILY EXCEPT ON THURSDAYS TAKE 1 TABLET     No current facility-administered medications on file prior to visit.      She is allergic to crestor [rosuvastatin calcium]; lipitor [atorvastatin]; lortab [hydrocodone-acetaminophen]; and tape..    Review of Systems   Constitutional: Positive for fatigue. Negative for fever.   HENT: Negative.  Negative for sore throat.    Eyes: Negative.    Respiratory: Positive for shortness of breath. Negative for cough.    Cardiovascular: Negative for chest pain.   Genitourinary: Negative.    Musculoskeletal: Negative.    Skin: Negative.    Neurological: Positive for weakness.   Psychiatric/Behavioral: Positive for decreased concentration. Negative for confusion and suicidal ideas. The patient is nervous/anxious and has insomnia.        Objective    Visit Vitals  /68   Ht 165.1 cm (65\")   Wt 92.1 kg (203 lb)   LMP  (LMP Unknown)   BMI 33.78 kg/m²       Physical Exam  Vitals signs and nursing note reviewed.   Constitutional:       Appearance: She is well-developed.      Comments: Arrives via [employer]over[employer] wheelchair   HENT:      Head: Normocephalic.      Right Ear: External ear normal.      Left Ear: External ear normal.      Mouth/Throat:      Mouth: Mucous membranes are moist.   Eyes:      Pupils: Pupils are " equal, round, and reactive to light.   Neck:      Musculoskeletal: Normal range of motion and neck supple.   Cardiovascular:      Rate and Rhythm: Normal rate and regular rhythm.      Heart sounds: Normal heart sounds.   Pulmonary:      Effort: Pulmonary effort is normal.      Breath sounds: Normal breath sounds.      Comments: 2 L of O2 per nasal cannula  Abdominal:      General: Bowel sounds are normal.      Palpations: Abdomen is soft.   Musculoskeletal: Normal range of motion.   Skin:     General: Skin is warm.      Capillary Refill: Capillary refill takes less than 2 seconds.   Neurological:      Mental Status: She is alert and oriented to person, place, and time.   Psychiatric:         Behavior: Behavior normal.         Assessment/Plan   Problems Addressed this Visit        Cardiovascular and Mediastinum    Hypertension - Primary (Chronic)    Hyperlipidemia    Heart failure with preserved left ventricular function (HFpEF) (CMS/[facility])       Respiratory    COPD (chronic obstructive pulmonary disease) (CMS/HCC) (Chronic)       Endocrine    Diabetes mellitus (CMS/HCC) (Chronic)    Relevant Orders    Ambulatory Referral for Diabetic Eye Exam-Ophthalmology      Other Visit Diagnoses     Mild episode of recurrent major depressive disorder (CMS/[facility])        Relevant Medications    amitriptyline (ELAVIL) 150 MG tablet    escitalopram (Lexapro) 20 MG tablet    Insomnia, unspecified type        Relevant Medications    amitriptyline (ELAVIL) 150 MG tablet      Diagnoses       Codes Comments    Essential hypertension    -  Primary ICD-10-CM: I10  ICD-9-CM: 401.9     Hyperlipidemia, unspecified hyperlipidemia type     ICD-10-CM: E78.5  ICD-9-CM: 272.4     Chronic obstructive pulmonary disease, unspecified COPD type (CMS/[facility])     ICD-10-CM: J44.9  ICD-9-CM: 496     Type 2 diabetes mellitus with diabetic polyneuropathy, with long-term current use of insulin (CMS/[facility])     ICD-10-CM: E11.42, Z79.4  ICD-9-CM: 250.60, 357.2, V58.67      Mild episode of recurrent major depressive disorder (CMS/[facility])     ICD-10-CM: F33.0  ICD-9-CM: 296.31     Heart failure with preserved left ventricular function (HFpEF) (CMS/[facility])     ICD-10-CM: I50.30  ICD-9-CM: 428.9     Insomnia, unspecified type     ICD-10-CM: G47.00  ICD-9-CM: 780.52         New Medications Ordered This Visit   Medications   • amitriptyline (ELAVIL) 150 MG tablet     Sig: Take 1 tablet by mouth Every Night.     Dispense:  30 tablet     Refill:  3   • escitalopram (Lexapro) 20 MG tablet     Sig: Take 1 tablet by mouth Daily.     Dispense:  30 tablet     Refill:  2     1. Essential Hypertension:  Continue Cardizem and Lasix use as previously prescribed  Adhere to no more than 2 g sodium diet daily     2.  Hyperlipidemia:  Continue Zetia as previously prescribed  Discussed ways to reduce saturated fats in diet such as using all of oil as opposed to vegetable oil when cooking     3.  COPD:  Continue Anoro, albuterol inhaler and nebulizer as needed  Continue Lasix as previously prescribed  Continue use of home oxygen at 2 to 3 L per nasal cannula daily     4.  Diabetes mellitus with stage IV chronic kidney disease, with long-term current use of insulin::  Continue insulin use as previously prescribed  Educated on importance of thorough diabetic foot care and encouraged inspect feet daily for signs of ulcerations or callus as well as foreign bodies  Referral placed to Dr. Clay for ophthalmology exam and will call to schedule appointment     5. Depressive disorder:  Discontinue Celexa as previously prescribed and tapering instructions provided  Begin Lexapro as prescribed  Educated on possible side of this medication including but not limited possible suicidal ideations  Encouraged to seek emergency medical treatment for any thoughts of helplessness, hopelessness or self-harm     6.  Heart failure with preserved left ventricular function:  Continue use of Hoveround daily to help complete ADLs and  provide her with a chance of mobility for socialization  Continue on Bumex previously prescribed  Encouraged to seek emergency medical treatment for any new or worsening shortness of breath or weight gain of greater than 3-5 pounds in a 24-hour or if greater than 3+ pitting edema encouraged.  Encourage use of compression stockings for minimum of 8 hours daily    7.  Insomnia, unspecified type:  Discontinue trazodone as previously prescribed a tapering instructions provided  Begin Elavil as prescribed  Educated on possible side effects of this medication including but not limited to increased risk for drowsiness, dizziness, blurred vision and constipation  Encouraged to turn off all electronic devices including television, cell phone in room at night in order to promote an environment more conducive to sleeping  Encouraged to set a regular bedtime and try to adhere to that schedule    Continue on current medications as previously prescribed   I spent 28 minutes in direct face to face contact with patient.  Greater than 50% of this time was spent counseling patient and discussing plan of care.  Return in about 3 months (around 2/19/2021) for Medicare Wellness.        This document has been electronically signed by ABRAHAM Boone on November 20, 2020 06:58 CST

## 2020-12-04 NOTE — NURSING NOTE
Dr. Coreas came up to see patient, she is more alert, but drowsy.  ABG read, meds added/adjusted, and RT is in setting patient up on BIPAP   98

## 2021-06-07 NOTE — PROGRESS NOTES
"Anticoagulation by Pharmacy - Warfarin    Brendon Skelton is a 74 y.o.female being continued on warfarin for atrial fibrillation with valve replacement    Home regimen: 5 mg Su/Tu/Th, 2.5 mg all other days  INR Goal: 2.5-3.5    Last INR:   Lab Results   Component Value Date    INR 2.61 (H) 03/08/2020       Objective:  [Ht: (P) 165.1 cm (65\"); Wt: 105 kg (231 lb)]  Lab Results   Component Value Date    INR 2.61 (H) 03/08/2020    INR 1.75 (H) 03/07/2020    INR 1.51 (H) 03/06/2020    PROTIME 27.7 (H) 03/08/2020    PROTIME 20.2 (H) 03/07/2020    PROTIME 18.0 (H) 03/06/2020     Lab Results   Component Value Date    HGB 8.1 (L) 03/08/2020    HGB 7.9 (L) 03/07/2020    HGB 8.3 (L) 03/06/2020    HCT 25.4 (L) 03/08/2020    HCT 25.5 (L) 03/07/2020    HCT 25.8 (L) 03/06/2020     03/08/2020     03/07/2020     03/06/2020       Recent Warfarin Administrations       The 5 most recent administrations since 02/26/2020 are shown below each listed medication.    Warfarin Sodium         Order Dose Date Given     warfarin (COUMADIN) tablet 2.5 mg 2.5 mg 03/02/2020     warfarin (COUMADIN) tablet 5 mg 5 mg 03/01/2020      5 mg 02/29/2020                      Assessment  Interacting medications: aspirin  H/h is up today  INR is therapeutic today but did trend up very rapidly overnight  Continue 2.5 mg at this time as I believe the recent large jump is due to the 5 mg dose of warfarin this patient received recently. Pt does have a higher range of therapeutic INRs    Plan:  1.  Give warfarin 2.5 mg PO tonight   2.  Draw a PT/INR in AM  3.  Pharmacy will continue to follow    Milan Mcintosh RPH  03/08/20 1:07 PM     " no

## 2021-07-06 NOTE — PROGRESS NOTES
PT states compliant c tx. PT denies any new medications or bleeding issues. Denies any s/s of blood clot. PT instructed to continue same dose and Coumadin friendly diet. PT will be seen in 2 weeks. Patient instructed regarding medication; results given and questions answered. Nutritional counseling given.  Dietary factors affecting therapy addressed.  Patient instructed to monitor for excessive bruising or bleeding. PT verbalizes understanding.         This document has been electronically signed by ABRAHAM White on December 14, 2018 10:40 AM         
BUE/BLE WFL(PROM) except both shoulders limited due to pain and contactures.

## 2021-08-21 NOTE — TELEPHONE ENCOUNTER
"PATIENT CALLING IN REQUEST SOME PAPER WORK BE SENT IN FOR A NEW HOVER ROUND BATTERY. PATIENT STATES THAT THE FORM THAT NEEDS TO BE DONE IS A  \"CONUTED NEED AND USE FORM \"    CALL BACK NUMBER: [phone]  "
MD//LINDA/ADEOLA

## 2021-11-15 NOTE — PROGRESS NOTES
"Anticoagulation by Pharmacy - Warfarin    Brendon Skelton is a 73 y.o.female  [Ht: 165.1 cm (65\"); Wt: 118 kg (260 lb)] on Warfarin for indication of atrial fibrillation.    Goal INR: 2-3  Home regimen: 4 mg nightly    Today's INR:   Lab Results   Component Value Date    INR 2.28 (H) 07/17/2019         Lab Results   Component Value Date    INR 2.28 (H) 07/17/2019    INR 3.14 (H) 07/16/2019    INR 4.66 (H) 07/15/2019    PROTIME 24.9 (H) 07/17/2019    PROTIME 32.0 (H) 07/16/2019    PROTIME 43.7 (H) 07/15/2019     Lab Results   Component Value Date    HGB 8.6 (L) 07/17/2019    HGB 8.9 (L) 07/16/2019    HGB 8.5 (L) 07/15/2019     Lab Results   Component Value Date    HCT 27.2 (L) 07/17/2019    HCT 29.4 (L) 07/16/2019    HCT 27.6 (L) 07/15/2019     Lab Results   Component Value Date     07/17/2019     07/16/2019     07/15/2019       Recent Warfarin Administrations       The 5 most recent administrations since 07/10/2019 are shown below each listed medication.    Warfarin Sodium         Order Dose Date Given     warfarin (COUMADIN) tablet 1 mg 1 mg 07/16/2019                      Assessment/Plan:  Reviewed above labs. INR is 2.28.  INR is at goal, but expected to decrease further.   H/H low but stable.  Patient did receive dose of warfarin last night after dose was held for 3 days due to elevated INR.  Interacting medications include aspirin and citalopram.  Will increase current dose of warfarin to 2 mg.   Rx will continue to follow and adjust dose accordingly.        Sarah Dennis RPH  07/17/19 1:37 PM       " EMERGENCY DEPARTMENT HISTORY AND PHYSICAL EXAM      Date: 11/14/2021  Patient Name: Malinda Mcmahan    History of Presenting Illness     Chief Complaint   Patient presents with    Anxiety    Back Pain       History Provided By: Patient    HPI: Malinda Mcmahan, 77 y.o. female with a past medical history significant lung cancer, GERD, arthritis, high cholesterol presents to the ED with cc of right flank pain radiating into the right lower chest under the rib cage, acute onset while talking on the phone to her boyfriend 1 hour ago. Patient reports that the pain is sharp and worse with any deep breathing. She denies a history of this pain. No recent travel, no history of blood clot. She specifically denies fever, chills, body aches, abdominal pain, dysuria, hematuria, leg swelling, midsternal chest pain, cough, shortness of breath. There are no other complaints, changes, or physical findings at this time. PCP: PASQUALE Barnhart    Current Outpatient Medications   Medication Sig Dispense Refill    ketorolac (TORADOL) 10 mg tablet Take 1 Tablet by mouth every six (6) hours as needed for Pain.  18 Tablet 0    ergocalciferol (ERGOCALCIFEROL) 1,250 mcg (50,000 unit) capsule 1 CAPSULE EVERY SUNDAY ORALLY 84 DAYS      pravastatin (PRAVACHOL) 20 mg tablet TAKE 1 TABLET BY MOUTH EVERY DAY         Past History     Past Medical History:  Past Medical History:   Diagnosis Date    Arthritis     GERD (gastroesophageal reflux disease)     resolved    Lung cancer ([facility] [address]        Past Surgical History:  Past Surgical History:   Procedure Laterality Date    HX APPENDECTOMY      HX OTHER SURGICAL      procedure on heart; NASD       Family History:  Family History   Problem Relation Age of Onset    Diabetes Maternal Aunt        Social History:  Social History     Tobacco Use    Smoking status: Former Smoker    Smokeless tobacco: Never Used   Vaping Use    Vaping Use: Never used   Substance Use Topics    Alcohol use: Yes     Comment: Occasional    Drug use: Never       Allergies: Allergies   Allergen Reactions    Chocolate [Cocoa] Angioedema    Tree Nut Angioedema         Review of Systems   Review of Systems   Constitutional: Negative for activity change, chills and fever. HENT: Negative for congestion, ear pain, rhinorrhea, sneezing and sore throat. Eyes: Negative for pain and visual disturbance. Respiratory: Negative for cough and shortness of breath. Cardiovascular: Negative for chest pain and leg swelling. Gastrointestinal: Positive for abdominal pain. Negative for diarrhea, nausea and vomiting. Genitourinary: Positive for flank pain. Negative for difficulty urinating, dysuria, hematuria and urgency. Musculoskeletal: Positive for back pain. Negative for gait problem. Skin: Negative for rash. Neurological: Negative for speech difficulty, weakness and headaches. Psychiatric/Behavioral: The patient is not nervous/anxious. All other systems reviewed and are negative. Physical Exam   Physical Exam  Vitals and nursing note reviewed. Constitutional:       General: She is not in acute distress. Appearance: Normal appearance. She is not ill-appearing or toxic-appearing. HENT:      Head: Normocephalic and atraumatic. Nose: Nose normal.      Mouth/Throat:      Mouth: Mucous membranes are moist.   Eyes:      Extraocular Movements: Extraocular movements intact. Conjunctiva/sclera: Conjunctivae normal.      Pupils: Pupils are equal, round, and reactive to light. Cardiovascular:      Rate and Rhythm: Normal rate. Pulses: Normal pulses. Heart sounds: Normal heart sounds. No murmur heard. Pulmonary:      Effort: Pulmonary effort is normal. No respiratory distress. Breath sounds: Normal breath sounds. Comments: +splinting with inspiration  Chest:      Chest wall: No deformity or crepitus.       Comments: No tenderness to palpation of the chest wall  Abdominal: General: Bowel sounds are normal.      Palpations: Abdomen is soft. Tenderness: There is no abdominal tenderness. There is no right CVA tenderness, left CVA tenderness, guarding or rebound. Negative signs include Og's sign. Musculoskeletal:         General: No deformity or signs of injury. Normal range of motion. Cervical back: Normal range of motion. Skin:     General: Skin is warm and dry. Capillary Refill: Capillary refill takes less than 2 seconds. Findings: No rash. Neurological:      General: No focal deficit present. Mental Status: She is alert and oriented to person, place, and time. Cranial Nerves: No cranial nerve deficit.    Psychiatric:         Mood and Affect: Mood normal.         Diagnostic Study Results     Labs -     Recent Results (from the past 300 hour(s))   EKG, 12 LEAD, INITIAL    Collection Time: 11/14/21 10:21 PM   Result Value Ref Range    Ventricular Rate 89 BPM    Atrial Rate 89 BPM    P-R Interval 140 ms    QRS Duration 72 ms    Q-T Interval 342 ms    QTC Calculation (Bezet) 416 ms    Calculated P Axis 72 degrees    Calculated R Axis 16 degrees    Calculated T Axis 66 degrees    Diagnosis       Normal sinus rhythm  Normal ECG  No previous ECGs available  Confirmed by Kike Epps ([id number]) on 11/15/2021 12:01:56 PM     URINALYSIS W/ REFLEX CULTURE    Collection Time: 11/14/21 10:30 PM    Specimen: Urine   Result Value Ref Range    Color Yellow/Straw      Appearance Clear Clear      Specific gravity 1.010 1.003 - 1.030      pH (UA) 6.0 5.0 - 8.0      Protein Negative Negative mg/dL    Glucose Negative Negative mg/dL    Ketone Negative Negative mg/dL    Bilirubin Negative Negative      Blood Negative Negative      Urobilinogen 0.1 0.1 - 1.0 EU/dL    Nitrites Negative Negative      Leukocyte Esterase Small (A) Negative      UA:UC IF INDICATED Culture not indicated by UA result Culture not indicated by UA result      WBC 0-4 0 - 4 /hpf    RBC 0-5 0 - 5 /hpf    Bacteria Negative Negative /hpf    Mucus Trace /lpf   NT-PRO BNP    Collection Time: 11/15/21 12:00 AM   Result Value Ref Range    NT pro- (H) <125 pg/mL   CBC WITH AUTOMATED DIFF    Collection Time: 11/15/21 12:00 AM   Result Value Ref Range    WBC 7.8 3.6 - 11.0 K/uL    RBC 4.32 3.80 - 5.20 M/uL    HGB 13.5 11.5 - 16.0 g/dL    HCT 41.2 35.0 - 47.0 %    MCV 95.4 80.0 - 99.0 FL    MCH 31.3 26.0 - 34.0 PG    MCHC 32.8 30.0 - 36.5 g/dL    RDW 13.9 11.5 - 14.5 %    PLATELET 846 976 - 323 K/uL    MPV 11.1 8.9 - 12.9 FL    NRBC 0.0 0.0  WBC    ABSOLUTE NRBC 0.00 0.00 - 0.01 K/uL    NEUTROPHILS 63 32 - 75 %    LYMPHOCYTES 23 12 - 49 %    MONOCYTES 8 5 - 13 %    EOSINOPHILS 5 0 - 7 %    BASOPHILS 1 0 - 1 %    IMMATURE GRANULOCYTES 0 0 - 0.5 %    ABS. NEUTROPHILS 5.0 1.8 - 8.0 K/UL    ABS. LYMPHOCYTES 1.8 0.8 - 3.5 K/UL    ABS. MONOCYTES 0.6 0.0 - 1.0 K/UL    ABS. EOSINOPHILS 0.4 0.0 - 0.4 K/UL    ABS. BASOPHILS 0.0 0.0 - 0.1 K/UL    ABS. IMM. GRANS. 0.0 0.00 - 0.04 K/UL    DF AUTOMATED     TROPONIN-HIGH SENSITIVITY    Collection Time: 11/15/21 12:00 AM   Result Value Ref Range    Troponin-High Sensitivity 6 0 - 51 ng/L   METABOLIC PANEL, COMPREHENSIVE    Collection Time: 11/15/21 12:00 AM   Result Value Ref Range    Sodium 139 136 - 145 mmol/L    Potassium 3.7 3.5 - 5.1 mmol/L    Chloride 109 (H) 97 - 108 mmol/L    CO2 25 21 - 32 mmol/L    Anion gap 5 5 - 15 mmol/L    Glucose 113 (H) 65 - 100 mg/dL    BUN 19 6 - 20 mg/dL    Creatinine 1.18 (H) 0.55 - 1.02 mg/dL    BUN/Creatinine ratio 16 12 - 20      GFR est AA 56 (L) >60 ml/min/1.73m2    GFR est non-AA 46 (L) >60 ml/min/1.73m2    Calcium 9.1 8.5 - 10.1 mg/dL    Bilirubin, total 0.3 0.2 - 1.0 mg/dL    AST (SGOT) 22 15 - 37 U/L    ALT (SGPT) 26 12 - 78 U/L    Alk.  phosphatase 112 45 - 117 U/L    Protein, total 7.6 6.4 - 8.2 g/dL    Albumin 3.1 (L) 3.5 - 5.0 g/dL    Globulin 4.5 (H) 2.0 - 4.0 g/dL    A-G Ratio 0.7 (L) 1.1 - 2.2     D DIMER    Collection Time: 11/15/21 12:00 AM   Result Value Ref Range    D DIMER 0.93 (H) <0.50 ug/ml(FEU)       Radiologic Studies -   XR Results (most recent):  Results from Hospital Encounter encounter on 11/14/21    XR CHEST PORT    Narrative  HISTORY:  right-sided rib pain, pleurisy    TECHNIQUE:  XR CHEST PORT    COMPARISON: 4/22/2019  LIMITATIONS: None    TUBES/LINES: Left chest wall Port-A-Cath present with the tip of the catheter to  the distal SVC. LUNG PARENCHYMA: Ill-defined opacity at the left lung base partially obscuring  the hemidiaphragm  TRACHEA/BRONCHI: Normal  PULMONARY VESSELS: Normal  PLEURA: Cannot exclude bilateral effusions although more convincing on the left. HEART: Normal  AORTIC SHADOW:Normal.  MEDIASTINUM: Normal  BONE/SOFT TISSUES: No acute abnormality. OTHER: None    Impression  Airspace opacity at the left lower lobe and in the acute setting  would suggest pneumonia and/or aspiration and in either case probably with a  small left pleural effusion. Follow-up to radiographic resolution and/or  further evaluation with chest CT if symptoms/findings do not resolve as expected  with treatment, or if indicated otherwise. CT Results  (Last 48 hours)    None        CT Results (most recent):  Results from Hospital Encounter encounter on 11/14/21    CTA CHEST W OR W WO CONT    Narrative  HISTORY:  right chest pain, pleurisy, elevated ddimer  Dose reduction technique: All CT scans at this facility are performed using dose reduction optimization  technique as appropriate on the exam including the following: Automated exposure  control, adjustment of the MA and/or KV according to patient size and/or use of  iterative reconstructive technique. TECHNIQUE: CTA CHEST W OR W WO CONT. CT angiogram of the chest is performed  with maximum intensity projection images (MIPS) generated. 100 cc [id number]-[id number] injected.       COMPARISON: 9/22/2021  LIMITATIONS: None    LINES/TUBES/MEDICAL SUPPORT DEVICES:  Left chest wall Port-A-Cath present with  distal tip to the SVC    LUNG PARENCHYMA: There is some new airspace opacity in the right lower lobe. Stable subpleural nodule or scar at the right middle lobe level,. TRACHEA/BRONCHI: Normal  PULMONARY VESSELS: Normal. No definitive persistent central filling defects  identified on multiple contiguous images to diagnose pulmonary embolism. PLEURA: Small right pleural effusion is new since prior exam. Stable appearance  of what is thought to represent pleural scarring/thickening in the posterior  medial left upper lobe  MEDIASTINUM: Left hilar adenopathy unchanged  HEART: Small left ventricular apical aneurysm suspected measures up to 1.3 cm  maximum. AORTA/GREAT VESSELS: No aortic aneurysm or dissection. ESOPHAGUS: Normal  AXILLAE: Normal  BONES/TISSUES: No acute abnormality    UPPER ABDOMEN: Negative for acute abnormality. OTHER: None    Impression  Right pleural effusion with right lung base airspace opacity and in the acute  setting would suggest pneumonia and/or aspiration. Stable subpleural nodule or  scar in the right middle lobe. Stable pleural thickening or scarring posterior  medial left upper lobe level and left hilar adenopathy. No pulmonary embolism or  acute aortic abnormalities identified. . 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 Medical Decision Making and ED Course   I am the first provider for this patient. I reviewed the vital signs, available nursing notes, past medical history, past surgical history, family history and social history. Vital Signs-Reviewed the patient's vital signs.   Wt Readings from Last 3 Encounters:   11/14/21 71.2 kg (157 lb)   06/02/21 69.9 kg (154 lb)   03/31/21 70.1 kg (154 lb 9.6 oz)     Temp Readings from Last 3 Encounters:   11/14/21 97.6 °F (36.4 °C)   06/02/21 98.6 °F (37 °C)     BP Readings from Last 3 Encounters:   11/14/21 (!) 143/101   06/02/21 124/83   03/31/21 118/70     Pulse Readings from Last 3 Encounters:   11/14/21 93   06/02/21 (!) 103   03/31/21 (!) 112       No data found. Records Reviewed: Nursing Notes and Old Medical Records    Provider Notes (Medical Decision Making):       MDM  Number of Diagnoses or Management Options  Pleural effusion on right  Pneumonia of right lower lobe due to infectious organism  Diagnosis management comments: 26-year-old female presenting with sudden onset right flank pain radiating under the right rib cage, worse with deep breathing. She presents in no acute distress however appears uncomfortable. She is not tachypneic or hypoxic. Afebrile. She does have history of lung cancer. CBC and CMP without any acute findings, LFTs normal, mildly elevated D-dimer. CTA chest reveals right-sided pleural effusion with potential consolidation. Patient had pain relief in ED with Toradol. We will treat outpatient with Levaquin to cover for community-acquired pneumonia, and Toradol. She has a follow-up appointment with her oncologist shortly for repeat chest x-ray. She has been advised to return to the ED for any worsening symptoms to include shortness of breath and fever. Amount and/or Complexity of Data Reviewed  Clinical lab tests: ordered and reviewed  Tests in the radiology section of CPT®: ordered and reviewed  Review and summarize past medical records: yes          ED Course:   Initial assessment performed. The patients presenting problems have been discussed, and they are in agreement with the care plan formulated and outlined with them. I have encouraged them to ask questions as they arise throughout their visit.            Procedures       Mic Ruth PA-C    Procedures     Mic Ruth ZACHARY        Disposition     Disposition: DC- Adult Discharges: All of the diagnostic tests were reviewed and questions answered. Diagnosis, care plan and treatment options were discussed. The patient understands the instructions and will follow up as directed. The patients results have been reviewed with them. They have been counseled regarding their diagnosis. The patient verbally convey understanding and agreement of the signs, symptoms, diagnosis, treatment and prognosis and additionally agrees to follow up as recommended with their PCP in 24 - 48 hours. They also agree with the care-plan and convey that all of their questions have been answered. I have also put together some discharge instructions for them that include: 1) educational information regarding their diagnosis, 2) how to care for their diagnosis at home, as well a 3) list of reasons why they would want to return to the ED prior to their follow-up appointment, should their condition change. Discharged     DISCHARGE PLAN:  1. Current Discharge Medication List      CONTINUE these medications which have NOT CHANGED    Details   ergocalciferol (ERGOCALCIFEROL) 1,250 mcg (50,000 unit) capsule 1 CAPSULE EVERY SUNDAY ORALLY 84 DAYS      pravastatin (PRAVACHOL) 20 mg tablet TAKE 1 TABLET BY MOUTH EVERY DAY           2. Follow-up Information     Follow up With Specialties Details Why Contact Info    Crystal Greenema Physician Assistant Schedule an appointment as soon as possible for a visit  for follow up from ER visit [phone]      Robert Chambers MD Hematology and Oncology Schedule an appointment as soon as possible for a visit  for follow up from ER visit [address] [place] [address]  [phone]      [address] [place] [address] DEPT Emergency Medicine  As needed, If symptoms worsen [address] [facility] [address]  [phone]        3.   Return to ED if worse 4.   Discharge Medication List as of 11/15/2021  4:24 AM      START taking these medications    Details   levoFLOXacin (Levaquin) 750 mg tablet Take 1 Tablet by mouth daily for 5 days. , Normal, Disp-5 Tablet, R-0      ketorolac (TORADOL) 10 mg tablet Take 1 Tablet by mouth every six (6) hours as needed for Pain., Normal, Disp-18 Tablet, R-0         CONTINUE these medications which have NOT CHANGED    Details   ergocalciferol (ERGOCALCIFEROL) 1,250 mcg (50,000 unit) capsule 1 CAPSULE EVERY SUNDAY ORALLY 84 DAYS, Historical Med      pravastatin (PRAVACHOL) 20 mg tablet TAKE 1 TABLET BY MOUTH EVERY DAY, Historical Med             Diagnosis     Clinical Impression:   1. Pleural effusion on right    2. Pneumonia of right lower lobe due to infectious organism        Attestations:    Gwendolyn Rooney PA-C    Please note that this dictation was completed with [employer]blog, the computer voice recognition software. Quite often unanticipated grammatical, syntax, homophones, and other interpretive errors are inadvertently transcribed by the computer software. Please disregard these errors. Please excuse any errors that have escaped final proofreading. Thank you.

## 2022-05-06 NOTE — PROGRESS NOTES
Adult Nutrition  Assessment    Patient Name:  Brendon Skelton  YOB: 1945  MRN: [id number]  Admit Date:  2/24/2020    Assessment Date:  3/6/2020    Comments:  Pt with very poor intake. She c/o indigestion at almost every meal and sometimes nausea (not always assiociated with HD). She has Stg 2 on heel. Tunnel catheter placed and temporary cath removed, continues HD. Noted wt 1yr ago 233#, 2/28/20 277#, 2/29 259# after 1st dialysis session and currenlty 230#/BMI 38.3. Encouraged pt to speak w/ MD regarding indigestion/nausea and to drink Novasource renal especially if she is not eating well. RD to follow hospital course.    Reason for Assessment     Row Name 03/06/20 1429          Reason for Assessment    Reason For Assessment  follow-up protocol     Diagnosis  surgery/postoperative complications;renal disease         Nutrition/Diet History     Row Name 03/06/20 1430          Nutrition/Diet History    Typical Food/Fluid Intake  Pt states she is having lots of indigestion and some nausea- and she is aware that she is not eating much of anything.      Supplemental Drinks/Foods/Additives  encouraged her to drink      Factors Affecting Nutritional Intake  nausea;other (see comments) indigestion         Anthropometrics     Row Name 03/06/20 1238          Anthropometrics    Weight  104 kg (230 lb 6 oz)         Labs/Tests/Procedures/Meds     Row Name 03/06/20 1431          Labs/Procedures/Meds    Lab Results Reviewed  reviewed     Lab Results Comments  GFR 10L, Glu x24hrs         Diagnostic Tests/Procedures    Diagnostic Test/Procedure Reviewed  reviewed     Diagnostic Test/Procedures Comments  tunnel cath placed, temp cath removed        Medications    Pertinent Medications Reviewed  reviewed     Pertinent Medications Comments  SSI, levemir 25hs, Vit C             Nutrition Prescription Ordered     Row Name 03/06/20 1432          Nutrition Prescription PO    Current PO Diet  Regular     Fluid  What Type Of Note Output Would You Prefer (Optional)?: Bullet Format Consistency  Thin     Supplement  Novasource Renal (Nepro)     Supplement Frequency  3 times a day     Common Modifiers  Cardiac;Consistent Carbohydrate;Renal         Evaluation of Received Nutrient/Fluid Intake     Row Name 03/06/20 1432          PO Evaluation    Number of Days PO Intake Evaluated  3 days     Number of Meals  8     % PO Intake  0x6, 25x1, 100x1               Electronically signed by:  Carly Carter RD  03/06/20 2:36 PM   How Severe Is Your Skin Lesion?: mild Has Your Skin Lesion Been Treated?: not been treated Is This A New Presentation, Or A Follow-Up?: Skin Lesion

## 2022-07-28 NOTE — TELEPHONE ENCOUNTER
Epilepsy Progress Note  Interval History  Pt seen and examined by epilepsy team at bedside. No events reported overnight. Pt denies nausea and vomiting today. She reports less difficulty holding her urine and improvements in balance today. Pt states that she was previously taking Keppra 750mg 2x/day but stopped taking one of the two doses due to symptoms of dizziness, nervousness, and feeling "edgy."       Review of Systems:  CONSTITUTIONAL:  No fever, chills, weakness or fatigue.  HEENT:  Eyes:  No visual loss, blurred vision, or double vision.  Ears, Nose, Throat:  No hearing loss. No sore throat, no nasal dishcarge.  SKIN:  No rash or itching.  CARDIOVASCULAR:  No chest pain, palpitations or edema.  RESPIRATORY:  No shortness of breath, cough or sputum.  GASTROINTESTINAL:  No anorexia, nausea, vomiting or diarrhea.  GENITOURINARY: No burning on urination. Some difficulty holding in urine but improved from yesterday.  NEUROLOGICAL:  see HPI  MUSCULOSKELETAL:  No muscle, back pain, joint pain or stiffness.  HEMATOLOGIC:  No bleeding or bruising.  LYMPHATICS:  No enlarged nodes.   PSYCHIATRIC:  No depression or anxiety.     PAST MEDICAL & SURGICAL HISTORY:  HTN (hypertension)  Epilepsy  No significant past surgical history    FAMILY HISTORY:  No pertinent family history in first degree relatives    Social History:  Alcohol: per patient, 1-2 glasses of wine per night and occasional use of "hard alcohol"; per , not sure how much but finds bottles of vodka in garbage cans  Illicits: none  smoking: per patient, does not smoke; per chart, 2 ppd per   Occupation: retired   Lives with: partner, Garret     Allergies  Endorses allergy to peppers and mushrooms  No other known drug, environmental, or food      MEDICATIONS  (STANDING):  enoxaparin Injectable 40 milliGRAM(s) SubCutaneous every 24 hours  levETIRAcetam 750 milliGRAM(s) Oral daily  thiamine IVPB 500 milliGRAM(s) IV Intermittent daily    MEDICATIONS  (PRN):  acetaminophen     Tablet .. 650 milliGRAM(s) Oral every 6 hours PRN Temp greater or equal to 38C (100.4F), Mild Pain (1 - 3)  diazepam  Injectable 5 milliGRAM(s) IV Push every 1 hour PRN CIWA score >8    VITAL SIGNS:  T(C): 36.8 (07-28-22 @ 08:42), Max: 37.3 (07-27-22 @ 20:48)  HR: 59 (07-28-22 @ 08:42) (59 - 77)  BP: 144/83 (07-28-22 @ 08:42) (122/75 - 144/83)  RR: 18 (07-28-22 @ 08:42) (17 - 18)  SpO2: 99% (07-28-22 @ 08:42) (95% - 99%)  Wt(kg): --    PHYSICAL EXAM:  General:  pt received exiting bathroom, with EEG head wrapping removed  Psychiatric: normal affect, no internal preoccupation, some anxiety regarding length of time on EEG  Ears, Nose, Throat: no abnormalities, mucus membranes moist  Neck: supple, no lymphadenopathy or nodules palpable  Cardiovascular: regular rate and rhythm, normal S1/S2, no murmurs   Chest: Clear to bases. 	  Abdomen: soft, non-tender, no hepatosplenomegaly   Extremities: no edema, clubbing or cyanosis  Skin: no rash or neurocutaneous signs     Cognitive:  Alert and oriented to person, place, time. Fund of knowledge is appropriate. Registration 3/3; Recall 1/3    Cranial Nerves:  II: Full to confrontation. III/IV/VI: PERRLA EOMI No nystagmus  V1V2V3: Symmetric, VII: Face appears symmetric VIII: Normal to screening, IX/X: Palate Elevates Symmetrical  XI: Trapezius Symmetric, 5/5 strength in shoulder shrug and neck turn  XII: Tongue midline  Motor: effort-based strength ~4/5 throughout b/l, tone: normal x 4 limbs, no tremor   Sensation: Intact to light touch, vibration, and proprioception throughout, symmetric b/l  Coordination: Finger-nose-finger intact, heel-shin intact, no dysmetria  Reflexes:  DTR: 2+ symmetric UE b/l; not elicited in the LE b/l  Plantar responses: Down bilaterally  Gait: normal gait, negative Romberg    Labs  CBC Full  -  ( 27 Jul 2022 12:34 )  WBC Count : 6.45 K/uL  RBC Count : 3.43 M/uL  Hemoglobin : 10.0 g/dL  Hematocrit : 31.0 %  Platelet Count - Automated : 222 K/uL  Mean Cell Volume : 90.4 fl  Mean Cell Hemoglobin : 29.2 pg  Mean Cell Hemoglobin Concentration : 32.3 gm/dL  Auto Neutrophil # : 4.81 K/uL  Auto Lymphocyte # : 0.55 K/uL  Auto Monocyte # : 1.03 K/uL  Auto Eosinophil # : 0.01 K/uL  Auto Basophil # : 0.03 K/uL  Auto Neutrophil % : 74.5 %  Auto Lymphocyte % : 8.5 %  Auto Monocyte % : 16.0 %  Auto Eosinophil % : 0.2 %  Auto Basophil % : 0.5 %    07-27    136  |  100  |  3<L>  ----------------------------<  124<H>  3.2<L>   |  24  |  0.34<L>    Ca    8.7      27 Jul 2022 12:34  Phos  2.4     07-27  Mg     2.4     07-27    TPro  7.8  /  Alb  4.6  /  TBili  0.4  /  DBili  x   /  AST  55<H>  /  ALT  26  /  AlkPhos  111  07-27    LIVER FUNCTIONS - ( 27 Jul 2022 01:45 )  Alb: 4.6 g/dL / Pro: 7.8 g/dL / ALK PHOS: 111 U/L / ALT: 26 U/L / AST: 55 U/L / GGT: x             EEG: no seizure activity noted on EEG  Needs to mention her hover round in todays note.    Epilepsy Progress Note  Interval History  Pt seen and examined by epilepsy team at bedside. No events reported overnight. Pt denies nausea and vomiting today. She reports less difficulty holding her urine and improvements in balance today. Pt states that she was previously taking Keppra 750mg 2x/day but stopped taking one of the two doses due to symptoms of dizziness, nervousness, and feeling "edgy."       Review of Systems:  CONSTITUTIONAL:  No fever, chills, weakness or fatigue.  HEENT:  Eyes:  No visual loss, blurred vision, or double vision.  Ears, Nose, Throat:  No hearing loss. No sore throat, no nasal dishcarge.  SKIN:  No rash or itching.  CARDIOVASCULAR:  No chest pain, palpitations or edema.  RESPIRATORY:  No shortness of breath, cough or sputum.  GASTROINTESTINAL:  No anorexia, nausea, vomiting or diarrhea.  GENITOURINARY: No burning on urination. Some difficulty holding in urine but improved from yesterday.  NEUROLOGICAL:  see HPI  MUSCULOSKELETAL:  No muscle, back pain, joint pain or stiffness.  HEMATOLOGIC:  No bleeding or bruising.  LYMPHATICS:  No enlarged nodes.   PSYCHIATRIC:  No depression or anxiety.     PAST MEDICAL & SURGICAL HISTORY:  HTN (hypertension)  Epilepsy  No significant past surgical history    FAMILY HISTORY:  No pertinent family history in first degree relatives    Social History:  Alcohol: per patient, 1-2 glasses of wine per night and occasional use of "hard alcohol"; per , not sure how much but finds bottles of vodka in garbage cans  Illicits: none  smoking: per patient, does not smoke; per chart, 2 ppd per   Occupation: retired   Lives with: partner, Garret     Allergies  Endorses allergy to peppers and mushrooms  No other known drug, environmental, or food      MEDICATIONS  (STANDING):  enoxaparin Injectable 40 milliGRAM(s) SubCutaneous every 24 hours  levETIRAcetam 750 milliGRAM(s) Oral daily  thiamine IVPB 500 milliGRAM(s) IV Intermittent daily    MEDICATIONS  (PRN):  acetaminophen     Tablet .. 650 milliGRAM(s) Oral every 6 hours PRN Temp greater or equal to 38C (100.4F), Mild Pain (1 - 3)  diazepam  Injectable 5 milliGRAM(s) IV Push every 1 hour PRN CIWA score >8    VITAL SIGNS:  T(C): 36.8 (07-28-22 @ 08:42), Max: 37.3 (07-27-22 @ 20:48)  HR: 59 (07-28-22 @ 08:42) (59 - 77)  BP: 144/83 (07-28-22 @ 08:42) (122/75 - 144/83)  RR: 18 (07-28-22 @ 08:42) (17 - 18)  SpO2: 99% (07-28-22 @ 08:42) (95% - 99%)  Wt(kg): --    PHYSICAL EXAM:  General:  pt received exiting bathroom, with EEG head wrapping removed  Psychiatric: normal affect, no internal preoccupation, some anxiety regarding length of time on EEG  Ears, Nose, Throat: no abnormalities, mucus membranes moist  Neck: supple, no lymphadenopathy or nodules palpable  Cardiovascular: regular rate and rhythm, normal S1/S2, no murmurs   Chest: Clear to bases. 	  Abdomen: soft, non-tender, no hepatosplenomegaly   Extremities: no edema, clubbing or cyanosis  Skin: no rash or neurocutaneous signs     Cognitive:  Alert and oriented to person, place, time. Fund of knowledge is appropriate. Registration 3/3; Recall 3/3    Cranial Nerves:  II: Full to confrontation. III/IV/VI: PERRLA EOMI No nystagmus  V1V2V3: Symmetric, VII: Face appears symmetric VIII: Normal to screening, IX/X: Palate Elevates Symmetrical  XI: Trapezius Symmetric, 5/5 strength in shoulder shrug and neck turn  XII: Tongue midline  Motor: effort-based strength ~4/5 throughout b/l, tone: normal x 4 limbs, no tremor   Sensation: Intact to light touch, vibration, and proprioception throughout, symmetric b/l  Coordination: Finger-nose-finger intact, heel-shin intact, no dysmetria  Reflexes:  DTR: 2+ symmetric UE b/l; not elicited in the LE b/l  Plantar responses: Down bilaterally  Gait: normal gait, negative Romberg    Labs  CBC Full  -  ( 27 Jul 2022 12:34 )  WBC Count : 6.45 K/uL  RBC Count : 3.43 M/uL  Hemoglobin : 10.0 g/dL  Hematocrit : 31.0 %  Platelet Count - Automated : 222 K/uL  Mean Cell Volume : 90.4 fl  Mean Cell Hemoglobin : 29.2 pg  Mean Cell Hemoglobin Concentration : 32.3 gm/dL  Auto Neutrophil # : 4.81 K/uL  Auto Lymphocyte # : 0.55 K/uL  Auto Monocyte # : 1.03 K/uL  Auto Eosinophil # : 0.01 K/uL  Auto Basophil # : 0.03 K/uL  Auto Neutrophil % : 74.5 %  Auto Lymphocyte % : 8.5 %  Auto Monocyte % : 16.0 %  Auto Eosinophil % : 0.2 %  Auto Basophil % : 0.5 %    07-27    136  |  100  |  3<L>  ----------------------------<  124<H>  3.2<L>   |  24  |  0.34<L>    Ca    8.7      27 Jul 2022 12:34  Phos  2.4     07-27  Mg     2.4     07-27    TPro  7.8  /  Alb  4.6  /  TBili  0.4  /  DBili  x   /  AST  55<H>  /  ALT  26  /  AlkPhos  111  07-27    LIVER FUNCTIONS - ( 27 Jul 2022 01:45 )  Alb: 4.6 g/dL / Pro: 7.8 g/dL / ALK PHOS: 111 U/L / ALT: 26 U/L / AST: 55 U/L / GGT: x             EEG: no seizure activity noted on EEG

## 2022-08-16 NOTE — OUTREACH NOTE
Patient stated she weighs herself daily and keeps a log of weight and blood glucose levels for PCP. Stated she enjoys walking for exercise when weather permitting. CA discussed Annual [facility] Visit with patient, stated she will consider scheduling at a later time.   
JES[facility] [employer]  - [facility]

## 2022-09-10 NOTE — PROGRESS NOTES
Today's INR is 2.9.  Patient states no med changes or bleeding problems or unexplained bruising. Patient instructed to continue current dosing schedule. Verbalizes understanding. Will recheck 1 month.  Patient instructed regarding medication; results given and questions answered. Nutritional counseling given.  Dietary factors affecting therapy addressed.  Patient instructed to monitor for excessive bruising or bleeding. Findings reported by Dixie Bermeo RN.       This document has been electronically signed by ERIKA PanCNP-BC Stephanie  On January 2, 2020 10:42 AM     No

## (undated) DEVICE — 3M™ IOBAN™ 2 ANTIMICROBIAL INCISE DRAPE 6651EZ: Brand: IOBAN™ 2

## (undated) DEVICE — ENDOPATH XCEL BLADELESS TROCARS WITH STABILITY SLEEVES: Brand: ENDOPATH XCEL

## (undated) DEVICE — SPNG GZ WOVN 4X4IN 12PLY 10/BX STRL

## (undated) DEVICE — GLV SURG TRIUMPH LT PF LTX 7.5 STRL

## (undated) DEVICE — TOWEL,OR,DSP,ST,BLUE,DLX,8/PK,10PK/CS: Brand: MEDLINE

## (undated) DEVICE — SUT PDS CLOSURE CT1 1/0 27IN Z341H

## (undated) DEVICE — SUT VICRYL 3/0 CT1 27IN J258H

## (undated) DEVICE — GLV SURG TRIUMPH LT PF LTX 6 STRL

## (undated) DEVICE — CORE TRUMPET FOR SINGLE SOLUTION BAG: Brand: CORE DYNAMICS

## (undated) DEVICE — PAD GRND REM POLYHESIVE A/ DISP

## (undated) DEVICE — Device

## (undated) DEVICE — SINGLE USE INJECTOR: Brand: SINGLE USE INJECTOR

## (undated) DEVICE — SOL IRR NACL 0.9PCT BT 1000ML

## (undated) DEVICE — STERILE POLYISOPRENE POWDER-FREE SURGICAL GLOVES WITH EMOLLIENT COATING: Brand: PROTEXIS

## (undated) DEVICE — DECANT BG O JET

## (undated) DEVICE — GOWN,AURORA,NOREINF,RAGLAN,XL,STERILE: Brand: MEDLINE

## (undated) DEVICE — MARKR SPOT ENDOSCOPIC LESION INJ

## (undated) DEVICE — SOL IRR H2O BTL 1000ML STRL

## (undated) DEVICE — PK LAP CHOLE LF 60

## (undated) DEVICE — TOTAL TRAY, 16FR 10ML SIL FOLEY, URN: Brand: MEDLINE

## (undated) DEVICE — APPL CHLORAPREP W/TINT 26ML ORNG

## (undated) DEVICE — VISUALIZATION SYSTEM: Brand: CLEARIFY

## (undated) DEVICE — SOL IRRIG NACL 1000ML

## (undated) DEVICE — Device: Brand: DISPOSABLE ELECTROSURGICAL SNARE

## (undated) DEVICE — GLV SURG TRIUMPH LT PF LTX 6.5 STRL

## (undated) DEVICE — TRY IRR

## (undated) DEVICE — TRAP SXN POLYP QUICKCATCH LF

## (undated) DEVICE — GLV SURG TRIUMPH LT PF LTX 8 STRL

## (undated) DEVICE — MONOPOLAR METZENBAUM SCISSOR TIP, DISPOSABLE: Brand: MONOPOLAR METZENBAUM SCISSOR TIP, DISPOSABLE

## (undated) DEVICE — PK ANGIO LF 60

## (undated) DEVICE — ANTIBACTERIAL UNDYED BRAIDED (POLYGLACTIN 910), SYNTHETIC ABSORBABLE SUTURE: Brand: COATED VICRYL

## (undated) DEVICE — GLV SURG SENSICARE POLYISPRN W/ALOE PF LF 6.5 GRN STRL

## (undated) DEVICE — PK MAJ PROC LF 60

## (undated) DEVICE — BIOPATCH™ ANTIMICROBIAL DRESSING WITH CHLORHEXIDINE GLUCONATE IS A HYDROPHILLIC POLYURETHANE ABSORPTIVE FOAM WITH CHLORHEXIDINE GLUCONATE (CHG) WHICH INHIBITS BACTERIAL GROWTH UNDER THE DRESSING. THE DRESSING IS INTENDED TO BE USED TO ABSORB EXUDATE, COVER A WOUND CAUSED BY VASCULAR AND NONVASCULAR PERCUTANEOUS MEDICAL DEVICES DURING SURGERY, AS WELL AS REDUCE LOCAL INFECTION AND COLONIZATION OF MICROORGANISMS.: Brand: BIOPATCH

## (undated) DEVICE — SHEET, T, LAPAROTOMY, STERILE: Brand: MEDLINE

## (undated) DEVICE — MAYO STAND COVER: Brand: CONVERTORS

## (undated) DEVICE — GLV SURG TRIUMPH PF LTX 6.5 STRL

## (undated) DEVICE — GLV SURG SENSICARE PI PF LF 7 GRN STRL

## (undated) DEVICE — SUT MONOCRYL 4/0 PS2 27IN Y426H ETY426H

## (undated) DEVICE — SYS PUMP PREVENA125 INCSN MNGT PP/DRSNG 45ML 1P/U

## (undated) DEVICE — SUT SILK 1 LBYRNTH TIES 30IN A307H

## (undated) DEVICE — BITEBLOCK ENDO W/STRAP 60F A/ LF DISP

## (undated) DEVICE — PK PM 60

## (undated) DEVICE — GAUZE,SPONGE,4"X4",16PLY,XRAY,STRL,LF: Brand: MEDLINE

## (undated) DEVICE — GOWN,PREVENTION PLUS,XLARGE,STERILE: Brand: MEDLINE

## (undated) DEVICE — SUT PDS 3/0 SH 27IN DYED Z316H

## (undated) DEVICE — KT INTRO MINISTICK MAX W/GW PALLADIUM ECHO 4F 21G 7CM

## (undated) DEVICE — GLIDEPATH HEMODIALYSIS CATH, ST, DL, 14.5 FR. 23CM: Brand: GLIDEPATH LONG-TERM HEMODIALYSIS CATHETER WITH PRELOADED STYLET

## (undated) DEVICE — SHEET,DRAPE,53X77,STERILE: Brand: MEDLINE

## (undated) DEVICE — SUT VIC 2/0 SH 27IN

## (undated) DEVICE — GLV SURG TRIUMPH PF LTX 7 STRL

## (undated) DEVICE — HARMONIC ACE +7 LAPAROSCOPIC SHEARS ADVANCED HEMOSTASIS 5MM DIAMETER 36CM SHAFT LENGTH  FOR USE WITH GRAY HAND PIECE ONLY: Brand: HARMONIC ACE

## (undated) DEVICE — GLV SURG SENSICARE GREEN W/ALOE PF LF 8.5 STRL

## (undated) DEVICE — 3M™ STERI-STRIP™ REINFORCED ADHESIVE SKIN CLOSURES, R1547, 1/2 IN X 4 IN (12 MM X 100 MM), 6 STRIPS/ENVELOPE: Brand: 3M™ STERI-STRIP™

## (undated) DEVICE — SUT VICRYL 3-0 SH-1 PO 18IN J772D

## (undated) DEVICE — SUT ETHLN 2/0 FS 18IN 664H

## (undated) DEVICE — SINGLE-USE BIOPSY FORCEPS: Brand: RADIAL JAW 4

## (undated) DEVICE — SYS INCSN MGMT PREVENA PEEL AND PL 13CM

## (undated) DEVICE — CANN SMPL SOFTECH BIFLO ETCO2 A/M 7FT

## (undated) DEVICE — ACCESS PLATFORM FOR MINIMALLY INVASIVE SURGERY.: Brand: GELPORT® LAPAROSCOPIC  SYSTEM

## (undated) DEVICE — GLV SURG SENSICARE PI LF PF 8 GRN STRL

## (undated) DEVICE — INTENDED FOR TISSUE SEPARATION, AND OTHER PROCEDURES THAT REQUIRE A SHARP SURGICAL BLADE TO PUNCTURE OR CUT.: Brand: BARD-PARKER ® DISPOSABLE SCALPELS

## (undated) DEVICE — COUNT NDL FOAM STRIP W/MAG 60CT

## (undated) DEVICE — GRSPR BABCOCK 10MM 31CM 1P/U

## (undated) DEVICE — 1314 FOAM STRIP NEEDLE COUNTER: Brand: DEVON

## (undated) DEVICE — ELECTRODE,RT,STRESS,FOAM,50PK: Brand: MEDLINE

## (undated) DEVICE — SPNG LAP 18X18IN LF STRL PK/5

## (undated) DEVICE — TP SXN YANKR BLB TIP W/TBG 10F LF STRL

## (undated) DEVICE — SUT VIC 0 CT1 36IN J946H

## (undated) DEVICE — MAD PERIPHERAL VASCULAR-LF: Brand: MEDLINE INDUSTRIES, INC.

## (undated) DEVICE — GLV SURG SENSICARE GREEN W/ALOE PF LF 6 STRL

## (undated) DEVICE — ADHS LIQ MASTISOL 2/3ML

## (undated) DEVICE — GLV SURG SENSICARE GREEN W/ALOE PF LF 6.5 STRL

## (undated) DEVICE — PENCL E/S HNDSWCH ROCKR CB

## (undated) DEVICE — STPLR SKIN VISISTAT WD 35CT